# Patient Record
Sex: FEMALE | Race: WHITE | NOT HISPANIC OR LATINO | Employment: OTHER | URBAN - METROPOLITAN AREA
[De-identification: names, ages, dates, MRNs, and addresses within clinical notes are randomized per-mention and may not be internally consistent; named-entity substitution may affect disease eponyms.]

---

## 2017-01-01 ENCOUNTER — NURSE TRIAGE (OUTPATIENT)
Dept: ADMINISTRATIVE | Facility: CLINIC | Age: 77
End: 2017-01-01

## 2017-01-02 ENCOUNTER — TELEPHONE (OUTPATIENT)
Dept: INTERNAL MEDICINE | Facility: CLINIC | Age: 77
End: 2017-01-02

## 2017-01-03 ENCOUNTER — OFFICE VISIT (OUTPATIENT)
Dept: INTERNAL MEDICINE | Facility: CLINIC | Age: 77
End: 2017-01-03
Payer: MEDICARE

## 2017-01-03 VITALS
BODY MASS INDEX: 20.25 KG/M2 | WEIGHT: 129 LBS | HEART RATE: 58 BPM | SYSTOLIC BLOOD PRESSURE: 142 MMHG | HEIGHT: 67 IN | DIASTOLIC BLOOD PRESSURE: 86 MMHG

## 2017-01-03 DIAGNOSIS — B02.9 HERPES ZOSTER WITHOUT COMPLICATION: Primary | ICD-10-CM

## 2017-01-03 DIAGNOSIS — B02.29 POST HERPETIC NEURALGIA: ICD-10-CM

## 2017-01-03 DIAGNOSIS — I10 ESSENTIAL HYPERTENSION: ICD-10-CM

## 2017-01-03 PROCEDURE — 99213 OFFICE O/P EST LOW 20 MIN: CPT | Mod: PBBFAC | Performed by: INTERNAL MEDICINE

## 2017-01-03 PROCEDURE — 99214 OFFICE O/P EST MOD 30 MIN: CPT | Mod: S$PBB,,, | Performed by: INTERNAL MEDICINE

## 2017-01-03 PROCEDURE — 99999 PR PBB SHADOW E&M-EST. PATIENT-LVL III: CPT | Mod: PBBFAC,,, | Performed by: INTERNAL MEDICINE

## 2017-01-03 NOTE — MR AVS SNAPSHOT
[facility] - Internal Medicine  [address]  [facility] [address]  Phone: [phone]  Fax: [phone]                  Shima Sorto   1/3/2017 1:00 PM   Office Visit    Description:  Female : 1940   Provider:  Zeynep Tmoas MD   Department:  [facility] - Internal Medicine           Reason for Visit     Follow-up           Diagnoses this Visit        Comments    Herpes zoster without complication    -  Primary     Post herpetic neuralgia         Elevated blood pressure         Essential hypertension                To Do List           Goals (5 Years of Data)     None      [facility]     [facility] -  Assistance  Registered nurses in the [facility] provide clinical advisement, health education, appointment booking, and other advisory services.  Call for this free service at [phone].             Medications           Message regarding Medications     Verify the changes and/or additions to your medication regime listed below are the same as discussed with your clinician today.  If any of these changes or additions are incorrect, please notify your healthcare provider.             Verify that the below list of medications is an accurate representation of the medications you are currently taking.  If none reported, the list may be blank. If incorrect, please contact your healthcare provider. Carry this list with you in case of emergency.           Current Medications     alendronate (FOSAMAX) 70 MG tablet 1 tablet on  with a full glass of water and wait 30 minutes for breakfast and pills. Take sitting or standing    atenolol (TENORMIN) 25 MG tablet Take one per day    B COMPLEX & C NO.10/FOLIC ACID (B COMPLEX WITH C#10-FOLIC ACID ORAL) Take by mouth.    calcium citrate (CALCITRATE) 200 mg (950 mg) tablet Take 1 tablet by mouth once daily.    co-enzyme Q-10 50 mg capsule Take 50 mg by mouth once daily.    fish oil-vit E-fat  "acid5-hb137 (SUPER OMEGA-3) 400-5 mg-unit Cap Take 1 capsule by mouth Daily.    lisinopril 10 MG tablet Take 1 tablet (10 mg total) by mouth every morning.    MULTIVITAMIN ORAL Take 1 tablet by mouth Daily.    valacyclovir (VALTREX) 1000 MG tablet Take 1 tablet (1,000 mg total) by mouth 3 (three) times daily.           Clinical Reference Information           Vital Signs - Last Recorded  Most recent update: 1/3/2017  1:56 PM by Zeynep Tomas MD    BP Pulse Ht Wt BMI    (!) 142/86 (!) 58 5' 7" (1.702 m) 58.5 kg (128 lb 15.5 oz) 20.2 kg/m2      Blood Pressure          Most Recent Value    BP  (!)  142/86      Allergies as of 1/3/2017     Asparagus      Immunizations Administered on Date of Encounter - 1/3/2017     None      "

## 2017-01-03 NOTE — PROGRESS NOTES
Subjective:       Patient ID: Shima Sorto is a 76 y.o. female.    Chief Complaint: Follow-up (shingles)    HPI   Symptoms of shingles on 12/23, but patient did not take treatment until after 12/30. She has had symptoms of a viral disease and elevated blood pressure lately.  Review of Systems   Constitutional: Negative for activity change, chills, fever and unexpected weight change.   Respiratory: Negative for cough, chest tightness, shortness of breath and wheezing.    Cardiovascular: Negative for chest pain, palpitations and leg swelling.       Objective:      Physical Exam    No visual symptoms  Lesions are not draining and above the left eye  Sclera is not injected  Assessment:       1. Herpes zoster without complication    2. Post herpetic neuralgia    3. Elevated blood pressure    4. Essential hypertension        Plan:     Letter to [facility]  Shima was seen today for follow-up.    Diagnoses and all orders for this visit:    Herpes zoster without complication: complete valtrex    Post herpetic neuralgia: at this time does not want a medication    Elevated blood pressure: improving, monitor and continue    Essential hypertension: continue lisinopril      No Follow-up on file.    New Prescriptions    No medications on file       Modified Medications    No medications on file       No orders of the defined types were placed in this encounter.      Labs, studies and consults associated with this visit were reviewed

## 2017-01-03 NOTE — LETTER
January 3, 2017    Shima Sorto  [address] [place] [address]             [facility] - Internal Medicine  [address]  [place] [address]  Phone: [phone]  Fax: [phone] Dear Ms. Shima Sorto:    I am writing this letter as your primary care physician and having knowledge of a recent illness advising that for the medical safety of the public you not fly on January 2,2017. You were diagnosed with shingles in an exposed area that had not been treated long enough to say that it was not infectious.    If you have any questions or concerns, please don't hesitate to call.    Sincerely,          Zeynep Tomas MD

## 2017-01-17 ENCOUNTER — HOSPITAL ENCOUNTER (OUTPATIENT)
Facility: HOSPITAL | Age: 77
Discharge: HOME | End: 2017-01-19
Attending: EMERGENCY MEDICINE | Admitting: INTERNAL MEDICINE
Payer: MEDICARE

## 2017-01-17 DIAGNOSIS — R42 DIZZINESS: Primary | ICD-10-CM

## 2017-01-17 DIAGNOSIS — R53.83 FATIGUE, UNSPECIFIED TYPE: ICD-10-CM

## 2017-01-17 DIAGNOSIS — R42 VERTIGO: ICD-10-CM

## 2017-01-17 DIAGNOSIS — R11.0 NAUSEA: ICD-10-CM

## 2017-01-17 DIAGNOSIS — R53.83 FATIGUE: ICD-10-CM

## 2017-01-17 LAB
ALBUMIN SERPL BCP-MCNC: 3.3 G/DL
ALP SERPL-CCNC: 45 U/L
ALT SERPL W/O P-5'-P-CCNC: 19 U/L
ANION GAP SERPL CALC-SCNC: 4 MMOL/L
AST SERPL-CCNC: 30 U/L
BACTERIA #/AREA URNS AUTO: ABNORMAL /HPF
BASOPHILS # BLD AUTO: 0.02 K/UL
BASOPHILS NFR BLD: 0.5 %
BILIRUB SERPL-MCNC: 0.6 MG/DL
BILIRUB UR QL STRIP: NEGATIVE
BNP SERPL-MCNC: 138 PG/ML
BUN SERPL-MCNC: 24 MG/DL
CALCIUM SERPL-MCNC: 8.6 MG/DL
CHLORIDE SERPL-SCNC: 104 MMOL/L
CLARITY UR REFRACT.AUTO: CLEAR
CO2 SERPL-SCNC: 30 MMOL/L
COLOR UR AUTO: YELLOW
CREAT SERPL-MCNC: 1 MG/DL
DIFFERENTIAL METHOD: ABNORMAL
EOSINOPHIL # BLD AUTO: 0 K/UL
EOSINOPHIL NFR BLD: 0.5 %
ERYTHROCYTE [DISTWIDTH] IN BLOOD BY AUTOMATED COUNT: 13.5 %
EST. GFR  (AFRICAN AMERICAN): >60 ML/MIN/1.73 M^2
EST. GFR  (NON AFRICAN AMERICAN): 54.9 ML/MIN/1.73 M^2
GLUCOSE SERPL-MCNC: 91 MG/DL
GLUCOSE UR QL STRIP: NEGATIVE
HCT VFR BLD AUTO: 33.6 %
HGB BLD-MCNC: 11.2 G/DL
HGB UR QL STRIP: ABNORMAL
INR PPP: 1
KETONES UR QL STRIP: NEGATIVE
LEUKOCYTE ESTERASE UR QL STRIP: NEGATIVE
LYMPHOCYTES # BLD AUTO: 1.5 K/UL
LYMPHOCYTES NFR BLD: 38.1 %
MCH RBC QN AUTO: 30.9 PG
MCHC RBC AUTO-ENTMCNC: 33.3 %
MCV RBC AUTO: 93 FL
MICROSCOPIC COMMENT: ABNORMAL
MONOCYTES # BLD AUTO: 0.3 K/UL
MONOCYTES NFR BLD: 8 %
NEUTROPHILS # BLD AUTO: 2.1 K/UL
NEUTROPHILS NFR BLD: 52.9 %
NITRITE UR QL STRIP: NEGATIVE
PH UR STRIP: 8 [PH] (ref 5–8)
PLATELET # BLD AUTO: 143 K/UL
PMV BLD AUTO: 11.1 FL
POTASSIUM SERPL-SCNC: 4.5 MMOL/L
PROT SERPL-MCNC: 6.4 G/DL
PROT UR QL STRIP: NEGATIVE
PROTHROMBIN TIME: 10.8 SEC
RBC # BLD AUTO: 3.62 M/UL
RBC #/AREA URNS AUTO: 20 /HPF (ref 0–4)
SODIUM SERPL-SCNC: 138 MMOL/L
SP GR UR STRIP: 1 (ref 1–1.03)
TROPONIN I SERPL DL<=0.01 NG/ML-MCNC: 0.01 NG/ML
TROPONIN I SERPL DL<=0.01 NG/ML-MCNC: <0.006 NG/ML
TSH SERPL DL<=0.005 MIU/L-ACNC: 3.6 UIU/ML
URN SPEC COLLECT METH UR: ABNORMAL
UROBILINOGEN UR STRIP-ACNC: NEGATIVE EU/DL
WBC # BLD AUTO: 3.99 K/UL
WBC #/AREA URNS AUTO: 1 /HPF (ref 0–5)

## 2017-01-17 PROCEDURE — 83880 ASSAY OF NATRIURETIC PEPTIDE: CPT

## 2017-01-17 PROCEDURE — 25000003 PHARM REV CODE 250: Performed by: PHYSICIAN ASSISTANT

## 2017-01-17 PROCEDURE — 63600175 PHARM REV CODE 636 W HCPCS: Performed by: PHYSICIAN ASSISTANT

## 2017-01-17 PROCEDURE — 85025 COMPLETE CBC W/AUTO DIFF WBC: CPT

## 2017-01-17 PROCEDURE — 25000003 PHARM REV CODE 250: Performed by: EMERGENCY MEDICINE

## 2017-01-17 PROCEDURE — 85610 PROTHROMBIN TIME: CPT

## 2017-01-17 PROCEDURE — 81001 URINALYSIS AUTO W/SCOPE: CPT

## 2017-01-17 PROCEDURE — 99285 EMERGENCY DEPT VISIT HI MDM: CPT | Mod: ,,, | Performed by: EMERGENCY MEDICINE

## 2017-01-17 PROCEDURE — G0378 HOSPITAL OBSERVATION PER HR: HCPCS

## 2017-01-17 PROCEDURE — 96361 HYDRATE IV INFUSION ADD-ON: CPT

## 2017-01-17 PROCEDURE — 93010 ELECTROCARDIOGRAM REPORT: CPT | Mod: ,,, | Performed by: INTERNAL MEDICINE

## 2017-01-17 PROCEDURE — 84484 ASSAY OF TROPONIN QUANT: CPT | Mod: 91

## 2017-01-17 PROCEDURE — 96374 THER/PROPH/DIAG INJ IV PUSH: CPT

## 2017-01-17 PROCEDURE — 96376 TX/PRO/DX INJ SAME DRUG ADON: CPT

## 2017-01-17 PROCEDURE — 84443 ASSAY THYROID STIM HORMONE: CPT

## 2017-01-17 PROCEDURE — 63600175 PHARM REV CODE 636 W HCPCS: Performed by: STUDENT IN AN ORGANIZED HEALTH CARE EDUCATION/TRAINING PROGRAM

## 2017-01-17 PROCEDURE — 80053 COMPREHEN METABOLIC PANEL: CPT

## 2017-01-17 PROCEDURE — 99220 PR INITIAL OBSERVATION CARE,LEVL III: CPT | Mod: ,,, | Performed by: PHYSICIAN ASSISTANT

## 2017-01-17 PROCEDURE — 99285 EMERGENCY DEPT VISIT HI MDM: CPT | Mod: 25

## 2017-01-17 RX ORDER — SODIUM CHLORIDE 0.9 % (FLUSH) 0.9 %
3 SYRINGE (ML) INJECTION EVERY 8 HOURS
Status: DISCONTINUED | OUTPATIENT
Start: 2017-01-17 | End: 2017-01-19 | Stop reason: HOSPADM

## 2017-01-17 RX ORDER — DIAZEPAM 10 MG/2ML
2 INJECTION INTRAMUSCULAR ONCE
Status: COMPLETED | OUTPATIENT
Start: 2017-01-17 | End: 2017-01-17

## 2017-01-17 RX ORDER — ONDANSETRON 2 MG/ML
4 INJECTION INTRAMUSCULAR; INTRAVENOUS
Status: COMPLETED | OUTPATIENT
Start: 2017-01-17 | End: 2017-01-17

## 2017-01-17 RX ORDER — MECLIZINE HCL 12.5 MG 12.5 MG/1
25 TABLET ORAL 3 TIMES DAILY PRN
Status: DISCONTINUED | OUTPATIENT
Start: 2017-01-17 | End: 2017-01-19 | Stop reason: HOSPADM

## 2017-01-17 RX ORDER — LISINOPRIL 10 MG/1
10 TABLET ORAL EVERY MORNING
Status: DISCONTINUED | OUTPATIENT
Start: 2017-01-18 | End: 2017-01-19 | Stop reason: HOSPADM

## 2017-01-17 RX ORDER — RAMELTEON 8 MG/1
8 TABLET ORAL NIGHTLY PRN
Status: DISCONTINUED | OUTPATIENT
Start: 2017-01-17 | End: 2017-01-19 | Stop reason: HOSPADM

## 2017-01-17 RX ORDER — HYDROCODONE BITARTRATE AND ACETAMINOPHEN 5; 325 MG/1; MG/1
1 TABLET ORAL EVERY 4 HOURS PRN
Status: DISCONTINUED | OUTPATIENT
Start: 2017-01-17 | End: 2017-01-19 | Stop reason: HOSPADM

## 2017-01-17 RX ORDER — ACETAMINOPHEN 325 MG/1
650 TABLET ORAL EVERY 4 HOURS PRN
Status: DISCONTINUED | OUTPATIENT
Start: 2017-01-17 | End: 2017-01-19 | Stop reason: HOSPADM

## 2017-01-17 RX ORDER — ONDANSETRON 2 MG/ML
4 INJECTION INTRAMUSCULAR; INTRAVENOUS EVERY 12 HOURS PRN
Status: DISCONTINUED | OUTPATIENT
Start: 2017-01-17 | End: 2017-01-18

## 2017-01-17 RX ORDER — METHYLPREDNISOLONE SOD SUCC 125 MG
100 VIAL (EA) INJECTION DAILY
Status: DISCONTINUED | OUTPATIENT
Start: 2017-01-17 | End: 2017-01-18

## 2017-01-17 RX ORDER — ATENOLOL 25 MG/1
25 TABLET ORAL DAILY
Status: DISCONTINUED | OUTPATIENT
Start: 2017-01-18 | End: 2017-01-19 | Stop reason: HOSPADM

## 2017-01-17 RX ORDER — ONDANSETRON 8 MG/1
8 TABLET, ORALLY DISINTEGRATING ORAL EVERY 8 HOURS PRN
Status: DISCONTINUED | OUTPATIENT
Start: 2017-01-17 | End: 2017-01-17

## 2017-01-17 RX ORDER — DIAZEPAM 10 MG/2ML
2 INJECTION INTRAMUSCULAR EVERY 4 HOURS PRN
Status: DISCONTINUED | OUTPATIENT
Start: 2017-01-17 | End: 2017-01-18

## 2017-01-17 RX ORDER — SODIUM CHLORIDE 9 MG/ML
INJECTION, SOLUTION INTRAVENOUS CONTINUOUS
Status: DISCONTINUED | OUTPATIENT
Start: 2017-01-17 | End: 2017-01-18

## 2017-01-17 RX ORDER — ONDANSETRON 2 MG/ML
4 INJECTION INTRAMUSCULAR; INTRAVENOUS EVERY 12 HOURS PRN
Status: DISCONTINUED | OUTPATIENT
Start: 2017-01-17 | End: 2017-01-17

## 2017-01-17 RX ADMIN — METHYLPREDNISOLONE SODIUM SUCCINATE 100 MG: 125 INJECTION, POWDER, FOR SOLUTION INTRAMUSCULAR; INTRAVENOUS at 06:01

## 2017-01-17 RX ADMIN — MECLIZINE 25 MG: 12.5 TABLET ORAL at 12:01

## 2017-01-17 RX ADMIN — ONDANSETRON 4 MG: 2 INJECTION INTRAMUSCULAR; INTRAVENOUS at 02:01

## 2017-01-17 RX ADMIN — SODIUM CHLORIDE 500 ML: 0.9 INJECTION, SOLUTION INTRAVENOUS at 10:01

## 2017-01-17 RX ADMIN — SODIUM CHLORIDE: 0.9 INJECTION, SOLUTION INTRAVENOUS at 05:01

## 2017-01-17 RX ADMIN — PROMETHAZINE HYDROCHLORIDE 6.25 MG: 25 INJECTION INTRAMUSCULAR; INTRAVENOUS at 06:01

## 2017-01-17 RX ADMIN — DIAZEPAM 2 MG: 5 INJECTION, SOLUTION INTRAMUSCULAR; INTRAVENOUS at 06:01

## 2017-01-17 RX ADMIN — Medication 3 ML: at 02:01

## 2017-01-17 RX ADMIN — ONDANSETRON 4 MG: 2 INJECTION INTRAMUSCULAR; INTRAVENOUS at 10:01

## 2017-01-17 RX ADMIN — Medication 3 ML: at 10:01

## 2017-01-17 NOTE — ED PROVIDER NOTES
Encounter Date: 1/17/2017       History     Chief Complaint   Patient presents with    Fatigue     became dizzy nauseated, weak,      Review of patient's allergies indicates:   Allergen Reactions    Asparagus      Other reaction(s): Rash     HPI Comments: Ms. Sorto is a 75 yo F PMHx MVP, anemia, and HTN, who presents with vertigo. Patient states that she has had 3 second episodes of vertigo for the past 2 weeks, not associated with any particular activity. This morning she was at the coffee shop when she felt the room spinning around her, had diaphoresis and severe nausea, and she had to hold onto the counter. Bystanders helped her to a chair and her friends brought her into the ED. She currently feels queasy, but does not have vertigo. She denies any recent illness, decreased PO intake, change in medication or diet. She does feel dizzy sometimes when she goes from supine to sitting and sitting to standing. She has a history of mitral valve prolapse, but denies any symptoms or other cardiac history. She states her pulse has always been in the 50s and she has taken atenolol and lisinopril for blood pressure for over 10 years. Patient has had 2 loose BMs today, but does not have any abdominal pain or recent history of diarrhea. Friend at bedside states that patient is currently under a lot of stress. Denies fever, chills, abdominal pain, chest pain, SOB, headaches, loss of consciousness, changes in vision.     The history is provided by the patient.     Past Medical History   Diagnosis Date    Allergy      seasonal    Anemia     Basal cell carcinoma 7/2013     forehead    Hx of colonic polyps     Hypertension     Medullary sponge kidney     MVP (mitral valve prolapse)     Osteoporosis, senile     Renal calculi     TMJ syndrome      sometimes jaw clicking/jaw pain    Urinary retention     Visual impairment      reading glasses     No past medical history pertinent negatives.  Past Surgical History    Procedure Laterality Date    Kidney stone surgery  2000     @ [facility]  Mohs procedure      Interstim placed stage 1       Family History   Problem Relation Age of Onset    Kidney disease Mother     Heart disease Father     Breast cancer Maternal Aunt     Anesthesia problems Neg Hx     Malignant hypertension Neg Hx     Hypotension Neg Hx     Malignant hyperthermia Neg Hx     Pseudochol deficiency Neg Hx     Colon cancer Neg Hx     Ovarian cancer Neg Hx     Melanoma Neg Hx     Psoriasis Neg Hx     Lupus Neg Hx     Eczema Neg Hx     Acne Neg Hx      Social History   Substance Use Topics    Smoking status: Former Smoker     Packs/day: 0.50     Years: 8.00     Quit date: 8/22/1964    Smokeless tobacco: Never Used    Alcohol use 1.2 oz/week     2 Glasses of wine per week      Comment: daily     Review of Systems   Constitutional: Negative for chills, fatigue and fever.   HENT: Negative for congestion and hearing loss.    Eyes: Negative for visual disturbance.   Respiratory: Negative for cough and shortness of breath.    Cardiovascular: Negative for chest pain, palpitations and leg swelling.   Gastrointestinal: Positive for nausea. Negative for abdominal pain, diarrhea and vomiting.   Endocrine: Negative.    Genitourinary: Negative for difficulty urinating and dysuria.   Musculoskeletal: Negative.    Skin: Negative.    Neurological: Positive for dizziness. Negative for syncope and headaches.   Psychiatric/Behavioral: Negative.        Physical Exam   Initial Vitals   BP Pulse Resp Temp SpO2   01/17/17 0920 01/17/17 0920 01/17/17 0920 01/17/17 0920 01/17/17 0920   162/72 14 18 97.4 °F (36.3 °C) 97 %     Physical Exam    Constitutional:   Thin female in no acute distress    HENT:   Head: Normocephalic and atraumatic.   Mouth/Throat: Oropharynx is clear and moist.   Eyes: Conjunctivae are normal.   Neck: Normal range of motion.   Cardiovascular:   Bradycardic at 48 bpm, S1/S2 with no m/r/g    Pulmonary/Chest: Breath sounds normal. She has no wheezes. She has no rales.   Abdominal: Soft. Bowel sounds are normal. She exhibits no distension. There is no tenderness.   Musculoskeletal: Normal range of motion. She exhibits no edema or tenderness.   Neurological: She is alert and oriented to person, place, and time. She has normal strength. No cranial nerve deficit.   Negative Bernie-Hallpike maneuver   Skin: Skin is warm and dry.   Psychiatric: She has a normal mood and affect.         ED Course   Procedures  Labs Reviewed   CBC W/ AUTO DIFFERENTIAL - Abnormal; Notable for the following:        Result Value    RBC 3.62 (*)     Hemoglobin 11.2 (*)     Hematocrit 33.6 (*)     Platelets 143 (*)     All other components within normal limits    Narrative:     PLEASE REVIEW ORDER START TIME BEFORE MARKING SPECIMEN  COLLECTED.   COMPREHENSIVE METABOLIC PANEL - Abnormal; Notable for the following:     CO2 30 (*)     BUN, Bld 24 (*)     Calcium 8.6 (*)     Albumin 3.3 (*)     Alkaline Phosphatase 45 (*)     Anion Gap 4 (*)     eGFR if non  54.9 (*)     All other components within normal limits    Narrative:     PLEASE REVIEW ORDER START TIME BEFORE MARKING SPECIMEN  COLLECTED.   B-TYPE NATRIURETIC PEPTIDE - Abnormal; Notable for the following:      (*)     All other components within normal limits    Narrative:     PLEASE REVIEW ORDER START TIME BEFORE MARKING SPECIMEN  COLLECTED.   PROTIME-INR    Narrative:     PLEASE REVIEW ORDER START TIME BEFORE MARKING SPECIMEN  COLLECTED.   TROPONIN I    Narrative:     PLEASE REVIEW ORDER START TIME BEFORE MARKING SPECIMEN  COLLECTED.   TSH   TROPONIN I   TSH    Narrative:     PLEASE REVIEW ORDER START TIME BEFORE MARKING SPECIMEN  COLLECTED.  ADD ON PER DR MAR, TSH, ORDER #[id number]   11:34  01/17/2017      EKG Readings: (Independently Interpreted)   Initial Reading: No STEMI. Rhythm: Sinus Bradycardia.          Medical Decision Making:   History:   Old  Medical Records: I decided to obtain old medical records.  Initial Assessment:   Ms. Sorto is a 77 yo F PMHx MVP, anemia, and HTN, who presents with vertigo. Differentials include bradycardia, BPPV, ACS.   Clinical Tests:   Lab Tests: Ordered  Medical Tests: Ordered  ED Management:  R/o cardiac cause with troponin and BNP. CBC, CMP, TSH, CXR ordered. Zofran and 500cc NS ordered.   Other:   I have discussed this case with another health care provider.       APC / Resident Notes:   11:34 AM Patient's CXR and labs reviewed, no acute findings. Patient completed 500 cc bolus with improvement in dizziness and nausea, but still had one episode of vertigo en route to CXR. Patient's vital signs were stable while in room, HR 61, /72, RR 14. Patient will be admitted to observation. Patient in agreement with this plan. CT head without contrast ordered.               ED Course     Clinical Impression:   The primary encounter diagnosis was Dizziness. Diagnoses of Nausea, Fatigue, unspecified type, and Vertigo were also pertinent to this visit.        ATTENDING PHYSICIAN ATTESTATION  I have repeated the key portions of the resident's history and physical, reviewed and agree with the resident medical documentation, and supervised and managed the medical care of the patient.  Additionally, I was present for the critical portion of any procedure(s) performed.    Neuro: awake, alert, oriented, CNs intact, fundi w/o papilledema, motor, sensory, and coordination intact in 4 extremities      Kings Rico MD, RUMA, FACEP  Department of Emergency Medicine    All systems were reviewed and were negative except as noted in the HPI.    Medical Decision Making:    I reviewed and interpreted the ECG and any monitoring strips.  I reviewed radiology personally along with interpretations.  I reviewed and interpreted the laboratory results.    Kings Rico MD, RUMA, CPE, FACEP  Department of Emergency Medicine  , [facility]  [employer]           Dewayne Rico MD  01/18/17 0638

## 2017-01-17 NOTE — ASSESSMENT & PLAN NOTE
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 Recent shingles outbreak affecting face, delayed treatment. Denies tinnitus, fullness, hearing loss, endorses some R ear pain. CT negative, no acute neurologic deficits.  - Start 22 day steroid taper now, 100 mg daily (will do IV as patient has poor PO tolerance at this point). Literature review does not recommend antiviral therapy  - Patient given antivert in ED without much help (but may have vomited soon after administration), will give Diazepam 2 mg IV now, this can be repeated or titrated up depending on response  - PT/OT for vestibular therapy  - ENT outpatient follow up, consider in house consult based on response  - Neurochecks q4h

## 2017-01-17 NOTE — ED NOTES
Pt resting in bed comfortably; blood drawn for lab; no needs voiced at this time; call light in reach instructed to use if needed; pt verbalized understanding; will continue to monitor for any changes

## 2017-01-17 NOTE — SUBJECTIVE & OBJECTIVE
Past Medical History   Diagnosis Date    Allergy      seasonal    Anemia     Basal cell carcinoma 7/2013     forehead    Hx of colonic polyps     Hypertension     Medullary sponge kidney     MVP (mitral valve prolapse)     Osteoporosis, senile     Renal calculi     TMJ syndrome      sometimes jaw clicking/jaw pain    Urinary retention     Visual impairment      reading glasses       Past Surgical History   Procedure Laterality Date    Kid[facility] stone surgery  2000     @ [facility]    Mohs procedure      Interstim placed stage 1         Review of patient's allergies indicates:   Allergen Reactions    Asparagus      Other reaction(s): Rash       No current facility-administered medications on file prior to encounter.      Current Outpatient Prescriptions on File Prior to Encounter   Medication Sig    alendronate (FOSAMAX) 70 MG tablet 1 tablet on Sunday with a full glass of water and wait 30 minutes for breakfast and pills. Take sitting or standing    atenolol (TENORMIN) 25 MG tablet Take one per day    B COMPLEX & C NO.10/FOLIC ACID (B COMPLEX WITH C#10-FOLIC ACID ORAL) Take by mouth.    calcium citrate (CALCITRATE) 200 mg (950 mg) tablet Take 1 tablet by mouth once daily.    co-enzyme Q-10 50 mg capsule Take 50 mg by mouth once daily.    fish oil-vit E-fat acid5-hb137 (SUPER OMEGA-3) 400-5 mg-unit Cap Take 1 capsule by mouth Daily.    lisinopril 10 MG tablet Take 1 tablet (10 mg total) by mouth every morning.    MULTIVITAMIN ORAL Take 1 tablet by mouth Daily.    valacyclovir (VALTREX) 1000 MG tablet Take 1 tablet (1,000 mg total) by mouth 3 (three) times daily.     Family History     Problem Relation (Age of Onset)    Breast cancer Maternal Aunt    Heart disease Father    Kidney disease Mother        Social History Main Topics    Smoking status: Former Smoker     Packs/day: 0.50     Years: 8.00     Quit date: 8/22/1964    Smokeless tobacco: Never Used    Alcohol use 1.2 oz/week     2 Glasses of  wine per week      Comment: daily    Drug use: No    Sexual activity: Not Currently     Review of Systems   Constitutional: Negative for activity change, chills, fatigue and fever.   HENT: Positive for ear pain (R) and postnasal drip. Negative for ear discharge, hearing loss, sore throat and tinnitus.    Eyes: Negative for photophobia, pain, discharge, redness and visual disturbance.   Respiratory: Negative for apnea, cough, shortness of breath and wheezing.    Cardiovascular: Negative for chest pain, palpitations and leg swelling.   Gastrointestinal: Positive for nausea and vomiting. Negative for abdominal distention, abdominal pain, blood in stool, constipation and diarrhea.   Endocrine: Negative for cold intolerance and heat intolerance.   Genitourinary: Negative for decreased urine volume, difficulty urinating, dysuria, flank pain, hematuria and vaginal bleeding.   Musculoskeletal: Negative for arthralgias, gait problem, myalgias and neck stiffness.   Skin: Positive for rash (1 resolving facial lesion). Negative for wound.   Neurological: Positive for dizziness. Negative for tremors, syncope, facial asymmetry, speech difficulty, weakness, light-headedness, numbness and headaches.   Hematological: Negative for adenopathy. Does not bruise/bleed easily.   Psychiatric/Behavioral: Negative for agitation, confusion and sleep disturbance. The patient is not nervous/anxious.      Objective:     Vital Signs (Most Recent):  Temp: 97.4 °F (36.3 °C) (01/17/17 0920)  Pulse: (!) 54 (01/17/17 1605)  Resp: 14 (01/17/17 1605)  BP: (!) 161/74 (01/17/17 1605)  SpO2: 98 % (01/17/17 1605) Vital Signs (24h Range):  Temp:  [97.4 °F (36.3 °C)] 97.4 °F (36.3 °C)  Pulse:  [14-72] 54  Resp:  [14-43] 14  SpO2:  [95 %-100 %] 98 %  BP: (157-193)/() 161/74     Weight: 56.2 kg (124 lb)  Body mass index is 19.42 kg/(m^2).    Physical Exam   Constitutional: She is oriented to person, place, and time. She appears well-developed and  "well-nourished. She is cooperative. She appears distressed.   Eyes closed, actively vomiting with movement   HENT:   Head: Normocephalic and atraumatic.   Right Ear: Hearing, external ear and ear canal normal.   Left Ear: Hearing, external ear and ear canal normal.   Nose: Nose normal.   Mouth/Throat: Uvula is midline and oropharynx is clear and moist.   Cerumen in canal blocking TM   Eyes: Conjunctivae and lids are normal. Pupils are equal, round, and reactive to light. Right conjunctiva is not injected. Left conjunctiva is not injected. No scleral icterus. Right eye exhibits nystagmus (when looking Left). Left eye exhibits nystagmus.   Neck: Normal range of motion, full passive range of motion without pain and phonation normal. Neck supple.   Cardiovascular: Normal rate, regular rhythm and intact distal pulses.    Murmur heard.  Pulmonary/Chest: Effort normal and breath sounds normal. She has no wheezes. She has no rales.   Abdominal: Soft. Bowel sounds are normal. There is no hepatosplenomegaly. There is tenderness ("Soreness").   Musculoskeletal: Normal range of motion.        Right shoulder: Normal.        Right elbow: Normal.       Right wrist: Normal.   Lymphadenopathy:     She has no cervical adenopathy.   Neurological: She is alert and oriented to person, place, and time. No cranial nerve deficit. GCS eye subscore is 4. GCS verbal subscore is 5. GCS motor subscore is 6.   5/5 strength bilaterally without focal neurology deficits   Skin: Skin is warm, dry and intact.   No vesicular lesions on face or trunk   Psychiatric: She has a normal mood and affect. Her speech is normal and behavior is normal. Judgment and thought content normal. Cognition and memory are normal.   Nursing note and vitals reviewed.       Significant Labs:   CBC:   Recent Labs  Lab 01/17/17  1019   WBC 3.99   HGB 11.2*   HCT 33.6*   *     CMP:   Recent Labs  Lab 01/17/17  1019      K 4.5      CO2 30*   GLU 91   BUN 24* "   CREATININE 1.0   CALCIUM 8.6*   PROT 6.4   ALBUMIN 3.3*   BILITOT 0.6   ALKPHOS 45*   AST 30   ALT 19   ANIONGAP 4*   EGFRNONAA 54.9*     Urine Studies:   Recent Labs  Lab 01/17/17  1513   COLORU Yellow   APPEARANCEUA Clear   PHUR 8.0   SPECGRAV 1.005   PROTEINUA Negative   GLUCUA Negative   KETONESU Negative   BILIRUBINUA Negative   OCCULTUA 2+*   NITRITE Negative   UROBILINOGEN Negative   LEUKOCYTESUR Negative   RBCUA 20*   WBCUA 1   BACTERIA Occasional     All pertinent labs within the past 24 hours have been reviewed.    Significant Imaging: CT: I have reviewed all pertinent results/findings within the past 24 hours and my personal findings are:  no acute findings  CXR: I have reviewed all pertinent results/findings within the past 24 hours and my personal findings are:  no acute findings

## 2017-01-17 NOTE — IP AVS SNAPSHOT
[facility]  [address]  [facility] [address]  Phone: [phone]           Patient Discharge Instructions     Our goal is to set you up for success. This packet includes information on your condition, medications, and your home care. It will help you to care for yourself so you don't get sicker and need to go back to the hospital.     Please ask your nurse if you have any questions.        There are many details to remember when preparing to leave the hospital. Here is what you will need to do:    1. Take your medicine. If you are prescribed medications, review your Medication List in the following pages. You may have new medications to  at the pharmacy and others that you'll need to stop taking. Review the instructions for how and when to take your medications. Talk with your doctor or nurses if you are unsure of what to do.     2. Go to your follow-up appointments. Specific follow-up information is listed in the following pages. Your may be contacted by a transition nurse or clinical provider about future appointments. Be sure we have all of the phone numbers to reach you, if needed. Please contact your provider's office if you are unable to make an appointment.     3. Watch for warning signs. Your doctor or nurse will give you detailed warning signs to watch for and when to call for assistance. These instructions may also include educational information about your condition. If you experience any of warning signs to your health, call your doctor.               [facility]  Unless otherwise directed by your provider, please contact [facility], our nurse care line that is available for 24/7 assistance.     [phone] (toll-free)    Registered nurses in the [facility] provide clinical advisement, health education, appointment booking, and other advisory services.                    ** Verify the list of medication(s) below is accurate and up  to date. Carry this with you in case of emergency. If your medications have changed, please notify your healthcare provider.             Medication List      START taking these medications        Additional Info                      diazePAM 5 MG tablet   Commonly known as:  VALIUM   Quantity:  30 tablet   Refills:  0   Dose:  5 mg    Last time this was given:  5 mg on 1/19/2017  4:34 AM   Instructions:  Take 1 tablet (5 mg total) by mouth every 6 (six) hours as needed (dizziness).     Begin Date    AM    Noon    PM    Bedtime       meclizine 25 mg tablet   Commonly known as:  ANTIVERT   Quantity:  90 tablet   Refills:  0   Dose:  25 mg    Last time this was given:  25 mg on 1/17/2017 12:35 PM   Instructions:  Take 1 tablet (25 mg total) by mouth 3 (three) times daily as needed for Dizziness.     Begin Date    AM    Noon    PM    Bedtime       methylPREDNISolone 16 MG Tab   Commonly known as:  MEDROL   Quantity:  40 tablet   Refills:  0    Instructions:  Take as directed in taper instructions     Begin Date    AM    Noon    PM    Bedtime       ondansetron 8 MG Tbdl   Commonly known as:  ZOFRAN-ODT   Quantity:  30 tablet   Refills:  0   Dose:  8 mg    Last time this was given:  8 mg on 1/18/2017  8:41 PM   Instructions:  Take 1 tablet (8 mg total) by mouth every 6 (six) hours as needed (nausea).     Begin Date    AM    Noon    PM    Bedtime         CONTINUE taking these medications        Additional Info                      alendronate 70 MG tablet   Commonly known as:  FOSAMAX   Quantity:  4 tablet   Refills:  12    Instructions:  1 tablet on Sunday with a full glass of water and wait 30 minutes for breakfast and pills. Take sitting or standing     Begin Date    AM    Noon    PM    Bedtime       atenolol 25 MG tablet   Commonly known as:  TENORMIN   Quantity:  30 tablet   Refills:  12    Last time this was given:  25 mg on 1/19/2017  9:33 AM   Instructions:  Take one per day     Begin Date    AM    Noon    PM     Bedtime       B COMPLEX WITH C#10-FOLIC ACID ORAL   Refills:  0    Instructions:  Take by mouth.     Begin Date    AM    Noon    PM    Bedtime       calcium citrate 200 mg (950 mg) tablet   Commonly known as:  CALCITRATE   Refills:  0   Dose:  1 tablet    Instructions:  Take 1 tablet by mouth once daily.     Begin Date    AM    Noon    PM    Bedtime       co-enzyme Q-10 50 mg capsule   Refills:  0   Dose:  50 mg    Instructions:  Take 50 mg by mouth once daily.     Begin Date    AM    Noon    PM    Bedtime       lisinopril 10 MG tablet   Quantity:  30 tablet   Refills:  12   Dose:  10 mg    Last time this was given:  10 mg on 1/19/2017  6:10 AM   Instructions:  Take 1 tablet (10 mg total) by mouth every morning.     Begin Date    AM    Noon    PM    Bedtime       MULTIVITAMIN ORAL   Refills:  0   Dose:  1 tablet    Instructions:  Take 1 tablet by mouth Daily.     Begin Date    AM    Noon    PM    Bedtime       SUPER OMEGA-3 400-5 mg-unit Cap   Refills:  0   Dose:  1 capsule   Generic drug:  fish oil-vit E-fat acid5-hb137    Instructions:  Take 1 capsule by mouth Daily.     Begin Date    AM    Noon    PM    Bedtime         STOP taking these medications     valacyclovir 1000 MG tablet   Commonly known as:  VALTREX            Where to Get Your Medications      [facility] [id number] [place] [address] AV AT [place] [id number] [facility] [address]    Hours:  24-hours Phone:  [phone]     meclizine 25 mg tablet    methylPREDNISolone 16 MG Tab    ondansetron 8 MG Tbdl         You can get these medications from any pharmacy     Bring a paper prescription for each of these medications     diazePAM 5 MG tablet                  Please bring to all follow up appointments:    1. A copy of your discharge instructions.  2. All medicines you are currently taking in their original bottles.  3. Identification and insurance card.    Please  arrive 15 minutes ahead of scheduled appointment time.    Please call 24 hours in advance if you must reschedule your appointment and/or time.        Follow-up Information     Follow up with Zeynep Tomas MD.    Specialty:  Internal Medicine    Why:  Message sent to clinic to schedule hospital discharge follow up and clinic will notify patient    Contact information:    [address]  [place] [address]  [phone]          Please follow up.    Why:  Home Health/         Discharge Instructions     Future Orders    Activity as tolerated     Call MD for:  persistent dizziness, light-headedness, or visual disturbances     Diet general     Questions:    Total calories:      Fat restriction, if any:      Protein restriction, if any:      Na restriction, if any:      Fluid restriction:      Additional restrictions:          Discharge Instructions       22 Day Methylprednisolone Taper   Start 01/19  Day 4-6: 80 mg once daily (5 pills x 3d)   Day 7-9: 64 mg once daily (4 pills x 3d)   Day 10-12: 32 mg once daily (2 pills x 3d)   Day 13-15: 16 mg once daily (1 pills x 3d)   Day 16-22: 8 mg once daily (1/2 pill x 7d)     Discharge References/Attachments     DIZZINESS (VERTIGO) AND BALANCE PROBLEMS: ENSURING YOUR SAFETY (ENGLISH)    DIZZINESS (VERTIGO) WITH MEDICATIONS, MANAGING (ENGLISH)    MECLIZINE HYDROCHLORIDE ORAL TABLET (ENGLISH)    DIAZEPAM ORAL TABLET (ENGLISH)    ONDANSETRON ORAL DISINTEGRATING TABLET (ENGLISH)        Primary Diagnosis     Your primary diagnosis was:  Inflammation Of Vestibular Nerve      Admission Information     Date & Time Provider Department [facility]    1/17/2017  9:35 AM Tulio Nieto MD [facility] [id number]      Care Providers     Provider Role Specialty Primary office phone    Tulio Nieto MD Attending Provider Hospitalist [phone]    Darrick Astudillo MD Team Attending  Hospitalist [phone]    Tulio Nieto MD Team Attending  Hospitalist [phone]     "  Your Vitals Were     BP Pulse Temp Resp Height Weight    134/63 (BP Location: Right arm, Patient Position: Lying, BP Method: Automatic) 52 98.1 °F (36.7 °C) (Oral) 18 5' 7" (1.702 m) 56.2 kg (124 lb)    SpO2 BMI             99% 19.42 kg/m2         Recent Lab Values     No lab values to display.      Allergies as of 1/19/2017        Reactions    Asparagus     Other reaction(s): Rash      Advance Directives     An advance directive is a document which, in the event you are no longer able to make decisions for yourself, tells your healthcare team what kind of treatment you do or do not want to receive, or who you would like to make those decisions for you.  If you do not currently have an advance directive, [facility] encourages you to create one.  For more information call:  [phone]), [phone] ([phone]),  or log on to [email or web address].        Smoking Cessation     If you would like to quit smoking:   You may be eligible for free services if you are a [place] resident and started smoking cigarettes before September 1, 1988.  Call the Smoking Cessation Trust ([facility]) toll free at [phone] or [phone].   Call 6-943-QUIT-NOW if you do not meet the above criteria.            Language Assistance Services     ATTENTION: Language assistance services are available, free of charge. Please call [phone].      ATENCIÓN: Si habla español, tiene a norton disposición servicios gratuitos de asistencia lingüística. Llame al [phone].     [facility] Ý: N?u b?n nói Ti?ng Vi?t, có các d?ch v? h? tr? ngôn ng? mi?n phí dành cho b?n. G?i s? [phone].         [facility] complies with applicable Federal civil rights laws and does not discriminate on the basis of race, color, national origin, age, disability, or sex.        "

## 2017-01-17 NOTE — H&P
"[facility] Hospital Medicine  History & Physical    Patient Name: Shima Sorto  MRN: [id number]  Admission Date: 1/17/2017  Attending Physician: Dewayne Rico MD   Primary Care Provider: Zeynep Tomas MD    [facility] Medicine Team: [facility] E José Miguel Santos PA-C     Patient information was obtained from patient, past medical records and ER records.     Subjective:     Principal Problem:Vertigo    Chief Complaint:  "the room is spinning"     HPI: 76F with PMHx of mitral valve prolapse, anemia, HTN, and recent shingles outbreak who presents with vertigo that started abruptly this morning and is associated with nausea and vomiting. Patient describes the room as spinning, she denies ringing in the ears or hearing loss, but does state that she has developed R ear pain since being in the hospital. Her nausea is worse with movement in nay direction, better when lying still and eyes closed. She denies any focal neurologic deficits such as numbness, weakness, headache, or confusion. The patient was seen in this ED on 12/30 and diagnosed with a shingles outbreak affecting her L face/eyebrow (only one active lesion, associated with numbness, tingling). She completed her valtrex on 01/06/2017. Her symptoms related to the shingles is resolved. Chat review reveals that patient had "symptoms of shingles on 12/23, but patient did not take treatment until after 12/30. She has had symptoms of a viral disease". Currently the patient denies fever, chills, abdominal pain, chest pain, SOB, headaches, loss of consciousness, changes in vision.    In ED, CBC showing stable and chronic anemia, CMP showing stable renal function, troponin WNL, BNP slightly elevated at 138 with no history of CHF, TSH WNL, UA clean, CT without acute abnormalities, CXR clear. Patient placed into observation for evaluation of vertigo.       Past Medical History   Diagnosis Date    Allergy      seasonal    Anemia     Basal cell " carcinoma 7/2013     forehead    Hx of colonic polyps     Hypertension     Medullary sponge kidney     MVP (mitral valve prolapse)     Osteoporosis, senile     Renal calculi     TMJ syndrome      sometimes jaw clicking/jaw pain    Urinary retention     Visual impairment      reading glasses       Past Surgical History   Procedure Laterality Date    Kidney stone surgery  2000     @ [facility]  Mohs procedure      Interstim placed stage 1         Review of patient's allergies indicates:   Allergen Reactions    Asparagus      Other reaction(s): Rash       No current facility-administered medications on file prior to encounter.      Current Outpatient Prescriptions on File Prior to Encounter   Medication Sig    alendronate (FOSAMAX) 70 MG tablet 1 tablet on Sunday with a full glass of water and wait 30 minutes for breakfast and pills. Take sitting or standing    atenolol (TENORMIN) 25 MG tablet Take one per day    B COMPLEX & C NO.10/FOLIC ACID (B COMPLEX WITH C#10-FOLIC ACID ORAL) Take by mouth.    calcium citrate (CALCITRATE) 200 mg (950 mg) tablet Take 1 tablet by mouth once daily.    co-enzyme Q-10 50 mg capsule Take 50 mg by mouth once daily.    fish oil-vit E-fat acid5-hb137 (SUPER OMEGA-3) 400-5 mg-unit Cap Take 1 capsule by mouth Daily.    lisinopril 10 MG tablet Take 1 tablet (10 mg total) by mouth every morning.    MULTIVITAMIN ORAL Take 1 tablet by mouth Daily.    valacyclovir (VALTREX) 1000 MG tablet Take 1 tablet (1,000 mg total) by mouth 3 (three) times daily.     Family History     Problem Relation (Age of Onset)    Breast cancer Maternal Aunt    Heart disease Father    Kidney disease Mother        Social History Main Topics    Smoking status: Former Smoker     Packs/day: 0.50     Years: 8.00     Quit date: 8/22/1964    Smokeless tobacco: Never Used    Alcohol use 1.2 oz/week     2 Glasses of wine per week      Comment: daily    Drug use: No    Sexual activity: Not Currently      Review of Systems   Constitutional: Negative for activity change, chills, fatigue and fever.   HENT: Positive for ear pain (R) and postnasal drip. Negative for ear discharge, hearing loss, sore throat and tinnitus.    Eyes: Negative for photophobia, pain, discharge, redness and visual disturbance.   Respiratory: Negative for apnea, cough, shortness of breath and wheezing.    Cardiovascular: Negative for chest pain, palpitations and leg swelling.   Gastrointestinal: Positive for nausea and vomiting. Negative for abdominal distention, abdominal pain, blood in stool, constipation and diarrhea.   Endocrine: Negative for cold intolerance and heat intolerance.   Genitourinary: Negative for decreased urine volume, difficulty urinating, dysuria, flank pain, hematuria and vaginal bleeding.   Musculoskeletal: Negative for arthralgias, gait problem, myalgias and neck stiffness.   Skin: Positive for rash (1 resolving facial lesion). Negative for wound.   Neurological: Positive for dizziness. Negative for tremors, syncope, facial asymmetry, speech difficulty, weakness, light-headedness, numbness and headaches.   Hematological: Negative for adenopathy. Does not bruise/bleed easily.   Psychiatric/Behavioral: Negative for agitation, confusion and sleep disturbance. The patient is not nervous/anxious.      Objective:     Vital Signs (Most Recent):  Temp: 97.4 °F (36.3 °C) (01/17/17 0920)  Pulse: (!) 54 (01/17/17 1605)  Resp: 14 (01/17/17 1605)  BP: (!) 161/74 (01/17/17 1605)  SpO2: 98 % (01/17/17 1605) Vital Signs (24h Range):  Temp:  [97.4 °F (36.3 °C)] 97.4 °F (36.3 °C)  Pulse:  [14-72] 54  Resp:  [14-43] 14  SpO2:  [95 %-100 %] 98 %  BP: (157-193)/() 161/74     Weight: 56.2 kg (124 lb)  Body mass index is 19.42 kg/(m^2).    Physical Exam   Constitutional: She is oriented to person, place, and time. She appears well-developed and well-nourished. She is cooperative. She appears distressed.   Eyes closed, actively vomiting  "with movement   HENT:   Head: Normocephalic and atraumatic.   Right Ear: Hearing, external ear and ear canal normal.   Left Ear: Hearing, external ear and ear canal normal.   Nose: Nose normal.   Mouth/Throat: Uvula is midline and oropharynx is clear and moist.   Cerumen in canal blocking TM   Eyes: Conjunctivae and lids are normal. Pupils are equal, round, and reactive to light. Right conjunctiva is not injected. Left conjunctiva is not injected. No scleral icterus. Right eye exhibits nystagmus (when looking Left). Left eye exhibits nystagmus.   Neck: Normal range of motion, full passive range of motion without pain and phonation normal. Neck supple.   Cardiovascular: Normal rate, regular rhythm and intact distal pulses.    Murmur heard.  Pulmonary/Chest: Effort normal and breath sounds normal. She has no wheezes. She has no rales.   Abdominal: Soft. Bowel sounds are normal. There is no hepatosplenomegaly. There is tenderness ("Soreness").   Musculoskeletal: Normal range of motion.        Right shoulder: Normal.        Right elbow: Normal.       Right wrist: Normal.   Lymphadenopathy:     She has no cervical adenopathy.   Neurological: She is alert and oriented to person, place, and time. No cranial nerve deficit. GCS eye subscore is 4. GCS verbal subscore is 5. GCS motor subscore is 6.   5/5 strength bilaterally without focal neurology deficits   Skin: Skin is warm, dry and intact.   No vesicular lesions on face or trunk   Psychiatric: She has a normal mood and affect. Her speech is normal and behavior is normal. Judgment and thought content normal. Cognition and memory are normal.   Nursing note and vitals reviewed.       Significant Labs:   CBC:   Recent Labs  Lab 01/17/17  1019   WBC 3.99   HGB 11.2*   HCT 33.6*   *     CMP:   Recent Labs  Lab 01/17/17  1019      K 4.5      CO2 30*   GLU 91   BUN 24*   CREATININE 1.0   CALCIUM 8.6*   PROT 6.4   ALBUMIN 3.3*   BILITOT 0.6   ALKPHOS 45*   AST " 30   ALT 19   ANIONGAP 4*   EGFRNONAA 54.9*     Urine Studies:   Recent Labs  Lab 01/17/17  1513   COLORU Yellow   APPEARANCEUA Clear   PHUR 8.0   SPECGRAV 1.005   PROTEINUA Negative   GLUCUA Negative   KETONESU Negative   BILIRUBINUA Negative   OCCULTUA 2+*   NITRITE Negative   UROBILINOGEN Negative   LEUKOCYTESUR Negative   RBCUA 20*   WBCUA 1   BACTERIA Occasional     All pertinent labs within the past 24 hours have been reviewed.    Significant Imaging: CT: I have reviewed all pertinent results/findings within the past 24 hours and my personal findings are:  no acute findings  CXR: I have reviewed all pertinent results/findings within the past 24 hours and my personal findings are:  no acute findings    Assessment/Plan:     Vertigo  Vestibular neuritis vs Meniere's. Recent shingles outbreak affecting face, delayed treatment. Denies tinnitus, fullness, hearing loss, endorses some R ear pain. CT negative, no acute neurologic deficits.  - Start 22 day steroid taper now, 100 mg daily (will do IV as patient has poor PO tolerance at this point). Literature review does not recommend antiviral therapy  - Patient given antivert in ED without much help (but may have vomited soon after administration), will give Diazepam 2 mg IV now, this can be repeated or titrated up depending on response  - PT/OT for vestibular therapy  - ENT outpatient follow up, consider in house consult based on response  - Neurochecks q4h    Hypertension  - continue lisinopril and atenolol     VTE Risk Mitigation         Ordered     Medium Risk of VTE  Once      01/17/17 1241     Place JUAN hose  Until discontinued      01/17/17 1241     Place sequential compression device  Until discontinued      01/17/17 1241        José Miguel Santos PA-C  Department of Hospital Medicine   [facility]

## 2017-01-17 NOTE — ED TRIAGE NOTES
For the past week or so I have had episodes of dizziness and feeling like passing out; today I was at the coffee shop and felt dizzy and clammy then it passed and decided to come here to be evaluated; it felt like the room was spinning

## 2017-01-18 PROBLEM — R42 VERTIGO: Status: ACTIVE | Noted: 2017-01-18

## 2017-01-18 PROBLEM — H81.20 VESTIBULAR NEURONITIS: Status: ACTIVE | Noted: 2017-01-17

## 2017-01-18 PROCEDURE — G8988 SELF CARE GOAL STATUS: HCPCS | Mod: CI

## 2017-01-18 PROCEDURE — G8980 MOBILITY D/C STATUS: HCPCS | Mod: CK

## 2017-01-18 PROCEDURE — G0378 HOSPITAL OBSERVATION PER HR: HCPCS

## 2017-01-18 PROCEDURE — 99226 PR SUBSEQUENT OBSERVATION CARE,LEVEL III: CPT | Mod: ,,, | Performed by: PHYSICIAN ASSISTANT

## 2017-01-18 PROCEDURE — G8978 MOBILITY CURRENT STATUS: HCPCS | Mod: CK

## 2017-01-18 PROCEDURE — G8979 MOBILITY GOAL STATUS: HCPCS | Mod: CJ

## 2017-01-18 PROCEDURE — 97535 SELF CARE MNGMENT TRAINING: CPT

## 2017-01-18 PROCEDURE — 97162 PT EVAL MOD COMPLEX 30 MIN: CPT

## 2017-01-18 PROCEDURE — 97166 OT EVAL MOD COMPLEX 45 MIN: CPT

## 2017-01-18 PROCEDURE — 25000003 PHARM REV CODE 250: Performed by: PHYSICIAN ASSISTANT

## 2017-01-18 PROCEDURE — 63600175 PHARM REV CODE 636 W HCPCS: Performed by: PHYSICIAN ASSISTANT

## 2017-01-18 PROCEDURE — G8987 SELF CARE CURRENT STATUS: HCPCS | Mod: CJ

## 2017-01-18 RX ORDER — ONDANSETRON 8 MG/1
8 TABLET, ORALLY DISINTEGRATING ORAL EVERY 6 HOURS PRN
Status: DISCONTINUED | OUTPATIENT
Start: 2017-01-18 | End: 2017-01-19 | Stop reason: HOSPADM

## 2017-01-18 RX ORDER — PROMETHAZINE HYDROCHLORIDE 12.5 MG/1
12.5 TABLET ORAL EVERY 6 HOURS PRN
Status: DISCONTINUED | OUTPATIENT
Start: 2017-01-18 | End: 2017-01-19 | Stop reason: HOSPADM

## 2017-01-18 RX ORDER — DIAZEPAM 5 MG/1
5 TABLET ORAL EVERY 6 HOURS PRN
Status: DISCONTINUED | OUTPATIENT
Start: 2017-01-18 | End: 2017-01-19 | Stop reason: HOSPADM

## 2017-01-18 RX ORDER — METHYLPREDNISOLONE 4 MG/1
100 TABLET ORAL DAILY
Status: DISCONTINUED | OUTPATIENT
Start: 2017-01-18 | End: 2017-01-18 | Stop reason: ALTCHOICE

## 2017-01-18 RX ORDER — METHYLPREDNISOLONE SOD SUCC 125 MG
100 VIAL (EA) INJECTION DAILY
Status: DISCONTINUED | OUTPATIENT
Start: 2017-01-18 | End: 2017-01-19 | Stop reason: HOSPADM

## 2017-01-18 RX ADMIN — ONDANSETRON 8 MG: 8 TABLET, ORALLY DISINTEGRATING ORAL at 08:01

## 2017-01-18 RX ADMIN — LISINOPRIL 10 MG: 10 TABLET ORAL at 06:01

## 2017-01-18 RX ADMIN — ONDANSETRON 4 MG: 2 INJECTION INTRAMUSCULAR; INTRAVENOUS at 08:01

## 2017-01-18 RX ADMIN — ATENOLOL 25 MG: 25 TABLET ORAL at 08:01

## 2017-01-18 RX ADMIN — Medication 3 ML: at 06:01

## 2017-01-18 RX ADMIN — METHYLPREDNISOLONE SODIUM SUCCINATE 100 MG: 125 INJECTION, POWDER, FOR SOLUTION INTRAMUSCULAR; INTRAVENOUS at 05:01

## 2017-01-18 NOTE — PT/OT/SLP EVAL
Physical Therapy  Evaluation    Shima Sorto   MRN: [id number]   Admitting Diagnosis: Vertigo    PT Received On: 17  PT Start Time: 853     PT Stop Time: 929    PT Total Time (min): 36 min       Billable Minutes:  Evaluation 26 and Therapeutic Activity 10    Diagnosis: Vertigo    Past Medical History   Diagnosis Date    Allergy      seasonal    Anemia     Basal cell carcinoma 2013     forehead    Hx of colonic polyps     Hypertension     Medullary sponge kidney     MVP (mitral valve prolapse)     Osteoporosis, senile     Renal calculi     TMJ syndrome      sometimes jaw clicking/jaw pain    Urinary retention     Vertigo 2017    Visual impairment      reading glasses      Past Surgical History   Procedure Laterality Date    Kidney stone surgery       @ [facility]  Mohs procedure      Interstim placed stage 1         Referring physician: ROMI Nieto  Date referred to PT: 2017    General Precautions: Standard, fall  Orthopedic Precautions: N/A   Braces: N/A            Patient History:  Lives With: alone  Living Arrangements: house  Home Accessibility: stairs to enter home  Home Layout: Able to live on 1st floor  Number of Stairs to Enter Home: 16  Stair Railings at Home: outside, present on left side  Living Environment Comment: Pt lives alone in raised house with 16 PATY and mother-in-law suite on 1st floor. Pt reports (I) with ADLs and amb. Pt reports she has friends who are able to provide assist upon d/c.   Equipment Currently Used at Home: none  DME owned (not currently used): none    Previous Level of Function:  Ambulation Skills: independent  Transfer Skills: independent  ADL Skills: independent  Work/Leisure Activity: independent    Subjective:  Communicated with RN prior to session.  Pt agreeable to therapy session.   Chief Complaint: vertigo and nausea  Patient goals: return home     Pain Ratin/10               Pain Rating Post-Intervention: 0/10    Objective:    Patient found with: peripheral IV     Cognitive Exam:  Oriented to: Person, Place, Time and Situation    Follows Commands/attention: Follows multistep  commands  Communication: clear/fluent  Safety awareness/insight to disability: impaired    Physical Exam:  Postural examination/scapula alignment: Rounded shoulder    Skin integrity: Visible skin intact  Edema: None noted B LE    Sensation:   Intact  light/touch B LE    Lower Extremity Range of Motion:  Right Lower Extremity: WFL  Left Lower Extremity: WFL    Lower Extremity Strength:  Right Lower Extremity: WFL  Left Lower Extremity: WFL     Gross motor coordination: WFL    Functional Mobility:  Bed Mobility:  Supine to Sit: Supervision    Transfers:  Sit <> Stand Assistance: Contact Guard Assistance (x2 trials)  Sit <> Stand Assistive Device: Rolling Walker    Gait:   Gait Distance: ~125ft vc's to increase step length and heel strike; amb limited 2* dizziness and nausea  Assistance 1: Contact Guard Assistance  Gait Assistive Device: Rolling walker  Gait Pattern: swing-through gait  Gait Deviation(s): decreased allie, decreased toe-to-floor clearance, decreased velocity of limb motion, decreased stride length, decreased step length, decreased weight-shifting ability    Balance:   Static Sit: GOOD: Takes MODERATE challenges from all directions  Dynamic Sit: GOOD: Maintains balance through MODERATE excursions of active trunk movement  Static Stand: FAIR+: Takes MINIMAL challenges from all directions  Dynamic stand: FAIR: Needs CONTACT GUARD during gait    Therapeutic Activities and Exercises:  Pt educated on role of PT/POC.  Pt stood EOB on first stance pt experienced LOB and sat EOB quickly with CGA at hips to ensure safety with transfer.   Pt educated on compensatory strategies for vertigo, focusing on one object when dizzy; pt unable to tolerate rapid positional changes today.  Pt educated on benefits of OP PT for vestibular rehab and need for 24hr A for safety  upon d/c at this time.  Pt safe to amb to bathroom with RN staff with RW.     AM-PAC 6 CLICK MOBILITY  How much help from another person does this patient currently need?   1 = Unable, Total/Dependent Assistance  2 = A lot, Maximum/Moderate Assistance  3 = A little, Minimum/Contact Guard/Supervision  4 = None, Modified [place]/Independent    Turning over in bed (including adjusting bedclothes, sheets and blankets)?: 4  Sitting down on and standing up from a chair with arms (e.g., wheelchair, bedside commode, etc.): 3  Moving from lying on back to sitting on the side of the bed?: 3  Moving to and from a bed to a chair (including a wheelchair)?: 3  Need to walk in hospital room?: 3  Climbing 3-5 steps with a railing?: 3  Total Score: 19     AM-PAC Raw Score CMS G-Code Modifier Level of Impairment Assistance   6 % Total / Unable   7 - 9 CM 80 - 100% Maximal Assist   10 - 14 CL 60 - 80% Moderate Assist   15 - 19 CK 40 - 60% Moderate Assist   20 - 22 CJ 20 - 40% Minimal Assist   23 CI 1-20% SBA / CGA   24 CH 0% Independent/ Mod I     Patient left up in chair with all lines intact, call button in reach and RN notified.    Assessment:   Shima Sorto is a 76 y.o. female with a medical diagnosis of Vertigo and presents with increased dizziness and nausea and decreased overall functional mobility. Pt performed bed mobility S and transfer CGA with RW. Pt amb ~125ft vc's to increase step length and heel strike; amb limited 2* dizziness and nausea. Pt will benefit from skilled PT to improve deficits and increase overall functional mobility. Pt will benefit from OP PT for vestibular rehab and will require 24hr assist upon d/c for safety.     Rehab identified problem list/impairments: Rehab identified problem list/impairments: weakness, impaired endurance, gait instability, impaired balance, impaired functional mobilty (vertigo)    Rehab potential is good.    Activity tolerance: Good    Discharge  recommendations: Discharge Facility/Level Of Care Needs: outpatient PT ((vestibular rehab) 24hr A)     Barriers to discharge: Barriers to Discharge: Decreased caregiver support (pt lives alone)    Equipment recommendations: Equipment Needed After Discharge: walker, rolling, bedside commode, bath bench     GOALS:   Physical Therapy Goals        Problem: Physical Therapy Goal    Goal Priority Disciplines Outcome Goal Variances Interventions   Physical Therapy Goal     PT/OT, PT Ongoing (interventions implemented as appropriate)     Description:  Goals to be met by: 2017     Patient will increase functional independence with mobility by performin. Sit to stand transfer with Modified [place]  2. Gait  x 200 feet with Supervision with or without AD.   3. Ascend/descend 16 stair with left Handrails Contact Guard Assistance.                 PLAN:    Patient to be seen 4 x/week to address the above listed problems via gait training, therapeutic activities, therapeutic exercises  Plan of Care expires: 17  Plan of Care reviewed with: patient          BIJAN JASWINDER, PT  2017

## 2017-01-18 NOTE — ASSESSMENT & PLAN NOTE
Vestibular neuritis 2/2 recent shingles infection. Recent shingles outbreak affecting face, delayed treatment. Denies tinnitus, fullness, hearing loss, endorsed some R ear pain, then resolved. CT negative, no acute neurologic deficits.    - Start 22 day steroid taper on admission, 100 mg daily. Literature review does not recommend antiviral therapy. Significant improvement  - Antiemetics: zofran and phenergan, none since this AM  - PT/OT for vestibular therapy: home with home health and then can follow outpatient for further vestibular therapy as warranted.  - Neurochecks q4h

## 2017-01-18 NOTE — PLAN OF CARE
Zeynep Tomas MD  [address] / [facility] [address]      [id number] - [place], [place] - [address] AT [facility] [address]  [address]  [facility] [address]  Phone: [phone] Fax: [phone]    Payor: MEDICARE / Plan: MEDICARE PART A & B / Product Type: [facility] /        01/18/17 1427   Discharge Assessment   Assessment Type Discharge Planning Assessment   Confirmed/corrected address and phone number on facesheet? Yes   Assessment information obtained from? Patient   Expected Length of Stay (days) 2   Prior to hospitilization cognitive status: Alert/Oriented   Prior to hospitalization functional status: Independent   Current cognitive status: Alert/Oriented   Current Functional Status: Independent   Arrived From home or self-care   Lives With alone   Able to Return to Prior Arrangements yes   Is patient able to care for self after discharge? Yes   Who are your caregiver(s) and their phone number(s)? Chi Sorto  son [phone]   Patient's perception of discharge disposition home or selfcare   Patient currently receives home health services? No   Patient currently receives any other outside agency services? No   Equipment Currently Used at Home none   Does the patient have transportation to healthcare appointments? Yes   Transportation Available family or friend will provide   On Dialysis? No   Discharge Plan A Home   Discharge Plan B Home Health   Patient/Family In Agreement With Plan yes

## 2017-01-18 NOTE — PT/OT/SLP EVAL
Occupational Therapy  Evaluation/Co-Evaluation with Physical Therapy    Shima Sorto   MRN: [id number]   Admitting Diagnosis: Vertigo    OT Date of Treatment: 01/18/17   OT Start Time: 0854  OT Stop Time: 0930  OT Total Time (min): 36 min    Billable Minutes:  Evaluation 25  Self Care/Home Management 11    Diagnosis: Vertigo   Sudden onset vertigo when turning after buying a coffee at a coffee shop on 1/17/2017.  Recovering from shingles.  No definitive dx yet: vestibular neuritis vs Meniere's    Past Medical History   Diagnosis Date    Allergy      seasonal    Anemia     Basal cell carcinoma 7/2013     forehead    Hx of colonic polyps     Hypertension     Medullary sponge kidney     MVP (mitral valve prolapse)     Osteoporosis, senile     Renal calculi     TMJ syndrome      sometimes jaw clicking/jaw pain    Urinary retention     Vertigo 1/17/2017    Visual impairment      reading glasses      Past Surgical History   Procedure Laterality Date    Kidney stone surgery  2000     @ [facility]    Mohs procedure      Interstim placed stage 1         Referring physician: Dr. Tulio Nieto  Date referred to OT: 1/17/2017    General Precautions: Standard, fall  Orthopedic Precautions: N/A  Braces: N/A    Do you have any cultural, spiritual, Shinto conflicts, given your current situation?: No issues     Patient History:  Living Environment  Lives With:  (Pt lives alone in raised house with 16 PATY with rail on R.  Has a mother-in-law suite on ground level with walk in shower.)  Living Environment Comment: Both children live out of state; has friends she can rely on  Equipment Currently Used at Home: none    Prior level of function:   Bed Mobility/Transfers: independent  Grooming: independent  Bathing: independent  Upper Body Dressing: independent  Lower Body Dressing: independent  Toileting: independent  Home Management Skills: independent  IADL Comments: Enjoys getting coffee with friends, walks to  "[place] daily, drives     Dominant hand: right    Subjective:  Communicated with RN prior to session.  "I'm disappointed"  (pt disappointed by level of assist needed for amb)  Chief Complaint: vertigo, room tilting and spinning  Patient/Family stated goals: Reach plof    Pain Ratin/10              Pain Rating Post-Intervention: 0/10    Objective:  Patient found with: peripheral IV    Cognitive Exam:  Oriented to: Person, Place, Time and Situation  Follows Commands/attention: Follows one-step commands  Communication: clear/fluent  Memory:  No Deficits noted  Safety awareness/insight to disability: impaired; impaired judgement: pt has not yet called her children to tell them she is in hosp.  Impaired problem solving; pt wanted a SW to call her friend to bring phone cord when she has her cell phone with friend's number and enough power.  Coping skills/emotional control: Appropriate to situation    Visual/perceptual:  Intact    Physical Exam:  Postural examination/scapula alignment: No postural abnormalities identified  Skin integrity: Visible skin intact  Edema: None noted     Sensation:   Intact    Upper Extremity Range of Motion:  Right Upper Extremity: A/PROM WNL  Left Upper Extremity: A/PROM WNL    Upper Extremity Strength:  Right Upper Extremity: 4/5  Left Upper Extremity: 4/5   Strength: 4/5    Fine motor coordination:   Intact    Gross motor coordination: Impaired BLEs during ambulation    Functional Mobility:  Bed Mobility:  Rolling/Turning to Left: Supervision  Rolling/Turning Right: Supervision  Supine to Sit: Supervision    Transfers:  Sit <> Stand Assistance: Contact Guard Assistance  Sit <> Stand Assistive Device: Rolling Walker  Bed <> Chair Technique: Stand Pivot  Bed <> Chair Transfer Assistance: Contact Guard Assistance  Bed <> Chair Assistive Device: Rolling Walker    Functional Ambulation: Ambulate in room and halls with CG A of 2 people; see PT eval    Activities of Daily " "Living:  Feeding Level of Assistance: Set-up Assistance    UE Dressing Level of Assistance: Moderate assistance (don gown like robe)    LE Dressing Level of Assistance: Contact guard (don/doff socks)       Grooming Level of Assistance: Contact guard assistance     Toileting Level of Assistance: Contact guard            Balance:   Static Sit: FAIR+: Able to take MINIMAL challenges from all directions  Dynamic Sit: FAIR+: Maintains balance through MINIMAL excursions of active trunk motion  Static Stand: FAIR: Maintains without assist but unable to take challenges  Dynamic stand: FAIR: Needs CONTACT GUARD during gait    Therapeutic Activities and Exercises:  -Worked on transfers, LB and UB dressing  -Worked on compensatory tech for vertigo.  It helped pt to look at a distant point.  It also helped to avoid moving her head and to stay still after changing positions.    -[facility] 6 CLICK ADL  How much help from another person does this patient currently need?  1 = Unable, Total/Dependent Assistance  2 = A lot, Maximum/Moderate Assistance  3 = A little, Minimum/Contact Guard/Supervision  4 = None, Modified [place]/Independent    Putting on and taking off regular lower body clothing? : 3  Bathing (including washing, rinsing, drying)?: 3  Toileting, which includes using toilet, bedpan, or urinal? : 3  Putting on and taking off regular upper body clothing?: 3  Taking care of personal grooming such as brushing teeth?: 3  Eating meals?: 3  Total Score: 18    AM-PAC Raw Score CMS "G-Code Modifier Level of Impairment Assistance   6 % Total / Unable   7 - 9 CM 80 - 100% Maximal Assist   10 - 14 CL 60 - 80% Moderate Assist   15 - 19 CK 40 - 60% Moderate Assist   20 - 22 CJ 20 - 40% Minimal Assist   23 CI 1-20% SBA / CGA   24 CH 0% Independent/ Mod I       Patient left up in chair with all lines intact and call button in reach    Assessment:  Shima Sorto is a 76 y.o. female with a medical diagnosis of Vertigo of " unknown etiology.  The pt is currently at over-all CG A level self-care 2' to vertigo in standing, impaired static and dynamic standing balance, impaired dynamic sitting balance, nausea with too much movement.  When pt came to stand suddenly, pt had immediate/strong feeling of room tilting and fell back onto bed (onto buttocks) before OT and PT could react.The pt was previously indep all self-care and I-ADLS.  Pt to be seen by OT to increase self-care indep via compensation.    Rehab identified problem list/impairments: Rehab identified problem list/impairments: weakness, impaired endurance, impaired self care skills, impaired functional mobilty, impaired balance, gait instability (vertigo)    Rehab potential is good.    Activity tolerance: Fair    Discharge recommendations: Discharge Facility/Level Of Care Needs:  (Pt needs 24 hour assist and OP PT with vertigo specialist) Recommend 24 hour assist as pt is a high fall risk    Barriers to discharge: Barriers to Discharge: Inaccessible home environment, Decreased caregiver support (children out of state; 16 PATY)    Equipment recommendations: walker, rolling, bath bench, bedside commode     GOALS:   Occupational Therapy Goals        Problem: Occupational Therapy Goal    Goal Priority Disciplines Outcome Interventions   Occupational Therapy Goal     OT, PT/OT Ongoing (interventions implemented as appropriate)    Description:  1.  Pt will dress self, inducing minimal vertigo by compensating with either minimal head mvmt or fixating on distant spot  2.  Toilet self with SBA with RW  3.  [place] in standing with SBA x10 min  4.  Toilet transfer with SBA              PLAN:  Patient to be seen 3 x/week to address the above listed problems via self-care/home management, therapeutic activities, therapeutic exercises  Plan of Care expires: 02/18/17  Plan of Care reviewed with: patient         BERNADETTE Hernandez  01/18/2017

## 2017-01-18 NOTE — PROGRESS NOTES
"[facility] Hospital Medicine  Progress Note    Patient Name: Shima Sorto  MRN: [id number]  Patient Class: OP- Observation   Admission Date: 1/17/2017  Length of Stay: 0 days  Attending Physician: Tulio Nieto MD  Primary Care Provider: Zeynep Tomas MD    [facility] Medicine Team: [facility] HOSP MED E José Miguel Santos PA-C    Subjective:     Principal Problem:Vestibular neuronitis    HPI:  76F with PMHx of mitral valve prolapse, anemia, HTN, and recent shingles outbreak who presents with vertigo that started abruptly this morning and is associated with nausea and vomiting. Patient describes the room as spinning, she denies ringing in the ears or hearing loss, but does state that she has developed R ear pain since being in the hospital. Her nausea is worse with movement in nay direction, better when lying still and eyes closed. She denies any focal neurologic deficits such as numbness, weakness, headache, or confusion. The patient was seen in this ED on 12/30 and diagnosed with a shingles outbreak affecting her L face/eyebrow (only one active lesion, associated with numbness, tingling). She completed her valtrex on 01/06/2017. Her symptoms related to the shingles is resolved. Chat review reveals that patient had "symptoms of shingles on 12/23, but patient did not take treatment until after 12/30. She has had symptoms of a viral disease". Currently the patient denies fever, chills, abdominal pain, chest pain, SOB, headaches, loss of consciousness, changes in vision.    In ED, CBC showing stable and chronic anemia, CMP showing stable renal function, troponin WNL, BNP slightly elevated at 138 with no history of CHF, TSH WNL, UA clean, CT without acute abnormalities, CXR clear. Patient placed into observation for evaluation of vertigo.       Hospital Course:  Patient placed into observation for evaluation of vertigo. Patient with significant improvement s/p steroids and antiemetics. Patient worked " "well with PT/OT, but still slightly unstable on feet.    Interval History: Markedly improved. Tolerating diet and advanced to regular. Some vertigo with sudden movements and when looking up, encouraged patient to move more slowly. Worked well with PT/OT gaining confidence, plan for d/c tomorrow with home health. Patient complaining of food being too salty, offered low salt diet, refused. Patient complaining of stickiness of bedside table, notified nurse to provide patient wipes. Patient complaining about cost cutting measures taken by hospitals in general and discharging patient's "sicker and quicker" to save money, I assured the patient that her discharge is up to her and I always strive to ensure patient's feel safe going home, are knowledgeable about their diagnosis, and understand their treatment plan. I assured her that no one is forcing me or her to discharge. Extensive conversation about discharge planning.    Review of Systems   Constitutional: Negative for chills and fever.   Eyes: Negative for photophobia and visual disturbance.   Respiratory: Negative for cough and shortness of breath.    Cardiovascular: Negative for chest pain and palpitations.   Gastrointestinal: Negative for abdominal pain and nausea.   Genitourinary: Negative for difficulty urinating and dysuria.   Neurological: Positive for dizziness. Negative for weakness and headaches.     Objective:     Vital Signs (Most Recent):  Temp: 97.8 °F (36.6 °C) (01/18/17 0800)  Pulse: (!) 52 (01/18/17 1300)  Resp: 17 (01/18/17 0800)  BP: 124/86 (01/18/17 0800)  SpO2: 100 % (01/18/17 0800) Vital Signs (24h Range):  Temp:  [97.7 °F (36.5 °C)-97.9 °F (36.6 °C)] 97.8 °F (36.6 °C)  Pulse:  [48-72] 52  Resp:  [14-43] 17  SpO2:  [95 %-100 %] 100 %  BP: (124-176)/(60-86) 124/86     Weight: 56.2 kg (124 lb)  Body mass index is 19.42 kg/(m^2).    Intake/Output Summary (Last 24 hours) at 01/18/17 1430  Last data filed at 01/18/17 0639   Gross per 24 hour   Intake   "            240 ml   Output                0 ml   Net              240 ml      Physical Exam   Constitutional: She is oriented to person, place, and time.   Patient sitting up in chair   Eyes: EOM are normal. Pupils are equal, round, and reactive to light. Right eye exhibits no nystagmus. Left eye exhibits no nystagmus.   Cardiovascular: Normal rate and regular rhythm.    Pulmonary/Chest: Effort normal and breath sounds normal. No respiratory distress.   Abdominal: Soft. Bowel sounds are normal.   Musculoskeletal: Normal range of motion.   Neurological: She is alert and oriented to person, place, and time. No cranial nerve deficit.   No focal deficits   Nursing note and vitals reviewed.      Significant Labs: All pertinent labs within the past 24 hours have been reviewed.    Significant Imaging: I have reviewed all pertinent imaging results/findings within the past 24 hours.    Assessment/Plan:      * Vestibular neuronitis  Vestibular neuritis 2/2 recent shingles infection. Recent shingles outbreak affecting face, delayed treatment. Denies tinnitus, fullness, hearing loss, endorsed some R ear pain, then resolved. CT negative, no acute neurologic deficits.    - Start 22 day steroid taper on admission, 100 mg daily. Literature review does not recommend antiviral therapy. Significant improvement  - Antiemetics: zofran and phenergan, none since this AM  - PT/OT for vestibular therapy: home with home health and then can follow outpatient for further vestibular therapy as warranted.  - Neurochecks q4h    Hypertension  - continue lisinopril and atenolol     VTE Risk Mitigation         Ordered     Medium Risk of VTE  Once      01/17/17 1241     Place JUAN hose  Until discontinued      01/17/17 1241     Place sequential compression device  Until discontinued      01/17/17 1241          José Miguel Santos PA-C  Department of Hospital Medicine   [facility]

## 2017-01-18 NOTE — PLAN OF CARE
Problem: Patient Care Overview  Goal: Plan of Care Review  Outcome: Ongoing (interventions implemented as appropriate)  Pt free from injury entire shift.  VSS and afebrile.  Pt still c/o of dizziness but advises nausea has subsided.  No complaints or signs of distress at this time.  Bed locked in low position, bed rails up x2, call light within reach.

## 2017-01-18 NOTE — SUBJECTIVE & OBJECTIVE
"Interval History: Markedly improved. Tolerating diet and advanced to regular. Some vertigo with sudden movements and when looking up, encouraged patient to move more slowly. Worked well with PT/OT gaining confidence, plan for d/c tomorrow with home health. Patient complaining of food being too salty, offered low salt diet, refused. Patient complaining of stickiness of bedside table, notified nurse to provide patient wipes. Patient complaining about cost cutting measures taken by hospitals in general and discharging patient's "sicker and quicker" to save money, I assured the patient that her discharge is up to her and I always strive to ensure patient's feel safe going home, are knowledgeable about their diagnosis, and understand their treatment plan. I assured her that no one is forcing me or her to discharge. Extensive conversation about discharge planning.    Review of Systems   Constitutional: Negative for chills and fever.   Eyes: Negative for photophobia and visual disturbance.   Respiratory: Negative for cough and shortness of breath.    Cardiovascular: Negative for chest pain and palpitations.   Gastrointestinal: Negative for abdominal pain and nausea.   Genitourinary: Negative for difficulty urinating and dysuria.   Neurological: Positive for dizziness. Negative for weakness and headaches.     Objective:     Vital Signs (Most Recent):  Temp: 97.8 °F (36.6 °C) (01/18/17 0800)  Pulse: (!) 52 (01/18/17 1300)  Resp: 17 (01/18/17 0800)  BP: 124/86 (01/18/17 0800)  SpO2: 100 % (01/18/17 0800) Vital Signs (24h Range):  Temp:  [97.7 °F (36.5 °C)-97.9 °F (36.6 °C)] 97.8 °F (36.6 °C)  Pulse:  [48-72] 52  Resp:  [14-43] 17  SpO2:  [95 %-100 %] 100 %  BP: (124-176)/(60-86) 124/86     Weight: 56.2 kg (124 lb)  Body mass index is 19.42 kg/(m^2).    Intake/Output Summary (Last 24 hours) at 01/18/17 1430  Last data filed at 01/18/17 0639   Gross per 24 hour   Intake              240 ml   Output                0 ml   Net     "          240 ml      Physical Exam   Constitutional: She is oriented to person, place, and time.   Patient sitting up in chair   Eyes: EOM are normal. Pupils are equal, round, and reactive to light. Right eye exhibits no nystagmus. Left eye exhibits no nystagmus.   Cardiovascular: Normal rate and regular rhythm.    Pulmonary/Chest: Effort normal and breath sounds normal. No respiratory distress.   Abdominal: Soft. Bowel sounds are normal.   Musculoskeletal: Normal range of motion.   Neurological: She is alert and oriented to person, place, and time. No cranial nerve deficit.   No focal deficits   Nursing note and vitals reviewed.      Significant Labs: All pertinent labs within the past 24 hours have been reviewed.    Significant Imaging: I have reviewed all pertinent imaging results/findings within the past 24 hours.

## 2017-01-18 NOTE — PLAN OF CARE
Problem: Occupational Therapy Goal  Goal: Occupational Therapy Goal  1. Pt will dress self, inducing minimal vertigo by compensating with either minimal head mvmt or fixating on distant spot  2. Toilet self with SBA with RW  3. [place] in standing with SBA x10 min  4. Toilet transfer with SBA   Outcome: Ongoing (interventions implemented as appropriate)  Eval completed and goals established  BERNADETTE Garcia  1/18/2017  [phone]

## 2017-01-18 NOTE — PLAN OF CARE
Problem: Physical Therapy Goal  Goal: Physical Therapy Goal  Goals to be met by: 2017     Patient will increase functional independence with mobility by performin. Sit to stand transfer with Modified [place]  2. Gait x 200 feet with Supervision with or without AD.   3. Ascend/descend 16 stair with left Handrails Contact Guard Assistance.   Outcome: Ongoing (interventions implemented as appropriate)  Pt evaluation complete.      BIJAN SAN, PT  2017

## 2017-01-19 ENCOUNTER — NURSE TRIAGE (OUTPATIENT)
Dept: ADMINISTRATIVE | Facility: CLINIC | Age: 77
End: 2017-01-19

## 2017-01-19 ENCOUNTER — TELEPHONE (OUTPATIENT)
Dept: INTERNAL MEDICINE | Facility: CLINIC | Age: 77
End: 2017-01-19

## 2017-01-19 VITALS
DIASTOLIC BLOOD PRESSURE: 63 MMHG | BODY MASS INDEX: 19.46 KG/M2 | SYSTOLIC BLOOD PRESSURE: 134 MMHG | WEIGHT: 124 LBS | OXYGEN SATURATION: 99 % | RESPIRATION RATE: 18 BRPM | TEMPERATURE: 98 F | HEART RATE: 52 BPM | HEIGHT: 67 IN

## 2017-01-19 LAB
ANION GAP SERPL CALC-SCNC: 6 MMOL/L
BUN SERPL-MCNC: 32 MG/DL
CALCIUM SERPL-MCNC: 8.2 MG/DL
CHLORIDE SERPL-SCNC: 105 MMOL/L
CO2 SERPL-SCNC: 27 MMOL/L
CREAT SERPL-MCNC: 1 MG/DL
EST. GFR  (AFRICAN AMERICAN): >60 ML/MIN/1.73 M^2
EST. GFR  (NON AFRICAN AMERICAN): 54.9 ML/MIN/1.73 M^2
GLUCOSE SERPL-MCNC: 139 MG/DL
POTASSIUM SERPL-SCNC: 4.4 MMOL/L
SODIUM SERPL-SCNC: 138 MMOL/L

## 2017-01-19 PROCEDURE — 25000003 PHARM REV CODE 250: Performed by: PHYSICIAN ASSISTANT

## 2017-01-19 PROCEDURE — G8989 SELF CARE D/C STATUS: HCPCS | Mod: CJ

## 2017-01-19 PROCEDURE — 99217 PR OBSERVATION CARE DISCHARGE: CPT | Mod: ,,, | Performed by: PHYSICIAN ASSISTANT

## 2017-01-19 PROCEDURE — 63600175 PHARM REV CODE 636 W HCPCS: Performed by: PHYSICIAN ASSISTANT

## 2017-01-19 PROCEDURE — G8988 SELF CARE GOAL STATUS: HCPCS | Mod: CI

## 2017-01-19 PROCEDURE — 80048 BASIC METABOLIC PNL TOTAL CA: CPT

## 2017-01-19 PROCEDURE — G0378 HOSPITAL OBSERVATION PER HR: HCPCS

## 2017-01-19 PROCEDURE — 36415 COLL VENOUS BLD VENIPUNCTURE: CPT

## 2017-01-19 RX ORDER — MECLIZINE HYDROCHLORIDE 25 MG/1
25 TABLET ORAL 3 TIMES DAILY PRN
Qty: 90 TABLET | Refills: 0 | Status: SHIPPED | OUTPATIENT
Start: 2017-01-19 | End: 2018-07-10

## 2017-01-19 RX ORDER — DIAZEPAM 5 MG/1
5 TABLET ORAL EVERY 6 HOURS PRN
Qty: 30 TABLET | Refills: 0 | Status: SHIPPED | OUTPATIENT
Start: 2017-01-19 | End: 2018-07-10

## 2017-01-19 RX ORDER — DIAZEPAM 5 MG/1
5 TABLET ORAL EVERY 6 HOURS PRN
Qty: 30 TABLET | Refills: 0 | Status: SHIPPED | OUTPATIENT
Start: 2017-01-19 | End: 2017-01-19

## 2017-01-19 RX ORDER — ONDANSETRON 8 MG/1
8 TABLET, ORALLY DISINTEGRATING ORAL EVERY 6 HOURS PRN
Qty: 30 TABLET | Refills: 0 | Status: SHIPPED | OUTPATIENT
Start: 2017-01-19 | End: 2018-07-10

## 2017-01-19 RX ORDER — METHYLPREDNISOLONE 16 MG/1
TABLET ORAL
Qty: 40 TABLET | Refills: 0 | Status: SHIPPED | OUTPATIENT
Start: 2017-01-19 | End: 2017-07-20 | Stop reason: ALTCHOICE

## 2017-01-19 RX ADMIN — DIAZEPAM 5 MG: 5 TABLET ORAL at 04:01

## 2017-01-19 RX ADMIN — LISINOPRIL 10 MG: 10 TABLET ORAL at 06:01

## 2017-01-19 RX ADMIN — METHYLPREDNISOLONE SODIUM SUCCINATE 100 MG: 125 INJECTION, POWDER, FOR SOLUTION INTRAMUSCULAR; INTRAVENOUS at 09:01

## 2017-01-19 RX ADMIN — ATENOLOL 25 MG: 25 TABLET ORAL at 09:01

## 2017-01-19 NOTE — NURSING
Pt received discharge instruction and prescription for lorazepam. IV access removed. No complications, catheter intact. No questions in regard to instructions. Pt has ride on way. [facility].

## 2017-01-19 NOTE — ASSESSMENT & PLAN NOTE
Vestibular neuritis 2/2 recent shingles infection. Recent shingles outbreak affecting face, delayed treatment. Denies tinnitus, fullness, hearing loss, endorsed some R ear pain at admission, then resolved. CT negative, no acute neurologic deficits.    - Start 22 day steroid taper on admission, directions given on discharge. Literature review does not recommend antiviral therapy. Patient with significant improvement in symptoms.   - Antiemetics: zofran, meclizine, valium  - PT/OT for vestibular therapy: home with home health today and then patient can follow outpatient for further vestibular therapy as warranted.

## 2017-01-19 NOTE — PLAN OF CARE
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       No future appointments.  Follow-up Information     Follow up with Zeynep Tomas MD.    Specialty:  Internal Medicine    Why:  Message sent to clinic to schedule hospital discharge follow up and clinic will notify patient    Contact information:    [address]  [facility] [address]  [phone]          Please follow up.    Why:  Home Health/             I have seen and examined this patient face to face today. My clinical findings that support the need for the home health skilled services and home bound status are the following:  Weakness/numbness causing balance and gait disturbance due to vertigo making it taxing to leave home.  Requiring assistive device to leave home due to unsteady gait caused by  vertigo.    Allergies:  Review of patient's allergies indicates:   Allergen Reactions    Asparagus      Other reaction(s): Rash       Diet: regular diet and 3 gram sodium diet    Activities: activity as tolerated    Nursing:   SN to complete comprehensive assessment including routine vital signs. Instruct on disease process and s/s of complications to report to MD. Review/verify medication list sent home with the patient at time of discharge  and instruct patient/caregiver as needed. Frequency may be adjusted depending on start of care date.    Notify MD if SBP > 160 or < 90; DBP > 90 or < 50; HR > 120 or <  50; Temp > 101; Other:         CONSULTS:    Physical Therapy to evaluate and treat. Evaluate for home safety and equipment needs; Establish/upgrade home exercise program. Perform / instruct on therapeutic exercises, gait training, transfer training, and Range of Motion.  Occupational Therapy to evaluate and treat. Evaluate home environment for safety and equipment needs. Perform/Instruct on transfers, ADL training, ROM, and therapeutic exercises.    MISCELLANEOUS CARE:  N/A    WOUND CARE ORDERS  n/a      Medications: Review discharge medications with patient and family and provide education.      Current Discharge Medication List      START taking these medications    Details   diazePAM (VALIUM) 5 MG tablet Take 1 tablet (5 mg total) by mouth every 6 (six) hours as needed (dizziness).  Qty: 30 tablet, Refills: 0      meclizine (ANTIVERT) 25 mg tablet Take 1 tablet (25 mg total) by mouth 3 (three) times daily as needed for Dizziness.  Qty: 90 tablet, Refills: 0      methylPREDNISolone (MEDROL) 16 MG Tab Take as directed in taper instructions  Qty: 40 tablet, Refills: 0      ondansetron (ZOFRAN-ODT) 8 MG TbDL Take 1 tablet (8 mg total) by mouth every 6 (six) hours as needed (nausea).  Qty: 30 tablet, Refills: 0         CONTINUE these medications which have NOT CHANGED    Details   alendronate (FOSAMAX) 70 MG tablet 1 tablet on Sunday with a full glass of water and wait 30 minutes for breakfast and pills. Take sitting or standing  Qty: 4 tablet, Refills: 12      atenolol (TENORMIN) 25 MG tablet Take one per day  Qty: 30 tablet, Refills: 12      B COMPLEX & C NO.10/FOLIC ACID (B COMPLEX WITH C#10-FOLIC ACID ORAL) Take by mouth.      calcium citrate (CALCITRATE) 200 mg (950 mg) tablet Take 1 tablet by mouth once daily.      co-enzyme Q-10 50 mg capsule Take 50 mg by mouth once daily.      fish oil-vit E-fat acid5-hb137 (SUPER OMEGA-3) 400-5 mg-unit Cap Take 1 capsule by mouth Daily.      lisinopril 10 MG tablet Take 1  tablet (10 mg total) by mouth every morning.  Qty: 30 tablet, Refills: 12      MULTIVITAMIN ORAL Take 1 tablet by mouth Daily.         STOP taking these medications       valacyclovir (VALTREX) 1000 MG tablet Comments:   Reason for Stopping:               I certify that this patient is confined to her home and needs physical therapy and occupational therapy.

## 2017-01-19 NOTE — TELEPHONE ENCOUNTER
Reason for Disposition   Caller has medication question only, adult not sick, and triager answers question    Protocols used: ST MEDICATION QUESTION CALL-A-AH    Patient needed clarification on how to take Prednisone. Explained medication dosing per med rec. Patient voices understanding.

## 2017-01-19 NOTE — TELEPHONE ENCOUNTER
----- Message from Moriah Verdugo sent at 1/19/2017  9:20 AM CST -----  Contact: Marybeth  with [facility]  Type: Sooner appointment than  is able to schedule    When is the first available appointment? 2/23/17    What is the nature of the appointment? Hospital Follow up     What appointment type: Hospital Follow up    Comments: Please notify the patient with a one week follow up.     Thanks!

## 2017-01-19 NOTE — DISCHARGE INSTRUCTIONS
22 Day Methylprednisolone Taper   Start 01/19  Day 4-6: 80 mg once daily (5 pills x 3d)   Day 7-9: 64 mg once daily (4 pills x 3d)   Day 10-12: 32 mg once daily (2 pills x 3d)   Day 13-15: 16 mg once daily (1 pills x 3d)   Day 16-22: 8 mg once daily (1/2 pill x 7d)

## 2017-01-19 NOTE — DISCHARGE SUMMARY
"[facility] Hospital Medicine  Discharge Summary      Patient Name: Shima Sorto  MRN: [id number]  Admission Date: 1/17/2017  Hospital Length of Stay: 0 days  Discharge Date and Time: 1/19/2017 11:26 AM  Attending Physician: No att. providers found   Discharging Provider: José Miguel Santos PA-C  Primary Care Provider: Zeynep Tomas MD  [facility] Medicine Team: [facility] HOSP MED E José Miguel Santos PA-C    HPI:   76F with PMHx of mitral valve prolapse, anemia, HTN, and recent shingles outbreak who presents with vertigo that started abruptly this morning and is associated with nausea and vomiting. Patient describes the room as spinning, she denies ringing in the ears or hearing loss, but does state that she has developed R ear pain since being in the hospital. Her nausea is worse with movement in nay direction, better when lying still and eyes closed. She denies any focal neurologic deficits such as numbness, weakness, headache, or confusion. The patient was seen in this ED on 12/30 and diagnosed with a shingles outbreak affecting her L face/eyebrow (only one active lesion, associated with numbness, tingling). She completed her valtrex on 01/06/2017. Her symptoms related to the shingles is resolved. Chat review reveals that patient had "symptoms of shingles on 12/23, but patient did not take treatment until after 12/30. She has had symptoms of a viral disease". Currently the patient denies fever, chills, abdominal pain, chest pain, SOB, headaches, loss of consciousness, changes in vision.    In ED, CBC showing stable and chronic anemia, CMP showing stable renal function, troponin WNL, BNP slightly elevated at 138 with no history of CHF, TSH WNL, UA clean, CT without acute abnormalities, CXR clear. Patient placed into observation for evaluation of vertigo.       * No surgery found *      Indwelling Lines/Drains at time of discharge:   Lines/Drains/Airways          No matching active lines, drains, or " airways        Hospital Course:   Patient placed into observation for evaluation of labyrinthitis 2/2 recent shingles outbreak. Patient with significant improvement s/p steroids, valium, and antiemetics. Patient worked well with PT/OT who recommended home with 24 hour care and outpatient vestibular therapy. On day of discharge patient was able to ambulate to restroom and back without complaints of dizziness. She was sitting up in bed and pleasant. She was able to bend over without complaint and states she is ready to return home and feels safe doing so. She is discharged with a steroid taper for 22 days, valium for acute dizziness, meclizine as needed, and home with home health. She states she may have a walker at home that she can use and she has friends who can be with her daily to ensure she doesn't fall. She is stable for discharge home.     Consults:     Significant Diagnostic Studies: Labs:   CMP   Recent Labs  Lab 01/19/17  0448      K 4.4      CO2 27   *   BUN 32*   CREATININE 1.0   CALCIUM 8.2*   ANIONGAP 6*   ESTGFRAFRICA >60.0   EGFRNONAA 54.9*   , CBC No results for input(s): WBC, HGB, HCT, PLT in the last 48 hours. 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 Recent shingles outbreak affecting face, delayed treatment. Denies tinnitus, fullness, hearing loss, endorsed some R ear pain at admission, then resolved. CT negative, no acute neurologic deficits.    - Start 22 day steroid taper on admission, directions given on discharge. Literature review does not  recommend antiviral therapy. Patient with significant improvement in symptoms.   - Antiemetics: zofran, meclizine, valium  - PT/OT for vestibular therapy: home with home health today and then patient can follow outpatient for further vestibular therapy as warranted.    Hypertension  - continue lisinopril and atenolol       Discharged Condition: good    Disposition: Home or Self Care    Follow Up:  Follow-up Information     Follow up with Zeynep Tomas MD.    Specialty:  Internal Medicine    Why:  Message sent to clinic to schedule hospital discharge follow up and clinic will notify patient    Contact information:    [address]  [facility] [address]  [phone]          Please follow up.    Why:  Home Health/         Patient Instructions:     Diet general     Activity as tolerated     Call MD for:  persistent dizziness, light-headedness, or visual disturbances       Medications:  Reconciled Home Medications:   Discharge Medication List as of 1/19/2017 10:03 AM      START taking these medications    Details   meclizine (ANTIVERT) 25 mg tablet Take 1 tablet (25 mg total) by mouth 3 (three) times daily as needed for Dizziness., Starting 1/19/2017, Until Discontinued, Normal      methylPREDNISolone (MEDROL) 16 MG Tab Take as directed in taper instructions, Normal      ondansetron (ZOFRAN-ODT) 8 MG TbDL Take 1 tablet (8 mg total) by mouth every 6 (six) hours as needed (nausea)., Starting 1/19/2017, Until Discontinued, Normal         CONTINUE these medications which have CHANGED    Details   diazePAM (VALIUM) 5 MG tablet Take 1 tablet (5 mg total) by mouth every 6 (six) hours as needed (dizziness)., Starting 1/19/2017, Until Sat 2/18/17, Print         CONTINUE these medications which have NOT CHANGED    Details   alendronate (FOSAMAX) 70 MG tablet 1 tablet on Sunday with a full glass of water and wait 30 minutes for breakfast and pills. Take sitting or standing, Normal      atenolol (TENORMIN) 25 MG tablet Take  one per day, Normal      B COMPLEX & C NO.10/FOLIC ACID (B COMPLEX WITH C#10-FOLIC ACID ORAL) Take by mouth., Until Discontinued, Historical Med      calcium citrate (CALCITRATE) 200 mg (950 mg) tablet Take 1 tablet by mouth once daily., Until Discontinued, Historical Med      co-enzyme Q-10 50 mg capsule Take 50 mg by mouth once daily., Until Discontinued, Historical Med      fish oil-vit E-fat acid5-hb137 (SUPER OMEGA-3) 400-5 mg-unit Cap Take 1 capsule by mouth Daily., Starting 4/26/2012, Until Discontinued, Historical Med      lisinopril 10 MG tablet Take 1 tablet (10 mg total) by mouth every morning., Starting 7/7/2016, Until Discontinued, Normal      MULTIVITAMIN ORAL Take 1 tablet by mouth Daily., Starting 4/26/2012, Until Discontinued, Historical Med         STOP taking these medications       valacyclovir (VALTREX) 1000 MG tablet Comments:   Reason for Stopping:             Time spent on the discharge of patient: 35 minutes    José Miguel Santos PA-C  Department of Hospital Medicine  [facility]

## 2017-01-22 NOTE — PT/OT/SLP DISCHARGE
Occupational Therapy Discharge Summary    Shima Sorto  MRN: [id number]   Vestibular neuronitis   Patient Discharged from acute Occupational Therapy on 1/19/2017.  Please refer to prior OT note dated on 1/18/2017 for functional status.     Assessment:   Patient appropriate for care in another setting.  GOALS:   Occupational Therapy Goals        Problem: Occupational Therapy Goal    Goal Priority Disciplines Outcome Interventions   Occupational Therapy Goal     OT, PT/OT Ongoing (interventions implemented as appropriate)    Description:  1.  Pt will dress self, inducing minimal vertigo by compensating with either minimal head mvmt or fixating on distant spot  2.  Toilet self with SBA with RW  3.  [place] in standing with SBA x10 min  4.  Toilet transfer with SBA            Reasons for Discontinuation of Therapy Services  Transfer to alternate level of care.      Plan:  Patient Discharged to: Home, recommend OP PT with vertigo specialist.

## 2017-01-23 ENCOUNTER — NURSE TRIAGE (OUTPATIENT)
Dept: ADMINISTRATIVE | Facility: CLINIC | Age: 77
End: 2017-01-23

## 2017-01-23 ENCOUNTER — TELEPHONE (OUTPATIENT)
Dept: ADMINISTRATIVE | Facility: OTHER | Age: 77
End: 2017-01-23

## 2017-01-23 NOTE — TELEPHONE ENCOUNTER
Reason for Disposition   Caller has medication question, adult has minor symptoms, caller declines triage, and triager answers question    Protocols used: ST MEDICATION QUESTION CALL-A-AH    Patient calling to ask if she should continue to take Diazepam 5mg if it is not helping and I stated she does not have to take the med if not helpful (medication is PRN).  Patient stated she will continue the Meclizine 25mg and Prednisone 16mg.  Patient continues to have issues with not able to get services with [facility], PT, and OT.

## 2017-01-23 NOTE — TELEPHONE ENCOUNTER
Patient called asking for her SW who she thinks was Clementina ( It is [facility]). She states her HH was not chosen on Thursday when she was discharged.    She has chosen [facility]. I contacted Inpatient CM and they will reach out to Mrs. Sorto to inform her that [facility] will be reaching out to her for her needs.    Thank you    Suzanne Lei, [facility]  Ext [phone]

## 2017-01-23 NOTE — PLAN OF CARE
Sw left a message for pt to informed that [facility] will provide service.    Tiffanie Gonzales, [facility]  [phone]

## 2017-01-24 ENCOUNTER — TELEPHONE (OUTPATIENT)
Dept: INTERNAL MEDICINE | Facility: CLINIC | Age: 77
End: 2017-01-24

## 2017-01-24 NOTE — TELEPHONE ENCOUNTER
Please have our  call the patient and see if there is a problem getting the services. They were probably ordered from the hospital. GML

## 2017-01-24 NOTE — TELEPHONE ENCOUNTER
----- Message from Claudia Payne sent at 1/24/2017  1:30 PM CST -----  Contact: Self/ [phone]   Pt want to have referral for outpatient physical therapy. Pt stated the nurse agree that she need to get outpatient therapy. Please call and advise     Thank you

## 2017-01-24 NOTE — TELEPHONE ENCOUNTER
Please have our  call the patient regarding HH OT and PT from the hospital: see the previous message. GML

## 2017-01-24 NOTE — TELEPHONE ENCOUNTER
Good Afternoon,        Dr Tomas would like for you to assist Mrs Sorto with Home Health, Occupational Therapy, and Physical Therapy ordered during the patients last hospital stay.

## 2017-01-30 ENCOUNTER — OFFICE VISIT (OUTPATIENT)
Dept: INTERNAL MEDICINE | Facility: CLINIC | Age: 77
End: 2017-01-30
Payer: MEDICARE

## 2017-01-30 VITALS
BODY MASS INDEX: 20.62 KG/M2 | WEIGHT: 131.38 LBS | DIASTOLIC BLOOD PRESSURE: 86 MMHG | SYSTOLIC BLOOD PRESSURE: 162 MMHG | HEIGHT: 67 IN | HEART RATE: 64 BPM

## 2017-01-30 DIAGNOSIS — R33.9 URINARY RETENTION: ICD-10-CM

## 2017-01-30 DIAGNOSIS — M81.0 OSTEOPOROSIS: ICD-10-CM

## 2017-01-30 DIAGNOSIS — Z12.31 ENCOUNTER FOR SCREENING MAMMOGRAM FOR BREAST CANCER: ICD-10-CM

## 2017-01-30 DIAGNOSIS — N20.0 CALCIUM NEPHROLITHIASIS: ICD-10-CM

## 2017-01-30 DIAGNOSIS — Z13.9 SCREENING: ICD-10-CM

## 2017-01-30 DIAGNOSIS — I10 ESSENTIAL HYPERTENSION: ICD-10-CM

## 2017-01-30 DIAGNOSIS — H81.23 VESTIBULAR NEURONITIS, BILATERAL: ICD-10-CM

## 2017-01-30 DIAGNOSIS — M81.0 POSTMENOPAUSAL BONE LOSS: ICD-10-CM

## 2017-01-30 DIAGNOSIS — N32.9 BLADDER DISORDER: ICD-10-CM

## 2017-01-30 DIAGNOSIS — Z09 HOSPITAL DISCHARGE FOLLOW-UP: Primary | ICD-10-CM

## 2017-01-30 PROCEDURE — 99214 OFFICE O/P EST MOD 30 MIN: CPT | Mod: S$PBB,,, | Performed by: INTERNAL MEDICINE

## 2017-01-30 PROCEDURE — 99999 PR PBB SHADOW E&M-EST. PATIENT-LVL V: CPT | Mod: PBBFAC,,, | Performed by: INTERNAL MEDICINE

## 2017-01-30 PROCEDURE — 99215 OFFICE O/P EST HI 40 MIN: CPT | Mod: PBBFAC | Performed by: INTERNAL MEDICINE

## 2017-01-30 RX ORDER — LISINOPRIL 10 MG/1
20 TABLET ORAL EVERY MORNING
Qty: 60 TABLET | Refills: 3 | Status: SHIPPED | OUTPATIENT
Start: 2017-01-30 | End: 2017-05-10 | Stop reason: SDUPTHER

## 2017-01-30 RX ORDER — ALENDRONATE SODIUM 70 MG/1
TABLET ORAL
Qty: 4 TABLET | Refills: 12 | Status: SHIPPED | OUTPATIENT
Start: 2017-01-30 | End: 2018-02-04 | Stop reason: SDUPTHER

## 2017-01-30 NOTE — MR AVS SNAPSHOT
[facility] - [facility] Medicine  [address]  [place] [address]  Phone: [phone]  Fax: [phone]                  Shima Sorto   2017 10:00 AM   Office Visit    Description:  Female : 1940   Provider:  Zeynep Tomas MD   Department:  [facility] - Internal Medicine           Reason for Visit     Hospital Follow Up           Diagnoses this Visit        Comments    Hospital discharge follow-up    -  Primary     Postmenopausal bone loss         Encounter for screening mammogram for breast cancer         Screening         Osteoporosis         Bladder disorder         Vestibular neuronitis, bilateral         Essential hypertension         Calcium nephrolithiasis         Urinary retention                To Do List           Future Appointments        Provider Department Dept Phone    2017 1:30 PM Zeynep Tomas MD [facility] [phone]      Goals (5 Years of Data)     None      Follow-Up and Disposition     Return in about 3 weeks (around 2017) for Follow up hypertension.       These Medications        Disp Refills Start End    lisinopril 10 MG tablet 60 tablet 3 2017     Take 2 tablets (20 mg total) by mouth every morning. Dosage change - Oral    Pharmacy: [facility] [address] [place] [address] [address] AT [facility] Ph #: [phone]       alendronate (FOSAMAX) 70 MG tablet 4 tablet 12 2017     1 tablet on  with a full glass of water and wait 30 minutes for breakfast and pills. Take sitting or standing    Pharmacy: [facility] [address] [place] [address] [address] AT [facility] Ph #: [phone]         [facility]     [facility] On [facility] Nurse Care Line -  Assistance  Registered nurses in the [facility] On Call Center provide clinical advisement, health education, appointment booking, and other advisory services.  Call for this free service at  [phone].             Medications           Message regarding Medications     Verify the changes and/or additions to your medication regime listed below are the same as discussed with your clinician today.  If any of these changes or additions are incorrect, please notify your healthcare provider.        CHANGE how you are taking these medications     Start Taking Instead of    lisinopril 10 MG tablet lisinopril 10 MG tablet    Dosage:  Take 2 tablets (20 mg total) by mouth every morning. Dosage change Dosage:  Take 1 tablet (10 mg total) by mouth every morning.    Reason for Change:  Reorder            Verify that the below list of medications is an accurate representation of the medications you are currently taking.  If none reported, the list may be blank. If incorrect, please contact your healthcare provider. Carry this list with you in case of emergency.           Current Medications     alendronate (FOSAMAX) 70 MG tablet 1 tablet on Sunday with a full glass of water and wait 30 minutes for breakfast and pills. Take sitting or standing    atenolol (TENORMIN) 25 MG tablet Take one per day    B COMPLEX & C NO.10/FOLIC ACID (B COMPLEX WITH C#10-FOLIC ACID ORAL) Take by mouth.    calcium citrate (CALCITRATE) 200 mg (950 mg) tablet Take 1 tablet by mouth once daily.    co-enzyme Q-10 50 mg capsule Take 50 mg by mouth once daily.    fish oil-vit E-fat acid5-hb137 (SUPER OMEGA-3) 400-5 mg-unit Cap Take 1 capsule by mouth Daily.    lisinopril 10 MG tablet Take 2 tablets (20 mg total) by mouth every morning. Dosage change    methylPREDNISolone (MEDROL) 16 MG Tab Take as directed in taper instructions    MULTIVITAMIN ORAL Take 1 tablet by mouth Daily.    diazePAM (VALIUM) 5 MG tablet Take 1 tablet (5 mg total) by mouth every 6 (six) hours as needed (dizziness).    meclizine (ANTIVERT) 25 mg tablet Take 1 tablet (25 mg total) by mouth 3 (three) times daily as needed for Dizziness.    ondansetron (ZOFRAN-ODT) 8 MG  "TbDL Take 1 tablet (8 mg total) by mouth every 6 (six) hours as needed (nausea).           Clinical Reference Information           Vital Signs - Last Recorded  Most recent update: 1/30/2017 10:59 AM by Zeynep Tomas MD    BP Pulse Ht Wt BMI    (!) 162/86 64 5' 6.5" (1.689 m) 59.6 kg (131 lb 6.3 oz) 20.89 kg/m2      Blood Pressure          Most Recent Value    BP  (!)  162/86      Allergies as of 1/30/2017     Asparagus      Immunizations Administered on Date of Encounter - 1/30/2017     None      Orders Placed During Today's Visit      Normal Orders This Visit    Ambulatory consult to Endocrinology     Ambulatory consult to Gynecology     Ambulatory consult to Urology     Future Labs/Procedures Expected by Expires    DXA Bone Density Spine And Hip_Axial Skeleton  1/30/2017 1/30/2018    Mammo Digital Screening Bilat with CAD  1/30/2017 (Approximate) 1/30/2018      "

## 2017-01-30 NOTE — PROGRESS NOTES
Subjective:       Patient ID: Shima Sorto is a 76 y.o. female.    Chief Complaint: Hospital Follow Up    HPI   * Vestibular neuronitis  Vestibular neuritis 2/2 recent shingles infection. Recent shingles outbreak affecting face, delayed treatment. Denies tinnitus, fullness, hearing loss, endorsed some R ear pain at admission, then resolved. CT negative, no acute neurologic deficits.     - Start 22 day steroid taper on admission, directions given on discharge. Literature review does not recommend antiviral therapy. Patient with significant improvement in symptoms.   - Antiemetics: zofran, meclizine, valium  - PT/OT for vestibular therapy: home with home health today and then patient can follow outpatient for further vestibular therapy as warranted.     Hypertension  - continue lisinopril and atenolol         Discharged Condition: good     Disposition: Home or Self Care     Follow Up:      Follow-up Information      Follow up with Zeynep Tomas MD.     Specialty: Internal Medicine     Why: Message sent to clinic to schedule hospital discharge follow up and clinic will notify patient     Contact information:     [address]  [facility] [address]  [phone]           Please follow up.     Why: Home Health/          Overall she is doing better, she has tapered off the diazepam, meclizine and Zofran.  Review of Systems   Constitutional: Negative for chills, fatigue, fever and unexpected weight change.   HENT: Negative for trouble swallowing.    Respiratory: Negative for cough, shortness of breath and wheezing.    Cardiovascular: Negative for chest pain, palpitations and leg swelling.   Gastrointestinal: Negative for abdominal distention, abdominal pain, blood in stool and vomiting.       Objective:      Physical Exam   Constitutional: She appears well-developed and well-nourished.   Neck: No JVD present. No thyromegaly present.   Cardiovascular: Normal rate, normal heart sounds and intact distal  pulses.    Pulmonary/Chest: Effort normal and breath sounds normal. No respiratory distress.     no positional trigger  Assessment:       1. Hospital discharge follow-up    2. Postmenopausal bone loss    3. Encounter for screening mammogram for breast cancer    4. Screening    5. Osteoporosis    6. Bladder disorder    7. Vestibular neuronitis, bilateral    8. Essential hypertension    9. Calcium nephrolithiasis    10. Urinary retention        Plan:       Shima was seen today for hospital follow up.    Diagnoses and all orders for this visit:    Hospital discharge follow-up    Postmenopausal bone loss  -     DXA Bone Density Spine And Hip_Axial Skeleton; Future    Encounter for screening mammogram for breast cancer  -     Mammo Digital Screening Bilat with CAD; Future    Screening  -     Ambulatory consult to Gynecology    Osteoporosis  -     Ambulatory consult to Endocrinology    Bladder disorder  -     Ambulatory consult to Urology    Vestibular neuronitis, bilateral: Continue the taper off of prednisone    Essential hypertension: Increase lisinopril to 20 mg daily    Calcium nephrolithiasis    Urinary retention    Other orders  -     lisinopril 10 MG tablet; Take 2 tablets (20 mg total) by mouth every morning. Dosage change  -     alendronate (FOSAMAX) 70 MG tablet; 1 tablet on Sunday with a full glass of water and wait 30 minutes for breakfast and pills. Take sitting or standing      Return in about 3 weeks (around 2/20/2017) for Follow up hypertension.    New Prescriptions    No medications on file       Modified Medications    Modified Medication Previous Medication    ALENDRONATE (FOSAMAX) 70 MG TABLET alendronate (FOSAMAX) 70 MG tablet       1 tablet on Sunday with a full glass of water and wait 30 minutes for breakfast and pills. Take sitting or standing    1 tablet on Sunday with a full glass of water and wait 30 minutes for breakfast and pills. Take sitting or standing    LISINOPRIL 10 MG TABLET lisinopril  10 MG tablet       Take 2 tablets (20 mg total) by mouth every morning. Dosage change    Take 1 tablet (10 mg total) by mouth every morning.       Orders Placed This Encounter   Procedures    DXA Bone Density Spine And Hip_Axial Skeleton     Standing Status:   Future     Standing Expiration Date:   1/30/2018    Mammo Digital Screening Bilat with CAD     Standing Status:   Future     Standing Expiration Date:   1/30/2018    Ambulatory consult to Gynecology     Referral Priority:   Routine     Referral Type:   Consultation     Referral Reason:   Specialty Services Required     Requested Specialty:   Gynecology     Number of Visits Requested:   1    Ambulatory consult to Endocrinology     Referral Priority:   Routine     Referral Type:   Consultation     Requested Specialty:   Endocrinology     Number of Visits Requested:   1    Ambulatory consult to Urology     Referral Priority:   Routine     Referral Type:   Consultation     Referral Reason:   Specialty Services Required     Requested Specialty:   Urology     Number of Visits Requested:   1       Labs, studies and consults associated with this visit were reviewed

## 2017-01-31 ENCOUNTER — PATIENT MESSAGE (OUTPATIENT)
Dept: INTERNAL MEDICINE | Facility: CLINIC | Age: 77
End: 2017-01-31

## 2017-01-31 ENCOUNTER — HOSPITAL ENCOUNTER (OUTPATIENT)
Dept: RADIOLOGY | Facility: CLINIC | Age: 77
Discharge: HOME | End: 2017-01-31
Attending: INTERNAL MEDICINE
Payer: MEDICARE

## 2017-01-31 DIAGNOSIS — M81.0 POSTMENOPAUSAL BONE LOSS: ICD-10-CM

## 2017-01-31 PROCEDURE — 77080 DXA BONE DENSITY AXIAL: CPT | Mod: 26,,, | Performed by: INTERNAL MEDICINE

## 2017-01-31 PROCEDURE — 77080 DXA BONE DENSITY AXIAL: CPT | Mod: TC

## 2017-02-21 ENCOUNTER — PATIENT MESSAGE (OUTPATIENT)
Dept: ADMINISTRATIVE | Facility: OTHER | Age: 77
End: 2017-02-21

## 2017-02-21 ENCOUNTER — IMMUNIZATION (OUTPATIENT)
Dept: INTERNAL MEDICINE | Facility: CLINIC | Age: 77
End: 2017-02-21
Payer: MEDICARE

## 2017-02-21 ENCOUNTER — OFFICE VISIT (OUTPATIENT)
Dept: INTERNAL MEDICINE | Facility: CLINIC | Age: 77
End: 2017-02-21
Payer: MEDICARE

## 2017-02-21 VITALS
SYSTOLIC BLOOD PRESSURE: 102 MMHG | WEIGHT: 132.94 LBS | BODY MASS INDEX: 20.87 KG/M2 | DIASTOLIC BLOOD PRESSURE: 64 MMHG | HEART RATE: 60 BPM | HEIGHT: 67 IN

## 2017-02-21 DIAGNOSIS — I10 ESSENTIAL HYPERTENSION: Primary | ICD-10-CM

## 2017-02-21 DIAGNOSIS — R53.83 FATIGUE, UNSPECIFIED TYPE: ICD-10-CM

## 2017-02-21 DIAGNOSIS — Z23 IMMUNIZATION DUE: ICD-10-CM

## 2017-02-21 DIAGNOSIS — M81.0 OSTEOPOROSIS: ICD-10-CM

## 2017-02-21 PROCEDURE — 99999 PR PBB SHADOW E&M-EST. PATIENT-LVL V: CPT | Mod: PBBFAC,,, | Performed by: INTERNAL MEDICINE

## 2017-02-21 PROCEDURE — G0008 ADMIN INFLUENZA VIRUS VAC: HCPCS | Mod: PBBFAC

## 2017-02-21 PROCEDURE — 90670 PCV13 VACCINE IM: CPT | Mod: ,,, | Performed by: INTERNAL MEDICINE

## 2017-02-21 PROCEDURE — 90662 IIV NO PRSV INCREASED AG IM: CPT | Mod: PBBFAC | Performed by: INTERNAL MEDICINE

## 2017-02-21 PROCEDURE — G0009 ADMIN PNEUMOCOCCAL VACCINE: HCPCS | Mod: ,,, | Performed by: INTERNAL MEDICINE

## 2017-02-21 PROCEDURE — 99214 OFFICE O/P EST MOD 30 MIN: CPT | Mod: S$PBB,25,, | Performed by: INTERNAL MEDICINE

## 2017-02-21 PROCEDURE — G0009 ADMIN PNEUMOCOCCAL VACCINE: HCPCS | Mod: PBBFAC

## 2017-02-21 NOTE — MR AVS SNAPSHOT
[facility] - [facility]  [address]  [facility] [address]  Phone: [phone]  Fax: [phone]                  Shima Sorto   2017 2:00 PM   Office Visit    Description:  Female : 1940   Provider:  Zeynep Tomas MD   Department:  [facility]           Reason for Visit     Follow-up           Diagnoses this Visit        Comments    Essential hypertension    -  Primary     Fatigue, unspecified type         Osteoporosis         Immunization due                To Do List           Future Appointments        Provider Department Dept Phone    3/23/2017 10:00 AM MD [facility] - Internal Medicine [phone]    3/31/2017 10:00 AM [facility] [id number] SCREEN INT MED [facility] [phone]      Goals (5 Years of Data)     None      Follow-Up and Disposition     Return in about 1 month (around 3/21/2017) for Follow up hypertension.      [facility]     [facility] Nurse Care Line -  Assistance  Registered nurses in the [facility] Center provide clinical advisement, health education, appointment booking, and other advisory services.  Call for this free service at [phone].             Medications           Message regarding Medications     Verify the changes and/or additions to your medication regime listed below are the same as discussed with your clinician today.  If any of these changes or additions are incorrect, please notify your healthcare provider.             Verify that the below list of medications is an accurate representation of the medications you are currently taking.  If none reported, the list may be blank. If incorrect, please contact your healthcare provider. Carry this list with you in case of emergency.           Current Medications     alendronate (FOSAMAX) 70 MG tablet 1 tablet on  with a full glass of water and wait 30 minutes for breakfast and pills. Take sitting or standing     "atenolol (TENORMIN) 25 MG tablet Take one per day    B COMPLEX & C NO.10/FOLIC ACID (B COMPLEX WITH C#10-FOLIC ACID ORAL) Take by mouth.    calcium citrate (CALCITRATE) 200 mg (950 mg) tablet Take 1 tablet by mouth once daily.    co-enzyme Q-10 50 mg capsule Take 50 mg by mouth once daily.    diazePAM (VALIUM) 5 MG tablet Take 1 tablet (5 mg total) by mouth every 6 (six) hours as needed (dizziness).    fish oil-vit E-fat acid5-hb137 (SUPER OMEGA-3) 400-5 mg-unit Cap Take 1 capsule by mouth Daily.    lisinopril 10 MG tablet Take 2 tablets (20 mg total) by mouth every morning. Dosage change    meclizine (ANTIVERT) 25 mg tablet Take 1 tablet (25 mg total) by mouth 3 (three) times daily as needed for Dizziness.    MULTIVITAMIN ORAL Take 1 tablet by mouth Daily.    ondansetron (ZOFRAN-ODT) 8 MG TbDL Take 1 tablet (8 mg total) by mouth every 6 (six) hours as needed (nausea).    methylPREDNISolone (MEDROL) 16 MG Tab Take as directed in taper instructions           Clinical Reference Information           Your Vitals Were     BP Pulse Height Weight BMI    102/64 60 5' 6.5" (1.689 m) 60.3 kg (132 lb 15 oz) 21.14 kg/m2      Blood Pressure          Most Recent Value    BP  102/64      Allergies as of 2/21/2017     Asparagus      Immunizations Administered on Date of Encounter - 2/21/2017     Name Date Dose VIS Date Route    Pneumococcal Conjugate - 13 Valent 2/21/2017 0.5 mL 11/5/2015 Intramuscular      Orders Placed During Today's Visit      Normal Orders This Visit    Ambulatory consult to Endocrinology     Ambulatory consult to Sleep Disorders     Assign [facility] Onboarding Questionnaire Series     Hypertension Digital Medicine ([facility])  Enrollment Order     Pneumococcal Conjugate Vaccine (13 Valent) (IM)       Hypertension Digital Medicine Program Information              As discussed, you could benefit from enrolling in the Hypertension Digital Medicine Program. The goal of the program is to help you effectively manage your " high blood pressure through an appropriate balance of medication and lifestyle changes, all from the comfort of your own home. Effectively managed blood pressure reduces your risk of having a heart attack or a stroke in the future, so you can enjoy a long and healthy life. We want to make blood pressure control your goal.        What is the Hypertension Digital Medicine Program?  Finding the right balance in managing high blood pressure is different for every person, and we will tailor our treatment approach based on your individual needs using the most current evidence-based guidelines.  As a participant, you will be able to send home blood pressure readings, on your schedule, directly into your medical record at [facility]. I, along with a team of pharmacists, will monitor this data, help you adjust your medication(s) and/or make lifestyle recommendations to better manage your hypertension.       What are the requirements of the program?   Participating in the program is as easy as 1 - 2 - 3.   1. Smartphone - You must have your own smartphone to participate (either an iPhone or an Android phone such as [employer], [employer]s, HTC, LG, Get[employer], or [employer]).    2. [facility] account - [facility] offers a great way to connect through the online patient portal, [facility], which is free and provides you access to your [facility] medical record.  3. Digital blood pressure cuff - Using this blood pressure cuff that hooks up to your smartphone, you will be able to send in your home blood pressure readings to the Hypertension Digital Medicine Team. We have made arrangements to offer blood pressure cuffs at a discount for our programs participants.           What can I do to get started?   Complete the Hypertension Digital Medicine Patient Consent questionnaire already available in your [facility] account. To access and complete this questionnaire, either  - Use the [facility] website on a computer and select My Medical Record, then  "Questionnaires.  - Use the [email or web address] dmitry on your smartphone and select "Questionnaires".    Once you have given consent, additional onboarding questionnaires will be assigned to you to complete prior to starting the program. You must return to the "Questionnaires" page of your [facility] account to start the additional questionnaires.     How do I purchase a digital blood pressure cuff and obtain a discount?  [facility] has negotiated a discounted price from industry leading healthcare technology companies. Patients using an Apple iPhone can purchase an Bizzingo Ease Blood Pressure cuff for $33 (originally $39.99). While supplies last, Android users can purchase the Unutility Electric Blood Pressure Monitor for $45 (originally $129.95).  Purchase locations will be sent once all onboarding questionnaires have been completed (see above).    If you have any questions regarding this program or would like more information, please visit our Hypertension Digital Medicine website ([email or web address]) or call Digital Medicine Patient Support at [phone].          Language Assistance Services     ATTENTION: Language assistance services are available, free of charge. Please call [phone].      ATENCIÓN: Si habla español, tiene a norton disposición servicios gratuitos de asistencia lingüística. Llame al [phone].     RAJ Ý: N?u b?n nói Ti?ng Vi?t, có các d?ch v? h? tr? ngôn ng? mi?n phí dành cho b?n. G?i s? [phone].         [facility] complies with applicable Federal civil rights laws and does not discriminate on the basis of race, color, national origin, age, disability, or sex.        "

## 2017-02-21 NOTE — PROGRESS NOTES
Subjective:       Patient ID: Shima Sorto is a 76 y.o. female.    Chief Complaint: Follow-up    HPI   Patient is here for follow up of multiple problems that are related to a recent illness and hospitalization.  She has been noted to have labile hypertension and we discussed today the hypertension digital medicine program which the patient is interested in it will give us a better look at her blood pressures and allow for better adjustment of medication.    We discussed the fatigue she has experienced and the fact that the sleep apnea studies could now be done on an outpatient basis.  This can be done with home monitoring and she would like to consider this.      The patient has a history of osteoporosis and is currently on alendronate.  Her last bone density suggests that there is been no improvement and we discussed a endocrinology consult.  She has been followed in endocrinology by Dr. Thompson.    The patient also has continued to have some vertigo and I've suggested that she take a much lower dose of the medicine 6.25 mg at bedtime        Review of Systems   Constitutional: Negative for activity change, chills, fever and unexpected weight change.   Respiratory: Negative for cough, chest tightness, shortness of breath and wheezing.    Cardiovascular: Negative for chest pain, palpitations and leg swelling.       Patient still experiences some vertigo  Objective:      Physical Exam   Constitutional: She appears well-developed and well-nourished.   Neck: No JVD present. No thyromegaly present.   Cardiovascular: Normal rate, normal heart sounds and intact distal pulses.    Pulmonary/Chest: Effort normal and breath sounds normal. No respiratory distress.       Assessment:       1. Essential hypertension    2. Fatigue, unspecified type    3. Osteoporosis    4. Immunization due        Plan:       Shima was seen today for follow-up.    Diagnoses and all orders for this visit:    Essential hypertension:  Discussed program  -     Hypertension Digital Medicine (HDMP)  Enrollment Order  -     Assign HDMP Onboarding Questionnaire Series    Fatigue, unspecified type: Discuss further workup for sleep apnea  -     Cancel: Ambulatory consult to Sleep Disorders  -     Ambulatory consult to Sleep Disorders    Osteoporosis: May need change of medication  -     Ambulatory consult to Endocrinology    Immunization due  -     Pneumococcal Conjugate Vaccine (13 Valent) (IM)      Return in about 1 month (around 3/21/2017) for Follow up hypertension.    New Prescriptions    No medications on file       Modified Medications    No medications on file       Orders Placed This Encounter   Procedures    Pneumococcal Conjugate Vaccine (13 Valent) (IM)    Ambulatory consult to Sleep Disorders     Referral Priority:   Routine     Referral Type:   Consultation     Number of Visits Requested:   1    Ambulatory consult to Endocrinology     Referral Priority:   Routine     Referral Type:   Consultation     Requested Specialty:   Endocrinology     Number of Visits Requested:   1    Hypertension Digital Medicine (HDMP)  Enrollment Order       Labs, studies and consults associated with this visit were reviewed

## 2017-02-21 NOTE — PT/OT/SLP DISCHARGE
Physical Therapy Discharge Summary    Shima Sorto  MRN: [id number]   Vestibular neuronitis   Patient Discharged from acute Physical Therapy on 2017.  Please refer to prior PT noted date on 2017 for functional status.     Assessment:   Patient appropriate for care in another setting.  GOALS:   Physical Therapy Goals        Problem: Physical Therapy Goal    Goal Priority Disciplines Outcome Goal Variances Interventions   Physical Therapy Goal     PT/OT, PT Ongoing (interventions implemented as appropriate)     Description:  Goals to be met by: 2017     Patient will increase functional independence with mobility by performin. Sit to stand transfer with Modified [place]  2. Gait  x 200 feet with Supervision with or without AD.   3. Ascend/descend 16 stair with left Handrails Contact Guard Assistance.               Reasons for Discontinuation of Therapy Services  Transfer to alternate level of care.      Plan:  Patient Discharged to: Home with Home Health Service.    BIJAN SAN, PT  2017

## 2017-02-22 ENCOUNTER — NURSE TRIAGE (OUTPATIENT)
Dept: ADMINISTRATIVE | Facility: CLINIC | Age: 77
End: 2017-02-22

## 2017-02-22 ENCOUNTER — OFFICE VISIT (OUTPATIENT)
Dept: SLEEP MEDICINE | Facility: CLINIC | Age: 77
End: 2017-02-22
Payer: MEDICARE

## 2017-02-22 VITALS
BODY MASS INDEX: 21.33 KG/M2 | SYSTOLIC BLOOD PRESSURE: 120 MMHG | WEIGHT: 132.69 LBS | HEIGHT: 66 IN | HEART RATE: 72 BPM | DIASTOLIC BLOOD PRESSURE: 65 MMHG

## 2017-02-22 DIAGNOSIS — R09.89 LABILE BLOOD PRESSURE: ICD-10-CM

## 2017-02-22 DIAGNOSIS — G47.30 SLEEP APNEA, UNSPECIFIED TYPE: Primary | ICD-10-CM

## 2017-02-22 PROCEDURE — 99213 OFFICE O/P EST LOW 20 MIN: CPT | Mod: PBBFAC | Performed by: PSYCHIATRY & NEUROLOGY

## 2017-02-22 PROCEDURE — 99999 PR PBB SHADOW E&M-EST. PATIENT-LVL III: CPT | Mod: PBBFAC,,, | Performed by: PSYCHIATRY & NEUROLOGY

## 2017-02-22 PROCEDURE — 99213 OFFICE O/P EST LOW 20 MIN: CPT | Mod: S$PBB,,, | Performed by: PSYCHIATRY & NEUROLOGY

## 2017-02-22 NOTE — PROGRESS NOTES
"This 76 y.o. female patient returns for the evaluation of possible obstructive sleep apnea.     Patient was unable to complete sleep study after last visit 2 years ago. 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  She continues to complain of being excessively sleepy by mid afternoon.  "im tired of being tired". Reports this is ongoing since the 1980's.    KATHERIN risk symptoms:  Occasional soft snoring  Gasping for air in sleep  Excessively sleepy during the day    PRIOR SLEEP STUDY:  Per Dr. Tomas "Not sleeping enough or not sleeping the right way, puts her head down on her desk or while driving at times wants to take a nap"    "im tired of being tired". Reports this is ongoing since the 1980's.  Feels okay when she wakes up in the morning, but gets sleepy later in the day.  Excessively sleepy at times during the day. She has dozed off at traffic light.    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    ESS = 10    Takes decaf coffee in am; 3-4 cups in the morning  No trouble breathing through her nose.  Feels that she can breathe better when she stretches her jaw forward; Sometimes gasping when sitting quietly during the day  Snoring very softly, sometimes, reported by her late     No report of restless legs or squirming at night.    SLEEP ROUTINE:   Bed partner: sleeps alone   Time to bed: 11pm - MN, sometimes later   Sleep onset latency: very brief   Disruptions or awakenings: 1-2 times due to bathroom   Wakeup time: 4:30-4:45 am on Monday / 5:30-6 am / rarely 7 am   Perceived sleep quality: unsure   Daytime naps: none    Past Medical History   Diagnosis Date    Allergy      seasonal    Anemia     Basal cell carcinoma 7/2013     forehead    Hx of colonic polyps     Hypertension     " "Medullary sponge kidney     MVP (mitral valve prolapse)     Osteoporosis, senile     Renal calculi     TMJ syndrome      sometimes jaw clicking/jaw pain    Urinary retention     Vertigo 1/17/2017    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    Visual impairment      reading glasses       Past Surgical History   Procedure Laterality Date    [facility]  [facility]    Mohs procedure      Interstim placed stage 1         Family History   Problem Relation Age of Onset    Kidney disease Mother     Heart disease Father     Breast cancer Maternal Aunt     Anesthesia problems Neg Hx     Malignant hypertension Neg Hx     Hypotension Neg Hx     Malignant hyperthermia Neg Hx     Pseudochol deficiency Neg Hx     Colon cancer Neg Hx     Ovarian cancer Neg Hx     Melanoma Neg Hx     Psoriasis Neg Hx     Lupus Neg Hx     Eczema Neg Hx     Acne Neg Hx        Social History   Substance Use Topics    Smoking status: Former Smoker     Packs/day: 0.50     Years: 8.00     Quit date: 8/22/1964    Smokeless tobacco: Never Used    Alcohol use 1.2 oz/week     2 Glasses of wine per week      Comment: daily   Teaches social policy at [employer]    ALLERGIES: Reviewed in EPIC    CURRENT MEDICATIONS: Reviewed in EPIC    REVIEW OF SYSTEMS:  Sleep related symptoms as per HPI;    Denies weight gain;    Denies sinus problems;    Urinary frequency;    Otherwise, a balance of systems reviewed is negative.    PHYSICAL EXAM:  Visit Vitals    /65    Pulse 72    Ht 5' 6" (1.676 m)    Wt 60.2 kg (132 lb 11.5 oz)    BMI 21.42 kg/m2         ASSESSMENT:    1. Sleep Apnea, unspecifice. The patient symptomatically has snoring and excessive daytime sleepiness with exam findings of a crowded oral airway. This warrants further investigation for possible obstructive sleep apnea. She has medical co-morbidities of labile blood pressure in setting of hypertension.    2. Continues to have daytime hypersomnia, despite increasing " time in bed.         PLAN:    Diagnostic: Overnight polysomnogram, in-lab. The nature of this procedure and its indication was discussed with the patient.    Treatment:  1. Patient counseled on sufficient sleep needs.  2. She would prefer an oral appliance, should she test positive for KATHERIN    Precautions: The patient was advised to abstain from driving should they feel sleepy or drowsy.    Follow up: I will see the patient back after the overnight polysomnogram has been completed. At this time, we can review the data and discuss potential treatment options. MD/NP

## 2017-02-22 NOTE — PATIENT INSTRUCTIONS
Carmen or Josh will contact you to schedule your sleep study. Their number is [phone] (ext 1). The [facility] [place], operated by [facility] is located on 7th floor of the [facility].    We will call you when the sleep study results are ready - if you have not heard from us by 2 weeks from the date of the study, please call [phone] (ext 2).    You are advised to abstain from driving should you feel sleepy or drowsy.

## 2017-02-22 NOTE — LETTER
February 22, 2017      Zeynep Tomas MD  [address]  [facility] [address]           [facility]  [address]  [facility] [address]  Phone: [phone]          Patient: Shima Sorto   MR Number: [id number]   YOB: 1940   Date of Visit: 2/22/2017       Dear Dr. Zeynep Tomas:    Thank you for referring Shima Sorto to me for evaluation. Attached you will find relevant portions of my assessment and plan of care.    If you have questions, please do not hesitate to call me. I look forward to following Shima Sorto along with you.    Sincerely,    Bryan Chamberlain MD    Enclosure  CC:  No Recipients    If you would like to receive this communication electronically, please contact [email or web address] or [phone] to request more information on [employer] access.    For providers and/or their staff who would like to refer a patient to [facility], please contact us through our one-stop-shop provider referral line, [facility], at [phone].    If you feel you have received this communication in error or would no longer like to receive these types of communications, please e-mail [email or web address]

## 2017-02-22 NOTE — MR AVS SNAPSHOT
[facility]  [address]  [facility] [address]  Phone: [phone]                  Shima Sorto   2017 10:00 AM   Office Visit    Description:  Female : 1940   Provider:  Bryan Chamberlain MD   Department:  [facility]           Diagnoses this Visit        Comments    Sleep apnea, unspecified type    -  Primary     Labile blood pressure                To Do List           Future Appointments        Provider Department Dept Phone    3/23/2017 10:00 AM MD Fransico Santiago ayesha - Internal Medicine [phone]    3/31/2017 10:00 AM NOM [id number] SCREEN INT MED [facility] [phone]    2017 11:30 AM ZEYAD Harris - Endo/Diab/Metab [phone]      Goals (5 Years of Data)     None      Follow-Up and Disposition     Return after sleep study.      [facility]     [facility] -  Assistance  Registered nurses in the [facility] provide clinical advisement, health education, appointment booking, and other advisory services.  Call for this free service at [phone].             Medications           Message regarding Medications     Verify the changes and/or additions to your medication regime listed below are the same as discussed with your clinician today.  If any of these changes or additions are incorrect, please notify your healthcare provider.             Verify that the below list of medications is an accurate representation of the medications you are currently taking.  If none reported, the list may be blank. If incorrect, please contact your healthcare provider. Carry this list with you in case of emergency.           Current Medications     alendronate (FOSAMAX) 70 MG tablet 1 tablet on  with a full glass of water and wait 30 minutes for breakfast and pills. Take sitting or standing    atenolol (TENORMIN) 25 MG tablet Take one per day    B COMPLEX  "& C NO.10/FOLIC ACID (B COMPLEX WITH C#10-FOLIC ACID ORAL) Take by mouth.    calcium citrate (CALCITRATE) 200 mg (950 mg) tablet Take 1 tablet by mouth once daily.    co-enzyme Q-10 50 mg capsule Take 50 mg by mouth once daily.    fish oil-vit E-fat acid5-hb137 (SUPER OMEGA-3) 400-5 mg-unit Cap Take 1 capsule by mouth Daily.    lisinopril 10 MG tablet Take 2 tablets (20 mg total) by mouth every morning. Dosage change    meclizine (ANTIVERT) 25 mg tablet Take 1 tablet (25 mg total) by mouth 3 (three) times daily as needed for Dizziness.    MULTIVITAMIN ORAL Take 1 tablet by mouth Daily.    diazePAM (VALIUM) 5 MG tablet Take 1 tablet (5 mg total) by mouth every 6 (six) hours as needed (dizziness).    methylPREDNISolone (MEDROL) 16 MG Tab Take as directed in taper instructions    ondansetron (ZOFRAN-ODT) 8 MG TbDL Take 1 tablet (8 mg total) by mouth every 6 (six) hours as needed (nausea).           Clinical Reference Information           Your Vitals Were     BP Pulse Height Weight BMI    120/65 72 5' 6" (1.676 m) 60.2 kg (132 lb 11.5 oz) 21.42 kg/m2      Blood Pressure          Most Recent Value    BP  120/65      Allergies as of 2/22/2017     Asparagus      Immunizations Administered on Date of Encounter - 2/22/2017     None      Orders Placed During Today's Visit     Future Labs/Procedures Expected by Expires    Polysomnogram (CPAP will be added if patient meets diagnostic criteria.)  As directed 2/22/2018      Instructions    Carmen or Josh will contact you to schedule your sleep study. Their number is [phone] (ext 1). The [facility] is located on 7th floor of the [facility].    We will call you when the sleep study results are ready - if you have not heard from us by 2 weeks from the date of the study, please call [phone] (ext 2).    You are advised to abstain from driving should you feel sleepy or drowsy.         Language Assistance Services     ATTENTION: Language assistance services are " available, free of charge. Please call [phone].      ATENCIÓN: Si habla cindi, tiene a norton disposición servicios gratuitos de asistencia lingüística. Llame al [phone].     CHÚ Ý: N?u b?n nói Ti?ng Vi?t, có các d?ch v? h? tr? ngôn ng? mi?n phí dành cho b?n. G?i s? [phone].         [facility] complies with applicable Federal civil rights laws and does not discriminate on the basis of race, color, national origin, age, disability, or sex.

## 2017-02-23 NOTE — TELEPHONE ENCOUNTER
"  Reason for Disposition   Influenza (TIV; Injection) injected vaccine reactions  (all triage questions negative)    Answer Assessment - Initial Assessment Questions  1. SYMPTOMS: "What is the main symptom?" (e.g., redness, swelling, pain)       Has some redness on upper arm where she had flu immunization yesterday  2. ONSET: "When was the vaccine (shot) given?" "How much later did the __________ begin?" (e.g., hours, days ago)       Not sure  3. SEVERITY: "How bad is it?"       Redness to top of arm/shoulder  4. FEVER: "Is there a fever?" If so, ask: "What is it, how was it measured, and when did it start?"       Not reported  5. IMMUNIZATIONS GIVEN: "What shots have you recently received?"      Flu in affected arm, pneumonia in other arm yesterday  6. PAST REACTIONS: "Have you reacted to immunizations before?" If so, ask: "What happened?"      Not reported  7. OTHER SYMPTOMS: "Do you have any other symptoms?"      None reported.    Protocols used: ST IMMUNIZATION [id number]    "

## 2017-02-27 ENCOUNTER — PATIENT OUTREACH (OUTPATIENT)
Dept: OTHER | Facility: OTHER | Age: 77
End: 2017-02-27
Payer: MEDICARE

## 2017-02-27 NOTE — PROGRESS NOTES
Last 5 Patient Entered Readings                                                               Current 30 Day Average: 127/61     Recent Readings 2/27/2017 2/26/2017 2/26/2017 2/26/2017 2/25/2017    Systolic BP (mmHg) 128 123 121 127 119    Diastolic BP (mmHg) 60 60 63 65 58    Pulse 55 58 57 57 59        Initial introduction completed with the Mrs. Shima Sorto and the role of the health  was explained.   We discussed the following information:  Exercise - Pt exercises everyday - free weights, riding her stationary bike, walking and stretching.  Diet - Patient has been on a low sodium diet since she was a child (ever since her father had a heart attack). She does eat out sometimes but she mostly consumes low sodium foods and does not add salt to anything. We have noticed that every once and a while, she will splurge on some chips etc and we believe that is why her BP readings are sometimes elevated so she will make sure to monitor that closely.     Resources on diet and exercise were sent.     Patient is aware that I am not available for emergencies and to call 911 or [facility] on call if one arises.  Patient is aware of the importance of medication adherence.  Patient is aware of the importance of diet and exercise.  Patient is aware that his sodium intake should be no more than 2000mg per day.  Patient is aware that the recommended physical activity each week should be about 30 minutes per day at least 5 times per week.   Patient is aware of the importance of taking BP readings at least weekly if not more and during different times each day.  Patient is aware that the health  can be used as a resource for lifestyle modifications to help reduce or maintain a healthy BP

## 2017-02-28 ENCOUNTER — PATIENT MESSAGE (OUTPATIENT)
Dept: ADMINISTRATIVE | Facility: OTHER | Age: 77
End: 2017-02-28

## 2017-03-03 ENCOUNTER — TELEPHONE (OUTPATIENT)
Dept: SLEEP MEDICINE | Facility: OTHER | Age: 77
End: 2017-03-03

## 2017-03-11 ENCOUNTER — HOSPITAL ENCOUNTER (OUTPATIENT)
Dept: SLEEP MEDICINE | Facility: OTHER | Age: 77
Discharge: HOME | End: 2017-03-11
Attending: PSYCHIATRY & NEUROLOGY
Payer: MEDICARE

## 2017-03-11 DIAGNOSIS — G47.33 OSA (OBSTRUCTIVE SLEEP APNEA): ICD-10-CM

## 2017-03-11 DIAGNOSIS — R09.89 LABILE BLOOD PRESSURE: ICD-10-CM

## 2017-03-11 DIAGNOSIS — G47.30 SLEEP APNEA, UNSPECIFIED TYPE: ICD-10-CM

## 2017-03-11 PROCEDURE — 95810 POLYSOM 6/> YRS 4/> PARAM: CPT

## 2017-03-11 PROCEDURE — 95810 POLYSOM 6/> YRS 4/> PARAM: CPT | Mod: 26,,, | Performed by: PSYCHIATRY & NEUROLOGY

## 2017-03-12 NOTE — PROGRESS NOTES
Baseline PSG was performed on [facility]. Procedure was explained and questions were answered prior to the study.    Patient did not meet criteria for CPAP treatment.    EKG:  Occasional PAC    Difficulties:  Changed EKG placement from Lead One to Lead Two due to poor signal; Averaged channels due to EKG artifact

## 2017-03-14 ENCOUNTER — PATIENT OUTREACH (OUTPATIENT)
Dept: OTHER | Facility: OTHER | Age: 77
End: 2017-03-14
Payer: MEDICARE

## 2017-03-14 NOTE — LETTER
"   Cece Morgan, PharmD  [address] [place] [address]     Dear Shima Sorto,    Welcome to the [facility]!         My name is eCce Morgan and I am your dedicated clinical pharmacist.  As an expert in medication management, I will help ensure that the medications you are taking continue to provide you with the intended benefits.      This is Beth Villaseñor and she will be your health  for the duration of the program.  Her job is to help you identify lifestyle changes to improve your blood pressure control.  You will talk about nutrition, exercise, and other ways that you may be able to adjust your current habits to better your health. Together, we will work to improve your overall health and encourage you to meet your goals for a healthier lifestyle.    What we expect from YOU:    You will need to take blood pressure readings multiple times a week and no less than one reading per week.   It is important that you take your measurements at different times during the day, when possible.     What you should expect from your Digital Medicine Care Team:   We will provide you with education about high blood pressure, including lifestyle changes that could help you to control your blood pressure.   We will review your weekly readings and provide you with monthly blood pressure progress reports after you have been in the program for more than 30 days.   We will send monthly progress reports on your blood pressure control to your physician so they can follow along with your progress as well.    You will be able to reach me by phone at [phone] or through your [facility] account by clicking "Send a message to your doctor's office" on the home screen then selecting my name in the "To the office of:" field.     I look forward to working with you to achieve your blood pressure goals!    Sincerely,    Cece Morgan, [facility]  Your personal " Clinical Pharmacist    Please visit [email or web address] to learn more about high blood pressure and what you can do lower your blood pressure.                                                                                         Shima Sorto  [address] [place] [address]

## 2017-03-14 NOTE — PROGRESS NOTES
Last 5 Patient Entered Readings                                                               Current 30 Day Average: 126/61     Recent Readings 3/13/2017 3/13/2017 3/13/2017 3/13/2017 3/13/2017    Systolic BP (mmHg) 131 147 145 105 120    Diastolic BP (mmHg) 65 72 65 55 60    Pulse 54 51 51 56 56        Hypertension Medications             atenolol (TENORMIN) 25 MG tablet Take one per day. Patient states she takes 12.5mg -qam    lisinopril 10 MG tablet Take 2 tablets (20 mg total) by mouth every morning. Dosage change        Called patient to introduce her into the HDM (hypertension digital medicine) clinic.     Verified the following information with the patient:  Drug allergies  Medication adherence- Patient states she is adherent.     Screening questionnaires:  Depression- not indicated    Sleep apnea- not indicated    Explained that we expect her to obtain several blood pressures/week at random times of day.     Explained that our goal is to get her BP to consistently below 140/90mmHg.     Patient and I agreed that the patient will take her BP daily to every other day at varying times of the day.     I will plan to follow-up with the patient in 4 weeks.    Emailed patient link to [facility] webpage as well as my direct phone number in case she has in questions.

## 2017-03-16 ENCOUNTER — PATIENT OUTREACH (OUTPATIENT)
Dept: OTHER | Facility: OTHER | Age: 77
End: 2017-03-16
Payer: MEDICARE

## 2017-03-16 NOTE — PROGRESS NOTES
Last 5 Patient Entered Readings                                                               Current 30 Day Average: 127/62     Recent Readings 3/15/2017 3/15/2017 3/14/2017 3/14/2017 3/14/2017    Systolic BP (mmHg) 173 176 124 140 129    Diastolic BP (mmHg) 80 85 61 66 63    Pulse 51 50 52 54 55        Followed up with patient to discuss her elevated readings from last night. She explained that she was under a lot of stress last night dealing with issues with her health insurance coverage etc. She denies symptoms and will take another reading soon to see how her BP is. I will notify her PharmD.

## 2017-03-21 ENCOUNTER — TELEPHONE (OUTPATIENT)
Dept: SLEEP MEDICINE | Facility: OTHER | Age: 77
End: 2017-03-21

## 2017-03-22 ENCOUNTER — OFFICE VISIT (OUTPATIENT)
Dept: SLEEP MEDICINE | Facility: CLINIC | Age: 77
End: 2017-03-22
Payer: MEDICARE

## 2017-03-22 VITALS
BODY MASS INDEX: 21.36 KG/M2 | WEIGHT: 132.94 LBS | DIASTOLIC BLOOD PRESSURE: 74 MMHG | HEIGHT: 66 IN | HEART RATE: 52 BPM | SYSTOLIC BLOOD PRESSURE: 110 MMHG

## 2017-03-22 DIAGNOSIS — G47.33 OSA (OBSTRUCTIVE SLEEP APNEA): Primary | ICD-10-CM

## 2017-03-22 PROCEDURE — 99999 PR PBB SHADOW E&M-EST. PATIENT-LVL III: CPT | Mod: PBBFAC,,, | Performed by: NURSE PRACTITIONER

## 2017-03-22 PROCEDURE — 99213 OFFICE O/P EST LOW 20 MIN: CPT | Mod: PBBFAC | Performed by: NURSE PRACTITIONER

## 2017-03-22 PROCEDURE — 99214 OFFICE O/P EST MOD 30 MIN: CPT | Mod: S$PBB,,, | Performed by: NURSE PRACTITIONER

## 2017-03-22 NOTE — PROGRESS NOTES
"Shima Sorto returns to discuss sleep study results and potential treatment options. Last seen in clinic by Dr. Chamberlain 02/22/2017. This is her initial visit with me.       02/22/2017 Dr. Chamberlain This 76 y.o. female patient returns for the evaluation of possible obstructive sleep apnea.     Patient was unable to complete sleep study after last visit 2 years ago. 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  She continues to complain of being excessively sleepy by mid afternoon.  "im tired of being tired". Reports this is ongoing since the 1980's.    KATHERIN risk symptoms:  Occasional soft snoring  Gasping for air in sleep  Excessively sleepy during the day    PRIOR SLEEP STUDY:  Per Dr. Tomas "Not sleeping enough or not sleeping the right way, puts her head down on her desk or while driving at times wants to take a nap"    "im tired of being tired". Reports this is ongoing since the 1980's.  Feels okay when she wakes up in the morning, but gets sleepy later in the day.  Excessively sleepy at times during the day. She has dozed off at traffic light.    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    ESS = 10    Takes decaf coffee in am; 3-4 cups in the morning  No trouble breathing through her nose.  Feels that she can breathe better when she stretches her jaw forward; Sometimes gasping when sitting quietly during the day  Snoring very softly, sometimes, reported by her late     No report of restless legs or squirming at night.    SLEEP ROUTINE:   Bed partner: sleeps alone   Time to bed: 11pm - MN, sometimes later   Sleep onset latency: very brief   Disruptions or awakenings: 1-2 times due to bathroom   Wakeup time: 4:30-4:45 am on Monday / 5:30-6 am / rarely 7 am   Perceived sleep quality: " unsure   Daytime naps: none    INTERVAL HISTORY:    03/22/2017 RAIZA Adam NP: Discussed 03/11/2017 in-lab sleep study results in detail. Patient symptoms of interrupted sleep and excessive daytime sleepiness remain the same. She commutes a lot and is concerned about her safety as she is increasingly sleepier at the wheel. Reports her quality of life is poor due to sleepiness.     Baseline Sleep Study:  The overall AHI was 6.3 with an oxygen ted of 82.0%. The overall RDI was 10.7  The supine AHI was 31.0 and the REM AHI was 8.8.     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    Visual impairment     reading glasses       Past Surgical History:   Procedure Laterality Date    interstim placed stage 1      KIDNEY STONE SURGERY  2000    @ [facility]  MOHS procedure         Family History   Problem Relation Age of Onset    Kidney disease Mother     Heart disease Father     Breast cancer Maternal Aunt     Anesthesia problems Neg Hx     Malignant hypertension Neg Hx     Hypotension Neg Hx     Malignant hyperthermia Neg Hx     Pseudochol deficiency Neg Hx     Colon cancer Neg Hx     Ovarian cancer Neg Hx     Melanoma Neg Hx     Psoriasis Neg Hx     Lupus Neg Hx     Eczema Neg Hx     Acne Neg Hx        Social History   Substance Use Topics    Smoking status: Former Smoker     Packs/day: 0.50     Years: 8.00     Quit date: 8/22/1964    Smokeless tobacco: Never Used    Alcohol use 1.2 oz/week     2 Glasses of wine per week      Comment: daily   Teaches social policy at [employer]    ALLERGIES: Reviewed in EPIC    CURRENT MEDICATIONS: Reviewed in  "EPIC    REVIEW OF SYSTEMS:  Sleep related symptoms as per HPI;    Denies weight gain;    Denies sinus problems;    Urinary frequency;    Otherwise, a balance of systems reviewed is negative.    PHYSICAL EXAM:  /74  Pulse (!) 52  Ht 5' 6" (1.676 m)  Wt 60.3 kg (132 lb 15 oz)  BMI 21.46 kg/m2      ASSESSMENT:    Obstructive sleep apnea, mild by AHI. The patient symptomatically has interrupted sleep and excessive daytime sleepiness. She has medical co-morbidities of labile blood pressure in setting of hypertension. This warrants treatment for KATHERIN.     Continues to have daytime hypersomnia, despite increasing time in bed.         PLAN:    - Education: During our discussion today, we talked about the etiology of obstructive sleep apnea as well as the potential ramifications of untreated sleep apnea, which could include daytime sleepiness, hypertension, heart disease and/or stroke. We discussed potential treatment options, which could include weight loss, body positioning, continuous positive airway pressure (CPAP), OA, EPAP, or referral for surgical consideration.     -Treatment: pt prefers oral appliance to PAP therapy. Discussed with pt that oral appliance letter and rx will be sent to Dr. Dinero's office (provided pt with copy). Pt to make Sleep Med f/u appointment ~a couple of months after using OA to re-evaluate sleep apnea and titration PSG to re-evaluate KATHERIN with oral appliance. Treatment plan acceptable to pt, all questions/concerns addressed to patient's satisfaction.     - Precautions: The patient was advised to abstain from driving should they feel sleepy  or drowsy.     I spent greater than 15 minutes during this 30 minute visit in counseling the patient regarding treatment option, PAP therapy vs. Oral appliance with patient     "

## 2017-03-23 ENCOUNTER — OFFICE VISIT (OUTPATIENT)
Dept: INTERNAL MEDICINE | Facility: CLINIC | Age: 77
End: 2017-03-23
Payer: MEDICARE

## 2017-03-23 ENCOUNTER — TELEPHONE (OUTPATIENT)
Dept: SLEEP MEDICINE | Facility: CLINIC | Age: 77
End: 2017-03-23

## 2017-03-23 VITALS
HEART RATE: 56 BPM | DIASTOLIC BLOOD PRESSURE: 76 MMHG | HEIGHT: 67 IN | BODY MASS INDEX: 20.38 KG/M2 | WEIGHT: 129.88 LBS | SYSTOLIC BLOOD PRESSURE: 116 MMHG

## 2017-03-23 DIAGNOSIS — N20.0 CALCIUM NEPHROLITHIASIS: ICD-10-CM

## 2017-03-23 DIAGNOSIS — G47.33 OSA (OBSTRUCTIVE SLEEP APNEA): Primary | ICD-10-CM

## 2017-03-23 DIAGNOSIS — R42 VERTIGO: ICD-10-CM

## 2017-03-23 DIAGNOSIS — H81.23 VESTIBULAR NEURONITIS, BILATERAL: ICD-10-CM

## 2017-03-23 DIAGNOSIS — H81.90 VESTIBULAR DISORDER, UNSPECIFIED LATERALITY: Primary | ICD-10-CM

## 2017-03-23 DIAGNOSIS — R33.9 URINARY RETENTION: ICD-10-CM

## 2017-03-23 DIAGNOSIS — R26.89 BALANCE DISORDER: ICD-10-CM

## 2017-03-23 DIAGNOSIS — I10 ESSENTIAL HYPERTENSION: ICD-10-CM

## 2017-03-23 DIAGNOSIS — G47.33 OSA (OBSTRUCTIVE SLEEP APNEA): ICD-10-CM

## 2017-03-23 PROCEDURE — 99999 PR PBB SHADOW E&M-EST. PATIENT-LVL IV: CPT | Mod: PBBFAC,,, | Performed by: INTERNAL MEDICINE

## 2017-03-23 PROCEDURE — 99214 OFFICE O/P EST MOD 30 MIN: CPT | Mod: S$PBB,,, | Performed by: INTERNAL MEDICINE

## 2017-03-23 PROCEDURE — 99214 OFFICE O/P EST MOD 30 MIN: CPT | Mod: PBBFAC | Performed by: INTERNAL MEDICINE

## 2017-03-23 NOTE — TELEPHONE ENCOUNTER
----- Message from Candace Irby sent at 3/23/2017  4:54 PM CDT -----  Contact: pt  _  1st Request  _  2nd Request  _  3rd Request        Who: pt    Why: pt doesn't want the dental piece for her sleeping. She wants the cpap machine instead. Please call the pt     What Number to Call Back:[phone] or [phone]    When to Expect a call back: (Before the end of the day)   -- if the call is after 12:00, the call back will be tomorrow.

## 2017-03-23 NOTE — MR AVS SNAPSHOT
[facility]  [address]  [facility] [address]  Phone: [phone]  Fax: [phone]                  Shima Sorto   3/23/2017 10:00 AM   Office Visit    Description:  Female : 1940   Provider:  Zeynep Tomas MD   Department:  [facility]           Reason for Visit     Follow-up           Diagnoses this Visit        Comments    Vestibular disorder, unspecified laterality    -  Primary     Vertigo         Balance disorder         KATHERIN (obstructive sleep apnea)         Essential hypertension         Vestibular neuronitis, bilateral         Calcium nephrolithiasis         Urinary retention                To Do List           Future Appointments        Provider Department Dept Phone    3/31/2017 10:00 AM [id number] SCREEN INT MED [facility] [phone]    2017 11:30 AM ZEYAD [facility] - Endo/Diab/Metab [phone]    2017 11:30 AM Zeynep Tomas MD [facility] - Internal Medicine [phone]      Goals (5 Years of Data)              Today    3/22/17    3/21/17    Blood Pressure <= 140/90   116/76  116/76  116/76    Notes - Note created  2017  3:21 PM by Beth Villaseñor    It is important to consistently maintain a controlled blood pressure.      Exercise at least 150 minutes per week.           Notes - Note created  2017  3:43 PM by Beth Villaseñor    Continue your work our regime daily (riding your bike, stretching, free weights, walking etc).      Take at least one BP reading per week at various times of the day             Follow-Up and Disposition     Return in about 2 months (around 2017) for Follow up Sleep Apnea and hypertension.      [facility]     [facility] -  Assistance  Registered nurses in the [facility] provide clinical advisement, health education, appointment booking, and other advisory services.  Call for this free  "service at [phone].             Medications           Message regarding Medications     Verify the changes and/or additions to your medication regime listed below are the same as discussed with your clinician today.  If any of these changes or additions are incorrect, please notify your healthcare provider.             Verify that the below list of medications is an accurate representation of the medications you are currently taking.  If none reported, the list may be blank. If incorrect, please contact your healthcare provider. Carry this list with you in case of emergency.           Current Medications     alendronate (FOSAMAX) 70 MG tablet 1 tablet on Sunday with a full glass of water and wait 30 minutes for breakfast and pills. Take sitting or standing    atenolol (TENORMIN) 25 MG tablet Take one per day    B COMPLEX & C NO.10/FOLIC ACID (B COMPLEX WITH C#10-FOLIC ACID ORAL) Take by mouth.    calcium citrate (CALCITRATE) 200 mg (950 mg) tablet Take 1 tablet by mouth once daily.    co-enzyme Q-10 50 mg capsule Take 50 mg by mouth once daily.    diazePAM (VALIUM) 5 MG tablet Take 1 tablet (5 mg total) by mouth every 6 (six) hours as needed (dizziness).    fish oil-vit E-fat acid5-hb137 (SUPER OMEGA-3) 400-5 mg-unit Cap Take 1 capsule by mouth Daily.    lisinopril 10 MG tablet Take 2 tablets (20 mg total) by mouth every morning. Dosage change    meclizine (ANTIVERT) 25 mg tablet Take 1 tablet (25 mg total) by mouth 3 (three) times daily as needed for Dizziness.    methylPREDNISolone (MEDROL) 16 MG Tab Take as directed in taper instructions    MULTIVITAMIN ORAL Take 1 tablet by mouth Daily.    ondansetron (ZOFRAN-ODT) 8 MG TbDL Take 1 tablet (8 mg total) by mouth every 6 (six) hours as needed (nausea).           Clinical Reference Information           Your Vitals Were     BP Pulse Height Weight BMI    116/76 56 5' 6.5" (1.689 m) 58.9 kg (129 lb 13.6 oz) 20.64 kg/m2      Blood Pressure          Most Recent " Value    BP  116/76      Allergies as of 3/23/2017     Asparagus      Immunizations Administered on Date of Encounter - 3/23/2017     None      Orders Placed During Today's Visit      Normal Orders This Visit    Ambulatory consult to Physical Therapy       Language Assistance Services     ATTENTION: Language assistance services are available, free of charge. Please call [phone].      ATENCIÓN: Si habla español, tiene a norton disposición servicios gratuitos de asistencia lingüística. Llame al [phone].     CHÚ Ý: N?u b?n nói Ti?ng Vi?t, có các d?ch v? h? tr? ngôn ng? mi?n phí dành cho b?n. G?i s? [phone].         [facility] complies with applicable Federal civil rights laws and does not discriminate on the basis of race, color, national origin, age, disability, or sex.

## 2017-03-23 NOTE — PROGRESS NOTES
Subjective:       Patient ID: Shima Sorto is a 76 y.o. female.    Chief Complaint: Follow-up (Essential hypertension)    HPI   Last appt was  Meclizine: 1/4 of one tablet at bedtime:it does not make her sick or the negative effects. She is better.  Sleep Study: discussed with the patient, you may give her the paper copy.  Reviewed and discussed in I believe the patient would benefit from CPAP    Review of Systems   Constitutional: Negative for chills, fever and unexpected weight change.   HENT: Negative for trouble swallowing.    Respiratory: Negative for cough, shortness of breath and wheezing.    Cardiovascular: Negative for chest pain and palpitations.   Gastrointestinal: Negative for abdominal distention, abdominal pain, blood in stool and vomiting.   Musculoskeletal: Negative for back pain.       Objective:      Physical Exam   Constitutional: She is oriented to person, place, and time. She appears well-developed and well-nourished. No distress.   Neck: Carotid bruit is not present. No thyromegaly present.   Cardiovascular: Normal rate, regular rhythm and normal heart sounds.  PMI is not displaced.    Pulmonary/Chest: Effort normal and breath sounds normal. No respiratory distress.   Abdominal: Soft. Bowel sounds are normal. She exhibits no distension. There is no tenderness.   Musculoskeletal: She exhibits no edema.   Neurological: She is alert and oriented to person, place, and time.       Assessment:       1. Vestibular disorder, unspecified laterality    2. Vertigo    3. Balance disorder    4. KATHERIN (obstructive sleep apnea)    5. Essential hypertension    6. Vestibular neuronitis, bilateral    7. Calcium nephrolithiasis    8. Urinary retention        Plan:       Shima was seen today for follow-up.    Diagnoses and all orders for this visit:    Vestibular disorder, unspecified laterality  -     Ambulatory consult to Physical Therapy    Vertigo  -     Ambulatory consult to Physical Therapy    Balance  disorder  -     Ambulatory consult to Physical Therapy    KATHERIN (obstructive sleep apnea): In discussion with the patient I believe she would benefit from CPAP and I would recommend the titration study    Essential hypertension: Well controlled    Vestibular neuronitis, bilateral    Calcium nephrolithiasis: Discussed calcium and vitamin D replacement    Urinary retention: As instructed      Return in about 2 months (around 5/23/2017) for Follow up Sleep Apnea and hypertension.    New Prescriptions    No medications on file       Modified Medications    No medications on file       Orders Placed This Encounter   Procedures    Ambulatory consult to Physical Therapy     Referral Priority:   Routine     Referral Type:   Physical Medicine     Referral Reason:   Specialty Services Required     Requested Specialty:   Physical Therapy     Number of Visits Requested:   1       Labs, studies and consults associated with this visit were reviewed

## 2017-03-24 NOTE — TELEPHONE ENCOUNTER
Pt aware that CPAP order has been sent.     Questions/concerns addressed to patient's satisfaction.     Scheduled for f/u after CPAP set up on Wednesday, May 3 at 11:20 am.

## 2017-03-24 NOTE — TELEPHONE ENCOUNTER
CPAP for home use ordered.     Auto CPAP 6 - 20 cm, mask of patient's choice, and heated humidification. Insurance approval for CPAP may take up to 14 business days.     Please schedule pt to f/u with me  6 - 8 weeks from now.

## 2017-03-24 NOTE — PROGRESS NOTES
Pt phoned in 03/23/2017 requesting set up for CPAP instead of OA.     Auto CPAP 6 - 20 cm. RTC 6 - 8 weeks after set up.

## 2017-03-27 ENCOUNTER — PATIENT OUTREACH (OUTPATIENT)
Dept: OTHER | Facility: OTHER | Age: 77
End: 2017-03-27
Payer: MEDICARE

## 2017-03-27 NOTE — PROGRESS NOTES
Last 5 Patient Entered Readings                                                               Current 30 Day Average: 128/63     Recent Readings 3/27/2017 3/26/2017 3/26/2017 3/26/2017 3/25/2017    Systolic BP (mmHg) 134 137 118 114 97    Diastolic BP (mmHg) 66 64 56 55 61    Pulse 52 55 53 53 58          Follow up with Mrs. Shima Echols Noreen completed. Patient is maintaining a low sodium diet and continuing her exercise regime. She reports that she is doing well and feeling good. Her readings have improved and she denies symptoms. Patient did not have any further questions or concerns. I will follow up in a few weeks to see how she is doing and progressing.

## 2017-03-31 ENCOUNTER — HOSPITAL ENCOUNTER (OUTPATIENT)
Dept: RADIOLOGY | Facility: HOSPITAL | Age: 77
Discharge: HOME | End: 2017-03-31
Attending: INTERNAL MEDICINE
Payer: MEDICARE

## 2017-03-31 DIAGNOSIS — Z12.31 ENCOUNTER FOR SCREENING MAMMOGRAM FOR BREAST CANCER: ICD-10-CM

## 2017-03-31 PROCEDURE — 77063 BREAST TOMOSYNTHESIS BI: CPT | Mod: 26,,, | Performed by: RADIOLOGY

## 2017-03-31 PROCEDURE — 77067 SCR MAMMO BI INCL CAD: CPT | Mod: 26,,, | Performed by: RADIOLOGY

## 2017-03-31 PROCEDURE — 77067 SCR MAMMO BI INCL CAD: CPT | Mod: TC

## 2017-04-05 ENCOUNTER — CLINICAL SUPPORT (OUTPATIENT)
Dept: REHABILITATION | Facility: HOSPITAL | Age: 77
End: 2017-04-05
Attending: INTERNAL MEDICINE
Payer: MEDICARE

## 2017-04-05 DIAGNOSIS — R26.89 BALANCE PROBLEM: ICD-10-CM

## 2017-04-05 DIAGNOSIS — R26.9 GAIT DIFFICULTY: ICD-10-CM

## 2017-04-05 PROCEDURE — G8978 MOBILITY CURRENT STATUS: HCPCS | Mod: CI

## 2017-04-05 PROCEDURE — G8979 MOBILITY GOAL STATUS: HCPCS | Mod: CI

## 2017-04-05 PROCEDURE — 97161 PT EVAL LOW COMPLEX 20 MIN: CPT

## 2017-04-05 NOTE — PROGRESS NOTES
Name: Shima Sorto  Clinic Number: [id number]    Shima Sorto is a 76 y.o. female evaluated on 4/6/2017.    Diagnosis:   Encounter Diagnoses   Name Primary?    Gait difficulty     Balance problem         Physician: Zeynep Tomas MD    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    Visual impairment     reading glasses     Current Outpatient Prescriptions   Medication Sig    alendronate (FOSAMAX) 70 MG tablet 1 tablet on Sunday with a full glass of water and wait 30 minutes for breakfast and pills. Take sitting or standing    atenolol (TENORMIN) 25 MG tablet Take one per day    B COMPLEX & C NO.10/FOLIC ACID (B COMPLEX WITH C#10-FOLIC ACID ORAL) Take by mouth.    calcium citrate (CALCITRATE) 200 mg (950 mg) tablet Take 1 tablet by mouth once daily.    co-enzyme Q-10 50 mg capsule Take 50 mg by mouth once daily.    diazePAM (VALIUM) 5 MG tablet Take 1 tablet (5 mg total) by mouth every 6 (six) hours as needed (dizziness).    fish oil-vit E-fat acid5-hb137 (SUPER OMEGA-3) 400-5 mg-unit Cap Take 1 capsule by mouth Daily.    lisinopril 10 MG tablet Take 2 tablets (20 mg total) by mouth every morning. Dosage change    meclizine (ANTIVERT) 25 mg tablet Take 1 tablet (25 mg total) by mouth 3 (three) times daily as needed for Dizziness.    methylPREDNISolone (MEDROL) 16 MG Tab Take as directed in taper instructions    MULTIVITAMIN ORAL Take 1 tablet by mouth Daily.    ondansetron (ZOFRAN-ODT) 8 MG TbDL Take 1 tablet (8 mg total) by mouth every 6 (six) hours as needed (nausea).     No current facility-administered medications for this visit.      Review of  "patient's allergies indicates:   Allergen Reactions    Asparagus      Other reaction(s): Rash       Precautions :Cardiac/standard  Occupation:   retired    Subjective   In Dec 2016 had shingles, pt reports receiving antibiotics and then she was diagnosed with vestibular neuritis.   Jan 17,2017 episode of vertigo with 2 days in the hospital.  Pt had to use a walker for a few days and HH following discharge. Dizziness has resolved for the most part however gait and balance is still effected by speed while walking. Pt reports occasional heaviness and fullness in her forehead and sinuses while performing head movements, but does not report dizziness.    Shima Wykle symptoms are still present and are constant.      Since onset:  improving     Type : balance    Description of symptoms : sinus congestion/      Duration: symptoms present with inc speed    Associated symptoms : none    Prior Episodes: none    Falls: No        Patient Goals:  "to be able to walk at inc speed and on all surfaces without LOB"    CERVICAL ROM  Flex 100%  Ext    75%  RR    90%  LR     90%  SBR  100%  SBL   100%    POSTURE: rounded shoulder/fwd head/dec cervical lordosis       Oculomotor    Smooth Pursuit: elicits symptoms  Saccadic eye movements:  does not elicit symptoms  Convergence/divergence: Intact  Vestibular Ocular Reflex: Intact slight symptoms        VESTIBULAR  Head impulse test  Intact  Head shake test elicits symptoms of "heaviness"  [place]  intact       HASSAN Assessment    1. Sitting to Standing   4 - able to stand without using hands and stabilize independently  2. Standing Unsupported   4 - able to stand safely 2 minutes without hold  3. Sitting Unsupported   4 - able to sit safely and securely 2 minutes  4. Standing to Sitting   4 - sits safely with minimal use of hands  5. Pivot Transfer   4 - able to trnasfer safely with minor use of hands  6. Standing with Eyes Closed   4 - albe to stand 10 seconds safely  7. Standing " with Feet Together   4 - able to place feet together independently and stand 1 minute safely  8. Reaching Forward with Outstretched Arm   4 - can reach forward confidently 25 cm/10 inches  9. Retrieving Object from Floor   4 - able to  slipper safely and easily  10. Turning to Look Behind   4 - looks behind from both sides and weights shifts well  11. Turning 360 Degrees   4 - able to turn 360 in seconds or less  12. Placing Alternate Foot on Step   3 - able to stand independently and completely 8 steps > 20 seconds  13. Standing with One Foot in Front   4 - able to tandem stand independently and hold 30 seconds  14. Standing on One Foot   4 - able to lift leg independently and hold > 10 seconds                             Total:    55        Maximum = 56    CEREBELLAR SCREENS:  VOR cancellation impaired  Hand clap   WNL  Finger to nose  WNL  Toe tapping  WNl    BALANCE  rhomberg EO on foam Mod I  rhomberg EC on foam c/ SBA post LOB  Tandem stance EO on foam CGA        Gait on level surfaces: occasional veering to the right    Gait Deviations: Shima Charodario displays occasional unsteady gait.  Gait with changing speeds: gait quality declines wiht fast speeds  Gait with horizontal head turns: gait quality declines  Gait with vertical head turns: gait quality declines  Gait with pivot turns: gait quality declines  Tandem ambulation: gait quality declines/loss of balance    Time in: 10:50am  Time Out: 11:40am  PT Evaluation Completed?: Yes  Discussed plan of care with patient?: Yes    Shima received 0 minutes of vestibular rehabilitation  VOR 1& 2 hor/vert  VOR cancellation  rhomberg on foam  Tandem on foam  Walking with ball toss  Cone taps with step overs  Tandem gait  martínez  Written Home Exercises Provided: VOR 1 hor/vert  Pt demonstrated good understanding of the education provided. rajat good return demo of skill of exercise.    This is a 76 y.o. female with a medical diagnosis of   Encounter Diagnoses    Name Primary?    Gait difficulty     Balance problem    . Patient presents with abnormality of gait. Shima Sorto will benefit from skilled physical therapy intervention to address the above impairments and functional limitations.   History  Co-morbidities and personal factors that may impact the plan of care Examination  Body Structures and Functions, activity limitations and participation restrictions that may impact the plan of care Clinical Presentation          Stable     Decision Making/ Complexity Score      Low complexity   Co-morbidities:       MVP  HTN  Osteoporosis            Personal Factors:    Body Regions:  vestibular  Body Systems:   Gait with deviations  Decreased balance during dynamic activites    occulomotor deficits    Activity limitations:   Pt has difficulty with balance on uneven surfaces with ambulation    Participation Restrictions:                  The following goals were discussed with the patient and patient is in agreement with them as to be addressed in the treatment plan.     STG'S: 3 weeks  1. Patient independent in HEP of balance.  Exercises to be done Daily.  2. Pt will demonstrate ability to perform rhomberg on foam with EC x 20 sec without LOB  3. Pt will ambulate with head turns without LOB  LTG'S : 6 weeks  1. Patient able to ambulate in community safely without assistive device, on varied terrainwith head movement, without LOB or c/o dizziness.  2. Pt will balance in tandem stance x 30 sec without LOB  3. Pt to be I with self management of condition and progression vestibular program for maintenance.    PLAN       Shima Sorto will be seen 2 times per weeks for the next 6 weeks for vestibular rehabilitation. Pt may be seen by a PTA as part of the Rehab Team.     Teresa Rush, PT  4/6/2017

## 2017-04-06 ENCOUNTER — PATIENT OUTREACH (OUTPATIENT)
Dept: OTHER | Facility: OTHER | Age: 77
End: 2017-04-06
Payer: MEDICARE

## 2017-04-06 PROBLEM — R26.89 BALANCE PROBLEM: Status: ACTIVE | Noted: 2017-04-06

## 2017-04-06 PROBLEM — R26.9 GAIT DIFFICULTY: Status: ACTIVE | Noted: 2017-04-06

## 2017-04-06 NOTE — PROGRESS NOTES
Last 5 Patient Entered Readings                                                               Current 30 Day Average: 126/63     Recent Readings 4/5/2017 4/5/2017 4/4/2017 4/4/2017 4/3/2017    Systolic BP (mmHg) 123 121 126 138 109    Diastolic BP (mmHg) 62 61 66 68 66    Pulse 56 54 63 53 52        Hypertension Medications             atenolol (TENORMIN) 25 MG tablet Take one per day Patient states she takes 12.5mg -qam    lisinopril 10 MG tablet Take 2 tablets (20 mg total) by mouth every morning. Dosage change        Plan:   Called patient to follow up. Per current 30 day average, BP is well controlled. Patient denies having questions or concerns. Will continue to monitor. WCB in 3 months, sooner if BP begins to trend up or down.

## 2017-04-10 ENCOUNTER — TELEPHONE (OUTPATIENT)
Dept: SLEEP MEDICINE | Facility: CLINIC | Age: 77
End: 2017-04-10

## 2017-04-10 ENCOUNTER — CLINICAL SUPPORT (OUTPATIENT)
Dept: REHABILITATION | Facility: HOSPITAL | Age: 77
End: 2017-04-10
Attending: INTERNAL MEDICINE
Payer: MEDICARE

## 2017-04-10 DIAGNOSIS — R26.89 BALANCE PROBLEM: ICD-10-CM

## 2017-04-10 DIAGNOSIS — R26.9 GAIT DIFFICULTY: ICD-10-CM

## 2017-04-10 PROCEDURE — 97116 GAIT TRAINING THERAPY: CPT

## 2017-04-10 PROCEDURE — 97112 NEUROMUSCULAR REEDUCATION: CPT

## 2017-04-10 NOTE — TELEPHONE ENCOUNTER
----- Message from Nancy Garcia sent at 4/10/2017 10:04 AM CDT -----  x_  1st Request  _  2nd Request  _  3rd Request        Who: KEITH HAJI [[id number]]    Why: Pt called in reference to her C-Pap equipment she haven't received any of it yet and would like to speak to Milly in reference to that. Please call her.     What Number to Call Back: [phone]    When to Expect a call back: (Before the end of the day)   -- if the call is after 12:00, the call back will be tomorrow.

## 2017-04-10 NOTE — TELEPHONE ENCOUNTER
Per Renea CPAP order sent to [facility] on 3/24 but [facility] does not dispense auto CPAP machines. Renea to send CPAP order to [facility] instead and notify pt.

## 2017-04-10 NOTE — PROGRESS NOTES
Physical Therapy Daily Note     Name: Shima Echols [facility] Number: [id number]  Diagnosis:   Encounter Diagnoses   Name Primary?    Gait difficulty     Balance problem      Physician: Zeynep Tomas MD  Precautions: standard  Visit #: 2 of 12  PTA Visit #: 0  Time In: 2:00pm  Time Out: 2:55pm  Total Treatment Time 1:1: 55  Cancels/no shows:0    Subjective     Pt reports: no complaints, reports compliance with HEP  Pain Scale: Shima rates pain on a scale of 0-10 to be 1 currently.    Objective     Shima received individual therapeutic exercises to develop balance for 55 minutes including:  VOR 1& 2 hor/vert1 min ea  VOR cancellation 1min  rhomberg on foam EO 30 sec/ EC 30 sec x3  Tandem on foam 30 sec x 2  Walking with ball toss 20ft x2 fwd/bwd  Cone taps with step overs Lat/fwd 2x ea 4 cones  Tandem gait  20 ft x4  karioke 20ft x2  Steamboats ytb 20x  Side steps ytb  Written Home Exercises Provided: as provided upon evaluation.  Added SLS near counter top/rhomberg in corner EC  Pt demo good understanding of the education provided. Shima demonstrated good return demonstration of activities.     Education provided re: discussed proprioception exercises with and without visual assistance  Shima verbalized good understanding of education provided.   No spiritual or educational barriers to learning provided    PRECAUTIONS standard  Assessment     Patient tolerated treatment well.  Several LOB with rhomberg on foam EC and with cone step overs.  Progress as tolerated.  Work towards increased dynamic challenges.  This is a 76 y.o. female referred to outpatient physical therapy and presents with a medical diagnosis of balance difficulty and demonstrates limitations as described in the problem list. Pt prognosis is Good. Pt will continue to benefit from skilled outpatient physical therapy to address the deficits listed in the problem list, provide pt/family  education and to maximize pt's level of independence in the home and community environment.     Goals as follows:  STG'S: 3 weeks  1. Patient independent in HEP of balance.  Exercises to be done Daily.  2. Pt will demonstrate ability to perform rhomberg on foam with EC x 20 sec without LOB  3. Pt will ambulate with head turns without LOB  LTG'S : 6 weeks  1. Patient able to ambulate in community safely without assistive device, on varied terrainwith head movement, without LOB or c/o dizziness.  2. Pt will balance in tandem stance x 30 sec without LOB  3. Pt to be I with self management of condition and progression vestibular program for maintenance.       Plan     Continue with established Plan of Care towards PT goals.    Therapist: Teresa Rush, PT  4/10/2017

## 2017-04-11 ENCOUNTER — PATIENT MESSAGE (OUTPATIENT)
Dept: INTERNAL MEDICINE | Facility: CLINIC | Age: 77
End: 2017-04-11

## 2017-04-12 ENCOUNTER — CLINICAL SUPPORT (OUTPATIENT)
Dept: REHABILITATION | Facility: HOSPITAL | Age: 77
End: 2017-04-12
Attending: INTERNAL MEDICINE
Payer: MEDICARE

## 2017-04-12 DIAGNOSIS — R26.9 GAIT DIFFICULTY: ICD-10-CM

## 2017-04-12 DIAGNOSIS — R26.89 BALANCE PROBLEM: ICD-10-CM

## 2017-04-12 PROCEDURE — 97112 NEUROMUSCULAR REEDUCATION: CPT

## 2017-04-12 NOTE — PROGRESS NOTES
Physical Therapy Daily Note     Name: Shima Echols [facility] Number: [id number]  Diagnosis:   Encounter Diagnoses   Name Primary?    Gait difficulty     Balance problem      Physician: Zeynep Tomas MD  Precautions: standard  Visit #: 3 of 12  PTA Visit #: 0  Time In: 1:30pm  Time Out: :2:15pm  Total Treatment Time 1:1: 0  Cancels/no shows:0    Subjective     Pt reports: no complaints, reports compliance with HEP  Pain Scale: Shima rates pain on a scale of 0-10 to be 1 currently.    Objective     Shima received individual therapeutic exercises to develop balance for 45 minutes including:  VOR 1&  hor/vert1 min ea  VOR 2  VOR cancellation 1min  rhomberg on foam EO 30 sec/ EC 30 sec x3  Tandem on foam 30 sec x 2  Walking with ball toss 20ft x2 fwd/bwd  Cone taps with step overs Lat/fwd 2x ea 4 cones  Tandem gait  20 ft x41  karioke 20ft x2  Steamboats ytb 20x  Crab walks ytb  20ft x2.  SPC step outs        Written Home Exercises Provided: as provided upon evaluation.  Added SLS near counter top/rhomberg in corner EC  Pt demo good understanding of the education provided. Shima demonstrated good return demonstration of activities.     Education provided re: discussed proprioception exercises with and without visual assistance  Shima verbalized good understanding of education provided.   No spiritual or educational barriers to learning provided    PRECAUTIONS standard  Assessment     Patient tolerated treatment well. Addded reps to exercises.  Pt tolerated treatment well and had 2 minor LOB with tandem gait and cone step overs  This is a 76 y.o. female referred to outpatient physical therapy and presents with a medical diagnosis of balance difficulty and demonstrates limitations as described in the problem list. Pt prognosis is Good. Pt will continue to benefit from skilled outpatient physical therapy to address the deficits listed in the problem list, provide  pt/family education and to maximize pt's level of independence in the home and community environment.     Goals as follows:  STG'S: 3 weeks  1. Patient independent in HEP of balance.  Exercises to be done Daily.  2. Pt will demonstrate ability to perform rhomberg on foam with EC x 20 sec without LOB  3. Pt will ambulate with head turns without LOB  LTG'S : 6 weeks  1. Patient able to ambulate in community safely without assistive device, on varied terrainwith head movement, without LOB or c/o dizziness.  2. Pt will balance in tandem stance x 30 sec without LOB  3. Pt to be I with self management of condition and progression vestibular program for maintenance.       Plan     Continue with established Plan of Care towards PT goals.    Therapist: Teresa Rush, PT  4/12/2017

## 2017-04-15 ENCOUNTER — PATIENT MESSAGE (OUTPATIENT)
Dept: INTERNAL MEDICINE | Facility: CLINIC | Age: 77
End: 2017-04-15

## 2017-04-18 ENCOUNTER — CLINICAL SUPPORT (OUTPATIENT)
Dept: REHABILITATION | Facility: HOSPITAL | Age: 77
End: 2017-04-18
Attending: INTERNAL MEDICINE
Payer: MEDICARE

## 2017-04-18 DIAGNOSIS — R26.89 BALANCE PROBLEM: ICD-10-CM

## 2017-04-18 DIAGNOSIS — R26.9 GAIT DIFFICULTY: ICD-10-CM

## 2017-04-18 PROCEDURE — 97112 NEUROMUSCULAR REEDUCATION: CPT

## 2017-04-18 NOTE — PROGRESS NOTES
Physical Therapy Daily Note     Name: Shima Echols [facility] Number: [id number]  Diagnosis:   Encounter Diagnoses   Name Primary?    Gait difficulty     Balance problem      Physician: Zeynep Tomas MD  Precautions: standard  Visit #: 4 of 12  PTA Visit #: 0  Time In: 9:30am  Time Out: 10:30am  Total Treatment Time 1:1: 15  Cancels/no shows:0    Subjective     Pt reports: no complaints, reports compliance with HEP  Pain Scale: Shima rates pain on a scale of 0-10 to be 1 currently.    Objective     Shima received individual therapeutic exercises to develop balance for 60 minutes including:  VOR 1&  hor/vert1 min ea  VOR 2  VOR cancellation 1min  rhomberg on foam EO 30 sec/ EC 30 sec x3  Tandem on foam 30 sec x 2  Walking with ball toss 20ft x2 fwd/bwd  Cone taps with step overs Lat/fwd 2x ea 4 cones  Tandem gait  20 ft x41  karioke 20ft x2  Steamboats RTB 20x  Crab walks RTB x2  SPC step outs  Pink 10x side to side/fwd  High stepping with UE swing 20 ft x2  Fwd lunge with UE flex 5x  Side lunge with shoulder ABD 5x    Written Home Exercises Provided: as provided upon evaluation.  Added SLS near counter top/rhomberg in corner EC.  Added SLS c/ EC/tandem gait and walk with head turns(4/18/17)  Pt demo good understanding of the education provided. Shima demonstrated good return demonstration of activities.     Education provided re: discussed proprioception exercises with and without visual assistance  Shima verbalized good understanding of education provided.   No spiritual or educational barriers to learning provided    PRECAUTIONS standard  Assessment     Patient tolerated treatment well. Addded  External  Challenges to balance today with SPC and foam, along with increasingly difficulty dynamic UE/LE challenges.  Pt had 2 min LOB .  This is a 76 y.o. female referred to outpatient physical therapy and presents with a medical diagnosis of balance difficulty  and demonstrates limitations as described in the problem list. Pt prognosis is Good. Pt will continue to benefit from skilled outpatient physical therapy to address the deficits listed in the problem list, provide pt/family education and to maximize pt's level of independence in the home and community environment.     Goals as follows:  STG'S: 3 weeks  1. Patient independent in HEP of balance.  Exercises to be done Daily.  2. Pt will demonstrate ability to perform rhomberg on foam with EC x 20 sec without LOB  3. Pt will ambulate with head turns without LOB  LTG'S : 6 weeks  1. Patient able to ambulate in community safely without assistive device, on varied terrainwith head movement, without LOB or c/o dizziness.  2. Pt will balance in tandem stance x 30 sec without LOB  3. Pt to be I with self management of condition and progression vestibular program for maintenance.       Plan     Continue with established Plan of Care towards PT goals.    Therapist: Teresa Rush, PT  4/18/2017

## 2017-04-20 ENCOUNTER — CLINICAL SUPPORT (OUTPATIENT)
Dept: REHABILITATION | Facility: HOSPITAL | Age: 77
End: 2017-04-20
Attending: INTERNAL MEDICINE
Payer: MEDICARE

## 2017-04-20 DIAGNOSIS — R26.89 BALANCE PROBLEM: ICD-10-CM

## 2017-04-20 DIAGNOSIS — R26.9 GAIT DIFFICULTY: Primary | ICD-10-CM

## 2017-04-20 PROCEDURE — 97112 NEUROMUSCULAR REEDUCATION: CPT

## 2017-04-20 NOTE — PROGRESS NOTES
Physical Therapy Daily Note     Name: Shima Echols [facility] Number: [id number]  Diagnosis:   No diagnosis found.  Physician: Zeynep Tomas MD  Precautions: standard  Visit #: 5 of 12  PTA Visit #: 0  Time In: 9:30am  Time Out: 10:20am  Total Treatment Time 1:1: 30min  Cancels/no shows:0    Subjective     Pt reports: no complaints, reports compliance with HEP  Pain Scale: Shima rates pain on a scale of 0-10 to be 1 currently.    Objective     Shima received individual therapeutic exercises to develop balance for 50 minutes including:  VOR 1&  hor/vert1 min ea N/P  VOR 2 hor/vert  VOR cancellation 1min  rhomberg on foam EO 30 sec/ EC 30 sec x3  Tandem on foam 30 sec x 2  Walking with ball toss 20ft x2 fwd/bwd  Cone taps with step overs Lat/fwd 2x ea 4 cones  Tandem gait  20 ft x41  Gait c/ head turns  20ft x2  Steamboats RTB 20x  Crab walks RTB x2  SPC step outs  Pink 10x side to side/fwd onto foam c/ CGA  High stepping with UE swing 20 ft x2  Fwd lunge with UE flex 5x  Side lunge with shoulder ABD 5x  Cone tap SL 5x  Step ups on BOSU 5x fwd/lateral  Written Home Exercises Provided: as provided upon evaluation.  Added SLS near counter top/rhomberg in corner EC.  Added SLS c/ EC/tandem gait and walk with head turns(4/18/17)  Pt demo good understanding of the education provided. Shima demonstrated good return demonstration of activities.     Education provided re: discussed proprioception exercises with and without visual assistance  Shima verbalized good understanding of education provided.   No spiritual or educational barriers to learning provided    PRECAUTIONS standard  Assessment     Pt reports she has improved with PT.  Increased challenges today with external objects. Will re-assess next visit,  This is a 76 y.o. female referred to outpatient physical therapy and presents with a medical diagnosis of balance difficulty and demonstrates limitations as  described in the problem list. Pt prognosis is Good. Pt will continue to benefit from skilled outpatient physical therapy to address the deficits listed in the problem list, provide pt/family education and to maximize pt's level of independence in the home and community environment.     Goals as follows:  STG'S: 3 weeks  1. Patient independent in HEP of balance.  Exercises to be done Daily.  2. Pt will demonstrate ability to perform rhomberg on foam with EC x 20 sec without LOB  3. Pt will ambulate with head turns without LOB  LTG'S : 6 weeks  1. Patient able to ambulate in community safely without assistive device, on varied terrainwith head movement, without LOB or c/o dizziness.  2. Pt will balance in tandem stance x 30 sec without LOB  3. Pt to be I with self management of condition and progression vestibular program for maintenance.       Plan     Continue with established Plan of Care towards PT goals.    Therapist: Teresa Rush, PT  4/20/2017

## 2017-04-24 ENCOUNTER — CLINICAL SUPPORT (OUTPATIENT)
Dept: REHABILITATION | Facility: HOSPITAL | Age: 77
End: 2017-04-24
Attending: INTERNAL MEDICINE
Payer: MEDICARE

## 2017-04-24 DIAGNOSIS — R26.9 GAIT DIFFICULTY: Primary | ICD-10-CM

## 2017-04-24 DIAGNOSIS — R26.89 BALANCE PROBLEM: ICD-10-CM

## 2017-04-24 PROCEDURE — G8979 MOBILITY GOAL STATUS: HCPCS | Mod: CI

## 2017-04-24 PROCEDURE — 97112 NEUROMUSCULAR REEDUCATION: CPT

## 2017-04-24 PROCEDURE — G8978 MOBILITY CURRENT STATUS: HCPCS | Mod: CJ

## 2017-04-24 NOTE — PROGRESS NOTES
Physical Therapy Daily Note     Name: Shima Echols [facility] Number: [id number]  Diagnosis:   Encounter Diagnoses   Name Primary?    Gait difficulty Yes    Balance problem      Physician: Zeynep Tomas MD  Precautions: Standard  Visit #: 6 of 12  PTA Visit #: 1  Time In: 10:42 am  Time Out: 11:30 am  Total Treatment Time 1:1: 48 mins    Evaluation Date: 4/5/2017  POC Expiration: 6 weeks - 5/17/17    Subjective     Pt reports: pain and pressure in her R ear as well as between her eyes.   Pain Scale: Shima rates pain on a scale of 0-10 to be 0 currently.    Objective     Shima received individual therapeutic exercises to develop balance for 48 minutes including:  VOR X1 Hor/vert x 1 min ea - NP  VOR X2 Hor/vert x 1 min ea  VOR Cancellation x 1 min  Rhomberg on foam EO 30 sec / EC 30 sec x 3  Tandem on foam 30 sec x 2  Walking with ball toss 20ft x 2 fwd/bwd  Cone taps with step overs Lat/fwd 2x ea (4 cones)  Tandem gait 20 ft x 4  Gait c/ head turns 20ft x 2  Steamboats RTB 20x  Crab walks RTB x 2 laps  SPC step outs Pink 10x side to side/fwd onto foam c/ CGA  High stepping with UE swing 20 ft x 2  Fwd lunge with UE flex 5x  Side lunge with shoulder ABD 5x  Cone tap SL 10x  Step ups on BOSU 5x fwd/lateral    Written Home Exercises Provided: added SLS c/ EC/tandem gait and walk with head turns (4/18/17)  Pt demo good understanding of the education provided. Shima demonstrated good return demonstration of activities.     Education provided re: discussed proprioception exercises with and without visual assistance  Shima verbalized good understanding of education provided.   No spiritual or educational barriers to learning provided    Assessment     Patient tolerated treatment session well today. Pt with LOB this session with steamboats and step outs with pink SC, however was able to self recover. FOTO score increased 17% since initial evaluation despite pt  report of improvement with PT and ADL's. Plan for pt to be re-evaluated next visit by PT.   This is a 76 y.o. female referred to outpatient physical therapy and presents with a medical diagnosis of balance difficulty and demonstrates limitations as described in the problem list. Pt prognosis is Good. Pt will continue to benefit from skilled outpatient physical therapy to address the deficits listed in the problem list, provide pt/family education and to maximize pt's level of independence in the home and community environment.     Goals as follows:  STG'S: 3 weeks  1. Patient independent in HEP of balance.  Exercises to be done Daily.  2. Pt will demonstrate ability to perform rhomberg on foam with EC x 20 sec without LOB  3. Pt will ambulate with head turns without LOB    LTG'S : 6 weeks  1. Patient able to ambulate in community safely without assistive device, on varied terrainwith head movement, without LOB or c/o dizziness.  2. Pt will balance in tandem stance x 30 sec without LOB  3. Pt to be I with self management of condition and progression vestibular program for maintenance.     Plan     Continue with established Plan of Care towards PT goals.    Therapist: Elsa Campbell PTA  4/24/2017     Teresa Rush PT and Elsa Campbell PTA met face to face to discuss patient's treatment plan and progress towards established goals.  Treatment will be continued as described in initial report/eval and progress notes. Patient will be seen by physical therapist every sixth visit and minimally once per month.

## 2017-04-25 ENCOUNTER — PATIENT OUTREACH (OUTPATIENT)
Dept: OTHER | Facility: OTHER | Age: 77
End: 2017-04-25
Payer: MEDICARE

## 2017-04-25 NOTE — PROGRESS NOTES
Last 5 Patient Entered Readings                                                               Current 30 Day Average: 130/62     Recent Readings 4/25/2017 4/23/2017 4/23/2017 4/22/2017 4/21/2017    Systolic BP (mmHg) 129 154 158 139 156    Diastolic BP (mmHg) 63 73 74 63 69    Pulse 49 43 45 47 45          Follow up with Mrs. Shima Echols Noreen completed. Patient is maintaining a low sodium diet and continuing her exercise regime. She reports that she is doing well and feeling good. She still takes her readings before taking her BP medications but only does this sometimes (she wants to see how her readings are running before taking her medication). I explained that this gets confusing and makes it hard to adjust medications be we are receiving readings without her medication in her system. I requested that she take those readings on her manual cuff and she somewhat agreed. I will continue to have conversations about this. Patient did not have any further questions or concerns. I will follow up in a few weeks to see how she is doing and progressing.

## 2017-04-26 ENCOUNTER — CLINICAL SUPPORT (OUTPATIENT)
Dept: REHABILITATION | Facility: HOSPITAL | Age: 77
End: 2017-04-26
Attending: INTERNAL MEDICINE
Payer: MEDICARE

## 2017-04-26 ENCOUNTER — TELEPHONE (OUTPATIENT)
Dept: SLEEP MEDICINE | Facility: CLINIC | Age: 77
End: 2017-04-26

## 2017-04-26 DIAGNOSIS — R26.89 BALANCE PROBLEM: ICD-10-CM

## 2017-04-26 DIAGNOSIS — R26.9 GAIT DIFFICULTY: Primary | ICD-10-CM

## 2017-04-26 PROCEDURE — 97112 NEUROMUSCULAR REEDUCATION: CPT

## 2017-04-26 NOTE — TELEPHONE ENCOUNTER
----- Message from Trinidad Ferreira sent at 4/26/2017  4:28 PM CDT -----  Contact: pt  x_  1st Request  _  2nd Request  _  3rd Request      Who:pt    Why: pt states that she would like to speak to staff in regards to C-pap machine     What Number to Call Back: [phone]    When to Expect a call back: (Before the end of the day)   -- if call after 3:00 call back will be tomorrow.

## 2017-04-26 NOTE — PROGRESS NOTES
"                                                    Physical Therapy Daily Note     Name: Shima Echols [facility] Number: [id number]  Diagnosis:   Encounter Diagnoses   Name Primary?    Gait difficulty Yes    Balance problem      Physician: Zeynep Tomas MD  Precautions: Standard  Visit #: 7 of 12  PTA Visit #: 1  Time In: 10:35 am  Time Out: 11:25 am  Total Treatment Time 1:1: 50 mins    Evaluation Date: 4/5/2017  POC Expiration: 6 weeks - 5/17/17    Subjective   I walk"  Pt reports: "things dont jump as much when I walk"  Pain Scale: Shima rates pain on a scale of 0-10 to be 0 currently.    Objective   HASSAN Assessment 56/56    1. Sitting to Standing   4 - able to stand without using hands and stabilize independently  2. Standing Unsupported   4 - able to stand safely 2 minutes without hold  3. Sitting Unsupported   4 - able to sit safely and securely 2 minutes  4. Standing to Sitting   4 - sits safely with minimal use of hands  5. Pivot Transfer   4 - able to trnasfer safely with minor use of hands  6. Standing with Eyes Closed   4 - albe to stand 10 seconds safely  7. Standing with Feet Together   4 - able to place feet together independently and stand 1 minute safely  8. Reaching Forward with Outstretched Arm   4 - can reach forward confidently 25 cm/10 inches  9. Retrieving Object from Floor   4 - able to  slipper safely and easily  10. Turning to Look Behind   4 - looks behind from both sides and weights shifts well  11. Turning 360 Degrees   4 - able to turn 360 in seconds or less  12. Placing Alternate Foot on Step   4 - able to stand independently safely and complete 8 steps in 20 seconds  13. Standing with One Foot in Front   4 - able to tandem stand independently and hold 30 seconds  14. Standing on One Foot   4  Shima received individual therapeutic exercises to develop balance for 50 minutes including    VOR X1 Hor/vert x 1 min ea std on foam  VOR2 on foam 1 min  On foam  VOR Cancellation " x 1 min  On foam  Cone taps with step overs Lat/fwd 2x ea (4 cones)  Tandem gait 20 ft x 4  Gait c/ head turns 20ft x 2 Quick/smooth pursuits  Steamboats RTB 20x N/P  Crab walks RTB x 2 laps N/P  SPC step outs Pink 10x side to side/fwd onto foam c/ CGA  High stepping with UE swing 20 ft x 2  Fwd lunge with UE flex 5x  Side lunge with shoulder ABD 5x  Cone tap SL 10x  Step ups on BOSU 10x fwd/lateral  BOSU balance 30 sec x 3 both sides  Written Home Exercises Provided: added SLS c/ EC/tandem gait and walk with head turns (4/18/17)  Pt demo good understanding of the education provided. Shima demonstrated good return demonstration of activities.     Education provided re: discussed proprioception exercises with and without visual assistance and adding external forces  Shima verbalized good understanding of education provided.   No spiritual or educational barriers to learning provided    Assessment     Patient tolerated treatment session well today. Pt tends to have LOB to the left with head turns.  Pt challenged with standing foam  with VOR ex and  during gait with VOR/head turns. Increase challenges  This is a 76 y.o. female referred to outpatient physical therapy and presents with a medical diagnosis of balance difficulty and demonstrates limitations as described in the problem list. Pt prognosis is Good. Pt will continue to benefit from skilled outpatient physical therapy to address the deficits listed in the problem list, provide pt/family education and to maximize pt's level of independence in the home and community environment.     Goals as follows:  STG'S: 3 weeks  1. Patient independent in HEP of balance.  Exercises to be done Daily.  2. Pt will demonstrate ability to perform rhomberg on foam with EC x 20 sec without LOB  3. Pt will ambulate with head turns without LOB    LTG'S : 6 weeks  1. Patient able to ambulate in community safely without assistive device, on varied terrainwith head movement, without LOB  or c/o dizziness.  2. Pt will balance in tandem stance x 30 sec without LOB  3. Pt to be I with self management of condition and progression vestibular program for maintenance.     Plan     Continue with established Plan of Care towards PT goals.    Therapist: Teresa Rush PT  4/26/2017     Teresa Rush PT and Elsa Campbell PTA met face to face to discuss patient's treatment plan and progress towards established goals.  Treatment will be continued as described in initial report/eval and progress notes. Patient will be seen by physical therapist every sixth visit and minimally once per month.

## 2017-04-26 NOTE — TELEPHONE ENCOUNTER
Return pt call. Pt stated that she disliked CPAP.   She is interested in dental appliance over cpap.    Past office visit 3/22/2017.  Next appt 5/3/2017.    She would like to have model demonstration of devices on next office visit.

## 2017-05-01 ENCOUNTER — CLINICAL SUPPORT (OUTPATIENT)
Dept: REHABILITATION | Facility: HOSPITAL | Age: 77
End: 2017-05-01
Attending: INTERNAL MEDICINE
Payer: MEDICARE

## 2017-05-01 DIAGNOSIS — R26.89 BALANCE PROBLEM: ICD-10-CM

## 2017-05-01 DIAGNOSIS — R26.9 GAIT DIFFICULTY: Primary | ICD-10-CM

## 2017-05-01 PROCEDURE — 97112 NEUROMUSCULAR REEDUCATION: CPT

## 2017-05-01 NOTE — PROGRESS NOTES
"                                                    Physical Therapy Daily Note     Name: Shima Echols [facility] Number: [id number]  Diagnosis:   Encounter Diagnoses   Name Primary?    Gait difficulty Yes    Balance problem      Physician: Zeynep Tomas MD  Precautions: Standard  Visit #: 8 of 12  PTA Visit #: 0  Time In: 11:00am  Time Out: 11:50pm   Total Treatment Time 1:1: 50 mins    Evaluation Date: 4/5/2017  POC Expiration: 6 weeks - 5/17/17    Subjective     Pt reports: "I can tell I'm getting better"  Pain Scale: Shima rates pain on a scale of 0-10 to be 0 currently.    Objective     Shima received individual therapeutic exercises to develop balance for 50 minutes including    VOR X1 Hor/vert x 1 min ea std on BOSU  VOR2 on foam 1 min  BOSU  VOR Cancellation x 1 min  BOSU  Cone taps with step overs /fwd 5x  (4 cones)  Tandem gait 20 ft x 4  Gait c/ head turns 20ft x 2 Quick/smooth pursuits  SPC step outs Pink 10x side to side/fwd onto foam c/ CGA  High stepping with UE swing 20 ft x 2  Fwd lunge with UE flex 5x  Side lunge with shoulder ABD 5x  Cone tap SL 10x  Step ups on BOSU 20x fwd/lateral  Purple disc 30 sec  BOSU balance 30 sec x 3 both sides  Written Home Exercises Provided: added SLS c/ EC/tandem gait and walk with head turns (4/18/17)  Pt demo good understanding of the education provided. Shima demonstrated good return demonstration of activities.     Education provided re: discussed proprioception exercises with and without visual assistance and adding external forces  Shima verbalized good understanding of education provided.   No spiritual or educational barriers to learning provided    Assessment     Patient tolerated treatment session well today. Improvement with VOR 2 on foam and gait with head turns. Pt still c/o "heaviness in her head" with humidity. Pt reports she will contact MD to see if she can take claritan to determine if sinus congestion is affecting balance.  Pt instructed to " contact MD secondary to potential interactions  This is a 76 y.o. female referred to outpatient physical therapy and presents with a medical diagnosis of balance difficulty and demonstrates limitations as described in the problem list. Pt prognosis is Good. Pt will continue to benefit from skilled outpatient physical therapy to address the deficits listed in the problem list, provide pt/family education and to maximize pt's level of independence in the home and community environment.     Goals as follows:  STG'S: 3 weeks  1. Patient independent in HEP of balance.  Exercises to be done Daily.  2. Pt will demonstrate ability to perform rhomberg on foam with EC x 20 sec without LOB  3. Pt will ambulate with head turns without LOB    LTG'S : 6 weeks  1. Patient able to ambulate in community safely without assistive device, on varied terrainwith head movement, without LOB or c/o dizziness.  2. Pt will balance in tandem stance x 30 sec without LOB  3. Pt to be I with self management of condition and progression vestibular program for maintenance.     Plan     Continue with established Plan of Care towards PT goals.    Therapist: Teresa Rush PT  5/1/2017     Teresa Rush PT and Elsa Campbell PTA met face to face to discuss patient's treatment plan and progress towards established goals.  Treatment will be continued as described in initial report/eval and progress notes. Patient will be seen by physical therapist every sixth visit and minimally once per month.

## 2017-05-03 ENCOUNTER — CLINICAL SUPPORT (OUTPATIENT)
Dept: REHABILITATION | Facility: HOSPITAL | Age: 77
End: 2017-05-03
Attending: INTERNAL MEDICINE
Payer: MEDICARE

## 2017-05-03 ENCOUNTER — OFFICE VISIT (OUTPATIENT)
Dept: SLEEP MEDICINE | Facility: CLINIC | Age: 77
End: 2017-05-03
Payer: MEDICARE

## 2017-05-03 VITALS
DIASTOLIC BLOOD PRESSURE: 60 MMHG | HEIGHT: 67 IN | BODY MASS INDEX: 20.9 KG/M2 | SYSTOLIC BLOOD PRESSURE: 140 MMHG | WEIGHT: 133.19 LBS | HEART RATE: 68 BPM

## 2017-05-03 DIAGNOSIS — G47.33 OSA (OBSTRUCTIVE SLEEP APNEA): Primary | ICD-10-CM

## 2017-05-03 DIAGNOSIS — R26.9 GAIT DIFFICULTY: Primary | ICD-10-CM

## 2017-05-03 DIAGNOSIS — R26.89 BALANCE PROBLEM: ICD-10-CM

## 2017-05-03 PROCEDURE — 99214 OFFICE O/P EST MOD 30 MIN: CPT | Mod: S$PBB,,, | Performed by: NURSE PRACTITIONER

## 2017-05-03 PROCEDURE — 99999 PR PBB SHADOW E&M-EST. PATIENT-LVL III: CPT | Mod: PBBFAC,,, | Performed by: NURSE PRACTITIONER

## 2017-05-03 PROCEDURE — 97112 NEUROMUSCULAR REEDUCATION: CPT

## 2017-05-03 PROCEDURE — 99213 OFFICE O/P EST LOW 20 MIN: CPT | Mod: PBBFAC | Performed by: NURSE PRACTITIONER

## 2017-05-03 NOTE — PROGRESS NOTES
"                                                    Physical Therapy Daily Note     Name: Shima Echols [facility] Number: [id number]  Diagnosis:   Encounter Diagnoses   Name Primary?    Gait difficulty Yes    Balance problem      Physician: Zeynep Tomas MD  Precautions: Standard  Visit #: 9 of 12  PTA Visit #: 1  Time In: 3:00 pm  Time Out: 3:50 pm   Total Treatment Time: 50 mins (1:1 with PTA for 50 mins)    Evaluation Date: 4/5/2017  POC Expiration: 6 weeks - 5/17/17    Subjective     Pt reports: "I am feeling foggy today." Pt states "when I feel myself going side to side when I walk I feel like I'm dodging statues that are all in a row and side to side." Pt reports that she has not contacted her doctor yet in regards to taking Claritin.   Pain Scale: Shima rates dizziness on a scale of 0-10 to be 0 currently.    Objective     Shima received individual therapeutic exercises to develop balance for 50 minutes including  VOR X1 Hor/Vert x 1 min each on BOSU /c CGA  VOR X2 Horizontal on foam x 1 min   VOR cancellation x 1 min on BOSU /c CGA  Cone taps with step overs/fwd 5x (4 cones)  Tandem gait 20 ft x 4  Gait c/ head turns 20ft x 2 quick/smooth pursuits  SPC step outs Pink 10x side to side/fwd onto foam c/ CGA  High stepping with UE swing 20 ft x 2  Fwd lunge with UE flex 10x  Side lunge with shoulder ABD 10x  Cone tap SL 10x  Step ups on BOSU 20x fwd/lateral  Balance on purple disc x 30 sec  BOSU balance 30 sec x 3 both sides    Written Home Exercises Provided: added SLS c/ EC/tandem gait and walk with head turns (4/18/17)  Pt demo good understanding of the education provided. Shima demonstrated good return demonstration of activities.     Education provided re: discussed proprioception exercises with and without visual assistance and adding external forces  Shima verbalized good understanding of education provided.   No spiritual or educational barriers to learning provided    Assessment     Patient " tolerated treatment session well today. Good tolerance to VORX1 and VOR cancellation on BOSU with heavy CGA required secondary to increase in sway. Mild LOB with gait exercise, however patient able to self recover.   This is a 76 y.o. female referred to outpatient physical therapy and presents with a medical diagnosis of balance difficulty and demonstrates limitations as described in the problem list. Pt prognosis is Good. Pt will continue to benefit from skilled outpatient physical therapy to address the deficits listed in the problem list, provide pt/family education and to maximize pt's level of independence in the home and community environment.     Goals as follows:  STG'S: 3 weeks  1. Patient independent in HEP of balance.  Exercises to be done Daily.  2. Pt will demonstrate ability to perform rhomberg on foam with EC x 20 sec without LOB  3. Pt will ambulate with head turns without LOB    LTG'S : 6 weeks  1. Patient able to ambulate in community safely without assistive device, on varied terrainwith head movement, without LOB or c/o dizziness.  2. Pt will balance in tandem stance x 30 sec without LOB  3. Pt to be I with self management of condition and progression vestibular program for maintenance.     Plan     Continue with established Plan of Care towards PT goals.    Therapist: Elsa Campbell PTA  5/3/2017     Teresa Rush PT and Elsa Campbell PTA met face to face to discuss patient's treatment plan and progress towards established goals.  Treatment will be continued as described in initial report/eval and progress notes. Patient will be seen by physical therapist every sixth visit and minimally once per month.

## 2017-05-03 NOTE — LETTER
[facility]  [address]  [facility] [address]  Phone: [phone]     05/03/2017    RE:  Oral Appliance for the treatment of Obstructive Sleep Apnea      To whom it may concern:    I am writing this letter on behalf of my patient Shima Sorto.  She has been diagnosed with obstructive sleep apnea (KATHERIN) associated with symptoms of interrupted sleep and excessive daytime sleepiness. KATHERIN was confirmed on her in-lab sleep study on 03/11/2017. Her degree of sleep apnea falls within the mild severity category.  Although she benefits from CPAP, she prefers this treatment to PAP therapy.  She has elected to pursue use of an oral appliance as treatment for obstructive sleep apnea, which is appropriate, given  her circumstances.    According to the American Academy of Sleep Medicine, oral appliances are indicated for use in patients with KATHERIN. This is defined in their Practice Parameters for the Treatment of Snoring and Obstructive Sleep Apnea with Oral Appliances: An Update for 2005    Although not as efficacious as CPAP, oral appliances (OAs) are indicated for use in patients with mild to moderate KATHERIN who prefer OAs to CPAP, or who do not respond to CPAP, are not appropriate candidates for CPAP, or who fail treatment attempts with CPAP or treatment with behavioral measures such as weight loss or sleep position change. (Guideline)    Shima Sorto is a suitable candidate for an oral appliance, and I am recommending this as medically necessary for his condition of obstructive sleep apnea. I am requesting he have this service completed by Dr. Edy Dinero, FELIPA.  Please fit MAD/OA to treat KATHERIN.    Sincerely,        MIKAELA Adam NP  Nurse Practitioner   Sleep Medicine - [facility]  [address] [facility]  [place] [address]

## 2017-05-03 NOTE — PROGRESS NOTES
"Shima Solorioilchristine Noreen returns to discuss alternative treatment option to CPAP.       02/22/2017 Dr. Chamberlain This 76 y.o. female patient returns for the evaluation of possible obstructive sleep apnea.     Patient was unable to complete sleep study after last visit 2 years ago. 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  She continues to complain of being excessively sleepy by mid afternoon.  "im tired of being tired". Reports this is ongoing since the 1980's.    KATHERIN risk symptoms:  Occasional soft snoring  Gasping for air in sleep  Excessively sleepy during the day    PRIOR SLEEP STUDY:  Per Dr. Tomas "Not sleeping enough or not sleeping the right way, puts her head down on her desk or while driving at times wants to take a nap"    "im tired of being tired". Reports this is ongoing since the 1980's.  Feels okay when she wakes up in the morning, but gets sleepy later in the day.  Excessively sleepy at times during the day. She has dozed off at traffic light.    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    ESS = 10    Takes decaf coffee in am; 3-4 cups in the morning  No trouble breathing through her nose.  Feels that she can breathe better when she stretches her jaw forward; Sometimes gasping when sitting quietly during the day  Snoring very softly, sometimes, reported by her late     No report of restless legs or squirming at night.    SLEEP ROUTINE:   Bed partner: sleeps alone   Time to bed: 11pm - MN, sometimes later   Sleep onset latency: very brief   Disruptions or awakenings: 1-2 times due to bathroom   Wakeup time: 4:30-4:45 am on Monday / 5:30-6 am / rarely 7 am   Perceived sleep quality: unsure   Daytime naps: none    INTERVAL HISTORY:    03/22/2017 RAIZA Adam NP: Discussed 03/11/2017 " in-lab sleep study results in detail. Patient symptoms of interrupted sleep and excessive daytime sleepiness remain the same. She commutes a lot and is concerned about her safety as she is increasingly sleepier at the wheel. Reports her quality of life is poor due to sleepiness.       05/03/2017 RAIZA Adam NP: Pt phoned in after clinic visit on 03/22 requesting Rx for CPAP instead of OA. However, pt returns in clinic today to re-discuss oral appliance for treatment instead. States she went to CPAP set up at [facility] and did not like the look of CPAP mask on another patron that was there with her. She did not try on any masks. She left DME without CPAP. Would like trial of OA instead. Provided pt again with Rx and letter, as well as contact number for both Dr. Dinero and Dr. Crowe so that pt may contact them for pricing/consulation.     Baseline Sleep Study:  The overall AHI was 6.3 with an oxygen ted of 82.0%. The overall RDI was 10.7  The supine AHI was 31.0 and the REM AHI was 8.8.     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    Visual impairment     reading glasses       Past Surgical History:   Procedure Laterality Date    interstim placed stage 1      KIDNEY STONE SURGERY  2000    @ [facility]  MOHS procedure         Family History   Problem Relation Age of Onset    Kidney disease Mother     Heart disease Father     Breast cancer Maternal Aunt     Anesthesia problems Neg Hx     Malignant hypertension Neg Hx     Hypotension Neg Hx     Malignant hyperthermia Neg Hx     Pseudochol deficiency Neg Hx     Colon cancer Neg Hx      "Ovarian cancer Neg Hx     Melanoma Neg Hx     Psoriasis Neg Hx     Lupus Neg Hx     Eczema Neg Hx     Acne Neg Hx        Social History   Substance Use Topics    Smoking status: Former Smoker     Packs/day: 0.50     Years: 8.00     Quit date: 8/22/1964    Smokeless tobacco: Never Used    Alcohol use 1.2 oz/week     2 Glasses of wine per week      Comment: daily   Teaches social policy at [employer]    ALLERGIES: Reviewed in EPIC    CURRENT MEDICATIONS: Reviewed in EPIC    REVIEW OF SYSTEMS:  Sleep related symptoms as per HPI;    Denies weight gain;    Denies sinus problems;    Urinary frequency;    Otherwise, a balance of systems reviewed is negative.    PHYSICAL EXAM:  BP (!) 140/60 (BP Location: Right arm, Patient Position: Sitting, BP Method: Manual)  Pulse 68  Ht 5' 6.5" (1.689 m)  Wt 60.4 kg (133 lb 2.5 oz)  BMI 21.17 kg/m2      ASSESSMENT:    Obstructive sleep apnea, mild by AHI. The patient symptomatically has interrupted sleep and excessive daytime sleepiness. She has medical co-morbidities of labile blood pressure in setting of hypertension. This warrants treatment for KATHERIN.     Continues to have daytime hypersomnia, despite increasing time in bed.         PLAN:    - Education: During our discussion today, we talked about the etiology of obstructive sleep apnea as well as the potential ramifications of untreated sleep apnea, which could include daytime sleepiness, hypertension, heart disease and/or stroke. We discussed potential treatment options, which could include weight loss, body positioning, continuous positive airway pressure (CPAP), OA, EPAP, or referral for surgical consideration.     -Treatment: pt prefers oral appliance to PAP therapy. Discussed with pt that oral appliance letter and rx will be sent to Dr. Dinero's office (provided pt with copy). Pt to make Sleep Med f/u appointment ~a couple of months after using OA to re-evaluate sleep apnea and titration PSG to re-evaluate KATHERIN with oral " appliance. Treatment plan acceptable to pt, all questions/concerns addressed to patient's satisfaction.     - Precautions: The patient was advised to abstain from driving should they feel sleepy  or drowsy.     I spent greater than 30 minutes during this 45 minute visit in counseling the patient regarding treatment option, PAP therapy vs. Oral appliance with patient

## 2017-05-10 ENCOUNTER — NURSE TRIAGE (OUTPATIENT)
Dept: ADMINISTRATIVE | Facility: CLINIC | Age: 77
End: 2017-05-10

## 2017-05-10 ENCOUNTER — CLINICAL SUPPORT (OUTPATIENT)
Dept: REHABILITATION | Facility: HOSPITAL | Age: 77
End: 2017-05-10
Attending: INTERNAL MEDICINE
Payer: MEDICARE

## 2017-05-10 ENCOUNTER — PATIENT MESSAGE (OUTPATIENT)
Dept: INTERNAL MEDICINE | Facility: CLINIC | Age: 77
End: 2017-05-10

## 2017-05-10 ENCOUNTER — TELEPHONE (OUTPATIENT)
Dept: INTERNAL MEDICINE | Facility: CLINIC | Age: 77
End: 2017-05-10

## 2017-05-10 DIAGNOSIS — R26.89 BALANCE PROBLEM: ICD-10-CM

## 2017-05-10 DIAGNOSIS — R26.9 GAIT DIFFICULTY: Primary | ICD-10-CM

## 2017-05-10 PROCEDURE — 97112 NEUROMUSCULAR REEDUCATION: CPT

## 2017-05-10 RX ORDER — LISINOPRIL 10 MG/1
20 TABLET ORAL EVERY MORNING
Qty: 60 TABLET | Refills: 3 | Status: SHIPPED | OUTPATIENT
Start: 2017-05-10 | End: 2018-04-10 | Stop reason: SDUPTHER

## 2017-05-10 RX ORDER — ATENOLOL 25 MG/1
TABLET ORAL
Qty: 30 TABLET | Refills: 12 | Status: SHIPPED | OUTPATIENT
Start: 2017-05-10 | End: 2018-08-14 | Stop reason: SDUPTHER

## 2017-05-10 NOTE — TELEPHONE ENCOUNTER
Please advise pt called [facility] to see if Claritin would interfere with any of her medications. [facility] was unable to answer that question. Pt was advise to speak with at pharmacists.

## 2017-05-10 NOTE — TELEPHONE ENCOUNTER
Reason for Disposition   Caller has medication question about med not prescribed by PCP and triager unable to answer question (e.g., compatibility with other med, storage)    Protocols used: ST MEDICATION QUESTION CALL-A-  pt calling to find out if Claritin will interfere with any of her other medications. Pt advised to follow up with pharmacist.

## 2017-05-10 NOTE — PROGRESS NOTES
"                                                    Physical Therapy Daily Note     Name: Shima Echols [facility]  Clinic Number: [id number]  Diagnosis:   Encounter Diagnoses   Name Primary?    Gait difficulty Yes    Balance problem      Physician: Zeynep Tomas MD  Precautions: Standard  Visit #: 10 of 12  PTA Visit #: 2  Time In: 10:05 am  Time Out: 10:50 am   Total Treatment Time: 45 mins (1:1 with PTA for 45 mins)    Evaluation Date: 4/5/2017  POC Expiration: 6 weeks - 5/17/17    Subjective     Pt reports: "I am not feeling as stable as I'd like to be." Patient reports feeling herself drift off to the right when she is walking on concrete. Patient reports "some family came into town this past weekend and pointed out to me that I am unable to walk in a straight line and have to touch walls and rails to orient me to where I am in space."  Pain Scale: Shima rates dizziness on a scale of 0-10 to be 0 currently.    Objective     Shima received individual therapeutic exercises to develop balance for 45 minutes including  VOR X1 Hor/Vert x 1 min each on BOSU /c CGA  VOR X2 Horizontal on foam x 1 min   VOR cancellation x 1 min on BOSU /c CGA  Cone taps with step overs/fwd 5x (4 cones)  Tandem gait 20 ft x 4  Gait c/ head turns 20ft x 2 quick/smooth pursuits  SPC step outs Pink 15x side to side/fwd onto foam c/ CGA  High stepping with UE swing 20 ft x 2  Fwd lunge with UE flex 10x  Side lunge with shoulder ABD 10x  Cone tap SL 10x, Double 20x  Step ups on BOSU 20x fwd/lateral  Balance on purple disc 2 x 30 sec  BOSU balance 30 sec x 3 both sides    Written Home Exercises Provided: added SLS c/ EC/tandem gait and walk with head turns (4/18/17)  Pt demo good understanding of the education provided. Shima demonstrated good return demonstration of activities.     Education provided re: discussed proprioception exercises with and without visual assistance and adding external forces  Shima verbalized good understanding of " education provided.   No spiritual or educational barriers to learning provided    Assessment     Patient tolerated treatment session well today. Good tolerance to VORX1 and VOR cancellation on BOSU with heavy CGA required secondary to increase in sway. Mild LOB with gait exercise, however patient able to self recover.   This is a 76 y.o. female referred to outpatient physical therapy and presents with a medical diagnosis of balance difficulty and demonstrates limitations as described in the problem list. Pt prognosis is Good. Pt will continue to benefit from skilled outpatient physical therapy to address the deficits listed in the problem list, provide pt/family education and to maximize pt's level of independence in the home and community environment.     Goals as follows:  STG'S: 3 weeks  1. Patient independent in HEP of balance.  Exercises to be done Daily.  2. Pt will demonstrate ability to perform rhomberg on foam with EC x 20 sec without LOB  3. Pt will ambulate with head turns without LOB    LTG'S : 6 weeks  1. Patient able to ambulate in community safely without assistive device, on varied terrainwith head movement, without LOB or c/o dizziness.  2. Pt will balance in tandem stance x 30 sec without LOB  3. Pt to be I with self management of condition and progression vestibular program for maintenance.     Plan     Continue with established Plan of Care towards PT goals.    Therapist: Elsa Campbell PTA  5/10/2017     Teresa Rush PT and Elsa Campbell PTA met face to face to discuss patient's treatment plan and progress towards established goals.  Treatment will be continued as described in initial report/eval and progress notes. Patient will be seen by physical therapist every sixth visit and minimally once per month.

## 2017-05-15 ENCOUNTER — CLINICAL SUPPORT (OUTPATIENT)
Dept: REHABILITATION | Facility: HOSPITAL | Age: 77
End: 2017-05-15
Attending: INTERNAL MEDICINE
Payer: MEDICARE

## 2017-05-15 DIAGNOSIS — R26.89 BALANCE PROBLEM: ICD-10-CM

## 2017-05-15 DIAGNOSIS — R26.9 GAIT DIFFICULTY: Primary | ICD-10-CM

## 2017-05-15 PROCEDURE — 97112 NEUROMUSCULAR REEDUCATION: CPT

## 2017-05-15 NOTE — PROGRESS NOTES
"                                                    Physical Therapy Daily Note     Name: Shima Echols [facility]  Clinic Number: [id number]  Diagnosis:   Encounter Diagnoses   Name Primary?    Gait difficulty Yes    Balance problem      Physician: Zeynep Tomas MD  Precautions: Standard  Visit #: 11 of 12  PTA Visit #: 3  Time In: 11:05 am  Time Out: 11:45 am   Total Treatment Time: 40 mins (1:1 with PTA for 40 mins)    Evaluation Date: 4/5/2017  POC Expiration: 6 weeks - 5/17/17    Subjective     Pt reports: "I feel more stable today."  Pain Scale: Shima rates dizziness on a scale of 0-10 to be 0 currently.    Objective     Shima received individual therapeutic exercises to develop balance for 40 minutes including  VOR X1 Hor/Vert x 1 min each on BOSU /c CGA  VOR X2 Horizontal on foam x 1 min   VOR cancellation x 1 min on BOSU /c CGA  Cone taps with step overs/fwd 5x (4 cones)  Tandem gait 20 ft x 4  Gait c/ head turns 20ft x 2 quick/smooth pursuits  SPC step outs Pink 15x side to side/fwd onto foam c/ CGA  High stepping with UE swing 20 ft x 2  Fwd lunge with UE flex 10x  Side lunge with shoulder ABD 10x  Cone tap SL 10x, Double 20x  Step ups on BOSU 20x fwd/lateral  Balance on purple disc 2 x 30 sec  BOSU balance 30 sec x 3 both sides    Written Home Exercises Provided: added SLS c/ EC/tandem gait and walk with head turns (4/18/17)  Pt demo good understanding of the education provided. Shima demonstrated good return demonstration of activities.     Education provided re: discussed proprioception exercises with and without visual assistance and adding external forces  Shima verbalized good understanding of education provided.   No spiritual or educational barriers to learning provided    Assessment     Patient tolerated treatment session well today. Good tolerance to VORX1 and VOR cancellation on BOSU with heavy CGA required secondary to increase in sway. Mild LOB with gait exercise, however patient able to self " recover. Plan for patient to be re-evaluated by PT next visit.   This is a 76 y.o. female referred to outpatient physical therapy and presents with a medical diagnosis of balance difficulty and demonstrates limitations as described in the problem list. Pt prognosis is Good. Pt will continue to benefit from skilled outpatient physical therapy to address the deficits listed in the problem list, provide pt/family education and to maximize pt's level of independence in the home and community environment.     Goals as follows:  STG'S: 3 weeks  1. Patient independent in HEP of balance.  Exercises to be done Daily.  2. Pt will demonstrate ability to perform rhomberg on foam with EC x 20 sec without LOB  3. Pt will ambulate with head turns without LOB    LTG'S : 6 weeks  1. Patient able to ambulate in community safely without assistive device, on varied terrainwith head movement, without LOB or c/o dizziness.  2. Pt will balance in tandem stance x 30 sec without LOB  3. Pt to be I with self management of condition and progression vestibular program for maintenance.     Plan     Continue with established Plan of Care towards PT goals.    Therapist: Elsa Campbell PTA  5/15/2017     Teresa Rush PT and Elsa Campbell PTA met face to face to discuss patient's treatment plan and progress towards established goals.  Treatment will be continued as described in initial report/eval and progress notes. Patient will be seen by physical therapist every sixth visit and minimally once per month.

## 2017-05-17 ENCOUNTER — CLINICAL SUPPORT (OUTPATIENT)
Dept: REHABILITATION | Facility: HOSPITAL | Age: 77
End: 2017-05-17
Attending: INTERNAL MEDICINE
Payer: MEDICARE

## 2017-05-17 DIAGNOSIS — R26.89 BALANCE PROBLEM: ICD-10-CM

## 2017-05-17 DIAGNOSIS — R26.9 GAIT DIFFICULTY: Primary | ICD-10-CM

## 2017-05-17 PROCEDURE — G8979 MOBILITY GOAL STATUS: HCPCS | Mod: CI

## 2017-05-17 PROCEDURE — 97110 THERAPEUTIC EXERCISES: CPT

## 2017-05-17 PROCEDURE — G8980 MOBILITY D/C STATUS: HCPCS | Mod: CJ

## 2017-05-17 NOTE — PROGRESS NOTES
"                                                  DC  Physical Therapy Daily Note     Name: Shima Echols [facility] Number: [id number]  Diagnosis:   Encounter Diagnoses   Name Primary?    Gait difficulty Yes    Balance problem      Physician: Zeynep Tomas MD  Precautions: Standard  Visit #: 12 of 12  PTA Visit #: 3  Time In   1:30 pm  Time Out: 2:30pm  Total Treatment Time: 60 mins (1:1 with PTA for 60 mins)    Evaluation Date: 4/5/2017  POC Expiration: 6 weeks - 5/17/17    Subjective     Pt reports: "I feel I have gotten a little better, but not as good as I wanted to"  Pain Scale: Shima rates dizziness on a scale of 0-10 to be 0 currently.    Objective     Shima received individual therapeutic exercises to develop balance for 60 minutes including  Review of HEP, copy scanned into chart    Written Home Exercises Provided:added and updated as per chart  Pt demo good understanding of the education provided. Shima demonstrated good return demonstration of activities.     Education provided re: discussed proprioception exercises with and without visual assistance and adding external forces. Discussed potential sinus pressure adding to dynamic balance difficulties  Shima verbalized good understanding of education provided.   No spiritual or educational barriers to learning provided    Assessment   Pt received extensive education today on purpose of exercises, defiects existing being minimal.  Pt continues to reports difficulty with walking on uneven ground at times, pt never has a true LOB that has been observed by PT.  Pt has plateaued with progress and at this point pt will continue to focus on dynamic balance exercises at home.  Pts balance ability flucuates with weather and pressure in sinuses with barometric changes.  Pt exhibits no evidence of vestibular diagnosis at this point.    Goals as follows:  STG'S: 3 weeks  1. Patient independent in HEP of balance.  Exercises to be done Daily. Met  2. Pt will " demonstrate ability to perform rhomberg on foam with EC x 20 sec without LOB Not met  3. Pt will ambulate with head turns without LOB Met    LTG'S : 6 weeks  1. Patient able to ambulate in community safely without assistive device, on varied terrainwith head movement, without LOB or c/o dizziness.met  2. Pt will balance in tandem stance x 30 sec without LOB not met  3. Pt to be I with self management of condition and progression vestibular program for maintenance.met     Plan     DC to I HEP    Therapist: Teresa Rush, PT  5/17/2017

## 2017-05-22 RX ORDER — LISINOPRIL 10 MG/1
TABLET ORAL
Qty: 60 TABLET | Refills: 12 | Status: SHIPPED | OUTPATIENT
Start: 2017-05-22 | End: 2017-07-12

## 2017-05-25 ENCOUNTER — OFFICE VISIT (OUTPATIENT)
Dept: INTERNAL MEDICINE | Facility: CLINIC | Age: 77
End: 2017-05-25
Payer: MEDICARE

## 2017-05-25 ENCOUNTER — PATIENT OUTREACH (OUTPATIENT)
Dept: OTHER | Facility: OTHER | Age: 77
End: 2017-05-25
Payer: MEDICARE

## 2017-05-25 ENCOUNTER — PATIENT MESSAGE (OUTPATIENT)
Dept: INTERNAL MEDICINE | Facility: CLINIC | Age: 77
End: 2017-05-25

## 2017-05-25 VITALS
BODY MASS INDEX: 20.55 KG/M2 | OXYGEN SATURATION: 99 % | SYSTOLIC BLOOD PRESSURE: 132 MMHG | HEART RATE: 43 BPM | DIASTOLIC BLOOD PRESSURE: 70 MMHG | HEIGHT: 67 IN | WEIGHT: 130.94 LBS

## 2017-05-25 DIAGNOSIS — N64.4 BREAST PAIN: ICD-10-CM

## 2017-05-25 DIAGNOSIS — J34.9 SINUS DISORDER: Primary | ICD-10-CM

## 2017-05-25 DIAGNOSIS — R60.0 EDEMA OF LOWER EXTREMITY, UNSPECIFIED LATERALITY: ICD-10-CM

## 2017-05-25 DIAGNOSIS — T14.8XXD WOUND HEALING, DELAYED: ICD-10-CM

## 2017-05-25 PROCEDURE — 99215 OFFICE O/P EST HI 40 MIN: CPT | Mod: PBBFAC | Performed by: INTERNAL MEDICINE

## 2017-05-25 PROCEDURE — 99214 OFFICE O/P EST MOD 30 MIN: CPT | Mod: S$PBB,,, | Performed by: INTERNAL MEDICINE

## 2017-05-25 PROCEDURE — 99999 PR PBB SHADOW E&M-EST. PATIENT-LVL V: CPT | Mod: PBBFAC,,, | Performed by: INTERNAL MEDICINE

## 2017-05-25 NOTE — PROGRESS NOTES
Subjective:       Patient ID: Shima Sorto is a 76 y.o. female.    Chief Complaint: Follow-up (vestibular disorfer, unspecified laterality)    HPI   Patient is her for follow up:    Vestibular disorder: completed PT  Sleep Apnea; patient unable to wear headgear, then went to Dr. Edy Dinero for an appliance.    Allergy: claritin and nasal spray    Breast    Wound  Review of Systems   Constitutional: Negative for chills, fever and unexpected weight change.   HENT: Negative for trouble swallowing.    Respiratory: Negative for cough, shortness of breath and wheezing.    Cardiovascular: Negative for chest pain and palpitations.   Gastrointestinal: Negative for abdominal distention, abdominal pain, blood in stool and vomiting.   Musculoskeletal: Negative for back pain.       Objective:      Physical Exam   Constitutional: She is oriented to person, place, and time. She appears well-developed and well-nourished. No distress.   Neck: Carotid bruit is not present. No thyromegaly present.   Cardiovascular: Normal rate, regular rhythm and normal heart sounds.  PMI is not displaced.    Pulmonary/Chest: Effort normal and breath sounds normal. No respiratory distress.   Left chest wall pain   Abdominal: Soft. Bowel sounds are normal. She exhibits no distension. There is no tenderness.   Musculoskeletal: She exhibits no edema.   Neurological: She is alert and oriented to person, place, and time.       Assessment:       1. Sinus disorder    2. Edema of lower extremity, unspecified laterality    3. Wound healing, delayed    4. Breast pain        Plan:       Shima was seen today for follow-up.    Diagnoses and all orders for this visit:    Sinus disorder: patient has been discharged from PT for vestibular PT, she is using claritin and fluticasone and still feels a fullness in her ears. She could consider changing to xyzal and possible using Dymista  -     Ambulatory consult to ENT    Edema of lower extremity, unspecified  laterality: I would use the support stocking    Wound healing, delayed: referral  -     Ambulatory consult to Wound Clinic    Breast pain: on exam this seemed to be the chest wall.    Return in about 3 months (around 8/25/2017) for FU referrals.    New Prescriptions    No medications on file       Modified Medications    No medications on file       Orders Placed This Encounter   Procedures    Ambulatory consult to ENT     Referral Priority:   Routine     Referral Type:   Consultation     Referral Reason:   Specialty Services Required     Requested Specialty:   Otolaryngology     Number of Visits Requested:   1    Ambulatory consult to Wound Clinic     Referral Priority:   Routine     Referral Type:   Consultation     Referral Reason:   Specialty Services Required     Requested Specialty:   Wound Care     Number of Visits Requested:   1       Labs, studies and consults associated with this visit were reviewed

## 2017-05-25 NOTE — PROGRESS NOTES
Last 5 Patient Entered Readings                                                               Current 30 Day Average: 129/62     Recent Readings 5/23/2017 5/21/2017 5/19/2017 5/18/2017 5/17/2017    Systolic BP (mmHg) 129 126 101 101 132    Diastolic BP (mmHg) 60 60 53 54 64    Pulse 45 52 55 63 50          Follow up with Mrs. Shima Ehcols Noreen completed. Patient is maintaining a low sodium diet and continuing her exercise regime. She reports that overall she is doing well and feeling great. She continues to take readings weekly. We dicussed her readings and the fluctuations from time to time and she informed me that she always feels great and denies symptoms. Patient did not have any further questions or concerns. I will follow up in a few weeks to see how she is doing and progressing.

## 2017-05-31 ENCOUNTER — TELEPHONE (OUTPATIENT)
Dept: OBSTETRICS AND GYNECOLOGY | Facility: CLINIC | Age: 77
End: 2017-05-31

## 2017-05-31 NOTE — TELEPHONE ENCOUNTER
Called pt to inform her that due to Medicare regulations she only needs to be seen every 2 years. Informed pt that she may receive a bill.  Pt stated that she is going to call her insurance to see if they will cover her visit and she will give the office a call back.

## 2017-06-02 ENCOUNTER — OFFICE VISIT (OUTPATIENT)
Dept: WOUND CARE | Facility: CLINIC | Age: 77
End: 2017-06-02
Payer: MEDICARE

## 2017-06-02 VITALS
TEMPERATURE: 98 F | WEIGHT: 132.63 LBS | BODY MASS INDEX: 20.82 KG/M2 | HEIGHT: 67 IN | HEART RATE: 47 BPM | DIASTOLIC BLOOD PRESSURE: 65 MMHG | SYSTOLIC BLOOD PRESSURE: 148 MMHG

## 2017-06-02 DIAGNOSIS — I83.029 VARICOSE VEINS OF LEFT LOWER EXTREMITY WITH ULCER: ICD-10-CM

## 2017-06-02 DIAGNOSIS — L97.929 VARICOSE VEINS OF LEFT LOWER EXTREMITY WITH ULCER: ICD-10-CM

## 2017-06-02 DIAGNOSIS — L97.329 ULCER OF LEFT ANKLE, WITH UNSPECIFIED SEVERITY: ICD-10-CM

## 2017-06-02 PROCEDURE — 29580 STRAPPING UNNA BOOT: CPT | Mod: PBBFAC | Performed by: NURSE PRACTITIONER

## 2017-06-02 PROCEDURE — 29580 STRAPPING UNNA BOOT: CPT | Mod: S$PBB,,, | Performed by: NURSE PRACTITIONER

## 2017-06-02 PROCEDURE — 99203 OFFICE O/P NEW LOW 30 MIN: CPT | Mod: 25,S$PBB,, | Performed by: NURSE PRACTITIONER

## 2017-06-02 PROCEDURE — 99999 PR PBB SHADOW E&M-EST. PATIENT-LVL IV: CPT | Mod: PBBFAC,,, | Performed by: NURSE PRACTITIONER

## 2017-06-02 PROCEDURE — 99214 OFFICE O/P EST MOD 30 MIN: CPT | Mod: PBBFAC | Performed by: NURSE PRACTITIONER

## 2017-06-02 NOTE — PROGRESS NOTES
Subjective:       Patient ID: Shima Sorto is a 76 y.o. female.    Chief Complaint: Wound Check    HPI   This is a 76 year old female referred by Dr. Tomas for evaluation and management of an ulcer to the left medial ankle.  This has been present for several months.  She believes this was initiated by trauma to the area.  She is not currently using anything topically on the wound although she does wear compression hose on a routine basis.  She is afebrile.  She denies increased redness, swelling or purulent drainage.  She does not complain of pain.  Review of Systems   Constitutional: Positive for fatigue. Negative for chills, diaphoresis and fever.   HENT: Positive for postnasal drip, rhinorrhea, sinus pressure and trouble swallowing. Negative for hearing loss, sneezing, sore throat and tinnitus.    Eyes: Negative for visual disturbance.   Respiratory: Positive for apnea (not on CPAP presently). Negative for cough, shortness of breath and wheezing.    Cardiovascular: Positive for leg swelling. Negative for chest pain and palpitations.   Gastrointestinal: Negative for constipation, diarrhea, nausea and vomiting.   Genitourinary: Positive for frequency. Negative for difficulty urinating, dysuria and hematuria.   Musculoskeletal: Negative for arthralgias, back pain and joint swelling.        Trigger finger   Skin: Positive for wound.   Neurological: Negative for dizziness, weakness, light-headedness and headaches.        Balance problems   Hematological: Does not bruise/bleed easily.   Psychiatric/Behavioral: Negative for confusion, decreased concentration, dysphoric mood and sleep disturbance. The patient is not nervous/anxious.        Objective:      Physical Exam   Constitutional: She is oriented to person, place, and time. She appears well-developed and well-nourished. No distress.   HENT:   Head: Normocephalic and atraumatic.   Mouth/Throat: Oropharynx is clear and moist.   Eyes: Conjunctivae and EOM are  normal. Pupils are equal, round, and reactive to light. Right eye exhibits no discharge. Left eye exhibits no discharge. No scleral icterus.   Neck: Normal range of motion. Neck supple. No JVD present. No tracheal deviation present. No thyromegaly present.   Cardiovascular: Normal rate, regular rhythm, normal heart sounds and intact distal pulses.  Exam reveals no gallop and no friction rub.    No murmur heard.  Pulmonary/Chest: Effort normal and breath sounds normal. No respiratory distress. She has no wheezes. She has no rales.   Abdominal: Soft. Bowel sounds are normal. She exhibits no distension. There is no tenderness.   Musculoskeletal: Normal range of motion. She exhibits no edema or tenderness.        Feet:    Neurological: She is alert and oriented to person, place, and time.   Skin: Skin is warm and dry. No rash noted. She is not diaphoretic. No erythema.   Psychiatric: She has a normal mood and affect. Her behavior is normal. Judgment and thought content normal.   Nursing note and vitals reviewed.      Shima was seen in the clinic room and placed in the supine position on the treatment table.  The left leg was cleansed with Easi-clense sponges and dried thoroughly.  Eucerin cream was applied to the lower legs. Medihoney gel and a mepilex foam dressing was applied to the wound.  The patient's foot was positioned at a 90 degree angle.  The coflex was applied per package instructions.  A two layered application was performed using a spiral technique beginning with the foam layer followed by the cohesive bandage avoiding creases or folds.  The wrap was started behind the first metatarsal and ended below the tibial tubercle of the knee.  There was overlap of each turn half the width of the previous turn.  The compression wrap will be changed every 7 days.    Assessment:       1. Ulcer of left ankle, with unspecified severity    2. 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  Patient was warned not to get the dressings wet and to use cast covers for showering.  Should the dressing become wet, he is to remove it, place a wet-to-dry dressing over the wound, cover with gauze and roll gauze and use ace wraps for compression and to secure bandages.  He should then notify this office as soon as possible to have a new dressing applied.  Return to clinic in one week.

## 2017-06-02 NOTE — LETTER
June 2, 2017      Zeynep Tomas MD  [address]  [facility] [address]           [facility]  [address]  [facility] [address]  Phone: [phone]          Patient: Shima Sorto   MR Number: [id number]   YOB: 1940   Date of Visit: 6/2/2017       Dear Dr. Zeynep Tomas:    Thank you for referring Shima Sorto to me for evaluation. Attached you will find relevant portions of my assessment and plan of care.    If you have questions, please do not hesitate to call me. I look forward to following Shima Sorto along with you.    Sincerely,    Agnes Lisa NP    Enclosure  CC:  No Recipients    If you would like to receive this communication electronically, please contact [email or web address] or [phone] to request more information on [facility] access.    For providers and/or their staff who would like to refer a patient to [facility], please contact us through our one-stop-shop provider referral line, [facility], at [phone].    If you feel you have received this communication in error or would no longer like to receive these types of communications, please e-mail [email or web address]

## 2017-06-07 ENCOUNTER — NURSE TRIAGE (OUTPATIENT)
Dept: ADMINISTRATIVE | Facility: CLINIC | Age: 77
End: 2017-06-07

## 2017-06-07 NOTE — TELEPHONE ENCOUNTER
"1/2 in wound ankle on left. Pt called re white cream on it. Pt usually walks a couple miles all day. Just got home. Leg feeling sl burning and tingling. Keeping feet up now. How many hours to keep foot elevated?  Able to walk tonight after being on feet today? Spoke with Radha whitlock at 456pm.  Ok to go walk today per RN. Main concern is keep leg up whenever sitting. Pt notified. Call back with questions.     Reason for Disposition   Wound causes numbness (i.e., loss of sensation)    Answer Assessment - Initial Assessment Questions  1. APPEARANCE of INJURY: "What does the injury look like?"      Taking care of relatives today. Going to clinic tomorrow in am. Every once and while when on the feet a while feeling burning and tingling. sl numbness occasionally, stocking covering toes.    Protocols used: ST SKIN INJURY-A-      "

## 2017-06-08 ENCOUNTER — OFFICE VISIT (OUTPATIENT)
Dept: WOUND CARE | Facility: CLINIC | Age: 77
End: 2017-06-08
Payer: MEDICARE

## 2017-06-08 VITALS
DIASTOLIC BLOOD PRESSURE: 55 MMHG | BODY MASS INDEX: 20.78 KG/M2 | WEIGHT: 132.38 LBS | TEMPERATURE: 98 F | HEART RATE: 55 BPM | HEIGHT: 67 IN | SYSTOLIC BLOOD PRESSURE: 142 MMHG

## 2017-06-08 DIAGNOSIS — L97.329 ULCER OF LEFT ANKLE, WITH UNSPECIFIED SEVERITY: Primary | ICD-10-CM

## 2017-06-08 DIAGNOSIS — L97.929 VARICOSE VEINS OF LEFT LOWER EXTREMITY WITH ULCER: ICD-10-CM

## 2017-06-08 DIAGNOSIS — I83.029 VARICOSE VEINS OF LEFT LOWER EXTREMITY WITH ULCER: ICD-10-CM

## 2017-06-08 PROCEDURE — 11042 DBRDMT SUBQ TIS 1ST 20SQCM/<: CPT | Mod: S$PBB,LT,, | Performed by: NURSE PRACTITIONER

## 2017-06-08 PROCEDURE — 29580 STRAPPING UNNA BOOT: CPT | Mod: PBBFAC,LT | Performed by: NURSE PRACTITIONER

## 2017-06-08 PROCEDURE — 99214 OFFICE O/P EST MOD 30 MIN: CPT | Mod: PBBFAC | Performed by: NURSE PRACTITIONER

## 2017-06-08 PROCEDURE — 99999 PR PBB SHADOW E&M-EST. PATIENT-LVL IV: CPT | Mod: PBBFAC,,, | Performed by: NURSE PRACTITIONER

## 2017-06-08 PROCEDURE — 99499 UNLISTED E&M SERVICE: CPT | Mod: S$PBB,,, | Performed by: NURSE PRACTITIONER

## 2017-06-08 PROCEDURE — 11042 DBRDMT SUBQ TIS 1ST 20SQCM/<: CPT | Mod: PBBFAC,LT | Performed by: NURSE PRACTITIONER

## 2017-06-08 NOTE — PROGRESS NOTES
Subjective:       Patient ID: Shima Sorto is a 76 y.o. female.    Chief Complaint: Wound Check    HPI   This patient is seen today for reevaluation of an ulcer to the left medial ankle.  This has been present for several months.  She believes this was initiated by trauma to the area.  A coflex compression wrap was placed on the leg on the last visit. The wound is healing as evidenced by absence of necrotic tissue post-debridement.  She is afebrile.  She denies increased redness, swelling or purulent drainage.  Her pain level is 2/10.  Review of Systems   Constitutional: Positive for fatigue. Negative for chills, diaphoresis and fever.   HENT: Positive for postnasal drip, rhinorrhea, sinus pressure and trouble swallowing. Negative for hearing loss, sneezing, sore throat and tinnitus.    Eyes: Negative for visual disturbance.   Respiratory: Positive for apnea (not on CPAP presently). Negative for cough, shortness of breath and wheezing.    Cardiovascular: Positive for leg swelling. Negative for chest pain and palpitations.   Gastrointestinal: Negative for constipation, diarrhea, nausea and vomiting.   Genitourinary: Positive for frequency. Negative for difficulty urinating, dysuria and hematuria.   Musculoskeletal: Negative for arthralgias, back pain and joint swelling.        Trigger finger   Skin: Positive for wound.   Neurological: Negative for dizziness, weakness, light-headedness and headaches.        Balance problems   Hematological: Does not bruise/bleed easily.   Psychiatric/Behavioral: Negative for confusion, decreased concentration, dysphoric mood and sleep disturbance. The patient is not nervous/anxious.        Objective:      Physical Exam   Constitutional: She is oriented to person, place, and time. She appears well-developed and well-nourished. No distress.   HENT:   Head: Normocephalic and atraumatic.   Cardiovascular: Intact distal pulses.    Musculoskeletal: Normal range of motion. She  exhibits no edema or tenderness.        Feet:    Neurological: She is alert and oriented to person, place, and time.   Skin: Skin is warm and dry. No rash noted. She is not diaphoretic. No erythema.   Psychiatric: She has a normal mood and affect. Her behavior is normal. Judgment and thought content normal.   Nursing note and vitals reviewed.      Procedure: Shima was seen in the clinic room and placed in the supine position on the treatment table. Attention was directed to the wound which was located on the left medial ankle, where an wound measuring 0.5x0.4 cm is noted. The wound was covered with 100% non-viable tissue. No acute signs of infection were noted. The wound was non-tender. The wound was prepped with alcohol. Utilizing a #15 scalpel, I debrided the wound full-thickness through skin and subcutaneous tissue to excise viable and nonviable tissue inside the wound margins. The patient tolerated well. Because of a lack of sensation, anesthesia was not necessary. The wound was flushed with wound . There was minimal bleeding with debridement which was well controlled with direct pressure. The wound was then dressed with mepilex foam dressing. The patient tolerated the procedure well.    Shima was seen in the clinic room and placed in the supine position on the treatment table.  The left leg was cleansed with Easi-clense sponges and dried thoroughly.  Eucerin cream was applied to the lower legs.A mepilex foam dressing was applied to the wound.  The patient's foot was positioned at a 90 degree angle.  The coflex was applied per package instructions.  A two layered application was performed using a spiral technique beginning with the foam layer followed by the cohesive bandage avoiding creases or folds.  The wrap was started behind the first metatarsal and ended below the tibial tubercle of the knee.  There was overlap of each turn half the width of the previous turn.  The compression wrap will be changed  every 7 days.    Assessment:       1. Ulcer of left ankle, with unspecified severity    2. 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  Patient was warned not to get the dressings wet and to use cast covers for showering.  Should the dressing become wet, he is to remove it, place a wet-to-dry dressing over the wound, cover with gauze and roll gauze and use ace wraps for compression and to secure bandages.  He should then notify this office as soon as possible to have a new dressing applied.  Return to clinic in one week.     Left medial ankle post-debridement

## 2017-06-16 ENCOUNTER — OFFICE VISIT (OUTPATIENT)
Dept: WOUND CARE | Facility: CLINIC | Age: 77
End: 2017-06-16
Payer: MEDICARE

## 2017-06-16 VITALS
HEART RATE: 53 BPM | HEIGHT: 66 IN | SYSTOLIC BLOOD PRESSURE: 139 MMHG | BODY MASS INDEX: 21.05 KG/M2 | TEMPERATURE: 98 F | DIASTOLIC BLOOD PRESSURE: 65 MMHG | WEIGHT: 131 LBS

## 2017-06-16 DIAGNOSIS — I87.2 VENOUS INSUFFICIENCY OF BOTH LOWER EXTREMITIES: Primary | ICD-10-CM

## 2017-06-16 PROBLEM — I83.029 VARICOSE VEINS OF LEFT LOWER EXTREMITY WITH ULCER: Status: RESOLVED | Noted: 2017-06-02 | Resolved: 2017-06-16

## 2017-06-16 PROBLEM — L97.329 ULCER OF LEFT ANKLE: Status: RESOLVED | Noted: 2017-06-02 | Resolved: 2017-06-16

## 2017-06-16 PROBLEM — L97.929 VARICOSE VEINS OF LEFT LOWER EXTREMITY WITH ULCER: Status: RESOLVED | Noted: 2017-06-02 | Resolved: 2017-06-16

## 2017-06-16 PROCEDURE — 99214 OFFICE O/P EST MOD 30 MIN: CPT | Mod: PBBFAC | Performed by: NURSE PRACTITIONER

## 2017-06-16 PROCEDURE — 99999 PR PBB SHADOW E&M-EST. PATIENT-LVL IV: CPT | Mod: PBBFAC,,, | Performed by: NURSE PRACTITIONER

## 2017-06-16 PROCEDURE — 99211 OFF/OP EST MAY X REQ PHY/QHP: CPT | Mod: S$PBB,,, | Performed by: NURSE PRACTITIONER

## 2017-06-16 NOTE — PROGRESS NOTES
Subjective:       Patient ID: Shima Sorto is a 76 y.o. female.    Chief Complaint: Wound Check    Wound Check        This patient is seen today for reevaluation of an ulcer to the left medial ankle.  This has been present for several months.  She believes this was initiated by trauma to the area.  A coflex compression wrap was placed on the leg on the last visit. The wound is now healed.  She is afebrile.  She denies increased redness, swelling or purulent drainage.  She does not complain of pain.  Review of Systems     Unchanged from prior visit.  Objective:      Physical Exam   Constitutional: She is oriented to person, place, and time. She appears well-developed and well-nourished. No distress.   HENT:   Head: Normocephalic and atraumatic.   Cardiovascular: Intact distal pulses.    Musculoskeletal: Normal range of motion. She exhibits no edema or tenderness.        Feet:    Neurological: She is alert and oriented to person, place, and time.   Skin: Skin is warm and dry. No rash noted. She is not diaphoretic. No erythema.   Psychiatric: She has a normal mood and affect. Her behavior is normal. Judgment and thought content normal.   Nursing note and vitals reviewed.        Assessment:       1. Venous insufficiency of both lower extremities        Plan:           Compression hose bilateral lower legs.  Return to clinic as needed.     Left medial ankle

## 2017-06-16 NOTE — PATIENT INSTRUCTIONS
Leave the foam dressing in place to the healed wound site for one week then remove.    Apply compression hose first thing in the morning and remove at bedtime.

## 2017-06-18 ENCOUNTER — NURSE TRIAGE (OUTPATIENT)
Dept: ADMINISTRATIVE | Facility: CLINIC | Age: 77
End: 2017-06-18

## 2017-06-18 NOTE — TELEPHONE ENCOUNTER
Reason for Disposition   Wound doesn't sound infected    Protocols used: ST WOUND INFECTION-A-AH    Patient called with concerns that she lost the foam dressing SHAY Lisa NP gave her for the left ankle wound. Patient informed that a message would be sent to NP to see if they can provide another foam dressing or if she can just use gauze and paper.

## 2017-06-19 ENCOUNTER — TELEPHONE (OUTPATIENT)
Dept: SURGERY | Facility: CLINIC | Age: 77
End: 2017-06-19

## 2017-06-19 NOTE — TELEPHONE ENCOUNTER
"Patient called with concerns that she lost the foam dressing that SHAY Lisa, ZEYAD, gave her for the left ankle wound and left a message. Patient was called back and informed that a message would be sent to NP to call the pt back regarding dressing the wound.  Pt stated that for today, she has applied sterile 1"x1" gauze and paper tape.  Pt informed that SHAY Lisa will call her back tomorrow.  Pt verbalized understanding.       "

## 2017-06-26 ENCOUNTER — PATIENT MESSAGE (OUTPATIENT)
Dept: WOUND CARE | Facility: CLINIC | Age: 77
End: 2017-06-26

## 2017-06-27 ENCOUNTER — OFFICE VISIT (OUTPATIENT)
Dept: WOUND CARE | Facility: CLINIC | Age: 77
End: 2017-06-27
Payer: MEDICARE

## 2017-06-27 ENCOUNTER — TELEPHONE (OUTPATIENT)
Dept: WOUND CARE | Facility: CLINIC | Age: 77
End: 2017-06-27

## 2017-06-27 VITALS
HEIGHT: 68 IN | HEART RATE: 57 BPM | TEMPERATURE: 98 F | BODY MASS INDEX: 20.29 KG/M2 | WEIGHT: 133.88 LBS | DIASTOLIC BLOOD PRESSURE: 72 MMHG | SYSTOLIC BLOOD PRESSURE: 162 MMHG

## 2017-06-27 DIAGNOSIS — I87.2 VENOUS INSUFFICIENCY OF BOTH LOWER EXTREMITIES: ICD-10-CM

## 2017-06-27 DIAGNOSIS — L97.321 SKIN ULCER OF LEFT ANKLE, LIMITED TO BREAKDOWN OF SKIN: Primary | ICD-10-CM

## 2017-06-27 PROCEDURE — 99212 OFFICE O/P EST SF 10 MIN: CPT | Mod: 25,S$PBB,, | Performed by: NURSE PRACTITIONER

## 2017-06-27 PROCEDURE — 1126F AMNT PAIN NOTED NONE PRSNT: CPT | Mod: ,,, | Performed by: NURSE PRACTITIONER

## 2017-06-27 PROCEDURE — 99215 OFFICE O/P EST HI 40 MIN: CPT | Mod: PBBFAC,25 | Performed by: NURSE PRACTITIONER

## 2017-06-27 PROCEDURE — 29580 STRAPPING UNNA BOOT: CPT | Mod: S$PBB,LT,, | Performed by: NURSE PRACTITIONER

## 2017-06-27 PROCEDURE — 29580 STRAPPING UNNA BOOT: CPT | Mod: PBBFAC,LT | Performed by: NURSE PRACTITIONER

## 2017-06-27 PROCEDURE — 99999 PR PBB SHADOW E&M-EST. PATIENT-LVL V: CPT | Mod: PBBFAC,,, | Performed by: NURSE PRACTITIONER

## 2017-06-27 PROCEDURE — 1159F MED LIST DOCD IN RCRD: CPT | Mod: ,,, | Performed by: NURSE PRACTITIONER

## 2017-06-27 NOTE — PROGRESS NOTES
Subjective:       Patient ID: Shima Sorto is a 76 y.o. female.    Chief Complaint: Wound Check    Wound Check        This patient is seen today for reevaluation of a recurrent ulcer to the left medial ankle.  When she was seen on 6/16/17 the wound was healed.  She has responded well to compression therapy in the past.  She is afebrile.  She denies increased redness, swelling or purulent drainage.  She does not complain of pain.  Review of Systems  Unchanged from prior visit.  Objective:      Physical Exam   Constitutional: She is oriented to person, place, and time. She appears well-developed and well-nourished. No distress.   HENT:   Head: Normocephalic and atraumatic.   Cardiovascular: Intact distal pulses.    Musculoskeletal: Normal range of motion. She exhibits no edema or tenderness.        Feet:    Neurological: She is alert and oriented to person, place, and time.   Skin: Skin is warm and dry. No rash noted. She is not diaphoretic. No erythema.   Psychiatric: She has a normal mood and affect. Her behavior is normal. Judgment and thought content normal.   Nursing note and vitals reviewed.      Shima was seen in the clinic room and placed in the supine position on the treatment table.  The coflex bandage was removed with scissors and the leg was cleansed with Easi-clense sponges and dried thoroughly.  Eucerin  cream was applied to the lower legs. A mepilex foam dressing was applied to the wound.  The patient's foot was positioned at a 90 degree angle.  The coflex was applied per package instructions.  A two layered application was performed using a spiral technique beginning with the foam layer followed by the cohesive bandage avoiding creases or folds.  The wrap was started behind the first metatarsal and ended below the tibial tubercle of the knee.  There was overlap of each turn half the width of the previous turn.  The compression wrap will be changed every 7 days.    Assessment:       1. Skin  ulcer of left ankle, limited to breakdown of skin    2. Venous insufficiency of both lower extremities        Plan:           Pre-EVLT study.  Coflex compression wrap left lower leg as detailed above.  Patient was warned not to get the dressings wet and to use cast covers for showering.  Should the dressing become wet, she is to remove it, place a wet-to-dry dressing over the wound, cover with gauze and roll gauze and use ace wraps for compression and to secure bandages.  She should then notify this office as soon as possible to have a new dressing applied.  Return to clinic in one week.    Left medial ankle

## 2017-06-28 ENCOUNTER — PATIENT OUTREACH (OUTPATIENT)
Dept: OTHER | Facility: OTHER | Age: 77
End: 2017-06-28

## 2017-06-28 NOTE — PROGRESS NOTES
Last 5 Patient Entered Readings                                                               Current 30 Day Average: 122/62     Recent Readings 6/26/2017 6/23/2017 6/19/2017 6/17/2017 6/15/2017    Systolic BP (mmHg) 147 116 132 145 131    Diastolic BP (mmHg) 77 54 60 71 61    Pulse 51 58 52 50 49          Follow up with Mrs. Shima Echols Charodario completed. Patient is maintaining a low sodium diet and continuing her exercise regime. She reports that she is doing well and feeling good for the most part. She is still dealing with some inner ear issues and is going see her ENT in July. This all started in January after having vertigo and has not subsided since. She mentioned that the days that her readings are higher are days when she is on the go a lot and over doing it. She denies symptoms and stated that she just does not have enough time to relax like she should before taking her BP reading. She will let us know if anything changes. Patient did not have any further questions or concerns. I will follow up in a few weeks to see how she is doing and progressing.

## 2017-07-05 ENCOUNTER — OFFICE VISIT (OUTPATIENT)
Dept: WOUND CARE | Facility: CLINIC | Age: 77
End: 2017-07-05
Payer: MEDICARE

## 2017-07-05 VITALS
SYSTOLIC BLOOD PRESSURE: 151 MMHG | BODY MASS INDEX: 20.36 KG/M2 | HEART RATE: 60 BPM | TEMPERATURE: 98 F | HEIGHT: 68 IN | WEIGHT: 134.38 LBS | DIASTOLIC BLOOD PRESSURE: 71 MMHG

## 2017-07-05 DIAGNOSIS — L97.321 SKIN ULCER OF LEFT ANKLE, LIMITED TO BREAKDOWN OF SKIN: Primary | ICD-10-CM

## 2017-07-05 DIAGNOSIS — I87.2 VENOUS INSUFFICIENCY OF BOTH LOWER EXTREMITIES: ICD-10-CM

## 2017-07-05 PROCEDURE — 99214 OFFICE O/P EST MOD 30 MIN: CPT | Mod: PBBFAC | Performed by: NURSE PRACTITIONER

## 2017-07-05 PROCEDURE — 29580 STRAPPING UNNA BOOT: CPT | Mod: PBBFAC,LT | Performed by: NURSE PRACTITIONER

## 2017-07-05 PROCEDURE — 29580 STRAPPING UNNA BOOT: CPT | Mod: S$PBB,LT,, | Performed by: NURSE PRACTITIONER

## 2017-07-05 PROCEDURE — 99999 PR PBB SHADOW E&M-EST. PATIENT-LVL IV: CPT | Mod: PBBFAC,,, | Performed by: NURSE PRACTITIONER

## 2017-07-05 PROCEDURE — 99499 UNLISTED E&M SERVICE: CPT | Mod: S$PBB,,, | Performed by: NURSE PRACTITIONER

## 2017-07-05 NOTE — PROGRESS NOTES
Subjective:       Patient ID: Shima Sorto is a 76 y.o. female.    Chief Complaint: Wound Check    Wound Check        This patient is seen today for reevaluation of a recurrent ulcer to the left medial ankle.  A coflex compression wrap was placed on the leg on the last visit and the wound is healing as evidenced by wound contracture.  She is afebrile.  She denies increased redness, swelling or purulent drainage.  She does not complain of pain.  Review of Systems  Unchanged from prior visit.  Objective:      Physical Exam   Constitutional: She is oriented to person, place, and time. She appears well-developed and well-nourished. No distress.   HENT:   Head: Normocephalic and atraumatic.   Cardiovascular: Intact distal pulses.    Musculoskeletal: Normal range of motion. She exhibits no edema or tenderness.        Feet:    Neurological: She is alert and oriented to person, place, and time.   Skin: Skin is warm and dry. No rash noted. She is not diaphoretic. No erythema.   Psychiatric: She has a normal mood and affect. Her behavior is normal. Judgment and thought content normal.   Nursing note and vitals reviewed.      Shima was seen in the clinic room and placed in the supine position on the treatment table.  The coflex bandage was removed with scissors and the leg was cleansed with Easi-clense sponges and dried thoroughly.  Eucerin  cream was applied to the lower legs. A mepilex foam dressing was applied to the wound.  The patient's foot was positioned at a 90 degree angle.  The coflex was applied per package instructions.  A two layered application was performed using a spiral technique beginning with the foam layer followed by the cohesive bandage avoiding creases or folds.  The wrap was started behind the first metatarsal and ended below the tibial tubercle of the knee.  There was overlap of each turn half the width of the previous turn.  The compression wrap will be changed every 7 days.    Assessment:        1. Skin ulcer of left ankle, limited to breakdown of skin    2. Venous insufficiency of both lower extremities        Plan:           Pre-EVLT study.  Coflex compression wrap left lower leg as detailed above.  Patient was warned not to get the dressings wet and to use cast covers for showering.  Should the dressing become wet, she is to remove it, place a wet-to-dry dressing over the wound, cover with gauze and roll gauze and use ace wraps for compression and to secure bandages.  She should then notify this office as soon as possible to have a new dressing applied.  Return to clinic in one week.    Left medial ankle

## 2017-07-06 ENCOUNTER — PATIENT OUTREACH (OUTPATIENT)
Dept: OTHER | Facility: OTHER | Age: 77
End: 2017-07-06

## 2017-07-06 NOTE — PROGRESS NOTES
Last 5 Patient Entered Readings                                                               Current 30 Day Average: 124/62     Recent Readings 6/26/2017 6/23/2017 6/19/2017 6/17/2017 6/15/2017    Systolic BP (mmHg) 147 116 132 145 131    Diastolic BP (mmHg) 77 54 60 71 61    Pulse 51 58 52 50 49          Hypertension Medications             atenolol (TENORMIN) 25 MG tablet Take one per day    lisinopril 10 MG tablet Take 2 tablets (20 mg total) by mouth every morning. Dosage change        Plan:   Called patient to follow up. Per current 30 day average, BP is well controlled. Patient denies having questions or concerns. Will continue to monitor. WCB in 4 months, sooner if BP begins to trend up or down.

## 2017-07-12 ENCOUNTER — OFFICE VISIT (OUTPATIENT)
Dept: WOUND CARE | Facility: CLINIC | Age: 77
End: 2017-07-12
Payer: MEDICARE

## 2017-07-12 ENCOUNTER — CLINICAL SUPPORT (OUTPATIENT)
Dept: AUDIOLOGY | Facility: CLINIC | Age: 77
End: 2017-07-12
Payer: MEDICARE

## 2017-07-12 ENCOUNTER — HOSPITAL ENCOUNTER (OUTPATIENT)
Dept: VASCULAR SURGERY | Facility: CLINIC | Age: 77
Discharge: HOME | End: 2017-07-12
Attending: NURSE PRACTITIONER
Payer: MEDICARE

## 2017-07-12 ENCOUNTER — OFFICE VISIT (OUTPATIENT)
Dept: OTOLARYNGOLOGY | Facility: CLINIC | Age: 77
End: 2017-07-12
Payer: MEDICARE

## 2017-07-12 VITALS
HEART RATE: 48 BPM | DIASTOLIC BLOOD PRESSURE: 73 MMHG | WEIGHT: 132.31 LBS | BODY MASS INDEX: 20.05 KG/M2 | HEIGHT: 68 IN | SYSTOLIC BLOOD PRESSURE: 204 MMHG | TEMPERATURE: 97 F

## 2017-07-12 VITALS — HEART RATE: 51 BPM | SYSTOLIC BLOOD PRESSURE: 131 MMHG | DIASTOLIC BLOOD PRESSURE: 63 MMHG | TEMPERATURE: 97 F

## 2017-07-12 DIAGNOSIS — H81.319 AURAL VERTIGO, UNSPECIFIED LATERALITY: Primary | ICD-10-CM

## 2017-07-12 DIAGNOSIS — H61.21 IMPACTED CERUMEN, RIGHT EAR: ICD-10-CM

## 2017-07-12 DIAGNOSIS — H81.20 VESTIBULAR NEURONITIS, UNSPECIFIED LATERALITY: ICD-10-CM

## 2017-07-12 DIAGNOSIS — H90.3 HEARING LOSS, SENSORINEURAL, HIGH FREQUENCY, BILATERAL: ICD-10-CM

## 2017-07-12 DIAGNOSIS — Z86.19 HISTORY OF SHINGLES: ICD-10-CM

## 2017-07-12 DIAGNOSIS — R27.0 ATAXIA: Primary | ICD-10-CM

## 2017-07-12 DIAGNOSIS — J34.3 HYPERTROPHY OF NASAL TURBINATES: ICD-10-CM

## 2017-07-12 DIAGNOSIS — Z85.820 HISTORY OF MALIGNANT MELANOMA OF SKIN: ICD-10-CM

## 2017-07-12 DIAGNOSIS — R09.81 NASAL CONGESTION: ICD-10-CM

## 2017-07-12 DIAGNOSIS — I87.2 VENOUS INSUFFICIENCY OF BOTH LOWER EXTREMITIES: ICD-10-CM

## 2017-07-12 DIAGNOSIS — H93.8X3 EAR STUFFINESS, BILATERAL: ICD-10-CM

## 2017-07-12 DIAGNOSIS — H90.3 SENSORINEURAL HEARING LOSS, BILATERAL: ICD-10-CM

## 2017-07-12 DIAGNOSIS — L97.321 SKIN ULCER OF LEFT ANKLE, LIMITED TO BREAKDOWN OF SKIN: Primary | ICD-10-CM

## 2017-07-12 PROCEDURE — 99215 OFFICE O/P EST HI 40 MIN: CPT | Mod: PBBFAC,27 | Performed by: NURSE PRACTITIONER

## 2017-07-12 PROCEDURE — 99499 UNLISTED E&M SERVICE: CPT | Mod: S$PBB,,, | Performed by: NURSE PRACTITIONER

## 2017-07-12 PROCEDURE — 29580 STRAPPING UNNA BOOT: CPT | Mod: S$PBB,LT,, | Performed by: NURSE PRACTITIONER

## 2017-07-12 PROCEDURE — 99999 PR PBB SHADOW E&M-EST. PATIENT-LVL I: CPT | Mod: PBBFAC,,,

## 2017-07-12 PROCEDURE — 93970 EXTREMITY STUDY: CPT | Mod: 26,S$PBB,, | Performed by: SURGERY

## 2017-07-12 PROCEDURE — 29580 STRAPPING UNNA BOOT: CPT | Mod: PBBFAC,LT | Performed by: NURSE PRACTITIONER

## 2017-07-12 PROCEDURE — 99203 OFFICE O/P NEW LOW 30 MIN: CPT | Mod: 25,S$PBB,, | Performed by: OTOLARYNGOLOGY

## 2017-07-12 PROCEDURE — 99999 PR PBB SHADOW E&M-EST. PATIENT-LVL III: CPT | Mod: PBBFAC,,, | Performed by: OTOLARYNGOLOGY

## 2017-07-12 PROCEDURE — 99999 PR PBB SHADOW E&M-EST. PATIENT-LVL V: CPT | Mod: PBBFAC,,, | Performed by: NURSE PRACTITIONER

## 2017-07-12 PROCEDURE — 1159F MED LIST DOCD IN RCRD: CPT | Mod: ,,, | Performed by: OTOLARYNGOLOGY

## 2017-07-12 PROCEDURE — 1125F AMNT PAIN NOTED PAIN PRSNT: CPT | Mod: ,,, | Performed by: OTOLARYNGOLOGY

## 2017-07-12 PROCEDURE — 69210 REMOVE IMPACTED EAR WAX UNI: CPT | Mod: S$PBB,,, | Performed by: OTOLARYNGOLOGY

## 2017-07-12 NOTE — PROGRESS NOTES
Subjective:       Patient ID: Shima Sorto is a 76 y.o. female.    Chief Complaint: Wound Check    Wound Check        This patient is seen today for reevaluation of a recurrent ulcer to the left medial ankle.  A coflex compression wrap was placed on the leg on the last visit and the wound is healing as evidenced by wound contracture.  She is afebrile.  She denies increased redness, swelling or purulent drainage.  She does not complain of pain.  Review of Systems  Unchanged from prior visit.  Objective:      Physical Exam   Constitutional: She is oriented to person, place, and time. She appears well-developed and well-nourished. No distress.   HENT:   Head: Normocephalic and atraumatic.   Cardiovascular: Intact distal pulses.    Musculoskeletal: Normal range of motion. She exhibits no edema or tenderness.        Feet:    Neurological: She is alert and oriented to person, place, and time.   Skin: Skin is warm and dry. No rash noted. She is not diaphoretic. No erythema.   Psychiatric: She has a normal mood and affect. Her behavior is normal. Judgment and thought content normal.   Nursing note and vitals reviewed.      A vascular lab study performed today revealed:         Right  Left  Great Saphenous Veins  Proximal thigh    5.0  5.2  Middle thigh    4.8  4.6  Distal thigh    3.6  4.2    Small Saphenous Veins  Proximal calf    2.7  -    There is no evidence of venous thrombosis in the deep or superficial veins bilaterally.  There is significant reflux in the right GSV including the SFJ.  There is significant reflux in the SSV including the saphenopopliteal junction.  No reflux is noted in the accessory saphenous vein.    The left GSV exits the fascia at the distal ghigh with larger varicose veins noted in the region.  There is significant reflux in the SSV including the saphenopopliteal junction.There is no significant reflux in the SSV including the saphenopopliteal junction or accessory saphenous  vein.    Shima was seen in the clinic room and placed in the supine position on the treatment table.  The coflex bandage was removed with scissors and the leg was cleansed with Easi-clense sponges and dried thoroughly.  Eucerin  cream was applied to the lower legs. A mepilex foam dressing was applied to the wound.  The patient's foot was positioned at a 90 degree angle.  The coflex was applied per package instructions.  A two layered application was performed using a spiral technique beginning with the foam layer followed by the cohesive bandage avoiding creases or folds.  The wrap was started behind the first metatarsal and ended below the tibial tubercle of the knee.  There was overlap of each turn half the width of the previous turn.  The compression wrap will be changed every 7 days.    Assessment:       1. Skin ulcer of left ankle, limited to breakdown of skin    2. Venous insufficiency of both lower extremities        Plan:           Refer to vascular for possible EVLT procedure.  Coflex compression wrap left lower leg as detailed above.  Patient was warned not to get the dressings wet and to use cast covers for showering.  Should the dressing become wet, she is to remove it, place a wet-to-dry dressing over the wound, cover with gauze and roll gauze and use ace wraps for compression and to secure bandages.  She should then notify this office as soon as possible to have a new dressing applied.  Return to clinic in one week.    Left medial ankle

## 2017-07-12 NOTE — PATIENT INSTRUCTIONS
Head CT re: sinuses reviewed with pt.at computer screen   Large C.I. removed from AD eac  Audiometry reviewed: asymmetric ( AD > AS) high frequency SNHL; elevated AS reflexes  Consider VNG testing + posturography   Trial of Flonase NSS ( or Nasacort) 1-2 sprays @ lateral nostril once a day x 6 weeks may help   versus use of dye  as previously advised  Continue Claritin for now

## 2017-07-12 NOTE — LETTER
July 13, 2017      Zeynep Tomas MD  [address]  [facility] [address]           [facility] - Otorhinolaryngology  [address]  [facility] [address]  Phone: [phone]  Fax: [phone]          Patient: Shima Sorto   MR Number: [id number]   YOB: 1940   Date of Visit: 7/12/2017       Dear Dr. Zeynep Tomas:    Thank you for referring Shima Sorto to me for evaluation. Attached you will find relevant portions of my assessment and plan of care.    If you have questions, please do not hesitate to call me. I look forward to following Shima Sorto along with you.    Sincerely,    Naseem Buckley III, MD    Enclosure  CC:  No Recipients    If you would like to receive this communication electronically, please contact [email or web address] or [phone] to request more information on [facility] access.    For providers and/or their staff who would like to refer a patient to [facility], please contact us through our one-stop-shop provider referral line, [facility], at [phone].    If you feel you have received this communication in error or would no longer like to receive these types of communications, please e-mail [email or web address]

## 2017-07-12 NOTE — PROGRESS NOTES
"Subjective:       Patient ID: Shima Sorto is a 76 y.o. female.    Chief Complaint: No chief complaint on file.    HPI:Ms. Sorto is a 76-year-old  female who knows the [facility].  Her handwritten reason for the visit today is "ears and sinus area feel like stuffed with cotton, feel cloudy, off balance, cannot walk consistently in straight line, "balance problem".    She indicates a previous diagnosis of shingles with her first outbreak occurring on her right shoulder in 2013.  She had a minor outbreak in the right brow area around Christmastime of 2016 while  attending a special needs granddaughter in [place].  She presented to our ED 12/30/16 for a patrick above her left eye with tingling and burning sensations there diagnosed as herpes zoster.    She was ultimately evaluated in the ED 1/17/17 was significant vertigo symptoms thereafter.    A CT scan of the head was performed without contrast which indicated mild white matter changes in volume loss.    I reviewed the scan with the patient at computer screen.  There was no evidence of sinus pathology indicated in any of the visualized sinuses.  The mastoid air cells were clear.  Dr. Tomas indicated indicated some fluid behind the patient's left eardrum around the same time.  Her significant imbalance and vertigo and dizziness symptoms are greatly subsided after completion of 5 weeks of physical therapy.  She continues to walk with a cane.  She would like to regain the ability to walk a straight line again.  She is driving presently.  She reports ataxia and mis-stepping every fourth or fifth step at this point.    She was examined by Zeynep Tomas 5/25/17 in follow-up for her vestibular disorder.  The EMR otes indicate the patient's use of an oral appliance fitted by Edy Gonzalez which helps control her sleep apnea symptoms.  She was unable to wear headgear/CPAP.  She is diagnosed with a sinus disorder, edema of lower extremity, delayed wound healing of the " left angle area and breast pain.    She was advised  to change from her usual Claritin and fluticasone to Xyzal and Dymista.  She was referred to our service at that point.    PMH: High blood pressure, melanoma removed from forehead, vertigo 1/17/2017, kidney disease  PSH: Mohs surgery of forehead summer 2013; InterStim implantation 2014 with removal 1 month later  Family history: Heart disease and high blood pressure (father), diabetes maternal aunts and uncles, breast cancer maternal aunt and kidney disease, mother  ALLERGIES: Asparagus  Habits: One to 2 alcoholic drinks per week; 67 cups of decaf coffee per day  Occupation: Retired  as of 8/1/2016  Review of Systems   Ears: Positive for ear pain, ear pressure (hannah 1/17/2017), ringing in ear and dizziness (since 1/17/2017).  Taken gentramycin/streptomycin: ?    Nose:  Positive for postnasal drip.    Mouth/Throat: Positive for impaired swallowing.   Eyes:  Visual change: ?   Cardiovascular:  Chest pain: ?   Gastrointestinal:  Positive for indigestion.   Other:  Positive for kidney problem, bladder problem, arthritis, rash and cold intolerance.         The patient has completed an audiometric study performed by the [facility] audiology service..  The study is duplicated below and the results are reviewed with the patient in detail.  Objective:           Blood pressure 131/63 pulse 51 temperature height 5 feet 8 inches weight 132 pounds  Gen.: Alert and oriented and articulate lady in no acute distress  Physical Exam   Constitutional: She is oriented to person, place, and time. She appears well-developed and well-nourished.   HENT:   Head: Normocephalic.       Right Ear: Tympanic membrane and external ear normal. No drainage. No foreign bodies. No mastoid tenderness. Tympanic membrane is not perforated. No decreased hearing is noted.   Left Ear: Tympanic membrane and external ear normal. No drainage. No foreign bodies. No mastoid  tenderness. Tympanic membrane is not perforated. No decreased hearing is noted.   Ears:    Nose: Nose normal. No nasal deformity, septal deviation or nasal septal hematoma. No epistaxis. Right sinus exhibits no maxillary sinus tenderness and no frontal sinus tenderness. Left sinus exhibits no maxillary sinus tenderness and no frontal sinus tenderness.       Mouth/Throat: Uvula is midline, oropharynx is clear and moist and mucous membranes are normal. No oral lesions. No trismus in the jaw. No uvula swelling. No oropharyngeal exudate or tonsillar abscesses.   Neck: Neck supple. No tracheal deviation present. No thyromegaly present.   Pulmonary/Chest: Effort normal. No stridor.   Lymphadenopathy:     She has no cervical adenopathy.   Neurological: She is alert and oriented to person, place, and time.   Skin: Rash noted.       Assessment:       1. Ataxia    2. History of shingles ; single left medial brow lesion 12/2016   3. History of malignant melanoma of skin ; upper midline forehead   4. Nasal congestion    5. Impacted cerumen, right ear    6. Ear stuffiness, bilateral    7. Hypertrophy of nasal turbinates; both middle turbinates   8. Vestibular neuronitis, unspecified laterality    9. Hearing loss, sensorineural, high frequency, bilateral        Plan:     Head CT re: sinuses reviewed with pt.at computer screen   Large C.I. removed from AD eac  Audiometry reviewed: asymmetric ( AD > AS) high frequency SNHL; elevated AS reflexes  Consider VNG testing + posturography re: balance  Trial of Flonase NSS ( or Nasacort) 1-2 sprays @ lateral nostril once a day x 6 weeks may help   vs use of Dymista as previously advised  Continue Claritin for now

## 2017-07-13 ENCOUNTER — PATIENT MESSAGE (OUTPATIENT)
Dept: INTERNAL MEDICINE | Facility: CLINIC | Age: 77
End: 2017-07-13

## 2017-07-14 ENCOUNTER — PATIENT MESSAGE (OUTPATIENT)
Dept: WOUND CARE | Facility: CLINIC | Age: 77
End: 2017-07-14

## 2017-07-14 ENCOUNTER — TELEPHONE (OUTPATIENT)
Dept: WOUND CARE | Facility: CLINIC | Age: 77
End: 2017-07-14

## 2017-07-14 ENCOUNTER — NURSE TRIAGE (OUTPATIENT)
Dept: ADMINISTRATIVE | Facility: CLINIC | Age: 77
End: 2017-07-14

## 2017-07-14 NOTE — TELEPHONE ENCOUNTER
Pt was calling about her wound on her leg- states that she feels like there are tight rubberbands above the bandage- she is wanting to know if this is related to her spike in blood pressure    Please contact patient to advise    Reason for Disposition   Nursing judgment    Protocols used: ST NO PROTOCOL CALL: INFORMATION ONLY-A-[place]

## 2017-07-20 ENCOUNTER — INITIAL CONSULT (OUTPATIENT)
Dept: VASCULAR SURGERY | Facility: CLINIC | Age: 77
End: 2017-07-20
Payer: MEDICARE

## 2017-07-20 VITALS
DIASTOLIC BLOOD PRESSURE: 62 MMHG | WEIGHT: 134 LBS | TEMPERATURE: 98 F | HEART RATE: 49 BPM | SYSTOLIC BLOOD PRESSURE: 145 MMHG | HEIGHT: 66 IN | BODY MASS INDEX: 21.53 KG/M2

## 2017-07-20 DIAGNOSIS — L97.321 SKIN ULCER OF LEFT ANKLE, LIMITED TO BREAKDOWN OF SKIN: Primary | ICD-10-CM

## 2017-07-20 DIAGNOSIS — I87.2 VENOUS INSUFFICIENCY: Primary | ICD-10-CM

## 2017-07-20 DIAGNOSIS — I87.2 VENOUS INSUFFICIENCY: ICD-10-CM

## 2017-07-20 PROCEDURE — 99213 OFFICE O/P EST LOW 20 MIN: CPT | Mod: PBBFAC | Performed by: SURGERY

## 2017-07-20 PROCEDURE — 99214 OFFICE O/P EST MOD 30 MIN: CPT | Mod: S$PBB,,, | Performed by: SURGERY

## 2017-07-20 PROCEDURE — 1159F MED LIST DOCD IN RCRD: CPT | Mod: ,,, | Performed by: SURGERY

## 2017-07-20 PROCEDURE — 1126F AMNT PAIN NOTED NONE PRSNT: CPT | Mod: ,,, | Performed by: SURGERY

## 2017-07-20 PROCEDURE — 99999 PR PBB SHADOW E&M-EST. PATIENT-LVL III: CPT | Mod: PBBFAC,,, | Performed by: SURGERY

## 2017-07-20 RX ORDER — MELOXICAM 7.5 MG/1
7.5 TABLET ORAL DAILY
Qty: 7 TABLET | Refills: 0 | Status: SHIPPED | OUTPATIENT
Start: 2017-07-20 | End: 2017-08-19

## 2017-07-20 RX ORDER — ALPRAZOLAM 0.5 MG/1
0.5 TABLET ORAL NIGHTLY PRN
Qty: 2 TABLET | Refills: 0 | Status: SHIPPED | OUTPATIENT
Start: 2017-07-20 | End: 2018-07-10

## 2017-07-20 NOTE — LETTER
July 21, 2017      Agnes Lisa NP  [address]  [facility] [address]           [facility]  [address]  [facility] [address]  Phone: [phone]  Fax: [phone]          Patient: Shima Sorto   MR Number: [id number]   YOB: 1940   Date of Visit: 7/20/2017       Dear Agnes Lisa:    Thank you for referring Shima Sorto to me for evaluation. Attached you will find relevant portions of my assessment and plan of care.    If you have questions, please do not hesitate to call me. I look forward to following Shima Sorto along with you.    Sincerely,    Esperanza Rico MD    Enclosure  CC:  No Recipients    If you would like to receive this communication electronically, please contact [email or web address] or [phone] to request more information on [facility] access.    For providers and/or their staff who would like to refer a patient to [facility], please contact us through our one-stop-shop provider referral line, [facility], at [phone].    If you feel you have received this communication in error or would no longer like to receive these types of communications, please e-mail [email or web address]

## 2017-07-21 ENCOUNTER — PATIENT MESSAGE (OUTPATIENT)
Dept: WOUND CARE | Facility: CLINIC | Age: 77
End: 2017-07-21

## 2017-07-21 ENCOUNTER — TELEPHONE (OUTPATIENT)
Dept: VASCULAR SURGERY | Facility: CLINIC | Age: 77
End: 2017-07-21

## 2017-07-21 NOTE — PROGRESS NOTES
Patient ID: Shima Sorto is a 77 y.o. female.    I. HISTORY     Chief Complaint: No chief complaint on file.      HPI Shima Sorto is a 77 y.o. female referred by Naomi Lisa for evaluation and management of an ulcer to the left medial ankle.  This has been present for several months.  She believes this was initiated by trauma to the area.  She has been treated with a Profore compression dressing and the wound is almost healed. She wears knee high 20-30mmHg compression hose on a routine basis.  She is afebrile.  She denies increased redness, swelling or purulent drainage.  She does not complain of pain.  No history of DVT or prior venous ablation procedures. Denies claudication or rest pain.     [facility] as done a scrape biopsy which was negative for malignancy but they would like to do a punch biopsy after the venous ablation.    Review of Systems   Constitution: Negative.   HENT: Negative.    Eyes: Negative.    Cardiovascular: Negative for chest pain, claudication and leg swelling.   Respiratory: Negative for cough and shortness of breath.    Endocrine: Negative.    Hematologic/Lymphatic: Negative for adenopathy and bleeding problem. Does not bruise/bleed easily.   Skin: Positive for color change, dry skin, itching and poor wound healing.   Musculoskeletal: Positive for arthritis, joint pain and stiffness.   Gastrointestinal: Negative for bloating, nausea and vomiting.   Neurological: Negative.        II. PHYSICAL EXAM   Right Arm BP - Sittin/65 (17 1030)  Left Arm BP - Sittin/62 (17 1030)  Pulse: (!) 49  Temp: 97.6 °F (36.4 °C)  Body mass index is 21.63 kg/m².      Physical Exam   Constitutional: She is oriented to person, place, and time. She appears well-developed and well-nourished.   HENT:   Head: Normocephalic and atraumatic.   Eyes: Conjunctivae and EOM are normal.   Neck: Neck supple. No JVD present.   Cardiovascular: Normal rate.    Musculoskeletal:  Normal range of motion. She exhibits no edema or tenderness.   Neurological: She is alert and oriented to person, place, and time. No cranial nerve deficit.   Skin: Skin is warm and dry. No erythema.   Psychiatric: She has a normal mood and affect.   Vitals reviewed.    Left lower leg dressed with a coflex multilayer compression dressing. Images reviewed in media tab.  Bilateral gaitor hyperpigmentation and skin inflammation.    III. ASSESSMENT & PLAN (MEDICAL DECISION MAKING)     1. Skin ulcer of left ankle, limited to breakdown of skin    2. Venous insufficiency        Imaging Results:  Venous duplex -  Right GSV reflux >500ms including the SFJ; 5.0mm       Right LSV reflux >500ms including the SPJ; 2.7mm     Left   GSV reflux >500ms including the SFJ; 5.2mm     No Left    LSV reflux       No DVT      Assessment/Diagnosis and Plan:  Shima Sorto is a 77 y.o. female with a chronic recurrent left lower leg ulcer in the setting of chronic venous insufficiency. She has worn compression stockings for 3+ years and is being treated with a compression bandage at this time.   Plan left GSV EVLT to help prevent ulcer recurrence. I have explained the risks, benefits, medical treatments, and alternatives of the procedure in detail.    Risks include but are not limited to chronic pain, bruising, burns, deep vein thrombosis requiring long term anticoagulation, continued symptoms, leg swelling, skin changes or ulcers.  The patient voices understanding and all questions have been answered.  The patient agrees to proceed as planned.    Scheduled for Aug 2.    Esperanza Rico MD FACS [facility]  Vascular & Endovascular Surgery

## 2017-07-21 NOTE — TELEPHONE ENCOUNTER
Called patient to explain procedure again no answer left message with a call back number [phone].

## 2017-07-28 ENCOUNTER — OFFICE VISIT (OUTPATIENT)
Dept: WOUND CARE | Facility: CLINIC | Age: 77
End: 2017-07-28
Payer: MEDICARE

## 2017-07-28 VITALS
SYSTOLIC BLOOD PRESSURE: 142 MMHG | HEIGHT: 67 IN | TEMPERATURE: 98 F | WEIGHT: 134 LBS | BODY MASS INDEX: 21.03 KG/M2 | DIASTOLIC BLOOD PRESSURE: 54 MMHG | HEART RATE: 59 BPM

## 2017-07-28 DIAGNOSIS — I87.2 VENOUS INSUFFICIENCY: ICD-10-CM

## 2017-07-28 DIAGNOSIS — L97.321 SKIN ULCER OF LEFT ANKLE, LIMITED TO BREAKDOWN OF SKIN: Primary | ICD-10-CM

## 2017-07-28 PROCEDURE — 99999 PR PBB SHADOW E&M-EST. PATIENT-LVL IV: CPT | Mod: PBBFAC,,, | Performed by: NURSE PRACTITIONER

## 2017-07-28 PROCEDURE — 29580 STRAPPING UNNA BOOT: CPT | Mod: PBBFAC,LT | Performed by: NURSE PRACTITIONER

## 2017-07-28 PROCEDURE — 99499 UNLISTED E&M SERVICE: CPT | Mod: S$PBB,,, | Performed by: NURSE PRACTITIONER

## 2017-07-28 PROCEDURE — 29580 STRAPPING UNNA BOOT: CPT | Mod: S$PBB,LT,, | Performed by: NURSE PRACTITIONER

## 2017-07-28 PROCEDURE — 99214 OFFICE O/P EST MOD 30 MIN: CPT | Mod: PBBFAC | Performed by: NURSE PRACTITIONER

## 2017-07-28 NOTE — PROGRESS NOTES
Subjective:       Patient ID: Shima Sorto is a 77 y.o. female.    Chief Complaint: Wound Check    Wound Check        This patient is seen today for reevaluation of a recurrent ulcer to the left medial ankle.  A coflex compression wrap was placed on the leg on the last visit and the wound is healing as evidenced by wound contracture.  She is afebrile.  She denies increased redness, swelling or purulent drainage.  She does not complain of pain.  Review of Systems  Unchanged from prior visit.  Objective:      Physical Exam   Constitutional: She is oriented to person, place, and time. She appears well-developed and well-nourished. No distress.   HENT:   Head: Normocephalic and atraumatic.   Cardiovascular: Intact distal pulses.    Musculoskeletal: Normal range of motion. She exhibits no edema or tenderness.        Feet:    Neurological: She is alert and oriented to person, place, and time.   Skin: Skin is warm and dry. No rash noted. She is not diaphoretic. No erythema.   Psychiatric: She has a normal mood and affect. Her behavior is normal. Judgment and thought content normal.   Nursing note and vitals reviewed.      Shima was seen in the clinic room and placed in the supine position on the treatment table.  The coflex bandage was removed with scissors and the leg was cleansed with Easi-clense sponges and dried thoroughly.  Eucerin  cream was applied to the lower legs. A mepilex foam dressing was applied to the wound.  The patient's foot was positioned at a 90 degree angle.  The coflex was applied per package instructions.  A two layered application was performed using a spiral technique beginning with the foam layer followed by the cohesive bandage avoiding creases or folds.  The wrap was started behind the first metatarsal and ended below the tibial tubercle of the knee.  There was overlap of each turn half the width of the previous turn.  The compression wrap will be changed every 7 days.    Assessment:        1. Skin ulcer of left ankle, limited to breakdown of skin    2. Venous insufficiency        Plan:           Coflex compression wrap left lower leg as detailed above.  Patient was warned not to get the dressings wet and to use cast covers for showering.  Should the dressing become wet, she is to remove it, place a wet-to-dry dressing over the wound, cover with gauze and roll gauze and use ace wraps for compression and to secure bandages.  She should then notify this office as soon as possible to have a new dressing applied.  Return to clinic in one week.    Left medial ankle

## 2017-08-01 ENCOUNTER — PATIENT OUTREACH (OUTPATIENT)
Dept: OTHER | Facility: OTHER | Age: 77
End: 2017-08-01

## 2017-08-01 DIAGNOSIS — I87.2 VENOUS INSUFFICIENCY: Primary | ICD-10-CM

## 2017-08-01 NOTE — PROGRESS NOTES
Last 5 Patient Entered Redings Current 30 Day Average: 134/66     Recent Readings 7/30/2017 7/14/2017 7/14/2017 7/14/2017 7/14/2017    Systolic BP (mmHg) 137 140 148 164 154    Diastolic BP (mmHg) 67 65 69 73 74    Pulse 50 59 50 54 50          LVM to follow up with Mrs. Shima Echols Noreen. Inquired about how her low sodium diet and exercise is going. Inquired about her recent readings which were a little bit elevated. Requested that she call me back so we can discuss this and to make sure she does not have any questions. It appears that she may have just had a bad day or issues with her cuff because all of the elevated readings are on the same day.

## 2017-08-02 ENCOUNTER — PROCEDURE VISIT (OUTPATIENT)
Dept: VASCULAR SURGERY | Facility: CLINIC | Age: 77
End: 2017-08-02
Payer: MEDICARE

## 2017-08-02 VITALS
BODY MASS INDEX: 21.05 KG/M2 | DIASTOLIC BLOOD PRESSURE: 68 MMHG | WEIGHT: 131 LBS | HEIGHT: 66 IN | HEART RATE: 65 BPM | SYSTOLIC BLOOD PRESSURE: 158 MMHG | TEMPERATURE: 98 F

## 2017-08-02 DIAGNOSIS — I87.2 VENOUS INSUFFICIENCY: ICD-10-CM

## 2017-08-02 DIAGNOSIS — L97.321 SKIN ULCER OF LEFT ANKLE, LIMITED TO BREAKDOWN OF SKIN: Primary | ICD-10-CM

## 2017-08-02 PROCEDURE — 36478 ENDOVENOUS LASER 1ST VEIN: CPT | Mod: PBBFAC,LT | Performed by: SURGERY

## 2017-08-02 PROCEDURE — 36478 ENDOVENOUS LASER 1ST VEIN: CPT | Mod: S$PBB,LT,, | Performed by: SURGERY

## 2017-08-02 NOTE — PROGRESS NOTES
Shima Sorto    08/02/2017    Pre-Procedure Diagnosis: Left greater saphenous vein reflux; significant superficial venous insufficiency with ulcer and inflammation    Post-Procedure Diagnsosis: Same    Procedure:  Laser endovenous ablation of the left greater saphenous vein    Surgeon: Esperanza Rico MD FACS     Anesthesia: Local    The skin of the leg was prepped and draped in sterile fashion.  Ultrasound-guidance was used throughout the procedure with a portable duplex ultrasound machine.  The GSV was cannulated below the knee using a micro-puncture system.  An 0.035 wire J-tip followed by a 4-Fr Angiodynamics sheath was placed throughout the left GSV.   Using tumescent anesthesia, the entire sheathed portion of the GSV was anesthesized keeping the vein at least one cm from the skin; Klein pump was used.  Position of the tip of the laser was then reconfirmed to be approximately 2 cm from the saphenofemoral junction.  The 1470 nm laser was then activated and the entire length of the vein was treated at ~ 49 J/cm.  The fiber and sheath were then removed intact.  Duplex confirmed occlusion of the GSV with continued patency of the common femoral vein.   The incision was closed with Steri-strips.   Sterile compression dressings and a compression stocking were applied and the patient was discharged to home in a satisfactory condition.    Cannulation site: Below-the-knee    Sheath length: 55 cm    Jon of power: 7 W    Laser time: 348.2 sec    Joules: 2439.5 J

## 2017-08-07 ENCOUNTER — HOSPITAL ENCOUNTER (OUTPATIENT)
Dept: VASCULAR SURGERY | Facility: CLINIC | Age: 77
Discharge: HOME | End: 2017-08-07
Attending: SURGERY
Payer: MEDICARE

## 2017-08-07 ENCOUNTER — PATIENT MESSAGE (OUTPATIENT)
Dept: WOUND CARE | Facility: CLINIC | Age: 77
End: 2017-08-07

## 2017-08-07 ENCOUNTER — CLINICAL SUPPORT (OUTPATIENT)
Dept: AUDIOLOGY | Facility: CLINIC | Age: 77
End: 2017-08-07
Payer: MEDICARE

## 2017-08-07 DIAGNOSIS — H81.391 PERIPHERAL VERTIGO, RIGHT: Primary | ICD-10-CM

## 2017-08-07 DIAGNOSIS — I87.2 VENOUS INSUFFICIENCY: ICD-10-CM

## 2017-08-07 PROCEDURE — 92537 CALORIC VSTBLR TEST W/REC: CPT | Mod: PBBFAC | Performed by: AUDIOLOGIST-HEARING AID FITTER

## 2017-08-07 PROCEDURE — 92540 BASIC VESTIBULAR EVALUATION: CPT | Mod: 26,S$PBB,, | Performed by: AUDIOLOGIST-HEARING AID FITTER

## 2017-08-07 PROCEDURE — 93971 EXTREMITY STUDY: CPT | Mod: 26,S$PBB,, | Performed by: SURGERY

## 2017-08-07 PROCEDURE — 92540 BASIC VESTIBULAR EVALUATION: CPT | Mod: PBBFAC | Performed by: AUDIOLOGIST-HEARING AID FITTER

## 2017-08-07 PROCEDURE — 92537 CALORIC VSTBLR TEST W/REC: CPT | Mod: 26,S$PBB,, | Performed by: AUDIOLOGIST-HEARING AID FITTER

## 2017-08-07 NOTE — PROGRESS NOTES
VNG/Postuography Evaluation    Referring physician:  Dr. Buckley    77 y.o. female complains of vertigo, dizziness, nausea and vomiting.  Symptoms occurred spontaneously and have been one isolated episode since 2017. Ms. Sorto was vague describing her symptoms since they occurred 8 month ago. Ms. Sorto stated her episode lasted several hours. She reported she has not had any episodes since.     Gaze nystagmus was absent.    Oculomotor function was normal.    Spontaneous nystagmus was absent.    The head-hanging left Hallpike revealed <clinically insignificant> nystagmus: 3 d/s down-beating in the supine position and 3 d/s non-fatiguing, left beating in the return to upright position.    The head-hanging right Hallpike was negative.    Static positional nystagmus was absent.    Unilateral weakness: 100% (right)  Directional preponderance 19% (left beating)    RC: 0 d/s   d/s  RW: 0 d/s  LW: 28 d/s    Fixation suppression was normal.    Impression: Compensated, right peripheral vestibular abnormality.

## 2017-08-14 ENCOUNTER — PATIENT MESSAGE (OUTPATIENT)
Dept: WOUND CARE | Facility: CLINIC | Age: 77
End: 2017-08-14

## 2017-08-14 DIAGNOSIS — I87.2 VENOUS INSUFFICIENCY: Primary | ICD-10-CM

## 2017-08-25 NOTE — PROGRESS NOTES
Last 5 Patient Entered Redings Current 30 Day Average: 127/66     Recent Readings 8/24/2017 8/24/2017 8/24/2017 8/18/2017 8/18/2017    Systolic BP (mmHg) 145 158 165 127 132    Diastolic BP (mmHg) 63 74 75 59 60    Pulse 43 46 47 53 56          Hypertension Digital Medicine Program (HDMP): Health  Follow Up    Lifestyle Modifications:    1. Low sodium diet: yes    2.Physical activity: yes : Rides bike, lift weights and stretches at home.     3.Hypotension/Hypertension symptoms: no   Frequency/Alleviating factors/Precipitating factors, etc.     4. Medication Adherence? Per patient yes     Patient informed me that she took her readings before she took her medication on 8/24 because she wanted to see how much her BP medication was helping her BP readings. She is aware that she does need her BP medication so she will continue taking it as prescribed.     Follow up with Mrs. Ronquillo Osminilchristine Sorto completed. Patient did not have any further questions or concerns. I will follow up in a few weeks to see how she is doing and progressing.

## 2017-08-30 ENCOUNTER — OFFICE VISIT (OUTPATIENT)
Dept: VASCULAR SURGERY | Facility: CLINIC | Age: 77
End: 2017-08-30
Payer: MEDICARE

## 2017-08-30 ENCOUNTER — HOSPITAL ENCOUNTER (OUTPATIENT)
Dept: VASCULAR SURGERY | Facility: CLINIC | Age: 77
Discharge: HOME | End: 2017-08-30
Attending: SURGERY
Payer: MEDICARE

## 2017-08-30 VITALS
HEART RATE: 42 BPM | WEIGHT: 135.31 LBS | SYSTOLIC BLOOD PRESSURE: 164 MMHG | DIASTOLIC BLOOD PRESSURE: 71 MMHG | BODY MASS INDEX: 21.75 KG/M2 | HEIGHT: 66 IN

## 2017-08-30 DIAGNOSIS — I87.2 VENOUS INSUFFICIENCY: ICD-10-CM

## 2017-08-30 DIAGNOSIS — L97.321 SKIN ULCER OF LEFT ANKLE, LIMITED TO BREAKDOWN OF SKIN: Primary | ICD-10-CM

## 2017-08-30 PROCEDURE — 3077F SYST BP >= 140 MM HG: CPT | Mod: ,,, | Performed by: SURGERY

## 2017-08-30 PROCEDURE — 99999 PR PBB SHADOW E&M-EST. PATIENT-LVL II: CPT | Mod: PBBFAC,,, | Performed by: SURGERY

## 2017-08-30 PROCEDURE — 1126F AMNT PAIN NOTED NONE PRSNT: CPT | Mod: ,,, | Performed by: SURGERY

## 2017-08-30 PROCEDURE — 99213 OFFICE O/P EST LOW 20 MIN: CPT | Mod: S$PBB,,, | Performed by: SURGERY

## 2017-08-30 PROCEDURE — 93971 EXTREMITY STUDY: CPT | Mod: 26,S$PBB,, | Performed by: SURGERY

## 2017-08-30 PROCEDURE — 3078F DIAST BP <80 MM HG: CPT | Mod: ,,, | Performed by: SURGERY

## 2017-08-30 PROCEDURE — 99212 OFFICE O/P EST SF 10 MIN: CPT | Mod: PBBFAC | Performed by: SURGERY

## 2017-08-30 PROCEDURE — 93971 EXTREMITY STUDY: CPT | Mod: PBBFAC | Performed by: SURGERY

## 2017-08-30 PROCEDURE — 1159F MED LIST DOCD IN RCRD: CPT | Mod: ,,, | Performed by: SURGERY

## 2017-08-30 RX ORDER — EMOLLIENT BASE
CREAM (GRAM) TOPICAL
COMMUNITY
End: 2019-06-13

## 2017-08-30 NOTE — PROGRESS NOTES
Patient ID: Shima Sorto is a 77 y.o. female.    I. HISTORY     Chief Complaint: Follow-up (1 month f/u with [facility] , pt states having some discomfort when pressing the shin on her left leg. )      HPI Shima Sorto is a 77 y.o. female s/p Left GSV EVLT on 8/2/17 for nonhealing left medial ankle wound. Pt. Reports the left medial ankle wound is almost completely healed but there remains a small scab that will not resolve. She saw Dr. Douglas ([facility]) recently who sent a scraping of the left medial wound which patient reports is negative for CA. She states that Dr. Finch, outside dermatologist, also evaluated the wound.   She reports some left lateral leg numbness which was also present prior to the EVLT procedure. She is wearing her thigh high compression stockings.    She was initially referred by Naomi Lisa for evaluation and management of an ulcer to the left medial ankle that had had been present for several months.  She believes this was initiated by trauma to the area.  She had been treated with a Profore compression dressing and the wound almost healed. She wears knee high 20-30mmHg compression hose on a routine basis.  She is afebrile.  She denies increased redness, swelling or purulent drainage.  She does not complain of pain.  No history of DVT or prior venous ablation procedures. Denies claudication or rest pain.     Outside Dermatology ([facility])  done a scrape biopsy which was negative for malignancy but they would like to do a punch biopsy after the venous ablation.    Review of Systems   Constitution: Negative.   HENT: Negative.    Eyes: Negative.    Cardiovascular: Negative for chest pain, claudication and leg swelling.   Respiratory: Negative for cough and shortness of breath.    Endocrine: Negative.    Hematologic/Lymphatic: Negative for adenopathy and bleeding problem. Does not bruise/bleed easily.   Skin: Positive for color change, dry skin, itching and poor  wound healing.   Musculoskeletal: Positive for arthritis, joint pain and stiffness.   Gastrointestinal: Negative for bloating, nausea and vomiting.   Neurological: Negative.        II. PHYSICAL EXAM   Right Arm BP - Sittin/72 (17 1330)  Left Arm BP - Sittin/71 (17 1330)  Pulse: (!) 42     Body mass index is 21.84 kg/m².      Physical Exam   Constitutional: She is oriented to person, place, and time. She appears well-developed and well-nourished.   HENT:   Head: Normocephalic and atraumatic.   Eyes: Conjunctivae and EOM are normal.   Neck: Neck supple. No JVD present.   Cardiovascular: Normal rate.    Musculoskeletal: Normal range of motion. She exhibits no edema or tenderness.        Feet:    Neurological: She is alert and oriented to person, place, and time. No cranial nerve deficit.   Skin: Skin is warm and dry. No erythema.   Psychiatric: She has a normal mood and affect.   Vitals reviewed.    Left lower leg dressed with a coflex multilayer compression dressing. Images reviewed in media tab.  Bilateral gaitor hyperpigmentation and skin inflammation.    III. ASSESSMENT & PLAN (MEDICAL DECISION MAKING)     1. Skin ulcer of left ankle, limited to breakdown of skin    2. Venous insufficiency        Imaging Results:   Venous Duplex 17 ;   Left GSV: No compression; no color fill; thrombosed GSV   No DVT    Previous:  Venous duplex -  Right GSV reflux >500ms including the SFJ; 5.0mm       Right LSV reflux >500ms including the SPJ; 2.7mm     Left   GSV reflux >500ms including the SFJ; 5.2mm     No Left    LSV reflux     No DVT      Assessment/Diagnosis and Plan:  Shima Sorto is a 77 y.o. female with a chronic recurrent left lower leg ulcer in the setting of chronic venous insufficiency s/p Left GSV EVLT procedure on 17.     1. F/u prn      WALLY Sherman, APRN, FNP-BC  Nurse Practitioner  Vascular and Endovascular Surgery        Vascular Surgery Staff  I have seen and examined  the patient and reviewed the residents note. I agree with their assessment and plan.    Doing well after left EVLT  Cont compression stockings.  See me back as needed    Esperanza iRco MD FACS VI  Vascular & Endovascular Surgery

## 2017-09-06 ENCOUNTER — OFFICE VISIT (OUTPATIENT)
Dept: INTERNAL MEDICINE | Facility: CLINIC | Age: 77
End: 2017-09-06
Payer: MEDICARE

## 2017-09-06 ENCOUNTER — LAB VISIT (OUTPATIENT)
Dept: LAB | Facility: HOSPITAL | Age: 77
End: 2017-09-06
Attending: INTERNAL MEDICINE
Payer: MEDICARE

## 2017-09-06 VITALS
SYSTOLIC BLOOD PRESSURE: 122 MMHG | WEIGHT: 135.38 LBS | DIASTOLIC BLOOD PRESSURE: 78 MMHG | BODY MASS INDEX: 21.76 KG/M2 | HEIGHT: 66 IN | HEART RATE: 84 BPM | OXYGEN SATURATION: 99 %

## 2017-09-06 DIAGNOSIS — N28.1 CYST OF LEFT KIDNEY: Primary | ICD-10-CM

## 2017-09-06 DIAGNOSIS — E78.00 PURE HYPERCHOLESTEROLEMIA: ICD-10-CM

## 2017-09-06 DIAGNOSIS — N28.1 CYST OF RIGHT KIDNEY: ICD-10-CM

## 2017-09-06 DIAGNOSIS — R94.4 DECREASED GFR: ICD-10-CM

## 2017-09-06 LAB
ALBUMIN SERPL BCP-MCNC: 3.5 G/DL
ALP SERPL-CCNC: 58 U/L
ALT SERPL W/O P-5'-P-CCNC: 26 U/L
ANION GAP SERPL CALC-SCNC: 8 MMOL/L
AST SERPL-CCNC: 37 U/L
BASOPHILS # BLD AUTO: 0.02 K/UL
BASOPHILS NFR BLD: 0.4 %
BILIRUB SERPL-MCNC: 0.7 MG/DL
BILIRUB UR QL STRIP: NEGATIVE
BUN SERPL-MCNC: 26 MG/DL
CALCIUM SERPL-MCNC: 9.3 MG/DL
CHLORIDE SERPL-SCNC: 100 MMOL/L
CHOLEST SERPL-MCNC: 207 MG/DL
CHOLEST/HDLC SERPL: 2.9 {RATIO}
CLARITY UR REFRACT.AUTO: CLEAR
CO2 SERPL-SCNC: 30 MMOL/L
COLOR UR AUTO: YELLOW
CREAT SERPL-MCNC: 1.1 MG/DL
DIFFERENTIAL METHOD: ABNORMAL
EOSINOPHIL # BLD AUTO: 0.1 K/UL
EOSINOPHIL NFR BLD: 1.8 %
ERYTHROCYTE [DISTWIDTH] IN BLOOD BY AUTOMATED COUNT: 14 %
EST. GFR  (AFRICAN AMERICAN): 56 ML/MIN/1.73 M^2
EST. GFR  (NON AFRICAN AMERICAN): 48.5 ML/MIN/1.73 M^2
GLUCOSE SERPL-MCNC: 87 MG/DL
GLUCOSE UR QL STRIP: NEGATIVE
HCT VFR BLD AUTO: 34.4 %
HDLC SERPL-MCNC: 71 MG/DL
HDLC SERPL: 34.3 %
HGB BLD-MCNC: 11.6 G/DL
HGB UR QL STRIP: ABNORMAL
KETONES UR QL STRIP: NEGATIVE
LDLC SERPL CALC-MCNC: 117.2 MG/DL
LEUKOCYTE ESTERASE UR QL STRIP: NEGATIVE
LYMPHOCYTES # BLD AUTO: 1.8 K/UL
LYMPHOCYTES NFR BLD: 41.2 %
MCH RBC QN AUTO: 30.1 PG
MCHC RBC AUTO-ENTMCNC: 33.7 G/DL
MCV RBC AUTO: 89 FL
MICROSCOPIC COMMENT: ABNORMAL
MONOCYTES # BLD AUTO: 0.4 K/UL
MONOCYTES NFR BLD: 8.3 %
NEUTROPHILS # BLD AUTO: 2.2 K/UL
NEUTROPHILS NFR BLD: 48.1 %
NITRITE UR QL STRIP: NEGATIVE
NONHDLC SERPL-MCNC: 136 MG/DL
PH UR STRIP: 8 [PH] (ref 5–8)
PLATELET # BLD AUTO: 156 K/UL
PMV BLD AUTO: 11.2 FL
POTASSIUM SERPL-SCNC: 4.7 MMOL/L
PROT SERPL-MCNC: 6.8 G/DL
PROT UR QL STRIP: NEGATIVE
RBC # BLD AUTO: 3.85 M/UL
RBC #/AREA URNS AUTO: 15 /HPF (ref 0–4)
SODIUM SERPL-SCNC: 138 MMOL/L
SP GR UR STRIP: 1 (ref 1–1.03)
SQUAMOUS #/AREA URNS AUTO: 0 /HPF
TRIGL SERPL-MCNC: 94 MG/DL
URN SPEC COLLECT METH UR: ABNORMAL
UROBILINOGEN UR STRIP-ACNC: NEGATIVE EU/DL
WBC # BLD AUTO: 4.47 K/UL
WBC #/AREA URNS AUTO: 0 /HPF (ref 0–5)

## 2017-09-06 PROCEDURE — 80053 COMPREHEN METABOLIC PANEL: CPT

## 2017-09-06 PROCEDURE — 36415 COLL VENOUS BLD VENIPUNCTURE: CPT

## 2017-09-06 PROCEDURE — 1159F MED LIST DOCD IN RCRD: CPT | Mod: ,,, | Performed by: INTERNAL MEDICINE

## 2017-09-06 PROCEDURE — 99999 PR PBB SHADOW E&M-EST. PATIENT-LVL IV: CPT | Mod: PBBFAC,,, | Performed by: INTERNAL MEDICINE

## 2017-09-06 PROCEDURE — 1126F AMNT PAIN NOTED NONE PRSNT: CPT | Mod: ,,, | Performed by: INTERNAL MEDICINE

## 2017-09-06 PROCEDURE — 81001 URINALYSIS AUTO W/SCOPE: CPT

## 2017-09-06 PROCEDURE — 3078F DIAST BP <80 MM HG: CPT | Mod: ,,, | Performed by: INTERNAL MEDICINE

## 2017-09-06 PROCEDURE — 99214 OFFICE O/P EST MOD 30 MIN: CPT | Mod: S$PBB,,, | Performed by: INTERNAL MEDICINE

## 2017-09-06 PROCEDURE — 3074F SYST BP LT 130 MM HG: CPT | Mod: ,,, | Performed by: INTERNAL MEDICINE

## 2017-09-06 PROCEDURE — 85025 COMPLETE CBC W/AUTO DIFF WBC: CPT

## 2017-09-06 PROCEDURE — 80061 LIPID PANEL: CPT

## 2017-09-06 PROCEDURE — 99214 OFFICE O/P EST MOD 30 MIN: CPT | Mod: PBBFAC | Performed by: INTERNAL MEDICINE

## 2017-09-06 NOTE — PROGRESS NOTES
Subjective:      Patient ID: Shima Sorto is a 77 y.o. female.    Chief Complaint: Follow-up    HPI:  HPI   Patient is here in follow up    She has completed 6 weeks of therapy for vertigo, she finds herself better but  her problem is not resolved. She has had VNG and was scheduled for posturography which is scheduled. Both were scheduled by Dr. Buckley.    The patient has had a skin ulcer of the left ankle. She was treated by Ms. Lisa and consult with Dr. 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    Patient has had an appt with Dr. AVINASH Finch and a follow up with him related to the area on the left leg after the GSV EVLT  She is wearing support hose bilaterally.    Discussion of KATHERIN: Dr. Jabari Dinero      Annual exam:   Colonoscopy: 4/25/13 follow up in 5 years Dr. Parry  Mammogram: 3/2017  Gyn: patient may make appt  Optho: Dr. Matute  Flu:   Tetanus: 12/6/2014  Shingles: done  Pneumovax: done  Prevnar 13 : done  Incomplete emptying of the bladder  On Fosamax : Osteoporosis     Consultants:  Nephrology: Dr. Manjarrez  Urology: Dr. Stoddard  ( Inter-Stim: tried)  Sleep Consult:Patient completed this consult, sleep study was not done: Dr. Chamberlain ( Mouth Device) GSV  ELVT  Vascular Consult: reviewed 2015/2017: Venous Insufficiency   Bone Density: 1/6/2016 ( was off Fosamax for 2 years ) restarted in 1/2016  Dr. Rajiv Matute: Retinal hemorrhages resolved  Dr. Manriquez opthamologist  Patient Active Problem List   Diagnosis    Calcium nephrolithiasis    Hypertension    Hyperoxaluria    Hypocitraturic calcium nephrolithiasis    Cystic kidney disease    Fatigue    Urinary retention    Osteoporosis: per Endocrinology currently off medication    Hypoglycemia    Trigger middle finger of right hand    Retinal hemorrhage of both eyes at about 1'oclock    Vestibular neuronitis    Vertigo    KATHERIN (obstructive sleep apnea)    Gait difficulty    Balance problem    Skin ulcer of left ankle, limited to  "breakdown of skin    Venous insufficiency     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    Visual impairment     reading glasses     Past Surgical History:   Procedure Laterality Date    interstim placed stage 1      KIDNEY STONE SURGERY  2000    @ [facility]  MOHS procedure       Family History   Problem Relation Age of Onset    Kidney disease Mother     Heart disease Father     Breast cancer Maternal Aunt     Anesthesia problems Neg Hx     Malignant hypertension Neg Hx     Hypotension Neg Hx     Malignant hyperthermia Neg Hx     Pseudochol deficiency Neg Hx     Colon cancer Neg Hx     Ovarian cancer Neg Hx     Melanoma Neg Hx     Psoriasis Neg Hx     Lupus Neg Hx     Eczema Neg Hx     Acne Neg Hx      Review of Systems   Constitutional: Negative for chills, fatigue, fever and unexpected weight change.   HENT: Negative for trouble swallowing.    Respiratory: Negative for cough, shortness of breath and wheezing.    Cardiovascular: Negative for chest pain, palpitations and leg swelling.   Gastrointestinal: Negative for abdominal distention, abdominal pain, blood in stool and vomiting.     Objective:     Vitals:    09/06/17 1318   BP: 122/78   Pulse: 84   SpO2: 99%   Weight: 61.4 kg (135 lb 5.8 oz)   Height: 5' 6" (1.676 m)   PainSc: 0-No pain     Body mass index is 21.85 kg/m².  Physical Exam   Constitutional: She appears well-developed and well-nourished.   Neck: No JVD present. No thyromegaly present.   Cardiovascular: Normal rate, normal heart sounds and intact distal pulses.    Pulmonary/Chest: Effort normal and breath sounds normal. No " respiratory distress.   Musculoskeletal:   Leg not examined as she was wearing support hose     Assessment:     1. Cyst of left kidney    2. Cyst of right kidney    3. Decreased GFR    4. Pure hypercholesterolemia      Plan:   Shima was seen today for follow-up.    Diagnoses and all orders for this visit:    Cyst of left kidney  -     US Retroperitoneal Complete (Kidney and; Future  -     Urinalysis    Cyst of right kidney  -     US Retroperitoneal Complete (Kidney and; Future  -     Urinalysis    Decreased GFR  -     CBC auto differential; Future  -     Comprehensive metabolic panel; Future    Pure hypercholesterolemia  -     Lipid panel; Future       Return in about 3 months (around 12/6/2017) for Follow up.      Medication List          Accurate as of 9/6/17  2:02 PM. If you have any questions, ask your nurse or doctor.               CONTINUE taking these medications    alendronate 70 MG tablet  Commonly known as:  FOSAMAX  1 tablet on Sunday with a full glass of water and wait 30 minutes for breakfast and pills. Take sitting or standing     alprazolam 0.5 MG tablet  Commonly known as:  XANAX  Take 1 tablet (0.5 mg total) by mouth nightly as needed for Anxiety.     atenolol 25 MG tablet  Commonly known as:  TENORMIN  Take one per day     B COMPLEX WITH C#10-FOLIC ACID ORAL     calcium citrate 200 mg (950 mg) tablet  Commonly known as:  CALCITRATE     co-enzyme Q-10 50 mg capsule     diazePAM 5 MG tablet  Commonly known as:  VALIUM  Take 1 tablet (5 mg total) by mouth every 6 (six) hours as needed (dizziness).     lisinopril 10 MG tablet  Take 2 tablets (20 mg total) by mouth every morning. Dosage change     meclizine 25 mg tablet  Commonly known as:  ANTIVERT  Take 1 tablet (25 mg total) by mouth 3 (three) times daily as needed for Dizziness.     MULTIVITAMIN ORAL     ondansetron 8 MG Tbdl  Commonly known as:  ZOFRAN-ODT  Take 1 tablet (8 mg total) by mouth every 6 (six) hours as needed (nausea).     SUPER  OMEGA-3 400-5 mg-unit Cap  Generic drug:  fish oil-vit E-fat [id number]-[id number]     VANICREAM  Generic drug:  radiacream

## 2017-09-21 ENCOUNTER — PATIENT OUTREACH (OUTPATIENT)
Dept: OTHER | Facility: OTHER | Age: 77
End: 2017-09-21

## 2017-09-21 NOTE — PROGRESS NOTES
Last 5 Patient Entered Redings Current 30 Day Average: 124/62     Recent Readings 9/18/2017 9/18/2017 9/9/2017 9/8/2017 9/6/2017    Systolic BP (mmHg) 129 136 111 105 124    Diastolic BP (mmHg) 59 65 60 58 63    Pulse 54 56 63 46 54          Hypertension Digital Medicine (HDMP) Health  Follow Up    LVM to follow up with Mrs. Ronquillo Kinza Sorto.    Per 30 day average, blood pressure is controlled 124/62 mmHg. Encouraged adherence to low sodium diet and physical activity guidelines. Advised patient to call or message with questions or concerns. WCB in 3-4 weeks.

## 2017-10-05 ENCOUNTER — CLINICAL SUPPORT (OUTPATIENT)
Dept: AUDIOLOGY | Facility: CLINIC | Age: 77
End: 2017-10-05
Payer: MEDICARE

## 2017-10-05 DIAGNOSIS — H81.391 VERTIGO, PERIPHERAL, RIGHT: Primary | ICD-10-CM

## 2017-10-05 PROCEDURE — 92548 CDP-SOT 6 COND W/I&R: CPT | Mod: 26,S$PBB,, | Performed by: AUDIOLOGIST-HEARING AID FITTER

## 2017-10-05 PROCEDURE — 92548 CDP-SOT 6 COND W/I&R: CPT | Mod: PBBFAC | Performed by: AUDIOLOGIST-HEARING AID FITTER

## 2017-10-05 NOTE — PROGRESS NOTES
VNG/Postuography Evaluation    Referring physician:  Dr. Buckley    77 y.o. female returns to the clinic for posturography testing.    Sensory Organization Test was normal.  Motor Control Test was normal.    Impression: Normal posturography testing.

## 2017-10-09 ENCOUNTER — PATIENT MESSAGE (OUTPATIENT)
Dept: OTOLARYNGOLOGY | Facility: CLINIC | Age: 77
End: 2017-10-09

## 2017-10-10 ENCOUNTER — PATIENT MESSAGE (OUTPATIENT)
Dept: OTOLARYNGOLOGY | Facility: CLINIC | Age: 77
End: 2017-10-10

## 2017-10-10 DIAGNOSIS — R42 VERTIGO: Primary | ICD-10-CM

## 2017-10-10 DIAGNOSIS — R42 DIZZINESS: ICD-10-CM

## 2017-10-13 ENCOUNTER — PATIENT MESSAGE (OUTPATIENT)
Dept: INTERNAL MEDICINE | Facility: CLINIC | Age: 77
End: 2017-10-13

## 2017-10-16 ENCOUNTER — PATIENT OUTREACH (OUTPATIENT)
Dept: OTHER | Facility: OTHER | Age: 77
End: 2017-10-16

## 2017-10-16 NOTE — PROGRESS NOTES
Last 5 Patient Entered Redings Current 30 Day Average: 121/57     Recent Readings 10/15/2017 10/14/2017 10/2/2017 10/2/2017 9/18/2017    Systolic BP (mmHg) 109 118 127 131 129    Diastolic BP (mmHg) 52 60 57 61 59    Pulse 59 60 53 53 54          Hypertension Digital Medicine (HDMP) Health  Follow Up    LVM to follow up with Mrs. Ronquillo Kinza Sorto.    Per 30 day average, blood pressure is well controlled 121/57 mmHg. Encouraged adherence to low sodium diet and physical activity guidelines. Advised patient to call or message with questions or concerns.

## 2017-11-03 ENCOUNTER — HOSPITAL ENCOUNTER (OUTPATIENT)
Dept: RADIOLOGY | Facility: HOSPITAL | Age: 77
Discharge: HOME | End: 2017-11-03
Attending: INTERNAL MEDICINE
Payer: MEDICARE

## 2017-11-03 DIAGNOSIS — N28.1 CYST OF LEFT KIDNEY: ICD-10-CM

## 2017-11-03 DIAGNOSIS — N28.1 CYST OF RIGHT KIDNEY: ICD-10-CM

## 2017-11-03 PROCEDURE — 76770 US EXAM ABDO BACK WALL COMP: CPT | Mod: TC

## 2017-11-03 PROCEDURE — 76770 US EXAM ABDO BACK WALL COMP: CPT | Mod: 26,GC,, | Performed by: RADIOLOGY

## 2017-11-05 ENCOUNTER — PATIENT MESSAGE (OUTPATIENT)
Dept: INTERNAL MEDICINE | Facility: CLINIC | Age: 77
End: 2017-11-05

## 2017-11-05 ENCOUNTER — NURSE TRIAGE (OUTPATIENT)
Dept: ADMINISTRATIVE | Facility: CLINIC | Age: 77
End: 2017-11-05

## 2017-11-05 DIAGNOSIS — I49.9 CARDIAC ARRHYTHMIA, UNSPECIFIED CARDIAC ARRHYTHMIA TYPE: Primary | ICD-10-CM

## 2017-11-05 NOTE — TELEPHONE ENCOUNTER
Reason for Disposition   [1] MILD  bleeding or SPOTTING AND [2] after procedure (e.g., biopsy) or pelvic examination (e.g., pap smear) AND [2] < 7 days   (all triage questions negative)    Protocols used: ST VAGINAL BLEEDING - [id number]    Patient called to report that she wiped some blood away on tissue after a bowel movement but is certain that the blood was probably from the vagina or urethra. No pain or discomfort with urination, but patient mentioned that she had kidney stones without much discomfort in the past. Patient encouraged to make appt with pcp or ob/gyn for further care advice. She verbalized understanding.

## 2017-11-06 ENCOUNTER — TELEPHONE (OUTPATIENT)
Dept: INTERNAL MEDICINE | Facility: CLINIC | Age: 77
End: 2017-11-06

## 2017-11-06 ENCOUNTER — TELEPHONE (OUTPATIENT)
Dept: OBSTETRICS AND GYNECOLOGY | Facility: CLINIC | Age: 77
End: 2017-11-06

## 2017-11-06 ENCOUNTER — PATIENT MESSAGE (OUTPATIENT)
Dept: OBSTETRICS AND GYNECOLOGY | Facility: CLINIC | Age: 77
End: 2017-11-06

## 2017-11-06 ENCOUNTER — PATIENT OUTREACH (OUTPATIENT)
Dept: OTHER | Facility: OTHER | Age: 77
End: 2017-11-06

## 2017-11-06 ENCOUNTER — CLINICAL SUPPORT (OUTPATIENT)
Dept: REHABILITATION | Facility: HOSPITAL | Age: 77
End: 2017-11-06
Attending: OTOLARYNGOLOGY
Payer: MEDICARE

## 2017-11-06 DIAGNOSIS — R26.81 GAIT INSTABILITY: ICD-10-CM

## 2017-11-06 DIAGNOSIS — R26.89 IMBALANCE: Primary | ICD-10-CM

## 2017-11-06 PROCEDURE — 97112 NEUROMUSCULAR REEDUCATION: CPT

## 2017-11-06 PROCEDURE — G8978 MOBILITY CURRENT STATUS: HCPCS | Mod: CI

## 2017-11-06 PROCEDURE — 97535 SELF CARE MNGMENT TRAINING: CPT

## 2017-11-06 PROCEDURE — 97162 PT EVAL MOD COMPLEX 30 MIN: CPT

## 2017-11-06 PROCEDURE — G8979 MOBILITY GOAL STATUS: HCPCS | Mod: CI

## 2017-11-06 NOTE — TELEPHONE ENCOUNTER
Returned the pt's to the clinic. Informed the pt that Medicare allows for their insured to be seen ecery two years for a WWE and that she can be seen sooner for any other problems. Pt informed me that she has a WWE scheduled in December. Informed the pt that I can change her appointment to a problem visit and give her a sooner appointment. Pt agreed to appointment changes and informed me that she will be sending Dr. Mccord a message along with a photo of the perforated wound in her groin area. Pt given appointment date and time. Pt verbalized understanding. Letter sent out.

## 2017-11-06 NOTE — TELEPHONE ENCOUNTER
----- Message from Claudia Payne sent at 11/6/2017 12:04 PM CST -----  Contact: Self/ [phone]   Pt stated she was waiting on call from the office. Please call and advise     Thank you

## 2017-11-06 NOTE — PROGRESS NOTES
Last 5 Patient Entered Readings Current 30 Day Average: 124/58     Recent Readings 10/31/2017 10/15/2017 10/14/2017 10/2/2017 10/2/2017    Systolic BP (mmHg) 145 109 118 127 131    Diastolic BP (mmHg) 63 52 60 57 61    Pulse 52 59 60 53 53        Hypertension Medications             atenolol (TENORMIN) 25 MG tablet Take one per day    lisinopril 10 MG tablet Take 2 tablets (20 mg total) by mouth every morning. Dosage change        Assessment/ Plan:   Called patient to follow up. Per current 30 day average, BP is well controlled.  Patient states she has to complete a 24-holter monitor in order to determine what kind of arrhthymias she is suffering from.   Requested patient begin taking 2-3 readings per week, patient confirms understanding.   Patient denies hypotensive s/sx (lightheadedness, dizziness, nausea, fatigue) associated with low readings.  Instructed patient to inform me if she does develop hypotensive s/sx associated with low readings, patient confirms understanding.    Patient denies having questions or concerns. Instructed patient to call if she has any questions or concerns, patient confirms understanding.   Will continue to monitor. WCB in 2 months, sooner if BP begins to trend up or down.

## 2017-11-06 NOTE — TELEPHONE ENCOUNTER
Attempted to contact the pt regarding scheduling her annual. No answer, left VM message for the pt to call the clinic back.

## 2017-11-06 NOTE — TELEPHONE ENCOUNTER
----- Message from Herve Burleson sent at 11/6/2017 11:25 AM CST -----  X_  1st Request  _  2nd Request  _  3rd Request        Who: KEITH HAJI [[id number]]    Why: Requesting a call back in regards to getting an appt sooner. She states she has an open wound in her groin areas and would like to schedule an appt. She's currently scheduled for 12/14. Please call to discuss.    What Number to Call Back:[phone]    When to Expect a call back: (Within 24 hours)    Please return the call at earliest convenience. Thanks!

## 2017-11-07 ENCOUNTER — TELEPHONE (OUTPATIENT)
Dept: INTERNAL MEDICINE | Facility: CLINIC | Age: 77
End: 2017-11-07

## 2017-11-07 NOTE — TELEPHONE ENCOUNTER
----- Message from Julianna Petty sent at 11/7/2017 10:11 AM CST -----  Contact: patient on cell phone [phone]  Pt is calling about an order for a holter monitor and said that she was told someone would contact her about arrangement to have it put on but no one has contacted her. Please call to advise her .

## 2017-11-07 NOTE — PLAN OF CARE
Name: Shima Echols [facility] Number: [id number]     Shima is a 77 y.o. female evaluated on 11/06/2017    Diagnosis:    Encounter Diagnoses   Name Primary?   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 Take sitting or standing    alprazolam (XANAX) 0.5 MG tablet Take 1 tablet (0.5 mg total) by mouth nightly as needed for Anxiety.    atenolol (TENORMIN) 25 MG tablet Take one per day    B COMPLEX & C NO.10/FOLIC ACID (B COMPLEX WITH C#10-FOLIC ACID ORAL) Take by mouth.    calcium citrate (CALCITRATE) 200 mg (950 mg) tablet Take 1 tablet by mouth once daily.    co-enzyme Q-10 50 mg capsule Take 50 mg by mouth once daily.    diazePAM (VALIUM) 5 MG tablet Take 1 tablet (5 mg total) by mouth every 6 (six) hours as needed (dizziness).    fish oil-vit E-fat acid5-hb137 (SUPER OMEGA-3) 400-5 mg-unit Cap Take 1 capsule by mouth Daily.    lisinopril 10 MG tablet Take 2 tablets (20 mg total) by mouth every morning. Dosage change    meclizine (ANTIVERT) 25 mg tablet Take 1 tablet (25 mg total) by mouth 3 (three) times daily as needed for Dizziness.    MULTIVITAMIN ORAL Take 1 tablet by mouth Daily.    ondansetron (ZOFRAN-ODT) 8 MG TbDL Take 1 tablet (8 mg total) by mouth every 6 (six) hours as needed (nausea).    radiacream (VANICREAM) Apply topically  as needed.     Current Facility-Administered Medications   Medication    lidocaine-EPINEPHrine 1%-1:100,000 30 mL, lidocaine HCL 10 mg/ml (1%) 20 mL, sodium bicarbonate 1 mEq/mL (8.4 %) 10 mL in sodium chloride 0.9% 500 mL solution        Review of patient's allergies indicates:   Allergen Reactions    Asparagus      Other reaction(s): Rash       Precautions :Fall    SocHx:   Lives alone, + driving  Was having PT at [facility]  Pt does walk outdoors- amb 1.5 miles/day  Raised home with stairs and attic access with stairs  Exercises and stretches daily    SUBJECTIVE:   Pt wants to be able to walk on a balance beam.    Pt gives very lengthy historical history of her conditions and status    Onset: states possible shingles virus from forehead to affecting her vestibular nerve   Related medical checks while in [place] with her dtr   Cole Acuña 2016  Then to ER 12/31/16- was given anti viral meds  Jan 19, 2017 severe onset vertigo - admitted 2 nights- initially unable to stand- then progressive walking with device then HHA and now no longer needs.   Gait and balance are improving.     ENT testing    8/7/17 VNG  VNG/Postuography Evaluation     Referring physician:  Dr. Buckley     77 y.o. female complains of vertigo, dizziness, nausea and vomiting.  Symptoms occurred spontaneously and have been one isolated episode since January 2017. Ms. Sorto was vague describing her symptoms since they occurred 8 month ago. Ms. Sorto stated her episode lasted several hours. She reported she has not had any episodes since.      Gaze nystagmus was absent.     Oculomotor function was normal.     Spontaneous nystagmus was absent.     The head-hanging left Hallpike revealed <clinically insignificant> nystagmus: 3 d/s down-beating in the supine position and 3 d/s non-fatiguing, left beating in the return to upright position.     The head-hanging right Hallpike was negative.     Static positional nystagmus was  absent.     Unilateral weakness: 100% (right)  Directional preponderance 19% (left beating)     RC: 0 d/s   d/s  RW: 0 d/s  LW: 28 d/s     Fixation suppression was normal.     Impression: Compensated, right peripheral vestibular abnormality.        10/15/17  VNG/Postuography Evaluation   Referring physician:  Dr. Buckley   77 y.o. female returns to the clinic for posturography testing.   Sensory Organization Test was normal.  Motor Control Test was normal.   Impression: Normal posturography testing.       Shima Sorto's symptoms - remaining symptoms or for balance while walking  Denies any remaining dizziness or vertigo     Since onset:  improving     Type : balance    Description of symptoms : imbalance with standing on one foot while stretching  Adjustments while walking      Duration: sporadic while up and moving    Associated symptoms : no    Prior Episodes: no    Falls: No    Intensity of sympotoms: Patient rates symptoms to be  2 on a scale of 1-10.(where 0 = asymptomatic and 10 = extremely severe and disabling symptoms)    Patient Goals:  Walk on a balance beam    OBJECTIVE:  Pt is very talkative and demonstrative.     Mental status: intact- some cueing to remain on target and focus on components of evaluation    Posture Alignment :mild FH and slight kyphosis  Stands with narrow base    Sensation: Light Touch: Intact           Cervical AROM:  wfl - no onset of symptoms    ROM:  UPPER EXTREMITY--AROM  (R) UE: WFLs  (L) UE: WFLs           RANGE OF MOTION--LOWER EXTREMITIES   (R) LE: WFLs (L) LE: WFLs  Pt moves quickly and shows her exercise with large ROM attempts      Strength  Right LE  Left LE    Knee extension: 5/5 Knee extension: 5/5   Knee flexion: 4+/5 Knee flexion: 4/5   Hip flexion: 5/5 Hip flexion: 5/5   Hip extension:  4/5 Hip extension: 4/5   Hip abduction: 4+/5 Hip abduction: 4+/5   Ankle dorsiflexion:   5/5 Ankle dorsiflexion:   5/5   Ankle plantarflexion: 5/5 Ankle plantarflexion:  5/5     COORDINATION:   Moves quickly and with large motions    HEEL TO SHIN  B LE:  Able to perform    KELLEY B LE:  Able to perform    KELLEY B UE:  Able to perform    Finger to finger:  Able to perform    Eye Head Coordination:  Smooth Pursuit: good motion quality  Saccadic eye movements:  does not elicit symptoms  Convergence/divergence: Intact  Vestibular Ocular Reflex: Intact    Tracking with Head movements:    Head movements/object stationary: does not elicit symptoms  Head movement/moving object in phase:does not elicit symptoms  Head movement/moving object out of phase:does not elicit symptoms  Head and object stationary, body rotates does not elicit symptoms      SENSORY ORGANIZATION (Greater than 30 seconds)   Firm Surface Eyes Open:Yes  Firm Surface Eyes Closed: Yes  Foam Surface Eyes Open: Yes  Foam Surface Eyes Closed: No  Romberg FTEO: Yes  Romberg FTEC: No  Tandem Romberg EO: No  Tandem Romberg EC: No  Single Limb Support EO: varied in performance and concentration to task  Pt is able to tolerate moderate perturbations: Yes     HASSAN Assessment    1. Sitting to Standing   4 - able to stand without using hands and stabilize independently  2. Standing Unsupported   4 - able to stand safely 2 minutes without hold  3. Sitting Unsupported   4 - able to sit safely and securely 2 minutes  4. Standing to Sitting   4 - sits safely with minimal use of hands  5. Pivot Transfer   4 - able to trnasfer safely with minor use of hands  6. Standing with Eyes Closed   4 - albe to stand 10 seconds safely  7. Standing with Feet Together   4 - able to place feet together independently and stand 1 minute safely  8. Reaching Forward with Outstretched Arm   4 - can reach forward confidently 25 cm/10 inches  9. Retrieving Object from Floor   4 - able to  slipper safely and easily  10. Turning to Look Behind   4 - looks behind from both sides and weights shifts well  11. Turning 360 Degrees   3 - able to trun 360 safely one  side only in 4 seconds or less  12. Placing Alternate Foot on Step   3 - able to stand independently and completely 8 steps > 20 seconds  13. Standing with One Foot in Front   3 - able to place foot ahead of other independently and hold 30 seconds  14. Standing on One Foot   3- able to lift leg independently and hold 5-10 seconds                             Total:    52        Maximum = 56    Motion Provoked Symptoms  ( Intensity: 0-10 scale  0=no Sx, 10=severe Sx)     Intensity Duration   Baseline symptoms 0 no reports of dizziness or vertigo with postures and observed while moving supine, rolling, and prone- no issues with supine to sit  Did not perform formal testing    Sitting - Supine     Supine - L side     Supine - R side     Supine - Sitting     Left Hallpike     Left Hallpike - Sitting      Right Hallpike     Right Hallpike - Sitting     Sitting - Nose to Left Knee     Sitting - Erect Left     Sitting - Nose to Right Knee     Sitting - Erect Left     Sitting - Head Rotation     Sitting - Head flx/ext     Standing - Turn to Right     Standing - Turn to Left       Gait on level surfaces: amb no device    Gait Deviations: Shima displays occasional unsteady gait.- narrow base with some step over/crossover  Moves quickly and easily distracted to talk or comment  Gait with changing speeds: gait remains stable with speed changes  Gait with horizontal head turns: path deviates from midline  Gait with vertical head turns: path deviates from midline  Gait with pivot turns: gait remains stable   Tandem ambulation: path deviates from midline    Elevations: steps with rails - and no use of UE alt and non alt pattern  Encouraged use of rails when present      Dynamic Gait Index:   20/24    Treatment:   Education in Vestibular System and Balance Rehab  Discussed fall prevention and safety recommendations.   PT Evaluation Completed?: Yes  Discussed plan of care with patient?: Yes    Shima received 15 minutes of  neuromuscular re-education.     Written Home Exercises Provided: safety and balance training  Gait instruction and pattern with increased ROBERT  Safety and fall prevention  Vestibular rehab discussion- no dizziness or vertigo reported  Discussed needs of people for balance and coordination in community setting  She demonstrated her HEP- cued for less quick motions and more stance control and quality of movement    Instructed in and performed neuromuscular reeducation and vestibular rehab with activities consisting of:   Balance progression in standing with UE support as needed,  Progression of difficulty by changing foot position, closing eyes, moving head/arms, changing surface, SLS or dynamic challenges.  Perform varied challenges for 10 minutes daily.  Instructed in and performed wall slides with holds and hip abd with holds. - much cueing and repetition.     Pt demonstrated fair understanding of the education provided. Shima demo fair return demo of skill of exercise.    Assessment     This is a 77 y.o. female with a medical diagnosis of   Encounter Diagnoses   Name Primary?    Imbalance Yes    Gait instability    . Patient presents with impaired balance for higher balance challenges and gait in community. Pt is very quick and moves /talks with urgency and detail.  Pt has fair to good balance holding despite her VNG showing R ear dysfunction. She does need more stability control, improved gait quality, and focused attention to needs.  Pt has high goals for walking on balance beam and should consider good quality ambulation and balance in community setting.   Shima will benefit from skilled physical therapy intervention to address the above impairments and functional limitations.   Expect good response to intervention  FUNCTIONAL LIMITATIONS REPORTS- G codes    Category:  Mobility    Tool/Score:       FOTO  81      HASSAN  52/56      DGI  20/24       Current: : CI 1-20%    Goal: : CI 1-20%    Discharge:  :    History  Co-morbidities and personal factors that may impact the plan of care Examination  Body Structures and Functions, activity limitations and participation restrictions that may impact the plan of care Clinical Presentation   Decision Making/ Complexity Score   Co-morbidities:     R vestibular dysfunction per VNG  Shingles    Personal Factors:   Expectations  Active    Body Regions:  B LE  trunk    Body Systems:   Ankles and hips for balance strategies  Gait  Balance  coordination    Activity limitations:   Walking and balance in community and higher level goals      Participation Restrictions:   eager          evolving and changing     MOD         The following goals were discussed with the patient and patient is in agreement with them as to be addressed in the treatment plan.     STG'S: 4-6 weeks  1. Patient independent in HEP of balance.  Exercises to be done Daily.  2. Pt to stand on one foot with good stance stability for 10+ sec..   3.  Decrease patient's subjective rating of imbalance to a  1 (on 0-10 scale) or less as baseline.    LTG'S :4-6 weeks  1. Patient able to ambulate in community safely without assistive device, on varied terrainwith head movement, without LOB or c/o dizziness.  DGI improvement by 1-3 points.  2. Patient's subjective rating of imbalance will decreased to 0-2 on 0-10 scale  3. Patient to show amb with improved base and gait quality-   4.  Reduce risk of falls with Donaldson Balance gains by 3-5 points.  5. Pt to be I with self management of condition and progression vestibular program for maintenance.      PLAN:    Shima will be seen 1 times per months for the next 4-6 weeks for vestibular rehabilitation, balance and coordinatoin training, gait training, postural education, dynamic standing balance exercises, somato-sensory exercises, eye head coordination exercises  and and other therapeutic interventions as deemed necessary to address above goals.. Pt may be seen by a PTA  as part of the Rehab Team.     I certify the need for these services furnished under this plan of treatment and while under my care.       ____________________________________  Physician/Referring Practitioner    _______________  Date of Signature

## 2017-11-08 ENCOUNTER — HOSPITAL ENCOUNTER (EMERGENCY)
Facility: HOSPITAL | Age: 77
Discharge: HOME | End: 2017-11-08
Attending: EMERGENCY MEDICINE
Payer: MEDICARE

## 2017-11-08 VITALS
SYSTOLIC BLOOD PRESSURE: 149 MMHG | HEART RATE: 59 BPM | RESPIRATION RATE: 18 BRPM | DIASTOLIC BLOOD PRESSURE: 65 MMHG | BODY MASS INDEX: 20.72 KG/M2 | TEMPERATURE: 98 F | HEIGHT: 67 IN | WEIGHT: 132 LBS | OXYGEN SATURATION: 100 %

## 2017-11-08 DIAGNOSIS — S31.109A WOUND OF LEFT GROIN, INITIAL ENCOUNTER: Primary | ICD-10-CM

## 2017-11-08 PROCEDURE — 99283 EMERGENCY DEPT VISIT LOW MDM: CPT

## 2017-11-08 PROCEDURE — 99284 EMERGENCY DEPT VISIT MOD MDM: CPT | Mod: ,,, | Performed by: PHYSICIAN ASSISTANT

## 2017-11-08 RX ORDER — BACITRACIN ZINC 500 UNIT/G
OINTMENT (GRAM) TOPICAL DAILY PRN
Qty: 30 G | Refills: 0 | Status: SHIPPED | OUTPATIENT
Start: 2017-11-08 | End: 2018-07-10

## 2017-11-08 NOTE — ED NOTES
Discharge home, states understanding to follow up as directed. Ambulates out of ED without difficulty. RX given,

## 2017-11-08 NOTE — ED NOTES
Patient identifiers verified and correct for Ms Sorto  C/C: Wound check  APPEARANCE: awake and alert in NAD.  SKIN: warm, dry. Linear abrasion to inner groin panty line not open, not pink. 1 drop of blood on toilet paper  MUSCULOSKELETAL: Patient moving all extremities spontaneously, no obvious swelling or deformities noted. Ambulates independently.  RESPIRATORY: Denies shortness of breath.Respirations unlabored.   CARDIAC: Denies CP, 2+ distal pulses; no peripheral edema  ABDOMEN: S/ND/NT, Denies nausea  : voids spontaneously, denies difficulty  Neurologic: AAO x 4; follows commands equal strength in all extremities; denies numbness/tingling. Denies dizziness

## 2017-11-08 NOTE — ED PROVIDER NOTES
Encounter Date: 11/8/2017    SCRIBE #1 NOTE: I, Mary Hendricks, am scribing for, and in the presence of,  Dr. Mcnamara. I have scribed the following portions of the note - the APC attestation.       History     Chief Complaint   Patient presents with    Wound Infection     L groin 'wound since sunday     Patient is a 77-year-old female with past medical history of hypertension, mitral valve prolapse, biliary sponge kidney who presents to the emergency department due to a 4 day history of left groin wound.  Patient states that she was urinating on Sunday and noted a lesion in her left groin.  Patient states she noted a little white dot in the center of the lesion. Patient denies any discharge, fevers, chills, chest pain, shortness of breath, or any other complaint.          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    Visual impairment     reading glasses     Past Surgical History:   Procedure Laterality Date    interstim placed stage 1      KIDNEY STONE SURGERY  2000    @ [facility]  MOHS procedure       Family History   Problem Relation Age of Onset    Kidney disease Mother     Heart disease Father     Breast cancer Maternal Aunt     Anesthesia problems Neg Hx     Malignant hypertension Neg Hx     Hypotension Neg Hx     Malignant hyperthermia Neg Hx     Pseudochol deficiency Neg Hx     Colon cancer Neg Hx     Ovarian cancer Neg Hx     Melanoma Neg Hx     Psoriasis Neg Hx     Lupus Neg Hx     Eczema Neg Hx     Acne Neg Hx      Social  History   Substance Use Topics    Smoking status: Former Smoker     Packs/day: 0.50     Years: 8.00     Quit date: 8/22/1964    Smokeless tobacco: Never Used    Alcohol use 1.2 oz/week     2 Glasses of wine per week      Comment: daily, none today     Review of Systems   Constitutional: Negative for activity change, appetite change, diaphoresis, fatigue and fever.   HENT: Negative for congestion, dental problem, drooling, ear pain, facial swelling, sore throat and trouble swallowing.    Eyes: Negative for pain, discharge and visual disturbance.   Respiratory: Negative for apnea, cough, chest tightness and shortness of breath.    Cardiovascular: Negative for chest pain and palpitations.   Gastrointestinal: Negative for abdominal distention, anal bleeding, blood in stool, diarrhea, nausea and vomiting.   Endocrine: Negative for cold intolerance and polydipsia.   Genitourinary: Negative for decreased urine volume, difficulty urinating, enuresis, frequency and hematuria.   Musculoskeletal: Negative for arthralgias, gait problem, myalgias and neck stiffness.   Skin: Negative for color change and pallor.        Small skin wound on left perineum    Allergic/Immunologic: Negative for environmental allergies.   Neurological: Negative for dizziness, syncope, numbness and headaches.   Psychiatric/Behavioral: Negative for agitation, confusion and dysphoric mood.       Physical Exam     Initial Vitals [11/08/17 0956]   BP Pulse Resp Temp SpO2   (!) 149/65 (!) 59 18 97.9 °F (36.6 °C) 100 %      MAP       93         Physical Exam    Nursing note and vitals reviewed.  Constitutional: She appears well-developed and well-nourished. She is not diaphoretic. No distress.   HENT:   Head: Normocephalic and atraumatic.   Neck: Normal range of motion. Neck supple.   Cardiovascular: Normal rate, regular rhythm and normal heart sounds. Exam reveals no gallop and no friction rub.    No murmur heard.  Pulmonary/Chest: Breath sounds normal.  She has no wheezes. She has no rhonchi. She has no rales.   Abdominal: Soft. Bowel sounds are normal. There is no tenderness. There is no rebound and no guarding.   Musculoskeletal: Normal range of motion.   Neurological: She is alert and oriented to person, place, and time.   Skin: Skin is warm and dry. No rash noted. No erythema.   Small skin wound on left perineum   Psychiatric: She has a normal mood and affect.         ED Course   Procedures  Labs Reviewed - No data to display                APC / Resident Notes:   Patient is a 77-year-old female with past medical history of hypertension, mitral valve prolapse, biliary sponge kidney who presents to the emergency department due to a 4 day history of left groin pain.  Physical exam reveals female in no acute distress.  Heart regular rate and rhythm.  Lungs clear to auscultation bilaterally.  2 cm macerated skin tear noted on left perineum.  Patient will be discharged home in stable condition.  Patient is told to keep area dry and apply gauze.  Patient will be given antibiotic cream to apply to affected area.  Plan of treatment discussed with attending physician and she is agreeable.       Scribe Attestation:   Scribe #1: I performed the above scribed service and the documentation accurately describes the services I performed. I attest to the accuracy of the note.    Attending Attestation:     Physician Attestation Statement for NP/PA:   I have conducted a face to face encounter with this patient in addition to the NP/PA, due to NP/PA Request    Other NP/PA Attestation Additions:      Medical Decision Making: Pt with a small wound to the left perineum.  Likely drained abscess.  No fluctuance or cellulitis.  Pt was explained how to perform local wound care and follow-up with PCP.  Return instructions given.                 ED Course      Clinical Impression:   The encounter diagnosis was Wound of left groin, initial encounter.    Disposition:   Disposition:  Discharged  Condition: Stable                        Yudith Vinson PA-C  11/08/17 2019

## 2017-11-10 ENCOUNTER — NURSE TRIAGE (OUTPATIENT)
Dept: ADMINISTRATIVE | Facility: CLINIC | Age: 77
End: 2017-11-10

## 2017-11-10 ENCOUNTER — HOSPITAL ENCOUNTER (EMERGENCY)
Facility: HOSPITAL | Age: 77
Discharge: HOME | End: 2017-11-10
Attending: EMERGENCY MEDICINE
Payer: MEDICARE

## 2017-11-10 VITALS
WEIGHT: 132 LBS | HEIGHT: 67 IN | HEART RATE: 61 BPM | OXYGEN SATURATION: 100 % | TEMPERATURE: 98 F | DIASTOLIC BLOOD PRESSURE: 60 MMHG | RESPIRATION RATE: 16 BRPM | SYSTOLIC BLOOD PRESSURE: 131 MMHG | BODY MASS INDEX: 20.72 KG/M2

## 2017-11-10 DIAGNOSIS — M25.571 RIGHT ANKLE PAIN: Primary | ICD-10-CM

## 2017-11-10 PROCEDURE — 99283 EMERGENCY DEPT VISIT LOW MDM: CPT

## 2017-11-10 PROCEDURE — 99284 EMERGENCY DEPT VISIT MOD MDM: CPT | Mod: ,,, | Performed by: PHYSICIAN ASSISTANT

## 2017-11-10 NOTE — ED PROVIDER NOTES
Encounter Date: 11/10/2017    SCRIBE #1 NOTE: I, Haley Alexis, am scribing for, and in the presence of,  Dr. Bradshaw. I have scribed the following portions of the note - the APC attestation.       History     Chief Complaint   Patient presents with    Ankle Pain     Pt reports R ankle sprain last night at 1730     Patient is a 77 year old female with PMHx of mitral valve prolapse, HTN, medullary sponge kidney, osteoporosis, and anemia. She presents to the ED for right ankle pain. She reports tripping and falling yesterday evening while working around the house. Reports pain exacerbated with weight bearing and turning movements. Describes the pain as aching. Rates pain 2/10. She denies fever,chills, nausea, vomiting, sob, chest pain, abd pain, dysuria, diarrhea, or constipation. She is a former smoker and reports alcohol use.             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    Visual impairment     reading glasses     Past Surgical History:   Procedure Laterality Date    interstim placed stage 1      KIDNEY STONE SURGERY  2000    @ [facility]  MOHS procedure       Family History   Problem Relation Age of Onset    Kidney disease Mother     Heart disease Father     Breast cancer Maternal Aunt     Anesthesia problems Neg Hx     Malignant hypertension Neg Hx     Hypotension Neg Hx     Malignant hyperthermia Neg Hx     Pseudochol deficiency Neg Hx     Colon cancer Neg Hx     Ovarian cancer Neg Hx     Melanoma Neg Hx     Psoriasis Neg  Hx     Lupus Neg Hx     Eczema Neg Hx     Acne Neg Hx      Social History   Substance Use Topics    Smoking status: Former Smoker     Packs/day: 0.50     Years: 8.00     Quit date: 8/22/1964    Smokeless tobacco: Never Used    Alcohol use Yes      Comment: 1-3x/week     Review of Systems   Constitutional: Negative for fever.   HENT: Negative for sore throat.    Respiratory: Negative for shortness of breath.    Cardiovascular: Negative for chest pain.   Gastrointestinal: Negative for nausea.   Genitourinary: Negative for dysuria.   Musculoskeletal: Negative for back pain.        Right ankle pain   Skin: Negative for rash.   Neurological: Negative for weakness.   Hematological: Does not bruise/bleed easily.       Physical Exam     Initial Vitals [11/10/17 1105]   BP Pulse Resp Temp SpO2   131/60 61 16 97.7 °F (36.5 °C) 100 %      MAP       83.67         Physical Exam    Vitals reviewed.  Constitutional: She appears well-developed and well-nourished. She is not diaphoretic. No distress.   Patient resting comfortably in NAD on RA.    HENT:   Head: Normocephalic and atraumatic.   Nose: Nose normal.   Eyes: Conjunctivae and EOM are normal. Pupils are equal, round, and reactive to light.   Neck: Normal range of motion.   Cardiovascular: Normal rate and regular rhythm. Exam reveals no friction rub.    No murmur heard.  Pulses:       Dorsalis pedis pulses are 2+ on the right side, and 2+ on the left side.   Pulmonary/Chest: Breath sounds normal. No respiratory distress. She has no wheezes. She has no rales.   Abdominal: Soft. Bowel sounds are normal. She exhibits no distension. There is no tenderness. There is no rebound.   Musculoskeletal:        Right ankle: She exhibits decreased range of motion (pain with inversion). She exhibits no swelling, no ecchymosis and normal pulse. Tenderness. Lateral malleolus tenderness found.   Neurological: She is alert and oriented to person, place, and time. She has normal strength.  No sensory deficit.   Skin: Skin is warm and dry. No erythema.   Psychiatric: She has a normal mood and affect. Thought content normal.         ED Course   Procedures  Labs Reviewed - No data to display       X-Rays:   Independently Interpreted Readings:   Other Readings:  XR ankle: negative.    Medical Decision Making:   History:   Old Medical Records: I decided to obtain old medical records.  Independently Interpreted Test(s):   I have ordered and independently interpreted X-rays - see prior notes.  Clinical Tests:   Radiological Study: Ordered and Reviewed       APC / Resident Notes:   Patient is a 77 year old female with PMHx of mitral valve prolapse, HTN, medullary sponge kidney, osteoporosis, and anemia. She presents to the ED for right ankle pain. Patient is in no acute distress. Vitals stable. AAOx3. RRR. Lungs CTA. Abdomen is soft, non tender, non distended. Skin is normal appearance without rashes. TTP over lateral malleolus.      Will order imaging and reassess.     Differential diagnoses include, but are not limited to: ankle sprain, fracture, dislocation, contusion, or ligamental tear.     Patient found to have no acute displaced fracture or dislocation identified. Will discharge home with F/U with PCP and instructions to [facility].     I have discussed and reviewed with my supervising physician.           Scribe Attestation:   Scribe #1: I performed the above scribed service and the documentation accurately describes the services I performed. I attest to the accuracy of the note.    Attending Attestation:     Physician Attestation Statement for NP/PA:   I have conducted a face to face encounter with this patient in addition to the NP/PA, due to Medical Complexity    Other NP/PA Attestation Additions:    History of Present Illness: 77 y.o. woman presents with mechanical injury s/p rolling her right ankle last night. XR is negative. Consistent with a strain. Patient is neurovascularly intact. Will discharge  with instructions to [facility] and script for NSAID's provided.          Physician Attestation for Scribe:      Comments: I, Dr. Adalberto Bradshaw, personally performed the services described in this documentation. All medical record entries made by the scribe were at my direction and in my presence.  I have reviewed the chart and agree that the record reflects my personal performance and is accurate and complete. Adalberto rBadshaw MD.  2:52 PM 11/10/2017              ED Course      Clinical Impression:   The encounter diagnosis was Right ankle pain.    Disposition:   Disposition: Discharged  Condition: Stable                        Vesna Contreras PA-C  11/10/17 2479

## 2017-11-10 NOTE — ED TRIAGE NOTES
Pt reports to the ED c/o right ankle pain. Pt states the nurse on call told her it sounded like a sprain and to go to the ED. Pt reports twisting her ankle yesterday evening while moving the garbage cans. Pt ambulatory to intake. Tenderness noted to right ankle.

## 2017-11-10 NOTE — TELEPHONE ENCOUNTER
Reason for Disposition   Followed an injury   High-risk adult (e.g., age > 60, osteoporosis, chronic steroid use)    Protocols used: ST ANKLE PAIN-A-[place],  ANKLE AND FOOT INJURY-A-[place]    Pt states she was walking and missed a step that was about 8 inches last night. Pt c/o ankle pain, denies swelling. Care advice given.

## 2017-11-14 ENCOUNTER — OFFICE VISIT (OUTPATIENT)
Dept: INTERNAL MEDICINE | Facility: CLINIC | Age: 77
End: 2017-11-14
Payer: MEDICARE

## 2017-11-14 VITALS
HEIGHT: 67 IN | WEIGHT: 131.63 LBS | TEMPERATURE: 98 F | OXYGEN SATURATION: 100 % | SYSTOLIC BLOOD PRESSURE: 122 MMHG | BODY MASS INDEX: 20.66 KG/M2 | HEART RATE: 59 BPM | DIASTOLIC BLOOD PRESSURE: 78 MMHG

## 2017-11-14 DIAGNOSIS — B02.9 HERPES ZOSTER WITHOUT COMPLICATION: Primary | ICD-10-CM

## 2017-11-14 PROCEDURE — 99999 PR PBB SHADOW E&M-EST. PATIENT-LVL III: CPT | Mod: PBBFAC,,, | Performed by: INTERNAL MEDICINE

## 2017-11-14 PROCEDURE — 99213 OFFICE O/P EST LOW 20 MIN: CPT | Mod: PBBFAC | Performed by: INTERNAL MEDICINE

## 2017-11-14 PROCEDURE — 99213 OFFICE O/P EST LOW 20 MIN: CPT | Mod: S$PBB,,, | Performed by: INTERNAL MEDICINE

## 2017-11-14 RX ORDER — VALACYCLOVIR HYDROCHLORIDE 1 G/1
1000 TABLET, FILM COATED ORAL 3 TIMES DAILY
Qty: 21 TABLET | Refills: 0 | Status: SHIPPED | OUTPATIENT
Start: 2017-11-14 | End: 2017-11-21

## 2017-11-14 NOTE — PROGRESS NOTES
Subjective:       Patient ID: Shima Sorto is a 77 y.o. female.    Chief Complaint: Rash    Patient reports that she thinks she has shingles again, this time on her back.  She has the same tingling itchy sensation she has had before.  Also has history of herpes simplex type 2      Rash   This is a new problem. The current episode started in the past 7 days. The problem is unchanged. The affected locations include the back. The rash is characterized by itchiness. She was exposed to nothing. Pertinent negatives include no congestion, cough, diarrhea, fatigue, fever, shortness of breath, sore throat or vomiting. Past treatments include nothing. The treatment provided no relief. Her past medical history is significant for varicella.     Review of Systems   Constitutional: Negative for activity change, chills, fatigue and fever.   HENT: Negative for congestion, ear pain, nosebleeds, postnasal drip, sinus pressure and sore throat.    Eyes: Negative.  Negative for visual disturbance.   Respiratory: Negative for cough, chest tightness, shortness of breath and wheezing.    Cardiovascular: Negative for chest pain.   Gastrointestinal: Negative for abdominal pain, diarrhea, nausea and vomiting.   Genitourinary: Negative for difficulty urinating, dysuria, frequency and urgency.   Musculoskeletal: Negative for arthralgias and neck stiffness.   Skin: Positive for rash.   Neurological: Negative for dizziness, weakness and headaches.   Psychiatric/Behavioral: Negative for sleep disturbance. The patient is not nervous/anxious.        Objective:      Physical Exam   Skin:            Assessment:       1. Herpes zoster without complication        Plan:   Shima was seen today for rash.    Diagnoses and all orders for this visit:    Herpes zoster without complication    Other orders  -     valACYclovir (VALTREX) 1000 MG tablet; Take 1 tablet (1,000 mg total) by mouth 3 (three) times daily.

## 2017-11-16 ENCOUNTER — PATIENT MESSAGE (OUTPATIENT)
Dept: INTERNAL MEDICINE | Facility: CLINIC | Age: 77
End: 2017-11-16

## 2017-11-20 ENCOUNTER — CLINICAL SUPPORT (OUTPATIENT)
Dept: ELECTROPHYSIOLOGY | Facility: CLINIC | Age: 77
End: 2017-11-20
Attending: INTERNAL MEDICINE
Payer: MEDICARE

## 2017-11-20 DIAGNOSIS — I49.9 CARDIAC ARRHYTHMIA, UNSPECIFIED CARDIAC ARRHYTHMIA TYPE: ICD-10-CM

## 2017-11-20 PROCEDURE — 93227 XTRNL ECG REC<48 HR R&I: CPT | Mod: S$PBB,,, | Performed by: INTERNAL MEDICINE

## 2017-11-20 PROCEDURE — 93226 XTRNL ECG REC<48 HR SCAN A/R: CPT | Mod: PBBFAC | Performed by: INTERNAL MEDICINE

## 2017-11-29 ENCOUNTER — PATIENT OUTREACH (OUTPATIENT)
Dept: OTHER | Facility: OTHER | Age: 77
End: 2017-11-29

## 2017-11-29 NOTE — PROGRESS NOTES
Last 5 Patient Entered Readings Current 30 Day Average: 133/62     Recent Readings 11/8/2017 10/31/2017 10/15/2017 10/14/2017 10/2/2017    Systolic BP (mmHg) 120 145 109 118 127    Diastolic BP (mmHg) 60 63 52 60 57    Pulse 56 52 59 60 53        Messaging patient regarding lack of readings.

## 2017-12-04 ENCOUNTER — CLINICAL SUPPORT (OUTPATIENT)
Dept: REHABILITATION | Facility: HOSPITAL | Age: 77
End: 2017-12-04
Attending: OTOLARYNGOLOGY
Payer: MEDICARE

## 2017-12-04 DIAGNOSIS — R26.81 GAIT INSTABILITY: Primary | ICD-10-CM

## 2017-12-04 DIAGNOSIS — R26.89 IMBALANCE: ICD-10-CM

## 2017-12-04 PROCEDURE — 97110 THERAPEUTIC EXERCISES: CPT

## 2017-12-04 PROCEDURE — 97112 NEUROMUSCULAR REEDUCATION: CPT

## 2017-12-04 NOTE — PROGRESS NOTES
"                                                    Physical Therapy Progress Note     Name: Shima Echols [facility]  Clinic Number: [id number]  Diagnosis:   Encounter Diagnoses   Name Primary?   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 Pt is pleased to learn some of these things to focus on.    Pain Scale:  0  Baseline - 0/10 dizziness or vertigo reported  Notes at times with quick rollover of sit up from bed will have brief nausea and almost dizzy- resolves quickly.  This did not occur in clinic today with bed mobility    Objective     Elderly female amb to dept no device, wearing tennis shoes.   Pt amb with narrow base of support.  Pt with FH and kyphosis noted.     Patient received individual therapy to increase strength, endurance, ROM, posture, core stabilization and balance with activities as follows:     Shima received therapeutic exercises to develop strength, endurance, posture and core stabilization for 15 minutes including:   Bridge x 10  Bridge + leg ext with focus on hip ext and core stability x 10   sidelying hip abd with 1# - cues for alignment of LE- 5x lift 5x hold x 3 cycles  Wall slides 5 count hold x 10  Ankle heel toe raises with UE support    Cued during bed mobility for roll to " sidelying then to sit- no dizziness reported    Patient participated in neuromuscular re-education activities to improve: Balance, Coordination, Kinesthetic, Sense, Proprioception and Posture for 45 minutes. The following activities were included:   Pt was instructed in and performed neuromuscular reeducation for vestibular rehab:   -balance progressive challenges in standing with UE support as needed- increase difficulty with change in foot position, closing eyes, moving arms/head, change in surface, and / or SLS- vary activities and perform for 10-15 minutes daily.  Use of step, foam, gaze stabilization of visual spotting, SLS with arm support- showed most sway with eyes closed, showed SLS 3-5 seconds  Wall pulls for hip and ankle strategies  Resisted and challenged gait activities for heel toe pattern and hip control  Cued for wide base  Gaze in standing with turns, walking forward and back, spins while walking    Pt should continue as HEP as instructed.  updated written/illustrated HEP.  Pt demo good and fair understanding of the education provided. Shima demonstrated good and fair return demonstration of activities.     No spiritual or educational barriers to learning provided  Assessment   This is a 77 y.o. female referred to outpatient physical therapy and presents with a medical diagnosis of dizziness and vertigo and demonstrates limitations as described in the problem list Pt prognosis is Good. Pt will continue to benefit from skilled outpatient physical therapy to address the deficits listed in the problem list, provide pt/family education and to maximize pt's level of independence in the home and community environment.  Pt has some mild core and hip weakness that may contribute to some of her gait instability.  Pt has reported vestibular weakness of R ear and imbalance with higher level challenges.   Pt nees cues for quality of posture, core stability and gait pattern.     Plan   Continue with established  Plan of Care towards PT goals.

## 2017-12-05 ENCOUNTER — IMMUNIZATION (OUTPATIENT)
Dept: INTERNAL MEDICINE | Facility: CLINIC | Age: 77
End: 2017-12-05
Payer: MEDICARE

## 2017-12-05 PROCEDURE — G0008 ADMIN INFLUENZA VIRUS VAC: HCPCS | Mod: PBBFAC

## 2017-12-11 ENCOUNTER — PATIENT OUTREACH (OUTPATIENT)
Dept: OTHER | Facility: OTHER | Age: 77
End: 2017-12-11

## 2017-12-11 NOTE — PROGRESS NOTES
Last 5 Patient Entered Readings Current 30 Day Average: 121/58     Recent Readings 12/3/2017 11/8/2017 10/31/2017 10/15/2017 10/14/2017    Systolic BP (mmHg) 121 120 145 109 118    Diastolic BP (mmHg) 58 60 63 52 60    Pulse 56 56 52 59 60          Hypertension Digital Medicine (HDMP) Health  Follow Up    LVM to follow up with Mrs. Shima Echols Noreen.    Per 30 day average, blood pressure is controlled 121/58 mmHg but she has not been taking readings regularly. Requested that she start taking readings twice a week. Encouraged adherence to low sodium diet and physical activity guidelines. Requested patient call or message back so we can discuss her readings/obtain an update. Will continue to monitor/follow up.

## 2017-12-13 ENCOUNTER — NURSE TRIAGE (OUTPATIENT)
Dept: ADMINISTRATIVE | Facility: CLINIC | Age: 77
End: 2017-12-13

## 2017-12-13 NOTE — PROGRESS NOTES
Last 5 Patient Entered Readings Current 30 Day Average: 121/58     Recent Readings 12/3/2017 11/8/2017 10/31/2017 10/15/2017 10/14/2017    Systolic BP (mmHg) 121 120 145 109 118    Diastolic BP (mmHg) 58 60 63 52 60    Pulse 56 56 52 59 60           Hypertension Medications             atenolol (TENORMIN) 25 MG tablet Take one per day    lisinopril 10 MG tablet Take 2 tablets (20 mg total) by mouth every morning. Dosage change        Assessment/ Plan:   Called patient to follow up. Per newly released 2017 ACC/ AHA HTN guidelines (goal of BP < 130/80), current 30-day average is well controlled.  Discussed new goals with patient. Requested patient take more frequent readings so I can better determine whether or not we need to adjust she HTN medication regimen, patient confirms understanding.   Patient denies having questions or concerns. Instructed patient to call if she has any questions or concerns, patient confirms understanding.   Will continue to monitor. WCB in 4 months, sooner if BP begins to trend up or down.

## 2017-12-14 NOTE — TELEPHONE ENCOUNTER
Dionna may get it if her insurance will pay for it. We dod not have it. I would get it at a pharmacy so they can check payment. GML

## 2017-12-14 NOTE — TELEPHONE ENCOUNTER
Shima called to question whether she should get the new shingles vaccine. She did have zostavax in 2014, she said.  Message to Dr Tomas, pcp. Please contact caller directly with any additional care advice.      Reason for Disposition   Health Information question, no triage required and triager able to answer question   Caller has NON-URGENT medication question about med that PCP prescribed and triager unable to answer question    Protocols used: ST INFORMATION ONLY CALL-A-AH, ST MEDICATION QUESTION CALL-A-AH

## 2017-12-18 ENCOUNTER — CLINICAL SUPPORT (OUTPATIENT)
Dept: REHABILITATION | Facility: HOSPITAL | Age: 77
End: 2017-12-18
Attending: OTOLARYNGOLOGY
Payer: MEDICARE

## 2017-12-18 DIAGNOSIS — R26.89 IMBALANCE: ICD-10-CM

## 2017-12-18 DIAGNOSIS — R26.81 GAIT INSTABILITY: Primary | ICD-10-CM

## 2017-12-18 PROCEDURE — 97112 NEUROMUSCULAR REEDUCATION: CPT

## 2017-12-18 PROCEDURE — 97110 THERAPEUTIC EXERCISES: CPT

## 2017-12-21 NOTE — PROGRESS NOTES
Physical Therapy Progress Note     [place] [facility] [place] [id number]  Diagnosis:   Encounter Diagnoses   Name Primary?   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  Pt admits to little vertigo- some feeling when she moves quickly- remembers tasks with VNG testing and results showing she compensates well. .    Pain Scale:  0  Baseline - 0/10 dizziness or vertigo reported  Notes at times with quick rollover or sit up from bed will have brief  almost dizzy- resolves quickly.  This did not occur in clinic today with bed mobility    Objective     Elderly female amb to dept no device, wearing tennis shoes.   Pt amb with narrow base of support.  Pt with FH and kyphosis noted.     Patient received individual therapy to increase strength, endurance, ROM, posture, core stabilization and balance with activities as follows:     Shima received therapeutic exercises to develop strength, endurance, posture and core stabilization for 15 minutes including:   Green tband DF and PF- had cramping- stood for incline GSS and standing runners stretch of GSS R and L  Some therapist assisted pressures and stretching with cramping  Bridge x 10  Bridge + leg ext with focus on hip ext and core stability x 10    sidelying hip abd with 1# - cues for alignment of LE- 5x lift 5x hold x 3 cycles  Wall slides 5 count hold x 10  Ankle heel toe raises with UE support  Hip abd in standing- cueing for stance alignment and stability    Cued during bed mobility for roll to sidelying then to sit- no dizziness reported    Patient participated in neuromuscular re-education activities to improve: Balance, Coordination, Kinesthetic, Sense, Proprioception and Posture for 45 minutes. The following activities were included:   Pt was instructed in and performed neuromuscular reeducation for vestibular rehab:   -balance progressive challenges in standing with UE support as needed- increase difficulty with change in foot position, closing eyes, moving arms/head, change in surface, and / or SLS- vary activities and perform for 10-15 minutes daily.  Use of step, foam, gaze stabilization of visual spotting, SLS with arm support- showed most sway with eyes closed, showed SLS 5-8 seconds  Wall pulls for hip and ankle strategies added to HEP  Rocker board activities for ankle motions and activities for holding balance and strategies to recover  Cued for wide base while amb   Gaze stabilization during balance challegnes    Pt should continue as HEP as instructed.  updated written/illustrated HEP.  Pt demo good and fair understanding of the education provided. Shima demonstrated good and fair return demonstration of activities.     No spiritual or educational barriers to learning provided  Assessment   This is a 77 y.o. female referred to outpatient physical therapy and presents with a medical diagnosis of dizziness and vertigo and demonstrates limitations as described in the problem list Pt prognosis is Good. Pt will continue to benefit from skilled outpatient physical therapy to address the deficits listed in the problem list, provide pt/family education and to maximize pt's level of independence in the home and community environment.  Pt has mild  postural and core weakness that presents itself with higher balance challenges  Pt tends to move quickly and needs focus on quality.   Pt has reported vestibular weakness of R ear and imbalance with higher level challenges.   Pt nees cues for quality of posture, core stability and gait pattern.     Plan   Continue with established Plan of Care towards PT goals.

## 2017-12-25 ENCOUNTER — NURSE TRIAGE (OUTPATIENT)
Dept: ADMINISTRATIVE | Facility: CLINIC | Age: 77
End: 2017-12-25

## 2017-12-26 ENCOUNTER — PATIENT OUTREACH (OUTPATIENT)
Dept: OTHER | Facility: OTHER | Age: 77
End: 2017-12-26

## 2017-12-26 NOTE — TELEPHONE ENCOUNTER
"Pt called re past couple days felt worse - throat burning, sinuses draining. Hx vertigo. T99, ears feeling really stuffy. fri 12/29 10 yo granddaughter with CP coming, child has no shots.  Phone disconnected during call    Reason for Disposition   Ear congestion present > 48 hours    Answer Assessment - Initial Assessment Questions  1. LOCATION: "Which ear is involved?"       Pt states she is walking 2.25 mi qd.   2. SENSATION: "Describe how the ear feels."      Congested bilat , nauseated/queezy x1 week. Drinking fluids.   3. ONSET:  "When did the ear symptoms start?"       12/21  4. PAIN: "Do you also have an earache?" If so, ask: "How bad is it?" (Scale 1-10; or mild, moderate, severe)      2  5. CAUSE: "What do you think is causing the ear congestion?"     Unsure   6. URI: "Do you have a runny nose or cough?"      Cough, no SOB  7. NASAL ALLERGIES: "Are there symptoms of hay fever, such as sneezing or a clear nasal discharge?"     Some sneezing , using claritin, clarispray , KATHERIN    Protocols used: ST EAR - CONGESTION-A-AH  care recommendations offered. rec to follow up with MD in am. Call back with questions.   "

## 2017-12-26 NOTE — PROGRESS NOTES
Last 5 Patient Entered Readings Current 30 Day Average: 121/58     Recent Readings 12/3/2017 11/8/2017 10/31/2017 10/15/2017 10/14/2017    SBP (mmHg) 121 120 145 109 118    DBP (mmHg) 58 60 63 52 60    Pulse 56 56 52 59 60        12/26- Messaging patient regarding lack of readings.     1/9- Called patient to follow up. Per newly released 2017 ACC/ AHA HTN guidelines (goal of BP < 130/80), current 30-day average is well controlled. Patient has been suffering from an URI, states she hopes to take a BP reading soon. Patient is scheduled to see PCP tomorrow. Requested patient take more frequent readings so I can better determine whether or not we need to adjust she HTN medication regimen, patient confirms understanding.   Patient denies having questions or concerns. Instructed patient to call if she has any questions or concerns, patient confirms understanding.   Will continue to monitor. WCB in 1 month.

## 2017-12-27 ENCOUNTER — HOSPITAL ENCOUNTER (EMERGENCY)
Facility: HOSPITAL | Age: 77
Discharge: HOME | End: 2017-12-27
Attending: EMERGENCY MEDICINE
Payer: MEDICARE

## 2017-12-27 VITALS
RESPIRATION RATE: 14 BRPM | DIASTOLIC BLOOD PRESSURE: 73 MMHG | HEART RATE: 72 BPM | WEIGHT: 125 LBS | OXYGEN SATURATION: 99 % | TEMPERATURE: 99 F | BODY MASS INDEX: 19.87 KG/M2 | SYSTOLIC BLOOD PRESSURE: 163 MMHG

## 2017-12-27 DIAGNOSIS — J06.9 VIRAL URI WITH COUGH: Primary | ICD-10-CM

## 2017-12-27 DIAGNOSIS — R05.9 COUGH: ICD-10-CM

## 2017-12-27 LAB
FLUAV AG SPEC QL IA: NEGATIVE
FLUBV AG SPEC QL IA: NEGATIVE
SPECIMEN SOURCE: NORMAL

## 2017-12-27 PROCEDURE — 25000003 PHARM REV CODE 250: Performed by: EMERGENCY MEDICINE

## 2017-12-27 PROCEDURE — 96372 THER/PROPH/DIAG INJ SC/IM: CPT

## 2017-12-27 PROCEDURE — 63600175 PHARM REV CODE 636 W HCPCS: Performed by: EMERGENCY MEDICINE

## 2017-12-27 PROCEDURE — 99284 EMERGENCY DEPT VISIT MOD MDM: CPT | Mod: 25

## 2017-12-27 PROCEDURE — 25000242 PHARM REV CODE 250 ALT 637 W/ HCPCS: Performed by: EMERGENCY MEDICINE

## 2017-12-27 PROCEDURE — 87400 INFLUENZA A/B EACH AG IA: CPT

## 2017-12-27 PROCEDURE — 94640 AIRWAY INHALATION TREATMENT: CPT

## 2017-12-27 PROCEDURE — 99284 EMERGENCY DEPT VISIT MOD MDM: CPT | Mod: ,,, | Performed by: EMERGENCY MEDICINE

## 2017-12-27 RX ORDER — CETIRIZINE HYDROCHLORIDE 10 MG/1
10 TABLET ORAL DAILY
Qty: 30 TABLET | Refills: 0 | Status: SHIPPED | OUTPATIENT
Start: 2017-12-27 | End: 2018-07-10

## 2017-12-27 RX ORDER — CETIRIZINE HYDROCHLORIDE 5 MG/1
10 TABLET ORAL
Status: COMPLETED | OUTPATIENT
Start: 2017-12-27 | End: 2017-12-27

## 2017-12-27 RX ORDER — DEXAMETHASONE SODIUM PHOSPHATE 4 MG/ML
8 INJECTION, SOLUTION INTRA-ARTICULAR; INTRALESIONAL; INTRAMUSCULAR; INTRAVENOUS; SOFT TISSUE
Status: COMPLETED | OUTPATIENT
Start: 2017-12-27 | End: 2017-12-27

## 2017-12-27 RX ORDER — BENZONATATE 100 MG/1
100 CAPSULE ORAL 3 TIMES DAILY PRN
Qty: 20 CAPSULE | Refills: 0 | Status: SHIPPED | OUTPATIENT
Start: 2017-12-27 | End: 2018-01-06

## 2017-12-27 RX ORDER — ALBUTEROL SULFATE 0.83 MG/ML
2.5 SOLUTION RESPIRATORY (INHALATION)
Status: COMPLETED | OUTPATIENT
Start: 2017-12-27 | End: 2017-12-27

## 2017-12-27 RX ADMIN — CETIRIZINE HYDROCHLORIDE 10 MG: 5 TABLET, FILM COATED ORAL at 10:12

## 2017-12-27 RX ADMIN — ALBUTEROL SULFATE 2.5 MG: 2.5 SOLUTION RESPIRATORY (INHALATION) at 10:12

## 2017-12-27 RX ADMIN — DEXAMETHASONE SODIUM PHOSPHATE 8 MG: 4 INJECTION, SOLUTION INTRAMUSCULAR; INTRAVENOUS at 11:12

## 2017-12-27 NOTE — ED TRIAGE NOTES
Patient received with complaint of cough, sore throat, bilateral ear pain intermittently, with chest congestion, sputum reported as yellow/green, and generally not feeling well. Lack of appetite.    No LDA's in place on arrival to department.    Family not present.    Pain:  Rated left groin 3/10.    Psychosocial:  Patient is calm and cooperative.  Patients insight and judgement are appropriate to situation.  Appears clean, well maintained, with clothing appropriate to environment.  No evidence of delusions, hallucinations, or psychosis.    Neuro:  Eyes open spontaneously.  Awake, alert, oriented x 4.  Speech clear and appropriate.  Tolerating saliva secretions well.  Able to follow commands, demonstrating ability to actively and appropriately communicate within context of current conversation.  Symmetrical facial muscles.        Airway:  Bilateral chest rise and fall.  RR regular and non-labored.  No crepitus or subcutaneous emphysema noted on palpation.  Nasal congestion noted.  Cough.  No noted sputum.    Circulatory:  Skin warm, dry, and pink.  Capillary refill/skin blanching less than 3 seconds to distal of 4 extremities.    Abdomen:  Abdomen soft and non-distended.        Urinary:  No related complaints.    Extremities:  No redness, heat, swelling, deformity, or pain.    Skin:  Intact with no bruising/discolorations noted.

## 2017-12-27 NOTE — ED PROVIDER NOTES
Encounter Date: 12/27/2017    SCRIBE #1 NOTE: I, Haley Alexis, am scribing for, and in the presence of,  Dr. Gallegos. I have scribed the following portions of the note - the Resident attestation. Other sections scribed: CXR.       History     Chief Complaint   Patient presents with    Cough     Nasal Stuffiness for 5 days    Sore Throat     77-year-old female presents for the evaluation of nasal congestion and cough.  Patient reports an intermittent cough for the past 5 days.  The cough is productive of green sputum.  She does report sick contacts.  Patient did get a flu shot.  She also is complaining of a sore throat and persistent watery clear nasal discharge.  sHe denies running any fevers.  Denies shortness of breath, chest pain, headache.          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    Visual impairment     reading glasses     Past Surgical History:   Procedure Laterality Date    interstim placed stage 1      KIDNEY STONE SURGERY  2000    @ [facility]  MOHS procedure       Family History   Problem Relation Age of Onset    Kidney disease Mother     Heart disease Father     Breast cancer Maternal Aunt     Anesthesia problems Neg Hx     Malignant hypertension Neg Hx     Hypotension Neg Hx     Malignant hyperthermia Neg Hx     Pseudochol deficiency Neg Hx     Colon cancer Neg Hx     Ovarian cancer Neg Hx     Melanoma Neg Hx     Psoriasis Neg Hx     Lupus Neg Hx     Eczema Neg Hx     Acne Neg Hx      Social History    Substance Use Topics    Smoking status: Former Smoker     Packs/day: 0.50     Years: 8.00     Quit date: 8/22/1964    Smokeless tobacco: Never Used    Alcohol use Yes      Comment: 1-3x/week     Review of Systems   Constitutional: Negative for fever.   HENT: Positive for congestion, postnasal drip, rhinorrhea, sinus pain, sinus pressure and sore throat. Negative for facial swelling, hearing loss, mouth sores, tinnitus and trouble swallowing. Ear pain: ear fullness.    Respiratory: Positive for cough. Negative for shortness of breath.    Cardiovascular: Negative for chest pain.   Gastrointestinal: Negative for nausea.   Genitourinary: Negative for dysuria.   Musculoskeletal: Negative for back pain.   Skin: Negative for rash.   Neurological: Negative for weakness.   Hematological: Does not bruise/bleed easily.       Physical Exam     Initial Vitals [12/27/17 0856]   BP Pulse Resp Temp SpO2   (!) 163/73 89 16 99.2 °F (37.3 °C) 99 %      MAP       103         Physical Exam    Nursing note and vitals reviewed.  Constitutional: She appears well-developed and well-nourished. She is not diaphoretic. No distress.   HENT:   Head: Normocephalic and atraumatic.   Right Ear: External ear normal.   Left Ear: External ear normal.   Mouth/Throat: Oropharynx is clear and moist. No oropharyngeal exudate.   Erythematous nares bilaterally. No swollen or exudative tonsils. TMs normal bilaterally    Eyes: Right eye exhibits no discharge. Left eye exhibits no discharge.   Neck: No thyromegaly present. No tracheal deviation present.   Cardiovascular: Normal rate, regular rhythm and normal heart sounds.   Pulmonary/Chest: Breath sounds normal. No respiratory distress. She has no wheezes. She has no rhonchi. She has no rales. She exhibits no tenderness.   Abdominal: Soft. Bowel sounds are normal. She exhibits no distension. There is no tenderness. There is no rebound.   Musculoskeletal: Normal range of motion. She exhibits no edema or  tenderness.   Neurological: She is alert and oriented to person, place, and time. She has normal strength. No cranial nerve deficit.   Skin: Skin is warm and dry. No erythema. No pallor.   Psychiatric: She has a normal mood and affect. Her behavior is normal. Thought content normal.         ED Course   Procedures  Labs Reviewed   INFLUENZA A AND B ANTIGEN          X-Rays:   Independently Interpreted Readings:   Chest X-Ray: No acute process.     Medical Decision Making:   History:   Old Medical Records: I decided to obtain old medical records.  Initial Assessment:   77-year-old female presents for evaluation of sinus pressure, nasal congestion, and cough.  Differential Diagnosis:   Influenza, viral URI, pneumonia, bacterial infection, sinusitis, postnasal drip  Independently Interpreted Test(s):   I have ordered and independently interpreted X-rays - see prior notes.  Clinical Tests:   Lab Tests: Ordered and Reviewed  Radiological Study: Ordered and Reviewed  ED Management:  Patient is afebrile well-appearing, I do not feel any strong indication for blood work.  We will get a chest x-ray and a flu swab.  Will treat symptomatically with an albuterol inhaler, cetirizine, and a Decadron IM injection       APC / Resident Notes:   HO-III Update:  Flu antigen test is negative.  Patient with a normal chest x-ray, no evidence of pneumonia, consolidation, or other acute intra-or acid pathology.  Will discharge the patient at this time after she gets her Decadron injection.  We'll give a prescription for Tessalon  and cetirizine.  He has been encouraged to follow-up with her primary care provider    Fabricio Arambula MD, PGY- 3  11:39 AM         Scribe Attestation:   Scribe #1: I performed the above scribed service and the documentation accurately describes the services I performed. I attest to the accuracy of the note.    Attending Attestation:   Physician Attestation Statement for Resident:  As the supervising MD   Physician  Attestation Statement: I have personally seen and examined this patient.   I agree with the above history. -: 77 y.o. female presents with cough. She is concerned because her grandchild,who has a medical problem, will be in town and she wants to be checked. CXR performed. Patient was given injection of decadron and is to use over-the-counter allergy medication.    As the supervising MD I agree with the above PE.    As the supervising MD I agree with the above treatment, course, plan, and disposition.                    ED Course      Clinical Impression:   The primary encounter diagnosis was Viral URI with cough. A diagnosis of Cough was also pertinent to this visit.    Disposition:   Disposition: Discharged  Condition: Stable                        Genaro Gallegos MD  01/11/18 [id number]

## 2018-01-10 ENCOUNTER — OFFICE VISIT (OUTPATIENT)
Dept: INTERNAL MEDICINE | Facility: CLINIC | Age: 78
End: 2018-01-10
Payer: MEDICARE

## 2018-01-10 VITALS
DIASTOLIC BLOOD PRESSURE: 60 MMHG | WEIGHT: 132.25 LBS | BODY MASS INDEX: 21.25 KG/M2 | OXYGEN SATURATION: 98 % | SYSTOLIC BLOOD PRESSURE: 118 MMHG | HEIGHT: 66 IN | HEART RATE: 53 BPM

## 2018-01-10 DIAGNOSIS — R26.89 IMBALANCE: ICD-10-CM

## 2018-01-10 DIAGNOSIS — R82.992 HYPEROXALURIA: ICD-10-CM

## 2018-01-10 DIAGNOSIS — I10 ESSENTIAL HYPERTENSION: ICD-10-CM

## 2018-01-10 DIAGNOSIS — J34.9 SINUS DISORDER: Primary | ICD-10-CM

## 2018-01-10 DIAGNOSIS — R26.81 GAIT INSTABILITY: ICD-10-CM

## 2018-01-10 DIAGNOSIS — G47.33 OSA (OBSTRUCTIVE SLEEP APNEA): ICD-10-CM

## 2018-01-10 PROBLEM — L97.321 SKIN ULCER OF LEFT ANKLE, LIMITED TO BREAKDOWN OF SKIN: Status: RESOLVED | Noted: 2017-06-02 | Resolved: 2018-01-10

## 2018-01-10 PROCEDURE — 99214 OFFICE O/P EST MOD 30 MIN: CPT | Mod: PBBFAC | Performed by: INTERNAL MEDICINE

## 2018-01-10 PROCEDURE — 99999 PR PBB SHADOW E&M-EST. PATIENT-LVL IV: CPT | Mod: PBBFAC,,, | Performed by: INTERNAL MEDICINE

## 2018-01-10 PROCEDURE — 99214 OFFICE O/P EST MOD 30 MIN: CPT | Mod: S$PBB,,, | Performed by: INTERNAL MEDICINE

## 2018-01-10 NOTE — PATIENT INSTRUCTIONS
Stop Zyrtec: this has a side effect of drowsiness    Sinus clog:  Rinse sinuses with sterile saline ( Arm and Hammer) three times a day ( often needing 4-5 spays in each nostril until you taste it in the back of your throat , then gently blow.    Nightly use one spray of flonase in each nostril.    -------------------------------------------------------------------------------------------------------------

## 2018-01-10 NOTE — PROGRESS NOTES
"Subjective:      Patient ID: Shima Sorto is a 77 y.o. female.    Chief Complaint: Follow-up (3 months f/u)    HPI:  HPI   Patient has had a respiratory illness since the end of December. She went to the ER on 12/27/2017. She was given a Decadron injection, Tessalon and Certirizine. She continues with a cough although now dry, fatigue and her sinuses are clogged and now with ear fullness.     Stop Zyrtec: this has a side effect of drowsiness    Sinus clog:  Rinse sinuses with sterile saline ( Arm and Hammer) three times a day ( often needing 4-5 spays in each nostril until you taste it in the back of your throat , then gently blow.    Nightly use one spray of flonase in each nostril.  ----------------------------------------------------------------------------------------------------  Patient brought the Sleep Apnea Device to show me  -----------------------------------------------------------------------------------------------------  Blister on groin: 11/8/2017: 1/4" by 3/4" and raised about a 1/16"    -----------------------------------------------------------------------------------------------------  Discussed current therapy  Patient Active Problem List   Diagnosis    Calcium nephrolithiasis    Hypertension    Hyperoxaluria    Hypocitraturic calcium nephrolithiasis    Cystic kidney disease    Fatigue    Urinary retention    Osteoporosis: per Endocrinology currently off medication    Hypoglycemia    Trigger middle finger of right hand    Retinal hemorrhage of both eyes at about 1'oclock    Vestibular neuronitis    Vertigo    KATHERIN (obstructive sleep apnea)    Venous insufficiency     Past Medical History:   Diagnosis Date    Allergy     seasonal    Anemia     Basal cell carcinoma 7/2013    forehead    Hx of colonic polyps     Hypertension     Medullary sponge kidney     MVP (mitral valve prolapse)     Osteoporosis, senile     Renal calculi     TMJ syndrome     sometimes jaw " "clicking/jaw pain    Urinary retention     Vertigo 1/17/2017    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    Visual impairment     reading glasses     Past Surgical History:   Procedure Laterality Date    interstim placed stage 1      [facility] ST[facility]  2000    [facility]    MOHS procedure       Family History   Problem Relation Age of Onset    Kidney disease Mother     Heart disease Father     Breast cancer Maternal Aunt     Anesthesia problems Neg Hx     Malignant hypertension Neg Hx     Hypotension Neg Hx     Malignant hyperthermia Neg Hx     Pseudochol deficiency Neg Hx     Colon cancer Neg Hx     Ovarian cancer Neg Hx     Melanoma Neg Hx     Psoriasis Neg Hx     Lupus Neg Hx     Eczema Neg Hx     Acne Neg Hx      Review of Systems  Objective:     Vitals:    01/10/18 1415   BP: 118/60   Pulse: (!) 53   SpO2: 98%   Weight: 60 kg (132 lb 4.4 oz)   Height: 5' 6" (1.676 m)   PainSc: 0-No pain     Body mass index is 21.35 kg/m².  Physical Exam  Assessment:     1. Sinus disorder    2. KATHERIN (obstructive sleep apnea)    3. Hyperoxaluria    4. Essential hypertension    5. Gait instability    6. Imbalance      Plan:   Shima was seen today for follow-up.    Diagnoses and all orders for this visit:    Sinus disorder: discussed    KATHERIN (obstructive sleep apnea): discussed appliance    Hyperoxaluria: stable    Essential hypertension: well controlled    Gait instability: PT    Imbalance PT      Return in about 3 months (around 4/10/2018) for Follow up.     Medication List          Accurate as of 1/10/18  3:12 PM. If you have any questions, ask your nurse or doctor.               CONTINUE taking these medications    alendronate 70 MG tablet  Commonly known as:  FOSAMAX  1 tablet on Sunday with a full glass of water and wait 30 minutes for breakfast and pills. Take sitting or standing     ALPRAZolam 0.5 MG tablet  Commonly known as:  XANAX  Take 1 tablet (0.5 mg total) by mouth nightly as needed for Anxiety.   "   atenolol 25 MG tablet  Commonly known as:  TENORMIN  Take one per day     B COMPLEX WITH C#10-FOLIC ACID ORAL     bacitracin 500 unit/gram Oint  Apply topically daily as needed. Apply to wound     calcium citrate 200 mg (950 mg) tablet  Commonly known as:  CALCITRATE     cetirizine 10 MG tablet  Commonly known as:  ZYRTEC  Take 1 tablet (10 mg total) by mouth once daily.     co-enzyme Q-10 50 mg capsule     diazePAM 5 MG tablet  Commonly known as:  VALIUM  Take 1 tablet (5 mg total) by mouth every 6 (six) hours as needed (dizziness).     lisinopril 10 MG tablet  Take 2 tablets (20 mg total) by mouth every morning. Dosage change     meclizine 25 mg tablet  Commonly known as:  ANTIVERT  Take 1 tablet (25 mg total) by mouth 3 (three) times daily as needed for Dizziness.     MULTIVITAMIN ORAL     ondansetron 8 MG Tbdl  Commonly known as:  ZOFRAN-ODT  Take 1 tablet (8 mg total) by mouth every 6 (six) hours as needed (nausea).     SUPER OMEGA-3 400-5 mg-unit Cap  Generic drug:  fish oil-E-fatty [id number]     valACYclovir 1000 MG tablet  Commonly known as:  VALTREX  Take 1 tablet (1,000 mg total) by mouth 3 (three) times daily.     VANICREAM  Generic drug:  radiacream

## 2018-01-12 ENCOUNTER — CLINICAL SUPPORT (OUTPATIENT)
Dept: REHABILITATION | Facility: HOSPITAL | Age: 78
End: 2018-01-12
Attending: OTOLARYNGOLOGY
Payer: MEDICARE

## 2018-01-12 DIAGNOSIS — R42 VERTIGO: Primary | ICD-10-CM

## 2018-01-12 DIAGNOSIS — R26.81 GAIT INSTABILITY: ICD-10-CM

## 2018-01-12 PROCEDURE — 97110 THERAPEUTIC EXERCISES: CPT

## 2018-01-12 PROCEDURE — 97112 NEUROMUSCULAR REEDUCATION: CPT

## 2018-01-12 NOTE — PROGRESS NOTES
Physical Therapy Progress Note     Name: Shima Echols [facility]  Clinic Number: [id number]  Diagnosis:   Encounter Diagnoses   Name Primary?   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  Pt notes mild issues with rolling in bed- will move slowly, close eyes, then steady self by staring at door- this works for her. Pt notes mild difficulty with standing hip abd exercise- leans against wall to stay straight.   Pt denies true vertigo.  Pt walks no device community level.     She has some sinus congestion and this makes her head feel full.  She was busy with family and illness over the holidays plus cold weather limited her participation in activities.   Pain Scale:  0  Baseline - 0/10 dizziness or vertigo reported    Objective     Elderly female amb to dept no device, wearing tennis shoes.   Pt amb with narrow base of support.  Pt with FH and kyphosis noted.     Patient received individual therapy to increase strength, endurance, ROM, posture, core stabilization and balance with activities as follows:     Shima received therapeutic exercises to develop strength, endurance, posture and core stabilization for 15 minutes including:   Bridge x 10  Bridge + leg  ext with focus on hip ext and core stability x 10   sidelying hip abd with 1# - cues for alignment of LE- 5x lift 5x hold x 3 cycles  Prone alt HE x 2x15  Wall slides 5 count hold x 10  Ankle heel toe raises with UE support    Showed bed mobility and transitions without c/o dizziness or vertigo  Cued during bed mobility for roll to sidelying then to sit- no dizziness reported    Patient participated in neuromuscular re-education activities to improve: Balance, Coordination, Kinesthetic, Sense, Proprioception and Posture for 45 minutes. The following activities were included:   Pt was instructed in and performed neuromuscular reeducation for vestibular rehab:   -balance progressive challenges in standing with UE support as needed- increase difficulty with change in foot position, closing eyes, moving arms/head, change in surface, and / or SLS- vary activities and perform for 10-15 minutes daily.  Use of step, foam, gaze stabilization of visual spotting, SLS with arm support- showed most sway with eyes closed, showed SLS 3-5 seconds  Wall pulls for hip and ankle strategies- needs repeated cueing and assistance for ab actitation and DF over feet to pull forward  Resisted and challenged gait activities for heel toe pattern and hip control  Cued for wide base  Gaze in standing with turns, walking forward and back, spins while walking  Gait with head motions, gait with turns  Able to step over box and keep walking  Stairs in alt pattern with and without rails MOD I- encouraged use of HHA on one rail for safety in home setting- Pt has basement steps and 2nd floor steps    Pt should continue as HEP as instructed.  updated written/illustrated HEP.  Pt demo good and fair understanding of the education provided. Shima demonstrated good and fair return demonstration of activities.     No spiritual or educational barriers to learning provided  Assessment   This is a 77 y.o. female referred to outpatient physical therapy and  presents with a medical diagnosis of dizziness and vertigo and demonstrates limitations as described in the problem list Pt prognosis is Good. Pt will continue to benefit from skilled outpatient physical therapy to address the deficits listed in the problem list, provide pt/family education and to maximize pt's level of independence in the home and community environment.    Pt has functional balance and control for home and community ambulation.  She tends to work more for flexibility and needs more focus on stability and coordination.  R vestibular compensation and cueing for mobility without onset of dizziness or vertigo.  Focus on hip and ankle strategies in standing.   Pt has mild core and hip weakness that may contribute to some of her gait instability.  Pt has reported vestibular weakness of R ear and imbalance with higher level challenges.   Pt nees cues for quality of posture, core stability and gait pattern.     Plan   Continue with established Plan of Care towards PT goals.

## 2018-01-17 ENCOUNTER — NURSE TRIAGE (OUTPATIENT)
Dept: ADMINISTRATIVE | Facility: CLINIC | Age: 78
End: 2018-01-17

## 2018-01-17 NOTE — TELEPHONE ENCOUNTER
"  Reason for Disposition   Caller has NON-URGENT medication question about med that PCP prescribed and triager unable to answer question    Answer Assessment - Initial Assessment Questions  1. SYMPTOMS: "Do you have any symptoms?"      cough  2. SEVERITY: If symptoms are present, ask "Are they mild, moderate or severe?"      moderate    Protocols used: ST MEDICATION QUESTION CALL-A-AH    "

## 2018-01-17 NOTE — TELEPHONE ENCOUNTER
"  Reason for Disposition   Caller has medication question, adult has minor symptoms, caller declines triage, and triager answers question    Answer Assessment - Initial Assessment Questions  1. SYMPTOMS: "Do you have any symptoms?"      cough  2. SEVERITY: If symptoms are present, ask "Are they mild, moderate or severe?"      mild    Protocols used: ST MEDICATION QUESTION CALL-A-  Patient states she has found the Tessalon Pearls. She is taking them to see if the medication will help the cough.  "

## 2018-01-21 ENCOUNTER — PATIENT MESSAGE (OUTPATIENT)
Dept: INTERNAL MEDICINE | Facility: CLINIC | Age: 78
End: 2018-01-21

## 2018-01-22 ENCOUNTER — TELEPHONE (OUTPATIENT)
Dept: INTERNAL MEDICINE | Facility: CLINIC | Age: 78
End: 2018-01-22

## 2018-01-22 RX ORDER — VALACYCLOVIR HYDROCHLORIDE 1 G/1
1000 TABLET, FILM COATED ORAL 3 TIMES DAILY
Qty: 30 TABLET | Refills: 0 | Status: SHIPPED | OUTPATIENT
Start: 2018-01-22 | End: 2018-08-16

## 2018-02-01 NOTE — PROGRESS NOTES
Last 5 Patient Entered Readings                                      Current 30 Day Average:      Recent Readings 12/3/2017 11/8/2017 10/31/2017 10/15/2017 10/14/2017    SBP (mmHg) 121 120 145 109 118    DBP (mmHg) 58 60 63 52 60    Pulse 56 56 52 59 60          Hypertension Digital Medicine Program (HDMP): Health  Follow Up    Lifestyle Modifications:    1.Low sodium diet: Deferred    2.Physical activity: yes : She walks every chance she gets. Walks up two flights of stairs that are in her home numerous times a day. She also walks to the coffee shop in the mornings to meet friends and works in her yard.     3.Hypotension/Hypertension symptoms: no   Frequency/Alleviating factors/Precipitating factors, etc.     4.Patient has been compliant with the medication regimen.     Patient told me that over the last few weeks, she has been suffering from a cold and how has Shingles. She will be finished with her medication in two days. Because of getting caught up with all of that, she has not taken a BP reading. She told me that on a normal week, when she is not ill, that she is on the go a lot and it is hard for her to sit down and relax long enough to the take a BP reading. I reminded her that we would only need to see a reading once a week but she still stated that once a week is still hard and she forgets.   Patient received a call during our conversation so she is going to call me back.    Patient called back and we discussed whether or not she still wanted to take her BP readings. She stated that she does and knows it is important to be followed by her care team. I explained that it is a requirement of the program for her to take at least 1 BP reading per week in order for us to continue to monitor her. She agreed and stated that she will work on getting a reading in once a week. She continue talking about how busy her life is but that she will try.     I then asked the patient to make sure she is still logged into  "her [employer]ry and she got upset saying "she is always logged in and that she should not have to do an extra step". She could not understand the fact that sometimes, over time, patients get automatically logged out of their [employer]. I did my best to explain why but she did not appear to understand. She is just going to take her BP reading and call me back me back whenever she does it so I can check to see if she received it. We will move on from there if not.     Follow up with Mrs. Ronquillo Kinza Sorto completed. No further questions or concerns. I will follow up in a few weeks to assess progress.     "

## 2018-02-02 ENCOUNTER — CLINICAL SUPPORT (OUTPATIENT)
Dept: REHABILITATION | Facility: HOSPITAL | Age: 78
End: 2018-02-02
Attending: OTOLARYNGOLOGY
Payer: MEDICARE

## 2018-02-02 DIAGNOSIS — R26.81 GAIT INSTABILITY: ICD-10-CM

## 2018-02-02 PROCEDURE — G8979 MOBILITY GOAL STATUS: HCPCS | Mod: CI

## 2018-02-02 PROCEDURE — 97112 NEUROMUSCULAR REEDUCATION: CPT

## 2018-02-02 PROCEDURE — 97110 THERAPEUTIC EXERCISES: CPT

## 2018-02-02 PROCEDURE — G8980 MOBILITY D/C STATUS: HCPCS | Mod: CI

## 2018-02-05 PROBLEM — R26.81 GAIT INSTABILITY: Status: RESOLVED | Noted: 2018-01-12 | Resolved: 2018-02-05

## 2018-02-05 RX ORDER — ALENDRONATE SODIUM 70 MG/1
TABLET ORAL
Qty: 4 TABLET | Refills: 12 | Status: SHIPPED | OUTPATIENT
Start: 2018-02-05 | End: 2019-02-01 | Stop reason: SDUPTHER

## 2018-02-05 NOTE — PROGRESS NOTES
Physical Therapy Progress Note     Name: Shima Echols [facility] Number: [id number]  Diagnosis:   Encounter Diagnosis   Name Primary?   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     Precautions: standard  Visit #: 5  Treatment:  Neuro chandrakant, therex, education  Subjective   Pt notes issues with Shingles continue on her upper back.  Pt reports therapy has given her a better understanding of the why and the how of balance as well as focus on certain motions to improve.   Pt is compliant with her HEP and therapy's suggestions to advance.   Pt continues to walk outdoors or due to weather in big box stores.    Pt denies true dizziness or vertigo-  0/10 baseline.  Pt may have min issues with moving in bed but knows how to stabilize.  Pt is in agreement with discharge.  She uses counter support for trunk with standing activities.   Pain Scale:  0  Baseline - 0/10 dizziness or vertigo reported    Objective     Elderly female amb to dept no device, wearing tennis shoes.   Pt amb with narrow base of support.  Pt with FH and kyphosis noted.     Baseline symptoms 0/10  No motion provoked complaints    DGI no device- mild drift with head motions - no significant LOB and showed ability to recover as needed  Stairs alt pattern - encouraged  to use rail for safety as needed  Pt has stairs in her home and community  HASSAN Assessment    1. Sitting to Standing   4 - able to stand without using hands and stabilize independently  2. Standing Unsupported   4 - able to stand safely 2 minutes without hold  3. Sitting Unsupported   4 - able to sit safely and securely 2 minutes  4. Standing to Sitting   4 - sits safely with minimal use of hands  5. Pivot Transfer   4 - able to trnasfer safely with minor use of hands  6. Standing with Eyes Closed   4 - albe to stand 10 seconds safely  7. Standing with Feet Together   4 - able to place feet together independently and stand 1 minute safely  8. Reaching Forward with Outstretched Arm   4 - can reach forward confidently 25 cm/10 inches  9. Retrieving Object from Floor   4 - able to  slipper safely and easily  10. Turning to Look Behind   4 - looks behind from both sides and weights shifts well  11. Turning 360 Degrees   4 - able to turn 360 in seconds or less  12. Placing Alternate Foot on Step   4 - able to stand independently safely and complete 8 steps in 20 seconds  13. Standing with One Foot in Front   3 - able to place foot ahead of other independently and hold 30 seconds  14. Standing on One Foot   3- able to lift leg independently and hold 5-10 seconds  SLS varies  54/56    LE strength grossly WFL  Cueing needed with hip abd- tends to roll trunk and oftern uses more HF  Grossly 4-4+/5    Patient received individual therapy to increase strength, endurance, ROM, posture, core stabilization and balance with activities as follows:     Shima received therapeutic exercises to develop strength, endurance, posture and core stabilization for 15 minutes including:   Bridge x 10  Bridge + leg ext with focus on hip ext and core stability x 10   sidelying hip abd with 1# - cues for alignment of LE- 5x lift 5x hold x 3 cycles  Prone alt HE x 2x15  Wall slides 5 count hold x 10  Ankle heel toe raises with UE  support    Showed bed mobility and transitions without c/o dizziness or vertigo  Cued during bed mobility for roll to sidelying then to sit- no dizziness reported    Patient participated in neuromuscular re-education activities to improve: Balance, Coordination, Kinesthetic, Sense, Proprioception and Posture for 45 minutes. The following activities were included:   Pt was instructed in and performed neuromuscular reeducation for vestibular rehab:   -balance progressive challenges in standing with UE support as needed- increase difficulty with change in foot position, closing eyes, moving arms/head, change in surface, and / or SLS- vary activities and perform for 10-15 minutes daily.  Use of step, foam, gaze stabilization of visual spotting, SLS with arm support- showed most sway with eyes closed, showed SLS 3-5 seconds  Wall pulls for hip and ankle strategies- needs repeated cueing and assistance for ab actitation and DF over feet to pull forward      Donaldson assessment and challenges in those positions  Dynamic Gait Index and challenges in those positions during testing.     Cued for wide base  Gaze in standing with turns, walking forward and back, spins while walking  Gait with head motions, gait with turns  Able to step over box and keep walking  Stairs in alt pattern with and without rails MOD I- encouraged use of HHA on one rail for safety in home setting- Pt has basement steps and 2nd floor steps    Pt should continue as HEP as instructed.  updated written/illustrated HEP.  Pt demo good and fair understanding of the education provided. Shima demonstrated good and fair return demonstration of activities.     No spiritual or educational barriers to learning provided  Assessment   This is a 77 y.o. female referred to outpatient physical therapy and presents with a medical diagnosis of dizziness and vertigo and demonstrates limitations as described in the problem list Pt prognosis is Good. Pt will continue to benefit  from skilled outpatient physical therapy to address the deficits listed in the problem list, provide pt/family education and to maximize pt's level of independence in the home and community environment.    Pt has gained a better understanding her focus on hips and ankles to assist in her dynamic balance and gait needs.   She has functional balance and control for home and community ambulation.  She tends to work more for flexibility and needs more focus on stability and coordination.  R vestibular compensation and cueing for mobility without onset of dizziness or vertigo.  Focus on hip and ankle strategies in standing.   Pt has mild core and hip weakness that may contribute to some of her gait instability- this has improved slightly and will benefit from ongoing focused exercise.     FUNCTIONAL LIMITATIONS REPORTS- G codes     Category:  Mobility     Tool/Score:       FOTO  93/100      HASSAN  54/56      DGI  22/24       Current: :      Goal: : CI 1-20%     Discharge: :CI 1-20%    STG'S: 4-6 weeks  1. Patient independent in HEP of balance.  Exercises to be done Daily.  2. Pt to stand on one foot with good stance stability for 10+ sec..   3.  Decrease patient's subjective rating of imbalance to a  1 (on 0-10 scale) or less as baseline.     LTG'S  :4-6 weeks  1. Patient able to ambulate in community safely without assistive device, on varied terrainwith head movement, without LOB or c/o dizziness.  DGI improvement by 1-3 points.  2. Patient's subjective rating of imbalance will decreased to 0-2 on 0-10 scale  3. Patient to show amb with improved base and gait quality-   4.  Reduce risk of falls with Hassan Balance gains by 3-5 points.  5. Pt to be I with self management of condition and progression vestibular program for maintenance.    Goals achieved with ongoing progression with HEP.  Cueing for trunk stability and hip abd focus  PLAN Discharge from PT.

## 2018-02-06 ENCOUNTER — PATIENT OUTREACH (OUTPATIENT)
Dept: OTHER | Facility: OTHER | Age: 78
End: 2018-02-06

## 2018-02-06 ENCOUNTER — TELEPHONE (OUTPATIENT)
Dept: SLEEP MEDICINE | Facility: CLINIC | Age: 78
End: 2018-02-06

## 2018-02-06 DIAGNOSIS — G47.33 OBSTRUCTIVE SLEEP APNEA: Primary | ICD-10-CM

## 2018-02-06 NOTE — TELEPHONE ENCOUNTER
Received note from Dr. Rosette LEO referring pt back to Sleep Clinic for post OA PSG to determine appliance efficacy.     Please notify pt that PSG ordered and once approved, Carmen or Josh will contact her to schedule sleep study. Their number is [phone] (ext 2). [facility] is located on 7th floor of the [facility].    We will call when the sleep study results are ready - if you have not heard from us by 2 weeks from the date of the study, please call [phone] (ext 1).

## 2018-02-06 NOTE — PROGRESS NOTES
Last 5 Patient Entered Readings                                      Current 30 Day Average: 144/61     Recent Readings 2/3/2018 12/3/2017 11/8/2017 10/31/2017 10/15/2017    SBP (mmHg) 144 121 120 145 109    DBP (mmHg) 61 58 60 63 52    Pulse 55 56 56 52 59        Hypertension Medications             atenolol (TENORMIN) 25 MG tablet Take one per day    lisinopril 10 MG tablet Take 2 tablets (20 mg total) by mouth every morning. Dosage change        Assessment/ Plan:   Called patient to follow up.   Per newly released 2017 ACC/ AHA HTN guidelines (goal of BP < 130/80), current 30-day average is uncontrolled; however, patient has only taken 1 reading within the past 30 days.    Requested patient take more frequent readings so I can better determine whether or not we need to adjust she HTN medication regimen, patient confirms understanding.   Patient denies having questions or concerns. Instructed patient to call if she has any questions or concerns, patient confirms understanding.   Will continue to monitor. WCB in 1 month, and if BP remains elevated will discuss increasing lisinopril to 40mg.

## 2018-02-12 ENCOUNTER — TELEPHONE (OUTPATIENT)
Dept: SLEEP MEDICINE | Facility: CLINIC | Age: 78
End: 2018-02-12

## 2018-02-24 ENCOUNTER — HOSPITAL ENCOUNTER (OUTPATIENT)
Dept: SLEEP MEDICINE | Facility: OTHER | Age: 78
Discharge: HOME | End: 2018-02-24
Attending: PSYCHIATRY & NEUROLOGY
Payer: MEDICARE

## 2018-02-24 DIAGNOSIS — G47.33 OBSTRUCTIVE SLEEP APNEA: ICD-10-CM

## 2018-02-24 PROCEDURE — 95810 POLYSOM 6/> YRS 4/> PARAM: CPT

## 2018-02-24 PROCEDURE — 95810 POLYSOM 6/> YRS 4/> PARAM: CPT | Mod: 26,,, | Performed by: PSYCHIATRY & NEUROLOGY

## 2018-02-25 NOTE — PROGRESS NOTES
This is a Screen study for 77 year old Shima Sorto.  The procedure was explained to the patient upon arrival. This included the set-up process and what to expect during the night.   It was also explained to the patient that the test will be interpreted by a physician and the results will be sent to her PCP.  Her EKG appeared to be NSR with rare PACs.  Sleep disorder breathing most significant in supine REM sleep.  She did not qualify for a split night study.   Occational soft snoring observed.  She is wearing her oral appliance for this sleep study,

## 2018-02-28 ENCOUNTER — PATIENT OUTREACH (OUTPATIENT)
Dept: OTHER | Facility: OTHER | Age: 78
End: 2018-02-28

## 2018-02-28 NOTE — PROGRESS NOTES
Last 5 Patient Entered Readings                                      Current 30 Day Average: 130/63     Recent Readings 2/23/2018 2/23/2018 2/13/2018 2/8/2018 2/3/2018    SBP (mmHg) 126 133 110 140 144    DBP (mmHg) 60 65 61 69 61    Pulse 54 55 53 54 55          Hypertension Digital Medicine (HDMP) Health  Follow Up    LVM to follow up with Mrs. Ronquillo Kinza Sorto.    Per 30 day average, blood pressure is well controlled 130/63 mmHg. Encouraged adherence to low sodium diet and physical activity guidelines. Advised patient to call or message with questions or concerns.

## 2018-03-06 ENCOUNTER — PATIENT OUTREACH (OUTPATIENT)
Dept: OTHER | Facility: OTHER | Age: 78
End: 2018-03-06

## 2018-03-06 NOTE — PROGRESS NOTES
Last 5 Patient Entered Readings                                      Current 30 Day Average: 128/63     Recent Readings 3/5/2018 3/5/2018 2/23/2018 2/23/2018 2/13/2018    SBP (mmHg) 135 139 126 133 110    DBP (mmHg) 61 62 60 65 61    Pulse 57 58 54 55 53        Hypertension Medications             atenolol (TENORMIN) 25 MG tablet Take one per day    lisinopril 10 MG tablet Take 2 tablets (20 mg total) by mouth every morning. Dosage change        Assessment/ Plan:   Called patient to follow up.   Per newly released 2017 ACC/ AHA HTN guidelines (goal of BP < 130/80), current 30-day average is controlled.    Requested patient take more frequent readings so I can better determine whether or not we need to adjust she HTN medication regimen, patient confirms understanding.   Patient denies having questions or concerns. Instructed patient to call if she has any questions or concerns, patient confirms understanding.   Will continue to monitor. WCB in 6 weeks

## 2018-03-13 ENCOUNTER — TELEPHONE (OUTPATIENT)
Dept: SLEEP MEDICINE | Facility: CLINIC | Age: 78
End: 2018-03-13

## 2018-03-13 NOTE — TELEPHONE ENCOUNTER
----- Message from Christina Delacruz sent at 3/13/2018  9:58 AM CDT -----  Contact: KEITH HAJI [[id number]]  x_  1st Request  _  2nd Request  _  3rd Request        Who: KEITH HAJI [[id number]]    Why: Requesting a call back in regards to sleep study results.   Please return the call at earliest convenience. Thanks!    What Number to Call Back: [phone]      When to Expect a call back: (Within 24 hours)

## 2018-03-13 NOTE — TELEPHONE ENCOUNTER
Criselda,      1) Please send copy of 02/24/2018 PSG results to Dr. Rosette LEO    2) If patient returns my call, please schedule her for f/u so we can discuss her recent sleep study unless she is open to me emailing her results.

## 2018-03-14 ENCOUNTER — OFFICE VISIT (OUTPATIENT)
Dept: SLEEP MEDICINE | Facility: CLINIC | Age: 78
End: 2018-03-14
Payer: MEDICARE

## 2018-03-14 VITALS
SYSTOLIC BLOOD PRESSURE: 155 MMHG | HEIGHT: 66 IN | HEART RATE: 49 BPM | DIASTOLIC BLOOD PRESSURE: 73 MMHG | WEIGHT: 133.38 LBS | BODY MASS INDEX: 21.44 KG/M2

## 2018-03-14 DIAGNOSIS — G47.33 OBSTRUCTIVE SLEEP APNEA: Primary | ICD-10-CM

## 2018-03-14 PROCEDURE — 99213 OFFICE O/P EST LOW 20 MIN: CPT | Mod: PBBFAC | Performed by: NURSE PRACTITIONER

## 2018-03-14 PROCEDURE — 99999 PR PBB SHADOW E&M-EST. PATIENT-LVL III: CPT | Mod: PBBFAC,,, | Performed by: NURSE PRACTITIONER

## 2018-03-14 PROCEDURE — 99213 OFFICE O/P EST LOW 20 MIN: CPT | Mod: S$PBB,,, | Performed by: NURSE PRACTITIONER

## 2018-03-14 NOTE — PROGRESS NOTES
"Shima Echols [place] returns to discuss recent PSG w/ OA results.       02/22/2017 Dr. Chamberlain This 77 y.o. female patient returns for the evaluation of possible obstructive sleep apnea.     Patient was unable to complete sleep study after last visit 2 years ago. 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  She continues to complain of being excessively sleepy by mid afternoon.  "im tired of being tired". Reports this is ongoing since the 1980's.    KATHERIN risk symptoms:  Occasional soft snoring  Gasping for air in sleep  Excessively sleepy during the day    PRIOR SLEEP STUDY:  Per Dr. Tomas "Not sleeping enough or not sleeping the right way, puts her head down on her desk or while driving at times wants to take a nap"    "im tired of being tired". Reports this is ongoing since the 1980's.  Feels okay when she wakes up in the morning, but gets sleepy later in the day.  Excessively sleepy at times during the day. She has dozed off at traffic light.    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    ESS = 10    Takes decaf coffee in am; 3-4 cups in the morning  No trouble breathing through her nose.  Feels that she can breathe better when she stretches her jaw forward; Sometimes gasping when sitting quietly during the day  Snoring very softly, sometimes, reported by her late     No report of restless legs or squirming at night.    SLEEP ROUTINE:   Bed partner: sleeps alone   Time to bed: 11pm - MN, sometimes later   Sleep onset latency: very brief   Disruptions or awakenings: 1-2 times due to bathroom   Wakeup time: 4:30-4:45 am on Monday / 5:30-6 am / rarely 7 am   Perceived sleep quality: unsure   Daytime naps: none    INTERVAL HISTORY:    03/22/2017 RAIZA Adam NP: Discussed 03/11/2017 in-lab sleep " study results in detail. Patient symptoms of interrupted sleep and excessive daytime sleepiness remain the same. She commutes a lot and is concerned about her safety as she is increasingly sleepier at the wheel. Reports her quality of life is poor due to sleepiness.       05/03/2017 RAIZA Adam NP: Pt phoned in after clinic visit on 03/22 requesting Rx for CPAP instead of OA. However, pt returns in clinic today to re-discuss oral appliance for treatment instead. States she went to CPAP set up at [facility] and did not like the look of CPAP mask on another patron that was there with her. She did not try on any masks. She left DME without CPAP. Would like trial of OA instead. Provided pt again with Rx and letter, as well as contact number for both Dr. Dinero and Dr. Crowe so that pt may contact them for pricing/consulation.     03/14/2018 RAIZA Adam NP: Discussed recently completed PSG with OA device in place. Showed effective control of KATHERIN w/ current setting on side sleep. If KATHERIN control in supine sleep desired, DDS may further adjust OA. Pt may not want to adjust further as she has TMJ symptoms she does not want to aggravate. Reports continuous, refreshing sleep; no longer dozing off at red lights, or feeling excessively sleepy during the day.    Baseline Sleep Study:  The overall AHI was 6.3 with an oxygen ted of 82.0%. The overall RDI was 10.7  The supine AHI was 31.0 and the REM AHI was 8.8.     02/24/2018 PSG w/  lb. The overall AHI was 3.1 with an oxygen ted of 84.0%. The supine AHI was 17.6 and the REM AHI was 13.3. Continued use of oral appliance and avoidance of supine sleep appears to be successful at controlling obstructive sleep apnea (KATHERIN). 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    Visual impairment     reading glasses       Past Surgical History:   Procedure Laterality Date    interstim placed stage 1      KIDNEY STONE SURGERY  2000    @ [facility]  MOHS procedure         Family History   Problem Relation Age of Onset    Kidney disease Mother     Heart disease Father     Breast cancer Maternal Aunt     Anesthesia problems Neg Hx     Malignant hypertension Neg Hx     Hypotension Neg Hx     Malignant hyperthermia Neg Hx     Pseudochol deficiency Neg Hx     Colon cancer Neg Hx     Ovarian cancer Neg Hx     Melanoma Neg Hx     Psoriasis Neg Hx     Lupus Neg Hx     Eczema Neg Hx     Acne Neg Hx        Social History   Substance Use Topics    Smoking status: Former Smoker     Packs/day: 0.50     Years: 8.00     Quit date: 8/22/1964    Smokeless tobacco: Never Used    Alcohol use Yes      Comment: 1-3x/week   Teaches social policy at [employer]    ALLERGIES: Reviewed in EPIC    CURRENT MEDICATIONS: Reviewed in EPIC    REVIEW OF SYSTEMS:  Sleep related symptoms as per HPI;    Denies weight gain;    Denies sinus problems;    Urinary frequency;    Otherwise, a balance of systems reviewed is negative.    PHYSICAL EXAM:  BP (!) 155/73   Pulse (!) 49   Ht 5' 6" (1.676 m)   Wt 60.5 kg (133 lb 6.1 oz)   BMI 21.53 kg/m²       ASSESSMENT:    Obstructive sleep apnea, mild by AHI. The patient symptomatically has interrupted sleep and excessive daytime sleepiness resolved with OA. She has medical co-morbidities of labile blood pressure in setting of hypertension. This warrants treatment for KATHERIN.     Continues to have daytime hypersomnia, despite increasing time in bed.         PLAN:    -Treatment:  Continue OA on side sleep only. If KATHERIN control in supine sleep " desired, DDS to adjust OA, as tolerated by pt    - Education: During our discussion today, we talked about the etiology of obstructive sleep apnea as well as the potential ramifications of untreated sleep apnea, which could include daytime sleepiness, hypertension, heart disease and/or stroke. We discussed potential treatment options, which could include weight loss, body positioning, continuous positive airway pressure (CPAP), OA, EPAP, or referral for surgical consideration.     - Precautions: The patient was advised to abstain from driving should they feel sleepy  or drowsy.

## 2018-03-26 ENCOUNTER — PATIENT OUTREACH (OUTPATIENT)
Dept: OTHER | Facility: OTHER | Age: 78
End: 2018-03-26

## 2018-03-26 NOTE — PROGRESS NOTES
Last 5 Patient Entered Readings                                      Current 30 Day Average: 127/58     Recent Readings 3/19/2018 3/5/2018 3/5/2018 2/23/2018 2/23/2018    SBP (mmHg) 119 135 139 126 133    DBP (mmHg) 55 61 62 60 65    Pulse 63 57 58 54 55          Hypertension Digital Medicine Program (HDMP): Health  Follow Up    Lifestyle Modifications:    1.Low sodium diet: yes : Patient continues maintaining a low sodium diet. She is reading all the food labels and and avoids highly salted foods. No major changes in her diet that would cause an increase in sodium intake.     2.Physical activity: yes : Patient continues her home exercise regimen a few times per week.     3.Hypotension/Hypertension symptoms: no   Frequency/Alleviating factors/Precipitating factors, etc.     4.Patient has been compliant with the medication regimen.     I requested that she start taking readings 1-2 times per week and she agreed.     Follow up with Mrs. Ronquillo Osminilchristine Sorto completed. No further questions or concerns. I will follow up in a few weeks to assess progress.

## 2018-04-10 ENCOUNTER — HOSPITAL ENCOUNTER (OUTPATIENT)
Dept: RADIOLOGY | Facility: HOSPITAL | Age: 78
Discharge: HOME | End: 2018-04-10
Attending: INTERNAL MEDICINE
Payer: MEDICARE

## 2018-04-10 ENCOUNTER — OFFICE VISIT (OUTPATIENT)
Dept: INTERNAL MEDICINE | Facility: CLINIC | Age: 78
End: 2018-04-10
Payer: MEDICARE

## 2018-04-10 VITALS
HEART RATE: 64 BPM | SYSTOLIC BLOOD PRESSURE: 122 MMHG | HEIGHT: 66 IN | OXYGEN SATURATION: 99 % | WEIGHT: 135.81 LBS | DIASTOLIC BLOOD PRESSURE: 64 MMHG | BODY MASS INDEX: 21.83 KG/M2

## 2018-04-10 DIAGNOSIS — Z01.419 ENCOUNTER FOR ANNUAL ROUTINE GYNECOLOGICAL EXAMINATION: ICD-10-CM

## 2018-04-10 DIAGNOSIS — Z12.31 ENCOUNTER FOR SCREENING MAMMOGRAM FOR BREAST CANCER: ICD-10-CM

## 2018-04-10 DIAGNOSIS — I10 ESSENTIAL HYPERTENSION: Primary | ICD-10-CM

## 2018-04-10 DIAGNOSIS — M25.519 SHOULDER PAIN, UNSPECIFIED CHRONICITY, UNSPECIFIED LATERALITY: ICD-10-CM

## 2018-04-10 DIAGNOSIS — G47.33 OBSTRUCTIVE SLEEP APNEA: ICD-10-CM

## 2018-04-10 PROCEDURE — 77063 BREAST TOMOSYNTHESIS BI: CPT | Mod: 26,,, | Performed by: RADIOLOGY

## 2018-04-10 PROCEDURE — 99214 OFFICE O/P EST MOD 30 MIN: CPT | Mod: S$PBB,,, | Performed by: INTERNAL MEDICINE

## 2018-04-10 PROCEDURE — 77067 SCR MAMMO BI INCL CAD: CPT | Mod: TC

## 2018-04-10 PROCEDURE — 99999 PR PBB SHADOW E&M-EST. PATIENT-LVL V: CPT | Mod: PBBFAC,,, | Performed by: INTERNAL MEDICINE

## 2018-04-10 PROCEDURE — 77067 SCR MAMMO BI INCL CAD: CPT | Mod: 26,,, | Performed by: RADIOLOGY

## 2018-04-10 PROCEDURE — 99215 OFFICE O/P EST HI 40 MIN: CPT | Mod: PBBFAC | Performed by: INTERNAL MEDICINE

## 2018-04-10 RX ORDER — TRIAMCINOLONE ACETONIDE 1 MG/G
CREAM TOPICAL 2 TIMES DAILY
Start: 2018-04-10 | End: 2018-04-20

## 2018-04-10 RX ORDER — LISINOPRIL 10 MG/1
20 TABLET ORAL EVERY MORNING
Qty: 60 TABLET | Refills: 12 | Status: SHIPPED | OUTPATIENT
Start: 2018-04-10 | End: 2019-01-04 | Stop reason: ALTCHOICE

## 2018-04-10 NOTE — PROGRESS NOTES
Subjective:      Patient ID: Shima Sorto is a 77 y.o. female.    Chief Complaint: Follow-up    HPI:  HPI   Patient's lists:  Refill of lisinopril 20  M daily refilled for one year  Added triamcinolone acetonide 0.1% ( Kenalog) 0.1% cream added to the chart may not be the correct dose  Discussed rotator cuff: form is important  Sleep Study repeated with oral appliance and non-supine sleep  (Somnodent)  Dry hands with splitting and bleeding: would try triamcinolone that she has.  Patient Active Problem List   Diagnosis    Calcium nephrolithiasis    Hypertension    Hyperoxaluria    Hypocitraturic calcium nephrolithiasis    Cystic kidney disease    Fatigue    Urinary retention    Osteoporosis: per Endocrinology currently off medication    Hypoglycemia    Trigger middle finger of right hand    Retinal hemorrhage of both eyes at about 1'oclock    Vestibular neuronitis    Vertigo    Obstructive sleep apnea    Venous insufficiency     Past Medical History:   Diagnosis Date    Allergy     seasonal    Anemia     Basal cell carcinoma 2013    forehead    Hx of colonic polyps     Hypertension     Medullary sponge kidney     MVP (mitral valve prolapse)     Osteoporosis, senile     Renal calculi     TMJ syndrome     sometimes jaw clicking/jaw pain    Urinary retention     Vertigo 2017    Vestibular neuronitis 2017    Visual impairment     reading glasses     Past Surgical History:   Procedure Laterality Date    interstim placed stage 1      KIDNEY STONE SURGERY      @ [facility]  MOHS procedure       Family History   Problem Relation Age of Onset    Kidney disease Mother     Heart disease Father     Breast cancer Maternal Aunt     Anesthesia problems Neg Hx     Malignant hypertension Neg Hx     Hypotension Neg Hx     Malignant hyperthermia Neg Hx     Pseudochol deficiency Neg Hx     Colon cancer Neg Hx     Ovarian cancer Neg Hx     Melanoma Neg Hx      "Psoriasis Neg Hx     Lupus Neg Hx     Eczema Neg Hx     Acne Neg Hx      Review of Systems   Constitutional: Negative for activity change, chills, fever and unexpected weight change.   Respiratory: Negative for cough, chest tightness, shortness of breath and wheezing.    Cardiovascular: Negative for chest pain, palpitations and leg swelling.     Objective:     Vitals:    04/10/18 1454   BP: 122/64   Pulse: 64   SpO2: 99%   Weight: 61.6 kg (135 lb 12.9 oz)   Height: 5' 6" (1.676 m)   PainSc: 0-No pain     Body mass index is 21.92 kg/m².  Physical Exam   Constitutional: She appears well-developed and well-nourished.   Neck: No JVD present. No thyromegaly present.   Cardiovascular: Normal rate, normal heart sounds and intact distal pulses.    Pulmonary/Chest: Effort normal and breath sounds normal. No respiratory distress.     Assessment:     1. Essential hypertension    2. Obstructive sleep apnea    3. Shoulder pain, unspecified chronicity, unspecified laterality    4. Encounter for annual routine gynecological examination    5. Encounter for screening mammogram for breast cancer      Plan:   Shima was seen today for follow-up.    Diagnoses and all orders for this visit:    Essential hypertension  -     CBC auto differential; Future  -     Comprehensive metabolic panel; Future  -     Lipid panel; Future    Obstructive sleep apnea    Shoulder pain, unspecified chronicity, unspecified laterality    Encounter for annual routine gynecological examination  -     Ambulatory consult to Gynecology    Encounter for screening mammogram for breast cancer  -     Mammo Digital Screening Bilat with Tomosynthesis CAD; Future    Other orders  -     lisinopril 10 MG tablet; Take 2 tablets (20 mg total) by mouth every morning. Dosage change  -     triamcinolone acetonide 0.1% (KENALOG) 0.1 % cream; Apply topically 2 (two) times daily. Per Dr. GEOVANY Finch      Follow-up in about 3 months (around 7/10/2018) for Follow up.   "   Medication List          Accurate as of 4/10/18  3:33 PM. If you have any questions, ask your nurse or doctor.               START taking these medications    triamcinolone acetonide 0.1% 0.1 % cream  Commonly known as:  KENALOG  Apply topically 2 (two) times daily. Per Dr. GEOVANY Finch  Started by:  Zeynep Tomas MD        CONTINUE taking these medications    alendronate 70 MG tablet  Commonly known as:  FOSAMAX  TAKE 1 TABLET BY MOUTH ON SUNDAY WITH A FULL GLASS OF WATER AND WAIT 30 MINUTES BEFORE BREAKFAST AND PILLS. TAKE SITTING OR STANDING     ALPRAZolam 0.5 MG tablet  Commonly known as:  XANAX  Take 1 tablet (0.5 mg total) by mouth nightly as needed for Anxiety.     atenolol 25 MG tablet  Commonly known as:  TENORMIN  Take one per day     B COMPLEX WITH C#10-FOLIC ACID ORAL     bacitracin 500 unit/gram Oint  Apply topically daily as needed. Apply to wound     calcium citrate 200 mg (950 mg) tablet  Commonly known as:  CALCITRATE     cetirizine 10 MG tablet  Commonly known as:  ZYRTEC  Take 1 tablet (10 mg total) by mouth once daily.     co-enzyme Q-10 50 mg capsule     diazePAM 5 MG tablet  Commonly known as:  VALIUM  Take 1 tablet (5 mg total) by mouth every 6 (six) hours as needed (dizziness).     lisinopril 10 MG tablet  Take 2 tablets (20 mg total) by mouth every morning. Dosage change     meclizine 25 mg tablet  Commonly known as:  ANTIVERT  Take 1 tablet (25 mg total) by mouth 3 (three) times daily as needed for Dizziness.     MULTIVITAMIN ORAL     ondansetron 8 MG Tbdl  Commonly known as:  ZOFRAN-ODT  Take 1 tablet (8 mg total) by mouth every 6 (six) hours as needed (nausea).     SUPER OMEGA-3 400-5 mg-unit Cap  Generic drug:  fish oil-E-fatty [id number]     valACYclovir 1000 MG tablet  Commonly known as:  VALTREX  Take 1 tablet (1,000 mg total) by mouth 3 (three) times daily.     VANICREAM  Generic drug:  radiacream           Where to Get Your Medications      These medications were sent to  [id number] - [facility] [address] AT [facility] [address]  [facility] [address], [facility] [id number]    Phone:  [phone]   · lisinopril 10 MG tablet     Information about where to get these medications is not yet available    Ask your nurse or doctor about these medications  · triamcinolone acetonide 0.1% 0.1 % cream

## 2018-04-11 ENCOUNTER — LAB VISIT (OUTPATIENT)
Dept: LAB | Facility: HOSPITAL | Age: 78
End: 2018-04-11
Attending: INTERNAL MEDICINE
Payer: MEDICARE

## 2018-04-11 DIAGNOSIS — I10 ESSENTIAL HYPERTENSION: ICD-10-CM

## 2018-04-11 LAB
ALBUMIN SERPL BCP-MCNC: 3.7 G/DL
ALP SERPL-CCNC: 54 U/L
ALT SERPL W/O P-5'-P-CCNC: 23 U/L
ANION GAP SERPL CALC-SCNC: 8 MMOL/L
AST SERPL-CCNC: 28 U/L
BASOPHILS # BLD AUTO: 0.01 K/UL
BASOPHILS NFR BLD: 0.2 %
BILIRUB SERPL-MCNC: 0.6 MG/DL
BUN SERPL-MCNC: 33 MG/DL
CALCIUM SERPL-MCNC: 9.5 MG/DL
CHLORIDE SERPL-SCNC: 106 MMOL/L
CHOLEST SERPL-MCNC: 202 MG/DL
CHOLEST/HDLC SERPL: 3.1 {RATIO}
CO2 SERPL-SCNC: 29 MMOL/L
CREAT SERPL-MCNC: 1 MG/DL
DIFFERENTIAL METHOD: ABNORMAL
EOSINOPHIL # BLD AUTO: 0.1 K/UL
EOSINOPHIL NFR BLD: 1.2 %
ERYTHROCYTE [DISTWIDTH] IN BLOOD BY AUTOMATED COUNT: 14 %
EST. GFR  (AFRICAN AMERICAN): >60 ML/MIN/1.73 M^2
EST. GFR  (NON AFRICAN AMERICAN): 54 ML/MIN/1.73 M^2
GLUCOSE SERPL-MCNC: 105 MG/DL
HCT VFR BLD AUTO: 36.2 %
HDLC SERPL-MCNC: 65 MG/DL
HDLC SERPL: 32.2 %
HGB BLD-MCNC: 11.7 G/DL
LDLC SERPL CALC-MCNC: 122.4 MG/DL
LYMPHOCYTES # BLD AUTO: 1.7 K/UL
LYMPHOCYTES NFR BLD: 41.2 %
MCH RBC QN AUTO: 31 PG
MCHC RBC AUTO-ENTMCNC: 32.3 G/DL
MCV RBC AUTO: 96 FL
MONOCYTES # BLD AUTO: 0.6 K/UL
MONOCYTES NFR BLD: 13.2 %
NEUTROPHILS # BLD AUTO: 1.8 K/UL
NEUTROPHILS NFR BLD: 44.2 %
NONHDLC SERPL-MCNC: 137 MG/DL
PLATELET # BLD AUTO: 151 K/UL
PMV BLD AUTO: 11.7 FL
POTASSIUM SERPL-SCNC: 4.1 MMOL/L
PROT SERPL-MCNC: 6.7 G/DL
RBC # BLD AUTO: 3.78 M/UL
SODIUM SERPL-SCNC: 143 MMOL/L
TRIGL SERPL-MCNC: 73 MG/DL
WBC # BLD AUTO: 4.17 K/UL

## 2018-04-11 PROCEDURE — 80061 LIPID PANEL: CPT

## 2018-04-11 PROCEDURE — 80053 COMPREHEN METABOLIC PANEL: CPT

## 2018-04-11 PROCEDURE — 85025 COMPLETE CBC W/AUTO DIFF WBC: CPT

## 2018-04-11 PROCEDURE — 36415 COLL VENOUS BLD VENIPUNCTURE: CPT

## 2018-04-17 ENCOUNTER — PATIENT OUTREACH (OUTPATIENT)
Dept: OTHER | Facility: OTHER | Age: 78
End: 2018-04-17

## 2018-04-17 NOTE — PROGRESS NOTES
Last 5 Patient Entered Readings                                      Current 30 Day Average: 119/55     Recent Readings 3/19/2018 3/5/2018 3/5/2018 2/23/2018 2/23/2018    SBP (mmHg) 119 135 139 126 133    DBP (mmHg) 55 61 62 60 65    Pulse 63 57 58 54 55        Hypertension Medications             atenolol (TENORMIN) 25 MG tablet Take one per day    lisinopril 10 MG tablet Take 2 tablets (20 mg total) by mouth every morning. Dosage change        Assessment/ Plan:   Called patient to follow up.   Per newly released 2017 ACC/ AHA HTN guidelines (goal of BP < 130/80), current 30-day average is well controlled.    No readings since 3/19. Patient states she will resume readings soon.   Patient denies having questions or concerns. Instructed patient to call if she has any questions or concerns, patient confirms understanding.   Will continue to monitor. WCB in 3 months, sooner if BP begins to trend up or down.

## 2018-05-10 ENCOUNTER — OFFICE VISIT (OUTPATIENT)
Dept: DERMATOLOGY | Facility: CLINIC | Age: 78
End: 2018-05-10
Payer: MEDICARE

## 2018-05-10 DIAGNOSIS — Z85.828 HISTORY OF NONMELANOMA SKIN CANCER: ICD-10-CM

## 2018-05-10 DIAGNOSIS — L82.1 SEBORRHEIC KERATOSIS: Primary | ICD-10-CM

## 2018-05-10 DIAGNOSIS — D22.9 MULTIPLE BENIGN NEVI: ICD-10-CM

## 2018-05-10 DIAGNOSIS — L81.4 LENTIGO: ICD-10-CM

## 2018-05-10 PROCEDURE — 99203 OFFICE O/P NEW LOW 30 MIN: CPT | Mod: S$PBB,,, | Performed by: DERMATOLOGY

## 2018-05-10 PROCEDURE — 99999 PR PBB SHADOW E&M-EST. PATIENT-LVL II: CPT | Mod: PBBFAC,,, | Performed by: DERMATOLOGY

## 2018-05-10 PROCEDURE — 99212 OFFICE O/P EST SF 10 MIN: CPT | Mod: PBBFAC | Performed by: DERMATOLOGY

## 2018-05-10 RX ORDER — TRIAMCINOLONE ACETONIDE 1 MG/G
CREAM TOPICAL
COMMUNITY
Start: 2018-04-03 | End: 2018-07-10

## 2018-05-10 RX ORDER — TRIAMCINOLONE ACETONIDE 1 MG/ML
LOTION TOPICAL
Refills: 1 | COMMUNITY
Start: 2018-05-04 | End: 2018-07-10

## 2018-05-10 NOTE — PROGRESS NOTES
Subjective:       Patient ID:  Shima Sorto is a 77 y.o. female who presents for   Chief Complaint   Patient presents with    Rash     all over x several months, Rx-topical TAC lotn and cream .1%     History of Present Illness: The patient presents for follow up of skin check.    The patient was last seen on: 1/14/2014 for cryosurgery to 1 actinic keratoses which have resolved.     Red itchy papules all over the body, come and go, appear to be in different stages -- previously treated as asteatotic eczema  Patient complains of lesion(s)  Location: lower extremities  Duration: >years  Symptoms: ithcy  Relieving factors/Previous treatments: TAC    History of NMSC:   - BCC of the foreahead: treated with mohs in 7/2013.    This is a high risk patient here to check for the development of new lesions.            Review of Systems   Skin: Positive for activity-related sunscreen use. Negative for daily sunscreen use and recent sunburn.   Hematologic/Lymphatic: Does not bruise/bleed easily.        Objective:    Physical Exam   Constitutional: She appears well-developed and well-nourished. No distress.   Neurological: She is alert and oriented to person, place, and time. She is not disoriented.   Psychiatric: She has a normal mood and affect.   Skin:   Areas Examined (abnormalities noted in diagram):   Scalp / Hair Palpated and Inspected  Head / Face Inspection Performed  Neck Inspection Performed  Chest / Axilla Inspection Performed  Abdomen Inspection Performed  Genitals / Buttocks / Groin Inspection Performed  Back Inspection Performed  RUE Inspected  LUE Inspection Performed  RLE Inspected  LLE Inspection Performed  Nails and Digits Inspection Performed                   Diagram Legend     Erythematous scaling macule/papule c/w actinic keratosis       Vascular papule c/w angioma      Pigmented verrucoid papule/plaque c/w seborrheic keratosis      Yellow umbilicated papule c/w sebaceous hyperplasia       Irregularly shaped tan macule c/w lentigo     1-2 mm smooth white papules consistent with Milia      Movable subcutaneous cyst with punctum c/w epidermal inclusion cyst      Subcutaneous movable cyst c/w pilar cyst      Firm pink to brown papule c/w dermatofibroma      Pedunculated fleshy papule(s) c/w skin tag(s)      Evenly pigmented macule c/w junctional nevus     Mildly variegated pigmented, slightly irregular-bordered macule c/w mildly atypical nevus      Flesh colored to evenly pigmented papule c/w intradermal nevus       Pink pearly papule/plaque c/w basal cell carcinoma      Erythematous hyperkeratotic cursted plaque c/w SCC      Surgical scar with no sign of skin cancer recurrence      Open and closed comedones      Inflammatory papules and pustules      Verrucoid papule consistent consistent with wart     Erythematous eczematous patches and plaques     Dystrophic onycholytic nail with subungual debris c/w onychomycosis     Umbilicated papule    Erythematous-base heme-crusted tan verrucoid plaque consistent with inflamed seborrheic keratosis     Erythematous Silvery Scaling Plaque c/w Psoriasis     See annotation      Assessment / Plan:        Seborrheic keratosis  These are benign inherited growths without a malignant potential. Reassurance given to patient. No treatment is necessary.       Lentigo  This is a benign hyperpigmented sun induced lesion. Daily sun protection will reduce the number of new lesions. Treatment of these benign lesions are considered cosmetic.      Multiple benign nevi  total body skin examination performed today including at least 12 points as noted in physical examination. No lesions suspicious for malignancy noted.  Reassurance provided.  Instructed patient to observe lesion(s) for changes and follow up in clinic if changes are noted. Discussed ABCDE's of moles and brochure provided.      History of nonmelanoma skin cancer  Area(s) of previous NMSC evaluated with no signs of  recurrence.  Total body skin examination performed today including at least 12 points as noted in physical examination. No lesions suspicious for malignancy noted.    Pt with list of questions:  Rec lex'valentino's hand cream  otc shampoo  Lever 2000 soap - for hands  suzy nail products           Follow-up in about 1 year (around 5/10/2019).

## 2018-05-28 ENCOUNTER — TELEPHONE (OUTPATIENT)
Dept: INTERNAL MEDICINE | Facility: CLINIC | Age: 78
End: 2018-05-28

## 2018-05-28 ENCOUNTER — PATIENT MESSAGE (OUTPATIENT)
Dept: INTERNAL MEDICINE | Facility: CLINIC | Age: 78
End: 2018-05-28

## 2018-05-28 ENCOUNTER — PATIENT OUTREACH (OUTPATIENT)
Dept: OTHER | Facility: OTHER | Age: 78
End: 2018-05-28

## 2018-05-28 DIAGNOSIS — K90.41 GLUTEN INTOLERANCE: Primary | ICD-10-CM

## 2018-05-28 NOTE — TELEPHONE ENCOUNTER
Please schedule the following lab test    Outpatient Procedures Ordered This Visit    IgA              Tissue transglutaminase, IgA               We will check this but it is more likely to be a gluten intolerance

## 2018-05-28 NOTE — TELEPHONE ENCOUNTER
Shima Tomas MD 3 hours ago (1:04 PM)         On Thursday, 10 May I saw Dr Brunson and asked about a rash I have had for some time.   She suggested using the Triamcinalone  0.1 cream and lotion Dr Finch prescribed for the rash.         Coincidentally, later that day it was suggested by a relative that I consider the rash as a possible sign of gluten allergy.  In addition, there was the collection other symptoms I had--persistent bloating, a frequent pain on the left side of my abdomen, frequent diarrhea, continual tiredness, and others.     At that time 2 weeks ago, I stopped the cream and lotion and changed my diet--which was almost there anyway.  The change in how I feel is dramatic.   No new rash spots have appeared and the existing ones faded or scabbed.   I no longer need  Tums almost daily and the other symptoms are going away.   I feel much better.     Do I conclude that I probably have a gluten allergy, or is a test needed?   Other than continuing to follow the gluten free diet, is there anything else I need to do?

## 2018-05-28 NOTE — PROGRESS NOTES
Last 5 Patient Entered Readings                                      Current 30 Day Average: 131/60     Recent Readings 5/15/2018 5/15/2018 5/6/2018 4/30/2018 4/30/2018    SBP (mmHg) 133 151 128 133 141    DBP (mmHg) 60 70 64 56 61    Pulse 42 44 50 44 44          Digital Medicine: Health  Follow Up    Lifestyle Modifications:    1.Dietary Modifications (Sodium intake <2,000mg/day, food labels, dining out): Patient informed me that she continues reading food labels and tries to find lower sodium options whenever she can. She struggles the most with eating out because it is something she does regularly. She is going to a seafood restaurant today but told me that she only eats broiled foods and requests that the cook prepares her meal without any added salt. I did suggest that she limit the amount of seafood that she eats, regardless of how it is cooked (boiled, fried, broiled) because it is still likely to high in sodium content. She will work on this. I provided her with the Eat Fit resource.     2.Physical Activity: Deferred    3.Medication Therapy: Patient has been compliant with the medication regimen.    4.Patient has the following medication side effects/concerns:   (Frequency/Alleviating factors/Precipitating factors, etc.)     Patient informed me that she has not forgotten to take her BP reading but instead, when she thinks about taking it, it never ends up being a good time and she is not fully relaxed. This causes her to wait and she ends up not taking her BP readings. She is going to try to get one in this week.     Follow up with  Shima Kinza Sorto completed. No further questions or concerns. Will continue follow up to achieve health goals.

## 2018-05-29 ENCOUNTER — LAB VISIT (OUTPATIENT)
Dept: LAB | Facility: HOSPITAL | Age: 78
End: 2018-05-29
Attending: INTERNAL MEDICINE
Payer: MEDICARE

## 2018-05-29 DIAGNOSIS — K90.41 GLUTEN INTOLERANCE: ICD-10-CM

## 2018-05-29 LAB — IGA SERPL-MCNC: 241 MG/DL

## 2018-05-29 PROCEDURE — 82784 ASSAY IGA/IGD/IGG/IGM EACH: CPT

## 2018-05-29 PROCEDURE — 83516 IMMUNOASSAY NONANTIBODY: CPT

## 2018-05-29 PROCEDURE — 36415 COLL VENOUS BLD VENIPUNCTURE: CPT

## 2018-05-31 LAB — TTG IGA SER IA-ACNC: 6 UNITS

## 2018-06-24 ENCOUNTER — PATIENT MESSAGE (OUTPATIENT)
Dept: INTERNAL MEDICINE | Facility: CLINIC | Age: 78
End: 2018-06-24

## 2018-06-25 ENCOUNTER — OFFICE VISIT (OUTPATIENT)
Dept: URGENT CARE | Facility: CLINIC | Age: 78
End: 2018-06-25
Payer: MEDICARE

## 2018-06-25 ENCOUNTER — NURSE TRIAGE (OUTPATIENT)
Dept: ADMINISTRATIVE | Facility: CLINIC | Age: 78
End: 2018-06-25

## 2018-06-25 VITALS
HEART RATE: 44 BPM | RESPIRATION RATE: 17 BRPM | TEMPERATURE: 98 F | BODY MASS INDEX: 20.89 KG/M2 | WEIGHT: 130 LBS | DIASTOLIC BLOOD PRESSURE: 71 MMHG | OXYGEN SATURATION: 99 % | SYSTOLIC BLOOD PRESSURE: 138 MMHG | HEIGHT: 66 IN

## 2018-06-25 DIAGNOSIS — L85.3 DRY SKIN DERMATITIS: Primary | ICD-10-CM

## 2018-06-25 PROCEDURE — 99214 OFFICE O/P EST MOD 30 MIN: CPT | Mod: S$GLB,,, | Performed by: FAMILY MEDICINE

## 2018-06-25 NOTE — PROGRESS NOTES
"Subjective:       Patient ID: Shima Sorto is a 77 y.o. female.    Vitals:  height is 5' 6" (1.676 m) and weight is 59 kg (130 lb). Her oral temperature is 98 °F (36.7 °C). Her blood pressure is 138/71 and her pulse is 44 (abnormal). Her respiration is 17 and oxygen saturation is 99%.     Chief Complaint: Rash    Pt states she thinks she has had a shingles outbreak. Pt states she had shingles in January. Pt states she is experiencing redness and itching on her upper back between her shoulder blades. Her rash and itch is on both sides. No assoc pain. Pt states she noticed rash for the first time on Friday. She states it's about healed now. Pt denies taking any medications. She had had vaccine.      Rash   This is a new problem. The current episode started in the past 7 days. The rash is characterized by redness, itchiness and blistering. Pertinent negatives include no diarrhea, fever, joint pain, shortness of breath, sore throat or vomiting. Past treatments include nothing.     Review of Systems   Constitution: Negative for chills and fever.   HENT: Negative for sore throat.    Eyes: Negative for blurred vision.   Cardiovascular: Negative for chest pain.   Respiratory: Negative for shortness of breath.    Skin: Positive for itching and rash.   Musculoskeletal: Negative for back pain and joint pain.   Gastrointestinal: Negative for abdominal pain, diarrhea, nausea and vomiting.   Neurological: Negative for headaches.   Psychiatric/Behavioral: The patient is not nervous/anxious.        Objective:      Physical Exam   Constitutional: She is oriented to person, place, and time. She appears well-developed and well-nourished. She is cooperative.  Non-toxic appearance. She does not appear ill. No distress.   HENT:   Head: Normocephalic and atraumatic.   Right Ear: Hearing, tympanic membrane, external ear and ear canal normal.   Left Ear: Hearing, tympanic membrane, external ear and ear canal normal.   Nose: Nose " normal. No mucosal edema, rhinorrhea or nasal deformity. No epistaxis. Right sinus exhibits no maxillary sinus tenderness and no frontal sinus tenderness. Left sinus exhibits no maxillary sinus tenderness and no frontal sinus tenderness.   Mouth/Throat: Uvula is midline, oropharynx is clear and moist and mucous membranes are normal. No trismus in the jaw. Normal dentition. No uvula swelling. No posterior oropharyngeal erythema.   Eyes: Conjunctivae, EOM and lids are normal. Pupils are equal, round, and reactive to light. Right eye exhibits no discharge. Left eye exhibits no discharge. No scleral icterus.   Sclera clear bilat   Neck: Trachea normal, normal range of motion, full passive range of motion without pain and phonation normal. Neck supple.   Cardiovascular: Regular rhythm, normal heart sounds, intact distal pulses and normal pulses.    Rate 50 bpm, c/w previous readings. She reports its sometimes in the 40's   Pulmonary/Chest: Effort normal and breath sounds normal. No respiratory distress. She has no wheezes.   Abdominal: Soft. Normal appearance and bowel sounds are normal. She exhibits no distension, no pulsatile midline mass and no mass. There is no tenderness.   Musculoskeletal: Normal range of motion. She exhibits no edema or deformity.   Neurological: She is alert and oriented to person, place, and time. She exhibits normal muscle tone. Coordination normal.   Skin: Skin is warm, dry and intact. She is not diaphoretic. No pallor.   Few pinpoint scattered scabs to left upper back and 2 similar lesions to right back. No red base.   Psychiatric: She has a normal mood and affect. Her speech is normal and behavior is normal. Judgment and thought content normal. Cognition and memory are normal.   Nursing note and vitals reviewed.      Assessment:       1. Dry skin dermatitis        Plan:         Dry skin dermatitis    I REVIEWED WITH HER THAT FORTUNATELY SHE DOES NOT SEEM TO HAVE SHINGLES, AND WHY I  THOUGHT  THIS.     She shared with me previous pictures on her phone of what she thought was shingles (bilateral and unassociated with pain) and these did not have the appearance of shingles either.     ALSO ADVISED FOLLOW UP PCP ABOUT YOUR LOW HEART RATES. YOUR BETA-BLOCKER MEDICINE (ATENOLOL)  MAY NEED TO BE DISCONTINUED.

## 2018-06-25 NOTE — PATIENT INSTRUCTIONS
FORTUNATELY, YOU DO NOT SEEM TO HAVE SHINGLES.  Understanding Seborrheic Dermatitis    Seborrheic dermatitis is a common type of rash that causes red, scaly, greasy skin. It occurs on skin that has oil glands, such as the face, scalp, and upper chest. It tends to last a long time, or go away and come back. Seborrheicdermatitis is not spread from person to person.  How to say it  [id number]   What causes seborrheic dermatitis?  The cause is not yet known. It may be partly caused by a type of yeast that grows on skin, along with excess oil production. Experts are still learning more. Its more common in men than women, and it can occur at any age. It happens more often in people with HIV/AIDS, Parkinson disease, alcoholic pancreatitis, hepatitis, or cancer. It can also get worse during times of stress.  Symptoms of seborrheic dermatitis  Symptoms can include skin that is:  · Bumpy  · Covered with yellow scales or crusts  · Cracked  · Greasy  · Itchy  · Leaking fluid  · Painful  · Red or orange  These symptoms can occur on skin:  · Around the nose  · Behind the ears  · In the beard  · In the eyebrows  · On the scalp, also known as dandruff  · On the upper chest  You may also have acne, inflamed eyelids (blepharitis), or other skin conditions at the same time.  Treatment for seborrheic dermatitis  Treatment can reduce symptoms for a period of time. The types of treatments most often used include:  · Antifungal shampoo, body wash, or cream. These contain medicines such as ketoconazole, fluconazole, selenium sulfide, ciclopirox, or tea tree oil.  · Corticosteroid cream or ointment. These containmedicines such ashydrocortisone or fluocinolone acetonide.  · Calcineurin inhibitorcream or ointment. These contain medicines such as pimecrolimus or tacrolimus.  · Shampoo or cream with other medicines. These contain medicines such as coal tar, salicylic acid, or zinc pyrithione.  · Sodium sulfacetemide creams  and washes. These may also help.  Wash your skin gently. You can remove scales with oil and gentle rubbing or a brush.  Living with seborrheic dermatitis  Seborrheic dermatitis is an ongoing (chronic) condition. It can go away and then come back. You will likely need to use shampoo, cream, or ointment with medicine once or twice a week. This can help to keep symptoms from coming back or getting worse.  When to call your healthcare provider  Call your healthcare provider right away if you have any of these:  · Symptoms that dont get better, or get worse  · New symptoms   Date Last Reviewed: 5/1/2016 © 2000-2017 [employer]. [address] [place] [address], [place] [address]. All rights reserved. This information is not intended as a substitute for professional medical care. Always follow your healthcare professional's instructions.        Nonspecific Dermatitis  Dermatitis is a skin rash caused by something that touches the skin and makes it irritated and inflamed.  Your skin may be red, swollen, dry, and may be cracked. Blisters may form and ooze. The rash will itch.  Dermatitis can form on the face and neck, backs of hands, forearms, genitals, and lower legs. Dermatitis is not passed from person to person.  Talk with your health care provider about what may have caused the rash. Common things that cause skin allergies are metal in jewelry, plants like poison ivy or poison oak, and certain skin care products. You will need to avoid the source of your rash in the future to prevent it from coming back. In some cases, the cause of the dermatitis may not be found.  Treatment is done to relieve itching and prevent the rash from coming back. The rash should go away in a few days to a few weeks.  Home care  The health care provider may prescribe medications to relieve swelling and itching. Follow all instructions when using these medications.  · Avoid anything that heats up your skin, such as hot showers or baths,  or direct sunlight. This can make itching worse.  · Stay away from whatever you think caused the rash.  · Bathe in warm, not hot, water. Apply a moisturizing lotion after bathing to prevent dry skin.  · Avoid skin irritants such as wool or silk clothing, grease, oils, harsh soaps, and detergents.  · Apply cold compresses to soothe your sores to help relieve your symptoms. Do this for 30 minutes 3 to 4 times a day. You can make a cold compress by soaking a cloth in cold water. Squeeze out excess water. You can add colloidal oatmeal to the water to help reduce itching. For severe itching in a small area, apply an ice pack wrapped in a thin towel. Do this for 20 minutes 3 to 4 times a day.  · You can also help relieve large areas of itching by taking a lukewarm bath with colloidal oatmeal added to the water.  · Use hydrocortisone cream for redness and irritation, unless another medicine was prescribed. You can also use benzocaine anesthetic cream or spray.  · Use oral diphenhydramine to help reduce itching. This is an antihistamine you can buy at drug and grocery stores. It can make you sleepy, so use lower doses during the daytime. Or you can use loratadine. This is an antihistamine that will not make you sleepy. Dont use diphenhydramine if you have glaucoma or have trouble urinating because of an enlarged prostate.  · Wash your hands or use an antibacterial gel often to prevent the spread of the rash.  Follow-up care  Follow up with your health care provider. Make an appointment with your health care provider if your symptoms do not get better in the next 1 to 2 weeks.  When to seek medical advice  Call your health care provider right away if any of these occur:  · Spreading of the rash to other parts of your body  · Severe swelling of your face, eyelids, mouth, throat or tongue  · Trouble urinating due to swelling in the genital area  · Fever of 100.4°F (38°C) or higher  · Redness or swelling that gets  worse  · Pain that gets worse  · Foul-smelling fluid leaking from the skin  · Yellow-brown crusts on the open blisters  · Joint pain   Date Last Reviewed: 7/23/2014  © 6711-2409 [employer]. [address] [place] [address], [place] [address]. All rights reserved. This information is not intended as a substitute for professional medical care. Always follow your healthcare professional's instructions.    FORTUNATELY, YOU DO NOT SEEM TO HAVE SHINGLES.      FOLLOW UP WITH YOUR PCP ABOUT YOUR LOW HEART RATES.  YOUR BETA-BLOCKER MEDICINE (ATENOLOL)  MAY NEED TO BE DISCONTINUED.

## 2018-06-25 NOTE — TELEPHONE ENCOUNTER
Patient called to report the following:     -I think I have another shingles outbreak on my back, left and right side of spine between shoulder blade  -discovered on Friday 6/22/18   -I have had shingles before  -denies pain, and fever     Education completed per [facility] On Call Care Advice including follow up with provider within 24 hours. Patient verbalized understating. Patient will report to urgent care.     Reason for Disposition   [1] Shingles rash (matches SYMPTOMS) AND [2] onset within past 72 hours    Protocols used: ST SHINGLES-A-

## 2018-06-26 ENCOUNTER — PATIENT OUTREACH (OUTPATIENT)
Dept: OTHER | Facility: OTHER | Age: 78
End: 2018-06-26

## 2018-06-26 NOTE — PROGRESS NOTES
Last 5 Patient Entered Readings                                      Current 30 Day Average: 127/65     Recent Readings 6/21/2018 6/2/2018 5/15/2018 5/15/2018 5/6/2018    SBP (mmHg) 125 129 133 151 128    DBP (mmHg) 64 65 60 70 64    Pulse 55 52 42 44 50          Digital Medicine: Health  Follow Up    Lifestyle Modifications:    1.Dietary Modifications (Sodium intake <2,000mg/day, food labels, dining out): Deferred     2.Physical Activity: Patient stays active around her home. She lives in a raised home so she goes up and down the stairs from the basement all the way up to the finished attic that she has where she stores a lot of books and household supplies.     3.Medication Therapy: Patient has been compliant with the medication regimen.    4.Patient has the following medication side effects/concerns:   (Frequency/Alleviating factors/Precipitating factors, etc.)     Patient went to urgent care yesterday because of a rash she has on her back and while she was there, the physician stated concerns about her low heart rate (was in the 40s). Patients heart rate does fall in the 40s sometimes. Patient does feels run down and fatigue some days. The physician thinks the Atenolol may be causing her heart rate to be lower. I will inform her PharmD, AVINASH Galloway, of this information. The patient will be see Dr. Tomas in about 2 weeks and she will also bring this up at the appointment as well.     Patient recently got braces so she is not uses her sleep apnea mouth piece/machine right now.    Patient continues struggling with getting BP readings in because she is not always in a situation where she is fully relaxed enough to take her reading. She will continue to work on this.       Follow up with Mrs. Ronquillo Kinza Sorto completed. No further questions or concerns. Will continue follow up to achieve health goals.

## 2018-06-27 ENCOUNTER — PATIENT OUTREACH (OUTPATIENT)
Dept: OTHER | Facility: OTHER | Age: 78
End: 2018-06-27

## 2018-06-27 NOTE — PROGRESS NOTES
Last 5 Patient Entered Readings                                      Current 30 Day Average: 125/62     Recent Readings 6/26/2018 6/26/2018 6/21/2018 6/2/2018 5/15/2018    SBP (mmHg) 122 139 125 129 133    DBP (mmHg) 58 65 64 65 60    Pulse 52 50 55 52 42        Hypertension Medications             atenolol (TENORMIN) 25 MG tablet Take one per day. Patient is currently taking 12.5mg daily.     lisinopril 10 MG tablet Take 2 tablets (20 mg total) by mouth every morning. Dosage change        Assessment/ Plan:   Called patient to follow up regarding patient's HR. Patient was seen by Urgent Care on 6/25 and her HR was 44bpm. Offered to begin BB taper, atenolol 12.5mg EOD; however, patient would prefer to discuss with PCP. Patient is scheduled to see PCP on 7/10.  Per newly released 2017 ACC/ AHA HTN guidelines (goal of BP < 130/80), current 30-day average is well controlled.    Patient denies having questions or concerns. Instructed patient to call if she has any questions or concerns, patient confirms understanding.   Will continue to monitor. WCB in 1 month.

## 2018-07-10 ENCOUNTER — OFFICE VISIT (OUTPATIENT)
Dept: INTERNAL MEDICINE | Facility: CLINIC | Age: 78
End: 2018-07-10
Payer: MEDICARE

## 2018-07-10 VITALS
WEIGHT: 128.31 LBS | HEART RATE: 82 BPM | HEIGHT: 66 IN | SYSTOLIC BLOOD PRESSURE: 132 MMHG | DIASTOLIC BLOOD PRESSURE: 78 MMHG | BODY MASS INDEX: 20.62 KG/M2 | OXYGEN SATURATION: 99 %

## 2018-07-10 DIAGNOSIS — N63.10 BREAST MASS, RIGHT: ICD-10-CM

## 2018-07-10 DIAGNOSIS — N64.4 BREAST PAIN: ICD-10-CM

## 2018-07-10 DIAGNOSIS — I10 ESSENTIAL HYPERTENSION: ICD-10-CM

## 2018-07-10 DIAGNOSIS — R00.1 BRADYCARDIA: Primary | ICD-10-CM

## 2018-07-10 DIAGNOSIS — Z12.11 COLON CANCER SCREENING: ICD-10-CM

## 2018-07-10 PROCEDURE — 99214 OFFICE O/P EST MOD 30 MIN: CPT | Mod: PBBFAC | Performed by: INTERNAL MEDICINE

## 2018-07-10 PROCEDURE — 99999 PR PBB SHADOW E&M-EST. PATIENT-LVL IV: CPT | Mod: PBBFAC,,, | Performed by: INTERNAL MEDICINE

## 2018-07-10 PROCEDURE — 99214 OFFICE O/P EST MOD 30 MIN: CPT | Mod: S$PBB,,, | Performed by: INTERNAL MEDICINE

## 2018-07-10 NOTE — PROGRESS NOTES
Subjective:      Patient ID: Shima Sorto is a 77 y.o. female.    Chief Complaint: Follow-up    HPI:  HPI   Follow up in 3 months:  Celiac diet: but celiac antibodies are negative  Rash: dry skin was told in UC: red dots , itchy  Shingles vaccine : Wed 5/23/2018  Your next dose in 7/22/2018  Questions:  Blood pressure  Pulse Atenolol 12.5  Patient Active Problem List   Diagnosis    Calcium nephrolithiasis    Hypertension    Hyperoxaluria    Hypocitraturic calcium nephrolithiasis    Cystic kidney disease    Fatigue    Urinary retention    Osteoporosis: per Endocrinology currently off medication    Hypoglycemia    Trigger middle finger of right hand    Retinal hemorrhage of both eyes at about 1'oclock    Vestibular neuronitis    Vertigo    Obstructive sleep apnea    Venous insufficiency    Bradycardia     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    Visual impairment     reading glasses     Past Surgical History:   Procedure Laterality Date    interstim placed stage 1      KIDNEY STONE SURGERY  2000    @ [facility]  MOHS procedure       Family History   Problem Relation Age of Onset    Kidney disease Mother     Heart disease Father     Breast cancer Maternal Aunt     Anesthesia problems Neg Hx     Malignant hypertension Neg Hx     Hypotension Neg Hx     Malignant hyperthermia Neg Hx     Pseudochol deficiency Neg Hx     Colon cancer Neg Hx     Ovarian cancer Neg Hx     Melanoma Neg Hx     Psoriasis Neg Hx     Lupus Neg Hx     Eczema Neg Hx     Acne Neg Hx      Review of Systems   Constitutional: Negative for fatigue.  "  HENT: Negative for congestion, rhinorrhea and sore throat.    Eyes: Negative for pain.   Respiratory: Negative for cough and shortness of breath.    Gastrointestinal: Negative for diarrhea and vomiting.   Skin: Positive for rash.     Objective:     Vitals:    07/10/18 1346   BP: 132/78   Pulse: 82   SpO2: 99%   Weight: 58.2 kg (128 lb 4.9 oz)   Height: 5' 6" (1.676 m)   PainSc: 0-No pain     Body mass index is 20.71 kg/m².  Physical Exam   Constitutional: She appears well-developed and well-nourished.   Neck: No JVD present. No thyromegaly present.   Cardiovascular: Normal rate, normal heart sounds and intact distal pulses.    Pulmonary/Chest: Effort normal and breath sounds normal. No respiratory distress.     Assessment:     1. Bradycardia    2. Colon cancer screening    3. Essential hypertension      Plan:   Shima was seen today for follow-up.    Diagnoses and all orders for this visit:    Bradycardia: discussed would continue on atenolol 12.5    Colon cancer screening  -     Case request GI: COLONOSCOPY    Essential hypertension: same medication      Follow-up in about 3 months (around 10/10/2018) for Follow up.     Medication List          Accurate as of 7/10/18  2:50 PM. If you have any questions, ask your nurse or doctor.               CHANGE how you take these medications    atenolol 25 MG tablet  Commonly known as:  TENORMIN  Take one per day  What changed:  · how much to take  · additional instructions        CONTINUE taking these medications    alendronate 70 MG tablet  Commonly known as:  FOSAMAX  TAKE 1 TABLET BY MOUTH ON SUNDAY WITH A FULL GLASS OF WATER AND WAIT 30 MINUTES BEFORE BREAKFAST AND PILLS. TAKE SITTING OR STANDING     B COMPLEX WITH C#10-FOLIC ACID ORAL     calcium citrate 200 mg (950 mg) tablet  Commonly known as:  CALCITRATE     co-enzyme Q-10 50 mg capsule     lisinopril 10 MG tablet  Take 2 tablets (20 mg total) by mouth every morning. Dosage change     MULTIVITAMIN ORAL   "   PRESERVISION AREDS 2 ORAL     SUPER OMEGA-3 400-5 mg-unit Cap  Generic drug:  fish oil-E-fatty [id number]     valACYclovir 1000 MG tablet  Commonly known as:  VALTREX  Take 1 tablet (1,000 mg total) by mouth 3 (three) times daily.     VANICREAM  Generic drug:  radiacream        STOP taking these medications    ALPRAZolam 0.5 MG tablet  Commonly known as:  XANAX  Stopped by:  Zeynep Tomas MD     bacitracin 500 unit/gram Oint  Stopped by:  Zeynep Tomas MD     cetirizine 10 MG tablet  Commonly known as:  ZYRTEC  Stopped by:  Zeynep Tomas MD     diazePAM 5 MG tablet  Commonly known as:  VALIUM  Stopped by:  Zeynep Tomas MD     meclizine 25 mg tablet  Commonly known as:  ANTIVERT  Stopped by:  Zeynep Tomas MD     ondansetron 8 MG Tbdl  Commonly known as:  ZOFRAN-ODT  Stopped by:  Zeynep Tomas MD     triamcinolone acetonide 0.1% 0.1 % cream  Commonly known as:  KENALOG  Stopped by:  Zeynep Tomas MD     triamcinolone acetonide 0.1% 0.1 % Lotn  Commonly known as:  KENALOG  Stopped by:  Zeynep Tomas MD     varicella-zoster gE-AS01B (PF) 50 mcg/0.5 mL injection  Commonly known as:  SHINGRIX (PF)  Stopped by:  Zeynep Tomas MD

## 2018-07-11 ENCOUNTER — HOSPITAL ENCOUNTER (OUTPATIENT)
Dept: RADIOLOGY | Facility: HOSPITAL | Age: 78
Discharge: HOME | End: 2018-07-11
Attending: INTERNAL MEDICINE
Payer: MEDICARE

## 2018-07-11 DIAGNOSIS — N63.10 BREAST MASS, RIGHT: ICD-10-CM

## 2018-07-11 DIAGNOSIS — N64.4 BREAST PAIN: ICD-10-CM

## 2018-07-11 PROCEDURE — 77065 DX MAMMO INCL CAD UNI: CPT | Mod: TC,PO

## 2018-07-11 PROCEDURE — 76642 ULTRASOUND BREAST LIMITED: CPT | Mod: 26,RT,, | Performed by: RADIOLOGY

## 2018-07-11 PROCEDURE — 77061 BREAST TOMOSYNTHESIS UNI: CPT | Mod: 26,,, | Performed by: RADIOLOGY

## 2018-07-11 PROCEDURE — 76642 ULTRASOUND BREAST LIMITED: CPT | Mod: TC,PO,RT

## 2018-07-11 PROCEDURE — 77061 BREAST TOMOSYNTHESIS UNI: CPT | Mod: TC,PO

## 2018-07-11 PROCEDURE — 77065 DX MAMMO INCL CAD UNI: CPT | Mod: 26,,, | Performed by: RADIOLOGY

## 2018-07-12 DIAGNOSIS — Z12.11 SPECIAL SCREENING FOR MALIGNANT NEOPLASMS, COLON: Primary | ICD-10-CM

## 2018-07-12 RX ORDER — POLYETHYLENE GLYCOL 3350, SODIUM SULFATE ANHYDROUS, SODIUM BICARBONATE, SODIUM CHLORIDE, POTASSIUM CHLORIDE 236; 22.74; 6.74; 5.86; 2.97 G/4L; G/4L; G/4L; G/4L; G/4L
4 POWDER, FOR SOLUTION ORAL ONCE
Qty: 4000 ML | Refills: 0 | Status: SHIPPED | OUTPATIENT
Start: 2018-07-12 | End: 2018-07-12

## 2018-07-13 ENCOUNTER — TELEPHONE (OUTPATIENT)
Dept: OBSTETRICS AND GYNECOLOGY | Facility: CLINIC | Age: 78
End: 2018-07-13

## 2018-07-13 NOTE — TELEPHONE ENCOUNTER
Returned the pt's call to the clinic. Pt stated that she'll be able to have her breast evaluated when she goes in for her mmg on 7/20. Pt wanted to schedule her wwe. Pt agreed to an appointment on 8/16 at 10:45AM. Pt verbalized understanding. Letter sent out.

## 2018-07-13 NOTE — TELEPHONE ENCOUNTER
----- Message from Sofia Schmidt sent at 7/13/2018  3:11 PM CDT -----  Contact: self  Pt is calling to see if she could get an appt before September. She stated she has a mass in her right breast that she is getting a biopsy on 07/20/18. But she thinks she has another mass and is very concern. I offered other providers but pt refused. The pt can be reached at  Cell:[phone] or home: [phone].

## 2018-07-20 ENCOUNTER — HOSPITAL ENCOUNTER (OUTPATIENT)
Dept: RADIOLOGY | Facility: HOSPITAL | Age: 78
Discharge: HOME | End: 2018-07-20
Attending: INTERNAL MEDICINE
Payer: MEDICARE

## 2018-07-20 DIAGNOSIS — R92.8 ABNORMAL MAMMOGRAM: ICD-10-CM

## 2018-07-20 PROCEDURE — 88360 TUMOR IMMUNOHISTOCHEM/MANUAL: CPT | Mod: 26,,, | Performed by: PATHOLOGY

## 2018-07-20 PROCEDURE — 19081 BX BREAST 1ST LESION STRTCTC: CPT | Mod: RT,,, | Performed by: RADIOLOGY

## 2018-07-20 PROCEDURE — 19081 BX BREAST 1ST LESION STRTCTC: CPT | Mod: TC,PO,RT

## 2018-07-20 PROCEDURE — 88305 TISSUE EXAM BY PATHOLOGIST: CPT | Mod: 26,,, | Performed by: PATHOLOGY

## 2018-07-20 PROCEDURE — 27201044 MAMMO BREAST STEREOTACTIC BREAST BIOPSY RIGHT: Mod: PO

## 2018-07-20 PROCEDURE — 88342 IMHCHEM/IMCYTCHM 1ST ANTB: CPT | Mod: 26,59,, | Performed by: PATHOLOGY

## 2018-07-20 PROCEDURE — 88360 TUMOR IMMUNOHISTOCHEM/MANUAL: CPT | Performed by: PATHOLOGY

## 2018-07-24 ENCOUNTER — PATIENT MESSAGE (OUTPATIENT)
Dept: INTERNAL MEDICINE | Facility: CLINIC | Age: 78
End: 2018-07-24

## 2018-07-24 ENCOUNTER — SURGERY (OUTPATIENT)
Age: 78
End: 2018-07-24

## 2018-07-24 ENCOUNTER — HOSPITAL ENCOUNTER (OUTPATIENT)
Facility: HOSPITAL | Age: 78
Discharge: HOME | End: 2018-07-24
Attending: COLON & RECTAL SURGERY | Admitting: COLON & RECTAL SURGERY
Payer: MEDICARE

## 2018-07-24 ENCOUNTER — TELEPHONE (OUTPATIENT)
Dept: SURGERY | Facility: CLINIC | Age: 78
End: 2018-07-24

## 2018-07-24 ENCOUNTER — ANESTHESIA (OUTPATIENT)
Dept: ENDOSCOPY | Facility: HOSPITAL | Age: 78
End: 2018-07-24
Payer: MEDICARE

## 2018-07-24 ENCOUNTER — ANESTHESIA EVENT (OUTPATIENT)
Dept: ENDOSCOPY | Facility: HOSPITAL | Age: 78
End: 2018-07-24
Payer: MEDICARE

## 2018-07-24 VITALS
WEIGHT: 125 LBS | BODY MASS INDEX: 19.62 KG/M2 | HEART RATE: 87 BPM | HEIGHT: 67 IN | RESPIRATION RATE: 18 BRPM | DIASTOLIC BLOOD PRESSURE: 79 MMHG | OXYGEN SATURATION: 97 % | TEMPERATURE: 98 F | SYSTOLIC BLOOD PRESSURE: 181 MMHG

## 2018-07-24 DIAGNOSIS — Z12.11 SCREENING FOR COLON CANCER: ICD-10-CM

## 2018-07-24 PROCEDURE — E9220 PRA ENDO ANESTHESIA: HCPCS | Mod: ,,, | Performed by: NURSE ANESTHETIST, CERTIFIED REGISTERED

## 2018-07-24 PROCEDURE — 25000003 PHARM REV CODE 250: Performed by: NURSE ANESTHETIST, CERTIFIED REGISTERED

## 2018-07-24 PROCEDURE — 25000003 PHARM REV CODE 250: Performed by: NURSE PRACTITIONER

## 2018-07-24 PROCEDURE — 37000008 HC ANESTHESIA 1ST 15 MINUTES: Performed by: COLON & RECTAL SURGERY

## 2018-07-24 PROCEDURE — G0105 COLORECTAL SCRN; HI RISK IND: HCPCS | Mod: ,,, | Performed by: COLON & RECTAL SURGERY

## 2018-07-24 PROCEDURE — 37000009 HC ANESTHESIA EA ADD 15 MINS: Performed by: COLON & RECTAL SURGERY

## 2018-07-24 PROCEDURE — G0105 COLORECTAL SCRN; HI RISK IND: HCPCS | Performed by: COLON & RECTAL SURGERY

## 2018-07-24 PROCEDURE — 63600175 PHARM REV CODE 636 W HCPCS: Performed by: NURSE ANESTHETIST, CERTIFIED REGISTERED

## 2018-07-24 RX ORDER — PROPOFOL 10 MG/ML
VIAL (ML) INTRAVENOUS CONTINUOUS PRN
Status: DISCONTINUED | OUTPATIENT
Start: 2018-07-24 | End: 2018-07-24

## 2018-07-24 RX ORDER — LIDOCAINE HCL/PF 100 MG/5ML
SYRINGE (ML) INTRAVENOUS
Status: DISCONTINUED | OUTPATIENT
Start: 2018-07-24 | End: 2018-07-24

## 2018-07-24 RX ORDER — SODIUM CHLORIDE 9 MG/ML
INJECTION, SOLUTION INTRAVENOUS CONTINUOUS
Status: DISCONTINUED | OUTPATIENT
Start: 2018-07-24 | End: 2018-07-24 | Stop reason: HOSPADM

## 2018-07-24 RX ORDER — SODIUM CHLORIDE 0.9 % (FLUSH) 0.9 %
3 SYRINGE (ML) INJECTION
Status: DISCONTINUED | OUTPATIENT
Start: 2018-07-24 | End: 2018-07-24 | Stop reason: HOSPADM

## 2018-07-24 RX ORDER — LABETALOL HYDROCHLORIDE 5 MG/ML
INJECTION, SOLUTION INTRAVENOUS
Status: DISCONTINUED | OUTPATIENT
Start: 2018-07-24 | End: 2018-07-24

## 2018-07-24 RX ORDER — PROPOFOL 10 MG/ML
VIAL (ML) INTRAVENOUS
Status: DISCONTINUED | OUTPATIENT
Start: 2018-07-24 | End: 2018-07-24

## 2018-07-24 RX ADMIN — SODIUM CHLORIDE: 0.9 INJECTION, SOLUTION INTRAVENOUS at 01:07

## 2018-07-24 RX ADMIN — LIDOCAINE HYDROCHLORIDE 50 MG: 20 INJECTION, SOLUTION INTRAVENOUS at 03:07

## 2018-07-24 RX ADMIN — LABETALOL HYDROCHLORIDE 10 MG: 5 INJECTION, SOLUTION INTRAVENOUS at 03:07

## 2018-07-24 RX ADMIN — SODIUM CHLORIDE: 0.9 INJECTION, SOLUTION INTRAVENOUS at 03:07

## 2018-07-24 RX ADMIN — PROPOFOL 60 MG: 10 INJECTION, EMULSION INTRAVENOUS at 03:07

## 2018-07-24 RX ADMIN — PROPOFOL 125 MCG/KG/MIN: 10 INJECTION, EMULSION INTRAVENOUS at 03:07

## 2018-07-24 RX ADMIN — PROPOFOL 20 MG: 10 INJECTION, EMULSION INTRAVENOUS at 03:07

## 2018-07-24 NOTE — TELEPHONE ENCOUNTER
Patient returned my call, we discussed biopsy results. Explained that biopsy showed invasive lobular carcinoma. We discussed waht this means, and that the next step is to meet with a breast surgeon. Patient is agreeable with this, would like first available. Scheduled for 8/1 with Dr. Mason. Reviewed the location of the breast center, patient verbalized understanding of all information

## 2018-07-24 NOTE — TRANSFER OF CARE
"Anesthesia Transfer of Care Note    Patient: Shima Sorto    Procedure(s) Performed: Procedure(s) (LRB):  COLONOSCOPY (N/A)    Patient location: PACU    Anesthesia Type: general    Transport from OR: Transported from OR on room air with adequate spontaneous ventilation    Post pain: adequate analgesia    Post assessment: no apparent anesthetic complications and tolerated procedure well    Post vital signs: stable    Level of consciousness: awake and alert    Nausea/Vomiting: no nausea/vomiting    Complications: none    Transfer of care protocol was followed      Last vitals:   Visit Vitals  /57   Pulse 72   Temp 36.7 °C (98.1 °F) (Temporal)   Resp 18   Ht 5' 7" (1.702 m)   Wt 56.7 kg (125 lb)   LMP  (LMP Unknown)   SpO2 99%   Breastfeeding? No   BMI 19.58 kg/m²     "

## 2018-07-24 NOTE — DISCHARGE INSTRUCTIONS
Colonoscopy     A camera attached to a flexible tube with a viewing lens is used to take video pictures.     Colonoscopy is a test to view the inside of your lower digestive tract (colon and rectum). Sometimes it can show the last part of the small intestine (ileum). During the test, small pieces of tissue may be removed for testing. This is called a biopsy. Small growths, such as polyps, may also be removed.   Why is colonoscopy done?  The test is done to help look for colon cancer. And it can help find the source of abdominal pain, bleeding, and changes in bowel habits. It may be needed once a year, depending on factors such as your:  · Age  · Health history  · Family health history  · Symptoms  · Results from any prior colonoscopy  Risks and possible complications  These include:  · Bleeding               · A puncture or tear in the colon   · Risks of anesthesia  · A cancer lesion not being seen  Getting ready   To prepare for the test:  · Talk with your healthcare provider about the risks of the test (see below). Also ask your healthcare provider about alternatives to the test.  · Tell your healthcare provider about any medicines you take. Also tell him or her about any health conditions you may have.  · Make sure your rectum and colon are empty for the test. Follow the diet and bowel prep instructions exactly. If you dont, the test may need to be rescheduled.  · Plan for a friend or family member to drive you home after the test.     Colonoscopy provides an inside view of the entire colon.     You may discuss the results with your doctor right away or at a future visit.  During the test   The test is usually done in the hospital on an outpatient basis. This means you go home the same day. The procedure takes about 30 minutes. During that time:  · You are given relaxing (sedating) medicine through an IV line. You may be drowsy, or fully asleep.  · The healthcare provider will first give you a physical exam to  check for anal and rectal problems.  · Then the anus is lubricated and the scope inserted.  · If you are awake, you may have a feeling similar to needing to have a bowel movement. You may also feel pressure as air is pumped into the colon. Its OK to pass gas during the procedure.  · Biopsy, polyp removal, or other treatments may be done during the test.  After the test   You may have gas right after the test. It can help to try to pass it to help prevent later bloating. Your healthcare provider may discuss the results with you right away. Or you may need to schedule a follow-up visit to talk about the results. After the test, you can go back to your normal eating and other activities. You may be tired from the sedation and need to rest for a few hours.  Date Last Reviewed: 11/1/2016 © 2000-2017 [employer]. [address] [place] [address], [place] [address]. All rights reserved. This information is not intended as a substitute for professional medical care. Always follow your healthcare professional's instructions.

## 2018-07-24 NOTE — H&P
Endoscopy H&P    Procedure : Colonoscopy      asymptomatic screening exam      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    Visual impairment     reading glasses       Family History   Problem Relation Age of Onset    Kidney disease Mother     Heart disease Father     Breast cancer Maternal Aunt     Anesthesia problems Neg Hx     Malignant hypertension Neg Hx     Hypotension Neg Hx     Malignant hyperthermia Neg Hx     Pseudochol deficiency Neg Hx     Colon cancer Neg Hx     Ovarian cancer Neg Hx     Melanoma Neg Hx     Psoriasis Neg Hx     Lupus Neg Hx     Eczema Neg Hx     Acne Neg Hx        Social History     Social History    Marital status:      Spouse name: N/A    Number of children: N/A    Years of education: N/A     Occupational History     [employer]      Social History Main Topics    Smoking status: Former Smoker     Packs/day: 0.50     Years: 8.00     Quit date: 8/22/1964    Smokeless tobacco: Never Used    Alcohol use Yes      Comment: 1-3x/week    Drug use: No    Sexual activity: Not Currently     Other Topics Concern    Not on file     Social History Narrative    No narrative on file       Review of Systems:  Respiratory ROS: no cough, shortness of breath, or wheezing  Cardiovascular ROS: no chest pain or dyspnea on exertion  Gastrointestinal ROS: no abdominal pain, change in bowel habits, or black or bloody stools  Musculoskeletal ROS: negative  Neurological ROS: no TIA or stroke symptoms        Physical  Exam:  General: no distress  Head: normocephalic  Neck: supple, symmetrical, trachea midline  Lungs:  clear to auscultation bilaterally and normal respiratory effort  Heart: regular rate and rhythm, S1, S2 normal, no murmur, rub or gallop  Abdomen: soft, non-tender non-distented; bowel sounds normal; no masses,  no organomegaly  Extremities: no cyanosis or edema, or clubbing       Deep Sedation: Mallampati Score II (hard and soft palate, upper portion of tonsils anduvula visible)    II    Assessment and Plan:  Proceed with Colonoscopy

## 2018-07-24 NOTE — PROVATION PATIENT INSTRUCTIONS
Discharge Summary/Instructions after an Endoscopic Procedure  Patient Name: Shima Sorto  Patient MRN: [id number]  Patient YOB: 1940 Tuesday, July 24, 2018  Juan Miguel Pena MD  RESTRICTIONS:  During your procedure today, you received medications for sedation.  These   medications may affect your judgment, balance and coordination.  Therefore,   for 24 hours, you have the following restrictions:   - DO NOT drive a car, operate machinery, make legal/financial decisions,   sign important papers or drink alcohol.    ACTIVITY:  Today: no heavy lifting, straining or running due to procedural   sedation/anesthesia.  The following day: return to full activity including work.  DIET:  Eat and drink normally unless instructed otherwise.     TREATMENT FOR COMMON SIDE EFFECTS:  - Mild abdominal pain, nausea, belching, bloating or excessive gas:  rest,   eat lightly and use a heating pad.  - Sore Throat: treat with throat lozenges and/or gargle with warm salt   water.  - Because air was used during the procedure, expelling large amounts of air   from your rectum or belching is normal.  - If a bowel prep was taken, you may not have a bowel movement for 1-3 days.    This is normal.  SYMPTOMS TO WATCH FOR AND REPORT TO YOUR PHYSICIAN:  1. Abdominal pain or bloating, other than gas cramps.  2. Chest pain.  3. Back pain.  4. Signs of infection such as: chills or fever occurring within 24 hours   after the procedure.  5. Rectal bleeding, which would show as bright red, maroon, or black stools.   (A tablespoon of blood from the rectum is not serious, especially if   hemorrhoids are present.)  6. Vomiting.  7. Weakness or dizziness.  GO DIRECTLY TO THE NEAREST EMERGENCY ROOM IF YOU HAVE ANY OF THE FOLLOWING:      Difficulty breathing              Chills and/or fever over 101 F   Persistent vomiting and/or vomiting blood   Severe abdominal pain   Severe chest pain   Black, tarry stools   Bleeding- more than one tablespoon   Any  other symptom or condition that you feel may need urgent attention  Your doctor recommends these additional instructions:  If any biopsies were taken, your doctors clinic will contact you in 1 to 2   weeks with any results.  - Discharge patient to home (ambulatory).   - Resume previous diet indefinitely.   - Continue present medications.   - Repeat colonoscopy in 7 years for screening purposesdepending on health.  For questions, problems or results please call your physician - Juan Miguel Pena MD at Work:  [phone].  [facility], EMERGENCY ROOM PHONE NUMBER: [phone]  IF A COMPLICATION OR EMERGENCY SITUATION ARISES AND YOU ARE UNABLE TO REACH   YOUR PHYSICIAN - GO DIRECTLY TO THE EMERGENCY ROOM.  Juan Miguel Pena MD  7/24/2018 3:43:14 PM  This report has been verified and signed electronically.  [employer]

## 2018-07-24 NOTE — ANESTHESIA PREPROCEDURE EVALUATION
07/24/2018  Shima Sorto is a 78 y.o., female.  Patient Active Problem List   Diagnosis    Calcium nephrolithiasis    Hypertension    Hyperoxaluria    Hypocitraturic calcium nephrolithiasis    Cystic kidney disease    Fatigue    Urinary retention    Osteoporosis: per Endocrinology currently off medication    Hypoglycemia    Trigger middle finger of right hand    Retinal hemorrhage of both eyes at about 1'oclock    Vestibular neuronitis    Vertigo    Obstructive sleep apnea    Venous insufficiency    Bradycardia    Screening for colon cancer     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    Visual impairment     reading glasses         Anesthesia Evaluation    I have reviewed the Patient Summary Reports.     I have reviewed the Medications.     Review of Systems  Anesthesia Hx:  No problems with previous Anesthesia Denies Hx of Anesthetic complications  Denies Family Hx of Anesthesia complications.   Denies Personal Hx of Anesthesia complications.   Hematology/Oncology:  Hematology Normal   Oncology Normal     EENT/Dental:EENT/Dental Normal   Cardiovascular:   Exercise tolerance: good Hypertension Valvular problems/Murmurs, MVP    Pulmonary:   Sleep Apnea    Renal/:   Chronic Renal Disease    Hepatic/GI:  Hepatic/GI Normal Bowel Prep.    Musculoskeletal:  Musculoskeletal Normal    Neurological:   Neuromuscular Disease,    Endocrine:  Endocrine Normal    Dermatological:  Skin Normal    Psych:  Psychiatric Normal            Physical Exam  General:  Well nourished    Airway/Jaw/Neck:  Airway Findings: Mouth Opening: Normal Tongue: Normal  General Airway Assessment: Adult  Mallampati: II  TM Distance: Normal, at least 6 cm  Jaw/Neck Findings:  Neck ROM: Normal ROM     Eyes/Ears/Nose:  EYES/EARS/NOSE FINDINGS: Normal   Dental:  Dental Findings: In tact, upper braces, lower braces   Chest/Lungs:  Chest/Lungs Findings: Clear to auscultation, Normal Respiratory Rate     Heart/Vascular:  Heart Findings: Rate: Normal  Sounds: Normal     Abdomen:  Abdomen Findings: Normal    Musculoskeletal:  Musculoskeletal Findings: Normal   Skin:  Skin Findings: Normal    Mental Status:  Mental Status Findings:  Cooperative, Alert and Oriented         Anesthesia Plan  Type of Anesthesia, risks & benefits discussed:  Anesthesia Type:  general  Patient's Preference: General  Intra-op Monitoring Plan: standard ASA monitors  Intra-op Monitoring Plan Comments:   Post Op Pain Control Plan:   Post Op Pain Control Plan Comments:   Induction:   IV  Beta Blocker:  Patient is on a Beta-Blocker and has received one dose within the past 24 hours (No further documentation required).       Informed Consent: Patient understands risks and agrees with Anesthesia plan.  Questions answered. Anesthesia consent signed with patient.  ASA Score: 3     Day of Surgery Review of History & Physical: I have interviewed and examined the patient. I have reviewed the patient's H&P dated:  There are no significant changes.  H&P update referred to the provider.     Anesthesia Plan Notes: NPO confirmed        Ready For Surgery From Anesthesia Perspective.

## 2018-07-25 ENCOUNTER — PATIENT MESSAGE (OUTPATIENT)
Dept: INTERNAL MEDICINE | Facility: CLINIC | Age: 78
End: 2018-07-25

## 2018-07-25 ENCOUNTER — TELEPHONE (OUTPATIENT)
Dept: INTERNAL MEDICINE | Facility: CLINIC | Age: 78
End: 2018-07-25

## 2018-07-25 ENCOUNTER — PATIENT OUTREACH (OUTPATIENT)
Dept: OTHER | Facility: OTHER | Age: 78
End: 2018-07-25

## 2018-07-25 NOTE — PROGRESS NOTES
Last 5 Patient Entered Readings                                      Current 30 Day Average: 117/59     Recent Readings 7/22/2018 7/9/2018 6/26/2018 6/26/2018 6/21/2018    SBP (mmHg) 98 132 122 139 125    DBP (mmHg) 55 64 58 65 64    Pulse 59 51 52 50 55        Hypertension Medications             atenolol (TENORMIN) 25 MG tablet Take one per day Patient is currently taking 12.5mg daily.     lisinopril 10 MG tablet Take 2 tablets (20 mg total) by mouth every morning. Dosage change      Hospital Medications             labetalol injection (Discontinued) as needed.        Assessment/ Plan:   Called patient to follow up.  Patient was recently diagnosed with breast cancer.   Per newly released 2017 ACC/ AHA HTN guidelines (goal of BP < 130/80), current 30-day average is well controlled.    Patient denies having questions or concerns. Instructed patient to call if she has any questions or concerns, patient confirms understanding.   Will continue to monitor. WCB in 3 months, sooner if BP begins to trend up or down.

## 2018-07-25 NOTE — ANESTHESIA POSTPROCEDURE EVALUATION
"Anesthesia Post Evaluation    Patient: Shima Sorto    Procedure(s) Performed: Procedure(s) (LRB):  COLONOSCOPY (N/A)    Final Anesthesia Type: general  Patient location during evaluation: PACU  Patient participation: Yes- Able to Participate  Level of consciousness: awake and alert  Post-procedure vital signs: reviewed and stable  Pain management: adequate  Airway patency: patent  PONV status at discharge: No PONV  Anesthetic complications: no      Cardiovascular status: blood pressure returned to baseline  Respiratory status: unassisted  Hydration status: euvolemic  Follow-up not needed.        Visit Vitals  BP (!) 181/79 (BP Location: Left arm, Patient Position: Sitting)   Pulse 87   Temp 36.6 °C (97.9 °F) (Temporal)   Resp 18   Ht 5' 7" (1.702 m)   Wt 56.7 kg (125 lb)   LMP  (LMP Unknown)   SpO2 97%   Breastfeeding? No   BMI 19.58 kg/m²       Pain/Eboni Score: Pain Assessment Performed: Yes (7/24/2018  4:15 PM)  Presence of Pain: denies (7/24/2018  4:15 PM)  Eboni Score: 10 (7/24/2018  4:00 PM)      "

## 2018-07-31 ENCOUNTER — TELEPHONE (OUTPATIENT)
Dept: ENDOSCOPY | Facility: HOSPITAL | Age: 78
End: 2018-07-31

## 2018-08-01 ENCOUNTER — OFFICE VISIT (OUTPATIENT)
Dept: SURGERY | Facility: CLINIC | Age: 78
End: 2018-08-01
Payer: MEDICARE

## 2018-08-01 ENCOUNTER — DOCUMENTATION ONLY (OUTPATIENT)
Dept: SURGERY | Facility: CLINIC | Age: 78
End: 2018-08-01

## 2018-08-01 ENCOUNTER — PATIENT OUTREACH (OUTPATIENT)
Dept: OTHER | Facility: OTHER | Age: 78
End: 2018-08-01

## 2018-08-01 VITALS
DIASTOLIC BLOOD PRESSURE: 77 MMHG | HEART RATE: 44 BPM | BODY MASS INDEX: 20.41 KG/M2 | HEIGHT: 66 IN | TEMPERATURE: 98 F | SYSTOLIC BLOOD PRESSURE: 175 MMHG | WEIGHT: 127 LBS

## 2018-08-01 DIAGNOSIS — C50.911 MALIGNANT NEOPLASM OF RIGHT BREAST IN FEMALE, ESTROGEN RECEPTOR POSITIVE, UNSPECIFIED SITE OF BREAST: Primary | ICD-10-CM

## 2018-08-01 DIAGNOSIS — C50.411 MALIGNANT NEOPLASM OF UPPER-OUTER QUADRANT OF RIGHT BREAST IN FEMALE, ESTROGEN RECEPTOR POSITIVE: Primary | ICD-10-CM

## 2018-08-01 DIAGNOSIS — Z17.0 MALIGNANT NEOPLASM OF RIGHT BREAST IN FEMALE, ESTROGEN RECEPTOR POSITIVE, UNSPECIFIED SITE OF BREAST: Primary | ICD-10-CM

## 2018-08-01 DIAGNOSIS — Z17.0 MALIGNANT NEOPLASM OF UPPER-OUTER QUADRANT OF RIGHT BREAST IN FEMALE, ESTROGEN RECEPTOR POSITIVE: Primary | ICD-10-CM

## 2018-08-01 PROCEDURE — 99205 OFFICE O/P NEW HI 60 MIN: CPT | Mod: S$PBB,,, | Performed by: SURGERY

## 2018-08-01 PROCEDURE — 99213 OFFICE O/P EST LOW 20 MIN: CPT | Mod: PBBFAC,PO | Performed by: SURGERY

## 2018-08-01 PROCEDURE — 99999 PR PBB SHADOW E&M-EST. PATIENT-LVL III: CPT | Mod: PBBFAC,,, | Performed by: SURGERY

## 2018-08-01 NOTE — PROGRESS NOTES
Last 5 Patient Entered Readings                                      Current 30 Day Average: 131/64     Recent Readings 7/28/2018 7/28/2018 7/27/2018 7/26/2018 7/22/2018    SBP (mmHg) 156 155 131 137 98    DBP (mmHg) 74 67 62 63 55    Pulse 62 59 44 54 59          Digital Medicine: Health  Follow Up    Lifestyle Modifications:    1.Dietary Modifications (Sodium intake <2,000mg/day, food labels, dining out): Patient admits to eating out and drinking this weekend whenever her children were in to visit her. She does not do this often but she is sure this has contributed to her higher BP readings on 7/28.     2.Physical Activity: Deferred    3.Medication Therapy: Patient has been compliant with the medication regimen.    4.Patient has the following medication side effects/concerns:   (Frequency/Alleviating factors/Precipitating factors, etc.)     Patient is also under a bit of stress. She has an appointment today at 2:30pm to discuss the plans for her cancer treatment per patient information.     Patient denies symptoms even on the day her BP was elevated. She will let us know if anything changes.     Follow up with Mrs. Hermosillole Kinza Sorto completed. No further questions or concerns. Will continue follow up to achieve health goals.

## 2018-08-01 NOTE — PROGRESS NOTES
Nurse Navigator Note:     Met with patient during her consult with Dr. Mason. Patient and I reviewed the information she discussed with Dr. Mason, including treatment options, diagnosis, and future plans for workup. Patient and I went through the new patient binder, explained some of the information and why it is provided.     Also offered patient consults with our other specialty clinics: Dr. Smith for gynecological health during treatment, Christina Rosas for physical therapy evaluation, Dr. Collins for psychological support, and Sydney Baron for nutritional counseling. Explained to patient that all of these support services are completely optional. Discussed that physical therapy is the only service that is recommended pre-op specifically, everything else can be requested at a later time. Patient was given a copy of her appointments, Dr. Mason's card, and my card. Encouraged her to call me if she has any questions or concerns or would like to schedule any additional appointments. Verbalized understanding of all information.

## 2018-08-01 NOTE — Clinical Note
Dr. Tomas,   I wanted to update you on Ms. Sorto.  She will be obtaining a breast MRI for her new diagnosis of a Lobular Breast Cancer.  Though it does not have aggressive features on pathology, these tumors sometimes are more extensive than what is initially appreciated on the mammogram.  If the MRI does not show suspicion for additional disease, she is likely to proceed with a lumpectomy and sentinel node biopsy.  She seems quite healthy at her age, so I will plan on obtaining CXR, EKG and labs for anesthesia purposes, but I don't think she will require additional evaluation prior to surgery.  She is eager to get things moving.  Thank you for sending her!   Abby Mason MD Breast Surgical Oncology

## 2018-08-01 NOTE — PROGRESS NOTES
New Breast Cancer  History and Physical  [facility]  Department of Surgery    REFERRING PROVIDER: Zeynep Tomas MD  [address] [place] [address]    CHIEF COMPLAINT: right breast cancer    Subjective:      Shima Sorto is a 78 y.o. postmenopausal female referred for evaluation of recently diagnosed carcinoma of the right breast. The patient was initially referred for surgical evaluation of a palpable right breast lump felt by the patient first noted on 7/10/18. She states that she first noticed a right breast lump on 7/10/18. She followed up with her internist who then ordered a mammogram and ultrasound. Her last mammogram before this time was 4/10/18 and was normal. Follow-up breast biopsy (18), mammogram (18) and ultrasound (18) showed a 10 mm x 9 mm x 4 mm oval, hypoechoic mass with indistinct margins seen in the upper outer quadrant of the right breast at 10 o'clock in the posterior depth, 5 cm from the nipple. A stereotactic biopsy was performed on 18 with pathology revealing infiltrating lobular carcinoma of the breast.     Patient does routinely do self breast exams.  Patient has noted a change on breast exam, see above.  Patient denies nipple discharge. Patient admits to to previous breast biopsy. She states that she had a prior right breast biopsy on the right side and was told it was not cancerous. This biopsy was based off of an abnormal mammogram. Patient denies a personal history of breast cancer.    Findings at that time were the following:   Tumor size: 1 cm   Tumor ndgndrndanddndend:nd nd2nd Estrogen Receptor: positive   Progesterone Receptor: negative   Her-2 mike: negative   Lymph node status: clinically negative   Lymphatic invasion: unknown     GYN History:  Age of menarche was 13. Age of menopause was 56. Patient admits to hormonal therapy. She states she used OCP for approximately 5 years in the 1960s. Patient is . Age of first live birth was 31. Patient  did not breast feed.    FAMILY History:  Maternal aunt- breast cancer, 55;  from breast cancer 5-10 years later (patient is unsure)  Father- melanoma, 65; did not die from melanoma  No other family history of malignancy including ovarian, colon, pancreatic, prostate, gastric or melanoma cancers.    Past Medical History:   Diagnosis Date    Allergy     seasonal    Anemia     Basal cell carcinoma 2013    forehead    Hx of colonic polyps     Hypertension     Medullary sponge kidney     MVP (mitral valve prolapse)     Osteoporosis, senile     Renal calculi     TMJ syndrome     sometimes jaw clicking/jaw pain    Urinary retention     Vertigo 2017    Vestibular neuronitis 2017    Visual impairment     reading glasses     Past Surgical History:   Procedure Laterality Date    BREAST CYST ASPIRATION Right     COLONOSCOPY N/A 2018    Procedure: COLONOSCOPY;  Surgeon: Juan Miguel Pena MD;  Location: [facility] ([address] [place] [address]);  Service: Endoscopy;  Laterality: N/A;    interstim placed stage 1      KIDNEY STONE SURGERY      @ [facility]  MOHS procedure       Current Outpatient Prescriptions on File Prior to Visit   Medication Sig Dispense Refill    alendronate (FOSAMAX) 70 MG tablet TAKE 1 TABLET BY MOUTH ON  WITH A FULL GLASS OF WATER AND WAIT 30 MINUTES BEFORE BREAKFAST AND PILLS. TAKE SITTING OR STANDING 4 tablet 12    atenolol (TENORMIN) 25 MG tablet Take one per day (Patient taking differently: 12.5 mg. Take one per day) 30 tablet 12    B COMPLEX & C NO.10/FOLIC ACID (B COMPLEX WITH C#10-FOLIC ACID ORAL) Take by mouth.      calcium citrate (CALCITRATE) 200 mg (950 mg) tablet Take 1 tablet by mouth once daily.      co-enzyme Q-10 50 mg capsule Take 100 mg by mouth once daily.       fish oil-vit E-fat acid5-hb137 (SUPER OMEGA-3) 400-5 mg-unit Cap Take 1 capsule by mouth Daily.      lisinopril 10 MG tablet Take 2 tablets (20 mg total) by mouth every morning. Dosage  change 60 tablet 12    MULTIVITAMIN ORAL Take 1 tablet by mouth Daily.      radiacream (VANICREAM) Apply topically as needed.      valACYclovir (VALTREX) 1000 MG tablet Take 1 tablet (1,000 mg total) by mouth 3 (three) times daily. 30 tablet 0    vit C/E/Zn/coppr/lutein/zeaxan (PRESERVISION AREDS 2 ORAL) Take by mouth.       No current facility-administered medications on file prior to visit.      Social History     Social History    Marital status:      Spouse name: N/A    Number of children: N/A    Years of education: N/A     Occupational History     [employer]      Social History Main Topics    Smoking status: Former Smoker     Packs/day: 0.50     Years: 8.00     Quit date: 8/22/1964    Smokeless tobacco: Never Used    Alcohol use Yes      Comment: 1-3x/week    Drug use: No    Sexual activity: Not Currently     Other Topics Concern    Not on file     Social History Narrative    No narrative on file     Family History   Problem Relation Age of Onset    Kidney disease Mother     Heart disease Father     Breast cancer Maternal Aunt     Anesthesia problems Neg Hx     Malignant hypertension Neg Hx     Hypotension Neg Hx     Malignant hyperthermia Neg Hx     Pseudochol deficiency Neg Hx     Colon cancer Neg Hx     Ovarian cancer Neg Hx     Melanoma Neg Hx     Psoriasis Neg Hx     Lupus Neg Hx     Eczema Neg Hx     Acne Neg Hx         Review of Systems  Review of Systems   Constitutional: Negative for activity change, chills and fever.   Respiratory: Negative for chest tightness and shortness of breath.    Cardiovascular: Negative for chest pain.   Gastrointestinal: Negative for abdominal pain, blood in stool, constipation, diarrhea, nausea and vomiting.   Genitourinary: Positive for frequency (Increased frequency at baseline). Negative for hematuria.   Musculoskeletal: Negative for arthralgias and back pain.   Skin: Negative for color change and wound.  "  Neurological: Negative for dizziness, light-headedness and headaches.   Hematological: Negative for adenopathy. Does not bruise/bleed easily.   Psychiatric/Behavioral: Negative for agitation.        Objective:   PHYSICAL EXAM:  BP (!) 175/77 (BP Location: Left arm, Patient Position: Sitting, BP Method: Medium (Automatic))   Pulse (!) 44   Temp 98 °F (36.7 °C) (Oral)   Ht 5' 6" (1.676 m)   Wt 57.6 kg (127 lb)   LMP  (LMP Unknown)   BMI 20.50 kg/m²     Physical Exam   Vitals reviewed.  Constitutional: She is oriented to person, place, and time. She appears well-developed and well-nourished.   HENT:   Head: Normocephalic and atraumatic.   Cardiovascular: Normal rate.    Pulmonary/Chest: Effort normal. No respiratory distress. Right breast exhibits mass, skin change (Extensive bruising over lateral aspect of breast) and tenderness. Right breast exhibits no inverted nipple and no nipple discharge. Left breast exhibits no inverted nipple, no mass, no nipple discharge, no skin change and no tenderness.       Abdominal: Soft. There is no tenderness.   Genitourinary: No breast bleeding.   Musculoskeletal: Normal range of motion.   Neurological: She is alert and oriented to person, place, and time.   Skin: Skin is warm and dry.     Psychiatric: She has a normal mood and affect.     Radiology review: Images personally reviewed by me in the clinic.   Mammogram:7/11/18  Findings:  This procedure was performed using tomosynthesis.  Computer-aided detection was utilized in the interpretation of this examination.  Mammo Digital Diagnostic Right with Tomosynthesis_CAD  The breast is heterogeneously dense, which may obscure small masses.     There is an equal density, oval mass with indistinct margins seen in the upper outer quadrant of the right breast in the posterior depth.     Ultrasound:7/11/18  There is a 10 mm x 9 mm x 4 mm oval, hypoechoic mass with indistinct margins seen in the upper outer quadrant of the right " breast at 10 o'clock in the posterior depth, 5 cm from the nipple. The mass correlates with a palpable mass reported by the patient.     Assessment:      Shima Sorto is a 78 y.o. postmenopausal female with recently diagnosed invasive lobular carcinoma of the right breast.      Plan:    Options for management were discussed with the patient and her friend. We reviewed the existing data noting the equivalency of breast conserving surgery with radiation therapy and mastectomy. We also reviewed the guidelines of the National Comprehensive Cancer Network for Stage I breast carcinoma. We discussed the need for lumpectomy margins to be negative for carcinoma, the necessity for postoperative radiation therapy after breast conservation in most cases, the possibility of a failed or false negative sentinel lymph node biopsy and the potential need for complete lymphadenectomy for a failed or positive sentinel lymph node biopsy were fully discussed. In the setting of mastectomy, delayed or immediate reconstruction options are available and were discussed.     In the setting of lumpectomy, radiation therapy would be recommended majority of the time.  The duration and treatment side effects were discussed with the patient.  This will be coordinated with the radiation oncologist pending final pathology.    We also discussed the role of systemic therapy in the treatment of early stage breast cancer.  We discussed that this is based on tumor biology and quincy status and will be determined based on final pathology.  We discussed that if the cancer is hormone positive, endocrine therapy would be recommended in most cases and its use can reduce the risk of recurrence as well as improve survival. Side effects of treatment were briefly discussed. We also discussed the potential role for chemotherapy based on a number of factors such as tumor phenotype (ER+ vs. triple negative vs. Srp6ygl+) and this would be determined in  coordination with the medical oncologist.    We will plan on obtaining bilateral breast MRI given diagnosis of invasive lobular carcinoma to check for multifocal and contralateral disease as well as assess for any apparent LAD.   I explained that it is routine to obtain MRI for lobular cancers, but also that this was not identified on her most recent mammogram and therefore difficult to detect being lobular.  The MRI will be more sensitive.  This will assure us that she is a candidate for lumpectomy if there is not additional disease present.  All of her questions were answered today.  We will see Ms. Sorto back next week following the MRI to discuss surgical options in more detail.    Patient was educated on breast cancer, receptors, wire localization lumpectomy, mastectomy, sentinel lymph node mapping and biopsy, axillary lymph node dissection, reconstruction, breast prosthesis with post-mastectomy bra and radiation therapy. Patient was given [facility].  All her questions were answered.    Total time spent with the patient: 70 minutes.  50 minutes of face to face consultation and 20 minutes of chart review and coordination of care.

## 2018-08-01 NOTE — LETTER
August 5, 2018      Zeynep Tomas MD  [address]  [facility] [address]           [facility]  [address]  [facility] [address]  Phone: [phone]  Fax: [phone]          Patient: Shima Sorto   MR Number: [id number]   YOB: 1940   Date of Visit: 8/1/2018       Dear Dr. Zeynep Tomas:    Thank you for referring Shima Sorto to me for evaluation. Attached you will find relevant portions of my assessment and plan of care.    If you have questions, please do not hesitate to call me. I look forward to following Shima Sorto along with you.    Sincerely,    Abby Mason MD    Enclosure  CC:  No Recipients    If you would like to receive this communication electronically, please contact [email or web address] or [phone] to request more information on [employer] Link access.    For providers and/or their staff who would like to refer a patient to [facility], please contact us through our one-stop-shop provider referral line, [facility], at [phone].    If you feel you have received this communication in error or would no longer like to receive these types of communications, please e-mail [email or web address]

## 2018-08-02 DIAGNOSIS — C50.911 MALIGNANT NEOPLASM OF RIGHT BREAST IN FEMALE, ESTROGEN RECEPTOR POSITIVE, UNSPECIFIED SITE OF BREAST: Primary | ICD-10-CM

## 2018-08-02 DIAGNOSIS — Z17.0 MALIGNANT NEOPLASM OF RIGHT BREAST IN FEMALE, ESTROGEN RECEPTOR POSITIVE, UNSPECIFIED SITE OF BREAST: Primary | ICD-10-CM

## 2018-08-05 ENCOUNTER — PATIENT MESSAGE (OUTPATIENT)
Dept: INTERNAL MEDICINE | Facility: CLINIC | Age: 78
End: 2018-08-05

## 2018-08-05 PROBLEM — Z17.0 MALIGNANT NEOPLASM OF UPPER-OUTER QUADRANT OF RIGHT BREAST IN FEMALE, ESTROGEN RECEPTOR POSITIVE: Status: ACTIVE | Noted: 2018-08-05

## 2018-08-05 PROBLEM — C50.411 MALIGNANT NEOPLASM OF UPPER-OUTER QUADRANT OF RIGHT BREAST IN FEMALE, ESTROGEN RECEPTOR POSITIVE: Status: ACTIVE | Noted: 2018-08-05

## 2018-08-06 ENCOUNTER — HOSPITAL ENCOUNTER (OUTPATIENT)
Dept: RADIOLOGY | Facility: HOSPITAL | Age: 78
Discharge: HOME | End: 2018-08-06
Attending: SURGERY
Payer: MEDICARE

## 2018-08-06 ENCOUNTER — TELEPHONE (OUTPATIENT)
Dept: SURGERY | Facility: CLINIC | Age: 78
End: 2018-08-06

## 2018-08-06 DIAGNOSIS — C50.911 MALIGNANT NEOPLASM OF RIGHT BREAST IN FEMALE, ESTROGEN RECEPTOR POSITIVE, UNSPECIFIED SITE OF BREAST: ICD-10-CM

## 2018-08-06 DIAGNOSIS — Z17.0 MALIGNANT NEOPLASM OF RIGHT BREAST IN FEMALE, ESTROGEN RECEPTOR POSITIVE, UNSPECIFIED SITE OF BREAST: ICD-10-CM

## 2018-08-06 DIAGNOSIS — Z17.0 MALIGNANT NEOPLASM OF UPPER-OUTER QUADRANT OF RIGHT BREAST IN FEMALE, ESTROGEN RECEPTOR POSITIVE: Primary | ICD-10-CM

## 2018-08-06 DIAGNOSIS — C50.411 MALIGNANT NEOPLASM OF UPPER-OUTER QUADRANT OF RIGHT BREAST IN FEMALE, ESTROGEN RECEPTOR POSITIVE: Primary | ICD-10-CM

## 2018-08-06 PROCEDURE — 0159T MRI BREAST BILATERAL W W/O CONTRAST: CPT | Mod: 26,,, | Performed by: RADIOLOGY

## 2018-08-06 PROCEDURE — 25500020 PHARM REV CODE 255: Performed by: SURGERY

## 2018-08-06 PROCEDURE — A9577 INJ MULTIHANCE: HCPCS | Performed by: SURGERY

## 2018-08-06 PROCEDURE — 0159T MRI BREAST BILATERAL W W/O CONTRAST: CPT | Mod: TC

## 2018-08-06 PROCEDURE — 77059 MRI BREAST BILATERAL W W/O CONTRAST: CPT | Mod: 26,,, | Performed by: RADIOLOGY

## 2018-08-06 RX ADMIN — GADOBENATE DIMEGLUMINE 12 ML: 529 INJECTION, SOLUTION INTRAVENOUS at 02:08

## 2018-08-06 NOTE — PROGRESS NOTES
OUTPATIENT PHYSICAL THERAPY   EVALUATION    Name: Shima Sorto  Clinic Number: [id number]    Therapy Diagnosis:   Encounter Diagnoses   Name Primary?    Malignant neoplasm of upper-outer quadrant of right breast in female, estrogen receptor positive Yes    At risk for lymphedema      Physician: Abby Mason MD    Physician Orders: PT Eval and Treat   Medical Diagnosis: Right breast infiltrating lobular carcinoma  Evaluation Date: 8/8/2018  Authorization period Expiration: 12/31/18  Plan of Care Certification Period: 8/8/18  Insurance: Medicare Part A & B    Visit #: 1/ Visits authorized: 1  Time In:11:00 AM  Time Out: 11:50 AM  Total Billable Time: 50 minutes    Precautions: Standard    History   History of Present Illness: Shima is a 78 y.o. female that presents to  [facility] at the [facility] secondary to dx of right breast cancer.    Dx: Right breast  infiltrating lobular carcinoma, Grade I, ER (+), MD (-), BVW4imm (-)   Surgery date: pending    Pt presents today for baseline measurements to aid in the early detection of lymphedema, UE muscle testing, postural and ROM assessment along with education of risk of lymphedema and surgical precautions post surgery. Circumferential measurements will be taken today of BL UEs for early detection of lymphedema post surgery. Pt will also be instructed in exercises to perform pre and post-surgery to insure best outcomes.       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    Shima has a current medication list which includes the following prescription(s): alendronate, atenolol, b complex c no.10/folic acid, calcium citrate, co-enzyme q-10, fish oil-e-fatty acid5-[id number], lisinopril, multivitamin, radiacream, valacyclovir, and vit c/e/zn/coppr/lutein/zeaxan.    Review of patient's allergies indicates:   Allergen Reactions    Asparagus      Other reaction(s): Rash          Hand dominance: right hand dominant  Prior Therapy: 2017 for vertigo  Nutrition:  Thin  Social History: Lives alone   Place of Residence (Steps/Adaptations): stairs in home  Current functional status:  Indpendent with ADL's  Exercise routine prior to onset : stretching exercises; walking --6000 steps a day  DME owned: None  Work:  Retired                         Subjective   Pt states: not having any pain  Pain: 0/10 on VAS.     Objective   Mental status :oriented    Posture/Alignment   Postural examination/scapula alignment: forward head and rounded shoulders  Joint integrity: WFLs  Skin integrity: intact  Edema: none noted    Sensation: Light Touch: Intact           Proprioception: Intact  - appearance: well groomed     ROM:   UPPER EXTREMITY--AROM/PROM  (R) UE: WNLs  (L) UE: WNLs     Shoulder Range of Motion:   ACTIVE ROM LEFT RIGHT   Flexion 180 180   Abduction 180 180   Horizontal Abd 60 60   Extension 70 70   IR/90deg 85 85   ER/90deg 90 90     Strength: manual muscle test grades below   Upper Extremity Strength   (L) UE (R) UE   Shoulder flexion: 5/5 5/5   Shoulder Abduction: 5/5 5/5   Shoulder IR 5/5 5/5   Shoulder ER 5/5 5/5   Elbow flexion: 5/5 5/5   Elbow extension: 5/5 5/5   Lower Trap: 5/5 5/5   Middle Trap: 5/5 5/5    5/5 5/5       Baseline Measurements of BL UE's for early detection of Lymphedema:     LANDMARK RIGHT UE LEFT UE  "DIFFERENCE   E + 8" 26 cm 26 cm 0 cm   E + 6" 24 cm 23.5 cm 0.5 cm   E + 4" 24 cm7 24 cm 0 cm   E + 2" 22 cm 22 cm 0 cm   Elbow 23 cm 23 cm 0 cm   W+ 8" 23 cm 23 cm 0 cm   W +  6" 21 cm 21 cm 0 cm   W + 4" 17 cm 17 cm 0 cm   Wrist 16 cm 16 cm 0 cm   DPC 21 cm 21 cm 0 cm   IP Thumb 7 cm 7 cm 0 cm         Coordination:   - fine motor: WFL  - UE coordination: intact     - LE coordination:  Not tested     Functional Mobility (Bed mobility, transfers)  Bed mobility: I =  independent   Roll to left: I  Roll to right: I  Supine to prone: I  Scooting to edge of bed: I  Supine to sit: I  Sit to supine: I  Transfers to bed: I  Transfers to toilet: I  Sit to stand:  I  Stand pivot:  I  Car transfers: I      ADL's:  Feeding: I = independent   Grooming: I  Hygiene: I  UB Dressing: I  LB Dressing: I  Toileting: I  Bathing: I    Gait Assessment:   - AD used: none  - Assistance: independent  - Distance: community distances       Endurance Deficit: none      Patient Education   - role of PT in multi - disciplinary team, goals for PT  - Pt was educated in lymphedema etiology and management plans.    - Pt was provided with written risk reductions and precautions for managing lymphedema.   - Reviewed JOLIE drain care instructions.     ROM/lifting Precautions post surgery discussed -  until drains have been removed:  - do not lift affected arm above 90 degrees of shoulder flexion  - do not lift over 5 lbs  - do not pull or push heavy objects  - do not sleep on your stomach or surgery side     Written Home Exercises Provided and Patient Education: Handouts given   Pt was instructed in and performed therapeutic exercise for postural correction and alignment, stretching and soft tissue mobility, and strengthening.     Exercises included: handout given    - exaggerated deep breathing and relaxation  - scapular retractions  - wrist circles  - elbow flexion/extension      Pt was able to demonstrate and report understanding and performance    Pt " has no cultural, educational or language barriers to learning provided.    Functional Limitations Reporting     Category: mobility  Score: 0% Limitation   Current/ : CH = 0 % impaired, limited or restricted  Goal/ : CH = 0 % impaired, limited or restricted   Discharge/: CH = 0% impaired, limited or restricted       Modifier  Impairment Limitation Restriction    CH  0 % impaired, limited or restricted    CI  @ least 1% but less than 20% impaired, limited or restricted    CJ  @ least 20%<40% impaired, limited or restricted    CK  @ least 40%<60% impaired, limited or restricted    CL  @ least 60% <80% impaired, limited or restricted    CM  @ least 80%<100% impaired limited or restricted    CN  100% impaired, limited or restricted     Assessment   This is a 78 y.o. female referred to outpatient physical therapy and presents with a medical diagnosis of right breast cancer and was seen today pre-operatively to assess strength and ROM of BL UEs, to take baseline circumferential measurements of BL UEs to aid in the early detection of lymphedema and provide pt education on exercises/precations post breast surgery. Pt does not exhibit any ROM impairments  Pt educated in lymphedema risks/precautions as well as ROM/lifting precautions post surgery - pt demonstrated/verbalized understanding. No goals established this visit as goals for PT will be established post surgery at follow up.      Anticipated barriers to physical therapy: None     Pt's spiritual, cultural and educational needs considered and pt agreeable to plan of care and goals as stated below:     Medical necessity is demonstrated by the following IMPAIRMENTS/PROMBLEM LIST:  History  Co-morbidities and personal factors that may impact the plan of care Examination  Body Structures and Functions, activity limitations and participation restrictions that may impact the plan of care    Clinical Presentation   Co-morbidities:   Right Breast  Cancer        Personal Factors:   no deficits Body Regions:   upper extremities    Body Systems:   ROM  strength        Participation Restrictions:   No deficits     Activity limitations:   Mobility  lifting and carrying objects    Self care  no deficits    Domestic Life  no deficits    Interactions/Relationships  no deficits    Life Areas  no deficits    Community and Social Life  no deficits         stable and uncomplicated                      low   low  low Decision Making/ Complexity Score:  low       Plan   Schedule patient for follow up with Physical therapy post surgery. Goals for therapy post surgery will be established at that time.     Therapist: Pricila Bryson, PT  8/8/2018

## 2018-08-06 NOTE — TELEPHONE ENCOUNTER
Called patient to let her know that her BMP is in acceptable range for her MRI today. No answer, left voicemail with this information

## 2018-08-08 ENCOUNTER — OFFICE VISIT (OUTPATIENT)
Dept: SURGERY | Facility: CLINIC | Age: 78
End: 2018-08-08
Payer: MEDICARE

## 2018-08-08 ENCOUNTER — CLINICAL SUPPORT (OUTPATIENT)
Dept: REHABILITATION | Facility: HOSPITAL | Age: 78
End: 2018-08-08
Payer: MEDICARE

## 2018-08-08 VITALS
HEIGHT: 66 IN | DIASTOLIC BLOOD PRESSURE: 77 MMHG | TEMPERATURE: 98 F | WEIGHT: 127 LBS | BODY MASS INDEX: 20.41 KG/M2 | SYSTOLIC BLOOD PRESSURE: 166 MMHG | HEART RATE: 50 BPM

## 2018-08-08 DIAGNOSIS — Z17.0 MALIGNANT NEOPLASM OF UPPER-OUTER QUADRANT OF RIGHT BREAST IN FEMALE, ESTROGEN RECEPTOR POSITIVE: Primary | ICD-10-CM

## 2018-08-08 DIAGNOSIS — Z91.89 AT RISK FOR LYMPHEDEMA: ICD-10-CM

## 2018-08-08 DIAGNOSIS — C50.411 MALIGNANT NEOPLASM OF UPPER-OUTER QUADRANT OF RIGHT BREAST IN FEMALE, ESTROGEN RECEPTOR POSITIVE: Primary | ICD-10-CM

## 2018-08-08 PROCEDURE — 97161 PT EVAL LOW COMPLEX 20 MIN: CPT | Mod: PO | Performed by: PHYSICAL MEDICINE & REHABILITATION

## 2018-08-08 PROCEDURE — 99213 OFFICE O/P EST LOW 20 MIN: CPT | Mod: PBBFAC,PO | Performed by: SURGERY

## 2018-08-08 PROCEDURE — 99999 PR PBB SHADOW E&M-EST. PATIENT-LVL III: CPT | Mod: PBBFAC,,, | Performed by: SURGERY

## 2018-08-08 PROCEDURE — G8984 CARRY CURRENT STATUS: HCPCS | Mod: CH,PO | Performed by: PHYSICAL MEDICINE & REHABILITATION

## 2018-08-08 PROCEDURE — G8986 CARRY D/C STATUS: HCPCS | Mod: CH,PO | Performed by: PHYSICAL MEDICINE & REHABILITATION

## 2018-08-08 PROCEDURE — 99214 OFFICE O/P EST MOD 30 MIN: CPT | Mod: S$PBB,,, | Performed by: SURGERY

## 2018-08-08 PROCEDURE — G8985 CARRY GOAL STATUS: HCPCS | Mod: CH,PO | Performed by: PHYSICAL MEDICINE & REHABILITATION

## 2018-08-08 NOTE — Clinical Note
Dr. Tomas,Ms. Sorto's MRI was clear for additional breast disease.We have decided to proceed with Lumpectomy and sentinel node biopsy this Friday.Thanks again for sending Ms. Sorto!Abby Mason MDDignity Health [facility] Centerast Surgical Oncology

## 2018-08-08 NOTE — PROGRESS NOTES
New Breast Cancer  History and Physical  [facility]  Department of Surgery    REFERRING PROVIDER: Zeynep Tomas    CHIEF COMPLAINT: right breast cancer    Subjective:      Shima Sorto is a 78 y.o. postmenopausal female referred for evaluation of recently diagnosed carcinoma of the right breast. The patient was initially referred for surgical evaluation of a palpable right breast lump felt by the patient first noted on 7/10/18. She states that she first noticed a right breast lump on 7/10/18. She followed up with her internist who then ordered a mammogram and ultrasound. Her last mammogram before this time was 4/10/18 and was normal. Follow-up breast biopsy (7/20/18), mammogram (7/11/18) and ultrasound (7/11/18) showed a 10 mm x 9 mm x 4 mm oval, hypoechoic mass with indistinct margins seen in the upper outer quadrant of the right breast at 10 o'clock in the posterior depth, 5 cm from the nipple. A stereotactic biopsy was performed on 7/20/18 with pathology revealing infiltrating lobular carcinoma of the breast.     Patient does routinely do self breast exams.  Patient has noted a change on breast exam, see above.  Patient denies nipple discharge. Patient admits to to previous breast biopsy. She states that she had a prior right breast biopsy on the right side and was told it was not cancerous. This biopsy was based off of an abnormal mammogram. Patient denies a personal history of breast cancer.    Findings at that time were the following:   Tumor size: 1 cm   Tumor ndgndrndanddndend:nd nd2nd Estrogen Receptor: positive   Progesterone Receptor: negative   Her-2 mike: negative   Lymph node status: clinically negative   Lymphatic invasion: unknown     Interval history:  Ms. Sorto presents back to clinic following MRI for further discussion of surgical options. MRI was negative other than confirmed malignancy in the right breast. She denies any new lumps, breast pain or nipple discharge. She is leaning toward partial  mastectomy at this time.    GYN History:  Age of menarche was 13. Age of menopause was 56. Patient admits to hormonal therapy. She states she used OCP for approximately 5 years in the 1960s. Patient is . Age of first live birth was 31. Patient did not breast feed.    FAMILY History:  Maternal aunt- breast cancer, 55;  from breast cancer 5-10 years later (patient is unsure)  Father- melanoma, 65; did not die from melanoma  No other family history of malignancy including ovarian, colon, pancreatic, prostate, gastric or melanoma cancers.    Past Medical History:   Diagnosis Date    Allergy     seasonal    Anemia     Basal cell carcinoma 2013    forehead    Hx of colonic polyps     Hypertension     Medullary sponge kidney     MVP (mitral valve prolapse)     Osteoporosis, senile     Renal calculi     Sleep apnea     TMJ syndrome     sometimes jaw clicking/jaw pain    Urinary retention     Vertigo 2017    Vestibular neuronitis 2017    Visual impairment     reading glasses     Past Surgical History:   Procedure Laterality Date    BREAST CYST ASPIRATION Right     COLONOSCOPY N/A 2018    Procedure: COLONOSCOPY;  Surgeon: Juan Miguel Pena MD;  Location: [facility] ([address] [place] [address]);  Service: Endoscopy;  Laterality: N/A;    interstim placed stage 1      KIDNEY STONE SURGERY      @ [facility]  MOHS procedure       Current Outpatient Prescriptions on File Prior to Visit   Medication Sig Dispense Refill    alendronate (FOSAMAX) 70 MG tablet TAKE 1 TABLET BY MOUTH ON  WITH A FULL GLASS OF WATER AND WAIT 30 MINUTES BEFORE BREAKFAST AND PILLS. TAKE SITTING OR STANDING 4 tablet 12    atenolol (TENORMIN) 25 MG tablet Take one per day (Patient taking differently: 12.5 mg. Take one per day) 30 tablet 12    B COMPLEX & C NO.10/FOLIC ACID (B COMPLEX WITH C#10-FOLIC ACID ORAL) Take by mouth.      calcium citrate (CALCITRATE) 200 mg (950 mg) tablet Take 1 tablet by mouth once daily.       co-enzyme Q-10 50 mg capsule Take 100 mg by mouth once daily.       fish oil-vit E-fat acid5-hb137 (SUPER OMEGA-3) 400-5 mg-unit Cap Take 1 capsule by mouth Daily.      lisinopril 10 MG tablet Take 2 tablets (20 mg total) by mouth every morning. Dosage change 60 tablet 12    MULTIVITAMIN ORAL Take 1 tablet by mouth Daily.      radiacream (VANICREAM) Apply topically as needed.      valACYclovir (VALTREX) 1000 MG tablet Take 1 tablet (1,000 mg total) by mouth 3 (three) times daily. 30 tablet 0    vit C/E/Zn/coppr/lutein/zeaxan (PRESERVISION AREDS 2 ORAL) Take by mouth.       No current facility-administered medications on file prior to visit.      Social History     Social History    Marital status:      Spouse name: N/A    Number of children: N/A    Years of education: N/A     Occupational History     [employer]      Social History Main Topics    Smoking status: Former Smoker     Packs/day: 0.50     Years: 8.00     Quit date: 8/22/1964    Smokeless tobacco: Never Used    Alcohol use Yes      Comment: 1-3x/week    Drug use: No    Sexual activity: Not Currently     Other Topics Concern    Not on file     Social History Narrative    No narrative on file     Family History   Problem Relation Age of Onset    Kidney disease Mother     Heart disease Father     Melanoma Father     Breast cancer Maternal Aunt     Anesthesia problems Neg Hx     Malignant hypertension Neg Hx     Hypotension Neg Hx     Malignant hyperthermia Neg Hx     Pseudochol deficiency Neg Hx     Colon cancer Neg Hx     Ovarian cancer Neg Hx     Psoriasis Neg Hx     Lupus Neg Hx     Eczema Neg Hx     Acne Neg Hx         Review of Systems  Review of Systems   Constitutional: Negative for activity change, chills and fever.   Respiratory: Negative for chest tightness and shortness of breath.    Cardiovascular: Negative for chest pain.   Gastrointestinal: Negative for abdominal pain,  "blood in stool, constipation, diarrhea, nausea and vomiting.   Genitourinary: Positive for frequency (Increased frequency at baseline). Negative for hematuria.   Musculoskeletal: Negative for arthralgias and back pain.   Skin: Negative for color change and wound.   Neurological: Negative for dizziness, light-headedness and headaches.   Hematological: Negative for adenopathy. Does not bruise/bleed easily.   Psychiatric/Behavioral: Negative for agitation.        Objective:   PHYSICAL EXAM:  BP (!) 166/77 (BP Location: Right arm, Patient Position: Sitting, BP Method: Medium (Automatic))   Pulse (!) 50   Temp 97.6 °F (36.4 °C) (Oral)   Ht 5' 6" (1.676 m)   Wt 57.6 kg (127 lb)   LMP  (LMP Unknown)   BMI 20.50 kg/m²     Physical Exam   Vitals reviewed.  Constitutional: She is oriented to person, place, and time. She appears well-developed and well-nourished.   HENT:   Head: Normocephalic and atraumatic.   Cardiovascular: Normal rate.    Pulmonary/Chest: Effort normal. No respiratory distress. Right breast exhibits mass, skin change (Extensive bruising over lateral aspect of breast) and tenderness. Right breast exhibits no inverted nipple and no nipple discharge. Left breast exhibits no inverted nipple, no mass, no nipple discharge, no skin change and no tenderness.       Abdominal: Soft. There is no tenderness.   Genitourinary: No breast bleeding.   Musculoskeletal: Normal range of motion.   Neurological: She is alert and oriented to person, place, and time.   Skin: Skin is warm and dry.     Psychiatric: She has a normal mood and affect.     Radiology review: Images personally reviewed by me in the clinic.   Mammogram:7/11/18  Findings:  This procedure was performed using tomosynthesis.  Computer-aided detection was utilized in the interpretation of this examination.  Mammo Digital Diagnostic Right with Tomosynthesis_CAD  The breast is heterogeneously dense, which may obscure small masses.     There is an equal " density, oval mass with indistinct margins seen in the upper outer quadrant of the right breast in the posterior depth.     Ultrasound:7/11/18  There is a 10 mm x 9 mm x 4 mm oval, hypoechoic mass with indistinct margins seen in the upper outer quadrant of the right breast at 10 o'clock in the posterior depth, 5 cm from the nipple. The mass correlates with a palpable mass reported by the patient.     MRI 8/1/18:  Findings:  The breasts have heterogeneous fibroglandular tissue. The background parenchymal enhancement is minimal and symmetric.      Right  There is a mass seen in the upper outer quadrant of the right breast at 10 o'clock in the posterior depth, 5 cm from the nipple. This recently biopsied mass is difficult to see on MRI.  What is present is the artifact from the biopsy marker as well as minimal enhancement surrounding this artifact.  There are no additional lesions present.  The right axilla is normal.      Left  There is no evidence of suspicious masses, abnormal enhancement, or other abnormal findings.      Incidental note is made of an accessory vessel running along the course of the aortic arch. This is only partially visualized and is likely congenital.      Impression:  Right  Mass: Right breast mass at the upper outer posterior 10 o'clock position. Assessment: 6 - Known biopsy, proven malignancy.      Left  There is no MR evidence of malignancy.    Assessment:      Shima Sorto is a 78 y.o. postmenopausal female with recently diagnosed invasive lobular carcinoma of the right breast.      Plan:    Options for management were discussed with the patient and her friend. We reviewed the existing data noting the equivalency of breast conserving surgery with radiation therapy and mastectomy. We also reviewed the guidelines of the National Comprehensive Cancer Network for Stage I breast carcinoma. We discussed the need for lumpectomy margins to be negative for carcinoma, the necessity for  postoperative radiation therapy after breast conservation in most cases, the possibility of a failed or false negative sentinel lymph node biopsy and the potential need for complete lymphadenectomy for a failed or positive sentinel lymph node biopsy were fully discussed. In the setting of mastectomy, delayed or immediate reconstruction options are available and were discussed.     In the setting of lumpectomy, radiation therapy would be recommended majority of the time.  The duration and treatment side effects were discussed with the patient.  This will be coordinated with the radiation oncologist pending final pathology.    We also discussed the role of systemic therapy in the treatment of early stage breast cancer.  We discussed that this is based on tumor biology and quincy status and will be determined based on final pathology.  We discussed that if the cancer is hormone positive, endocrine therapy would be recommended in most cases and its use can reduce the risk of recurrence as well as improve survival. Side effects of treatment were briefly discussed. We also discussed the potential role for chemotherapy based on a number of factors such as tumor phenotype (ER+ vs. triple negative vs. Itb0aot+) and this would be determined in coordination with the medical oncologist.    MRI did not show any additional disease.  She is ready to proceed with lumpectomy and SLNB.  I can visualize the lesion on Ultraound in the office today so will use US guidance in the OR rather than have her come back for a seed or a wire.  All of her questions were answered today.    Patient was educated on breast cancer, receptors, wire localization lumpectomy, mastectomy, sentinel lymph node mapping and biopsy, axillary lymph node dissection, reconstruction, breast prosthesis with post-mastectomy bra and radiation therapy. Patient was given patient information binder including [facility] breast cancer treatment brochure.  All her questions were  answered.    Total time spent with the patient: 50 minutes.  40 minutes of face to face consultation and 10 minutes of chart review and coordination of care.

## 2018-08-08 NOTE — PATIENT INSTRUCTIONS
PRE/POST OP PATIENT EDUCATION    Post Operative Instructions     Range of Motion/lifting Precautions post surgery  The following activities should be avoided until your drain(s) have been removed  - do not lift affected arm above 90 degrees of shoulder flexion  - do not lift over 5 lbs  - do not pull or push heavy objects  - do not sleep on your stomach or surgery side       After surgery, you may begin self-care tasks including grooming, dressing, feeding and simple hygiene as soon as you feel up to it.    Schedule your post-op therapy follow-up after your drains have been removed     When to call your doctor   - if any part of your affected arm or axilla feels hot, is reddened or has increased swelling   - if you develop a temperature over 101 degrees Fahrenheit      Lymphedema - Identification and Prevention     Lymphedema - is the swelling of a body area or extremity caused by the accumulation of lymphatic fluid.  There is a risk for lymphedema with the removal of lymph nodes, trauma or radiation therapy.  Treatment of breast cancer often involves surgery: mastectomy or lumpectomy. Some of the lymph nodes in the underarm (called axillary lymph nodes) may be removed and checked to see if they contain cancer cells.     During breast surgery when axillary lymph nodes are removed (with sentinel node biopsy or axillary dissection) or are treated with radiation therapy, the lymphatic system may become impaired. This may prevent lymphatic fluid from leaving the area therefore, causing lymphedema.     Lymphatic fluid is a normal part of the circulatory system. Its function is to remove waste products and to produce cells vital to fighting infection. Swelling occurs when the vessels become restricted and the lymphatic fluid is unable to freely flow through them.  If lymphedema is left untreated, the affected limb could progressively become more swollen, which could lead to hardening  of the skin, bulkiness in the limb, infection and impaired wound healing.         There are things you can do to decrease the chance of developing lymphedema.                                          [email or web address]                                                                                                                                                  The information presented is intended for general information and educational purposes. It is not intended to replace the  advice of your health care provider. Contact your health care provider if you believe you have a health problem.                                                    POST OP EXERCISES - SAFE TO DO THE FIRST 2 WEEKS AFTER SURGERY UNTIL YOUR FOLLOW UP APPOINTMENT WITH PHYSICAL/OCCUPATIONAL THERAPY    Scapular Retraction (Standing)    With arms at sides, squeeze shoulder blades together. Do not shrug shoulders and do not hold your breath. Hold 5 seconds. Repeat 10 times 1 sessions 1-2 x day.       Exaggerated Breathing and Relaxation      Practice deep breathing frequently in the first few days following surgery even before you begin exercising. This exercise helps with tissue extensibility in the chest wall.  Inhale slowly and deeply through the nose and exhale through pursed lips. Concentrate on relaxing as you let the air out of your lungs. Repeat three (3) to four (4) times, remembering to breath in deeply and then relaxing. This exercise helps to ease the sensation of pulling and discomfort that may be experienced while exercising.      Ball Squeeze OR Hand pumps       Perform this exercise three (3) times a day for 1-3 minutes each time.    The ball squeeze or hand pumps helps to prevent or reduce temporary swelling that may occur in the affected arm. This exercise may be performed standing, sitting or while lying in bed. During this exercise the affected arm should be slightly bent and held upward. Support your arm with a  pillow if you are uncomfortable holding it up.    a. Hold a rubber ball in your hand on the affected side and lift your arm upward.  b. Alternate squeezing and relaxing the ball.              AROM: Elbow Flexion / Extension        With left hand palm up, gently bend elbow as far as possible. Then straighten arm as far as possible. Do this in standing.   Repeat 15 times per set. Do 3 sets per session. Do 1-3 sessions per day.

## 2018-08-08 NOTE — H&P (VIEW-ONLY)
New Breast Cancer  History and Physical  [facility]  Department of Surgery    REFERRING PROVIDER: Zeynep Tomas    CHIEF COMPLAINT: right breast cancer    Subjective:      Shima Sorto is a 78 y.o. postmenopausal female referred for evaluation of recently diagnosed carcinoma of the right breast. The patient was initially referred for surgical evaluation of a palpable right breast lump felt by the patient first noted on 7/10/18. She states that she first noticed a right breast lump on 7/10/18. She followed up with her internist who then ordered a mammogram and ultrasound. Her last mammogram before this time was 4/10/18 and was normal. Follow-up breast biopsy (7/20/18), mammogram (7/11/18) and ultrasound (7/11/18) showed a 10 mm x 9 mm x 4 mm oval, hypoechoic mass with indistinct margins seen in the upper outer quadrant of the right breast at 10 o'clock in the posterior depth, 5 cm from the nipple. A stereotactic biopsy was performed on 7/20/18 with pathology revealing infiltrating lobular carcinoma of the breast.     Patient does routinely do self breast exams.  Patient has noted a change on breast exam, see above.  Patient denies nipple discharge. Patient admits to to previous breast biopsy. She states that she had a prior right breast biopsy on the right side and was told it was not cancerous. This biopsy was based off of an abnormal mammogram. Patient denies a personal history of breast cancer.    Findings at that time were the following:   Tumor size: 1 cm   Tumor ndgndrndanddndend:nd nd2nd Estrogen Receptor: positive   Progesterone Receptor: negative   Her-2 mike: negative   Lymph node status: clinically negative   Lymphatic invasion: unknown     Interval history:  Ms. Sorto presents back to clinic following MRI for further discussion of surgical options. MRI was negative other than confirmed malignancy in the right breast. She denies any new lumps, breast pain or nipple discharge. She is leaning toward partial  mastectomy at this time.    GYN History:  Age of menarche was 13. Age of menopause was 56. Patient admits to hormonal therapy. She states she used OCP for approximately 5 years in the 1960s. Patient is . Age of first live birth was 31. Patient did not breast feed.    FAMILY History:  Maternal aunt- breast cancer, 55;  from breast cancer 5-10 years later (patient is unsure)  Father- melanoma, 65; did not die from melanoma  No other family history of malignancy including ovarian, colon, pancreatic, prostate, gastric or melanoma cancers.    Past Medical History:   Diagnosis Date    Allergy     seasonal    Anemia     Basal cell carcinoma 2013    forehead    Hx of colonic polyps     Hypertension     Medullary sponge kidney     MVP (mitral valve prolapse)     Osteoporosis, senile     Renal calculi     Sleep apnea     TMJ syndrome     sometimes jaw clicking/jaw pain    Urinary retention     Vertigo 2017    Vestibular neuronitis 2017    Visual impairment     reading glasses     Past Surgical History:   Procedure Laterality Date    BREAST CYST ASPIRATION Right     COLONOSCOPY N/A 2018    Procedure: COLONOSCOPY;  Surgeon: Juan Miguel Pena MD;  Location: [facility] ([address] [place] [address]);  Service: Endoscopy;  Laterality: N/A;    interstim placed stage 1      KIDNEY STONE SURGERY      @ [facility]  MOHS procedure       Current Outpatient Prescriptions on File Prior to Visit   Medication Sig Dispense Refill    alendronate (FOSAMAX) 70 MG tablet TAKE 1 TABLET BY MOUTH ON  WITH A FULL GLASS OF WATER AND WAIT 30 MINUTES BEFORE BREAKFAST AND PILLS. TAKE SITTING OR STANDING 4 tablet 12    atenolol (TENORMIN) 25 MG tablet Take one per day (Patient taking differently: 12.5 mg. Take one per day) 30 tablet 12    B COMPLEX & C NO.10/FOLIC ACID (B COMPLEX WITH C#10-FOLIC ACID ORAL) Take by mouth.      calcium citrate (CALCITRATE) 200 mg (950 mg) tablet Take 1 tablet by mouth once daily.       co-enzyme Q-10 50 mg capsule Take 100 mg by mouth once daily.       fish oil-vit E-fat acid5-hb137 (SUPER OMEGA-3) 400-5 mg-unit Cap Take 1 capsule by mouth Daily.      lisinopril 10 MG tablet Take 2 tablets (20 mg total) by mouth every morning. Dosage change 60 tablet 12    MULTIVITAMIN ORAL Take 1 tablet by mouth Daily.      radiacream (VANICREAM) Apply topically as needed.      valACYclovir (VALTREX) 1000 MG tablet Take 1 tablet (1,000 mg total) by mouth 3 (three) times daily. 30 tablet 0    vit C/E/Zn/coppr/lutein/zeaxan (PRESERVISION AREDS 2 ORAL) Take by mouth.       No current facility-administered medications on file prior to visit.      Social History     Social History    Marital status:      Spouse name: N/A    Number of children: N/A    Years of education: N/A     Occupational History     [employer]      Social History Main Topics    Smoking status: Former Smoker     Packs/day: 0.50     Years: 8.00     Quit date: 8/22/1964    Smokeless tobacco: Never Used    Alcohol use Yes      Comment: 1-3x/week    Drug use: No    Sexual activity: Not Currently     Other Topics Concern    Not on file     Social History Narrative    No narrative on file     Family History   Problem Relation Age of Onset    Kidney disease Mother     Heart disease Father     Melanoma Father     Breast cancer Maternal Aunt     Anesthesia problems Neg Hx     Malignant hypertension Neg Hx     Hypotension Neg Hx     Malignant hyperthermia Neg Hx     Pseudochol deficiency Neg Hx     Colon cancer Neg Hx     Ovarian cancer Neg Hx     Psoriasis Neg Hx     Lupus Neg Hx     Eczema Neg Hx     Acne Neg Hx         Review of Systems  Review of Systems   Constitutional: Negative for activity change, chills and fever.   Respiratory: Negative for chest tightness and shortness of breath.    Cardiovascular: Negative for chest pain.   Gastrointestinal: Negative for abdominal pain,  "blood in stool, constipation, diarrhea, nausea and vomiting.   Genitourinary: Positive for frequency (Increased frequency at baseline). Negative for hematuria.   Musculoskeletal: Negative for arthralgias and back pain.   Skin: Negative for color change and wound.   Neurological: Negative for dizziness, light-headedness and headaches.   Hematological: Negative for adenopathy. Does not bruise/bleed easily.   Psychiatric/Behavioral: Negative for agitation.        Objective:   PHYSICAL EXAM:  BP (!) 166/77 (BP Location: Right arm, Patient Position: Sitting, BP Method: Medium (Automatic))   Pulse (!) 50   Temp 97.6 °F (36.4 °C) (Oral)   Ht 5' 6" (1.676 m)   Wt 57.6 kg (127 lb)   LMP  (LMP Unknown)   BMI 20.50 kg/m²     Physical Exam   Vitals reviewed.  Constitutional: She is oriented to person, place, and time. She appears well-developed and well-nourished.   HENT:   Head: Normocephalic and atraumatic.   Cardiovascular: Normal rate.    Pulmonary/Chest: Effort normal. No respiratory distress. Right breast exhibits mass, skin change (Extensive bruising over lateral aspect of breast) and tenderness. Right breast exhibits no inverted nipple and no nipple discharge. Left breast exhibits no inverted nipple, no mass, no nipple discharge, no skin change and no tenderness.       Abdominal: Soft. There is no tenderness.   Genitourinary: No breast bleeding.   Musculoskeletal: Normal range of motion.   Neurological: She is alert and oriented to person, place, and time.   Skin: Skin is warm and dry.     Psychiatric: She has a normal mood and affect.     Radiology review: Images personally reviewed by me in the clinic.   Mammogram:7/11/18  Findings:  This procedure was performed using tomosynthesis.  Computer-aided detection was utilized in the interpretation of this examination.  Mammo Digital Diagnostic Right with Tomosynthesis_CAD  The breast is heterogeneously dense, which may obscure small masses.     There is an equal " density, oval mass with indistinct margins seen in the upper outer quadrant of the right breast in the posterior depth.     Ultrasound:7/11/18  There is a 10 mm x 9 mm x 4 mm oval, hypoechoic mass with indistinct margins seen in the upper outer quadrant of the right breast at 10 o'clock in the posterior depth, 5 cm from the nipple. The mass correlates with a palpable mass reported by the patient.     MRI 8/1/18:  Findings:  The breasts have heterogeneous fibroglandular tissue. The background parenchymal enhancement is minimal and symmetric.      Right  There is a mass seen in the upper outer quadrant of the right breast at 10 o'clock in the posterior depth, 5 cm from the nipple. This recently biopsied mass is difficult to see on MRI.  What is present is the artifact from the biopsy marker as well as minimal enhancement surrounding this artifact.  There are no additional lesions present.  The right axilla is normal.      Left  There is no evidence of suspicious masses, abnormal enhancement, or other abnormal findings.      Incidental note is made of an accessory vessel running along the course of the aortic arch. This is only partially visualized and is likely congenital.      Impression:  Right  Mass: Right breast mass at the upper outer posterior 10 o'clock position. Assessment: 6 - Known biopsy, proven malignancy.      Left  There is no MR evidence of malignancy.    Assessment:      Shima Sorto is a 78 y.o. postmenopausal female with recently diagnosed invasive lobular carcinoma of the right breast.      Plan:    Options for management were discussed with the patient and her friend. We reviewed the existing data noting the equivalency of breast conserving surgery with radiation therapy and mastectomy. We also reviewed the guidelines of the National Comprehensive Cancer Network for Stage I breast carcinoma. We discussed the need for lumpectomy margins to be negative for carcinoma, the necessity for  postoperative radiation therapy after breast conservation in most cases, the possibility of a failed or false negative sentinel lymph node biopsy and the potential need for complete lymphadenectomy for a failed or positive sentinel lymph node biopsy were fully discussed. In the setting of mastectomy, delayed or immediate reconstruction options are available and were discussed.     In the setting of lumpectomy, radiation therapy would be recommended majority of the time.  The duration and treatment side effects were discussed with the patient.  This will be coordinated with the radiation oncologist pending final pathology.    We also discussed the role of systemic therapy in the treatment of early stage breast cancer.  We discussed that this is based on tumor biology and quincy status and will be determined based on final pathology.  We discussed that if the cancer is hormone positive, endocrine therapy would be recommended in most cases and its use can reduce the risk of recurrence as well as improve survival. Side effects of treatment were briefly discussed. We also discussed the potential role for chemotherapy based on a number of factors such as tumor phenotype (ER+ vs. triple negative vs. [id number]+) and this would be determined in coordination with the medical oncologist.    MRI did not show any additional disease.  She is ready to proceed with lumpectomy and SLNB.  I can visualize the lesion on Ultraound in the office today so will use US guidance in the OR rather than have her come back for a seed or a wire.  All of her questions were answered today.    Patient was educated on breast cancer, receptors, wire localization lumpectomy, mastectomy, sentinel lymph node mapping and biopsy, axillary lymph node dissection, reconstruction, breast prosthesis with post-mastectomy bra and radiation therapy. Patient was given patient information binder including [facility] breast cancer treatment brochure.  All her questions were  answered.    Total time spent with the patient: 50 minutes.  40 minutes of face to face consultation and 10 minutes of chart review and coordination of care.

## 2018-08-09 ENCOUNTER — PATIENT MESSAGE (OUTPATIENT)
Dept: SURGERY | Facility: HOSPITAL | Age: 78
End: 2018-08-09

## 2018-08-09 ENCOUNTER — PATIENT MESSAGE (OUTPATIENT)
Dept: INTERNAL MEDICINE | Facility: CLINIC | Age: 78
End: 2018-08-09

## 2018-08-09 DIAGNOSIS — Z17.0 MALIGNANT NEOPLASM OF UPPER-OUTER QUADRANT OF RIGHT BREAST IN FEMALE, ESTROGEN RECEPTOR POSITIVE: Primary | ICD-10-CM

## 2018-08-09 DIAGNOSIS — C50.411 MALIGNANT NEOPLASM OF UPPER-OUTER QUADRANT OF RIGHT BREAST IN FEMALE, ESTROGEN RECEPTOR POSITIVE: Primary | ICD-10-CM

## 2018-08-10 ENCOUNTER — TELEPHONE (OUTPATIENT)
Dept: HEMATOLOGY/ONCOLOGY | Facility: CLINIC | Age: 78
End: 2018-08-10

## 2018-08-14 ENCOUNTER — CLINICAL SUPPORT (OUTPATIENT)
Dept: HEMATOLOGY/ONCOLOGY | Facility: CLINIC | Age: 78
End: 2018-08-14
Payer: MEDICARE

## 2018-08-14 VITALS — WEIGHT: 128.31 LBS | BODY MASS INDEX: 20.62 KG/M2 | HEIGHT: 66 IN

## 2018-08-14 DIAGNOSIS — C50.411 MALIGNANT NEOPLASM OF UPPER-OUTER QUADRANT OF RIGHT BREAST IN FEMALE, ESTROGEN RECEPTOR POSITIVE: ICD-10-CM

## 2018-08-14 DIAGNOSIS — Z71.3 NUTRITIONAL COUNSELING: Primary | ICD-10-CM

## 2018-08-14 DIAGNOSIS — Z17.0 MALIGNANT NEOPLASM OF UPPER-OUTER QUADRANT OF RIGHT BREAST IN FEMALE, ESTROGEN RECEPTOR POSITIVE: ICD-10-CM

## 2018-08-14 PROCEDURE — 99212 OFFICE O/P EST SF 10 MIN: CPT | Mod: PBBFAC | Performed by: DIETITIAN, REGISTERED

## 2018-08-14 PROCEDURE — 99999 PR PBB SHADOW E&M-EST. PATIENT-LVL II: CPT | Mod: PBBFAC,,, | Performed by: DIETITIAN, REGISTERED

## 2018-08-14 PROCEDURE — 97802 MEDICAL NUTRITION INDIV IN: CPT | Mod: PBBFAC,59 | Performed by: DIETITIAN, REGISTERED

## 2018-08-14 RX ORDER — ATENOLOL 25 MG/1
TABLET ORAL
Qty: 30 TABLET | Refills: 6 | Status: SHIPPED | OUTPATIENT
Start: 2018-08-14 | End: 2020-01-07 | Stop reason: SDUPTHER

## 2018-08-14 NOTE — PROGRESS NOTES
"Medical Nutrition Therapy Oncology Progress Note    Name: Shima Sorto MRN: [id number]  : 1940    Age: 78 y.o.  Ethnicity: /White Language: English    Diagnosis: No diagnosis found.    Chemo Regimen:    Referring MD: Dr. Mason Frequency:  Radiation:            Goal of Cancer treatment n/a         Nutrition Assessment     Chief Complaint:   Chief Complaint   Patient presents with    Nutrition Counseling    Breast Cancer        Anthropometric Measurements:  Height: 5' 6" (1.676 m)  Current Weight: 58.2 kg (128 lb 4.9 oz)  Ideal Body Weight: 136#  Percent Ideal Body Weight:: 94%  BMI: Body mass index is 20.71 kg/m².     Weight History:   Wt Readings from Last 8 Encounters:   18 58.2 kg (128 lb 4.9 oz)   18 57.6 kg (127 lb)   18 57.6 kg (127 lb)   18 56.7 kg (125 lb)   07/10/18 58.2 kg (128 lb 4.9 oz)   18 59 kg (130 lb)   04/10/18 61.6 kg (135 lb 12.9 oz)   18 60.5 kg (133 lb 6.1 oz)     Medical Health History:  Feeding tube placed: No  Pre-treatment: Yes    Past Surgical History:   Procedure Laterality Date    BREAST CYST ASPIRATION Right     interstim placed stage 1      KIDNEY STONE SURGERY      @ [facility]  MOHS procedure          Medications:   Current Outpatient Medications:     alendronate (FOSAMAX) 70 MG tablet, TAKE 1 TABLET BY MOUTH ON  WITH A FULL GLASS OF WATER AND WAIT 30 MINUTES BEFORE BREAKFAST AND PILLS. TAKE SITTING OR STANDING, Disp: 4 tablet, Rfl: 12    atenolol (TENORMIN) 25 MG tablet, Take one per day (Patient taking differently: 12.5 mg. Take one per day), Disp: 30 tablet, Rfl: 12    B COMPLEX & C NO.10/FOLIC ACID (B COMPLEX WITH C#10-FOLIC ACID ORAL), Take by mouth., Disp: , Rfl:     calcium citrate (CALCITRATE) 200 mg (950 mg) tablet, Take 1 tablet by mouth once daily., Disp: , Rfl:     co-enzyme Q-10 50 mg capsule, Take 100 mg by mouth once daily. , Disp: , Rfl:     fish oil-vit E-fat acid5-hb137 (SUPER " OMEGA-3) 400-5 mg-unit Cap, Take 1 capsule by mouth Daily., Disp: , Rfl:     lisinopril 10 MG tablet, Take 2 tablets (20 mg total) by mouth every morning. Dosage change, Disp: 60 tablet, Rfl: 12    MULTIVITAMIN ORAL, Take 1 tablet by mouth Daily., Disp: , Rfl:     radiacream (VANICREAM), Apply topically as needed., Disp: , Rfl:     valACYclovir (VALTREX) 1000 MG tablet, Take 1 tablet (1,000 mg total) by mouth 3 (three) times daily., Disp: 30 tablet, Rfl: 0    vit C/E/Zn/coppr/lutein/zeaxan (PRESERVISION AREDS 2 ORAL), Take by mouth., Disp: , Rfl:     Last Labs:  Last Labs:  Glucose   Date Value Ref Range Status   08/06/2018 88 70 - 110 mg/dL Final   04/11/2018 105 70 - 110 mg/dL Final     BUN, Bld   Date Value Ref Range Status   08/06/2018 27 (H) 8 - 23 mg/dL Final   04/11/2018 33 (H) 8 - 23 mg/dL Final     Creatinine   Date Value Ref Range Status   08/06/2018 1.0 0.5 - 1.4 mg/dL Final   04/11/2018 1.0 0.5 - 1.4 mg/dL Final     Sodium   Date Value Ref Range Status   08/06/2018 138 136 - 145 mmol/L Final   04/11/2018 143 136 - 145 mmol/L Final     Potassium   Date Value Ref Range Status   08/06/2018 4.4 3.5 - 5.1 mmol/L Final   04/11/2018 4.1 3.5 - 5.1 mmol/L Final     Phosphorus   Date Value Ref Range Status   12/28/2015 3.2 2.7 - 4.5 mg/dL Final   08/17/2015 2.9 2.7 - 4.5 mg/dL Final     Calcium   Date Value Ref Range Status   08/06/2018 9.2 8.7 - 10.5 mg/dL Final   04/11/2018 9.5 8.7 - 10.5 mg/dL Final     Prealbumin   Date Value Ref Range Status   01/20/2010 20 20 - 43 mg/dL Final     Total Protein   Date Value Ref Range Status   04/11/2018 6.7 6.0 - 8.4 g/dL Final   09/06/2017 6.8 6.0 - 8.4 g/dL Final     Cholesterol   Date Value Ref Range Status   04/11/2018 202 (H) 120 - 199 mg/dL Final     Comment:     The National Cholesterol Education Program (NCEP) has set the  following guidelines (reference ranges) for Cholesterol:  Optimal.....................<200 mg/dL  Borderline High.............200-239  mg/dL  High........................> or = 240 mg/dL     09/06/2017 207 (H) 120 - 199 mg/dL Final     Comment:     The National Cholesterol Education Program (NCEP) has set the  following guidelines (reference ranges) for Cholesterol:  Optimal.....................<200 mg/dL  Borderline High.............200-239 mg/dL  High........................> or = 240 mg/dL       No results found for: HGBA1C  Hemoglobin   Date Value Ref Range Status   04/11/2018 11.7 (L) 12.0 - 16.0 g/dL Final   09/06/2017 11.6 (L) 12.0 - 16.0 g/dL Final     Hematocrit   Date Value Ref Range Status   04/11/2018 36.2 (L) 37.0 - 48.5 % Final   09/06/2017 34.4 (L) 37.0 - 48.5 % Final     Iron   Date Value Ref Range Status   04/10/2013 90 30 - 160 ug/dL Final   01/20/2010 92 30 - 160 mcg/dl Final     No components found for: FROLATE  Vit D, 25-Hydroxy   Date Value Ref Range Status   04/16/2015 52 30 - 96 ng/mL Final     Comment:     Vitamin D deficiency.........<10 ng/mL                              Vitamin D insufficiency......10-29 ng/mL       Vitamin D sufficiency........> or equal to 30 ng/mL  Vitamin D toxicity............>100 ng/mL     03/25/2014 48 30 - 96 ng/mL Final     Comment:     Vitamin D deficiency.........<10 ng/mL                              Vitamin D insufficiency......10-29 ng/mL       Vitamin D sufficiency........> or equal to 30 ng/mL  Vitamin D toxicity............>100 ng/mL     WBC   Date Value Ref Range Status   04/11/2018 4.17 3.90 - 12.70 K/uL Final   09/06/2017 4.47 3.90 - 12.70 K/uL Final         Client History/Food Access:    Living Situation: Lives alone   Who: Shops for Groceries? Patient   Who : Prepares meals? Patient   Who: Fills prescriptions? Patient   Are there financial difficulties purchasing food? No   Personal History (occupation, physical activity level, exercise):      Baseline for Outcomes Monitoring  Food and Nutrition History: Pt here for nutrition counseling before partial mastectomy. Pt with current  weight of 128# which pt states is stable. Pt recently had braces put on--states she is eating softer foods and snacking less often. Per 24 hour recall, pt with fairly normal consistency diet and snacks often. Pt states she eats 2 meals/day (evening meal away from the home 3-4 nights/week) and avoids fried foods. Pt eats whole grains/starches only at breakfast. Pt snacks on fruit often, stating she eats about 1 pint of strawberries daily in addition to a variety of all other fruit. Pt has a history of kidney stones and had questions regarding calcium oxalate diet. Reviewed list of oxalate content of foods and provided pt with copy. Encourage calcium paired with oxalate foods. Encouraged pt to limit vitamin C supplementation. Pt states her MD recently asked her to stop taking vitamin E supplements as well. Encouraged pt to aim to eat more vitamins and minerals rather than using supplements. Pt states she exercises daily. States she stretches in bed every morning and lifts light weights.   24-hour recall:  Breakfast: cereal with 2% milk, coffee, fruit or oatmeal with fruit and coffee or toast with fruit  Lunch: grapes, cherries, blueberries, crackers  Dinner: fish or seafood with vegetables or steak or cheeseburger with 1/2 bun, vegetables  Snack: strawberries and celery with crackers or yogurt  Drinks: milk, water, tonic, sweet tea     Nutritional Needs:  Estimated Needs Method Use    [id number] kcal/day [] Predictive Equation: [place]   [x]  25-28 kcal/kg   Protein 60-70 g 1.0-1.2 gm/kg/day   Fluid 1600 ml 28 ml/kg/day     Food/Nutrient Intake (oral, enteral or parenteral) and Patient Behaviors     Calorie intake: Adequate   Protein intake: Inadequate     Yes/No    N/A Uses medical food supplements   N/A Cooking techniques to minimize fatigue   Yes Currently modifying food textures   Yes Able to maintain usual physical actiivty     Nutrition Diagnosis     Nutrition Diagnosis Related to (Etiology) As Evidenced  By (Signs/Symptoms)   No nutrition diagnosis at this time                   Nutritional Intervention and Prescription        Nutrition Intervention General/healthful diet   Goals/Expected Outcomes Pt to consume healthy diet with fruits and vegetables often throughout the day.  Pt to balance diet with fats, carbohydrates, and protein at each meal.   Progress Progressing towards goal     Nutrition Intervention Protein-modified diet   Goals/Expected Outcomes Pt to consume adequate protein to protect kidneys, but also promote healing from surgery.   Progress Initial     Nutrition Intervention Vitamin-modified diet and Mineral-modified diet   Goals/Expected Outcomes Pt to consume calcium at each meal and limit oxalate foods to no more than 200 mg/day.   Progress Initial     Pt needs education? yes (see intervention)    Coordination of Nutrition Care: Comments:   Collaboration with other providers MD         Monitoring and Evaluation     Monitor: diet education needs    Next Visit: PRN    Materials Provided:  1. RD contact information 2. Kidney stone nutrition therapy   3. Oxalate content of food            Total time: 60 minutes    Nutrition Score:      ©2010 [employer] Oncology Toolkit   Answers for HPI/ROS submitted by the patient on 8/10/2018   appetite change : No  unexpected weight change: No  visual disturbance: No  cough: No  shortness of breath: No  chest pain: No  abdominal pain: No  diarrhea: No  frequency: Yes  back pain: No  rash: No  headaches: No  adenopathy: No  nervous/ anxious: No

## 2018-08-14 NOTE — PATIENT INSTRUCTIONS
Recommendations:    Pt to consume calcium at each meal and limit oxalate foods to no more than 200 mg/day.  Pt to consume healthy diet with fruits and vegetables often throughout the day, lean protein, whole grains, and low fat dairy.  Pt to consume adequate calories and protein to maintain weight.  Pt to exercise for at least 30 minutes 5 days/week.

## 2018-08-16 ENCOUNTER — OFFICE VISIT (OUTPATIENT)
Dept: OBSTETRICS AND GYNECOLOGY | Facility: CLINIC | Age: 78
End: 2018-08-16
Attending: OBSTETRICS & GYNECOLOGY
Payer: MEDICARE

## 2018-08-16 VITALS — BODY MASS INDEX: 20.1 KG/M2 | HEIGHT: 67 IN | SYSTOLIC BLOOD PRESSURE: 120 MMHG | DIASTOLIC BLOOD PRESSURE: 70 MMHG

## 2018-08-16 DIAGNOSIS — Z01.419 VISIT FOR GYNECOLOGIC EXAMINATION: Primary | ICD-10-CM

## 2018-08-16 DIAGNOSIS — D22.9 BENIGN NEVUS OF SKIN: ICD-10-CM

## 2018-08-16 PROCEDURE — 99999 PR PBB SHADOW E&M-EST. PATIENT-LVL III: CPT | Mod: PBBFAC,,, | Performed by: OBSTETRICS & GYNECOLOGY

## 2018-08-16 PROCEDURE — 99213 OFFICE O/P EST LOW 20 MIN: CPT | Mod: PBBFAC | Performed by: OBSTETRICS & GYNECOLOGY

## 2018-08-16 PROCEDURE — G0101 CA SCREEN;PELVIC/BREAST EXAM: HCPCS | Mod: S$PBB,,, | Performed by: OBSTETRICS & GYNECOLOGY

## 2018-08-16 NOTE — PRE-PROCEDURE INSTRUCTIONS
Preop instructions: NPO solids/ milk products  after midnight or clears up to 2 hours before arrival (clear liquids are: water, apple juice, black coffee/no cream or creamer, Gatorade & Jello), shower instructions, directions, leave all valuables at home, medication instructions for PM prior & am of procedure explained. Patient stated an understanding.      Patient denies any side effects or issues with anesthesia or sedation. Does report that it does not take much to put her under.

## 2018-08-17 ENCOUNTER — HOSPITAL ENCOUNTER (OUTPATIENT)
Dept: RADIOLOGY | Facility: HOSPITAL | Age: 78
Discharge: HOME | End: 2018-08-17
Attending: SURGERY | Admitting: SURGERY
Payer: MEDICARE

## 2018-08-17 ENCOUNTER — HOSPITAL ENCOUNTER (OUTPATIENT)
Dept: RADIOLOGY | Facility: HOSPITAL | Age: 78
Discharge: HOME | End: 2018-08-17
Attending: SURGERY
Payer: MEDICARE

## 2018-08-17 ENCOUNTER — HOSPITAL ENCOUNTER (OUTPATIENT)
Facility: HOSPITAL | Age: 78
Discharge: HOME | End: 2018-08-17
Attending: SURGERY | Admitting: SURGERY
Payer: MEDICARE

## 2018-08-17 ENCOUNTER — ANESTHESIA (OUTPATIENT)
Dept: SURGERY | Facility: HOSPITAL | Age: 78
End: 2018-08-17
Payer: MEDICARE

## 2018-08-17 ENCOUNTER — ANESTHESIA EVENT (OUTPATIENT)
Dept: SURGERY | Facility: HOSPITAL | Age: 78
End: 2018-08-17
Payer: MEDICARE

## 2018-08-17 VITALS
BODY MASS INDEX: 19.62 KG/M2 | OXYGEN SATURATION: 100 % | HEART RATE: 65 BPM | HEIGHT: 67 IN | RESPIRATION RATE: 16 BRPM | DIASTOLIC BLOOD PRESSURE: 78 MMHG | TEMPERATURE: 98 F | WEIGHT: 125 LBS | SYSTOLIC BLOOD PRESSURE: 168 MMHG

## 2018-08-17 DIAGNOSIS — C50.411 MALIGNANT NEOPLASM OF UPPER-OUTER QUADRANT OF RIGHT BREAST IN FEMALE, ESTROGEN RECEPTOR POSITIVE: ICD-10-CM

## 2018-08-17 DIAGNOSIS — Z17.0 MALIGNANT NEOPLASM OF UPPER-OUTER QUADRANT OF RIGHT BREAST IN FEMALE, ESTROGEN RECEPTOR POSITIVE: ICD-10-CM

## 2018-08-17 DIAGNOSIS — C50.911 CANCER OF RIGHT BREAST: ICD-10-CM

## 2018-08-17 PROCEDURE — 64520 N BLOCK LUMBAR/THORACIC: CPT | Performed by: ANESTHESIOLOGY

## 2018-08-17 PROCEDURE — 19301 PARTIAL MASTECTOMY: CPT | Mod: RT,,, | Performed by: SURGERY

## 2018-08-17 PROCEDURE — 76098 X-RAY EXAM SURGICAL SPECIMEN: CPT | Mod: TC,PO

## 2018-08-17 PROCEDURE — 36000707: Performed by: SURGERY

## 2018-08-17 PROCEDURE — 71000033 HC RECOVERY, INTIAL HOUR: Performed by: SURGERY

## 2018-08-17 PROCEDURE — 76998 US GUIDE INTRAOP: CPT | Mod: 26,,, | Performed by: SURGERY

## 2018-08-17 PROCEDURE — 38525 BIOPSY/REMOVAL LYMPH NODES: CPT | Mod: 51,RT,, | Performed by: SURGERY

## 2018-08-17 PROCEDURE — D9220A PRA ANESTHESIA: Mod: CRNA,,, | Performed by: NURSE ANESTHETIST, CERTIFIED REGISTERED

## 2018-08-17 PROCEDURE — 88341 IMHCHEM/IMCYTCHM EA ADD ANTB: CPT | Mod: 26,,, | Performed by: PATHOLOGY

## 2018-08-17 PROCEDURE — C1729 CATH, DRAINAGE: HCPCS | Performed by: SURGERY

## 2018-08-17 PROCEDURE — 36000706: Performed by: SURGERY

## 2018-08-17 PROCEDURE — 25000003 PHARM REV CODE 250: Performed by: STUDENT IN AN ORGANIZED HEALTH CARE EDUCATION/TRAINING PROGRAM

## 2018-08-17 PROCEDURE — 88342 IMHCHEM/IMCYTCHM 1ST ANTB: CPT | Performed by: PATHOLOGY

## 2018-08-17 PROCEDURE — A9520 TC99 TILMANOCEPT DIAG 0.5MCI: HCPCS

## 2018-08-17 PROCEDURE — 37000008 HC ANESTHESIA 1ST 15 MINUTES: Performed by: SURGERY

## 2018-08-17 PROCEDURE — 63600175 PHARM REV CODE 636 W HCPCS: Performed by: ANESTHESIOLOGY

## 2018-08-17 PROCEDURE — 88342 IMHCHEM/IMCYTCHM 1ST ANTB: CPT | Mod: 26,,, | Performed by: PATHOLOGY

## 2018-08-17 PROCEDURE — 88307 TISSUE EXAM BY PATHOLOGIST: CPT | Mod: 59 | Performed by: PATHOLOGY

## 2018-08-17 PROCEDURE — 27000221 HC OXYGEN, UP TO 24 HOURS

## 2018-08-17 PROCEDURE — 94761 N-INVAS EAR/PLS OXIMETRY MLT: CPT

## 2018-08-17 PROCEDURE — 25000003 PHARM REV CODE 250: Performed by: NURSE ANESTHETIST, CERTIFIED REGISTERED

## 2018-08-17 PROCEDURE — D9220A PRA ANESTHESIA: Mod: ANES,,, | Performed by: ANESTHESIOLOGY

## 2018-08-17 PROCEDURE — 88305 TISSUE EXAM BY PATHOLOGIST: CPT | Performed by: PATHOLOGY

## 2018-08-17 PROCEDURE — 37000009 HC ANESTHESIA EA ADD 15 MINS: Performed by: SURGERY

## 2018-08-17 PROCEDURE — 38900 IO MAP OF SENT LYMPH NODE: CPT | Mod: ,,, | Performed by: SURGERY

## 2018-08-17 PROCEDURE — 63600175 PHARM REV CODE 636 W HCPCS: Performed by: NURSE ANESTHETIST, CERTIFIED REGISTERED

## 2018-08-17 PROCEDURE — 88309 TISSUE EXAM BY PATHOLOGIST: CPT | Mod: 26,,, | Performed by: PATHOLOGY

## 2018-08-17 PROCEDURE — 63600175 PHARM REV CODE 636 W HCPCS: Performed by: STUDENT IN AN ORGANIZED HEALTH CARE EDUCATION/TRAINING PROGRAM

## 2018-08-17 PROCEDURE — 71000015 HC POSTOP RECOV 1ST HR: Performed by: SURGERY

## 2018-08-17 PROCEDURE — 76098 X-RAY EXAM SURGICAL SPECIMEN: CPT | Mod: 26,,, | Performed by: SURGERY

## 2018-08-17 PROCEDURE — 88305 TISSUE EXAM BY PATHOLOGIST: CPT | Mod: 26,,, | Performed by: PATHOLOGY

## 2018-08-17 PROCEDURE — 88307 TISSUE EXAM BY PATHOLOGIST: CPT | Mod: 26,,, | Performed by: PATHOLOGY

## 2018-08-17 PROCEDURE — 27200651 HC AIRWAY, LMA: Performed by: NURSE ANESTHETIST, CERTIFIED REGISTERED

## 2018-08-17 RX ORDER — MIDAZOLAM HYDROCHLORIDE 1 MG/ML
INJECTION, SOLUTION INTRAMUSCULAR; INTRAVENOUS
Status: DISCONTINUED | OUTPATIENT
Start: 2018-08-17 | End: 2018-08-17

## 2018-08-17 RX ORDER — DEXAMETHASONE SODIUM PHOSPHATE 4 MG/ML
INJECTION, SOLUTION INTRA-ARTICULAR; INTRALESIONAL; INTRAMUSCULAR; INTRAVENOUS; SOFT TISSUE
Status: DISCONTINUED | OUTPATIENT
Start: 2018-08-17 | End: 2018-08-17

## 2018-08-17 RX ORDER — HYDROCODONE BITARTRATE AND ACETAMINOPHEN 5; 325 MG/1; MG/1
1 TABLET ORAL ONCE AS NEEDED
Status: DISCONTINUED | OUTPATIENT
Start: 2018-08-17 | End: 2018-08-17 | Stop reason: HOSPADM

## 2018-08-17 RX ORDER — ROPIVACAINE HYDROCHLORIDE 5 MG/ML
INJECTION, SOLUTION EPIDURAL; INFILTRATION; PERINEURAL
Status: COMPLETED | OUTPATIENT
Start: 2018-08-17 | End: 2018-08-17

## 2018-08-17 RX ORDER — HYDROCODONE BITARTRATE AND ACETAMINOPHEN 5; 325 MG/1; MG/1
1 TABLET ORAL EVERY 6 HOURS PRN
Qty: 12 TABLET | Refills: 0 | Status: SHIPPED | OUTPATIENT
Start: 2018-08-17 | End: 2018-09-04

## 2018-08-17 RX ORDER — MIDAZOLAM HYDROCHLORIDE 1 MG/ML
0.5 INJECTION INTRAMUSCULAR; INTRAVENOUS
Status: DISCONTINUED | OUTPATIENT
Start: 2018-08-17 | End: 2018-08-17 | Stop reason: HOSPADM

## 2018-08-17 RX ORDER — EPHEDRINE SULFATE 50 MG/ML
INJECTION, SOLUTION INTRAVENOUS
Status: DISCONTINUED | OUTPATIENT
Start: 2018-08-17 | End: 2018-08-17

## 2018-08-17 RX ORDER — FENTANYL CITRATE 50 UG/ML
INJECTION, SOLUTION INTRAMUSCULAR; INTRAVENOUS
Status: DISCONTINUED | OUTPATIENT
Start: 2018-08-17 | End: 2018-08-17

## 2018-08-17 RX ORDER — CEFAZOLIN SODIUM 1 G/3ML
2 INJECTION, POWDER, FOR SOLUTION INTRAMUSCULAR; INTRAVENOUS
Status: COMPLETED | OUTPATIENT
Start: 2018-08-17 | End: 2018-08-17

## 2018-08-17 RX ORDER — SODIUM CHLORIDE 9 MG/ML
INJECTION, SOLUTION INTRAVENOUS CONTINUOUS
Status: DISCONTINUED | OUTPATIENT
Start: 2018-08-17 | End: 2023-10-10

## 2018-08-17 RX ORDER — PROPOFOL 10 MG/ML
VIAL (ML) INTRAVENOUS
Status: DISCONTINUED | OUTPATIENT
Start: 2018-08-17 | End: 2018-08-17

## 2018-08-17 RX ORDER — LIDOCAINE HCL/PF 100 MG/5ML
SYRINGE (ML) INTRAVENOUS
Status: DISCONTINUED | OUTPATIENT
Start: 2018-08-17 | End: 2018-08-17

## 2018-08-17 RX ORDER — ONDANSETRON 2 MG/ML
INJECTION INTRAMUSCULAR; INTRAVENOUS
Status: DISCONTINUED | OUTPATIENT
Start: 2018-08-17 | End: 2018-08-17

## 2018-08-17 RX ORDER — FENTANYL CITRATE 50 UG/ML
25 INJECTION, SOLUTION INTRAMUSCULAR; INTRAVENOUS EVERY 5 MIN PRN
Status: DISCONTINUED | OUTPATIENT
Start: 2018-08-17 | End: 2018-08-17 | Stop reason: HOSPADM

## 2018-08-17 RX ADMIN — FENTANYL CITRATE 50 MCG: 50 INJECTION INTRAMUSCULAR; INTRAVENOUS at 09:08

## 2018-08-17 RX ADMIN — FENTANYL CITRATE 25 MCG: 50 INJECTION, SOLUTION INTRAMUSCULAR; INTRAVENOUS at 12:08

## 2018-08-17 RX ADMIN — DEXAMETHASONE SODIUM PHOSPHATE 4 MG: 4 INJECTION, SOLUTION INTRAMUSCULAR; INTRAVENOUS at 11:08

## 2018-08-17 RX ADMIN — LIDOCAINE HYDROCHLORIDE 100 MG: 20 INJECTION, SOLUTION INTRAVENOUS at 10:08

## 2018-08-17 RX ADMIN — FENTANYL CITRATE 25 MCG: 50 INJECTION, SOLUTION INTRAMUSCULAR; INTRAVENOUS at 11:08

## 2018-08-17 RX ADMIN — ROPIVACAINE HYDROCHLORIDE 30 ML: 5 INJECTION, SOLUTION EPIDURAL; INFILTRATION; PERINEURAL at 09:08

## 2018-08-17 RX ADMIN — SODIUM CHLORIDE: 0.9 INJECTION, SOLUTION INTRAVENOUS at 08:08

## 2018-08-17 RX ADMIN — ONDANSETRON 4 MG: 2 INJECTION INTRAMUSCULAR; INTRAVENOUS at 12:08

## 2018-08-17 RX ADMIN — PROPOFOL 110 MG: 10 INJECTION, EMULSION INTRAVENOUS at 10:08

## 2018-08-17 RX ADMIN — MIDAZOLAM HYDROCHLORIDE 1 MG: 1 INJECTION, SOLUTION INTRAMUSCULAR; INTRAVENOUS at 09:08

## 2018-08-17 RX ADMIN — MIDAZOLAM HYDROCHLORIDE 1 MG: 1 INJECTION, SOLUTION INTRAMUSCULAR; INTRAVENOUS at 10:08

## 2018-08-17 RX ADMIN — EPHEDRINE SULFATE 5 MG: 50 INJECTION, SOLUTION INTRAMUSCULAR; INTRAVENOUS; SUBCUTANEOUS at 11:08

## 2018-08-17 RX ADMIN — CEFAZOLIN 2 G: 330 INJECTION, POWDER, FOR SOLUTION INTRAMUSCULAR; INTRAVENOUS at 10:08

## 2018-08-17 NOTE — ANESTHESIA POSTPROCEDURE EVALUATION
"Anesthesia Post Evaluation    Patient: Shima Sorto    Procedure(s) Performed: Procedure(s) (LRB):  MASTECTOMY, PARTIAL-US guided (Right)  INJECTION, FOR SENTINEL NODE IDENTIFICATION (Right)  BIOPSY, LYMPH NODE, SENTINEL (Right)    Final Anesthesia Type: general  Patient location during evaluation: PACU  Patient participation: Yes- Able to Participate  Level of consciousness: awake and alert and oriented  Post-procedure vital signs: reviewed and stable  Pain management: adequate  Airway patency: patent  PONV status at discharge: No PONV  Anesthetic complications: no      Cardiovascular status: hemodynamically stable  Respiratory status: unassisted, spontaneous ventilation and room air  Hydration status: euvolemic  Follow-up not needed.        Visit Vitals  BP (!) 168/77   Pulse 60   Temp 36.5 °C (97.7 °F) (Temporal)   Resp 18   Ht 5' 7" (1.702 m)   Wt 56.7 kg (125 lb)   LMP  (LMP Unknown)   SpO2 100%   Breastfeeding? No   BMI 19.58 kg/m²       Pain/Eboni Score: Pain Assessment Performed: Yes (8/17/2018 12:56 PM)  Presence of Pain: denies (8/17/2018 12:56 PM)  Pain Rating Prior to Med Admin: 0 (8/17/2018  9:26 AM)  Eboni Score: 8 (8/17/2018 12:56 PM)        "

## 2018-08-17 NOTE — PROGRESS NOTES
Patient states she's hesitant to get up because she's scared she will lose her balance and become dizzy, patient is awake and alert, drinking water, she's asking for more time to feel less foggy.

## 2018-08-17 NOTE — TRANSFER OF CARE
"Anesthesia Transfer of Care Note    Patient: Shima Sorto    Procedure(s) Performed: Procedure(s) (LRB):  MASTECTOMY, PARTIAL-US guided (Right)  INJECTION, FOR SENTINEL NODE IDENTIFICATION (Right)  BIOPSY, LYMPH NODE, SENTINEL (Right)    Patient location: PACU    Anesthesia Type: general    Transport from OR: Transported from OR on 6-10 L/min O2 by face mask with adequate spontaneous ventilation    Post pain: adequate analgesia    Post assessment: no apparent anesthetic complications and tolerated procedure well    Post vital signs: stable    Level of consciousness: awake and responds to stimulation    Nausea/Vomiting: no nausea/vomiting    Complications: none    Transfer of care protocol was followed      Last vitals:   Visit Vitals  BP (!) 157/71   Pulse (!) 52   Temp 36.5 °C (97.7 °F) (Temporal)   Resp 18   Ht 5' 7" (1.702 m)   Wt 56.7 kg (125 lb)   LMP  (LMP Unknown)   SpO2 100%   Breastfeeding? No   BMI 19.58 kg/m²     "

## 2018-08-17 NOTE — BRIEF OP NOTE
[facility]  Brief Operative Note     SUMMARY     Surgery Date: 8/17/2018     Surgeon(s) and Role:     * Abby Mason MD - Primary     * James Carranza MD - Resident - Assisting        Pre-op Diagnosis:  Malignant neoplasm of upper-outer quadrant of right breast in female, estrogen receptor positive [C50.411, Z17.0]    Post-op Diagnosis:  Post-Op Diagnosis Codes:     * Malignant neoplasm of upper-outer quadrant of right breast in female, estrogen receptor positive [C50.411, Z17.0]    Procedure(s) (LRB):  MASTECTOMY, PARTIAL-US guided (Right)  INJECTION, FOR SENTINEL NODE IDENTIFICATION (Right)  BIOPSY, LYMPH NODE, SENTINEL (Right)    Anesthesia: General    Description of the findings of the procedure: R Partial Mastectomy with SLNBx    Findings/Key Components: R partial Mastectomy.  [place] Lymph node(s) submitted to path.    Estimated Blood Loss: 25 ml         Specimens:   Specimen (12h ago, onward)    Start     Ordered    08/17/18 1250  Specimen to Pathology - Surgery  Once     Comments:  1.) right lumpectomy  (green=inferior; blue=superior; orange=lateral; yellow=anterior; black=posterior; red=medial                                    -- PERMANENT2.) right axillary sentinel lymph node # 1 (hot 93)  --PERMANENT3.) right axillary sentinel lymph node #2 ( warm 32 )  - PERMANENT4.) right breast inferior margin; ink marks new margin  - PERMANENT     Start Status   08/17/18 1250 Collected (08/17/18 1256)       08/17/18 1255    08/17/18 1226  Specimen to Pathology - Surgery  Once     Comments:  1.) right inferior margin; ink marks new margin   -  PERMANENT     Start Status   08/17/18 1226 Collected (08/17/18 1226)       08/17/18 1226    08/17/18 1210  Specimen to Pathology - Surgery  Once     Comments:  1.) Right lumpectomy ( green=inferior;blue=superior; orange=lateral;yellow=anterior; black=posterior; red=medial )                         - PERMANENT2.) Right axillary sentinel lymph node # 1(  Hot 93 )   -  PERMANENT3.) Right axillary sentinel lymph node # 2 ( warm 32)  -  PERMANENT     Start Status   08/17/18 1210 Collected (08/17/18 1210)       08/17/18 1209    Pending  Specimen to Pathology - Surgery  Once     Comments:  1.) right lumpectomy  - ( green=inferior; blue=superior;orange=lateral;yellow=anterior;black=posterior;red=medial)  -                                    PERMANENT      Pending          Discharge Note    SUMMARY     Admit Date: 8/17/2018    Discharge Date and Time:  08/17/2018 1:12 PM    Hospital Course (synopsis of major diagnoses, care, treatment, and services provided during the course of the hospital stay): Presented for R partial mastectomy.  No complications.  Discharged in stable position.     Final Diagnosis: Post-Op Diagnosis Codes:     * Malignant neoplasm of upper-outer quadrant of right breast in female, estrogen receptor positive [C50.411, Z17.0]    Disposition: Home or Self Care    Follow Up/Patient Instructions:     Medications:  Reconciled Home Medications:      Medication List      START taking these medications    HYDROcodone-acetaminophen 5-325 mg per tablet  Commonly known as:  NORCO  Take 1 tablet by mouth every 6 (six) hours as needed for Pain.        CONTINUE taking these medications    alendronate 70 MG tablet  Commonly known as:  FOSAMAX  TAKE 1 TABLET BY MOUTH ON SUNDAY WITH A FULL GLASS OF WATER AND WAIT 30 MINUTES BEFORE BREAKFAST AND PILLS. TAKE SITTING OR STANDING     atenolol 25 MG tablet  Commonly known as:  TENORMIN  TAKE 1 TABLET BY MOUTH EVERY DAY     B COMPLEX WITH C#10-FOLIC ACID ORAL  Take by mouth.     calcium citrate 200 mg (950 mg) tablet  Commonly known as:  CALCITRATE  Take 1 tablet by mouth once daily.     co-enzyme Q-10 50 mg capsule  Take 100 mg by mouth once daily.     lisinopril 10 MG tablet  Take 2 tablets (20 mg total) by mouth every morning. Dosage change     MULTIVITAMIN ORAL  Take 1 tablet by mouth Daily.     PRESERVISION AREDS 2  ORAL  Take by mouth.     SUPER OMEGA-3 400-5 mg-unit Cap  Generic drug:  fish oil-E-fatty acid5-rrvc680  Take 1 capsule by mouth Daily.     VANICREAM  Generic drug:  radiacream  Apply topically as needed.          Discharge Procedure Orders   Diet Adult Regular     Lifting restrictions   Order Comments: No more than 10 lbs for 2 weeks.     Notify your health care provider if you experience any of the following:  temperature >100.4     Notify your health care provider if you experience any of the following:  persistent nausea and vomiting or diarrhea     Notify your health care provider if you experience any of the following:  redness, tenderness, or signs of infection (pain, swelling, redness, odor or green/yellow discharge around incision site)     Notify your health care provider if you experience any of the following:  increased confusion or weakness     Notify your health care provider if you experience any of the following:  persistent dizziness, light-headedness, or visual disturbances     Activity as tolerated

## 2018-08-17 NOTE — DISCHARGE INSTRUCTIONS
POSTOPERATIVE INSTRUCTIONS FOLLOWING SENTINEL   LYMPH NODE BIOPSY AND LUMPECTOMY      The following are post-operative instructions that will help you to recover from your surgery.  Please read over these instructions carefully and contact us if we can answer any of your questions or concerns.    Dressing/breast binder (surgi-bra)  A surgical bra may be placed around your chest after your surgery.  If you are given the bra, please wear it as close to 24 hours a day as possible until your post-operative clinic appointment.  If the elastic around the bra irritates your skin, you may wear a soft t-shirt underneath the bra.    You may go without wearing the bra long enough to bath, to launder and dry the bra. If you have fluffy filler placed inside the bra, the filler should be removed whenever the bra is taken off. Please reinsert the fluffy filler, or insert the new soft filler, under the bra when you put the bra back on.  If the bra is extremely uncomfortable, you may wear a supportive sports bra instead after 2 days.    You may shower after 2 days.  Do not take a tub bath and do not soak the surgical site. Please do not remove the white strips of tape (steri-strips) that cover your incision.  They will be removed at your clinic visit.    Activity   You should be able to return to your regular activities 2 to 7 days after your surgery depending on your particular procedure.  However, do not engage in strenuous activities in which you use your upper body such as:  golf, tennis, aerobics, washing windows, raking the yard, mopping, vacuuming, heavy lifting (e.g children) until you are seen for your follow-up appointment in clinic.    Medication for pain  You may find that over the counter pain medications may be sufficient for your pain.  You will be given a prescription for pain medication for more severe pain.  You should not drive or operate machinery while taking these.  Please take narcotics with food.  Narcotics  can cause, or worse, constipation.  You will need to increase your fluid intake, eat high fiber foods (such as fruits and bran) and make sure that you are up and walking. You may need to take an over the counter stool softener for constipation.    Please report the following:     Temperature greater than 101 degrees   Discharge or bad odor from the wound   Excessive bleeding, such as bloody dressing or extreme bruising   Redness at incision and/or drain sites   Swelling or buildup of fluid around incision     If blue dye was used to locate your sentinel lymph nodes, your urine and stool may be blue-green in color for 1 or 2 days.      Additional information  I will see you approximately 2 weeks following your surgery.  If this follow-up appointment has not been made, please call the office.    If you have any questions or problems, please call my office or my nurse.    Dr. Patricia Samson, RN  [phone]    After hours and on weekends, you may call the main [facility] line at [phone] and ask to have the general surgery resident paged or have me paged.      Anesthesia: General Anesthesia     You are watched continuously during your procedure by your anesthesia provider.     Youre due to have surgery. During surgery, youll be given medicine called anesthesia or anesthetic. This will keep you comfortable and pain-free. Your anesthesia provider will use general anesthesia.  What is general anesthesia?  General anesthesia puts you into a state like deep sleep. It goes into the bloodstream (IV anesthetics), into the lungs (gas anesthetics), or both. You feel nothing during the procedure. You will not remember it. During the procedure, the anesthesia provider monitors you continuously. He or she checks your heart rate and rhythm, blood pressure, breathing, and blood oxygen.  · IV anesthetics. IV anesthetics are given through an IV line in your arm. Theyre often given first. This is so you are  asleep before a gas anesthetic is started. Some kinds of IV anesthetics relieve pain. Others relax you. Your doctor will decide which kind is best in your case.  · Gas anesthetics. Gas anesthetics are breathed into the lungs. They are often used to keep you asleep. They can be given through a facemask or a tube placed in your larynx or trachea (breathing tube).  ¨ If you have a facemask, your anesthesia provider will most likely place it over your nose and mouth while youre still awake. Youll breathe oxygen through the mask as your IV anesthetic is started. Gas anesthetic may be added through the mask.  ¨ If you have a tube in the larynx or trachea, it will be inserted into your throat after youre asleep.  Anesthesia tools and medicines  You will likely have:  · IV anesthetics. These are put into an IV line into your bloodstream.  · Gas anesthetics. You breathe these anesthetics into your lungs, where they pass into your bloodstream.  · Pulse oximeter. This is a small clip that is attached to the end of your finger. This measures your blood oxygen level.  · Electrocardiography leads (electrodes). These are small sticky pads that are placed on your chest. They record your heart rate and rhythm.  · Blood pressure cuff. This reads your blood pressure.  Risks and possible complications  General anesthesia has some risks. These include:  · Breathing problems  · Nausea and vomiting  · Sore throat or hoarseness (usually temporary)  · Allergic reaction to the anesthetic  · Irregular heartbeat (rare)  · Cardiac arrest (rare)   Anesthesia safety  · Follow all instructions you are given for how long not to eat or drink before your procedure.  · Be sure your doctor knows what medicines and drugs you take. This includes over-the-counter medicines, herbs, supplements, alcohol or other drugs. You will be asked when those were last taken.  · Have an adult family member or friend drive you home after the procedure.  · For the  first 24 hours after your surgery:  ¨ Do not drive or use heavy equipment.  ¨ Do not make important decisions or sign legal documents. If important decisions or signing legal documents is necessary during the first 24 hours after surgery, have a trusted family member or spouse act on your behalf.  ¨ Avoid alcohol.  ¨ Have a responsible adult stay with you. He or she can watch for problems and help keep you safe.  Date Last Reviewed: 12/1/2016 © 2000-2017 [employer]. [address] [place] [address]. All rights reserved. This information is not intended as a substitute for professional medical care. Always follow your healthcare professional's instructions.

## 2018-08-17 NOTE — ANESTHESIA RELEASE NOTE
"Anesthesia Release from PACU Note    Patient: Shima Sorto    Procedure(s) Performed: Procedure(s) (LRB):  MASTECTOMY, PARTIAL-US guided (Right)  INJECTION, FOR SENTINEL NODE IDENTIFICATION (Right)  BIOPSY, LYMPH NODE, SENTINEL (Right)    Anesthesia type: general    Post pain: Adequate analgesia    Post assessment: no apparent anesthetic complications, tolerated procedure well and no evidence of recall    Last Vitals:   Visit Vitals  BP (!) 168/77   Pulse 60   Temp 36.5 °C (97.7 °F) (Temporal)   Resp 18   Ht 5' 7" (1.702 m)   Wt 56.7 kg (125 lb)   LMP  (LMP Unknown)   SpO2 100%   Breastfeeding? No   BMI 19.58 kg/m²       Post vital signs: stable    Level of consciousness: awake, alert  and oriented    Nausea/Vomiting: no nausea/no vomiting    Complications: none    Airway Patency: patent    Respiratory: unassisted, spontaneous ventilation, room air    Cardiovascular: stable    Hydration: euvolemic  "

## 2018-08-17 NOTE — PROGRESS NOTES
Patient asked for some more time in Phase II prior to getting dressed. Son at bedside, patient awake, alert, sitting up talking with son.

## 2018-08-17 NOTE — PLAN OF CARE
Patient tolerated procedure/anesthesia well, vss, no distress noted or reported. Pain well controlled, denies nausea, tolerates PO. Discharge instructions reviewed with patient and family, verbalized understanding, consents with chart.

## 2018-08-17 NOTE — ANESTHESIA PROCEDURE NOTES
Paravertebral Single Injection Block(s)    Patient location during procedure: pre-op   Block not for primary anesthetic.  Reason for block: at surgeon's request and post-op pain management   Post-op Pain Location: R breast pain  Start time: 8/17/2018 9:15 AM  Timeout: 8/17/2018 9:15 AM   End time: 8/17/2018 9:30 AM  Staffing  Anesthesiologist: Eliud Cortez MD  Resident/CRNA: Jia Walters MD  Performed: resident/CRNA   Preanesthetic Checklist  Completed: patient identified, site marked, surgical consent, pre-op evaluation, timeout performed, IV checked, risks and benefits discussed and monitors and equipment checked  Peripheral Block  Patient position: sitting  Prep: ChloraPrep  Patient monitoring: heart rate, cardiac monitor, continuous pulse ox, continuous capnometry and frequent blood pressure checks  Block type: paravertebral - thoracic  Laterality: right  Injection technique: single shot  Needle  Needle type: Tuohy   Needle gauge: 17 G  Needle length: 3.5 in  Needle localization: anatomical landmarks     Assessment  Injection assessment: negative aspiration and negative parasthesia  Paresthesia pain: none  Heart rate change: no  Slow fractionated injection: yes  Additional Notes  T2 os at 2 cm  T4 os at 2.5 cm  VSS.  [facility] RN monitoring vitals throughout procedure.  Patient tolerated procedure well.

## 2018-08-17 NOTE — OP NOTE
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 right breast ultrasound localization partial mastectomy (lumpectomy) with excision for clear margins   2. right axillary deep sentinel lymph node biopsy   3. injection of right breast with technetium-labeled radiocolloid for sentinel lymph node identification  4. Identification of sentinel lymph node   5. Ultrasound localization of right breast mass  6. Surgeon interpretation of specimen radiograph        PROCEDURE IN DETAIL:   The patient underwent informed consent.  The films were reviewed.    She was then brought to the Operating Room and placed in the supine position. regional and general anesthesia was administered.    The right breast was injected in the subareolar region with the technetium-labeled radiocolloid.The right breast, anterior chest, arm and axilla were then prepped and draped in a sterile fashion.     Next, we turned our attention to the right breast itself. Ultrasound was used to identify the breast mass at 10 o'clock.  The area if interest was identified with a clip within and then was marked for localization using ultrasound guidance.  An incision was made in the upper outer quadrant of the right breast over the anticipated tract of the lesion in a alessio-areolar location with a skin ellipse taken directly over the tumor.  The specimen was dissected circumferentially around the cancer.  We did dissect all the way down to, and including the underlying pectoralis fascia.  The ultrasound localization lumpectomy specimen was inked on the back table using green ink inferiorly, blue ink superiorly, orange ink laterally, yellow ink anteriorly, black ink posteriorly, and  the red ink medially.  It was then fixed with acetic acid and submitted for specimen radiograph with the Mozart, which confirmed the clip and area of interest within the specimen, and was interpreted in the operating room. Given the appearance and location of the lesion, additional margins were taken including inferior.       Using the gamma probe, activity was noted and localized in the right axilla through the same incision.  We then dissected down through the clavipectoral fascia using electrocautery, identifying a hot afferent lymphatic channel coming from the upper outer quadrant axillary tail of Mack breast tissue to a level 1 sentinel node, which was excised. It was dissected circumferentially with blunt dissection and the afferent and efferent lymphatics were tied together. The lymph node was labeled as specimen #1 for permanent sectioning, hot and not blue with an ex vivo count of 93. A total of 2 axillary sentinel lymph nodes were removed.  The probe was placed in the cavity and there was no significant residual background radioactivity or blue dye noted in the axilla indicating adequate and appropriate sentinel lymph node biopsy. The cavity was then palpated and 0 further palpable nodes were noted. Any additional palpable nodes were sent to pathology for permanent section.       Within the lumpectomy cavity, hemostasis was achieved with cautery. The wound was irrigated until clear. There was no evidence of bleeding. It was closed in multiple layers with deep dermal and subcutaneous interrupted Vicryl sutures and a running 4-0 vicryl subcuticular skin closure.    Dermabond was applied. Sterile fluff gauze was placed and a post-procedure bra was placed. She tolerated the procedure well without complication and was turned over to Anesthesia for transport to the recovery area in a satisfactory condition. All specimens were sent to Pathology for permanent sectioning.    ESTIMATED BLOOD LOSS:  10ml    COMPLICATIONS: none    Specimens: 1.) right lumpectomy  (green=inferior; blue=superior; orange=lateral; yellow=anterior; black=posterior; red=medial                                    -- PERMANENT2.) right axillary sentinel lymph node # 1 (hot 93)  --PERMANENT3.) right axillary sentinel lymph node #2 ( warm 32 )  - PERMANENT4.) right breast inferior margin; ink marks new margin  - PERMANENT    DISPOSITION:  PACU--hemodynamically stable    ATTESTATION:  I was present and scrubbed for the entire procedure.

## 2018-08-17 NOTE — ANESTHESIA PREPROCEDURE EVALUATION
08/17/2018  Shima Sorto is a 78 y.o., female with breast Ca.  Here for right mastectomy and sentinel node dissection.    Anesthesia Evaluation    I have reviewed the Patient Summary Reports.    I have reviewed the Nursing Notes.   I have reviewed the Medications.     Review of Systems  Anesthesia Hx:  No problems with previous Anesthesia    Cardiovascular:   Hypertension    Pulmonary:   Sleep Apnea    Renal/:   Chronic Renal Disease        Physical Exam  General:  Well nourished    Airway/Jaw/Neck:  Airway Findings: Mouth Opening: Normal Tongue: Normal  General Airway Assessment: Adult  Mallampati: II  Improves to II with phonation.  TM Distance: 4 - 6 cm      Dental:  Dental Findings: In tact   Chest/Lungs:  Chest/Lungs Findings: Clear to auscultation     Heart/Vascular:  Heart Findings: Rate: Normal  Rhythm: Regular Rhythm  Sounds: Normal        Mental Status:  Mental Status Findings:  Cooperative, Alert and Oriented         Anesthesia Plan  Type of Anesthesia, risks & benefits discussed:  Anesthesia Type:  general, regional  Patient's Preference:   Intra-op Monitoring Plan: standard ASA monitors  Intra-op Monitoring Plan Comments:   Post Op Pain Control Plan:   Post Op Pain Control Plan Comments:   Induction:   IV  Beta Blocker:         Informed Consent: Patient understands risks and agrees with Anesthesia plan.  Questions answered. Anesthesia consent signed with patient.  ASA Score: 3     Day of Surgery Review of History & Physical:    H&P update referred to the surgeon.         Ready For Surgery From Anesthesia Perspective.

## 2018-08-17 NOTE — INTERVAL H&P NOTE
The patient has been examined and the H&P has been reviewed:    I concur with the findings and no changes have occurred since H&P was written.    Anesthesia/Surgery risks, benefits and alternative options discussed and understood by patient/family.          Active Hospital Problems    Diagnosis  POA    Cancer of right breast [C50.911]  Yes      Resolved Hospital Problems   No resolved problems to display.

## 2018-08-18 ENCOUNTER — PATIENT MESSAGE (OUTPATIENT)
Dept: SURGERY | Facility: CLINIC | Age: 78
End: 2018-08-18

## 2018-08-18 ENCOUNTER — NURSE TRIAGE (OUTPATIENT)
Dept: ADMINISTRATIVE | Facility: CLINIC | Age: 78
End: 2018-08-18

## 2018-08-18 NOTE — TELEPHONE ENCOUNTER
Patient called to report the following:     -partial mastectomy 8/17/18   -denies symptoms   -can I restart the vitamin supplements      Education completed per [facility] On Call Care Advice including follow up with provider. Patient states that I will   Patient verbalized understating.     Can you please advise patient?       Reason for Disposition   Caller has NON-URGENT medication question about med that PCP prescribed and triager unable to answer question    Protocols used: ST MEDICATION QUESTION CALL-A-AH

## 2018-08-19 ENCOUNTER — NURSE TRIAGE (OUTPATIENT)
Dept: ADMINISTRATIVE | Facility: CLINIC | Age: 78
End: 2018-08-19

## 2018-08-19 NOTE — TELEPHONE ENCOUNTER
surg 8/17  Pt called re vits/supplements. Pt would like to speak with MD on call if ok to start retaking her meds.     Reason for Disposition   [1] Follow-up call from patient regarding patient's clinical status AND [2] information urgent    Protocols used: ST PCP CALL - NO TRIAGE-A-

## 2018-08-20 ENCOUNTER — TELEPHONE (OUTPATIENT)
Dept: SURGERY | Facility: CLINIC | Age: 78
End: 2018-08-20

## 2018-08-20 NOTE — TELEPHONE ENCOUNTER
Pt called in with additional questions. States she spoke with the resident on call who told her to wait 1 week before resuming her supplements of Vit E and fish oil. Pt also had questions about post op appt and when she was able to drive. All questions answered. Appt for post op eval booked on 9/4/18 at 3:15 pm.Appt reminder mailed to pt.

## 2018-08-23 ENCOUNTER — TELEPHONE (OUTPATIENT)
Dept: SURGERY | Facility: CLINIC | Age: 78
End: 2018-08-23

## 2018-08-23 NOTE — TELEPHONE ENCOUNTER
Return call to pt after receiving a voice mail message. Pt had question of whether or not she should have steri strips on incision. Informed pt that there are no steri strips, sutures are on the inside with glue on the top of incision. Pt asked if she can wash her hair now. Informed pt that she may wash her hair.Advised pt to call back if any further questions or concerns arise.

## 2018-08-24 NOTE — PROGRESS NOTES
"CC: Well woman exam    Shima Sorto is a 78 y.o. female  presents for a well woman exam.  LMP: No LMP recorded (lmp unknown). Patient is postmenopausal..  No gynecologic issues, problems, or complaints but has other concerns.    She is scheduled for a lumpectomy/mastectomy on 18.  She reports when breast biopsy was done, a large vessel was hit and she experienced bleeding.  She noticed blood on her blouse today and wants to ensure the bleeding has not restarted.      She also noticed a "mole" on the back of her calf and is wondering if she should follow-up with dermatology as she has seen in them in the past.     Past Medical History:   Diagnosis Date    Allergy     seasonal    Anemia     Basal cell carcinoma 2013    forehead    Hx of colonic polyps     Hypertension     Medullary sponge kidney     MVP (mitral valve prolapse)     Osteoporosis, senile     Renal calculi     Sleep apnea     TMJ syndrome     sometimes jaw clicking/jaw pain    Urinary retention     Vertigo 2017    Vestibular neuronitis 2017    Visual impairment     reading glasses     Past Surgical History:   Procedure Laterality Date    BREAST CYST ASPIRATION Right     interstim placed stage 1      KIDNEY STONE SURGERY      @ [facility]  MOHS procedure       Social History     Socioeconomic History    Marital status:      Spouse name: None    Number of children: None    Years of education: None    Highest education level: None   Social Needs    Financial resource strain: None    Food insecurity - worry: None    Food insecurity - inability: None    Transportation needs - medical: None    Transportation needs - non-medical: None   Occupational History    Occupation:      Employer: [employer]   Tobacco Use    Smoking status: Former Smoker     Packs/day: 0.50     Years: 8.00     Pack years: 4.00     Last attempt to quit: 1964     Years since " "quittin.0    Smokeless tobacco: Never Used   Substance and Sexual Activity    Alcohol use: Yes     Comment: 1-3x/week    Drug use: No    Sexual activity: Not Currently   Other Topics Concern    Are you pregnant or think you may be? Not Asked    Breast-feeding Not Asked   Social History Narrative    None     Family History   Problem Relation Age of Onset    Kidney disease Mother     Heart disease Father     Melanoma Father     Breast cancer Maternal Aunt     Anesthesia problems Neg Hx     Malignant hypertension Neg Hx     Hypotension Neg Hx     Malignant hyperthermia Neg Hx     Pseudochol deficiency Neg Hx     Colon cancer Neg Hx     Ovarian cancer Neg Hx     Psoriasis Neg Hx     Lupus Neg Hx     Eczema Neg Hx     Acne Neg Hx      OB History      Para Term  AB Living    4 3 3   1 2    SAB TAB Ectopic Multiple Live Births    1       2          /70   Ht 5' 7" (1.702 m)   LMP  (LMP Unknown)   BMI 20.10 kg/m²       ROS:    ROS:  GENERAL: Denies weight gain or weight loss. Feeling well overall.   SKIN: Denies rash or lesions.   HEAD: Denies head injury or headache.   NODES: Denies enlarged lymph nodes.   CHEST: Denies chest pain or shortness of breath.   CARDIOVASCULAR: Denies palpitations or left sided chest pain.   ABDOMEN: No abdominal pain, constipation, diarrhea, nausea, vomiting or rectal bleeding.   URINARY: No frequency, dysuria, hematuria, or burning on urination.  REPRODUCTIVE: See HPI.   BREASTS: The patient performs breast self-examination and denies pain, lumps, or nipple discharge.   HEMATOLOGIC: No easy bruisability or excessive bleeding.   MUSCULOSKELETAL: Denies joint pain or swelling.   NEUROLOGIC: Denies syncope or weakness.   PSYCHIATRIC: Denies depression, anxiety or mood swings.    PHYSICAL EXAM:    APPEARANCE: Well nourished, well developed, in no acute distress.  AFFECT: WNL, alert and oriented x 3  SKIN: No acne or hirsutism  NECK: Neck symmetric " without masses or thyromegaly  NODES: No inguinal, cervical, axillary, or femoral lymph node enlargement  CHEST: Good respiratory effect  ABDOMEN: Soft.  No tenderness or masses.  No hepatosplenomegaly.  No hernias.  BREASTS: Symmetrical, no skin changes or visible lesions.  Palpable mass in the are of where the biopsy occured, no nipple discharge bilaterally.  No active bleeding  PELVIC: Normal external genitalia without lesions.  Normal hair distribution.  Adequate perineal body, normal urethral meatus.  Vagina moist and well rugated without lesions or discharge.  Cervix pink, without lesions, discharge or tenderness.  No significant cystocele or rectocele.  Bimanual exam shows uterus to be normal size, regular, mobile and nontender.  Adnexa without masses or tenderness.    RECTAL: Rectovaginal exam confirms above with normal sphincter tone, no masses.  EXTREMITIES: No edema.  Skin nevus on the right calf      ICD-10-CM ICD-9-CM    1. Visit for gynecologic examination Z01.419 V72.31    2. Benign nevus of skin D22.9 216.9          Patient was counseled today on A.C.S. Pap guidelines and recommendations for yearly pelvic exams, mammograms and monthly self breast exams; to see her PCP for other health maintenance.     Recommended f/u with dermatology to evaluate what is most likely a skin nevus    Follow-up in about 2 years (around 8/16/2020).

## 2018-08-28 ENCOUNTER — TELEPHONE (OUTPATIENT)
Dept: SURGERY | Facility: CLINIC | Age: 78
End: 2018-08-28

## 2018-08-28 NOTE — TELEPHONE ENCOUNTER
Pt calls in with question of purchasing a sports bra. Informed pt that she does not necessarily need a sports bra if she has a good compression with her regular bra with gauze pad in bra. Pt states that she has not ever worn a sports bra would most likely never wear a sports bra after this, but does not have an more gauze. Informed pt that we can get her some fluff pad to line her regular bra until her appt with Dr Mason 9/4/18. Pt to come to [facility] for the fluff pads today. Pt also notified per Dr Mason of her surgical pathology results.

## 2018-08-29 ENCOUNTER — PATIENT OUTREACH (OUTPATIENT)
Dept: OTHER | Facility: OTHER | Age: 78
End: 2018-08-29

## 2018-08-29 NOTE — PROGRESS NOTES
Last 5 Patient Entered Readings                                      Current 30 Day Average: 118/60     Recent Readings 8/28/2018 8/20/2018 8/19/2018 8/11/2018 8/10/2018    SBP (mmHg) 128 98 129 115 126    DBP (mmHg) 60 57 67 57 62    Pulse 56 67 57 53 52          Digital Medicine: Health  Follow Up    Lifestyle Modifications:    1.Dietary Modifications (Sodium intake <2,000mg/day, food labels, dining out): Deferred    2.Physical Activity: Deferred    3.Medication Therapy: Patient has been compliant with the medication regimen. Patient is doing well on her current regimen. She denies symptoms/side effects.     4.Patient has the following medication side effects/concerns:   (Frequency/Alleviating factors/Precipitating factors, etc.)     Patient recently had surgery to remove a lump from her breast. She is healing up well and stated that she recently got the results back and they did not find any cancer cells. Patient is very relieved by this news.   Patient is worried because she had the first round of the shingles vaccination but the second round has a shortage so her pharmacy is out of it. She is worried that she will not get it within the window that she needs to get it in. She will continue reaching out to other pharmacies to see if they can assist. [facility] pharmacy is giving priority to those who got the first round with them. She got hers at [facility].     Follow up with  Shima Kinza Sorto completed. No further questions or concerns. Will continue follow up to achieve health goals.

## 2018-08-30 ENCOUNTER — PATIENT MESSAGE (OUTPATIENT)
Dept: INTERNAL MEDICINE | Facility: CLINIC | Age: 78
End: 2018-08-30

## 2018-08-30 NOTE — TELEPHONE ENCOUNTER
Spoke to Erica Wagner RN manager advised to speak to pharmacist at [facility] and [facility] pharmacy in regards to patient issue . Pt first dose of shingrix 1 was placed in pt chart and pt was advised by pharmacist Ray to have pt come in for pt second dose , spoke to pt , pt verbalized understanding states she will come in today and have vaccine given to her .

## 2018-09-04 ENCOUNTER — OFFICE VISIT (OUTPATIENT)
Dept: SURGERY | Facility: CLINIC | Age: 78
End: 2018-09-04
Payer: MEDICARE

## 2018-09-04 ENCOUNTER — TUMOR BOARD CONFERENCE (OUTPATIENT)
Dept: SURGERY | Facility: CLINIC | Age: 78
End: 2018-09-04

## 2018-09-04 VITALS
HEIGHT: 66 IN | WEIGHT: 125.63 LBS | TEMPERATURE: 98 F | HEART RATE: 46 BPM | SYSTOLIC BLOOD PRESSURE: 179 MMHG | DIASTOLIC BLOOD PRESSURE: 77 MMHG | BODY MASS INDEX: 20.19 KG/M2

## 2018-09-04 DIAGNOSIS — C50.411 MALIGNANT NEOPLASM OF UPPER-OUTER QUADRANT OF RIGHT BREAST IN FEMALE, ESTROGEN RECEPTOR POSITIVE: Primary | ICD-10-CM

## 2018-09-04 DIAGNOSIS — Z17.0 MALIGNANT NEOPLASM OF UPPER-OUTER QUADRANT OF RIGHT BREAST IN FEMALE, ESTROGEN RECEPTOR POSITIVE: Primary | ICD-10-CM

## 2018-09-04 PROCEDURE — 99213 OFFICE O/P EST LOW 20 MIN: CPT | Mod: PBBFAC,PO | Performed by: SURGERY

## 2018-09-04 PROCEDURE — 99999 PR PBB SHADOW E&M-EST. PATIENT-LVL III: CPT | Mod: PBBFAC,,, | Performed by: SURGERY

## 2018-09-04 PROCEDURE — 99024 POSTOP FOLLOW-UP VISIT: CPT | Mod: POP,,, | Performed by: SURGERY

## 2018-09-04 NOTE — PROGRESS NOTES
"    [facility]       Post-Op        REFERRING PHYSICIAN:  Zeynep Tomas MD    MEDICAL ONCOLOGIST:    Dr. Hernandez  RADIATION ONCOLOGIST:   Dr. Ames    DIAGNOSIS:    This is a 78 y.o. female with a stage IA,  pT1bN0(sn)MX grade 1 ER+ NE- HER2 - invasive lobular carcinoma of the right breast.    TREATMENT SUMMARY:  The patient is status post right partial mastectomy mastectomy and sentinel node biopsy on 8/17/2018.  Final pathology showed 8 mm unifocal grade 2 invasive lobular carcinoma with 0/2 SLNs with disease.    INTERVAL HISTORY:   Shima Sorto comes in for a post-op check. She denies fever, chills, chest pain or shortness of breath. She is pain free. Took one Percocet on the evening of surgery. Nothing since. States she "never really had any pain." No issues with incision. No drainage or redness. No axillary or breast swelling. No fever.    MEDICATIONS:  Current Outpatient Medications   Medication Sig Dispense Refill    alendronate (FOSAMAX) 70 MG tablet TAKE 1 TABLET BY MOUTH ON SUNDAY WITH A FULL GLASS OF WATER AND WAIT 30 MINUTES BEFORE BREAKFAST AND PILLS. TAKE SITTING OR STANDING 4 tablet 12    atenolol (TENORMIN) 25 MG tablet TAKE 1 TABLET BY MOUTH EVERY DAY 30 tablet 6    B COMPLEX & C NO.10/FOLIC ACID (B COMPLEX WITH C#10-FOLIC ACID ORAL) Take by mouth.      calcium citrate (CALCITRATE) 200 mg (950 mg) tablet Take 1 tablet by mouth once daily.      co-enzyme Q-10 50 mg capsule Take 100 mg by mouth once daily.       fish oil-vit E-fat acid5-hb137 (SUPER OMEGA-3) 400-5 mg-unit Cap Take 1 capsule by mouth Daily.      MULTIVITAMIN ORAL Take 1 tablet by mouth Daily.      radiacream (VANICREAM) Apply topically as needed.      vit C/E/Zn/coppr/lutein/zeaxan (PRESERVISION AREDS 2 ORAL) Take by mouth.      lisinopril 10 MG tablet Take 2 tablets (20 mg total) by mouth every morning. Dosage change 60 tablet 12     No current facility-administered medications for this visit.  "     Facility-Administered Medications Ordered in Other Visits   Medication Dose Route Frequency Provider Last Rate Last Dose    0.9%  NaCl infusion   Intravenous Continuous James Carranza MD 70 mL/hr at 08/17/18 0843         ALLERGIES:   Review of patient's allergies indicates:   Allergen Reactions    Asparagus Rash       PHYSICAL EXAMINATION:   General:  This is a well appearing female with appropriate speech, affect and gait.     Breast:  R breast single incision clean, dry, and intact. No erythema, induration, drainage, or other signs of infection. No edema. Dermabond in place.    IMPRESSION:   The patient has had an uneventful postoperative course.    PLAN:   1. Return in 4 months for a follow up office visit and breast exam  4. The patient is advised in continued exam of the breast chest wall and to report to this office sooner should she note any areas of abnormality or concern.  3. Discussed at multidisciplinary tumor conference this morning  4.  She has been instructed to meet with med onc and rad onc for discussion of adjuvant treatment recommendations

## 2018-09-06 ENCOUNTER — OFFICE VISIT (OUTPATIENT)
Dept: SURGERY | Facility: CLINIC | Age: 78
End: 2018-09-06
Payer: MEDICARE

## 2018-09-06 VITALS
DIASTOLIC BLOOD PRESSURE: 62 MMHG | HEIGHT: 67 IN | TEMPERATURE: 98 F | HEART RATE: 49 BPM | WEIGHT: 125.31 LBS | SYSTOLIC BLOOD PRESSURE: 127 MMHG | BODY MASS INDEX: 19.67 KG/M2 | RESPIRATION RATE: 16 BRPM

## 2018-09-06 DIAGNOSIS — Z17.0 MALIGNANT NEOPLASM OF UPPER-OUTER QUADRANT OF RIGHT BREAST IN FEMALE, ESTROGEN RECEPTOR POSITIVE: Primary | ICD-10-CM

## 2018-09-06 DIAGNOSIS — C50.411 MALIGNANT NEOPLASM OF UPPER-OUTER QUADRANT OF RIGHT BREAST IN FEMALE, ESTROGEN RECEPTOR POSITIVE: Primary | ICD-10-CM

## 2018-09-06 PROCEDURE — 99204 OFFICE O/P NEW MOD 45 MIN: CPT | Mod: S$PBB,,, | Performed by: RADIOLOGY

## 2018-09-06 PROCEDURE — 99213 OFFICE O/P EST LOW 20 MIN: CPT | Mod: PBBFAC,PO | Performed by: RADIOLOGY

## 2018-09-06 PROCEDURE — 99999 PR PBB SHADOW E&M-EST. PATIENT-LVL III: CPT | Mod: PBBFAC,,, | Performed by: RADIOLOGY

## 2018-09-06 RX ORDER — ERGOCALCIFEROL 1.25 MG/1
50000 CAPSULE ORAL
COMMUNITY
End: 2019-01-15

## 2018-09-06 NOTE — PROGRESS NOTES
REFERRING PHYSICIAN:   Abby Mason M.D.    DIAGNOSIS:  pT1b N0 M0, stage IA, invasive lobular carcinoma of the right breast    HISTORY OF PRESENT ILLNESS:   Ms. Sorto is a 78-year-old female who was recently diagnosed with right breast cancer she presented with a palpable mass in the upper outer quadrant of the right breast. A mammogram in 2018 revealed a 10 x 9 x 4 mm suspicious lesion in the upper outer quadrant of the right breast.   A core needle biopsy on 2018 revealed grade 1, invasive lobular carcinoma which was ER positive ( %), MO negative, and her 2- with a Ki-67 of 5%.   Bilateral breast MRI on 2018 revealed the known lesion in the upper outer quadrant of the right breast with no evidence suspicious masses in the left breast.  On 2018, she underwent lumpectomy and sentinel node biopsy.  The pathology revealed right breast with 8 mm, grade 1, invasive lobular carcinoma with the closest margin at 2 mm anteriorly and posteriorly.  2/2 sentinel lymph nodes were negative for involvement.  She is here today for recommendations regarding further treatment.      At present, patient is healing from the surgery without any unexpected side effects.  She denies right breast pain, edema, erythema, or nipple discharge.   She also denies fever, night sweats, or weight loss.  She is planned to see Dr. Hernandez in 2 weeks regarding endocrine therapy.  She lives alone and is very active.    REVIEW OF SYSTEMS:  As above.  In addition, patient denies headaches, visual problems, dizziness, chest pain, shortness of breath, cough, nausea, vomiting, diarrhea, or any new bony pains. Patient also denies easy bruising, skin rashes, or numbness or tingling.    GYN HISTORY:   Menarche age 13.  Menopause at age 56. .  She has 5 year history of oral contraceptive pills.  She also has history of hormone replacement therapy.    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 TAKE SITTING OR STANDING    atenolol (TENORMIN) 25 MG tablet TAKE 1 TABLET BY MOUTH EVERY DAY    B COMPLEX & C NO.10/FOLIC ACID (B COMPLEX WITH C#10-FOLIC ACID ORAL) Take by mouth.    calcium citrate (CALCITRATE) 200 mg (950 mg) tablet Take 1 tablet by mouth once daily.    co-enzyme Q-10 50 mg capsule Take 100 mg by mouth once daily.     ergocalciferol (VITAMIN D2) 50,000 unit Cap Take 50,000 Units by mouth every 7 days.    fish oil-vit E-fat acid5-hb137 (SUPER OMEGA-3) 400-5 mg-unit Cap Take 1 capsule by mouth Daily.    lisinopril 10 MG tablet Take 2 tablets (20 mg total) by mouth every morning. Dosage change    MULTIVITAMIN ORAL Take 1 tablet by mouth Daily.    radiacream (VANICREAM) Apply topically as needed.    vit C/E/Zn/coppr/lutein/zeaxan (PRESERVISION AREDS 2 ORAL) Take by mouth.     No current facility-administered medications for this visit.      Facility-Administered Medications Ordered in Other Visits   Medication    0.9%  NaCl infusion  "      SOCIAL HISTORY:  Social History     Socioeconomic History    Marital status:      Spouse name: Not on file    Number of children: Not on file    Years of education: Not on file    Highest education level: Not on file   Social Needs    Financial resource strain: Not on file    Food insecurity - worry: Not on file    Food insecurity - inability: Not on file    Transportation needs - medical: Not on file    Transportation needs - non-medical: Not on file   Occupational History    Occupation:      Employer: [employer]   Tobacco Use    Smoking status: Former Smoker     Packs/day: 0.50     Years: 8.00     Pack years: 4.00     Last attempt to quit: 1964     Years since quittin.0    Smokeless tobacco: Never Used   Substance and Sexual Activity    Alcohol use: Yes     Comment: 1-3x/week    Drug use: No    Sexual activity: Not Currently   Other Topics Concern    Are you pregnant or think you may be? Not Asked    Breast-feeding Not Asked   Social History Narrative    Not on file       FAMILY HISTORY:  Family History   Problem Relation Age of Onset    Kidney disease Mother     Heart disease Father     Melanoma Father     Breast cancer Maternal Aunt     Anesthesia problems Neg Hx     Malignant hypertension Neg Hx     Hypotension Neg Hx     Malignant hyperthermia Neg Hx     Pseudochol deficiency Neg Hx     Colon cancer Neg Hx     Ovarian cancer Neg Hx     Psoriasis Neg Hx     Lupus Neg Hx     Eczema Neg Hx     Acne Neg Hx          PHYSICAL EXAMINATION:  Vitals:    18 0944   BP: 127/62   Pulse: (!) 49   Resp: 16   Temp: 97.7 °F (36.5 °C)   TempSrc: Oral   Weight: 56.8 kg (125 lb 5.3 oz)   Height: 5' 7" (1.702 m)   Body mass index is 19.63 kg/m².  GENERAL: Patient is alert and oriented, in no acute distress.  HEENT:Extraocular muscles are intact.  Oropharynx is clear without lesions.  There is no cervical or supraclavicular lymphadenopathy " palpated.  No thyromegaly noted.  HEART: Regular rate and rhythm.  LUNGS: Clear to auscultation bilaterally.  BREAST EXAM:  The right breast is slightly smaller than the left.  The scar secondary to lumpectomy and sentinel node biopsy is noted in the upper outer quadrant of the right breast.  No abnormal masses palpated in the right breast or right axilla.  The left breast and left axilla are also without palpable masses.  ABDOMEN:Soft, nontender, nondistended, without hepatosplenomegaly.  Normoactive bowel sounds.  EXTREMITIES: No clubbing, cyanosis, or edema.  NEUROLOGICAL: Cranial nerve II through XII grossly intact.  Sensation is intact.  Strength is 5 out of 5 in the upper and lower extremities bilaterally.     ASSESSMENT:   This is a 78-year-old female with p T1b N0 M0, stage IA, invasive lobular carcinoma of the right breast who underwent lumpectomy and sentinel node biopsy on August 17, 2018 with an 8 mm lesion which is ER positive, GA negative, and her 2-.    PLAN:   After review of the pathology and images of the radiological studies, Ms. Sorto is noted to have a lesion which measures less than 1 cm which was excised with negative margins.  I had a long discussion with the patient about the options of endocrine therapy alone versus radiation with or without endocrine therapy.  The risks, benefits, and side effects of each were also discussed in detail.  She would like to undergo postoperative radiation to reduce her chance of local recurrence.  I plan to  Deliver approximately 4200 cGy.    The risks, benefits, and side effects of radiation were explained in detail to the patient.  All questions were answered and informed consent was signed.  I plan to see the patient back for radiation planning CT in approximately 2-3 weeks.    Psychosocial Distress screening score of Distress Score: 1 noted and reviewed. No intervention indicated.    I spent approximately 60 minutes reviewing the available records and  evaluating the patient, out of which over 50% of the time was spent face to face with the patient in counseling and coordinating this patient's care.

## 2018-09-06 NOTE — PATIENT INSTRUCTIONS
Radiation Therapy Treatment  Radiation therapy can help you in your fight against cancer. It begins with a session to discuss treatment with your doctor. If you and your doctor decide on radiation, you will return for a simulation. The simulation is a planning session that helps the doctor target your cancer. He or she will design a radiation plan to protect normal tissues. When the simulation and plan are completed, you will begin your daily treatments. Treatment is usually once daily Monday to Friday. It takes less than a half an hour. Sometimes you may need radiation twice a day, with usually 6 hours between treatments. After the course of radiation is complete, you will be scheduled for follow-up appointments. This is to make sure the cancer is under control. The follow-ups will also make sure that any side effects from the treatment are taken care of.  Radiation therapy uses high-energy X-rays to kill cancer cells.   Your treatment planning visit: The simulation  Your radiation therapy team uses a special machine called a simulator to map out your treatment. The simulator is usually an X-ray machine (fluoroscopy), CT scanner, MRI scanner, or PET-CT scanner machine. Laser lights act as guides to help position your body accurately. During this visit:  · The team figures out the best position for your body. They make notes in your chart so youll be placed the same way each time.  · You may use special devices to keep your body correctly positioned and still during treatment. These may include molds, masks, rests, and blocks.  · The team makes ink marks on your skin. These will help you get in the same position for each treatment. Tiny permanent tattoos may also be used.   · Markers such as metal balls or wires may be put on or in your body. Sometime these are taped to the skin to help with the imaging process. These work with the X-rays to position your body. The markers are removed when the visit is  over.  After the team has the imaging and data, the information is sent into the computer planning system. Your doctor and the team of physicists and dosimetrists design a treatment field. The field will best target your cancer and how it might spread. It will also help limit radiation to nearby normal tissues.  Your treatments  Each treatment usually takes 10 to 30 minutes. You may need to change into a hospital gown. The radiation therapist puts you in the correct position on the treatment table, then leaves the room. Sometimes you may need more imaging before each treatment. The machine may take digital X-rays or a CT scan to help make sure you are lined up correctly. During treatment, lie as still as you can and breathe normally. You will hear noises coming from the machine. You can talk to the radiation therapist, who watches you from the control room on a TV monitor. After treatment, the therapist will help you off the table. You can then get dressed and go back to your normal activities.  Date Last Reviewed: 1/14/2016 © 2000-2017 [employer]. [address] [place] [address]. All rights reserved. This information is not intended as a substitute for professional medical care. Always follow your healthcare professional's instructions.        Discharge Instructions for Radiation Therapy  Radiation therapy uses high-energy X-rays to kill cancer cells and help you in your fight against cancer. Radiation destroys cancer cells gradually, over time. The goal of therapy is to focus on and kill as many cancer cells as possible. Radiation can also damage or kill some of the normal cells that are closest to the tumor. Damaged normal cells can repair themselves, often within a few days.  Caring for your skin  You should ask your healthcare provider for specific products that he or she recommends for washing and bathing. In general, use a mild nondetergent soap and warm (not hot) water to clean the  "area receiving radiation. Pat the region dry rather than rubbing.  Your healthcare provider may give you products to moisturize the skin and prevent infection. The goal is to prevent cracks or breaks in the skin that may be sensitive from treatment:   · Dont be surprised if your treatment causes skin redness, and a type of "sunburn" over time. Some radiation treatments can cause this.   · Ask your therapy team what lotion to use. Also ask for directions about when and how to apply it.  · Avoid prolonged or direct sunlight on the treated area. Ask your therapy team about using a sunscreen. You do not have to avoid going outside altogether, but must take appropriate precautions.  · Dont remove ink marks unless your radiation therapist says its OK. Dont scrub or use soap on the marks when you wash. Let water run over them and pat them dry.  · Protect your skin from heat or cold. Avoid hot tubs, saunas, heating pads, or ice packs.  · Avoid clothing that causes friction or rubbing on the skin.  Fighting fatigue  Radiation therapy may cause you to feel tired. Your body is working hard to heal and repair itself. To feel better, try these things:  · Do light exercise each day. Take short walks.  · Plan tasks for the times when you tend to have the most energy. Ask for help when you need it.  · Relax before you go to bed. This will help you sleep better. Try reading or listening to soothing music.  Coping with appetite changes  Here are ways to cope:  · Tell your therapy team if you find it hard to eat or you have no appetite. You may be referred to a nutritionist to help you with meal planning.  · Radiation to certain internal sites can cause nausea, depending on the location of treatment. This can affect your appetite. Think of healthy eating as part of your treatment. Try these tips:  ¨ Eat slowly.  ¨ Eat small meals several times a day.  ¨ Eat more food when youre feeling better.  ¨ Ask others to keep you company " when you eat.  ¨ Stock up on easy-to-prepare foods.  ¨ Eat foods high in protein and calories. Your healthcare provider may recommend liquid meal supplements.  ¨ Drink plenty of water and other fluids.  ¨ Ask your healthcare provider before taking any vitamins or over-the-counter supplements. Such products are not regulated by the FDA and can sometimes interfere with your treatments.   Dealing with other side effects  Here are suggestions to deal with other side effects:   · Be prepared for hair loss in the area being treated. The hair loss may be permanent. Be sure to discuss this with your healthcare provider.  · Sip cool water if your mouth or throat becomes dry or sore. Ice chips may also help.  · Tell your healthcare provider if you have diarrhea or constipation. You may be given a special diet.  · If you have trouble swallowing liquids, tell your healthcare provider.  Follow-up  Make a follow-up appointment as directed by your healthcare provider.     When to call your healthcare provider  Call your healthcare provider right away if you have any of the following:  · Unexpected headaches  · Trouble concentrating  · Ongoing fatigue  · Wheezing, shortness of breath, or trouble breathing  · Pain that doesnt go away, especially if its always in the same place  · New or unusual lumps, bumps, or swelling  · Dizziness or lightheadedness  · Unusual rashes, bruises, or bleeding  · Fever of 100.4°F (38°C) or higher, or chills  · Nausea and vomiting  · Diarrhea that doesnt improve with time  · Skin breakdown; significant pain due to skin irritation   Date Last Reviewed: 1/13/2016  © 6031-7894 [employer]. [address] [place] [address], [place] [address]. All rights reserved. This information is not intended as a substitute for professional medical care. Always follow your healthcare professional's instructions.        Radiation Therapy: Managing Short-Term Side Effects     Take short walks daily.   Radiation  therapy uses high-energy X-rays or particles to kill cancer cells. Some normal cells can also be affected. This causes side effects such as dry skin, tiredness (fatigue), or changes in your appetite. Most side effects go away when your radiation therapy is over.  Having side effects of radiation therapy does not mean that your cancer is getting worse or that therapy isnt working.   Caring for your skin  Skin problems may happen where your body gets radiation. Your skin may become dry, itchy, red, and peeling. It may darken in that spot, like a tan. To care for your skin:  · Dont scrub on the treatment area. Clean that area of the skin every day. Use warm water and mild soap, or as your healthcare provider advises. Pat the skin afterward or let it air dry.  · Ask your therapy team what lotion to use and when to use it.  · Keep the treated area out of the sun. Ask your team about using a sunscreen.  · Don't remove ink marks unless your radiation therapist says you can. Dont scrub the marks when you wash. Let water run over them and pat them dry.  · Protect your skin from heat or cold. Avoid hot tubs, saunas, hot pads, and ice packs.  · Wear soft, loose clothing to avoid rubbing skin.  Fighting tiredness  The cancer itself or the radiation therapy may cause you to feel tired. Your body is working hard to heal and repair itself. To feel better:  · Try light exercise each day. Take short walks.  · Plan tasks for the times when you tend to have the most energy. Ask for help when you need it.  · Relax before you go to bed to sleep better. Try reading or listening to soothing music.  · Be sure to let your cancer care team know if you continue to have fatigue that is not getting better. They may be able to offer ways to help.   Coping with appetite changes  Tell your therapy team if you find it hard to eat or have no appetite. You may need to see a nutritionist. This is a healthcare provider with special training in meal  planning. To keep your strength up, you need to eat well and maintain your weight. Think of healthy eating as part of your treatment. Try these tips:  · Eat slowly.  · Eat small meals several times a day.  · Eat more food when youre feeling better, even if it is not mealtime.  · Ask others to keep you company when you eat.  · Stock up on easy-to-prepare foods.  · Eat foods high in protein and calories.  · Drink plenty of water and other fluids.  · Ask your healthcare provider before taking any vitamins.  Site-specific side effects  These side effects include the following:   · You may lose hair in the area being treated. The hair often grows back after treatment.  · Your mouth or throat can become dry or sore if your head or neck is being treated. Sip cool water to help ease discomfort.  · Nausea and bowel changes can happen with radiation to the pelvic region. Tell your healthcare provider if you have nausea, diarrhea, or constipation. You may need to take medicine or follow a special diet.  Talk with your healthcare team  Radiation therapy can also have other side effects, including some that might not show up until years later. Be sure to talk with your healthcare team about what to expect with the type of radiation therapy you are getting, including when you should call them with concerns.   Date Last Reviewed: 5/1/2016  © 8113-8291 [employer]. [address] [place] [address], [place] [address]. All rights reserved. This information is not intended as a substitute for professional medical care. Always follow your healthcare professional's instructions.      Return for ct/sim in 2-3 weeks

## 2018-09-12 ENCOUNTER — PATIENT MESSAGE (OUTPATIENT)
Dept: INTERNAL MEDICINE | Facility: CLINIC | Age: 78
End: 2018-09-12

## 2018-09-12 DIAGNOSIS — N18.30 CKD (CHRONIC KIDNEY DISEASE) STAGE 3, GFR 30-59 ML/MIN: ICD-10-CM

## 2018-09-16 ENCOUNTER — PATIENT MESSAGE (OUTPATIENT)
Dept: SURGERY | Facility: CLINIC | Age: 78
End: 2018-09-16

## 2018-09-22 NOTE — PROGRESS NOTES
Interdisciplinary Breast Cancer Conference    Shima Sorto    Female    Date Presented to Tumor Board: 09/04/18    HOSPTIAL/CLINIC PRESENTING: [facility] [address]    TUMOR SITE: RIGHT    TUMOR SITE: UOQ(1:00 posterior depth 5cm FN)    Presenter: Keith Fernando(on behalf of Abby Mason MD)    Reason For Consultation: Initial Presentation    Specialties Present: Medical Oncology;Surgical Oncology;Pathology;Radiology;Research;Physical / Occupational Therapy    Patient Status: a current patient    Treatment to Date: Surgical Intervention(s)(partial mastectomy with SLNB)    Clinical Trial Eligibility: None available    Estrogen Receptor Status: Positive    Progesterone Status: Negative    Her2/COLBY Status: Negative    Cancer Staging  Invasive lobular carcinoma, T1bN0  Stage IA per AJCC 8th ed. pathologic prognostic staging system      RECOMMENDED PLAN: Radiation;Endocrine Therapy   Radiation oncology consult to discuss, AI    UNSTAGEABLE: No         PRESENTATION AT CANCER CONFERENCE: [facility]

## 2018-09-24 ENCOUNTER — HOSPITAL ENCOUNTER (OUTPATIENT)
Dept: RADIATION THERAPY | Facility: HOSPITAL | Age: 78
Discharge: HOME | End: 2018-09-24
Attending: RADIOLOGY
Payer: MEDICARE

## 2018-09-24 PROCEDURE — 77334 RADIATION TREATMENT AID(S): CPT | Mod: TC | Performed by: RADIOLOGY

## 2018-09-24 PROCEDURE — 77263 THER RADIOLOGY TX PLNG CPLX: CPT | Mod: ,,, | Performed by: RADIOLOGY

## 2018-09-24 PROCEDURE — 77290 THER RAD SIMULAJ FIELD CPLX: CPT | Mod: TC | Performed by: RADIOLOGY

## 2018-09-24 PROCEDURE — 77014 HC CT GUIDANCE RADIATION THERAPY FLDS PLACEMENT: CPT | Mod: TC | Performed by: RADIOLOGY

## 2018-09-24 PROCEDURE — 77334 RADIATION TREATMENT AID(S): CPT | Mod: 26,,, | Performed by: RADIOLOGY

## 2018-09-24 PROCEDURE — 77290 THER RAD SIMULAJ FIELD CPLX: CPT | Mod: 26,,, | Performed by: RADIOLOGY

## 2018-09-25 RX ORDER — ATENOLOL 25 MG/1
TABLET ORAL
Qty: 30 TABLET | Refills: 12 | Status: SHIPPED | OUTPATIENT
Start: 2018-09-25 | End: 2019-03-27

## 2018-09-26 PROCEDURE — 77334 RADIATION TREATMENT AID(S): CPT | Mod: TC | Performed by: RADIOLOGY

## 2018-09-26 PROCEDURE — 77295 3-D RADIOTHERAPY PLAN: CPT | Mod: TC | Performed by: RADIOLOGY

## 2018-09-26 PROCEDURE — 77334 RADIATION TREATMENT AID(S): CPT | Mod: 26,,, | Performed by: RADIOLOGY

## 2018-09-26 PROCEDURE — 77300 RADIATION THERAPY DOSE PLAN: CPT | Mod: TC | Performed by: RADIOLOGY

## 2018-09-26 PROCEDURE — 77295 3-D RADIOTHERAPY PLAN: CPT | Mod: 26,,, | Performed by: RADIOLOGY

## 2018-09-26 PROCEDURE — 77300 RADIATION THERAPY DOSE PLAN: CPT | Mod: 26,,, | Performed by: RADIOLOGY

## 2018-09-27 ENCOUNTER — PATIENT OUTREACH (OUTPATIENT)
Dept: OTHER | Facility: OTHER | Age: 78
End: 2018-09-27

## 2018-09-27 ENCOUNTER — PATIENT MESSAGE (OUTPATIENT)
Dept: ADMINISTRATIVE | Facility: OTHER | Age: 78
End: 2018-09-27

## 2018-09-27 ENCOUNTER — OFFICE VISIT (OUTPATIENT)
Dept: SURGERY | Facility: CLINIC | Age: 78
End: 2018-09-27
Payer: MEDICARE

## 2018-09-27 VITALS
TEMPERATURE: 98 F | RESPIRATION RATE: 16 BRPM | WEIGHT: 129.44 LBS | BODY MASS INDEX: 20.31 KG/M2 | DIASTOLIC BLOOD PRESSURE: 64 MMHG | HEIGHT: 67 IN | SYSTOLIC BLOOD PRESSURE: 154 MMHG

## 2018-09-27 DIAGNOSIS — Z17.0 MALIGNANT NEOPLASM OF UPPER-OUTER QUADRANT OF RIGHT BREAST IN FEMALE, ESTROGEN RECEPTOR POSITIVE: Primary | ICD-10-CM

## 2018-09-27 DIAGNOSIS — C50.411 MALIGNANT NEOPLASM OF UPPER-OUTER QUADRANT OF RIGHT BREAST IN FEMALE, ESTROGEN RECEPTOR POSITIVE: Primary | ICD-10-CM

## 2018-09-27 PROCEDURE — 99205 OFFICE O/P NEW HI 60 MIN: CPT | Mod: S$PBB,,, | Performed by: INTERNAL MEDICINE

## 2018-09-27 PROCEDURE — 99999 PR PBB SHADOW E&M-EST. PATIENT-LVL III: CPT | Mod: PBBFAC,,, | Performed by: INTERNAL MEDICINE

## 2018-09-27 PROCEDURE — 99213 OFFICE O/P EST LOW 20 MIN: CPT | Mod: PBBFAC,PO | Performed by: INTERNAL MEDICINE

## 2018-09-27 NOTE — PROGRESS NOTES
Subjective:       Patient ID: Shima Sorto is a 78 y.o. female.    Chief Complaint: No chief complaint on file.    HPI   2REFERRING PHYSICIAN:  Abby Mason M.D.    REASON FOR REFERRAL:  Recent diagnosis of breast cancer, consideration for   adjuvant treatment.    HISTORY OF PRESENT ILLNESS:  Ms. Sorto is a very pleasant 78-year-old    female who was recently diagnosed with breast cancer.  Apparently, she presented   with a palpable mass in the upper outer quadrant of the right breast.  A   mammogram in July 2018 showed a 10 mm suspicious lesion in the upper outer   quadrant of the right breast.  Core needle biopsy was performed on 07/20/2018   and showed a grade I invasive lobular carcinoma, which was ER strongly positive,   CA negative and HER-2 negative with a Ki-67 of 5%.  A breast MRI on 08/06/2018,   showed the known lesion in the upper outer quadrant of the right breast with no   evidence of suspicious masses elsewhere.  On 08/17/2018, she underwent a   lumpectomy and sentinel lymph node biopsy.  Pathology report from the procedure   indicates that she had an 8 mm grade 1 invasive lobular carcinoma with the   closest margin being 2 mm.  Two of 2 sentinel lymph nodes were negative for   involvement.  She has already met Dr. Ames, and she has already undergone   simulation and she will start radiation therapy four days from now.  She   presents to discuss adjuvant treatment options.    PAST MEDICAL HISTORY:  As above.  She has a history of medullary sponge kidneys,   mitral valve prolapse, osteoporosis, renal stones, sleep apnea, TMJ disease and   vertigo.    PAST SURGICAL HISTORY:  Significant for kidney stone surgery in 2000.  She also   had a Mohs procedure for a skin carcinoma.    SOCIAL HISTORY:  She is a retired .  She is .  She does   not smoke and does not drink alcohol more than socially.    GYNECOLOGICAL HISTORY:  Menarche was at 12, first live birth at 31  and she is .  Menopause at 55.    FAMILY HISTORY:  Her father had melanoma.      Review of Systems      Overall she feels well.  She has fully recovered from her surgery.  She denies any anxiety, depression, easy bruising, fevers, chills, night  sweats, weight loss, nausea, vomiting, diarrhea, constipation, diplopia,     blurred vision, headache, chest pain, palpitations, shortness of breath, breast pain, abdominal pain, extremity pain, or difficulty ambulating.  The remainder of the ten-point ROS, including general, skin, lymph, H/N, cardiorespiratory, GI, , Neuro, Endocrine, and psychiatric is negative.     Objective:      Physical Exam        She is alert, oriented to time, place, person, pleasant, well      nourished, in no acute physical distress.                                    VITAL SIGNS:  Reviewed                                      HEENT:  Normal.  There are no nasal, oral, lip, gingival, auricular, lid,    or conjunctival lesions.  Mucosae are moist and pink, and there is no        thrush.  Pupils are equal, reactive to light and accommodation.              Extraocular muscle movements are intact.  Dentition is good.  She is wearing braces.  There is no frontal or maxillary tenderness.                                     NECK:  Supple without JVD, adenopathy, or thyromegaly.                       LUNGS:  Clear to auscultation without wheezing, rales, or rhonchi.           CARDIOVASCULAR:  Reveals an S1, S2, no murmurs, no rubs, no gallops.         ABDOMEN:  Soft, nontender, without organomegaly.  Bowel sounds are    present.                                                                     EXTREMITIES:  No cyanosis, clubbing, or edema.                               BREASTS:  She is status post right lumpectomy with a well-healed incision in the upper outer quadrant.    There are no masses in either breast.                                      LYMPHATIC:  There is no cervical, axillary, or  supraclavicular adenopathy.   SKIN:  Warm and moist, without petechiae, rashes, induration, or ecchymoses.           NEUROLOGIC:  DTRs are 0-1+ bilaterally, symmetrical, motor function is 5/5,  and cranial nerves are  within normal limits.    Assessment:       1. Malignant neoplasm of upper-outer quadrant of right breast in female, estrogen receptor positive        Plan:         After review of the pathology and images of the radiological studies, Ms. Sorto is noted to have a lesion which measures less than 1 cm which was excised with negative margins.  I had a long discussion with her; she had a [id number] lobular carcinoma and she is s/p lumpectomy.  She has already been scheduled to begin her radiation treatment next week.  Upon completion of XRT, she will be an excellent candidate for adjuvant hormopnal therapy with anastrazole.  The pros and cons of such an approach were explained to her in detail.  I will see her again in late October to initiate her adjuvant hormonal therapy.  Her multiple questions were answered to her satisfaction.  I spent approximately 80 minutes reviewing the available records and evaluating the patient, out of which over 75% of the time was spent face to face with the patient in counseling and coordinating this patient's care.

## 2018-09-27 NOTE — PROGRESS NOTES
Last 5 Patient Entered Readings                                      Current 30 Day Average: 120/60     Recent Readings 9/22/2018 9/20/2018 9/14/2018 8/29/2018 8/28/2018    SBP (mmHg) 109 106 121 135 128    DBP (mmHg) 62 52 62 65 60    Pulse 56 69 56 56 56            Digital Medicine: Health  Follow Up    Left voicemail to follow up with Maye Shima Kinza Sorto.  Current BP average 120/60 mmHg is at goal, <130/80.

## 2018-09-28 ENCOUNTER — DOCUMENTATION ONLY (OUTPATIENT)
Dept: RADIATION ONCOLOGY | Facility: CLINIC | Age: 78
End: 2018-09-28

## 2018-09-28 PROCEDURE — 77280 THER RAD SIMULAJ FIELD SMPL: CPT | Mod: 26,,, | Performed by: RADIOLOGY

## 2018-09-28 PROCEDURE — 77280 THER RAD SIMULAJ FIELD SMPL: CPT | Mod: TC | Performed by: RADIOLOGY

## 2018-09-28 NOTE — PLAN OF CARE
Problem: Patient Care Overview  Goal: Plan of Care Review  Outcome: Ongoing (interventions implemented as appropriate)  First day of XRT to right breast. Nursing education done and handout given and discussed. Miaderm given to pt.

## 2018-10-01 ENCOUNTER — PATIENT MESSAGE (OUTPATIENT)
Dept: INTERNAL MEDICINE | Facility: CLINIC | Age: 78
End: 2018-10-01

## 2018-10-01 ENCOUNTER — HOSPITAL ENCOUNTER (OUTPATIENT)
Dept: RADIATION THERAPY | Facility: HOSPITAL | Age: 78
Discharge: HOME | End: 2018-10-01
Attending: RADIOLOGY
Payer: MEDICARE

## 2018-10-01 PROCEDURE — 77412 RADIATION TX DELIVERY LVL 3: CPT | Performed by: RADIOLOGY

## 2018-10-02 ENCOUNTER — PATIENT MESSAGE (OUTPATIENT)
Dept: INTERNAL MEDICINE | Facility: CLINIC | Age: 78
End: 2018-10-02

## 2018-10-02 ENCOUNTER — OFFICE VISIT (OUTPATIENT)
Dept: INTERNAL MEDICINE | Facility: CLINIC | Age: 78
End: 2018-10-02
Payer: MEDICARE

## 2018-10-02 ENCOUNTER — DOCUMENTATION ONLY (OUTPATIENT)
Dept: RADIATION ONCOLOGY | Facility: CLINIC | Age: 78
End: 2018-10-02

## 2018-10-02 DIAGNOSIS — L98.9 SKIN LESION: Primary | ICD-10-CM

## 2018-10-02 PROCEDURE — 99999 PR PBB SHADOW E&M-EST. PATIENT-LVL IV: CPT | Mod: PBBFAC,,, | Performed by: INTERNAL MEDICINE

## 2018-10-02 PROCEDURE — 77412 RADIATION TX DELIVERY LVL 3: CPT | Performed by: RADIOLOGY

## 2018-10-02 PROCEDURE — 99214 OFFICE O/P EST MOD 30 MIN: CPT | Mod: PBBFAC,25 | Performed by: INTERNAL MEDICINE

## 2018-10-02 PROCEDURE — 99212 OFFICE O/P EST SF 10 MIN: CPT | Mod: S$PBB,,, | Performed by: INTERNAL MEDICINE

## 2018-10-02 NOTE — PLAN OF CARE
Problem: Patient Care Overview  Goal: Plan of Care Review  Outcome: Ongoing (interventions implemented as appropriate)  Pt. On day 1 of xrt to breast.  Pt. Just started.  Using cream.

## 2018-10-03 PROCEDURE — 77412 RADIATION TX DELIVERY LVL 3: CPT | Performed by: RADIOLOGY

## 2018-10-04 PROCEDURE — 77336 RADIATION PHYSICS CONSULT: CPT | Performed by: RADIOLOGY

## 2018-10-04 PROCEDURE — 77412 RADIATION TX DELIVERY LVL 3: CPT | Performed by: RADIOLOGY

## 2018-10-05 VITALS
WEIGHT: 126.13 LBS | SYSTOLIC BLOOD PRESSURE: 131 MMHG | OXYGEN SATURATION: 99 % | HEIGHT: 67 IN | HEART RATE: 50 BPM | DIASTOLIC BLOOD PRESSURE: 66 MMHG | BODY MASS INDEX: 19.8 KG/M2

## 2018-10-05 PROCEDURE — 77412 RADIATION TX DELIVERY LVL 3: CPT | Performed by: RADIOLOGY

## 2018-10-05 NOTE — PROGRESS NOTES
Subjective:      Patient ID: Shima Sorto is a 78 y.o. female.    Chief Complaint: Herpes Zoster    HPI:  HPI   Patient is currently under treatment for breast cancer and her current treatment is radiation.  She has had a breakout on her back and she wonders whether this is shingles.  She walked over today to see if I would see her.  She has no other problems and overall is doing quite well.    Patient is on the digital Hypertension program.  Her blood pressure is elevated today but I do not believe that this is what her blood pressure generally is.  Reviewing the digital Hypertension program her blood pressure is well controlled.  Patient Active Problem List   Diagnosis    Calcium nephrolithiasis    Hypertension    Hyperoxaluria    Hypocitraturic calcium nephrolithiasis    Cystic kidney disease    Fatigue    Urinary retention    Osteoporosis: per Endocrinology currently off medication    Hypoglycemia    Trigger middle finger of right hand    Retinal hemorrhage of both eyes at about 1'oclock    Vestibular neuronitis    Vertigo    Obstructive sleep apnea    Venous insufficiency    Bradycardia    Screening for colon cancer    Malignant neoplasm of upper-outer quadrant of right breast in female, estrogen receptor positive    At risk for lymphedema    Cancer of right breast    CKD (chronic kidney disease) stage 3, GFR 30-59 ml/min     Past Medical History:   Diagnosis Date    Allergy     seasonal    Anemia     Basal cell carcinoma 7/2013    forehead    Hx of colonic polyps     Hypertension     Medullary sponge kidney     MVP (mitral valve prolapse)     Osteoporosis, senile     Renal calculi     Sleep apnea     TMJ syndrome     sometimes jaw clicking/jaw pain    Urinary retention     Vertigo 1/17/2017    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    Visual impairment     reading glasses     Past Surgical History:   Procedure Laterality Date    BIOPSY, LYMPH NODE, SENTINEL Right 8/17/2018     Performed by Abby Mason MD at [facility] OR 2ND FLR    BREAST CYST ASPIRATION Right 1999    COLONOSCOPY N/A 7/24/2018    Procedure: COLONOSCOPY;  Surgeon: Juan Miguel Pena MD;  Location: [facility] (4TH FLR);  Service: Endoscopy;  Laterality: N/A;    COLONOSCOPY N/A 7/24/2018    Performed by Juan Miguel Pena MD at [facility] ENDO (4TH FLR)    COLONOSCOPY N/A 4/25/2013    Performed by Rigoberto Parry MD at [facility] ENDO (4TH FLR)    INJECTION FOR SENTINEL NODE IDENTIFICATION Right 8/17/2018    Procedure: INJECTION, FOR SENTINEL NODE IDENTIFICATION;  Surgeon: Abby Mason MD;  Location: [facility] OR [address] [place] [address];  Service: General;  Laterality: Right;    INJECTION, FOR SENTINEL NODE IDENTIFICATION Right 8/17/2018    Performed by Abby Mason MD at [facility] OR [address] [place] [address]    INSERTION-LEAD-INTERSTIM (STAGE I) N/A 7/2/2013    Performed by Leticia Stoddard MD at [facility] OR [address] [place] [address]    interstim placed stage 1      KID[facility] STONE SURGERY  2000    @ [facility]    MASTECTOMY, PARTIAL Right 8/17/2018    Procedure: MASTECTOMY, PARTIAL-US guided;  Surgeon: Abby Mason MD;  Location: [facility] OR [address] [place] [address];  Service: General;  Laterality: Right;    MASTECTOMY, PARTIAL-US guided Right 8/17/2018    Performed by Ab[facility] Mason [facility] at [facility] OR 2ND [facility]    MOHS procedure      REMOVAL, NEUROSTIMULATOR, SACRAL N/A 7/25/2013    Performed by Leticia Stoddard MD at [facility] OR [address] [place] [address]    SENTINEL LYMPH NODE BIOPSY Right 8/17/2018    Procedure: BIOPSY, LYMPH NODE, SENTINEL;  Surgeon: Abby Mason MD;  Location: [facility] OR [address] [place] [address];  Service: General;  Laterality: Right;     Family History   Problem Relation Age of Onset    Kidney disease Mother     Heart disease Father     Melanoma Father     Breast cancer Maternal Aunt     Anesthesia problems Neg Hx     Malignant hypertension Neg Hx     Hypotension Neg Hx     Malignant hyperthermia Neg Hx     Pseudochol deficiency Neg Hx     Colon cancer Neg Hx     Ovarian cancer Neg Hx     Psoriasis Neg Hx      "Lupus Neg Hx     Eczema Neg Hx     Acne Neg Hx      Review of Systems: no new problems  Objective:     Vitals:    10/02/18 1509 10/05/18 1322   BP: (!) 160/60 131/66   Pulse: (!) 50    SpO2: 99%    Weight: 57.2 kg (126 lb 1.7 oz)    Height: 5' 7" (1.702 m)    PainSc: 0-No pain      Body mass index is 19.75 kg/m².  Physical Exam   Constitutional: She appears well-developed and well-nourished.   Neck: No JVD present. No thyromegaly present.   Cardiovascular: Normal rate, normal heart sounds and intact distal pulses.   Pulmonary/Chest: Effort normal and breath sounds normal. No respiratory distress.    2 skin lesions which are not shingles  Assessment:     1. Skin lesion      Plan:   Shima was seen today for herpes zoster.    Diagnoses and all orders for this visit:    Skin lesion:  2 skin lesions not shingles      Blood pressure is to be followed on the digital Hypertension program  Problem List Items Addressed This Visit     None      Visit Diagnoses     Skin lesion    -  Primary        No orders of the defined types were placed in this encounter.    No Follow-up on file.     Medication List           Accurate as of 10/2/18 11:59 PM. If you have any questions, ask your nurse or doctor.               CONTINUE taking these medications    alendronate 70 MG tablet  Commonly known as:  FOSAMAX  TAKE 1 TABLET BY MOUTH ON SUNDAY WITH A FULL GLASS OF WATER AND WAIT 30 MINUTES BEFORE BREAKFAST AND PILLS. TAKE SITTING OR STANDING     * atenolol 25 MG tablet  Commonly known as:  TENORMIN  TAKE 1 TABLET BY MOUTH EVERY DAY     * atenolol 25 MG tablet  Commonly known as:  TENORMIN  TAKE 1 TABLET BY MOUTH EVERY DAY     B COMPLEX WITH C#10-FOLIC ACID ORAL     calcium citrate 200 mg (950 mg) tablet  Commonly known as:  CALCITRATE     co-enzyme Q-10 50 mg capsule     lisinopril 10 MG tablet  Take 2 tablets (20 mg total) by mouth every morning. Dosage change     MULTIVITAMIN ORAL     PRESERVISION AREDS 2 ORAL     SUPER OMEGA-3 400-5 " mg-unit Cap  Generic drug:  fish oil-E-fatty [id number]     VANICREAM  Generic drug:  radiacream     VITAMIN D2 50,000 unit Cap  Generic drug:  ergocalciferol         * This list has 2 medication(s) that are the same as other medications prescribed for you. Read the directions carefully, and ask your doctor or other care provider to review them with you.

## 2018-10-08 PROCEDURE — 77417 THER RADIOLOGY PORT IMAGE(S): CPT | Performed by: RADIOLOGY

## 2018-10-08 PROCEDURE — 77412 RADIATION TX DELIVERY LVL 3: CPT | Performed by: RADIOLOGY

## 2018-10-09 ENCOUNTER — DOCUMENTATION ONLY (OUTPATIENT)
Dept: RADIATION ONCOLOGY | Facility: CLINIC | Age: 78
End: 2018-10-09

## 2018-10-09 PROCEDURE — 77412 RADIATION TX DELIVERY LVL 3: CPT | Performed by: RADIOLOGY

## 2018-10-09 NOTE — PLAN OF CARE
Problem: Patient Care Overview  Goal: Plan of Care Review  Outcome: Ongoing (interventions implemented as appropriate)  Pt. On day 6 of xrt to breast.  Pt. Doing well.  No c/o .  Using cream.

## 2018-10-10 ENCOUNTER — IMMUNIZATION (OUTPATIENT)
Dept: INTERNAL MEDICINE | Facility: CLINIC | Age: 78
End: 2018-10-10
Payer: MEDICARE

## 2018-10-10 ENCOUNTER — OFFICE VISIT (OUTPATIENT)
Dept: INTERNAL MEDICINE | Facility: CLINIC | Age: 78
End: 2018-10-10
Payer: MEDICARE

## 2018-10-10 VITALS
SYSTOLIC BLOOD PRESSURE: 122 MMHG | DIASTOLIC BLOOD PRESSURE: 74 MMHG | HEIGHT: 67 IN | HEART RATE: 64 BPM | OXYGEN SATURATION: 97 % | WEIGHT: 126.31 LBS | BODY MASS INDEX: 19.82 KG/M2

## 2018-10-10 DIAGNOSIS — L98.9 SKIN LESION: ICD-10-CM

## 2018-10-10 DIAGNOSIS — M67.911 DISORDER OF ROTATOR CUFF OF BOTH SHOULDERS: ICD-10-CM

## 2018-10-10 DIAGNOSIS — Z71.89 GOALS OF CARE, COUNSELING/DISCUSSION: Primary | ICD-10-CM

## 2018-10-10 DIAGNOSIS — M67.912 DISORDER OF ROTATOR CUFF OF BOTH SHOULDERS: ICD-10-CM

## 2018-10-10 PROCEDURE — 99999 PR PBB SHADOW E&M-EST. PATIENT-LVL III: CPT | Mod: PBBFAC,,, | Performed by: INTERNAL MEDICINE

## 2018-10-10 PROCEDURE — 90662 IIV NO PRSV INCREASED AG IM: CPT | Mod: PBBFAC

## 2018-10-10 PROCEDURE — 77412 RADIATION TX DELIVERY LVL 3: CPT | Performed by: RADIOLOGY

## 2018-10-10 PROCEDURE — 99213 OFFICE O/P EST LOW 20 MIN: CPT | Mod: S$PBB,,, | Performed by: INTERNAL MEDICINE

## 2018-10-10 PROCEDURE — 99213 OFFICE O/P EST LOW 20 MIN: CPT | Mod: PBBFAC,25 | Performed by: INTERNAL MEDICINE

## 2018-10-10 NOTE — PROGRESS NOTES
Subjective:      Patient ID: Shima Sorto is a 78 y.o. female.    Chief Complaint: Follow-up    HPI:  Hypertension   This is a chronic problem. The current episode started more than 1 year ago. The problem has been waxing and waning since onset. The problem is controlled. Associated symptoms include malaise/fatigue and neck pain. Pertinent negatives include no anxiety, blurred vision, chest pain, orthopnea, palpitations, peripheral edema, PND, shortness of breath or sweats. Agents associated with hypertension include decongestants. Risk factors for coronary artery disease include family history and post-menopausal state. Past treatments include lifestyle changes. The current treatment provides significant improvement. There are no compliance problems.    Patient had filled the about out on line    Patient had a series of questions that she needed direction  Patient Active Problem List   Diagnosis    Calcium nephrolithiasis    Hypertension    Hyperoxaluria    Hypocitraturic calcium nephrolithiasis    Cystic kidney disease    Fatigue    Urinary retention    Osteoporosis: per Endocrinology currently off medication    Hypoglycemia    Trigger middle finger of right hand    Retinal hemorrhage of both eyes at about 1'oclock    Vestibular neuronitis    Vertigo    Obstructive sleep apnea    Venous insufficiency    Bradycardia    Screening for colon cancer    Malignant neoplasm of upper-outer quadrant of right breast in female, estrogen receptor positive    At risk for lymphedema    Cancer of right breast    CKD (chronic kidney disease) stage 3, GFR 30-59 ml/min     Past Medical History:   Diagnosis Date    Allergy     seasonal    Anemia     Basal cell carcinoma 7/2013    forehead    Hx of colonic polyps     Hypertension     Medullary sponge kidney     MVP (mitral valve prolapse)     Osteoporosis, senile     Renal calculi     Sleep apnea     TMJ syndrome     sometimes jaw clicking/jaw  pain    Urinary retention     Vertigo 1/17/2017    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    Visual impairment     reading glasses     Past Surgical History:   Procedure Laterality Date    BIOPSY, LYMPH NODE, SENTINEL Right 8/17/2018    Performed by [facility] at [facility] OR 2ND [facility]    BREAST CYST ASPIRATION Right 1999    COLONOSCOPY N/A 7/24/2018    Procedure: COLONOSCOPY;  Surgeon: Juan Miguel Pena MD;  Location: [facility] (4TH FLR);  Service: Endoscopy;  Laterality: N/A;    COLONOSCOPY N/A 7/24/2018    Performed by Juan Miguel Pena MD at [facility] ENDO (4TH FLR)    COLONOSCOPY N/A 4/25/2013    Performed by Rigoberto Parry MD at [facility] ENDO (4TH FLR)    INJECTION FOR SENTINEL NODE IDENTIFICATION Right 8/17/2018    Procedure: INJECTION, FOR SENTINEL NODE IDENTIFICATION;  Surgeon: Abby Mason MD;  Location: [facility] OR [address] [place] [address];  Service: General;  Laterality: Right;    INJECTION, FOR SENTINEL NODE IDENTIFICATION Right 8/17/2018    Performed by Abby Mason MD at [facility] OR [address] [place] [address]    INSERTION-LEAD-INTERSTIM (STAGE I) N/A 7/2/2013    Performed by Leticia Stoddard MD at [facility] OR [address] [place] [address]    interstim placed stage 1      KIDNEY STONE SURGERY  2000    @ [employer]    MASTECTOMY, PARTIAL Right 8/17/2018    Procedure: MASTECTOMY, PARTIAL-US guided;  Surgeon: Abby Mason MD;  Location: [facility] OR [address] [place] [address];  Service: General;  Laterality: Right;    MASTECTOMY, PARTIAL-US guided Right 8/17/2018    Performed by Abby Mason MD at [facility] OR [address] [place] [address]    MOHS procedure      REMOVAL, NEUROSTIMULATOR, SACRAL N/A 7/25/2013    Performed by Leticia Stoddard MD at [facility] OR [address] [place] [address]    SENTINEL LYMPH NODE BIOPSY Right 8/17/2018    Procedure: BIOPSY, LYMPH NODE, SENTINEL;  Surgeon: Abby Mason MD;  Location: [facility] OR [address] [place] [address];  Service: General;  Laterality: Right;     Family History   Problem Relation Age of Onset    Kidney disease Mother     Heart disease Father     Melanoma Father     Breast cancer Maternal Aunt      "Anesthesia problems Neg Hx     Malignant hypertension Neg Hx     Hypotension Neg Hx     Malignant hyperthermia Neg Hx     Pseudochol deficiency Neg Hx     Colon cancer Neg Hx     Ovarian cancer Neg Hx     Psoriasis Neg Hx     Lupus Neg Hx     Eczema Neg Hx     Acne Neg Hx      Review of Systems   Constitutional: Positive for malaise/fatigue. Negative for chills, fever and unexpected weight change.   HENT: Negative for trouble swallowing.    Eyes: Negative for blurred vision.   Respiratory: Negative for cough, shortness of breath and wheezing.    Cardiovascular: Negative for chest pain, palpitations, orthopnea and PND.   Gastrointestinal: Negative for abdominal distention, abdominal pain, blood in stool and vomiting.        Indigestion:   Musculoskeletal: Positive for neck pain. Negative for back pain.     Objective:     Vitals:    10/10/18 1340   BP: 122/74   Pulse: 64   SpO2: 97%   Weight: 57.3 kg (126 lb 5.2 oz)   Height: 5' 7" (1.702 m)   PainSc: 0-No pain     Body mass index is 19.79 kg/m².  Physical Exam   Constitutional: She is oriented to person, place, and time. She appears well-developed and well-nourished. No distress.   Neck: Carotid bruit is not present. No thyromegaly present.   Cardiovascular: Normal rate, regular rhythm and normal heart sounds. PMI is not displaced.   Pulmonary/Chest: Effort normal and breath sounds normal. No respiratory distress.   Abdominal: Soft. Bowel sounds are normal. She exhibits no distension. There is no tenderness.   Musculoskeletal: She exhibits no edema.   Neurological: She is alert and oriented to person, place, and time.     Assessment:     1. Goals of care, counseling/discussion    2. Skin lesion    3. Disorder of rotator cuff of both shoulders      Plan:   Shima was seen today for follow-up.    Diagnoses and all orders for this visit:    Goals of care, counseling/discussion  Comments:  Multiple questions answered for the patient    Skin " lesion  Comments:  Are on leg should be removed  Orders:  -     Ambulatory consult to Dermatology    Disorder of rotator cuff of both shoulders  Comments:  Patient will call me with the PT person she would like to see and I will refer for shoulder exercise instruction    Because patient is seeing multiple doctors she does not want her regular 3 month appt but will call for follow up and I have asked the patient to come in at least yearly.    Problem List Items Addressed This Visit     None      Visit Diagnoses     Goals of care, counseling/discussion    -  Primary    Multiple questions answered for the patient    Skin lesion        Are on leg should be removed    Relevant Orders    Ambulatory consult to Dermatology    Disorder of rotator cuff of both shoulders        Patient will call me with the PT person she would like to see and I will refer for shoulder exercise instruction        Orders Placed This Encounter   Procedures    Ambulatory consult to Dermatology     Referral Priority:   Routine     Referral Type:   Consultation     Referral Reason:   Specialty Services Required     Requested Specialty:   Dermatology     Number of Visits Requested:   1     Follow-up if symptoms worsen or fail to improve.     Medication List           Accurate as of 10/10/18  6:02 PM. If you have any questions, ask your nurse or doctor.               START taking these medications    influenza 180 mcg/0.5 mL vaccine  Commonly known as:  FLUZONE HIGH-DOSE  Inject into the muscle.        CONTINUE taking these medications    alendronate 70 MG tablet  Commonly known as:  FOSAMAX  TAKE 1 TABLET BY MOUTH ON SUNDAY WITH A FULL GLASS OF WATER AND WAIT 30 MINUTES BEFORE BREAKFAST AND PILLS. TAKE SITTING OR STANDING     * atenolol 25 MG tablet  Commonly known as:  TENORMIN  TAKE 1 TABLET BY MOUTH EVERY DAY     * atenolol 25 MG tablet  Commonly known as:  TENORMIN  TAKE 1 TABLET BY MOUTH EVERY DAY     B COMPLEX WITH C#10-FOLIC ACID ORAL      calcium citrate 200 mg (950 mg) tablet  Commonly known as:  CALCITRATE     co-enzyme Q-10 50 mg capsule     lisinopril 10 MG tablet  Take 2 tablets (20 mg total) by mouth every morning. Dosage change     MULTIVITAMIN ORAL     PRESERVISION AREDS 2 ORAL     SUPER OMEGA-3 400-5 mg-unit Cap  Generic drug:  fish oil-E-fatty acid5-xsym602     VANICREAM  Generic drug:  radiacream     VITAMIN D2 50,000 unit Cap  Generic drug:  ergocalciferol         * This list has 2 medication(s) that are the same as other medications prescribed for you. Read the directions carefully, and ask your doctor or other care provider to review them with you.               Where to Get Your Medications      These medications were sent to [facility]  [facility] [address]    Hours:  Mon-Fri, 8a-5:30p Phone:  [phone]   · influenza 180 mcg/0.5 mL vaccine

## 2018-10-11 PROCEDURE — 77412 RADIATION TX DELIVERY LVL 3: CPT | Performed by: RADIOLOGY

## 2018-10-11 NOTE — PROGRESS NOTES
Last 5 Patient Entered Readings                                      Current 30 Day Average: 118/60     Recent Readings 10/6/2018 10/3/2018 9/22/2018 9/20/2018 9/14/2018    SBP (mmHg) 123 131 109 106 121    DBP (mmHg) 57 66 62 52 62    Pulse 60 60 56 69 56          Digital Medicine: Health  Follow Up    Lifestyle Modifications:    1.Dietary Modifications (Sodium intake <2,000mg/day, food labels, dining out): Deferred    2.Physical Activity: Deferred    3.Medication Therapy: Patient has been compliant with the medication regimen. Patient is doing well on her current regimen. She denies symptoms/side effects.     4.Patient has the following medication side effects/concerns:   (Frequency/Alleviating factors/Precipitating factors, etc.)     Patient is doing well and feeling good. She is currently undergoing radiation but stated that it is a very quick process and she feels fine after treatment.   She stated that she is not taking her BP readings as often as she once was because I informed that she is still doing well with getting her readings in at least once a week.     Follow up with Mrs. Hermosillole Kinza Sorto completed. No further questions or concerns. Will continue to follow up to achieve health goals.

## 2018-10-12 ENCOUNTER — TELEPHONE (OUTPATIENT)
Dept: HEMATOLOGY/ONCOLOGY | Facility: CLINIC | Age: 78
End: 2018-10-12

## 2018-10-12 PROCEDURE — 77412 RADIATION TX DELIVERY LVL 3: CPT | Performed by: RADIOLOGY

## 2018-10-12 NOTE — TELEPHONE ENCOUNTER
Called pt and informed that Dr. Conroy will order DXA scan after starting arimidex.   Pt verbalized understanding.           ----- Message from Robert Hernandez MD sent at 10/12/2018  4:34 PM CDT -----  Contact: [phone]  No, I typically get a DXA scan when they start arimidex, not before  ----- Message -----  From: Christina Mas  Sent: 10/12/2018   4:10 PM  To: Robert Hernandez MD    Does she need scans ordered before her visit on 10/23?    ----- Message -----  From: Gosia Duron  Sent: 10/12/2018   4:06 PM  To: [facility] Staff    Pt is calling to speak with the nurse, pt would like to discuss her appt and bone scan that needs to be ordered.       Thank you!

## 2018-10-15 ENCOUNTER — TELEPHONE (OUTPATIENT)
Dept: INTERNAL MEDICINE | Facility: CLINIC | Age: 78
End: 2018-10-15

## 2018-10-15 DIAGNOSIS — M67.919 DISORDER OF ROTATOR CUFF, UNSPECIFIED LATERALITY: Primary | ICD-10-CM

## 2018-10-15 PROCEDURE — 77412 RADIATION TX DELIVERY LVL 3: CPT | Performed by: RADIOLOGY

## 2018-10-15 PROCEDURE — 77336 RADIATION PHYSICS CONSULT: CPT | Performed by: RADIOLOGY

## 2018-10-15 NOTE — TELEPHONE ENCOUNTER
----- Message from Emery Chaudhry sent at 10/12/2018  3:15 PM CDT -----  Contact: self [phone]  Patient stated the PT name is Roberto Zurita in regards to her rotator cuff brace. Please call and advise, Thanks

## 2018-10-15 NOTE — TELEPHONE ENCOUNTER
Do you know anything about this?      Tried to call the patient for more information. Left message on machine

## 2018-10-16 ENCOUNTER — PATIENT OUTREACH (OUTPATIENT)
Dept: OTHER | Facility: OTHER | Age: 78
End: 2018-10-16

## 2018-10-16 ENCOUNTER — DOCUMENTATION ONLY (OUTPATIENT)
Dept: RADIATION ONCOLOGY | Facility: CLINIC | Age: 78
End: 2018-10-16

## 2018-10-16 PROCEDURE — 77417 THER RADIOLOGY PORT IMAGE(S): CPT | Performed by: RADIOLOGY

## 2018-10-16 PROCEDURE — 77412 RADIATION TX DELIVERY LVL 3: CPT | Performed by: RADIOLOGY

## 2018-10-16 NOTE — PROGRESS NOTES
Last 5 Patient Entered Readings                                      Current 30 Day Average: 117/59     Recent Readings 10/6/2018 10/3/2018 9/22/2018 9/20/2018 9/14/2018    SBP (mmHg) 123 131 109 106 121    DBP (mmHg) 57 66 62 52 62    Pulse 60 60 56 69 56        Hypertension Medications             atenolol (TENORMIN) 25 MG tablet TAKE 1 TABLET BY MOUTH EVERY DAY- patient is taking 1/2 tab         lisinopril 10 MG tablet Take 2 tablets (20 mg total) by mouth every morning. Dosage change        Assessment/ Plan:   Called patient to follow up, reviewed recent readings.   Per 2017 ACC/ AHA HTN guidelines (goal of BP < 130/80), current 30-day average is well controlled. Patient denies hypotensive s/sx with low readings.   Patient was seen by PCP on 10/10, BP was 122/74.  Patient states she has been undergoing radiation for the past 3 weeks.   Patient denies having questions or concerns. Instructed patient to call if she has any questions or concerns, patient confirms understanding.   Will continue to monitor. WCB in 3 months, sooner if BP begins to trend up or down.

## 2018-10-16 NOTE — PLAN OF CARE
Problem: Patient Care Overview  Goal: Plan of Care Review  Outcome: Ongoing (interventions implemented as appropriate)  Pt. On day 11 of xrt to breast.  Pt. Doing well.  No c/o  Mild erythema.  Using cream.

## 2018-10-17 PROCEDURE — 77412 RADIATION TX DELIVERY LVL 3: CPT | Performed by: RADIOLOGY

## 2018-10-18 PROCEDURE — 77412 RADIATION TX DELIVERY LVL 3: CPT | Performed by: RADIOLOGY

## 2018-10-19 PROCEDURE — 77336 RADIATION PHYSICS CONSULT: CPT | Performed by: RADIOLOGY

## 2018-10-19 PROCEDURE — 77412 RADIATION TX DELIVERY LVL 3: CPT | Performed by: RADIOLOGY

## 2018-10-22 PROCEDURE — 77412 RADIATION TX DELIVERY LVL 3: CPT | Performed by: RADIOLOGY

## 2018-10-22 NOTE — PROGRESS NOTES
Subjective:       Patient ID: Shima Sorto is a 78 y.o. female.    Chief Complaint: No chief complaint on file.    HPI    Ms. Sorto returns today for follow up.  She has now completed her XRT and she is here to initiate her adjuvant hormonal treatment.  However, she is reluctant to do so and had multiple questions.    Briefly, she is a  78-year-old  female who was recently diagnosed with breast cancer.  On 08/17/2018, she underwent a lumpectomy and sentinel lymph node biopsy for an 8 mm grade 1 invasive lobular carcinoma which was ER strongly positive, ID negative and HER-2 negative with a Ki-67 of 5%with the closest margin being 2 mm.  Two of 2 sentinel lymph nodes were negative for involvement.      As mentioned above, she has now completed her radiation therapy.      Review of Systems      Overall she feels well.  She has fully recovered from her surgery and XRT.  She denies any anxiety, depression, easy bruising, fevers, chills, night  sweats, weight loss, nausea, vomiting, diarrhea, constipation, diplopia,     blurred vision, headache, chest pain, palpitations, shortness of breath, breast pain, abdominal pain, extremity pain, or difficulty ambulating.  The remainder of the ten-point ROS, including general, skin, lymph, H/N, cardiorespiratory, GI, , Neuro, Endocrine, and psychiatric is negative.     Objective:      Physical Exam        She is alert, oriented to time, place, person, pleasant, well      nourished, in no acute physical distress.                                    VITAL SIGNS:  Reviewed                                      HEENT:  Normal.  There are no nasal, oral, lip, gingival, auricular, lid,    or conjunctival lesions.  Mucosae are moist and pink, and there is no        thrush.  Pupils are equal, reactive to light and accommodation.              Extraocular muscle movements are intact.  Dentition is good.  She is wearing braces.                                       NECK:   Supple without JVD, adenopathy, or thyromegaly.                       LUNGS:  Clear to auscultation without wheezing, rales, or rhonchi.           CARDIOVASCULAR:  Reveals an S1, S2, no murmurs, no rubs, no gallops.         ABDOMEN:  Soft, nontender, without organomegaly.  Bowel sounds are    present.                                                                     EXTREMITIES:  No cyanosis, clubbing, or edema.                               BREASTS:  She is status post right lumpectomy with a well-healed incision in the upper outer quadrant.    There are no masses in either breast.                                      LYMPHATIC:  There is no cervical, axillary, or supraclavicular adenopathy.   SKIN:  Warm and moist, without petechiae, rashes, induration, or ecchymoses.  There is mild erythema on the right breast from her XRT.          NEUROLOGIC:  DTRs are 0-1+ bilaterally, symmetrical, motor function is 5/5,and cranial nerves are  within normal limits.    Assessment:       1. Malignant neoplasm of upper-outer quadrant of right breast in female, estrogen receptor positive        Plan:           I had a long discussion with her; she had a [id number] lobular carcinoma and she is s/p lumpectomy.  Even though she is excellent candidate for adjuvant hormopnal therapy with anastrazole, she is extremely reluctant to try AIs and is extremely concerned since she already has osteoporosis based on her most recent DXA scan of 1/31/2017 (reviewed).   We have a 25 minute discussion about the pros and cons of adjuvant hormonal therapy..  She asked for more time to weigh ehr options, and for her DXA scan be repeated PRIOR to starting AIs.  I will see her again in three weeks with a DXA scan.  Her multiple questions were answered to her satisfaction.

## 2018-10-23 ENCOUNTER — DOCUMENTATION ONLY (OUTPATIENT)
Dept: RADIATION ONCOLOGY | Facility: CLINIC | Age: 78
End: 2018-10-23

## 2018-10-23 ENCOUNTER — OFFICE VISIT (OUTPATIENT)
Dept: HEMATOLOGY/ONCOLOGY | Facility: CLINIC | Age: 78
End: 2018-10-23
Payer: MEDICARE

## 2018-10-23 DIAGNOSIS — T38.6X5A OSTEOPOROSIS DUE TO AROMATASE INHIBITOR: ICD-10-CM

## 2018-10-23 DIAGNOSIS — C50.411 MALIGNANT NEOPLASM OF UPPER-OUTER QUADRANT OF RIGHT BREAST IN FEMALE, ESTROGEN RECEPTOR POSITIVE: Primary | ICD-10-CM

## 2018-10-23 DIAGNOSIS — Z17.0 MALIGNANT NEOPLASM OF UPPER-OUTER QUADRANT OF RIGHT BREAST IN FEMALE, ESTROGEN RECEPTOR POSITIVE: Primary | ICD-10-CM

## 2018-10-23 DIAGNOSIS — M81.8 OSTEOPOROSIS DUE TO AROMATASE INHIBITOR: ICD-10-CM

## 2018-10-23 PROCEDURE — 99999 PR PBB SHADOW E&M-EST. PATIENT-LVL II: CPT | Mod: PBBFAC,,, | Performed by: INTERNAL MEDICINE

## 2018-10-23 PROCEDURE — 99212 OFFICE O/P EST SF 10 MIN: CPT | Mod: PBBFAC | Performed by: INTERNAL MEDICINE

## 2018-10-23 PROCEDURE — 99214 OFFICE O/P EST MOD 30 MIN: CPT | Mod: S$PBB,,, | Performed by: INTERNAL MEDICINE

## 2018-10-23 NOTE — PLAN OF CARE
Problem: Patient Care Overview  Goal: Plan of Care Review  Outcome: Outcome(s) achieved Date Met: 10/23/18  Pt.completed xrt to breast. rtc 6 weeks.

## 2018-10-31 ENCOUNTER — TELEPHONE (OUTPATIENT)
Dept: HEMATOLOGY/ONCOLOGY | Facility: CLINIC | Age: 78
End: 2018-10-31

## 2018-10-31 ENCOUNTER — HOSPITAL ENCOUNTER (OUTPATIENT)
Dept: RADIOLOGY | Facility: CLINIC | Age: 78
Discharge: HOME | End: 2018-10-31
Attending: INTERNAL MEDICINE
Payer: MEDICARE

## 2018-10-31 ENCOUNTER — DOCUMENTATION ONLY (OUTPATIENT)
Dept: HEMATOLOGY/ONCOLOGY | Facility: CLINIC | Age: 78
End: 2018-10-31

## 2018-10-31 DIAGNOSIS — T38.6X5A OSTEOPOROSIS DUE TO AROMATASE INHIBITOR: ICD-10-CM

## 2018-10-31 DIAGNOSIS — M81.8 OSTEOPOROSIS DUE TO AROMATASE INHIBITOR: ICD-10-CM

## 2018-10-31 DIAGNOSIS — Z79.811 USE OF AROMATASE INHIBITORS: Primary | ICD-10-CM

## 2018-10-31 RX ORDER — ANASTROZOLE 1 MG/1
1 TABLET ORAL DAILY
Qty: 90 TABLET | Refills: 1 | Status: SHIPPED | OUTPATIENT
Start: 2018-10-31 | End: 2019-04-03 | Stop reason: SDUPTHER

## 2018-10-31 NOTE — PROGRESS NOTES
At the patient's request, a prescription for anastrazole was sent to her pharmacy today.  Please refer to my note from last week when I had seen her.    Robert Hernandez MD

## 2018-10-31 NOTE — TELEPHONE ENCOUNTER
----- Message from Ekaterina Brown sent at 10/31/2018 11:36 AM CDT -----  Contact: Pt  Pt would like to schedule bone scan that is being requested by     Contact number [phone]  Thanks

## 2018-10-31 NOTE — TELEPHONE ENCOUNTER
Received call from [place] in bone density who stated that patient's insurance will only cover bone density tests every 2 years. Patient did not proceed with imaging until further followed up with dr. Conroy, given if proceeds out of pocket cost will be $300. Explained to patient to hold off, and if highly recommended by Dr. Conroy and patient comfortable with out of pocket cost would have patient rescheduled for next available.     Will follow up with patient per Dr. Conroy's recommendations.

## 2018-10-31 NOTE — TELEPHONE ENCOUNTER
Contacted patient to discuss her appointment rescheduling. Patient states that she does not feel comfortable delaying her AI therapy until the bone density test, given Dr. Conroy stressed importance of starting shortly after radiation therapy which was completed on 10/22. Patient requesting appointment with dr. Conroy to discuss. Explained to patient that no appointment necessary, arimidex prescription to be called in to her pharmacy this afternoon and that she can start as soon as tomorrow if she likes.   Patient in agreement, and common symptoms experienced with medication and medication instructions explained. Patient verbalized understanding.    Also, at this time patient recommended to have follow up and bone density test schedule no later than 3 months (which is also the soonest to have the bone density test completed). Recall set to have test and appt with Dr. Conroy for 2/1/2019.   Pt also encouraged to reach out to staff with any noted discomfort or symptom concerns after initiation with the anastrazole given her dx of osteoporosis.     Patient verbalized understanding.     Will relay to dr. Conroy to have prescription submitted to her pharmacy.

## 2018-11-01 ENCOUNTER — PATIENT MESSAGE (OUTPATIENT)
Dept: HEMATOLOGY/ONCOLOGY | Facility: CLINIC | Age: 78
End: 2018-11-01

## 2018-11-01 ENCOUNTER — TELEPHONE (OUTPATIENT)
Dept: HEMATOLOGY/ONCOLOGY | Facility: CLINIC | Age: 78
End: 2018-11-01

## 2018-11-01 NOTE — TELEPHONE ENCOUNTER
----- Message from Sharmila Fu sent at 11/1/2018  4:16 PM CDT -----  Contact: Pt  Pt called to speak with nurse anton have some questions   Callback#[phone]  Thank You  IFEANYI fu

## 2018-11-01 NOTE — TELEPHONE ENCOUNTER
Spoke with patient.  She is requesting to speak with you about her bone density scan, as she was told medicare didn't cover it.  She is hesitant to start her arimidex until this scan is approved and obtained.  ~jovan

## 2018-11-02 ENCOUNTER — TELEPHONE (OUTPATIENT)
Dept: RADIATION ONCOLOGY | Facility: CLINIC | Age: 78
End: 2018-11-02

## 2018-11-02 ENCOUNTER — TELEPHONE (OUTPATIENT)
Dept: HEMATOLOGY/ONCOLOGY | Facility: CLINIC | Age: 78
End: 2018-11-02

## 2018-11-02 NOTE — TELEPHONE ENCOUNTER
----- Message from Jesse Forbes sent at 11/2/2018 11:16 AM CDT -----  Contact: Pt   Will like a call from staff in regards to message that was left via my portal on yesterday 11/1      Contact::[phone]

## 2018-11-02 NOTE — TELEPHONE ENCOUNTER
Pt. Has small rash.  Pt. Encouraged to use hydrocortisone cream and to call back if no improvement.  Pt. Still using cream.  RTC 6 weeks.

## 2018-11-02 NOTE — TELEPHONE ENCOUNTER
Contacted patient to explain that per our authorization department the referral for the DXA has actually been approved. Unsure of why the system was showing her out of pocket costs. Patient rescheduled for DXA imaging. Appointment confirmed per pt.

## 2018-11-02 NOTE — TELEPHONE ENCOUNTER
Returned call to patient to inform that message was received via patient portal. Explained that this was discussed with Dr. Hernandez who verbalized that he felt that the dxa scan was not considered medically urgent given the medication she is prescribed makes insidious changes and may take years to show any changes. Okay to wait 3 months, after two year patrick of last dxa to proceed. Patient now after seeking medicare approval, however has changed her mine and once again reluctant to start AI therapy without a baseline DXA given her known hx of Osteoporosis dx in 2017. Explained to patient that would be discussed with auth department and if could be approved will have rescheduled given Dr. Conroy's permission. Dr. conroy in agreement if able to be authorized, otherwise still persists that patient should wait three months after initiation of therapy.     Will call patient back once everything sorted.

## 2018-11-03 ENCOUNTER — NURSE TRIAGE (OUTPATIENT)
Dept: ADMINISTRATIVE | Facility: CLINIC | Age: 78
End: 2018-11-03

## 2018-11-03 NOTE — TELEPHONE ENCOUNTER
"    Reason for Disposition   MILD rectal bleeding (more than just a few drops or streaks)    Answer Assessment - Initial Assessment Questions  1. APPEARANCE of BLOOD: "What color is it?" "Is it passed separately, on the surface of the stool, or mixed in with the stool?"       Faded red  2. AMOUNT: "How much blood was passed?"       Small 1/8 inch  3. FREQUENCY: "How many times has blood been passed with the stools?"       twice  4. ONSET: "When was the blood first seen in the stools?" (Days or weeks)       2 days   5. DIARRHEA: "Is there also some diarrhea?" If so, ask: "How many diarrhea stools were passed in past 24 hours?"       no  6. CONSTIPATION: "Do you have constipation?" If so, "How bad is it?"      no  7. RECURRENT SYMPTOMS: "Have you had blood in your stools before?" If so, ask: "When was the last time?" and "What happened that time?"       no  8. BLOOD THINNERS: "Do you take any blood thinners?" (e.g., Coumadin/warfarin, Pradaxa/dabigatran, aspirin)      no  9. OTHER SYMPTOMS: "Do you have any other symptoms?"  (e.g., abdominal pain, vomiting, dizziness, fever)      Dizzy earlier  10. PREGNANCY: "Is there any chance you are pregnant?" "When was your last menstrual period?"        no    Protocols used: ST RECTAL BLEEDING-A-AH   per protocol, advised to see pcp within 2 days    "

## 2018-11-05 ENCOUNTER — TELEPHONE (OUTPATIENT)
Dept: SURGERY | Facility: CLINIC | Age: 78
End: 2018-11-05

## 2018-11-05 NOTE — TELEPHONE ENCOUNTER
Spoke with pt on 11/2/18 regarding breast redness ans spots that pt is concerned may be shingles. Pt states she just finished radiation to the breast week before last. Advised pt to contact her Radiation Oncologist to see if they feel this is due to radiation, or if pt to seek medical attention from her PCP to evaluation the possibility of shingles. Pt voiced understanding.

## 2018-11-07 ENCOUNTER — CLINICAL SUPPORT (OUTPATIENT)
Dept: REHABILITATION | Facility: HOSPITAL | Age: 78
End: 2018-11-07
Attending: INTERNAL MEDICINE
Payer: MEDICARE

## 2018-11-07 DIAGNOSIS — R29.898 SHOULDER WEAKNESS: ICD-10-CM

## 2018-11-07 DIAGNOSIS — M25.511 BILATERAL SHOULDER PAIN, UNSPECIFIED CHRONICITY: ICD-10-CM

## 2018-11-07 DIAGNOSIS — Z91.89 AT RISK FOR LYMPHEDEMA: Primary | ICD-10-CM

## 2018-11-07 DIAGNOSIS — R29.3 POSTURE ABNORMALITY: ICD-10-CM

## 2018-11-07 DIAGNOSIS — M25.512 BILATERAL SHOULDER PAIN, UNSPECIFIED CHRONICITY: ICD-10-CM

## 2018-11-07 PROCEDURE — G8985 CARRY GOAL STATUS: HCPCS | Mod: CJ,PO

## 2018-11-07 PROCEDURE — G8984 CARRY CURRENT STATUS: HCPCS | Mod: CJ,PO

## 2018-11-07 PROCEDURE — 97162 PT EVAL MOD COMPLEX 30 MIN: CPT | Mod: PO

## 2018-11-07 PROCEDURE — 97110 THERAPEUTIC EXERCISES: CPT | Mod: PO

## 2018-11-07 PROCEDURE — 97140 MANUAL THERAPY 1/> REGIONS: CPT | Mod: PO

## 2018-11-07 NOTE — PLAN OF CARE
Patient: Shima Echols [facility] #: [id number]    Date: 11/07/2018  Physician: Zeynep Tomas MD   Diagnosis:   Encounter Diagnoses   Name Primary?    At risk for lymphedema Yes    Posture abnormality     Bilateral shoulder pain, unspecified chronicity     Shoulder weakness      Patient is a 78 y.o..     History of Present Illness: R breast CA ILC ER+   Onset: some post op tightness reported and post radiation-  Pt notes chronic shoulder and postural issues.  Surgery: 8/17/2018 lumpectomy with SLNB 2 of 2 negative  Radiation: October 22, 2018  Chemotherapy: -  Hormonal medications:still discussing with Dr Conroy- pt wants bone scan before starting AIs  Pt denies CHF, KF-stable CKD 3, and DM.  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 TAKE SITTING OR STANDING    anastrozole (ARIMIDEX) 1 mg Tab Take 1 tablet (1 mg total) by mouth once daily.    atenolol (TENORMIN) 25 MG tablet TAKE 1 TABLET BY MOUTH EVERY DAY    atenolol (TENORMIN) 25 MG tablet TAKE 1 TABLET BY MOUTH EVERY DAY    B COMPLEX & C NO.10/FOLIC ACID (B COMPLEX WITH C#10-FOLIC ACID ORAL) Take by mouth.    calcium citrate (CALCITRATE) 200 mg (950 mg) tablet Take 1 tablet by mouth once daily.    co-enzyme Q-10 50 mg capsule Take 100 mg by mouth once daily.      ergocalciferol (VITAMIN D2) 50,000 unit Cap Take 50,000 Units by mouth every 7 days.    fish oil-vit E-fat acid5-hb137 (SUPER OMEGA-3) 400-5 mg-unit Cap Take 1 capsule by mouth Daily.    influenza (FLUZONE HIGH-DOSE) 180 mcg/0.5 mL vaccine Inject into the muscle.    lisinopril 10 MG tablet Take 2 tablets (20 mg total) by mouth every morning. Dosage change    MULTIVITAMIN ORAL Take 1 tablet by mouth Daily.    radiacream (VANICREAM) Apply topically as needed.    vit C/E/Zn/coppr/lutein/zeaxan (PRESERVISION AREDS 2 ORAL) Take by mouth.     No current facility-administered medications for this visit.      Facility-Administered Medications Ordered in Other Visits   Medication    0.9%  NaCl infusion     Review of patient's allergies indicates:   Allergen Reactions    Asparagus Rash     Precautions: low risk of lymphedema    Social History: lives alone, + driving, walk  Questions return to weighted exercise 7# free weights at home  Able to manage to ADLS and dressing  Mild hardness in chest and breast  Environmental barriers: - stairs with rails  Abuse/Neglect: -  Nutritional status:thin  Educational needs: met  Spiritual/Cultural: met  Fall risk: min      Subjective    Shima reports she is not sure about starting Anastrazole until baseline bone density and not sure if she will proceed.  Pt wants to know more from therapy about what she can and can not do.  Pt rates pain at a 0/10 where 0 = no pain and 10 = maximal pain. Does feel lumpy and tight in some areas of breast  Pt has general shoulder soreness R and L- feels R is less apparent at shoulder due to focus on surgical site,  Her L shoulder is sore.  Pt has OP and has always tried to work on her posture.   Patient's goals are as follows: what and how to exercise     Objective:  Thin female R lumpectomy with SLNB site in R lateral chest region  Small breasts B- mild lobular density around incision line,  2 small ? Skin tabs vs scabs vs hypertrophic scarring  "along line- mild soreness just superior and lateral to scar line moving towards axilla  Bony chest and ribs, rounded shoulders with kyphosis noted  Amount of Swelling:Location of Swelling: no lymphedema noted to R UE or breast region  Skin Integrity: intact,  Multiple skin tags, moles, brown spots throughout chest and UE- noted to small circular areas along incisional line- grayish pink- pt will view and discuss with MD as needed  Palpation/Texture: mild tautness along and around lumpectomy/SLNB site, mild tautness in R axilla  Radiation skin color changes in field  Small red dots more midline R chest wall- pt questions shingles due to prior history but doesn't appear to be this- pt using lotion from radiation oncology  Circulation: intact  Posture: as above- OP - fh rounded shoulders, kyphosis    Range of Motion - UE  (R) mild end range limitations per posture for flex and abd, mild tautness in axilla with full ROM supine  (L) mild end range limitations per posture for flex and abd,   Shows active shoulder motions without onset of pain- able to reach behind head and up lower back to near bra strap B  Wrist and elbow wfl    Strength: not resisted but shows active motion    Current Functional Status:  Gait: MOD I  Transfers: MOD I  Bed Mobility: MOD I    Girth Measurements (in centimeters)  LANDMARK RIGHT UE 11/7/18 LEFT UE DIFFERENCE   E + 8" 22 cm 22.5 cm 0.5 cm   E + 6" 22 cm 21.5 cm 0.5 cm   E + 4" 22.5 cm 22.0 cm 0.5 cm   E + 2" 21.5 cm 21.5 cm 0 cm   Elbow  22.0cm 22.0 cm 0 cm   W+ 8" 22.0 cm 21.5 cm 0.5 cm   W +  6" 19.5 cm 19.5cm 0cm   W + 4" 17.5 cm 18.5 cm 1.0 cm   Wrist 15.5 cm 15.5 cm 0 cm   DPC 20.0 cm 18.5 cm 1.5 cm   IP Thumb 5.5 cm 5.5 cm 0 cm     Sensation: intact to lt touch    Treatment Evaluation  Pt was educated in lymphedema etiology and management plans.  Pt was provided with written  risk reductions and precautions for managing lymphedema.    Discussed low risk with 2 nodes removed and " radiation.  No signs or symptoms to R upper quadrant at present.   Pt requested education in therex programs- she declined following for rot cuff issues- pt has functional ROM without onset of pain- notes shoulders are achy at times.  Pt was cued in postural correction to assist in alignment and shoulder biomechanics.  Pt received stm and massage to R axilla, ant and lateral chest wall, gentle AAROM of R sh with added rib gentle press away, LTR and R UE motions.  Positioned in sh flex abd and er- instructed in gentle skin mobility and scar massage.   Pt was instructed in and performed therapeutic exercise for postural correction and alignment, stretching and soft tissue mobility, and strengthening.    Exercises included: postural correction - OP and lymphedema recommendations.    Pt has 7# weights at home she is presently using- agreed on biceps and triceps and motions with large muscle groups close to her body.  1-2# weights or theraband program was provided.  Butterfly style with active LTR  Snow cherelle style with active LTR  sidelying reach and retraction  Open book - horizontal Abd  Reach and elongate  Gentle pec stretch  sbing  Table walk aways  tband green rowing and retraction ee and ef  Retractions with gentle extension  scaption to 90 with 1#  Pt was able to demonstrate and report understanding and performance.      Discussed biomechanics of shoulder and posture to protect rot cuff during overhead motions.     This patient is in agreement to participate in Lymphedema treatment.      Assessment    This patient presents s/p R breast CA with lumpectomy SLNB and radiation- pt has prior h/o shoulder OA/rotator cuff issues, OP, and shingles  resulting in:risk of  lymphedema of the R upper quadrant, some mild tautness in her R axilla and near incisional line that appears to be typical of post operative and post radiation course, increased pain, increased stiffness in the ant chest wall, axilla and prior shoulder  issues- she chooses to begin with education and HEP and will contact PT in near future if additional follow up is warranted.    Pt was pleased with education and training. Pt choice to have therapy as needed and address R axilla and chest wall- her shoulder issues are chronic per her report.  This pt would benefit from skilled therapy services to address the above impairments.  Good/ Fair expected outcomes with therapy intervention.     History  Co-morbidities and personal factors that may impact the plan of care Examination  Body Structures and Functions, activity limitations and participation restrictions that may impact the plan of care Clinical Presentation   Decision Making/ Complexity Score   Co-morbidities:   R breast CA with lumpectomy SLNB and radiation- pt has prior h/o shoulder OA/rotator cuff issues, OP, and shingles    Personal Factors:   Active and interested in education and HEP Body Regions:  B shoulders  R UE, R upper quadrant  R breast    Body Systems:     ROM  Posture  Tissue tautness  Shoulder biomechanics  Lymphedema risk      Activity limitations:   Full reach  Wants full return to exercise  Participation Restrictions:   Wants HEP                 The following goals were discussed with the patient and patient is in agreement with them as to be addressed in the treatment plan.     Short Term Goals: ( 6-8   weeks)  Patient will demonstrate 100% knowledge of lymphedema precautions and signs of infection.   Patient will perform self lymph drainage techniques and soft tissue/scar mobility techniques..   Pt to show improved postural awareness and alignment.  Pt to be I and compliant with HEP.    Pt to be I with her self management plan.    Plan  Patient to be seen as needed for updates to HEP - therex and education on progression of program. - for the medically necessary treatments to include: decongestive massage- Manual lymphatic drainage, intermittent compression pump, multi layered bandaging,  education in lymphedema precautions, self massage, self bandaging, and assistance in obtaining appropriate compression garment.  Therex, postural correction, and progression of HEP.      I certify the need for these services furnished under this plan of treatment and while under my care.         ____________________________________     ____________    Physician/Referring Practitioner                         Date of Signature

## 2018-11-08 ENCOUNTER — HOSPITAL ENCOUNTER (OUTPATIENT)
Dept: RADIOLOGY | Facility: CLINIC | Age: 78
Discharge: HOME | End: 2018-11-08
Attending: INTERNAL MEDICINE
Payer: MEDICARE

## 2018-11-08 PROCEDURE — 77080 DXA BONE DENSITY AXIAL: CPT | Mod: 26,,, | Performed by: INTERNAL MEDICINE

## 2018-11-08 PROCEDURE — 77080 DXA BONE DENSITY AXIAL: CPT | Mod: TC

## 2018-11-12 ENCOUNTER — PATIENT MESSAGE (OUTPATIENT)
Dept: HEMATOLOGY/ONCOLOGY | Facility: CLINIC | Age: 78
End: 2018-11-12

## 2018-11-12 ENCOUNTER — PATIENT MESSAGE (OUTPATIENT)
Dept: SURGERY | Facility: CLINIC | Age: 78
End: 2018-11-12

## 2018-11-12 ENCOUNTER — PATIENT MESSAGE (OUTPATIENT)
Dept: INTERNAL MEDICINE | Facility: CLINIC | Age: 78
End: 2018-11-12

## 2018-11-12 ENCOUNTER — TELEPHONE (OUTPATIENT)
Dept: SURGERY | Facility: CLINIC | Age: 78
End: 2018-11-12

## 2018-11-12 NOTE — TELEPHONE ENCOUNTER
Spoke with pt regarding the appt with Dr Mason tomorrow being canceled. Pt said it was still showing up on her portal that the appt was still scheduled. Informed pt hat the appt has in fact been canceled on our end and that it may be just delayed in showing up on her end.Pt also questioned about her Dexa scan results. Informed pt that the scan is awaiting a final result and that she can message Dr Hernandez on that for further information. Pt voiced understanding.

## 2018-11-13 ENCOUNTER — PATIENT MESSAGE (OUTPATIENT)
Dept: HEMATOLOGY/ONCOLOGY | Facility: CLINIC | Age: 78
End: 2018-11-13

## 2018-11-13 ENCOUNTER — PATIENT OUTREACH (OUTPATIENT)
Dept: OTHER | Facility: OTHER | Age: 78
End: 2018-11-13

## 2018-11-13 NOTE — PROGRESS NOTES
Last 5 Patient Entered Readings                                      Current 30 Day Average: 123/59     Recent Readings 11/6/2018 10/31/2018 10/25/2018 10/24/2018 10/6/2018    SBP (mmHg) 133 114 126 119 123    DBP (mmHg) 62 57 58 59 57    Pulse 48 52 61 58 60          Digital Medicine: Health  Follow Up    Lifestyle Modifications:    1.Dietary Modifications (Sodium intake <2,000mg/day, food labels, dining out): Patient continues monitoring her sodium intake. She denies any major changes in her diet that would cause an increase in salt intake    2.Physical Activity: Deferred    3.Medication Therapy: Patient has been compliant with the medication regimen. Patient is doing well on her current regimen. She denies symptoms/side effects.     4.Patient has the following medication side effects/concerns:   (Frequency/Alleviating factors/Precipitating factors, etc.)     Patient is pleased with her BP readings at this time.     Follow up with  Shima Kinza Sorto completed. No further questions or concerns. Will continue to follow up to achieve health goals.

## 2018-12-03 ENCOUNTER — OFFICE VISIT (OUTPATIENT)
Dept: RADIATION ONCOLOGY | Facility: CLINIC | Age: 78
End: 2018-12-03
Payer: MEDICARE

## 2018-12-03 VITALS
RESPIRATION RATE: 16 BRPM | TEMPERATURE: 98 F | WEIGHT: 131.31 LBS | HEIGHT: 67 IN | DIASTOLIC BLOOD PRESSURE: 63 MMHG | BODY MASS INDEX: 20.61 KG/M2 | HEART RATE: 53 BPM | SYSTOLIC BLOOD PRESSURE: 140 MMHG

## 2018-12-03 DIAGNOSIS — C50.411 MALIGNANT NEOPLASM OF UPPER-OUTER QUADRANT OF RIGHT BREAST IN FEMALE, ESTROGEN RECEPTOR POSITIVE: Primary | ICD-10-CM

## 2018-12-03 DIAGNOSIS — Z17.0 MALIGNANT NEOPLASM OF UPPER-OUTER QUADRANT OF RIGHT BREAST IN FEMALE, ESTROGEN RECEPTOR POSITIVE: Primary | ICD-10-CM

## 2018-12-03 PROCEDURE — 99999 PR PBB SHADOW E&M-EST. PATIENT-LVL III: CPT | Mod: PBBFAC,,, | Performed by: RADIOLOGY

## 2018-12-03 PROCEDURE — 99213 OFFICE O/P EST LOW 20 MIN: CPT | Mod: PBBFAC | Performed by: RADIOLOGY

## 2018-12-03 PROCEDURE — 99213 OFFICE O/P EST LOW 20 MIN: CPT | Mod: S$PBB,,, | Performed by: RADIOLOGY

## 2018-12-03 NOTE — PROGRESS NOTES
"REFERRING PHYSICIAN: Abby Mason MD    DIAGNOSIS: pT1b N0 M0, stage IA, invasive lobular carcinoma of the right breast    Radiation Treatment Summary:  Ms. Sorto completed radiation to the right breast as shown below.    Treatment Site Energy Dose/Fx (Gy) #Fx The Total Dose (Gy) Start Date End Date   RT BREAST 6X 2.65 16 / 16 42.4 10/1/2018 10/22/2018     INTERVAL HISTORY:   Ms. Sorto is a 78-year-old female who was recently diagnosed with right breast cancer she presented with a palpable mass in the upper outer quadrant of the right breast. A mammogram in July 2018 revealed a 10 x 9 x 4 mm suspicious lesion in the upper outer quadrant of the right breast. A core needle biopsy on July 20, 2018 revealed grade 1, invasive lobular carcinoma which was ER positive ( %), MS negative, and her 2- with a Ki-67 of 5%. Bilateral breast MRI on August 6, 2018 revealed the known lesion in the upper outer quadrant of the right breast with no evidence suspicious masses in the left breast. On August 17, 2018, she underwent lumpectomy and sentinel node biopsy. The pathology revealed right breast with 8 mm, grade 1, invasive lobular carcinoma with the closest margin at 2 mm anteriorly and posteriorly. 2/2 sentinel lymph nodes were negative for involvement. To reduce her chance of local recurrence, she received postoperative radiation to the right breast as above.  She is here today for routine follow-up.    After discussion with Dr. Hernandez, Ms. Sorto recently started Anastrozole.  At present, she denies right breast pain, edema, erythema, or nipple discharge. She also denies fever, night sweats, or weight loss.    PHYSICAL EXAMINATION:  VITAL SINGS: BP (!) 140/63   Pulse (!) 53   Temp 97.5 °F (36.4 °C) (Oral)   Resp 16   Ht 5' 7" (1.702 m)   Wt 59.6 kg (131 lb 4.8 oz)   LMP  (LMP Unknown)   BMI 20.56 kg/m²   GENERAL: Patient is alert and oriented, in no acute distress.  HEENT: Extraocular muscles are intact.  " Oropharynx is clear without lesions.  There is no cervical or supraclavicular adenopathy palpated.    CHEST: Breath sounds clear bilaterally.  No rales.  No rhonchi.  Unlabored respirations.  CARDIOVASCULAR: Normal S1, S2.  Normal rate.  Regular rhythm.  BREAST EXAM:  There is mild hyperpigmentation of the skin of the right breast.  The right breast is slightly smaller than the left. The scar secondary to lumpectomy and sentinel node biopsy is noted in the upper outer quadrant of the right breast. No abnormal masses palpated in the right breast or right axilla. The left breast and left axilla are also without palpable masses.  ABDOMEN: Bowel sounds normal.  No tenderness.  No abdominal distention.  No hepatomegaly.  No splenomegaly.  EXTREMITIES: No clubbing, cyanosis, edema  NEUROLOGIC: Cranial nerves II through XII are grossly intact.  Sensation is intact.  Strength is 5 out of 5 in the upper and lower extremities bilaterally.    ASSESSMENT:   This is a 78-year-old female with p T1b N0 M0, stage IA, invasive Lab pillar carcinoma of the right breast who underwent lumpectomy and sentinel node biopsy on August 17, 2018 followed by postoperative radiation, with an 8 mm lesion which is ER positive, DC negative, and her 2 negative.    PLAN:   Ms. Sorto is recovering from the radiation without any unexpected side effects.  She will continue anastrozole as planned.  She will also follow up with Dr. Mason and repeat yearly mammograms.  I plan to see her back in approximately 6 months, unless symptoms warrant otherwise.

## 2018-12-12 ENCOUNTER — TELEPHONE (OUTPATIENT)
Dept: SURGERY | Facility: CLINIC | Age: 78
End: 2018-12-12

## 2018-12-12 NOTE — TELEPHONE ENCOUNTER
Spoke with pt. Appt for f/u scheduled at the end of Feb 2019 due to Dr Mason being out on maternity leave.Appt reminder mailed to pt per her request for appt 2/27/18 at 2:00 pm.

## 2018-12-12 NOTE — TELEPHONE ENCOUNTER
----- Message from Linda Galaviz sent at 12/12/2018  3:51 PM CST -----  Contact: Patient   Needs Advice    Reason for call: Patient is needing an appointment in January or February. Patient was returning a dropped call.        Communication Preference: [phone]    Additional Information: please contact the caller

## 2018-12-13 ENCOUNTER — OFFICE VISIT (OUTPATIENT)
Dept: ENDOCRINOLOGY | Facility: CLINIC | Age: 78
End: 2018-12-13
Payer: MEDICARE

## 2018-12-13 VITALS
DIASTOLIC BLOOD PRESSURE: 72 MMHG | HEIGHT: 67 IN | WEIGHT: 128.75 LBS | SYSTOLIC BLOOD PRESSURE: 100 MMHG | BODY MASS INDEX: 20.21 KG/M2 | HEART RATE: 68 BPM

## 2018-12-13 DIAGNOSIS — Z79.811 USE OF ANASTROZOLE (ARIMIDEX): ICD-10-CM

## 2018-12-13 DIAGNOSIS — N20.0 HYPOCITRATURIC CALCIUM NEPHROLITHIASIS: ICD-10-CM

## 2018-12-13 DIAGNOSIS — M81.0 OSTEOPOROSIS, UNSPECIFIED OSTEOPOROSIS TYPE, UNSPECIFIED PATHOLOGICAL FRACTURE PRESENCE: Primary | ICD-10-CM

## 2018-12-13 PROCEDURE — 99214 OFFICE O/P EST MOD 30 MIN: CPT | Mod: PBBFAC | Performed by: INTERNAL MEDICINE

## 2018-12-13 PROCEDURE — 99214 OFFICE O/P EST MOD 30 MIN: CPT | Mod: S$PBB,,, | Performed by: INTERNAL MEDICINE

## 2018-12-13 PROCEDURE — 99999 PR PBB SHADOW E&M-EST. PATIENT-LVL IV: CPT | Mod: PBBFAC,,, | Performed by: INTERNAL MEDICINE

## 2018-12-13 NOTE — PROGRESS NOTES
Subjective:     Patient ID: Shima Sorto is a 78 y.o. female.    Chief Complaint: No chief complaint on file.    HPI:   Ms. Sorto is a 78 y.o. female who is here for a consult visit for evaluation  and management of osteoporosis. Last seen by Dr. Thompson two years ago.     Since her last visit, had partial mastectomy and diagnosed with breast cancer. Currently on anastrozole.  Denies falls or fractures since last visit with Dr. Thompson.   Used ALN for nine years stopped five years ago. Restarted ALN 2/2018. Has indigestion but not clear if related. Takes ALN on Sunday morning with water, 30 - 45 minutes upright. And avoids food for thirty minutes.     FN T score -2.8    Osteoporosis Risk Factor Assessment:    Menarche occurred at 12 years of age.   Menopause occurred at mid 50s.  Her menstrual cycles were normal but closer to five weeks between cycles. Had at least 10 cycles per years. Two children and one miscarriage.   Last kidney stone 2002.  Denies history of falls or fractures. Denies loss of height (maybe an 1/8th of inch). Denies back pain.     She denies history of calcium disorders, thyroid disease, kidney stones, malabsorption symptoms, anemia or kidney disease.     Supplements:  Calcium citrate one daily   Vitamin D3 one daily   Also on b complex, vision vitamin, fish oil, co enzyme and biotin    Dietary:  Vegetables, lean proteins,   Avoids spinach  Carrots, celery, turnips, green peas, green beans, tomatoes, zucchini, squash  Almonds  Elizabeth/fish  Avoids starches    Mother fell twice, fractured hip.    Review of Systems   No recent illness    Objective:     Physical Exam   Constitutional: She appears well-developed and well-nourished. No distress.   HENT:   Head: Normocephalic and atraumatic.   Nose: Nose normal.   Mouth/Throat: Oropharynx is clear and moist. No oropharyngeal exudate.   Eyes: Conjunctivae and EOM are normal. Pupils are equal, round, and reactive to light. No scleral icterus.  "  Neck: Normal range of motion. Neck supple. No thyromegaly present.   Cardiovascular: Normal rate, regular rhythm, normal heart sounds and intact distal pulses.   Pulmonary/Chest: Effort normal and breath sounds normal.   Abdominal: Soft. Bowel sounds are normal. She exhibits no distension.   Musculoskeletal: Normal range of motion. She exhibits no edema or tenderness.   Lymphadenopathy:     She has no cervical adenopathy.   Neurological: She has normal reflexes.   Skin: Skin is warm and dry.   Psychiatric: She has a normal mood and affect.     Vitals:    12/13/18 1405   BP: 100/72   Pulse: 68   Weight: 58.4 kg (128 lb 12 oz)   Height: 5' 7" (1.702 m)       11/2018  FINDINGS:  Lumbar Spine: Lumbar bone mineral density L1-L4 is 0.915g/cm2, which is a T-score of -1.2. The Z-score is 1.4.    Total Hip: Total hip bone mineral density is 0.702g/cm2.  The T-score is -2.0, and the Z-score is 0.0.    Femoral neck: Bone mineral density is 0.540g/cm2 and the T-score is -2.8 and the Z-score is -0.6 g/cm2.    There is a 14.9% risk of a major osteoporotic fracture and a 6.6% risk of hip fracture in the next 10 years (FRAX).    Compared with previous DXA, BMD at the lumbar spine has remained stable, and the BMD at the total hip has remained stable.    Assessment/Plan:     1. Osteoporosis, unspecified osteoporosis type, unspecified pathological fracture presence  - continue ALN  - Calcium, Timed Urine; Future  - Creatinine, urine, timed 24 Hours; Future    2. Use of anastrozole (Arimidex)    3. Has history of nephrolithiasis currently drinks mild and takes supplements + mVI  May need to cut back   Check 24 hr urine    BMD in two years.   "

## 2018-12-14 ENCOUNTER — HOSPITAL ENCOUNTER (OUTPATIENT)
Dept: RADIOLOGY | Facility: HOSPITAL | Age: 78
Discharge: HOME | End: 2018-12-14
Attending: INTERNAL MEDICINE
Payer: MEDICARE

## 2018-12-14 DIAGNOSIS — N20.0 HYPOCITRATURIC CALCIUM NEPHROLITHIASIS: ICD-10-CM

## 2018-12-14 PROCEDURE — 74018 RADEX ABDOMEN 1 VIEW: CPT | Mod: 26,,, | Performed by: RADIOLOGY

## 2018-12-14 PROCEDURE — 74018 RADEX ABDOMEN 1 VIEW: CPT | Mod: TC

## 2018-12-27 ENCOUNTER — PATIENT OUTREACH (OUTPATIENT)
Dept: OTHER | Facility: OTHER | Age: 78
End: 2018-12-27

## 2018-12-27 NOTE — PROGRESS NOTES
Last 5 Patient Entered Readings                                      Current 30 Day Average:      Recent Readings 11/6/2018 10/31/2018 10/25/2018 10/24/2018 10/6/2018    SBP (mmHg) 133 114 126 119 123    DBP (mmHg) 62 57 58 59 57    Pulse 48 52 61 58 60          Digital Medicine: Health  Follow Up    Lifestyle Modifications:    1.Dietary Modifications (Sodium intake <2,000mg/day, food labels, dining out): Deferred    2.Physical Activity:  Deferred    3.Medication Therapy: Patient has been compliant with the medication regimen. Patient is doing well on her current regimen. She denies symptoms/side effects.     4.Patient has the following medication side effects/concerns:   (Frequency/Alleviating factors/Precipitating factors, etc.)     Patient plans to get back on track with taking her BP reading within the next few days. She stated that she went on hiatus for a few weeks due to the stresses of the holidays and family coming and going.   She knows that she needs to get on a better schedule with taking her BP readings and she understands the importance of this. She wants to make sure that her BP readings are staying consistent because she had a higher diastolic reading when she was seeing Dr. Beltran and she is unsure why that happened. I will continue to work with her to make sure she starts taking her BP readings again.     Follow up with Mrs. Ronquillo Kinza Sorto completed. No further questions or concerns. Will continue to follow up to achieve health goals.

## 2019-01-04 ENCOUNTER — PATIENT OUTREACH (OUTPATIENT)
Dept: OTHER | Facility: OTHER | Age: 79
End: 2019-01-04

## 2019-01-04 DIAGNOSIS — I10 HYPERTENSION, UNSPECIFIED TYPE: Primary | ICD-10-CM

## 2019-01-04 RX ORDER — OLMESARTAN MEDOXOMIL 20 MG/1
20 TABLET ORAL DAILY
Qty: 30 TABLET | Refills: 11 | Status: SHIPPED | OUTPATIENT
Start: 2019-01-04 | End: 2019-05-04 | Stop reason: SDUPTHER

## 2019-01-04 NOTE — PROGRESS NOTES
HPI:  Called patient to follow up. Patient endorses adherence to medication regimen daily and denies missed doses. Patient denies hypotensive s/sx (lightheadedness, dizziness, nausea, fatigue); patient denies hypertensive s/sx (SOB, CP, severe headaches, changes in vision, dizziness, fatigue, confusion, anxiety, nosebleeds).     Last 5 Patient Entered Readings                                      Current 30 Day Average:      Recent Readings 11/6/2018 10/31/2018 10/25/2018 10/24/2018 10/6/2018    SBP (mmHg) 133 114 126 119 123    DBP (mmHg) 62 57 58 59 57    Pulse 48 52 61 58 60        Assessment:  No recent readings, last reading on 11/6.    Plan:  Discussed recent study linking increased risk of lung cancer to use of ACE inhibitors in patients who have been on therapy for 5+ years. Discussed with and instructed patient to stop lisinopril 20mg and to start olmesartan 20mg, patient confirms understanding.   Instructed patient to resume readings.   Patients health , Beth Villaseñor, will be following up as scheduled. I will continue to monitor regularly and will follow-up in 3 weeks, sooner if blood pressure begins to trend upward or downward.     Current medication regimen:  Hypertension Medications             atenolol (TENORMIN) 25 MG tablet TAKE 1 TABLET BY MOUTH EVERY DAY 12.5mg daily         olmesartan (BENICAR) 20 MG tablet Take 1 tablet (20 mg total) by mouth once daily. Stop lisinopril.        Patient denies having questions or concerns. Patient has my contact information and knows to call with any concerns or clinical changes.

## 2019-01-08 DIAGNOSIS — Z79.811 USE OF AROMATASE INHIBITORS: Primary | ICD-10-CM

## 2019-01-08 DIAGNOSIS — Z91.89 AT RISK FOR OSTEOPOROSIS: ICD-10-CM

## 2019-01-08 DIAGNOSIS — Z85.3 HISTORY OF BREAST CANCER: ICD-10-CM

## 2019-01-09 ENCOUNTER — HOSPITAL ENCOUNTER (EMERGENCY)
Facility: HOSPITAL | Age: 79
Discharge: HOME | End: 2019-01-09
Attending: EMERGENCY MEDICINE
Payer: MEDICARE

## 2019-01-09 VITALS
HEART RATE: 54 BPM | TEMPERATURE: 98 F | OXYGEN SATURATION: 100 % | RESPIRATION RATE: 16 BRPM | HEIGHT: 67 IN | SYSTOLIC BLOOD PRESSURE: 170 MMHG | BODY MASS INDEX: 19.3 KG/M2 | WEIGHT: 123 LBS | DIASTOLIC BLOOD PRESSURE: 68 MMHG

## 2019-01-09 DIAGNOSIS — R31.9 HEMATURIA, UNSPECIFIED TYPE: ICD-10-CM

## 2019-01-09 DIAGNOSIS — S39.012A BACK STRAIN, INITIAL ENCOUNTER: Primary | ICD-10-CM

## 2019-01-09 LAB
BACTERIA #/AREA URNS AUTO: ABNORMAL /HPF
BILIRUB UR QL STRIP: NEGATIVE
BUN SERPL-MCNC: 28 MG/DL (ref 6–30)
CHLORIDE SERPL-SCNC: 100 MMOL/L (ref 95–110)
CLARITY UR REFRACT.AUTO: ABNORMAL
COLOR UR AUTO: YELLOW
CREAT SERPL-MCNC: 0.9 MG/DL (ref 0.5–1.4)
GLUCOSE SERPL-MCNC: 87 MG/DL (ref 70–110)
GLUCOSE UR QL STRIP: ABNORMAL
HCT VFR BLD CALC: 34 %PCV (ref 36–54)
HGB UR QL STRIP: ABNORMAL
HYALINE CASTS UR QL AUTO: 1 /LPF
KETONES UR QL STRIP: NEGATIVE
LEUKOCYTE ESTERASE UR QL STRIP: NEGATIVE
MICROSCOPIC COMMENT: ABNORMAL
NITRITE UR QL STRIP: NEGATIVE
PH UR STRIP: 7 [PH] (ref 5–8)
POC IONIZED CALCIUM: 1.11 MMOL/L (ref 1.06–1.42)
POC TCO2 (MEASURED): 28 MMOL/L (ref 23–29)
POTASSIUM BLD-SCNC: 3.9 MMOL/L (ref 3.5–5.1)
PROT UR QL STRIP: ABNORMAL
RBC #/AREA URNS AUTO: >100 /HPF (ref 0–4)
SAMPLE: ABNORMAL
SODIUM BLD-SCNC: 139 MMOL/L (ref 136–145)
SP GR UR STRIP: 1.01 (ref 1–1.03)
SQUAMOUS #/AREA URNS AUTO: 3 /HPF
URN SPEC COLLECT METH UR: ABNORMAL
WBC #/AREA URNS AUTO: 4 /HPF (ref 0–5)

## 2019-01-09 PROCEDURE — 25000003 PHARM REV CODE 250: Performed by: EMERGENCY MEDICINE

## 2019-01-09 PROCEDURE — 81001 URINALYSIS AUTO W/SCOPE: CPT

## 2019-01-09 PROCEDURE — 99284 EMERGENCY DEPT VISIT MOD MDM: CPT | Mod: ,,, | Performed by: EMERGENCY MEDICINE

## 2019-01-09 PROCEDURE — 99284 PR EMERGENCY DEPT VISIT,LEVEL IV: ICD-10-PCS | Mod: ,,, | Performed by: EMERGENCY MEDICINE

## 2019-01-09 PROCEDURE — 99284 EMERGENCY DEPT VISIT MOD MDM: CPT | Mod: 25

## 2019-01-09 RX ORDER — METHOCARBAMOL 750 MG/1
1500 TABLET, FILM COATED ORAL 3 TIMES DAILY
Qty: 20 TABLET | Refills: 0 | Status: SHIPPED | OUTPATIENT
Start: 2019-01-09 | End: 2019-01-19

## 2019-01-09 RX ORDER — ACETAMINOPHEN 500 MG
1000 TABLET ORAL EVERY 8 HOURS PRN
Qty: 30 TABLET | Refills: 0 | COMMUNITY
Start: 2019-01-09 | End: 2019-02-18

## 2019-01-09 RX ORDER — ACETAMINOPHEN 500 MG
1000 TABLET ORAL
Status: COMPLETED | OUTPATIENT
Start: 2019-01-09 | End: 2019-01-09

## 2019-01-09 RX ADMIN — ACETAMINOPHEN 1000 MG: 500 TABLET ORAL at 08:01

## 2019-01-09 NOTE — ED PROVIDER NOTES
Encounter Date: 1/9/2019    SCRIBE #1 NOTE: I, Rai Anderson, am scribing for, and in the presence of,  Tay Zapata III, MD. I have scribed the following portions of the note -       History     Chief Complaint   Patient presents with    Back Pain     began dec 26th. no trauma. no falls. no injury. no loss of bladder or bowel control. has not seen a md yet about it. c/o frequent urination and pain above her waist     77 y/o female with comorbities of chronic and cystic kidney disease, osteoporosis, and urinary retention difficulty presents to ED c/o back pain. Patient reports back pain in the lumbar region bilaterally that began acutely when she picked up a 1 y/o child 16x days PTA. She endorses little to no relief of pain and reports she has not taken any medication for pain. Patient endorses a dull, constant ache in the lower lumber region. She also endorses general weakness, chills, and frequency. Denies fever, nausea, vomiting, numbness, or any incontinence.       The history is provided by the patient and medical records.     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    Visual impairment     reading glasses     Past Surgical History:   Procedure Laterality Date    BIOPSY, LYMPH NODE, SENTINEL Right 8/17/2018    Performed by [facility] at [facility] OR 2ND FLR    BREAST CYST ASPIRATION Right 1999    COLONOSCOPY N/A 7/24/2018    Performed by Juan Miguel Pena MD at [facility] ENDO (4TH FLR)    COLONOSCOPY N/A 4/25/2013    Performed by  Rigoberto Parry MD at [facility] ENDO (4TH FLR)    INJECTION, FOR SENTINEL NODE IDENTIFICATION Right 2018    Performed by [facility]by [facility] at [facility] OR 2ND FLR    INSERTION-LEAD-INTERSTIM (STAGE I) N/A 2013    Performed by Leticia Stoddard MD at [facility] OR 2ND FLR    interstim placed stage 1      KIDNEY STONE SURGERY  2000    @ [employer]    MASTECTOMY, PARTIAL-US guided Right 2018    Performed by [facility]by Mason [facility] at [facility] OR 2ND FLR    MOHS procedure      REMOVAL, NEUROSTIMULATOR, SACRAL N/A 2013    Performed by Leticia [facility]ard MD at [facility] OR 1ST FLR     Family History   Problem Relation Age of Onset    Kidney disease Mother     Heart disease Father     Melanoma Father     Breast cancer Maternal Aunt     Anesthesia problems Neg Hx     Malignant hypertension Neg Hx     Hypotension Neg Hx     Malignant hyperthermia Neg Hx     Pseudochol deficiency Neg Hx     Colon cancer Neg Hx     Ovarian cancer Neg Hx     Psoriasis Neg Hx     Lupus Neg Hx     Eczema Neg Hx     Acne Neg Hx      Social History     Tobacco Use    Smoking status: Former Smoker     Packs/day: 0.50     Years: 8.00     Pack years: 4.00     Last attempt to quit: 1964     Years since quittin.4    Smokeless tobacco: Never Used   Substance Use Topics    Alcohol use: Yes     Comment: 1-3x/week    Drug use: No     Review of Systems   Constitutional: Positive for chills. Negative for fever.   HENT: Negative for rhinorrhea and sinus pain.    Eyes: Negative for pain and visual disturbance.   Respiratory: Negative for chest tightness and shortness of breath.    Cardiovascular: Negative for chest pain and leg swelling.   Gastrointestinal: Negative for diarrhea, nausea and vomiting.   Genitourinary: Positive for frequency.   Musculoskeletal: Positive for back pain (lumbar ) and myalgias (lumbar).   Neurological: Positive for weakness. Negative for numbness.   Psychiatric/Behavioral: Negative for confusion and decreased  concentration.       Physical Exam     Initial Vitals [01/09/19 0726]   BP Pulse Resp Temp SpO2   (!) 148/72 79 18 97.9 °F (36.6 °C) 99 %      MAP       --         Physical Exam    Nursing note and vitals reviewed.  Constitutional: She appears well-developed and well-nourished. No distress.   HENT:   Head: Normocephalic and atraumatic.   Eyes: EOM are normal. Pupils are equal, round, and reactive to light.   Neck: Normal range of motion. Neck supple.   Pulmonary/Chest: Breath sounds normal. No respiratory distress. She has no wheezes. She has no rhonchi. She has no rales.   Abdominal: Soft. She exhibits no distension. There is no tenderness. There is no rebound and no guarding.   Musculoskeletal: Normal range of motion. She exhibits tenderness (paraspinal musculature of lumbar bilaterally. No midline T or L-spine tenderness. Pain reproducible with movement.).   Skin: Skin is warm and dry. No erythema.   Psychiatric: She has a normal mood and affect. Her behavior is normal. Judgment and thought content normal.         ED Course   Procedures  Labs Reviewed   URINALYSIS, REFLEX TO URINE CULTURE - Abnormal; Notable for the following components:       Result Value    Appearance, UA Hazy (*)     Protein, UA 3+ (*)     Glucose, UA 1+ (*)     Occult Blood UA 2+ (*)     All other components within normal limits    Narrative:     Preferred Collection Type->Urine, Clean Catch   URINALYSIS MICROSCOPIC - Abnormal; Notable for the following components:    RBC, UA >100 (*)     All other components within normal limits    Narrative:     Preferred Collection Type->Urine, Clean Catch   ISTAT PROCEDURE - Abnormal; Notable for the following components:    POC Hematocrit 34 (*)     All other components within normal limits   ISTAT CHEM8          Imaging Results          CT Renal Stone Study ABD Pelvis WO (Final result)  Result time 01/09/19 10:22:30    Final result by Clemente Abel MD (01/09/19 10:22:30)                 Impression:       Numerous calcifications at the right renal collecting system, with the largest discrete stone measuring 0.6 cm, and no calcifications present in the left kidney.  There is no hydronephrosis or proximal ureteral dilatation bilaterally, though owing to a paucity of intra-abdominal fat, the ureters are not evaluated along the mid and distal course.  Incomplete visualization argues against ureteral dilatation, though ureteral obstruction cannot be definitively excluded.    Hyperattenuating cystic focus at the left renal midpole measuring up to 0.9 cm which may reflect a hemorrhagic cyst with several additional simple or minimally complex cysts.    Moderate degenerative change of the lumbar spine as detailed above.    Copious stool and gas present throughout the large bowel likely reflecting constipation.    Additional findings as detailed above.    Electronically signed by resident: Ricki Sim MD  Date:    01/09/2019  Time:    09:32    Electronically signed by: Clemente Abel MD  Date:    01/09/2019  Time:    10:22             Narrative:    EXAMINATION:  CT RENAL STONE STUDY ABD PELVIS WO    CLINICAL HISTORY:  Flank pain, recurrent stone disease suspected;    TECHNIQUE:  The patient was surveyed from the thoracic inlet through the pelvis without the administration of oral or IV contrast and data was reconstructed for coronal, sagittal, and axial images.    COMPARISON:  Ultrasound retroperitoneal complete 11/03/2017    FINDINGS:  The visualized esophagus is normal in course and contour.    The thoracic aorta is normal in course, caliber, and contour without significant atherosclerotic calcifications.The heart does not appear enlarged and there is no pericardial effusion.    The lung bases are symmetrically expanded and demonstrate no convincing evidence of pleural thickening, pneumothorax, or pleural fluid.  There is linear opacification in the inferior lingula which may represent subsegmental atelectasis versus  scarring.    The liver is normal in size and attenuation with no focal hepatic abnormality.  The gallbladder shows no evidence of stones or pericholecystic fluid.  There is no intra-or extrahepatic biliary ductal dilatation.    There is high-density, likely ingested material in the gastric lumen. The stomach, spleen, pancreas, and adrenal glands are otherwise unremarkable.    The kidneys are normal in size and location.  There are scattered calcifications about the inferior aspect of the right renal collecting system likely representing numerous nephroliths, with the largest discrete stone measuring up to 0.6 cm.  There is a single 0.4 cm nonobstructing nephrolith at the interpolar region of the right kidney.  There is no dilatation of the right renal pelvis or proximal ureter.  Owing to a paucity of intra-abdominal fat and lack of contrast, the mid and distal course of the ureter are incompletely visualized.    No calcifications present in the left kidney, however there are two hypodense cystic structures demonstrating water attenuation characteristics, one at the superior pole, and the other at the interpolar region both favored to represent cysts.  Approximately 0.9 cm hyperattenuating cystic focus about the posterior aspect of the left renal midpole which may reflect a hemorrhagic cyst.  No evidence of left-sided hydronephrosis or proximal ureteral dilatation, though similar to the contralateral side the ureter is incompletely evaluated along its mid and distal course.  The urinary bladder demonstrates no significant abnormality.    The abdominal aorta is normal in course and caliber without significant atherosclerotic calcifications.    The visualized loops of small bowel show no evidence of obstruction or inflammation. There is copious stool and gas present throughout the large bowel likely reflecting constipation..  There is no ascites, free fluid, or intraperitoneal free air. There is no evidence of lymph  node enlargement in the abdomen or pelvis.    When viewed with bone windows the osseous structures demonstrate moderate degenerative change noting compression fracture at the L3 vertebral body and grade 1 retrolisthesis L1 on L2.  The extraperitoneal soft tissues are unremarkable.                                 Medical Decision Making:   History:   Old Medical Records: I decided to obtain old medical records.  Initial Assessment:   Patient with back pain after picking up a two year old child. No evidence for cuadaequina syndrome or other red flags. She has not taken any medications. Will discharge with tylenol and muscle relaxant. Will also check UA since she is having frequency and has had bladder emptying problems in the past.   Differential Diagnosis:   Herniated disc, caudaequina syndrome, back strain, UTI, pyelonephritis.  Clinical Tests:   Lab Tests: Ordered and Reviewed  ED Management:  Patient's urinanalysis shows hematuria but no signs of infection. Will discharge home with Tylenol and Robaxin. No red flags for more concerning back problems or kidney stones. She is instructed to follow up with her PCP for her back pain and hematuria.     Upon attempting to discharge the patient, she now states pain may be somewhat similar to prior kidney stones. Given hematuria, a chemistry and CT scan was obtained. There is no evidence of ureteral stone. Will discharge home.              Attending Attestation:           Physician Attestation for Scribe:  Physician Attestation Statement for Scribe #1: I, Tay Zapata III, MD, reviewed documentation, as scribed by Rai Anderson in my presence, and it is both accurate and complete.                    Clinical Impression:   The primary encounter diagnosis was Back strain, initial encounter. A diagnosis of Hematuria, unspecified type was also pertinent to this visit.      Disposition:   Disposition: Discharged               I, Dr. Tay Zapata III, personally performed the  services described in this documentation. All medical record entries made by the scribe were at my direction and in my presence.  I have reviewed the chart and agree that the record reflects my personal performance and is accurate and complete. Tay Zapata III, MD.  11:27 AM 01/09/2019           Tay Zapata III, MD  01/09/19 1128

## 2019-01-09 NOTE — ED NOTES
Patient identifiers verified and correct for Ms Sorto  C/C: Back pain, mid > right after recently lifting child.   APPEARANCE: awake and alert in NAD. Color pale.  SKIN: warm, dry and intact. No breakdown or bruising.  MUSCULOSKELETAL: Patient moving all extremities spontaneously, no obvious swelling or deformities noted. Ambulates independently.  RESPIRATORY: Denies shortness of breath.Respirations unlabored.   CARDIAC: Denies CP, 2+ distal pulses; no peripheral edema  ABDOMEN: S/ND/NT, Denies nausea  : voids spontaneously, states frequency and inability to empty bladder  Neurologic: AAO x 4; follows commands equal strength in all extremities; denies numbness/tingling. Denies dizziness Denies weakness

## 2019-01-11 ENCOUNTER — PATIENT MESSAGE (OUTPATIENT)
Dept: INTERNAL MEDICINE | Facility: CLINIC | Age: 79
End: 2019-01-11

## 2019-01-14 ENCOUNTER — PATIENT MESSAGE (OUTPATIENT)
Dept: INTERNAL MEDICINE | Facility: CLINIC | Age: 79
End: 2019-01-14

## 2019-01-15 ENCOUNTER — OFFICE VISIT (OUTPATIENT)
Dept: INTERNAL MEDICINE | Facility: CLINIC | Age: 79
End: 2019-01-15
Payer: MEDICARE

## 2019-01-15 VITALS
DIASTOLIC BLOOD PRESSURE: 78 MMHG | SYSTOLIC BLOOD PRESSURE: 142 MMHG | WEIGHT: 128.06 LBS | OXYGEN SATURATION: 96 % | BODY MASS INDEX: 20.1 KG/M2 | HEIGHT: 67 IN | HEART RATE: 78 BPM

## 2019-01-15 DIAGNOSIS — R52 PAIN MANAGEMENT: ICD-10-CM

## 2019-01-15 DIAGNOSIS — S32.030S CLOSED COMPRESSION FRACTURE OF THIRD LUMBAR VERTEBRA, SEQUELA: Primary | ICD-10-CM

## 2019-01-15 DIAGNOSIS — N20.0 KIDNEY STONE: ICD-10-CM

## 2019-01-15 PROCEDURE — 99999 PR PBB SHADOW E&M-EST. PATIENT-LVL III: CPT | Mod: PBBFAC,,, | Performed by: INTERNAL MEDICINE

## 2019-01-15 PROCEDURE — 99213 OFFICE O/P EST LOW 20 MIN: CPT | Mod: PBBFAC | Performed by: INTERNAL MEDICINE

## 2019-01-15 PROCEDURE — 99999 PR PBB SHADOW E&M-EST. PATIENT-LVL III: ICD-10-PCS | Mod: PBBFAC,,, | Performed by: INTERNAL MEDICINE

## 2019-01-15 PROCEDURE — 99214 OFFICE O/P EST MOD 30 MIN: CPT | Mod: S$PBB,,, | Performed by: INTERNAL MEDICINE

## 2019-01-15 PROCEDURE — 99214 PR OFFICE/OUTPT VISIT, EST, LEVL IV, 30-39 MIN: ICD-10-PCS | Mod: S$PBB,,, | Performed by: INTERNAL MEDICINE

## 2019-01-15 RX ORDER — ASCORBIC ACID 125 MG
TABLET,CHEWABLE ORAL
COMMUNITY
End: 2022-02-25

## 2019-01-15 NOTE — PROGRESS NOTES
Subjective:      Patient ID: Shima Sorto is a 78 y.o. female.    Chief Complaint: Hospital Follow Up    HPI:  HPI   Follow up ER visit: 1/9/2019    Compression fracture: L3 tolerable 3-4 and 5-6. She believes the fracture was on 12/26/2018. Patient is on Alendronate 70 mg weekly , the 2nd period of time. She has kidney stones and is on 1000 a day of vit D.    Renal stone study showed multiple calcifications, urine greater than 100 RBC's. Patient is on a waiting list for an appt with Dr. Sims  Patient Active Problem List   Diagnosis    Calcium nephrolithiasis    Hypertension    Hyperoxaluria    Hypocitraturic calcium nephrolithiasis    Cystic kidney disease    Fatigue    Urinary retention    Osteoporosis: per Endocrinology currently off medication    Hypoglycemia    Trigger middle finger of right hand    Retinal hemorrhage of both eyes at about 1'oclock    Vestibular neuronitis    Vertigo    Obstructive sleep apnea    Venous insufficiency    Bradycardia    Screening for colon cancer    Malignant neoplasm of upper-outer quadrant of right breast in female, estrogen receptor positive    At risk for lymphedema    Cancer of right breast    CKD (chronic kidney disease) stage 3, GFR 30-59 ml/min    Posture abnormality    Shoulder pain, bilateral    Shoulder weakness     Past Medical History:   Diagnosis Date    Allergy     seasonal    Anemia     Basal cell carcinoma 7/2013    forehead    Breast cancer     Hx of colonic polyps     Hypertension     Medullary sponge kidney     MVP (mitral valve prolapse)     Osteoporosis, senile     Renal calculi     Sleep apnea     TMJ syndrome     sometimes jaw clicking/jaw pain    Urinary retention     Vertigo 1/17/2017    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    Visual impairment     reading glasses     Past Surgical History:   Procedure Laterality Date    BIOPSY, LYMPH NODE, SENTINEL Right 8/17/2018    [facility]formed by [facility]  "2ND FLR    BREAST CYST ASPIRATION Right 1999    COLONOSCOPY N/A 7/24/2018    Performed by Juan Miguel [facility] at [facility] ENDO (4TH FLR)    COLONOSCOPY N/A 4/25/2013    Performed by Rigoberto Parry MD at [facility] ENDO (4TH FLR)    INJECTION, FOR SENTINEL NODE IDENTIFICATION Right 8/17/2018    Performed by [facility] at [facility] OR 2ND FLR    INSERTION-LEAD-INTERSTIM (STAGE I) N/A 7/2/2013    Performed by Leticia Stoddard MD at [facility] OR 2ND FLR    interstim placed stage 1      KIDNEY STONE SURGERY  2000    @ [facility]    MASTECTOMY, PARTIAL-US guided Right 8/17/2018    Performed by [facility] Mason [facility] at [facility] OR 2ND FLR    MOHS procedure      REMOVAL, NEUROSTIMULATOR, SACRAL N/A 7/25/2013    Performed by Leticia Stoddard MD at [facility] OR 1ST FLR     Family History   Problem Relation Age of Onset    Kidney disease Mother     Heart disease Father     Melanoma Father     Breast cancer Maternal Aunt     Anesthesia problems Neg Hx     Malignant hypertension Neg Hx     Hypotension Neg Hx     Malignant hyperthermia Neg Hx     Pseudochol deficiency Neg Hx     Colon cancer Neg Hx     Ovarian cancer Neg Hx     Psoriasis Neg Hx     Lupus Neg Hx     Eczema Neg Hx     Acne Neg Hx      Review of Systems   Constitutional: Negative for activity change, chills, fever and unexpected weight change.   Respiratory: Negative for cough, chest tightness, shortness of breath and wheezing.    Cardiovascular: Negative for chest pain, palpitations and leg swelling.   Gastrointestinal: Negative for abdominal pain.   Genitourinary: Positive for hematuria. Negative for dysuria and pelvic pain.   Musculoskeletal: Positive for back pain.   Neurological: Negative for weakness, numbness and headaches.     Objective:     Vitals:    01/15/19 1123   BP: (!) 142/78   Pulse: 78   SpO2: 96%   Weight: 58.1 kg (128 lb 1.4 oz)   Height: 5' 7" (1.702 m)   PainSc:   2   PainLoc: Back     Body mass index is 20.06 kg/m².  Physical Exam "   Constitutional: She is oriented to person, place, and time. She appears well-developed and well-nourished. No distress.   Neck: Carotid bruit is not present. No thyromegaly present.   Cardiovascular: Normal rate, regular rhythm and normal heart sounds. PMI is not displaced.   Pulmonary/Chest: Effort normal and breath sounds normal. No respiratory distress.   Abdominal: Soft. Bowel sounds are normal. She exhibits no distension. There is no tenderness.   Musculoskeletal: She exhibits no edema.   Neurological: She is alert and oriented to person, place, and time.     Assessment:     1. Closed compression fracture of third lumbar vertebra, sequela    2. Kidney stone    3. Pain management      Plan:   Shima was seen today for hospital follow up.    Diagnoses and all orders for this visit:    Closed compression fracture of third lumbar vertebra, sequela  Comments:  New, patient on alendronate, patient level tolerable 3-4 to 5-6, no additional interventions needed    Kidney stone  Comments:  Patient will follow up with Nephrology    Pain management  Comments:  Discussed options        Problem List Items Addressed This Visit     None      Visit Diagnoses     Closed compression fracture of third lumbar vertebra, sequela    -  Primary    New, patient on alendronate, patient level tolerable 3-4 to 5-6, no additional interventions needed    Kidney stone        Patient will follow up with Nephrology    Pain management        Discussed options        No orders of the defined types were placed in this encounter.    No Follow-up on file.     Medication List           Accurate as of 1/15/19  6:58 PM. If you have any questions, ask your nurse or doctor.               CONTINUE taking these medications    acetaminophen 500 MG tablet  Commonly known as:  TYLENOL  Take 2 tablets (1,000 mg total) by mouth every 8 (eight) hours as needed for Pain.     alendronate 70 MG tablet  Commonly known as:  FOSAMAX  TAKE 1 TABLET BY MOUTH ON SUNDAY  WITH A FULL GLASS OF WATER AND WAIT 30 MINUTES BEFORE BREAKFAST AND PILLS. TAKE SITTING OR STANDING     anastrozole 1 mg Tab  Commonly known as:  ARIMIDEX  Take 1 tablet (1 mg total) by mouth once daily.     * atenolol 25 MG tablet  Commonly known as:  TENORMIN  TAKE 1 TABLET BY MOUTH EVERY DAY     * atenolol 25 MG tablet  Commonly known as:  TENORMIN  TAKE 1 TABLET BY MOUTH EVERY DAY     B COMPLEX WITH C#10-FOLIC ACID ORAL     calcium citrate 200 mg (950 mg) tablet  Commonly known as:  CALCITRATE     co-enzyme Q-10 50 mg capsule     influenza 180 mcg/0.5 mL vaccine  Commonly known as:  FLUZONE HIGH-DOSE  Inject into the muscle.     methocarbamol 750 MG Tab  Commonly known as:  ROBAXIN  Take 2 tablets (1,500 mg total) by mouth 3 (three) times daily. for 10 days     MULTIVITAMIN ORAL     olmesartan 20 MG tablet  Commonly known as:  BENICAR  Take 1 tablet (20 mg total) by mouth once daily. Stop lisinopril.     PRESERVISION AREDS 2 ORAL     SUPER OMEGA-3 400-5 mg-unit Cap  Generic drug:  fish oil-E-fatty [id number]     VANICREAM  Generic drug:  radiacream     VITAJOY DAILY D 1,000 unit Chew  Generic drug:  cholecalciferol (vitamin D3)         * This list has 2 medication(s) that are the same as other medications prescribed for you. Read the directions carefully, and ask your doctor or other care provider to review them with you.            STOP taking these medications    VITAMIN D2 50,000 unit Cap  Generic drug:  ergocalciferol  Stopped by:  Zeynep Toams MD

## 2019-01-16 ENCOUNTER — PATIENT MESSAGE (OUTPATIENT)
Dept: INTERNAL MEDICINE | Facility: CLINIC | Age: 79
End: 2019-01-16

## 2019-01-25 ENCOUNTER — PATIENT OUTREACH (OUTPATIENT)
Dept: OTHER | Facility: OTHER | Age: 79
End: 2019-01-25

## 2019-01-25 NOTE — PROGRESS NOTES
HPI:  Called patient to follow up since changing lisinopril to olmesartan, patient confirms she is tolerating well. Patient endorses adherence to medication regimen daily and denies missed doses. Patient denies hypotensive s/sx (lightheadedness, dizziness, nausea, fatigue); patient denies hypertensive s/sx (SOB, CP, severe headaches, changes in vision, dizziness, fatigue, confusion, anxiety, nosebleeds).     Last 5 Patient Entered Readings                                      Current 30 Day Average: 130/62     Recent Readings 1/22/2019 1/14/2019 1/13/2019 1/12/2019 1/8/2019    SBP (mmHg) 134 123 158 122 115    DBP (mmHg) 63 57 72 57 59    Pulse 55 60 43 57 57     Assessment:  Reviewed recent readings. Per 2017 ACC/ AHA HTN guidelines (goal of BP < 130/80), current 30-day average is controlled.     Plan:  Continue current medication regimen.   Patients health , Beth Villaseñor, will be following up as scheduled.   I will continue to monitor regularly and will follow-up in 12 weeks, sooner if blood pressure begins to trend upward or downward.     Current medication regimen:  Hypertension Medications             atenolol (TENORMIN) 25 MG tablet TAKE 1 TABLET BY MOUTH EVERY DAY 12.5mg daily         olmesartan (BENICAR) 20 MG tablet Take 1 tablet (20 mg total) by mouth once daily. Stop lisinopril.         Patient denies having questions or concerns. Patient has my contact information and knows to call with any concerns or clinical changes.

## 2019-01-30 ENCOUNTER — DOCUMENTATION ONLY (OUTPATIENT)
Dept: REHABILITATION | Facility: HOSPITAL | Age: 79
End: 2019-01-30

## 2019-01-30 PROBLEM — R29.898 SHOULDER WEAKNESS: Status: RESOLVED | Noted: 2018-11-07 | Resolved: 2019-01-30

## 2019-01-30 PROBLEM — M25.512 SHOULDER PAIN, BILATERAL: Status: RESOLVED | Noted: 2018-11-07 | Resolved: 2019-01-30

## 2019-01-30 PROBLEM — R29.3 POSTURE ABNORMALITY: Status: RESOLVED | Noted: 2018-11-07 | Resolved: 2019-01-30

## 2019-01-30 PROBLEM — M25.511 SHOULDER PAIN, BILATERAL: Status: RESOLVED | Noted: 2018-11-07 | Resolved: 2019-01-30

## 2019-01-30 NOTE — PROGRESS NOTES
Pt was evaluated 11/7/18 for rotator cuff and R breast CA lymphedema risk.    Pt was seen for eval only - she was to call to return as needed.     Pt has not contacted dept to resume.    No change in status or change in goals.     Discharge due to lack of attendance.

## 2019-02-01 RX ORDER — ALENDRONATE SODIUM 70 MG/1
TABLET ORAL
Qty: 4 TABLET | Refills: 12 | Status: SHIPPED | OUTPATIENT
Start: 2019-02-01 | End: 2019-11-14 | Stop reason: ALTCHOICE

## 2019-02-18 ENCOUNTER — OFFICE VISIT (OUTPATIENT)
Dept: HEMATOLOGY/ONCOLOGY | Facility: CLINIC | Age: 79
End: 2019-02-18
Payer: MEDICARE

## 2019-02-18 ENCOUNTER — TELEPHONE (OUTPATIENT)
Dept: NEPHROLOGY | Facility: CLINIC | Age: 79
End: 2019-02-18

## 2019-02-18 VITALS
TEMPERATURE: 98 F | DIASTOLIC BLOOD PRESSURE: 67 MMHG | OXYGEN SATURATION: 100 % | BODY MASS INDEX: 20.21 KG/M2 | RESPIRATION RATE: 18 BRPM | SYSTOLIC BLOOD PRESSURE: 162 MMHG | HEART RATE: 55 BPM | HEIGHT: 67 IN | WEIGHT: 128.75 LBS

## 2019-02-18 DIAGNOSIS — C50.411 MALIGNANT NEOPLASM OF UPPER-OUTER QUADRANT OF RIGHT BREAST IN FEMALE, ESTROGEN RECEPTOR POSITIVE: Primary | ICD-10-CM

## 2019-02-18 DIAGNOSIS — Z17.0 MALIGNANT NEOPLASM OF UPPER-OUTER QUADRANT OF RIGHT BREAST IN FEMALE, ESTROGEN RECEPTOR POSITIVE: Primary | ICD-10-CM

## 2019-02-18 DIAGNOSIS — N18.30 CHRONIC KIDNEY DISEASE, STAGE III (MODERATE): Primary | ICD-10-CM

## 2019-02-18 PROCEDURE — 99213 OFFICE O/P EST LOW 20 MIN: CPT | Mod: S$PBB,,, | Performed by: INTERNAL MEDICINE

## 2019-02-18 PROCEDURE — 99214 OFFICE O/P EST MOD 30 MIN: CPT | Mod: PBBFAC | Performed by: INTERNAL MEDICINE

## 2019-02-18 PROCEDURE — 99213 PR OFFICE/OUTPT VISIT, EST, LEVL III, 20-29 MIN: ICD-10-PCS | Mod: S$PBB,,, | Performed by: INTERNAL MEDICINE

## 2019-02-18 PROCEDURE — 99999 PR PBB SHADOW E&M-EST. PATIENT-LVL IV: CPT | Mod: PBBFAC,,, | Performed by: INTERNAL MEDICINE

## 2019-02-18 PROCEDURE — 99999 PR PBB SHADOW E&M-EST. PATIENT-LVL IV: ICD-10-PCS | Mod: PBBFAC,,, | Performed by: INTERNAL MEDICINE

## 2019-02-18 NOTE — TELEPHONE ENCOUNTER
Former pt of dr duval came in today to inform me she was refer to dr gil because of some minor issues and plus she hasn't seen dr duval in 3years. I made an appt for pt for pt in April she was satisfy but still a little concern and I inform her ill let dr gil know some of the things she stated and ill give her a call on the response dr gil gives

## 2019-02-18 NOTE — PROGRESS NOTES
Subjective:       Patient ID: Shima Sorto is a 78 y.o. female.    Chief Complaint: No chief complaint on file.    HPI    Ms. Sorto returns today for follow up.  I had last seen her in later October 2018. She started anastrazole in mid November 2018, and so fas has tolerated it well.    Briefly, she is a  78-year-old  female who was recently diagnosed with breast cancer.  On 08/17/2018, she underwent a lumpectomy and sentinel lymph node biopsy for an 8 mm grade 1 invasive lobular carcinoma which was ER strongly positive, OR negative and HER-2 negative with a Ki-67 of 5%with the closest margin being 2 mm.  Two of 2 sentinel lymph nodes were negative for involvement.      As mentioned above, she has now completed her radiation therapy and has been started on AIs.  Her DXA scan showed osteopenia..      Review of Systems      Overall she feels well.  She has tolerated the anastrazole quite well so far.   She denies any anxiety, depression, easy bruising, fevers, chills, night  sweats, weight loss, nausea, vomiting, diarrhea, constipation, diplopia, blurred vision, headache, chest pain, palpitations, shortness of breath, breast pain, abdominal pain, extremity pain, or difficulty ambulating.  The remainder of the ten-point ROS, including general, skin, lymph, H/N, cardiorespiratory, GI, , Neuro, Endocrine, and psychiatric is negative.     Objective:      Physical Exam        She is alert, oriented to time, place, person, pleasant, well      nourished, in no acute physical distress.                                    VITAL SIGNS:  Reviewed                                      HEENT:  Normal.  There are no nasal, oral, lip, gingival, auricular, lid,    or conjunctival lesions.  Mucosae are moist and pink, and there is no        thrush.  Pupils are equal, reactive to light and accommodation.              Extraocular muscle movements are intact.  Dentition is good.  She is wearing braces.                                        NECK:  Supple without JVD, adenopathy, or thyromegaly.                       LUNGS:  Clear to auscultation without wheezing, rales, or rhonchi.           CARDIOVASCULAR:  Reveals an S1, S2, no murmurs, no rubs, no gallops.         ABDOMEN:  Soft, nontender, without organomegaly.  Bowel sounds are    present.                                                                     EXTREMITIES:  No cyanosis, clubbing, or edema.                               BREASTS:  She is status post right lumpectomy with a well-healed incision in the upper outer quadrant.    There are no masses in either breast.                                      LYMPHATIC:  There is no cervical, axillary, or supraclavicular adenopathy.   SKIN:  Warm and moist, without petechiae, rashes, induration, or ecchymoses.  There is mild erythema on the right breast from her XRT.          NEUROLOGIC:  DTRs are 0-1+ bilaterally, symmetrical, motor function is 5/5,and cranial nerves are  within normal limits.    Assessment:       1. Malignant neoplasm of upper-outer quadrant of right breast in female, estrogen receptor positive        Plan:           I had a long discussion with her;  She will remain on anatsrazole through the end of October 2023, and see me again in 4 months.  Her multiple questions were answered to her satisfaction.

## 2019-02-19 ENCOUNTER — TELEPHONE (OUTPATIENT)
Dept: NEPHROLOGY | Facility: CLINIC | Age: 79
End: 2019-02-19

## 2019-02-19 DIAGNOSIS — N18.30 CKD (CHRONIC KIDNEY DISEASE) STAGE 3, GFR 30-59 ML/MIN: Primary | ICD-10-CM

## 2019-02-19 NOTE — TELEPHONE ENCOUNTER
I called pt to inform her dr gil will see her in apr. And need lab work. Pt is aware and didn't have no further questions

## 2019-02-19 NOTE — TELEPHONE ENCOUNTER
Called pt to inform her I talk to dr gil and dr gil stated her appt in April is fine . Pt understood and had concerns I also forward message over to dr gil

## 2019-02-27 ENCOUNTER — PATIENT OUTREACH (OUTPATIENT)
Dept: OTHER | Facility: OTHER | Age: 79
End: 2019-02-27

## 2019-02-27 NOTE — PROGRESS NOTES
Last 5 Patient Entered Readings                                      Current 30 Day Average: 127/60     Recent Readings 2/19/2019 2/14/2019 2/7/2019 1/28/2019 1/25/2019    SBP (mmHg) 127 128 140 111 127    DBP (mmHg) 61 62 61 55 58    Pulse 60 64 53 70 63          Digital Medicine: Health  Follow Up    Lifestyle Modifications:    1.Dietary Modifications (Sodium intake <2,000mg/day, food labels, dining out): Patient continues maintaining a low sodium diet. She reads food labels and avoids highly salted food. She recently found out that she is lactose intolerant so she has cut back on her dairy intake.      2.Physical Activity: Patient does a lot of yard and house work to keep her active.     3.Medication Therapy: Patient has been compliant with the medication regimen. Patient is doing well on her current regimen. She denies symptoms/side effects.   Patient has a question about her Olmesartan. She read on the bottle that she needed to speak with a doctor before drinking alcohol. She only drinks maybe once a week or when she is visiting with friends (usually just 1-2 glasses of wine). She just wants to see if it is ok for her to drink with the medication or not. I will forward this question to her PharmD, AVINASH Morgan.     4.Patient has the following medication side effects/concerns:   (Frequency/Alleviating factors/Precipitating factors, etc.)     Follow up with Mrs. Ronquillo Kinza Sorto completed. No further questions or concerns. Will continue to follow up to achieve health goals.

## 2019-03-06 ENCOUNTER — NURSE TRIAGE (OUTPATIENT)
Dept: ADMINISTRATIVE | Facility: CLINIC | Age: 79
End: 2019-03-06

## 2019-03-06 NOTE — TELEPHONE ENCOUNTER
Pt reported earlier today was cleaning up outside. Put foot in trash to put something down and lost balance falling to left side of body/hip. Has no pain, no bruising/swelling, is wt bearing and walking as normal. Is a little stiff but was stiff when she woke up today so nothing new. Stated she didn't fall very hard. Was calling to see if she needed to be seen.    Reason for Disposition   High-risk adult (e.g., age > 60, osteoporosis, chronic steroid use)    Protocols used: ST HIP INJURY-A-[place]

## 2019-03-06 NOTE — PLAN OF CARE
Name: Shima Sorto  Clinic Number: [id number]    Shima Sorto is a 76 y.o. female evaluated on 4/6/2017.    Diagnosis:   Encounter Diagnoses   Name Primary?    Gait difficulty     Balance problem         Physician: Zeynep Tomas MD    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    Visual impairment     reading glasses     Current Outpatient Prescriptions   Medication Sig    alendronate (FOSAMAX) 70 MG tablet 1 tablet on Sunday with a full glass of water and wait 30 minutes for breakfast and pills. Take sitting or standing    atenolol (TENORMIN) 25 MG tablet Take one per day    B COMPLEX & C NO.10/FOLIC ACID (B COMPLEX WITH C#10-FOLIC ACID ORAL) Take by mouth.    calcium citrate (CALCITRATE) 200 mg (950 mg) tablet Take 1 tablet by mouth once daily.    co-enzyme Q-10 50 mg capsule Take 50 mg by mouth once daily.    diazePAM (VALIUM) 5 MG tablet Take 1 tablet (5 mg total) by mouth every 6 (six) hours as needed (dizziness).    fish oil-vit E-fat acid5-hb137 (SUPER OMEGA-3) 400-5 mg-unit Cap Take 1 capsule by mouth Daily.    lisinopril 10 MG tablet Take 2 tablets (20 mg total) by mouth every morning. Dosage change    meclizine (ANTIVERT) 25 mg tablet Take 1 tablet (25 mg total) by mouth 3 (three) times daily as needed for Dizziness.    methylPREDNISolone (MEDROL) 16 MG Tab Take as directed in taper instructions    MULTIVITAMIN ORAL Take 1 tablet by mouth Daily.    ondansetron (ZOFRAN-ODT) 8 MG TbDL Take 1 tablet (8 mg total) by mouth every 6 (six) hours as needed (nausea).     No current facility-administered medications for this visit.      Review of  "patient's allergies indicates:   Allergen Reactions    Asparagus      Other reaction(s): Rash       Precautions :Cardiac/standard  Occupation:   retired    Subjective   In Dec 2016 had shingles, pt reports receiving antibiotics and then she was diagnosed with vestibular neuritis.   Jan 17,2017 episode of vertigo with 2 days in the hospital.  Pt had to use a walker for a few days and HH following discharge. Dizziness has resolved for the most part however gait and balance is still effected by speed while walking. Pt reports occasional heaviness and fullness in her forehead and sinuses while performing head movements, but does not report dizziness.    Shima Wykle symptoms are still present and are constant.      Since onset:  improving     Type : balance    Description of symptoms : sinus congestion/      Duration: symptoms present with inc speed    Associated symptoms : none    Prior Episodes: none    Falls: No        Patient Goals:  "to be able to walk at inc speed and on all surfaces without LOB"    CERVICAL ROM  Flex 100%  Ext    75%  RR    90%  LR     90%  SBR  100%  SBL   100%    POSTURE: rounded shoulder/fwd head/dec cervical lordosis       Oculomotor    Smooth Pursuit: elicits symptoms  Saccadic eye movements:  does not elicit symptoms  Convergence/divergence: Intact  Vestibular Ocular Reflex: Intact slight symptoms        VESTIBULAR  Head impulse test  Intact  Head shake test elicits symptoms of "heaviness"  [place]- Hallpike  intact       HASSAN Assessment    1. Sitting to Standing   4 - able to stand without using hands and stabilize independently  2. Standing Unsupported   4 - able to stand safely 2 minutes without hold  3. Sitting Unsupported   4 - able to sit safely and securely 2 minutes  4. Standing to Sitting   4 - sits safely with minimal use of hands  5. Pivot Transfer   4 - able to trnasfer safely with minor use of hands  6. Standing with Eyes Closed   4 - albe to stand 10 seconds safely  7. Standing " with Feet Together   4 - able to place feet together independently and stand 1 minute safely  8. Reaching Forward with Outstretched Arm   4 - can reach forward confidently 25 cm/10 inches  9. Retrieving Object from Floor   4 - able to  slipper safely and easily  10. Turning to Look Behind   4 - looks behind from both sides and weights shifts well  11. Turning 360 Degrees   4 - able to turn 360 in seconds or less  12. Placing Alternate Foot on Step   3 - able to stand independently and completely 8 steps > 20 seconds  13. Standing with One Foot in Front   4 - able to tandem stand independently and hold 30 seconds  14. Standing on One Foot   4 - able to lift leg independently and hold > 10 seconds                             Total:    55        Maximum = 56    CEREBELLAR SCREENS:  VOR cancellation impaired  Hand clap   WNL  Finger to nose  WNL  Toe tapping  WNl    BALANCE  rhomberg EO on foam Mod I  rhomberg EC on foam c/ SBA post LOB  Tandem stance EO on foam CGA        Gait on level surfaces: occasional veering to the right    Gait Deviations: Shima Charodario displays occasional unsteady gait.  Gait with changing speeds: gait quality declines wiht fast speeds  Gait with horizontal head turns: gait quality declines  Gait with vertical head turns: gait quality declines  Gait with pivot turns: gait quality declines  Tandem ambulation: gait quality declines/loss of balance    Time in: 10:50am  Time Out: 11:40am  PT Evaluation Completed?: Yes  Discussed plan of care with patient?: Yes    Shima received 0 minutes of vestibular rehabilitation  VOR 1& 2 hor/vert  VOR cancellation  rhomberg on foam  Tandem on foam  Walking with ball toss  Cone taps with step overs  Tandem gait  martínez  Written Home Exercises Provided: VOR 1 hor/vert  Pt demonstrated good understanding of the education provided. rajat good return demo of skill of exercise.    This is a 76 y.o. female with a medical diagnosis of   Encounter Diagnoses    Name Primary?    Gait difficulty     Balance problem    . Patient presents with abnormality of gait. Shima Sorto will benefit from skilled physical therapy intervention to address the above impairments and functional limitations.   History  Co-morbidities and personal factors that may impact the plan of care Examination  Body Structures and Functions, activity limitations and participation restrictions that may impact the plan of care Clinical Presentation          Stable     Decision Making/ Complexity Score      Low complexity   Co-morbidities:       MVP  HTN  Osteoporosis            Personal Factors:    Body Regions:  vestibular  Body Systems:   Gait with deviations  Decreased balance during dynamic activites    occulomotor deficits    Activity limitations:   Pt has difficulty with balance on uneven surfaces with ambulation    Participation Restrictions:                  The following goals were discussed with the patient and patient is in agreement with them as to be addressed in the treatment plan.     STG'S: 3 weeks  1. Patient independent in HEP of balance.  Exercises to be done Daily.  2. Pt will demonstrate ability to perform rhomberg on foam with EC x 20 sec without LOB  3. Pt will ambulate with head turns without LOB  LTG'S : 6 weeks  1. Patient able to ambulate in community safely without assistive device, on varied terrainwith head movement, without LOB or c/o dizziness.  2. Pt will balance in tandem stance x 30 sec without LOB  3. Pt to be I with self management of condition and progression vestibular program for maintenance.    PLAN       Shima Sorto will be seen 2 times per weeks for the next 6 weeks for vestibular rehabilitation. Pt may be seen by a PTA as part of the Rehab Team.     Teresa Rush, PT  4/6/2017     0 = understands/communicates without difficulty

## 2019-03-07 ENCOUNTER — TELEPHONE (OUTPATIENT)
Dept: INTERNAL MEDICINE | Facility: CLINIC | Age: 79
End: 2019-03-07

## 2019-03-07 NOTE — TELEPHONE ENCOUNTER
Spoke to pt and she informed that she is on her way to the ER requesting a call from you about left hip

## 2019-03-07 NOTE — TELEPHONE ENCOUNTER
I tried to return the call at 4:39 on her mobile and it went to a voice mail. Please call and see how we can help. See the last 2 days of messages. GML

## 2019-03-07 NOTE — TELEPHONE ENCOUNTER
----- Message from Julianna Petty sent at 3/7/2019 12:46 PM CST -----  Contact: patient on cell [phone]  Pt returned the nurse's call.     Pt does not feel bad but is concerned because that she can see a marble sized lump on her left hip She said that it moves. . Patient wonders if she could have a chipped bone. Please call pt to advise pt but she said that she would like a happily ever answer!

## 2019-03-08 ENCOUNTER — PATIENT MESSAGE (OUTPATIENT)
Dept: SURGERY | Facility: CLINIC | Age: 79
End: 2019-03-08

## 2019-03-08 ENCOUNTER — HOSPITAL ENCOUNTER (OUTPATIENT)
Dept: RADIOLOGY | Facility: HOSPITAL | Age: 79
Discharge: HOME | End: 2019-03-08
Attending: INTERNAL MEDICINE
Payer: MEDICARE

## 2019-03-08 ENCOUNTER — OFFICE VISIT (OUTPATIENT)
Dept: INTERNAL MEDICINE | Facility: CLINIC | Age: 79
End: 2019-03-08
Payer: MEDICARE

## 2019-03-08 ENCOUNTER — PATIENT MESSAGE (OUTPATIENT)
Dept: INTERNAL MEDICINE | Facility: CLINIC | Age: 79
End: 2019-03-08

## 2019-03-08 ENCOUNTER — TELEPHONE (OUTPATIENT)
Dept: INTERNAL MEDICINE | Facility: CLINIC | Age: 79
End: 2019-03-08

## 2019-03-08 VITALS
HEART RATE: 46 BPM | DIASTOLIC BLOOD PRESSURE: 80 MMHG | OXYGEN SATURATION: 99 % | WEIGHT: 129.44 LBS | SYSTOLIC BLOOD PRESSURE: 140 MMHG | BODY MASS INDEX: 20.27 KG/M2
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
## 2019-03-08 DIAGNOSIS — M79.89 SOFT TISSUE MASS: ICD-10-CM

## 2019-03-08 DIAGNOSIS — M25.552 LEFT HIP PAIN: ICD-10-CM

## 2019-03-08 DIAGNOSIS — M25.552 LEFT HIP PAIN: Primary | ICD-10-CM

## 2019-03-08 DIAGNOSIS — Z17.0 MALIGNANT NEOPLASM OF UPPER-OUTER QUADRANT OF RIGHT BREAST IN FEMALE, ESTROGEN RECEPTOR POSITIVE: ICD-10-CM

## 2019-03-08 DIAGNOSIS — C50.411 MALIGNANT NEOPLASM OF UPPER-OUTER QUADRANT OF RIGHT BREAST IN FEMALE, ESTROGEN RECEPTOR POSITIVE: ICD-10-CM

## 2019-03-08 PROCEDURE — 99214 PR OFFICE/OUTPT VISIT, EST, LEVL IV, 30-39 MIN: ICD-10-PCS | Mod: S$PBB,,, | Performed by: INTERNAL MEDICINE

## 2019-03-08 PROCEDURE — 73502 X-RAY EXAM HIP UNI 2-3 VIEWS: CPT | Mod: 26,LT,, | Performed by: RADIOLOGY

## 2019-03-08 PROCEDURE — 99999 PR PBB SHADOW E&M-EST. PATIENT-LVL III: ICD-10-PCS | Mod: PBBFAC,,, | Performed by: INTERNAL MEDICINE

## 2019-03-08 PROCEDURE — 99999 PR PBB SHADOW E&M-EST. PATIENT-LVL III: CPT | Mod: PBBFAC,,, | Performed by: INTERNAL MEDICINE

## 2019-03-08 PROCEDURE — 99214 OFFICE O/P EST MOD 30 MIN: CPT | Mod: S$PBB,,, | Performed by: INTERNAL MEDICINE

## 2019-03-08 PROCEDURE — 99213 OFFICE O/P EST LOW 20 MIN: CPT | Mod: PBBFAC,25 | Performed by: INTERNAL MEDICINE

## 2019-03-08 PROCEDURE — 73502 XR HIP 2 VIEW LEFT: ICD-10-PCS | Mod: 26,LT,, | Performed by: RADIOLOGY

## 2019-03-08 PROCEDURE — 73502 X-RAY EXAM HIP UNI 2-3 VIEWS: CPT | Mod: TC,LT

## 2019-03-08 NOTE — PROGRESS NOTES
Subjective:       Patient ID: Shima Sorto is a 78 y.o. female.    Chief Complaint: Fall    HPI 78-year-old in with days ago patient she tried to raise leg 2 ft in the air to down some and lost her balance landing on the side of the.  She said she mostly braced herself with her arms did not hit head.  She only noticed mild soreness but did notice a soft tissue type lump in the region.  There was no bruising or bleeding.  She watched it for a day and the discomfort was a little less but this morning when the lump was still present she decided to come in.  She thinks the area is a little sore and she is not quite sure whether this lump is older new.  It is somewhat soft and mobile.  Again no bruising.  No tenderness with walking or moving the leg.  She can cross the left hip.  She does have a history of thin bones and breast cancer      Review of Systems   Constitutional: Negative for appetite change, chills and fever.   HENT: Negative for nosebleeds, sinus pain and sore throat.    Eyes: Negative for visual disturbance.   Respiratory: Negative for cough, shortness of breath and wheezing.    Cardiovascular: Negative for chest pain and leg swelling.   Gastrointestinal: Negative for abdominal pain, constipation and diarrhea.   Genitourinary: Negative for difficulty urinating and hematuria.   Musculoskeletal: Positive for arthralgias. Negative for neck pain and neck stiffness.   Skin: Negative for pallor and rash.        Soft tissue lump lateral left hip   Neurological: Negative for headaches.   Psychiatric/Behavioral: Negative for dysphoric mood and suicidal ideas. The patient is not nervous/anxious.        Objective:      Physical Exam   Constitutional: She appears well-developed and well-nourished.   HENT:   Head: Normocephalic and atraumatic.   Abdominal: Soft. Bowel sounds are normal. She exhibits no distension. There is no tenderness.   Musculoskeletal: She exhibits tenderness (Minimal tenderness soft tissue  lateral left hip.  No tenderness with straight leg raise or rotation of the hip.). She exhibits no edema or deformity.   There is a small approximately 1-1/2 cm round rubbery mobile soft tissue mass.  Minimally tender.  This could be a cyst versus lipoma versus other.   Skin: No rash noted. No erythema. No pallor.       Assessment:       1. Left hip pain    2. Fall, initial encounter    3. Soft tissue mass    4. Malignant neoplasm of upper-outer quadrant of right breast in female, estrogen receptor positive        Plan:       Shima was seen today for fall.    Diagnoses and all orders for this visit:    Left hip pain  -     X-Ray Hip 2 or 3 views Right; Future  -     X-Ray Hip 2 or 3 views Left; Future    Fall, initial encounter  -     X-Ray Hip 2 or 3 views Right; Future    Soft tissue mass  Comments:  Left lateral hip, possibly lipoma vs other soft tissue disruption after fall, but no bruise and minimal tenderness.   Orders:  -     X-Ray Hip 2 or 3 views Right; Future    Malignant neoplasm of upper-outer quadrant of right breast in female, estrogen receptor positive          Will get an x-ray of the hip but I doubt there is a fracture.  I suspect this soft tissue finding may have been present prior to the fall.  This may be a lipoma or cyst.  I asked her to use some ice then heat and observe for changes.  If staying the same she can monitor for a few weeks.  If at all worsening she should see her PCP or myself.

## 2019-03-08 NOTE — TELEPHONE ENCOUNTER
----- Message from Emery Chaudhry sent at 3/8/2019  9:33 AM CST -----  Contact: [phone]  Patient is calling to inform the office she will like to take appointment on next week , stated the nurse offer her appointment on yesterday  . Please call and advise, Thanks

## 2019-03-08 NOTE — TELEPHONE ENCOUNTER
Pt  Did not go to the ER. Pt request to be seen by MD today. Pt  Assisted with scheduling appointment.

## 2019-03-20 ENCOUNTER — PATIENT MESSAGE (OUTPATIENT)
Dept: ADMINISTRATIVE | Facility: OTHER | Age: 79
End: 2019-03-20

## 2019-03-21 ENCOUNTER — PATIENT MESSAGE (OUTPATIENT)
Dept: ENDOCRINOLOGY | Facility: CLINIC | Age: 79
End: 2019-03-21

## 2019-03-21 ENCOUNTER — LAB VISIT (OUTPATIENT)
Dept: LAB | Facility: HOSPITAL | Age: 79
End: 2019-03-21
Attending: INTERNAL MEDICINE
Payer: MEDICARE

## 2019-03-21 DIAGNOSIS — M81.0 OSTEOPOROSIS, UNSPECIFIED OSTEOPOROSIS TYPE, UNSPECIFIED PATHOLOGICAL FRACTURE PRESENCE: ICD-10-CM

## 2019-03-21 LAB
CALCIUM 24H UR-MRATE: 5 MG/HR
CALCIUM UR-MCNC: 4 MG/DL
CALCIUM URINE (MG/SPEC): 128 MG/SPEC
CREAT 24H UR-MRATE: 32 MG/HR
CREAT UR-MCNC: 24 MG/DL
CREATININE, URINE (MG/SPEC): 768 MG/SPEC
URINE COLLECTION DURATION: 24 HR
URINE COLLECTION DURATION: 24 HR
URINE VOLUME: 3200 ML
URINE VOLUME: 3200 ML

## 2019-03-21 PROCEDURE — 82570 ASSAY OF URINE CREATININE: CPT

## 2019-03-21 PROCEDURE — 82340 ASSAY OF CALCIUM IN URINE: CPT

## 2019-03-27 ENCOUNTER — OFFICE VISIT (OUTPATIENT)
Dept: SURGERY | Facility: CLINIC | Age: 79
End: 2019-03-27
Payer: MEDICARE

## 2019-03-27 VITALS
DIASTOLIC BLOOD PRESSURE: 73 MMHG | TEMPERATURE: 97 F | BODY MASS INDEX: 20.17 KG/M2 | HEIGHT: 67 IN | SYSTOLIC BLOOD PRESSURE: 150 MMHG | WEIGHT: 128.5 LBS | HEART RATE: 55 BPM

## 2019-03-27 DIAGNOSIS — Z17.0 MALIGNANT NEOPLASM OF UPPER-OUTER QUADRANT OF RIGHT BREAST IN FEMALE, ESTROGEN RECEPTOR POSITIVE: Primary | ICD-10-CM

## 2019-03-27 DIAGNOSIS — C50.411 MALIGNANT NEOPLASM OF UPPER-OUTER QUADRANT OF RIGHT BREAST IN FEMALE, ESTROGEN RECEPTOR POSITIVE: Primary | ICD-10-CM

## 2019-03-27 DIAGNOSIS — Z85.3 PERSONAL HISTORY OF BREAST CANCER: Primary | ICD-10-CM

## 2019-03-27 DIAGNOSIS — M25.611 DECREASED RANGE OF MOTION OF SHOULDER, RIGHT: Primary | ICD-10-CM

## 2019-03-27 PROCEDURE — 99999 PR PBB SHADOW E&M-EST. PATIENT-LVL III: CPT | Mod: PBBFAC,,, | Performed by: SURGERY

## 2019-03-27 PROCEDURE — 99213 PR OFFICE/OUTPT VISIT, EST, LEVL III, 20-29 MIN: ICD-10-PCS | Mod: S$PBB,,, | Performed by: SURGERY

## 2019-03-27 PROCEDURE — 99213 OFFICE O/P EST LOW 20 MIN: CPT | Mod: S$PBB,,, | Performed by: SURGERY

## 2019-03-27 PROCEDURE — 99213 OFFICE O/P EST LOW 20 MIN: CPT | Mod: PBBFAC,PO | Performed by: SURGERY

## 2019-03-27 PROCEDURE — 99999 PR PBB SHADOW E&M-EST. PATIENT-LVL III: ICD-10-PCS | Mod: PBBFAC,,, | Performed by: SURGERY

## 2019-03-27 NOTE — PROGRESS NOTES
[facility]          Surveillance Visit        REFERRING PHYSICIAN:  Zeynep Tomas MD    MEDICAL ONCOLOGIST:    Dr. Hernandez  RADIATION ONCOLOGIST:   Dr. Ames    DIAGNOSIS:    This is a 78 y.o. female with a stage IA,  pT1bN0(sn)MX grade 1 ER+ IN- HER2 - invasive lobular carcinoma of the right breast.    TREATMENT SUMMARY:  The patient is status post right partial mastectomy and sentinel node biopsy (2 LN) on 8/17/2018.  Final pathology showed 8 mm unifocal grade 2 invasive lobular carcinoma with 0/2 SLNs with disease. Now s/p XRT, completed 10/2018, on Anastrazole (10/31/2018). DXA (11/8/2018) with osteopenia.     INTERVAL HISTORY:   Shima Sorto comes in for a follow up, last seen in clinic in September 2018. Has been meeting with PT, would like to be cleared for more activity. No side effects from AI. Seen in ED for kidney stones around [place] with hematuria / compression fracture in L3. Recent fall after [place], no hip fracture on XR.       MEDICATIONS:  Current Outpatient Medications   Medication Sig Dispense Refill    alendronate (FOSAMAX) 70 MG tablet TAKE 1 TABLET BY MOUTH ON SUNDAY WITH A FULL GLASS OF WATER AND WAIT 30 MINUTES BEFORE BREAKFAST AND PILLS. TAKE SITTING OR STANDING 4 tablet 12    anastrozole (ARIMIDEX) 1 mg Tab Take 1 tablet (1 mg total) by mouth once daily. 90 tablet 1    atenolol (TENORMIN) 25 MG tablet TAKE 1 TABLET BY MOUTH EVERY DAY 30 tablet 6    atenolol (TENORMIN) 25 MG tablet TAKE 1 TABLET BY MOUTH EVERY DAY 30 tablet 12    B COMPLEX & C NO.10/FOLIC ACID (B COMPLEX WITH C#10-FOLIC ACID ORAL) Take by mouth.      Biotin/Silicon Diox/L-Cysteine (KAROLYN MATRIX 5000 ORAL) Take by mouth.      calcium citrate (CALCITRATE) 200 mg (950 mg) tablet Take 1 tablet by mouth once daily.      cholecalciferol, vitamin D3, (VITAJOY DAILY D) 1,000 unit Chew Take by mouth.      co-enzyme Q-10 50 mg capsule Take 100 mg by mouth once daily.       fish oil-vit  E-fat acid5-hb137 (SUPER OMEGA-3) 400-5 mg-unit Cap Take 1 capsule by mouth Daily.      influenza (FLUZONE HIGH-DOSE) 180 mcg/0.5 mL vaccine Inject into the muscle. 0.5 mL 0    MULTIVITAMIN ORAL Take 1 tablet by mouth Daily.      mv-min-folic ac-biotin-lutein (KAROLYN MATRIX 5000 COMPLETE) 33-5,000-250 mcg Tab Take 5,000 mcg by mouth once daily.      olmesartan (BENICAR) 20 MG tablet Take 1 tablet (20 mg total) by mouth once daily. Stop lisinopril. 30 tablet 11    radiacream (VANICREAM) Apply topically as needed.      vit C/E/Zn/coppr/lutein/zeaxan (PRESERVISION AREDS 2 ORAL) Take by mouth.       No current facility-administered medications for this visit.      Facility-Administered Medications Ordered in Other Visits   Medication Dose Route Frequency Provider Last Rate Last Dose    0.9%  NaCl infusion   Intravenous Continuous James Carranza MD 70 mL/hr at 08/17/18 0843         ALLERGIES:   Review of patient's allergies indicates:   Allergen Reactions    Asparagus Rash       PHYSICAL EXAMINATION:   Physical Exam   Constitutional: She is oriented to person, place, and time. She appears well-developed and well-nourished.   HENT:   Head: Normocephalic.   Eyes: Pupils are equal, round, and reactive to light.   Neck: Neck supple. No thyromegaly present.   Cardiovascular: Normal rate.    Pulmonary/Chest: Effort normal. No respiratory distress. Right breast exhibits tenderness. Right breast exhibits no inverted nipple, no mass, no nipple discharge and no skin change. Left breast exhibits no inverted nipple, no mass, no nipple discharge, no skin change and no tenderness.       Lumpectomy incision RUOQ    Area of tenderness to palpation at 4-5 o'clock in R breast   Abdominal: Soft. She exhibits no distension. There is no tenderness.   Musculoskeletal: She exhibits no edema.   Neurological: She is alert and oriented to person, place, and time. No cranial nerve deficit.   Skin: Skin is warm.       IMAGING:  Last MRI  8/2018  Last MMG Bilat 4/2018    IMPRESSION:   The patient has had an uneventful postoperative course now s/p XRT on AI.    PLAN:   1. Return in 6 months for a follow up office visit and breast exam  2. Due for MMG in April 2019  3. The patient is advised in continued exam of the breast chest wall and to report to this office sooner should she note any areas of abnormality or concern.  4. Continue to follow with Dr. Conroy for AI.   5. Will refer for PT

## 2019-03-27 NOTE — Clinical Note
Doing well.  Will continue annual mammography and I will see her in 6 months for clinical breast exam.

## 2019-04-01 ENCOUNTER — PATIENT OUTREACH (OUTPATIENT)
Dept: OTHER | Facility: OTHER | Age: 79
End: 2019-04-01

## 2019-04-01 NOTE — PROGRESS NOTES
Last 5 Patient Entered Readings                                      Current 30 Day Average: 129/64     Recent Readings 3/25/2019 3/22/2019 2/19/2019 2/14/2019 2/7/2019    SBP (mmHg) 127 130 127 128 140    DBP (mmHg) 62 65 61 62 61    Pulse 59 68 60 64 53          Digital Medicine: Health  Follow Up    Lifestyle Modifications:    1.Dietary Modifications (Sodium intake <2,000mg/day, food labels, dining out): Patient continues monitoring her sodium intake. She denies any major changes that would cause an increase in salt intake.     2.Physical Activity: Patient continues stretching and doing exercises in her home.     3.Medication Therapy: Patient has been compliant with the medication regimen. Patient is doing well on her current BP medication regimen. She denies symptoms/side effects.     4.Patient has the following medication side effects/concerns:   (Frequency/Alleviating factors/Precipitating factors, etc.)     Patient is working on getting more consistent with taking her BP readings. She stated that she gets busy and cannot find enough time to fully relax before taking her readings. She continues working on this.  Patient has not worn her CPAP in a few months due to needing a new mouth piece. She is waiting until she gets her braces off so she can get fitted correctly. She should get her braces removed in the summer.     Follow up with Mrs. Shima Sorto completed. No further questions or concerns. Will continue to follow up to achieve health goals.

## 2019-04-03 DIAGNOSIS — Z79.811 USE OF AROMATASE INHIBITORS: ICD-10-CM

## 2019-04-03 RX ORDER — ANASTROZOLE 1 MG/1
1 TABLET ORAL DAILY
Qty: 90 TABLET | Refills: 1 | Status: SHIPPED | OUTPATIENT
Start: 2019-04-03 | End: 2019-10-26 | Stop reason: SDUPTHER

## 2019-04-03 NOTE — TELEPHONE ENCOUNTER
----- Message from Martha Rico sent at 4/3/2019  4:00 PM CDT -----  Contact: Pt   Pt is requesting a refill on her rx anastrozole (ARIMIDEX) 1 mg Tab.       Pt can be contacted at [phone]

## 2019-04-08 ENCOUNTER — LAB VISIT (OUTPATIENT)
Dept: LAB | Facility: HOSPITAL | Age: 79
End: 2019-04-08
Payer: MEDICARE

## 2019-04-08 DIAGNOSIS — N18.30 CKD (CHRONIC KIDNEY DISEASE) STAGE 3, GFR 30-59 ML/MIN: ICD-10-CM

## 2019-04-08 LAB
25(OH)D3+25(OH)D2 SERPL-MCNC: 80 NG/ML (ref 30–96)
ALBUMIN SERPL BCP-MCNC: 3.6 G/DL (ref 3.5–5.2)
ANION GAP SERPL CALC-SCNC: 9 MMOL/L (ref 8–16)
BUN SERPL-MCNC: 19 MG/DL (ref 8–23)
CALCIUM SERPL-MCNC: 9.5 MG/DL (ref 8.7–10.5)
CHLORIDE SERPL-SCNC: 103 MMOL/L (ref 95–110)
CO2 SERPL-SCNC: 29 MMOL/L (ref 23–29)
CREAT SERPL-MCNC: 1.1 MG/DL (ref 0.5–1.4)
EST. GFR  (AFRICAN AMERICAN): 55.6 ML/MIN/1.73 M^2
EST. GFR  (NON AFRICAN AMERICAN): 48.2 ML/MIN/1.73 M^2
GLUCOSE SERPL-MCNC: 97 MG/DL (ref 70–110)
PHOSPHATE SERPL-MCNC: 4.4 MG/DL (ref 2.7–4.5)
POTASSIUM SERPL-SCNC: 4.7 MMOL/L (ref 3.5–5.1)
PTH-INTACT SERPL-MCNC: 64 PG/ML (ref 9–77)
SODIUM SERPL-SCNC: 141 MMOL/L (ref 136–145)

## 2019-04-08 PROCEDURE — 80069 RENAL FUNCTION PANEL: CPT

## 2019-04-08 PROCEDURE — 82306 VITAMIN D 25 HYDROXY: CPT

## 2019-04-08 PROCEDURE — 36415 COLL VENOUS BLD VENIPUNCTURE: CPT

## 2019-04-08 PROCEDURE — 83970 ASSAY OF PARATHORMONE: CPT

## 2019-04-09 ENCOUNTER — CLINICAL SUPPORT (OUTPATIENT)
Dept: REHABILITATION | Facility: OTHER | Age: 79
End: 2019-04-09
Attending: SURGERY
Payer: MEDICARE

## 2019-04-09 DIAGNOSIS — M54.50 CHRONIC LEFT-SIDED LOW BACK PAIN WITHOUT SCIATICA: ICD-10-CM

## 2019-04-09 DIAGNOSIS — R29.3 POOR POSTURE: ICD-10-CM

## 2019-04-09 DIAGNOSIS — G89.29 CHRONIC LEFT-SIDED LOW BACK PAIN WITHOUT SCIATICA: ICD-10-CM

## 2019-04-09 PROCEDURE — 97161 PT EVAL LOW COMPLEX 20 MIN: CPT | Mod: PN | Performed by: PHYSICAL THERAPIST

## 2019-04-09 NOTE — PLAN OF CARE
[facility]  Physical Therapy Initial Evaluation    Name: Shima Sorto  Clinic Number: [id number]    Therapy Diagnosis:   Encounter Diagnoses   Name Primary?    Chronic left-sided low back pain without sciatica     Poor posture      Physician: Abby Mason MD    Physician Orders: PT Eval and Treat: Pt has decreased range of motion. History of Breast Cancer.  Medical Diagnosis from Referral: M25.611 (ICD-10-CM) - Decreased range of motion of shoulder, right   Evaluation Date: 4/9/2019  Authorization Period Expiration: 3/26/2020  Plan of Care Expiration: 7/5/2019  Visit # / Visits authorized: 1/ 1 (pending further authorization)    Time In: 3:10 PM  Time Out: 4:10 PM  Total Billable Time: 60 minutes    Precautions: Standard, cancer and osteoporosis    Subjective   Date of onset: 1/9/2019  History of current condition - Shima reports: mastectomy in August in 2018. She says procedure went very well. Mild tenderness over incision. She says overall shoulder motion is good, but mild pain to B shoulders. Primary concern at this time is low back pain. Did some lifting around the holidays and had pain to low back, was later dx with stress fracture at L3. Reports pain with lifting and holding, as with holding 2 year old relative (weighs about 20 pounds).       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    Shima has a current medication list which includes the following prescription(s): alendronate, anastrozole, atenolol, b complex c no.10/folic acid, calcium citrate, cholecalciferol (vitamin d3), co-enzyme q-10, fish oil-e-fatty acid5-[id number], multivitamin, mv-min-folic ac-biotin-lutein, olmesartan, radiacream, and vit c/e/zn/coppr/lutein/zeaxan, and the following Facility-Administered Medications: sodium chloride 0.9%.    Review of patient's allergies indicates:   Allergen Reactions    Asparagus Rash        Imaging, none:     Prior Therapy: none for c/c  Social History: Pt lives alone in raised two story home.   Occupation: retired  Prior Level of Function: Independent with ADL's and driving   Current Level of Function: Independent with ADL's and driving. Pain with lifting/holding 20 pound 2 year old relative    Pain:  Current 1/10, worst 3/10, best 0/10   Location: left low back   Description: tightness and fatigue to low back. Only occasional pain to shoulders reported  Aggravating Factors: Lifting  Easing Factors: rest and activity     Pts goals: wants to be able to get out and do yard work    Objective       Posture: Mild rounded shoulders. L shoulder elevated consistent with noted scoliosis through thoracolumbar spine    B shoulder AROM grossly WFL and equal bilaterally. Slight c/o pulling in end range elevation R    Upper Extremity Strength   (L) UE (R) UE   Shoulder flexion: 5/5 5/5   Shoulder Abduction: 4+/5 4+/5   Shoulder extension 5/5 5/5   Shoulder ER 4+/5 4+/5   Shoulder IR 5/5 5/5   Elbow flexion: 5/5 5/5   Elbow extension: 4+/5 4+/5     Apley's Scratch Test   Left Right   Combined ER T2 T2   Combined IR T8 T10   Cross-body reach Posterior opposite shoulder Posterior opposite shoulder            Lumbar Range of Motion:    Percent WFL Pain   Flexion 50%   tightness        Extension 50%   Pain midline        Left Side Bending 75% Pain L        Right Side Bending 75%         Left rotation   75%         Right Rotation   75%              Lower Extremity Strength  Right LE  Left LE    Ankle dorsiflexion: 5/5 Ankle dorsiflexion: 5/5   Ankle plantarflexion: 5/5 Ankle plantarflexion: 5/5   Knee extension: 5/5 Knee extension: 5/5   Knee flexion: 5/5 Knee flexion: 5/5   Hip flexion: 4+/5 Hip flexion: 5/5   Hip external rotation: 5/5 Hip external rotation: 5/5   Hip internal rotation: 5/5 Hip internal rotation: 5/5   Hip extension:  3+/5 Hip extension: 3+/5   Hip abduction: 5/5 Hip abduction: 5/5   Hip adduction: 4/5 Hip adduction 4/5       Neuro Dynamic Testing:    Sciatic nerve:      SLR: R = negative     L = negative          Palpation: Tenderness to L3 spinous process and PVM to L    Sensation: grossly intact to light touch    Flexibility:    Hamstring: R = full; L = full   Quad: R = mod; L = mod   Piriformis: R: full; L full           CMS Impairment/Limitation/Restriction for FOTO Shoulder Survey    Therapist reviewed FOTO scores for Shima Sorto on 4/9/2019.   FOTO documents entered into [employer] - see Media section.    Limitation Score: 21%  Category: Carrying    Current : CJ = at least 20% but < 40% impaired, limited or restricted  Goal: CJ = at least 20% but < 40% impaired, limited or restricted  Discharge: n/a             TREATMENT   Treatment Time In: n/a  Treatment Time Out: n/a  Total Treatment time separate from Evaluation: n/a minutes        Home Exercises and Patient Education Provided    Education provided:   - Therapy rationale and plan of care. Scapular retractions demonstrated for HEP.    Written Home Exercises Provided: yes.  Exercises were reviewed and Shima was able to demonstrate them prior to the end of the session.  Shima demonstrated good  understanding of the education  provided.     See EMR under Patient Instructions for exercises provided 4/9/2019.    Assessment   Shima is a 78 y.o. female referred to outpatient Physical Therapy with a medical diagnosis of M25.611 (ICD-10-CM) - Decreased range of motion of shoulder, right. Pt presents with primary complaint of L sided low back pain with report of stress fracture to L3 after lifting small child in January. B shoulder ROM WFL, and MMT >/=4+/5. With assessment of lumbar spine, mild ROM limitations noted with some c/o pain to L lumbar spine. Tenderness over L3 transverse process and PVM. Weakness to B hip extensors with MMT. Flexibility restrictions to B quads. Scoliosis noted with lumbar ROM. Recommend skilled intervention with focus on improved core stability and postural strengthening.    Pt prognosis is Good.   Pt will benefit from skilled outpatient Physical Therapy to address the deficits stated above and in the chart below, provide pt/family education, and to maximize pt's level of independence.     Plan of care discussed with patient: Yes  Pt's spiritual, cultural and educational needs considered and patient is agreeable to the plan of care and goals as stated below:     Anticipated Barriers for therapy: none    Medical Necessity is demonstrated by the following  History  Co-morbidities and personal factors that may impact the plan of care Co-morbidities:   cancer, HTN, vertigo, osteoporosis    Personal Factors:   no deficits     moderate   Examination  Body Structures and Functions, activity limitations and participation restrictions that may impact the plan of care Body Regions:   back  lower extremities  upper extremities    Body Systems:    ROM  strength  balance  gait    Participation Restrictions:   Lifting, carrying, bending    Activity limitations:   Learning and applying knowledge  no deficits    General Tasks and Commands  no deficits    Communication  no deficits    Mobility  lifting and carrying objects    Self  care  no deficits    Domestic Life  doing house work (cleaning house, washing dishes, laundry)    Interactions/Relationships  no deficits    Life Areas  no deficits    Community and Social Life  community life         moderate   Clinical Presentation stable and uncomplicated low   Decision Making/ Complexity Score: low     Goals:  Short Term Goals (4 Weeks):   1. Pt will report 20% reduction in pain of the lumbar spinefor ease with ADL's  2. PT will demonstrate improved upright posture with minimal cuing for ease with functional positioning in home and community.  3. Pt will demonstrate improved lumbar spine ROM in all directions by 10% for ease with bending activities.     Long Term Goals (12 Weeks):   1. Pt will report being independent with HEP for maintenance of improvements gained during therapy sessions  2. PT will report 50% reduction of pain of the back for ease with yard work.   3. Pt will demonstrate trunk and extremity strength to >=4+/5 without the provocation of pain for ease with lifting.  4. Pt will demonstrate appropriate upright posture without external cueing for ease with lifting 2-year old relative.       Plan   Plan of care Certification: 4/9/2019 to 7/5/2019.    Outpatient Physical Therapy 2 times weekly for 12 weeks to include the following interventions: Manual Therapy, Moist Heat/ Ice, Neuromuscular Re-ed, Patient Education and Therapeutic Exercise.     Elsa Regalado, PT

## 2019-04-11 ENCOUNTER — OFFICE VISIT (OUTPATIENT)
Dept: NEPHROLOGY | Facility: CLINIC | Age: 79
End: 2019-04-11
Payer: MEDICARE

## 2019-04-11 ENCOUNTER — HOSPITAL ENCOUNTER (OUTPATIENT)
Dept: RADIOLOGY | Facility: HOSPITAL | Age: 79
Discharge: HOME | End: 2019-04-11
Attending: SURGERY
Payer: MEDICARE

## 2019-04-11 VITALS
OXYGEN SATURATION: 100 % | DIASTOLIC BLOOD PRESSURE: 60 MMHG | BODY MASS INDEX: 19.82 KG/M2 | SYSTOLIC BLOOD PRESSURE: 130 MMHG | HEIGHT: 67 IN | HEART RATE: 56 BPM | WEIGHT: 126.31 LBS

## 2019-04-11 DIAGNOSIS — N20.0 KIDNEY STONES: ICD-10-CM

## 2019-04-11 DIAGNOSIS — Z85.3 PERSONAL HISTORY OF BREAST CANCER: ICD-10-CM

## 2019-04-11 DIAGNOSIS — N18.30 CKD (CHRONIC KIDNEY DISEASE) STAGE 3, GFR 30-59 ML/MIN: Primary | ICD-10-CM

## 2019-04-11 PROCEDURE — 99999 PR PBB SHADOW E&M-EST. PATIENT-LVL III: ICD-10-PCS | Mod: PBBFAC,,, | Performed by: INTERNAL MEDICINE

## 2019-04-11 PROCEDURE — 99204 OFFICE O/P NEW MOD 45 MIN: CPT | Mod: S$PBB,,, | Performed by: INTERNAL MEDICINE

## 2019-04-11 PROCEDURE — 77062 MAMMO DIGITAL DIAGNOSTIC BILAT WITH TOMOSYNTHESIS_CAD: ICD-10-PCS | Mod: 26,,, | Performed by: RADIOLOGY

## 2019-04-11 PROCEDURE — 99213 OFFICE O/P EST LOW 20 MIN: CPT | Mod: PBBFAC | Performed by: INTERNAL MEDICINE

## 2019-04-11 PROCEDURE — 77062 BREAST TOMOSYNTHESIS BI: CPT | Mod: 26,,, | Performed by: RADIOLOGY

## 2019-04-11 PROCEDURE — 77066 MAMMO DIGITAL DIAGNOSTIC BILAT WITH TOMOSYNTHESIS_CAD: ICD-10-PCS | Mod: 26,,, | Performed by: RADIOLOGY

## 2019-04-11 PROCEDURE — 77062 BREAST TOMOSYNTHESIS BI: CPT | Mod: TC,PO

## 2019-04-11 PROCEDURE — 77066 DX MAMMO INCL CAD BI: CPT | Mod: 26,,, | Performed by: RADIOLOGY

## 2019-04-11 PROCEDURE — 99204 PR OFFICE/OUTPT VISIT, NEW, LEVL IV, 45-59 MIN: ICD-10-PCS | Mod: S$PBB,,, | Performed by: INTERNAL MEDICINE

## 2019-04-11 PROCEDURE — 99999 PR PBB SHADOW E&M-EST. PATIENT-LVL III: CPT | Mod: PBBFAC,,, | Performed by: INTERNAL MEDICINE

## 2019-04-11 NOTE — PROGRESS NOTES
This is a followup visit on [facility].  The patient was previously seen by Dr. Manjarrez and this is my first time seeing the patient.    HISTORY OF PRESENT ILLNESS:  This is a 78-year-old white female with a   longstanding history of nephrolithiasis, but no recent symptomatic   nephrolithiasis episodes who is coming in for a followup.  She also has   hypertension with stable blood pressures at home and had failed InterStim   therapy with her bladder in the past and had seen Dr. Stoddard in Urology.  No   recent symptomatic stones.  Her kidney function is stable with a creatinine of   1.1, which appears to be her baseline.  Her creatinine is stable.  Her blood   pressure is stable.    PAST MEDICAL HISTORY:  Significant for hypertension for over 10 years, history   of kidney stones, failed InterStim therapy, breast cancer, status post   lumpectomy, XRT and adjuvant hormonal therapy and osteoporosis.    REVIEW OF SYSTEMS:  She states that she is feeling well.  Denies any back pain   or kidney stone pain.  She denies any blood in the urine, frequency, urgency,   hematuria, dysuria, nausea, vomiting, chest pain or shortness of breath.    PHYSICAL EXAMINATION:  GENERAL:  A well-nourished, well-developed individual, in no acute distress.  HEENT:  PERRLA.  EOMI.  HEENT:  No cervical lymphadenopathy.  CARDIOVASCULAR:  Rate and rhythm regular.  No murmurs, gallops or rubs.  LUNGS:  Clear to auscultation bilaterally.  Normal respiratory effort.  ABDOMEN:  Soft, nontender and nondistended.  SKIN:  No rash.  No indurations.  Warm to touch.  EXTREMITIES:  Edema.    ASSESSMENT AND PLAN:  This is a 78-year-old white female with a longstanding   history of hypertension who is coming in for a followup visit.  1.  ____ nephrolithiasis.  No recent symptomatic nephrolithiasis.  She had a   procedure in 2001, for a kidney stone.  Her most recent CAT scan showed small   calcifications with 0.6 cm stone and 0.4 cm stone, which were not  causing her   problem.  She is hydrating well over 3 liters of urine output per recent 24-hour   urine per the patient.  I advised her to drink lemon juice for citrate.  She   also follows a low oxalate diet because in 2010, she had high oxalate, but since   then, the levels have been much better.  I also advised her to decrease her   meat intake.  She can follow up in a year.  2.  Renal.  Her creatinines have been stable around 1.1.  I emphasized good   blood pressure control.  She has CKD stage III with an MDRD GFR of 48 to 55 mL   per minute.  3.  Hypertension.  Blood pressure is well controlled per the patient.  She has   no urine protein.  She is on Benicar for nephroprotection.  4.  Renal osteodystrophy.  Vitamin D levels are stable on the higher side.  I   asked her to decrease her frequency of vitamin D over-the-counter that she   takes.  Her PTH is stable.  Calcium and phosphorus are stable.  She can be   followed up in a year.      SB/IN  dd: 04/11/2019 11:27:27 (CDT)  td: 04/12/2019 01:04:33 (CDT)  Doc ID   #[id number]  Job ID #[id number]    CC:     [id number]

## 2019-04-13 PROBLEM — C50.911 CANCER OF RIGHT BREAST: Status: RESOLVED | Noted: 2018-08-17 | Resolved: 2019-04-13

## 2019-04-16 NOTE — PROGRESS NOTES
"  Physical Therapy Daily Treatment Note     Name: Shima Echols [facility]  Clinic Number: [id number]    Therapy Diagnosis:   Encounter Diagnoses   Name Primary?    Chronic left-sided low back pain without sciatica     Poor posture      Physician: Abby Mason MD    Visit Date: 4/17/2019    Physician Orders: PT Eval and Treat: Pt has decreased range of motion. History of Breast Cancer.  Medical Diagnosis from Referral: M25.611 (ICD-10-CM) - Decreased range of motion of shoulder, right   Evaluation Date: 4/9/2019  Authorization Period Expiration: 12/31/2019  Plan of Care Expiration: 7/5/2019  Visit # / Visits authorized: 2/20    Time In: 12:00 PM  Time Out: 1:00 PM  Total Billable Time: 60 minutes    Precautions:Standard, cancer and osteoporosis     Subjective     Pt reports: no new complaints today.  She was compliant with home exercise program.  Response to previous treatment: no change  Functional change: no change    Pain: 1/10  Location: left low back       Objective     Shima received therapeutic exercises to develop strength, endurance, flexibility, posture and core stabilization for 60 minutes including:  TrA 5" x 2 min  TrA/PPT 5" x 2 min  Bridges x 2 min  Brace marching x 2 min  Adduction ball squeeze 5" x 2 min  Prone hip extension 2 x 10  Prone quad stretch 3 x 30"  Rows GTB 2 x 10  SALPD GTB 2 x 10  Lat pull down GTB 2 x 10  Mini squats 2 x 10  Step ups on 6" step 2 x 10  Heel raises 3 x 10  GS slantboard 3  30"  Shuttle (62.5#) x 5 min          Home Exercises Provided and Patient Education Provided     Education provided:   - therex rationale    Written Home Exercises Provided: Patient instructed to cont prior HEP.  Exercises were reviewed and Shima was able to demonstrate them prior to the end of the session.  Shima demonstrated good  understanding of the education provided.     See EMR under Patient Instructions for exercises provided 4/9/2019.    Assessment     Good tolerance to initial treatment " session today. Verbal and tactile cuing for technique with therexMaye Ronquillo is progressing well towards her goals.   Pt prognosis is Good.     Pt will continue to benefit from skilled outpatient physical therapy to address the deficits listed in the problem list box on initial evaluation, provide pt/family education and to maximize pt's level of independence in the home and community environment.     Pt's spiritual, cultural and educational needs considered and pt agreeable to plan of care and goals.     Anticipated barriers to physical therapy: none    Goals: IE 4/9/2019  Short Term Goals (4 Weeks):   1. Pt will report 20% reduction in pain of the lumbar spinefor ease with ADL's. Progressing, not met  2. PT will demonstrate improved upright posture with minimal cuing for ease with functional positioning in home and community. Progressing, not met  3. Pt will demonstrate improved lumbar spine ROM in all directions by 10% for ease with bending activities. Progressing, not met     Long Term Goals (12 Weeks):   1. Pt will report being independent with HEP for maintenance of improvements gained during therapy sessions. Progressing, not met  2. PT will report 50% reduction of pain of the back for ease with yard work. Progressing, not met  3. Pt will demonstrate trunk and extremity strength to >=4+/5 without the provocation of pain for ease with lifting. Progressing, not met  4. Pt will demonstrate appropriate upright posture without external cueing for ease with lifting 2-year old relative. Progressing, not met      Plan     Plan of care Certification: 4/9/2019 to 7/5/2019.     Continue plan of care with focus on improving core and hip strength and improved posture.    Elsa Regalado, PT

## 2019-04-17 ENCOUNTER — CLINICAL SUPPORT (OUTPATIENT)
Dept: REHABILITATION | Facility: OTHER | Age: 79
End: 2019-04-17
Payer: MEDICARE

## 2019-04-17 DIAGNOSIS — R29.3 POOR POSTURE: ICD-10-CM

## 2019-04-17 DIAGNOSIS — G89.29 CHRONIC LEFT-SIDED LOW BACK PAIN WITHOUT SCIATICA: ICD-10-CM

## 2019-04-17 DIAGNOSIS — M54.50 CHRONIC LEFT-SIDED LOW BACK PAIN WITHOUT SCIATICA: ICD-10-CM

## 2019-04-17 PROCEDURE — 97110 THERAPEUTIC EXERCISES: CPT | Mod: PN | Performed by: PHYSICAL THERAPIST

## 2019-04-18 ENCOUNTER — PATIENT OUTREACH (OUTPATIENT)
Dept: OTHER | Facility: OTHER | Age: 79
End: 2019-04-18

## 2019-04-25 ENCOUNTER — CLINICAL SUPPORT (OUTPATIENT)
Dept: REHABILITATION | Facility: OTHER | Age: 79
End: 2019-04-25
Payer: MEDICARE

## 2019-04-25 DIAGNOSIS — M54.50 CHRONIC LEFT-SIDED LOW BACK PAIN WITHOUT SCIATICA: ICD-10-CM

## 2019-04-25 DIAGNOSIS — R29.3 POOR POSTURE: ICD-10-CM

## 2019-04-25 DIAGNOSIS — G89.29 CHRONIC LEFT-SIDED LOW BACK PAIN WITHOUT SCIATICA: ICD-10-CM

## 2019-04-25 PROCEDURE — 97110 THERAPEUTIC EXERCISES: CPT | Mod: PN | Performed by: PHYSICAL THERAPIST

## 2019-04-25 NOTE — PROGRESS NOTES
"  Physical Therapy Daily Treatment Note     Name: Shima Echols [facility]  Clinic Number: [id number]    Therapy Diagnosis:   Encounter Diagnoses   Name Primary?    Chronic left-sided low back pain without sciatica     Poor posture      Physician: Abby Mason MD    Visit Date: 4/25/2019    Physician Orders: PT Eval and Treat: Pt has decreased range of motion. History of Breast Cancer.  Medical Diagnosis from Referral: M25.611 (ICD-10-CM) - Decreased range of motion of shoulder, right   Evaluation Date: 4/9/2019  Authorization Period Expiration: 12/31/2019  Plan of Care Expiration: 7/5/2019  Visit # / Visits authorized: 3/20    Time In: 11:05 AM  Time Out: 12:05 PM  Total Billable Time: 60 minutes    Precautions:Standard, cancer and osteoporosis     Subjective     Pt reports: feeling a little stiff this morning. Worked in her hard, including shoveling dirt/rocks, over the weekend without significant increase in pain.  She was compliant with home exercise program.  Response to previous treatment: no change  Functional change: no change    Pain: 1/10  Location: left low back       Objective     Shima received therapeutic exercises to develop strength, endurance, flexibility, posture and core stabilization for 60 minutes including:  TrA 5" x 2 min  TrA/PPT 5" x 2 min  Bridges x 2 min  Brace marching x 2 min  Adduction ball squeeze 5" x 2 min  +SLR abd 1# 2 x 10  +SLR add 1# 2 x 10  Prone hip extension 1# 2 x 10  Prone quad stretch 3 x 30"  Rows GTB 2 x 10  SALPD GTB 2 x 10  Lat pull down GTB 2 x 10 - NP  Mini squats 2 x 10 - NP  Step ups on 6" step 2 x 10  Heel raises 3 x 10  GS slantboard 3  30"  Shuttle (62.5#) x 5 min          Home Exercises Provided and Patient Education Provided     Education provided:   - therex rationale    Written Home Exercises Provided: Patient instructed to cont prior HEP.  Exercises were reviewed and Shima was able to demonstrate them prior to the end of the session.  Shima demonstrated good  " understanding of the education provided.     See EMR under Patient Instructions for exercises provided 4/9/2019.    Assessment     Good tolerance to all therex today. Min c/o mm fatigue with SLR in adduction, but able to perform. Verbal and tactile cuing to position and pelvic stabilization with new sidelying exercises.    Shima is progressing well towards her goals.   Pt prognosis is Good.     Pt will continue to benefit from skilled outpatient physical therapy to address the deficits listed in the problem list box on initial evaluation, provide pt/family education and to maximize pt's level of independence in the home and community environment.     Pt's spiritual, cultural and educational needs considered and pt agreeable to plan of care and goals.     Anticipated barriers to physical therapy: none    Goals: IE 4/9/2019  Short Term Goals (4 Weeks):   1. Pt will report 20% reduction in pain of the lumbar spinefor ease with ADL's. Progressing, not met  2. PT will demonstrate improved upright posture with minimal cuing for ease with functional positioning in home and community. Progressing, not met  3. Pt will demonstrate improved lumbar spine ROM in all directions by 10% for ease with bending activities. Progressing, not met     Long Term Goals (12 Weeks):   1. Pt will report being independent with HEP for maintenance of improvements gained during therapy sessions. Progressing, not met  2. PT will report 50% reduction of pain of the back for ease with yard work. Progressing, not met  3. Pt will demonstrate trunk and extremity strength to >=4+/5 without the provocation of pain for ease with lifting. Progressing, not met  4. Pt will demonstrate appropriate upright posture without external cueing for ease with lifting 2-year old relative. Progressing, not met      Plan     Plan of care Certification: 4/9/2019 to 7/5/2019.     Continue plan of care with focus on improving core and hip strength and improved  posture.    Elsa Regalado, PT

## 2019-04-29 NOTE — PROGRESS NOTES
"  Physical Therapy Daily Treatment Note     Name: Shima Echols [facility]  Clinic Number: [id number]    Therapy Diagnosis:   Encounter Diagnoses   Name Primary?    Chronic left-sided low back pain without sciatica     Poor posture      Physician: Abby Mason MD    Visit Date: 4/30/2019    Physician Orders: PT Eval and Treat: Pt has decreased range of motion. History of Breast Cancer.  Medical Diagnosis from Referral: M25.611 (ICD-10-CM) - Decreased range of motion of shoulder, right   Evaluation Date: 4/9/2019  Authorization Period Expiration: 12/31/2019  Plan of Care Expiration: 7/5/2019  Visit # / Visits authorized: 4/20    Time In: 1:00 AM  Time Out: 2:0 PM  Total Billable Time: 60 minutes    Precautions:Standard, cancer and osteoporosis     Subjective     Pt reports: doing a lot of yard and street work recently with c/c back stiffness.  She was compliant with home exercise program.  Response to previous treatment: no change  Functional change: no change    Pain: 1/10  Location: left low back       Objective     Shima received therapeutic exercises to develop strength, endurance, flexibility, posture and core stabilization for 60 minutes including:    TrA 5" x 2 min  TrA/PPT 5" x 2 min  Bridges x 2 min  Brace marching x 2 min  Adduction ball squeeze 5" x 2 min  SLR abd 1# 2 x 10  SLR add 1# 2 x 10  Prone hip extension 1# 2 x 10  Prone quad stretch 3 x 30"    Rows GTB 2 x 10  SALPD GTB 2 x 10  Lat pull down GTB 2 x 10   Mini squats 2 x 10 - NP  Step ups on 6" step 2 x 10  Heel raises 3 x 10  GS slantboard 3  30"  Shuttle (62.5#) x 5 min      Modalities: 10 min x MHP to low back simultaneous with therex today    Home Exercises Provided and Patient Education Provided     Education provided:   - therex rationale    Written Home Exercises Provided: Patient instructed to cont prior HEP.  Exercises were reviewed and Shima was able to demonstrate them prior to the end of the session.  Shima demonstrated good  " understanding of the education provided.     See EMR under Patient Instructions for exercises provided 4/9/2019.    Assessment     Reduction of stiffness with supine therex with concurrent use of modalities. TC/VC for scapular positioning as well as core activation with standing therex.    Shima is progressing well towards her goals.   Pt prognosis is Good.     Pt will continue to benefit from skilled outpatient physical therapy to address the deficits listed in the problem list box on initial evaluation, provide pt/family education and to maximize pt's level of independence in the home and community environment.     Pt's spiritual, cultural and educational needs considered and pt agreeable to plan of care and goals.     Anticipated barriers to physical therapy: none    Goals: IE 4/9/2019  Short Term Goals (4 Weeks):   1. Pt will report 20% reduction in pain of the lumbar spinefor ease with ADL's. Progressing, not met  2. PT will demonstrate improved upright posture with minimal cuing for ease with functional positioning in home and community. Progressing, not met  3. Pt will demonstrate improved lumbar spine ROM in all directions by 10% for ease with bending activities. Progressing, not met     Long Term Goals (12 Weeks):   1. Pt will report being independent with HEP for maintenance of improvements gained during therapy sessions. Progressing, not met  2. PT will report 50% reduction of pain of the back for ease with yard work. Progressing, not met  3. Pt will demonstrate trunk and extremity strength to >=4+/5 without the provocation of pain for ease with lifting. Progressing, not met  4. Pt will demonstrate appropriate upright posture without external cueing for ease with lifting 2-year old relative. Progressing, not met      Plan     Plan of care Certification: 4/9/2019 to 7/5/2019.     Continue plan of care with focus on improving core and hip strength and improved posture.    Arely Dempsey, PT

## 2019-04-30 ENCOUNTER — CLINICAL SUPPORT (OUTPATIENT)
Dept: REHABILITATION | Facility: OTHER | Age: 79
End: 2019-04-30
Payer: MEDICARE

## 2019-04-30 DIAGNOSIS — R29.3 POOR POSTURE: ICD-10-CM

## 2019-04-30 DIAGNOSIS — G89.29 CHRONIC LEFT-SIDED LOW BACK PAIN WITHOUT SCIATICA: ICD-10-CM

## 2019-04-30 DIAGNOSIS — M54.50 CHRONIC LEFT-SIDED LOW BACK PAIN WITHOUT SCIATICA: ICD-10-CM

## 2019-04-30 PROCEDURE — 97110 THERAPEUTIC EXERCISES: CPT | Mod: PN

## 2019-05-01 ENCOUNTER — PATIENT OUTREACH (OUTPATIENT)
Dept: OTHER | Facility: OTHER | Age: 79
End: 2019-05-01

## 2019-05-01 NOTE — PROGRESS NOTES
Last 5 Patient Entered Readings                                      Current 30 Day Average: 133/62     Recent Readings 4/18/2019 4/14/2019 4/10/2019 4/2/2019 3/25/2019    SBP (mmHg) 133 133 133 131 127    DBP (mmHg) 62 62 62 63 62    Pulse 58 58 58 65 59          Digital Medicine: Health  Follow Up    Lifestyle Modifications:    1.Dietary Modifications (Sodium intake <2,000mg/day, food labels, dining out): Patient denies any changes to her diet that would cause an increase in salt intake.     2.Physical Activity: Patient has been working more in her garden lately (along with exercising a few days per week). This is why she missed taking a few BP readings. She will submit one today or tomorrow.     3.Medication Therapy: Patient has been compliant with the medication regimen. Patient is doing well on her current BP medication regimen and she denies symptoms/side effects. She did mention that she has noticed her BP is running a little higher then is has in the past since changing her BP medication. Her average is still fairly controlled and she did not appear to be concerned but wanted to mention it. She also had issues getting the olmesartan from her [facility] but was able to get it from another local pharmacy she use in the past.     4.Patient has the following medication side effects/concerns: None  (Frequency/Alleviating factors/Precipitating factors, etc.)     Follow up with Mrs. Ronquillo Osminreshma Noreen completed. No further questions or concerns. Will continue to follow up to achieve health goals.

## 2019-05-02 ENCOUNTER — CLINICAL SUPPORT (OUTPATIENT)
Dept: REHABILITATION | Facility: OTHER | Age: 79
End: 2019-05-02
Payer: MEDICARE

## 2019-05-02 DIAGNOSIS — M54.50 CHRONIC LEFT-SIDED LOW BACK PAIN WITHOUT SCIATICA: ICD-10-CM

## 2019-05-02 DIAGNOSIS — R29.3 POOR POSTURE: ICD-10-CM

## 2019-05-02 DIAGNOSIS — G89.29 CHRONIC LEFT-SIDED LOW BACK PAIN WITHOUT SCIATICA: ICD-10-CM

## 2019-05-02 PROCEDURE — 97110 THERAPEUTIC EXERCISES: CPT | Mod: PN

## 2019-05-02 NOTE — PROGRESS NOTES
"  Physical Therapy Daily Treatment Note     Name: Shima Echols [facility]  Clinic Number: [id number]    Therapy Diagnosis:   Encounter Diagnoses   Name Primary?    Chronic left-sided low back pain without sciatica     Poor posture      Physician: Abby Mason MD    Visit Date: 5/2/2019    Physician Orders: PT Eval and Treat: Pt has decreased range of motion. History of Breast Cancer.  Medical Diagnosis from Referral: M25.611 (ICD-10-CM) - Decreased range of motion of shoulder, right   Evaluation Date: 4/9/2019  Authorization Period Expiration: 12/31/2019  Plan of Care Expiration: 7/5/2019  Visit # / Visits authorized: 5/20    Time In: 11:00 AM  Time Out: 12:00 PM  Total Billable Time: 30 minutes    Precautions:Standard, cancer and osteoporosis     Subjective     Pt reports: denies soreness after increased resistance during previous visit  She was compliant with home exercise program.  Response to previous treatment: no change  Functional change: no change    Pain: 1/10  Location: left low back       Objective     Shima received therapeutic exercises to develop strength, endurance, flexibility, posture and core stabilization for 60 minutes including:    TrA 5" x 2 min  TrA/PPT 5" x 2 min  Bridges x 2 min x OTB -- increased resistance today  +BKFO 2 x 10 x OTB  Brace marching x 2 min  Adduction ball squeeze 5" x 2 min  SLR abd 2# 2 x 10 -- increased resistance today  SLR add 2# 2 x 10 -- increased resistance today  Prone hip extension 2# 2 x 10 -- increased resistance today  Prone quad stretch 3 x 30"    Rows GTB 2 x 10  SALPD GTB 2 x 10  Lat pull down GTB 2 x 10   +anti rotations: 1 x 15 x 3" endrange hold x OTB  Mini squats 2 x 10 - NP  Step ups on 6" step 2 x 10  Heel raises 3 x 10  GS slantboard 3  30"  Shuttle (75#) x 20 -- increased resistance today      Modalities: 00 min x MHP to low back simultaneous with therex today    Home Exercises Provided and Patient Education Provided     Education provided:   - therex " rationale    Written Home Exercises Provided: Patient instructed to cont prior HEP.  Exercises were reviewed and Shima was able to demonstrate them prior to the end of the session.  Shima demonstrated good  understanding of the education provided.     See EMR under Patient Instructions for exercises provided 4/9/2019.    Assessment     Increased resistance with several exercises with good training effect. New exercises added to promote core activation with extremity dual tasking. Mod difficulty maintaining neutral spine during dual task due to decreased strength and motor control. Pt stands with mild lateral trunk lean to left despite VC/TC for upright posture and core activation.  Shima is progressing well towards her goals.   Pt prognosis is Good.     Pt will continue to benefit from skilled outpatient physical therapy to address the deficits listed in the problem list box on initial evaluation, provide pt/family education and to maximize pt's level of independence in the home and community environment.     Pt's spiritual, cultural and educational needs considered and pt agreeable to plan of care and goals.     Anticipated barriers to physical therapy: none    Goals: IE 4/9/2019  Short Term Goals (4 Weeks):   1. Pt will report 20% reduction in pain of the lumbar spinefor ease with ADL's. Progressing, not met  2. PT will demonstrate improved upright posture with minimal cuing for ease with functional positioning in home and community. Progressing, not met  3. Pt will demonstrate improved lumbar spine ROM in all directions by 10% for ease with bending activities. Progressing, not met     Long Term Goals (12 Weeks):   1. Pt will report being independent with HEP for maintenance of improvements gained during therapy sessions. Progressing, not met  2. PT will report 50% reduction of pain of the back for ease with yard work. Progressing, not met  3. Pt will demonstrate trunk and extremity strength to >=4+/5 without the  provocation of pain for ease with lifting. Progressing, not met  4. Pt will demonstrate appropriate upright posture without external cueing for ease with lifting 2-year old relative. Progressing, not met      Plan     Plan of care Certification: 4/9/2019 to 7/5/2019.     Continue plan of care with focus on improving core and hip strength and improved posture.    Arely Dempsey, PT

## 2019-05-04 ENCOUNTER — PATIENT MESSAGE (OUTPATIENT)
Dept: INTERNAL MEDICINE | Facility: CLINIC | Age: 79
End: 2019-05-04

## 2019-05-04 ENCOUNTER — PATIENT MESSAGE (OUTPATIENT)
Dept: ADMINISTRATIVE | Facility: OTHER | Age: 79
End: 2019-05-04

## 2019-05-04 DIAGNOSIS — I10 HYPERTENSION, UNSPECIFIED TYPE: ICD-10-CM

## 2019-05-05 RX ORDER — OLMESARTAN MEDOXOMIL 20 MG/1
TABLET ORAL
Qty: 30 TABLET | Refills: 6 | Status: SHIPPED | OUTPATIENT
Start: 2019-05-05 | End: 2019-05-06 | Stop reason: RX

## 2019-05-06 ENCOUNTER — TELEPHONE (OUTPATIENT)
Dept: SLEEP MEDICINE | Facility: CLINIC | Age: 79
End: 2019-05-06

## 2019-05-06 DIAGNOSIS — I10 HYPERTENSION, UNSPECIFIED TYPE: Primary | ICD-10-CM

## 2019-05-06 RX ORDER — VALSARTAN 160 MG/1
160 TABLET ORAL DAILY
Qty: 90 TABLET | Refills: 3 | Status: SHIPPED | OUTPATIENT
Start: 2019-05-06 | End: 2020-01-08

## 2019-05-06 NOTE — TELEPHONE ENCOUNTER
Please advise pt that in order to get CPAP machine for treatment, Medicare requires that she return to [facility] for an appointment to complete face-to-face visit requirement. During this clinic visit, a repeat sleep study must be ordered to re-evaluate sleep apnea. After repeating sleep test, we can then proceed with ordering CPAP machine. This is per Medicare policy.

## 2019-05-06 NOTE — TELEPHONE ENCOUNTER
----- Message from Genesis Peterson MA sent at 5/6/2019 10:55 AM CDT -----  Contact: KEITH HAJI [[id number]]      ----- Message -----  From: Christina Delacruz  Sent: 5/6/2019  10:35 AM  To: Jair Atkins Staff    Name of Who is Calling: KEITH HAJI [[id number]]      What is the request in detail: questions regarding switching mouth device to cpap machine. Please call     Can the clinic reply by [facility]: no    [facility] [phone]

## 2019-05-09 ENCOUNTER — PATIENT MESSAGE (OUTPATIENT)
Dept: INTERNAL MEDICINE | Facility: CLINIC | Age: 79
End: 2019-05-09

## 2019-05-09 ENCOUNTER — PATIENT MESSAGE (OUTPATIENT)
Dept: ADMINISTRATIVE | Facility: OTHER | Age: 79
End: 2019-05-09

## 2019-05-14 ENCOUNTER — CLINICAL SUPPORT (OUTPATIENT)
Dept: REHABILITATION | Facility: OTHER | Age: 79
End: 2019-05-14
Payer: MEDICARE

## 2019-05-14 ENCOUNTER — PATIENT MESSAGE (OUTPATIENT)
Dept: SLEEP MEDICINE | Facility: CLINIC | Age: 79
End: 2019-05-14

## 2019-05-14 DIAGNOSIS — R29.3 POOR POSTURE: ICD-10-CM

## 2019-05-14 DIAGNOSIS — M54.50 CHRONIC LEFT-SIDED LOW BACK PAIN WITHOUT SCIATICA: ICD-10-CM

## 2019-05-14 DIAGNOSIS — G89.29 CHRONIC LEFT-SIDED LOW BACK PAIN WITHOUT SCIATICA: ICD-10-CM

## 2019-05-14 PROCEDURE — 97110 THERAPEUTIC EXERCISES: CPT | Mod: PN | Performed by: PHYSICAL THERAPIST

## 2019-05-14 NOTE — PROGRESS NOTES
"  Physical Therapy Daily Treatment Note     [facility]  Clinic Number: [id number]    Therapy Diagnosis:   Encounter Diagnoses   Name Primary?    Chronic left-sided low back pain without sciatica     Poor posture      Physician: Abby Mason MD    Visit Date: 5/14/2019    Physician Orders: PT Eval and Treat: Pt has decreased range of motion. History of Breast Cancer.  Medical Diagnosis from Referral: M25.611 (ICD-10-CM) - Decreased range of motion of shoulder, right   Evaluation Date: 4/9/2019  Authorization Period Expiration: 12/31/2019  Plan of Care Expiration: 7/5/2019  Visit # / Visits authorized: 5/20    Time In: 3:00 PM  Time Out: 4:00 PM  Total Billable Time: 60 minutes    Precautions:Standard, cancer and osteoporosis     Subjective     Pt reports: she still feels she needs more reinforcement with therapy for improving her posture and strength. She would like referral for wellness program [facility] fitness center.   She was compliant with home exercise program.  Response to previous treatment: no change  Functional change: no change    Pain: 1/10  Location: left low back       Objective     Shima received therapeutic exercises to develop strength, endurance, flexibility, posture and core stabilization for 60 minutes including:    TrA 5" x 2 min - NP  TrA/PPT 5" x 2 min - NP  Bridges x 2 min x OTB  BKFO 2 x 10 x OTB - NP  Brace marching x 2 min  Adduction ball squeeze 5" x 2 min  SLR abd 2# 2 x 10   SLR add 2# 2 x 10   Prone hip extension 2# 2 x 10 --NP  Prone quad stretch 3 x 30" - NP    Rows GTB 2 x 10  SALPD GTB 2 x 10 - NP  Lat pull down GTB 2 x 10   anti rotations: 1 x 15 x 3" endrange hold x OTB- NP  Mini squats 2 x 10 - NP  Step ups on 6" step 2 x 10  Heel raises 3 x 10  GS slantboard 3  30"  Shuttle (75#) x 5 min      Modalities: 00 min x MHP to low back simultaneous with therex today    Home Exercises Provided and Patient Education Provided     Education provided:   - therex " rationale    Written Home Exercises Provided: Patient instructed to cont prior HEP.  Exercises were reviewed and Shima was able to demonstrate them prior to the end of the session.  Shima demonstrated good  understanding of the education provided.     See EMR under Patient Instructions for exercises provided 4/9/2019.    Assessment     Good tolerance to treatment today. 30 minutes of treatment time today spent discussing recommendations for exercise following discharge from therapy. Pt is interested in referral to medical wellness program, which should be a good transition for her.  Shima is progressing well towards her goals.   Pt prognosis is Good.     Pt will continue to benefit from skilled outpatient physical therapy to address the deficits listed in the problem list box on initial evaluation, provide pt/family education and to maximize pt's level of independence in the home and community environment.     Pt's spiritual, cultural and educational needs considered and pt agreeable to plan of care and goals.     Anticipated barriers to physical therapy: none    Goals: IE 4/9/2019  Short Term Goals (4 Weeks):   1. Pt will report 20% reduction in pain of the lumbar spinefor ease with ADL's. Progressing, not met  2. PT will demonstrate improved upright posture with minimal cuing for ease with functional positioning in home and community. Progressing, not met  3. Pt will demonstrate improved lumbar spine ROM in all directions by 10% for ease with bending activities. Progressing, not met     Long Term Goals (12 Weeks):   1. Pt will report being independent with HEP for maintenance of improvements gained during therapy sessions. Progressing, not met  2. PT will report 50% reduction of pain of the back for ease with yard work. Progressing, not met  3. Pt will demonstrate trunk and extremity strength to >=4+/5 without the provocation of pain for ease with lifting. Progressing, not met  4. Pt will demonstrate appropriate  upright posture without external cueing for ease with lifting 2-year old relative. Progressing, not met      Plan     Plan of care Certification: 4/9/2019 to 7/5/2019.     Continue plan of care with focus on improving core and hip strength and improved posture.    Elsa Regalado, PT

## 2019-05-15 NOTE — PROGRESS NOTES
"  Physical Therapy Daily Treatment Note     Name: Shima Echols [facility]  Clinic Number: [id number]    Therapy Diagnosis:   Encounter Diagnoses   Name Primary?    Chronic left-sided low back pain without sciatica     Poor posture      Physician: Abby Mason MD    Visit Date: 5/16/2019    Physician Orders: PT Eval and Treat: Pt has decreased range of motion. History of Breast Cancer.  Medical Diagnosis from Referral: M25.611 (ICD-10-CM) - Decreased range of motion of shoulder, right   Evaluation Date: 4/9/2019  Authorization Period Expiration: 12/31/2019  Plan of Care Expiration: 7/5/2019  Visit # / Visits authorized: 7/20    Time In: 11:00 AM  Time Out: 11:50 AM  Total Billable Time: 50 minutes    Precautions:Standard, cancer and osteoporosis     Subjective     Pt reports: feeling good today, no pain to low back or shoulder. She is looking forward to consultation at fitness center, and has been regularly performing home exercises.   She was compliant with home exercise program.  Response to previous treatment: no change  Functional change: no change    Pain: 0/10  Location: left low back       Objective     Shima received therapeutic exercises to develop strength, endurance, flexibility, posture and core stabilization for 50 minutes including:    TrA 5" x 2 min   TrA/PPT 5" x 2 min   Bridges x 2 min x OTB  BKFO 2 x 10 x OTB - NP  Brace marching x 2 min  Adduction ball squeeze 5" x 2 min - NP  SLR abd 2# 2 x 10   SLR add 2# 2 x 10   Prone hip extension 2# 2 x 10   Prone quad stretch 3 x 30" - NP    Rows GTB 2 x 10  SALPD GTB 2 x 10 - NP  Lat pull down GTB 2 x 10   anti rotations: 1 x 15 x 3" endrange hold x OTB- NP  Mini squats 2 x 10 - NP  Step ups on 6" step 2 x 10  Heel raises 3 x 10  GS slantboard 3  30"  Shuttle (75#) x 5 min - NP      B LE MMT: hip adduction 5/5 B, hip extension 4+/5 B    Home Exercises Provided and Patient Education Provided     Education provided:   - therex rationale    Written Home " Exercises Provided: yes.  Exercises were reviewed and Shima was able to demonstrate them prior to the end of the session.  Shima demonstrated good  understanding of the education provided.     See EMR under Patient Instructions for exercises provided 5/16/2019.    Assessment     Shima has made excellent progress with therapy and has met all therapeutic goals at this time. Recommend discharge to independent HEP based on progress.  Shima is progressing well towards her goals.   Pt prognosis is Good.     Pt will continue to benefit from skilled outpatient physical therapy to address the deficits listed in the problem list box on initial evaluation, provide pt/family education and to maximize pt's level of independence in the home and community environment.     Pt's spiritual, cultural and educational needs considered and pt agreeable to plan of care and goals.     Anticipated barriers to physical therapy: none    Goals: IE 4/9/2019  Short Term Goals (4 Weeks):   1. Pt will report 20% reduction in pain of the lumbar spinefor ease with ADL's. Met 5/16/2019  2. PT will demonstrate improved upright posture with minimal cuing for ease with functional positioning in home and community. Met 5/16/2019  3. Pt will demonstrate improved lumbar spine ROM in all directions by 10% for ease with bending activities. Met 5/16/2019     Long Term Goals (12 Weeks):   1. Pt will report being independent with HEP for maintenance of improvements gained during therapy sessions. Met 5/16/2019  2. PT will report 50% reduction of pain of the back for ease with yard work. Met 5/16/2019  3. Pt will demonstrate trunk and extremity strength to >=4+/5 without the provocation of pain for ease with lifting. Met 5/16/2019  4. Pt will demonstrate appropriate upright posture without external cueing for ease with lifting 2-year old relative. Met 5/16/2019      Plan     Discharge to independent [facility].    Elsa eRgalado, PT

## 2019-05-16 ENCOUNTER — OFFICE VISIT (OUTPATIENT)
Dept: SLEEP MEDICINE | Facility: CLINIC | Age: 79
End: 2019-05-16
Payer: MEDICARE

## 2019-05-16 ENCOUNTER — CLINICAL SUPPORT (OUTPATIENT)
Dept: REHABILITATION | Facility: OTHER | Age: 79
End: 2019-05-16
Payer: MEDICARE

## 2019-05-16 VITALS
BODY MASS INDEX: 19.51 KG/M2 | DIASTOLIC BLOOD PRESSURE: 63 MMHG | SYSTOLIC BLOOD PRESSURE: 152 MMHG | HEIGHT: 67 IN | HEART RATE: 41 BPM | WEIGHT: 124.31 LBS

## 2019-05-16 DIAGNOSIS — R29.3 POOR POSTURE: ICD-10-CM

## 2019-05-16 DIAGNOSIS — G47.33 OBSTRUCTIVE SLEEP APNEA: Primary | ICD-10-CM

## 2019-05-16 DIAGNOSIS — G89.29 CHRONIC LEFT-SIDED LOW BACK PAIN WITHOUT SCIATICA: ICD-10-CM

## 2019-05-16 DIAGNOSIS — M54.50 CHRONIC LEFT-SIDED LOW BACK PAIN WITHOUT SCIATICA: ICD-10-CM

## 2019-05-16 PROCEDURE — 99999 PR PBB SHADOW E&M-EST. PATIENT-LVL IV: CPT | Mod: PBBFAC,,, | Performed by: NURSE PRACTITIONER

## 2019-05-16 PROCEDURE — 97110 THERAPEUTIC EXERCISES: CPT | Mod: PN | Performed by: PHYSICAL THERAPIST

## 2019-05-16 PROCEDURE — 99213 OFFICE O/P EST LOW 20 MIN: CPT | Mod: S$PBB,,, | Performed by: NURSE PRACTITIONER

## 2019-05-16 PROCEDURE — 99213 PR OFFICE/OUTPT VISIT, EST, LEVL III, 20-29 MIN: ICD-10-PCS | Mod: S$PBB,,, | Performed by: NURSE PRACTITIONER

## 2019-05-16 PROCEDURE — 99999 PR PBB SHADOW E&M-EST. PATIENT-LVL IV: ICD-10-PCS | Mod: PBBFAC,,, | Performed by: NURSE PRACTITIONER

## 2019-05-16 PROCEDURE — 99214 OFFICE O/P EST MOD 30 MIN: CPT | Mod: PBBFAC | Performed by: NURSE PRACTITIONER

## 2019-05-16 NOTE — PLAN OF CARE
Outpatient Therapy Discharge Summary     Name: Shima Echols [facility] Number: [id number]    Therapy Diagnosis:   Encounter Diagnoses   Name Primary?    Chronic left-sided low back pain without sciatica     Poor posture      Physician: Abby Mason MD    Physician Orders: PT Eval and Treat: Pt has decreased range of motion. History of Breast Cancer.  Medical Diagnosis from Referral: M25.611 (ICD-10-CM) - Decreased range of motion of shoulder, right   Evaluation Date: 4/9/2019      Date of Last visit: 5/16/2019  Total Visits Received: 7  Cancelled Visits: 1  No Show Visits: 0    Assessment    Goals: All therapeutic goals met at this time.    Discharge reason: Patient has met all of her goals    Plan   This patient is discharged from Physical Therapy

## 2019-05-16 NOTE — PROGRESS NOTES
Shima Sorto returns in follow-up for KATHERIN management.     INTERVAL HISTORY:    05/16/2019 RAIZA Adam NP: Since last clinic visit pt has gotten braces due to malocclusion causing difficulty eating, persistently biting inside of cheek, and difficulty with speech. Braces on 06/10/2018 and will be removed soon. Apparently malocclusion present on impressions pre-OA made by Dr. Dinero, but perhaps this exacerbated malocclusion. Pt returns today to discuss whether there's any merit in resuming OA for KATHERIN. Of note, pt's prior OA showed effective therapy in side-sleep proven by PSG. Pt prefers to resume OA therapy since she felt significant improvement in sleep fragmentation and daytime sleepiness. She has an appointment for OA consultation with Dr. Salas early June.       03/14/2018 RAIZA Adam NP: Discussed recently completed PSG with OA device in place. Showed effective control of KATHERIN w/ current setting on side sleep. If KATHERIN control in supine sleep desired, DDS may further adjust OA. Pt may not want to adjust further as she has TMJ symptoms she does not want to aggravate. Reports continuous, refreshing sleep; no longer dozing off at red lights, or feeling excessively sleepy during the day.    05/03/2017 RAIZA Adam NP: Pt phoned in after clinic visit on 03/22 requesting Rx for CPAP instead of OA. However, pt returns in clinic today to re-discuss oral appliance for treatment instead. States she went to CPAP set up at [facility] and did not like the look of CPAP mask on another patron that was there with her. She did not try on any masks. She left [facility] without CPAP. Would like trial of OA instead. Provided pt again with Rx and letter, as well as contact number for both Dr. Dinero and Dr. Crowe so that pt may contact them for pricing/consulation.     03/22/2017 RAIZA Adam NP: Discussed 03/11/2017 in-lab sleep study results in detail. Patient symptoms of interrupted sleep and excessive daytime sleepiness  "remain the same. She commutes a lot and is concerned about her safety as she is increasingly sleepier at the wheel. Reports her quality of life is poor due to sleepiness.     02/22/2017 Dr. Chamberlain This 78 y.o. female patient returns for the evaluation of possible obstructive sleep apnea.     Patient was unable to complete sleep study after last visit 2 years ago. 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  She continues to complain of being excessively sleepy by mid afternoon.  "im tired of being tired". Reports this is ongoing since the 1980's.    KATHERIN risk symptoms:   Occasional soft snoring  Gasping for air in sleep  Excessively sleepy during the day    PRIOR SLEEP STUDY:  Per Dr. Tomas "Not sleeping enough or not sleeping the right way, puts her head down on her desk or while driving at times wants to take a nap"    "im tired of being tired". Reports this is ongoing since the 1980's.  Feels okay when she wakes up in the morning, but gets sleepy later in the day.  Excessively sleepy at times during the day. She has dozed off at traffic light.    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    ESS = 10    Takes decaf coffee in am; 3-4 cups in the morning  No trouble breathing through her nose.  Feels that she can breathe better when she stretches her jaw forward; Sometimes gasping when sitting quietly during the day  Snoring very softly, sometimes, reported by her late     No report of restless legs or squirming at night.    SLEEP ROUTINE:   Bed partner: sleeps alone   Time to bed: 11pm - MN, sometimes later   Sleep onset latency: very brief   Disruptions or awakenings: 1-2 times due to bathroom   Wakeup time: 4:30-4:45 am on Monday / 5:30-6 am / rarely 7 am   Perceived sleep quality: " unsure   Daytime naps: none    02/24/2018 PSG w/  lb. The overall AHI was 3.1 with an oxygen ted of 84.0%. The supine AHI was 17.6 and the REM AHI was 13.3. Continued use of oral appliance and avoidance of supine sleep appears to be successful at controlling obstructive sleep apnea (KATHERIN). Additional titration of OA may be needed to control KATHERIN supine    Baseline Sleep Study:  The overall AHI was 6.3 with an oxygen ted of 82.0%. The overall RDI was 10.7  The supine AHI was 31.0 and the REM AHI was 8.8.       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    Visual impairment     reading glasses       Past Surgical History:   Procedure Laterality Date    BIOPSY, LYMPH NODE, SENTINEL Right 8/17/2018    Performed by Abby Mason MD at [facility] OR 2ND FLR    BREAST CYST ASPIRATION Right 1999    BREAST LUMPECTOMY Right 2018    with radiation    COLONOSCOPY N/A 7/24/2018    Performed by Juan Miguel Pena MD at [facility] ENDO (4TH FLR)    COLONOSCOPY N/A 4/25/2013    Performed by Rigoberto Parry MD at [facility] ENDO (4TH FLR)    INJECTION, FOR SENTINEL NODE IDENTIFICATION Right 8/17/2018    Performed by Abby Mason MD at [facility] OR 2ND FLR    INSERTION-LEAD-INTERSTIM (STAGE I) N/A 7/2/2013    [facility]    interstim placed stage 1      KIDNEY STONE SURGERY  2000    @ [facility]    MASTECTOMY, PARTIAL-US guided Right 8/17/2018    Performed by Abby Mason MD at [facility] OR 2ND FLR    MOHS procedure      REMOVAL, NEUROSTIMULATOR, SACRAL N/A 7/25/2013    Performed by Leticia Stoddard,  "MD at [facility] OR 1ST FLR       Family History   Problem Relation Age of Onset    Kidney disease Mother     Heart disease Father     Melanoma Father     Breast cancer Maternal Aunt     Anesthesia problems Neg Hx     Malignant hypertension Neg Hx     Hypotension Neg Hx     Malignant hyperthermia Neg Hx     Pseudochol deficiency Neg Hx     Colon cancer Neg Hx     Ovarian cancer Neg Hx     Psoriasis Neg Hx     Lupus Neg Hx     Eczema Neg Hx     Acne Neg Hx        Social History     Tobacco Use    Smoking status: Former Smoker     Packs/day: 0.50     Years: 8.00     Pack years: 4.00     Last attempt to quit: 1964     Years since quittin.7    Smokeless tobacco: Never Used   Substance Use Topics    Alcohol use: Yes     Comment: 1-3x/week    Drug use: No   Teaches social policy at [employer]    ALLERGIES: Reviewed in EPIC    CURRENT MEDICATIONS: Reviewed in EPIC    REVIEW OF SYSTEMS:  Sleep related symptoms as per HPI;    Denies weight gain;    Denies sinus problems;    Urinary frequency;    Otherwise, a balance of systems reviewed is negative.    PHYSICAL EXAM:  BP (!) 152/63   Pulse (!) 41   Ht 5' 7" (1.702 m)   Wt 56.4 kg (124 lb 5.4 oz)   LMP  (LMP Unknown)   BMI 19.47 kg/m²     ASSESSMENT:    Obstructive sleep apnea, mild by AHI. The patient symptomatically has interrupted sleep and excessive daytime sleepiness resolved with OA. The patient is experiences symptomatic benefit from OA device. She has medical co-morbidities of labile blood pressure in setting of hypertension. This warrants treatment for KATHERIN. OA by Dr. Dinero showed effective therapy per repeat PSG showing AHI of 3.1, but OA ineffective for supine sleep.          PLAN:    Treatment:  Discussed merits of resuming OA, especially given prior success with this therapy. I defer ultimate decision to Maritza LEO who is managing braces and soon OA   Discussed that since braces, new impressions likely will be recommended and follow-up " closely with DDS given recent symptoms.   When OA resumed, best efficacy when side-lying. In the interim that KATHERIN is untreated, avoid supine sleep since KATHERIN worse in this sleeping position.   Provided today with TAP brochure and updated Rx for OA  Discussed that OA/MAD requires repeat sleep testing to determine efficacy.      Education: During our discussion today, we talked about the etiology of obstructive sleep apnea as well as the potential ramifications of untreated sleep apnea, which could include daytime sleepiness, hypertension, heart disease and/or stroke. We discussed potential treatment options, which could include weight loss, body positioning, continuous positive airway pressure (CPAP), OA, EPAP, or referral for surgical consideration.     Precautions: The patient was advised to abstain from driving should they feel sleepy  or drowsy.

## 2019-05-31 ENCOUNTER — PATIENT MESSAGE (OUTPATIENT)
Dept: INTERNAL MEDICINE | Facility: CLINIC | Age: 79
End: 2019-05-31

## 2019-06-04 ENCOUNTER — OFFICE VISIT (OUTPATIENT)
Dept: RADIATION ONCOLOGY | Facility: CLINIC | Age: 79
End: 2019-06-04
Payer: MEDICARE

## 2019-06-04 VITALS
SYSTOLIC BLOOD PRESSURE: 174 MMHG | DIASTOLIC BLOOD PRESSURE: 72 MMHG | WEIGHT: 127.19 LBS | BODY MASS INDEX: 19.96 KG/M2 | RESPIRATION RATE: 16 BRPM | TEMPERATURE: 98 F | HEIGHT: 67 IN | HEART RATE: 50 BPM

## 2019-06-04 DIAGNOSIS — Z17.0 MALIGNANT NEOPLASM OF UPPER-OUTER QUADRANT OF RIGHT BREAST IN FEMALE, ESTROGEN RECEPTOR POSITIVE: Primary | ICD-10-CM

## 2019-06-04 DIAGNOSIS — C50.411 MALIGNANT NEOPLASM OF UPPER-OUTER QUADRANT OF RIGHT BREAST IN FEMALE, ESTROGEN RECEPTOR POSITIVE: Primary | ICD-10-CM

## 2019-06-04 PROCEDURE — 99213 OFFICE O/P EST LOW 20 MIN: CPT | Mod: PBBFAC | Performed by: RADIOLOGY

## 2019-06-04 PROCEDURE — 99213 OFFICE O/P EST LOW 20 MIN: CPT | Mod: S$PBB,,, | Performed by: RADIOLOGY

## 2019-06-04 PROCEDURE — 99213 PR OFFICE/OUTPT VISIT, EST, LEVL III, 20-29 MIN: ICD-10-PCS | Mod: S$PBB,,, | Performed by: RADIOLOGY

## 2019-06-04 PROCEDURE — 99999 PR PBB SHADOW E&M-EST. PATIENT-LVL III: CPT | Mod: PBBFAC,,, | Performed by: RADIOLOGY

## 2019-06-04 PROCEDURE — 99999 PR PBB SHADOW E&M-EST. PATIENT-LVL III: ICD-10-PCS | Mod: PBBFAC,,, | Performed by: RADIOLOGY

## 2019-06-04 NOTE — PROGRESS NOTES
REFERRING PHYSICIAN: Abby Mason MD     DIAGNOSIS: pT1b N0 M0, stage IA, invasive lobular carcinoma of the right breast     Radiation Treatment Summary:  Ms. Sorto completed radiation to the right breast as shown below.     Treatment Site Energy Dose/Fx (Gy) #Fx The Total Dose (Gy) Start Date End Date   RT BREAST 6X 2.65 16 / 16 42.4 10/1/2018 10/22/2018      INTERVAL HISTORY:   Ms. Sorto is a 78-year-old female who completed radiation as above. She returns for a routine follow up.    Briefly, she was diagnosed with right breast cancer she presented with a palpable mass in the upper outer quadrant of the right breast. A mammogram in July 2018 revealed a 10 x 9 x 4 mm suspicious lesion in the upper outer quadrant of the right breast. A core needle biopsy on July 20, 2018 revealed grade 1, invasive lobular carcinoma which was ER positive ( %), NM negative, and her 2- with a Ki-67 of 5%. Bilateral breast MRI on August 6, 2018 revealed the known lesion in the upper outer quadrant of the right breast with no evidence suspicious masses in the left breast. On August 17, 2018, she underwent lumpectomy and sentinel node biopsy. The pathology revealed right breast with 8 mm, grade 1, invasive lobular carcinoma with the closest margin at 2 mm anteriorly and posteriorly. 2/2 sentinel lymph nodes were negative for involvement. To reduce her chance of local recurrence, she received postoperative radiation to the right breast as above.  She is here today for routine follow-up.     Ms. Sorto is currently on anastrozole which she is tolerating without any unexpected side effects. Since I saw her last, she suffered a fall with closed fracture of lumbar spine in December 2018.  She is recovering from that.  She had a repeat mammogram on April 11, 2019 which is negative for any suspicious lesions.  She is recommended to repeat that in 1 year.  At present, she denies right breast pain, edema, erythema, or nipple discharge. She also  "denies fever, night sweats, or weight loss. Of note, she reports some asymmetry of the salivary glands off and on over the last two months.  She denies pain, dry mouth, or dysphagia.    PHYSICAL EXAMINATION:  VITAL SINGS: BP (!) 174/72   Pulse (!) 50   Temp 97.9 °F (36.6 °C) (Oral)   Resp 16   Ht 5' 7" (1.702 m)   Wt 57.7 kg (127 lb 3.2 oz)   LMP  (LMP Unknown)   BMI 19.92 kg/m²   GENERAL: Patient is alert and oriented, in no acute distress.  HEENT: Extraocular muscles are intact.  Oropharynx is clear without lesions.  There is no cervical or supraclavicular adenopathy palpated.    CHEST: Breath sounds clear bilaterally.  No rales.  No rhonchi.  Unlabored respirations.  CARDIOVASCULAR: Normal S1, S2.  Normal rate.  Regular rhythm.  BREAST EXAM:  The right breast is slightly smaller than the left.  The scar secondary to lumpectomy and sentinel node biopsy is noted in the upper outer quadrant of the right breast.  No abnormal masses palpated in the right breast or right axilla.  The left breast and left axilla are also without palpable masses.  ABDOMEN: Bowel sounds normal.  No tenderness.  No abdominal distention.  No hepatomegaly.  No splenomegaly.  EXTREMITIES: No clubbing, cyanosis, edema  NEUROLOGIC: Cranial nerves II through XII are grossly intact.  Sensation is intact.  Strength is 5 out of 5 in the upper and lower extremities bilaterally.    ASSESSMENT:   This is a 78-year-old female with p T1b N0 M0, stage IA, invasive Lab pillar carcinoma of the right breast who underwent lumpectomy and sentinel node biopsy on August 17, 2018 followed by postoperative radiation, with an 8 mm lesion which is ER positive, UT negative, and her 2 negative.     PLAN:   Ms. Sorto is doing well from the standpoint of right breast cancer.  Her most recent mammogram in April 2019 is negative for any suspicious lesions.  She will repeat that in 1 year. She will continue anastrozole as planned.   I plan to see her back in " approximately 6 months, unless symptoms warrant otherwise.  She is planning to follow up with primary care physician regarding the salivary gland.

## 2019-06-05 ENCOUNTER — PATIENT OUTREACH (OUTPATIENT)
Dept: OTHER | Facility: OTHER | Age: 79
End: 2019-06-05

## 2019-06-05 NOTE — PROGRESS NOTES
Last 5 Patient Entered Readings                                      Current 30 Day Average: 124/63     Recent Readings 5/28/2019 5/28/2019 5/4/2019 4/18/2019 4/14/2019    SBP (mmHg) 124 124 127 133 133    DBP (mmHg) 63 63 62 62 62    Pulse 52 52 54 58 58          Digital Medicine: Health  Follow Up    Lifestyle Modifications:    1.Dietary Modifications (Sodium intake <2,000mg/day, food labels, dining out): Deferred    2.Physical Activity: Patient continues working in her garden and exercising a few times per week.     3.Medication Therapy: Patient has been compliant with the medication regimen. Patient is doing well on her current BP medication regimen. She is still concerned about it not working as well as the previous medication she is on so I will have her PharmD, AVINASH Morgan, address this further with her during her next outreach.     4.Patient has the following medication side effects/concerns: None  (Frequency/Alleviating factors/Precipitating factors, etc.)     Follow up with Mrs. Ronquillo Kinza Sorto completed. No further questions or concerns. Will continue to follow up to achieve health goals.

## 2019-06-06 ENCOUNTER — LAB VISIT (OUTPATIENT)
Dept: LAB | Facility: HOSPITAL | Age: 79
End: 2019-06-06
Attending: INTERNAL MEDICINE
Payer: MEDICARE

## 2019-06-06 ENCOUNTER — TELEPHONE (OUTPATIENT)
Dept: HEMATOLOGY/ONCOLOGY | Facility: CLINIC | Age: 79
End: 2019-06-06

## 2019-06-06 ENCOUNTER — OFFICE VISIT (OUTPATIENT)
Dept: HEMATOLOGY/ONCOLOGY | Facility: CLINIC | Age: 79
End: 2019-06-06
Payer: MEDICARE

## 2019-06-06 VITALS
HEART RATE: 47 BPM | SYSTOLIC BLOOD PRESSURE: 173 MMHG | RESPIRATION RATE: 16 BRPM | OXYGEN SATURATION: 99 % | HEIGHT: 67 IN | TEMPERATURE: 98 F | WEIGHT: 125 LBS | BODY MASS INDEX: 19.62 KG/M2 | DIASTOLIC BLOOD PRESSURE: 74 MMHG

## 2019-06-06 DIAGNOSIS — Z17.0 MALIGNANT NEOPLASM OF UPPER-OUTER QUADRANT OF RIGHT BREAST IN FEMALE, ESTROGEN RECEPTOR POSITIVE: Primary | ICD-10-CM

## 2019-06-06 DIAGNOSIS — N18.30 CKD (CHRONIC KIDNEY DISEASE) STAGE 3, GFR 30-59 ML/MIN: ICD-10-CM

## 2019-06-06 DIAGNOSIS — C50.411 MALIGNANT NEOPLASM OF UPPER-OUTER QUADRANT OF RIGHT BREAST IN FEMALE, ESTROGEN RECEPTOR POSITIVE: Primary | ICD-10-CM

## 2019-06-06 DIAGNOSIS — M54.9 BACK PAIN, UNSPECIFIED BACK LOCATION, UNSPECIFIED BACK PAIN LATERALITY, UNSPECIFIED CHRONICITY: ICD-10-CM

## 2019-06-06 DIAGNOSIS — Z17.0 MALIGNANT NEOPLASM OF UPPER-OUTER QUADRANT OF RIGHT BREAST IN FEMALE, ESTROGEN RECEPTOR POSITIVE: ICD-10-CM

## 2019-06-06 DIAGNOSIS — R59.0 CERVICAL LYMPHADENOPATHY: ICD-10-CM

## 2019-06-06 DIAGNOSIS — M48.56XS NON-TRAUMATIC COMPRESSION FRACTURE OF THIRD LUMBAR VERTEBRA, SEQUELA: ICD-10-CM

## 2019-06-06 DIAGNOSIS — C50.411 MALIGNANT NEOPLASM OF UPPER-OUTER QUADRANT OF RIGHT BREAST IN FEMALE, ESTROGEN RECEPTOR POSITIVE: ICD-10-CM

## 2019-06-06 DIAGNOSIS — M81.0 OSTEOPOROSIS, UNSPECIFIED OSTEOPOROSIS TYPE, UNSPECIFIED PATHOLOGICAL FRACTURE PRESENCE: ICD-10-CM

## 2019-06-06 LAB
ALBUMIN SERPL BCP-MCNC: 3.9 G/DL (ref 3.5–5.2)
ALP SERPL-CCNC: 64 U/L (ref 55–135)
ALT SERPL W/O P-5'-P-CCNC: 26 U/L (ref 10–44)
ANION GAP SERPL CALC-SCNC: 8 MMOL/L (ref 8–16)
AST SERPL-CCNC: 43 U/L (ref 10–40)
BILIRUB SERPL-MCNC: 0.6 MG/DL (ref 0.1–1)
BUN SERPL-MCNC: 22 MG/DL (ref 8–23)
CALCIUM SERPL-MCNC: 10 MG/DL (ref 8.7–10.5)
CHLORIDE SERPL-SCNC: 103 MMOL/L (ref 95–110)
CO2 SERPL-SCNC: 32 MMOL/L (ref 23–29)
CREAT SERPL-MCNC: 1.1 MG/DL (ref 0.5–1.4)
ERYTHROCYTE [DISTWIDTH] IN BLOOD BY AUTOMATED COUNT: 13.4 % (ref 11.5–14.5)
EST. GFR  (AFRICAN AMERICAN): 55.6 ML/MIN/1.73 M^2
EST. GFR  (NON AFRICAN AMERICAN): 48.2 ML/MIN/1.73 M^2
GLUCOSE SERPL-MCNC: 98 MG/DL (ref 70–110)
HCT VFR BLD AUTO: 35.4 % (ref 37–48.5)
HGB BLD-MCNC: 11.6 G/DL (ref 12–16)
IMM GRANULOCYTES # BLD AUTO: 0.01 K/UL (ref 0–0.04)
MCH RBC QN AUTO: 30.9 PG (ref 27–31)
MCHC RBC AUTO-ENTMCNC: 32.8 G/DL (ref 32–36)
MCV RBC AUTO: 94 FL (ref 82–98)
NEUTROPHILS # BLD AUTO: 1.8 K/UL (ref 1.8–7.7)
PLATELET # BLD AUTO: 145 K/UL (ref 150–350)
PMV BLD AUTO: 11.6 FL (ref 9.2–12.9)
POTASSIUM SERPL-SCNC: 4 MMOL/L (ref 3.5–5.1)
PROT SERPL-MCNC: 7 G/DL (ref 6–8.4)
RBC # BLD AUTO: 3.76 M/UL (ref 4–5.4)
SODIUM SERPL-SCNC: 143 MMOL/L (ref 136–145)
WBC # BLD AUTO: 3.96 K/UL (ref 3.9–12.7)

## 2019-06-06 PROCEDURE — 99999 PR PBB SHADOW E&M-EST. PATIENT-LVL IV: ICD-10-PCS | Mod: PBBFAC,,, | Performed by: NURSE PRACTITIONER

## 2019-06-06 PROCEDURE — 99999 PR PBB SHADOW E&M-EST. PATIENT-LVL IV: CPT | Mod: PBBFAC,,, | Performed by: NURSE PRACTITIONER

## 2019-06-06 PROCEDURE — 99214 OFFICE O/P EST MOD 30 MIN: CPT | Mod: PBBFAC | Performed by: NURSE PRACTITIONER

## 2019-06-06 PROCEDURE — 80053 COMPREHEN METABOLIC PANEL: CPT

## 2019-06-06 PROCEDURE — 36415 COLL VENOUS BLD VENIPUNCTURE: CPT

## 2019-06-06 PROCEDURE — 85027 COMPLETE CBC AUTOMATED: CPT

## 2019-06-06 PROCEDURE — 99215 OFFICE O/P EST HI 40 MIN: CPT | Mod: S$PBB,,, | Performed by: NURSE PRACTITIONER

## 2019-06-06 PROCEDURE — 99215 PR OFFICE/OUTPT VISIT, EST, LEVL V, 40-54 MIN: ICD-10-PCS | Mod: S$PBB,,, | Performed by: NURSE PRACTITIONER

## 2019-06-06 NOTE — TELEPHONE ENCOUNTER
----- Message from Melody Nelson NP sent at 6/6/2019  3:20 PM CDT -----  1. CT neck and MRI thoracic and lumbar spine this week or next.   2. Needs to see Dr. Beltran (endo) ASAP.   3. RTC to see Dr. Conroy in 3 months for follow up.

## 2019-06-06 NOTE — TELEPHONE ENCOUNTER
Called pt to schedule mri and ct, no answer left message asking pt to call back to assist with scheduling

## 2019-06-06 NOTE — Clinical Note
1. CT neck and MRI thoracic and lumbar spine this week or next. 2. Needs to see Dr. Beltran (vivien) ASAP. 3. RTC to see Dr. Conroy in 3 months for follow up.
Good Morning Dr. Beltran, I saw our mutual patient today. She went to ED 1/2019 with back pain and CT renal stone study was done and it was noted that she had a compression fracture at L3. She reports continued back pain. I will order further imaging as this may be an osteoporotic fx. ?May need Kyphoplasty. She tells me that she has been on Fosamax since 2003. Can you get her in to be evaluated?Best, PJ
no difficulty in swallowing

## 2019-06-06 NOTE — TELEPHONE ENCOUNTER
Returned call, spoke with patient and assisted with scheduling her MRI and CT scan next week on Wednesday, fasting instructions given.

## 2019-06-06 NOTE — TELEPHONE ENCOUNTER
----- Message from Dilia Crews sent at 6/6/2019  3:46 PM CDT -----  Contact: pt  Pt returning a missed call in regards to scheduling  She is available and can be reached at [phone]  Thank you

## 2019-06-06 NOTE — PROGRESS NOTES
"Subjective:       Patient ID: Shima Sorto is a 78 y.o. female.    Chief Complaint: Malignant neoplasm of upper-outer quadrant of right breast i    HPI   Ms. Sorto returns today for follow up. She started anastrazole in mid November 2018, and so far has tolerated it well.     In review, She was sent to nephrology by her endocrinologist. Saw Nephrology who mentioned she should see her internist for the swollen glands in her neck. Patient reports enlarged neck nodes for months. Left node larger than right- but both have become larger in the past months.  Her internist has been out and she was unable to get an appt.  She continues to have mid back pain. She noticed the pain when she picked up her grandchild in 1/2019. Of note, went to ED 1/2019 with back pain and CT renal stone study was done and it was noted that she had a compression fracture at L3. She tells me no further workup for that has been done. She has seen Dr. Beltran in the past but not since her fx Dx. She has been on Fosamax since "2003".   She has tolerated the anastrazole quite well so far.    Oncology History:  Briefly, she is a 78-year-old  female who was diagnosed with breast cancer.  On 08/17/2018, she underwent a lumpectomy and sentinel lymph node biopsy for an 8 mm grade 1 invasive lobular carcinoma which was ER strongly positive, MI negative and HER-2 negative with a Ki-67 of 5%with the closest margin being 2 mm.  Two of 2 sentinel lymph nodes were negative for involvement.      She has now completed her radiation therapy and has been started on AIs.    Her BMD on 11/8/18 showed osteoporosis.     A mammogram 4/11/19 was read as a BIRADS 2, and a repeat was recommended in 1 year.       Review of Systems    Overall she feels ok. See above. She denies any anxiety, depression, easy bruising, fevers, chills, night  sweats, weight loss, nausea, vomiting, diarrhea, constipation, diplopia, blurred vision, headache, chest pain, " palpitations, shortness of breath, breast pain, abdominal pain, extremity pain, or difficulty ambulating.  No urinary complaints. The remainder of the ten-point ROS, including general, skin, lymph, H/N, cardiorespiratory, GI, , Neuro, Endocrine, and psychiatric is negative.     Objective:      Physical Exam      She is alert, oriented to time, place, person, pleasant, well      nourished, in no acute physical distress.  Presents alone.                                VITAL SIGNS:  Reviewed                                      HEENT:  Normal.  There are no nasal, oral, lip, gingival, auricular, lid,    or conjunctival lesions.  Mucosae are moist and pink, and there is no        thrush.  Pupils are equal, reactive to light and accommodation.              Extraocular muscle movements are intact.  Dentition is good.  She is wearing braces.                                       NECK:  Supple without JVD,  or thyromegaly. Enlarged salivary glands L>R.                   LUNGS:  Clear to auscultation without wheezing, rales, or rhonchi.           CARDIOVASCULAR:  Reveals an S1, S2, no murmurs, no rubs, no gallops.         ABDOMEN:  Soft, nontender, without organomegaly.  Bowel sounds are    present.               MS: No spinal or paraspinal tenderness.                                                         EXTREMITIES:  No cyanosis, clubbing, or edema.                               BREASTS:  She is status post right lumpectomy with a well-healed incision in the upper outer quadrant.    There are no masses in either breast.                                      LYMPHATIC:  There is no cervical, axillary, or supraclavicular adenopathy.   SKIN:  Warm and moist, without petechiae, rashes, induration, or ecchymoses.  There is mild erythema on the right breast from her XRT.          NEUROLOGIC:  DTRs are 0-1+ bilaterally, symmetrical, motor function is 5/5,and cranial nerves are  within normal limits.    Assessment:       1.  Malignant neoplasm of upper-outer quadrant of right breast in female, estrogen receptor positive    2. Non-traumatic compression fracture of third lumbar vertebra, sequela    3. Back pain, unspecified back location, unspecified back pain laterality, unspecified chronicity    4. Cervical lymphadenopathy    5. Osteoporosis, unspecified osteoporosis type, unspecified pathological fracture presence    6. CKD (chronic kidney disease) stage 3, GFR 30-59 ml/min        Plan:           1. Doing well, clinically stable.  She will remain on anastrozole through the end of October 2023.   2,3. Needs further imaging as she continues to have back pain and a compression fracture was noted on CT renal stone study done in ED 1/2019. Will get  MRI thoracic and lumbar spine. If Osteoporotic fracture, may need kyphoplasty. Will send her back to ENDO. She has also been on Fosamex for >2 years.   4. CT neck.   5. Needs F/u with ENDO-see #2,3.  Vit D adequately replaced.   6. Continue to follow up with nephrology.     CBC, CMP today.   RTC to see Dr. Conroy in 3 months for follow up. Sooner f/u if indicated.    Message sent to Dr. Beltran.     Patient is in agreement with the proposed treatment plan. All questions were answered to the patient's satisfaction. Pt knows to call clinic for any new or worsening symptoms and if anything is needed before the next clinic visit.      KRISTIE RickP-C  Hematology & Oncology  [facility] [place] [address]  ph. [phone]  Fax. [phone]     I spent 40 minutes (face to face) with the patient, more than 50% was in counseling and coordination of care as detailed above.          Distress Screening Results: Psychosocial Distress screening score of Distress Score: 0 noted and reviewed. No intervention indicated.

## 2019-06-07 ENCOUNTER — TELEPHONE (OUTPATIENT)
Dept: ENDOCRINOLOGY | Facility: CLINIC | Age: 79
End: 2019-06-07

## 2019-06-07 NOTE — TELEPHONE ENCOUNTER
Talk to pt she agreed to come in on June 12 at 830. Pt stated that it is difficult to get up that eariler. I ask if she would like to see another dr at later she said she rather she dip. I told her after the 12th dr coleman do not have anything left until November. Pt agreed she will come in. Gave her the clinic floor

## 2019-06-12 ENCOUNTER — HOSPITAL ENCOUNTER (OUTPATIENT)
Dept: RADIOLOGY | Facility: HOSPITAL | Age: 79
Discharge: HOME | End: 2019-06-12
Attending: NURSE PRACTITIONER
Payer: MEDICARE

## 2019-06-12 ENCOUNTER — OFFICE VISIT (OUTPATIENT)
Dept: ENDOCRINOLOGY | Facility: CLINIC | Age: 79
End: 2019-06-12
Payer: MEDICARE

## 2019-06-12 ENCOUNTER — PATIENT MESSAGE (OUTPATIENT)
Dept: ENDOCRINOLOGY | Facility: CLINIC | Age: 79
End: 2019-06-12

## 2019-06-12 VITALS
BODY MASS INDEX: 19.48 KG/M2 | DIASTOLIC BLOOD PRESSURE: 70 MMHG | HEIGHT: 67 IN | HEART RATE: 48 BPM | WEIGHT: 124.13 LBS | SYSTOLIC BLOOD PRESSURE: 122 MMHG

## 2019-06-12 DIAGNOSIS — Z79.811 USE OF ANASTROZOLE (ARIMIDEX): ICD-10-CM

## 2019-06-12 DIAGNOSIS — M54.9 BACK PAIN, UNSPECIFIED BACK LOCATION, UNSPECIFIED BACK PAIN LATERALITY, UNSPECIFIED CHRONICITY: ICD-10-CM

## 2019-06-12 DIAGNOSIS — M48.56XS NON-TRAUMATIC COMPRESSION FRACTURE OF THIRD LUMBAR VERTEBRA, SEQUELA: ICD-10-CM

## 2019-06-12 DIAGNOSIS — Z17.0 MALIGNANT NEOPLASM OF UPPER-OUTER QUADRANT OF RIGHT BREAST IN FEMALE, ESTROGEN RECEPTOR POSITIVE: ICD-10-CM

## 2019-06-12 DIAGNOSIS — R59.0 CERVICAL LYMPHADENOPATHY: ICD-10-CM

## 2019-06-12 DIAGNOSIS — C50.911 MALIGNANT NEOPLASM OF RIGHT BREAST IN FEMALE, ESTROGEN RECEPTOR POSITIVE, UNSPECIFIED SITE OF BREAST: ICD-10-CM

## 2019-06-12 DIAGNOSIS — M81.0 OSTEOPOROSIS, UNSPECIFIED OSTEOPOROSIS TYPE, UNSPECIFIED PATHOLOGICAL FRACTURE PRESENCE: ICD-10-CM

## 2019-06-12 DIAGNOSIS — C50.411 MALIGNANT NEOPLASM OF UPPER-OUTER QUADRANT OF RIGHT BREAST IN FEMALE, ESTROGEN RECEPTOR POSITIVE: ICD-10-CM

## 2019-06-12 DIAGNOSIS — N18.30 CKD (CHRONIC KIDNEY DISEASE) STAGE 3, GFR 30-59 ML/MIN: ICD-10-CM

## 2019-06-12 DIAGNOSIS — M80.00XD AGE-RELATED OSTEOPOROSIS WITH CURRENT PATHOLOGICAL FRACTURE WITH ROUTINE HEALING: Primary | ICD-10-CM

## 2019-06-12 DIAGNOSIS — E55.9 VITAMIN D DEFICIENCY: ICD-10-CM

## 2019-06-12 DIAGNOSIS — Z17.0 MALIGNANT NEOPLASM OF RIGHT BREAST IN FEMALE, ESTROGEN RECEPTOR POSITIVE, UNSPECIFIED SITE OF BREAST: ICD-10-CM

## 2019-06-12 PROCEDURE — 70491 CT SOFT TISSUE NECK W/DYE: CPT | Mod: TC

## 2019-06-12 PROCEDURE — 25500020 PHARM REV CODE 255: Performed by: NURSE PRACTITIONER

## 2019-06-12 PROCEDURE — 99999 PR PBB SHADOW E&M-EST. PATIENT-LVL III: ICD-10-PCS | Mod: PBBFAC,,, | Performed by: INTERNAL MEDICINE

## 2019-06-12 PROCEDURE — 70491 CT SOFT TISSUE NECK W/DYE: CPT | Mod: 26,,, | Performed by: RADIOLOGY

## 2019-06-12 PROCEDURE — 72157 MRI THORACIC SPINE W WO CONTRAST: ICD-10-PCS | Mod: 26,,, | Performed by: RADIOLOGY

## 2019-06-12 PROCEDURE — A9585 GADOBUTROL INJECTION: HCPCS | Performed by: NURSE PRACTITIONER

## 2019-06-12 PROCEDURE — 72157 MRI CHEST SPINE W/O & W/DYE: CPT | Mod: 26,,, | Performed by: RADIOLOGY

## 2019-06-12 PROCEDURE — 72158 MRI LUMBAR SPINE W/O & W/DYE: CPT | Mod: TC

## 2019-06-12 PROCEDURE — 72158 MRI LUMBAR SPINE W WO CONTRAST: ICD-10-PCS | Mod: 26,,, | Performed by: RADIOLOGY

## 2019-06-12 PROCEDURE — 70491 CT SOFT TISSUE NECK WITH CONTRAST: ICD-10-PCS | Mod: 26,,, | Performed by: RADIOLOGY

## 2019-06-12 PROCEDURE — 99999 PR PBB SHADOW E&M-EST. PATIENT-LVL III: CPT | Mod: PBBFAC,,, | Performed by: INTERNAL MEDICINE

## 2019-06-12 PROCEDURE — 99214 OFFICE O/P EST MOD 30 MIN: CPT | Mod: S$PBB,,, | Performed by: INTERNAL MEDICINE

## 2019-06-12 PROCEDURE — 72158 MRI LUMBAR SPINE W/O & W/DYE: CPT | Mod: 26,,, | Performed by: RADIOLOGY

## 2019-06-12 PROCEDURE — 99213 OFFICE O/P EST LOW 20 MIN: CPT | Mod: PBBFAC,25 | Performed by: INTERNAL MEDICINE

## 2019-06-12 PROCEDURE — 99214 PR OFFICE/OUTPT VISIT, EST, LEVL IV, 30-39 MIN: ICD-10-PCS | Mod: S$PBB,,, | Performed by: INTERNAL MEDICINE

## 2019-06-12 PROCEDURE — 72157 MRI CHEST SPINE W/O & W/DYE: CPT | Mod: TC

## 2019-06-12 RX ORDER — GADOBUTROL 604.72 MG/ML
6 INJECTION INTRAVENOUS
Status: COMPLETED | OUTPATIENT
Start: 2019-06-12 | End: 2019-06-12

## 2019-06-12 RX ADMIN — GADOBUTROL 6 ML: 604.72 INJECTION INTRAVENOUS at 05:06

## 2019-06-12 RX ADMIN — IOHEXOL 75 ML: 350 INJECTION, SOLUTION INTRAVENOUS at 03:06

## 2019-06-12 NOTE — PROGRESS NOTES
Subjective:     Patient ID: Shima Sorto is a 78 y.o. female.    Chief Complaint: Follow-up    HPI:   Ms. Sorto is a 78 y.o. female with breast cancer s/p partial mastectomy on anastrozole (started 11/2018), hypertension, and CKD who is here for a follow-up visit for evaluation evaluation  and management of osteoporosis (FN T score -2.8). Last seen by me six months ago.       Since her last visit, she reports a compression fracture. She reports picking up a two year old. Developed a sensation that something wasn't quite right (soreness?) so she put her down. Over a two to three week period pain gradually worsened and drove herself to the ER for more care. She was diagnosed with an L3 compression fracture.     Previous regimen:  Used ALN for nine years stopped 2013 - 2016, restarted ALN 2/2018.       Reviewed Osteoporosis Risk Factor Assessment:     Menarche occurred at 12 years of age.   Menopause occurred at mid 50s.  Her menstrual cycles were normal but closer to five weeks between cycles. Had at least 10 cycles per year. Two children and one miscarriage. Denies lactation.  Last kidney stone 2002, and then again 1/2019.   Back pain due to compression fractures - pain has resolved and occasional in bed with twisting motion.      Has CKD stage III, anemia, and kidney stones. She denies history of calcium disorders, thyroid disease, malabsorption symptoms.    Supplements:  Calcium citrate one daily   Vitamin D3 1000 IUs one daily   Also on b complex, vision vitamin, fish oil, co enzyme and biotin     Dietary:  Vegetables, lean proteins,   Avoids spinach  Carrots, celery, turnips, green peas, green beans, tomatoes, zucchini, squash  Almonds  [place]/fish  Avoids starches     Mother fell twice, fractured hip.     Review of Systems  Objective:     Physical Exam   Constitutional: She is oriented to person, place, and time. She appears well-developed and well-nourished. No distress.   HENT:   Head: Normocephalic  "and atraumatic.   Nose: Nose normal.   Mouth/Throat: Oropharynx is clear and moist. No oropharyngeal exudate.   Eyes: Pupils are equal, round, and reactive to light. Conjunctivae and EOM are normal. No scleral icterus.   Neck: Normal range of motion. Neck supple. No thyromegaly present.   Cardiovascular: Normal rate, regular rhythm, normal heart sounds and intact distal pulses.   Pulmonary/Chest: Effort normal and breath sounds normal.   Abdominal: Soft. Bowel sounds are normal. She exhibits no distension.   Musculoskeletal: Normal range of motion. She exhibits no edema or tenderness.   Lymphadenopathy:     She has no cervical adenopathy.   Neurological: She is alert and oriented to person, place, and time. She has normal reflexes.   Skin: Skin is warm and dry.   Scoliosis    Psychiatric: She has a normal mood and affect.       Vitals:    06/12/19 0828   BP: 122/70   Pulse: (!) 48   Weight: 56.3 kg (124 lb 1.9 oz)   Height: 5' 7" (1.702 m)       Chemistry        Component Value Date/Time     06/06/2019 1447    K 4.0 06/06/2019 1447     06/06/2019 1447    CO2 32 (H) 06/06/2019 1447    BUN 22 06/06/2019 1447    CREATININE 1.1 06/06/2019 1447    GLU 98 06/06/2019 1447        Component Value Date/Time    CALCIUM 10.0 06/06/2019 1447    ALKPHOS 64 06/06/2019 1447    AST 43 (H) 06/06/2019 1447    ALT 26 06/06/2019 1447    BILITOT 0.6 06/06/2019 1447    ESTGFRAFRICA 55.6 (A) 06/06/2019 1447    EGFRNONAA 48.2 (A) 06/06/2019 1447            Results for KEITH HAJI (MRN [id number]) as of 6/12/2019 08:50   Ref. Range 4/8/2019 10:21   Vit D, 25-Hydroxy Latest Ref Range: 30 - 96 ng/mL 80   Results for KEITH HAJI (MRN [id number]) as of 6/12/2019 08:50   Ref. Range 6/6/2019 14:47   Sodium Latest Ref Range: 136 - 145 mmol/L 143   Potassium Latest Ref Range: 3.5 - 5.1 mmol/L 4.0   Chloride Latest Ref Range: 95 - 110 mmol/L 103   CO2 Latest Ref Range: 23 - 29 mmol/L 32 (H)   Anion Gap Latest Ref Range: 8 - " 16 mmol/L 8   BUN, Bld Latest Ref Range: 8 - 23 mg/dL 22   Creatinine Latest Ref Range: 0.5 - 1.4 mg/dL 1.1   eGFR if non African American Latest Ref Range: >60 mL/min/1.73 m^2 48.2 (A)   eGFR if African American Latest Ref Range: >60 mL/min/1.73 m^2 55.6 (A)   Glucose Latest Ref Range: 70 - 110 mg/dL 98   Calcium Latest Ref Range: 8.7 - 10.5 mg/dL 10.0   Alkaline Phosphatase Latest Ref Range: 55 - 135 U/L 64   PROTEIN TOTAL Latest Ref Range: 6.0 - 8.4 g/dL 7.0   Albumin Latest Ref Range: 3.5 - 5.2 g/dL 3.9   BILIRUBIN TOTAL Latest Ref Range: 0.1 - 1.0 mg/dL 0.6   AST Latest Ref Range: 10 - 40 U/L 43 (H)   ALT Latest Ref Range: 10 - 44 U/L 26     DXA 11/2018  Lumbar Spine: Lumbar bone mineral density L1-L4 is 0.915g/cm2, which is a T-score of -1.2. The Z-score is 1.4.  Total Hip: Total hip bone mineral density is 0.702g/cm2.  The T-score is -2.0, and the Z-score is 0.0.  Femoral neck: Bone mineral density is 0.540g/cm2 and the T-score is -2.8 and the Z-score is -0.6 g/cm2.  There is a 14.9% risk of a major osteoporotic fracture and a 6.6% risk of hip fracture in the next 10 years (FRAX).  Compared with previous DXA, BMD at the lumbar spine has remained stable, and the BMD at the total hip has remained stable.    Assessment/Plan:     1. Age-related osteoporosis with current pathological fracture with routine healing  Fracture while on oral alendronate with excellent compliance  Discussed options, to start PRolia -- discussed benefits and risks     2. CKD (chronic kidney disease) stage 3, GFR 30-59 ml/min  Calcium higher level of normal  Cut back on vitamin D a bit as level was 80 ng/ml    3. Vitamin D deficiency  Cut back to 1000 IUs three times weekly     4. Use of anastrozole (Arimidex)  Repeat DXA 11/2019

## 2019-06-13 ENCOUNTER — OFFICE VISIT (OUTPATIENT)
Dept: DERMATOLOGY | Facility: CLINIC | Age: 79
End: 2019-06-13
Payer: MEDICARE

## 2019-06-13 DIAGNOSIS — L30.0 NUMMULAR ECZEMA: ICD-10-CM

## 2019-06-13 DIAGNOSIS — Z85.828 HISTORY OF NONMELANOMA SKIN CANCER: ICD-10-CM

## 2019-06-13 DIAGNOSIS — L82.1 SEBORRHEIC KERATOSIS: Primary | ICD-10-CM

## 2019-06-13 DIAGNOSIS — D22.9 MULTIPLE BENIGN NEVI: ICD-10-CM

## 2019-06-13 PROCEDURE — 99999 PR PBB SHADOW E&M-EST. PATIENT-LVL II: CPT | Mod: PBBFAC,,, | Performed by: DERMATOLOGY

## 2019-06-13 PROCEDURE — 99999 PR PBB SHADOW E&M-EST. PATIENT-LVL II: ICD-10-PCS | Mod: PBBFAC,,, | Performed by: DERMATOLOGY

## 2019-06-13 PROCEDURE — 99214 OFFICE O/P EST MOD 30 MIN: CPT | Mod: S$PBB,,, | Performed by: DERMATOLOGY

## 2019-06-13 PROCEDURE — 99212 OFFICE O/P EST SF 10 MIN: CPT | Mod: PBBFAC | Performed by: DERMATOLOGY

## 2019-06-13 PROCEDURE — 99214 PR OFFICE/OUTPT VISIT, EST, LEVL IV, 30-39 MIN: ICD-10-PCS | Mod: S$PBB,,, | Performed by: DERMATOLOGY

## 2019-06-13 NOTE — PROGRESS NOTES
Subjective:       Patient ID:  Shima Sorto is a 78 y.o. female who presents for   Chief Complaint   Patient presents with    Skin Check     TBSE     History of Present Illness: The patient presents for follow up of skin check.    The patient was last seen on: 5/10/18 for skin check.  This is a high risk patient here to check for the development of new lesions.       Other skin complaints: lesions left thigh x few months + growing. No prev treatment        Review of Systems   Skin: Positive for activity-related sunscreen use. Negative for daily sunscreen use and recent sunburn.   Hematologic/Lymphatic: Does not bruise/bleed easily.        Objective:    Physical Exam   Constitutional: She appears well-developed and well-nourished. No distress.   Neurological: She is alert and oriented to person, place, and time. She is not disoriented.   Psychiatric: She has a normal mood and affect.   Skin:   Areas Examined (abnormalities noted in diagram):   Scalp / Hair Palpated and Inspected  Head / Face Inspection Performed  Neck Inspection Performed  Chest / Axilla Inspection Performed  Abdomen Inspection Performed  Genitals / Buttocks / Groin Inspection Performed  Back Inspection Performed  RUE Inspected  LUE Inspection Performed  RLE Inspected  LLE Inspection Performed  Nails and Digits Inspection Performed                   Diagram Legend     Erythematous scaling macule/papule c/w actinic keratosis       Vascular papule c/w angioma      Pigmented verrucoid papule/plaque c/w seborrheic keratosis      Yellow umbilicated papule c/w sebaceous hyperplasia      Irregularly shaped tan macule c/w lentigo     1-2 mm smooth white papules consistent with Milia      Movable subcutaneous cyst with punctum c/w epidermal inclusion cyst      Subcutaneous movable cyst c/w pilar cyst      Firm pink to brown papule c/w dermatofibroma      Pedunculated fleshy papule(s) c/w skin tag(s)      Evenly pigmented macule c/w junctional  nevus     Mildly variegated pigmented, slightly irregular-bordered macule c/w mildly atypical nevus      Flesh colored to evenly pigmented papule c/w intradermal nevus       Pink pearly papule/plaque c/w basal cell carcinoma      Erythematous hyperkeratotic cursted plaque c/w SCC      Surgical scar with no sign of skin cancer recurrence      Open and closed comedones      Inflammatory papules and pustules      Verrucoid papule consistent consistent with wart     Erythematous eczematous patches and plaques     Dystrophic onycholytic nail with subungual debris c/w onychomycosis     Umbilicated papule    Erythematous-base heme-crusted tan verrucoid plaque consistent with inflamed seborrheic keratosis     Erythematous Silvery Scaling Plaque c/w Psoriasis     See annotation      Assessment / Plan:        Seborrheic keratosis  These are benign inherited growths without a malignant potential. Reassurance given to patient. No treatment is necessary.       Multiple benign nevi   - stable and chronic      Nummular eczema  OTC HC qday - bid    History of nonmelanoma skin cancer  Area(s) of previous NMSC evaluated with no signs of recurrence.    Total body skin examination performed today including at least 12 points as noted in physical examination. No lesions suspicious for malignancy noted.               Follow up in about 1 year (around 6/13/2020).

## 2019-06-13 NOTE — PATIENT INSTRUCTIONS
XEROSIS (DRY SKIN)        1. Definition    Xerosis is the term for dry skin.  We all have a natural oil coating over our skin produced by the skin oil glands.  If this oil is removed, the skin becomes dry which can lead to cracking, which can lead to inflammation.  Xerosis is usually a long-term problem that recurs often, especially in the winter.    2. Cause     Long hot baths or showers can remove our natural oil and lead to xerosis.  One should never take more than one bath or shower a day and for no longer than ten minutes.   Use of harsh soaps such as Zest, Dial, and Ivory can worsen and cause xerosis.   Cold winter weather worsens xerosis because the amount of moisture contained in cold air is much less than the amount of moisture in warm air.    3. Treatment     Treatment is intended to restore the natural oil to your skin.  Keep the skin lubricated.     Do not take more than one bath or shower a day.  Use lukewarm water, not hot.  Hot water dries out the skin.     Use a gentle moisturizing soap such as Cetaphil soap, Oil of Olay, Dove, Basis, Ivory moisture care, Restoraderm cleanser.     When toweling dry, dont rub.  Blot the skin so there is still some water left on the skin.  You should apply a moisturizing cream to all of the skin such as Cerave cream, Cetaphil cream, Restoraderm or Eucerin Original Formula cream.   Alpha hydroxyacid lotions, i.e., AmLactin, also work very well for preventing dry skin, but may burn when used on inflamed or reddened skin.     If you like to swim during the winter months, you should not use soap when getting out of the pool.  When you have finished swimming, rinse off the chlorine with cool to warm water.  If this will be the only shower of the day, then you may use Cetaphil or another mild soap to cleanse your skin.  After the shower, apply a moisturizing cream to all of the skin as above.        [address] [facility] [place] [address]/ [phone]  [phone] FAX/ [email or web address]

## 2019-06-14 ENCOUNTER — TELEPHONE (OUTPATIENT)
Dept: SLEEP MEDICINE | Facility: CLINIC | Age: 79
End: 2019-06-14

## 2019-06-14 NOTE — TELEPHONE ENCOUNTER
----- Message from Kirsty Mccain sent at 6/14/2019 11:26 AM CDT -----  Contact: pt  Name of Who is Calling: KEITH HAJI [[id number]]      What is the request in detail: pt calling in regards to dental device and sleep machine.. Please advise..      Can the clinic reply by [facility]: no      What Number [facility] Back [facility] [phone]

## 2019-06-14 NOTE — TELEPHONE ENCOUNTER
Pt is calling about a provider that accepts medicare for her oral appliance. Pt stated that she now has her braces off and is ready to get her OA. She stated that you two would talk about the providers. Pt stated that she's fine with you messaging her through the portal.

## 2019-06-16 ENCOUNTER — PATIENT MESSAGE (OUTPATIENT)
Dept: SLEEP MEDICINE | Facility: CLINIC | Age: 79
End: 2019-06-16

## 2019-07-01 ENCOUNTER — PATIENT MESSAGE (OUTPATIENT)
Dept: SLEEP MEDICINE | Facility: CLINIC | Age: 79
End: 2019-07-01

## 2019-07-03 ENCOUNTER — PATIENT OUTREACH (OUTPATIENT)
Dept: OTHER | Facility: OTHER | Age: 79
End: 2019-07-03

## 2019-07-03 NOTE — PROGRESS NOTES
Last 5 Patient Entered Readings                                      Current 30 Day Average: 124/63     Recent Readings 6/23/2019 6/23/2019 6/16/2019 6/16/2019 6/8/2019    SBP (mmHg) 131 131 127 127 113    DBP (mmHg) 67 67 68 68 53    Pulse 55 55 58 58 59          Digital Medicine: Health  Follow Up    Lifestyle Modifications:    1.Dietary Modifications (Sodium intake <2,000mg/day, food labels, dining out): Deferred    2.Physical Activity: Deferred    3.Medication Therapy: Patient has been compliant with the medication regimen. Patient is doing well on her current BP medication regimen. She would still like to discuss her regimen with her PharmD, AVINASH Morgan, since she does not feel like the newer medication is controlling her BP like the old one did. I placed a task for her PharmD in regards to this.     4.Patient has the following medication side effects/concerns: None  (Frequency/Alleviating factors/Precipitating factors, etc.)     I requested that the patient start taking 2-3 BP readings per week. Patient stated that she will work on this. It has been hard because she has been busy, she moved a new tenet into her basement and she had to bring a family member to the hospital.     Follow up with Mrs. Ronquillo Kinza Sorto completed. No further questions or concerns. Will continue to follow up to achieve health goals.

## 2019-07-11 ENCOUNTER — PATIENT OUTREACH (OUTPATIENT)
Dept: OTHER | Facility: OTHER | Age: 79
End: 2019-07-11

## 2019-07-11 NOTE — PROGRESS NOTES
"HPI:  Called patient to follow up. HC placed task, "(No rush on this - just the next time you reach out). Patient continues to question the BP medication she is on now (had a switch due to the recall I believe). She stated that her BP use to run in the 100's/110's but now it runs in the 120s/130s. I assured her that her BP is still remaining controlled and she did express understanding, she is just worried about it trending up more in the future. I did ask her to start taking readings twice a week. Again, if you can just discuss this with her during your next call that would be great!"     Patient reports adherence to medication regimen daily and denies missed doses. Patient denies hypotensive s/sx (lightheadedness, dizziness, nausea, fatigue); patient denies hypertensive s/sx (SOB, CP, severe headaches, changes in vision, dizziness, fatigue, confusion, anxiety, nosebleeds).     Last 5 Patient Entered Readings                                      Current 30 Day Average: 118/60     Recent Readings 7/7/2019 7/7/2019 7/4/2019 7/4/2019 7/3/2019    SBP (mmHg) 128 128 102 102 101    DBP (mmHg) 60 60 54 54 49    Pulse 56 56 60 60 63        Assessment:  Reviewed recent readings. Per 2017 ACC/ AHA HTN guidelines (goal of BP < 130/80), current 30-day average is well controlled.     Plan:  Continue current medication regimen.   Patients health , Beth Villaseñor, will be following up as scheduled.   I will continue to monitor regularly and will follow-up in 6 months, sooner if blood pressure begins to trend upward or downward.     Current medication regimen:  Hypertension Medications             atenolol (TENORMIN) 25 MG tablet TAKE 1 TABLET BY MOUTH EVERY DAY    valsartan (DIOVAN) 160 MG tablet Take 1 tablet (160 mg total) by mouth once daily. To replace olmesartan 20.         Patient denies having questions or concerns. Patient has my contact information and knows to call with any concerns or clinical changes. " Instructed patient to call if BP remains > 130/80.

## 2019-07-18 PROBLEM — R29.3 POOR POSTURE: Status: RESOLVED | Noted: 2019-04-09 | Resolved: 2019-05-16

## 2019-07-18 PROBLEM — M54.50 CHRONIC LEFT-SIDED LOW BACK PAIN WITHOUT SCIATICA: Status: RESOLVED | Noted: 2019-04-09 | Resolved: 2019-05-16

## 2019-07-18 PROBLEM — G89.29 CHRONIC LEFT-SIDED LOW BACK PAIN WITHOUT SCIATICA: Status: RESOLVED | Noted: 2019-04-09 | Resolved: 2019-05-16

## 2019-08-18 ENCOUNTER — PATIENT MESSAGE (OUTPATIENT)
Dept: SLEEP MEDICINE | Facility: CLINIC | Age: 79
End: 2019-08-18

## 2019-09-12 ENCOUNTER — OFFICE VISIT (OUTPATIENT)
Dept: HEMATOLOGY/ONCOLOGY | Facility: CLINIC | Age: 79
End: 2019-09-12
Payer: MEDICARE

## 2019-09-12 ENCOUNTER — CLINICAL SUPPORT (OUTPATIENT)
Dept: INTERNAL MEDICINE | Facility: CLINIC | Age: 79
End: 2019-09-12
Payer: MEDICARE

## 2019-09-12 VITALS
TEMPERATURE: 98 F | DIASTOLIC BLOOD PRESSURE: 73 MMHG | OXYGEN SATURATION: 100 % | BODY MASS INDEX: 19.27 KG/M2 | HEART RATE: 51 BPM | SYSTOLIC BLOOD PRESSURE: 166 MMHG | RESPIRATION RATE: 20 BRPM | WEIGHT: 123 LBS

## 2019-09-12 DIAGNOSIS — Z79.811 USE OF AROMATASE INHIBITORS: ICD-10-CM

## 2019-09-12 DIAGNOSIS — C50.411 MALIGNANT NEOPLASM OF UPPER-OUTER QUADRANT OF RIGHT BREAST IN FEMALE, ESTROGEN RECEPTOR POSITIVE: Primary | ICD-10-CM

## 2019-09-12 DIAGNOSIS — Z17.0 MALIGNANT NEOPLASM OF UPPER-OUTER QUADRANT OF RIGHT BREAST IN FEMALE, ESTROGEN RECEPTOR POSITIVE: Primary | ICD-10-CM

## 2019-09-12 PROCEDURE — 99999 PR PBB SHADOW E&M-EST. PATIENT-LVL I: CPT | Mod: PBBFAC,,,

## 2019-09-12 PROCEDURE — 99213 OFFICE O/P EST LOW 20 MIN: CPT | Mod: S$PBB,,, | Performed by: INTERNAL MEDICINE

## 2019-09-12 PROCEDURE — 99999 PR PBB SHADOW E&M-EST. PATIENT-LVL III: CPT | Mod: PBBFAC,,, | Performed by: INTERNAL MEDICINE

## 2019-09-12 PROCEDURE — 99211 OFF/OP EST MAY X REQ PHY/QHP: CPT | Mod: PBBFAC,27

## 2019-09-12 PROCEDURE — 99213 PR OFFICE/OUTPT VISIT, EST, LEVL III, 20-29 MIN: ICD-10-PCS | Mod: S$PBB,,, | Performed by: INTERNAL MEDICINE

## 2019-09-12 PROCEDURE — 90662 IIV NO PRSV INCREASED AG IM: CPT | Mod: PBBFAC

## 2019-09-12 PROCEDURE — 99999 PR PBB SHADOW E&M-EST. PATIENT-LVL I: ICD-10-PCS | Mod: PBBFAC,,,

## 2019-09-12 PROCEDURE — 99213 OFFICE O/P EST LOW 20 MIN: CPT | Mod: PBBFAC | Performed by: INTERNAL MEDICINE

## 2019-09-12 PROCEDURE — 99999 PR PBB SHADOW E&M-EST. PATIENT-LVL III: ICD-10-PCS | Mod: PBBFAC,,, | Performed by: INTERNAL MEDICINE

## 2019-09-12 NOTE — PROGRESS NOTES
Subjective:       Patient ID: Shima Sorto is a 79 y.o. female.    Chief Complaint: No chief complaint on file.    HPI    Ms. Sorto returns today for follow up.  She has been on anastrazole since mid November 2018, and so fas has tolerated it well.    Briefly, she is a  78-year-old  female who diagnosed with breast cancer last year.  On 08/17/2018, she underwent a lumpectomy and sentinel lymph node biopsy for an 8 mm grade 1 invasive lobular carcinoma which was ER strongly positive, MT negative and HER-2 negative with a Ki-67 of 5% with the closest margin being 2 mm.  Two of 2 sentinel lymph nodes were negative for involvement.      She has completed her radiation therapy and has been started on AIs.  Her DXA scan last November had shown osteopenia.      Review of Systems      Overall she feels well.  She has tolerated the anastrazole quite well so far.   She denies any anxiety, depression, easy bruising, fevers, chills, night  sweats, weight loss, nausea, vomiting, diarrhea, constipation, diplopia, blurred vision, headache, chest pain, palpitations, shortness of breath, breast pain, abdominal pain, extremity pain, or difficulty ambulating.  The remainder of the ten-point ROS, including general, skin, lymph, H/N, cardiorespiratory, GI, , Neuro, Endocrine, and psychiatric is negative.     Objective:      Physical Exam        She is alert, oriented to time, place, person, pleasant, well      nourished, in no acute physical distress.                                    VITAL SIGNS:  Reviewed                                      HEENT:  Normal.  There are no nasal, oral, lip, gingival, auricular, lid,    or conjunctival lesions.  Mucosae are moist and pink, and there is no        thrush.  Pupils are equal, reactive to light and accommodation.              Extraocular muscle movements are intact.  Dentition is good.  She is wearing braces.                                       NECK:  Supple without JVD,  adenopathy, or thyromegaly.                       LUNGS:  Clear to auscultation without wheezing, rales, or rhonchi.           CARDIOVASCULAR:  Reveals an S1, S2, no murmurs, no rubs, no gallops.         ABDOMEN:  Soft, nontender, without organomegaly.  Bowel sounds are    present.                                                                     EXTREMITIES:  No cyanosis, clubbing, or edema.                               BREASTS:  She is status post right lumpectomy with a well-healed incision in the upper outer quadrant.    There are no masses in either breast.                                      LYMPHATIC:  There is no cervical, axillary, or supraclavicular adenopathy.   SKIN:  Warm and moist, without petechiae, rashes, induration, or ecchymoses.  There is mild erythema on the right breast from her XRT.          NEUROLOGIC:  DTRs are 0-1+ bilaterally, symmetrical, motor function is 5/5,and cranial nerves are  within normal limits.    Assessment:       1. Malignant neoplasm of upper-outer quadrant of right breast in female, estrogen receptor positive    2. Use of aromatase inhibitors      3.     osteopenia  Plan:           I had a long discussion with her;  She will remain on anatsrazole through the end of October 2023, and see me again in 4 months.  Her multiple questions were answered to her satisfaction.

## 2019-09-13 ENCOUNTER — PATIENT OUTREACH (OUTPATIENT)
Dept: OTHER | Facility: OTHER | Age: 79
End: 2019-09-13

## 2019-09-13 NOTE — PROGRESS NOTES
Digital Medicine: Health  Follow-Up    The history is provided by the patient.     Follow Up  Follow-up reason(s): goal follow-up              Diet:   She has the following dietary restrictions: low sodium diet    Physical Activity:   When asked if exercising, patient responded: yes3 day(s) a week.  Her level of intensity when exercising is low.    Patient participates in the following activities: biking and walking    Patient is working to get her walking in and riding her stationary bike. She does this a few times per week.       [facility]    Intervention/Plan    There are no preventive care reminders to display for this patient.    Last 5 Patient Entered Readings                                      Current 30 Day Average: 130/70     Recent Readings 9/11/2019 9/11/2019 9/1/2019 9/1/2019 8/25/2019    SBP (mmHg) 120 120 129 129 129    DBP (mmHg) 57 57 77 77 77    Pulse 57 57 58 58 58

## 2019-09-26 ENCOUNTER — TELEPHONE (OUTPATIENT)
Dept: INTERNAL MEDICINE | Facility: CLINIC | Age: 79
End: 2019-09-26

## 2019-09-30 ENCOUNTER — OFFICE VISIT (OUTPATIENT)
Dept: SURGERY | Facility: CLINIC | Age: 79
End: 2019-09-30
Payer: MEDICARE

## 2019-09-30 VITALS
DIASTOLIC BLOOD PRESSURE: 72 MMHG | WEIGHT: 123 LBS | HEART RATE: 51 BPM | HEIGHT: 67 IN | BODY MASS INDEX: 19.3 KG/M2 | TEMPERATURE: 98 F | SYSTOLIC BLOOD PRESSURE: 167 MMHG

## 2019-09-30 DIAGNOSIS — C50.411 MALIGNANT NEOPLASM OF UPPER-OUTER QUADRANT OF RIGHT BREAST IN FEMALE, ESTROGEN RECEPTOR POSITIVE: Primary | ICD-10-CM

## 2019-09-30 DIAGNOSIS — Z17.0 MALIGNANT NEOPLASM OF UPPER-OUTER QUADRANT OF RIGHT BREAST IN FEMALE, ESTROGEN RECEPTOR POSITIVE: Primary | ICD-10-CM

## 2019-09-30 PROCEDURE — 99213 OFFICE O/P EST LOW 20 MIN: CPT | Mod: PBBFAC | Performed by: SURGERY

## 2019-09-30 PROCEDURE — 99999 PR PBB SHADOW E&M-EST. PATIENT-LVL III: ICD-10-PCS | Mod: PBBFAC,,, | Performed by: SURGERY

## 2019-09-30 PROCEDURE — 99213 OFFICE O/P EST LOW 20 MIN: CPT | Mod: S$PBB,,, | Performed by: SURGERY

## 2019-09-30 PROCEDURE — 99999 PR PBB SHADOW E&M-EST. PATIENT-LVL III: CPT | Mod: PBBFAC,,, | Performed by: SURGERY

## 2019-09-30 PROCEDURE — 99213 PR OFFICE/OUTPT VISIT, EST, LEVL III, 20-29 MIN: ICD-10-PCS | Mod: S$PBB,,, | Performed by: SURGERY

## 2019-10-05 NOTE — TELEPHONE ENCOUNTER
We have received a request for a refill of atenolol. Please ask if she is still taking the med. GML

## 2019-10-07 RX ORDER — ATENOLOL 25 MG/1
TABLET ORAL
Qty: 30 TABLET | Refills: 12 | Status: SHIPPED | OUTPATIENT
Start: 2019-10-07 | End: 2020-01-17

## 2019-10-07 NOTE — TELEPHONE ENCOUNTER
Spoke with pt. Pt stated it was the lisinopril that was d/c. Stated she is still taking the atenolol. Pt uses [facility] on [address].

## 2019-10-08 NOTE — PROGRESS NOTES
[facility]          Surveillance Visit        REFERRING PHYSICIAN:  Zeynep Tomas MD    MEDICAL ONCOLOGIST:    Dr. Hernandez  RADIATION ONCOLOGIST:   Dr. Ames    DIAGNOSIS:    This is a 79 y.o. female with a stage IA,  pT1bN0(sn)MX grade 1 ER+ NJ- HER2 - invasive lobular carcinoma of the right breast.    TREATMENT SUMMARY:  The patient is status post right partial mastectomy and sentinel node biopsy (2 LN) on 8/17/2018.  Final pathology showed 8 mm unifocal grade 2 invasive lobular carcinoma with 0/2 SLNs with disease. Now s/p XRT, completed 10/2018, on Anastrazole (10/31/2018). DXA (11/8/2018) with osteopenia.     INTERVAL HISTORY:   Shima Sorto comes in for a follow up, last seen in clinic in March, 2019.  No side effects from AI.  Recent fall after [place], no hip fracture on XR.   Overall doing very well.    MEDICATIONS:  Current Outpatient Medications   Medication Sig Dispense Refill    alendronate (FOSAMAX) 70 MG tablet TAKE 1 TABLET BY MOUTH ON SUNDAY WITH A FULL GLASS OF WATER AND WAIT 30 MINUTES BEFORE BREAKFAST AND PILLS. TAKE SITTING OR STANDING 4 tablet 12    anastrozole (ARIMIDEX) 1 mg Tab Take 1 tablet (1 mg total) by mouth once daily. 90 tablet 1    atenolol (TENORMIN) 25 MG tablet TAKE 1 TABLET BY MOUTH EVERY DAY 30 tablet 6    B COMPLEX & C NO.10/FOLIC ACID (B COMPLEX WITH C#10-FOLIC ACID ORAL) Take by mouth.      calcium citrate (CALCITRATE) 200 mg (950 mg) tablet Take 1 tablet by mouth once daily.      cholecalciferol, vitamin D3, (VITAJOY DAILY D) 1,000 unit Chew Take by mouth.      co-enzyme Q-10 50 mg capsule Take 100 mg by mouth once daily.       fish oil-vit E-fat acid5-hb137 (SUPER OMEGA-3) 400-5 mg-unit Cap Take 1 capsule by mouth Daily.      MULTIVITAMIN ORAL Take 1 tablet by mouth Daily.      mv-min-folic ac-biotin-lutein (KAROLYN MATRIX 5000 COMPLETE) 33-5,000-250 mcg Tab Take 5,000 mcg by mouth once daily.      valsartan (DIOVAN) 160 MG tablet  "Take 1 tablet (160 mg total) by mouth once daily. To replace olmesartan 20. 90 tablet 3    vit C/E/Zn/coppr/lutein/zeaxan (PRESERVISION AREDS 2 ORAL) Take by mouth.      atenolol (TENORMIN) 25 MG tablet TAKE 1 TABLET BY MOUTH EVERY DAY 30 tablet 12     No current facility-administered medications for this visit.      Facility-Administered Medications Ordered in Other Visits   Medication Dose Route Frequency Provider Last Rate Last Dose    0.9%  NaCl infusion   Intravenous Continuous James Carranza MD 70 mL/hr at 08/17/18 0818         ALLERGIES:   Review of patient's allergies indicates:   Allergen Reactions    Asparagus Rash       PHYSICAL EXAMINATION:   BP (!) 167/72 (BP Location: Left arm, Patient Position: Sitting, BP Method: Medium (Automatic))   Pulse (!) 51   Temp 97.6 °F (36.4 °C) (Oral)   Ht 5' 7" (1.702 m)   Wt 55.8 kg (123 lb 0.3 oz)   LMP  (LMP Unknown)   BMI 19.27 kg/m²     Physical Exam   Constitutional: She is oriented to person, place, and time. She appears well-developed and well-nourished.   HENT:   Head: Normocephalic.   Eyes: Pupils are equal, round, and reactive to light.   Neck: Neck supple. No thyromegaly present.   Cardiovascular: Normal rate.    Pulmonary/Chest: Effort normal. No respiratory distress. Right breast exhibits tenderness. Right breast exhibits no inverted nipple, no mass, no nipple discharge and no skin change. Left breast exhibits no inverted nipple, no mass, no nipple discharge, no skin change and no tenderness.   Lumpectomy incision RUOQ       Abdominal: Soft. She exhibits no distension. There is no tenderness.   Musculoskeletal: She exhibits no edema.   Neurological: She is alert and oriented to person, place, and time. No cranial nerve deficit.   Skin: Skin is warm.       IMAGING:  Last MRI 8/2018  Last MMG Bilat 4/2019 - BIRADS 1    IMPRESSION:   The patient has had an uneventful postoperative course now s/p XRT on AI.    PLAN:   1. Return in 6 months for a " follow up office visit and breast exam  2. Due for MMG in April 2020  3. The patient is advised in continued exam of the breast chest wall and to report to this office sooner should she note any areas of abnormality or concern.  4. Continue to follow with Dr. Conroy for AI.

## 2019-10-18 ENCOUNTER — PATIENT OUTREACH (OUTPATIENT)
Dept: OTHER | Facility: OTHER | Age: 79
End: 2019-10-18

## 2019-10-18 NOTE — PROGRESS NOTES
Digital Medicine: Health  Follow-Up    The history is provided by the patient.     Follow Up  Follow-up reason(s): reading review and routine education    Patient is unsure why her BP reading on 10/9 was higher then her normal range. She stated that she will not take the reading over if its under 160 in the top number (she said that it takes too much time).     Patient denies symptoms and stated that she will reach out if she starts experiencing any issues.           Physical Activity:   When asked if exercising, patient responded: yesHer level of intensity when exercising is low.    Medication Adherence:   She misses doses: never      Patient identified the following reasons for non-compliance: none    Patient is doing well on her current BP medication regimen. She denies symptoms/side effects.       [facility]    Intervention/Plan    There are no preventive care reminders to display for this patient.    Last 5 Patient Entered Readings                                      Current 30 Day Average: 134/69     Recent Readings 10/13/2019 10/13/2019 10/9/2019 10/9/2019 9/24/2019    SBP (mmHg) 125 125 148 148 130    DBP (mmHg) 69 69 70 70 69    Pulse 56 56 53 53 58

## 2019-10-26 DIAGNOSIS — Z79.811 USE OF AROMATASE INHIBITORS: ICD-10-CM

## 2019-10-28 RX ORDER — ANASTROZOLE 1 MG/1
TABLET ORAL
Qty: 90 TABLET | Refills: 0 | Status: SHIPPED | OUTPATIENT
Start: 2019-10-28 | End: 2020-03-23

## 2019-11-04 ENCOUNTER — PATIENT MESSAGE (OUTPATIENT)
Dept: ENDOCRINOLOGY | Facility: CLINIC | Age: 79
End: 2019-11-04

## 2019-11-05 ENCOUNTER — PATIENT MESSAGE (OUTPATIENT)
Dept: ENDOCRINOLOGY | Facility: CLINIC | Age: 79
End: 2019-11-05

## 2019-11-14 ENCOUNTER — INFUSION (OUTPATIENT)
Dept: INFECTIOUS DISEASES | Facility: HOSPITAL | Age: 79
End: 2019-11-14
Attending: INTERNAL MEDICINE
Payer: MEDICARE

## 2019-11-14 ENCOUNTER — HOSPITAL ENCOUNTER (OUTPATIENT)
Dept: RADIOLOGY | Facility: CLINIC | Age: 79
Discharge: HOME | End: 2019-11-14
Attending: INTERNAL MEDICINE
Payer: MEDICARE

## 2019-11-14 VITALS — HEIGHT: 67 IN | BODY MASS INDEX: 19.3 KG/M2 | WEIGHT: 123 LBS

## 2019-11-14 DIAGNOSIS — E55.9 VITAMIN D DEFICIENCY: Primary | ICD-10-CM

## 2019-11-14 DIAGNOSIS — M81.0 OSTEOPOROSIS, UNSPECIFIED OSTEOPOROSIS TYPE, UNSPECIFIED PATHOLOGICAL FRACTURE PRESENCE: ICD-10-CM

## 2019-11-14 DIAGNOSIS — M80.00XD AGE-RELATED OSTEOPOROSIS WITH CURRENT PATHOLOGICAL FRACTURE WITH ROUTINE HEALING: ICD-10-CM

## 2019-11-14 DIAGNOSIS — N18.30 CKD (CHRONIC KIDNEY DISEASE) STAGE 3, GFR 30-59 ML/MIN: ICD-10-CM

## 2019-11-14 PROCEDURE — 77080 DXA BONE DENSITY AXIAL: CPT | Mod: 26,,, | Performed by: RADIOLOGY

## 2019-11-14 PROCEDURE — 77080 DEXA BONE DENSITY SPINE HIP: ICD-10-PCS | Mod: 26,,, | Performed by: RADIOLOGY

## 2019-11-14 PROCEDURE — 96372 THER/PROPH/DIAG INJ SC/IM: CPT

## 2019-11-14 PROCEDURE — 63600175 PHARM REV CODE 636 W HCPCS: Mod: JG | Performed by: INTERNAL MEDICINE

## 2019-11-14 PROCEDURE — 77080 DXA BONE DENSITY AXIAL: CPT | Mod: TC

## 2019-11-14 RX ADMIN — DENOSUMAB 60 MG: 60 INJECTION SUBCUTANEOUS at 11:11

## 2019-11-16 ENCOUNTER — NURSE TRIAGE (OUTPATIENT)
Dept: ADMINISTRATIVE | Facility: CLINIC | Age: 79
End: 2019-11-16

## 2019-11-16 NOTE — TELEPHONE ENCOUNTER
Reason for Disposition   Major injury to the back (e.g., MVA, fall > 10 feet or 3 meters, penetrating injury, etc.)   [1] High-risk adult (e.g., age > 60, osteoporosis, chronic steroid use) AND [2] still hurts    Additional Information   Negative: Passed out (i.e., lost consciousness, collapsed and was not responding)   Negative: Shock suspected (e.g., cold/pale/clammy skin, too weak to stand, low BP, rapid pulse)   Negative: Sounds like a life-threatening emergency to the triager   Negative: Dangerous mechanism of injury (e.g., MVA, contact sports, trampoline, diving, fall > 10 feet or 3 meters)  (Exception: back pain began > 1 hour after injury)   Negative: [1] Weakness (i.e., paralysis, loss of muscle strength) of the leg(s) or foot AND [2] sudden onset after back injury   Negative: [1] Numbness (i.e., loss of sensation) of the leg(s) or foot AND [2] sudden onset after back injury   Negative: [1] Major bleeding (e.g., actively dripping or spurting) AND [2] can't be stopped   Negative: Bullet wound, knife wound, or other penetrating object   Negative: Shock suspected (e.g., cold/pale/clammy skin, too weak to stand, low BP, rapid pulse)   Negative: Sounds like a life-threatening emergency to the triager   Negative: [1] Injuries at more than 1 site AND [2] unsure which guideline to use   Negative: Injury to the neck   Negative: Injury to the tailbone   Negative: Back pain not from an injury   Negative: Back pain from overuse (work, exercise, gardening) OR from twisting, lifting, or bending injury   Negative: [1] SEVERE PAIN in kidney area (flank) AND [2] follows direct blow to that site   Negative: Blood in urine (red, pink, or tea-colored)   Negative: [1] Unable to urinate (or only a few drops) > 4 hours AND     [2] bladder feels very full (e.g., palpable bladder or strong urge to urinate)   Negative: [1] Urinary or bowel incontinence (i.e., loss of bladder or bowel control) AND [2] new  onset   Negative: Numbness (loss of sensation) in groin or rectal area   Negative: Skin is split open or gaping  (or length > 1/2 inch or 12 mm)   Negative: Puncture wound of back   Negative: [1] Bleeding AND [2] won't stop after 10 minutes of direct pressure (using correct technique)   Negative: Sounds like a serious injury to the triager   Negative: Weakness of a leg or foot (e.g., unable to bear weight, dragging foot)   Negative: Numbness of a leg or foot (i.e.., loss of sensation)   Negative: [1] SEVERE pain (e.g., excruciating) AND [2] not improved 2 hours after pain medicine/ice packs   Negative: Pain radiates into the thigh or further down the leg now   Negative: [1] Landed hard on feet or buttocks AND [2] pain over spine   Negative: Suspicious history for the injury   Negative: Patient is confused or is an unreliable provider of information (e.g., dementia, profound mental retardation, alcohol intoxication)    Protocols used: BACK PAIN-A-, BACK INJURY-A-  Pt called re stress fx L3 from Jan 2019 healing until today pt leaned over to get something 24 inches off floor. In December turned to L without moving feet to  3 yo then carried things to car. One week later told she had stress fx. Told to take Tylenol at that time. Suddenly felt pain at fx site today. took 5 mins to be able to stand up. used salon pas and Tylenol (waited to talk with triage). Now sitting in desk chair. Bone scan thurs: osteopenia. cancer pt. Fosamax discontinued. Had first prolia injection 2 days ago. rates pain with standing , able to move arms. pain still there 5-6. Goes to 8-9 if moving. rec UC within 24 hours. Call back with questions

## 2019-12-03 ENCOUNTER — OFFICE VISIT (OUTPATIENT)
Dept: RADIATION ONCOLOGY | Facility: CLINIC | Age: 79
End: 2019-12-03
Payer: MEDICARE

## 2019-12-03 VITALS
TEMPERATURE: 98 F | HEART RATE: 49 BPM | DIASTOLIC BLOOD PRESSURE: 77 MMHG | WEIGHT: 127.31 LBS | RESPIRATION RATE: 18 BRPM | SYSTOLIC BLOOD PRESSURE: 179 MMHG | BODY MASS INDEX: 19.98 KG/M2 | HEIGHT: 67 IN

## 2019-12-03 DIAGNOSIS — C50.411 MALIGNANT NEOPLASM OF UPPER-OUTER QUADRANT OF RIGHT BREAST IN FEMALE, ESTROGEN RECEPTOR POSITIVE: Primary | ICD-10-CM

## 2019-12-03 DIAGNOSIS — Z17.0 MALIGNANT NEOPLASM OF UPPER-OUTER QUADRANT OF RIGHT BREAST IN FEMALE, ESTROGEN RECEPTOR POSITIVE: Primary | ICD-10-CM

## 2019-12-03 PROCEDURE — 99213 OFFICE O/P EST LOW 20 MIN: CPT | Mod: PBBFAC | Performed by: RADIOLOGY

## 2019-12-03 PROCEDURE — 1126F AMNT PAIN NOTED NONE PRSNT: CPT | Mod: ,,, | Performed by: RADIOLOGY

## 2019-12-03 PROCEDURE — 1126F PR PAIN SEVERITY QUANTIFIED, NO PAIN PRESENT: ICD-10-PCS | Mod: ,,, | Performed by: RADIOLOGY

## 2019-12-03 PROCEDURE — 99999 PR PBB SHADOW E&M-EST. PATIENT-LVL III: CPT | Mod: PBBFAC,,, | Performed by: RADIOLOGY

## 2019-12-03 PROCEDURE — 99999 PR PBB SHADOW E&M-EST. PATIENT-LVL III: ICD-10-PCS | Mod: PBBFAC,,, | Performed by: RADIOLOGY

## 2019-12-03 PROCEDURE — 99213 OFFICE O/P EST LOW 20 MIN: CPT | Mod: S$PBB,,, | Performed by: RADIOLOGY

## 2019-12-03 PROCEDURE — 99213 PR OFFICE/OUTPT VISIT, EST, LEVL III, 20-29 MIN: ICD-10-PCS | Mod: S$PBB,,, | Performed by: RADIOLOGY

## 2019-12-03 PROCEDURE — 1159F MED LIST DOCD IN RCRD: CPT | Mod: ,,, | Performed by: RADIOLOGY

## 2019-12-03 PROCEDURE — 1159F PR MEDICATION LIST DOCUMENTED IN MEDICAL RECORD: ICD-10-PCS | Mod: ,,, | Performed by: RADIOLOGY

## 2019-12-03 NOTE — PROGRESS NOTES
REFERRING PHYSICIAN: Abby Mason MD     DIAGNOSIS: pT1b N0 M0, stage IA, invasive lobular carcinoma of the right breast     Radiation Treatment Summary:  Ms. Sorto completed radiation to the right breast as shown below.     Treatment Site Energy Dose/Fx (Gy) #Fx The Total Dose (Gy) Start Date End Date   RT BREAST 6X 2.65 16 / 16 42.4 10/1/2018 10/22/2018      INTERVAL HISTORY:   Ms. Sorto is a 79-year-old female who completed radiation as above. She returns for a routine follow up.     Briefly, she was diagnosed with right breast cancer she presented with a palpable mass in the upper outer quadrant of the right breast. A mammogram in July 2018 revealed a 10 x 9 x 4 mm suspicious lesion in the upper outer quadrant of the right breast. A core needle biopsy on July 20, 2018 revealed grade 1, invasive lobular carcinoma which was ER positive ( %), NV negative, and her 2- with a Ki-67 of 5%. Bilateral breast MRI on August 6, 2018 revealed the known lesion in the upper outer quadrant of the right breast with no evidence suspicious masses in the left breast. On August 17, 2018, she underwent lumpectomy and sentinel node biopsy. The pathology revealed right breast with 8 mm, grade 1, invasive lobular carcinoma with the closest margin at 2 mm anteriorly and posteriorly. 2/2 sentinel lymph nodes were negative for involvement. To reduce her chance of local recurrence, she received postoperative radiation to the right breast as above.  She is here today for routine follow-up.     Ms. Sorto is currently on anastrozole which she is tolerating without any unexpected side effects.  Her last mammogram on April 11, 2019 which is negative for any suspicious lesions.  She is recommended to repeat that in 1 year.  At present, she reports occasional discomfort in the right breast site of the lumpectomy, but denies right breast edema, erythema, or nipple discharge. She also denies fever, night sweats, or weight loss.      PHYSICAL  "EXAMINATION:  VITAL SINGS: BP (!) 179/77   Pulse (!) 49   Temp 97.6 °F (36.4 °C) (Oral)   Resp 18   Ht 5' 7" (1.702 m)   Wt 57.7 kg (127 lb 4.8 oz)   LMP  (LMP Unknown)   BMI 19.94 kg/m²   GENERAL: Patient is alert and oriented, in no acute distress.  HEENT: Extraocular muscles are intact.  Oropharynx is clear without lesions.  There is no cervical or supraclavicular adenopathy palpated.    CHEST: Breath sounds clear bilaterally.  No rales.  No rhonchi.  Unlabored respirations.  CARDIOVASCULAR: Normal S1, S2.  Normal rate.  Regular rhythm.  BREAST EXAM: The right breast is slightly smaller than the left.  The scar secondary to lumpectomy and sentinel node biopsy is noted in the upper outer quadrant of the right breast.  No abnormal masses palpated in the right breast or right axilla.  The left breast and left axilla are also without palpable masses.  ABDOMEN: Bowel sounds normal.  No tenderness.  No abdominal distention.  No hepatomegaly.  No splenomegaly.  EXTREMITIES: No clubbing, cyanosis, edema  NEUROLOGIC: Cranial nerves II through XII are grossly intact.  Sensation is intact.  Strength is 5 out of 5 in the upper and lower extremities bilaterally.    ASSESSMENT:   This is a 79-year-old female with p T1b N0 M0, stage IA, invasive lobular carcinoma of the right breast who underwent lumpectomy and sentinel node biopsy on August 17, 2018 followed by postoperative radiation, with an 8 mm lesion which is ER positive, MT negative, and her 2 negative.     PLAN:   Ms. Sorto is doing well from the standpoint of right breast cancer.  Her most recent mammogram in April 2019 is negative for any suspicious lesions.  She will repeat that in 1 year. She will continue anastrozole as planned.  She will continue follow-up with Medical Oncology as planned.  I plan to see her back as needed, unless symptoms warrant otherwise.       "

## 2019-12-03 NOTE — PATIENT INSTRUCTIONS
Continue follow up with Dr. Hernandez.  Repeat bilateral mammogram in April 2020  Return to see me as needed.

## 2019-12-05 ENCOUNTER — PATIENT OUTREACH (OUTPATIENT)
Dept: OTHER | Facility: OTHER | Age: 79
End: 2019-12-05

## 2019-12-05 NOTE — PROGRESS NOTES
Digital Medicine: Health  Follow-Up    The history is provided by the patient.     Follow Up  Follow-up reason(s): routine education    Patient is unsure why her BP readings continue to run above the goal range. She denies major symptoms but sometimes she does have slight dizziness and/or fatigue. Symptoms usually resolve fairly quickly.     She is still not using her CPAP and does not plan on using it. She is using a mouthpiece that helps keep her airway open at night so she can avoid using the CPAP.       Intervention/Plan    There are no preventive care reminders to display for this patient.    Last 5 Patient Entered Readings                                      Current 30 Day Average: 141/66     Recent Readings 12/1/2019 12/1/2019 11/17/2019 11/17/2019 11/17/2019    SBP (mmHg) 143 143 143 143 150    DBP (mmHg) 66 66 65 65 69    Pulse 63 63 53 53 56                      Diet Screening   No change to diet.  She has the following dietary restrictions: low sodium diet    Patient admits to dietary indiscretions over the Thanksgiving holiday. She ate out numerous times each day for the past week or so. She is now just starting to get back on track with her diet. She is hoping to see her BP start trending down now that she is getting back on track.     Physical Activity Screening   No change to exercise routine.    When asked if exercising, patient responded: yesHer level of intensity when exercising is low.    Patient participates in the following activities: walking    Medication Adherence Screening   She misses doses: never      Patient identified the following reasons for non-compliance: none    Patient is doing well on her current BP medication regimen. She denies symptoms/side effects.       SDOH

## 2019-12-26 ENCOUNTER — PATIENT OUTREACH (OUTPATIENT)
Dept: OTHER | Facility: OTHER | Age: 79
End: 2019-12-26

## 2019-12-26 NOTE — PROGRESS NOTES
Digital Medicine: Clinician Follow-Up    The history is provided by the patient.     Follow Up  Follow-up reason(s): reading review        HPI:  Called patient to follow up. Patient reports adherence to medication regimen daily and denies missed doses. Patient denies hypotensive s/sx (lightheadedness, dizziness, nausea, fatigue); patient denies hypertensive s/sx (SOB, CP, severe headaches, changes in vision, dizziness, fatigue, confusion, anxiety, nosebleeds).     Patient reports she has been using another home BP cuff, reports BPs have been running 140-160s.    Last 5 Patient Entered Readings                                      Current 30 Day Average: 153/71     Recent Readings 12/10/2019 12/10/2019 12/1/2019 12/1/2019 11/17/2019    SBP (mmHg) 162 162 143 143 143    DBP (mmHg) 76 76 66 66 65    Pulse 52 52 63 63 53        Assessment:  Reviewed recent readings and labs (last CMP drawn on 6/6). Per 2017 ACC/ AHA HTN guidelines (goal of BP < 130/80), current 30-day average is uncontrolled. Patient is scheduled to see PCP on 1/21/20.    Plan:  Offered to increase valsartan to 320mg, patient declines changes today, states she would prefer to discuss with PCP on 1/21; therefore, continue current medication regimen.   Instructed patient to increase frequency of digital monitoring to 2-3x per week.   Encouraged patient to purchase a new cuff, enrolled in 2/2017.  Patients health  will be following up as scheduled.   I will continue to monitor regularly and will follow-up in 6 weeks, sooner if blood pressure begins to trend upward or downward.     Current medication regimen:  Hypertension Medications             atenolol (TENORMIN) 25 MG tablet TAKE 1 TABLET BY MOUTH EVERY DAY         valsartan (DIOVAN) 160 MG tablet Take 1 tablet (160 mg total) by mouth once daily. To replace olmesartan 20.         Patient denies having questions or concerns. Patient has my contact information and knows to call with any concerns or  clinical changes.

## 2020-01-07 ENCOUNTER — OFFICE VISIT (OUTPATIENT)
Dept: INTERNAL MEDICINE | Facility: CLINIC | Age: 80
End: 2020-01-07
Payer: MEDICARE

## 2020-01-07 VITALS
DIASTOLIC BLOOD PRESSURE: 90 MMHG | WEIGHT: 124.44 LBS | SYSTOLIC BLOOD PRESSURE: 176 MMHG | BODY MASS INDEX: 19.53 KG/M2 | HEIGHT: 67 IN | HEART RATE: 56 BPM

## 2020-01-07 DIAGNOSIS — R00.1 BRADYCARDIA: ICD-10-CM

## 2020-01-07 DIAGNOSIS — Q61.9 CYSTIC KIDNEY DISEASE: ICD-10-CM

## 2020-01-07 DIAGNOSIS — C50.411 MALIGNANT NEOPLASM OF UPPER-OUTER QUADRANT OF RIGHT BREAST IN FEMALE, ESTROGEN RECEPTOR POSITIVE: ICD-10-CM

## 2020-01-07 DIAGNOSIS — I87.2 VENOUS INSUFFICIENCY: ICD-10-CM

## 2020-01-07 DIAGNOSIS — Z00.00 ENCOUNTER FOR PREVENTIVE HEALTH EXAMINATION: Primary | ICD-10-CM

## 2020-01-07 DIAGNOSIS — Z79.811 USE OF AROMATASE INHIBITORS: ICD-10-CM

## 2020-01-07 DIAGNOSIS — M80.00XD AGE-RELATED OSTEOPOROSIS WITH CURRENT PATHOLOGICAL FRACTURE WITH ROUTINE HEALING: ICD-10-CM

## 2020-01-07 DIAGNOSIS — G47.33 OBSTRUCTIVE SLEEP APNEA: ICD-10-CM

## 2020-01-07 DIAGNOSIS — I10 ESSENTIAL HYPERTENSION: ICD-10-CM

## 2020-01-07 DIAGNOSIS — N18.30 CKD (CHRONIC KIDNEY DISEASE) STAGE 3, GFR 30-59 ML/MIN: ICD-10-CM

## 2020-01-07 DIAGNOSIS — R33.9 URINARY RETENTION: ICD-10-CM

## 2020-01-07 DIAGNOSIS — Z17.0 MALIGNANT NEOPLASM OF UPPER-OUTER QUADRANT OF RIGHT BREAST IN FEMALE, ESTROGEN RECEPTOR POSITIVE: ICD-10-CM

## 2020-01-07 DIAGNOSIS — D69.6 THROMBOCYTOPENIA: ICD-10-CM

## 2020-01-07 DIAGNOSIS — N20.0 HYPOCITRATURIC CALCIUM NEPHROLITHIASIS: ICD-10-CM

## 2020-01-07 DIAGNOSIS — E55.9 VITAMIN D DEFICIENCY: ICD-10-CM

## 2020-01-07 PROCEDURE — 99999 PR PBB SHADOW E&M-EST. PATIENT-LVL V: ICD-10-PCS | Mod: PBBFAC,,, | Performed by: NURSE PRACTITIONER

## 2020-01-07 PROCEDURE — G0439 PPPS, SUBSEQ VISIT: HCPCS | Mod: S$GLB,,, | Performed by: NURSE PRACTITIONER

## 2020-01-07 PROCEDURE — G0439 PR MEDICARE ANNUAL WELLNESS SUBSEQUENT VISIT: ICD-10-PCS | Mod: S$GLB,,, | Performed by: NURSE PRACTITIONER

## 2020-01-07 PROCEDURE — 99215 OFFICE O/P EST HI 40 MIN: CPT | Mod: PBBFAC | Performed by: NURSE PRACTITIONER

## 2020-01-07 PROCEDURE — 99999 PR PBB SHADOW E&M-EST. PATIENT-LVL V: CPT | Mod: PBBFAC,,, | Performed by: NURSE PRACTITIONER

## 2020-01-07 NOTE — PROGRESS NOTES
"Shima Sorto presented for a  Medicare AWV and comprehensive Health Risk Assessment today. The following components were reviewed and updated:    · Medical history  · Family History  · Social history  · Allergies and Current Medications  · Health Risk Assessment  · Health Maintenance  · Care Team     ** See Completed Assessments for Annual Wellness Visit within the encounter summary.**       The following assessments were completed:  · Living Situation  · CAGE  · Depression Screening  · Timed Get Up and Go  · Whisper Test  · Cognitive Function Screening  · Nutrition Screening  · ADL Screening  · PAQ Screening        Vitals:    01/07/20 0925 01/07/20 1037   BP: (!) 172/92 (!) 176/90   BP Location: Left arm Left arm   Patient Position: Sitting Sitting   Pulse: (!) 56    Weight: 56.4 kg (124 lb 7.2 oz)    Height: 5' 7" (1.702 m)      Body mass index is 19.49 kg/m².  Physical Exam   Constitutional: She is oriented to person, place, and time. She appears well-developed and well-nourished. No distress.   HENT:   Head: Normocephalic and atraumatic.   Eyes: No scleral icterus.   Neck: Normal range of motion. Neck supple.   Cardiovascular: Regular rhythm, normal heart sounds and intact distal pulses. Bradycardia present.   Pulmonary/Chest: Effort normal and breath sounds normal. No respiratory distress.   Abdominal: She exhibits no distension.   Musculoskeletal: Normal range of motion. She exhibits no edema.   Neurological: She is alert and oriented to person, place, and time.   Skin: Skin is warm and dry. She is not diaphoretic.   Psychiatric: She has a normal mood and affect. Her behavior is normal.       Diagnoses and health risks identified today and associated recommendations/orders:    1. Encounter for preventive health examination  Assessment completed  Preventive health recommendations reviewed    2. CKD (chronic kidney disease) stage 3, GFR 30-59 ml/min  Stable.   Controlled with current medical therapy  Followed by " PCP and Nephrology.     3. Cystic kidney disease  Stable.   Controlled with current medical therapy  Followed by Nephrology  And PCP    4. Hypocitraturic calcium nephrolithiasis  Stable.   Controlled with current medical therapy  Followed by PCP and Nephrology.     5. Malignant neoplasm of upper-outer quadrant of right breast in female, estrogen receptor positive  Stable.   Controlled with current medical therapy  Followed by Heme-Onc    6. Thrombocytopenia  Stable.   Followed by PCP and Heme-Onc      7. Essential hypertension  Elevated at today's visit.  Checked twice.  Patient denies severe headache, chest pain, sudden vision changes, shortness of breath.  She is part of the digital hypertension program and her blood pressures have been running higher than her normal.  Offered to increase patient's valsartan to 320 mg today.  Patient says she would like to discuss this with the pharmacist 1st and have a change to the digital hypertension program.  Patient said she will also notify her PCP.  Patient has appointment coming up with PCP in 3 weeks.  Discussed signs and symptoms to go to the ER.  Discussed low caffeine and low-salt diet.  Followed by PCP.     8. Venous insufficiency  Stable.   Followed by PCP.     9. Bradycardia  Stable.   Followed by PCP.     10. Vitamin D deficiency  Stable.   Controlled with current medical therapy  Followed by PCP.     11. Age-related osteoporosis with current pathological fracture with routine healing  Stable.   Controlled with current medical therapy  Followed by PCP.     12. Urinary retention  Stable.   Evaluated by urology  Followed by PCP.     13. Obstructive sleep apnea  Stable.   Uses a mouthpeice  Followed by PCP.     14. Use of aromatase inhibitors  Stable.   Followed by hem/onc    Provided Shima with a 5-10 year written screening schedule and personal prevention plan. Recommendations were developed using the USPSTF age appropriate recommendations. Education, counseling,  and referrals were provided as needed. After Visit Summary printed and given to patient which includes a list of additional screenings\tests needed.    Follow up in about 2 weeks (around 1/21/2020) for a routine visit with your primary care provider or sooner if problems arise. .    Tiffanie Licona, NP

## 2020-01-07 NOTE — PATIENT INSTRUCTIONS
Blood pressure was elevated today.  I recommend you increase the valsartan to 320 mg daily.  Please call the pharmacist with the digital htn program to further discuss this and have her change your medication.  Keep follow up with Dr. Tomas.      Your blood pressure was elevated at today's visit.  Recheck your blood pressure at home 4-5 times in the next week.  Keep a blood pressure log.  Call your PCP if blood pressures remain consistently greater than 140/90.  Go to the ER if you start having severe headaches, chest pain, shortness of breath or sudden vision changes.  Decrease your salt and caffeine intake.  Maintain adequate water intake.        Counseling and Referral of Other Preventative  (Italic type indicates deductible and co-insurance are waived)    Patient Name: Shima Sorto  Today's Date: 1/7/2020    Health Maintenance       Date Due Completion Date    DEXA SCAN 11/14/2021 11/14/2019    Lipid Panel 04/11/2023 4/11/2018    TETANUS VACCINE 12/06/2024 12/6/2014        No orders of the defined types were placed in this encounter.    The following information is provided to all patients.  This information is to help you find resources for any of the problems found today that may be affecting your health:                Living healthy guide: www.[facility].gov      Understanding Diabetes: www.diabetes.org      Eating healthy: www.cdc.gov/healthyweight      CDC home safety checklist: www.cdc.gov/steadi/patient.html      Agency on Aging: www.goea.[place].[facility]      Alcoholics anonymous (AA): www.aa.org      Physical Activity: www.[place].nih.gov/sa3qoqc      Tobacco use: www.quitwithusla.org

## 2020-01-08 ENCOUNTER — PATIENT OUTREACH (OUTPATIENT)
Dept: OTHER | Facility: OTHER | Age: 80
End: 2020-01-08

## 2020-01-08 DIAGNOSIS — I10 HYPERTENSION, UNSPECIFIED TYPE: ICD-10-CM

## 2020-01-08 RX ORDER — VALSARTAN 320 MG/1
320 TABLET ORAL DAILY
Qty: 30 TABLET | Refills: 11 | Status: SHIPPED | OUTPATIENT
Start: 2020-01-08 | End: 2020-01-15 | Stop reason: ALTCHOICE

## 2020-01-08 NOTE — PROGRESS NOTES
"Digital Medicine: Clinician Follow-Up    The history is provided by the patient.     Follow Up  Follow-up reason(s): reading review and medication change      Medication Change: dose increase      HPI:  Patient called in regards to office visit yesterday, BP was 176/90; per note, "Elevated at today's visit.  Checked twice.  Patient denies severe headache, chest pain, sudden vision changes, shortness of breath.  She is part of the digital hypertension program and her blood pressures have been running higher than her normal.  Offered to increase patient's valsartan to 320 mg today.  Patient says she would like to discuss this with the pharmacist 1st and have a change to the digital hypertension program.  Patient said she will also notify her PCP.  Patient has appointment coming up with PCP in 3 weeks.  Discussed signs and symptoms to go to the ER.  Discussed low caffeine and low-salt diet.  Followed by PCP."      Patient attributes trend in BP to currently untreated KATHERIN. Patient reports teeth have shifted, can no longer wear current KATHERIN splint. Patient states she needs to purchase a new KATHERIN splint.  Patient requests I route PCP my note.    Last 5 Patient Entered Readings                                      Current 30 Day Average: 151/74     Recent Readings 12/29/2019 12/29/2019 12/26/2019 12/26/2019 12/10/2019    SBP (mmHg) 153 153 138 138 162    DBP (mmHg) 82 82 63 63 76    Pulse 52 52 54 54 52        Assessment:  Reviewed recent readings and labs (last CMP drawn on 6/6/19). Per 2017 ACC/ AHA HTN guidelines (goal of BP < 130/80), current 30-day average is uncontrolled. Patient is scheduled to see PCP on 1/21/20.    Plan:  Will route note to PCP.  Discussed with and instructed patient to increase valsartan to 320, patient confirms understanding.  Instructed patient to bring digital cuff to her appointment on 1/21/20 for a reading comparison. Instructed patient to bring her digital cuff back to the [facility] if readings " are greater than 10 points apart.  Patients health  will be following up as scheduled.   I will continue to monitor regularly and will follow-up in 2 weeks, sooner if blood pressure begins to trend upward or downward.     Current medication regimen:  Hypertension Medications             atenolol (TENORMIN) 25 MG tablet TAKE 1 TABLET BY MOUTH EVERY DAY    valsartan (DIOVAN) 320 MG tablet Take 1 tablet (320 mg total) by mouth once daily. Dose increase.        Patient denies having questions or concerns. Patient has my contact information and knows to call with any concerns or clinical changes.

## 2020-01-15 ENCOUNTER — PATIENT MESSAGE (OUTPATIENT)
Dept: INTERNAL MEDICINE | Facility: CLINIC | Age: 80
End: 2020-01-15

## 2020-01-15 ENCOUNTER — PATIENT OUTREACH (OUTPATIENT)
Dept: OTHER | Facility: OTHER | Age: 80
End: 2020-01-15

## 2020-01-15 ENCOUNTER — PATIENT MESSAGE (OUTPATIENT)
Dept: ADMINISTRATIVE | Facility: OTHER | Age: 80
End: 2020-01-15

## 2020-01-15 DIAGNOSIS — I10 HYPERTENSION, UNSPECIFIED TYPE: Primary | ICD-10-CM

## 2020-01-15 RX ORDER — VALSARTAN 320 MG/1
320 TABLET ORAL DAILY
Qty: 90 TABLET | Refills: 3
Start: 2020-01-15 | End: 2021-03-29 | Stop reason: SDUPTHER

## 2020-01-15 NOTE — PROGRESS NOTES
Digital Medicine: Clinician Follow-Up    The history is provided by the patient.     Follow Up  Follow-up reason(s): reading review        HPI:  Called patient to follow up per patient's request via [facility], [facility] is unable to fill valsartan 320mg RX due to backorder.     Last 5 Patient Entered Readings                                      Current 30 Day Average: 149/74     Recent Readings 1/14/2020 1/14/2020 1/11/2020 1/11/2020 12/29/2019    SBP (mmHg) 139 139 167 167 153    DBP (mmHg) 68 68 83 83 82    Pulse 58 58 45 45 52        Assessment:  Reviewed recent readings and labs (last CMP drawn on 6/6/19). Per 2017 ACC/ AHA HTN guidelines (goal of BP < 130/80), current 30-day average is uncontrolled.     Plan:  Called in RX (valsartan 320mg) to [facility].  Patients health  will be following up as scheduled.   I will continue to monitor regularly and will follow-up in 1 week, sooner if blood pressure begins to trend upward or downward.     Current medication regimen:  Hypertension Medications             atenolol (TENORMIN) 25 MG tablet TAKE 1 TABLET BY MOUTH EVERY DAY    valsartan (DIOVAN) 320 MG tablet Take 1 tablet (320 mg total) by mouth once daily. Dose increase.        Patient denies having questions or concerns. Patient has my contact information and knows to call with any concerns or clinical changes.

## 2020-01-17 ENCOUNTER — OFFICE VISIT (OUTPATIENT)
Dept: HEMATOLOGY/ONCOLOGY | Facility: CLINIC | Age: 80
End: 2020-01-17
Payer: MEDICARE

## 2020-01-17 VITALS
SYSTOLIC BLOOD PRESSURE: 167 MMHG | OXYGEN SATURATION: 100 % | BODY MASS INDEX: 19.44 KG/M2 | RESPIRATION RATE: 18 BRPM | WEIGHT: 123.88 LBS | HEART RATE: 47 BPM | TEMPERATURE: 98 F | HEIGHT: 67 IN | DIASTOLIC BLOOD PRESSURE: 76 MMHG

## 2020-01-17 DIAGNOSIS — Z17.0 MALIGNANT NEOPLASM OF UPPER-OUTER QUADRANT OF RIGHT BREAST IN FEMALE, ESTROGEN RECEPTOR POSITIVE: Primary | ICD-10-CM

## 2020-01-17 DIAGNOSIS — Z79.811 USE OF AROMATASE INHIBITORS: ICD-10-CM

## 2020-01-17 DIAGNOSIS — M80.00XD AGE-RELATED OSTEOPOROSIS WITH CURRENT PATHOLOGICAL FRACTURE WITH ROUTINE HEALING: ICD-10-CM

## 2020-01-17 DIAGNOSIS — E55.9 VITAMIN D DEFICIENCY: ICD-10-CM

## 2020-01-17 DIAGNOSIS — C50.411 MALIGNANT NEOPLASM OF UPPER-OUTER QUADRANT OF RIGHT BREAST IN FEMALE, ESTROGEN RECEPTOR POSITIVE: Primary | ICD-10-CM

## 2020-01-17 PROCEDURE — 1126F AMNT PAIN NOTED NONE PRSNT: CPT | Mod: ,,, | Performed by: NURSE PRACTITIONER

## 2020-01-17 PROCEDURE — 99214 OFFICE O/P EST MOD 30 MIN: CPT | Mod: PBBFAC | Performed by: NURSE PRACTITIONER

## 2020-01-17 PROCEDURE — 99999 PR PBB SHADOW E&M-EST. PATIENT-LVL IV: ICD-10-PCS | Mod: PBBFAC,,, | Performed by: NURSE PRACTITIONER

## 2020-01-17 PROCEDURE — 99999 PR PBB SHADOW E&M-EST. PATIENT-LVL IV: CPT | Mod: PBBFAC,,, | Performed by: NURSE PRACTITIONER

## 2020-01-17 PROCEDURE — 1159F PR MEDICATION LIST DOCUMENTED IN MEDICAL RECORD: ICD-10-PCS | Mod: ,,, | Performed by: NURSE PRACTITIONER

## 2020-01-17 PROCEDURE — 1126F PR PAIN SEVERITY QUANTIFIED, NO PAIN PRESENT: ICD-10-PCS | Mod: ,,, | Performed by: NURSE PRACTITIONER

## 2020-01-17 PROCEDURE — 99214 OFFICE O/P EST MOD 30 MIN: CPT | Mod: S$PBB,,, | Performed by: NURSE PRACTITIONER

## 2020-01-17 PROCEDURE — 1159F MED LIST DOCD IN RCRD: CPT | Mod: ,,, | Performed by: NURSE PRACTITIONER

## 2020-01-17 PROCEDURE — 99214 PR OFFICE/OUTPT VISIT, EST, LEVL IV, 30-39 MIN: ICD-10-PCS | Mod: S$PBB,,, | Performed by: NURSE PRACTITIONER

## 2020-01-17 NOTE — PROGRESS NOTES
"Subjective:       Patient ID: Shima Sorto is a 79 y.o. female.    Chief Complaint: Malignant neoplasm of upper-outer quadrant of right breast i    HPI    Ms. Sorto returns today for follow up.  She has been on anastrazole since mid November 2018.  Back on Prolia per Endo. Last dose 11/2019.  She has multiple questions regarding BP and medication, indigestion, sleep apnea....  Issues with sleep apnea since her teeth were "fixed" and no longer required mouth guard. Going to dentist next Wednesday to get another "oral device" to wear at night as this has helped in the past prior to dental correction.    Hopes to stop valsartan once sleep apnea resolves.   Seeing Dr. Licona.   Also hair thinning- derm suggested biotin but not helping.   Was told that her salivary glands swollen by nephrology and encouraged to see internist.   Seeing nephrology for h/o kidney stones.     Briefly, she is a  79-year-old  female who diagnosed with breast cancer.  On 08/17/2018, she underwent a lumpectomy and sentinel lymph node biopsy for an 8 mm grade 1 invasive lobular carcinoma which was ER strongly positive, ME negative and HER-2 negative with a Ki-67 of 5% with the closest margin being 2 mm.  Two of 2 sentinel lymph nodes were negative for involvement.      She has completed her radiation therapy and has been started on AIs.  Her DXA scan last November had shown osteopenia.      Review of Systems      Overall she feels ok. See above.   She has tolerated the anastrazole quite well so far.   She denies any anxiety, depression, easy bruising, fevers, chills, night  sweats, weight loss, nausea, vomiting, diarrhea, constipation, diplopia, blurred vision, headache, chest pain, palpitations, shortness of breath, breast pain, abdominal pain, extremity pain, or difficulty ambulating.  The remainder of the ten-point ROS, including general, skin, lymph, H/N, cardiorespiratory, GI, , Neuro, Endocrine, and psychiatric is " negative.     Objective:      Physical Exam        She is alert, oriented to time, place, person, pleasant, well      nourished, in no acute physical distress.                                    VITAL SIGNS:  Reviewed                                      HEENT:  Normal.  There are no nasal, oral, lip, gingival, auricular, lid,    or conjunctival lesions.  Mucosae are moist and pink, and there is no        thrush.  Pupils are equal, reactive to light and accommodation.              Extraocular muscle movements are intact.  Dentition is good.  She is wearing braces.                                       NECK:  Supple without JVD, adenopathy, or thyromegaly.                       LUNGS:  Clear to auscultation without wheezing, rales, or rhonchi.           CARDIOVASCULAR:  Reveals an S1, S2, no murmurs, no rubs, no gallops.         ABDOMEN:  Soft, nontender, without organomegaly.  Bowel sounds are    present.                                                                     EXTREMITIES:  No cyanosis, clubbing, or edema.                               BREASTS:  She is status post right lumpectomy with a well-healed incision in the upper outer quadrant.    There are no masses in either breast.                                      LYMPHATIC:  There is no cervical, axillary, or supraclavicular adenopathy.   SKIN:  Warm and moist, without petechiae, rashes, induration, or ecchymoses.  There is mild erythema on the right breast from her XRT.          NEUROLOGIC:  DTRs are 0-1+ bilaterally, symmetrical, motor function is 5/5,and cranial nerves are  within normal limits.    Assessment:       1. Malignant neoplasm of upper-outer quadrant of right breast in female, estrogen receptor positive    2. Age-related osteoporosis with current pathological fracture with routine healing    3. Vitamin D deficiency    4. Use of aromatase inhibitors        Plan:           In regards to her breast cancer, GILDARDO clinically and doing well.    Continue anastrozole through the end of October 2023.   RTC in 4 months to see Dr. Conroy.   MMG due 4/2020.   Continue follow up with Endo for prolia. Vit d adequately replaced.   Continue to follow up with PCP for other health maintenance. Reminded that she needs Vit D yearly.     All questions answered. Lengthy discussion regarding issues/questions.      Patient is in agreement with the proposed treatment plan. All questions were answered to the patient's satisfaction. Pt knows to call clinic for any new or worsening symptoms and if anything is needed before the next clinic visit.      Melody Nelson, KRISTIEP-C  Hematology & Oncology  [facility] [place] [address]  ph. [phone]  Fax. [phone]     I spent 45 minutes (face to face) with the patient, more than 50% was in counseling and coordination of care as detailed above.

## 2020-01-20 ENCOUNTER — PATIENT OUTREACH (OUTPATIENT)
Dept: OTHER | Facility: OTHER | Age: 80
End: 2020-01-20

## 2020-01-20 NOTE — TELEPHONE ENCOUNTER
Spoke to pt, she wanted to know if we have any other dentist that takes medicare besides the five dentist you referred. I told the pt she can call her insurance to find out what dentist cover her insurance.

## 2020-01-21 ENCOUNTER — PATIENT OUTREACH (OUTPATIENT)
Dept: OTHER | Facility: OTHER | Age: 80
End: 2020-01-21

## 2020-01-21 ENCOUNTER — TELEPHONE (OUTPATIENT)
Dept: SLEEP MEDICINE | Facility: CLINIC | Age: 80
End: 2020-01-21

## 2020-01-21 ENCOUNTER — OFFICE VISIT (OUTPATIENT)
Dept: INTERNAL MEDICINE | Facility: CLINIC | Age: 80
End: 2020-01-21
Payer: MEDICARE

## 2020-01-21 VITALS
WEIGHT: 124.13 LBS | SYSTOLIC BLOOD PRESSURE: 142 MMHG | HEART RATE: 52 BPM | OXYGEN SATURATION: 99 % | BODY MASS INDEX: 19.48 KG/M2 | DIASTOLIC BLOOD PRESSURE: 70 MMHG | HEIGHT: 67 IN

## 2020-01-21 DIAGNOSIS — E72.53 PRIMARY HYPEROXALURIA: ICD-10-CM

## 2020-01-21 DIAGNOSIS — R68.89 COLD FEELING: ICD-10-CM

## 2020-01-21 DIAGNOSIS — I10 ESSENTIAL HYPERTENSION: Primary | ICD-10-CM

## 2020-01-21 DIAGNOSIS — K11.1 SALIVARY GLAND ENLARGEMENT: ICD-10-CM

## 2020-01-21 DIAGNOSIS — E55.9 MILD VITAMIN D DEFICIENCY: ICD-10-CM

## 2020-01-21 PROBLEM — I87.332 CHRONIC VENOUS HYPERTENSION (IDIOPATHIC) WITH ULCER AND INFLAMMATION OF LEFT LOWER EXTREMITY (CODE): Status: RESOLVED | Noted: 2020-01-21 | Resolved: 2020-01-21

## 2020-01-21 PROBLEM — I87.332 CHRONIC VENOUS HYPERTENSION (IDIOPATHIC) WITH ULCER AND INFLAMMATION OF LEFT LOWER EXTREMITY (CODE): Status: ACTIVE | Noted: 2020-01-21

## 2020-01-21 PROCEDURE — 99999 PR PBB SHADOW E&M-EST. PATIENT-LVL V: CPT | Mod: PBBFAC,,, | Performed by: INTERNAL MEDICINE

## 2020-01-21 PROCEDURE — 99215 OFFICE O/P EST HI 40 MIN: CPT | Mod: PBBFAC | Performed by: INTERNAL MEDICINE

## 2020-01-21 PROCEDURE — 1126F PR PAIN SEVERITY QUANTIFIED, NO PAIN PRESENT: ICD-10-PCS | Mod: ,,, | Performed by: INTERNAL MEDICINE

## 2020-01-21 PROCEDURE — 99214 OFFICE O/P EST MOD 30 MIN: CPT | Mod: S$PBB,,, | Performed by: INTERNAL MEDICINE

## 2020-01-21 PROCEDURE — 1159F PR MEDICATION LIST DOCUMENTED IN MEDICAL RECORD: ICD-10-PCS | Mod: ,,, | Performed by: INTERNAL MEDICINE

## 2020-01-21 PROCEDURE — 99214 PR OFFICE/OUTPT VISIT, EST, LEVL IV, 30-39 MIN: ICD-10-PCS | Mod: S$PBB,,, | Performed by: INTERNAL MEDICINE

## 2020-01-21 PROCEDURE — 1126F AMNT PAIN NOTED NONE PRSNT: CPT | Mod: ,,, | Performed by: INTERNAL MEDICINE

## 2020-01-21 PROCEDURE — 1159F MED LIST DOCD IN RCRD: CPT | Mod: ,,, | Performed by: INTERNAL MEDICINE

## 2020-01-21 PROCEDURE — 99999 PR PBB SHADOW E&M-EST. PATIENT-LVL V: ICD-10-PCS | Mod: PBBFAC,,, | Performed by: INTERNAL MEDICINE

## 2020-01-21 NOTE — PROGRESS NOTES
Digital Medicine: Clinician Follow-Up    The history is provided by the patient.     Follow Up  Follow-up reason(s): reading review and medication change follow-up      Is patient tolerating med change?:  Yes    HPI:  Called patient to follow up since increasing valsartan to 320, patient confirms she is tolerating well. Patient reports adherence to medication regimen daily and denies missed doses. Patient denies hypotensive s/sx (lightheadedness, dizziness, nausea, fatigue); patient denies hypertensive s/sx (SOB, CP, severe headaches, changes in vision, dizziness, fatigue, confusion, anxiety, nosebleeds).     Patient states she is scheduled to see her Dentist this week to discuss a new KATHERIN split.    Last 5 Patient Entered Readings                                      Current 30 Day Average: 145/74     Recent Readings 1/18/2020 1/18/2020 1/16/2020 1/16/2020 1/14/2020    SBP (mmHg) 135 135 139 139 139    DBP (mmHg) 69 69 78 78 68    Pulse 59 59 54 54 58        Assessment:  Reviewed recent readings and labs (last CMP drawn on 6/6/19). Per 2017 ACC/ AHA HTN guidelines (goal of BP < 130/80), current 30-day average is uncontrolled. Patient was seen by Heme/ Onc on 1/17/20, BP was 167/67. Patient is scheduled to see PCP today.    Plan:  Continue current medication regimen.   Patients health  will be following up as scheduled.   I will continue to monitor regularly and will follow-up in 4 weeks, sooner if blood pressure begins to trend upward or downward.     Current medication regimen:  Hypertension Medications             valsartan (DIOVAN) 320 MG tablet Take 1 tablet (320 mg total) by mouth once daily. Dose increase.         Patient denies having questions or concerns. Patient has my contact information and knows to call with any concerns or clinical changes.

## 2020-01-21 NOTE — PROGRESS NOTES
Subjective:      Patient ID: Shima Sorto is a 79 y.o. female.    Chief Complaint: Follow-up    HPI:  HPI   Hypertension: patient currently is on 320mg and wonders if sleep apnea may be contributing during a period she was not using the appliance. The increased dose of valsartan was started on 1/9/2020.    Breast cancer: follow up with oncology:    Concern of being always cold, no thyroid disease.    Compression fracture: on Prolia    Hair loss:   Patient Active Problem List   Diagnosis    Hypertension    Hyperoxaluria    Hypocitraturic calcium nephrolithiasis    Cystic kidney disease    Fatigue    Urinary retention    Age-related osteoporosis with current pathological fracture with routine healing    Hypoglycemia    Trigger middle finger of right hand    Retinal hemorrhage of both eyes at about 1'oclock    Vestibular neuronitis    Vertigo    Obstructive sleep apnea    Venous insufficiency    Bradycardia    Screening for colon cancer    Malignant neoplasm of upper-outer quadrant of right breast in female, estrogen receptor positive    At risk for lymphedema    CKD (chronic kidney disease) stage 3, GFR 30-59 ml/min    Vitamin D deficiency    Use of aromatase inhibitors    Thrombocytopenia    Primary hyperoxaluria     Past Medical History:   Diagnosis Date    Allergy     seasonal    Anemia     Basal cell carcinoma 7/2013    forehead    Breast cancer 2018    Hx of colonic polyps     Hypertension     Medullary sponge kidney     MVP (mitral valve prolapse)     Osteoporosis, senile     Pneumonia     Renal calculi     Sleep apnea     TMJ syndrome     sometimes jaw clicking/jaw pain    Urinary retention     Vertigo 1/17/2017    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    Visual impairment     reading glasses     Past Surgical History:   Procedure Laterality Date    BREAST CYST ASPIRATION Right 1999    BREAST LUMPECTOMY Right 2018    with radiation    COLONOSCOPY N/A 7/24/2018     Procedure: COLONOSCOPY;  Surgeon: Juan Miguel Pena MD;  Location: [facility] (4TH FLR);  Service: Endoscopy;  Laterality: N/A;    INJECTION FOR SENTINEL NODE IDENTIFICATION Right 8/17/2018    Procedure: INJECTION, FOR SENTINEL NODE IDENTIFICATION;  Surgeon: Abby Mason MD;  [facility];  Service: General;  Laterality: Right;    interstim placed stage 1      and removed    KIDNEY STONE SURGERY  2000    @ [facility]    MASTECTOMY, PARTIAL Right 8/17/2018    Procedure: MASTECTOMY, PARTIAL-US guided;  Surgeon: Abby Mason MD;  Location: [facility] OR [address] [place] [address];  Service: General;  Laterality: Right;    MOHS procedure      SENTINEL LYMPH NODE BIOPSY Right 8/17/2018    Procedure: BIOPSY, LYMPH NODE, SENTINEL;  Surgeon: Abby Mason MD;  Location: [facility] OR [address] [place] [address];  Service: General;  Laterality: Right;     Family History   Problem Relation Age of Onset    Kidney disease Mother     Heart disease Father     Melanoma Father     Heart attack Father     Breast cancer Maternal Aunt     Hearing loss Son     Hyperlipidemia Son     Anesthesia problems Neg Hx     Malignant hypertension Neg Hx     Hypotension Neg Hx     Malignant hyperthermia Neg Hx     Pseudochol deficiency Neg Hx     Colon cancer Neg Hx     Ovarian cancer Neg Hx     Psoriasis Neg Hx     Lupus Neg Hx     Eczema Neg Hx     Acne Neg Hx      Review of Systems   Constitutional: Negative for activity change and unexpected weight change.   HENT: Negative for hearing loss, rhinorrhea and trouble swallowing.    Eyes: Positive for visual disturbance. Negative for discharge.   Respiratory: Negative for chest tightness and wheezing.    Cardiovascular: Negative for chest pain and palpitations.   Gastrointestinal: Negative for blood in stool, constipation, diarrhea and vomiting.   Endocrine: Negative for polydipsia and polyuria.   Genitourinary: Negative for difficulty urinating, dysuria, hematuria and menstrual problem.   Musculoskeletal:  "Positive for neck pain. Negative for arthralgias and joint swelling.   Neurological: Negative for weakness and headaches.   Psychiatric/Behavioral: Negative for confusion and dysphoric mood.     Objective:     Vitals:    01/21/20 1413   BP: (!) 142/70   Pulse: (!) 52   SpO2: 99%   Weight: 56.3 kg (124 lb 1.9 oz)   Height: 5' 7" (1.702 m)   PainSc: 0-No pain     Body mass index is 19.44 kg/m².  Physical Exam   Constitutional: She is oriented to person, place, and time. She appears well-developed and well-nourished. No distress.   Neck: Carotid bruit is not present. No thyromegaly present.   Cardiovascular: Normal rate, regular rhythm and normal heart sounds. PMI is not displaced.   Pulmonary/Chest: Effort normal and breath sounds normal. No respiratory distress.   Abdominal: Soft. Bowel sounds are normal. She exhibits no distension. There is no tenderness.   Musculoskeletal: She exhibits no edema.   Neurological: She is alert and oriented to person, place, and time.     Assessment:     1. Essential hypertension    2. Primary hyperoxaluria    3. Salivary gland enlargement    4. Cold feeling    5. Mild vitamin D deficiency      Plan:   Shima was seen today for follow-up.    Diagnoses and all orders for this visit:    Essential hypertension  Comments:  On Digital Medicine:   Orders:  -     CBC auto differential; Future  -     Comprehensive metabolic panel; Future  -     Lipid panel; Future    Primary hyperoxaluria  Comments:  Monitor    Salivary gland enlargement  Comments:  referrral  Orders:  -     Ambulatory consult to ENT    Cold feeling  Comments:  Likely not related to a disease process  Orders:  -     TSH; Future    Mild vitamin D deficiency  Comments:  Monitor  Orders:  -     Vitamin D; Future        Problem List Items Addressed This Visit     Hypertension - Primary    Relevant Orders    CBC auto differential    Comprehensive metabolic panel    Lipid panel    Primary hyperoxaluria      Other Visit Diagnoses     " Salivary gland enlargement        referrral    Relevant Orders    Ambulatory consult to ENT    Cold feeling        Likely not related to a disease process    Relevant Orders    TSH    Mild vitamin D deficiency        Monitor    Relevant Orders    Vitamin D        Orders Placed This Encounter   Procedures    CBC auto differential     Standing Status:   Future     Standing Expiration Date:   3/21/2021    Comprehensive metabolic panel     Standing Status:   Future     Standing Expiration Date:   3/21/2021    Lipid panel     Standing Status:   Future     Standing Expiration Date:   3/21/2021    TSH     Standing Status:   Future     Standing Expiration Date:   3/21/2021    Vitamin D     Standing Status:   Future     Standing Expiration Date:   3/21/2021    Ambulatory consult to ENT     Referral Priority:   Routine     Referral Type:   Consultation     Referral Reason:   Specialty Services Required     Requested Specialty:   Otolaryngology     Number of Visits Requested:   1     Follow up in about 6 months (around 7/21/2020).     Medication List           Accurate as of January 21, 2020 11:59 PM. If you have any questions, ask your nurse or doctor.               CONTINUE taking these medications    anastrozole 1 mg Tab  Commonly known as:  ARIMIDEX  TAKE 1 TABLET(1 MG) BY MOUTH EVERY DAY     B COMPLEX WITH C#10-FOLIC ACID ORAL     calcium citrate 200 mg (950 mg) tablet  Commonly known as:  CALCITRATE     co-enzyme Q-10 50 mg capsule     Sudarshan Matrix 5000 Complete 33-5,000-250 mcg Tab  Generic drug:  mv-min-folic ac-biotin-lutein     MULTIVITAMIN ORAL     PRESERVISION AREDS 2 ORAL     Prolia 60 mg/mL Syrg  Generic drug:  denosumab     Super Omega-3 400-5 mg-unit Cap  Generic drug:  fish oil-E-fatty acid5-abml883     valsartan 320 MG tablet  Commonly known as:  DIOVAN  Take 1 tablet (320 mg total) by mouth once daily. Dose increase.     VitaJoy Daily D 1,000 unit Chew  Generic drug:  cholecalciferol (vitamin D3)

## 2020-01-21 NOTE — TELEPHONE ENCOUNTER
----- Message from Didier Leal sent at 1/21/2020 12:23 PM CST -----  Contact: Christina (Dr. Teresa)  Name of Who is Calling: Christina Teresa)      What is the request in detail: Would like to speak with staff in regards to getting the medical necessity and sleep study that was requested by faxed on 1/17. Please fax to [phone]. Needs ASAP for patient appointment tomorrow.      Can the clinic reply by [facility]: no      What Number to Call Back if not in [facility]: [phone]

## 2020-01-21 NOTE — TELEPHONE ENCOUNTER
----- Message from Didier Leal sent at 1/21/2020 12:23 PM CST -----  Contact: Christina (Dr. Teresa)  Name of Who is Calling: Christina Teresa)      What is the request in detail: Would like to speak with staff in regards to getting the medical necessity and sleep study that was requested by faxed on 1/17. Please fax to [phone]. Needs ASAP for patient appointment tomorrow.      Can the clinic reply by [facility]: no      What Number to Call Back if not in [facility]: [phone]

## 2020-01-28 ENCOUNTER — PATIENT MESSAGE (OUTPATIENT)
Dept: ADMINISTRATIVE | Facility: OTHER | Age: 80
End: 2020-01-28

## 2020-01-28 ENCOUNTER — PATIENT MESSAGE (OUTPATIENT)
Dept: INTERNAL MEDICINE | Facility: CLINIC | Age: 80
End: 2020-01-28

## 2020-01-28 ENCOUNTER — TELEPHONE (OUTPATIENT)
Dept: INTERNAL MEDICINE | Facility: CLINIC | Age: 80
End: 2020-01-28

## 2020-01-28 NOTE — TELEPHONE ENCOUNTER
Please tell the patient that Mark is asking for an alternative to valsartan 320  mg. which is on backorder. With her permission I can send irbesartan 300 which is a similar drug.  Dr. Tomas

## 2020-01-28 NOTE — TELEPHONE ENCOUNTER
----- Message from Flor Coelho MA sent at 1/28/2020  2:53 PM CST -----  Contact: Mark/ [phone]      ----- Message -----  From: Emilie Grider  Sent: 1/28/2020  12:50 PM CST  To: Thom DA SILVA Staff    Prescription Alternative Needed:     The pharmacy needs alternative on the following RX:    valsartan (DIOVAN) 320 MG tablet    Reason: Drug on backorder. Please send alternative.    Pharmacy: [id number] - [place], [place] - [address] AT [facility] [address]    Please advise.    Thank You

## 2020-02-06 ENCOUNTER — PATIENT MESSAGE (OUTPATIENT)
Dept: INTERNAL MEDICINE | Facility: CLINIC | Age: 80
End: 2020-02-06

## 2020-02-06 ENCOUNTER — TELEPHONE (OUTPATIENT)
Dept: INTERNAL MEDICINE | Facility: CLINIC | Age: 80
End: 2020-02-06

## 2020-02-06 NOTE — TELEPHONE ENCOUNTER
----- Message from Ashley Almanza sent at 2/6/2020 10:56 AM CST -----  Contact: Sara with [facility]  [phone]  Pharmacy is calling to clarify an RX.  RX name:  Valsartan (DIOVAN) 320 MG tablet  What do they need to clarify:  On back order    Comments: Do you want to call in another Rx?

## 2020-02-06 NOTE — TELEPHONE ENCOUNTER
Spoke with pt. Pt stated pharmacy was holding her rx because they couldn't give her all the tablets. Stated she will check with pharmacy and notify us through portal if she is able to get the medication.

## 2020-02-06 NOTE — TELEPHONE ENCOUNTER
Please call the patient and see if she needs Valsartan 320 mg as Mark wants me to change it as it is on backorder. GML

## 2020-02-10 ENCOUNTER — OFFICE VISIT (OUTPATIENT)
Dept: OTOLARYNGOLOGY | Facility: CLINIC | Age: 80
End: 2020-02-10
Payer: MEDICARE

## 2020-02-10 VITALS — HEART RATE: 52 BPM | DIASTOLIC BLOOD PRESSURE: 73 MMHG | SYSTOLIC BLOOD PRESSURE: 160 MMHG

## 2020-02-10 DIAGNOSIS — R09.82 POST-NASAL DRIP: ICD-10-CM

## 2020-02-10 DIAGNOSIS — K11.1 SUBMANDIBULAR GLAND HYPERTROPHY: Primary | ICD-10-CM

## 2020-02-10 DIAGNOSIS — M54.2 POSTERIOR NECK PAIN: ICD-10-CM

## 2020-02-10 DIAGNOSIS — R44.8 FEELS COLD: ICD-10-CM

## 2020-02-10 DIAGNOSIS — R49.9 CHANGE IN VOICE: ICD-10-CM

## 2020-02-10 PROCEDURE — 99999 PR PBB SHADOW E&M-EST. PATIENT-LVL III: ICD-10-PCS | Mod: PBBFAC,,, | Performed by: OTOLARYNGOLOGY

## 2020-02-10 PROCEDURE — 99213 PR OFFICE/OUTPT VISIT, EST, LEVL III, 20-29 MIN: ICD-10-PCS | Mod: S$PBB,,, | Performed by: OTOLARYNGOLOGY

## 2020-02-10 PROCEDURE — 99213 OFFICE O/P EST LOW 20 MIN: CPT | Mod: PBBFAC | Performed by: OTOLARYNGOLOGY

## 2020-02-10 PROCEDURE — 99213 OFFICE O/P EST LOW 20 MIN: CPT | Mod: S$PBB,,, | Performed by: OTOLARYNGOLOGY

## 2020-02-10 PROCEDURE — 99999 PR PBB SHADOW E&M-EST. PATIENT-LVL III: CPT | Mod: PBBFAC,,, | Performed by: OTOLARYNGOLOGY

## 2020-02-10 NOTE — LETTER
February 11, 2020      Zeynep Tomas MD  [address]  [facility] [address]           Fr[facility] Kulwinder - Otorhinolaryngology  [address]  [facility] [address]  Phone: [phone]  Fax: [phone]          Patient: Shima Sorto   MR Number: [id number]   YOB: 1940   Date of Visit: 2/10/2020       Dear Dr. Zeynep Tomas:    Thank you for referring Shima Sorto to me for evaluation. Attached you will find relevant portions of my assessment and plan of care.    If you have questions, please do not hesitate to call me. I look forward to following Shima Sorto along with you.    Sincerely,    Naseem Buckley III, MD    Enclosure  CC:  No Recipients    If you would like to receive this communication electronically, please contact [email or web address] or [phone] to request more information on [employer] Link access.    For providers and/or their staff who would like to refer a patient to [facility], please contact us through our one-stop-shop provider referral line, [facility], at [phone].    If you feel you have received this communication in error or would no longer like to receive these types of communications, please e-mail [email or web address]

## 2020-02-10 NOTE — PATIENT INSTRUCTIONS
Hydration encouraged re: salivary gland status  Humidifer use encouraged prn  Consider consultation with Dr. Fransico Witt re; voice/vocal  status prn  Vocal hygiene instruction sheets provided  RTC prn

## 2020-02-11 NOTE — PROGRESS NOTES
"CC:Feels cold; glands swollen  HPI:Ms. Sorto is a 79-year-old  female with a history of breast cancer and stage 3 chronic kidney disease who is a close friend of Mica Cohen.    She reminds me of her remote history of vestibular neuronitis treated effectively with therapy.  She indicates feeling "cold" all of the time. This is one of her chief complaints.  No one seems to give her an answer about this condition.   Weather change affects her.  She also indicates firmness and some swelling of her submandibular glands.  She denies evidence of sialolithiasis.  She is a water drinker.  She further complains of a change in her voicing as well. She describes her voice as gravelly.  She is followed by Dr. Zeynep Tomas whose note of 01/21/2020 indicates the patient's follow-up for hypertension.  EMR notes indicate her history of breast cancer.   She complained of being cold at that visit without evidence of thyroid disease. She was diagnosed with essential hypertension, primary hyperoxaluria, salivary gland enlargement, a cold feeling and mild vitamin D deficiency.  She was given an ambulatory referral to the ENT service for evaluation of salivary gland enlargement.  She has a history of sleep apnea which is controlled with use of an oral device.  She is anticipating utilizing a new oral appliance which we fit for her this coming Humberto Wednesday.    She had completed a soft tissue neck CT with contrast in June 2019 which indicated an asymmetric enlargement of the left submandibular gland without associated inflammatory change or evidence to suggest sialolithiasis.  The parotid glands were bilaterally symmetric and demonstrated no gross abnormalities. There was no pathologic enlargement of the cervical lymph nodes.    Answers for HPI/ROS submitted by the patient on 2/10/2020   Fatigue (Tiredness)?: Yes  tinnitus: Yes  postnasal drip: Yes  trouble swallowing: Yes  Hoarseness?: Yes  eye discharge: Yes  Light " sensitivity / Light hurts the eyes?: Yes  Visual changes / Changes in eyesight?: Yes  Sleep disturbances due to breathing?: Yes  Irregular heartbeat?: Yes  frequency: Yes  back pain: Yes  neck pain: Yes  rash: Yes  swollen glands: Yes  sleep disturbance: Yes    Past Medical History:   Diagnosis Date    Allergy     seasonal    Anemia     Basal cell carcinoma 7/2013    forehead    Breast cancer 2018    Hx of colonic polyps     Hypertension     Medullary sponge kidney     MVP (mitral valve prolapse)     Osteoporosis, senile     Pneumonia     Renal calculi     Sleep apnea     TMJ syndrome     sometimes jaw clicking/jaw pain    Urinary retention     Vertigo 1/17/2017    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    Visual impairment     reading glasses    Allergies:  S Amy Sky  Past Surgical History:   Procedure Laterality Date    BREAST CYST ASPIRATION Right 1999    BREAST LUMPECTOMY Right 2018    with radiation    COLONOSCOPY N/A 7/24/2018    Procedure: COLONOSCOPY;  Surgeon: Juan Miguel Pena MD;  [facility]  Service: Endoscopy;  Laterality: N/A;    INJECTION FOR SENTINEL NODE IDENTIFICATION Right 8/17/2018    Procedure: INJECTION, FOR SENTINEL NODE IDENTIFICATION;  Surgeon: Abby Mason MD;  Location: [facility] OR [address] [place] [address];  Service: General;  Laterality: Right;    interstim placed stage 1      and removed    KIDNEY STONE SURGERY  2000    @ [employer]    MASTECTOMY, PARTIAL Right 8/17/2018    Procedure: MASTECTOMY, PARTIAL-US guided;  Surgeon: Abby Mason MD;  Location: [facility] OR [address] [place] [address];  Service: General;  Laterality: Right;    MOHS procedure      SENTINEL LYMPH NODE BIOPSY Right 8/17/2018    Procedure: BIOPSY, LYMPH NODE, SENTINEL;  Surgeon: Abby Mason MD;  Location: [facility] OR [address] [place] [address];  Service: General;  Laterality: Right;     Current Outpatient Medications on File Prior to Visit   Medication Sig Dispense Refill    anastrozole (ARIMIDEX) 1 mg Tab TAKE 1 TABLET(1 MG) BY MOUTH EVERY  DAY 90 tablet 0    B COMPLEX & C NO.10/FOLIC ACID (B COMPLEX WITH C#10-FOLIC ACID ORAL) Take by mouth.      calcium citrate (CALCITRATE) 200 mg (950 mg) tablet Take 1 tablet by mouth once daily.      cholecalciferol, vitamin D3, (VITAJOY DAILY D) 1,000 unit Chew Take by mouth.      co-enzyme Q-10 50 mg capsule Take 100 mg by mouth once daily.       denosumab (PROLIA) 60 mg/mL Syrg Inject 60 mg into the skin every 6 (six) months.      fish oil-vit E-fat acid5-hb137 (SUPER OMEGA-3) 400-5 mg-unit Cap Take 1 capsule by mouth Daily.      MULTIVITAMIN ORAL Take 1 tablet by mouth Daily.      mv-min-folic ac-biotin-lutein (KAROLYN MATRIX 5000 COMPLETE) 33-5,000-250 mcg Tab Take 5,000 mcg by mouth once daily.      valsartan (DIOVAN) 320 MG tablet Take 1 tablet (320 mg total) by mouth once daily. Dose increase. 90 tablet 3    vit C/E/Zn/coppr/lutein/zeaxan (PRESERVISION AREDS 2 ORAL) Take by mouth.       Current Facility-Administered Medications on File Prior to Visit   Medication Dose Route Frequency Provider Last Rate Last Dose    0.9%  NaCl infusion   Intravenous Continuous James Carranza MD 70 mL/hr at 08/17/18 0843         PE:  Blood pressure 160/73 pulse 52 height 5 ft 7 in weight 124 lb  General:  Alert and oriented articulate lady in no acute distress  Otologic examination for otoscopy indicates some wax in both ear canals.  I am not able to remove the wax today in the micro procedure room.  Nasal exam reveals evidence for more patent right nasal passage.  There is no evidence of purulent discharge or polypoid disease in the right nasal passage.  There is evidence for a narrower left nasal passage.  There is evidence for left middle turbinate hypertrophy.  Oropharyngeal exam reveals evidence of an elongated uvula and a slightly low lying soft palate. The floor of the mouth is supple; there is no evidence of sialolithiasis in the Lary's duct areas.  Cetacaine was applied to the posterior pharynx.  A  mirror exam of larynx reveals no specific evidence of vocal cord paresis or paralysis nor true vocal cord lesions on a brief glimpse exam.  The base of the tongue appeared within normal limits.  Neck examination reveals firm and bilaterally symmetrical submandibular gland hypertrophy without palpation of specific lesions in either gland.   I annot identify anterior or posterior cervical adenopathy.    DIAGNOSIS:     ICD-10-CM ICD-9-CM    1. 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 Post-nasal drip R09.82 784.91    3. Change in voice R49.9 784.49    4. Feels cold R68.89 780.99    5. Posterior neck pain M54.2 723.1      PLAN:  Conservative measures discussed and encouraged   Hydration encouraged re: salivary gland status  Humidifer use encouraged prn  Consider consultation with Dr. Fransico Witt re; voice/vocal  status prn  Vocal hygiene instruction sheets provided  RTC prn

## 2020-02-18 ENCOUNTER — PATIENT OUTREACH (OUTPATIENT)
Dept: OTHER | Facility: OTHER | Age: 80
End: 2020-02-18

## 2020-02-18 NOTE — PROGRESS NOTES
Digital Medicine: Clinician Follow-Up    The history is provided by the patient.     Follow Up  Follow-up reason(s): reading review          HPI:  Called patient to follow up. Patient reports adherence to medication regimen daily and denies missed doses. Patient denies hypotensive s/sx (lightheadedness, dizziness, nausea, fatigue); patient denies hypertensive s/sx (SOB, CP, severe headaches, changes in vision, dizziness, fatigue, confusion, anxiety, nosebleeds).     Patient states she has an upcoming appt with Dentist (2/26/20) regarding KATHERIN splint.     Last 5 Patient Entered Readings                                      Current 30 Day Average: 140/70     Recent Readings 2/16/2020 2/16/2020 2/12/2020 2/12/2020 1/31/2020    SBP (mmHg) 133 133 132 132 146    DBP (mmHg) 74 74 67 67 70    Pulse 60 60 65 65 55        Assessment:  Reviewed recent readings and labs (last CMP drawn on 6/6/19). Per 2017 ACC/ AHA HTN guidelines (goal of BP < 130/80), current 30-day average is slightly uncontrolled, appears to be trending down.    Plan:  Continue current medication regimen.   Patients health  will be following up as scheduled.   I will continue to monitor regularly and will follow-up in 6 weeks, sooner if blood pressure begins to trend upward or downward. If BP remains elevated, will discuss adding amlodipine.     Current medication regimen:  Hypertension Medications             valsartan (DIOVAN) 320 MG tablet Take 1 tablet (320 mg total) by mouth once daily. Dose increase.         Patient denies having questions or concerns. Patient has my contact information and knows to call with any concerns or clinical changes.

## 2020-03-03 ENCOUNTER — PATIENT OUTREACH (OUTPATIENT)
Dept: OTHER | Facility: OTHER | Age: 80
End: 2020-03-03

## 2020-03-03 NOTE — PROGRESS NOTES
Digital Medicine: Health  Follow-Up        Follow Up  Follow-up reason(s): routine education    Patient informed me that she is doing well and feeling good today. [place] CP[place]. She did inform me that she is going to reach out to her pharmacy to inquire about a refill on her Valsartan due to information that there is shortage, etc. She has 20 days left of this medication so she wants to proactive about it. She will reach out to her Clinician, AVINASH Morgan, if she needs assistance with changing the medication to something else (if she cannot obtain a Valsartan refill).      Intervention/Plan    There are no preventive care reminders to display for this patient.    Last 5 Patient Entered Readings                                      Current 30 Day Average: 133/69     Recent Readings 2/22/2020 2/22/2020 2/19/2020 2/19/2020 2/16/2020    SBP (mmHg) 150 150 116 116 133    DBP (mmHg) 73 73 60 60 74    Pulse 49 49 58 58 60                      Medication Adherence Screening   She did not miss a dose this month.    Patient identified the following reasons for non-compliance: None      SDOH

## 2020-03-23 DIAGNOSIS — Z79.811 USE OF AROMATASE INHIBITORS: ICD-10-CM

## 2020-03-23 RX ORDER — ANASTROZOLE 1 MG/1
TABLET ORAL
Qty: 90 TABLET | Refills: 3 | Status: SHIPPED | OUTPATIENT
Start: 2020-03-23 | End: 2021-03-29

## 2020-03-31 ENCOUNTER — TELEPHONE (OUTPATIENT)
Dept: INTERNAL MEDICINE | Facility: CLINIC | Age: 80
End: 2020-03-31

## 2020-03-31 ENCOUNTER — PATIENT OUTREACH (OUTPATIENT)
Dept: OTHER | Facility: OTHER | Age: 80
End: 2020-03-31

## 2020-03-31 ENCOUNTER — PATIENT MESSAGE (OUTPATIENT)
Dept: INTERNAL MEDICINE | Facility: CLINIC | Age: 80
End: 2020-03-31

## 2020-03-31 ENCOUNTER — NURSE TRIAGE (OUTPATIENT)
Dept: ADMINISTRATIVE | Facility: CLINIC | Age: 80
End: 2020-03-31

## 2020-03-31 NOTE — TELEPHONE ENCOUNTER
Pt states she started having right flank pain that is worsened by movement, she has a history of kidney stones but states she usually does not have pain with them, denies trouble or any pain with urination, denies fever, advised her to see a provider within 24 hours and transferred her to [facility] for virtual visit    Reason for Disposition   Pain mainly in flank (i.e., in the side, over the lower ribs or just below the ribs)   MODERATE pain (e.g., interferes with normal activities or awakens from sleep)    Additional Information   Negative: Passed out (i.e., lost consciousness, collapsed and was not responding)   Negative: Shock suspected (e.g., cold/pale/clammy skin, too weak to stand, low BP, rapid pulse)   Negative: Sounds like a life-threatening emergency to the triager   Negative: Major injury to the back (e.g., MVA, fall > 10 feet or 3 meters, penetrating injury, etc.)   Negative: Followed a tailbone injury   Negative: [1] Pain in the upper back over the ribs (rib cage) AND [2] radiates (travels, goes) into chest   Negative: [1] Pain in the upper back over the ribs (rib cage) AND [2] worsened by coughing (or clearly increases with breathing)   Negative: Back pain during pregnancy   Negative: Passed out (i.e., lost consciousness, collapsed and was not responding)   Negative: Shock suspected (e.g., cold/pale/clammy skin, too weak to stand, low BP, rapid pulse)   Negative: Difficult to awaken or acting confused (e.g., disoriented, slurred speech)   Negative: Sounds like a life-threatening emergency to the triager   Negative: Followed a major injury to the back (e.g., MVA, fall > 10 feet or 3 meters, penetrating injury, etc.)   Negative: Back pain or flank pain during pregnancy   Negative: Upper, mid or lower back pain that occurs mainly in the midline   Negative: [1] SEVERE pain (e.g., excruciating, scale 8-10) AND [2] not improved after pain medicine   Negative: [1] SEVERE pain (e.g.,  excruciating, scale 8-10) AND [2] present > 1 hour   Negative: [1] Sudden onset of severe flank pain AND [2] age > 60   Negative: [1] Abdominal pain AND [2] age > 60   Negative: [1] Unable to urinate (or only a few drops) > 4 hours AND     [2] bladder feels very full (e.g., palpable bladder or strong urge to urinate)   Negative: Vomiting   Negative: Weakness of a leg or foot (e.g., unable to bear weight, dragging foot)   Negative: Patient sounds very sick or weak to the triager   Negative: Fever > 100.5 F (38.1 C)   Negative: Pain or burning with urination    Protocols used: BACK PAIN-A-AH, FLANK PAIN-A-AH

## 2020-03-31 NOTE — TELEPHONE ENCOUNTER
Called pt to see if she was having trouble logging in to her video visit. Pt stated she does not want a video visit and if she needs to make an appointment she will call back to schedule a in person visit. Pt states she does not feel she is in need of one as of now.

## 2020-03-31 NOTE — PROGRESS NOTES
Digital Medicine: Clinician Follow-Up    The history is provided by the patient.     Follow Up  Follow-up reason(s): reading review        HPI:  Called patient to follow up. Patient reports adherence to medication regimen daily and denies missed doses. Patient admits to occasional hypotensive s/sx, attributes this to barometric changes (lightheadedness, dizziness, nausea, fatigue); patient denies hypertensive s/sx (SOB, CP, severe headaches, changes in vision, dizziness, fatigue, confusion, anxiety, nosebleeds).     Patient reports increase in physical activity, walking.    Patient reports KATHERIN splint was poorly made.    Last 5 Patient Entered Readings                                      Current 30 Day Average: 143/73     Recent Readings 3/25/2020 3/25/2020 3/24/2020 3/24/2020 3/20/2020    SBP (mmHg) 129 129 130 130 134    DBP (mmHg) 64 64 71 71 73    Pulse 58 58 55 55 63        Assessment:  Reviewed recent readings and labs (last CMP drawn on 6/6/19). Per 2017 ACC/ AHA HTN guidelines (goal of BP < 130/80), current 30-day average is slightly uncontrolled, appears to be trending down. Within the past month, SBPs have ranged 129-165 and DBPs have ranged 64-86.    Plan:  Continue current medication regimen.   Patients health  will be following up as scheduled.   I will continue to monitor regularly and will follow-up in 4 weeks, sooner if blood pressure begins to trend upward or downward. If BP remains elevated, will discuss adding amlodipine.     Current medication regimen:  Hypertension Medications             valsartan (DIOVAN) 320 MG tablet Take 1 tablet (320 mg total) by mouth once daily. Dose increase.         Patient denies having questions or concerns. Patient has my contact information and knows to call with any concerns or clinical changes.

## 2020-04-01 NOTE — TELEPHONE ENCOUNTER
She cannot do a video visit when she tries to log into it screen pops up she does not have the correct drive to support the system?    She states she has pain on her R side she believes it may be another compression fracture. She has had no falls and no urinary symptoms.

## 2020-04-01 NOTE — TELEPHONE ENCOUNTER
Primitivo Deshpande,      We are booking it as a video visit, commenting telephone and  I will call her at that time and check her in. For anything I need to see I will do a facetime. If she just wants a call no problem. I will call her right now so you can go on to other messages.    Thank you, as always you are terrific!  Zeynep    Calling right now

## 2020-04-07 ENCOUNTER — PATIENT MESSAGE (OUTPATIENT)
Dept: ADMINISTRATIVE | Facility: OTHER | Age: 80
End: 2020-04-07

## 2020-04-07 ENCOUNTER — PATIENT OUTREACH (OUTPATIENT)
Dept: OTHER | Facility: OTHER | Age: 80
End: 2020-04-07

## 2020-04-07 NOTE — PROGRESS NOTES
Digital Medicine: Clinician Follow-Up    The history is provided by the patient.     Follow Up  Follow-up reason(s): reading review      Alert received.   Care Team received high BP alert.  Patient is not experiencing symptoms.    HPI:  Called patient to follow up regarding elevated reading taken today, patient denies hypertensive s/sx (SOB, CP, severe headaches, changes in vision, dizziness, fatigue, confusion, anxiety, nosebleeds). Patient reports adherence to medication regimen daily and denies missed doses. Patient denies hypotensive s/sx (lightheadedness, dizziness, nausea, fatigue).    Patient attributes back pain/ strain  to elevated readings.     Patient states she is unable to fill valsartan 320 at [facility] or [facility], patient states she will try [facility] for availability. Patient reports she will call back if she is unable to fill valsartan elsewhere, will need to change to alternative.    Last 5 Patient Entered Readings                                      Current 30 Day Average: 149/77     Recent Readings 4/7/2020 4/7/2020 4/2/2020 4/2/2020 3/25/2020    SBP (mmHg) 191 191 163 163 129    DBP (mmHg) 92 92 80 80 64    Pulse 49 49 48 48 58        Assessment:  Reviewed recent readings and labs (last CMP drawn on 6/6/19). Per 2017 ACC/ AHA HTN guidelines (goal of BP < 130/80), current 30-day average is uncontrolled. Within the past month, SBPs have ranged 129-191 and DBPs have ranged 64-92.    Plan:  Continue current medication regimen.   Encouraged patient to continue frequent readings.  Patients health  will be following up as scheduled.   I will continue to monitor regularly and will follow-up in 2 weeks, sooner if blood pressure begins to trend upward or downward. If BP remains elevated, will discuss adding amlodipine 2.5.  Also, if needed, will change valsartan 320 to irbesartan 300.    Current medication regimen:  Hypertension Medications             valsartan (DIOVAN) 320 MG  tablet Take 1 tablet (320 mg total) by mouth once daily. Dose increase.         Patient denies having questions or concerns. Patient has my contact information and knows to call with any concerns or clinical changes.

## 2020-04-08 ENCOUNTER — PATIENT MESSAGE (OUTPATIENT)
Dept: INTERNAL MEDICINE | Facility: CLINIC | Age: 80
End: 2020-04-08

## 2020-04-08 RX ORDER — TIZANIDINE 2 MG/1
2 TABLET ORAL NIGHTLY PRN
Qty: 15 TABLET | Refills: 1 | Status: SHIPPED | OUTPATIENT
Start: 2020-04-08 | End: 2020-04-18

## 2020-04-20 ENCOUNTER — PATIENT OUTREACH (OUTPATIENT)
Dept: OTHER | Facility: OTHER | Age: 80
End: 2020-04-20

## 2020-04-20 DIAGNOSIS — I10 HYPERTENSION, UNSPECIFIED TYPE: Primary | ICD-10-CM

## 2020-04-20 NOTE — PROGRESS NOTES
Last 5 Patient Entered Readings                                      Current 30 Day Average: 149/78     Recent Readings 4/19/2020 4/19/2020 4/18/2020 4/18/2020 4/17/2020    SBP (mmHg) 148 148 174 174 168    DBP (mmHg) 65 65 84 84 88    Pulse 55 55 49 49 57        Hypertension Medications             valsartan (DIOVAN) 320 MG tablet Take 1 tablet (320 mg total) by mouth once daily. Dose increase.        Called patient to follow up regarding elevated readings taken on 4/17/20. Reviewed recent readings and labs (last CMP drawn on 6/6/19). Per 2017 ACC/ AHA HTN guidelines  (goal of BP < 130/80), current 30-day average is uncontrolled. Would like to discuss adding amlodipine 2.5.  LVM, requested patient call back at her convenience.  Will continue to monitor. WCB in 1 week.

## 2020-04-23 RX ORDER — AMLODIPINE BESYLATE 2.5 MG/1
2.5 TABLET ORAL DAILY
Qty: 30 TABLET | Refills: 11 | Status: SHIPPED | OUTPATIENT
Start: 2020-04-23 | End: 2021-03-29 | Stop reason: SDUPTHER

## 2020-04-23 NOTE — PROGRESS NOTES
Digital Medicine: Clinician Follow-Up    The history is provided by the patient.     Follow Up  Follow-up reason(s): reading review and medication change      Alert received.   Care Team received high BP alert.  Patient is not experiencing symptoms.  Medication Change: new med      HPI:  Called patient to follow up regarding elevated readings taken on 4/17/20; patient denies hypertensive s/sx (SOB, CP, severe headaches, changes in vision, dizziness, fatigue, confusion, anxiety, nosebleeds). Patient reports adherence to medication regimen daily and denies missed doses. Patient denies hypotensive s/sx (lightheadedness, dizziness, nausea, fatigue).    Patient reports sciatic pain.    Patient reports she has a h/o labile BP.    Last 5 Patient Entered Readings                                      Current 30 Day Average: 152/78     Recent Readings 4/22/2020 4/22/2020 4/21/2020 4/21/2020 4/19/2020    SBP (mmHg) 150 150 175 175 148    DBP (mmHg) 75 75 84 84 65    Pulse 60 60 54 54 55        Assessment:  Reviewed recent readings and labs (last CMP drawn on 6/6/19). Per 2017 ACC/ AHA HTN guidelines (goal of BP < 130/80), current 30-day average is uncontrolled. Within the past month, SBPs have ranged 124-191 and DBPs have ranged 64-92.    Plan:  Discussed with and instructed patient to start amlodipine 2.5, patient confirms understanding.   Patients health  will be following up as scheduled.   I will continue to monitor regularly and will follow-up in 2 weeks, sooner if blood pressure begins to trend upward or downward.     Current medication regimen:  Hypertension Medications             amLODIPine (NORVASC) 2.5 MG tablet Take 1 tablet (2.5 mg total) by mouth once daily.    valsartan (DIOVAN) 320 MG tablet Take 1 tablet (320 mg total) by mouth once daily. Dose increase.        Patient denies having questions or concerns. Patient has my contact information and knows to call with any concerns or clinical changes.

## 2020-05-07 ENCOUNTER — PATIENT MESSAGE (OUTPATIENT)
Dept: INTERNAL MEDICINE | Facility: CLINIC | Age: 80
End: 2020-05-07

## 2020-05-07 ENCOUNTER — PATIENT OUTREACH (OUTPATIENT)
Dept: OTHER | Facility: OTHER | Age: 80
End: 2020-05-07

## 2020-05-07 NOTE — PROGRESS NOTES
Digital Medicine: Clinician Follow-Up    The history is provided by the patient.     Follow Up  Follow-up reason(s): reading review and medication change follow-up      Is patient tolerating med change?:  Yes    HPI:  Called patient to follow up since starting amlodipine 2.5, patient confirms she is tolerating well. Patient reports adherence to medication regimen daily and denies missed doses. Patient denies hypotensive s/sx (lightheadedness, dizziness, nausea, fatigue); patient denies hypertensive s/sx (SOB, CP, severe headaches, changes in vision, dizziness, fatigue, confusion, anxiety, nosebleeds).     Last 5 Patient Entered Readings                                      Current 30 Day Average: 149/75     Recent Readings 5/2/2020 5/2/2020 5/1/2020 5/1/2020 4/30/2020    SBP (mmHg) 126 126 136 136 137    DBP (mmHg) 62 62 67 67 66    Pulse 58 58 60 60 55        Assessment:  Reviewed recent readings and labs (last CMP drawn on 6/6/19). Per 2017 ACC/ AHA HTN guidelines (goal of BP < 130/80), current 30-day average is well controlled. Within the past month, SBPs have ranged 124-191 and DBPs have ranged 62-92.    Plan:  Continue current medication regimen.   Patients health  will be following up as scheduled.   I will continue to monitor regularly and will follow-up in 4 weeks, sooner if blood pressure begins to trend upward or downward.     Current medication regimen:  Hypertension Medications             amLODIPine (NORVASC) 2.5 MG tablet Take 1 tablet (2.5 mg total) by mouth once daily.    valsartan (DIOVAN) 320 MG tablet Take 1 tablet (320 mg total) by mouth once daily. Dose increase.         Patient denies having questions or concerns. Patient has my contact information and knows to call with any concerns or clinical changes.

## 2020-05-18 ENCOUNTER — INFUSION (OUTPATIENT)
Dept: INFECTIOUS DISEASES | Facility: HOSPITAL | Age: 80
End: 2020-05-18
Attending: INTERNAL MEDICINE
Payer: MEDICARE

## 2020-05-18 VITALS
HEART RATE: 49 BPM | TEMPERATURE: 97 F | SYSTOLIC BLOOD PRESSURE: 154 MMHG | DIASTOLIC BLOOD PRESSURE: 69 MMHG | WEIGHT: 124.13 LBS | HEIGHT: 67 IN | BODY MASS INDEX: 19.48 KG/M2

## 2020-05-18 DIAGNOSIS — N18.30 CKD (CHRONIC KIDNEY DISEASE) STAGE 3, GFR 30-59 ML/MIN: ICD-10-CM

## 2020-05-18 DIAGNOSIS — E55.9 VITAMIN D DEFICIENCY: Primary | ICD-10-CM

## 2020-05-18 DIAGNOSIS — M80.00XD AGE-RELATED OSTEOPOROSIS WITH CURRENT PATHOLOGICAL FRACTURE WITH ROUTINE HEALING: ICD-10-CM

## 2020-05-18 PROCEDURE — 63600175 PHARM REV CODE 636 W HCPCS: Mod: JG | Performed by: INTERNAL MEDICINE

## 2020-05-18 PROCEDURE — 96372 THER/PROPH/DIAG INJ SC/IM: CPT

## 2020-05-18 RX ORDER — ATENOLOL 25 MG/1
TABLET ORAL
COMMUNITY
Start: 2020-04-26 | End: 2020-10-28

## 2020-05-18 RX ADMIN — DENOSUMAB 60 MG: 60 INJECTION SUBCUTANEOUS at 12:05

## 2020-05-18 NOTE — PROGRESS NOTES
Patient received Prolia 60 mg injection sub Q in the left arm.  Tolerated well and left in NAD.    Patient is taking calcium and Vit D

## 2020-05-25 ENCOUNTER — NURSE TRIAGE (OUTPATIENT)
Dept: ADMINISTRATIVE | Facility: CLINIC | Age: 80
End: 2020-05-25

## 2020-05-25 PROCEDURE — 99284 PR EMERGENCY DEPT VISIT,LEVEL IV: ICD-10-PCS | Mod: ,,, | Performed by: EMERGENCY MEDICINE

## 2020-05-25 PROCEDURE — 99283 EMERGENCY DEPT VISIT LOW MDM: CPT

## 2020-05-25 PROCEDURE — 99284 EMERGENCY DEPT VISIT MOD MDM: CPT | Mod: ,,, | Performed by: EMERGENCY MEDICINE

## 2020-05-26 ENCOUNTER — HOSPITAL ENCOUNTER (EMERGENCY)
Facility: HOSPITAL | Age: 80
Discharge: HOME | End: 2020-05-26
Attending: EMERGENCY MEDICINE
Payer: MEDICARE

## 2020-05-26 VITALS
HEIGHT: 67 IN | TEMPERATURE: 99 F | HEART RATE: 81 BPM | SYSTOLIC BLOOD PRESSURE: 147 MMHG | BODY MASS INDEX: 19.46 KG/M2 | WEIGHT: 124 LBS | DIASTOLIC BLOOD PRESSURE: 76 MMHG | OXYGEN SATURATION: 98 % | RESPIRATION RATE: 18 BRPM

## 2020-05-26 DIAGNOSIS — R31.9 HEMATURIA, UNSPECIFIED TYPE: Primary | ICD-10-CM

## 2020-05-26 DIAGNOSIS — N30.01 ACUTE CYSTITIS WITH HEMATURIA: ICD-10-CM

## 2020-05-26 LAB
ALBUMIN SERPL BCP-MCNC: 4.2 G/DL (ref 3.5–5.2)
ALP SERPL-CCNC: 45 U/L (ref 55–135)
ALT SERPL W/O P-5'-P-CCNC: 22 U/L (ref 10–44)
ANION GAP SERPL CALC-SCNC: 12 MMOL/L (ref 8–16)
AST SERPL-CCNC: 34 U/L (ref 10–40)
BACTERIA #/AREA URNS AUTO: ABNORMAL /HPF
BASOPHILS # BLD AUTO: 0.04 K/UL (ref 0–0.2)
BASOPHILS NFR BLD: 0.5 % (ref 0–1.9)
BILIRUB SERPL-MCNC: 0.5 MG/DL (ref 0.1–1)
BILIRUB UR QL STRIP: NEGATIVE
BUN SERPL-MCNC: 24 MG/DL (ref 8–23)
CALCIUM SERPL-MCNC: 9.8 MG/DL (ref 8.7–10.5)
CHLORIDE SERPL-SCNC: 102 MMOL/L (ref 95–110)
CLARITY UR REFRACT.AUTO: ABNORMAL
CO2 SERPL-SCNC: 26 MMOL/L (ref 23–29)
COLOR UR AUTO: ABNORMAL
CREAT SERPL-MCNC: 1.2 MG/DL (ref 0.5–1.4)
DIFFERENTIAL METHOD: ABNORMAL
EOSINOPHIL # BLD AUTO: 0.1 K/UL (ref 0–0.5)
EOSINOPHIL NFR BLD: 0.9 % (ref 0–8)
ERYTHROCYTE [DISTWIDTH] IN BLOOD BY AUTOMATED COUNT: 13.7 % (ref 11.5–14.5)
EST. GFR  (AFRICAN AMERICAN): 49.7 ML/MIN/1.73 M^2
EST. GFR  (NON AFRICAN AMERICAN): 43.1 ML/MIN/1.73 M^2
GLUCOSE SERPL-MCNC: 101 MG/DL (ref 70–110)
GLUCOSE UR QL STRIP: NEGATIVE
HCT VFR BLD AUTO: 38.9 % (ref 37–48.5)
HGB BLD-MCNC: 12.2 G/DL (ref 12–16)
HGB UR QL STRIP: ABNORMAL
HYALINE CASTS UR QL AUTO: 0 /LPF
IMM GRANULOCYTES # BLD AUTO: 0.02 K/UL (ref 0–0.04)
IMM GRANULOCYTES NFR BLD AUTO: 0.3 % (ref 0–0.5)
INR PPP: 1 (ref 0.8–1.2)
KETONES UR QL STRIP: ABNORMAL
LACTATE SERPL-SCNC: 0.7 MMOL/L (ref 0.5–2.2)
LEUKOCYTE ESTERASE UR QL STRIP: NEGATIVE
LYMPHOCYTES # BLD AUTO: 1.6 K/UL (ref 1–4.8)
LYMPHOCYTES NFR BLD: 21.4 % (ref 18–48)
MCH RBC QN AUTO: 31.4 PG (ref 27–31)
MCHC RBC AUTO-ENTMCNC: 31.4 G/DL (ref 32–36)
MCV RBC AUTO: 100 FL (ref 82–98)
MICROSCOPIC COMMENT: ABNORMAL
MONOCYTES # BLD AUTO: 0.7 K/UL (ref 0.3–1)
MONOCYTES NFR BLD: 8.9 % (ref 4–15)
NEUTROPHILS # BLD AUTO: 5.2 K/UL (ref 1.8–7.7)
NEUTROPHILS NFR BLD: 68 % (ref 38–73)
NITRITE UR QL STRIP: NEGATIVE
NRBC BLD-RTO: 0 /100 WBC
PH UR STRIP: 7 [PH] (ref 5–8)
PLATELET # BLD AUTO: 168 K/UL (ref 150–350)
PMV BLD AUTO: 11.4 FL (ref 9.2–12.9)
POTASSIUM SERPL-SCNC: 4.4 MMOL/L (ref 3.5–5.1)
PROT SERPL-MCNC: 7.5 G/DL (ref 6–8.4)
PROT UR QL STRIP: ABNORMAL
PROTHROMBIN TIME: 10.3 SEC (ref 9–12.5)
RBC # BLD AUTO: 3.88 M/UL (ref 4–5.4)
RBC #/AREA URNS AUTO: >100 /HPF (ref 0–4)
SODIUM SERPL-SCNC: 140 MMOL/L (ref 136–145)
SP GR UR STRIP: 1.01 (ref 1–1.03)
URN SPEC COLLECT METH UR: ABNORMAL
WBC # BLD AUTO: 7.68 K/UL (ref 3.9–12.7)
WBC #/AREA URNS AUTO: 0 /HPF (ref 0–5)

## 2020-05-26 PROCEDURE — 80053 COMPREHEN METABOLIC PANEL: CPT

## 2020-05-26 PROCEDURE — 85610 PROTHROMBIN TIME: CPT

## 2020-05-26 PROCEDURE — 83605 ASSAY OF LACTIC ACID: CPT

## 2020-05-26 PROCEDURE — 85025 COMPLETE CBC W/AUTO DIFF WBC: CPT

## 2020-05-26 PROCEDURE — 25000003 PHARM REV CODE 250: Performed by: STUDENT IN AN ORGANIZED HEALTH CARE EDUCATION/TRAINING PROGRAM

## 2020-05-26 PROCEDURE — 81001 URINALYSIS AUTO W/SCOPE: CPT

## 2020-05-26 RX ORDER — CEPHALEXIN 500 MG/1
500 CAPSULE ORAL 4 TIMES DAILY
Qty: 20 CAPSULE | Refills: 0 | Status: SHIPPED | OUTPATIENT
Start: 2020-05-26 | End: 2020-05-31

## 2020-05-26 RX ORDER — CEPHALEXIN 500 MG/1
500 CAPSULE ORAL
Status: COMPLETED | OUTPATIENT
Start: 2020-05-26 | End: 2020-05-26

## 2020-05-26 RX ADMIN — CEPHALEXIN 500 MG: 500 CAPSULE ORAL at 01:05

## 2020-05-26 NOTE — ED NOTES
Pt resting comfortably in stretcher, no apparent distress noted. Pt denies pain at this time. Pt provided with additional blankets. Denies needs at this time. Side rails raised x2, bed locked and low.

## 2020-05-26 NOTE — ED TRIAGE NOTES
"Shima Sorto, a 79 y.o. female presents to the ED w/ complaint of hematuria. Pt reports blood in her urine beginning around 1930 last night. Pt reports that color has darkened with each void. Pt states "It's like the color of a merlot wine now." Pt also reports associated urinary frequency. Pt denies blood thinner use. Denies injury/assault/ trauma. Pt denies dizziness/lightheadedness, SOB, CP, N/V, abdominal pain, urinary discomforts. Pt reports PMH kidney stones and states "I never have symptoms when I have stones, that's the problem and why I'm here."    Triage note:  Chief Complaint   Patient presents with    Hematuria     Pt reports noticing dark blood in her urine at 7:30pm tonight. Pt with hx of kidney stones. Pt denies painful/burning urination, N/V, fever/chills, and abdominal pain at this time.     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

## 2020-05-26 NOTE — TELEPHONE ENCOUNTER
Reports urinating dark red blood. Advised per protocol to be seen in the ED, now, for further evaluation. Verbalizes understanding.      Reason for Disposition   Passing pure blood or large blood clots (i.e., size > a dime) (Exception: feng or small strands)    Additional Information   Negative: Shock suspected (e.g., cold/pale/clammy skin, too weak to stand, low BP, rapid pulse)   Negative: Sounds like a life-threatening emergency to the triager   Negative: Recent back or abdominal injury   Negative: Recent genital injury   Negative: [1] Unable to urinate (or only a few drops) > 4 hours AND [2] bladder feels very full (e.g., palpable bladder or strong urge to urinate)    Protocols used: ST URINE - BLOOD IN-A-AH

## 2020-05-26 NOTE — ED PROVIDER NOTES
Encounter Date: 5/25/2020       History     Chief Complaint   Patient presents with    Hematuria     Pt reports noticing dark blood in her urine at 7:30pm tonight. Pt with hx of kidney stones. Pt denies painful/burning urination, N/V, fever/chills, and abdominal pain at this time.     79-year-old  female with a history of breast cancer, stage 3 chronic kidney disease, htn, and kidney stones (last one 5 years ago) presents with blood in her urine.  Patient urinated around 6:00 p.m. and noticed that it was red.  At 1st she thought it may have come from her vagina but she had another episode of seeing red in her urine.  Patient denies any pain on urination.  However she has had increased frequency and urgency for the past week.  Patient denies any chest pain, shortness of breath, fever, chills, abdominal pain, nausea, vomiting.         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    Visual impairment     reading glasses     Past Surgical History:   Procedure Laterality Date    BREAST CYST ASPIRATION Right 1999    BREAST LUMPECTOMY Right 2018    with radiation    COLONOSCOPY N/A 7/24/2018    Procedure: COLONOSCOPY;  Surgeon: Juan Miguel Pena MD;  Location: [facility] (4TH FLR);  Service: Endoscopy;  Laterality: N/A;    INJECTION FOR SENTINEL NODE IDENTIFICATION Right 8/17/2018    Procedure: INJECTION, FOR SENTINEL NODE IDENTIFICATION;  Surgeon: Abby Mason MD;  Location: [address];  Service: General;   Laterality: Right;    interstim placed stage 1      and removed    KIDNEY STONE SURGERY      @ [facility]    MASTECTOMY, PARTIAL Right 2018    Procedure: MASTECTOMY, PARTIAL-US guided;  Surgeon: Abby Mason MD;  Location: [facility] OR [address] [place] [address];  Service: General;  Laterality: Right;    MOHS procedure      SENTINEL LYMPH NODE BIOPSY Right 2018    Procedure: BIOPSY, LYMPH NODE, SENTINEL;  Surgeon: Abby Mason MD;  Location: [facility] OR [address] [place] [address];  Service: General;  Laterality: Right;     Family History   Problem Relation Age of Onset    Kidney disease Mother     Heart disease Father     Melanoma Father     Heart attack Father     Breast cancer Maternal Aunt     Hearing loss Son     Hyperlipidemia Son     Anesthesia problems Neg Hx     Malignant hypertension Neg Hx     Hypotension Neg Hx     Malignant hyperthermia Neg Hx     Pseudochol deficiency Neg Hx     Colon cancer Neg Hx     Ovarian cancer Neg Hx     Psoriasis Neg Hx     Lupus Neg Hx     Eczema Neg Hx     Acne Neg Hx      Social History     Tobacco Use    Smoking status: Former Smoker     Packs/day: 0.50     Years: 8.00     Pack years: 4.00     Last attempt to quit: 1964     Years since quittin.7    Smokeless tobacco: Never Used   Substance Use Topics    Alcohol use: Yes     Frequency: Monthly or less     Drinks per session: 1 or 2     Binge frequency: Never     Comment: occasional, 1/ month    Drug use: No     Review of Systems   Constitutional: Negative for chills and fever.   HENT: Negative for congestion and sore throat.    Respiratory: Negative for shortness of breath.    Cardiovascular: Negative for chest pain and palpitations.   Gastrointestinal: Negative for abdominal pain, blood in stool, nausea and vomiting.   Genitourinary: Positive for frequency, hematuria and urgency. Negative for dysuria and flank pain.   Musculoskeletal: Negative for arthralgias and myalgias.   Skin: Negative for rash and wound.    Neurological: Negative for dizziness and light-headedness.   Psychiatric/Behavioral: Negative for agitation and confusion.       Physical Exam     Initial Vitals [05/25/20 2255]   BP Pulse Resp Temp SpO2   (!) 157/78 75 18 98.1 °F (36.7 °C) 97 %      MAP       --         Physical Exam    Nursing note and vitals reviewed.  Constitutional: She appears well-developed and well-nourished.   HENT:   Head: Normocephalic and atraumatic.   Eyes: EOM are normal.   Neck: Normal range of motion. No JVD present.   Cardiovascular: Normal rate, regular rhythm, normal heart sounds and intact distal pulses. Exam reveals no gallop and no friction rub.    No murmur heard.  Pulmonary/Chest: Breath sounds normal. No respiratory distress.   Abdominal: Soft. Bowel sounds are normal. She exhibits no distension. There is no tenderness.   Musculoskeletal: Normal range of motion.   Neurological: She is alert and oriented to person, place, and time.   Skin: Skin is warm and dry.   Psychiatric: She has a normal mood and affect.         ED Course   Procedures  Labs Reviewed   URINALYSIS, REFLEX TO URINE CULTURE - Abnormal; Notable for the following components:       Result Value    Color, UA Red (*)     Appearance, UA Cloudy (*)     Protein, UA 2+ (*)     Ketones, UA Trace (*)     Occult Blood UA 3+ (*)     All other components within normal limits    Narrative:     Preferred Collection Type->Urine, Clean Catch   CBC W/ AUTO DIFFERENTIAL - Abnormal; Notable for the following components:    RBC 3.88 (*)     Mean Corpuscular Volume 100 (*)     Mean Corpuscular Hemoglobin 31.4 (*)     Mean Corpuscular Hemoglobin Conc 31.4 (*)     All other components within normal limits   COMPREHENSIVE METABOLIC PANEL - Abnormal; Notable for the following components:    BUN, Bld 24 (*)     Alkaline Phosphatase 45 (*)     eGFR if  49.7 (*)     eGFR if non  43.1 (*)     All other components within normal limits   URINALYSIS  MICROSCOPIC - Abnormal; Notable for the following components:    RBC, UA >100 (*)     All other components within normal limits    Narrative:     Preferred Collection Type->Urine, Clean Catch   LACTIC ACID, PLASMA   PROTIME-INR          Imaging Results    None          Medical Decision Making:   Initial Assessment:   79-year-old  female with a history of breast cancer, stage 3 chronic kidney disease, htn, and kidney stones (last one 5 years ago) presents with blood in her urine. She has also had increased urgency and frequency for the past week.   Differential Diagnosis:   1. Acute cystitis  2. Kidney stone  3. Glomerular disease    Most likely a simple acute cystitis. Patient may have a kidney stone but she has no pain. Chance that she has a glomerular disease because she does have protein in her urine as well. Her Cr. Is stable though. Will treat the patient for a UTI. 5 day course of keflex. Given strict instructions to follow up and return precautions              Attending Attestation:   Physician Attestation Statement for Resident:  As the supervising MD   Physician Attestation Statement: I have personally seen and examined this patient.   I agree with the above history. -:   As the supervising MD I agree with the above PE.    As the supervising MD I agree with the above treatment, course, plan, and disposition.   -: 79-year-old female presenting to emergency department with complaint of hematuria and increased urinary frequency and urgency.  She is afebrile, stable, nontoxic.  No CVA or abdominal tenderness, no abdominal pain.  Urinalysis with hematuria, and symptomatically consistent with UTI.  Labs reviewed, no acute abnormality.  I discussed imaging with this patient at bedside.  She prefers to hold off at this time, will return if symptoms worsen.  She understands that there is a possibility that there could be a kidney stone, and feels comfortable declining CT at this time.  Will discharge home  with antibiotics, and close primary care follow-up.  Return precautions were discussed at bedside.  Discharged home in stable condition.  I have reviewed and agree with the residents interpretation of the following: lab data.                                  Clinical Impression:       ICD-10-CM ICD-9-CM   1. Hematuria, unspecified type R31.9 599.70   2. Acute cystitis with hematuria N30.01 595.0         Disposition:   Disposition: Discharged  Condition: Stable     ED Disposition Condition    Discharge Stable        ED Prescriptions     Medication Sig Dispense Start Date End Date Auth. Provider    cephALEXin (KEFLEX) 500 MG capsule Take 1 capsule (500 mg total) by mouth 4 (four) times daily. for 5 days 20 capsule 5/26/2020 5/31/2020 Jhony Malin MD        Follow-up Information     Follow up With Specialties Details Why Contact Info    Zeynep Tomas MD Internal Medicine Schedule an appointment as soon as possible for a visit in 1 week As needed [id number]  [place] [address]  [phone]                                       Jhony Malin MD  Resident  05/26/20 0218       Christina Busch MD  05/26/20 [id number]

## 2020-05-26 NOTE — TELEPHONE ENCOUNTER
"    Additional Information   Negative: Shock suspected (e.g., cold/pale/clammy skin, too weak to stand, low BP, rapid pulse)   Negative: Sounds like a life-threatening emergency to the triager   Negative: Urinary catheter, questions about   Negative: Recent back or abdominal injury   Negative: Recent genital injury   Negative: [1] Unable to urinate (or only a few drops) > 4 hours AND [2] bladder feels very full (e.g., palpable bladder or strong urge to urinate)   Negative: Passing pure blood or large blood clots (i.e., size > a dime) (Exception: feng or small strands)   Negative: Fever > 100.5 F (38.1 C)   Negative: Patient sounds very sick or weak to the triager   Negative: Known sickle cell disease   Negative: Taking Coumadin (warfarin) or other strong blood thinner, or known bleeding disorder (e.g., thrombocytopenia)   Negative: Side (flank) or back pain present   Negative: Pain or burning with passing urine   Negative: [1] Pink or red-colored urine and likely from food (beets, rhubarb, red food dye) AND [2] lasts > 24 hours after stopping food   Negative: Blood in urine  (Exception: could be normal menstrual bleeding)   Negative: [1] Female AND [2] before menopause AND [3] could be normal menstrual bleeding   Negative: Pink or red-colored urine and likely from food (beets, rhubarb, red food dye)    Protocols used: URINE - BLOOD IN-A-    Pt stated she feels like she has a UTI. Stated when she wiped, she saw a small amount of blood on her tissue. Pt answered "no" to all triage questions above. Advised a message will be sent to the PCP to follow up. Advised to come back if she becomes worse or has any other questions or concerns. Pt verbalized understanding.  "

## 2020-05-26 NOTE — DISCHARGE INSTRUCTIONS
Diagnosis: Hematuria (blood in urine), suspected urinary tract infection    Tests today showed:   Labs Reviewed   URINALYSIS, REFLEX TO URINE CULTURE - Abnormal; Notable for the following components:       Result Value    Color, UA Red (*)     Appearance, UA Cloudy (*)     Protein, UA 2+ (*)     Ketones, UA Trace (*)     Occult Blood UA 3+ (*)     All other components within normal limits    Narrative:     Preferred Collection Type->Urine, Clean Catch   CBC W/ AUTO DIFFERENTIAL   COMPREHENSIVE METABOLIC PANEL   LACTIC ACID, PLASMA   PROTIME-INR   URINALYSIS MICROSCOPIC     Imaging Results    None         Treatments you had today:   Medications - No data to display    Home Care Instructions:  - Take the prescribed antibiotic as directed  - Stay well hydrated  - For relief of burning and the feeling of urgency, take Azo over-the-counter according to packaging directions for up to two days. This medication may turn your urine yellow-orange, and may stain clothing.  - Continue taking your home medications as prescribed    Take ibuprofen (also called Advil, Motrin) for your pain. This medicine is available over-the-counter in 200 mg tablets.  - You may take 600 mg every 6 hours, or 800 mg every 8 hours as needed   - Do not take more than this amount, as it can cause kidney problems, bleeding in your stomach, and other serious problems.   - Do not also take naproxen (Aleve) at the same time or on the same day  - If you have heart problems or uncontrolled high blood pressure, you should not take ibuprofen for more than 3 days without discussing with your doctor    Follow-up plan:  - Follow-up with: Primary care doctor within 3 - 5 days  - Additional testing and/or evaluation as directed by your primary doctor    Return to the Emergency Department for symptoms including but not limited to: worsening symptoms, severe abdominal or back pain, shortness of breath or chest pain, vomiting with inability to hold down fluids,  fevers greater than 100.4°F, dizziness, passing out/fainting/unconsciousness, or other concerning symptoms.

## 2020-05-27 ENCOUNTER — NURSE TRIAGE (OUTPATIENT)
Dept: ADMINISTRATIVE | Facility: CLINIC | Age: 80
End: 2020-05-27

## 2020-05-27 ENCOUNTER — HOSPITAL ENCOUNTER (EMERGENCY)
Facility: HOSPITAL | Age: 80
Discharge: HOME | End: 2020-05-27
Attending: EMERGENCY MEDICINE
Payer: MEDICARE

## 2020-05-27 VITALS
DIASTOLIC BLOOD PRESSURE: 65 MMHG | HEIGHT: 67 IN | TEMPERATURE: 99 F | HEART RATE: 74 BPM | BODY MASS INDEX: 19.46 KG/M2 | RESPIRATION RATE: 18 BRPM | WEIGHT: 124 LBS | SYSTOLIC BLOOD PRESSURE: 152 MMHG | OXYGEN SATURATION: 100 %

## 2020-05-27 DIAGNOSIS — M79.606 LEG PAIN: ICD-10-CM

## 2020-05-27 DIAGNOSIS — M79.89 LEG SWELLING: Primary | ICD-10-CM

## 2020-05-27 PROCEDURE — 99284 PR EMERGENCY DEPT VISIT,LEVEL IV: ICD-10-PCS | Mod: ,,, | Performed by: PHYSICIAN ASSISTANT

## 2020-05-27 PROCEDURE — 99284 EMERGENCY DEPT VISIT MOD MDM: CPT | Mod: 25

## 2020-05-27 PROCEDURE — 99284 EMERGENCY DEPT VISIT MOD MDM: CPT | Mod: ,,, | Performed by: PHYSICIAN ASSISTANT

## 2020-05-27 NOTE — TELEPHONE ENCOUNTER
Yesterday started with tingling and tightness around left knee as if had a tourniquet on it. Then the same on right leg. Then feeling extended to ankles. Has pain as well. Improved with elevation of legs. Has a UTI. Went to ER on Monday night and was given Cephalexin for UTI.

## 2020-05-27 NOTE — ED NOTES
Patient identifiers verified and correct for Ms Sorto  C/C: Pain tin BLE from knees to ankles SEE NN  APPEARANCE: awake and alert in NAD.  SKIN: warm, dry and intact. No breakdown or bruising.  MUSCULOSKELETAL: Patient moving all extremities spontaneously, no obvious swelling or deformities noted. Ambulates independently.  RESPIRATORY: Denies shortness of breath.Respirations unlabored.   CARDIAC: Denies CP, 2+ distal pulses; no peripheral edema  ABDOMEN: S/ND/NT, Denies nausea  : voids spontaneously, denies difficulty  Neurologic: AAO x 4; follows commands equal strength in all extremities; denies numbness/tingling. Denies dizziness Denies weakness, tingling pain

## 2020-05-27 NOTE — ED PROVIDER NOTES
"Encounter Date: 5/27/2020       History     Chief Complaint   Patient presents with    Leg Pain     started with pain to L leg, 1 month ago has sciatic nerve problem, on antibiotics for uti,, r foot feels like band around it     79-year-old female with history of breast cancer on aromatase inhibitor, hypertension, CKD 3 presents for bilateral lower leg pain for about 5 hr.  She reports that she felt like there were tourniquets around my legs".  Pain was worse with hanging her legs down and improved with elevating them.  She has significantly decreased pain now without taking any medications.  She reports associated bilateral leg swelling and tightness" in her knees.  She has intermittent tingling sensation in her left foot which is chronic as well as sciatica that usually causes her to have low back and buttock pain.  She denies any weakness, decreased range of motion, fevers/chills, skin changes, injury, shortness of breath, orthopnea or chest pain.  She is able to ambulate without difficulty. No history of DVT/PE.        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    Visual impairment     reading glasses     Past Surgical History:   Procedure Laterality Date    BREAST CYST ASPIRATION Right 1999    BREAST LUMPECTOMY Right 2018    with radiation    COLONOSCOPY N/A 7/24/2018    Procedure: COLONOSCOPY;  Surgeon: Juan Miguel Pena MD;  Location: [facility] ([address] [place] [address]);  Service: Endoscopy;  Laterality: N/A; "    INJECTION FOR SENTINEL NODE IDENTIFICATION Right 2018    Procedure: INJECTION, FOR SENTINEL NODE IDENTIFICATION;  Surgeon: Abby Mason MD;  Location: [facility] OR [address] [place] [address];  Service: General;  Laterality: Right;    interstim placed stage 1      and removed    KIDNEY STONE SURGERY      @ [facility]    MASTECTOMY, PARTIAL Right 2018    Procedure: MASTECTOMY, PARTIAL-US guided;  Surgeon: Abby Mason MD;  Location: [facility] OR [address] [place] [address];  Service: General;  Laterality: Right;    MOHS procedure      SENTINEL LYMPH NODE BIOPSY Right 2018    Procedure: BIOPSY, LYMPH NODE, SENTINEL;  Surgeon: Abby Mason MD;  Location: [facility] OR [address] [place] [address];  Service: General;  Laterality: Right;     Family History   Problem Relation Age of Onset    Kidney disease Mother     Heart disease Father     Melanoma Father     Heart attack Father     Breast cancer Maternal Aunt     Hearing loss Son     Hyperlipidemia Son     Anesthesia problems Neg Hx     Malignant hypertension Neg Hx     Hypotension Neg Hx     Malignant hyperthermia Neg Hx     Pseudochol deficiency Neg Hx     Colon cancer Neg Hx     Ovarian cancer Neg Hx     Psoriasis Neg Hx     Lupus Neg Hx     Eczema Neg Hx     Acne Neg Hx      Social History     Tobacco Use    Smoking status: Former Smoker     Packs/day: 0.50     Years: 8.00     Pack years: 4.00     Last attempt to quit: 1964     Years since quittin.8    Smokeless tobacco: Never Used   Substance Use Topics    Alcohol use: Yes     Frequency: Monthly or less     Drinks per session: 1 or 2     Binge frequency: Never     Comment: occasional, 1/ month    Drug use: No     Review of Systems   Constitutional: Negative for fever.   HENT: Negative for sore throat.    Respiratory: Negative for shortness of breath.    Cardiovascular: Positive for leg swelling. Negative for chest pain.   Gastrointestinal: Negative for nausea.   Genitourinary: Negative for dysuria.   Musculoskeletal: Positive  for myalgias. Negative for back pain and gait problem.   Skin: Negative for rash.   Neurological: Negative for weakness.   Hematological: Does not bruise/bleed easily.       Physical Exam     Initial Vitals [05/27/20 1640]   BP Pulse Resp Temp SpO2   (!) 152/65 74 18 98.5 °F (36.9 °C) 100 %      MAP       --         Physical Exam    Nursing note and vitals reviewed.  Constitutional: She appears well-developed and well-nourished.   HENT:   Head: Normocephalic and atraumatic.   Eyes: EOM are normal. Pupils are equal, round, and reactive to light.   Neck: Normal range of motion. Neck supple.   Cardiovascular: Normal rate, regular rhythm, normal heart sounds and intact distal pulses. Exam reveals no gallop and no friction rub.    No murmur heard.  1+ pitting edema bilateral lower legs to the mid calf.  There are tender, there is no erythema or warmth   Pulmonary/Chest: Breath sounds normal. No respiratory distress. She has no wheezes. She has no rhonchi. She has no rales. She exhibits no tenderness.   Musculoskeletal: Normal range of motion.   Neurological: She is alert and oriented to person, place, and time. She has normal strength. No sensory deficit.   Skin: Skin is warm and dry.   Psychiatric: She has a normal mood and affect.         ED Course   Procedures  Labs Reviewed - No data to display       Imaging Results          US Lower Extremity Veins Bilateral (Final result)  Result time 05/27/20 19:07:11    Final result by Juliocesar Mariscal MD (05/27/20 19:07:11)                 Impression:      No evidence of deep venous thrombosis in either lower extremity.    Electronically signed by resident: José Miguel Clark  Date:    05/27/2020  Time:    19:00    Electronically signed by: Juliocesar Mariscal MD  Date:    05/27/2020  Time:    19:07             Narrative:    EXAMINATION:  US LOWER EXTREMITY VEINS BILATERAL    CLINICAL HISTORY:  Other specified soft tissue disorders    TECHNIQUE:  Duplex and color flow Doppler and dynamic  compression was performed of the bilateral lower extremity veins was performed.    COMPARISON:  None    FINDINGS:  Right thigh veins: The common femoral, femoral, popliteal, upper greater saphenous, and deep femoral veins are patent and free of thrombus. The veins are normally compressible and have normal phasic flow and augmentation response.    Right calf veins: The visualized calf veins are patent.    Left thigh veins: The common femoral, femoral, popliteal, upper greater saphenous, and deep femoral veins are patent and free of thrombus. The veins are normally compressible and have normal phasic flow and augmentation response.    Left calf veins: The visualized calf veins are patent.    Miscellaneous: None                                 Medical Decision Making:   History:   Old Medical Records: I decided to obtain old medical records.  Old Records Summarized: records from previous admission(s).       <> Summary of Records: Patient seen in the ED last night for hematuria and diagnosed with UTI  Initial Assessment:   79-year-old female presenting for bilateral leg swelling and pain.  She is hypertensive 150/65 with otherwise normal vitals.  She has pitting edema in her bilateral lower legs and tenderness but no erythema, normal strength and sensation.  Differential Diagnosis:   Given her history of cancer am concerned about DVT  Venous insufficiency  I considered heart failure but I think this is unlikely given short duration of symptoms and no shortness of breath, chest pain or cardiac history  Clinical exam inconsistent with cellulitis  Clinical Tests:   Radiological Study: Ordered and Reviewed  ED Management:  Will do leg ultrasound and reassess.    Ultrasound negative for DVT bilaterally.  I suspect the patient's symptoms are due venous insufficiency.  I advised her to use compression stockings and follow up with her PCP. Stressed the importance of follow-up, strict ED return precautions given.  Patient voiced  understanding and is comfortable with discharge.                                    Clinical Impression:       ICD-10-CM ICD-9-CM   1. Leg swelling M79.89 729.81   2. Leg pain M79.606 729.5         Disposition:   Disposition: Discharged  Condition: Stable                        Shea Estevez PA-C  05/27/20 2018

## 2020-05-27 NOTE — TELEPHONE ENCOUNTER
Moderate swelling to BLE, worse on Left. At the end of call pt reported she had been feeling a bit woozy off and on. Pt advised to get treatment at ER now. She VU. Asked about COVID testing. RN advised testing can be done at ER, [facility], and community testing centers.       Reason for Disposition   Diabetes mellitus or weak immune system (e.g., HIV positive, cancer chemotherapy, transplant patient)   Thigh, calf, or ankle swelling in both legs, but one side is definitely more swollen    Additional Information   Negative: Shock suspected (e.g., cold/pale/clammy skin, too weak to stand, low BP, rapid pulse)   Negative: Sounds like a life-threatening emergency to the triager   Negative: [1] Unable to urinate (or only a few drops) > 4 hours AND     [2] bladder feels very full (e.g., palpable bladder or strong urge to urinate)   Negative: Passing pure blood or large blood clots (i.e., size > a dime)  (Exceptions: flecks, small strands, or pinkish-red color)   Negative: Patient sounds very sick or weak to the triager   Negative: [1] SEVERE pain (e.g., excruciating) AND [2] no improvement 2 hours after pain medications   Negative: [1] Fever > 100.5 F (38.1 C) AND [2] new onset since starting antibiotics   Negative: [1] Side (flank) or lower back pain AND [2] new onset since starting antibiotics   Negative: [1] Taking antibiotic > 24 hours for UTI AND [2] flank or lower back pain worsening   Negative: [1] Vomiting 2 or more times AND [2] interferes with taking oral antibiotic   Negative: [1] Taking antibiotic > 24 hours for UTI (urinary tract or bladder infection) AND [2] fever persists   Negative: [1] Taking antibiotic > 72 hours (3 days) for UTI AND [2] painful urination or frequency not improved   Negative: [1] Taking antibiotic > 72 hours (3 days) for UTI AND [2] flank or lower back pain not improved   Negative: Sickle cell disease   Negative: Sounds like a life-threatening emergency to the triager    Negative: Difficulty breathing at rest   Negative: Entire foot is cool or blue in comparison to other side   Negative: SEVERE swelling (e.g., swelling extends above knee, entire leg is swollen, weeping fluid)   Negative: Thigh or calf pain and only 1 side and present > 1 hour   Negative: Thigh, calf, or ankle swelling in only one leg    Protocols used: ST URINARY TRACT INFECTION ON ANTIBIOTIC FOLLOW-UP CALL - FEMALE-A-, LEG SWELLING AND EDEMA-A-OH

## 2020-05-27 NOTE — ED TRIAGE NOTES
"Patient states both leg swelling and tightness in knees 5 hours PTA, felt like a "tourniquet" States currently feels better. Currently on Keflex for UTI.   "

## 2020-05-27 NOTE — PROVIDER PROGRESS NOTES - EMERGENCY DEPT.
Emergency Department TeleTRIAGE Encounter Note      CHIEF COMPLAINT    Chief Complaint   Patient presents with    Leg Pain     started with pain to L leg, 1 month ago has sciatic nerve problem, on antibiotics for uti,, r foot feels like band around it       VITAL SIGNS   Initial Vitals [05/27/20 1640]   BP Pulse Resp Temp SpO2   (!) 152/65 74 18 98.5 °F (36.9 °C) 100 %      MAP       --            ALLERGIES    Review of patient's allergies indicates:   Allergen Reactions    Asparagus Rash       PROVIDER TRIAGE NOTE  Patient with past medical history breast cancer, vertigo, anemia, hypertension, venous insufficiency presents to the ED for evaluation of leg pain.  Patient reports less leg pain and swelling that started approximately 5 hr ago.  She states that it feels as if a tourniquet was placed just below her left knee.  Swelling extends down to her ankle.  Similar but not as severe pain also noted to the right leg.  She denies trauma or injury.  She has not taken any medications for the pain.  She has never had a blood clot before.  She denies chest pain or shortness of breath.      ORDERS  Labs Reviewed - No data to display    ED Orders (720h ago, onward)    Start Ordered     Status Ordering Provider    05/27/20 1650 05/27/20 1649  US Lower Extremity Veins Left  1 time imaging      Ordered SHADIA LÓPEZ            Virtual Visit Note: The provider triage portion of this emergency department evaluation and documentation was performed via [employer]-time, a HIPAA-compliant telemedicine application, in concert with a tele-presenter in the room. A face to face patient evaluation with one of my colleagues will occur once the patient is placed in an emergency department room.      DISCLAIMER: This note was prepared with [employer] voice recognition transcription software. Garbled syntax, mangled pronouns, and other bizarre constructions may be attributed to that software system.

## 2020-05-28 NOTE — DISCHARGE INSTRUCTIONS
Diagnosis: Leg swelling, leg pain    There are no signs of blood clots on your ultrasound.  I am not sure what is causing your leg swelling, however you should use compression stockings and elevate the legs to help with swelling.  You can continue to take Tylenol, up to 3 g daily which is 6 of the 500 mg extra strength tablets as needed for pain.    Please schedule an appointment with your primary care doctor for follow-up.  If you start to have any severe swelling, color changes in the legs or other concerning findings please return to the emergency department.

## 2020-05-28 NOTE — TELEPHONE ENCOUNTER
Pt stated she realized she hadn't been wearing the compression stockings and thinks that's why she had the pain and swelling in her legs. Stated now she has them on and she is feeling better. Pt stated she will see us in the office in July.

## 2020-05-31 PROCEDURE — 99454 REM MNTR PHYSIOL PARAM 16-30: CPT | Mod: PBBFAC | Performed by: INTERNAL MEDICINE

## 2020-06-04 ENCOUNTER — PATIENT OUTREACH (OUTPATIENT)
Dept: OTHER | Facility: OTHER | Age: 80
End: 2020-06-04

## 2020-06-04 NOTE — PROGRESS NOTES
Digital Medicine: Clinician Follow-Up    The history is provided by the patient.       HPI:  Called patient to follow up. Patient reports adherence to medication regimen daily and denies missed doses. Patient denies hypotensive s/sx (lightheadedness, dizziness, nausea, fatigue); patient denies hypertensive s/sx (SOB, CP, severe headaches, changes in vision, dizziness, fatigue, confusion, anxiety, nosebleeds).     Last 5 Patient Entered Readings                                      Current 30 Day Average: 122/65     Recent Readings 6/3/2020 6/3/2020 6/1/2020 6/1/2020 5/30/2020    SBP (mmHg) 135 135 119 119 110    DBP (mmHg) 61 61 63 63 61    Pulse 58 58 57 57 58        Assessment:  Reviewed recent readings and labs (last CMP drawn on 5/26/20). Per 2017 ACC/ AHA HTN guidelines (goal of BP < 130/80), current 30-day average is well controlled. Within the past month, SBPs have ranged  and DBPs have ranged 57-78.    Plan:  Continue current medication regimen.   Instructed patient to call if BP remains > 130/80 or if she begins to experience s/sx of hypotension.   Patients health  will be following up as scheduled.   I will continue to monitor regularly and will follow-up in 6-8 weeks, sooner if blood pressure begins to trend upward or downward.     Current medication regimen:  Hypertension Medications             amLODIPine (NORVASC) 2.5 MG tablet Take 1 tablet (2.5 mg total) by mouth once daily.    atenoloL (TENORMIN) 25 MG tablet TK 1 T PO QD    valsartan (DIOVAN) 320 MG tablet Take 1 tablet (320 mg total) by mouth once daily. Dose increase.         Patient denies having questions or concerns. Patient has my contact information and knows to call with any concerns or clinical changes.

## 2020-06-10 ENCOUNTER — PATIENT OUTREACH (OUTPATIENT)
Dept: OTHER | Facility: OTHER | Age: 80
End: 2020-06-10

## 2020-06-10 NOTE — PROGRESS NOTES
Digital Medicine: Health  Follow-Up    The history is provided by the patient.   Follow Up  Follow-up reason(s): routine education    Brief follow up completed with the patient. She stated that she is doing well and denies any issues at this time. She is feeling much better since her hospital visits last week.     Patient continue taking precautions to stay healthy (such as wearing a mask, etc).       Intervention/Plan    There are no preventive care reminders to display for this patient.    Last 5 Patient Entered Readings                                      Current 30 Day Average: 123/65     Recent Readings 6/6/2020 6/6/2020 6/5/2020 6/5/2020 6/3/2020    SBP (mmHg) 133 133 121 121 135    DBP (mmHg) 60 60 60 60 61    Pulse 61 61 55 55 58                      Medication Adherence Screening   She did not miss a dose this month.    Patient identified the following reasons for non-compliance: None      SDOH

## 2020-06-17 ENCOUNTER — OFFICE VISIT (OUTPATIENT)
Dept: HEMATOLOGY/ONCOLOGY | Facility: CLINIC | Age: 80
End: 2020-06-17
Payer: MEDICARE

## 2020-06-17 ENCOUNTER — HOSPITAL ENCOUNTER (OUTPATIENT)
Dept: RADIOLOGY | Facility: HOSPITAL | Age: 80
Discharge: HOME | End: 2020-06-17
Attending: NURSE PRACTITIONER
Payer: MEDICARE

## 2020-06-17 VITALS
WEIGHT: 127 LBS | BODY MASS INDEX: 19.93 KG/M2 | HEART RATE: 55 BPM | DIASTOLIC BLOOD PRESSURE: 65 MMHG | OXYGEN SATURATION: 97 % | SYSTOLIC BLOOD PRESSURE: 147 MMHG | HEIGHT: 67 IN | RESPIRATION RATE: 18 BRPM | TEMPERATURE: 99 F

## 2020-06-17 DIAGNOSIS — C50.411 MALIGNANT NEOPLASM OF UPPER-OUTER QUADRANT OF RIGHT BREAST IN FEMALE, ESTROGEN RECEPTOR POSITIVE: Primary | ICD-10-CM

## 2020-06-17 DIAGNOSIS — C50.411 MALIGNANT NEOPLASM OF UPPER-OUTER QUADRANT OF RIGHT BREAST IN FEMALE, ESTROGEN RECEPTOR POSITIVE: ICD-10-CM

## 2020-06-17 DIAGNOSIS — Z79.811 USE OF AROMATASE INHIBITORS: ICD-10-CM

## 2020-06-17 DIAGNOSIS — Z17.0 MALIGNANT NEOPLASM OF UPPER-OUTER QUADRANT OF RIGHT BREAST IN FEMALE, ESTROGEN RECEPTOR POSITIVE: ICD-10-CM

## 2020-06-17 DIAGNOSIS — Z17.0 MALIGNANT NEOPLASM OF UPPER-OUTER QUADRANT OF RIGHT BREAST IN FEMALE, ESTROGEN RECEPTOR POSITIVE: Primary | ICD-10-CM

## 2020-06-17 PROCEDURE — 77062 BREAST TOMOSYNTHESIS BI: CPT | Mod: 26,,, | Performed by: RADIOLOGY

## 2020-06-17 PROCEDURE — 77066 MAMMO DIGITAL DIAGNOSTIC BILAT WITH TOMOSYNTHESIS_CAD: ICD-10-PCS | Mod: 26,,, | Performed by: RADIOLOGY

## 2020-06-17 PROCEDURE — 76642 US BREAST RIGHT LIMITED: ICD-10-PCS | Mod: 26,RT,, | Performed by: RADIOLOGY

## 2020-06-17 PROCEDURE — 99213 OFFICE O/P EST LOW 20 MIN: CPT | Mod: S$PBB,,, | Performed by: INTERNAL MEDICINE

## 2020-06-17 PROCEDURE — 99213 PR OFFICE/OUTPT VISIT, EST, LEVL III, 20-29 MIN: ICD-10-PCS | Mod: S$PBB,,, | Performed by: INTERNAL MEDICINE

## 2020-06-17 PROCEDURE — 76642 ULTRASOUND BREAST LIMITED: CPT | Mod: 26,RT,, | Performed by: RADIOLOGY

## 2020-06-17 PROCEDURE — 77066 DX MAMMO INCL CAD BI: CPT | Mod: 26,,, | Performed by: RADIOLOGY

## 2020-06-17 PROCEDURE — 77062 MAMMO DIGITAL DIAGNOSTIC BILAT WITH TOMOSYNTHESIS_CAD: ICD-10-PCS | Mod: 26,,, | Performed by: RADIOLOGY

## 2020-06-17 PROCEDURE — 77066 DX MAMMO INCL CAD BI: CPT | Mod: TC,PO

## 2020-06-17 PROCEDURE — 76642 ULTRASOUND BREAST LIMITED: CPT | Mod: TC,PO,RT

## 2020-06-17 NOTE — PROGRESS NOTES
Subjective:       Patient ID: Shima Sorto is a 79 y.o. female.    Chief Complaint: No chief complaint on file.    HPI    Ms. Sorto returns today for follow up.  She has been on anastrazole since mid November 2018, and so fas has tolerated it well.    Briefly, she is a  79-year-old  female who diagnosed with breast cancer last year.  On 08/17/2018, she underwent a lumpectomy and sentinel lymph node biopsy for an 8 mm grade 1 invasive lobular carcinoma which was ER strongly positive, KS negative and HER-2 negative with a Ki-67 of 5% with the closest margin being 2 mm.  Two of 2 sentinel lymph nodes were negative for involvement.      She completed her radiation therapy and was subsequently started on AIs.  Her DXA scan last November had shown osteopenia.  A mammogram and breast ultrasound earlier today was read as BIRADS II, and a one year follow up was recommended.      Review of Systems      Overall she feels well.  She has tolerated the anastrazole quite well so far.   She denies any anxiety, depression, easy bruising, fevers, chills, night  sweats, weight loss, nausea, vomiting, diarrhea, constipation, diplopia, blurred vision, headache, chest pain, palpitations, shortness of breath, breast pain, abdominal pain, extremity pain, or difficulty ambulating.  The remainder of the ten-point ROS, including general, skin, lymph, H/N, cardiorespiratory, GI, , Neuro, Endocrine, and psychiatric is negative.     Objective:      Physical Exam        She is alert, oriented to time, place, person, pleasant, well      nourished, in no acute physical distress.                                    VITAL SIGNS:  Reviewed                                      HEENT:  Normal.  There are no nasal, oral, lip, gingival, auricular, lid,    or conjunctival lesions.  Mucosae are moist and pink, and there is no        thrush.  Pupils are equal, reactive to light and accommodation.              Extraocular muscle movements  are intact.  Dentition is good.  She is wearing braces.                                       NECK:  Supple without JVD, adenopathy, or thyromegaly.                       LUNGS:  Clear to auscultation without wheezing, rales, or rhonchi.           CARDIOVASCULAR:  Reveals an S1, S2, no murmurs, no rubs, no gallops.         ABDOMEN:  Soft, nontender, without organomegaly.  Bowel sounds are    present.                                                                     EXTREMITIES:  No cyanosis, clubbing, or edema.                               BREASTS:  She is status post right lumpectomy with a well-healed incision in the upper outer quadrant.    There are no masses in either breast.                                      LYMPHATIC:  There is no cervical, axillary, or supraclavicular adenopathy.   SKIN:  Warm and moist, without petechiae, rashes, induration, or ecchymoses.  There is mild erythema on the right breast from her XRT.          NEUROLOGIC:  DTRs are 0-1+ bilaterally, symmetrical, motor function is 5/5,and cranial nerves are  within normal limits.    Assessment:       1. Malignant neoplasm of upper-outer quadrant of right breast in female, estrogen receptor positive    2. Use of aromatase inhibitors      3.    Osteopenia  Plan:           I had a long discussion with her;  She will remain on anatsrazole through the end of October 2023, and see me again in 6 months (at her request).  Her mammogram will be repeated a year from now.    Her multiple questions were answered to her satisfaction.

## 2020-07-13 ENCOUNTER — LAB VISIT (OUTPATIENT)
Dept: LAB | Facility: HOSPITAL | Age: 80
End: 2020-07-13
Attending: INTERNAL MEDICINE
Payer: MEDICARE

## 2020-07-13 DIAGNOSIS — R68.89 COLD FEELING: ICD-10-CM

## 2020-07-13 DIAGNOSIS — I10 ESSENTIAL HYPERTENSION: ICD-10-CM

## 2020-07-13 LAB
ALBUMIN SERPL BCP-MCNC: 3.8 G/DL (ref 3.5–5.2)
ALP SERPL-CCNC: 42 U/L (ref 55–135)
ALT SERPL W/O P-5'-P-CCNC: 23 U/L (ref 10–44)
ANION GAP SERPL CALC-SCNC: 6 MMOL/L (ref 8–16)
AST SERPL-CCNC: 34 U/L (ref 10–40)
BASOPHILS # BLD AUTO: 0.03 K/UL (ref 0–0.2)
BASOPHILS NFR BLD: 0.7 % (ref 0–1.9)
BILIRUB SERPL-MCNC: 0.6 MG/DL (ref 0.1–1)
BUN SERPL-MCNC: 26 MG/DL (ref 8–23)
CALCIUM SERPL-MCNC: 9.2 MG/DL (ref 8.7–10.5)
CHLORIDE SERPL-SCNC: 106 MMOL/L (ref 95–110)
CHOLEST SERPL-MCNC: 193 MG/DL (ref 120–199)
CHOLEST/HDLC SERPL: 2.8 {RATIO} (ref 2–5)
CO2 SERPL-SCNC: 32 MMOL/L (ref 23–29)
CREAT SERPL-MCNC: 1.3 MG/DL (ref 0.5–1.4)
DIFFERENTIAL METHOD: ABNORMAL
EOSINOPHIL # BLD AUTO: 0.1 K/UL (ref 0–0.5)
EOSINOPHIL NFR BLD: 1.4 % (ref 0–8)
ERYTHROCYTE [DISTWIDTH] IN BLOOD BY AUTOMATED COUNT: 13.1 % (ref 11.5–14.5)
EST. GFR  (AFRICAN AMERICAN): 45.1 ML/MIN/1.73 M^2
EST. GFR  (NON AFRICAN AMERICAN): 39.1 ML/MIN/1.73 M^2
GLUCOSE SERPL-MCNC: 98 MG/DL (ref 70–110)
HCT VFR BLD AUTO: 37.8 % (ref 37–48.5)
HDLC SERPL-MCNC: 68 MG/DL (ref 40–75)
HDLC SERPL: 35.2 % (ref 20–50)
HGB BLD-MCNC: 11.7 G/DL (ref 12–16)
IMM GRANULOCYTES # BLD AUTO: 0.01 K/UL (ref 0–0.04)
IMM GRANULOCYTES NFR BLD AUTO: 0.2 % (ref 0–0.5)
LDLC SERPL CALC-MCNC: 116.4 MG/DL (ref 63–159)
LYMPHOCYTES # BLD AUTO: 1.5 K/UL (ref 1–4.8)
LYMPHOCYTES NFR BLD: 35.8 % (ref 18–48)
MCH RBC QN AUTO: 31 PG (ref 27–31)
MCHC RBC AUTO-ENTMCNC: 31 G/DL (ref 32–36)
MCV RBC AUTO: 100 FL (ref 82–98)
MONOCYTES # BLD AUTO: 0.4 K/UL (ref 0.3–1)
MONOCYTES NFR BLD: 10.3 % (ref 4–15)
NEUTROPHILS # BLD AUTO: 2.1 K/UL (ref 1.8–7.7)
NEUTROPHILS NFR BLD: 51.6 % (ref 38–73)
NONHDLC SERPL-MCNC: 125 MG/DL
NRBC BLD-RTO: 0 /100 WBC
PLATELET # BLD AUTO: 151 K/UL (ref 150–350)
PMV BLD AUTO: 12.1 FL (ref 9.2–12.9)
POTASSIUM SERPL-SCNC: 4.9 MMOL/L (ref 3.5–5.1)
PROT SERPL-MCNC: 6.7 G/DL (ref 6–8.4)
RBC # BLD AUTO: 3.77 M/UL (ref 4–5.4)
SODIUM SERPL-SCNC: 144 MMOL/L (ref 136–145)
TRIGL SERPL-MCNC: 43 MG/DL (ref 30–150)
TSH SERPL DL<=0.005 MIU/L-ACNC: 2.93 UIU/ML (ref 0.4–4)
WBC # BLD AUTO: 4.16 K/UL (ref 3.9–12.7)

## 2020-07-13 PROCEDURE — 80061 LIPID PANEL: CPT

## 2020-07-13 PROCEDURE — 85025 COMPLETE CBC W/AUTO DIFF WBC: CPT

## 2020-07-13 PROCEDURE — 36415 COLL VENOUS BLD VENIPUNCTURE: CPT

## 2020-07-13 PROCEDURE — 84443 ASSAY THYROID STIM HORMONE: CPT

## 2020-07-13 PROCEDURE — 80053 COMPREHEN METABOLIC PANEL: CPT

## 2020-07-17 DIAGNOSIS — Z71.89 COMPLEX CARE COORDINATION: ICD-10-CM

## 2020-07-21 ENCOUNTER — OFFICE VISIT (OUTPATIENT)
Dept: INTERNAL MEDICINE | Facility: CLINIC | Age: 80
End: 2020-07-21
Payer: MEDICARE

## 2020-07-21 ENCOUNTER — TELEPHONE (OUTPATIENT)
Dept: DERMATOLOGY | Facility: CLINIC | Age: 80
End: 2020-07-21

## 2020-07-21 VITALS
DIASTOLIC BLOOD PRESSURE: 60 MMHG | OXYGEN SATURATION: 99 % | HEART RATE: 53 BPM | BODY MASS INDEX: 19.98 KG/M2 | WEIGHT: 124.31 LBS | HEIGHT: 66 IN | SYSTOLIC BLOOD PRESSURE: 116 MMHG

## 2020-07-21 DIAGNOSIS — R21 RASH: ICD-10-CM

## 2020-07-21 DIAGNOSIS — H61.23 BILATERAL IMPACTED CERUMEN: Primary | ICD-10-CM

## 2020-07-21 DIAGNOSIS — R94.4 DECREASED GFR: ICD-10-CM

## 2020-07-21 PROCEDURE — 99999 PR PBB SHADOW E&M-EST. PATIENT-LVL V: CPT | Mod: PBBFAC,,, | Performed by: INTERNAL MEDICINE

## 2020-07-21 PROCEDURE — 99214 PR OFFICE/OUTPT VISIT, EST, LEVL IV, 30-39 MIN: ICD-10-PCS | Mod: S$PBB,,, | Performed by: INTERNAL MEDICINE

## 2020-07-21 PROCEDURE — 99215 OFFICE O/P EST HI 40 MIN: CPT | Mod: PBBFAC | Performed by: INTERNAL MEDICINE

## 2020-07-21 PROCEDURE — 99999 PR PBB SHADOW E&M-EST. PATIENT-LVL V: ICD-10-PCS | Mod: PBBFAC,,, | Performed by: INTERNAL MEDICINE

## 2020-07-21 PROCEDURE — 99214 OFFICE O/P EST MOD 30 MIN: CPT | Mod: S$PBB,,, | Performed by: INTERNAL MEDICINE

## 2020-07-21 NOTE — TELEPHONE ENCOUNTER
----- Message from Apoorva Lyn sent at 7/21/2020 11:56 AM CDT -----  Please assist in scheduling

## 2020-07-21 NOTE — PROGRESS NOTES
Subjective:      Patient ID: Shima Sorto is a 80 y.o. female.    Chief Complaint: Follow-up    HPI:  HPI   Patient comes in with an organized list several questions on it she only wished in opinion.    She shows me these areas on her thigh which she describes as mysterious bites.  They are very small like a pink and when they occur her it feels like she has a stinger.:  I wonder if these might not be vascular in nature.    Patient describes to me fall she had hitting her chest.  She states that if you touch the bones under the right breast in the right breast itself that this is uncomfortable.  We discussed that it could be related to that fall.    The patient states that she feels tired in fact much too tired.  She has been diagnosed with sleep apnea but has not had a correct dental appliance.  She is uncertain as to how much to correlate to this.    Patient had a ringing in her ear and it was felt that cerumen removal might help this but this has not been completed    Patient had been referred to wound care for an area near her ankle.  This has healed but now she states that at time she has a throbbing pain.    At night while lying on her the patient states that she occasionally feels 1.5 in from the crest of the ilium pain spot her abdomen.  It may be the left or the right.  It does not occur her when she is sitting or standing.  She does have a history of back pain.    Patient asks me to go over care [place]    Patient states she is still wobbly when she walks and has been to physical therapy and it was suggested that she focus as she is walking.  There did not appear to be any muscle strength issues  Patient Active Problem List   Diagnosis    Hypertension    Hyperoxaluria    Hypocitraturic calcium nephrolithiasis    Cystic kidney disease    Fatigue    Urinary retention    Age-related osteoporosis with current pathological fracture with routine healing    Hypoglycemia    Trigger middle finger  of right hand    Retinal hemorrhage of both eyes at about 1'oclock    Vestibular neuronitis    Vertigo    Obstructive sleep apnea    Venous insufficiency    Bradycardia    Screening for colon cancer    Malignant neoplasm of upper-outer quadrant of right breast in female, estrogen receptor positive    At risk for lymphedema    CKD (chronic kidney disease) stage 3, GFR 30-59 ml/min    Vitamin D deficiency    Use of aromatase inhibitors    Thrombocytopenia    Primary hyperoxaluria     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    Visual impairment     reading glasses     Past Surgical History:   Procedure Laterality Date    BREAST CYST ASPIRATION Right 1999    BREAST LUMPECTOMY Right 2018    with radiation    COLONOSCOPY N/A 7/24/2018    Procedure: COLONOSCOPY;  Surgeon: Juan Miguel Pena MD;  Location: [facility] (4TH FLR);  Service: Endoscopy;  Laterality: N/A;    INJECTION FOR SENTINEL NODE IDENTIFICATION Right 8/17/2018    Procedure: INJECTION, FOR SENTINEL NODE IDENTIFICATION;  Surgeon: Abby Mason MD;  Location: [facility] OR 2ND FLR;  Service: General;  Laterality: Right;    interstim placed stage 1      and removed    KIDNEY STONE SURGERY  2000    @ [employer]    MASTECTOMY, PARTIAL Right 8/17/2018    Procedure: MASTECTOMY, PARTIAL-US guided;  Surgeon: Abby Mason MD;  [facility];  Service: General;  Laterality: Right;    MOHS procedure      SENTINEL LYMPH NODE BIOPSY Right 8/17/2018    Procedure: BIOPSY, LYMPH NODE, SENTINEL;  Surgeon: Abby Mason  "MD;  Location: [facility] OR [address] [place] [address];  Service: General;  Laterality: Right;     Family History   Problem Relation Age of Onset    Kidney disease Mother     Heart disease Father     Melanoma Father     Heart attack Father     Breast cancer Maternal Aunt     Hearing loss Son     Hyperlipidemia Son     Anesthesia problems Neg Hx     Malignant hypertension Neg Hx     Hypotension Neg Hx     Malignant hyperthermia Neg Hx     Pseudochol deficiency Neg Hx     Colon cancer Neg Hx     Ovarian cancer Neg Hx     Psoriasis Neg Hx     Lupus Neg Hx     Eczema Neg Hx     Acne Neg Hx      Review of Systems   Constitutional: Positive for activity change. Negative for unexpected weight change.   HENT: Negative for hearing loss, rhinorrhea and trouble swallowing.    Eyes: Positive for visual disturbance. Negative for discharge.   Respiratory: Positive for chest tightness. Negative for wheezing.    Cardiovascular: Negative for chest pain and palpitations.   Gastrointestinal: Negative for blood in stool, constipation, diarrhea and vomiting.   Endocrine: Negative for polydipsia and polyuria.   Genitourinary: Negative for difficulty urinating, dysuria, hematuria and menstrual problem.   Musculoskeletal: Negative for arthralgias, joint swelling and neck pain.   Neurological: Negative for weakness and headaches.   Psychiatric/Behavioral: Negative for confusion and dysphoric mood.     Objective:     Vitals:    07/21/20 1105   BP: 116/60   Pulse: (!) 53   SpO2: 99%   Weight: 56.4 kg (124 lb 5.4 oz)   Height: 5' 6" (1.676 m)   PainSc: 0-No pain     Body mass index is 20.07 kg/m².  Physical Exam  Constitutional:       General: She is not in acute distress.     Appearance: She is well-developed.   Neck:      Thyroid: No thyromegaly.      Vascular: No carotid bruit.   Cardiovascular:      Rate and Rhythm: Normal rate and regular rhythm.      Chest Wall: PMI is not displaced.      Heart sounds: Normal heart sounds.   Pulmonary:      " Effort: Pulmonary effort is normal. No respiratory distress.      Breath sounds: Normal breath sounds.   Abdominal:      General: Bowel sounds are normal. There is no distension.      Palpations: Abdomen is soft.      Tenderness: There is no abdominal tenderness.   Neurological:      Mental Status: She is alert and oriented to person, place, and time.       Assessment:     1. Bilateral impacted cerumen    2. Rash    3. Decreased GFR      Plan:   Shima was seen today for follow-up.    Diagnoses and all orders for this visit:    Bilateral impacted cerumen  -     Ambulatory referral/consult to ENT; Future    Rash  -     Ambulatory referral/consult to Dermatology; Future    Decreased GFR  -     Basic metabolic panel; Future      Only 3 issues required follow-up all other issues were felt to be stable or an explanation given.  Greater than 20 min was spent at the appointment.  Most of the time was spent in discussion.  Problem List Items Addressed This Visit     None      Visit Diagnoses     Bilateral impacted cerumen    -  Primary    Relevant Orders    Ambulatory referral/consult to ENT    Rash        Relevant Orders    Ambulatory referral/consult to Dermatology    Decreased GFR        Relevant Orders    Basic metabolic panel        Orders Placed This Encounter   Procedures    Basic metabolic panel     Standing Status:   Future     Standing Expiration Date:   7/21/2021    Ambulatory referral/consult to Dermatology     Standing Status:   Future     Standing Expiration Date:   8/21/2021     Referral Priority:   Routine     Referral Type:   Consultation     Referral Reason:   Specialty Services Required     Requested Specialty:   Dermatology     Number of Visits Requested:   1    Ambulatory referral/consult to ENT     Standing Status:   Future     Standing Expiration Date:   8/21/2021     Referral Priority:   Routine     Referral Type:   Consultation     Referral Reason:   Specialty Services Required     Requested  Specialty:   Otolaryngology     Number of Visits Requested:   1     Follow up in about 6 months (around 1/21/2021) for Follow up.     Medication List          Accurate as of July 21, 2020  5:32 PM. If you have any questions, ask your nurse or doctor.            CONTINUE taking these medications    amLODIPine 2.5 MG tablet  Commonly known as: NORVASC  Take 1 tablet (2.5 mg total) by mouth once daily.     anastrozole 1 mg Tab  Commonly known as: ARIMIDEX  TAKE 1 TABLET(1 MG) BY MOUTH EVERY DAY     atenoloL 25 MG tablet  Commonly known as: TENORMIN     B COMPLEX WITH C#10-FOLIC ACID ORAL     calcium citrate 200 mg (950 mg) tablet  Commonly known as: CALCITRATE     co-enzyme Q-10 50 mg capsule     KAROLYN MATRIX 5000 COMPLETE 33-5,000-250 mcg Tab  Generic drug: mv-min-folic ac-biotin-lutein     MULTIVITAMIN ORAL     PRESERVISION AREDS 2 ORAL     PROLIA 60 mg/mL Syrg  Generic drug: denosumab     SUPER OMEGA-3 400-5 mg-unit Cap  Generic drug: fish oil-E-fatty acid5-iwqa840     valsartan 320 MG tablet  Commonly known as: DIOVAN  Take 1 tablet (320 mg total) by mouth once daily. Dose increase.     VITAJOY DAILY D 25 mcg (1,000 unit) Chew  Generic drug: cholecalciferol (vitamin D3)

## 2020-07-21 NOTE — TELEPHONE ENCOUNTER
Spoke with patient due to message received to return call.  She did not know why is was calling and does not need an appointment in Dermatology.

## 2020-07-29 ENCOUNTER — PATIENT OUTREACH (OUTPATIENT)
Dept: OTHER | Facility: OTHER | Age: 80
End: 2020-07-29

## 2020-07-29 NOTE — PROGRESS NOTES
Digital Medicine: Clinician Follow-Up    Patient was seen by PCP on 7/21/20, BP was 116/60.    The history is provided by the patient.   Follow-up reason(s): routine follow up.     Patient readings are stable     Patient is not experiencing signs/symptoms of hypotension.  Patient is not experiencing signs/symptoms of hypertension.      Last 5 Patient Entered Readings                                      Current 30 Day Average: 128/65     Recent Readings 7/22/2020 7/22/2020 7/15/2020 7/15/2020 7/3/2020    SBP (mmHg) 118 118 138 138 129    DBP (mmHg) 63 63 67 67 64    Pulse 51 51 50 50 59          Depression Screening  Did not address depression screening.    Sleep Apnea Screening    Did not address sleep apnea screening.     Medication Affordability Screening  Did not address medication affordability screening.     Medication Adherence-Medication adherence was assessed.        ASSESSMENT(S)  Patients BP average is 128/65 mmHg, which is at goal. Patient's BP goal is less than or equal to 130/80 per 2017 ACC/AHA Hypertension Guidelines.      PLAN  Continue current therapy: see medication list below.    Patient verbalizes understanding. Patient did not express questions or concerns and patient has contact information if needed.      Instructed patient to call if she begins to experience s/s of hypotension.       Hypertension Medications             amLODIPine (NORVASC) 2.5 MG tablet Take 1 tablet (2.5 mg total) by mouth once daily.    atenoloL (TENORMIN) 25 MG tablet TK 1 T PO QD    valsartan (DIOVAN) 320 MG tablet Take 1 tablet (320 mg total) by mouth once daily. Dose increase.

## 2020-08-17 ENCOUNTER — OFFICE VISIT (OUTPATIENT)
Dept: OTOLARYNGOLOGY | Facility: CLINIC | Age: 80
End: 2020-08-17
Payer: MEDICARE

## 2020-08-17 VITALS — DIASTOLIC BLOOD PRESSURE: 68 MMHG | HEART RATE: 47 BPM | SYSTOLIC BLOOD PRESSURE: 152 MMHG

## 2020-08-17 DIAGNOSIS — H61.23 BILATERAL IMPACTED CERUMEN: ICD-10-CM

## 2020-08-17 PROCEDURE — 99499 NO LOS: ICD-10-PCS | Mod: S$PBB,,, | Performed by: OTOLARYNGOLOGY

## 2020-08-17 PROCEDURE — 99999 PR PBB SHADOW E&M-EST. PATIENT-LVL IV: CPT | Mod: PBBFAC,,, | Performed by: OTOLARYNGOLOGY

## 2020-08-17 PROCEDURE — 99214 OFFICE O/P EST MOD 30 MIN: CPT | Mod: PBBFAC,25 | Performed by: OTOLARYNGOLOGY

## 2020-08-17 PROCEDURE — 69210 PR REMOVAL IMPACTED CERUMEN REQUIRING INSTRUMENTATION, UNILATERAL: ICD-10-PCS | Mod: S$PBB,,, | Performed by: OTOLARYNGOLOGY

## 2020-08-17 PROCEDURE — 69210 REMOVE IMPACTED EAR WAX UNI: CPT | Mod: PBBFAC | Performed by: OTOLARYNGOLOGY

## 2020-08-17 PROCEDURE — 99499 UNLISTED E&M SERVICE: CPT | Mod: S$PBB,,, | Performed by: OTOLARYNGOLOGY

## 2020-08-17 PROCEDURE — 69210 REMOVE IMPACTED EAR WAX UNI: CPT | Mod: S$PBB,,, | Performed by: OTOLARYNGOLOGY

## 2020-08-17 PROCEDURE — 99999 PR PBB SHADOW E&M-EST. PATIENT-LVL IV: ICD-10-PCS | Mod: PBBFAC,,, | Performed by: OTOLARYNGOLOGY

## 2020-08-17 NOTE — PROGRESS NOTES
"CC: Ear discomfort r/o cerumen impaction  HPI:Ms. Sorto is an 80 year old CF with a remote hx of vestibular neuronitis.  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  She has a history of shingles 1st occurring in 2013 with a minor outbreak 2016.  Completed a CT of the head in January 2017 for vertigo sx which indicated no evidence of sinus pathology.  The mastoid air cells were clear.  She was last evaluated by me in February 2020 for evaluation of left submandibular gland hypertrophy.  One of her chief complaints was feeling "cold" all of the time.    She is here today  for ear cleaning procedures.   She believes she can hear well.   She paretnhetically indicates tinnitus perception in her ears described a ringing sensation or a conch- shell contant sound which goes away if she concentrates something else.    She admits to utilizing cotton swabs for ear cleaning.      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    Visual impairment     reading glasses     Past Surgical History:   Procedure Laterality Date    BREAST CYST ASPIRATION Right 1999    BREAST LUMPECTOMY Right 2018    with radiation    COLONOSCOPY N/A 7/24/2018    Procedure: COLONOSCOPY;  Surgeon: Juan Miguel Pena MD;  Location: [facility] ([address] [place] [address]);  Service: Endoscopy;  Laterality: N/A; "    INJECTION FOR SENTINEL NODE IDENTIFICATION Right 8/17/2018    Procedure: INJECTION, FOR SENTINEL NODE IDENTIFICATION;  Surgeon: Abby Mason MD;  Location: [facility] OR [address] [place] [address];  Service: General;  Laterality: Right;    interstim placed stage 1      and removed    KIDNEY STONE SURGERY  2000    @ [employer]    MASTECTOMY, PARTIAL Right 8/17/2018    Procedure: MASTECTOMY, PARTIAL-US guided;  Surgeon: Abby Mason MD;  [facility];  Service: General;  Laterality: Right;    MOHS procedure      SENTINEL LYMPH NODE BIOPSY Right 8/17/2018    Procedure: BIOPSY, LYMPH NODE, SENTINEL;  Surgeon: Abby Mason MD;  Location: [facility] OR [address] [place] [address];  Service: General;  Laterality: Right;     Current Outpatient Medications on File Prior to Visit   Medication Sig Dispense Refill    amLODIPine (NORVASC) 2.5 MG tablet Take 1 tablet (2.5 mg total) by mouth once daily. 30 tablet 11    anastrozole (ARIMIDEX) 1 mg Tab TAKE 1 TABLET(1 MG) BY MOUTH EVERY DAY 90 tablet 3    atenoloL (TENORMIN) 25 MG tablet TK 1 T PO QD      B COMPLEX & C NO.10/FOLIC ACID (B COMPLEX WITH C#10-FOLIC ACID ORAL) Take by mouth.      calcium citrate (CALCITRATE) 200 mg (950 mg) tablet Take 1 tablet by mouth once daily.      cholecalciferol, vitamin D3, (VITAJOY DAILY D) 1,000 unit Chew Take by mouth.      co-enzyme Q-10 50 mg capsule Take 100 mg by mouth once daily.       denosumab (PROLIA) 60 mg/mL Syrg Inject 60 mg into the skin every 6 (six) months.      fish oil-vit E-fat acid5-hb137 (SUPER OMEGA-3) 400-5 mg-unit Cap Take 1 capsule by mouth Daily.      MULTIVITAMIN ORAL Take 1 tablet by mouth Daily.      mv-min-folic ac-biotin-lutein (KAROLYN MATRIX 5000 COMPLETE) 33-5,000-250 mcg Tab Take 5,000 mcg by mouth once daily.      valsartan (DIOVAN) 320 MG tablet Take 1 tablet (320 mg total) by mouth once daily. Dose increase. 90 tablet 3    vit C/E/Zn/coppr/lutein/zeaxan (PRESERVISION AREDS 2 ORAL) Take by mouth.       Current  Facility-Administered Medications on File Prior to Visit   Medication Dose Route Frequency Provider Last Rate Last Dose    0.9%  NaCl infusion   Intravenous Continuous James Carranza MD 70 mL/hr at 08/17/18 0843             PE:Gen.: Alert and oriented CF in no acute distress  Both ears are examined under the microscope in the microprocedure room.  Procedure:  Occlusive skin and cerumen is removed the deep left ear canal.  Left TM is clear and intact when visualized.  A few hairs are noted deep in the left ear canal which were left alone.  There is no chelsea evidence of cerumen impaction occluding the left ear canal.  The left TM is clear and intact.  A tiny amount of cerumen is removed from the floor of the left ear canal the blunt curette.  Audiometry was not performed today.    DIAGNOSIS:     ICD-10-CM ICD-9-CM    1. Bilateral impacted cerumen  H61.23 380.4 Ambulatory referral/consult to ENT     PLAN: Cerumen/skin debris removed from occluded AD eac with microforceps  Some cerumen removed from AS eac ; some hairs remain  Discontinue use of cotton swabs for ear cleaning   Tinnitus Rx  options briefly discussed  RTC prn ear cleaning

## 2020-08-17 NOTE — LETTER
August 17, 2020      Zeynep Tomas MD  [address]  [facility] [address]           [facility] [address] [address]  [address]  [facility] [address]  Phone: [phone]  Fax: [phone]          Patient: Shima Sorto   MR Number: [id number]   YOB: 1940   Date of Visit: 8/17/2020       Dear Dr. Zeynep Tomas:    Thank you for referring Shima Sorto to me for evaluation. Attached you will find relevant portions of my assessment and plan of care.    If you have questions, please do not hesitate to call me. I look forward to following Shima Sorto along with you.    Sincerely,    Naseem Buckley III, MD    Enclosure  CC:  No Recipients    If you would like to receive this communication electronically, please contact [email or web address] or [phone] to request more information on [email or web address] Link access.    For providers and/or their staff who would like to refer a patient to [facility], please contact us through our one-stop-shop provider referral line, [facility], at [phone].    If you feel you have received this communication in error or would no longer like to receive these types of communications, please e-mail [email or web address]

## 2020-08-17 NOTE — PATIENT INSTRUCTIONS
Cerumen/skin debris removed from occluded AD eac with microforceps  Some cerumen removed from AS eac ; some hairs remain  Discontinue use of cotton swabs for ear cleaning   Tinnitus Rx  options briefly discussed  RTC prn ear cleaning

## 2020-08-21 ENCOUNTER — NURSE TRIAGE (OUTPATIENT)
Dept: ADMINISTRATIVE | Facility: CLINIC | Age: 80
End: 2020-08-21

## 2020-09-01 ENCOUNTER — TELEPHONE (OUTPATIENT)
Dept: NEPHROLOGY | Facility: CLINIC | Age: 80
End: 2020-09-01

## 2020-09-02 ENCOUNTER — PATIENT OUTREACH (OUTPATIENT)
Dept: OTHER | Facility: OTHER | Age: 80
End: 2020-09-02

## 2020-09-02 NOTE — PROGRESS NOTES
Digital Medicine: Health  Follow-Up    The history is provided by the patient.             Reason for review: Blood pressure at goal        Topics Covered on Call: General conversation about covid-19    Additional Follow-up details: Patient stated that she is doing well and feeling good. She denies any major changes at this time. She stated that she is will reach out if any concerns arise.    Patient spoke about Covid-19 and her family members who had it but recovered.         Diet-Not assessed          Physical Activity-Not assessed    Medication Adherence-Medication adherence was assessed.      Substance, Sleep, Stress-Not assessed      Continue current diet/physical activity routine.       Addressed any questions or concerns and patient has my contact information if needed prior to next outreach.   Explained the importance of self-monitoring and medication adherence. Encouraged the patient to communicate with their health  for lifestyle modifications to help improve or maintain a healthy lifestyle.            There are no preventive care reminders to display for this patient.    Last 5 Patient Entered Readings                                      Current 30 Day Average: 126/62     Recent Readings 8/26/2020 8/26/2020 8/16/2020 8/16/2020 8/8/2020    SBP (mmHg) 124 124 124 124 129    DBP (mmHg) 62 62 65 65 59    Pulse 62 62 57 57 59

## 2020-09-09 ENCOUNTER — CLINICAL SUPPORT (OUTPATIENT)
Dept: INTERNAL MEDICINE | Facility: CLINIC | Age: 80
End: 2020-09-09
Payer: MEDICARE

## 2020-09-09 PROCEDURE — 90694 VACC AIIV4 NO PRSRV 0.5ML IM: CPT | Mod: PBBFAC

## 2020-09-09 PROCEDURE — G0008 ADMIN INFLUENZA VIRUS VAC: HCPCS | Mod: PBBFAC

## 2020-09-14 ENCOUNTER — TELEPHONE (OUTPATIENT)
Dept: NEPHROLOGY | Facility: CLINIC | Age: 80
End: 2020-09-14

## 2020-09-14 DIAGNOSIS — N18.30 STAGE 3 CHRONIC KIDNEY DISEASE: Primary | ICD-10-CM

## 2020-10-01 ENCOUNTER — LAB VISIT (OUTPATIENT)
Dept: LAB | Facility: HOSPITAL | Age: 80
End: 2020-10-01
Payer: MEDICARE

## 2020-10-01 DIAGNOSIS — N18.30 STAGE 3 CHRONIC KIDNEY DISEASE: ICD-10-CM

## 2020-10-01 LAB
25(OH)D3+25(OH)D2 SERPL-MCNC: 74 NG/ML (ref 30–96)
ALBUMIN SERPL BCP-MCNC: 3.7 G/DL (ref 3.5–5.2)
ANION GAP SERPL CALC-SCNC: 9 MMOL/L (ref 8–16)
BUN SERPL-MCNC: 24 MG/DL (ref 8–23)
CALCIUM SERPL-MCNC: 9.2 MG/DL (ref 8.7–10.5)
CHLORIDE SERPL-SCNC: 101 MMOL/L (ref 95–110)
CO2 SERPL-SCNC: 31 MMOL/L (ref 23–29)
CREAT SERPL-MCNC: 1.2 MG/DL (ref 0.5–1.4)
EST. GFR  (AFRICAN AMERICAN): 49.3 ML/MIN/1.73 M^2
EST. GFR  (NON AFRICAN AMERICAN): 42.8 ML/MIN/1.73 M^2
GLUCOSE SERPL-MCNC: 96 MG/DL (ref 70–110)
PHOSPHATE SERPL-MCNC: 3.3 MG/DL (ref 2.7–4.5)
POTASSIUM SERPL-SCNC: 4.8 MMOL/L (ref 3.5–5.1)
PTH-INTACT SERPL-MCNC: 66 PG/ML (ref 9–77)
SODIUM SERPL-SCNC: 141 MMOL/L (ref 136–145)

## 2020-10-01 PROCEDURE — 80069 RENAL FUNCTION PANEL: CPT

## 2020-10-01 PROCEDURE — 83970 ASSAY OF PARATHORMONE: CPT

## 2020-10-01 PROCEDURE — 36415 COLL VENOUS BLD VENIPUNCTURE: CPT

## 2020-10-01 PROCEDURE — 82306 VITAMIN D 25 HYDROXY: CPT

## 2020-10-08 ENCOUNTER — OFFICE VISIT (OUTPATIENT)
Dept: NEPHROLOGY | Facility: CLINIC | Age: 80
End: 2020-10-08
Payer: MEDICARE

## 2020-10-08 VITALS
DIASTOLIC BLOOD PRESSURE: 66 MMHG | WEIGHT: 125.69 LBS | HEART RATE: 56 BPM | SYSTOLIC BLOOD PRESSURE: 134 MMHG | BODY MASS INDEX: 20.28 KG/M2 | OXYGEN SATURATION: 100 %

## 2020-10-08 DIAGNOSIS — N18.2 CHRONIC KIDNEY DISEASE, STAGE 2 (MILD): ICD-10-CM

## 2020-10-08 DIAGNOSIS — N20.0 KIDNEY STONES: Primary | ICD-10-CM

## 2020-10-08 DIAGNOSIS — N18.30 STAGE 3 CHRONIC KIDNEY DISEASE, UNSPECIFIED WHETHER STAGE 3A OR 3B CKD: ICD-10-CM

## 2020-10-08 PROCEDURE — 99214 OFFICE O/P EST MOD 30 MIN: CPT | Mod: S$PBB,,, | Performed by: INTERNAL MEDICINE

## 2020-10-08 PROCEDURE — 99213 OFFICE O/P EST LOW 20 MIN: CPT | Mod: PBBFAC | Performed by: INTERNAL MEDICINE

## 2020-10-08 PROCEDURE — 99999 PR PBB SHADOW E&M-EST. PATIENT-LVL III: CPT | Mod: PBBFAC,,, | Performed by: INTERNAL MEDICINE

## 2020-10-08 PROCEDURE — 99214 PR OFFICE/OUTPT VISIT, EST, LEVL IV, 30-39 MIN: ICD-10-PCS | Mod: S$PBB,,, | Performed by: INTERNAL MEDICINE

## 2020-10-08 PROCEDURE — 99999 PR PBB SHADOW E&M-EST. PATIENT-LVL III: ICD-10-PCS | Mod: PBBFAC,,, | Performed by: INTERNAL MEDICINE

## 2020-10-08 NOTE — PROGRESS NOTES
HISTORY OF PRESENT ILLNESS:  This is a 80 -year-old white female with a   longstanding history of nephrolithiasis, but no recent symptomatic   nephrolithiasis episodes who is coming in for a followup.  She also has   hypertension with stable blood pressures at home and had failed InterStim   therapy with her bladder in the past and had seen Dr. Stoddard in Urology.  No   recent symptomatic stones.  Her kidney function is stable with a creatinine of   1.2, which appears to be her baseline.  Her creatinine is stable.  Her blood   pressure is stable at home in the 119-130's/50's.    PAST MEDICAL HISTORY:  Significant for hypertension for over 10 years, history   of kidney stones, failed InterStim therapy, breast cancer, status post   lumpectomy, XRT and adjuvant hormonal therapy and osteoporosis.    REVIEW OF SYSTEMS:  She states that she is feeling well.  Apetite good.  Weight stable.Denies any back pain or kidney stone pain.  She denies any blood in the urine, frequency, urgency,   hematuria, dysuria, nausea, vomiting, chest pain or shortness of breath.    PHYSICAL EXAMINATION:  GENERAL:  A well-nourished, well-developed individual, in no acute distress.  HEENT:  PERRLA.  EOMI.  HEENT:  No cervical lymphadenopathy.  CARDIOVASCULAR:  Rate and rhythm regular.  No murmurs, gallops or rubs.  LUNGS:  Clear to auscultation bilaterally.  Normal respiratory effort.  ABDOMEN:  Soft, nontender and nondistended.  SKIN:  No rash.  No indurations.  Warm to touch.  EXTREMITIES:  Edema.    ASSESSMENT AND PLAN:  This is a 78-year-old white female with a longstanding   history of hypertension who is coming in for a followup visit.  1.   nephrolithiasis.  No recent symptomatic nephrolithiasis.  She had a   procedure in 2001, for a kidney stone.  Her most recent CAT scan showed small   calcifications with 0.6 cm stone and 0.4 cm stone, which were not causing her   problem.  She is hydrating well over 3 liters of urine output per  recent 24-hour   urine per the patient.  I advised her to drink lemon juice for citrate.  She   also follows a low oxalate diet because in 2010, she had high oxalate, but since   then, the levels have been much better.  I also advised her to decrease her   meat intake.  Will get  Renal US for stones as she had  0.6 cm stone on CT scan from 1/19.  2.  Renal.  Her creatinines have been stable around 1.2 with GFR of 42 ml/min.  I emphasized good   blood pressure control.  She has CKD stage III with an MDRD GFR of 42  mL   per minute. RBC's on UA's of and  likely sec to stones-had seen urology for in the past and does not want to be refferred.   3.  Hypertension.  Blood pressure is well controlled per the patient.  She has   no urine protein.  She is on Benicar for nephroprotection.  4.  Renal osteodystrophy.  Vitamin D levels are stable on the higher side. Her PTH is stable.  Calcium and phosphorus are stable.    She can be  followed up in a year. Needs US.dietary consult for stones, urorisk profile.

## 2020-10-09 ENCOUNTER — TELEPHONE (OUTPATIENT)
Dept: HEMATOLOGY/ONCOLOGY | Facility: CLINIC | Age: 80
End: 2020-10-09

## 2020-10-14 ENCOUNTER — HOSPITAL ENCOUNTER (OUTPATIENT)
Dept: RADIOLOGY | Facility: HOSPITAL | Age: 80
Discharge: HOME | End: 2020-10-14
Attending: INTERNAL MEDICINE
Payer: MEDICARE

## 2020-10-14 DIAGNOSIS — N20.0 KIDNEY STONES: ICD-10-CM

## 2020-10-14 DIAGNOSIS — N18.30 STAGE 3 CHRONIC KIDNEY DISEASE, UNSPECIFIED WHETHER STAGE 3A OR 3B CKD: ICD-10-CM

## 2020-10-14 PROCEDURE — 76770 US EXAM ABDO BACK WALL COMP: CPT | Mod: 26,,, | Performed by: RADIOLOGY

## 2020-10-14 PROCEDURE — 76770 US RETROPERITONEAL COMPLETE: ICD-10-PCS | Mod: 26,,, | Performed by: RADIOLOGY

## 2020-10-14 PROCEDURE — 76770 US EXAM ABDO BACK WALL COMP: CPT | Mod: TC

## 2020-10-15 ENCOUNTER — TELEPHONE (OUTPATIENT)
Dept: NEPHROLOGY | Facility: CLINIC | Age: 80
End: 2020-10-15

## 2020-10-15 ENCOUNTER — TELEPHONE (OUTPATIENT)
Dept: NEPHROLOGY | Facility: HOSPITAL | Age: 80
End: 2020-10-15

## 2020-10-15 NOTE — TELEPHONE ENCOUNTER
Please let her know that there is one medium sized stone in her kidney- 0.5 cm stone.  Please let her know that she has mild blockage in both kidneys likely from the stone which urology likely will not do anything about but I would like her to f/u with urology with Dr. Stoddard.

## 2020-10-20 ENCOUNTER — TELEPHONE (OUTPATIENT)
Dept: NEPHROLOGY | Facility: CLINIC | Age: 80
End: 2020-10-20

## 2020-10-20 NOTE — TELEPHONE ENCOUNTER
----- Message from Dewayne Rico sent at 10/20/2020  2:11 PM CDT -----  Contact: pt  Pt is calling to speak with nurse     Call back: [phone]

## 2020-10-21 ENCOUNTER — LAB VISIT (OUTPATIENT)
Dept: LAB | Facility: HOSPITAL | Age: 80
End: 2020-10-21
Payer: MEDICARE

## 2020-10-21 ENCOUNTER — PATIENT OUTREACH (OUTPATIENT)
Dept: OTHER | Facility: OTHER | Age: 80
End: 2020-10-21

## 2020-10-21 DIAGNOSIS — N18.30 STAGE 3 CHRONIC KIDNEY DISEASE, UNSPECIFIED WHETHER STAGE 3A OR 3B CKD: ICD-10-CM

## 2020-10-21 DIAGNOSIS — N20.0 KIDNEY STONES: ICD-10-CM

## 2020-10-21 DIAGNOSIS — N18.2 CHRONIC KIDNEY DISEASE, STAGE 2 (MILD): ICD-10-CM

## 2020-10-21 PROCEDURE — 84392 ASSAY OF URINE SULFATE: CPT

## 2020-10-21 PROCEDURE — 83986 ASSAY PH BODY FLUID NOS: CPT

## 2020-10-21 NOTE — PROGRESS NOTES
Digital Medicine: Clinician Follow-Up    Called patient to follow up regarding HTN digital readings.     Patient was seen by Nephrology on 10/8/20, BP was 134/66.    Patient reports she is will be getting a new dental KATHERIN device on 11/2/20.      The history is provided by the patient.   Follow-up reason(s): routine follow up.     Hypertension    Patient's blood pressure is stable.   Patient is not experiencing signs/symptoms of hypotension.  Patient is not experiencing signs/symptoms of hypertension.        Last 5 Patient Entered Readings                                      Current 30 Day Average: 136/65     Recent Readings 10/13/2020 10/13/2020 10/5/2020 10/5/2020 9/27/2020    SBP (mmHg) 123 123 133 133 153    DBP (mmHg) 55 55 66 66 73    Pulse 51 51 60 60 59          Depression Screening  Did not address depression screening.    Sleep Apnea Screening    Did not address sleep apnea screening.     Medication Affordability Screening  Did not address medication affordability screening.     Medication Adherence-Medication adherence was asssessed.  Patient continue taking medication as prescribed.            ASSESSMENT(S)  Patients BP average is 136/65 mmHg, which is above goal. Patient's BP goal is less than or equal to 130/80.     Hypertension Plan  Additional monitoring needed. Encouraged patient to take BP 1-2x per week.  Continue current therapy.  Instructed patient to call if BP remains > 140/90 or if she begins to experience s/s of hypotension.     Will continue to monitor, WCB in 4-6 weeks.          Addressed patient questions and patient has my contact information if needed prior to next outreach. Patient verbalizes understanding.                 Hypertension Medications             amLODIPine (NORVASC) 2.5 MG tablet Take 1 tablet (2.5 mg total) by mouth once daily.    atenoloL (TENORMIN) 25 MG tablet TK 1 T PO QD    valsartan (DIOVAN) 320 MG tablet Take 1 tablet (320 mg total) by mouth once daily. Dose  increase.

## 2020-10-23 ENCOUNTER — NUTRITION (OUTPATIENT)
Dept: NUTRITION | Facility: CLINIC | Age: 80
End: 2020-10-23
Payer: MEDICARE

## 2020-10-23 VITALS — HEIGHT: 66 IN | BODY MASS INDEX: 20.23 KG/M2 | WEIGHT: 125.88 LBS

## 2020-10-23 DIAGNOSIS — N18.2 CHRONIC KIDNEY DISEASE, STAGE 2 (MILD): Primary | ICD-10-CM

## 2020-10-23 DIAGNOSIS — I10 ESSENTIAL HYPERTENSION: ICD-10-CM

## 2020-10-23 DIAGNOSIS — N20.0 KIDNEY STONES: ICD-10-CM

## 2020-10-23 DIAGNOSIS — Z71.3 DIETARY COUNSELING: ICD-10-CM

## 2020-10-23 DIAGNOSIS — N18.30 STAGE 3 CHRONIC KIDNEY DISEASE, UNSPECIFIED WHETHER STAGE 3A OR 3B CKD: ICD-10-CM

## 2020-10-23 PROCEDURE — 99999 PR PBB SHADOW E&M-EST. PATIENT-LVL III: CPT | Mod: PBBFAC,,,

## 2020-10-23 PROCEDURE — 99999 PR PBB SHADOW E&M-EST. PATIENT-LVL III: ICD-10-PCS | Mod: PBBFAC,,,

## 2020-10-23 PROCEDURE — 99213 OFFICE O/P EST LOW 20 MIN: CPT | Mod: PBBFAC

## 2020-10-23 PROCEDURE — 97802 MEDICAL NUTRITION INDIV IN: CPT | Mod: PBBFAC | Performed by: DIETITIAN, REGISTERED

## 2020-10-23 NOTE — PROGRESS NOTES
"Referring Physician:Mercy Sims DO     Reason for visit:  Chief Complaint   Patient presents with    Chronic Kidney Disease    Nutrition Counseling    Hypertension      Initial Visit    :1940     Allergies Reviewed  Meds Reviewed    Anthropometrics  Weight:57.1 kg (125 lb 14.1 oz)  Height:5' 6" (1.676 m)  BMI:Body mass index is 20.32 kg/m².   IBW:   59.0 kg  +/-10%    Meds:  Outpatient Medications Prior to Visit   Medication Sig Dispense Refill    amLODIPine (NORVASC) 2.5 MG tablet Take 1 tablet (2.5 mg total) by mouth once daily. 30 tablet 11    anastrozole (ARIMIDEX) 1 mg Tab TAKE 1 TABLET(1 MG) BY MOUTH EVERY DAY 90 tablet 3    atenoloL (TENORMIN) 25 MG tablet TK 1 T PO QD      B COMPLEX & C NO.10/FOLIC ACID (B COMPLEX WITH C#10-FOLIC ACID ORAL) Take by mouth.      calcium citrate (CALCITRATE) 200 mg (950 mg) tablet Take 1 tablet by mouth once daily.      cholecalciferol, vitamin D3, (VITAJOY DAILY D) 1,000 unit Chew Take by mouth.      co-enzyme Q-10 50 mg capsule Take 100 mg by mouth once daily.       denosumab (PROLIA) 60 mg/mL Syrg Inject 60 mg into the skin every 6 (six) months.      fish oil-vit E-fat acid5-hb137 (SUPER OMEGA-3) 400-5 mg-unit Cap Take 1 capsule by mouth Daily.      flu vac 2020 65up-[id number],PF, 60 mcg (15 mcg x 4)/0.5 mL Syrg inject as directed (Patient not taking: Reported on 10/8/2020) 0.5 mL 0    flu vac 2020 65up-[id number],PF, (FLUAD QUAD 2020-21,65Y UP,,PF,) 60 mcg (15 mcg x 4)/0.5 mL Syrg inject IM (Patient not taking: Reported on 10/8/2020) 0.5 mL 0    MULTIVITAMIN ORAL Take 1 tablet by mouth Daily.      mv-min-folic ac-biotin-lutein (KAROLYN MATRIX 5000 COMPLETE) 33-5,000-250 mcg Tab Take 5,000 mcg by mouth once daily.      valsartan (DIOVAN) 320 MG tablet Take 1 tablet (320 mg total) by mouth once daily. Dose increase. 90 tablet 3    vit C/E/Zn/coppr/lutein/zeaxan (PRESERVISION AREDS 2 ORAL) Take by mouth.       Facility-Administered Medications Prior " "to Visit   Medication Dose Route Frequency Provider Last Rate Last Dose    0.9%  NaCl infusion   Intravenous Continuous James Carranza MD 70 mL/hr at 08/17/18 0843         Food/Drug Interactions Noted:  n/a    Vitamins/Supplements/Herbs:    See med list    Labs: eGFR  42.8   Vit D  74   Cr  1.2 (noted as stable in MD notes)     Nutrition Prescription:   1770 Kcals/day( 30 kcal/kg IBW),  47 g protein( 0.8 g/kg IBW)     Support System:    Pt lives alone; does her own grocery shopping and cooking.    Diet Hx:   Pt has followed low oxalate diet regimen for many years.  Avoids salt and sugar.  Beverages:  Water, decaf coffee, diluted apple/cranberry juices, green tea, gold tea.  Eats a lot of fruits and vegetables, chicken and fish (especially salmon) and rarely eats red meat.  States she is lactose-intolerant.  Brought with her a list she had devised from the [employer] website of foods high and low in potassium, and had questions about how much of her favorite foods on the "avoid" list she could safely eat.  Discussed small portions and no more than one selection per day as a general rule.    Breakfast:   All Bran cereal or shredded wheat with CoffeeMate or slice of wheat toast with fruit (likes all berries).  Decaf coffee and juice  Lunch:   Has a snack ie apple with a piece of cheese or 1/2 avocado  Dinner:   Last night was "red meat night":  1/3# raw weight ground beef with cheddar cheese and slice of bread.  Fruit and salad.  Tonight plans to have a dinner salad including fresh, cooked carrots with boiled egg and leftover salmon    Current activity level and/or physical limitations:    Normal activity    Motivation to make changes/anticipated barriers and/or expected adherence:  Continued diet adherence expected; pt states current diet regimen is working well for her    Nutrition-Focus Physical Findings:    Pt wearing face covering per COVID19 protocol; pt appears well nourished        Assessment:   Pt attentive " and interested in sharing her extensive medical history and that currently she has a kidney stone which has been asymptomatic for years.  States she is comfortable with current diet regimen and doesn't think she eats too much meat-portion sizes are small.    Nutrition Diagnosis:   None at this time    Recommendations:  Low sodium, low oxalate diet as tolerated.  Handout provided:  Calcium-Oxalate Restricted Diet    Strategies Implemented:    n/a    Consultation Time:45 minutes.  Communicated with referring healthcare provider:  Consult note available in pt's Epic chart per MD discretion  Follow Up:  Pt provided with dietitian contact number and advised to call with questions or make future appointment if further intervention needed.

## 2020-10-30 ENCOUNTER — TELEPHONE (OUTPATIENT)
Dept: NEPHROLOGY | Facility: HOSPITAL | Age: 80
End: 2020-10-30

## 2020-10-31 LAB
CALCIUM 24H UR-MRATE: 177 MG/DAY
CAOX INDEX 24H UR-RTO: 0.91
CAOX INDEX 24H UR-RTO: 1.22
CITRATE 24H UR-MRATE: 835 MG/DAY
CREAT 24H UR-MRATE: 797 MG/DAY (ref 600–1800)
MAGNESIUM 24H UR-MRATE: 209 MG/DAY
NA URATE 24H SATFR UR: 0.12
OXALATE 24H UR-MRATE: 63 MG/DAY
PATIENT HX: ABNORMAL
PH 24H UR: 7.4 [PH] (ref 5.5–7)
PHOSPHATE 24H UR-MRATE: 391 MG/DAY
POTASSIUM 24H UR-SRATE: 83 MEQ/DAY (ref 19–135)
SODIUM 24H UR-SRATE: 60 MEQ/DAY
SPECIMEN VOL 24H UR: 3.99 L/DAY
SULFATE 24H UR-SRATE: 7 MMOL/DAY
SUSPECTED PROBLEM IS: ABNORMAL
URATE 24H SATFR UR: 0.02
URATE 24H UR-MRATE: 291 MG/DAY

## 2020-11-02 ENCOUNTER — TELEPHONE (OUTPATIENT)
Dept: NEPHROLOGY | Facility: HOSPITAL | Age: 80
End: 2020-11-02

## 2020-11-02 ENCOUNTER — TELEPHONE (OUTPATIENT)
Dept: NEPHROLOGY | Facility: CLINIC | Age: 80
End: 2020-11-02

## 2020-11-02 NOTE — TELEPHONE ENCOUNTER
Please let her know that her oxalate in urine is high which can predispose her to kidney stones.  Please have her follow low oxalate diet and we can mail her an oxalate list.  Thanks.

## 2020-11-02 NOTE — TELEPHONE ENCOUNTER
Preferred Name:   Shima Sorto  Female, 80 y.o., 1940  Phone:   [phone] (M)  PCP:   Zeynep Tomas MD  Language:   English  Need Interp:   No  Allergies Last Reviewed:   10/23/20  Allergies:   Asparagus  Digital Medicine:   Active  Health Maintenance:   None  Primary Ins.:   MEDICARE  MRN:   [id number]  Pt Comm Pref:   Patient Portal, Mail  Last [employer] Login:   10/26/2020 10:26 AM, This patient has never logged into the mobile [employer] dmitry  Next Appt:   With Urology  11/16/2020 at 1:20 PM  My Sticky Note:     Specialty Comments:     Last Encounter Center:   None  Last Enc in Dept:   None  Last Enc this Prov:   10/8/2020  Last Contact Department:   [facility] NEPHROLOGY  Patient Calls    Mercy Sims DO routed conversation to Levi Madrid Staff 24 minutes ago (10:33 AM)      Mercy Sims DO 25 minutes ago (10:33 AM)        Please let her know that her oxalate in urine is high which can predispose her to kidney stones.  Please have her follow low oxalate diet and we can mail her an oxalate list.  Thanks.         Documentation           Recent Patient Communication     Last Update Reason Specialty     Today -- Nephrology     [facility] Nephrology Mercy Sims Closed     2 days ago -- Nephrology     [facility] Nephrology Mercy Sims Closed     5 days ago Medication Refill Internal Medicine     [facility] Internal Medicine Zeynep Tomas Open     1 week ago -- Nephrology     [facility] Nephrology Elda Pulliam Closed     1 week ago -- Nephrology     [facility] Nephrology Mercy Sims Closed       There are additional recent communications with this patient. View the rest in Chart Review.

## 2020-11-05 ENCOUNTER — OFFICE VISIT (OUTPATIENT)
Dept: ENDOCRINOLOGY | Facility: CLINIC | Age: 80
End: 2020-11-05
Payer: MEDICARE

## 2020-11-05 VITALS
DIASTOLIC BLOOD PRESSURE: 61 MMHG | SYSTOLIC BLOOD PRESSURE: 128 MMHG | WEIGHT: 130.06 LBS | BODY MASS INDEX: 20.9 KG/M2 | HEIGHT: 66 IN

## 2020-11-05 DIAGNOSIS — M80.00XD AGE-RELATED OSTEOPOROSIS WITH CURRENT PATHOLOGICAL FRACTURE WITH ROUTINE HEALING: Primary | ICD-10-CM

## 2020-11-05 DIAGNOSIS — E55.9 VITAMIN D DEFICIENCY: ICD-10-CM

## 2020-11-05 DIAGNOSIS — Z79.811 USE OF AROMATASE INHIBITORS: ICD-10-CM

## 2020-11-05 DIAGNOSIS — N18.31 STAGE 3A CHRONIC KIDNEY DISEASE: ICD-10-CM

## 2020-11-05 PROCEDURE — 99213 OFFICE O/P EST LOW 20 MIN: CPT | Mod: PBBFAC | Performed by: NURSE PRACTITIONER

## 2020-11-05 PROCEDURE — 99999 PR PBB SHADOW E&M-EST. PATIENT-LVL III: CPT | Mod: PBBFAC,,, | Performed by: NURSE PRACTITIONER

## 2020-11-05 PROCEDURE — 99999 PR PBB SHADOW E&M-EST. PATIENT-LVL III: ICD-10-PCS | Mod: PBBFAC,,, | Performed by: NURSE PRACTITIONER

## 2020-11-05 PROCEDURE — 99214 OFFICE O/P EST MOD 30 MIN: CPT | Mod: S$PBB,,, | Performed by: NURSE PRACTITIONER

## 2020-11-05 PROCEDURE — 99214 PR OFFICE/OUTPT VISIT, EST, LEVL IV, 30-39 MIN: ICD-10-PCS | Mod: S$PBB,,, | Performed by: NURSE PRACTITIONER

## 2020-11-05 NOTE — PATIENT INSTRUCTIONS
Osteoporosis   Continue Prolia    Bone density test in November 2020   Avoid excessive alcohol and tobacco   Continue current dosage of calcium    Recommended daily allowance of calcium is 1000 mg daily     Estimated Calcium Content of Foods:   Produce  Serving Size Estimated Calcium*    [place]atherine greens, frozen 8 oz 360 mg   Broccoli lauren 8 oz 200 mg   Kale, frozen 8 oz 180 mg   Soy Beans, green, boiled 8 oz 175 mg   Bok [place], cooked, boiled 8 oz 160 mg   Figs, dried 2 figs 65 mg   Broccoli, fresh, cooked 8 oz 60 mg   Oranges 1 whole 55 mg   Seafood Serving Size Estimated Calcium*    Sardines, canned with bones 3 oz 325 mg   [place], canned with bones 3 oz 180 mg   Shrimp, canned 3 oz 125 mg   Dairy Serving Size Estimated Calcium*    Ricotta, part-skim 4 oz 335 mg   Yogurt, plain, low-fat 6 oz 310 mg   Milk, skim, low-fat, whole 8 oz 300 mg   Yogurt with fruit, low-fat 6 oz 260 mg   Mozzarella, part-skim 1 oz 210 mg   Cheddar 1 oz 205 mg   Yogurt, Greek 6 oz 200 mg   American Cheese 1 oz 195 mg   Feta Cheese 4 oz 140 mg   Cottage Cheese, 2% 4 oz 105 mg   Frozen yogurt, vanilla 8 oz 105 mg   Ice Cream, vanilla 8 oz 85 mg   Parmesan 1 tbsp 55 mg   Fortified Food Serving Size Estimated Calcium*   [place] milk, rice milk or soy milk, fortified 8 oz 300 mg   Orange juice and other fruit juices, fortified 8 oz 300 mg   Tofu, prepared with calcium 4 oz 205 mg   Waffle, frozen, fortified 2 pieces 200 mg   Oatmeal, fortified 1 packet 140 mg   English muffin, fortified 1 muffin 100 mg   Cereal, fortified 8 oz 100-1,000 mg   Other Serving Size Estimated Calcium*   Mac & cheese, frozen 1 package 325 mg   Pizza, cheese, frozen 1 serving 115 mg   Pudding, chocolate, prepared with 2% milk 4 oz 160 mg   Beans, baked, canned 4 oz 160 mg   *The calcium content listed for most foods is estimated and can vary due to multiple factors. Check the food label to determine how much calcium is in a particular product.  If you read the nutrition  label for a food source, it lists the % calcium in that food.  For an 8 oz glass of milk, for example, the label states calcium 30%.  This is equivalent to 300 mg of calcium (multiply the listed number by 10).   **Table from the National Osteoporosis Foundation     Vit D    Vitamin D level is too high.    Decrease Vitamin D to 1000 IU 3 times weekly

## 2020-11-05 NOTE — PROGRESS NOTES
Subjective:     Patient ID: Shima Sorto is a 80 y.o. female.    Chief Complaint: Osteoporosis    HPI:   Ms. Sorto is a 80 y.o. female with breast cancer s/p partial mastectomy on anastrozole (started 11/2018), hypertension, and CKD who is here for a follow-up visit of osteoporosis (FN T score -2.8). Previous patient of Dr. Beltran. Last visit in June 2019.     Since last visit, she reports some right sided sciatica pain. Was using a walker and cane at this time.     States that she is having problems with following both renal and osteoporotic diet but has gotten better.    Previous regimen:  Used ALN for nine years stopped 2013 - 2016, restarted ALN 2/2018.     At her June 2019 visit she reported a compression fracture L3 after picking up a two year old.    We switched to Prolia with first injection in Nov 2019. Last injection in May 2020      Reviewed Osteoporosis Risk Factor Assessment:     Menarche occurred at 12 years of age.   Menopause occurred at mid 50s.  Her menstrual cycles were normal but closer to five weeks between cycles. Had at least 10 cycles per year. Two children and one miscarriage. Denies lactation.  Last kidney stone 2002, and then again 1/2019.     Back pain due to compression fractures - pain has resolved and occasional in bed with twisting motion.     She is no longer using cane or walker. Denies falls since last visit. She has good lighting in her home and no loose rugs. She is living alone. She has family that lives in other states. She has a lot of friends that live near her. She carries her cell phone with her in her pocket. She is doing stretches exercises in the morning that she learned from PT. She is also walking for about 2.5 miles in the morning after breakfast -on days that allows her to walk. She is up and down stairs at home.      Has CKD stage III, anemia, and kidney stones. She denies history of calcium disorders, thyroid disease, malabsorption  symptoms.    Supplements:  Calcium citrate one daily   Vitamin D3 1000 IUs 6 days weekly  Also on b complex, vision vitamin, fish oil, co enzyme and biotin      Ref. Range 10/1/2020 11:41   Vit D, 25-Hydroxy Latest Ref Range: 30 - 96 ng/mL 74     Dietary:  Vegetables, lean proteins,   Avoids spinach  Carrots, celery, turnips, green peas, green beans, tomatoes, zucchini, squash  Almonds  [place]/fish  Avoids starches and white things  Lactose intolerance and she avoid dairy  Avoids desserts    ETOH socially-scotch   Avoids tobacco    Mother fell twice, fractured hip.    DXA 11/14/19  FINDINGS:  The L1 to L4 vertebral bone mineral density is equal to 0.996 g/cm squared with a T score of -0.5.  There has been a +8.8% statistically significant change relative to the prior study.     The left femoral neck bone mineral density is equal to 0.582 g/cm squared with a T score of -2.4.  There has been  significant improvement relative to the prior study.     The total hip bone mineral density is equal to 0.724 g/cm squared with a T score of -1.8.  There has been a +3.2% statistically significant change relative to the prior study.     There is a 21.0% risk of a major osteoporotic fracture and a 6.7% risk of hip fracture in the next 10 years (FRAX).     Impression:  1. Normal bone mineral density of the lumbar spine, improved.  2. Severe osteopenia of the left femoral neck and moderate osteopenia of the left total hip, improved.  Consider FDA approved medical therapies in postmenopausal women and men aged 50 years and older, based on the following:     Family history of type 2 diabetes in maternal grandmother and aunt  Of note, has sleep apnea and waiting for oral device.      Review of Systems   Constitutional: Positive for fatigue (attributes to sleep apnea).   Eyes: Negative for visual disturbance.   Respiratory: Negative for shortness of breath.    Cardiovascular: Negative for chest pain.   Gastrointestinal: Negative for  "abdominal pain.   Musculoskeletal: Positive for arthralgias (with strenuous activity) and back pain. Negative for gait problem.   Skin: Negative for wound.   Neurological: Negative for headaches.   Hematological: Does not bruise/bleed easily.   Psychiatric/Behavioral: Negative for sleep disturbance.     Objective:     Physical Exam  Vitals signs reviewed.   Constitutional:       Appearance: She is well-developed.   Neck:      Thyroid: No thyromegaly.   Cardiovascular:      Rate and Rhythm: Normal rate.   Pulmonary:      Effort: Pulmonary effort is normal.   Abdominal:      Palpations: Abdomen is soft.   Musculoskeletal: Normal range of motion.      Comments: No point tenderness to spine         Vitals:    11/05/20 1002   BP: 128/61   Weight: 59 kg (130 lb 1.1 oz)   Height: 5' 6" (1.676 m)       Chemistry        Component Value Date/Time     10/01/2020 1141    K 4.8 10/01/2020 1141     10/01/2020 1141    CO2 31 (H) 10/01/2020 1141    BUN 24 (H) 10/01/2020 1141    CREATININE 1.2 10/01/2020 1141    GLU 96 10/01/2020 1141        Component Value Date/Time    CALCIUM 9.2 10/01/2020 1141    ALKPHOS 42 (L) 07/13/2020 1110    AST 34 07/13/2020 1110    ALT 23 07/13/2020 1110    BILITOT 0.6 07/13/2020 1110    ESTGFRAFRICA 49.3 (A) 10/01/2020 1141    EGFRNONAA 42.8 (A) 10/01/2020 1141        Results for KEITH HAJI (MRN [id number]) as of 11/5/2020 07:42   Ref. Range 10/1/2020 11:41   Sodium Latest Ref Range: 136 - 145 mmol/L 141   Potassium Latest Ref Range: 3.5 - 5.1 mmol/L 4.8   Chloride Latest Ref Range: 95 - 110 mmol/L 101   CO2 Latest Ref Range: 23 - 29 mmol/L 31 (H)   Anion Gap Latest Ref Range: 8 - 16 mmol/L 9   BUN Latest Ref Range: 8 - 23 mg/dL 24 (H)   Creatinine Latest Ref Range: 0.5 - 1.4 mg/dL 1.2   eGFR if non African American Latest Ref Range: >60 mL/min/1.73 m^2 42.8 (A)   eGFR if African American Latest Ref Range: >60 mL/min/1.73 m^2 49.3 (A)   Glucose Latest Ref Range: 70 - 110 mg/dL 96 "   Calcium Latest Ref Range: 8.7 - 10.5 mg/dL 9.2   Phosphorus Latest Ref Range: 2.7 - 4.5 mg/dL 3.3   Albumin Latest Ref Range: 3.5 - 5.2 g/dL 3.7   Vit D, 25-Hydroxy Latest Ref Range: 30 - 96 ng/mL 74   PTH Latest Ref Range: 9.0 - 77.0 pg/mL 66.0     Assessment/Plan:     1. Age-related osteoporosis with current pathological fracture with routine healing  DXA Bone Density Spine And Hip    Comprehensive Metabolic Panel    Vitamin D   2. Stage 3a chronic kidney disease     3. Vitamin D deficiency  Vitamin D   4. Use of aromatase inhibitors  DXA Bone Density Spine And Hip     Age-related osteoporosis with current pathological fracture with routine healing  -- Continue Prolia -- Let me know if you fracture  -- RDA of 3036-9489 mg daily of calcium. Given information on calcium in foods  -- Check Bone density test in November 2020  -- Avoid excessive alcohol and tobacco  -- Continue current dosage of calcium   -- Continue exercise       CKD (chronic kidney disease) stage 3, GFR 30-59 ml/min  -- Calcium higher level of normal  -- Cut back on vitamin D a bit as level was 80 ng/ml    Vitamin D deficiency  Cut back to 1000 IUs three times weekly     Use of aromatase inhibitors  Repeat DXA 11/2020    Follow up in about 1 year (around 11/5/2021).

## 2020-11-05 NOTE — ASSESSMENT & PLAN NOTE
-- Continue Prolia -- Let me know if you fracture  -- RDA of 2342-6421 mg daily of calcium. Given information on calcium in foods  -- Check Bone density test in November 2020  -- Avoid excessive alcohol and tobacco  -- Continue current dosage of calcium   -- Continue exercise

## 2020-11-06 ENCOUNTER — LAB VISIT (OUTPATIENT)
Dept: LAB | Facility: HOSPITAL | Age: 80
End: 2020-11-06
Payer: MEDICARE

## 2020-11-06 DIAGNOSIS — E55.9 VITAMIN D DEFICIENCY: ICD-10-CM

## 2020-11-06 DIAGNOSIS — M80.00XD AGE-RELATED OSTEOPOROSIS WITH CURRENT PATHOLOGICAL FRACTURE WITH ROUTINE HEALING: ICD-10-CM

## 2020-11-06 LAB
25(OH)D3+25(OH)D2 SERPL-MCNC: 74 NG/ML (ref 30–96)
ALBUMIN SERPL BCP-MCNC: 3.5 G/DL (ref 3.5–5.2)
ALP SERPL-CCNC: 50 U/L (ref 55–135)
ALT SERPL W/O P-5'-P-CCNC: 24 U/L (ref 10–44)
ANION GAP SERPL CALC-SCNC: 10 MMOL/L (ref 8–16)
AST SERPL-CCNC: 35 U/L (ref 10–40)
BILIRUB SERPL-MCNC: 0.5 MG/DL (ref 0.1–1)
BUN SERPL-MCNC: 15 MG/DL (ref 8–23)
CALCIUM SERPL-MCNC: 9.1 MG/DL (ref 8.7–10.5)
CHLORIDE SERPL-SCNC: 103 MMOL/L (ref 95–110)
CO2 SERPL-SCNC: 26 MMOL/L (ref 23–29)
CREAT SERPL-MCNC: 1.1 MG/DL (ref 0.5–1.4)
EST. GFR  (AFRICAN AMERICAN): 54.8 ML/MIN/1.73 M^2
EST. GFR  (NON AFRICAN AMERICAN): 47.5 ML/MIN/1.73 M^2
GLUCOSE SERPL-MCNC: 86 MG/DL (ref 70–110)
POTASSIUM SERPL-SCNC: 4.4 MMOL/L (ref 3.5–5.1)
PROT SERPL-MCNC: 6.8 G/DL (ref 6–8.4)
SODIUM SERPL-SCNC: 139 MMOL/L (ref 136–145)

## 2020-11-06 PROCEDURE — 36415 COLL VENOUS BLD VENIPUNCTURE: CPT

## 2020-11-06 PROCEDURE — 82306 VITAMIN D 25 HYDROXY: CPT

## 2020-11-06 PROCEDURE — 80053 COMPREHEN METABOLIC PANEL: CPT

## 2020-11-09 ENCOUNTER — PATIENT MESSAGE (OUTPATIENT)
Dept: ENDOCRINOLOGY | Facility: CLINIC | Age: 80
End: 2020-11-09

## 2020-11-12 ENCOUNTER — TELEPHONE (OUTPATIENT)
Dept: NEPHROLOGY | Facility: CLINIC | Age: 80
End: 2020-11-12

## 2020-11-12 ENCOUNTER — TELEPHONE (OUTPATIENT)
Dept: NEPHROLOGY | Facility: HOSPITAL | Age: 80
End: 2020-11-12

## 2020-11-12 NOTE — TELEPHONE ENCOUNTER
Please see tel messages from 10/15 where she was called by marianne and appt was made for 11/9 per marianne's note.  Thanks.

## 2020-11-16 ENCOUNTER — TELEPHONE (OUTPATIENT)
Dept: OBSTETRICS AND GYNECOLOGY | Facility: CLINIC | Age: 80
End: 2020-11-16

## 2020-11-16 NOTE — TELEPHONE ENCOUNTER
returned patient call, unable to reach patient ,lvm       ----- Message from Apoorva Lyn sent at 11/16/2020  2:55 PM CST -----  Pt would like to be contacted to set up an AWV.      [phone]

## 2020-11-19 ENCOUNTER — INFUSION (OUTPATIENT)
Dept: INFECTIOUS DISEASES | Facility: HOSPITAL | Age: 80
End: 2020-11-19
Attending: INTERNAL MEDICINE
Payer: MEDICARE

## 2020-11-19 VITALS
BODY MASS INDEX: 20.9 KG/M2 | HEIGHT: 66 IN | WEIGHT: 130.06 LBS | SYSTOLIC BLOOD PRESSURE: 150 MMHG | DIASTOLIC BLOOD PRESSURE: 70 MMHG | HEART RATE: 50 BPM

## 2020-11-19 DIAGNOSIS — M80.00XD AGE-RELATED OSTEOPOROSIS WITH CURRENT PATHOLOGICAL FRACTURE WITH ROUTINE HEALING: ICD-10-CM

## 2020-11-19 DIAGNOSIS — E55.9 VITAMIN D DEFICIENCY: Primary | ICD-10-CM

## 2020-11-19 DIAGNOSIS — N18.31 STAGE 3A CHRONIC KIDNEY DISEASE: ICD-10-CM

## 2020-11-19 PROCEDURE — 63600175 PHARM REV CODE 636 W HCPCS: Mod: JG | Performed by: INTERNAL MEDICINE

## 2020-11-19 PROCEDURE — 96372 THER/PROPH/DIAG INJ SC/IM: CPT

## 2020-11-19 RX ADMIN — DENOSUMAB 60 MG: 60 INJECTION SUBCUTANEOUS at 11:11

## 2020-11-20 ENCOUNTER — HOSPITAL ENCOUNTER (OUTPATIENT)
Dept: RADIOLOGY | Facility: CLINIC | Age: 80
Discharge: HOME | End: 2020-11-20
Attending: NURSE PRACTITIONER
Payer: MEDICARE

## 2020-11-20 DIAGNOSIS — M80.00XD AGE-RELATED OSTEOPOROSIS WITH CURRENT PATHOLOGICAL FRACTURE WITH ROUTINE HEALING: ICD-10-CM

## 2020-11-20 DIAGNOSIS — Z79.811 USE OF AROMATASE INHIBITORS: ICD-10-CM

## 2020-11-20 PROCEDURE — 77080 DXA BONE DENSITY AXIAL: CPT | Mod: TC

## 2020-11-20 PROCEDURE — 77080 DXA BONE DENSITY AXIAL: CPT | Mod: 26,,, | Performed by: INTERNAL MEDICINE

## 2020-11-20 PROCEDURE — 77080 DEXA BONE DENSITY SPINE HIP: ICD-10-PCS | Mod: 26,,, | Performed by: INTERNAL MEDICINE

## 2020-12-02 ENCOUNTER — PATIENT OUTREACH (OUTPATIENT)
Dept: OTHER | Facility: OTHER | Age: 80
End: 2020-12-02

## 2020-12-02 NOTE — PROGRESS NOTES
Digital Medicine: Clinician Follow-Up    ** Patient called back. Patient reports she has been told she has labile BP in the past. Patient confirms she is still using her [place] cuff from 2/2017.    *Called patient back per her request, JEFFRYM. WCB in 1-2 weeks.     Called patient regarding low readings taken on 12/1/20 (83/40 and 102/45); about 2 hours later BP was 169/76. Patient attributes moving furniture and exerting herself to high BP reading, denies hypotensive or hypertensive s/s. Patient requests I call back tomorrow to finish conversation.    The history is provided by the patient.   Follow-up reason(s): Alert received.   Care Team received low BP alert.      Hypertension    Patient's blood pressure is stable.   Patient is not experiencing signs/symptoms of hypotension.  Patient is not experiencing signs/symptoms of hypertension.          Last 5 Patient Entered Readings                                      Current 30 Day Average: 125/57     Recent Readings 12/1/2020 12/1/2020 12/1/2020 12/1/2020 12/1/2020    SBP (mmHg) 169 169 102 102 83    DBP (mmHg) 76 76 45 45 40    Pulse 52 52 58 58 62             Depression Screening  Did not address depression screening.    Sleep Apnea Screening    Did not address sleep apnea screening.     Medication Affordability Screening  Did not address medication affordability screening.     Medication Adherence-Medication adherence was asssessed.  Patient continue taking medication as prescribed.            ASSESSMENT(S)  Patients BP average is 126/58 mmHg, which is at goal. Patient's BP goal is less than or equal to 130/80.    Hypertension Plan  Continue current therapy.  Encouraged patient to purchase a new digital cuff, current cuff is from when she enrolled in the program in 2/2017.    Will continue to monitor, WCB in 4-6 weeks.          Addressed patient questions and patient has my contact information if needed prior to next outreach. Patient verbalizes  understanding.                 Hypertension Medications             amLODIPine (NORVASC) 2.5 MG tablet Take 1 tablet (2.5 mg total) by mouth once daily.    atenoloL (TENORMIN) 25 MG tablet Take 1 tablet (25 mg total) by mouth once daily.    valsartan (DIOVAN) 320 MG tablet Take 1 tablet (320 mg total) by mouth once daily. Dose increase.

## 2020-12-10 ENCOUNTER — PES CALL (OUTPATIENT)
Dept: ADMINISTRATIVE | Facility: CLINIC | Age: 80
End: 2020-12-10

## 2020-12-10 ENCOUNTER — PATIENT MESSAGE (OUTPATIENT)
Dept: ENDOCRINOLOGY | Facility: CLINIC | Age: 80
End: 2020-12-10

## 2020-12-14 ENCOUNTER — PATIENT MESSAGE (OUTPATIENT)
Dept: ENDOCRINOLOGY | Facility: CLINIC | Age: 80
End: 2020-12-14

## 2020-12-16 ENCOUNTER — TELEPHONE (OUTPATIENT)
Dept: OBSTETRICS AND GYNECOLOGY | Facility: CLINIC | Age: 80
End: 2020-12-16

## 2020-12-16 ENCOUNTER — OFFICE VISIT (OUTPATIENT)
Dept: UROLOGY | Facility: CLINIC | Age: 80
End: 2020-12-16
Payer: MEDICARE

## 2020-12-16 ENCOUNTER — OFFICE VISIT (OUTPATIENT)
Dept: HEMATOLOGY/ONCOLOGY | Facility: CLINIC | Age: 80
End: 2020-12-16
Payer: MEDICARE

## 2020-12-16 VITALS
BODY MASS INDEX: 19.56 KG/M2 | WEIGHT: 121.69 LBS | SYSTOLIC BLOOD PRESSURE: 128 MMHG | HEART RATE: 46 BPM | HEIGHT: 66 IN | DIASTOLIC BLOOD PRESSURE: 63 MMHG

## 2020-12-16 VITALS
SYSTOLIC BLOOD PRESSURE: 174 MMHG | DIASTOLIC BLOOD PRESSURE: 78 MMHG | OXYGEN SATURATION: 99 % | TEMPERATURE: 98 F | BODY MASS INDEX: 19.91 KG/M2 | HEIGHT: 66 IN | HEART RATE: 60 BPM | RESPIRATION RATE: 16 BRPM | WEIGHT: 123.88 LBS

## 2020-12-16 DIAGNOSIS — R33.9 INCOMPLETE EMPTYING OF BLADDER: Primary | ICD-10-CM

## 2020-12-16 DIAGNOSIS — Z17.0 MALIGNANT NEOPLASM OF UPPER-OUTER QUADRANT OF RIGHT BREAST IN FEMALE, ESTROGEN RECEPTOR POSITIVE: Primary | ICD-10-CM

## 2020-12-16 DIAGNOSIS — Z79.811 USE OF AROMATASE INHIBITORS: ICD-10-CM

## 2020-12-16 DIAGNOSIS — C50.411 MALIGNANT NEOPLASM OF UPPER-OUTER QUADRANT OF RIGHT BREAST IN FEMALE, ESTROGEN RECEPTOR POSITIVE: Primary | ICD-10-CM

## 2020-12-16 DIAGNOSIS — N13.30 BILATERAL HYDRONEPHROSIS: ICD-10-CM

## 2020-12-16 PROCEDURE — 99213 OFFICE O/P EST LOW 20 MIN: CPT | Mod: PBBFAC,27 | Performed by: UROLOGY

## 2020-12-16 PROCEDURE — 51701 PR INSERTION OF NON-INDWELLING BLADDER CATHETERIZATION FOR RESIDUAL UR: ICD-10-PCS | Mod: S$PBB,,, | Performed by: UROLOGY

## 2020-12-16 PROCEDURE — 99213 PR OFFICE/OUTPT VISIT, EST, LEVL III, 20-29 MIN: ICD-10-PCS | Mod: S$PBB,25,, | Performed by: UROLOGY

## 2020-12-16 PROCEDURE — 99999 PR PBB SHADOW E&M-EST. PATIENT-LVL III: ICD-10-PCS | Mod: PBBFAC,,, | Performed by: UROLOGY

## 2020-12-16 PROCEDURE — 51701 INSERT BLADDER CATHETER: CPT | Mod: S$PBB,,, | Performed by: UROLOGY

## 2020-12-16 PROCEDURE — 99213 OFFICE O/P EST LOW 20 MIN: CPT | Mod: S$PBB,,, | Performed by: INTERNAL MEDICINE

## 2020-12-16 PROCEDURE — 99215 OFFICE O/P EST HI 40 MIN: CPT | Mod: PBBFAC,25 | Performed by: INTERNAL MEDICINE

## 2020-12-16 PROCEDURE — 99213 OFFICE O/P EST LOW 20 MIN: CPT | Mod: S$PBB,25,, | Performed by: UROLOGY

## 2020-12-16 PROCEDURE — 51701 INSERT BLADDER CATHETER: CPT | Mod: PBBFAC | Performed by: UROLOGY

## 2020-12-16 PROCEDURE — 99999 PR PBB SHADOW E&M-EST. PATIENT-LVL V: ICD-10-PCS | Mod: PBBFAC,,, | Performed by: INTERNAL MEDICINE

## 2020-12-16 PROCEDURE — 99999 PR PBB SHADOW E&M-EST. PATIENT-LVL V: CPT | Mod: PBBFAC,,, | Performed by: INTERNAL MEDICINE

## 2020-12-16 PROCEDURE — 99999 PR PBB SHADOW E&M-EST. PATIENT-LVL III: CPT | Mod: PBBFAC,,, | Performed by: UROLOGY

## 2020-12-16 PROCEDURE — 99213 PR OFFICE/OUTPT VISIT, EST, LEVL III, 20-29 MIN: ICD-10-PCS | Mod: S$PBB,,, | Performed by: INTERNAL MEDICINE

## 2020-12-16 NOTE — PROGRESS NOTES
Subjective:       Patient ID: Shima Sorto is a 80 y.o. female.    Chief Complaint: No chief complaint on file.    HPI    Ms. Sorto returns today for follow up.  She has been on anastrazole since mid November 2018, and so fas has tolerated it well.    Briefly, she is an 80-year-old  female who diagnosed with breast cancer in 2018.  On 08/17/2018, she underwent a lumpectomy and sentinel lymph node biopsy for an 8 mm grade 1 invasive lobular carcinoma which was ER strongly positive, ND negative and HER-2 negative with a Ki-67 of 5% with the closest margin being 2 mm.  Two of 2 sentinel lymph nodes were negative for involvement.      She completed her radiation therapy and was subsequently started on AIs.  Her DXA scan last November had shown osteopenia.  A mammogram and breast ultrasound in June 2020 were read as BIRADS II, and a one year follow up was recommended.      Review of Systems      Overall she feels well.  She has tolerated the anastrazole quite well so far.   She denies any anxiety, depression, easy bruising, fevers, chills, night  sweats, weight loss, nausea, vomiting, diarrhea, constipation, diplopia, blurred vision, headache, chest pain, palpitations, shortness of breath, breast pain, abdominal pain, extremity pain, or difficulty ambulating.  The remainder of the ten-point ROS, including general, skin, lymph, H/N, cardiorespiratory, GI, , Neuro, Endocrine, and psychiatric is negative.     Objective:      Physical Exam        She is alert, oriented to time, place, person, pleasant, well      nourished, in no acute physical distress.                                    VITAL SIGNS:  Reviewed                                      HEENT:  Normal.  There are no nasal, oral, lip, gingival, auricular, lid,    or conjunctival lesions.  Mucosae are moist and pink, and there is no        thrush.  Pupils are equal, reactive to light and accommodation.              Extraocular muscle movements are  intact.  Dentition is good.  She is wearing braces.                                       NECK:  Supple without JVD, adenopathy, or thyromegaly.                       LUNGS:  Clear to auscultation without wheezing, rales, or rhonchi.           CARDIOVASCULAR:  Reveals an S1, S2, no murmurs, no rubs, no gallops.         ABDOMEN:  Soft, nontender, without organomegaly.  Bowel sounds are    present.                                                                     EXTREMITIES:  No cyanosis, clubbing, or edema.                               BREASTS:  She is status post right lumpectomy with a well-healed incision in the upper outer quadrant.    There are no masses in either breast.                                      LYMPHATIC:  There is no cervical, axillary, or supraclavicular adenopathy.   SKIN:  Warm and moist, without petechiae, rashes, induration, or ecchymoses.  There is mild erythema on the right breast from her XRT.          NEUROLOGIC:  DTRs are 0-1+ bilaterally, symmetrical, motor function is 5/5,and cranial nerves are  within normal limits.    Assessment:       1. Malignant neoplasm of upper-outer quadrant of right breast in female, estrogen receptor positive    2. Use of aromatase inhibitors      3.    Osteopenia  Plan:           I had a long discussion with her;  She will remain on anatsrazole through the end of October 2023, and see me again in 6 months with her yearly mammogram.    Her multiple questions were answered to her satisfaction.

## 2020-12-16 NOTE — PROGRESS NOTES
CHIEF COMPLAINT:    Mrs. Sorto is a 80 y.o. female presenting for a consultation at the request of Dr. Sims. Patient presents with mild bilateral hydronephrosis, history of incomplete bladder emptying..    PRESENTING ILLNESS:    Shima Sorto is a 80 y.o. female who returns at the [place] of her nephrologist, Dr. Sims.  She states that her symptoms are unchanged since she was last seen in the office in 2016.  She has to double void in the 2nd void is quite a lot she symptoms after has to wait  at least 30 sec for the 2nd time to void.  She states that 1 time she measured how much urine was coming out she had a 700 cc.  This was not including the postvoid residual that she might have had at the same time.  She has known incomplete bladder emptying, she is status post failed InterStim since placement.  She also cannot perform intermittent catheterization.  She also has a history of nephrolithiasis and on her most recent renal ultrasound, she was found to have a 6 mm nonobstructing stone.  This was on the right side.  There is no mass, or stones on the left side.  She has not passed a stone recently, however she does pass sand.  She is on Urocit-K.    Review of her chart reveals that her creatinine has been stable, at 1.1.  She has stable bilateral mild hydronephrosis.  And she is adamant about not wanting to retry another sacral neuromodulation.  She is willing to do something less invasive including PTNS.    REVIEW OF SYSTEMS:    Review of Systems   Constitutional: Negative.    HENT: Negative.    Eyes: Negative.    Respiratory: Negative.    Cardiovascular: Negative.    Gastrointestinal: Negative.    Genitourinary: Positive for urgency.   Musculoskeletal: Negative.    Skin: Negative.    Neurological: Negative.    Endo/Heme/Allergies: Negative.    Psychiatric/Behavioral: Negative.     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    Visual impairment     reading glasses       Past Surgical History:   Procedure Laterality Date    BREAST CYST ASPIRATION Right 1999    BREAST LUMPECTOMY Right 2018    with radiation    COLONOSCOPY N/A 7/24/2018    Procedure: COLONOSCOPY;  Surgeon: Juan Miguel Pena MD;  Location: [facility] ENDO (4TH FLR);  Service: Endoscopy;  Laterality: N/A;    INJECTION FOR SENTINEL NODE IDENTIFICATION Right 8/17/2018    Procedure: INJECTION, FOR SENTINEL NODE IDENTIFICATION;  Surgeon: Abby Mason MD;  Location: [facility] OR 2ND FLR;  Service: General;  Laterality: Right;    interstim placed stage 1      and removed    KIDNEY STONE SURGERY  2000    @ [facility]    MASTECTOMY, PARTIAL Right 8/17/2018    Procedure: MASTECTOMY, PARTIAL-US guided;  Surgeon: Abby Mason MD;  Location: [facility] OR 2ND FLR;  Service: General;  Laterality: Right;    MOHS procedure      SENTINEL LYMPH NODE BIOPSY Right 8/17/2018    Procedure: BIOPSY, LYMPH NODE, SENTINEL;  Surgeon: Abby Mason MD;  Location: [facility] OR 2ND FLR;  Service: General;  Laterality: Right;       Family History   Problem Relation Age of Onset    Kidney disease Mother     Heart disease Father     Melanoma Father     Heart attack Father     Breast cancer Maternal Aunt     Hearing loss Son     Hyperlipidemia Son      Socioeconomic History    Marital status:    Occupational History    Occupation:      Employer: [employer]   Social Needs    Financial resource strain: Not hard at all    Food insecurity     Worry: Sometimes true     Inability: Never true     Transportation needs     Medical: No     Non-medical: No   Tobacco Use    Smoking status: Former Smoker     Packs/day: 0.50     Years: 8.00     Pack years: 4.00     Quit date: 1964     Years since quittin.3    Smokeless tobacco: Never Used   Substance and Sexual Activity    Alcohol use: Yes     Frequency: Monthly or less     Drinks per session: 1 or 2     Binge frequency: Never     Comment: occasional, 1/ month    Drug use: No    Sexual activity: Not Currently   Lifestyle    Physical activity     Days per week: 1 day     Minutes per session: 40 min    Stress: Not at all   Relationships    Social connections     Talks on phone: Not on file     Gets together: More than three times a week     Attends Gnosticism service: Not on file     Active member of club or organization: No     Attends meetings of clubs or organizations: Never     Relationship status:      Allergies:  Asparagus    Medications:  Outpatient Encounter Medications as of 2020   Medication Sig Dispense Refill    amLODIPine (NORVASC) 2.5 MG tablet Take 1 tablet (2.5 mg total) by mouth once daily. 30 tablet 11    anastrozole (ARIMIDEX) 1 mg Tab TAKE 1 TABLET(1 MG) BY MOUTH EVERY DAY 90 tablet 3    atenoloL (TENORMIN) 25 MG tablet Take 1 tablet (25 mg total) by mouth once daily. 30 tablet 6    B COMPLEX & C NO.10/FOLIC ACID (B COMPLEX WITH C#10-FOLIC ACID ORAL) Take by mouth.      calcium citrate (CALCITRATE) 200 mg (950 mg) tablet Take 1 tablet by mouth once daily.      cholecalciferol, vitamin D3, (VITAJOY DAILY D) 1,000 unit Chew Take by mouth.      co-enzyme Q-10 50 mg capsule Take 100 mg by mouth once daily.       denosumab (PROLIA) 60 mg/mL Syrg Inject 60 mg into the skin every 6 (six) months.      fish oil-vit E-fat acid5-hb137 (SUPER OMEGA-3) 400-5 mg-unit Cap Take 1 capsule by mouth Daily.      flu vac 2020 65up-gvxLR72O,PF, (FLUAD QUAD 2020-21,65Y UP,,PF,) 60 mcg (15 mcg x 4)/0.5 mL Syrg inject IM 0.5 mL 0     MULTIVITAMIN ORAL Take 1 tablet by mouth Daily.      mv-min-folic ac-biotin-lutein (KAROLYN MATRIX 5000 COMPLETE) 33-5,000-250 mcg Tab Take 5,000 mcg by mouth once daily.      valsartan (DIOVAN) 320 MG tablet Take 1 tablet (320 mg total) by mouth once daily. Dose increase. 90 tablet 3    vit C/E/Zn/coppr/lutein/zeaxan (PRESERVISION AREDS 2 ORAL) Take by mouth.      flu vac 2020 65up-[id number],PF, 60 mcg (15 mcg x 4)/0.5 mL Syrg inject as directed (Patient not taking: Reported on 10/8/2020) 0.5 mL 0     Facility-Administered Encounter Medications as of 12/16/2020   Medication Dose Route Frequency Provider Last Rate Last Dose    0.9%  NaCl infusion   Intravenous Continuous James Carranza MD 70 mL/hr at 08/17/18 0843           PHYSICAL EXAMINATION:    The patient generally appears in good health, is appropriately interactive, and is in no apparent distress.    Skin: No lesions.    Mental: Cooperative with normal affect.    Neuro: Grossly intact.    HEENT: Normal. No evidence of lymphadenopathy.    Chest:  normal inspiratory effort.    Abdomen:  Soft, non-tender. No masses or organomegaly. Bladder is not palpable. No evidence of flank discomfort. No evidence of inguinal hernia.    Extremities: No clubbing, cyanosis, or edema    Normal external female genitalia  Grade II urogenital atrophy  Urethral meatus is normal  Urethra with a small urethral caruncle and bladder are nontender to bimanual exam  Well supported anteriorly and posteriorly   Uterus and cervix are normal  No adnexal masses  Amount in bladder was 540 ml (random volume, as she could not void)    LABS:    Lab Results   Component Value Date    BUN 15 11/06/2020    CREATININE 1.1 11/06/2020     UA specific gravity 1.005, pH 8, 50 blood, otherwise negative.    IMPRESSION:    Encounter Diagnoses   Name Primary?    Incomplete emptying of bladder Yes    Bilateral hydronephrosis        PLAN:    1.  Did not qualify for the Avation study  2.  Offered PTNS  and she would like to try it.  Will get her pre approved.

## 2020-12-17 ENCOUNTER — PATIENT MESSAGE (OUTPATIENT)
Dept: UROLOGY | Facility: CLINIC | Age: 80
End: 2020-12-17

## 2020-12-17 DIAGNOSIS — N39.41 URGE INCONTINENCE: Primary | ICD-10-CM

## 2020-12-18 ENCOUNTER — PATIENT OUTREACH (OUTPATIENT)
Dept: OTHER | Facility: OTHER | Age: 80
End: 2020-12-18

## 2020-12-18 NOTE — PROGRESS NOTES
Digital Medicine: Health  Follow-Up    The history is provided by the patient.             Reason for review: Blood pressure not at goal        Topics Covered on Call: Diet    Additional Follow-up details: Patient stated that she is doing well. She is still trying to pinpoint the cause of her fluctuating BP readings. She denies symptoms. She is just starting to use her CPAP again with the oral mouth piece. She denies any questions or concerns at this time.             Diet-no change to diet    No change to diet.        Physical Activity-Not assessed    Medication Adherence-Medication adherence was assessed.      Substance, Sleep, Stress-Not assessed      Continue current diet/physical activity routine.       Addressed patient questions and patient has my contact information if needed prior to next outreach. Patient verbalizes understanding.      Explained the importance of self-monitoring and medication adherence. Encouraged the patient to communicate with their health  for lifestyle modifications to help improve or maintain a healthy lifestyle.               There are no preventive care reminders to display for this patient.      Last 5 Patient Entered Readings                                      Current 30 Day Average: 134/60     Recent Readings 12/15/2020 12/15/2020 12/6/2020 12/6/2020 12/1/2020    SBP (mmHg) 115 115 154 154 169    DBP (mmHg) 72 72 69 69 76    Pulse 62 62 54 54 52

## 2021-01-03 ENCOUNTER — PATIENT MESSAGE (OUTPATIENT)
Dept: INTERNAL MEDICINE | Facility: CLINIC | Age: 81
End: 2021-01-03

## 2021-01-21 ENCOUNTER — OFFICE VISIT (OUTPATIENT)
Dept: INTERNAL MEDICINE | Facility: CLINIC | Age: 81
End: 2021-01-21
Payer: MEDICARE

## 2021-01-21 ENCOUNTER — DOCUMENTATION ONLY (OUTPATIENT)
Dept: RESEARCH | Facility: HOSPITAL | Age: 81
End: 2021-01-21

## 2021-01-21 VITALS
DIASTOLIC BLOOD PRESSURE: 60 MMHG | SYSTOLIC BLOOD PRESSURE: 114 MMHG | HEIGHT: 66 IN | HEART RATE: 54 BPM | WEIGHT: 129.19 LBS | OXYGEN SATURATION: 99 % | BODY MASS INDEX: 20.76 KG/M2

## 2021-01-21 DIAGNOSIS — E72.53 PRIMARY HYPEROXALURIA: ICD-10-CM

## 2021-01-21 DIAGNOSIS — C50.411 MALIGNANT NEOPLASM OF UPPER-OUTER QUADRANT OF RIGHT BREAST IN FEMALE, ESTROGEN RECEPTOR POSITIVE: ICD-10-CM

## 2021-01-21 DIAGNOSIS — D69.6 THROMBOCYTOPENIA: ICD-10-CM

## 2021-01-21 DIAGNOSIS — N18.30 STAGE 3 CHRONIC KIDNEY DISEASE, UNSPECIFIED WHETHER STAGE 3A OR 3B CKD: ICD-10-CM

## 2021-01-21 DIAGNOSIS — Z17.0 MALIGNANT NEOPLASM OF UPPER-OUTER QUADRANT OF RIGHT BREAST IN FEMALE, ESTROGEN RECEPTOR POSITIVE: ICD-10-CM

## 2021-01-21 DIAGNOSIS — M25.50 ARTHRALGIA, UNSPECIFIED JOINT: Primary | ICD-10-CM

## 2021-01-21 PROCEDURE — 99999 PR PBB SHADOW E&M-EST. PATIENT-LVL IV: ICD-10-PCS | Mod: PBBFAC,,, | Performed by: INTERNAL MEDICINE

## 2021-01-21 PROCEDURE — 99214 OFFICE O/P EST MOD 30 MIN: CPT | Mod: S$PBB,,, | Performed by: INTERNAL MEDICINE

## 2021-01-21 PROCEDURE — 99999 PR PBB SHADOW E&M-EST. PATIENT-LVL IV: CPT | Mod: PBBFAC,,, | Performed by: INTERNAL MEDICINE

## 2021-01-21 PROCEDURE — 99214 OFFICE O/P EST MOD 30 MIN: CPT | Mod: PBBFAC | Performed by: INTERNAL MEDICINE

## 2021-01-21 PROCEDURE — 99214 PR OFFICE/OUTPT VISIT, EST, LEVL IV, 30-39 MIN: ICD-10-PCS | Mod: S$PBB,,, | Performed by: INTERNAL MEDICINE

## 2021-01-27 ENCOUNTER — IMMUNIZATION (OUTPATIENT)
Dept: PHARMACY | Facility: CLINIC | Age: 81
End: 2021-01-27
Payer: MEDICARE

## 2021-01-27 DIAGNOSIS — Z23 NEED FOR VACCINATION: Primary | ICD-10-CM

## 2021-02-02 ENCOUNTER — PES CALL (OUTPATIENT)
Dept: ADMINISTRATIVE | Facility: CLINIC | Age: 81
End: 2021-02-02

## 2021-02-05 ENCOUNTER — HOSPITAL ENCOUNTER (EMERGENCY)
Facility: HOSPITAL | Age: 81
Discharge: HOME | End: 2021-02-05
Attending: EMERGENCY MEDICINE
Payer: MEDICARE

## 2021-02-05 VITALS
SYSTOLIC BLOOD PRESSURE: 179 MMHG | OXYGEN SATURATION: 99 % | DIASTOLIC BLOOD PRESSURE: 75 MMHG | RESPIRATION RATE: 18 BRPM | HEIGHT: 66 IN | HEART RATE: 55 BPM | BODY MASS INDEX: 19.77 KG/M2 | TEMPERATURE: 97 F | WEIGHT: 123 LBS

## 2021-02-05 DIAGNOSIS — R07.9 CHEST PAIN: ICD-10-CM

## 2021-02-05 DIAGNOSIS — R42 DIZZINESS: Primary | ICD-10-CM

## 2021-02-05 LAB
ALBUMIN SERPL BCP-MCNC: 3.5 G/DL (ref 3.5–5.2)
ALP SERPL-CCNC: 55 U/L (ref 55–135)
ALT SERPL W/O P-5'-P-CCNC: 24 U/L (ref 10–44)
ANION GAP SERPL CALC-SCNC: 9 MMOL/L (ref 8–16)
AST SERPL-CCNC: 39 U/L (ref 10–40)
BASOPHILS # BLD AUTO: 0.02 K/UL (ref 0–0.2)
BASOPHILS NFR BLD: 0.7 % (ref 0–1.9)
BILIRUB SERPL-MCNC: 0.7 MG/DL (ref 0.1–1)
BUN SERPL-MCNC: 17 MG/DL (ref 6–30)
BUN SERPL-MCNC: 19 MG/DL (ref 8–23)
CALCIUM SERPL-MCNC: 8.8 MG/DL (ref 8.7–10.5)
CHLORIDE SERPL-SCNC: 101 MMOL/L (ref 95–110)
CHLORIDE SERPL-SCNC: 104 MMOL/L (ref 95–110)
CHOLEST SERPL-MCNC: 166 MG/DL (ref 120–199)
CHOLEST/HDLC SERPL: 2.5 {RATIO} (ref 2–5)
CO2 SERPL-SCNC: 29 MMOL/L (ref 23–29)
CREAT SERPL-MCNC: 1.1 MG/DL (ref 0.5–1.4)
CREAT SERPL-MCNC: 1.2 MG/DL (ref 0.5–1.4)
CTP QC/QA: YES
DIFFERENTIAL METHOD: ABNORMAL
EOSINOPHIL # BLD AUTO: 0 K/UL (ref 0–0.5)
EOSINOPHIL NFR BLD: 1.3 % (ref 0–8)
ERYTHROCYTE [DISTWIDTH] IN BLOOD BY AUTOMATED COUNT: 12.8 % (ref 11.5–14.5)
EST. GFR  (AFRICAN AMERICAN): 49.3 ML/MIN/1.73 M^2
EST. GFR  (NON AFRICAN AMERICAN): 42.8 ML/MIN/1.73 M^2
GLUCOSE SERPL-MCNC: 100 MG/DL (ref 70–110)
GLUCOSE SERPL-MCNC: 104 MG/DL (ref 70–110)
HCT VFR BLD AUTO: 37.8 % (ref 37–48.5)
HCT VFR BLD CALC: 33 %PCV (ref 36–54)
HDLC SERPL-MCNC: 67 MG/DL (ref 40–75)
HDLC SERPL: 40.4 % (ref 20–50)
HGB BLD-MCNC: 12.1 G/DL (ref 12–16)
IMM GRANULOCYTES # BLD AUTO: 0.01 K/UL (ref 0–0.04)
IMM GRANULOCYTES NFR BLD AUTO: 0.3 % (ref 0–0.5)
INR PPP: 1 (ref 0.8–1.2)
LDLC SERPL CALC-MCNC: 93.4 MG/DL (ref 63–159)
LYMPHOCYTES # BLD AUTO: 0.8 K/UL (ref 1–4.8)
LYMPHOCYTES NFR BLD: 28.3 % (ref 18–48)
MCH RBC QN AUTO: 31.3 PG (ref 27–31)
MCHC RBC AUTO-ENTMCNC: 32 G/DL (ref 32–36)
MCV RBC AUTO: 98 FL (ref 82–98)
MONOCYTES # BLD AUTO: 0.4 K/UL (ref 0.3–1)
MONOCYTES NFR BLD: 13.1 % (ref 4–15)
NEUTROPHILS # BLD AUTO: 1.7 K/UL (ref 1.8–7.7)
NEUTROPHILS NFR BLD: 56.3 % (ref 38–73)
NONHDLC SERPL-MCNC: 99 MG/DL
NRBC BLD-RTO: 0 /100 WBC
PLATELET # BLD AUTO: 145 K/UL (ref 150–350)
PMV BLD AUTO: 12.2 FL (ref 9.2–12.9)
POC IONIZED CALCIUM: 1.02 MMOL/L (ref 1.06–1.42)
POC TCO2 (MEASURED): 28 MMOL/L (ref 23–29)
POCT GLUCOSE: 110 MG/DL (ref 70–110)
POTASSIUM BLD-SCNC: 3.6 MMOL/L (ref 3.5–5.1)
POTASSIUM SERPL-SCNC: 4.1 MMOL/L (ref 3.5–5.1)
PROT SERPL-MCNC: 6.8 G/DL (ref 6–8.4)
PROTHROMBIN TIME: 10.5 SEC (ref 9–12.5)
RBC # BLD AUTO: 3.86 M/UL (ref 4–5.4)
SAMPLE: ABNORMAL
SARS-COV-2 RDRP RESP QL NAA+PROBE: NEGATIVE
SODIUM BLD-SCNC: 138 MMOL/L (ref 136–145)
SODIUM SERPL-SCNC: 139 MMOL/L (ref 136–145)
TRIGL SERPL-MCNC: 28 MG/DL (ref 30–150)
TROPONIN I SERPL DL<=0.01 NG/ML-MCNC: 0.01 NG/ML (ref 0–0.03)
TROPONIN I SERPL DL<=0.01 NG/ML-MCNC: 0.01 NG/ML (ref 0–0.03)
TSH SERPL DL<=0.005 MIU/L-ACNC: 3.74 UIU/ML (ref 0.4–4)
WBC # BLD AUTO: 2.97 K/UL (ref 3.9–12.7)

## 2021-02-05 PROCEDURE — 93005 ELECTROCARDIOGRAM TRACING: CPT

## 2021-02-05 PROCEDURE — 85025 COMPLETE CBC W/AUTO DIFF WBC: CPT

## 2021-02-05 PROCEDURE — 99285 PR EMERGENCY DEPT VISIT,LEVEL V: ICD-10-PCS | Mod: CS,,, | Performed by: EMERGENCY MEDICINE

## 2021-02-05 PROCEDURE — 80047 BASIC METABLC PNL IONIZED CA: CPT

## 2021-02-05 PROCEDURE — 80053 COMPREHEN METABOLIC PANEL: CPT

## 2021-02-05 PROCEDURE — 99285 EMERGENCY DEPT VISIT HI MDM: CPT | Mod: CS,,, | Performed by: EMERGENCY MEDICINE

## 2021-02-05 PROCEDURE — 99284 EMERGENCY DEPT VISIT MOD MDM: CPT | Mod: ,,, | Performed by: PSYCHIATRY & NEUROLOGY

## 2021-02-05 PROCEDURE — 84484 ASSAY OF TROPONIN QUANT: CPT

## 2021-02-05 PROCEDURE — 99285 EMERGENCY DEPT VISIT HI MDM: CPT | Mod: 25

## 2021-02-05 PROCEDURE — U0002 COVID-19 LAB TEST NON-CDC: HCPCS | Performed by: EMERGENCY MEDICINE

## 2021-02-05 PROCEDURE — 84443 ASSAY THYROID STIM HORMONE: CPT

## 2021-02-05 PROCEDURE — 85610 PROTHROMBIN TIME: CPT

## 2021-02-05 PROCEDURE — 80061 LIPID PANEL: CPT

## 2021-02-05 PROCEDURE — 25500020 PHARM REV CODE 255: Performed by: EMERGENCY MEDICINE

## 2021-02-05 PROCEDURE — 93010 ELECTROCARDIOGRAM REPORT: CPT | Mod: ,,, | Performed by: INTERNAL MEDICINE

## 2021-02-05 PROCEDURE — 93010 EKG 12-LEAD: ICD-10-PCS | Mod: ,,, | Performed by: INTERNAL MEDICINE

## 2021-02-05 PROCEDURE — 82330 ASSAY OF CALCIUM: CPT

## 2021-02-05 PROCEDURE — 99284 PR EMERGENCY DEPT VISIT,LEVEL IV: ICD-10-PCS | Mod: ,,, | Performed by: PSYCHIATRY & NEUROLOGY

## 2021-02-05 PROCEDURE — 82962 GLUCOSE BLOOD TEST: CPT

## 2021-02-05 RX ADMIN — IOHEXOL 75 ML: 350 INJECTION, SOLUTION INTRAVENOUS at 01:02

## 2021-02-10 ENCOUNTER — PES CALL (OUTPATIENT)
Dept: ADMINISTRATIVE | Facility: CLINIC | Age: 81
End: 2021-02-10

## 2021-02-24 ENCOUNTER — IMMUNIZATION (OUTPATIENT)
Dept: PHARMACY | Facility: CLINIC | Age: 81
End: 2021-02-24
Payer: MEDICARE

## 2021-02-24 DIAGNOSIS — Z23 NEED FOR VACCINATION: Primary | ICD-10-CM

## 2021-03-24 ENCOUNTER — PATIENT MESSAGE (OUTPATIENT)
Dept: INTERNAL MEDICINE | Facility: CLINIC | Age: 81
End: 2021-03-24

## 2021-03-26 ENCOUNTER — OFFICE VISIT (OUTPATIENT)
Dept: URGENT CARE | Facility: CLINIC | Age: 81
End: 2021-03-26
Payer: MEDICARE

## 2021-03-26 VITALS
WEIGHT: 123 LBS | DIASTOLIC BLOOD PRESSURE: 80 MMHG | OXYGEN SATURATION: 98 % | TEMPERATURE: 98 F | SYSTOLIC BLOOD PRESSURE: 156 MMHG | BODY MASS INDEX: 19.77 KG/M2 | HEIGHT: 66 IN | HEART RATE: 60 BPM | RESPIRATION RATE: 18 BRPM

## 2021-03-26 DIAGNOSIS — R23.4 SKIN FISSURES: Primary | ICD-10-CM

## 2021-03-26 PROCEDURE — 99214 OFFICE O/P EST MOD 30 MIN: CPT | Mod: S$GLB,,, | Performed by: FAMILY MEDICINE

## 2021-03-26 PROCEDURE — 99214 PR OFFICE/OUTPT VISIT, EST, LEVL IV, 30-39 MIN: ICD-10-PCS | Mod: S$GLB,,, | Performed by: FAMILY MEDICINE

## 2021-03-26 RX ORDER — MUPIROCIN 20 MG/G
OINTMENT TOPICAL
Qty: 22 G | Refills: 0 | Status: SHIPPED | OUTPATIENT
Start: 2021-03-26 | End: 2021-04-09 | Stop reason: ALTCHOICE

## 2021-03-26 RX ORDER — SULFAMETHOXAZOLE AND TRIMETHOPRIM 800; 160 MG/1; MG/1
1 TABLET ORAL 2 TIMES DAILY
Qty: 14 TABLET | Refills: 0 | Status: SHIPPED | OUTPATIENT
Start: 2021-03-26 | End: 2021-04-02

## 2021-03-27 ENCOUNTER — TELEPHONE (OUTPATIENT)
Dept: URGENT CARE | Facility: CLINIC | Age: 81
End: 2021-03-27

## 2021-03-31 ENCOUNTER — TELEPHONE (OUTPATIENT)
Dept: INTERNAL MEDICINE | Facility: CLINIC | Age: 81
End: 2021-03-31

## 2021-04-06 ENCOUNTER — PES CALL (OUTPATIENT)
Dept: ADMINISTRATIVE | Facility: CLINIC | Age: 81
End: 2021-04-06

## 2021-04-09 ENCOUNTER — OFFICE VISIT (OUTPATIENT)
Dept: WOUND CARE | Facility: CLINIC | Age: 81
End: 2021-04-09
Payer: MEDICARE

## 2021-04-09 ENCOUNTER — TELEPHONE (OUTPATIENT)
Dept: HEMATOLOGY/ONCOLOGY | Facility: CLINIC | Age: 81
End: 2021-04-09

## 2021-04-09 VITALS
SYSTOLIC BLOOD PRESSURE: 122 MMHG | DIASTOLIC BLOOD PRESSURE: 54 MMHG | BODY MASS INDEX: 19.35 KG/M2 | TEMPERATURE: 97 F | HEART RATE: 54 BPM | WEIGHT: 120.38 LBS | HEIGHT: 66 IN

## 2021-04-09 DIAGNOSIS — B37.2 CANDIDIASIS, CUTANEOUS: ICD-10-CM

## 2021-04-09 DIAGNOSIS — L20.9 ATOPIC DERMATITIS, UNSPECIFIED TYPE: Primary | ICD-10-CM

## 2021-04-09 PROBLEM — R23.4 SKIN FISSURE: Status: ACTIVE | Noted: 2021-04-09

## 2021-04-09 PROCEDURE — 99204 OFFICE O/P NEW MOD 45 MIN: CPT | Mod: S$PBB,,, | Performed by: NURSE PRACTITIONER

## 2021-04-09 PROCEDURE — 99204 PR OFFICE/OUTPT VISIT, NEW, LEVL IV, 45-59 MIN: ICD-10-PCS | Mod: S$PBB,,, | Performed by: NURSE PRACTITIONER

## 2021-04-09 PROCEDURE — 99999 PR PBB SHADOW E&M-EST. PATIENT-LVL V: CPT | Mod: PBBFAC,,, | Performed by: NURSE PRACTITIONER

## 2021-04-09 PROCEDURE — 99215 OFFICE O/P EST HI 40 MIN: CPT | Mod: PBBFAC | Performed by: NURSE PRACTITIONER

## 2021-04-09 PROCEDURE — 99999 PR PBB SHADOW E&M-EST. PATIENT-LVL V: ICD-10-PCS | Mod: PBBFAC,,, | Performed by: NURSE PRACTITIONER

## 2021-04-09 RX ORDER — NYSTATIN 100000 U/G
CREAM TOPICAL 2 TIMES DAILY
Qty: 30 G | Refills: 1 | Status: SHIPPED | OUTPATIENT
Start: 2021-04-09 | End: 2021-06-17

## 2021-04-09 RX ORDER — TRIAMCINOLONE ACETONIDE 1 MG/G
CREAM TOPICAL 2 TIMES DAILY
Qty: 80 G | Refills: 1 | Status: SHIPPED | OUTPATIENT
Start: 2021-04-09 | End: 2021-06-17

## 2021-04-12 ENCOUNTER — PATIENT MESSAGE (OUTPATIENT)
Dept: HEMATOLOGY/ONCOLOGY | Facility: CLINIC | Age: 81
End: 2021-04-12

## 2021-04-12 ENCOUNTER — TELEPHONE (OUTPATIENT)
Dept: UROLOGY | Facility: CLINIC | Age: 81
End: 2021-04-12

## 2021-04-12 ENCOUNTER — DOCUMENTATION ONLY (OUTPATIENT)
Dept: HEMATOLOGY/ONCOLOGY | Facility: CLINIC | Age: 81
End: 2021-04-12

## 2021-04-25 ENCOUNTER — PATIENT MESSAGE (OUTPATIENT)
Dept: WOUND CARE | Facility: CLINIC | Age: 81
End: 2021-04-25

## 2021-05-20 ENCOUNTER — INFUSION (OUTPATIENT)
Dept: INFECTIOUS DISEASES | Facility: HOSPITAL | Age: 81
End: 2021-05-20
Attending: INTERNAL MEDICINE
Payer: MEDICARE

## 2021-05-20 VITALS
BODY MASS INDEX: 20.21 KG/M2 | TEMPERATURE: 99 F | RESPIRATION RATE: 16 BRPM | DIASTOLIC BLOOD PRESSURE: 65 MMHG | OXYGEN SATURATION: 100 % | WEIGHT: 125.75 LBS | HEART RATE: 52 BPM | HEIGHT: 66 IN | SYSTOLIC BLOOD PRESSURE: 140 MMHG

## 2021-05-20 DIAGNOSIS — M80.00XD AGE-RELATED OSTEOPOROSIS WITH CURRENT PATHOLOGICAL FRACTURE WITH ROUTINE HEALING: ICD-10-CM

## 2021-05-20 DIAGNOSIS — E55.9 VITAMIN D DEFICIENCY: Primary | ICD-10-CM

## 2021-05-20 DIAGNOSIS — N18.31 STAGE 3A CHRONIC KIDNEY DISEASE: ICD-10-CM

## 2021-05-20 PROCEDURE — 96372 THER/PROPH/DIAG INJ SC/IM: CPT

## 2021-05-20 PROCEDURE — 63600175 PHARM REV CODE 636 W HCPCS: Mod: JG | Performed by: INTERNAL MEDICINE

## 2021-05-20 RX ADMIN — DENOSUMAB 60 MG: 60 INJECTION SUBCUTANEOUS at 10:05

## 2021-06-01 ENCOUNTER — PES CALL (OUTPATIENT)
Dept: ADMINISTRATIVE | Facility: CLINIC | Age: 81
End: 2021-06-01

## 2021-06-02 ENCOUNTER — TELEPHONE (OUTPATIENT)
Dept: OBSTETRICS AND GYNECOLOGY | Facility: CLINIC | Age: 81
End: 2021-06-02

## 2021-06-03 ENCOUNTER — TELEPHONE (OUTPATIENT)
Dept: OBSTETRICS AND GYNECOLOGY | Facility: CLINIC | Age: 81
End: 2021-06-03

## 2021-06-03 ENCOUNTER — PATIENT MESSAGE (OUTPATIENT)
Dept: INTERNAL MEDICINE | Facility: CLINIC | Age: 81
End: 2021-06-03

## 2021-06-14 ENCOUNTER — TELEPHONE (OUTPATIENT)
Dept: DERMATOLOGY | Facility: CLINIC | Age: 81
End: 2021-06-14

## 2021-06-16 ENCOUNTER — TELEPHONE (OUTPATIENT)
Dept: INTERNAL MEDICINE | Facility: CLINIC | Age: 81
End: 2021-06-16

## 2021-06-17 ENCOUNTER — HOSPITAL ENCOUNTER (OUTPATIENT)
Dept: RADIOLOGY | Facility: HOSPITAL | Age: 81
Discharge: HOME | End: 2021-06-17
Attending: INTERNAL MEDICINE
Payer: MEDICARE

## 2021-06-17 ENCOUNTER — OFFICE VISIT (OUTPATIENT)
Dept: INTERNAL MEDICINE | Facility: CLINIC | Age: 81
End: 2021-06-17
Payer: MEDICARE

## 2021-06-17 VITALS — HEIGHT: 66 IN | WEIGHT: 124 LBS | BODY MASS INDEX: 19.93 KG/M2

## 2021-06-17 VITALS
DIASTOLIC BLOOD PRESSURE: 88 MMHG | BODY MASS INDEX: 19.88 KG/M2 | HEART RATE: 60 BPM | HEIGHT: 66 IN | SYSTOLIC BLOOD PRESSURE: 128 MMHG | OXYGEN SATURATION: 100 % | WEIGHT: 123.69 LBS

## 2021-06-17 DIAGNOSIS — R00.1 BRADYCARDIA: ICD-10-CM

## 2021-06-17 DIAGNOSIS — Z17.0 MALIGNANT NEOPLASM OF UPPER-OUTER QUADRANT OF RIGHT BREAST IN FEMALE, ESTROGEN RECEPTOR POSITIVE: ICD-10-CM

## 2021-06-17 DIAGNOSIS — I87.2 VENOUS INSUFFICIENCY: ICD-10-CM

## 2021-06-17 DIAGNOSIS — C50.411 MALIGNANT NEOPLASM OF UPPER-OUTER QUADRANT OF RIGHT BREAST IN FEMALE, ESTROGEN RECEPTOR POSITIVE: ICD-10-CM

## 2021-06-17 DIAGNOSIS — M80.00XD AGE-RELATED OSTEOPOROSIS WITH CURRENT PATHOLOGICAL FRACTURE WITH ROUTINE HEALING: ICD-10-CM

## 2021-06-17 DIAGNOSIS — I10 ESSENTIAL HYPERTENSION: ICD-10-CM

## 2021-06-17 DIAGNOSIS — Z00.00 ENCOUNTER FOR PREVENTIVE HEALTH EXAMINATION: Primary | ICD-10-CM

## 2021-06-17 DIAGNOSIS — N20.0 HYPOCITRATURIC CALCIUM NEPHROLITHIASIS: ICD-10-CM

## 2021-06-17 DIAGNOSIS — H35.63 RETINAL HEMORRHAGE OF BOTH EYES: ICD-10-CM

## 2021-06-17 DIAGNOSIS — N18.30 STAGE 3 CHRONIC KIDNEY DISEASE, UNSPECIFIED WHETHER STAGE 3A OR 3B CKD: ICD-10-CM

## 2021-06-17 DIAGNOSIS — G47.33 OBSTRUCTIVE SLEEP APNEA: ICD-10-CM

## 2021-06-17 DIAGNOSIS — Q61.9 CYSTIC KIDNEY DISEASE: ICD-10-CM

## 2021-06-17 DIAGNOSIS — Z79.811 USE OF AROMATASE INHIBITORS: ICD-10-CM

## 2021-06-17 DIAGNOSIS — E55.9 VITAMIN D DEFICIENCY: ICD-10-CM

## 2021-06-17 DIAGNOSIS — D69.6 THROMBOCYTOPENIA: ICD-10-CM

## 2021-06-17 PROBLEM — R07.9 CHEST PAIN: Status: RESOLVED | Noted: 2021-02-05 | Resolved: 2021-06-17

## 2021-06-17 PROBLEM — Z12.11 SCREENING FOR COLON CANCER: Status: RESOLVED | Noted: 2018-07-24 | Resolved: 2021-06-17

## 2021-06-17 PROCEDURE — 77066 DX MAMMO INCL CAD BI: CPT | Mod: TC

## 2021-06-17 PROCEDURE — 99999 PR PBB SHADOW E&M-EST. PATIENT-LVL V: ICD-10-PCS | Mod: PBBFAC,,, | Performed by: NURSE PRACTITIONER

## 2021-06-17 PROCEDURE — 99999 PR PBB SHADOW E&M-EST. PATIENT-LVL V: CPT | Mod: PBBFAC,,, | Performed by: NURSE PRACTITIONER

## 2021-06-17 PROCEDURE — G0439 PPPS, SUBSEQ VISIT: HCPCS | Mod: ,,, | Performed by: NURSE PRACTITIONER

## 2021-06-17 PROCEDURE — 77066 MAMMO DIGITAL DIAGNOSTIC BILAT WITH TOMO: ICD-10-PCS | Mod: 26,,, | Performed by: RADIOLOGY

## 2021-06-17 PROCEDURE — 77062 BREAST TOMOSYNTHESIS BI: CPT | Mod: 26,,, | Performed by: RADIOLOGY

## 2021-06-17 PROCEDURE — G0439 PR MEDICARE ANNUAL WELLNESS SUBSEQUENT VISIT: ICD-10-PCS | Mod: ,,, | Performed by: NURSE PRACTITIONER

## 2021-06-17 PROCEDURE — 77066 DX MAMMO INCL CAD BI: CPT | Mod: 26,,, | Performed by: RADIOLOGY

## 2021-06-17 PROCEDURE — 99215 OFFICE O/P EST HI 40 MIN: CPT | Mod: PBBFAC | Performed by: NURSE PRACTITIONER

## 2021-06-17 PROCEDURE — 77062 MAMMO DIGITAL DIAGNOSTIC BILAT WITH TOMO: ICD-10-PCS | Mod: 26,,, | Performed by: RADIOLOGY

## 2021-06-24 ENCOUNTER — TELEPHONE (OUTPATIENT)
Dept: NEPHROLOGY | Facility: CLINIC | Age: 81
End: 2021-06-24

## 2021-06-24 DIAGNOSIS — N18.32 STAGE 3B CHRONIC KIDNEY DISEASE: Primary | ICD-10-CM

## 2021-07-08 ENCOUNTER — OFFICE VISIT (OUTPATIENT)
Dept: URGENT CARE | Facility: CLINIC | Age: 81
End: 2021-07-08
Payer: MEDICARE

## 2021-07-08 VITALS
HEART RATE: 55 BPM | HEIGHT: 66 IN | RESPIRATION RATE: 18 BRPM | OXYGEN SATURATION: 100 % | WEIGHT: 123 LBS | BODY MASS INDEX: 19.77 KG/M2 | TEMPERATURE: 97 F

## 2021-07-08 DIAGNOSIS — T14.8XXA AVULSION OF SKIN: Primary | ICD-10-CM

## 2021-07-08 PROCEDURE — 99214 PR OFFICE/OUTPT VISIT, EST, LEVL IV, 30-39 MIN: ICD-10-PCS | Mod: S$GLB,,, | Performed by: INTERNAL MEDICINE

## 2021-07-08 PROCEDURE — 99214 OFFICE O/P EST MOD 30 MIN: CPT | Mod: S$GLB,,, | Performed by: INTERNAL MEDICINE

## 2021-07-08 RX ORDER — MUPIROCIN 20 MG/G
OINTMENT TOPICAL 3 TIMES DAILY
Qty: 1 G | Refills: 0 | Status: SHIPPED | OUTPATIENT
Start: 2021-07-08 | End: 2021-07-13

## 2021-07-09 ENCOUNTER — PATIENT MESSAGE (OUTPATIENT)
Dept: INTERNAL MEDICINE | Facility: CLINIC | Age: 81
End: 2021-07-09

## 2021-07-14 ENCOUNTER — PATIENT MESSAGE (OUTPATIENT)
Dept: WOUND CARE | Facility: CLINIC | Age: 81
End: 2021-07-14

## 2021-07-15 ENCOUNTER — LAB VISIT (OUTPATIENT)
Dept: LAB | Facility: HOSPITAL | Age: 81
End: 2021-07-15
Payer: MEDICARE

## 2021-07-15 DIAGNOSIS — N18.32 STAGE 3B CHRONIC KIDNEY DISEASE: ICD-10-CM

## 2021-07-15 LAB
25(OH)D3+25(OH)D2 SERPL-MCNC: 71 NG/ML (ref 30–96)
ALBUMIN SERPL BCP-MCNC: 3.5 G/DL (ref 3.5–5.2)
ANION GAP SERPL CALC-SCNC: 9 MMOL/L (ref 8–16)
BUN SERPL-MCNC: 19 MG/DL (ref 8–23)
CALCIUM SERPL-MCNC: 9.7 MG/DL (ref 8.7–10.5)
CHLORIDE SERPL-SCNC: 104 MMOL/L (ref 95–110)
CO2 SERPL-SCNC: 30 MMOL/L (ref 23–29)
CREAT SERPL-MCNC: 1.1 MG/DL (ref 0.5–1.4)
EST. GFR  (AFRICAN AMERICAN): 54.8 ML/MIN/1.73 M^2
EST. GFR  (NON AFRICAN AMERICAN): 47.5 ML/MIN/1.73 M^2
GLUCOSE SERPL-MCNC: 82 MG/DL (ref 70–110)
HCT VFR BLD AUTO: 36.7 % (ref 37–48.5)
HGB BLD-MCNC: 11.5 G/DL (ref 12–16)
PHOSPHATE SERPL-MCNC: 3.7 MG/DL (ref 2.7–4.5)
POTASSIUM SERPL-SCNC: 4.3 MMOL/L (ref 3.5–5.1)
PTH-INTACT SERPL-MCNC: 46 PG/ML (ref 9–77)
SODIUM SERPL-SCNC: 143 MMOL/L (ref 136–145)

## 2021-07-15 PROCEDURE — 36415 COLL VENOUS BLD VENIPUNCTURE: CPT | Performed by: INTERNAL MEDICINE

## 2021-07-15 PROCEDURE — 82306 VITAMIN D 25 HYDROXY: CPT | Performed by: INTERNAL MEDICINE

## 2021-07-15 PROCEDURE — 80069 RENAL FUNCTION PANEL: CPT | Performed by: INTERNAL MEDICINE

## 2021-07-15 PROCEDURE — 85014 HEMATOCRIT: CPT | Performed by: INTERNAL MEDICINE

## 2021-07-15 PROCEDURE — 83970 ASSAY OF PARATHORMONE: CPT | Performed by: INTERNAL MEDICINE

## 2021-07-15 PROCEDURE — 85018 HEMOGLOBIN: CPT | Performed by: INTERNAL MEDICINE

## 2021-07-16 ENCOUNTER — PATIENT MESSAGE (OUTPATIENT)
Dept: WOUND CARE | Facility: CLINIC | Age: 81
End: 2021-07-16

## 2021-07-16 ENCOUNTER — LAB VISIT (OUTPATIENT)
Dept: LAB | Facility: HOSPITAL | Age: 81
End: 2021-07-16
Attending: INTERNAL MEDICINE
Payer: MEDICARE

## 2021-07-16 DIAGNOSIS — S41.102A OPEN WOUND OF LEFT UPPER EXTREMITY, INITIAL ENCOUNTER: Primary | ICD-10-CM

## 2021-07-16 DIAGNOSIS — N18.32 STAGE 3B CHRONIC KIDNEY DISEASE: ICD-10-CM

## 2021-07-16 LAB
CREAT UR-MCNC: 29 MG/DL (ref 15–325)
PROT UR-MCNC: 9 MG/DL (ref 0–15)
PROT/CREAT UR: 0.31 MG/G{CREAT} (ref 0–0.2)

## 2021-07-16 PROCEDURE — 84156 ASSAY OF PROTEIN URINE: CPT | Performed by: INTERNAL MEDICINE

## 2021-07-16 RX ORDER — HONEY 100 %
PASTE (ML) TOPICAL
Qty: 44 ML | Refills: 0 | Status: SHIPPED | OUTPATIENT
Start: 2021-07-16 | End: 2021-08-16

## 2021-07-21 ENCOUNTER — OFFICE VISIT (OUTPATIENT)
Dept: INTERNAL MEDICINE | Facility: CLINIC | Age: 81
End: 2021-07-21
Payer: MEDICARE

## 2021-07-21 VITALS
HEART RATE: 49 BPM | SYSTOLIC BLOOD PRESSURE: 130 MMHG | OXYGEN SATURATION: 99 % | HEIGHT: 66 IN | DIASTOLIC BLOOD PRESSURE: 60 MMHG | WEIGHT: 121.94 LBS | BODY MASS INDEX: 19.6 KG/M2

## 2021-07-21 DIAGNOSIS — R29.3 BODY POSTURE PROBLEM: Primary | ICD-10-CM

## 2021-07-21 DIAGNOSIS — I10 ESSENTIAL HYPERTENSION: ICD-10-CM

## 2021-07-21 PROCEDURE — 99213 PR OFFICE/OUTPT VISIT, EST, LEVL III, 20-29 MIN: ICD-10-PCS | Mod: S$PBB,,, | Performed by: INTERNAL MEDICINE

## 2021-07-21 PROCEDURE — 99214 OFFICE O/P EST MOD 30 MIN: CPT | Mod: PBBFAC | Performed by: INTERNAL MEDICINE

## 2021-07-21 PROCEDURE — 99213 OFFICE O/P EST LOW 20 MIN: CPT | Mod: S$PBB,,, | Performed by: INTERNAL MEDICINE

## 2021-07-21 PROCEDURE — 99999 PR PBB SHADOW E&M-EST. PATIENT-LVL IV: ICD-10-PCS | Mod: PBBFAC,,, | Performed by: INTERNAL MEDICINE

## 2021-07-21 PROCEDURE — 99999 PR PBB SHADOW E&M-EST. PATIENT-LVL IV: CPT | Mod: PBBFAC,,, | Performed by: INTERNAL MEDICINE

## 2021-07-28 ENCOUNTER — PATIENT MESSAGE (OUTPATIENT)
Dept: NEPHROLOGY | Facility: CLINIC | Age: 81
End: 2021-07-28

## 2021-07-28 ENCOUNTER — NURSE TRIAGE (OUTPATIENT)
Dept: ADMINISTRATIVE | Facility: CLINIC | Age: 81
End: 2021-07-28

## 2021-07-28 ENCOUNTER — TELEPHONE (OUTPATIENT)
Dept: NEPHROLOGY | Facility: CLINIC | Age: 81
End: 2021-07-28

## 2021-08-02 DIAGNOSIS — N18.31 STAGE 3A CHRONIC KIDNEY DISEASE: Primary | ICD-10-CM

## 2021-08-09 ENCOUNTER — LAB VISIT (OUTPATIENT)
Dept: PRIMARY CARE CLINIC | Facility: CLINIC | Age: 81
End: 2021-08-09
Payer: MEDICARE

## 2021-08-09 DIAGNOSIS — Z20.822 ENCOUNTER FOR LABORATORY TESTING FOR COVID-19 VIRUS: ICD-10-CM

## 2021-08-09 PROCEDURE — U0003 INFECTIOUS AGENT DETECTION BY NUCLEIC ACID (DNA OR RNA); SEVERE ACUTE RESPIRATORY SYNDROME CORONAVIRUS 2 (SARS-COV-2) (CORONAVIRUS DISEASE [COVID-19]), AMPLIFIED PROBE TECHNIQUE, MAKING USE OF HIGH THROUGHPUT TECHNOLOGIES AS DESCRIBED BY CMS-2020-01-R: HCPCS | Performed by: INTERNAL MEDICINE

## 2021-08-09 PROCEDURE — U0005 INFEC AGEN DETEC AMPLI PROBE: HCPCS | Performed by: INTERNAL MEDICINE

## 2021-08-11 LAB
SARS-COV-2 RNA RESP QL NAA+PROBE: NOT DETECTED
SARS-COV-2- CYCLE NUMBER: -1

## 2021-08-16 ENCOUNTER — OFFICE VISIT (OUTPATIENT)
Dept: DERMATOLOGY | Facility: CLINIC | Age: 81
End: 2021-08-16
Payer: MEDICARE

## 2021-08-16 DIAGNOSIS — L81.4 LENTIGO: ICD-10-CM

## 2021-08-16 DIAGNOSIS — D22.9 MULTIPLE BENIGN NEVI: ICD-10-CM

## 2021-08-16 DIAGNOSIS — L98.8 PERFORATING DERMATOSIS: ICD-10-CM

## 2021-08-16 DIAGNOSIS — L30.0 NUMMULAR ECZEMA: ICD-10-CM

## 2021-08-16 DIAGNOSIS — L82.1 SK (SEBORRHEIC KERATOSIS): Primary | ICD-10-CM

## 2021-08-16 PROCEDURE — 99213 PR OFFICE/OUTPT VISIT, EST, LEVL III, 20-29 MIN: ICD-10-PCS | Mod: S$PBB,,, | Performed by: DERMATOLOGY

## 2021-08-16 PROCEDURE — 99213 OFFICE O/P EST LOW 20 MIN: CPT | Mod: PBBFAC | Performed by: DERMATOLOGY

## 2021-08-16 PROCEDURE — 99999 PR PBB SHADOW E&M-EST. PATIENT-LVL III: CPT | Mod: PBBFAC,,, | Performed by: DERMATOLOGY

## 2021-08-16 PROCEDURE — 99213 OFFICE O/P EST LOW 20 MIN: CPT | Mod: S$PBB,,, | Performed by: DERMATOLOGY

## 2021-08-16 PROCEDURE — 99999 PR PBB SHADOW E&M-EST. PATIENT-LVL III: ICD-10-PCS | Mod: PBBFAC,,, | Performed by: DERMATOLOGY

## 2021-08-23 ENCOUNTER — PATIENT MESSAGE (OUTPATIENT)
Dept: HEMATOLOGY/ONCOLOGY | Facility: CLINIC | Age: 81
End: 2021-08-23

## 2021-08-23 ENCOUNTER — PATIENT MESSAGE (OUTPATIENT)
Dept: NEPHROLOGY | Facility: CLINIC | Age: 81
End: 2021-08-23

## 2021-08-23 ENCOUNTER — PATIENT MESSAGE (OUTPATIENT)
Dept: DERMATOLOGY | Facility: CLINIC | Age: 81
End: 2021-08-23

## 2021-09-05 ENCOUNTER — NURSE TRIAGE (OUTPATIENT)
Dept: ADMINISTRATIVE | Facility: CLINIC | Age: 81
End: 2021-09-05

## 2021-09-07 ENCOUNTER — LAB VISIT (OUTPATIENT)
Dept: LAB | Facility: HOSPITAL | Age: 81
End: 2021-09-07
Attending: INTERNAL MEDICINE
Payer: MEDICARE

## 2021-09-07 DIAGNOSIS — N18.31 STAGE 3A CHRONIC KIDNEY DISEASE: ICD-10-CM

## 2021-09-07 LAB
ALBUMIN SERPL BCP-MCNC: 3.5 G/DL (ref 3.5–5.2)
ANION GAP SERPL CALC-SCNC: 8 MMOL/L (ref 8–16)
BUN SERPL-MCNC: 29 MG/DL (ref 8–23)
CALCIUM SERPL-MCNC: 9.1 MG/DL (ref 8.7–10.5)
CHLORIDE SERPL-SCNC: 100 MMOL/L (ref 95–110)
CO2 SERPL-SCNC: 29 MMOL/L (ref 23–29)
CREAT SERPL-MCNC: 1.3 MG/DL (ref 0.5–1.4)
EST. GFR  (AFRICAN AMERICAN): 44.5 ML/MIN/1.73 M^2
EST. GFR  (NON AFRICAN AMERICAN): 38.6 ML/MIN/1.73 M^2
GLUCOSE SERPL-MCNC: 101 MG/DL (ref 70–110)
HCT VFR BLD AUTO: 35.6 % (ref 37–48.5)
HGB BLD-MCNC: 11.7 G/DL (ref 12–16)
PHOSPHATE SERPL-MCNC: 3.7 MG/DL (ref 2.7–4.5)
POTASSIUM SERPL-SCNC: 4 MMOL/L (ref 3.5–5.1)
SODIUM SERPL-SCNC: 137 MMOL/L (ref 136–145)

## 2021-09-07 PROCEDURE — 85014 HEMATOCRIT: CPT | Performed by: INTERNAL MEDICINE

## 2021-09-07 PROCEDURE — 80069 RENAL FUNCTION PANEL: CPT | Performed by: INTERNAL MEDICINE

## 2021-09-07 PROCEDURE — 36415 COLL VENOUS BLD VENIPUNCTURE: CPT | Performed by: INTERNAL MEDICINE

## 2021-09-07 PROCEDURE — 85018 HEMOGLOBIN: CPT | Performed by: INTERNAL MEDICINE

## 2021-09-09 ENCOUNTER — OFFICE VISIT (OUTPATIENT)
Dept: NEPHROLOGY | Facility: CLINIC | Age: 81
End: 2021-09-09
Payer: MEDICARE

## 2021-09-09 VITALS
HEART RATE: 54 BPM | SYSTOLIC BLOOD PRESSURE: 120 MMHG | OXYGEN SATURATION: 100 % | BODY MASS INDEX: 19.84 KG/M2 | HEIGHT: 66 IN | DIASTOLIC BLOOD PRESSURE: 54 MMHG | WEIGHT: 123.44 LBS

## 2021-09-09 DIAGNOSIS — N20.0 KIDNEY STONES: Primary | ICD-10-CM

## 2021-09-09 DIAGNOSIS — N18.31 CHRONIC KIDNEY DISEASE, STAGE 3A: ICD-10-CM

## 2021-09-09 DIAGNOSIS — N18.30 STAGE 3 CHRONIC KIDNEY DISEASE, UNSPECIFIED WHETHER STAGE 3A OR 3B CKD: ICD-10-CM

## 2021-09-09 DIAGNOSIS — N20.0 KIDNEY STONES: ICD-10-CM

## 2021-09-09 DIAGNOSIS — N18.30 STAGE 3 CHRONIC KIDNEY DISEASE, UNSPECIFIED WHETHER STAGE 3A OR 3B CKD: Primary | ICD-10-CM

## 2021-09-09 PROCEDURE — 99214 OFFICE O/P EST MOD 30 MIN: CPT | Mod: S$PBB,,, | Performed by: INTERNAL MEDICINE

## 2021-09-09 PROCEDURE — 99214 PR OFFICE/OUTPT VISIT, EST, LEVL IV, 30-39 MIN: ICD-10-PCS | Mod: S$PBB,,, | Performed by: INTERNAL MEDICINE

## 2021-09-09 PROCEDURE — 99999 PR PBB SHADOW E&M-EST. PATIENT-LVL III: ICD-10-PCS | Mod: PBBFAC,,, | Performed by: INTERNAL MEDICINE

## 2021-09-09 PROCEDURE — 99999 PR PBB SHADOW E&M-EST. PATIENT-LVL III: CPT | Mod: PBBFAC,,, | Performed by: INTERNAL MEDICINE

## 2021-09-09 PROCEDURE — 99213 OFFICE O/P EST LOW 20 MIN: CPT | Mod: PBBFAC | Performed by: INTERNAL MEDICINE

## 2021-09-14 ENCOUNTER — NUTRITION (OUTPATIENT)
Dept: NUTRITION | Facility: CLINIC | Age: 81
End: 2021-09-14
Payer: MEDICARE

## 2021-09-14 VITALS — HEIGHT: 66 IN | WEIGHT: 126.31 LBS | BODY MASS INDEX: 20.3 KG/M2

## 2021-09-14 DIAGNOSIS — Z71.3 DIETARY COUNSELING: ICD-10-CM

## 2021-09-14 DIAGNOSIS — N18.30 STAGE 3 CHRONIC KIDNEY DISEASE, UNSPECIFIED WHETHER STAGE 3A OR 3B CKD: Primary | ICD-10-CM

## 2021-09-14 DIAGNOSIS — N20.0 KIDNEY STONES: ICD-10-CM

## 2021-09-14 DIAGNOSIS — N18.31 CHRONIC KIDNEY DISEASE, STAGE 3A: ICD-10-CM

## 2021-09-14 PROCEDURE — 99213 OFFICE O/P EST LOW 20 MIN: CPT | Mod: PBBFAC

## 2021-09-14 PROCEDURE — 99999 PR PBB SHADOW E&M-EST. PATIENT-LVL III: CPT | Mod: PBBFAC,,,

## 2021-09-14 PROCEDURE — 99999 PR PBB SHADOW E&M-EST. PATIENT-LVL III: ICD-10-PCS | Mod: PBBFAC,,,

## 2021-09-14 PROCEDURE — 97802 MEDICAL NUTRITION INDIV IN: CPT | Mod: PBBFAC | Performed by: DIETITIAN, REGISTERED

## 2021-09-15 ENCOUNTER — HOSPITAL ENCOUNTER (OUTPATIENT)
Dept: RADIOLOGY | Facility: HOSPITAL | Age: 81
Discharge: HOME | End: 2021-09-15
Attending: INTERNAL MEDICINE
Payer: MEDICARE

## 2021-09-15 DIAGNOSIS — N18.30 STAGE 3 CHRONIC KIDNEY DISEASE, UNSPECIFIED WHETHER STAGE 3A OR 3B CKD: ICD-10-CM

## 2021-09-15 DIAGNOSIS — N20.0 KIDNEY STONES: ICD-10-CM

## 2021-09-15 PROCEDURE — 76770 US EXAM ABDO BACK WALL COMP: CPT | Mod: 26,,, | Performed by: RADIOLOGY

## 2021-09-15 PROCEDURE — 76770 US KIDNEY: ICD-10-PCS | Mod: 26,,, | Performed by: RADIOLOGY

## 2021-09-15 PROCEDURE — 76770 US EXAM ABDO BACK WALL COMP: CPT | Mod: TC

## 2021-09-21 ENCOUNTER — PATIENT MESSAGE (OUTPATIENT)
Dept: DERMATOLOGY | Facility: CLINIC | Age: 81
End: 2021-09-21

## 2021-10-08 ENCOUNTER — IMMUNIZATION (OUTPATIENT)
Dept: INTERNAL MEDICINE | Facility: CLINIC | Age: 81
End: 2021-10-08
Payer: MEDICARE

## 2021-10-08 PROCEDURE — G0008 ADMIN INFLUENZA VIRUS VAC: HCPCS | Mod: PBBFAC

## 2021-10-08 PROCEDURE — 90694 VACC AIIV4 NO PRSRV 0.5ML IM: CPT | Mod: PBBFAC

## 2021-10-19 ENCOUNTER — PATIENT MESSAGE (OUTPATIENT)
Dept: INTERNAL MEDICINE | Facility: CLINIC | Age: 81
End: 2021-10-19
Payer: MEDICARE

## 2021-10-20 ENCOUNTER — PATIENT MESSAGE (OUTPATIENT)
Dept: ENDOCRINOLOGY | Facility: CLINIC | Age: 81
End: 2021-10-20
Payer: MEDICARE

## 2021-10-20 ENCOUNTER — TELEPHONE (OUTPATIENT)
Dept: HEMATOLOGY/ONCOLOGY | Facility: CLINIC | Age: 81
End: 2021-10-20

## 2021-10-21 DIAGNOSIS — M80.00XD AGE-RELATED OSTEOPOROSIS WITH CURRENT PATHOLOGICAL FRACTURE WITH ROUTINE HEALING: Primary | ICD-10-CM

## 2021-10-21 DIAGNOSIS — Z79.811 USE OF AROMATASE INHIBITORS: ICD-10-CM

## 2021-11-04 ENCOUNTER — OFFICE VISIT (OUTPATIENT)
Dept: INTERNAL MEDICINE | Facility: CLINIC | Age: 81
End: 2021-11-04
Payer: MEDICARE

## 2021-11-04 VITALS
OXYGEN SATURATION: 99 % | SYSTOLIC BLOOD PRESSURE: 116 MMHG | DIASTOLIC BLOOD PRESSURE: 68 MMHG | HEIGHT: 66 IN | HEART RATE: 58 BPM | BODY MASS INDEX: 20.2 KG/M2 | WEIGHT: 125.69 LBS

## 2021-11-04 DIAGNOSIS — N64.4 BREAST PAIN: Primary | ICD-10-CM

## 2021-11-04 DIAGNOSIS — C50.411 MALIGNANT NEOPLASM OF UPPER-OUTER QUADRANT OF RIGHT BREAST IN FEMALE, ESTROGEN RECEPTOR POSITIVE: ICD-10-CM

## 2021-11-04 DIAGNOSIS — Z17.0 MALIGNANT NEOPLASM OF UPPER-OUTER QUADRANT OF RIGHT BREAST IN FEMALE, ESTROGEN RECEPTOR POSITIVE: ICD-10-CM

## 2021-11-04 DIAGNOSIS — R07.81 RIB PAIN ON RIGHT SIDE: ICD-10-CM

## 2021-11-04 DIAGNOSIS — R53.81 PHYSICAL DECONDITIONING: ICD-10-CM

## 2021-11-04 PROCEDURE — 99214 OFFICE O/P EST MOD 30 MIN: CPT | Mod: S$PBB,,, | Performed by: INTERNAL MEDICINE

## 2021-11-04 PROCEDURE — 99214 OFFICE O/P EST MOD 30 MIN: CPT | Mod: PBBFAC | Performed by: INTERNAL MEDICINE

## 2021-11-04 PROCEDURE — 99214 PR OFFICE/OUTPT VISIT, EST, LEVL IV, 30-39 MIN: ICD-10-PCS | Mod: S$PBB,,, | Performed by: INTERNAL MEDICINE

## 2021-11-04 PROCEDURE — 99999 PR PBB SHADOW E&M-EST. PATIENT-LVL IV: ICD-10-PCS | Mod: PBBFAC,,, | Performed by: INTERNAL MEDICINE

## 2021-11-04 PROCEDURE — 99999 PR PBB SHADOW E&M-EST. PATIENT-LVL IV: CPT | Mod: PBBFAC,,, | Performed by: INTERNAL MEDICINE

## 2021-11-04 RX ORDER — VALSARTAN AND HYDROCHLOROTHIAZIDE 320; 25 MG/1; MG/1
TABLET, FILM COATED ORAL
COMMUNITY
Start: 2021-07-07 | End: 2021-12-20

## 2021-11-06 ENCOUNTER — PATIENT MESSAGE (OUTPATIENT)
Dept: RESEARCH | Facility: HOSPITAL | Age: 81
End: 2021-11-06
Payer: MEDICARE

## 2021-11-09 ENCOUNTER — OFFICE VISIT (OUTPATIENT)
Dept: SURGERY | Facility: CLINIC | Age: 81
End: 2021-11-09
Payer: MEDICARE

## 2021-11-09 ENCOUNTER — IMMUNIZATION (OUTPATIENT)
Dept: PHARMACY | Facility: CLINIC | Age: 81
End: 2021-11-09
Payer: MEDICARE

## 2021-11-09 VITALS
HEIGHT: 66 IN | SYSTOLIC BLOOD PRESSURE: 177 MMHG | HEART RATE: 54 BPM | BODY MASS INDEX: 20.09 KG/M2 | WEIGHT: 125 LBS | DIASTOLIC BLOOD PRESSURE: 79 MMHG

## 2021-11-09 DIAGNOSIS — Z23 NEED FOR VACCINATION: Primary | ICD-10-CM

## 2021-11-09 DIAGNOSIS — N64.4 BREAST PAIN: Primary | ICD-10-CM

## 2021-11-09 DIAGNOSIS — C50.411 MALIGNANT NEOPLASM OF UPPER-OUTER QUADRANT OF RIGHT BREAST IN FEMALE, ESTROGEN RECEPTOR POSITIVE: ICD-10-CM

## 2021-11-09 DIAGNOSIS — Z17.0 MALIGNANT NEOPLASM OF UPPER-OUTER QUADRANT OF RIGHT BREAST IN FEMALE, ESTROGEN RECEPTOR POSITIVE: ICD-10-CM

## 2021-11-09 PROCEDURE — 99999 PR PBB SHADOW E&M-EST. PATIENT-LVL III: CPT | Mod: PBBFAC,,, | Performed by: NURSE PRACTITIONER

## 2021-11-09 PROCEDURE — 99999 PR PBB SHADOW E&M-EST. PATIENT-LVL III: ICD-10-PCS | Mod: PBBFAC,,, | Performed by: NURSE PRACTITIONER

## 2021-11-09 PROCEDURE — 99213 PR OFFICE/OUTPT VISIT, EST, LEVL III, 20-29 MIN: ICD-10-PCS | Mod: S$PBB,,, | Performed by: NURSE PRACTITIONER

## 2021-11-09 PROCEDURE — 99213 OFFICE O/P EST LOW 20 MIN: CPT | Mod: S$PBB,,, | Performed by: NURSE PRACTITIONER

## 2021-11-09 PROCEDURE — 99213 OFFICE O/P EST LOW 20 MIN: CPT | Mod: PBBFAC | Performed by: NURSE PRACTITIONER

## 2021-11-10 ENCOUNTER — HOSPITAL ENCOUNTER (OUTPATIENT)
Dept: RADIOLOGY | Facility: HOSPITAL | Age: 81
Discharge: HOME | End: 2021-11-10
Attending: INTERNAL MEDICINE
Payer: MEDICARE

## 2021-11-10 ENCOUNTER — OFFICE VISIT (OUTPATIENT)
Dept: HEMATOLOGY/ONCOLOGY | Facility: CLINIC | Age: 81
End: 2021-11-10
Payer: MEDICARE

## 2021-11-10 VITALS
WEIGHT: 125.88 LBS | TEMPERATURE: 98 F | BODY MASS INDEX: 19.76 KG/M2 | SYSTOLIC BLOOD PRESSURE: 135 MMHG | HEART RATE: 65 BPM | OXYGEN SATURATION: 99 % | HEIGHT: 67 IN | DIASTOLIC BLOOD PRESSURE: 64 MMHG | RESPIRATION RATE: 18 BRPM

## 2021-11-10 DIAGNOSIS — C50.411 MALIGNANT NEOPLASM OF UPPER-OUTER QUADRANT OF RIGHT BREAST IN FEMALE, ESTROGEN RECEPTOR POSITIVE: ICD-10-CM

## 2021-11-10 DIAGNOSIS — R07.81 RIB PAIN ON RIGHT SIDE: ICD-10-CM

## 2021-11-10 DIAGNOSIS — C50.411 MALIGNANT NEOPLASM OF UPPER-OUTER QUADRANT OF RIGHT BREAST IN FEMALE, ESTROGEN RECEPTOR POSITIVE: Primary | ICD-10-CM

## 2021-11-10 DIAGNOSIS — Z17.0 MALIGNANT NEOPLASM OF UPPER-OUTER QUADRANT OF RIGHT BREAST IN FEMALE, ESTROGEN RECEPTOR POSITIVE: Primary | ICD-10-CM

## 2021-11-10 DIAGNOSIS — Z17.0 MALIGNANT NEOPLASM OF UPPER-OUTER QUADRANT OF RIGHT BREAST IN FEMALE, ESTROGEN RECEPTOR POSITIVE: ICD-10-CM

## 2021-11-10 DIAGNOSIS — Z79.811 USE OF AROMATASE INHIBITORS: ICD-10-CM

## 2021-11-10 PROCEDURE — 71100 X-RAY EXAM RIBS UNI 2 VIEWS: CPT | Mod: TC,FY,RT

## 2021-11-10 PROCEDURE — 99999 PR PBB SHADOW E&M-EST. PATIENT-LVL IV: ICD-10-PCS | Mod: PBBFAC,,, | Performed by: INTERNAL MEDICINE

## 2021-11-10 PROCEDURE — 71100 X-RAY EXAM RIBS UNI 2 VIEWS: CPT | Mod: 26,RT,, | Performed by: RADIOLOGY

## 2021-11-10 PROCEDURE — 99213 PR OFFICE/OUTPT VISIT, EST, LEVL III, 20-29 MIN: ICD-10-PCS | Mod: S$PBB,,, | Performed by: INTERNAL MEDICINE

## 2021-11-10 PROCEDURE — 99214 OFFICE O/P EST MOD 30 MIN: CPT | Mod: PBBFAC | Performed by: INTERNAL MEDICINE

## 2021-11-10 PROCEDURE — 99213 OFFICE O/P EST LOW 20 MIN: CPT | Mod: S$PBB,,, | Performed by: INTERNAL MEDICINE

## 2021-11-10 PROCEDURE — 99999 PR PBB SHADOW E&M-EST. PATIENT-LVL IV: CPT | Mod: PBBFAC,,, | Performed by: INTERNAL MEDICINE

## 2021-11-10 PROCEDURE — 71100 XR RIBS 2 VIEW RIGHT: ICD-10-PCS | Mod: 26,RT,, | Performed by: RADIOLOGY

## 2021-11-10 RX ORDER — VIT C/E/ZN/COPPR/LUTEIN/ZEAXAN 250MG-90MG
1 CAPSULE ORAL 2 TIMES DAILY
COMMUNITY
Start: 2021-07-07

## 2021-11-15 ENCOUNTER — HOSPITAL ENCOUNTER (OUTPATIENT)
Dept: RADIOLOGY | Facility: CLINIC | Age: 81
Discharge: HOME | End: 2021-11-15
Attending: NURSE PRACTITIONER
Payer: MEDICARE

## 2021-11-15 DIAGNOSIS — Z79.811 USE OF AROMATASE INHIBITORS: ICD-10-CM

## 2021-11-15 DIAGNOSIS — M80.00XD AGE-RELATED OSTEOPOROSIS WITH CURRENT PATHOLOGICAL FRACTURE WITH ROUTINE HEALING: ICD-10-CM

## 2021-11-15 PROCEDURE — 77080 DXA BONE DENSITY AXIAL: CPT | Mod: 26,,, | Performed by: INTERNAL MEDICINE

## 2021-11-15 PROCEDURE — 77080 DEXA BONE DENSITY SPINE HIP: ICD-10-PCS | Mod: 26,,, | Performed by: INTERNAL MEDICINE

## 2021-11-15 PROCEDURE — 77080 DXA BONE DENSITY AXIAL: CPT | Mod: TC

## 2021-11-17 ENCOUNTER — PATIENT MESSAGE (OUTPATIENT)
Dept: ENDOCRINOLOGY | Facility: CLINIC | Age: 81
End: 2021-11-17
Payer: MEDICARE

## 2021-11-19 ENCOUNTER — PATIENT MESSAGE (OUTPATIENT)
Dept: ENDOCRINOLOGY | Facility: CLINIC | Age: 81
End: 2021-11-19
Payer: MEDICARE

## 2021-11-21 ENCOUNTER — CLINICAL SUPPORT (OUTPATIENT)
Dept: URGENT CARE | Facility: CLINIC | Age: 81
End: 2021-11-21
Payer: MEDICARE

## 2021-11-21 DIAGNOSIS — Z11.9 ENCOUNTER FOR SCREENING EXAMINATION FOR INFECTIOUS DISEASE: ICD-10-CM

## 2021-11-21 PROCEDURE — U0005 INFEC AGEN DETEC AMPLI PROBE: HCPCS | Performed by: PHYSICIAN ASSISTANT

## 2021-11-21 PROCEDURE — 99211 OFF/OP EST MAY X REQ PHY/QHP: CPT | Mod: S$GLB,,, | Performed by: PHYSICIAN ASSISTANT

## 2021-11-21 PROCEDURE — 99211 PR OFFICE/OUTPT VISIT, EST, LEVL I: ICD-10-PCS | Mod: S$GLB,,, | Performed by: PHYSICIAN ASSISTANT

## 2021-11-21 PROCEDURE — U0003 INFECTIOUS AGENT DETECTION BY NUCLEIC ACID (DNA OR RNA); SEVERE ACUTE RESPIRATORY SYNDROME CORONAVIRUS 2 (SARS-COV-2) (CORONAVIRUS DISEASE [COVID-19]), AMPLIFIED PROBE TECHNIQUE, MAKING USE OF HIGH THROUGHPUT TECHNOLOGIES AS DESCRIBED BY CMS-2020-01-R: HCPCS | Performed by: PHYSICIAN ASSISTANT

## 2021-11-22 ENCOUNTER — OFFICE VISIT (OUTPATIENT)
Dept: OTOLARYNGOLOGY | Facility: CLINIC | Age: 81
End: 2021-11-22
Payer: MEDICARE

## 2021-11-22 VITALS — SYSTOLIC BLOOD PRESSURE: 122 MMHG | HEART RATE: 68 BPM | DIASTOLIC BLOOD PRESSURE: 72 MMHG

## 2021-11-22 DIAGNOSIS — H61.893 DRY BOTH EAR CANALS: Primary | ICD-10-CM

## 2021-11-22 DIAGNOSIS — H61.23 BILATERAL IMPACTED CERUMEN: ICD-10-CM

## 2021-11-22 DIAGNOSIS — H93.13 TINNITUS, BILATERAL: ICD-10-CM

## 2021-11-22 DIAGNOSIS — Z86.69 HISTORY OF HEARING LOSS: ICD-10-CM

## 2021-11-22 LAB
SARS-COV-2 RNA RESP QL NAA+PROBE: NOT DETECTED
SARS-COV-2- CYCLE NUMBER: NORMAL

## 2021-11-22 PROCEDURE — 69210 REMOVE IMPACTED EAR WAX UNI: CPT | Mod: S$PBB,,, | Performed by: OTOLARYNGOLOGY

## 2021-11-22 PROCEDURE — 99999 PR PBB SHADOW E&M-EST. PATIENT-LVL III: CPT | Mod: PBBFAC,,, | Performed by: OTOLARYNGOLOGY

## 2021-11-22 PROCEDURE — 69210 PR REMOVAL IMPACTED CERUMEN REQUIRING INSTRUMENTATION, UNILATERAL: ICD-10-PCS | Mod: S$PBB,,, | Performed by: OTOLARYNGOLOGY

## 2021-11-22 PROCEDURE — 99212 PR OFFICE/OUTPT VISIT, EST, LEVL II, 10-19 MIN: ICD-10-PCS | Mod: 25,S$PBB,, | Performed by: OTOLARYNGOLOGY

## 2021-11-22 PROCEDURE — 99212 OFFICE O/P EST SF 10 MIN: CPT | Mod: 25,S$PBB,, | Performed by: OTOLARYNGOLOGY

## 2021-11-22 PROCEDURE — 99999 PR PBB SHADOW E&M-EST. PATIENT-LVL III: ICD-10-PCS | Mod: PBBFAC,,, | Performed by: OTOLARYNGOLOGY

## 2021-11-22 PROCEDURE — 69210 REMOVE IMPACTED EAR WAX UNI: CPT | Mod: 50,PBBFAC | Performed by: OTOLARYNGOLOGY

## 2021-11-22 PROCEDURE — 99213 OFFICE O/P EST LOW 20 MIN: CPT | Mod: PBBFAC | Performed by: OTOLARYNGOLOGY

## 2021-11-23 ENCOUNTER — TELEPHONE (OUTPATIENT)
Dept: URGENT CARE | Facility: CLINIC | Age: 81
End: 2021-11-23
Payer: MEDICARE

## 2021-12-02 ENCOUNTER — INFUSION (OUTPATIENT)
Dept: INFECTIOUS DISEASES | Facility: HOSPITAL | Age: 81
End: 2021-12-02
Attending: INTERNAL MEDICINE
Payer: MEDICARE

## 2021-12-02 VITALS
HEIGHT: 66 IN | HEART RATE: 72 BPM | SYSTOLIC BLOOD PRESSURE: 127 MMHG | RESPIRATION RATE: 16 BRPM | WEIGHT: 125 LBS | TEMPERATURE: 97 F | BODY MASS INDEX: 20.09 KG/M2 | OXYGEN SATURATION: 100 % | DIASTOLIC BLOOD PRESSURE: 60 MMHG

## 2021-12-02 DIAGNOSIS — N18.31 STAGE 3A CHRONIC KIDNEY DISEASE: ICD-10-CM

## 2021-12-02 DIAGNOSIS — E55.9 VITAMIN D DEFICIENCY: Primary | ICD-10-CM

## 2021-12-02 DIAGNOSIS — M80.00XD AGE-RELATED OSTEOPOROSIS WITH CURRENT PATHOLOGICAL FRACTURE WITH ROUTINE HEALING: ICD-10-CM

## 2021-12-02 PROCEDURE — 96372 THER/PROPH/DIAG INJ SC/IM: CPT

## 2021-12-02 PROCEDURE — 63600175 PHARM REV CODE 636 W HCPCS: Mod: JG | Performed by: NURSE PRACTITIONER

## 2021-12-02 RX ADMIN — DENOSUMAB 60 MG: 60 INJECTION SUBCUTANEOUS at 10:12

## 2021-12-10 RX ORDER — ATENOLOL 25 MG/1
25 TABLET ORAL DAILY
Qty: 90 TABLET | Refills: 3 | Status: CANCELLED | OUTPATIENT
Start: 2021-12-10

## 2021-12-12 ENCOUNTER — TELEPHONE (OUTPATIENT)
Dept: INTERNAL MEDICINE | Facility: CLINIC | Age: 81
End: 2021-12-12
Payer: MEDICARE

## 2021-12-12 RX ORDER — ATENOLOL 25 MG/1
TABLET ORAL
Qty: 30 TABLET | Refills: 6 | Status: SHIPPED | OUTPATIENT
Start: 2021-12-12 | End: 2022-02-07 | Stop reason: SDUPTHER

## 2022-01-05 ENCOUNTER — OFFICE VISIT (OUTPATIENT)
Dept: UROLOGY | Facility: CLINIC | Age: 82
End: 2022-01-05
Payer: MEDICARE

## 2022-01-05 ENCOUNTER — LAB VISIT (OUTPATIENT)
Dept: LAB | Facility: HOSPITAL | Age: 82
End: 2022-01-05
Attending: UROLOGY
Payer: MEDICARE

## 2022-01-05 VITALS
DIASTOLIC BLOOD PRESSURE: 66 MMHG | WEIGHT: 126.13 LBS | HEIGHT: 66 IN | HEART RATE: 48 BPM | BODY MASS INDEX: 20.27 KG/M2 | SYSTOLIC BLOOD PRESSURE: 153 MMHG

## 2022-01-05 DIAGNOSIS — N13.30 BILATERAL HYDRONEPHROSIS: ICD-10-CM

## 2022-01-05 DIAGNOSIS — R33.9 INCOMPLETE EMPTYING OF BLADDER: ICD-10-CM

## 2022-01-05 DIAGNOSIS — R33.9 INCOMPLETE EMPTYING OF BLADDER: Primary | ICD-10-CM

## 2022-01-05 DIAGNOSIS — R35.0 INCREASED FREQUENCY OF URINATION: ICD-10-CM

## 2022-01-05 LAB
ANION GAP SERPL CALC-SCNC: 5 MMOL/L (ref 8–16)
BUN SERPL-MCNC: 24 MG/DL (ref 8–23)
CALCIUM SERPL-MCNC: 9.8 MG/DL (ref 8.7–10.5)
CHLORIDE SERPL-SCNC: 100 MMOL/L (ref 95–110)
CO2 SERPL-SCNC: 36 MMOL/L (ref 23–29)
CREAT SERPL-MCNC: 1.1 MG/DL (ref 0.5–1.4)
EST. GFR  (AFRICAN AMERICAN): 54.4 ML/MIN/1.73 M^2
EST. GFR  (NON AFRICAN AMERICAN): 47.2 ML/MIN/1.73 M^2
GLUCOSE SERPL-MCNC: 95 MG/DL (ref 70–110)
POTASSIUM SERPL-SCNC: 4.7 MMOL/L (ref 3.5–5.1)
SODIUM SERPL-SCNC: 141 MMOL/L (ref 136–145)

## 2022-01-05 PROCEDURE — 99999 PR PBB SHADOW E&M-EST. PATIENT-LVL III: CPT | Mod: PBBFAC,,, | Performed by: UROLOGY

## 2022-01-05 PROCEDURE — 99999 PR PBB SHADOW E&M-EST. PATIENT-LVL III: ICD-10-PCS | Mod: PBBFAC,,, | Performed by: UROLOGY

## 2022-01-05 PROCEDURE — 99214 PR OFFICE/OUTPT VISIT, EST, LEVL IV, 30-39 MIN: ICD-10-PCS | Mod: S$PBB,,, | Performed by: UROLOGY

## 2022-01-05 PROCEDURE — 80048 BASIC METABOLIC PNL TOTAL CA: CPT | Performed by: UROLOGY

## 2022-01-05 PROCEDURE — 51798 US URINE CAPACITY MEASURE: CPT | Mod: PBBFAC | Performed by: UROLOGY

## 2022-01-05 PROCEDURE — 36415 COLL VENOUS BLD VENIPUNCTURE: CPT | Performed by: UROLOGY

## 2022-01-05 PROCEDURE — 99214 OFFICE O/P EST MOD 30 MIN: CPT | Mod: S$PBB,,, | Performed by: UROLOGY

## 2022-01-05 PROCEDURE — 99213 OFFICE O/P EST LOW 20 MIN: CPT | Mod: PBBFAC | Performed by: UROLOGY

## 2022-01-05 PROCEDURE — 81002 URINALYSIS NONAUTO W/O SCOPE: CPT | Mod: PBBFAC | Performed by: UROLOGY

## 2022-01-05 RX ORDER — MULTIVITAMIN WITH IRON
1 TABLET ORAL DAILY
COMMUNITY
End: 2022-02-25

## 2022-01-05 NOTE — PROGRESS NOTES
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 female who returns for follow up today.  She has a history of non obstructed urinary retention.  She has tried intermittent catheterization was kept getting symptomatic bladder infections as a result of catheterization.  She also underwent the first stage InterStim but it was unsccessful so did not undergo the second stage and the lead was removed.  She voids at home.  She states when she is away from her house, it is harder to urinate as she is generally cold and finds when the bathrooms are cold, she tenses up and cannot void well. She feels like she voids better at home.  Her bathroom has a heater which she turns on when she voids.    Her hydronephrosis has been stable, though she has flank tenderness.  Her creatinine has also been stable.  She does not wish to try catheterizing.  She has stones on the right side but they are small.     REVIEW OF SYSTEMS:    Review of Systems   Constitutional: Negative.    HENT: Negative.    Eyes: Negative.    Respiratory: Negative.    Cardiovascular: Negative.    Gastrointestinal: Negative.    Genitourinary: Positive for flank pain and frequency.   Skin: Negative.    Neurological: Negative.    Endo/Heme/Allergies: Negative.    Psychiatric/Behavioral: Negative.        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 ENDO (4TH FLR);  Service: Endoscopy;  Laterality: N/A;    INJECTION FOR SENTINEL NODE IDENTIFICATION Right 2018    Procedure: INJECTION, FOR SENTINEL NODE IDENTIFICATION;  Surgeon: Abby Mason MD;  [facility];  Service: General;  Laterality: Right;    interstim placed stage 1      and removed    KIDNEY STONE SURGERY  2000    @ [facility]    MASTECTOMY, PARTIAL Right 2018    Procedure: MASTECTOMY, PARTIAL-US guided;  Surgeon: Abby Mason MD;  [facility];  Service: General;  Laterality: Right;    MOHS procedure      SENTINEL LYMPH NODE BIOPSY Right 2018    Procedure: BIOPSY, LYMPH NODE, SENTINEL;  Surgeon: Abby Mason MD;  [facility];  Service: General;  Laterality: Right;       Family History   Problem Relation Age of Onset    Kidney disease Mother     Heart disease Father     Melanoma Father     Heart attack Father     Breast cancer Maternal Aunt     Hearing loss Son     Hyperlipidemia Son      Socioeconomic History    Marital status:    Occupational History    Occupation:      Employer: [employer]    Tobacco Use    Smoking status: Former Smoker     Packs/day: 0.50     Years: 8.00     Pack years: 4.00     Quit date: 1964     Years since quittin.4    Smokeless tobacco: Never Used   Substance and Sexual Activity    Alcohol use: Yes     Alcohol/week: 1.0 standard drink     Types: 1 Shots of liquor per week     Comment: maybe one weekly    Drug use: No    Sexual activity: Not Currently     Financial Resource Strain: Low Risk     Difficulty of Paying Living Expenses: Not very hard   Food Insecurity: No Food Insecurity     Worried About Running Out of Food in the Last Year: Never true    Ran Out of Food in the Last Year: Never true   Transportation Needs: No Transportation Needs    Lack of Transportation (Medical): No    Lack of Transportation (Non-Medical): No   Physical Activity: Sufficiently Active    Days of Exercise per Week: 7 days    Minutes of Exercise per Session: 30 min   Stress: Stress Concern Present    Feeling of Stress : To some extent   Social Connections: Unknown    Frequency of Communication with Friends and Family: More than three times a week    Frequency of Social Gatherings with Friends and Family: Once a week    Active Member of Clubs or Organizations: Yes    Attends Club or Organization Meetings: 1 to 4 times per year    Marital Status:    Housing Stability: Low Risk     Unable to Pay for Housing in the Last Year: No    Number of Places Lived in the Last Year: 1    Unstable Housing in the Last Year: No       Allergies:  Asparagus    Medications:  Outpatient Encounter Medications as of 1/5/2022   Medication Sig Dispense Refill    amLODIPine (NORVASC) 2.5 MG tablet Take 1 tablet (2.5 mg total) by mouth once daily. 30 tablet 5    anastrozole (ARIMIDEX) 1 mg Tab TAKE 1 TABLET(1 MG) BY MOUTH EVERY DAY 90 tablet 3    atenoloL (TENORMIN) 25 MG tablet 1/2 tablet daily and elbert increase to full tablet if needed. 30 tablet 6    B COMPLEX & C NO.10/FOLIC ACID (B COMPLEX WITH C#10-FOLIC ACID ORAL) Take by mouth.      calcium citrate (CALCITRATE) 200 mg (950 mg) tablet Take 1 tablet by mouth once daily.      cholecalciferol, vitamin D3, 25 mcg (1,000 unit) Chew Take by mouth.      co-enzyme Q-10 50 mg capsule Take 100 mg by mouth once daily.       denosumab (PROLIA) 60 mg/mL Syrg Inject 60 mg into the skin every 6 (six) months.      fish oil-E-fatty acid5-qhes034 400-5 mg-unit Cap Take 1 capsule by mouth Daily.      MULTIVITAMIN ORAL Take 1 tablet by mouth Daily.      multivitamin with iron  (HAIR VITAMINS) Tab Take 4 capsules by mouth Daily. Nutrafol brand      PRESERVISION AREDS-2 250-90-40-1 mg Cap Take 1 tablet by mouth 2 (two) times daily.      valsartan (DIOVAN) 320 MG tablet TAKE 1 TABLET(320 MG) BY MOUTH EVERY DAY. DOSE INCREASE 90 tablet 1     Facility-Administered Encounter Medications as of 1/5/2022   Medication Dose Route Frequency Provider Last Rate Last Admin    0.9%  NaCl infusion   Intravenous Continuous James Carranza MD 70 mL/hr at 08/17/18 0843 New Bag at 08/17/18 0843         PHYSICAL EXAMINATION:    The patient generally appears in good health, is appropriately interactive, and is in no apparent distress.    Skin: No lesions.    Mental: Cooperative with normal affect.    Neuro: Grossly intact.    HEENT: Normal. No evidence of lymphadenopathy.    Chest:  normal inspiratory effort.    Abdomen:  Soft, non-tender. No masses or organomegaly. Bladder is not palpable. No evidence of flank discomfort. No evidence of inguinal hernia.    Extremities: No clubbing, cyanosis, or edema    PVR after she voided was 400 ml by bladder scan    LABS:    Lab Results   Component Value Date    BUN 24 (H) 01/05/2022    CREATININE 1.1 01/05/2022     UA 1.005, pH 8, + leuk, 250 blood, otherwise, negative (voided, has stones)    Renal ultrasound reviewed from 9/9/2021 and it shows stable mild hydronephrosis.  Some small stones on the right side.  No masses.  Left side has no stones    IMPRESSION:    Encounter Diagnoses   Name Primary?    Incomplete emptying of bladder Yes    Bilateral hydronephrosis     Increased frequency of urination        PLAN:    1.  Will check a BMP.  If stable, follow up in 1 year, if not, will have her return to the office for further discussion    NOTE:  The creatinine is 1.1 which is better than 1.3 on 9/7/2021.  Will continue to monitor.

## 2022-01-15 ENCOUNTER — LAB VISIT (OUTPATIENT)
Dept: PRIMARY CARE CLINIC | Facility: OTHER | Age: 82
End: 2022-01-15
Attending: INTERNAL MEDICINE
Payer: MEDICARE

## 2022-01-15 DIAGNOSIS — R05.9 COUGH: ICD-10-CM

## 2022-01-15 PROCEDURE — U0003 INFECTIOUS AGENT DETECTION BY NUCLEIC ACID (DNA OR RNA); SEVERE ACUTE RESPIRATORY SYNDROME CORONAVIRUS 2 (SARS-COV-2) (CORONAVIRUS DISEASE [COVID-19]), AMPLIFIED PROBE TECHNIQUE, MAKING USE OF HIGH THROUGHPUT TECHNOLOGIES AS DESCRIBED BY CMS-2020-01-R: HCPCS | Performed by: INTERNAL MEDICINE

## 2022-01-16 LAB
SARS-COV-2 RNA RESP QL NAA+PROBE: NOT DETECTED
SARS-COV-2- CYCLE NUMBER: NORMAL

## 2022-01-26 ENCOUNTER — OFFICE VISIT (OUTPATIENT)
Dept: URGENT CARE | Facility: CLINIC | Age: 82
End: 2022-01-26
Payer: MEDICARE

## 2022-01-26 VITALS
BODY MASS INDEX: 19.62 KG/M2 | TEMPERATURE: 98 F | SYSTOLIC BLOOD PRESSURE: 160 MMHG | OXYGEN SATURATION: 98 % | DIASTOLIC BLOOD PRESSURE: 65 MMHG | RESPIRATION RATE: 16 BRPM | HEART RATE: 64 BPM | WEIGHT: 125 LBS | HEIGHT: 67 IN

## 2022-01-26 DIAGNOSIS — N30.01 ACUTE CYSTITIS WITH HEMATURIA: Primary | ICD-10-CM

## 2022-01-26 LAB
BILIRUB UR QL STRIP: NEGATIVE
GLUCOSE UR QL STRIP: NEGATIVE
KETONES UR QL STRIP: NEGATIVE
LEUKOCYTE ESTERASE UR QL STRIP: POSITIVE
PH, POC UA: 8 (ref 5–8)
POC BLOOD, URINE: POSITIVE
POC NITRATES, URINE: NEGATIVE
PROT UR QL STRIP: POSITIVE
SP GR UR STRIP: 1 (ref 1–1.03)
UROBILINOGEN UR STRIP-ACNC: NORMAL (ref 0.1–1.1)

## 2022-01-26 PROCEDURE — 99213 PR OFFICE/OUTPT VISIT, EST, LEVL III, 20-29 MIN: ICD-10-PCS | Mod: S$GLB,,, | Performed by: FAMILY MEDICINE

## 2022-01-26 PROCEDURE — 99213 OFFICE O/P EST LOW 20 MIN: CPT | Mod: S$GLB,,, | Performed by: FAMILY MEDICINE

## 2022-01-26 PROCEDURE — 81003 URINALYSIS AUTO W/O SCOPE: CPT | Mod: QW,S$GLB,, | Performed by: FAMILY MEDICINE

## 2022-01-26 PROCEDURE — 87086 URINE CULTURE/COLONY COUNT: CPT | Performed by: FAMILY MEDICINE

## 2022-01-26 PROCEDURE — 81003 POCT URINALYSIS, DIPSTICK, AUTOMATED, W/O SCOPE: ICD-10-PCS | Mod: QW,S$GLB,, | Performed by: FAMILY MEDICINE

## 2022-01-26 RX ORDER — CEPHALEXIN 500 MG/1
500 CAPSULE ORAL 4 TIMES DAILY
Qty: 20 CAPSULE | Refills: 0 | Status: SHIPPED | OUTPATIENT
Start: 2022-01-26 | End: 2022-01-31

## 2022-01-26 NOTE — PROGRESS NOTES
Subjective:     Patient ID: Shima Sorto is a 81 y.o. female.    Chief Complaint: No chief complaint on file.    HPI:   Ms. Sorto is a 81 y.o. female with breast cancer s/p partial mastectomy on anastrozole (started 11/2018), KATHERIN and uses dental device, hypertension, and CKD who is here for a follow-up visit of osteoporosis (FN T score -2.8). Previous patient of Dr. Beltran and myself. Last visit in Nov 2020.     Denies falls or fractures since her last visit.  She remains on anastrozole     Had an episode of shingles that caused vertigo. States the vertigo caused some inner eat problems that cause her to drift when walking.     Had procedure for varicose veins on left leg in 2016. She is wearing compression stockings today. Noted some swelling in her left leg that began around one month ago. No recent traveling- last around Thanksgiving. Remembers she also had a wound on medial left leg in the past that was slow to heal. Her PCP recently retired and she is interested in a new PCP    Previous regimen:  Used ALN for nine years stopped 2013 - 2016, restarted ALN 2/2018.     At her June 2019 visit she reported a compression fracture L3 after picking up a two year old.    We switched to Prolia with first injection in Nov 2019. Last injection in Dec 2021      Reviewed Osteoporosis Risk Factor Assessment:     Menarche occurred at 12 years of age.   Menopause occurred at mid 50s.  Her menstrual cycles were normal but closer to five weeks between cycles. Had at least 10 cycles per year. Two children and one miscarriage. Denies lactation.  Last kidney stone 2002, and then again 1/2019.     Back pain due to compression fractures - pain has resolved and occasional in bed with twisting motion.     She is no longer using cane or walker -used for a couple of days when she had sciatica. She has good lighting in her home and no loose rugs. She is living alone. She has family that lives in other states. She has a lot of friends  that live near her. She carries her cell phone with her in her pocket. She is doing stretches exercises in the morning that she learned from PT. She is also walking for about 2.5 miles in the morning after breakfast -weather permitting -on days that allows her to walk. She is up and down stairs at home.      Has CKD stage III, anemia, and kidney stones. She denies history of calcium disorders, thyroid disease, malabsorption symptoms.    Supplements:  Calcium (1000)-mag citrate with Vitamin D (1000) and K2 2 tabs three times daily  Vitamin D3 1000 IUs 5 days weekly  nutrafol with 2500 of Vitamin D daily  Also on b complex, vision vitamin, fish oil, co enzyme and biotin    She is on CQ10 100 mg daily    Results for KEITH HAJI (MRN [id number]) as of 1/28/2022 09:10   Ref. Range 7/15/2021 10:53   Vit D, 25-Hydroxy Latest Ref Range: 30 - 96 ng/mL 71      Dietary:  Vegetables, lean proteins   Avoids spinach  Carrots, celery, turnips, green peas, green beans, tomatoes, zucchini, squash  Almonds  [place]/fish  Avoids starches and white things  Lactose intolerance and she avoid dairy  Avoids desserts    ETOH socially-scotch   Avoids tobacco    Mother fell twice, fractured hip.    11/15/2021 DXA  COMPARISON:  Comparison study done on 11/20/2020.     Lumbar spine:    BMD 1.044 g/cm2 and T-score 0.0.   Total Hip:           BMD 0.740 g/cm2 and T-score -1.7.   FINDINGS:  Lumbar spine (L1-L4):              BMD is 1.109 g/cm2, T-score is 0.6, and Z-score is 3.3.   Bone mineral density at L3 is increased compared to adjacent vertebrae consistent with clinical history of L3 fracture.   Total hip:                                BMD is 0.751 g/cm2, T-score is -1.6, and Z-score is 0.6   Femoral neck:                          BMD is 0.587 g/cm2, T-score is -2.4, and Z-score is 0.0.   FRAX:   21% risk of a major osteoporotic fracture in the next 10 years.   6.9% risk of hip fracture in the next 10  years.   Impression:   *Osteoporosis on treatment with Prolia  *Compared with previous DXA, BMD at the lumbar spine has increased by 6.2%, and the BMD at the total hip has remained stable.  RECOMMENDATIONS:  *Daily calcium intake 1183-8915 mg, dietary sources preferred; Vitamin D 4725-1691 IU daily.  *Weight bearing exercise and fall precautions.  *Recommend continued therapy with Prolia every 6 months.  If Prolia is discontinued alternative anti-resorptive therapy should be started to prevent rapid bone loss associated with Prolia withdrawal.  *Repeat BMD in 2 years     Family history of type 2 diabetes in maternal grandmother and aunt. FBG normal on last set of labs    Of note, she has sleep apnea and is wearing device.    Review of Systems   Musculoskeletal: Positive for arthralgias, back pain and gait problem.     Objective:     Physical Exam  Constitutional:       Appearance: Normal appearance.   Musculoskeletal:      Left lower leg: Edema present.      Comments: No point tenderness to spine    Neurological:      Mental Status: She is alert.       Vitals:    01/28/22 0917   BP: 130/80   Pulse: (!) 50   SpO2: 98%   Weight: 59.6 kg (131 lb 8.1 oz)       Chemistry        Component Value Date/Time     01/05/2022 1517    K 4.7 01/05/2022 1517     01/05/2022 1517    CO2 36 (H) 01/05/2022 1517    BUN 24 (H) 01/05/2022 1517    CREATININE 1.1 01/05/2022 1517    GLU 95 01/05/2022 1517        Component Value Date/Time    CALCIUM 9.8 01/05/2022 1517    ALKPHOS 55 02/05/2021 1048    AST 39 02/05/2021 1048    ALT 24 02/05/2021 1048    BILITOT 0.7 02/05/2021 1048    ESTGFRAFRICA 54.4 (A) 01/05/2022 1517    EGFRNONAA 47.2 (A) 01/05/2022 1517        Results for KEITH HAJI (MRN [id number]) as of 1/28/2022 09:25   Ref. Range 1/5/2022 15:17   Sodium Latest Ref Range: 136 - 145 mmol/L 141   Potassium Latest Ref Range: 3.5 - 5.1 mmol/L 4.7   Chloride Latest Ref Range: 95 - 110 mmol/L 100   CO2 Latest Ref Range:  23 - 29 mmol/L 36 (H)   Anion Gap Latest Ref Range: 8 - 16 mmol/L 5 (L)   BUN Latest Ref Range: 8 - 23 mg/dL 24 (H)   Creatinine Latest Ref Range: 0.5 - 1.4 mg/dL 1.1   eGFR if non African American Latest Ref Range: >60 mL/min/1.73 m^2 47.2 (A)   eGFR if African American Latest Ref Range: >60 mL/min/1.73 m^2 54.4 (A)   Glucose Latest Ref Range: 70 - 110 mg/dL 95   Calcium Latest Ref Range: 8.7 - 10.5 mg/dL 9.8     Assessment/Plan:     1. Age-related osteoporosis with current pathological fracture with routine healing  DXA Bone Density Spine And Hip    Comprehensive Metabolic Panel   2. Stage 3 chronic kidney disease, unspecified whether stage 3a or 3b CKD     3. Vitamin D deficiency     4. Use of aromatase inhibitors  DXA Bone Density Spine And Hip     Age-related osteoporosis with current pathological fracture with routine healing  -- Continue Prolia -- Let me know if you fracture  She is on 5500 IU of vitamin D daily. We will reduce her to Vitamin D 2500 IU daily as it is in a supplement she would like to continue to take for her hair. Continue CQ10. Stop Vitamin D 3 5 days weekly. Stop Rufus-Mag with Vitamin D3. We wrote stop on her bottles.                 Avoid excessive alcohol and tobacco                Recommended daily allowance of calcium is 1000 mg daily. If you look at my list below, and realize you are not on enough calcium, start Citracal petites 400 mg twice daily  Given information on calcium in foods    -- Check Bone density test in November 2022 considering anastrozole   -- Avoid excessive alcohol and tobacco  -- Continue current dosage of calcium   -- Continue exercise       CKD (chronic kidney disease) stage 3, GFR 30-59 ml/min  -- Cut back on vitamin D a bit as level was 71 ng/ml    Vitamin D deficiency  Vitamin D level is high and she is on too much vitamin D  As above    Use of aromatase inhibitors  Repeat DXA annually    Follow up in about 1 year (around 1/28/2023).

## 2022-01-27 NOTE — PATIENT INSTRUCTIONS
"PLEASE READ YOUR DISCHARGE INSTRUCTIONS ENTIRELY AS IT CONTAINS IMPORTANT INFORMATION.      Take the antibiotics to completion with food and probiotic.     Drink plenty of fluids, wipe front to back, take showers not baths, no scented soaps, wear breathable cotton underwear, urinate after sexual intercourse.     A urine culture was sent. You will be contacted once it results and appropriate action will be taken if needed.       Please go to the ER for worsening symptoms including fever, worsening flank pain, vomiting, etc.       Please return or see your primary care doctor if you develop new or worsening symptoms.     Please arrange follow up with your primary medical clinic as soon as possible. You must understand that you've received an Urgent Care treatment only and that you may be released before all of your medical problems are known or treated. You, the patient, will arrange for follow up as instructed. If your symptoms worsen or fail to improve you should go to the Emergency Room.  WE CANNOT RULE OUT ALL POSSIBLE CAUSES OF YOUR SYMPTOMS IN THE URGENT CARE SETTING PLEASE GO TO THE ER IF YOU FEELS YOUR CONDITION IS WORSENING OR YOU WOULD LIKE EMERGENT EVALUATION.    Patient Education       Urinary Tract Infections in Adults   The Basics   Written by the doctors and editors at [facility]   What is the urinary tract? -- The urinary tract is the group of organs in the body that handle urine (figure 1). The urinary tract includes the:  · Kidneys, 2 bean-shaped organs that filter the blood to make urine  · Bladder, a balloon-shaped organ that stores urine  · Ureters, 2 tubes that carry urine from the kidneys to the bladder  · Urethra, the tube that carries urine from the bladder to the outside of the body  What are urinary tract infections? -- Urinary tract infections, also called "UTIs," are infections that affect either the bladder or the kidneys:  · Bladder infections are more common than kidney infections. They " "happen when bacteria get into the urethra and travel up into the bladder. The medical term for bladder infection is "cystitis."  · Kidney infections happen when the bacteria travel even higher, up into the kidneys. The medical term for kidney infection is "pyelonephritis."   Both bladder and kidney infections are more common in women than men.  What are the symptoms of a bladder infection? -- The symptoms include:  · Pain or a burning feeling when you urinate  · The need to urinate often  · The need to urinate suddenly or in a hurry  · Blood in the urine  What are the symptoms of a kidney infection? -- The symptoms of a kidney infection can include the symptoms of a bladder infection, but kidney infections can also cause:  · Fever  · Back pain  · Nausea or vomiting  How do I find out if I have a urinary tract infection? -- Call your doctor or nurse. Sometimes, he or she can tell if you have a urinary tract infection just by learning about your symptoms.  Your doctor or nurse might do a simple urine test. If he or she thinks you might have a kidney infection or is unsure what you have, he or she might also do a more involved urine test to check for bacteria.  How are urinary tract infections treated? -- Most urinary tract infections are treated with antibiotic pills. These pills work by killing the germs that cause the infection.  If you have a bladder infection, you will probably need to take antibiotics for 3 to 7 days. If you have a kidney infection, you will probably need to take antibiotics for longer - maybe for up to 2 weeks. If you have a kidney infection, it's also possible you will need to be treated in the hospital.  Your symptoms should begin to improve within a day of starting antibiotics. But you should finish all the antibiotic pills you get. Otherwise your infection might come back.  If needed, you can also take a medicine to numb your bladder. This medicine eases the pain caused by urinary tract " infections. It also reduces the need to urinate.  What if I get bladder infections a lot? -- First, check with your doctor or nurse to make sure that you are really having bladder infections. The symptoms of bladder infection can be caused by other things. Your doctor or nurse will want to see if those problems might be causing your symptoms.  But if you are really dealing with repeated infections, there are things you can do to keep from getting more infections. These include:  · Avoiding spermicides (sperm-killing creams) - Spermicide is a form of birth control. It seems to increase the risk of bladder infections in some women, especially when used with a diaphragm. If you use spermicide and get a lot of bladder infections, you might want to try switching to a different form of birth control.  · Drinking more fluid - This can help prevent bladder infections.  · Urinating right after sex - Some doctors think this helps, because it helps flush out germs that might get into the bladder during sex. There is no proof it works, but it also cannot hurt.  · Vaginal estrogen - If you are a woman who has already been through menopause, your doctor might suggest this. Vaginal estrogen comes in a cream or a flexible ring that you put into your vagina. It can help prevent bladder infections.  · Antibiotics - If you get a lot of bladder infections, and the above methods have not helped, your doctor might give you antibiotics to help prevent infection. But taking antibiotics has downsides, so doctors usually suggest trying other things first.  Can cranberry juice or other cranberry products prevent bladder infections? -- The studies suggesting that cranberry products prevent bladder infections are not very good. Other studies suggest that cranberry products do not prevent bladder infections. But if you want to try cranberry products for this purpose, there is probably not much harm in doing so.  All topics are updated as new  evidence becomes available and our peer review process is complete.  This topic retrieved from [employer] on: Sep 21, 2021.  Topic 30684 Version 12.0  Release: 29.4.2 - C29.263  © 2021 UpToDate, Inc. and/or its affiliates. All rights reserved.  figure 1: Anatomy of the urinary tract     Urine is made by the kidneys. It passes from the kidneys into the bladder through two tubes called the ureters. Then it leaves the bladder through another tube called the urethra.  Graphic 21787 Version 7.0    Consumer Information Use and Disclaimer   This information is not specific medical advice and does not replace information you receive from your health care provider. This is only a brief summary of general information. It does NOT include all information about conditions, illnesses, injuries, tests, procedures, treatments, therapies, discharge instructions or life-style choices that may apply to you. You must talk with your health care provider for complete information about your health and treatment options. This information should not be used to decide whether or not to accept your health care provider's advice, instructions or recommendations. Only your health care provider has the knowledge and training to provide advice that is right for you. The use of this information is governed by the [employer] End User License Agreement, available at https://[email or web address].Spotzer/en/solutions/Cinemagram/about/bryan.The use of [employer] content is governed by the [employer] Terms of Use. ©2021 UpToDate, Inc. All rights reserved.  Copyright   © 2021 Up[employer], Inc. and/or its affiliates. All rights reserved.

## 2022-01-27 NOTE — PROGRESS NOTES
"Subjective:       Patient ID: Shima Sorto is a 81 y.o. female.    Vitals:  height is 5' 7" (1.702 m) and weight is 56.7 kg (125 lb). Her temperature is 98 °F (36.7 °C). Her blood pressure is 160/65 (abnormal) and her pulse is 64. Her respiration is 16 and oxygen saturation is 98%.     Chief Complaint: Dysuria    Pt states today with dysuria, cloudy urine, suprapubic pressure. No fever, chills, nv, vaginal discharge, concern for std. Has flank pain that is normal for her. Hx of kidney stone but US two months ago showed they aren't moving. No hematuria.   Has done well with keflex before.     Dysuria   This is a new problem. The current episode started today. The problem occurs every urination. The problem has been gradually improving. The quality of the pain is described as burning and stabbing. The pain is at a severity of 8/10. The pain is severe. There has been no fever. Associated symptoms include flank pain, frequency, hesitancy and urgency. Pertinent negatives include no behavior changes, chills, discharge, hematuria, nausea, possible pregnancy, sweats, vomiting, weight loss, bubble bath use, constipation, rash or withholding. She has tried nothing for the symptoms. The treatment provided no relief. Her past medical history is significant for catheterization, kidney stones and recurrent UTIs.       Constitution: Negative for chills.   Gastrointestinal: Negative for nausea, vomiting and constipation.   Genitourinary: Positive for dysuria, frequency, urgency, flank pain and history of kidney stones. Negative for hematuria.   Skin: Negative for rash.       Objective:      Physical Exam   Constitutional: She is oriented to person, place, and time. She appears well-developed and well-nourished.   HENT:   Head: Normocephalic and atraumatic.   Ears:   Right Ear: External ear normal.   Left Ear: External ear normal.   Nose: Nose normal. No nasal deformity. No epistaxis.   Mouth/Throat: Oropharynx is clear and " moist and mucous membranes are normal.   Eyes: Lids are normal.   Neck: Trachea normal and phonation normal. Neck supple.   Cardiovascular: Normal pulses.   Pulmonary/Chest: Effort normal.   Abdominal: Normal appearance and bowel sounds are normal. She exhibits no distension. Soft. There is abdominal tenderness in the suprapubic area. There is no rebound, no guarding, no CVA tenderness, no tenderness at McBurney's point, negative Kessler's sign, no left CVA tenderness and no right CVA tenderness.   Neurological: She is alert and oriented to person, place, and time.   Skin: Skin is warm, dry and intact.   Psychiatric: She has a normal mood and affect. Her speech is normal and behavior is normal. Cognition and memory  Nursing note and vitals reviewed.    Results for orders placed or performed in visit on 01/26/22   POCT Urinalysis, Dipstick, Automated, W/O Scope   Result Value Ref Range    POC Blood, Urine Positive (A) Negative    POC Bilirubin, Urine Negative Negative    POC Urobilinogen, Urine normal 0.1 - 1.1    POC Ketones, Urine Negative Negative    POC Protein, Urine Positive (A) Negative    POC Nitrates, Urine Negative Negative    POC Glucose, Urine Negative Negative    pH, UA 8.0 5 - 8    POC Specific Gravity, Urine 1.005 1.003 - 1.029    POC Leukocytes, Urine Positive (A) Negative     *Note: Due to a large number of results and/or encounters for the requested time period, some results have not been displayed. A complete set of results can be found in Results Review.           Assessment:       1. Acute cystitis with hematuria          Plan:         Acute cystitis with hematuria  -     POCT Urinalysis, Dipstick, Automated, W/O Scope  -     cephALEXin (KEFLEX) 500 MG capsule; Take 1 capsule (500 mg total) by mouth 4 (four) times daily. for 5 days  Dispense: 20 capsule; Refill: 0  -     Urine culture    has previous cultures with resistance to bactrim, decreased GFR will avoid on macrobid.     Patient  "Instructions   PLEASE READ YOUR DISCHARGE INSTRUCTIONS ENTIRELY AS IT CONTAINS IMPORTANT INFORMATION.      Take the antibiotics to completion with food and probiotic.     Drink plenty of fluids, wipe front to back, take showers not baths, no scented soaps, wear breathable cotton underwear, urinate after sexual intercourse.     A urine culture was sent. You will be contacted once it results and appropriate action will be taken if needed.       Please go to the ER for worsening symptoms including fever, worsening flank pain, vomiting, etc.       Please return or see your primary care doctor if you develop new or worsening symptoms.     Please arrange follow up with your primary medical clinic as soon as possible. You must understand that you've received an Urgent Care treatment only and that you may be released before all of your medical problems are known or treated. You, the patient, will arrange for follow up as instructed. If your symptoms worsen or fail to improve you should go to the Emergency Room.  WE CANNOT RULE OUT ALL POSSIBLE CAUSES OF YOUR SYMPTOMS IN THE URGENT CARE SETTING PLEASE GO TO THE ER IF YOU FEELS YOUR CONDITION IS WORSENING OR YOU WOULD LIKE EMERGENT EVALUATION.    Patient Education       Urinary Tract Infections in Adults   The Basics   Written by the doctors and editors at [facility]   What is the urinary tract? -- The urinary tract is the group of organs in the body that handle urine (figure 1). The urinary tract includes the:  · Kidneys, 2 bean-shaped organs that filter the blood to make urine  · Bladder, a balloon-shaped organ that stores urine  · Ureters, 2 tubes that carry urine from the kidneys to the bladder  · Urethra, the tube that carries urine from the bladder to the outside of the body  What are urinary tract infections? -- Urinary tract infections, also called "UTIs," are infections that affect either the bladder or the kidneys:  · Bladder infections are more common than kidney " "infections. They happen when bacteria get into the urethra and travel up into the bladder. The medical term for bladder infection is "cystitis."  · Kidney infections happen when the bacteria travel even higher, up into the kidneys. The medical term for kidney infection is "pyelonephritis."   Both bladder and kidney infections are more common in women than men.  What are the symptoms of a bladder infection? -- The symptoms include:  · Pain or a burning feeling when you urinate  · The need to urinate often  · The need to urinate suddenly or in a hurry  · Blood in the urine  What are the symptoms of a kidney infection? -- The symptoms of a kidney infection can include the symptoms of a bladder infection, but kidney infections can also cause:  · Fever  · Back pain  · Nausea or vomiting  How do I find out if I have a urinary tract infection? -- Call your doctor or nurse. Sometimes, he or she can tell if you have a urinary tract infection just by learning about your symptoms.  Your doctor or nurse might do a simple urine test. If he or she thinks you might have a kidney infection or is unsure what you have, he or she might also do a more involved urine test to check for bacteria.  How are urinary tract infections treated? -- Most urinary tract infections are treated with antibiotic pills. These pills work by killing the germs that cause the infection.  If you have a bladder infection, you will probably need to take antibiotics for 3 to 7 days. If you have a kidney infection, you will probably need to take antibiotics for longer - maybe for up to 2 weeks. If you have a kidney infection, it's also possible you will need to be treated in the hospital.  Your symptoms should begin to improve within a day of starting antibiotics. But you should finish all the antibiotic pills you get. Otherwise your infection might come back.  If needed, you can also take a medicine to numb your bladder. This medicine eases the pain caused by " urinary tract infections. It also reduces the need to urinate.  What if I get bladder infections a lot? -- First, check with your doctor or nurse to make sure that you are really having bladder infections. The symptoms of bladder infection can be caused by other things. Your doctor or nurse will want to see if those problems might be causing your symptoms.  But if you are really dealing with repeated infections, there are things you can do to keep from getting more infections. These include:  · Avoiding spermicides (sperm-killing creams) - Spermicide is a form of birth control. It seems to increase the risk of bladder infections in some women, especially when used with a diaphragm. If you use spermicide and get a lot of bladder infections, you might want to try switching to a different form of birth control.  · Drinking more fluid - This can help prevent bladder infections.  · Urinating right after sex - Some doctors think this helps, because it helps flush out germs that might get into the bladder during sex. There is no proof it works, but it also cannot hurt.  · Vaginal estrogen - If you are a woman who has already been through menopause, your doctor might suggest this. Vaginal estrogen comes in a cream or a flexible ring that you put into your vagina. It can help prevent bladder infections.  · Antibiotics - If you get a lot of bladder infections, and the above methods have not helped, your doctor might give you antibiotics to help prevent infection. But taking antibiotics has downsides, so doctors usually suggest trying other things first.  Can cranberry juice or other cranberry products prevent bladder infections? -- The studies suggesting that cranberry products prevent bladder infections are not very good. Other studies suggest that cranberry products do not prevent bladder infections. But if you want to try cranberry products for this purpose, there is probably not much harm in doing so.  All topics are  updated as new evidence becomes available and our peer review process is complete.  This topic retrieved from [employer] on: Sep 21, 2021.  Topic 22940 Version 12.0  Release: 29.4.2 - C29.263  © 2021 UpToDate, Inc. and/or its affiliates. All rights reserved.  figure 1: Anatomy of the urinary tract     Urine is made by the kidneys. It passes from the kidneys into the bladder through two tubes called the ureters. Then it leaves the bladder through another tube called the urethra.  Graphic 06976 Version 7.0    Consumer Information Use and Disclaimer   This information is not specific medical advice and does not replace information you receive from your health care provider. This is only a brief summary of general information. It does NOT include all information about conditions, illnesses, injuries, tests, procedures, treatments, therapies, discharge instructions or life-style choices that may apply to you. You must talk with your health care provider for complete information about your health and treatment options. This information should not be used to decide whether or not to accept your health care provider's advice, instructions or recommendations. Only your health care provider has the knowledge and training to provide advice that is right for you. The use of this information is governed by the [employer] End User License Agreement, available at https://[email or web address]/en/solutions/[email or web address]/about/bryan.The use of [employer] content is governed by the [employer] Terms of Use. ©2021 UpToDate, Inc. All rights reserved.  Copyright   © 2021 UpToDate, Inc. and/or its affiliates. All rights reserved.

## 2022-01-28 ENCOUNTER — OFFICE VISIT (OUTPATIENT)
Dept: ENDOCRINOLOGY | Facility: CLINIC | Age: 82
End: 2022-01-28
Payer: MEDICARE

## 2022-01-28 ENCOUNTER — TELEPHONE (OUTPATIENT)
Dept: URGENT CARE | Facility: CLINIC | Age: 82
End: 2022-01-28
Payer: MEDICARE

## 2022-01-28 VITALS
BODY MASS INDEX: 20.6 KG/M2 | HEART RATE: 50 BPM | DIASTOLIC BLOOD PRESSURE: 80 MMHG | OXYGEN SATURATION: 98 % | SYSTOLIC BLOOD PRESSURE: 130 MMHG | WEIGHT: 131.5 LBS

## 2022-01-28 DIAGNOSIS — E55.9 VITAMIN D DEFICIENCY: ICD-10-CM

## 2022-01-28 DIAGNOSIS — Z79.811 USE OF AROMATASE INHIBITORS: ICD-10-CM

## 2022-01-28 DIAGNOSIS — M80.00XD AGE-RELATED OSTEOPOROSIS WITH CURRENT PATHOLOGICAL FRACTURE WITH ROUTINE HEALING: ICD-10-CM

## 2022-01-28 DIAGNOSIS — N18.30 STAGE 3 CHRONIC KIDNEY DISEASE, UNSPECIFIED WHETHER STAGE 3A OR 3B CKD: ICD-10-CM

## 2022-01-28 LAB — BACTERIA UR CULT: ABNORMAL

## 2022-01-28 PROCEDURE — 99214 PR OFFICE/OUTPT VISIT, EST, LEVL IV, 30-39 MIN: ICD-10-PCS | Mod: S$PBB,,, | Performed by: NURSE PRACTITIONER

## 2022-01-28 PROCEDURE — 99999 PR PBB SHADOW E&M-EST. PATIENT-LVL IV: CPT | Mod: PBBFAC,,, | Performed by: NURSE PRACTITIONER

## 2022-01-28 PROCEDURE — 99214 OFFICE O/P EST MOD 30 MIN: CPT | Mod: PBBFAC | Performed by: NURSE PRACTITIONER

## 2022-01-28 PROCEDURE — 99214 OFFICE O/P EST MOD 30 MIN: CPT | Mod: S$PBB,,, | Performed by: NURSE PRACTITIONER

## 2022-01-28 PROCEDURE — 99999 PR PBB SHADOW E&M-EST. PATIENT-LVL IV: ICD-10-PCS | Mod: PBBFAC,,, | Performed by: NURSE PRACTITIONER

## 2022-01-28 NOTE — TELEPHONE ENCOUNTER
I spoke with patient and reported result of urine culture and should be sensitive to prescribed antibiotic. She is on Cephalexin and symptoms are resolved. Cont same.  Recheck with problems. Answered all questions.

## 2022-01-28 NOTE — ASSESSMENT & PLAN NOTE
-- Continue Prolia -- Let me know if you fracture  She is on 5500 IU of vitamin D daily. We will reduce her to Vitamin D 2500 IU daily as it is in a supplement she would like to continue to take for her hair. Continue CQ10. Stop Vitamin D 3 5 days weekly. Stop Rufus-Mag with Vitamin D3. We wrote stop on her bottles.                 Avoid excessive alcohol and tobacco                Recommended daily allowance of calcium is 1000 mg daily. If you look at my list below, and realize you are not on enough calcium, start Citracal petites 400 mg twice daily  Given information on calcium in foods    -- Check Bone density test in November 2022 considering anastrozole   -- Avoid excessive alcohol and tobacco  -- Continue current dosage of calcium   -- Continue exercise

## 2022-01-28 NOTE — PATIENT INSTRUCTIONS
Dr Marko Doty [phone]     Osteoporosis    She is on 5500 IU of vitamin D daily. We will reduce her to Vitamin D 2500 IU daily as it is in a supplement she would like to continue to take for her hair. Continue CQ10. Stop Vitamin D 3 5 days weekly. Stop Rufus-Mag with Vitamin D3. We wrote stop on her bottles.                 Continue Prolia               Avoid excessive alcohol and tobacco              Continue current dosage of calcium                   Recommended daily allowance of calcium is 1000 mg daily. If you look at my list below, and realize you are not on enough calcium, start Citracal petites 400 mg twice daily    Estimated Calcium Content of Foods:  Produce  Serving Size Estimated Calcium*    Katherine greens, frozen 8 oz 360 mg   Broccoli lauren 8 oz 200 mg   Kale, frozen 8 oz 180 mg   Soy Beans, green, boiled 8 oz 175 mg   Bok Miles, cooked, boiled 8 oz 160 mg   Figs, dried 2 figs 65 mg   Broccoli, fresh, cooked 8 oz 60 mg   Oranges 1 whole 55 mg   Seafood Serving Size Estimated Calcium*    Sardines, canned with bones 3 oz 325 mg   [place], canned with bones 3 oz 180 mg   Shrimp, canned 3 oz 125 mg   Dairy Serving Size Estimated Calcium*    Ricotta, part-skim 4 oz 335 mg   Yogurt, plain, low-fat 6 oz 310 mg   Milk, skim, low-fat, whole 8 oz 300 mg   Yogurt with fruit, low-fat 6 oz 260 mg   Mozzarella, part-skim 1 oz 210 mg   Cheddar 1 oz 205 mg   Yogurt, Greek 6 oz 200 mg   American Cheese 1 oz 195 mg   Feta Cheese 4 oz 140 mg   Cottage Cheese, 2% 4 oz 105 mg   Frozen yogurt, vanilla 8 oz 105 mg   Ice Cream, vanilla 8 oz 85 mg   Parmesan 1 tbsp 55 mg   Fortified Food Serving Size Estimated Calcium*   [place] milk, rice milk or soy milk, fortified 8 oz 300 mg   Orange juice and other fruit juices, fortified 8 oz 300 mg   Tofu, prepared with calcium 4 oz 205 mg   Waffle, frozen, fortified 2 pieces 200 mg   Oatmeal, fortified 1 packet 140 mg   English muffin, fortified 1 muffin 100 mg   Cereal, fortified 8 oz  100-1,000 mg   Other Serving Size Estimated Calcium*   Mac & cheese, frozen 1 package 325 mg   Pizza, cheese, frozen 1 serving 115 mg   Pudding, chocolate, prepared with 2% milk 4 oz 160 mg   Beans, baked, canned 4 oz 160 mg   *The calcium content listed for most foods is estimated and can vary due to multiple factors. Check the food label to determine how much calcium is in a particular product.  If you read the nutrition label for a food source, it lists the % calcium in that food.  For an 8 oz glass of milk, for example, the label states calcium 30%.  This is equivalent to 300 mg of calcium (multiply the listed number by 10).   **Table from the National Osteoporosis Foundation

## 2022-01-31 ENCOUNTER — LAB VISIT (OUTPATIENT)
Dept: LAB | Facility: HOSPITAL | Age: 82
End: 2022-01-31
Payer: MEDICARE

## 2022-01-31 DIAGNOSIS — N20.0 KIDNEY STONES: ICD-10-CM

## 2022-01-31 DIAGNOSIS — N20.0 KIDNEY STONES: Primary | ICD-10-CM

## 2022-01-31 LAB
ALBUMIN SERPL BCP-MCNC: 3.4 G/DL (ref 3.5–5.2)
ANION GAP SERPL CALC-SCNC: 6 MMOL/L (ref 8–16)
BUN SERPL-MCNC: 28 MG/DL (ref 8–23)
CALCIUM SERPL-MCNC: 9.1 MG/DL (ref 8.7–10.5)
CHLORIDE SERPL-SCNC: 103 MMOL/L (ref 95–110)
CO2 SERPL-SCNC: 33 MMOL/L (ref 23–29)
CREAT SERPL-MCNC: 1.1 MG/DL (ref 0.5–1.4)
EST. GFR  (AFRICAN AMERICAN): 54.4 ML/MIN/1.73 M^2
EST. GFR  (NON AFRICAN AMERICAN): 47.2 ML/MIN/1.73 M^2
GLUCOSE SERPL-MCNC: 84 MG/DL (ref 70–110)
HCT VFR BLD AUTO: 33.3 % (ref 37–48.5)
HGB BLD-MCNC: 10.6 G/DL (ref 12–16)
PHOSPHATE SERPL-MCNC: 3.4 MG/DL (ref 2.7–4.5)
POTASSIUM SERPL-SCNC: 4.1 MMOL/L (ref 3.5–5.1)
PTH-INTACT SERPL-MCNC: 120.1 PG/ML (ref 9–77)
SODIUM SERPL-SCNC: 142 MMOL/L (ref 136–145)

## 2022-01-31 PROCEDURE — 85014 HEMATOCRIT: CPT | Performed by: INTERNAL MEDICINE

## 2022-01-31 PROCEDURE — 80069 RENAL FUNCTION PANEL: CPT | Performed by: INTERNAL MEDICINE

## 2022-01-31 PROCEDURE — 36415 COLL VENOUS BLD VENIPUNCTURE: CPT | Performed by: INTERNAL MEDICINE

## 2022-01-31 PROCEDURE — 85018 HEMOGLOBIN: CPT | Performed by: INTERNAL MEDICINE

## 2022-01-31 PROCEDURE — 83970 ASSAY OF PARATHORMONE: CPT | Performed by: INTERNAL MEDICINE

## 2022-02-01 ENCOUNTER — LAB VISIT (OUTPATIENT)
Dept: LAB | Facility: HOSPITAL | Age: 82
End: 2022-02-01
Payer: MEDICARE

## 2022-02-01 DIAGNOSIS — M80.00XD AGE-RELATED OSTEOPOROSIS WITH CURRENT PATHOLOGICAL FRACTURE WITH ROUTINE HEALING: ICD-10-CM

## 2022-02-01 LAB
ALBUMIN SERPL BCP-MCNC: 3.4 G/DL (ref 3.5–5.2)
ALP SERPL-CCNC: 50 U/L (ref 55–135)
ALT SERPL W/O P-5'-P-CCNC: 25 U/L (ref 10–44)
ANION GAP SERPL CALC-SCNC: 8 MMOL/L (ref 8–16)
AST SERPL-CCNC: 33 U/L (ref 10–40)
BILIRUB SERPL-MCNC: 0.4 MG/DL (ref 0.1–1)
BUN SERPL-MCNC: 29 MG/DL (ref 8–23)
CALCIUM SERPL-MCNC: 9.1 MG/DL (ref 8.7–10.5)
CHLORIDE SERPL-SCNC: 104 MMOL/L (ref 95–110)
CO2 SERPL-SCNC: 29 MMOL/L (ref 23–29)
CREAT SERPL-MCNC: 1.2 MG/DL (ref 0.5–1.4)
EST. GFR  (AFRICAN AMERICAN): 49 ML/MIN/1.73 M^2
EST. GFR  (NON AFRICAN AMERICAN): 42.5 ML/MIN/1.73 M^2
GLUCOSE SERPL-MCNC: 85 MG/DL (ref 70–110)
POTASSIUM SERPL-SCNC: 4.5 MMOL/L (ref 3.5–5.1)
PROT SERPL-MCNC: 6.7 G/DL (ref 6–8.4)
SODIUM SERPL-SCNC: 141 MMOL/L (ref 136–145)

## 2022-02-01 PROCEDURE — 36415 COLL VENOUS BLD VENIPUNCTURE: CPT | Performed by: NURSE PRACTITIONER

## 2022-02-01 PROCEDURE — 80053 COMPREHEN METABOLIC PANEL: CPT | Performed by: NURSE PRACTITIONER

## 2022-02-02 ENCOUNTER — PATIENT MESSAGE (OUTPATIENT)
Dept: ENDOCRINOLOGY | Facility: CLINIC | Age: 82
End: 2022-02-02
Payer: MEDICARE

## 2022-02-02 NOTE — TELEPHONE ENCOUNTER
Results for KEITH HAJI (MRN [id number]) as of 2/2/2022 07:20   Ref. Range 2/1/2022 15:37   Sodium Latest Ref Range: 136 - 145 mmol/L 141   Potassium Latest Ref Range: 3.5 - 5.1 mmol/L 4.5   Chloride Latest Ref Range: 95 - 110 mmol/L 104   CO2 Latest Ref Range: 23 - 29 mmol/L 29   Anion Gap Latest Ref Range: 8 - 16 mmol/L 8   BUN Latest Ref Range: 8 - 23 mg/dL 29 (H)   Creatinine Latest Ref Range: 0.5 - 1.4 mg/dL 1.2   eGFR if non African American Latest Ref Range: >60 mL/min/1.73 m^2 42.5 (A)   eGFR if African American Latest Ref Range: >60 mL/min/1.73 m^2 49.0 (A)   Glucose Latest Ref Range: 70 - 110 mg/dL 85   Calcium Latest Ref Range: 8.7 - 10.5 mg/dL 9.1   Alkaline Phosphatase Latest Ref Range: 55 - 135 U/L 50 (L)   PROTEIN TOTAL Latest Ref Range: 6.0 - 8.4 g/dL 6.7   Albumin Latest Ref Range: 3.5 - 5.2 g/dL 3.4 (L)   BILIRUBIN TOTAL Latest Ref Range: 0.1 - 1.0 mg/dL 0.4   AST Latest Ref Range: 10 - 40 U/L 33   ALT Latest Ref Range: 10 - 44 U/L 25

## 2022-02-08 ENCOUNTER — OFFICE VISIT (OUTPATIENT)
Dept: NEPHROLOGY | Facility: CLINIC | Age: 82
End: 2022-02-08
Payer: MEDICARE

## 2022-02-08 VITALS
BODY MASS INDEX: 20.03 KG/M2 | OXYGEN SATURATION: 99 % | SYSTOLIC BLOOD PRESSURE: 146 MMHG | WEIGHT: 127.88 LBS | HEART RATE: 53 BPM | DIASTOLIC BLOOD PRESSURE: 62 MMHG

## 2022-02-08 DIAGNOSIS — N20.0 KIDNEY STONES: ICD-10-CM

## 2022-02-08 DIAGNOSIS — M79.89 LEG SWELLING: ICD-10-CM

## 2022-02-08 DIAGNOSIS — I74.9 EMBOLISM AND THROMBOSIS OF UNSPECIFIED ARTERY: ICD-10-CM

## 2022-02-08 DIAGNOSIS — D64.9 ANEMIA, UNSPECIFIED TYPE: ICD-10-CM

## 2022-02-08 DIAGNOSIS — N18.30 STAGE 3 CHRONIC KIDNEY DISEASE, UNSPECIFIED WHETHER STAGE 3A OR 3B CKD: Primary | ICD-10-CM

## 2022-02-08 PROCEDURE — 99213 OFFICE O/P EST LOW 20 MIN: CPT | Mod: PBBFAC | Performed by: INTERNAL MEDICINE

## 2022-02-08 PROCEDURE — 99214 OFFICE O/P EST MOD 30 MIN: CPT | Mod: S$PBB,,, | Performed by: INTERNAL MEDICINE

## 2022-02-08 PROCEDURE — 99999 PR PBB SHADOW E&M-EST. PATIENT-LVL III: CPT | Mod: PBBFAC,,, | Performed by: INTERNAL MEDICINE

## 2022-02-08 PROCEDURE — 99999 PR PBB SHADOW E&M-EST. PATIENT-LVL III: ICD-10-PCS | Mod: PBBFAC,,, | Performed by: INTERNAL MEDICINE

## 2022-02-08 PROCEDURE — 99214 PR OFFICE/OUTPT VISIT, EST, LEVL IV, 30-39 MIN: ICD-10-PCS | Mod: S$PBB,,, | Performed by: INTERNAL MEDICINE

## 2022-02-08 RX ORDER — FUROSEMIDE 20 MG/1
20 TABLET ORAL DAILY
Qty: 60 TABLET | Refills: 2 | Status: SHIPPED | OUTPATIENT
Start: 2022-02-08 | End: 2022-11-18

## 2022-02-08 NOTE — PROGRESS NOTES
HISTORY OF PRESENT ILLNESS:  This is a 81 -year-old white female with a   longstanding history of nephrolithiasis, but no recent symptomatic   nephrolithiasis episodes who is coming in for a followup.  She also has   hypertension with stable blood pressures at home and had failed InterStim   therapy with her bladder in the past and had seen Dr. Stoddard in Urology.  No   recent symptomatic stones.  Her kidney function is stable with a creatinine of   1.2, which appears to be close to her baseline.    Her blood   pressure is stable at home in the 140's-150's/60's-70's.    PAST MEDICAL HISTORY:  Significant for hypertension for over 10 years, history   of kidney stones, failed InterStim therapy, breast cancer, status post   lumpectomy, XRT and adjuvant hormonal therapy and osteoporosis.    REVIEW OF SYSTEMS:  She states that she is feeling well.  Apetite good.  Weight stable.Denies any back pain or kidney stone pain.  She denies any blood in the urine, frequency, urgency,   hematuria, dysuria, nausea, vomiting, chest pain or shortness of breath.    PHYSICAL EXAMINATION:limited  GENERAL:  A well-nourished, well-developed individual, in no acute distress.  CARDIOVASCULAR:  Rate and rhythm regular.  No murmurs, gallops or rubs.  LUNGS:  Clear to auscultation bilaterally.  Normal respiratory effort.  ABDOMEN:  Soft, nontender and nondistended.  L leg larger than R leg recently per pt.  +1 edema.    EXTREMITIES:  Edema.    ASSESSMENT AND PLAN:  This is a 81-year-old white female with a longstanding   history of hypertension who is coming in for a followup visit.  1.   Nephrolithiasis.  No recent symptomatic nephrolithiasis.  She had a   procedure in 2001, for a kidney stone.  Her most recent CAT scan showed small   calcifications with 0.6 cm stone and 0.4 cm stone, which were not causing her   problem.  She is hydrating well over 3 liters of urine output per recent 24-hour   urine per the patient.  I advised her to drink  lemon juice for citrate.  She   also follows a low oxalate diet for high oxalate.  I also advised her to decrease her   meat intake.  Last US from last year showed 0.5 cm and mild hydro frances and saw urology. Repeat US.   2.  Renal/ckd stg 3:  Her creatinines have been stable around 1.2 with GFR of 42  Ml/min.    I emphasized good   blood pressure control.   RBC's on UA's of and  likely sec to stones-had seen urology for in the past and did not want to be refferred. No RBC's now.  3.  Hypertension.  Blood pressure is elevated per the patient. Change amlodipine 2.5 mg to lasix 20 mg a day and monitor the bp's.   She has   no urine protein.  She is on Benicar for nephroprotection.  4.  Renal osteodystrophy.  Vitamin D levels are stable.   Her PTH is stable.  Calcium and phosphorus are stable.    5.  Anemia: stable.   She can be  followed up in 4 months.    US of L leg to r/o DVT and call with Bp readings.

## 2022-02-09 ENCOUNTER — TELEPHONE (OUTPATIENT)
Dept: NEPHROLOGY | Facility: HOSPITAL | Age: 82
End: 2022-02-09
Payer: MEDICARE

## 2022-02-09 ENCOUNTER — LAB VISIT (OUTPATIENT)
Dept: LAB | Facility: HOSPITAL | Age: 82
End: 2022-02-09
Payer: MEDICARE

## 2022-02-09 ENCOUNTER — TELEPHONE (OUTPATIENT)
Dept: NEPHROLOGY | Facility: CLINIC | Age: 82
End: 2022-02-09
Payer: MEDICARE

## 2022-02-09 DIAGNOSIS — N20.0 KIDNEY STONES: ICD-10-CM

## 2022-02-09 DIAGNOSIS — D64.9 ANEMIA, UNSPECIFIED TYPE: ICD-10-CM

## 2022-02-09 DIAGNOSIS — N18.30 STAGE 3 CHRONIC KIDNEY DISEASE, UNSPECIFIED WHETHER STAGE 3A OR 3B CKD: ICD-10-CM

## 2022-02-09 LAB
FERRITIN SERPL-MCNC: 44 NG/ML (ref 20–300)
IRON SERPL-MCNC: 61 UG/DL (ref 30–160)
SATURATED IRON: 16 % (ref 20–50)
TOTAL IRON BINDING CAPACITY: 371 UG/DL (ref 250–450)
TRANSFERRIN SERPL-MCNC: 251 MG/DL (ref 200–375)

## 2022-02-09 PROCEDURE — 84466 ASSAY OF TRANSFERRIN: CPT | Performed by: INTERNAL MEDICINE

## 2022-02-09 PROCEDURE — 82728 ASSAY OF FERRITIN: CPT | Performed by: INTERNAL MEDICINE

## 2022-02-09 PROCEDURE — 36415 COLL VENOUS BLD VENIPUNCTURE: CPT | Performed by: INTERNAL MEDICINE

## 2022-02-09 NOTE — TELEPHONE ENCOUNTER
----- Message from Georgina Caal sent at 2/9/2022  7:57 AM CST -----  Regarding: Lab Inquiry  Regarding:Pt asked to speak to nurse Elda  about imaging she thought she had scheduled for today.      Can patient be contacted on [facility] IQt:YES     Call Back number: [phone]

## 2022-02-10 ENCOUNTER — HOSPITAL ENCOUNTER (OUTPATIENT)
Dept: CARDIOLOGY | Facility: HOSPITAL | Age: 82
Discharge: HOME | End: 2022-02-10
Attending: INTERNAL MEDICINE
Payer: MEDICARE

## 2022-02-10 DIAGNOSIS — I74.9 EMBOLISM AND THROMBOSIS OF UNSPECIFIED ARTERY: ICD-10-CM

## 2022-02-10 DIAGNOSIS — M79.89 LEG SWELLING: ICD-10-CM

## 2022-02-10 PROCEDURE — 93971 EXTREMITY STUDY: CPT | Mod: 26,LT,, | Performed by: INTERNAL MEDICINE

## 2022-02-10 PROCEDURE — 93971 CV US DOPPLER VENOUS LEG LEFT (CUPID ONLY): ICD-10-PCS | Mod: 26,LT,, | Performed by: INTERNAL MEDICINE

## 2022-02-10 PROCEDURE — 93971 EXTREMITY STUDY: CPT | Mod: LT

## 2022-02-11 ENCOUNTER — PATIENT MESSAGE (OUTPATIENT)
Dept: OTHER | Facility: OTHER | Age: 82
End: 2022-02-11
Payer: MEDICARE

## 2022-02-11 ENCOUNTER — TELEPHONE (OUTPATIENT)
Dept: NEPHROLOGY | Facility: CLINIC | Age: 82
End: 2022-02-11
Payer: MEDICARE

## 2022-02-11 ENCOUNTER — TELEPHONE (OUTPATIENT)
Dept: NEPHROLOGY | Facility: HOSPITAL | Age: 82
End: 2022-02-11
Payer: MEDICARE

## 2022-02-11 NOTE — TELEPHONE ENCOUNTER
Iron low-she can take an OTC iron daily . May cause constipation/stool will be darker.

## 2022-02-11 NOTE — TELEPHONE ENCOUNTER
Please let her know that there is no evidence of a blood clot in her left leg and can see her PCP for further eval.          Documentation

## 2022-02-11 NOTE — TELEPHONE ENCOUNTER
Please let her know that there is no evidence of a blood clot in her left leg and can see her PCP for further eval.

## 2022-02-18 ENCOUNTER — PATIENT MESSAGE (OUTPATIENT)
Dept: OTHER | Facility: OTHER | Age: 82
End: 2022-02-18
Payer: MEDICARE

## 2022-02-24 ENCOUNTER — OFFICE VISIT (OUTPATIENT)
Dept: INTERNAL MEDICINE | Facility: CLINIC | Age: 82
End: 2022-02-24
Payer: MEDICARE

## 2022-02-24 ENCOUNTER — PATIENT MESSAGE (OUTPATIENT)
Dept: INTERNAL MEDICINE | Facility: CLINIC | Age: 82
End: 2022-02-24

## 2022-02-24 DIAGNOSIS — C50.411 MALIGNANT NEOPLASM OF UPPER-OUTER QUADRANT OF RIGHT BREAST IN FEMALE, ESTROGEN RECEPTOR POSITIVE: ICD-10-CM

## 2022-02-24 DIAGNOSIS — E72.53 PRIMARY HYPEROXALURIA: ICD-10-CM

## 2022-02-24 DIAGNOSIS — R42 VERTIGO: Primary | ICD-10-CM

## 2022-02-24 DIAGNOSIS — D69.6 THROMBOCYTOPENIA: ICD-10-CM

## 2022-02-24 DIAGNOSIS — Z17.0 MALIGNANT NEOPLASM OF UPPER-OUTER QUADRANT OF RIGHT BREAST IN FEMALE, ESTROGEN RECEPTOR POSITIVE: ICD-10-CM

## 2022-02-24 DIAGNOSIS — I10 HYPERTENSION, UNSPECIFIED TYPE: ICD-10-CM

## 2022-02-24 DIAGNOSIS — M81.0 OSTEOPOROSIS, UNSPECIFIED OSTEOPOROSIS TYPE, UNSPECIFIED PATHOLOGICAL FRACTURE PRESENCE: ICD-10-CM

## 2022-02-24 PROCEDURE — 99999 PR PBB SHADOW E&M-EST. PATIENT-LVL V: ICD-10-PCS | Mod: PBBFAC,,, | Performed by: INTERNAL MEDICINE

## 2022-02-24 PROCEDURE — 99999 PR PBB SHADOW E&M-EST. PATIENT-LVL V: CPT | Mod: PBBFAC,,, | Performed by: INTERNAL MEDICINE

## 2022-02-24 PROCEDURE — 99214 OFFICE O/P EST MOD 30 MIN: CPT | Mod: S$PBB,,, | Performed by: INTERNAL MEDICINE

## 2022-02-24 PROCEDURE — 99215 OFFICE O/P EST HI 40 MIN: CPT | Mod: PBBFAC | Performed by: INTERNAL MEDICINE

## 2022-02-24 PROCEDURE — 99214 PR OFFICE/OUTPT VISIT, EST, LEVL IV, 30-39 MIN: ICD-10-PCS | Mod: S$PBB,,, | Performed by: INTERNAL MEDICINE

## 2022-02-25 RX ORDER — UBIQUINOL 100 MG
100 CAPSULE ORAL DAILY
COMMUNITY
Start: 2015-08-14

## 2022-02-27 VITALS
WEIGHT: 125.69 LBS | HEART RATE: 68 BPM | TEMPERATURE: 99 F | BODY MASS INDEX: 19.73 KG/M2 | SYSTOLIC BLOOD PRESSURE: 138 MMHG | OXYGEN SATURATION: 99 % | HEIGHT: 67 IN | DIASTOLIC BLOOD PRESSURE: 68 MMHG

## 2022-02-27 NOTE — PROGRESS NOTES
Subjective:       Patient ID: Shima Sorto is a 81 y.o. female.    Chief Complaint: Hypertension    HPI  She returns for management of hypertension.  She has had hypertension for over a year.  Current treatment has included medications outlined in medication list.  She denies chest pain or shortness of breath.  No palpitations.  Denies left arm or neck pain.  She has osteoporosis.  Currently on calcium supplement    Past medical history:  Hypertension, nephrolithiasis, osteoporosis, polycystic kidney disease, sleep apnea, breast cancer, skin cancer, vitamin-D deficiency, status post lumpectomy    Medications:  See med list    No known drug allergies      Review of Systems   Constitutional: Negative for chills, fatigue, fever and unexpected weight change.   Respiratory: Negative for chest tightness and shortness of breath.    Cardiovascular: Negative for chest pain and palpitations.   Gastrointestinal: Negative for abdominal pain and blood in stool.   Neurological: Negative for dizziness, syncope, numbness and headaches.       Objective:      Physical Exam  HENT:      Right Ear: External ear normal.      Left Ear: External ear normal.      Nose: Nose normal.      Mouth/Throat:      Mouth: Mucous membranes are moist.      Pharynx: Oropharynx is clear.   Eyes:      Pupils: Pupils are equal, round, and reactive to light.   Cardiovascular:      Rate and Rhythm: Normal rate and regular rhythm.      Heart sounds: No murmur heard.  Pulmonary:      Breath sounds: Normal breath sounds.   Chest:   Breasts:      Right: No axillary adenopathy.      Left: No axillary adenopathy.       Abdominal:      General: There is no distension.      Palpations: There is no hepatomegaly or splenomegaly.      Tenderness: There is no abdominal tenderness.   Musculoskeletal:      Cervical back: Normal range of motion.   Lymphadenopathy:      Cervical: No cervical adenopathy.      Upper Body:      Right upper body: No axillary adenopathy.       Left upper body: No axillary adenopathy.   Neurological:      Cranial Nerves: No cranial nerve deficit.      Sensory: No sensory deficit.      Motor: Motor function is intact.      Deep Tendon Reflexes: Reflexes are normal and symmetric.         Assessment/Plan       Assessment and plan:  1.  Hypertension:  Controlled  2. Osteoporosis:  Continue Prolia  3. Discussed Pap smear, pelvic exam.  She declined all

## 2022-03-11 ENCOUNTER — PATIENT MESSAGE (OUTPATIENT)
Dept: OTHER | Facility: OTHER | Age: 82
End: 2022-03-11
Payer: MEDICARE

## 2022-03-14 ENCOUNTER — OFFICE VISIT (OUTPATIENT)
Dept: HEMATOLOGY/ONCOLOGY | Facility: CLINIC | Age: 82
End: 2022-03-14
Payer: MEDICARE

## 2022-03-14 VITALS
BODY MASS INDEX: 20.09 KG/M2 | OXYGEN SATURATION: 97 % | HEART RATE: 57 BPM | SYSTOLIC BLOOD PRESSURE: 149 MMHG | RESPIRATION RATE: 16 BRPM | TEMPERATURE: 97 F | DIASTOLIC BLOOD PRESSURE: 68 MMHG | WEIGHT: 125 LBS | HEIGHT: 66 IN

## 2022-03-14 DIAGNOSIS — Z17.0 MALIGNANT NEOPLASM OF UPPER-OUTER QUADRANT OF RIGHT BREAST IN FEMALE, ESTROGEN RECEPTOR POSITIVE: Primary | ICD-10-CM

## 2022-03-14 DIAGNOSIS — Z79.811 USE OF AROMATASE INHIBITORS: ICD-10-CM

## 2022-03-14 DIAGNOSIS — C50.411 MALIGNANT NEOPLASM OF UPPER-OUTER QUADRANT OF RIGHT BREAST IN FEMALE, ESTROGEN RECEPTOR POSITIVE: Primary | ICD-10-CM

## 2022-03-14 PROCEDURE — 99214 OFFICE O/P EST MOD 30 MIN: CPT | Mod: PBBFAC | Performed by: INTERNAL MEDICINE

## 2022-03-14 PROCEDURE — 99999 PR PBB SHADOW E&M-EST. PATIENT-LVL IV: CPT | Mod: PBBFAC,,, | Performed by: INTERNAL MEDICINE

## 2022-03-14 PROCEDURE — 99214 OFFICE O/P EST MOD 30 MIN: CPT | Mod: S$PBB,,, | Performed by: INTERNAL MEDICINE

## 2022-03-14 PROCEDURE — 99214 PR OFFICE/OUTPT VISIT, EST, LEVL IV, 30-39 MIN: ICD-10-PCS | Mod: S$PBB,,, | Performed by: INTERNAL MEDICINE

## 2022-03-14 PROCEDURE — 99999 PR PBB SHADOW E&M-EST. PATIENT-LVL IV: ICD-10-PCS | Mod: PBBFAC,,, | Performed by: INTERNAL MEDICINE

## 2022-03-14 RX ORDER — VALSARTAN 320 MG/1
TABLET ORAL
COMMUNITY
Start: 2022-03-08 | End: 2022-04-04 | Stop reason: SDUPTHER

## 2022-03-14 NOTE — PROGRESS NOTES
Subjective:       Patient ID: Shima Sorto is a 81 y.o. female.    Chief Complaint: No chief complaint on file.    HPI    Ms. Sorto returns today for follow up.  She has been on anastrazole since mid November 2018, and so fas has tolerated it well.    Briefly, she is an 81-year-old  female who diagnosed with breast cancer in 2018.  On 08/17/2018, she underwent a lumpectomy and sentinel lymph node biopsy for an 8 mm grade 1 invasive lobular carcinoma which was ER strongly positive, WV negative and HER-2 negative with a Ki-67 of 5%, with the closest margin being 2 mm.  Two of 2 sentinel lymph nodes were negative for involvement.      She completed her radiation therapy and was subsequently started on AIs.  Her DXA scan last November had shown osteopenia.  A mammogram and breast ultrasound in June 2021 were read as BIRADS II, and a one year follow up was recommended.      Review of Systems      Overall she feels well.  She has tolerated the anastrazole quite well so far, however, she complains of persistent chest wall pains primarily under the right breast.  She relates that she had sustained a fall several years ago and be area has been tender since then.  ECOG PS is 1.   She denies any anxiety, depression, easy bruising, fevers, chills, night  sweats, weight loss, nausea, vomiting, diarrhea, constipation, diplopia, blurred vision, headache, chest pain, palpitations, shortness of breath, breast pain, abdominal pain, extremity pain, or difficulty ambulating.  The remainder of the ten-point ROS, including general, skin, lymph, H/N, cardiorespiratory, GI, , Neuro, Endocrine, and psychiatric is negative.     Objective:      Physical Exam        She is alert, oriented to time, place, person, pleasant, well      nourished, in no acute physical distress.                                    VITAL SIGNS:  Reviewed                                      HEENT:  Normal.  There are no nasal, oral, lip, gingival,  auricular, lid,    or conjunctival lesions.  Mucosae are moist and pink, and there is no        thrush.  Pupils are equal, reactive to light and accommodation.              Extraocular muscle movements are intact.  Dentition is good.                                    NECK:  Supple without JVD, adenopathy, or thyromegaly.                       LUNGS:  Clear to auscultation without wheezing, rales, or rhonchi.           CARDIOVASCULAR:  Reveals an S1, S2, no murmurs, no rubs, no gallops.         ABDOMEN:  Soft, nontender, without organomegaly.  Bowel sounds are    present.                                                                     EXTREMITIES:  No cyanosis, clubbing, or edema.                               BREASTS:  She is status post right lumpectomy with a well-healed incision in the upper outer quadrant.    There are no masses in either breast.    There is tenderness on palpation of the right 7th or 8th rib from the lateral position although with the sternum.                                      LYMPHATIC:  There is no cervical, axillary, or supraclavicular adenopathy.   SKIN:  Warm and moist, without petechiae, rashes, induration, or ecchymoses.        NEUROLOGIC:  DTRs are 0-1+ bilaterally, symmetrical, motor function is 5/5,and cranial nerves are  within normal limits.    Assessment:       1. Malignant neoplasm of upper-outer quadrant of right breast in female, estrogen receptor positive    2. Use of aromatase inhibitors      3.    Osteopenia  4.      Pain, right chest wall, as described above  Plan:           I had a long discussion with her.    In regards to her pain, out of abundance of caution I offered to obtain a bone scan.  I explained to her that the negative predictive value as high while its specificity is rather low.  After a long deliberation, Mrs. Sorto stated that she would prefer to wait for now.  She will remain on anatsrazole through the end of October 2023, and see me again in late  June with a mammogram.  Her multiple questions were answered to her satisfaction.  Duration of visit including time spent reviewing her chart was in excess of 30 minutes.

## 2022-03-19 ENCOUNTER — PATIENT MESSAGE (OUTPATIENT)
Dept: HEMATOLOGY/ONCOLOGY | Facility: CLINIC | Age: 82
End: 2022-03-19
Payer: MEDICARE

## 2022-03-19 DIAGNOSIS — Z79.811 USE OF AROMATASE INHIBITORS: ICD-10-CM

## 2022-03-21 RX ORDER — ANASTROZOLE 1 MG/1
TABLET ORAL
Qty: 90 TABLET | Refills: 3 | Status: SHIPPED | OUTPATIENT
Start: 2022-03-21 | End: 2023-05-30

## 2022-03-24 ENCOUNTER — CLINICAL SUPPORT (OUTPATIENT)
Dept: REHABILITATION | Facility: HOSPITAL | Age: 82
End: 2022-03-24
Attending: INTERNAL MEDICINE
Payer: MEDICARE

## 2022-03-24 DIAGNOSIS — M53.82 IMPAIRED RANGE OF MOTION OF CERVICAL SPINE: ICD-10-CM

## 2022-03-24 DIAGNOSIS — M53.84 DECREASED RANGE OF MOTION OF THORACIC SPINE: ICD-10-CM

## 2022-03-24 DIAGNOSIS — R42 VERTIGO: ICD-10-CM

## 2022-03-24 PROCEDURE — 97110 THERAPEUTIC EXERCISES: CPT | Mod: PO

## 2022-03-24 PROCEDURE — 97161 PT EVAL LOW COMPLEX 20 MIN: CPT | Mod: PO

## 2022-03-24 NOTE — PROGRESS NOTES
[facility]   Physical Therapy Initial Evaluation     Date: 3/24/2022   Name: Shima Echols [facility]  Clinic Number: [id number]    Therapy Diagnosis:   Encounter Diagnoses   Name Primary?    Vertigo     Impaired range of motion of cervical spine     Decreased range of motion of thoracic spine      Physician: Carmen Milton MD  Physician Orders: PT Eval and Treat   Medical Diagnosis from Referral: Vertigo R42 (ICD-10-CM) - Dizziness and giddiness  Evaluation Date: 3/24/2022  Authorization Period Expiration: 3/24/2022  Plan of Care Expiration: 4/25/2022  Progress Note Due: 4/25/2022  Visit # / Visits authorized: 1/ 1   FOTO: 1/3    Precautions: Standard and Breast Cancer, History of L3 Compression Fracture    Time In: 3:43 pm (13 minutes late)  Time Out: 4:15 pm  Total Appointment Time (timed & untimed codes): 32 minutes  SUBJECTIVE   Date of onset: Chronic, no onset of pain.  History of current condition - Shima reports: patient reported she had one experience of vertigo while in [place], when she reports that she got a vestibular episode after having a bout of shingles. This is no longer a problem for the patient. The main reason she is here is because she feels that her blood pressure is affected by her posture, and she would like to consult with physical therapy to help her select a back brace to force her into a better posture, noting that she would prefer one with a neck brace as well as exercises she can have to facilitate improved posture. The patient denies any pain.  Imaging, Bone scan films: Comparison study done on 11/20/2020.     Lumbar spine:    BMD 1.044 g/cm2 and T-score 0.0.  Total Hip:           BMD 0.740 g/cm2 and T-score -1.7.  FINDINGS:  Lumbar spine (L1-L4):              BMD is 1.109 g/cm2, T-score is 0.6, and Z-score is 3.3.     Bone mineral density at L3 is increased compared to adjacent vertebrae consistent with clinical history of L3 fracture.     Total hip:                                 BMD is 0.751 g/cm2, T-score is -1.6, and Z-score is 0.6.  Femoral neck:                          BMD is 0.587 g/cm2, T-score is -2.4, and Z-score is 0.0.     FRAX:  21% risk of a major osteoporotic fracture in the next 10 years.  6.9% risk of hip fracture in the next 10 years.  Impression:  *Osteoporosis on treatment with Prolia  *Compared with previous DXA, BMD at the lumbar spine has increased by 6.2%, and the BMD at the total hip has remained stable.  Prior Therapy: None  Social History: Lives with their family  Occupation: Retired Professor  Prior Level of Function: Independent  Current Level of Function: Independent    Pain:  Current 0/10, worst 0/10, best 0/10  Patients goals: To obtain consultation on a back and neck brace for passive postural correction and exercises for postural correction.    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    Medications:   Shima has a current medication list which includes the following prescription(s): amlodipine, anastrozole, anastrozole, coq10 (ubiquinol),  "denosumab, fish oil-e-fatty [id number], furosemide, multivitamin, preservision areds-2, UNABLE TO FIND, valsartan, and valsartan, and the following Facility-Administered Medications: sodium chloride 0.9%.    Allergies:   Review of patient's allergies indicates:   Allergen Reactions    Asparagus Rash     OBJECTIVE     Cervical AROM   Flexion  (45-50 degrees) 40 degrees   Extension  (80 degrees) 70 degrees   Right Side Bending  (40 degrees) Not tested.   Left Side Bending  (40 degrees) Not tested.   Right Rotation  (90 degrees) 60 degrees   Left Rotation  (90 degrees) 60 degrees   Shoulder Flexion: 150 degrees bilaterally    Limitation/Restriction for FOTO Thoracic Survey    Therapist reviewed FOTO scores for Shima Sorto on 3/24/2022.   FOTO documents entered into [employer] - see Media section.    Limitation Score: TBD%       TREATMENT     Total Treatment time (time-based codes) separate from Evaluation: 8 minutes      Shima received the treatments listed below:      therapeutic exercises to develop ROM, posture and core stabilization for 8 minutes including:  Wall Standing + Scapular and Cervical Retraction, 30" (5x per day)  Seated Self-Thoracic Extension with Chair, 10x5" hold    PATIENT EDUCATION AND HOME EXERCISES     Education provided:   - -Findings of evaluation and examination, and affect of these on plan for treatment  -Prognosis and expectations  -Role of PT and team-centered care for patient  -Home exercise program and expectations of therapy  -[facility] and PT/PTA treatment    Written Home Exercises Provided: yes. Exercises were reviewed and Shima was able to demonstrate them prior to the end of the session.  Shima demonstrated good  understanding of the education provided. See EMR under Patient Instructions for exercises provided during therapy sessions.    ASSESSMENT     Shima is a 81 y.o. female referred to outpatient Physical Therapy with a medical diagnosis of vertigo, " however she reports that her main complaint is in correcting her postural deficits. Patient presents with significant postural abnormality of the thoracic spine and cervical spine, scapular malalignment, and impaired joint accessory mobility and active range of motion into flexion of cervical spine and extension of thoracic spine. Despite these limitations, the patient demonstrates good functional shoulder and cervical range of motion. She was educated that a cervical collar (which she was interested in obtaining recommendations for) is not recommended due to it's immobilizing properties rather than it's corrective ones. She was shown ideas for postural cueing that may temporarily aid in posture, but that PT does not endorse any specific kind and that it should be comfortable rather than a forceful correction.    Patient prognosis is Fair.   Patient will benefit from skilled outpatient Physical Therapy to address the deficits stated above and in the chart below, provide patient /family education, and to maximize patientt's level of independence.     Plan of care discussed with patient: Yes  Patient's spiritual, cultural and educational needs considered and patient is agreeable to the plan of care and goals as stated below:     Anticipated Barriers for therapy: None    Medical Necessity is demonstrated by the following  History  Co-morbidities and personal factors that may impact the plan of care Co-morbidities:   advanced age, CKD stage 3, history of cancer and HTN    Personal Factors:   age  education level  coping style  social background  lifestyle  character  attitudes     low   Examination  Body Structures and Functions, activity limitations and participation restrictions that may impact the plan of care Body Regions:   head  neck  back  upper extremities  trunk    Body Systems:    gross symmetry  ROM  strength  gross coordinated movement  balance  motor control  motor learning    Participation Restrictions:    Community Dweller    Activity limitations:   Learning and applying knowledge  no deficits    General Tasks and Commands  no deficits    Communication  no deficits    Mobility  driving (bike, car, motorcycle)    Self care  no deficits    Domestic Life  shopping  cooking  doing house work (cleaning house, washing dishes, laundry)  assisting others    Interactions/Relationships  no deficits    Life Areas  no deficits    Community and Social Life  community life  recreation and leisure         low   Clinical Presentation stable and uncomplicated low   Decision Making/ Complexity Score: low     Goals:  Short Term Goals: 2 weeks   1.) Patient will demonstrate independence in compliance and technique of home exercise program provided as per teach-back method of assessment.  2.) Patient will demonstrate ability to self-correct posture with minimal cueing for <3 times in each session.    Long Term Goals: 4 weeks   1.) Patient will demonstrate independence in compliance and technique of home exercise program provided as per teach-back method of assessment.  2.) Patient will demonstrate ability to self-correct posture with minimal cueing for <3 times in each session.  3.) Patient will demonstrate 4/5 strength as per parascapular retractors and >10 seconds on 30 mmHg of DNF endurance.    PLAN   Plan of care Certification: 3/24/2022 to 4/24/2022.    Outpatient Physical Therapy 1 times weekly for 4 weeks to include the following interventions: Manual Therapy, Moist Heat/ Ice, Neuromuscular Re-ed, Patient Education, Self Care, Therapeutic Activities and Therapeutic Exercise.     Bertha Sauceda PT, DPT, hospitals  License Number: [id number]      I CERTIFY THE NEED FOR THESE SERVICES FURNISHED UNDER THIS PLAN OF TREATMENT AND WHILE UNDER MY CARE   Physician's comments:     Physician's Signature: ___________________________________________________

## 2022-03-25 PROBLEM — M53.82 IMPAIRED RANGE OF MOTION OF CERVICAL SPINE: Status: ACTIVE | Noted: 2022-03-25

## 2022-03-25 PROBLEM — M53.84 DECREASED RANGE OF MOTION OF THORACIC SPINE: Status: ACTIVE | Noted: 2022-03-25

## 2022-03-29 NOTE — PLAN OF CARE
[facility]   Physical Therapy Initial Evaluation     Date: 3/24/2022   Name: Shima Echols [facility]  Clinic Number: [id number]    Therapy Diagnosis:   Encounter Diagnoses   Name Primary?    Vertigo     Impaired range of motion of cervical spine     Decreased range of motion of thoracic spine      Physician: Carmen Milton MD  Physician Orders: PT Eval and Treat   Medical Diagnosis from Referral: Vertigo R42 (ICD-10-CM) - Dizziness and giddiness  Evaluation Date: 3/24/2022  Authorization Period Expiration: 3/24/2022  Plan of Care Expiration: 4/25/2022  Progress Note Due: 4/25/2022  Visit # / Visits authorized: 1/ 1   FOTO: 1/3    Precautions: Standard and Breast Cancer, History of L3 Compression Fracture    Time In: 3:43 pm (13 minutes late)  Time Out: 4:15 pm  Total Appointment Time (timed & untimed codes): 32 minutes  SUBJECTIVE   Date of onset: Chronic, no onset of pain.  History of current condition - Shima reports: patient reported she had one experience of vertigo while in [place], when she reports that she got a vestibular episode after having a bout of shingles. This is no longer a problem for the patient. The main reason she is here is because she feels that her blood pressure is affected by her posture, and she would like to consult with physical therapy to help her select a back brace to force her into a better posture, noting that she would prefer one with a neck brace as well as exercises she can have to facilitate improved posture. The patient denies any pain.  Imaging, Bone scan films: Comparison study done on 11/20/2020.     Lumbar spine:    BMD 1.044 g/cm2 and T-score 0.0.  Total Hip:           BMD 0.740 g/cm2 and T-score -1.7.  FINDINGS:  Lumbar spine (L1-L4):              BMD is 1.109 g/cm2, T-score is 0.6, and Z-score is 3.3.     Bone mineral density at L3 is increased compared to adjacent vertebrae consistent with clinical history of L3 fracture.     Total hip:                                 BMD is 0.751 g/cm2, T-score is -1.6, and Z-score is 0.6.  Femoral neck:                          BMD is 0.587 g/cm2, T-score is -2.4, and Z-score is 0.0.     FRAX:  21% risk of a major osteoporotic fracture in the next 10 years.  6.9% risk of hip fracture in the next 10 years.  Impression:  *Osteoporosis on treatment with Prolia  *Compared with previous DXA, BMD at the lumbar spine has increased by 6.2%, and the BMD at the total hip has remained stable.  Prior Therapy: None  Social History: Lives with their family  Occupation: Retired Professor  Prior Level of Function: Independent  Current Level of Function: Independent    Pain:  Current 0/10, worst 0/10, best 0/10  Patients goals: To obtain consultation on a back and neck brace for passive postural correction and exercises for postural correction.    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    Medications:   Shima has a current medication list which includes the following prescription(s): amlodipine, anastrozole, anastrozole, coq10 (ubiquinol),  "denosumab, fish oil-e-fatty [id number], furosemide, multivitamin, preservision areds-2, UNABLE TO FIND, valsartan, and valsartan, and the following Facility-Administered Medications: sodium chloride 0.9%.    Allergies:   Review of patient's allergies indicates:   Allergen Reactions    Asparagus Rash     OBJECTIVE     Cervical AROM   Flexion  (45-50 degrees) 40 degrees   Extension  (80 degrees) 70 degrees   Right Side Bending  (40 degrees) Not tested.   Left Side Bending  (40 degrees) Not tested.   Right Rotation  (90 degrees) 60 degrees   Left Rotation  (90 degrees) 60 degrees   Shoulder Flexion: 150 degrees bilaterally    Limitation/Restriction for FOTO Thoracic Survey    Therapist reviewed FOTO scores for Shima Sorto on 3/24/2022.   FOTO documents entered into [employer] - see Media section.    Limitation Score: TBD%       TREATMENT     Total Treatment time (time-based codes) separate from Evaluation: 8 minutes      Shima received the treatments listed below:      therapeutic exercises to develop ROM, posture and core stabilization for 8 minutes including:  Wall Standing + Scapular and Cervical Retraction, 30" (5x per day)  Seated Self-Thoracic Extension with Chair, 10x5" hold    PATIENT EDUCATION AND HOME EXERCISES     Education provided:   - -Findings of evaluation and examination, and affect of these on plan for treatment  -Prognosis and expectations  -Role of PT and team-centered care for patient  -Home exercise program and expectations of therapy  -Te[facility] System of [place] and PT/PTA treatment    Written Home Exercises Provided: yes. Exercises were reviewed and Shima was able to demonstrate them prior to the end of the session.  Shima demonstrated good  understanding of the education provided. See EMR under Patient Instructions for exercises provided during therapy sessions.    ASSESSMENT     Shima is a 81 y.o. female referred to outpatient Physical Therapy with a medical diagnosis of vertigo, " however she reports that her main complaint is in correcting her postural deficits. Patient presents with significant postural abnormality of the thoracic spine and cervical spine, scapular malalignment, and impaired joint accessory mobility and active range of motion into flexion of cervical spine and extension of thoracic spine. Despite these limitations, the patient demonstrates good functional shoulder and cervical range of motion. She was educated that a cervical collar (which she was interested in obtaining recommendations for) is not recommended due to it's immobilizing properties rather than it's corrective ones. She was shown ideas for postural cueing that may temporarily aid in posture, but that PT does not endorse any specific kind and that it should be comfortable rather than a forceful correction.    Patient prognosis is Fair.   Patient will benefit from skilled outpatient Physical Therapy to address the deficits stated above and in the chart below, provide patient /family education, and to maximize patientt's level of independence.     Plan of care discussed with patient: Yes  Patient's spiritual, cultural and educational needs considered and patient is agreeable to the plan of care and goals as stated below:     Anticipated Barriers for therapy: None    Medical Necessity is demonstrated by the following  History  Co-morbidities and personal factors that may impact the plan of care Co-morbidities:   advanced age, CKD stage 3, history of cancer and HTN    Personal Factors:   age  education level  coping style  social background  lifestyle  character  attitudes     low   Examination  Body Structures and Functions, activity limitations and participation restrictions that may impact the plan of care Body Regions:   head  neck  back  upper extremities  trunk    Body Systems:    gross symmetry  ROM  strength  gross coordinated movement  balance  motor control  motor learning    Participation Restrictions:    Community Dweller    Activity limitations:   Learning and applying knowledge  no deficits    General Tasks and Commands  no deficits    Communication  no deficits    Mobility  driving (bike, car, motorcycle)    Self care  no deficits    Domestic Life  shopping  cooking  doing house work (cleaning house, washing dishes, laundry)  assisting others    Interactions/Relationships  no deficits    Life Areas  no deficits    Community and Social Life  community life  recreation and leisure         low   Clinical Presentation stable and uncomplicated low   Decision Making/ Complexity Score: low     Goals:  Short Term Goals: 2 weeks   1.) Patient will demonstrate independence in compliance and technique of home exercise program provided as per teach-back method of assessment.  2.) Patient will demonstrate ability to self-correct posture with minimal cueing for <3 times in each session.    Long Term Goals: 4 weeks   1.) Patient will demonstrate independence in compliance and technique of home exercise program provided as per teach-back method of assessment.  2.) Patient will demonstrate ability to self-correct posture with minimal cueing for <3 times in each session.  3.) Patient will demonstrate 4/5 strength as per parascapular retractors and >10 seconds on 30 mmHg of DNF endurance.    PLAN   Plan of care Certification: 3/24/2022 to 4/24/2022.    Outpatient Physical Therapy 1 times weekly for 4 weeks to include the following interventions: Manual Therapy, Moist Heat/ Ice, Neuromuscular Re-ed, Patient Education, Self Care, Therapeutic Activities and Therapeutic Exercise.     Bertha Sauceda PT, DPT, [facility]  License Number: [id number]      I CERTIFY THE NEED FOR THESE SERVICES FURNISHED UNDER THIS PLAN OF TREATMENT AND WHILE UNDER MY CARE   Physician's comments:     Physician's Signature: ___________________________________________________

## 2022-04-19 ENCOUNTER — PATIENT MESSAGE (OUTPATIENT)
Dept: ADMINISTRATIVE | Facility: OTHER | Age: 82
End: 2022-04-19
Payer: MEDICARE

## 2022-04-27 ENCOUNTER — PATIENT MESSAGE (OUTPATIENT)
Dept: NEPHROLOGY | Facility: CLINIC | Age: 82
End: 2022-04-27
Payer: MEDICARE

## 2022-05-17 ENCOUNTER — IMMUNIZATION (OUTPATIENT)
Dept: PHARMACY | Facility: CLINIC | Age: 82
End: 2022-05-17
Payer: MEDICARE

## 2022-05-17 DIAGNOSIS — Z23 NEED FOR VACCINATION: Primary | ICD-10-CM

## 2022-05-18 ENCOUNTER — TELEPHONE (OUTPATIENT)
Dept: NEPHROLOGY | Facility: CLINIC | Age: 82
End: 2022-05-18
Payer: MEDICARE

## 2022-05-18 DIAGNOSIS — N18.31 STAGE 3A CHRONIC KIDNEY DISEASE: Primary | ICD-10-CM

## 2022-05-19 ENCOUNTER — PATIENT MESSAGE (OUTPATIENT)
Dept: RESEARCH | Facility: HOSPITAL | Age: 82
End: 2022-05-19
Payer: MEDICARE

## 2022-06-06 ENCOUNTER — INFUSION (OUTPATIENT)
Dept: INFECTIOUS DISEASES | Facility: HOSPITAL | Age: 82
End: 2022-06-06
Attending: INTERNAL MEDICINE
Payer: MEDICARE

## 2022-06-06 VITALS
WEIGHT: 123.25 LBS | OXYGEN SATURATION: 100 % | SYSTOLIC BLOOD PRESSURE: 149 MMHG | TEMPERATURE: 96 F | HEART RATE: 61 BPM | BODY MASS INDEX: 19.89 KG/M2 | DIASTOLIC BLOOD PRESSURE: 66 MMHG

## 2022-06-06 DIAGNOSIS — M80.00XD AGE-RELATED OSTEOPOROSIS WITH CURRENT PATHOLOGICAL FRACTURE WITH ROUTINE HEALING: ICD-10-CM

## 2022-06-06 DIAGNOSIS — N18.31 STAGE 3A CHRONIC KIDNEY DISEASE: ICD-10-CM

## 2022-06-06 DIAGNOSIS — E55.9 VITAMIN D DEFICIENCY: Primary | ICD-10-CM

## 2022-06-06 PROCEDURE — 63600175 PHARM REV CODE 636 W HCPCS: Mod: JG | Performed by: NURSE PRACTITIONER

## 2022-06-06 PROCEDURE — 96372 THER/PROPH/DIAG INJ SC/IM: CPT

## 2022-06-06 RX ADMIN — DENOSUMAB 60 MG: 60 INJECTION SUBCUTANEOUS at 11:06

## 2022-06-06 NOTE — PROGRESS NOTES
Patient received Prolia 60 mg injection sub Q in the left arm. Patient tolerated injection well and left in NAD.    Patient is taking Vitamin D

## 2022-06-13 ENCOUNTER — LAB VISIT (OUTPATIENT)
Dept: LAB | Facility: HOSPITAL | Age: 82
End: 2022-06-13
Payer: MEDICARE

## 2022-06-13 DIAGNOSIS — N18.31 STAGE 3A CHRONIC KIDNEY DISEASE: ICD-10-CM

## 2022-06-13 LAB
ALBUMIN SERPL BCP-MCNC: 3.5 G/DL (ref 3.5–5.2)
ANION GAP SERPL CALC-SCNC: 12 MMOL/L (ref 8–16)
BUN SERPL-MCNC: 39 MG/DL (ref 8–23)
CALCIUM SERPL-MCNC: 8.7 MG/DL (ref 8.7–10.5)
CHLORIDE SERPL-SCNC: 98 MMOL/L (ref 95–110)
CO2 SERPL-SCNC: 25 MMOL/L (ref 23–29)
CREAT SERPL-MCNC: 1.3 MG/DL (ref 0.5–1.4)
EST. GFR  (AFRICAN AMERICAN): 44.5 ML/MIN/1.73 M^2
EST. GFR  (NON AFRICAN AMERICAN): 38.6 ML/MIN/1.73 M^2
GLUCOSE SERPL-MCNC: 65 MG/DL (ref 70–110)
HCT VFR BLD AUTO: 31.9 % (ref 37–48.5)
HGB BLD-MCNC: 10.4 G/DL (ref 12–16)
PHOSPHATE SERPL-MCNC: 2.9 MG/DL (ref 2.7–4.5)
POTASSIUM SERPL-SCNC: 3.7 MMOL/L (ref 3.5–5.1)
PTH-INTACT SERPL-MCNC: 79 PG/ML (ref 9–77)
SODIUM SERPL-SCNC: 135 MMOL/L (ref 136–145)

## 2022-06-13 PROCEDURE — 36415 COLL VENOUS BLD VENIPUNCTURE: CPT | Performed by: INTERNAL MEDICINE

## 2022-06-13 PROCEDURE — 85018 HEMOGLOBIN: CPT | Performed by: INTERNAL MEDICINE

## 2022-06-13 PROCEDURE — 80069 RENAL FUNCTION PANEL: CPT | Performed by: INTERNAL MEDICINE

## 2022-06-13 PROCEDURE — 83970 ASSAY OF PARATHORMONE: CPT | Performed by: INTERNAL MEDICINE

## 2022-06-13 PROCEDURE — 85014 HEMATOCRIT: CPT | Performed by: INTERNAL MEDICINE

## 2022-06-15 ENCOUNTER — PATIENT MESSAGE (OUTPATIENT)
Dept: NEPHROLOGY | Facility: CLINIC | Age: 82
End: 2022-06-15

## 2022-06-15 ENCOUNTER — OFFICE VISIT (OUTPATIENT)
Dept: NEPHROLOGY | Facility: CLINIC | Age: 82
End: 2022-06-15
Payer: MEDICARE

## 2022-06-15 VITALS
WEIGHT: 121.25 LBS | BODY MASS INDEX: 19.57 KG/M2 | HEART RATE: 52 BPM | SYSTOLIC BLOOD PRESSURE: 152 MMHG | DIASTOLIC BLOOD PRESSURE: 80 MMHG | OXYGEN SATURATION: 99 %

## 2022-06-15 DIAGNOSIS — N20.0 KIDNEY STONES: ICD-10-CM

## 2022-06-15 DIAGNOSIS — N18.30 STAGE 3 CHRONIC KIDNEY DISEASE, UNSPECIFIED WHETHER STAGE 3A OR 3B CKD: Primary | ICD-10-CM

## 2022-06-15 DIAGNOSIS — D64.9 ANEMIA, UNSPECIFIED TYPE: ICD-10-CM

## 2022-06-15 PROCEDURE — 99214 PR OFFICE/OUTPT VISIT, EST, LEVL IV, 30-39 MIN: ICD-10-PCS | Mod: S$PBB,,, | Performed by: INTERNAL MEDICINE

## 2022-06-15 PROCEDURE — 99999 PR PBB SHADOW E&M-EST. PATIENT-LVL II: ICD-10-PCS | Mod: PBBFAC,,, | Performed by: INTERNAL MEDICINE

## 2022-06-15 PROCEDURE — 99214 OFFICE O/P EST MOD 30 MIN: CPT | Mod: S$PBB,,, | Performed by: INTERNAL MEDICINE

## 2022-06-15 PROCEDURE — 99212 OFFICE O/P EST SF 10 MIN: CPT | Mod: PBBFAC | Performed by: INTERNAL MEDICINE

## 2022-06-15 PROCEDURE — 99999 PR PBB SHADOW E&M-EST. PATIENT-LVL II: CPT | Mod: PBBFAC,,, | Performed by: INTERNAL MEDICINE

## 2022-06-15 NOTE — PROGRESS NOTES
HISTORY OF PRESENT ILLNESS:  This is a 81 -year-old white female with a   longstanding history of nephrolithiasis, but no recent symptomatic   nephrolithiasis episodes who is coming in for a followup.  She also has   hypertension with stable blood pressures at home and had failed InterStim   therapy with her bladder in the past and had seen Dr. Stoddard in Urology.  No   recent symptomatic stones.  Her kidney function is stable with a creatinine of   1.3, which appears to be close to her baseline.    Her blood   pressure is stable at home in the 120's-130's/60's-70's and occasionally lower in the 1 teens and occasionally. .    PAST MEDICAL HISTORY:  Significant for hypertension for over 10 years, history   of kidney stones, failed InterStim therapy, breast cancer, status post   lumpectomy, XRT and adjuvant hormonal therapy and osteoporosis.  KATHERIN-CPAP    REVIEW OF SYSTEMS:  She states that she is feeling well but fatigued.  Apetite good.  Weight stable. Denies any back pain or kidney stone pain.  She denies any blood in the urine, frequency, urgency,   hematuria, dysuria, nausea, vomiting, chest pain or shortness of breath.    PHYSICAL EXAMINATION:limited  GENERAL:  A well-nourished, well-developed individual, in no acute distress.  CARDIOVASCULAR:  Rate and rhythm regular.  No murmurs, gallops or rubs.  LUNGS:  Clear to auscultation bilaterally.  Normal respiratory effort.  ABDOMEN:  Soft, nontender and nondistended.  No edema    EXTREMITIES:  Edema.    ASSESSMENT AND PLAN:  This is a 81-year-old white female with a longstanding   history of hypertension who is coming in for a followup visit.  1.   Nephrolithiasis.  No recent symptomatic nephrolithiasis.  She had a   procedure in 2001, for a kidney stone.  Her most recent CAT scan showed small   calcifications with 0.6 cm stone and 0.4 cm stone, which were not causing her   problem.  She is hydrating well over 3 liters of urine output per recent 24-hour   urine  per the patient.  I advised her to drink lemon juice for citrate.  She   also follows a low oxalate diet for high oxalate.  I also advised her to decrease her   meat intake.  Last US from last year showed 0.5 cm and mild hydro frances and saw urology. Repeat US.   2.  Renal/ckd stg 3:  Her creatinines have been stable around 1.2-1.3 with GFR of 39  Ml/min.    I emphasized good   blood pressure control.   RBC's on UA's of and  likely sec to stones-had seen urology in 1/22. Had PVR and did not want to cathterize and failed interstim.  No RBC's now.  3.  Hypertension.  Blood pressures are stable.     She has   no urine protein.  She is on Benicar for nephroprotection.  4.  Renal osteodystrophy.  Vitamin D levels are stable.   Her PTH is stable.  Calcium and phosphorus are stable.    5.  Anemia: stable. Restart iron.    She can be  followed up in 5 months.

## 2022-06-16 ENCOUNTER — TELEPHONE (OUTPATIENT)
Dept: HEMATOLOGY/ONCOLOGY | Facility: CLINIC | Age: 82
End: 2022-06-16
Payer: MEDICARE

## 2022-06-16 ENCOUNTER — TELEPHONE (OUTPATIENT)
Dept: NEPHROLOGY | Facility: CLINIC | Age: 82
End: 2022-06-16
Payer: MEDICARE

## 2022-06-16 NOTE — TELEPHONE ENCOUNTER
Spoke to patient to reschedule the 29th appt. Patient verbalized understanding and agreed to change.

## 2022-06-17 ENCOUNTER — HOSPITAL ENCOUNTER (OUTPATIENT)
Dept: RADIOLOGY | Facility: HOSPITAL | Age: 82
Discharge: HOME | End: 2022-06-17
Attending: INTERNAL MEDICINE
Payer: MEDICARE

## 2022-06-17 DIAGNOSIS — N18.30 STAGE 3 CHRONIC KIDNEY DISEASE, UNSPECIFIED WHETHER STAGE 3A OR 3B CKD: ICD-10-CM

## 2022-06-17 PROCEDURE — 76770 US EXAM ABDO BACK WALL COMP: CPT | Mod: 26,,, | Performed by: INTERNAL MEDICINE

## 2022-06-17 PROCEDURE — 76770 US KIDNEY: ICD-10-PCS | Mod: 26,,, | Performed by: INTERNAL MEDICINE

## 2022-06-17 PROCEDURE — 76770 US EXAM ABDO BACK WALL COMP: CPT | Mod: TC

## 2022-06-27 ENCOUNTER — OFFICE VISIT (OUTPATIENT)
Dept: INTERNAL MEDICINE | Facility: CLINIC | Age: 82
End: 2022-06-27
Payer: MEDICARE

## 2022-06-27 DIAGNOSIS — I10 HYPERTENSION, UNSPECIFIED TYPE: ICD-10-CM

## 2022-06-27 DIAGNOSIS — R07.9 CHEST PAIN, UNSPECIFIED TYPE: Primary | ICD-10-CM

## 2022-06-27 PROCEDURE — 99214 PR OFFICE/OUTPT VISIT, EST, LEVL IV, 30-39 MIN: ICD-10-PCS | Mod: S$PBB,,, | Performed by: INTERNAL MEDICINE

## 2022-06-27 PROCEDURE — 99214 OFFICE O/P EST MOD 30 MIN: CPT | Mod: PBBFAC | Performed by: INTERNAL MEDICINE

## 2022-06-27 PROCEDURE — 99999 PR PBB SHADOW E&M-EST. PATIENT-LVL IV: CPT | Mod: PBBFAC,,, | Performed by: INTERNAL MEDICINE

## 2022-06-27 PROCEDURE — 99999 PR PBB SHADOW E&M-EST. PATIENT-LVL IV: ICD-10-PCS | Mod: PBBFAC,,, | Performed by: INTERNAL MEDICINE

## 2022-06-27 PROCEDURE — 99214 OFFICE O/P EST MOD 30 MIN: CPT | Mod: S$PBB,,, | Performed by: INTERNAL MEDICINE

## 2022-06-28 ENCOUNTER — HOSPITAL ENCOUNTER (OUTPATIENT)
Dept: CARDIOLOGY | Facility: HOSPITAL | Age: 82
Discharge: HOME | End: 2022-06-28
Attending: INTERNAL MEDICINE
Payer: MEDICARE

## 2022-06-28 ENCOUNTER — HOSPITAL ENCOUNTER (OUTPATIENT)
Dept: RADIOLOGY | Facility: HOSPITAL | Age: 82
Discharge: HOME | End: 2022-06-28
Attending: INTERNAL MEDICINE
Payer: MEDICARE

## 2022-06-28 VITALS — WEIGHT: 124 LBS | HEIGHT: 66 IN | BODY MASS INDEX: 19.93 KG/M2

## 2022-06-28 DIAGNOSIS — R07.9 CHEST PAIN, UNSPECIFIED TYPE: ICD-10-CM

## 2022-06-28 DIAGNOSIS — C50.411 MALIGNANT NEOPLASM OF UPPER-OUTER QUADRANT OF RIGHT BREAST IN FEMALE, ESTROGEN RECEPTOR POSITIVE: ICD-10-CM

## 2022-06-28 DIAGNOSIS — Z17.0 MALIGNANT NEOPLASM OF UPPER-OUTER QUADRANT OF RIGHT BREAST IN FEMALE, ESTROGEN RECEPTOR POSITIVE: ICD-10-CM

## 2022-06-28 DIAGNOSIS — Z79.811 USE OF AROMATASE INHIBITORS: ICD-10-CM

## 2022-06-28 LAB
ASCENDING AORTA: 3.04 CM
BSA FOR ECHO PROCEDURE: 1.62 M2
CV ECHO LV RWT: 0.34 CM
CV STRESS BASE HR: 56 BPM
DIASTOLIC BLOOD PRESSURE: 60 MMHG
DOP CALC LVOT AREA: 2.4 CM2
DOP CALC LVOT DIAMETER: 1.74 CM
DOP CALC LVOT PEAK VEL: 1.3 M/S
DOP CALC LVOT STROKE VOLUME: 68.71 CM3
DOP CALCLVOT PEAK VEL VTI: 28.91 CM
E WAVE DECELERATION TIME: 245.61 MSEC
E/A RATIO: 0.94
E/E' RATIO: 9.87 M/S
ECHO LV POSTERIOR WALL: 0.73 CM (ref 0.6–1.1)
EJECTION FRACTION: 65 %
FRACTIONAL SHORTENING: 34 % (ref 28–44)
INTERVENTRICULAR SEPTUM: 0.68 CM (ref 0.6–1.1)
IVRT: 88.49 MSEC
LA MAJOR: 4.2 CM
LA MINOR: 5.36 CM
LA WIDTH: 2.74 CM
LEFT ATRIUM SIZE: 3.35 CM
LEFT ATRIUM VOLUME INDEX MOD: 11.1 ML/M2
LEFT ATRIUM VOLUME INDEX: 22.5 ML/M2
LEFT ATRIUM VOLUME MOD: 18.04 CM3
LEFT ATRIUM VOLUME: 36.75 CM3
LEFT INTERNAL DIMENSION IN SYSTOLE: 2.81 CM (ref 2.1–4)
LEFT VENTRICLE DIASTOLIC VOLUME INDEX: 49.34 ML/M2
LEFT VENTRICLE DIASTOLIC VOLUME: 80.43 ML
LEFT VENTRICLE MASS INDEX: 54 G/M2
LEFT VENTRICLE SYSTOLIC VOLUME INDEX: 18.3 ML/M2
LEFT VENTRICLE SYSTOLIC VOLUME: 29.89 ML
LEFT VENTRICULAR INTERNAL DIMENSION IN DIASTOLE: 4.24 CM (ref 3.5–6)
LEFT VENTRICULAR MASS: 87.24 G
LV LATERAL E/E' RATIO: 8.22 M/S
LV SEPTAL E/E' RATIO: 12.33 M/S
MV A" WAVE DURATION": 13.42 MSEC
MV PEAK A VEL: 0.79 M/S
MV PEAK E VEL: 0.74 M/S
MV STENOSIS PRESSURE HALF TIME: 71.23 MS
MV VALVE AREA P 1/2 METHOD: 3.09 CM2
OHS CV CPX 1 MINUTE RECOVERY HEART RATE: 103 BPM
OHS CV CPX 85 PERCENT MAX PREDICTED HEART RATE MALE: 115
OHS CV CPX ESTIMATED METS: 7
OHS CV CPX MAX PREDICTED HEART RATE: 135
OHS CV CPX PATIENT IS FEMALE: 1
OHS CV CPX PATIENT IS MALE: 0
OHS CV CPX PEAK DIASTOLIC BLOOD PRESSURE: 70 MMHG
OHS CV CPX PEAK HEAR RATE: 126 BPM
OHS CV CPX PEAK RATE PRESSURE PRODUCT: NORMAL
OHS CV CPX PEAK SYSTOLIC BLOOD PRESSURE: 184 MMHG
OHS CV CPX PERCENT MAX PREDICTED HEART RATE ACHIEVED: 94
OHS CV CPX RATE PRESSURE PRODUCT PRESENTING: 6496
PULM VEIN S/D RATIO: 2.11
PV PEAK D VEL: 0.36 M/S
PV PEAK S VEL: 0.76 M/S
RA MAJOR: 4.78 CM
RA PRESSURE: 3 MMHG
RA WIDTH: 3.77 CM
RIGHT VENTRICULAR END-DIASTOLIC DIMENSION: 3.19 CM
RV TISSUE DOPPLER FREE WALL SYSTOLIC VELOCITY 1 (APICAL 4 CHAMBER VIEW): 14.65 CM/S
SINUS: 2.97 CM
STJ: 2.91 CM
STRESS ECHO POST EXERCISE DUR MIN: 9 MINUTES
STRESS ECHO POST EXERCISE DUR SEC: 44 SECONDS
SYSTOLIC BLOOD PRESSURE: 116 MMHG
TDI LATERAL: 0.09 M/S
TDI SEPTAL: 0.06 M/S
TDI: 0.08 M/S
TRICUSPID ANNULAR PLANE SYSTOLIC EXCURSION: 2.19 CM

## 2022-06-28 PROCEDURE — 93351 STRESS TTE COMPLETE: CPT | Mod: 26,,, | Performed by: INTERNAL MEDICINE

## 2022-06-28 PROCEDURE — 77062 BREAST TOMOSYNTHESIS BI: CPT | Mod: 26,,, | Performed by: RADIOLOGY

## 2022-06-28 PROCEDURE — 77066 MAMMO DIGITAL DIAGNOSTIC BILAT WITH TOMO: ICD-10-PCS | Mod: 26,,, | Performed by: RADIOLOGY

## 2022-06-28 PROCEDURE — 77066 DX MAMMO INCL CAD BI: CPT | Mod: 26,,, | Performed by: RADIOLOGY

## 2022-06-28 PROCEDURE — 77062 MAMMO DIGITAL DIAGNOSTIC BILAT WITH TOMO: ICD-10-PCS | Mod: 26,,, | Performed by: RADIOLOGY

## 2022-06-28 PROCEDURE — 71046 X-RAY EXAM CHEST 2 VIEWS: CPT | Mod: 26,,, | Performed by: RADIOLOGY

## 2022-06-28 PROCEDURE — 77066 DX MAMMO INCL CAD BI: CPT | Mod: TC

## 2022-06-28 PROCEDURE — 71046 X-RAY EXAM CHEST 2 VIEWS: CPT | Mod: TC

## 2022-06-28 PROCEDURE — 71046 XR CHEST PA AND LATERAL: ICD-10-PCS | Mod: 26,,, | Performed by: RADIOLOGY

## 2022-06-28 PROCEDURE — 93351 STRESS TTE COMPLETE: CPT

## 2022-06-28 PROCEDURE — 93351 STRESS ECHO (CUPID ONLY): ICD-10-PCS | Mod: 26,,, | Performed by: INTERNAL MEDICINE

## 2022-06-29 ENCOUNTER — OFFICE VISIT (OUTPATIENT)
Dept: HEMATOLOGY/ONCOLOGY | Facility: CLINIC | Age: 82
End: 2022-06-29
Payer: MEDICARE

## 2022-06-29 DIAGNOSIS — C50.411 MALIGNANT NEOPLASM OF UPPER-OUTER QUADRANT OF RIGHT BREAST IN FEMALE, ESTROGEN RECEPTOR POSITIVE: Primary | ICD-10-CM

## 2022-06-29 DIAGNOSIS — Z79.811 USE OF AROMATASE INHIBITORS: ICD-10-CM

## 2022-06-29 DIAGNOSIS — Z17.0 MALIGNANT NEOPLASM OF UPPER-OUTER QUADRANT OF RIGHT BREAST IN FEMALE, ESTROGEN RECEPTOR POSITIVE: Primary | ICD-10-CM

## 2022-06-29 PROCEDURE — 99211 OFF/OP EST MAY X REQ PHY/QHP: CPT | Mod: PBBFAC | Performed by: INTERNAL MEDICINE

## 2022-06-29 PROCEDURE — 99999 PR PBB SHADOW E&M-EST. PATIENT-LVL I: CPT | Mod: PBBFAC,,, | Performed by: INTERNAL MEDICINE

## 2022-06-29 PROCEDURE — 99999 PR PBB SHADOW E&M-EST. PATIENT-LVL I: ICD-10-PCS | Mod: PBBFAC,,, | Performed by: INTERNAL MEDICINE

## 2022-06-29 PROCEDURE — 99213 OFFICE O/P EST LOW 20 MIN: CPT | Mod: S$PBB,,, | Performed by: INTERNAL MEDICINE

## 2022-06-29 PROCEDURE — 99213 PR OFFICE/OUTPT VISIT, EST, LEVL III, 20-29 MIN: ICD-10-PCS | Mod: S$PBB,,, | Performed by: INTERNAL MEDICINE

## 2022-06-29 NOTE — PROGRESS NOTES
Subjective:       Patient ID: Shima Sorto is a 81 y.o. female.    Chief Complaint: No chief complaint on file.    HPI    Ms. Sorto returns today for follow up.  She has been on anastrazole since mid November 2018, and so fas has tolerated it well.    Briefly, she is an 81-year-old  female who diagnosed with breast cancer in 2018.  On 08/17/2018, she underwent a lumpectomy and sentinel lymph node biopsy for an 8 mm grade 1 invasive lobular carcinoma which was ER strongly positive, NE negative and HER-2 negative with a Ki-67 of 5%, with the closest margin being 2 mm.  Two of 2 sentinel lymph nodes were negative for involvement.      She completed her radiation therapy and was subsequently started on AIs.  Her DXA scan last November had shown osteopenia.  A mammogram on 6/28/2022 were read as BIRADS II, and a one year follow up was recommended.      Review of Systems      Overall she feels well.  She has tolerated the anastrazole quite well so far.  ECOG PS is 1.   She denies any anxiety, depression, easy bruising, fevers, chills, night  sweats, weight loss, nausea, vomiting, diarrhea, constipation, diplopia, blurred vision, headache, chest pain, palpitations, shortness of breath, breast pain, abdominal pain, extremity pain, or difficulty ambulating.  The remainder of the ten-point ROS, including general, skin, lymph, H/N, cardiorespiratory, GI, , Neuro, Endocrine, and psychiatric is negative.     Objective:      Physical Exam        She is alert, oriented to time, place, person, pleasant, well      nourished, in no acute physical distress.                                    VITAL SIGNS:  Reviewed                                      HEENT:  Normal.  There are no nasal, oral, lip, gingival, auricular, lid,    or conjunctival lesions.  Mucosae are moist and pink, and there is no        thrush.  Pupils are equal, reactive to light and accommodation.              Extraocular muscle movements are  intact.  Dentition is good.                                    NECK:  Supple without JVD, adenopathy, or thyromegaly.                       LUNGS:  Clear to auscultation without wheezing, rales, or rhonchi.           CARDIOVASCULAR:  Reveals an S1, S2, no murmurs, no rubs, no gallops.         ABDOMEN:  Soft, nontender, without organomegaly.  Bowel sounds are    present.                                                                     EXTREMITIES:  No cyanosis, clubbing, or edema.                               BREASTS:  She is status post right lumpectomy with a well-healed incision in the upper outer quadrant.    There are no masses in either breast.    There is tenderness on palpation of the right 7th or 8th rib from the lateral position although with the sternum.                                      LYMPHATIC:  There is no cervical, axillary, or supraclavicular adenopathy.   SKIN:  Warm and moist, without petechiae, rashes, induration, or ecchymoses.        NEUROLOGIC:  DTRs are 0-1+ bilaterally, symmetrical, motor function is 5/5,and cranial nerves are  within normal limits.    Assessment:       1. Malignant neoplasm of upper-outer quadrant of right breast in female, estrogen receptor positive    2. Use of aromatase inhibitors      3.    Osteopenia    Plan:           I had a long discussion with her.    She will remain on anatsrazole through the end of October 2023, and see me again in 6 months.  Her mammogram will be repeated a year from now.  Her multiple questions were answered to her satisfaction.  Duration of visit including time spent reviewing her chart was in excess of 25 minutes.

## 2022-06-30 ENCOUNTER — TELEPHONE (OUTPATIENT)
Dept: INTERNAL MEDICINE | Facility: CLINIC | Age: 82
End: 2022-06-30
Payer: MEDICARE

## 2022-06-30 DIAGNOSIS — R94.39 ABNORMAL STRESS TEST: Primary | ICD-10-CM

## 2022-06-30 NOTE — TELEPHONE ENCOUNTER
Called and spoke to patient about abnormal stress test. Scheduled patient to see cardiology for friday

## 2022-06-30 NOTE — TELEPHONE ENCOUNTER
Please contact patient and inform her there was a slight abnormality on her stress test. I would like for her to see cardiology. Order in,please schedule

## 2022-07-01 ENCOUNTER — OFFICE VISIT (OUTPATIENT)
Dept: CARDIOLOGY | Facility: CLINIC | Age: 82
End: 2022-07-01
Payer: MEDICARE

## 2022-07-01 VITALS
SYSTOLIC BLOOD PRESSURE: 124 MMHG | DIASTOLIC BLOOD PRESSURE: 70 MMHG | BODY MASS INDEX: 19.56 KG/M2 | WEIGHT: 121.19 LBS

## 2022-07-01 DIAGNOSIS — I10 ESSENTIAL HYPERTENSION: Primary | ICD-10-CM

## 2022-07-01 DIAGNOSIS — R94.39 ABNORMAL STRESS TEST: ICD-10-CM

## 2022-07-01 PROCEDURE — 93010 EKG 12-LEAD: ICD-10-PCS | Mod: ,,, | Performed by: INTERNAL MEDICINE

## 2022-07-01 PROCEDURE — 99205 OFFICE O/P NEW HI 60 MIN: CPT | Mod: S$PBB,,, | Performed by: INTERNAL MEDICINE

## 2022-07-01 PROCEDURE — 99999 PR PBB SHADOW E&M-EST. PATIENT-LVL III: CPT | Mod: PBBFAC,,, | Performed by: INTERNAL MEDICINE

## 2022-07-01 PROCEDURE — 99999 PR PBB SHADOW E&M-EST. PATIENT-LVL III: ICD-10-PCS | Mod: PBBFAC,,, | Performed by: INTERNAL MEDICINE

## 2022-07-01 PROCEDURE — 99205 PR OFFICE/OUTPT VISIT, NEW, LEVL V, 60-74 MIN: ICD-10-PCS | Mod: S$PBB,,, | Performed by: INTERNAL MEDICINE

## 2022-07-01 PROCEDURE — 93005 ELECTROCARDIOGRAM TRACING: CPT

## 2022-07-01 PROCEDURE — 99213 OFFICE O/P EST LOW 20 MIN: CPT | Mod: PBBFAC | Performed by: INTERNAL MEDICINE

## 2022-07-01 PROCEDURE — 93010 ELECTROCARDIOGRAM REPORT: CPT | Mod: ,,, | Performed by: INTERNAL MEDICINE

## 2022-07-01 NOTE — PROGRESS NOTES
[facility]    Chief Complaint  Chief Complaint   Patient presents with    Abnormal Stress Test       HPI:    Patient presents to discuss her recent stress test.  She has a history of chest pain since she fell onto a plantar about 6 years ago.  It is not exertional.  But over the past couple of months, she has noted increasing fatigue when she exerts herself.  Denies dyspnea or chest discomfort.  This prompted the stress echo.  The report states that the electrocardiographic portion was positive for ischemia.  The tracings are not available for my review, perhaps they have not yet been uploaded in him use.  The echocardiogram showed no evidence of ischemia.  I have reviewed those images and agree.  She denies prior cardiac problems or workup.  She exercises regularly without limitations.    Medications  Current Outpatient Medications   Medication Sig Dispense Refill    amLODIPine (NORVASC) 2.5 MG tablet Take 1 tablet (2.5 mg total) by mouth once daily. 90 tablet 1    anastrozole (ARIMIDEX) 1 mg Tab TAKE 1 TABLET(1 MG) BY MOUTH EVERY DAY. 90 tablet 3    coQ10, ubiquinol, 100 mg Cap Take 100 mg by mouth once daily.      denosumab (PROLIA) 60 mg/mL Syrg Inject 60 mg into the skin every 6 (six) months.      fish oil-E-fatty [id number] 400-5 mg-unit Cap Take 1 capsule by mouth Daily.      furosemide (LASIX) 20 MG tablet Take 1 tablet (20 mg total) by mouth once daily. 60 tablet 2    ONE DAILY MULTIVITAMIN ORAL       PRESERVISION AREDS-2 250-90-40-1 mg Cap Take 1 tablet by mouth 2 (two) times daily.      UNABLE TO FIND Rufus-Mag-Citrate with D3 & K2      UNABLE TO FIND Nutrafol      valsartan (DIOVAN) 160 MG tablet Take 1 tablet (160 mg total) by mouth once daily. 90 tablet 1    vitamin renal formula, B-complex-vitamin c-folic acid, (NEPHROCAPS) 1 mg Cap Take 1 capsule by mouth once daily. 90 capsule 3     No current facility-administered medications for this visit.     Facility-Administered  Medications Ordered in Other Visits   Medication Dose Route Frequency Provider Last Rate Last Admin    0.9%  NaCl infusion   Intravenous Continuous James Carranza MD 70 mL/hr at 08/17/18 0843 New Bag at 08/17/18 0843        History  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    Visual impairment     reading glasses     Past Surgical History:   Procedure Laterality Date    BREAST CYST ASPIRATION Right 1999    BREAST LUMPECTOMY Right 2018    with radiation    COLONOSCOPY N/A 7/24/2018    Procedure: COLONOSCOPY;  Surgeon: Juan Miguel Pena MD;  Location: [facility] (4TH FLR);  Service: Endoscopy;  Laterality: N/A;    INJECTION FOR SENTINEL NODE IDENTIFICATION Right 8/17/2018    Procedure: INJECTION, FOR SENTINEL NODE IDENTIFICATION;  Surgeon: Abby Mason MD;  Location: [facility] OR 2ND FLR;  Service: General;  Laterality: Right;    interstim placed stage 1      and removed    KIDNEY STONE SURGERY  2000    @ [facility]    MASTECTOMY, PARTIAL Right 8/17/2018    Procedure: MASTECTOMY, PARTIAL-US guided;  Surgeon: Abby Mason MD;  Location: [facility] OR 2ND FLR;  Service: General;  Laterality: Right;    MOHS procedure      SENTINEL LYMPH NODE BIOPSY Right 8/17/2018    Procedure: BIOPSY, LYMPH NODE, SENTINEL;  Surgeon: Abby Mason MD;  Location: [facility] OR [facility] FLR;  Service: General;  Laterality: Right;     Social History     Socioeconomic History    Marital status:    Occupational History    Occupation:      Employer: [employer]    Tobacco Use    Smoking  status: Former Smoker     Packs/day: 0.50     Years: 8.00     Pack years: 4.00     Quit date: 1964     Years since quittin.8    Smokeless tobacco: Never Used   Substance and Sexual Activity    Alcohol use: Yes     Alcohol/week: 1.0 standard drink     Types: 1 Shots of liquor per week     Comment: maybe one weekly    Drug use: No    Sexual activity: Not Currently     Social Determinants of Health     Financial Resource Strain: Low Risk     Difficulty of Paying Living Expenses: Not hard at all   Food Insecurity: No Food Insecurity    Worried About Running Out of Food in the Last Year: Never true    Ran Out of Food in the Last Year: Never true   Transportation Needs: No Transportation Needs    Lack of Transportation (Medical): No    Lack of Transportation (Non-Medical): No   Physical Activity: Sufficiently Active    Days of Exercise per Week: 7 days    Minutes of Exercise per Session: 30 min   Stress: No Stress Concern Present    Feeling of Stress : Not at all   Social Connections: Unknown    Frequency of Communication with Friends and Family: More than three times a week    Frequency of Social Gatherings with Friends and Family: Twice a week    Active Member of Clubs or Organizations: Yes    Attends Club or Organization Meetings: 1 to 4 times per year    Marital Status:    Housing Stability: Low Risk     Unable to Pay for Housing in the Last Year: No    Number of Places Lived in the Last Year: 1    Unstable Housing in the Last Year: No     Family History   Problem Relation Age of Onset    Kidney disease Mother     Heart disease Father     Melanoma Father     Heart attack Father     Breast cancer Maternal Aunt     Hearing loss Son     Hyperlipidemia Son     Anesthesia problems Neg Hx     Malignant hypertension Neg Hx     Hypotension Neg Hx     Malignant hyperthermia Neg Hx     Pseudochol deficiency Neg Hx     Colon cancer Neg Hx     Ovarian cancer Neg Hx     Psoriasis  Neg Hx     Lupus Neg Hx     Eczema Neg Hx     Acne Neg Hx         Allergies  Review of patient's allergies indicates:   Allergen Reactions    Asparagus Rash       Review of Systems   Review of Systems   Constitutional: Negative for malaise/fatigue, weight gain and weight loss.   Eyes: Negative for visual disturbance.   Cardiovascular: Positive for chest pain. Negative for claudication, cyanosis, dyspnea on exertion, irregular heartbeat, leg swelling, near-syncope, orthopnea, palpitations, paroxysmal nocturnal dyspnea and syncope.   Respiratory: Negative for cough, hemoptysis, shortness of breath, sleep disturbances due to breathing and wheezing.    Hematologic/Lymphatic: Negative for bleeding problem. Does not bruise/bleed easily.   Skin: Negative for poor wound healing.   Musculoskeletal: Negative for muscle cramps and myalgias.   Gastrointestinal: Negative for abdominal pain, anorexia, diarrhea, heartburn, hematemesis, hematochezia, melena, nausea and vomiting.   Genitourinary: Negative for hematuria and nocturia.   Neurological: Negative for excessive daytime sleepiness, dizziness, focal weakness, light-headedness and weakness.       Physical Exam  Vitals:    07/01/22 1118   BP: 124/70     Wt Readings from Last 1 Encounters:   07/01/22 55 kg (121 lb 3.2 oz)     Physical Exam  Vitals and nursing note reviewed.   Constitutional:       General: She is not in acute distress.     Appearance: She is not toxic-appearing or diaphoretic.   HENT:      Head: Normocephalic and atraumatic.      Mouth/Throat:      Lips: Pink.      Mouth: Mucous membranes are moist.   Eyes:      General: No scleral icterus.     Conjunctiva/sclera: Conjunctivae normal.   Neck:      Thyroid: No thyromegaly.      Vascular: No carotid bruit, hepatojugular reflux or JVD.      Trachea: Trachea normal.   Cardiovascular:      Rate and Rhythm: Normal rate and regular rhythm.  No extrasystoles are present.     Chest Wall: PMI is not displaced.       Pulses:           Carotid pulses are 2+ on the right side and 2+ on the left side.       Radial pulses are 2+ on the right side and 2+ on the left side.        Dorsalis pedis pulses are 2+ on the right side and 2+ on the left side.        Posterior tibial pulses are 2+ on the right side and 2+ on the left side.      Heart sounds: S1 normal and S2 normal. No murmur heard.    No friction rub. No S3 or S4 sounds.   Pulmonary:      Effort: Pulmonary effort is normal. No accessory muscle usage or respiratory distress.      Breath sounds: Normal breath sounds and air entry. No decreased breath sounds, wheezing, rhonchi or rales.   Abdominal:      General: Bowel sounds are normal. There is no distension or abdominal bruit.      Palpations: Abdomen is soft. There is no hepatomegaly, splenomegaly or pulsatile mass.      Tenderness: There is no abdominal tenderness.   Musculoskeletal:         General: No tenderness or deformity.      Right lower leg: No edema.      Left lower leg: No edema.   Skin:     General: Skin is warm and dry.      Capillary Refill: Capillary refill takes less than 2 seconds.      Coloration: Skin is not cyanotic or pale.      Nails: There is no clubbing.   Neurological:      General: No focal deficit present.      Mental Status: She is alert and oriented to person, place, and time.   Psychiatric:         Attention and Perception: Attention normal.         Mood and Affect: Mood normal.         Speech: Speech normal.         Behavior: Behavior normal. Behavior is cooperative.         Labs  Hospital Outpatient Visit on 06/28/2022   Component Date Value Ref Range Status    Ascending aorta 06/28/2022 3.04  cm Final    STJ 06/28/2022 2.91  cm Final    IVRT 06/28/2022 88.49  msec Final    IVS 06/28/2022 0.68  0.6 - 1.1 cm Final    LA size 06/28/2022 3.35  cm Final    Left Atrium Major Axis 06/28/2022 4.20  cm Final    Left Atrium Minor Axis 06/28/2022 5.36  cm Final    LVIDd 06/28/2022 4.24  3.5 - 6.0  "cm Final    LVIDs 06/28/2022 2.81  2.1 - 4.0 cm Final    LVOT diameter 06/28/2022 1.74  cm Final    LVOT peak VTI 06/28/2022 28.91  cm Final    Posterior Wall 06/28/2022 0.73  0.6 - 1.1 cm Final    MV Peak A Aldo 06/28/2022 0.79  m/s Final    E wave deceleration time 06/28/2022 245.61  msec Final    MV Peak E Aldo 06/28/2022 0.74  m/s Final    PV Peak D Aldo 06/28/2022 0.36  m/s Final    PV Peak S Aldo 06/28/2022 0.76  m/s Final    RA Major Axis 06/28/2022 4.78  cm Final    RA Width 06/28/2022 3.77  cm Final    RVDD 06/28/2022 3.19  cm Final    Sinus 06/28/2022 2.97  cm Final    TAPSE 06/28/2022 2.19  cm Final    TDI LATERAL 06/28/2022 0.09  m/s Final    TDI SEPTAL 06/28/2022 0.06  m/s Final    LA WIDTH 06/28/2022 2.74  cm Final    MV stenosis pressure 1/2 time 06/28/2022 71.23  ms Final    LV Diastolic Volume 06/28/2022 80.43  mL Final    LV Systolic Volume 06/28/2022 29.89  mL Final    RV S' 06/28/2022 14.65  cm/s Final    LVOT peak aldo 06/28/2022 1.30  m/s Final    LA volume (mod) 06/28/2022 18.04  cm3 Final    MV "A" wave duration 06/28/2022 13.42  msec Final    LV LATERAL E/E' RATIO 06/28/2022 8.22  m/s Final    LV SEPTAL E/E' RATIO 06/28/2022 12.33  m/s Final    FS 06/28/2022 34  % Final    LA volume 06/28/2022 36.75  cm3 Final    LV mass 06/28/2022 87.24  g Final    Left Ventricle Relative Wall Thick* 06/28/2022 0.34  cm Final    MV valve area p 1/2 method 06/28/2022 3.09  cm2 Final    E/A ratio 06/28/2022 0.94   Final    Mean e' 06/28/2022 0.08  m/s Final    Pulm vein S/D ratio 06/28/2022 2.11   Final    LVOT area 06/28/2022 2.4  cm2 Final    LVOT stroke volume 06/28/2022 68.71  cm3 Final    E/E' ratio 06/28/2022 9.87  m/s Final    BSA 06/28/2022 1.62  m2 Final    Systolic blood pressure 06/28/2022 116  mmHg Final    Diastolic blood pressure 06/28/2022 60  mmHg Final    HR at rest 06/28/2022 56  bpm Final    RPP 06/28/2022 6,496   Final    Peak HR 06/28/2022 126  bpm " Final    Peak Systolic BP 06/28/2022 184  mmHg Final    Peak Diatolic BP 06/28/2022 70  mmHg Final    Peak RPP 06/28/2022 23,184   Final    Estimated METs 06/28/2022 7   Final    Max Predicted HR 06/28/2022 135   Final    85% Max Predicted HR 06/28/2022 115   Final    % Max HR Achieved 06/28/2022 94   Final    1 Minute Recovery HR 06/28/2022 103  bpm Final    OHS CV CPX PATIENT IS MALE 06/28/2022 0   Final    OHS CV CPX PATIENT IS FEMALE 06/28/2022 1   Final    Exercise duration (sec) 06/28/2022 44  seconds Final    Exercise duration (min) 06/28/2022 9  minutes Final    LV Systolic Volume Index 06/28/2022 18.3  mL/m2 Final    LV Diastolic Volume Index 06/28/2022 49.34  mL/m2 Final    LA Volume Index 06/28/2022 22.5  mL/m2 Final    LV Mass Index 06/28/2022 54  g/m2 Final    LA Volume Index (Mod) 06/28/2022 11.1  mL/m2 Final    Right Atrial Pressure (from IVC) 06/28/2022 3  mmHg Final    EF 06/28/2022 65  % Final   Lab Visit on 06/13/2022   Component Date Value Ref Range Status    Protein, Urine Random 06/13/2022 <7  0 - 15 mg/dL Final    Creatinine, Urine 06/13/2022 45.0  15.0 - 325.0 mg/dL Final    Prot/Creat Ratio, Urine 06/13/2022 Unable to calculate  0.00 - 0.20 Final   Lab Visit on 06/13/2022   Component Date Value Ref Range Status    PTH, Intact 06/13/2022 79.0 (A) 9.0 - 77.0 pg/mL Final    Glucose 06/13/2022 65 (A) 70 - 110 mg/dL Final    Sodium 06/13/2022 135 (A) 136 - 145 mmol/L Final    Potassium 06/13/2022 3.7  3.5 - 5.1 mmol/L Final    Chloride 06/13/2022 98  95 - 110 mmol/L Final    CO2 06/13/2022 25  23 - 29 mmol/L Final    BUN 06/13/2022 39 (A) 8 - 23 mg/dL Final    Calcium 06/13/2022 8.7  8.7 - 10.5 mg/dL Final    Creatinine 06/13/2022 1.3  0.5 - 1.4 mg/dL Final    Albumin 06/13/2022 3.5  3.5 - 5.2 g/dL Final    Phosphorus 06/13/2022 2.9  2.7 - 4.5 mg/dL Final    eGFR if  06/13/2022 44.5 (A) >60 mL/min/1.73 m^2 Final    eGFR if non African American  06/13/2022 38.6 (A) >60 mL/min/1.73 m^2 Final    Comment: Calculation used to obtain the estimated glomerular filtration  rate (eGFR) is the CKD-EPI equation.       Anion Gap 06/13/2022 12  8 - 16 mmol/L Final    Hemoglobin 06/13/2022 10.4 (A) 12.0 - 16.0 g/dL Final    Hematocrit 06/13/2022 31.9 (A) 37.0 - 48.5 % Final   Lab Visit on 02/09/2022   Component Date Value Ref Range Status    Ferritin 02/09/2022 44  20.0 - 300.0 ng/mL Final    Iron 02/09/2022 61  30 - 160 ug/dL Final    Transferrin 02/09/2022 251  200 - 375 mg/dL Final    TIBC 02/09/2022 371  250 - 450 ug/dL Final    Saturated Iron 02/09/2022 16 (A) 20 - 50 % Final   Lab Visit on 02/01/2022   Component Date Value Ref Range Status    Sodium 02/01/2022 141  136 - 145 mmol/L Final    Potassium 02/01/2022 4.5  3.5 - 5.1 mmol/L Final    Chloride 02/01/2022 104  95 - 110 mmol/L Final    CO2 02/01/2022 29  23 - 29 mmol/L Final    Glucose 02/01/2022 85  70 - 110 mg/dL Final    BUN 02/01/2022 29 (A) 8 - 23 mg/dL Final    Creatinine 02/01/2022 1.2  0.5 - 1.4 mg/dL Final    Calcium 02/01/2022 9.1  8.7 - 10.5 mg/dL Final    Total Protein 02/01/2022 6.7  6.0 - 8.4 g/dL Final    Albumin 02/01/2022 3.4 (A) 3.5 - 5.2 g/dL Final    Total Bilirubin 02/01/2022 0.4  0.1 - 1.0 mg/dL Final    Comment: For infants and newborns, interpretation of results should be based  on gestational age, weight and in agreement with clinical  observations.    Premature Infant recommended reference ranges:  Up to 24 hours.............<8.0 mg/dL  Up to 48 hours............<12.0 mg/dL  3-5 days..................<15.0 mg/dL  6-29 days.................<15.0 mg/dL      Alkaline Phosphatase 02/01/2022 50 (A) 55 - 135 U/L Final    AST 02/01/2022 33  10 - 40 U/L Final    ALT 02/01/2022 25  10 - 44 U/L Final    Anion Gap 02/01/2022 8  8 - 16 mmol/L Final    eGFR if  02/01/2022 49.0 (A) >60 mL/min/1.73 m^2 Final    eGFR if non African American 02/01/2022 42.5 (A)  >60 mL/min/1.73 m^2 Final    Comment: Calculation used to obtain the estimated glomerular filtration  rate (eGFR) is the CKD-EPI equation.      Lab Visit on 01/31/2022   Component Date Value Ref Range Status    Protein, Urine Random 01/31/2022 <7  0 - 15 mg/dL Final    Creatinine, Urine 01/31/2022 50.0  15.0 - 325.0 mg/dL Final    Prot/Creat Ratio, Urine 01/31/2022 Unable to calculate  0.00 - 0.20 Final    Specimen UA 01/31/2022 Urine, Unspecified   Final    Color, UA 01/31/2022 Yellow  Yellow, Straw, Yumiko Final    Appearance, UA 01/31/2022 Clear  Clear Final    pH, UA 01/31/2022 8.0  5.0 - 8.0 Final    Specific Gravity, UA 01/31/2022 1.015  1.005 - 1.030 Final    Protein, UA 01/31/2022 Negative  Negative Final    Comment: Recommend a 24 hour urine protein or a urine   protein/creatinine ratio if globulin induced proteinuria is  clinically suspected.      Glucose, UA 01/31/2022 Negative  Negative Final    Ketones, UA 01/31/2022 Negative  Negative Final    Bilirubin (UA) 01/31/2022 Negative  Negative Final    Occult Blood UA 01/31/2022 Trace (A) Negative Final    Nitrite, UA 01/31/2022 Negative  Negative Final    Leukocytes, UA 01/31/2022 Negative  Negative Final   Lab Visit on 01/31/2022   Component Date Value Ref Range Status    Hematocrit 01/31/2022 33.3 (A) 37.0 - 48.5 % Final    Hemoglobin 01/31/2022 10.6 (A) 12.0 - 16.0 g/dL Final    PTH, Intact 01/31/2022 120.1 (A) 9.0 - 77.0 pg/mL Final    Glucose 01/31/2022 84  70 - 110 mg/dL Final    Sodium 01/31/2022 142  136 - 145 mmol/L Final    Potassium 01/31/2022 4.1  3.5 - 5.1 mmol/L Final    Chloride 01/31/2022 103  95 - 110 mmol/L Final    CO2 01/31/2022 33 (A) 23 - 29 mmol/L Final    BUN 01/31/2022 28 (A) 8 - 23 mg/dL Final    Calcium 01/31/2022 9.1  8.7 - 10.5 mg/dL Final    Creatinine 01/31/2022 1.1  0.5 - 1.4 mg/dL Final    Albumin 01/31/2022 3.4 (A) 3.5 - 5.2 g/dL Final    Phosphorus 01/31/2022 3.4  2.7 - 4.5 mg/dL Final    eGFR  if  01/31/2022 54.4 (A) >60 mL/min/1.73 m^2 Final    eGFR if non African American 01/31/2022 47.2 (A) >60 mL/min/1.73 m^2 Final    Comment: Calculation used to obtain the estimated glomerular filtration  rate (eGFR) is the CKD-EPI equation.       Anion Gap 01/31/2022 6 (A) 8 - 16 mmol/L Final   Office Visit on 01/26/2022   Component Date Value Ref Range Status    POC Blood, Urine 01/26/2022 Positive (A) Negative Final    250rbc/ul    POC Bilirubin, Urine 01/26/2022 Negative  Negative Final    POC Urobilinogen, Urine 01/26/2022 normal  0.1 - 1.1 Final    POC Ketones, Urine 01/26/2022 Negative  Negative Final    POC Protein, Urine 01/26/2022 Positive (A) Negative Final    30mg/dl    POC Nitrates, Urine 01/26/2022 Negative  Negative Final    POC Glucose, Urine 01/26/2022 Negative  Negative Final    pH, UA 01/26/2022 8.0  5 - 8 Final    POC Specific Gravity, Urine 01/26/2022 1.005  1.003 - 1.029 Final    POC Leukocytes, Urine 01/26/2022 Positive (A) Negative Final    10wbc/ul    Urine Culture, Routine 01/26/2022  (A)  Final                    Value:GRAM POSITIVE COCCI  10,000 - 49,999 cfu/ml  Further identified as Globicatella sanguinis  Susceptibility testing not routinely performed     Lab Visit on 01/15/2022   Component Date Value Ref Range Status    SARS-CoV2 (COVID-19) Qualitative P* 01/15/2022 Not Detected  Not Detected Final    Comment: This test utilizes a real-time reverse transcription  polymerase chain reaction procedure to amplify and   detect the SARS-CoV-2 RdRp and N genes.    The analytical sensitivity (limit of detection) of   this assay is 100 copies/mL.     A Detected result is considered positive for COVID-19.  This patient is considered infected with the   SARS-CoV-2 virus and is presumed to be contagious.    A Not Detected result means that SARS-CoV-2 RNA is not  present above the limit of detection. It does not rule  out the possibility of COVID-19 and should not be  the  sole basis for treatment decisions.  If COVID-19 is   strongly suspected based on clinical and exposure   history,re-testing should be considered.      This test is only for use under Food and Drug   Administration s Emergency Use Authorization (EUA).   Commercial reagents are provided by [employer].  Performance characteristics of the EUA have been   independently verified by [facility]   Department of Pathology a                           nd Laboratory Medicine.        SARS-COV-2- Cycle Number 01/15/2022 N/A   Final    Comment: CT (Cycle Threshold) values are surrogate markers of   nucleic acid concentration in a sample. They are non-standard  measurements and should only be interpreted by those familiar   with both PCR technology and the patient's clinical presentation.     Lab Visit on 01/05/2022   Component Date Value Ref Range Status    Sodium 01/05/2022 141  136 - 145 mmol/L Final    Potassium 01/05/2022 4.7  3.5 - 5.1 mmol/L Final    Chloride 01/05/2022 100  95 - 110 mmol/L Final    CO2 01/05/2022 36 (A) 23 - 29 mmol/L Final    Glucose 01/05/2022 95  70 - 110 mg/dL Final    BUN 01/05/2022 24 (A) 8 - 23 mg/dL Final    Creatinine 01/05/2022 1.1  0.5 - 1.4 mg/dL Final    Calcium 01/05/2022 9.8  8.7 - 10.5 mg/dL Final    Anion Gap 01/05/2022 5 (A) 8 - 16 mmol/L Final    eGFR if African American 01/05/2022 54.4 (A) >60 mL/min/1.73 m^2 Final    eGFR if non African American 01/05/2022 47.2 (A) >60 mL/min/1.73 m^2 Final    Comment: Calculation used to obtain the estimated glomerular filtration  rate (eGFR) is the CKD-EPI equation.      There may be more visits with results that are not included.       Imaging  X-Ray Chest PA And Lateral    Result Date: 6/28/2022  EXAMINATION: XR CHEST PA AND LATERAL CLINICAL HISTORY: Chest pain, unspecified FINDINGS: Chest two views: Heart size is normal.  Lungs are clear and the bones showed DJD.     No acute process seen. Electronically  signed by: Kyler Valdez MD Date:    06/28/2022 Time:    11:21    US Kidney Only    Result Date: 6/17/2022  EXAMINATION: US KIDNEY CLINICAL HISTORY: stone; Chronic kidney disease, stage 3 unspecified TECHNIQUE: Ultrasound of the kidneys was performed including color flow and Doppler evaluation of the kidneys. COMPARISON: Ultrasound 09/15/2021 10/14/2020.  CT 01/09/2019 FINDINGS: Right kidney: The right kidney measures 10.4 cm. Resistive index measures 0.84.  Cortical thinning.  No loss of corticomedullary distinction. Simple renal cyst measuring 0.9 x 0.9 x 1.2 cm.  Multiple stones measuring up to 0.4 cm.  Mild hydronephrosis. Left kidney: The left kidney measures 11.3 cm. Resistive index measures 0.77.  Cortical thinning.  No loss of corticomedullary distinction. Lower pole cyst measuring 2.1 x 2.0 x 1.5 cm.  No stone visualized.  Mild hydronephrosis. Bilateral ureteric jets are noted.     Sonographic findings suggestive of medical renal disease. Stable right nephrolithiasis measuring up to 0.5 cm. mild bilateral hydronephrosis. Bilateral cysts. Electronically signed by resident: Jacob Salmeron Date:    06/17/2022 Time:    14:37 Electronically signed by: Angi Greenwood MD Date:    06/17/2022 Time:    14:57    Stress Echo Which stress agent will be used? Treadmill Exercise; Color Flow Doppler? No    Result Date: 6/28/2022  · The stress echo portion of this study is negative for myocardial ischemia. · The ECG portion of this study is positive for myocardial ischemia. · The test was stopped because the patient experienced fatigue. · The patient's exercise capacity was average. · During stress, the following significant arrhythmias were observed: rare PACs, rare PVCs. · The left ventricle is normal in size with normal systolic function. The estimated ejection fraction is 65%. · Normal left ventricular diastolic function. · Normal right ventricular size with normal right ventricular systolic function. · Normal  central venous pressure (3 mmHg).      Mammo Digital Diagnostic Bilat with Freddy    Result Date: 6/28/2022  Result: Mammo Digital Diagnostic Bilat with Freddy  History: Patient is 81 y.o. and is seen for diagnostic imaging. Films Compared: Compared to: 06/17/2021 Mammo Digital Diagnostic Bilat with Freddy, 06/17/2020 Mammo Digital Diagnostic Bilat w/ Freddy, and 04/11/2019 Mammo Digital Diagnostic Bilat w/ Freddy  Findings: This procedure was performed using tomosynthesis. Computer-aided detection was utilized in the interpretation of this examination. The breasts are heterogeneously dense, which may obscure small masses. Right There are post-surgical findings from a previous lumpectomy seen in the right breast. There has been no interval development of a suspicious mass, microcalcification, or architectural distortion. Left There is no evidence of suspicious masses, calcifications, or other abnormal findings in the left breast.     Bilateral There is no mammographic evidence of malignancy. BI-RADS Category: Overall: 2 - Benign  Recommendation: Routine screening mammogram in 1 year, or as clinically indicated.       Assessment  1. Abnormal stress test  - Ambulatory referral/consult to Cardiology    2. Essential hypertension  Controlled      Plan and Discussion    We discussed false-positive stress test.  We discussed that the echocardiogram increases the sensitivity and specificity of the test.  Hence although the electrocardiogram may be abnormal, it is a negative stress test with no evidence of significant coronary atherosclerosis.  Her risk factors are well managed.  Would not pursue further cardiac workup at this point.    The ASCVD Risk score (Maico NOAH Jr., et al., 2013) failed to calculate for the following reasons:    The 2013 ASCVD risk score is only valid for ages 40 to 79     Follow Up  Follow up if symptoms worsen or fail to improve.      Robbie Contreras MD

## 2022-07-02 VITALS
SYSTOLIC BLOOD PRESSURE: 138 MMHG | HEART RATE: 62 BPM | HEIGHT: 66 IN | BODY MASS INDEX: 20.05 KG/M2 | OXYGEN SATURATION: 100 % | TEMPERATURE: 97 F | WEIGHT: 124.75 LBS | DIASTOLIC BLOOD PRESSURE: 88 MMHG

## 2022-07-02 NOTE — PROGRESS NOTES
Subjective:       Patient ID: Shima Sorto is a 81 y.o. female.    Chief Complaint: Hypertension    HPI  She returns for management of hypertension.  She has had hypertension for over a year.  Current treatment has included medications outlined in medication list.  She denies  shortness of breath.  No palpitations.  Denies left arm or neck pain.  She complains of occasional episodes of chest pain    Medications:  See med list    Social history:  Does not smoke, does not drink alcohol      Review of Systems   Constitutional: Negative for chills, fatigue, fever and unexpected weight change.   Respiratory: Negative for chest tightness and shortness of breath.    Cardiovascular: Negative for chest pain and palpitations.   Gastrointestinal: Negative for abdominal pain and blood in stool.   Neurological: Negative for dizziness, syncope, numbness and headaches.       Objective:      Physical Exam  HENT:      Right Ear: External ear normal.      Left Ear: External ear normal.      Nose: Nose normal.      Mouth/Throat:      Mouth: Mucous membranes are moist.      Pharynx: Oropharynx is clear.   Eyes:      Pupils: Pupils are equal, round, and reactive to light.   Cardiovascular:      Rate and Rhythm: Normal rate and regular rhythm.      Heart sounds: No murmur heard.  Pulmonary:      Breath sounds: Normal breath sounds.   Chest:   Breasts:      Right: No axillary adenopathy.      Left: No axillary adenopathy.       Abdominal:      General: There is no distension.      Palpations: There is no hepatomegaly or splenomegaly.      Tenderness: There is no abdominal tenderness.   Musculoskeletal:      Cervical back: Normal range of motion.   Lymphadenopathy:      Cervical: No cervical adenopathy.      Upper Body:      Right upper body: No axillary adenopathy.      Left upper body: No axillary adenopathy.   Neurological:      Cranial Nerves: No cranial nerve deficit.      Sensory: No sensory deficit.      Motor: Motor  function is intact.      Deep Tendon Reflexes: Reflexes are normal and symmetric.         Assessment/Plan       Assessment and plan:  1.  Hypertension:  Controlled  2. Chest pain:  Check chest x-ray and stress echo

## 2022-07-11 ENCOUNTER — OFFICE VISIT (OUTPATIENT)
Dept: INTERNAL MEDICINE | Facility: CLINIC | Age: 82
End: 2022-07-11
Payer: MEDICARE

## 2022-07-11 VITALS
SYSTOLIC BLOOD PRESSURE: 126 MMHG | HEART RATE: 74 BPM | HEIGHT: 66 IN | OXYGEN SATURATION: 100 % | BODY MASS INDEX: 20.02 KG/M2 | WEIGHT: 124.56 LBS | DIASTOLIC BLOOD PRESSURE: 72 MMHG

## 2022-07-11 DIAGNOSIS — Z17.0 MALIGNANT NEOPLASM OF UPPER-OUTER QUADRANT OF RIGHT BREAST IN FEMALE, ESTROGEN RECEPTOR POSITIVE: ICD-10-CM

## 2022-07-11 DIAGNOSIS — M80.00XD AGE-RELATED OSTEOPOROSIS WITH CURRENT PATHOLOGICAL FRACTURE WITH ROUTINE HEALING: ICD-10-CM

## 2022-07-11 DIAGNOSIS — E55.9 VITAMIN D DEFICIENCY: ICD-10-CM

## 2022-07-11 DIAGNOSIS — Z79.811 USE OF AROMATASE INHIBITORS: ICD-10-CM

## 2022-07-11 DIAGNOSIS — Z00.00 ENCOUNTER FOR PREVENTIVE HEALTH EXAMINATION: Primary | ICD-10-CM

## 2022-07-11 DIAGNOSIS — C50.411 MALIGNANT NEOPLASM OF UPPER-OUTER QUADRANT OF RIGHT BREAST IN FEMALE, ESTROGEN RECEPTOR POSITIVE: ICD-10-CM

## 2022-07-11 DIAGNOSIS — N18.30 STAGE 3 CHRONIC KIDNEY DISEASE, UNSPECIFIED WHETHER STAGE 3A OR 3B CKD: ICD-10-CM

## 2022-07-11 DIAGNOSIS — E72.53 PRIMARY HYPEROXALURIA: ICD-10-CM

## 2022-07-11 DIAGNOSIS — R00.1 BRADYCARDIA: ICD-10-CM

## 2022-07-11 DIAGNOSIS — I87.2 VENOUS INSUFFICIENCY: ICD-10-CM

## 2022-07-11 DIAGNOSIS — N20.0 HYPOCITRATURIC CALCIUM NEPHROLITHIASIS: ICD-10-CM

## 2022-07-11 DIAGNOSIS — I10 HYPERTENSION, UNSPECIFIED TYPE: ICD-10-CM

## 2022-07-11 DIAGNOSIS — G47.33 OBSTRUCTIVE SLEEP APNEA: ICD-10-CM

## 2022-07-11 PROBLEM — R29.3 ABNORMAL POSTURE: Status: RESOLVED | Noted: 2018-11-07 | Resolved: 2022-07-11

## 2022-07-11 PROCEDURE — G0439 PPPS, SUBSEQ VISIT: HCPCS | Mod: ,,, | Performed by: NURSE PRACTITIONER

## 2022-07-11 PROCEDURE — 99999 PR PBB SHADOW E&M-EST. PATIENT-LVL IV: CPT | Mod: PBBFAC,,, | Performed by: NURSE PRACTITIONER

## 2022-07-11 PROCEDURE — G0439 PR MEDICARE ANNUAL WELLNESS SUBSEQUENT VISIT: ICD-10-PCS | Mod: ,,, | Performed by: NURSE PRACTITIONER

## 2022-07-11 PROCEDURE — 99214 OFFICE O/P EST MOD 30 MIN: CPT | Mod: PBBFAC | Performed by: NURSE PRACTITIONER

## 2022-07-11 PROCEDURE — 99999 PR PBB SHADOW E&M-EST. PATIENT-LVL IV: ICD-10-PCS | Mod: PBBFAC,,, | Performed by: NURSE PRACTITIONER

## 2022-07-11 NOTE — PATIENT INSTRUCTIONS
Counseling and Referral of Other Preventative  (Italic type indicates deductible and co-insurance are waived)    Patient Name: Shima Sorto  Today's Date: 7/11/2022    Health Maintenance       Date Due Completion Date    Influenza Vaccine (1) 09/01/2022 10/8/2021    COVID-19 Vaccine (5 - Booster for Moderna series) 09/17/2022 5/17/2022    DEXA Scan 11/15/2023 11/15/2021    TETANUS VACCINE 12/06/2024 12/6/2014    Colonoscopy 07/24/2025 7/24/2018    Lipid Panel 02/05/2026 2/5/2021        No orders of the defined types were placed in this encounter.    The following information is provided to all patients.  This information is to help you find resources for any of the problems found today that may be affecting your health:                Living healthy guide: www.[facility].[place].gov      Understanding Diabetes: www.diabetes.org      Eating healthy: www.cdc.gov/healthyweight      CDC home safety checklist: www.cdc.gov/steadi/patient.html      Agency on Aging: www.goea.[place].[facility]      Alcoholics anonymous (AA): www.aa.org      Physical Activity: www.[place].nih.gov/dj6itpv      Tobacco use: www.quitwithusla.org

## 2022-07-11 NOTE — PROGRESS NOTES
"  Shima Sorto presented for a  Medicare AWV and comprehensive Health Risk Assessment today. The following components were reviewed and updated:    · Medical history  · Family History  · Social history  · Allergies and Current Medications  · Health Risk Assessment  · Health Maintenance  · Care Team         ** See Completed Assessments for Annual Wellness Visit within the encounter summary.**         The following assessments were completed:  · Living Situation  · CAGE  · Depression Screening  · Timed Get Up and Go  · Whisper Test  · Cognitive Function Screening      ·   · Nutrition Screening  · ADL Screening  · PAQ Screening        Vitals:    07/11/22 1257 07/11/22 1305   BP:  126/72   BP Location:  Right arm   Patient Position:  Sitting   Pulse:  74   SpO2:  100%   Weight: 56.5 kg (124 lb 9 oz)    Height: 5' 6" (1.676 m)      Body mass index is 20.1 kg/m².  Physical Exam  Vitals and nursing note reviewed.   Constitutional:       Appearance: She is well-developed.   HENT:      Head: Normocephalic.   Cardiovascular:      Rate and Rhythm: Normal rate and regular rhythm.   Pulmonary:      Effort: Pulmonary effort is normal.      Breath sounds: Normal breath sounds.   Abdominal:      General: Bowel sounds are normal.      Palpations: Abdomen is soft.   Musculoskeletal:         General: Normal range of motion.   Skin:     General: Skin is warm and dry.   Neurological:      Mental Status: She is alert and oriented to person, place, and time.      Motor: No abnormal muscle tone.   Psychiatric:         Mood and Affect: Mood normal.               Diagnoses and health risks identified today and associated recommendations/orders:    1. Encounter for preventive health examination  Here for Health Risk Assessment/Annual Wellness Visit.  Health maintenance reviewed and updated. Follow up in one year    2. Hypertension, unspecified type  Chronic, stable on current medications. Followed by PCP.    3. Bradycardia  Chronic, stable. " Followed by PCP.    4. Venous insufficiency  Chronic, stable on current medications. Followed by PCP.    5. Stage 3 chronic kidney disease, unspecified whether stage 3a or 3b CKD  Chronic, stable on current medications. Followed by PCP, Nephrology.    6. Hypocitraturic calcium nephrolithiasis  Chronic, stable. Followed by PCP, Nephrology, Urology.    7. Primary hyperoxaluria  Chronic, stable. Followed by Nephrology, PCP, Urology.    8. Malignant neoplasm of upper-outer quadrant of right breast in female, estrogen receptor positive  Chronic, stable on current medication. Followed by Oncology, PCP.    9. Use of aromatase inhibitors  Stable. Followed by Oncology.    10. Age-related osteoporosis with current pathological fracture with routine healing  Chronic, stable on current medications/Prolia. Followed by PCP, Endocrinology.    11. Vitamin D deficiency  Chronic, stable on current medication. Followed by PCP.    12. Obstructive sleep apnea  Chronic, no CPAP use. Followed by PCP      Provided Shima with a 5-10 year written screening schedule and personal prevention plan. Recommendations were developed using the USPSTF age appropriate recommendations. Education, counseling, and referrals were provided as needed. After Visit Summary printed and given to patient which includes a list of additional screenings\tests needed.    Follow up in about 1 year (around 6/28/2023).with PCP    Deana Terry NP

## 2022-07-20 ENCOUNTER — PATIENT MESSAGE (OUTPATIENT)
Dept: NEPHROLOGY | Facility: CLINIC | Age: 82
End: 2022-07-20
Payer: MEDICARE

## 2022-09-12 ENCOUNTER — IMMUNIZATION (OUTPATIENT)
Dept: INTERNAL MEDICINE | Facility: CLINIC | Age: 82
End: 2022-09-12
Payer: MEDICARE

## 2022-09-12 PROCEDURE — G0008 ADMIN INFLUENZA VIRUS VAC: HCPCS | Mod: PBBFAC

## 2022-09-29 ENCOUNTER — PATIENT MESSAGE (OUTPATIENT)
Dept: NEPHROLOGY | Facility: CLINIC | Age: 82
End: 2022-09-29
Payer: MEDICARE

## 2022-10-03 DIAGNOSIS — Z71.89 COMPLEX CARE COORDINATION: ICD-10-CM

## 2022-10-05 ENCOUNTER — IMMUNIZATION (OUTPATIENT)
Dept: PHARMACY | Facility: CLINIC | Age: 82
End: 2022-10-05
Payer: MEDICARE

## 2022-10-05 DIAGNOSIS — Z23 NEED FOR VACCINATION: Primary | ICD-10-CM

## 2022-10-06 ENCOUNTER — PATIENT MESSAGE (OUTPATIENT)
Dept: INTERNAL MEDICINE | Facility: CLINIC | Age: 82
End: 2022-10-06
Payer: MEDICARE

## 2022-10-24 DIAGNOSIS — N18.30 STAGE 3 CHRONIC KIDNEY DISEASE, UNSPECIFIED WHETHER STAGE 3A OR 3B CKD: Primary | ICD-10-CM

## 2022-11-18 ENCOUNTER — LAB VISIT (OUTPATIENT)
Dept: LAB | Facility: HOSPITAL | Age: 82
End: 2022-11-18
Attending: INTERNAL MEDICINE
Payer: MEDICARE

## 2022-11-18 ENCOUNTER — OFFICE VISIT (OUTPATIENT)
Dept: INTERNAL MEDICINE | Facility: CLINIC | Age: 82
End: 2022-11-18
Payer: MEDICARE

## 2022-11-18 DIAGNOSIS — I10 PRIMARY HYPERTENSION: ICD-10-CM

## 2022-11-18 DIAGNOSIS — C44.90 SKIN CANCER: Primary | ICD-10-CM

## 2022-11-18 DIAGNOSIS — R68.81 EARLY SATIETY: ICD-10-CM

## 2022-11-18 LAB
ALBUMIN SERPL BCP-MCNC: 4 G/DL (ref 3.5–5.2)
ALP SERPL-CCNC: 56 U/L (ref 55–135)
ALT SERPL W/O P-5'-P-CCNC: 21 U/L (ref 10–44)
ANION GAP SERPL CALC-SCNC: 9 MMOL/L (ref 8–16)
AST SERPL-CCNC: 33 U/L (ref 10–40)
BASOPHILS # BLD AUTO: 0.03 K/UL (ref 0–0.2)
BASOPHILS NFR BLD: 0.7 % (ref 0–1.9)
BILIRUB SERPL-MCNC: 0.5 MG/DL (ref 0.1–1)
BUN SERPL-MCNC: 28 MG/DL (ref 8–23)
CALCIUM SERPL-MCNC: 10.1 MG/DL (ref 8.7–10.5)
CHLORIDE SERPL-SCNC: 102 MMOL/L (ref 95–110)
CO2 SERPL-SCNC: 32 MMOL/L (ref 23–29)
CREAT SERPL-MCNC: 1.4 MG/DL (ref 0.5–1.4)
DIFFERENTIAL METHOD: ABNORMAL
EOSINOPHIL # BLD AUTO: 0.1 K/UL (ref 0–0.5)
EOSINOPHIL NFR BLD: 1.7 % (ref 0–8)
ERYTHROCYTE [DISTWIDTH] IN BLOOD BY AUTOMATED COUNT: 12.8 % (ref 11.5–14.5)
EST. GFR  (NO RACE VARIABLE): 37.6 ML/MIN/1.73 M^2
GLUCOSE SERPL-MCNC: 100 MG/DL (ref 70–110)
HCT VFR BLD AUTO: 36.3 % (ref 37–48.5)
HGB BLD-MCNC: 11.3 G/DL (ref 12–16)
IMM GRANULOCYTES # BLD AUTO: 0.01 K/UL (ref 0–0.04)
IMM GRANULOCYTES NFR BLD AUTO: 0.2 % (ref 0–0.5)
LYMPHOCYTES # BLD AUTO: 1.3 K/UL (ref 1–4.8)
LYMPHOCYTES NFR BLD: 31.6 % (ref 18–48)
MCH RBC QN AUTO: 31.7 PG (ref 27–31)
MCHC RBC AUTO-ENTMCNC: 31.1 G/DL (ref 32–36)
MCV RBC AUTO: 102 FL (ref 82–98)
MONOCYTES # BLD AUTO: 0.5 K/UL (ref 0.3–1)
MONOCYTES NFR BLD: 10.7 % (ref 4–15)
NEUTROPHILS # BLD AUTO: 2.3 K/UL (ref 1.8–7.7)
NEUTROPHILS NFR BLD: 55.1 % (ref 38–73)
NRBC BLD-RTO: 0 /100 WBC
PLATELET # BLD AUTO: 172 K/UL (ref 150–450)
PMV BLD AUTO: 12.7 FL (ref 9.2–12.9)
POTASSIUM SERPL-SCNC: 4.6 MMOL/L (ref 3.5–5.1)
PROT SERPL-MCNC: 7.5 G/DL (ref 6–8.4)
RBC # BLD AUTO: 3.57 M/UL (ref 4–5.4)
SODIUM SERPL-SCNC: 143 MMOL/L (ref 136–145)
TSH SERPL DL<=0.005 MIU/L-ACNC: 2.82 UIU/ML (ref 0.4–4)
WBC # BLD AUTO: 4.21 K/UL (ref 3.9–12.7)

## 2022-11-18 PROCEDURE — 36415 COLL VENOUS BLD VENIPUNCTURE: CPT | Performed by: INTERNAL MEDICINE

## 2022-11-18 PROCEDURE — 84443 ASSAY THYROID STIM HORMONE: CPT | Performed by: INTERNAL MEDICINE

## 2022-11-18 PROCEDURE — 99214 PR OFFICE/OUTPT VISIT, EST, LEVL IV, 30-39 MIN: ICD-10-PCS | Mod: S$PBB,,, | Performed by: INTERNAL MEDICINE

## 2022-11-18 PROCEDURE — 85025 COMPLETE CBC W/AUTO DIFF WBC: CPT | Performed by: INTERNAL MEDICINE

## 2022-11-18 PROCEDURE — 99999 PR PBB SHADOW E&M-EST. PATIENT-LVL V: ICD-10-PCS | Mod: PBBFAC,,, | Performed by: INTERNAL MEDICINE

## 2022-11-18 PROCEDURE — 80053 COMPREHEN METABOLIC PANEL: CPT | Performed by: INTERNAL MEDICINE

## 2022-11-18 PROCEDURE — 99999 PR PBB SHADOW E&M-EST. PATIENT-LVL V: CPT | Mod: PBBFAC,,, | Performed by: INTERNAL MEDICINE

## 2022-11-18 PROCEDURE — 99214 OFFICE O/P EST MOD 30 MIN: CPT | Mod: S$PBB,,, | Performed by: INTERNAL MEDICINE

## 2022-11-18 PROCEDURE — 99215 OFFICE O/P EST HI 40 MIN: CPT | Mod: PBBFAC | Performed by: INTERNAL MEDICINE

## 2022-11-25 ENCOUNTER — LAB VISIT (OUTPATIENT)
Dept: LAB | Facility: HOSPITAL | Age: 82
End: 2022-11-25
Attending: INTERNAL MEDICINE
Payer: MEDICARE

## 2022-11-25 VITALS
OXYGEN SATURATION: 99 % | HEART RATE: 60 BPM | HEIGHT: 66 IN | WEIGHT: 127 LBS | TEMPERATURE: 99 F | SYSTOLIC BLOOD PRESSURE: 130 MMHG | DIASTOLIC BLOOD PRESSURE: 72 MMHG | BODY MASS INDEX: 20.41 KG/M2

## 2022-11-25 DIAGNOSIS — N18.30 STAGE 3 CHRONIC KIDNEY DISEASE, UNSPECIFIED WHETHER STAGE 3A OR 3B CKD: ICD-10-CM

## 2022-11-25 LAB
ALBUMIN SERPL BCP-MCNC: 3.5 G/DL (ref 3.5–5.2)
ANION GAP SERPL CALC-SCNC: 8 MMOL/L (ref 8–16)
BUN SERPL-MCNC: 29 MG/DL (ref 8–23)
CALCIUM SERPL-MCNC: 8.9 MG/DL (ref 8.7–10.5)
CHLORIDE SERPL-SCNC: 100 MMOL/L (ref 95–110)
CO2 SERPL-SCNC: 30 MMOL/L (ref 23–29)
CREAT SERPL-MCNC: 1.5 MG/DL (ref 0.5–1.4)
EST. GFR  (NO RACE VARIABLE): 34.6 ML/MIN/1.73 M^2
GLUCOSE SERPL-MCNC: 106 MG/DL (ref 70–110)
HCT VFR BLD AUTO: 33.7 % (ref 37–48.5)
HGB BLD-MCNC: 10.5 G/DL (ref 12–16)
PHOSPHATE SERPL-MCNC: 3.1 MG/DL (ref 2.7–4.5)
POTASSIUM SERPL-SCNC: 4.3 MMOL/L (ref 3.5–5.1)
PTH-INTACT SERPL-MCNC: 88.6 PG/ML (ref 9–77)
SODIUM SERPL-SCNC: 138 MMOL/L (ref 136–145)

## 2022-11-25 PROCEDURE — 83970 ASSAY OF PARATHORMONE: CPT | Performed by: INTERNAL MEDICINE

## 2022-11-25 PROCEDURE — 85018 HEMOGLOBIN: CPT | Performed by: INTERNAL MEDICINE

## 2022-11-25 PROCEDURE — 80069 RENAL FUNCTION PANEL: CPT | Performed by: INTERNAL MEDICINE

## 2022-11-25 PROCEDURE — 85014 HEMATOCRIT: CPT | Performed by: INTERNAL MEDICINE

## 2022-11-25 PROCEDURE — 36415 COLL VENOUS BLD VENIPUNCTURE: CPT | Performed by: INTERNAL MEDICINE

## 2022-11-25 NOTE — PROGRESS NOTES
Subjective:       Patient ID: Shima Sorto is a 82 y.o. female.    Chief Complaint: Hypertension    HPI  She returns for management of hypertension.  She has had hypertension for over a year.  Current treatment has included medications outlined in medication list.  She denies chest pain or shortness of breath.  No palpitations.  Denies left arm or neck pain.  She complains of abdominal pain and early satiety.         Past medical history:  Hypertension, nephrolithiasis, osteoporosis, polycystic kidney disease, sleep apnea, breast cancer, skin cancer, vitamin-D deficiency, status post lumpectomy     Medications:  Diovan, Prolia, Norvasc, Arimidex     No known drug allergies       Review of Systems   Constitutional:  Negative for chills, fatigue, fever and unexpected weight change.   Respiratory:  Negative for chest tightness and shortness of breath.    Cardiovascular:  Negative for chest pain and palpitations.   Gastrointestinal:  Negative for abdominal pain and blood in stool.   Neurological:  Negative for dizziness, syncope, numbness and headaches.     Objective:      Physical Exam  HENT:      Right Ear: External ear normal.      Left Ear: External ear normal.      Nose: Nose normal.      Mouth/Throat:      Mouth: Mucous membranes are moist.      Pharynx: Oropharynx is clear.   Eyes:      Pupils: Pupils are equal, round, and reactive to light.   Cardiovascular:      Rate and Rhythm: Normal rate and regular rhythm.      Heart sounds: No murmur heard.  Pulmonary:      Breath sounds: Normal breath sounds.   Abdominal:      General: There is no distension.      Palpations: There is no hepatomegaly or splenomegaly.      Tenderness: There is no abdominal tenderness.   Musculoskeletal:      Cervical back: Normal range of motion.   Lymphadenopathy:      Cervical: No cervical adenopathy.      Upper Body:      Right upper body: No axillary adenopathy.      Left upper body: No axillary adenopathy.   Neurological:       Cranial Nerves: No cranial nerve deficit.      Sensory: No sensory deficit.      Motor: Motor function is intact.      Deep Tendon Reflexes: Reflexes are normal and symmetric.       Assessment/Plan       Assessment and plan:  1.  Hypertension:  Check CMP and CBC  2.  Early satiety:  Check TSH.  Schedule gastroenterology appointment

## 2022-11-28 ENCOUNTER — OFFICE VISIT (OUTPATIENT)
Dept: NEPHROLOGY | Facility: CLINIC | Age: 82
End: 2022-11-28
Payer: MEDICARE

## 2022-11-28 VITALS
DIASTOLIC BLOOD PRESSURE: 68 MMHG | WEIGHT: 119.06 LBS | HEART RATE: 60 BPM | BODY MASS INDEX: 19.21 KG/M2 | OXYGEN SATURATION: 100 % | SYSTOLIC BLOOD PRESSURE: 164 MMHG

## 2022-11-28 DIAGNOSIS — N20.0 KIDNEY STONES: ICD-10-CM

## 2022-11-28 DIAGNOSIS — N18.30 STAGE 3 CHRONIC KIDNEY DISEASE, UNSPECIFIED WHETHER STAGE 3A OR 3B CKD: Primary | ICD-10-CM

## 2022-11-28 DIAGNOSIS — D64.9 ANEMIA, UNSPECIFIED TYPE: ICD-10-CM

## 2022-11-28 PROCEDURE — 99212 OFFICE O/P EST SF 10 MIN: CPT | Mod: PBBFAC | Performed by: INTERNAL MEDICINE

## 2022-11-28 PROCEDURE — 99999 PR PBB SHADOW E&M-EST. PATIENT-LVL II: ICD-10-PCS | Mod: PBBFAC,,, | Performed by: INTERNAL MEDICINE

## 2022-11-28 PROCEDURE — 99214 PR OFFICE/OUTPT VISIT, EST, LEVL IV, 30-39 MIN: ICD-10-PCS | Mod: S$PBB,,, | Performed by: INTERNAL MEDICINE

## 2022-11-28 PROCEDURE — 99999 PR PBB SHADOW E&M-EST. PATIENT-LVL II: CPT | Mod: PBBFAC,,, | Performed by: INTERNAL MEDICINE

## 2022-11-28 PROCEDURE — 99214 OFFICE O/P EST MOD 30 MIN: CPT | Mod: S$PBB,,, | Performed by: INTERNAL MEDICINE

## 2022-11-28 NOTE — PROGRESS NOTES
HISTORY OF PRESENT ILLNESS:  This is a 82 -year-old white female with a   longstanding history of nephrolithiasis, but no recent symptomatic   nephrolithiasis episodes who is coming in for a followup.  She also has   hypertension with stable blood pressures at home and had failed InterStim   therapy with her bladder in the past and had seen Dr. Stoddard in Urology.  No   recent symptomatic stones.  Her kidney function is stable with a creatinine of   1.5, slightly higher than baseline.    Her blood   pressure is stable at home in the 110's-140's/60's-70's and occasionally higher.    PAST MEDICAL HISTORY:  Significant for hypertension for over 10 years, history   of kidney stones, failed InterStim therapy, breast cancer, status post   lumpectomy, XRT and adjuvant hormonal therapy and osteoporosis.  KATHERIN-CPAP    REVIEW OF SYSTEMS:  She states that she is feeling well but fatigued.  Apetite good.  Weight stable. Denies any back pain or kidney stone pain.  She denies any blood in the urine, frequency, urgency, hematuria, dysuria, nausea, vomiting, chest pain or shortness of breath.    PHYSICAL EXAMINATION:limited  GENERAL:  A well-nourished, well-developed individual, in no acute distress.  CARDIOVASCULAR:  Rate and rhythm regular.  No murmurs, gallops or rubs.  LUNGS:  Clear to auscultation bilaterally.  Normal respiratory effort.  ABDOMEN:  Soft, nontender and nondistended.  No edema    EXTREMITIES:  Edema.    ASSESSMENT AND PLAN:  This is a 82-year-old white female with a longstanding   history of hypertension who is coming in for a followup visit.  1.   Nephrolithiasis.  No recent symptomatic nephrolithiasis.  She had a   procedure in 2001, for a kidney stone.  Her most recent CAT scan showed small   calcifications with 0.6 cm stone and 0.4 cm stone, which were not causing her   problem.  3 liters of urine output per last 24-hour   urine per the patient.  I advised her to drink lemon juice for citrate.  She   also  follows a low oxalate diet for high oxalate.  I also advised her to decrease her   meat intake.  Last US from 6/22 showed 0.5 cm on right side  and mild hydro frances and saw urology. Repeat US next time.   2.  Renal/ckd stg 3:  Her creatinines have been stable around 1.2-1.3 with GFR of 39  Ml/min but slightly higher-hydrate and repeat.    I emphasized good   blood pressure control.   RBC's on UA's of and  likely sec to stones-had seen urology in 1/22. Had PVR and did not want to cathterize and failed interstim.  No RBC's now.  3.  Hypertension.  Blood pressures are stable.     She has   no urine protein.  She is on Benicar for nephroprotection.  4.  Renal osteodystrophy.  Vitamin D levels are stable.   Her PTH is stable.  Calcium and phosphorus are stable.    5.  Anemia: stable. On  iron.    She can be  followed up in 5 months. RFP next week.

## 2022-11-29 ENCOUNTER — TELEPHONE (OUTPATIENT)
Dept: INTERNAL MEDICINE | Facility: CLINIC | Age: 82
End: 2022-11-29
Payer: MEDICARE

## 2022-11-29 VITALS — DIASTOLIC BLOOD PRESSURE: 66 MMHG | SYSTOLIC BLOOD PRESSURE: 139 MMHG

## 2022-12-07 ENCOUNTER — INFUSION (OUTPATIENT)
Dept: INFECTIOUS DISEASES | Facility: HOSPITAL | Age: 82
End: 2022-12-07
Payer: MEDICARE

## 2022-12-07 ENCOUNTER — TELEPHONE (OUTPATIENT)
Dept: NEPHROLOGY | Facility: CLINIC | Age: 82
End: 2022-12-07
Payer: MEDICARE

## 2022-12-07 VITALS
DIASTOLIC BLOOD PRESSURE: 63 MMHG | OXYGEN SATURATION: 100 % | SYSTOLIC BLOOD PRESSURE: 137 MMHG | RESPIRATION RATE: 18 BRPM | HEART RATE: 61 BPM | BODY MASS INDEX: 20.16 KG/M2 | TEMPERATURE: 97 F | WEIGHT: 124.88 LBS

## 2022-12-07 DIAGNOSIS — N18.31 STAGE 3A CHRONIC KIDNEY DISEASE: ICD-10-CM

## 2022-12-07 DIAGNOSIS — M80.00XD AGE-RELATED OSTEOPOROSIS WITH CURRENT PATHOLOGICAL FRACTURE WITH ROUTINE HEALING: ICD-10-CM

## 2022-12-07 DIAGNOSIS — E55.9 VITAMIN D DEFICIENCY: Primary | ICD-10-CM

## 2022-12-07 PROCEDURE — 63600175 PHARM REV CODE 636 W HCPCS: Mod: JG | Performed by: NURSE PRACTITIONER

## 2022-12-07 PROCEDURE — 96372 THER/PROPH/DIAG INJ SC/IM: CPT

## 2022-12-07 RX ADMIN — DENOSUMAB 60 MG: 60 INJECTION SUBCUTANEOUS at 11:12

## 2022-12-11 ENCOUNTER — OFFICE VISIT (OUTPATIENT)
Dept: URGENT CARE | Facility: CLINIC | Age: 82
End: 2022-12-11
Payer: MEDICARE

## 2022-12-11 VITALS
DIASTOLIC BLOOD PRESSURE: 48 MMHG | SYSTOLIC BLOOD PRESSURE: 108 MMHG | HEART RATE: 50 BPM | OXYGEN SATURATION: 98 % | TEMPERATURE: 98 F | RESPIRATION RATE: 18 BRPM | HEIGHT: 66 IN | BODY MASS INDEX: 19.77 KG/M2 | WEIGHT: 123 LBS

## 2022-12-11 DIAGNOSIS — J02.9 VIRAL PHARYNGITIS: Primary | ICD-10-CM

## 2022-12-11 DIAGNOSIS — J02.9 SORE THROAT: ICD-10-CM

## 2022-12-11 LAB
CTP QC/QA: YES
CTP QC/QA: YES
POC MOLECULAR INFLUENZA A AGN: NEGATIVE
POC MOLECULAR INFLUENZA B AGN: NEGATIVE
SARS-COV-2 AG RESP QL IA.RAPID: NEGATIVE

## 2022-12-11 PROCEDURE — 87502 INFLUENZA DNA AMP PROBE: CPT | Mod: QW,S$GLB,,

## 2022-12-11 PROCEDURE — 99213 OFFICE O/P EST LOW 20 MIN: CPT | Mod: CR,S$GLB,,

## 2022-12-11 PROCEDURE — 87502 POCT INFLUENZA A/B MOLECULAR: ICD-10-PCS | Mod: QW,S$GLB,,

## 2022-12-11 PROCEDURE — 99213 PR OFFICE/OUTPT VISIT, EST, LEVL III, 20-29 MIN: ICD-10-PCS | Mod: CR,S$GLB,,

## 2022-12-11 PROCEDURE — 87811 SARS-COV-2 COVID19 W/OPTIC: CPT | Mod: QW,CR,S$GLB,

## 2022-12-11 PROCEDURE — 87811 SARS CORONAVIRUS 2 ANTIGEN POCT, MANUAL READ: ICD-10-PCS | Mod: QW,CR,S$GLB,

## 2022-12-11 RX ORDER — DOXYCYCLINE HYCLATE 100 MG/1
100 TABLET, DELAYED RELEASE ORAL 2 TIMES DAILY
COMMUNITY
End: 2023-02-07

## 2022-12-11 NOTE — PROGRESS NOTES
"Subjective:       Patient ID: Shima Sorto is a 82 y.o. female.    Vitals:  height is 5' 6" (1.676 m) and weight is 55.8 kg (123 lb). Her temperature is 97.9 °F (36.6 °C). Her blood pressure is 108/48 (abnormal) and her pulse is 50 (abnormal). Her respiration is 18 and oxygen saturation is 98%.     Chief Complaint: Sore Throat    83 yo female presents to the urgent care with c/o sore throat. Patient states that symptoms started 5 days ago. Associated symptoms include burning ear sensation. Patient has tried Tylenol with no relief. Patient denies CP, SOB, fever.         Sore Throat   This is a new problem. The current episode started in the past 7 days. The problem has been gradually worsening. There has been no fever. Associated symptoms include congestion, ear pain and swollen glands. Pertinent negatives include no abdominal pain, diarrhea, drooling, headaches, hoarse voice, neck pain, shortness of breath or trouble swallowing. She has had no exposure to strep. She has tried acetaminophen for the symptoms. The treatment provided no relief.     HENT:  Positive for ear pain, congestion and sore throat. Negative for drooling and trouble swallowing.    Neck: Negative for neck pain, neck stiffness and neck swelling.   Cardiovascular:  Negative for chest pain and sob on exertion.   Respiratory:  Negative for shortness of breath.    Gastrointestinal:  Negative for abdominal pain and diarrhea.   Musculoskeletal:  Positive for muscle ache.   Neurological:  Negative for headaches.     Objective:      Physical Exam   Constitutional:  Non-toxic appearance. She does not appear ill. No distress. normal  HENT:   Head: Normocephalic.   Ears:   Right Ear: Tympanic membrane, external ear and ear canal normal. impacted cerumen  Left Ear: Tympanic membrane, external ear and ear canal normal. impacted cerumen  Nose: No rhinorrhea or congestion.   Mouth/Throat: Posterior oropharyngeal erythema present. No oropharyngeal exudate. "   Eyes: Conjunctivae are normal. Right eye exhibits no discharge. Left eye exhibits no discharge. No scleral icterus. Extraocular movement intact   Neck: No neck rigidity present.   Cardiovascular: Regular rhythm and normal heart sounds. Bradycardia present.   No murmur heard.Exam reveals no gallop and no friction rub.   Pulmonary/Chest: Effort normal and breath sounds normal. No stridor. No respiratory distress. She has no wheezes. She has no rhonchi. She has no rales. She exhibits no tenderness.   Abdominal: Normal appearance.   Musculoskeletal:      Cervical back: She exhibits no tenderness.   Lymphadenopathy:     She has cervical adenopathy.   Neurological: She is alert.   Skin: Skin is not diaphoretic and no rash.   Nursing note and vitals reviewed.      Assessment:       1. Viral pharyngitis    2. Sore throat          Plan:     Previous external notes reviewed.  Vital signs reviewed. /48, HR 50: recent EKG shows bradycardia.  Labs ordered. Labs reviewed. COVID/FLU negative.  Discussed viral pharyngitis, home care, tx options, and given follow up precautions.  Patient was briefed on my thought process and diagnosis.   Patient involved with the treatment plan and agreed to the plan.  Patient informed on warning signs, patient understood warning signs.  Patient informed to return to the urgent care or go to the ER if warning signs appear.    Patient Instructions   Please drink plenty of fluids.  Please get plenty of rest.  Please utilize saltwater gargles for sore throat.  Please utilize warm tea, and lemon for sore throat relief.  Please utilize over-the-counter throat numbing spray and lozenges for sore throat relief.  Please return here or go to the Emergency Department for any concerns or worsening of condition.  We recommend you take over the counter Flonase (Fluticasone) or another nasally inhaled steroid unless you are already taking one.  Nasal irrigation with a saline spray or Netti Pot like device  per their directions is also recommended.  If not allergic, please take over the counter Tylenol (Acetaminophen) and/or Motrin (Ibuprofen) as directed for control of pain and/or fever.  Please follow up with your primary care doctor or specialist as needed.    If you  smoke, please stop smoking.    Viral pharyngitis    Sore throat  -     SARS Coronavirus 2 Antigen, POCT Manual Read  -     POCT Influenza A/B MOLECULAR      Inge'Beverly Birmingham PA-C

## 2022-12-11 NOTE — PATIENT INSTRUCTIONS
Please drink plenty of fluids.  Please get plenty of rest.  Please utilize saltwater gargles for sore throat.  Please utilize warm tea, and lemon for sore throat relief.  Please utilize over-the-counter throat numbing spray and lozenges for sore throat relief.  Please return here or go to the Emergency Department for any concerns or worsening of condition.  We recommend you take over the counter Flonase (Fluticasone) or another nasally inhaled steroid unless you are already taking one.  Nasal irrigation with a saline spray or Netti Pot like device per their directions is also recommended.  If not allergic, please take over the counter Tylenol (Acetaminophen) and/or Motrin (Ibuprofen) as directed for control of pain and/or fever.  Please follow up with your primary care doctor or specialist as needed.    If you  smoke, please stop smoking.

## 2022-12-29 ENCOUNTER — TELEPHONE (OUTPATIENT)
Dept: DERMATOLOGY | Facility: CLINIC | Age: 82
End: 2022-12-29
Payer: MEDICARE

## 2022-12-29 NOTE — TELEPHONE ENCOUNTER
----- Message from Suzette Escobar CMA sent at 12/29/2022  9:40 AM CST -----  Regarding: Please call  Contact: [phone]  Pt is returning a call from Corrine regarding a sooner appt. Please call    Thank you

## 2022-12-29 NOTE — TELEPHONE ENCOUNTER
----- Message from Aura Rico LPN sent at 12/28/2022  2:22 PM CST -----  Contact: [phone]    ----- Message -----  From: Richard Ledezma  Sent: 12/28/2022   1:56 PM CST  To: Boy [facility]ley Staff    Pt calling to see if she can be seen sooner than 2/27 please advise [phone]

## 2023-01-03 ENCOUNTER — TELEPHONE (OUTPATIENT)
Dept: GASTROENTEROLOGY | Facility: CLINIC | Age: 83
End: 2023-01-03
Payer: MEDICARE

## 2023-01-03 NOTE — TELEPHONE ENCOUNTER
Return call and spoke with patient. Inform patient of Dr. Garza schedule and offered another provider. Patient declined on Dr. Ordaz the fellow  Schedule. Patient will keep Dr. Garza appointment

## 2023-01-03 NOTE — TELEPHONE ENCOUNTER
----- Message from Sofia Kang sent at 1/3/2023  2:27 PM CST -----  Regarding: Appt  Contact: [phone]  Patient Shima is calling. Patient would like to know if she can be seen sooner than 2/27. Please call patient at [phone]     Thank You

## 2023-01-05 ENCOUNTER — HOSPITAL ENCOUNTER (OUTPATIENT)
Dept: RADIOLOGY | Facility: CLINIC | Age: 83
Discharge: HOME | End: 2023-01-05
Attending: NURSE PRACTITIONER
Payer: MEDICARE

## 2023-01-05 DIAGNOSIS — M80.00XD AGE-RELATED OSTEOPOROSIS WITH CURRENT PATHOLOGICAL FRACTURE WITH ROUTINE HEALING: ICD-10-CM

## 2023-01-05 DIAGNOSIS — Z79.811 USE OF AROMATASE INHIBITORS: ICD-10-CM

## 2023-01-05 PROCEDURE — 77080 DXA BONE DENSITY AXIAL: CPT | Mod: TC

## 2023-01-05 PROCEDURE — 77080 DEXA BONE DENSITY SPINE HIP: ICD-10-PCS | Mod: 26,,, | Performed by: INTERNAL MEDICINE

## 2023-01-05 PROCEDURE — 77080 DXA BONE DENSITY AXIAL: CPT | Mod: 26,,, | Performed by: INTERNAL MEDICINE

## 2023-01-11 ENCOUNTER — OFFICE VISIT (OUTPATIENT)
Dept: HEMATOLOGY/ONCOLOGY | Facility: CLINIC | Age: 83
End: 2023-01-11
Payer: MEDICARE

## 2023-01-11 VITALS
HEART RATE: 57 BPM | RESPIRATION RATE: 17 BRPM | DIASTOLIC BLOOD PRESSURE: 66 MMHG | SYSTOLIC BLOOD PRESSURE: 149 MMHG | TEMPERATURE: 97 F | WEIGHT: 127 LBS | BODY MASS INDEX: 20.41 KG/M2 | HEIGHT: 66 IN | OXYGEN SATURATION: 100 %

## 2023-01-11 DIAGNOSIS — Z12.31 ENCOUNTER FOR SCREENING MAMMOGRAM FOR MALIGNANT NEOPLASM OF BREAST: ICD-10-CM

## 2023-01-11 DIAGNOSIS — Z17.0 MALIGNANT NEOPLASM OF UPPER-OUTER QUADRANT OF RIGHT BREAST IN FEMALE, ESTROGEN RECEPTOR POSITIVE: Primary | ICD-10-CM

## 2023-01-11 DIAGNOSIS — C50.411 MALIGNANT NEOPLASM OF UPPER-OUTER QUADRANT OF RIGHT BREAST IN FEMALE, ESTROGEN RECEPTOR POSITIVE: Primary | ICD-10-CM

## 2023-01-11 DIAGNOSIS — Z79.811 USE OF AROMATASE INHIBITORS: ICD-10-CM

## 2023-01-11 PROCEDURE — 99213 PR OFFICE/OUTPT VISIT, EST, LEVL III, 20-29 MIN: ICD-10-PCS | Mod: S$PBB,,, | Performed by: INTERNAL MEDICINE

## 2023-01-11 PROCEDURE — 99999 PR PBB SHADOW E&M-EST. PATIENT-LVL III: ICD-10-PCS | Mod: PBBFAC,,, | Performed by: INTERNAL MEDICINE

## 2023-01-11 PROCEDURE — 99999 PR PBB SHADOW E&M-EST. PATIENT-LVL III: CPT | Mod: PBBFAC,,, | Performed by: INTERNAL MEDICINE

## 2023-01-11 PROCEDURE — 99213 OFFICE O/P EST LOW 20 MIN: CPT | Mod: PBBFAC | Performed by: INTERNAL MEDICINE

## 2023-01-11 PROCEDURE — 99213 OFFICE O/P EST LOW 20 MIN: CPT | Mod: S$PBB,,, | Performed by: INTERNAL MEDICINE

## 2023-01-11 NOTE — PROGRESS NOTES
Subjective:       Patient ID: Shima Sorto is a 82 y.o. female.    Chief Complaint: Malignant neoplasm of upper-outer quadrant of right breast     HPI    Ms. Sorto returns today for follow up.  I had last seen her in late June 2022.  She has been on anastrazole since mid November 2018, and so fas has tolerated it well.    Briefly, she is an 82-year-old  female who diagnosed with breast cancer in 2018.  On 08/17/2018, she underwent a lumpectomy and sentinel lymph node biopsy for an 8 mm grade 1 invasive lobular carcinoma which was ER strongly positive, NM negative and HER-2 negative with a Ki-67 of 5%, with the closest margin being 2 mm.  Two of 2 sentinel lymph nodes were negative for involvement.      She completed her radiation therapy and was subsequently started on AIs.  Her DXA scan from 01/05/2023 shows osteopenia approaching osteoporosis (reviewed).  Of note, the patient is on Prolia.  A mammogram on 6/28/2022 were read as BIRADS II, and a one year follow up was recommended.      Review of Systems      Overall she feels well.  She has tolerated the anastrazole quite well so far.  ECOG PS is 1.   She denies any anxiety, depression, easy bruising, fevers, chills, night  sweats, weight loss, nausea, vomiting, diarrhea, constipation, diplopia, blurred vision, headache, chest pain, palpitations, shortness of breath, breast pain, abdominal pain, extremity pain, or difficulty ambulating.  The remainder of the ten-point ROS, including general, skin, lymph, H/N, cardiorespiratory, GI, , Neuro, Endocrine, and psychiatric is negative.     Objective:      Physical Exam        She is alert, oriented to time, place, person, pleasant, well      nourished, in no acute physical distress.                                    VITAL SIGNS:  Reviewed                                      HEENT:  Normal.  There are no nasal, oral, lip, gingival, auricular, lid,    or conjunctival lesions.  Mucosae are moist and  pink, and there is no        thrush.  Pupils are equal, reactive to light and accommodation.              Extraocular muscle movements are intact.  Dentition is good.                                    NECK:  Supple without JVD, adenopathy, or thyromegaly.                       LUNGS:  Clear to auscultation without wheezing, rales, or rhonchi.           CARDIOVASCULAR:  Reveals an S1, S2, no murmurs, no rubs, no gallops.         ABDOMEN:  Soft, nontender, without organomegaly.  Bowel sounds are    present.                                                                     EXTREMITIES:  No cyanosis, clubbing, or edema.                               BREASTS:  She is status post right lumpectomy with a well-healed incision in the upper outer quadrant.    There are no masses in either breast.    T                                    LYMPHATIC:  There is no cervical, axillary, or supraclavicular adenopathy.   SKIN:  Warm and moist, without petechiae, rashes, induration, or ecchymoses.        NEUROLOGIC:  DTRs are 0-1+ bilaterally, symmetrical, motor function is 5/5,and cranial nerves are  within normal limits.    Assessment:       1. Malignant neoplasm of upper-outer quadrant of right breast in female, estrogen receptor positive    2. Use of aromatase inhibitors      3.    Osteopenia approaching osteoporosis    Plan:           I had a long discussion with her.    She will remain on anatsrazole through the end of October 2023, and see me again in 6 months.  Her mammogram will be repeated prior to her next visit.  She will remain on calcium and vitamin-D supplements in addition the Prolia injections  Her multiple questions were answered to her satisfaction.  Duration of visit including time spent reviewing her chart was in excess of 20 minutes.     Route Chart for Scheduling    Med Onc Chart Routing      Follow up with physician 6 months.   Follow up with BASHIR    Infusion scheduling note    Injection scheduling note    Labs     Imaging Mammogram      Pharmacy appointment    Other referrals          Therapy Plan Information  Medications  denosumab (PROLIA) injection 60 mg  60 mg, Subcutaneous, Every 26 weeks

## 2023-02-07 ENCOUNTER — OFFICE VISIT (OUTPATIENT)
Dept: DERMATOLOGY | Facility: CLINIC | Age: 83
End: 2023-02-07
Payer: MEDICARE

## 2023-02-07 DIAGNOSIS — Z85.828 HISTORY OF NONMELANOMA SKIN CANCER: ICD-10-CM

## 2023-02-07 DIAGNOSIS — L82.0 INFLAMED SEBORRHEIC KERATOSIS: Primary | ICD-10-CM

## 2023-02-07 DIAGNOSIS — D22.9 MULTIPLE BENIGN NEVI: ICD-10-CM

## 2023-02-07 DIAGNOSIS — L82.1 SK (SEBORRHEIC KERATOSIS): ICD-10-CM

## 2023-02-07 DIAGNOSIS — L98.8 PERFORATING DERMATOSIS: ICD-10-CM

## 2023-02-07 PROCEDURE — 99213 OFFICE O/P EST LOW 20 MIN: CPT | Mod: 25,S$PBB,, | Performed by: DERMATOLOGY

## 2023-02-07 PROCEDURE — 99999 PR PBB SHADOW E&M-EST. PATIENT-LVL III: CPT | Mod: PBBFAC,,, | Performed by: DERMATOLOGY

## 2023-02-07 PROCEDURE — 99213 OFFICE O/P EST LOW 20 MIN: CPT | Mod: PBBFAC | Performed by: DERMATOLOGY

## 2023-02-07 PROCEDURE — 99999 PR PBB SHADOW E&M-EST. PATIENT-LVL III: ICD-10-PCS | Mod: PBBFAC,,, | Performed by: DERMATOLOGY

## 2023-02-07 PROCEDURE — 17110 DESTRUCTION B9 LES UP TO 14: CPT | Mod: S$PBB,,, | Performed by: DERMATOLOGY

## 2023-02-07 PROCEDURE — 17110 DESTRUCTION B9 LES UP TO 14: CPT | Mod: PBBFAC | Performed by: DERMATOLOGY

## 2023-02-07 PROCEDURE — 99213 PR OFFICE/OUTPT VISIT, EST, LEVL III, 20-29 MIN: ICD-10-PCS | Mod: 25,S$PBB,, | Performed by: DERMATOLOGY

## 2023-02-07 PROCEDURE — 17110 PR DESTRUCTION BENIGN LESIONS UP TO 14: ICD-10-PCS | Mod: S$PBB,,, | Performed by: DERMATOLOGY

## 2023-02-07 NOTE — PATIENT INSTRUCTIONS

## 2023-02-07 NOTE — PROGRESS NOTES
Subjective:       Patient ID:  Shima Sorto is a 82 y.o. female who presents for   Chief Complaint   Patient presents with    Skin Check     TBSE    Lesion     L posterior ear, R forearm     History of Present Illness: The patient presents for follow up of skin check.    The patient was last seen on: 8/16/21 for skin check.  This is a high risk patient here to check for the development of new lesions.      Other skin complaints:   Patient with new complaint of lesion(s)  Location: L forearm  Duration: 1 year  Symptoms: none  Relieving factors/Previous treatments: none    Patient with new complaint of lesion(s)  Location: posterior L ear  Duration: 8 months  Symptoms: larger  Relieving factors/Previous treatments: none    Pt has CRF - stage 3      Review of Systems   Skin:  Positive for activity-related sunscreen use. Negative for daily sunscreen use and recent sunburn.   Hematologic/Lymphatic: Bruises/bleeds easily (bruise).      Objective:    Physical Exam   Constitutional: She appears well-developed and well-nourished. No distress.   Neurological: She is alert and oriented to person, place, and time. She is not disoriented.   Psychiatric: She has a normal mood and affect.   Skin:   Areas Examined (abnormalities noted in diagram):   Scalp / Hair Palpated and Inspected  Head / Face Inspection Performed  Neck Inspection Performed  Chest / Axilla Inspection Performed  Abdomen Inspection Performed  Genitals / Buttocks / Groin Inspection Performed  Back Inspection Performed  RUE Inspected  LUE Inspection Performed  RLE Inspected  LLE Inspection Performed  Nails and Digits Inspection Performed                 Diagram Legend     Erythematous scaling macule/papule c/w actinic keratosis       Vascular papule c/w angioma      Pigmented verrucoid papule/plaque c/w seborrheic keratosis      Yellow umbilicated papule c/w sebaceous hyperplasia      Irregularly shaped tan macule c/w lentigo     1-2 mm smooth white  papules consistent with Milia      Movable subcutaneous cyst with punctum c/w epidermal inclusion cyst      Subcutaneous movable cyst c/w pilar cyst      Firm pink to brown papule c/w dermatofibroma      Pedunculated fleshy papule(s) c/w skin tag(s)      Evenly pigmented macule c/w junctional nevus     Mildly variegated pigmented, slightly irregular-bordered macule c/w mildly atypical nevus      Flesh colored to evenly pigmented papule c/w intradermal nevus       Pink pearly papule/plaque c/w basal cell carcinoma      Erythematous hyperkeratotic cursted plaque c/w SCC      Surgical scar with no sign of skin cancer recurrence      Open and closed comedones      Inflammatory papules and pustules      Verrucoid papule consistent consistent with wart     Erythematous eczematous patches and plaques     Dystrophic onycholytic nail with subungual debris c/w onychomycosis     Umbilicated papule    Erythematous-base heme-crusted tan verrucoid plaque consistent with inflamed seborrheic keratosis     Erythematous Silvery Scaling Plaque c/w Psoriasis     See annotation      Assessment / Plan:        Inflamed seborrheic keratosis - right forearm + bleeding  Cryosurgery procedure note:    Verbal consent from the patient is obtained including, but not limited to, risk of hypopigmentation/hyperpigmentation, scar, recurrence of lesion. Liquid nitrogen cryosurgery is applied to 1 lesions to produce a freeze injury. The patient is aware that blisters may form and is instructed on wound care with gentle cleansing and use of vaseline ointment to keep moist until healed. The patient is supplied a handout on cryosurgery and is instructed to call if lesions do not completely resolve.        SK (seborrheic keratosis)  These are benign inherited growths without a malignant potential. Reassurance given to patient. No treatment is necessary.       Multiple benign nevi   - minor problem and chronic.   Reassurance given to patient. No treatment  necessary.     Probable Perforating dermatosis - legs  Would need bx. As is relatively asymptomatic (and treatment options very limited), will defer bx at this time    History of nonmelanoma skin cancer  Area of previous nmsc examined. Site well healed with no signs of recurrence.    Total body skin examination performed today including at least 12 points as noted in physical examination. No lesions suspicious for malignancy noted.    Recommend daily sun protection/avoidance, use of at least SPF 30, broad spectrum sunscreen (OTC drug), skin self examinations, and routine physician surveillance to optimize early detection               No follow-ups on file.

## 2023-02-24 NOTE — PROGRESS NOTES
Subjective:     Patient ID: Shima Sorto is a 82 y.o. female.    Chief Complaint: No chief complaint on file.    HPI:   Ms. Sorto is a 82 y.o. female with breast cancer s/p partial mastectomy on anastrozole (started 11/2018), KATHERIN and uses dental device, hypertension, and CKD who is here for a follow-up visit of osteoporosis (FN T score -2.8). Previous patient of Dr. Beltran and myself. Last visit in 1/2022.     She remains on anastrozole     Since her last visit she has been dealing with ulcer on her left foot between 1st and 2nd toes. She is wearing ace bandage with darco shoe today. States this will come off this week!  States she fell around Feb 2023 due to darco shoe and losing her balance in parking garage- caught herself with her hands but landed on her knees. Denies fractures    Had an episode of shingles that caused vertigo. States the vertigo caused some inner ear problems that cause her to drift when walking. States this waxes and wanes since her last visit. She is trying to do exercises while walking with focus.     Had procedure for varicose veins on left leg in 2016. She is wearing compression stockings today on right leg.    Previous regimen:  Used ALN for nine years stopped 2013 - 2016, restarted ALN 2/2018.     At her June 2019 visit she reported a compression fracture L3 after picking up a two year old.  We switched to Prolia with first injection in Nov 2019. Last injection in Dec 2022      Reviewed Osteoporosis Risk Factor Assessment:     Menarche occurred at 12 years of age.   Menopause occurred at mid 50s.  Her menstrual cycles were normal but closer to five weeks between cycles. Had at least 10 cycles per year. Two children and one miscarriage. Denies lactation.  Last kidney stone 2002, and then again 1/2019   US in June 2022 showed one right sided kidney stone    Back pain due to compression fractures - pain has resolved and occasional in bed with twisting motion.     She is no longer  using cane or walker -used for a couple of days when she had sciatica. She has good lighting in her home and no loose rugs. She is living alone. She has family that lives in other states. She has a lot of friends that live near her. She carries her cell phone with her in her pocket.     She is doing stretches exercises in the morning that she learned from PT. She is also walking for about 2.5 miles in the morning after breakfast -weather permitting -on days that allows her to walk. She does not like the humidity, rain, or cold. She is going to big box stores for walking. She is up and down stairs at home. She has basement family area with bedroom and bathroom.      Has CKD stage III, anemia, and kidney stones. She denies history of calcium disorders, thyroid disease, malabsorption symptoms.    Supplements:  Calcium (1000)-mag citrate with Vitamin D (1000) and K2 2 tabs three times daily  Vitamin D3 1000 IUs 5 days weekly  Nutrafol with 2500 of Vitamin D daily which has helped with hair loss  Also on b complex, vision vitamin, fish oil, co enzyme and biotin    She is on CQ10 100 mg daily     Latest Reference Range & Units 11/06/20 11:27 07/15/21 10:53   Vit D, 25-Hydroxy 30 - 96 ng/mL 74 71      Dietary:  Vegetables, lean proteins   Avoids spinach  Carrots, celery, turnips, green peas, green beans, tomatoes, zucchini, squash  Almonds  Vinson/fish  Avoids starches and white things  Lactose intolerance and she avoid dairy  Avoids desserts  She is also drinking more lactaid and eating more cottage cheese and activa.     ETOH socially-scotch   Avoids tobacco    Mother fell twice, fractured hip.    States she was not taking vitamin D for a few months prior to bone scan.  States she has regular dental cleanings every 6 months. States she had a tooth pain in left lower. States her platform of crown is deteriorating but nothing to do at this time if does not hurt. She has decided not to pursue dental treatment at this  time.    1/5/2023 DXA  FINDINGS:  Lumbar spine (L1-L4):              BMD is 1.096 g/cm2, T-score is 0.6, and Z-score is 3.3.  Total hip:                                BMD is 0.710 g/cm2, T-score is -1.9, and Z-score is 0.3.  Femoral neck:                          BMD is 0.551 g/cm2, T-score is -2.7, and Z-score is -0.3.  Distal 1/3 radius:                      Not applicable  FRAX:  24% risk of a major osteoporotic fracture in the next 10 years.   8.8% risk of hip fracture in the next 10 years.  Impression   *Osteoporosis based on T-score below -2.5.  *Fracture risk is very high due to FRAX >4.5% risk of hip fracture in the next 10 years.  *Compared with previous DXA, BMD at the lumbar spine has increased by 6.2%, and the BMD at the total hip has declined by -5.5%.  RECOMMENDATIONS:  *Daily calcium intake 2709-4774 mg, dietary sources preferred; Vitamin D 4512-9923 IU daily.  *Weight bearing exercise and fall precautions.  *Confirm compliance with Prolia.  Recommend continued therapy with Prolia every 6 months.  If Prolia is discontinued alternative anti-resorptive therapy should be started to prevent rapid bone loss associated with Prolia withdrawal.  *Repeat BMD in 2 years.   Family history of type 2 diabetes in maternal grandmother and aunt. FBG normal on last set of labs    Of note, she has sleep apnea and was wearing dental device but no longer wearing as she had some orthodontia work done and dental device crumbled. Has been waking up tired but reports she is also getting up to go to the bathroom 3-4 times nightly. She is trying to limit water intake between 6-7pm and tries to go to bed before 11pm.    Review of Systems   Musculoskeletal:  Positive for arthralgias, back pain and gait problem.     Objective:     Physical Exam  Constitutional:       Appearance: Normal appearance.   Musculoskeletal:      Left lower leg: Edema present.      Comments: No point tenderness to spine    Neurological:      Mental  "Status: She is alert.     Vitals:    03/01/23 0958   BP: 130/76   Pulse: (!) 55   SpO2: 99%   Weight: 57.6 kg (126 lb 15.8 oz)   Height: 5' 7" (1.702 m)         Chemistry        Component Value Date/Time     02/27/2023 1211    K 4.9 02/27/2023 1211     02/27/2023 1211    CO2 31 (H) 02/27/2023 1211    BUN 27 (H) 02/27/2023 1211    CREATININE 1.3 02/27/2023 1211    CREATININE 1.3 02/27/2023 1211    GLU 91 02/27/2023 1211        Component Value Date/Time    CALCIUM 9.9 02/27/2023 1211    ALKPHOS 49 (L) 02/27/2023 1211    AST 38 02/27/2023 1211    ALT 27 02/27/2023 1211    BILITOT 0.6 02/27/2023 1211    ESTGFRAFRICA 44.5 (A) 06/13/2022 1152    EGFRNONAA 38.6 (A) 06/13/2022 1152        Results for KEITH HAJI (MRN [id number]) as of 1/28/2022 09:25   Ref. Range 1/5/2022 15:17   Sodium Latest Ref Range: 136 - 145 mmol/L 141   Potassium Latest Ref Range: 3.5 - 5.1 mmol/L 4.7   Chloride Latest Ref Range: 95 - 110 mmol/L 100   CO2 Latest Ref Range: 23 - 29 mmol/L 36 (H)   Anion Gap Latest Ref Range: 8 - 16 mmol/L 5 (L)   BUN Latest Ref Range: 8 - 23 mg/dL 24 (H)   Creatinine Latest Ref Range: 0.5 - 1.4 mg/dL 1.1   eGFR if non African American Latest Ref Range: >60 mL/min/1.73 m^2 47.2 (A)   eGFR if African American Latest Ref Range: >60 mL/min/1.73 m^2 54.4 (A)   Glucose Latest Ref Range: 70 - 110 mg/dL 95   Calcium Latest Ref Range: 8.7 - 10.5 mg/dL 9.8     Assessment/Plan:     1. Age-related osteoporosis with current pathological fracture with routine healing  Vitamin D    Comprehensive Metabolic Panel      2. Stage 3 chronic kidney disease, unspecified whether stage 3a or 3b CKD        3. Vitamin D deficiency  Vitamin D      4. Use of aromatase inhibitors        5. Primary hyperoxaluria          Age-related osteoporosis with current pathological fracture with routine healing  -- Continue Prolia   Check labs today  Discussed her bone density worsened in her hip. Consider switching to bone building " medication   Evenity. She will read about evenity and will let us know.   Continue current medications supplements- vitmain D etc  She is on Vitamin D 2500 IU daily as it is in a supplement she would like to continue to take for her hair. Continue CQ10.              Avoid excessive alcohol and tobacco                Recommended daily allowance of calcium is 1000 mg daily. If you look at my list below, and realize you are not on enough calcium, start Citracal petites 400 mg twice daily  Given information on calcium in foods    -- Check Bone density test in November 2022 considering anastrozole   -- Avoid excessive alcohol and tobacco  -- Continue current dosage of calcium   -- Continue exercise       CKD (chronic kidney disease) stage 3, GFR 30-59 ml/min  -- Cut back on vitamin D a bit as level was 71 ng/ml    Vitamin D deficiency  Vitamin D level is high and she is on too much vitamin D  As above    Use of aromatase inhibitors  Repeat DXA annually    Primary hyperoxaluria  Follows with nephrology    Follow up in about 1 year (around 3/1/2024).

## 2023-02-27 ENCOUNTER — LAB VISIT (OUTPATIENT)
Dept: LAB | Facility: HOSPITAL | Age: 83
End: 2023-02-27
Attending: INTERNAL MEDICINE
Payer: MEDICARE

## 2023-02-27 ENCOUNTER — OFFICE VISIT (OUTPATIENT)
Dept: GASTROENTEROLOGY | Facility: CLINIC | Age: 83
End: 2023-02-27
Payer: MEDICARE

## 2023-02-27 VITALS
BODY MASS INDEX: 19.44 KG/M2 | DIASTOLIC BLOOD PRESSURE: 63 MMHG | HEIGHT: 67 IN | WEIGHT: 123.88 LBS | SYSTOLIC BLOOD PRESSURE: 145 MMHG | HEART RATE: 49 BPM

## 2023-02-27 DIAGNOSIS — N18.9 CHRONIC RENAL IMPAIRMENT, UNSPECIFIED CKD STAGE: ICD-10-CM

## 2023-02-27 DIAGNOSIS — D53.9 MACROCYTIC ANEMIA: ICD-10-CM

## 2023-02-27 DIAGNOSIS — R68.81 EARLY SATIETY: ICD-10-CM

## 2023-02-27 DIAGNOSIS — D64.9 LOW HEMOGLOBIN: ICD-10-CM

## 2023-02-27 DIAGNOSIS — R14.0 ABDOMINAL DISTENTION: Primary | ICD-10-CM

## 2023-02-27 DIAGNOSIS — R14.0 ABDOMINAL DISTENTION: ICD-10-CM

## 2023-02-27 LAB
ALBUMIN SERPL BCP-MCNC: 3.8 G/DL (ref 3.5–5.2)
ALP SERPL-CCNC: 49 U/L (ref 55–135)
ALT SERPL W/O P-5'-P-CCNC: 27 U/L (ref 10–44)
ANION GAP SERPL CALC-SCNC: 7 MMOL/L (ref 8–16)
AST SERPL-CCNC: 38 U/L (ref 10–40)
BASOPHILS # BLD AUTO: 0.03 K/UL (ref 0–0.2)
BASOPHILS NFR BLD: 0.8 % (ref 0–1.9)
BILIRUB SERPL-MCNC: 0.6 MG/DL (ref 0.1–1)
BUN SERPL-MCNC: 27 MG/DL (ref 8–23)
CALCIUM SERPL-MCNC: 9.9 MG/DL (ref 8.7–10.5)
CHLORIDE SERPL-SCNC: 105 MMOL/L (ref 95–110)
CO2 SERPL-SCNC: 31 MMOL/L (ref 23–29)
CREAT SERPL-MCNC: 1.3 MG/DL (ref 0.5–1.4)
CREAT SERPL-MCNC: 1.3 MG/DL (ref 0.5–1.4)
DIFFERENTIAL METHOD: ABNORMAL
EOSINOPHIL # BLD AUTO: 0.1 K/UL (ref 0–0.5)
EOSINOPHIL NFR BLD: 1.3 % (ref 0–8)
ERYTHROCYTE [DISTWIDTH] IN BLOOD BY AUTOMATED COUNT: 13.1 % (ref 11.5–14.5)
EST. GFR  (NO RACE VARIABLE): 41.1 ML/MIN/1.73 M^2
EST. GFR  (NO RACE VARIABLE): 41.1 ML/MIN/1.73 M^2
FERRITIN SERPL-MCNC: 71 NG/ML (ref 20–300)
FOLATE SERPL-MCNC: 18.4 NG/ML (ref 4–24)
GLUCOSE SERPL-MCNC: 91 MG/DL (ref 70–110)
HCT VFR BLD AUTO: 34.1 % (ref 37–48.5)
HGB BLD-MCNC: 10.9 G/DL (ref 12–16)
IMM GRANULOCYTES # BLD AUTO: 0.01 K/UL (ref 0–0.04)
IMM GRANULOCYTES NFR BLD AUTO: 0.3 % (ref 0–0.5)
IRON SERPL-MCNC: 69 UG/DL (ref 30–160)
LIPASE SERPL-CCNC: 36 U/L (ref 4–60)
LYMPHOCYTES # BLD AUTO: 1.4 K/UL (ref 1–4.8)
LYMPHOCYTES NFR BLD: 35.2 % (ref 18–48)
MCH RBC QN AUTO: 31.8 PG (ref 27–31)
MCHC RBC AUTO-ENTMCNC: 32 G/DL (ref 32–36)
MCV RBC AUTO: 99 FL (ref 82–98)
MONOCYTES # BLD AUTO: 0.5 K/UL (ref 0.3–1)
MONOCYTES NFR BLD: 13.8 % (ref 4–15)
NEUTROPHILS # BLD AUTO: 1.9 K/UL (ref 1.8–7.7)
NEUTROPHILS NFR BLD: 48.6 % (ref 38–73)
NRBC BLD-RTO: 0 /100 WBC
PLATELET # BLD AUTO: 168 K/UL (ref 150–450)
PMV BLD AUTO: 12.1 FL (ref 9.2–12.9)
POTASSIUM SERPL-SCNC: 4.9 MMOL/L (ref 3.5–5.1)
PROT SERPL-MCNC: 7 G/DL (ref 6–8.4)
RBC # BLD AUTO: 3.43 M/UL (ref 4–5.4)
SATURATED IRON: 21 % (ref 20–50)
SODIUM SERPL-SCNC: 143 MMOL/L (ref 136–145)
TOTAL IRON BINDING CAPACITY: 324 UG/DL (ref 250–450)
TRANSFERRIN SERPL-MCNC: 219 MG/DL (ref 200–375)
VIT B12 SERPL-MCNC: 492 PG/ML (ref 210–950)
WBC # BLD AUTO: 3.84 K/UL (ref 3.9–12.7)

## 2023-02-27 PROCEDURE — 83690 ASSAY OF LIPASE: CPT | Performed by: INTERNAL MEDICINE

## 2023-02-27 PROCEDURE — 84466 ASSAY OF TRANSFERRIN: CPT | Performed by: INTERNAL MEDICINE

## 2023-02-27 PROCEDURE — 99215 OFFICE O/P EST HI 40 MIN: CPT | Mod: PBBFAC | Performed by: INTERNAL MEDICINE

## 2023-02-27 PROCEDURE — 99999 PR PBB SHADOW E&M-EST. PATIENT-LVL V: ICD-10-PCS | Mod: PBBFAC,,, | Performed by: INTERNAL MEDICINE

## 2023-02-27 PROCEDURE — 82746 ASSAY OF FOLIC ACID SERUM: CPT | Performed by: INTERNAL MEDICINE

## 2023-02-27 PROCEDURE — 99204 PR OFFICE/OUTPT VISIT, NEW, LEVL IV, 45-59 MIN: ICD-10-PCS | Mod: S$PBB,,, | Performed by: INTERNAL MEDICINE

## 2023-02-27 PROCEDURE — 80053 COMPREHEN METABOLIC PANEL: CPT | Performed by: INTERNAL MEDICINE

## 2023-02-27 PROCEDURE — 99204 OFFICE O/P NEW MOD 45 MIN: CPT | Mod: S$PBB,,, | Performed by: INTERNAL MEDICINE

## 2023-02-27 PROCEDURE — 36415 COLL VENOUS BLD VENIPUNCTURE: CPT | Performed by: INTERNAL MEDICINE

## 2023-02-27 PROCEDURE — 82607 VITAMIN B-12: CPT | Performed by: INTERNAL MEDICINE

## 2023-02-27 PROCEDURE — 82728 ASSAY OF FERRITIN: CPT | Performed by: INTERNAL MEDICINE

## 2023-02-27 PROCEDURE — 85025 COMPLETE CBC W/AUTO DIFF WBC: CPT | Performed by: INTERNAL MEDICINE

## 2023-02-27 PROCEDURE — 99999 PR PBB SHADOW E&M-EST. PATIENT-LVL V: CPT | Mod: PBBFAC,,, | Performed by: INTERNAL MEDICINE

## 2023-02-27 RX ORDER — DOXYCYCLINE HYCLATE 100 MG
100 TABLET ORAL 2 TIMES DAILY
COMMUNITY
Start: 2023-02-16 | End: 2023-04-05 | Stop reason: ALTCHOICE

## 2023-02-27 NOTE — Clinical Note
Refugio also please refer patient to gyn for ovarian and uterine evaluation referral placed in light of her abdominal distension thank you

## 2023-02-27 NOTE — PROGRESS NOTES
Last 5 Patient Entered Readings                                      Current 30 Day Average: 131/63     Recent Readings 4/18/2019 4/14/2019 4/10/2019 4/2/2019 3/25/2019    SBP (mmHg) 133 133 133 131 127    DBP (mmHg) 62 62 62 63 62    Pulse 58 58 58 65 59        Hypertension Medications             atenolol (TENORMIN) 25 MG tablet TAKE 1 TABLET BY MOUTH EVERY DAY    olmesartan (BENICAR) 20 MG tablet Take 1 tablet (20 mg total) by mouth once daily. Stop lisinopril.        Plan:   Called patient to follow up, reviewed BP readings. Per 2017 ACC/ AHA HTN guidelines  (goal of BP < 130/80), current 30-day average is slightly uncontrolled, remains stable. Patient was seen by Nephrology on 4/11, BP was 130/60.  LVM, requested patient call back at her convenience if she has any questions or concerns, and to continue to take at least weekly BP readings.   Will continue to monitor. WCB in 3 months, sooner if BP begins to trend up or down.          Helical Rim Text: The closure involved the helical rim.

## 2023-02-27 NOTE — PROGRESS NOTES
[facility] Consultation Note    Reason for Consult:  The primary encounter diagnosis was Abdominal distention. Diagnoses of Early satiety, Chronic renal impairment, unspecified CKD stage, Macrocytic anemia, and Low hemoglobin were also pertinent to this visit.    PCP:   Carmen Milton   [address] / [place] [address]    Referring MD:  Carmen Milton Md  [address] [place] [address]    Initial History of Present Illness (HPI):  This is a 82 y.o. female here for evaluation of some early satiety after eating and abdominal distension patient has history of kidney stones no on prior CT renal stone protocol in 2019 also looks like there is constipation on the scan reported patient's has had 2 prior colonoscopies in her system both normal last 1 about 4 years ago no polyps patient has no family history of GI malignancies nobody with esophageal or stomach cancer nobody with small-bowel or colon cancer nobody with advanced colon adenomas polyps nobody with ovarian or uterine cancer nobody with kidney or bladder cancer nobody with pancreatitis pancreatic cancer liver cancer patient is not losing weight but having what seems like some early satiety still has her gallbladder her appendix her ovaries and uterus and placed.  Will refer patient to gyn for further evaluation of her ovaries and uterus she does have chronic renal insufficiency not taking NSAIDs with a macrocytic anemia will set up patient for EGD for further evaluation will set up patient for CT enterography oral contrast only no IV contrast dye patient does know there is some limitation of solid organ viscera imaging without IV contrast but it should protect her kidneys she is happy with that.  Prior colonoscopy reports reviewed no dysphagia no odynophagia she is been drinking more water and her symptoms have improved a little bit no GI bleeding no NSAID use she does not feel like she is constipated sometimes she  will have as many as 2 bowel movements a day to a bowel movement every other day will recommend she consider Metamucil 1 tbsp in 12 oz water once or twice daily in the Main time.  No chest pain no fever no shortness of breath no weight loss.  No overt GI bleeding    Abdominal pain - no  Reflux - no  Dysphagia - no   Bowel habits - as above  GI bleeding - none  NSAID usage - none    Interval HPI 02/27/2023:  The patient's last visit with me was on Visit date not found.      ROS:  Constitutional: No fevers, chills, No weight loss  ENT:  No heartburn no dysphagia no odynophagia no hoarseness  CV: No chest pain, no palpitation  Pulm: No cough, No shortness of breath, no wheezing  Ophtho: No vision changes  GI: see HPI  Derm: No rash, no itching  Heme: No lymphadenopathy, No easy bruising  MSK: No significant arthritis  : No dysuria, No hematuria  Endo: No hot or cold intolerance  Neuro: No syncope, No seizure, no strokes  Psych: No uncontrolled anxiety, No uncontrolled depression    Medical History:  has a past medical history of Allergy, Anemia, Basal cell carcinoma (7/2013), Breast cancer (2018), colonic polyps, Hypertension, Medullary sponge kidney, MVP (mitral valve prolapse), Osteoporosis, senile, Pneumonia, Renal calculi, Sciatica, Sleep apnea, TMJ syndrome, Urinary retention, Vertigo (1/17/2017), 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    Surgical History:  has a past surgical history that includes Kidney stone surgery (2000); MOHS procedure; interstim placed stage 1; Breast cyst aspiration (Right, 1999); Colonoscopy (N/A, 7/24/2018); Mastectomy, partial (Right, 8/17/2018); Injection for sentinel node identification (Right, 8/17/2018); [place] lymph node biopsy (Right, 8/17/2018); and Breast lumpectomy (Right, 2018).    Family History: family history includes Breast cancer in her maternal aunt; Hearing loss in her son; Heart attack in her father; Heart disease in her father; Hyperlipidemia in  "her son; Kidney disease in her mother; Melanoma in her father..     Social History:  reports that she quit smoking about 58 years ago. She has a 4.00 pack-year smoking history. She has never used smokeless tobacco. She reports current alcohol use of about 1.0 standard drink per week. She reports that she does not use drugs.    Review of patient's allergies indicates:   Allergen Reactions    Asparagus Rash       Medication List with Changes/Refills   Current Medications    AMLODIPINE (NORVASC) 2.5 MG TABLET    Take 1 tablet (2.5 mg total) by mouth once daily.    ANASTROZOLE (ARIMIDEX) 1 MG TAB    TAKE 1 TABLET(1 MG) BY MOUTH EVERY DAY.    COQ10, UBIQUINOL, 100 MG CAP    Take 100 mg by mouth once daily.    DENOSUMAB (PROLIA) 60 MG/ML SYRG    Inject 60 mg into the skin every 6 (six) months.    DOXYCYCLINE (VIBRA-TABS) 100 MG TABLET    Take 100 mg by mouth 2 (two) times daily.    FERROUS SULFATE (SLOW RELEASE IRON) 142 MG (45 MG IRON) TBSR        FISH OIL-E-FATTY [id number] 400-5 MG-UNIT CAP    Take 1 capsule by mouth Daily.    PRESERVISION AREDS-2 250-90-40-1 MG CAP    Take 1 tablet by mouth 2 (two) times daily.    UNABLE TO FIND    2 tablets 2 (two) times daily. Rufus-Mag-Citrate with D3 & K2    UNABLE TO FIND    4 capsules Daily. Nutrafol    VALSARTAN (DIOVAN) 160 MG TABLET    TAKE 1 TABLET(160 MG) BY MOUTH EVERY DAY    VITAMIN RENAL FORMULA, B-COMPLEX-VITAMIN C-FOLIC ACID, (NEPHROCAPS) 1 MG CAP    Take 1 capsule by mouth once daily.         Objective Findings:    Vital Signs:  BP (!) 145/63 (BP Location: Left arm, Patient Position: Sitting, BP Method: Medium (Automatic))   Pulse (!) 49   Ht 5' 7" (1.702 m)   Wt 56.2 kg (123 lb 14.4 oz)   LMP  (LMP Unknown)   BMI 19.41 kg/m²   Body mass index is 19.41 kg/m².    Physical Exam:  General Appearance: Well appearing in no acute distress  Eyes:    No scleral icterus  ENT:  No lesions or masses   Lungs: CTA bilaterally, no wheezes, no rhonchi, no rales  Heart:  S1, S2 " normal, no murmurs heard  Abdomen:  Non distended, soft, no guarding, no rebound, no tenderness, no appreciated ascites, no bruits, no hepatosplenomegaly,  No CVA tenderness, no appreciated hernias, no Kessler sign, no McBurney point tenderness  Musculoskeletal:  No major joint deformities  Skin: No petechiae or rash on exposed skin areas  Neurologic:  Alert and oriented x4  Psychiatric:  Normal speech mentation and affect    Labs:  Lab Results   Component Value Date    WBC 4.21 11/18/2022    HGB 10.5 (L) 11/25/2022    HCT 33.7 (L) 11/25/2022     11/18/2022    CHOL 166 02/05/2021    TRIG 28 (L) 02/05/2021    HDL 67 02/05/2021    ALT 21 11/18/2022    AST 33 11/18/2022     (L) 12/07/2022    K 4.5 12/07/2022     12/07/2022    CREATININE 1.3 12/07/2022    BUN 21 12/07/2022    CO2 27 12/07/2022    TSH 2.823 11/18/2022    INR 1.0 02/05/2021             Medical Decision Making:  Lab work reviewed  Prior colonoscopy images reviewed myself  Lab work talk given chronic renal insufficiency talk given  CT enterography with oral contrast only talk given  EGD talk given  Metamucil talk given  Constipation talk given  Potential for earlier colonoscopy talk given      Assessment:  1. Abdominal distention    2. Early satiety    3. Chronic renal impairment, unspecified CKD stage    4. Macrocytic anemia    5. Low hemoglobin         Recommendations:  1. Lab work today  2. CT enterography with oral contrast only for further evaluation of abdominal distention in a patient with chronic renal insufficiency  3. Referral to endoscopy schedulers for urgent EGD for early satiety anemia abdominal distention  4. Start Metamucil 1 tbsp in 12 oz of water once or twice daily  5. Referral to gyn for ovarian uterine evaluation in light of patient's abdominal distension  6. Return GI clinic 8 weeks for follow-up to discuss if need for repeat colonoscopy sooner.      No follow-ups on file.      Order summary:  Orders Placed This  Encounter    CT Enterography Abd_Pelvis With Contrast    Creatinine, serum    CBC Auto Differential    Ferritin    Iron and TIBC    Vitamin B12    Folate    Comprehensive Metabolic Panel    Lipase    Ambulatory referral/consult to Obstetrics / Gynecology    Ambulatory referral/consult to Endo Procedure          Thank you so much for allowing me to participate in the care of Shima Echols Charodario    Keven Garza MD    DISCLAIMER: This note was prepared with dooyoo voice recognition transcription software. Garbled syntax, mangled or inadvertent pronouns, and other bizarre constructions may be attributed to that software system. While efforts were made to correct any mistakes made by this voice recognition program, some errors and/or omissions may remain in the note that were missed when the note was originally created.

## 2023-02-28 NOTE — PROGRESS NOTES
Shima your creatinine is stable does look like your slightly dehydrated make sure you drink ample amount of water each day we have plans for an EGD for further evaluation if EGD is unrevealing for your anemia will recommend a repeat colonoscopy.    Please make sure you follow-up your slight anemia with her Heme-Onc doctor Dr. Hernandez.    Thanks    Keven

## 2023-02-28 NOTE — ASSESSMENT & PLAN NOTE
-- Continue Prolia   Check labs today  Discussed her bone density worsened in her hip. Consider switching to bone building medication   Evenity. She will read about evenity and will let us know.   Continue current medications supplements- vitmain D etc  She is on Vitamin D 2500 IU daily as it is in a supplement she would like to continue to take for her hair. Continue CQ10.              Avoid excessive alcohol and tobacco                Recommended daily allowance of calcium is 1000 mg daily. If you look at my list below, and realize you are not on enough calcium, start Citracal petites 400 mg twice daily  Given information on calcium in foods    -- Check Bone density test in November 2022 considering anastrozole   -- Avoid excessive alcohol and tobacco  -- Continue current dosage of calcium   -- Continue exercise

## 2023-03-01 ENCOUNTER — LAB VISIT (OUTPATIENT)
Dept: LAB | Facility: HOSPITAL | Age: 83
End: 2023-03-01
Payer: MEDICARE

## 2023-03-01 ENCOUNTER — OFFICE VISIT (OUTPATIENT)
Dept: ENDOCRINOLOGY | Facility: CLINIC | Age: 83
End: 2023-03-01
Payer: MEDICARE

## 2023-03-01 VITALS
WEIGHT: 127 LBS | DIASTOLIC BLOOD PRESSURE: 76 MMHG | SYSTOLIC BLOOD PRESSURE: 130 MMHG | HEART RATE: 55 BPM | HEIGHT: 67 IN | OXYGEN SATURATION: 99 % | BODY MASS INDEX: 19.93 KG/M2

## 2023-03-01 DIAGNOSIS — Z79.811 USE OF AROMATASE INHIBITORS: ICD-10-CM

## 2023-03-01 DIAGNOSIS — N18.30 STAGE 3 CHRONIC KIDNEY DISEASE, UNSPECIFIED WHETHER STAGE 3A OR 3B CKD: ICD-10-CM

## 2023-03-01 DIAGNOSIS — E55.9 VITAMIN D DEFICIENCY: ICD-10-CM

## 2023-03-01 DIAGNOSIS — M80.00XD AGE-RELATED OSTEOPOROSIS WITH CURRENT PATHOLOGICAL FRACTURE WITH ROUTINE HEALING: ICD-10-CM

## 2023-03-01 DIAGNOSIS — E72.53 PRIMARY HYPEROXALURIA: ICD-10-CM

## 2023-03-01 LAB
25(OH)D3+25(OH)D2 SERPL-MCNC: 71 NG/ML (ref 30–96)
ALBUMIN SERPL BCP-MCNC: 3.8 G/DL (ref 3.5–5.2)
ALP SERPL-CCNC: 46 U/L (ref 55–135)
ALT SERPL W/O P-5'-P-CCNC: 24 U/L (ref 10–44)
ANION GAP SERPL CALC-SCNC: 6 MMOL/L (ref 8–16)
AST SERPL-CCNC: 34 U/L (ref 10–40)
BILIRUB SERPL-MCNC: 0.5 MG/DL (ref 0.1–1)
BUN SERPL-MCNC: 20 MG/DL (ref 8–23)
CALCIUM SERPL-MCNC: 9.6 MG/DL (ref 8.7–10.5)
CHLORIDE SERPL-SCNC: 103 MMOL/L (ref 95–110)
CO2 SERPL-SCNC: 31 MMOL/L (ref 23–29)
CREAT SERPL-MCNC: 1.3 MG/DL (ref 0.5–1.4)
EST. GFR  (NO RACE VARIABLE): 41.1 ML/MIN/1.73 M^2
GLUCOSE SERPL-MCNC: 82 MG/DL (ref 70–110)
POTASSIUM SERPL-SCNC: 4.8 MMOL/L (ref 3.5–5.1)
PROT SERPL-MCNC: 7 G/DL (ref 6–8.4)
SODIUM SERPL-SCNC: 140 MMOL/L (ref 136–145)

## 2023-03-01 PROCEDURE — 82306 VITAMIN D 25 HYDROXY: CPT | Performed by: NURSE PRACTITIONER

## 2023-03-01 PROCEDURE — 99214 PR OFFICE/OUTPT VISIT, EST, LEVL IV, 30-39 MIN: ICD-10-PCS | Mod: S$PBB,,, | Performed by: NURSE PRACTITIONER

## 2023-03-01 PROCEDURE — 80053 COMPREHEN METABOLIC PANEL: CPT | Performed by: NURSE PRACTITIONER

## 2023-03-01 PROCEDURE — 99999 PR PBB SHADOW E&M-EST. PATIENT-LVL IV: ICD-10-PCS | Mod: PBBFAC,,, | Performed by: NURSE PRACTITIONER

## 2023-03-01 PROCEDURE — 99214 OFFICE O/P EST MOD 30 MIN: CPT | Mod: S$PBB,,, | Performed by: NURSE PRACTITIONER

## 2023-03-01 PROCEDURE — 36415 COLL VENOUS BLD VENIPUNCTURE: CPT | Performed by: NURSE PRACTITIONER

## 2023-03-01 PROCEDURE — 99999 PR PBB SHADOW E&M-EST. PATIENT-LVL IV: CPT | Mod: PBBFAC,,, | Performed by: NURSE PRACTITIONER

## 2023-03-01 PROCEDURE — 99214 OFFICE O/P EST MOD 30 MIN: CPT | Mod: PBBFAC | Performed by: NURSE PRACTITIONER

## 2023-03-02 ENCOUNTER — TELEPHONE (OUTPATIENT)
Dept: ENDOSCOPY | Facility: HOSPITAL | Age: 83
End: 2023-03-02
Payer: MEDICARE

## 2023-03-02 VITALS — BODY MASS INDEX: 19.12 KG/M2 | WEIGHT: 121.81 LBS | HEIGHT: 67 IN

## 2023-03-02 DIAGNOSIS — D64.9 LOW HEMOGLOBIN: ICD-10-CM

## 2023-03-02 DIAGNOSIS — R68.81 EARLY SATIETY: ICD-10-CM

## 2023-03-02 DIAGNOSIS — R14.0 ABDOMINAL DISTENTION: Primary | ICD-10-CM

## 2023-03-02 NOTE — PLAN OF CARE
Endoscopy procedure scheduled on 3/17/23, prep instructions reviewed, Pt verbalized understanding. Patient instructed on how to locate prep instructions within the portal.

## 2023-03-02 NOTE — PLAN OF CARE
Contacted patient to schedule Urgent EGD. Patient states she has to speak with family in regards to availability for a ride. Patient to call back once she has a ride set up. Hold placed for patient on 3/9 and 3/17.

## 2023-03-07 ENCOUNTER — TELEPHONE (OUTPATIENT)
Dept: GASTROENTEROLOGY | Facility: CLINIC | Age: 83
End: 2023-03-07
Payer: MEDICARE

## 2023-03-07 ENCOUNTER — HOSPITAL ENCOUNTER (OUTPATIENT)
Dept: RADIOLOGY | Facility: HOSPITAL | Age: 83
Discharge: HOME | End: 2023-03-07
Attending: INTERNAL MEDICINE
Payer: MEDICARE

## 2023-03-07 DIAGNOSIS — N18.9 CHRONIC RENAL IMPAIRMENT, UNSPECIFIED CKD STAGE: ICD-10-CM

## 2023-03-07 DIAGNOSIS — R14.0 ABDOMINAL DISTENTION: ICD-10-CM

## 2023-03-07 DIAGNOSIS — R68.81 EARLY SATIETY: ICD-10-CM

## 2023-03-07 PROCEDURE — A9698 NON-RAD CONTRAST MATERIALNOC: HCPCS | Performed by: INTERNAL MEDICINE

## 2023-03-07 PROCEDURE — 25500020 PHARM REV CODE 255: Performed by: INTERNAL MEDICINE

## 2023-03-07 PROCEDURE — 74177 CT ENTEROGRAPHY ABD_PELVIS WITH CONTRAST: ICD-10-PCS | Mod: 26,,, | Performed by: STUDENT IN AN ORGANIZED HEALTH CARE EDUCATION/TRAINING PROGRAM

## 2023-03-07 PROCEDURE — 74177 CT ABD & PELVIS W/CONTRAST: CPT | Mod: TC

## 2023-03-07 PROCEDURE — 74177 CT ABD & PELVIS W/CONTRAST: CPT | Mod: 26,,, | Performed by: STUDENT IN AN ORGANIZED HEALTH CARE EDUCATION/TRAINING PROGRAM

## 2023-03-07 RX ADMIN — BARIUM SULFATE 225 ML: 1 SUSPENSION ORAL at 02:03

## 2023-03-07 RX ADMIN — BARIUM SULFATE 450 ML: 1 SUSPENSION ORAL at 02:03

## 2023-03-07 NOTE — TELEPHONE ENCOUNTER
----- Message from Krys Pizarro sent at 3/7/2023  8:28 AM CST -----  Shima Sorto calling regarding Patient Advice for wanting to speak to the nurse to clarify what she is having to see the OB/GYN doctor per the referral sent in from Dr. Garza because she is unaware of the diagnoses, call back [phone]

## 2023-03-07 NOTE — TELEPHONE ENCOUNTER
Return call and spoke with patient. Inform patient per Dr. Garza recommendation on last visit: Referral to gyn for ovarian uterine evaluation in light of patient's abdominal distension.     Patient verbalized understanding.

## 2023-03-10 ENCOUNTER — TELEPHONE (OUTPATIENT)
Dept: GASTROENTEROLOGY | Facility: CLINIC | Age: 83
End: 2023-03-10
Payer: MEDICARE

## 2023-03-10 NOTE — TELEPHONE ENCOUNTER
----- Message from Krys Pizarro sent at 3/10/2023  8:31 AM CST -----  Shima Sorto calling regarding Patient Advice if she is to continue taking the metamucil as a laxative or is it for her to have a EGD done, call back to clarify [phone]

## 2023-03-10 NOTE — TELEPHONE ENCOUNTER
Dr. Garza please advise:     Shima Sorto calling regarding Patient Advice if she is to continue taking the metamucil as a laxative or is it for her to have a EGD done, call back to clarify [phone]

## 2023-03-11 DIAGNOSIS — N28.1 RENAL CYST: ICD-10-CM

## 2023-03-11 DIAGNOSIS — N32.89 BLADDER DISTENTION: Primary | ICD-10-CM

## 2023-03-11 DIAGNOSIS — N28.1 RENAL CYST: Primary | ICD-10-CM

## 2023-03-12 NOTE — PROGRESS NOTES
IMPORTANT:    Refugio please refer patient to Urology for evaluation of moderate bladder distention and renal cyst.  Referral placed    Refugio please schedule patient for renal ultrasounds to follow-up renal cyst seen on CT scan.    Refugio please schedule patient for follow-up telemedicine video visit with me in the next 4-5 weeks for follow-up of her constipation.    Impression:     1. Findings suggestive for slow GI transit including small bowel feces sign and large volume stool burden throughout the colon.  Recommend clinical correlation.  2. No evidence of bowel stricture or mechanical obstruction.  Note, limited evaluation given lack of intravenous contrast.  3. Stable nonobstructing calcifications within the right lower pole renal calyx.  Simple and hyperdense left renal cysts.  4. Moderate bladder distension.  Recommend correlation for urinary retention.  5. Additional findings as above.     Electronically signed by resident: Harjit Thompson  Date:                                            03/07/2023  Time:                                           14:03     Electronically signed by: Abram Kurtz  Date:                                            03/07/2023    Shima your CT scan shows renal cyst that like to follow-up with renal ultrasounds and also what looks like constipation.    Please let me know how your doing with the MiraLax 1 capful in 12 oz of water twice daily.

## 2023-03-14 ENCOUNTER — PATIENT MESSAGE (OUTPATIENT)
Dept: GASTROENTEROLOGY | Facility: CLINIC | Age: 83
End: 2023-03-14
Payer: MEDICARE

## 2023-03-16 ENCOUNTER — TELEPHONE (OUTPATIENT)
Dept: GASTROENTEROLOGY | Facility: CLINIC | Age: 83
End: 2023-03-16
Payer: MEDICARE

## 2023-03-16 NOTE — TELEPHONE ENCOUNTER
----- Message from Richard Ledezma sent at 3/15/2023  1:25 PM CDT -----  Contact: [phone]  The patient is calling from a missed call and would like to speak with the staff.  the patient would like a call back at your earliest convince.  The pt can be reached at [phone]

## 2023-03-17 ENCOUNTER — HOSPITAL ENCOUNTER (OUTPATIENT)
Facility: HOSPITAL | Age: 83
Discharge: HOME | End: 2023-03-17
Attending: INTERNAL MEDICINE | Admitting: INTERNAL MEDICINE
Payer: MEDICARE

## 2023-03-17 ENCOUNTER — ANESTHESIA (OUTPATIENT)
Dept: ENDOSCOPY | Facility: HOSPITAL | Age: 83
End: 2023-03-17
Payer: MEDICARE

## 2023-03-17 ENCOUNTER — ANESTHESIA EVENT (OUTPATIENT)
Dept: ENDOSCOPY | Facility: HOSPITAL | Age: 83
End: 2023-03-17
Payer: MEDICARE

## 2023-03-17 VITALS
HEART RATE: 78 BPM | HEIGHT: 67 IN | WEIGHT: 119 LBS | SYSTOLIC BLOOD PRESSURE: 164 MMHG | BODY MASS INDEX: 18.68 KG/M2 | OXYGEN SATURATION: 98 % | RESPIRATION RATE: 18 BRPM | DIASTOLIC BLOOD PRESSURE: 96 MMHG | TEMPERATURE: 98 F

## 2023-03-17 DIAGNOSIS — K44.9 HIATAL HERNIA: Primary | ICD-10-CM

## 2023-03-17 DIAGNOSIS — R68.81 EARLY SATIETY: ICD-10-CM

## 2023-03-17 DIAGNOSIS — K22.10 EROSIVE ESOPHAGITIS: ICD-10-CM

## 2023-03-17 PROCEDURE — 88305 TISSUE EXAM BY PATHOLOGIST: CPT | Performed by: STUDENT IN AN ORGANIZED HEALTH CARE EDUCATION/TRAINING PROGRAM

## 2023-03-17 PROCEDURE — 25000003 PHARM REV CODE 250: Performed by: NURSE ANESTHETIST, CERTIFIED REGISTERED

## 2023-03-17 PROCEDURE — 43239 EGD BIOPSY SINGLE/MULTIPLE: CPT | Mod: ,,, | Performed by: INTERNAL MEDICINE

## 2023-03-17 PROCEDURE — 25000003 PHARM REV CODE 250: Performed by: STUDENT IN AN ORGANIZED HEALTH CARE EDUCATION/TRAINING PROGRAM

## 2023-03-17 PROCEDURE — 88305 TISSUE EXAM BY PATHOLOGIST: ICD-10-PCS | Mod: 26,,, | Performed by: STUDENT IN AN ORGANIZED HEALTH CARE EDUCATION/TRAINING PROGRAM

## 2023-03-17 PROCEDURE — 37000009 HC ANESTHESIA EA ADD 15 MINS: Performed by: INTERNAL MEDICINE

## 2023-03-17 PROCEDURE — 43239 PR EGD, FLEX, W/BIOPSY, SGL/MULTI: ICD-10-PCS | Mod: ,,, | Performed by: INTERNAL MEDICINE

## 2023-03-17 PROCEDURE — E9220 PRA ENDO ANESTHESIA: HCPCS | Mod: ,,, | Performed by: NURSE ANESTHETIST, CERTIFIED REGISTERED

## 2023-03-17 PROCEDURE — 88305 TISSUE EXAM BY PATHOLOGIST: CPT | Mod: 26,,, | Performed by: STUDENT IN AN ORGANIZED HEALTH CARE EDUCATION/TRAINING PROGRAM

## 2023-03-17 PROCEDURE — 37000008 HC ANESTHESIA 1ST 15 MINUTES: Performed by: INTERNAL MEDICINE

## 2023-03-17 PROCEDURE — 43239 EGD BIOPSY SINGLE/MULTIPLE: CPT | Performed by: INTERNAL MEDICINE

## 2023-03-17 PROCEDURE — 88342 CHG IMMUNOCYTOCHEMISTRY: ICD-10-PCS | Mod: 26,,, | Performed by: STUDENT IN AN ORGANIZED HEALTH CARE EDUCATION/TRAINING PROGRAM

## 2023-03-17 PROCEDURE — 88342 IMHCHEM/IMCYTCHM 1ST ANTB: CPT | Performed by: STUDENT IN AN ORGANIZED HEALTH CARE EDUCATION/TRAINING PROGRAM

## 2023-03-17 PROCEDURE — 27201012 HC FORCEPS, HOT/COLD, DISP: Performed by: INTERNAL MEDICINE

## 2023-03-17 PROCEDURE — 88342 IMHCHEM/IMCYTCHM 1ST ANTB: CPT | Mod: 26,,, | Performed by: STUDENT IN AN ORGANIZED HEALTH CARE EDUCATION/TRAINING PROGRAM

## 2023-03-17 PROCEDURE — 63600175 PHARM REV CODE 636 W HCPCS: Performed by: NURSE ANESTHETIST, CERTIFIED REGISTERED

## 2023-03-17 PROCEDURE — E9220 PRA ENDO ANESTHESIA: ICD-10-PCS | Mod: ,,, | Performed by: NURSE ANESTHETIST, CERTIFIED REGISTERED

## 2023-03-17 RX ORDER — PANTOPRAZOLE SODIUM 40 MG/1
40 TABLET, DELAYED RELEASE ORAL
Qty: 90 TABLET | Refills: 0 | Status: SHIPPED | OUTPATIENT
Start: 2023-03-17 | End: 2023-05-27 | Stop reason: SDUPTHER

## 2023-03-17 RX ORDER — LIDOCAINE HYDROCHLORIDE 20 MG/ML
INJECTION, SOLUTION EPIDURAL; INFILTRATION; INTRACAUDAL; PERINEURAL
Status: DISCONTINUED | OUTPATIENT
Start: 2023-03-17 | End: 2023-03-17

## 2023-03-17 RX ORDER — PROPOFOL 10 MG/ML
VIAL (ML) INTRAVENOUS
Status: DISCONTINUED | OUTPATIENT
Start: 2023-03-17 | End: 2023-03-17

## 2023-03-17 RX ADMIN — PROPOFOL 20 MG: 10 INJECTION, EMULSION INTRAVENOUS at 07:03

## 2023-03-17 RX ADMIN — LIDOCAINE HYDROCHLORIDE 50 MG: 20 INJECTION, SOLUTION EPIDURAL; INFILTRATION; INTRACAUDAL at 07:03

## 2023-03-17 RX ADMIN — PROPOFOL 60 MG: 10 INJECTION, EMULSION INTRAVENOUS at 07:03

## 2023-03-17 RX ADMIN — SODIUM CHLORIDE: 0.9 INJECTION, SOLUTION INTRAVENOUS at 07:03

## 2023-03-17 RX ADMIN — PROPOFOL 30 MG: 10 INJECTION, EMULSION INTRAVENOUS at 07:03

## 2023-03-17 NOTE — ANESTHESIA POSTPROCEDURE EVALUATION
Anesthesia Post Evaluation    Patient: Shima Sorto    Procedure(s) Performed: Procedure(s) (LRB):  EGD (ESOPHAGOGASTRODUODENOSCOPY) (N/A)    Final Anesthesia Type: general      Patient location during evaluation: GI PACU  Patient participation: Yes- Able to Participate  Level of consciousness: awake and alert  Post-procedure vital signs: reviewed and stable  Pain management: adequate  Airway patency: patent  KATHERIN mitigation strategies: Multimodal analgesia  PONV status at discharge: No PONV  Anesthetic complications: no      Cardiovascular status: stable  Respiratory status: unassisted and spontaneous ventilation  Hydration status: euvolemic  Follow-up not needed.          Vitals Value Taken Time   /96 03/17/23 0817   Temp 36.6 °C (97.9 °F) 03/17/23 0757   Pulse 78 03/17/23 0817   Resp 18 03/17/23 0817   SpO2 98 % 03/17/23 0817         Event Time   Out of Recovery 08:31:43         Pain/Eboni Score: Eboni Score: 10 (3/17/2023  7:58 AM)

## 2023-03-17 NOTE — H&P
[facility] 4[facility]  History & Physical    Subjective:      Chief Complaint/Reason for Admission:     EGD    Shima Sorto is a 82 y.o. female.    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 ENDO (4TH FLR);  Service: Endoscopy;  Laterality: N/A;    INJECTION FOR SENTINEL NODE IDENTIFICATION Right 8/17/2018    Procedure: INJECTION, FOR SENTINEL NODE IDENTIFICATION;  Surgeon: Abby Mason MD;  Location: [facility] OR [address] [place] [address];  Service: General;  Laterality: Right;    interstim placed stage 1      and removed    KIDNEY STONE SURGERY  2000    @ [facility]    MASTECTOMY, PARTIAL Right 8/17/2018    Procedure: MASTECTOMY, PARTIAL-US guided;  Surgeon: Abby Mason MD;  Location: [facility] OR [address] [place] [address];  Service: General;  Laterality: Right;    MOHS procedure      SENTINEL LYMPH NODE BIOPSY Right 8/17/2018    Procedure: BIOPSY, LYMPH NODE, SENTINEL;  Surgeon: Abby Mason MD;  Location: [facility] OR [address] [place] [address];  Service: General;  Laterality: Right;     Family History   Problem Relation Age of Onset    Kidney disease Mother     Heart disease Father     Melanoma Father     Heart attack Father     Breast cancer Maternal Aunt     Hearing loss Son     Hyperlipidemia Son     Anesthesia problems Neg Hx     Malignant hypertension Neg Hx      Hypotension Neg Hx     Malignant hyperthermia Neg Hx     Pseudochol deficiency Neg Hx     Colon cancer Neg Hx     Ovarian cancer Neg Hx     Psoriasis Neg Hx     Lupus Neg Hx     Eczema Neg Hx     Acne Neg Hx      Social History     Tobacco Use    Smoking status: Former     Packs/day: 0.50     Years: 8.00     Pack years: 4.00     Types: Cigarettes     Quit date: 1964     Years since quittin.6    Smokeless tobacco: Never   Substance Use Topics    Alcohol use: Yes     Alcohol/week: 1.0 standard drink     Types: 1 Shots of liquor per week     Comment: less than one weekly    Drug use: No       PTA Medications   Medication Sig    amLODIPine (NORVASC) 2.5 MG tablet Take 1 tablet (2.5 mg total) by mouth once daily.    anastrozole (ARIMIDEX) 1 mg Tab TAKE 1 TABLET(1 MG) BY MOUTH EVERY DAY.    coQ10, ubiquinol, 100 mg Cap Take 100 mg by mouth once daily.    denosumab (PROLIA) 60 mg/mL Syrg Inject 60 mg into the skin every 6 (six) months.    doxycycline (VIBRA-TABS) 100 MG tablet Take 100 mg by mouth 2 (two) times daily.    ferrous sulfate (SLOW RELEASE IRON) 142 mg (45 mg iron) TbSR     fish oil-E-fatty acid5-sedf744 400-5 mg-unit Cap Take 1 capsule by mouth Daily.    PRESERVISION AREDS-2 250-90-40-1 mg Cap Take 1 tablet by mouth 2 (two) times daily.    UNABLE TO FIND 2 tablets 2 (two) times daily. Rufus-Mag-Citrate with D3 & K2    UNABLE TO FIND 4 capsules Daily. Nutrafol    valsartan (DIOVAN) 160 MG tablet TAKE 1 TABLET(160 MG) BY MOUTH EVERY DAY    vitamin renal formula, B-complex-vitamin c-folic acid, (NEPHROCAPS) 1 mg Cap Take 1 capsule by mouth once daily.     Review of patient's allergies indicates:   Allergen Reactions    Asparagus Rash        Review of Systems   Constitutional:  Negative for fever.   Respiratory:  Negative for shortness of breath.    Cardiovascular:  Negative for chest pain.   Gastrointestinal:  Negative for blood in stool, diarrhea and melena.     Objective:      Vital Signs (Most Recent)        Vital Signs Range (Last 24H):       Physical Exam  Constitutional:       Appearance: Normal appearance.   Pulmonary:      Effort: Pulmonary effort is normal.   Neurological:      Mental Status: She is alert and oriented to person, place, and time.           Assessment:      Assessment:  1. Abdominal distention    2. Early satiety    3. Chronic renal impairment, unspecified CKD stage    4. Macrocytic anemia    5. Low hemoglobin             Plan:    EGD

## 2023-03-17 NOTE — TRANSFER OF CARE
"Anesthesia Transfer of Care Note    Patient: Shima Sorto    Procedure(s) Performed: Procedure(s) (LRB):  EGD (ESOPHAGOGASTRODUODENOSCOPY) (N/A)    Patient location: OPS    Anesthesia Type: general    Transport from OR: Transported from OR on room air with adequate spontaneous ventilation    Post pain: adequate analgesia    Post assessment: no apparent anesthetic complications and tolerated procedure well    Post vital signs: stable    Level of consciousness: sedated    Nausea/Vomiting: no nausea/vomiting    Complications: none    Transfer of care protocol was followed      Last vitals:   Visit Vitals  BP (!) 203/83 (BP Location: Left arm, Patient Position: Lying)   Pulse (!) 54   Temp 36.6 °C (97.9 °F) (Tympanic)   Resp 18   Ht 5' 7" (1.702 m)   Wt 54 kg (119 lb)   LMP  (LMP Unknown)   SpO2 100%   Breastfeeding No   BMI 18.64 kg/m²     "

## 2023-03-17 NOTE — PROVATION PATIENT INSTRUCTIONS
Discharge Summary/Instructions after an Endoscopic Procedure  Patient Name: Shima Sorto  Patient MRN: [id number]  Patient YOB: 1940 Friday, March 17, 2023  Keven Garza MD  Dear patient,  As a result of recent federal legislation (The Federal Cures Act), you may   receive lab or pathology results from your procedure in your [facility]   account before your physician is able to contact you. Your physician or   their representative will relay the results to you with their   recommendations at their soonest availability.  Thank you,  RESTRICTIONS:  During your procedure today, you received medications for sedation.  These   medications may affect your judgment, balance and coordination.  Therefore,   for 24 hours, you have the following restrictions:   - DO NOT drive a car, operate machinery, make legal/financial decisions,   sign important papers or drink alcohol.    ACTIVITY:  Today: no heavy lifting, straining or running due to procedural   sedation/anesthesia.  The following day: return to full activity including work.  DIET:  Eat and drink normally unless instructed otherwise.     TREATMENT FOR COMMON SIDE EFFECTS:  - Mild abdominal pain, nausea, belching, bloating or excessive gas:  rest,   eat lightly and use a heating pad.  - Sore Throat: treat with throat lozenges and/or gargle with warm salt   water.  - Because air was used during the procedure, expelling large amounts of air   from your rectum or belching is normal.  - If a bowel prep was taken, you may not have a bowel movement for 1-3 days.    This is normal.  SYMPTOMS TO WATCH FOR AND REPORT TO YOUR PHYSICIAN:  1. Abdominal pain or bloating, other than gas cramps.  2. Chest pain.  3. Back pain.  4. Signs of infection such as: chills or fever occurring within 24 hours   after the procedure.  5. Rectal bleeding, which would show as bright red, maroon, or black stools.   (A tablespoon of blood from the rectum is not serious, especially if    hemorrhoids are present.)  6. Vomiting.  7. Weakness or dizziness.  GO DIRECTLY TO THE NEAREST EMERGENCY ROOM IF YOU HAVE ANY OF THE FOLLOWING:      Difficulty breathing              Chills and/or fever over 101 F   Persistent vomiting and/or vomiting blood   Severe abdominal pain   Severe chest pain   Black, tarry stools   Bleeding- more than one tablespoon   Any other symptom or condition that you feel may need urgent attention  Your doctor recommends these additional instructions:  If any biopsies were taken, your doctors clinic will contact you in 1 to 2   weeks with any results.  - Discharge patient to home.   - Await pathology results.   - Telephone endoscopist for pathology results in 3 weeks.   - Return to GI clinic.   - Use Protonix 40 mg once daily (or any other full strength proton pump   inhibitor) - best taken 45 minutes before your first protein containing   meal.   - The findings and recommendations were discussed with the patient.  For questions, problems or results please call your physician - Keven Garza MD at Work:  [phone].  [facility], EMERGENCY ROOM PHONE NUMBER: [phone]  IF A COMPLICATION OR EMERGENCY SITUATION ARISES AND YOU ARE UNABLE TO REACH   YOUR PHYSICIAN - GO DIRECTLY TO THE EMERGENCY ROOM.  Keven Garza MD  3/17/2023 7:46:25 AM  This report has been verified and signed electronically.  Dear patient,  As a result of recent federal legislation (The Federal Cures Act), you may   receive lab or pathology results from your procedure in your [facility]   account before your physician is able to contact you. Your physician or   their representative will relay the results to you with their   recommendations at their soonest availability.  Thank you,  PROVATION

## 2023-03-17 NOTE — ANESTHESIA PREPROCEDURE EVALUATION
03/17/2023  Shima Sorto is a 82 y.o., female.    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    Visual impairment     reading glasses       Past Surgical History:   Procedure Laterality Date    BREAST CYST ASPIRATION Right 1999    BREAST LUMPECTOMY Right 2018    with radiation    COLONOSCOPY N/A 7/24/2018    Procedure: COLONOSCOPY;  Surgeon: Juan Miguel Pena MD;  Location: [facility] ENDO (4TH FLR);  Service: Endoscopy;  Laterality: N/A;    INJECTION FOR SENTINEL NODE IDENTIFICATION Right 8/17/2018    Procedure: INJECTION, FOR SENTINEL NODE IDENTIFICATION;  Surgeon: Abby Mason MD;  Location: [facility] OR [address] [place] [address];  Service: General;  Laterality: Right;    interstim placed stage 1      and removed    KIDNEY STONE SURGERY  2000    @ [facility]    MASTECTOMY, PARTIAL Right 8/17/2018    Procedure: MASTECTOMY, PARTIAL-US guided;  Surgeon: Abby Mason MD;  Location: [facility] OR [address] [place] [address];  Service: General;  Laterality: Right;    MOHS procedure      SENTINEL LYMPH NODE BIOPSY Right 8/17/2018    Procedure: BIOPSY, LYMPH NODE, SENTINEL;  Surgeon: Abby Mason MD;  Location: [facility] OR [address] [place] [address];  Service: General;  Laterality: Right;             Pre-op Assessment    I have reviewed the Patient Summary Reports.     I have reviewed the Nursing Notes.    I have reviewed the Medications.     Review of Systems  Anesthesia Hx:  No problems with previous Anesthesia    Social:  Former Smoker, Alcohol  Use    Hematology/Oncology:  Hematology Normal   Oncology Normal     EENT/Dental:EENT/Dental Normal   Cardiovascular:   Exercise tolerance: good Hypertension    Pulmonary:   Pneumonia Sleep Apnea    Renal/:   Chronic Renal Disease    Hepatic/GI:  Hepatic/GI Normal    Musculoskeletal:  Musculoskeletal Normal    Neurological:   Neuromuscular Disease,    Endocrine:  Endocrine Normal    Dermatological:  Skin Normal    Psych:  Psychiatric Normal           Physical Exam  General: Well nourished, Cooperative, Alert and Oriented    Airway:  Mallampati: II / II  Mouth Opening: Normal  TM Distance: 4 - 6 cm  Tongue: Normal  Neck ROM: Normal ROM    Dental:  Intact    Chest/Lungs:  Clear to auscultation, Normal Respiratory Rate    Heart:  Rate: Normal  Rhythm: Regular Rhythm        Anesthesia Plan  Type of Anesthesia, risks & benefits discussed:    Anesthesia Type: Gen Natural Airway  Intra-op Monitoring Plan: Standard ASA Monitors  Post Op Pain Control Plan: multimodal analgesia  Induction:  IV  Informed Consent: Informed consent signed with the Patient and all parties understand the risks and agree with anesthesia plan.  All questions answered.   ASA Score: 2  Day of Surgery Review of History & Physical: H&P Update referred to the surgeon/provider.    Ready For Surgery From Anesthesia Perspective.     .

## 2023-03-25 LAB
FINAL PATHOLOGIC DIAGNOSIS: NORMAL
GROSS: NORMAL
Lab: NORMAL
SUPPLEMENTAL DIAGNOSIS: NORMAL

## 2023-03-28 DIAGNOSIS — N18.30 STAGE 3 CHRONIC KIDNEY DISEASE, UNSPECIFIED WHETHER STAGE 3A OR 3B CKD: Primary | ICD-10-CM

## 2023-03-29 ENCOUNTER — HOSPITAL ENCOUNTER (OUTPATIENT)
Dept: RADIOLOGY | Facility: HOSPITAL | Age: 83
Discharge: HOME | End: 2023-03-29
Attending: INTERNAL MEDICINE
Payer: MEDICARE

## 2023-03-29 DIAGNOSIS — N28.1 RENAL CYST: ICD-10-CM

## 2023-03-29 PROCEDURE — 76770 US EXAM ABDO BACK WALL COMP: CPT | Mod: 26,,, | Performed by: STUDENT IN AN ORGANIZED HEALTH CARE EDUCATION/TRAINING PROGRAM

## 2023-03-29 PROCEDURE — 76770 US RETROPERITONEAL COMPLETE: ICD-10-PCS | Mod: 26,,, | Performed by: STUDENT IN AN ORGANIZED HEALTH CARE EDUCATION/TRAINING PROGRAM

## 2023-03-29 PROCEDURE — 76770 US EXAM ABDO BACK WALL COMP: CPT | Mod: TC

## 2023-03-30 ENCOUNTER — LAB VISIT (OUTPATIENT)
Dept: LAB | Facility: HOSPITAL | Age: 83
End: 2023-03-30
Payer: MEDICARE

## 2023-03-30 DIAGNOSIS — N18.30 STAGE 3 CHRONIC KIDNEY DISEASE, UNSPECIFIED WHETHER STAGE 3A OR 3B CKD: ICD-10-CM

## 2023-03-30 LAB
ALBUMIN SERPL BCP-MCNC: 3.9 G/DL (ref 3.5–5.2)
ANION GAP SERPL CALC-SCNC: 8 MMOL/L (ref 8–16)
BUN SERPL-MCNC: 28 MG/DL (ref 8–23)
CALCIUM SERPL-MCNC: 9.2 MG/DL (ref 8.7–10.5)
CHLORIDE SERPL-SCNC: 96 MMOL/L (ref 95–110)
CO2 SERPL-SCNC: 27 MMOL/L (ref 23–29)
CREAT SERPL-MCNC: 1.2 MG/DL (ref 0.5–1.4)
EST. GFR  (NO RACE VARIABLE): 45.2 ML/MIN/1.73 M^2
GLUCOSE SERPL-MCNC: 105 MG/DL (ref 70–110)
HCT VFR BLD AUTO: 32.4 % (ref 37–48.5)
HGB BLD-MCNC: 10.4 G/DL (ref 12–16)
PHOSPHATE SERPL-MCNC: 3.9 MG/DL (ref 2.7–4.5)
POTASSIUM SERPL-SCNC: 4.9 MMOL/L (ref 3.5–5.1)
PTH-INTACT SERPL-MCNC: 98.8 PG/ML (ref 9–77)
SODIUM SERPL-SCNC: 131 MMOL/L (ref 136–145)

## 2023-03-30 PROCEDURE — 85018 HEMOGLOBIN: CPT | Performed by: INTERNAL MEDICINE

## 2023-03-30 PROCEDURE — 83970 ASSAY OF PARATHORMONE: CPT | Performed by: INTERNAL MEDICINE

## 2023-03-30 PROCEDURE — 85014 HEMATOCRIT: CPT | Performed by: INTERNAL MEDICINE

## 2023-03-30 PROCEDURE — 80069 RENAL FUNCTION PANEL: CPT | Performed by: INTERNAL MEDICINE

## 2023-03-30 PROCEDURE — 36415 COLL VENOUS BLD VENIPUNCTURE: CPT | Performed by: INTERNAL MEDICINE

## 2023-03-31 ENCOUNTER — OFFICE VISIT (OUTPATIENT)
Dept: NEPHROLOGY | Facility: CLINIC | Age: 83
End: 2023-03-31
Payer: MEDICARE

## 2023-03-31 VITALS
HEIGHT: 67 IN | OXYGEN SATURATION: 100 % | WEIGHT: 125.69 LBS | HEART RATE: 50 BPM | BODY MASS INDEX: 19.73 KG/M2 | SYSTOLIC BLOOD PRESSURE: 161 MMHG | DIASTOLIC BLOOD PRESSURE: 65 MMHG

## 2023-03-31 DIAGNOSIS — E87.1 HYPONATREMIA: ICD-10-CM

## 2023-03-31 DIAGNOSIS — N18.31 STAGE 3A CHRONIC KIDNEY DISEASE: Primary | ICD-10-CM

## 2023-03-31 DIAGNOSIS — I10 HYPERTENSION, UNSPECIFIED TYPE: ICD-10-CM

## 2023-03-31 PROCEDURE — 99214 PR OFFICE/OUTPT VISIT, EST, LEVL IV, 30-39 MIN: ICD-10-PCS | Mod: S$PBB,,, | Performed by: INTERNAL MEDICINE

## 2023-03-31 PROCEDURE — 99999 PR PBB SHADOW E&M-EST. PATIENT-LVL III: ICD-10-PCS | Mod: PBBFAC,,, | Performed by: INTERNAL MEDICINE

## 2023-03-31 PROCEDURE — 99999 PR PBB SHADOW E&M-EST. PATIENT-LVL III: CPT | Mod: PBBFAC,,, | Performed by: INTERNAL MEDICINE

## 2023-03-31 PROCEDURE — 99213 OFFICE O/P EST LOW 20 MIN: CPT | Mod: PBBFAC | Performed by: INTERNAL MEDICINE

## 2023-03-31 PROCEDURE — 99214 OFFICE O/P EST MOD 30 MIN: CPT | Mod: S$PBB,,, | Performed by: INTERNAL MEDICINE

## 2023-03-31 NOTE — PROGRESS NOTES
HISTORY OF PRESENT ILLNESS:  This is a 82 -year-old white female with a   longstanding history of nephrolithiasis, but no recent symptomatic   nephrolithiasis episodes who is coming in for a followup.  She also has   hypertension with stable blood pressures at home and had failed InterStim   therapy with her bladder in the past and had seen Dr. Stoddard in Urology.  No   recent symptomatic stones.  Her kidney function is stable with a creatinine of   1.2.    Her blood pressure is stable at home in the 105's-130's/50's-60's and occasionally higher. C/o abd distension and is being worked up in GI.     PAST MEDICAL HISTORY:  Significant for hypertension for over 10 years, history   of kidney stones, failed InterStim therapy, breast cancer, status post   lumpectomy, XRT and adjuvant hormonal therapy and osteoporosis.  KATHERIN-CPAP    REVIEW OF SYSTEMS:  Abd distension. She states that she is feeling well but some fatigue.  Apetite good.  Weight stable. Denies any back pain or kidney stone pain.  She denies any blood in the urine, frequency, urgency, hematuria, dysuria, nausea, vomiting, chest pain or shortness of breath.    PHYSICAL EXAMINATION:limited  GENERAL:  A well-nourished, well-developed individual, in no acute distress.  CARDIOVASCULAR:  Rate and rhythm regular.  No murmurs, gallops or rubs.  LUNGS:  Clear to auscultation bilaterally.  Normal respiratory effort.  ABDOMEN:  Soft, nontender and nondistended.  No edema    EXTREMITIES:  Edema.    ASSESSMENT AND PLAN:  This is a 82-year-old white female with a longstanding   history of hypertension who is coming in for a followup visit.  1.   Nephrolithiasis.  No recent symptomatic nephrolithiasis.  She had a   procedure in 2001, for a kidney stone.  Her most recent CAT scan showed small   calcifications with 0.6 cm stone and 0.4 cm stone in the past but recent one showed only 0.4 cm stone on the left(one mildly complex cyst), which were not causing her   problem.  3  liters of urine output per last 24-hour   urine per the patient.  I advised her to drink lemon juice for citrate.  She   also follows a low oxalate diet for high oxalate.  I also advised her to decrease her   meat intake.  Last US from 6/22 showed 0.5 cm on right side  and mild hydro frances and saw urology.    Sodium slightly low-nml earlier in the month.  Will recheck with osm's in 2 weeks. H/o breast cancer. Had EGD, colonoscopy, mammogram, pap smear.  Started on PPI by GI but stopped it today and is taking  rolaids, asked her to check with GI to take pepcid if she can tolerate it better and safer for kidneys.   2.  Renal/ckd stg 3:  Her creatinines have been stable around 1.2-1.3 with GFR of 45  Ml/min.    I emphasized good   blood pressure control.   RBC's on UA's of and  likely sec to stones-had seen urology in 1/22. Had PVR and did not want to cathterize and failed interstim.  No RBC's on UA's overall since then.  3.  Hypertension.  Blood pressures are stable.     She has   no urine protein.  She is on Benicar for nephroprotection.  4.  Renal osteodystrophy.  Vitamin D levels are stable.   Her PTH is stable.  Calcium and phosphorus are stable.    5.  Anemia: stable. On  iron.      She can be  followed up in 6 months.  RFP , urine osmolality and serum osmolality in a week.

## 2023-04-03 ENCOUNTER — OFFICE VISIT (OUTPATIENT)
Dept: UROLOGY | Facility: CLINIC | Age: 83
End: 2023-04-03
Payer: MEDICARE

## 2023-04-03 VITALS
SYSTOLIC BLOOD PRESSURE: 113 MMHG | WEIGHT: 126.31 LBS | HEART RATE: 55 BPM | BODY MASS INDEX: 19.82 KG/M2 | HEIGHT: 67 IN | DIASTOLIC BLOOD PRESSURE: 64 MMHG

## 2023-04-03 DIAGNOSIS — N32.89 BLADDER DISTENTION: ICD-10-CM

## 2023-04-03 DIAGNOSIS — R33.9 INCOMPLETE BLADDER EMPTYING: Primary | ICD-10-CM

## 2023-04-03 DIAGNOSIS — N28.1 RENAL CYST: ICD-10-CM

## 2023-04-03 LAB
BILIRUB SERPL-MCNC: NORMAL MG/DL
BLOOD URINE, POC: NORMAL
CLARITY, POC UA: CLEAR
COLOR, POC UA: YELLOW
GLUCOSE UR QL STRIP: NORMAL
KETONES UR QL STRIP: NORMAL
LEUKOCYTE ESTERASE URINE, POC: NORMAL
MICROSCOPIC COMMENT: ABNORMAL
NITRITE, POC UA: NORMAL
PH, POC UA: 8
POC RESIDUAL URINE VOLUME: 805 ML (ref 0–100)
PROTEIN, POC: NORMAL
RBC #/AREA URNS AUTO: 6 /HPF (ref 0–4)
SPECIFIC GRAVITY, POC UA: 1000
UROBILINOGEN, POC UA: NORMAL
WBC #/AREA URNS AUTO: 1 /HPF (ref 0–5)

## 2023-04-03 PROCEDURE — 51701 INSERT BLADDER CATHETER: CPT | Mod: S$PBB,,, | Performed by: NURSE PRACTITIONER

## 2023-04-03 PROCEDURE — 87086 URINE CULTURE/COLONY COUNT: CPT | Performed by: NURSE PRACTITIONER

## 2023-04-03 PROCEDURE — 99215 PR OFFICE/OUTPT VISIT, EST, LEVL V, 40-54 MIN: ICD-10-PCS | Mod: S$PBB,25,, | Performed by: NURSE PRACTITIONER

## 2023-04-03 PROCEDURE — 81001 URINALYSIS AUTO W/SCOPE: CPT | Performed by: NURSE PRACTITIONER

## 2023-04-03 PROCEDURE — 99215 OFFICE O/P EST HI 40 MIN: CPT | Mod: S$PBB,25,, | Performed by: NURSE PRACTITIONER

## 2023-04-03 PROCEDURE — 99999 PR PBB SHADOW E&M-EST. PATIENT-LVL IV: CPT | Mod: PBBFAC,,, | Performed by: NURSE PRACTITIONER

## 2023-04-03 PROCEDURE — 51798 US URINE CAPACITY MEASURE: CPT | Mod: PBBFAC | Performed by: NURSE PRACTITIONER

## 2023-04-03 PROCEDURE — 99214 OFFICE O/P EST MOD 30 MIN: CPT | Mod: PBBFAC | Performed by: NURSE PRACTITIONER

## 2023-04-03 PROCEDURE — 81002 URINALYSIS NONAUTO W/O SCOPE: CPT | Mod: PBBFAC | Performed by: NURSE PRACTITIONER

## 2023-04-03 PROCEDURE — 99999 PR PBB SHADOW E&M-EST. PATIENT-LVL IV: ICD-10-PCS | Mod: PBBFAC,,, | Performed by: NURSE PRACTITIONER

## 2023-04-03 PROCEDURE — 51701 INSERT BLADDER CATHETER: CPT | Mod: PBBFAC | Performed by: NURSE PRACTITIONER

## 2023-04-03 PROCEDURE — 51701 PR INSERTION OF NON-INDWELLING BLADDER CATHETERIZATION FOR RESIDUAL UR: ICD-10-PCS | Mod: S$PBB,,, | Performed by: NURSE PRACTITIONER

## 2023-04-03 RX ORDER — TAMSULOSIN HYDROCHLORIDE 0.4 MG/1
0.4 CAPSULE ORAL DAILY
Qty: 30 CAPSULE | Refills: 11 | Status: ON HOLD | OUTPATIENT
Start: 2023-04-03 | End: 2023-10-14 | Stop reason: SDUPTHER

## 2023-04-03 NOTE — PROGRESS NOTES
CHIEF COMPLAINT:    Mrs. Sorto is a 82 y.o. female presenting for bladder distention.    .  PRESENTING ILLNESS:    Shima Sorto is a 82 y.o. female with a PMH of htn, ckd 3, hx breast ca, sania who presents for bladder distention on imaging.     Established patient to urology department. Presents today for bladder distention on imaging.  Recently underwent GI work up including CTA of abd/pelvis.  CTA remarkable for moderately distended bladder. Has history of non obstructed urinary retention.  Tried intermittent catheterization in the past, but stopped due to recurrent symptomatic bladder infections as a result of catheterization.  Underwent the first stage InterStim but it was unsccessful so did not undergo the second stage and the lead was removed.  She states when she is away from her house, it is harder to urinate as she is generally cold and finds when the bathrooms are cold, she tenses up and cannot void well. She feels like she voids better at home.  Her bathroom has a heater which she turns on when she voids.  She does not wish to try catheterizing.     Has had hydronephrosis on left side.  Recent kidney ultrasound reviewed and left hydronephrosis stable. Her creatinine has also been stable.  She has stones on the right side, they are small and not bothering.       REVIEW OF SYSTEMS:    Review of Systems   Constitutional:  Negative for chills and fever.   Respiratory:  Negative for shortness of breath.    Cardiovascular:  Negative for chest pain.   Gastrointestinal:  Negative for constipation and diarrhea.   Genitourinary:  Negative for dysuria, flank pain, frequency, hematuria and urgency.   Neurological:  Negative for dizziness and weakness.      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       Family History   Problem Relation Age of Onset    Kidney disease Mother     Heart disease Father     Melanoma Father     Heart attack Father     Breast cancer Maternal Aunt     Hearing loss Son     Hyperlipidemia Son     Anesthesia problems Neg Hx     Malignant hypertension Neg Hx     Hypotension Neg Hx     Malignant hyperthermia Neg Hx     Pseudochol deficiency Neg Hx     Colon cancer Neg Hx     Ovarian cancer Neg Hx     Psoriasis Neg Hx     Lupus Neg Hx     Eczema Neg Hx     Acne Neg Hx        Allergies:  Asparagus    Medications:    Current Outpatient Medications:     amLODIPine (NORVASC) 2.5 MG tablet, Take 1 tablet (2.5 mg total) by mouth once daily., Disp: 90 tablet, Rfl: 1    anastrozole (ARIMIDEX) 1 mg Tab, TAKE 1 TABLET(1 MG) BY MOUTH EVERY DAY., Disp: 90 tablet, Rfl: 3    coQ10, ubiquinol, 100 mg Cap, Take 100 mg by mouth once daily., Disp: , Rfl:     denosumab (PROLIA) 60 mg/mL Syrg, Inject 60 mg into the skin every 6 (six) months., Disp: , Rfl:     ferrous sulfate (SLOW RELEASE IRON) 142 mg (45 mg iron) TbSR, , Disp: , Rfl:     fish oil-E-fatty [id number] 400-5 mg-unit Cap, Take 1 capsule by mouth Daily., Disp: , Rfl:     PRESERVISION AREDS-2 250-90-40-1 mg Cap, Take 1 tablet by mouth 2 (two) times daily., Disp: , Rfl:     UNABLE TO FIND, 2 tablets 2 (two) times daily. Rufus-Mag-Citrate with D3 & K2, Disp: , Rfl:     UNABLE TO FIND, 4 capsules Daily. Nutrafol, Disp: , Rfl:     valsartan (DIOVAN) 160 MG tablet, TAKE 1 TABLET(160 MG) BY MOUTH EVERY DAY, Disp: 90 tablet, Rfl: 2    vitamin renal formula, B-complex-vitamin c-folic acid, (NEPHROCAPS) 1 mg Cap, Take 1 capsule by mouth once daily., Disp: 90 capsule, Rfl: 3    doxycycline  (VIBRA-TABS) 100 MG tablet, Take 100 mg by mouth 2 (two) times daily., Disp: , Rfl:     pantoprazole (PROTONIX) 40 MG tablet, Take 1 tablet (40 mg total) by mouth before breakfast. Best taken 45-60 minutes before your first protein meal of the day - Breakfast (Patient not taking: Reported on 4/3/2023), Disp: 90 tablet, Rfl: 0    tamsulosin (FLOMAX) 0.4 mg Cap, Take 1 capsule (0.4 mg total) by mouth once daily., Disp: 30 capsule, Rfl: 11  No current facility-administered medications for this visit.    Facility-Administered Medications Ordered in Other Visits:     0.9%  NaCl infusion, , Intravenous, Continuous, James Carranza MD, Last Rate: 70 mL/hr at 08/17/18 0843, New Bag at 03/17/23 0719    PHYSICAL EXAMINATION:    Physical Exam  Vitals and nursing note reviewed. Exam conducted with a chaperone present.   Constitutional:       Appearance: Normal appearance. She is well-developed.   HENT:      Head: Normocephalic and atraumatic.   Eyes:      Pupils: Pupils are equal, round, and reactive to light.   Pulmonary:      Effort: Pulmonary effort is normal.   Genitourinary:     General: Normal vulva.      Exam position: Supine.      Urethra: No prolapse, urethral pain, urethral swelling or urethral lesion.      Vagina: Normal.      Rectum: Normal.   Musculoskeletal:         General: Normal range of motion.      Cervical back: Normal range of motion.   Skin:     General: Skin is warm and dry.   Neurological:      Mental Status: She is alert and oriented to person, place, and time.   Psychiatric:         Behavior: Behavior normal.       Consent verbally obtained.  Betadine prep was applied to the urethral meatus. An in and out cath was performed after voiding.  The PVR was 820 ml.      LABS:    U/a dipstick performed in office today: yellow, ph 8, 1.000, trace bld  Bladder scan performed by Nurse Duina.   ml (patient did not void prior)      IMPRESSION:    Encounter Diagnoses   Name Primary?    Incomplete bladder  emptying Yes    Bladder distention     Renal cyst        PLAN:    Problem List Items Addressed This Visit    None  Visit Diagnoses       Incomplete bladder emptying    -  Primary    Relevant Orders    Ambulatory referral/consult to Physical/Occupational Therapy    Bladder distention        Relevant Orders    POCT URINE DIPSTICK WITHOUT MICROSCOPE (Completed)    POCT Bladder Scan (Completed)    Urinalysis Microscopic    Urine culture    Renal cyst                1. Bladder distention/incomplete bladder emptying    - PVR > 800 ml   - patient does not want to self cath due to infection risk   - will trial flomax daily   - refer to pelvic floor therapy   - send urine for analysis and culture  2. Rtc in 1 wk for PVR check, if >250 ml will plan to re-teach cic       Roxy Meyers NP      I spent over 45 minutes with the patient. Over 50% of the visit was spent in counseling.

## 2023-04-05 ENCOUNTER — OFFICE VISIT (OUTPATIENT)
Dept: URGENT CARE | Facility: CLINIC | Age: 83
End: 2023-04-05
Payer: MEDICARE

## 2023-04-05 VITALS
DIASTOLIC BLOOD PRESSURE: 69 MMHG | SYSTOLIC BLOOD PRESSURE: 138 MMHG | TEMPERATURE: 99 F | RESPIRATION RATE: 16 BRPM | WEIGHT: 126 LBS | HEIGHT: 67 IN | OXYGEN SATURATION: 98 % | BODY MASS INDEX: 19.78 KG/M2 | HEART RATE: 89 BPM

## 2023-04-05 DIAGNOSIS — N30.01 ACUTE CYSTITIS WITH HEMATURIA: ICD-10-CM

## 2023-04-05 DIAGNOSIS — R30.0 DYSURIA: Primary | ICD-10-CM

## 2023-04-05 DIAGNOSIS — N20.0 KIDNEY STONES: ICD-10-CM

## 2023-04-05 DIAGNOSIS — R35.0 FREQUENCY OF MICTURITION: ICD-10-CM

## 2023-04-05 LAB
BACTERIA UR CULT: NO GROWTH
BILIRUB UR QL STRIP: NEGATIVE
GLUCOSE UR QL STRIP: NEGATIVE
KETONES UR QL STRIP: NEGATIVE
LEUKOCYTE ESTERASE UR QL STRIP: NEGATIVE
PH, POC UA: 7 (ref 5–8)
POC BLOOD, URINE: POSITIVE
POC NITRATES, URINE: POSITIVE
PROT UR QL STRIP: POSITIVE
SP GR UR STRIP: 1.01 (ref 1–1.03)
UROBILINOGEN UR STRIP-ACNC: NORMAL (ref 0.1–1.1)

## 2023-04-05 PROCEDURE — 81003 URINALYSIS AUTO W/O SCOPE: CPT | Mod: QW,S$GLB,, | Performed by: FAMILY MEDICINE

## 2023-04-05 PROCEDURE — 87086 URINE CULTURE/COLONY COUNT: CPT | Performed by: FAMILY MEDICINE

## 2023-04-05 PROCEDURE — 81003 POCT URINALYSIS, DIPSTICK, MANUAL, W/O SCOPE: ICD-10-PCS | Mod: QW,S$GLB,, | Performed by: FAMILY MEDICINE

## 2023-04-05 PROCEDURE — 99213 PR OFFICE/OUTPT VISIT, EST, LEVL III, 20-29 MIN: ICD-10-PCS | Mod: S$GLB,,, | Performed by: FAMILY MEDICINE

## 2023-04-05 PROCEDURE — 99213 OFFICE O/P EST LOW 20 MIN: CPT | Mod: S$GLB,,, | Performed by: FAMILY MEDICINE

## 2023-04-05 RX ORDER — CEPHALEXIN 500 MG/1
500 CAPSULE ORAL EVERY 12 HOURS
Qty: 10 CAPSULE | Refills: 0 | Status: SHIPPED | OUTPATIENT
Start: 2023-04-05 | End: 2023-04-10

## 2023-04-05 NOTE — PROGRESS NOTES
"Subjective:      Patient ID: Shima Sorto is a 82 y.o. female.    Vitals:  height is 5' 7" (1.702 m) and weight is 57.2 kg (126 lb). Her temperature is 98.7 °F (37.1 °C). Her blood pressure is 138/69 and her pulse is 89. Her respiration is 16 and oxygen saturation is 98%.     Chief Complaint: Hematuria    Pt presents with c/o dysuria, increased frequency/urgency and blood in urine since this a.m.  Denies fever, chills, abd pain, N/V, vaginal discharge, weakness/dizziness.  Patient is currently under care of Urology for recurrent kidney stones and unstable bladder.  States the ultrasound done 1 week showed kidney stones.  Patient has a follow-up visit with urologist after 5 days.       Hematuria  This is a new problem. The current episode started yesterday. The problem is unchanged. She describes the hematuria as gross hematuria. She reports no clotting in her urine stream. Irritative symptoms include frequency. Pertinent negatives include no abdominal pain. Her past medical history is significant for kidney stones.     Gastrointestinal:  Negative for abdominal pain.   Genitourinary:  Positive for frequency and hematuria.    Objective:     Physical Exam   Constitutional: She is oriented to person, place, and time. She appears well-developed.   HENT:   Head: Normocephalic and atraumatic.   Ears:   Right Ear: External ear normal.   Left Ear: External ear normal.   Nose: Nose normal. No nasal deformity. No epistaxis.   Mouth/Throat: Oropharynx is clear and moist and mucous membranes are normal.   Eyes: Lids are normal.   Neck: Trachea normal and phonation normal. Neck supple.   Cardiovascular: Normal pulses.   No murmur heard.  Pulmonary/Chest: Effort normal. No stridor. No respiratory distress. She has no wheezes. She has no rhonchi. She has no rales.   Abdominal: Normal appearance and bowel sounds are normal. She exhibits no distension. Soft. There is no abdominal tenderness. There is no left CVA tenderness " and no right CVA tenderness.   Neurological: She is alert, oriented to person, place, and time and at baseline.   Skin: Skin is warm, dry and intact.   Psychiatric: Her speech is normal and behavior is normal.   Nursing note and vitals reviewed.    Assessment:     1. Dysuria    2. Acute cystitis with hematuria    3. Frequency of micturition    4. Kidney stones        Plan:   Discussed results/diagnosis/plan with patient in clinic. Advised to f/u with PCP/Urology within 2-5 days.  Considering patient's history of recurrent kidney stones, strict ER precautions given if symptoms get any worse. All questions answered. Patient verbally understood and agreed with treatment plan.     Dysuria  -     POCT Urinalysis, Dipstick, Manual, w/o Scope    Acute cystitis with hematuria  -     Urine culture    Frequency of micturition    Kidney stones    Other orders  -     cephALEXin (KEFLEX) 500 MG capsule; Take 1 capsule (500 mg total) by mouth every 12 (twelve) hours. for 5 days  Dispense: 10 capsule; Refill: 0

## 2023-04-08 ENCOUNTER — TELEPHONE (OUTPATIENT)
Dept: URGENT CARE | Facility: CLINIC | Age: 83
End: 2023-04-08
Payer: MEDICARE

## 2023-04-08 LAB — BACTERIA UR CULT: NORMAL

## 2023-04-08 NOTE — TELEPHONE ENCOUNTER
I spoke with patient and reported results of urine culture.  On cephalexin Day #3 and feeling better.  Plans to continue for full 5 days.  Has urology follow-up next week.

## 2023-04-09 ENCOUNTER — PATIENT MESSAGE (OUTPATIENT)
Dept: GASTROENTEROLOGY | Facility: CLINIC | Age: 83
End: 2023-04-09
Payer: MEDICARE

## 2023-04-10 ENCOUNTER — TELEPHONE (OUTPATIENT)
Dept: GASTROENTEROLOGY | Facility: CLINIC | Age: 83
End: 2023-04-10
Payer: MEDICARE

## 2023-04-10 ENCOUNTER — LAB VISIT (OUTPATIENT)
Dept: LAB | Facility: HOSPITAL | Age: 83
End: 2023-04-10
Payer: MEDICARE

## 2023-04-10 ENCOUNTER — OFFICE VISIT (OUTPATIENT)
Dept: URGENT CARE | Facility: CLINIC | Age: 83
End: 2023-04-10
Payer: MEDICARE

## 2023-04-10 VITALS
RESPIRATION RATE: 18 BRPM | TEMPERATURE: 98 F | HEART RATE: 64 BPM | HEIGHT: 67 IN | OXYGEN SATURATION: 99 % | SYSTOLIC BLOOD PRESSURE: 171 MMHG | BODY MASS INDEX: 19.8 KG/M2 | WEIGHT: 126.13 LBS | DIASTOLIC BLOOD PRESSURE: 71 MMHG

## 2023-04-10 DIAGNOSIS — R19.5 STOOL CONTENTS FINDING, ABNORMAL: Primary | ICD-10-CM

## 2023-04-10 DIAGNOSIS — Z13.0 SCREENING FOR IRON DEFICIENCY ANEMIA: ICD-10-CM

## 2023-04-10 DIAGNOSIS — Z79.899 ENCOUNTER FOR MONITORING LONG-TERM PROTON PUMP INHIBITOR THERAPY: ICD-10-CM

## 2023-04-10 DIAGNOSIS — B82.9 PARASITES IN STOOL: Primary | ICD-10-CM

## 2023-04-10 DIAGNOSIS — Z51.81 ENCOUNTER FOR MONITORING LONG-TERM PROTON PUMP INHIBITOR THERAPY: ICD-10-CM

## 2023-04-10 DIAGNOSIS — E87.1 HYPONATREMIA: ICD-10-CM

## 2023-04-10 DIAGNOSIS — Z87.440 HISTORY OF UTI: ICD-10-CM

## 2023-04-10 LAB
ALBUMIN SERPL BCP-MCNC: 3.9 G/DL (ref 3.5–5.2)
ANION GAP SERPL CALC-SCNC: 11 MMOL/L (ref 8–16)
BILIRUB UR QL STRIP: NEGATIVE
BUN SERPL-MCNC: 29 MG/DL (ref 8–23)
CALCIUM SERPL-MCNC: 9.5 MG/DL (ref 8.7–10.5)
CHLORIDE SERPL-SCNC: 95 MMOL/L (ref 95–110)
CO2 SERPL-SCNC: 24 MMOL/L (ref 23–29)
CREAT SERPL-MCNC: 1.6 MG/DL (ref 0.5–1.4)
EST. GFR  (NO RACE VARIABLE): 32 ML/MIN/1.73 M^2
GLUCOSE SERPL-MCNC: 97 MG/DL (ref 70–110)
GLUCOSE UR QL STRIP: NEGATIVE
KETONES UR QL STRIP: NEGATIVE
LEUKOCYTE ESTERASE UR QL STRIP: NEGATIVE
OSMOLALITY SERPL: 278 MOSM/KG (ref 275–295)
PH, POC UA: 7 (ref 5–8)
PHOSPHATE SERPL-MCNC: 4.1 MG/DL (ref 2.7–4.5)
POC BLOOD, URINE: POSITIVE
POC NITRATES, URINE: NEGATIVE
POTASSIUM SERPL-SCNC: 5.1 MMOL/L (ref 3.5–5.1)
PROT UR QL STRIP: NEGATIVE
SODIUM SERPL-SCNC: 130 MMOL/L (ref 136–145)
SP GR UR STRIP: 1 (ref 1–1.03)
UROBILINOGEN UR STRIP-ACNC: NORMAL (ref 0.1–1.1)

## 2023-04-10 PROCEDURE — 81003 URINALYSIS AUTO W/O SCOPE: CPT | Mod: QW,S$GLB,,

## 2023-04-10 PROCEDURE — 99213 OFFICE O/P EST LOW 20 MIN: CPT | Mod: S$GLB,,,

## 2023-04-10 PROCEDURE — 80069 RENAL FUNCTION PANEL: CPT | Performed by: INTERNAL MEDICINE

## 2023-04-10 PROCEDURE — 83930 ASSAY OF BLOOD OSMOLALITY: CPT | Performed by: INTERNAL MEDICINE

## 2023-04-10 PROCEDURE — 99213 PR OFFICE/OUTPT VISIT, EST, LEVL III, 20-29 MIN: ICD-10-PCS | Mod: S$GLB,,,

## 2023-04-10 PROCEDURE — 36415 COLL VENOUS BLD VENIPUNCTURE: CPT | Performed by: INTERNAL MEDICINE

## 2023-04-10 PROCEDURE — 81003 POCT URINALYSIS, DIPSTICK, AUTOMATED, W/O SCOPE: ICD-10-PCS | Mod: QW,S$GLB,,

## 2023-04-10 NOTE — TELEPHONE ENCOUNTER
----- Message from Jareth Harding sent at 4/10/2023  9:07 AM CDT -----  Type: Patient Call Back         Who called: Pt          What is the request in detail: Pt called in regarding call in regarding a message and Pic  that was send to the portal . Pt also would like a specimen to be collected          Can the clinic reply by [facility]?no          Would the patient rather a call back or a response via [facility]? Call back          Best call back number: [phone] (mobile)          Additional Information:           Thank You

## 2023-04-10 NOTE — PROGRESS NOTES
"Subjective:      Patient ID: Shima Sorto is a 82 y.o. female.    Vitals:  height is 5' 7" (1.702 m) and weight is 57.2 kg (126 lb 1.7 oz). Her tympanic temperature is 98 °F (36.7 °C). Her blood pressure is 171/71 (abnormal) and her pulse is 64. Her respiration is 18 and oxygen saturation is 99%.     Chief Complaint: Dysuria    Past Medical History:  No date: Allergy      Comment:  seasonal  No date: Anemia  7/2013: Basal cell carcinoma      Comment:  forehead  2018: Breast cancer  No date: Hx of colonic polyps  No date: Hypertension  No date: Medullary sponge kidney  No date: MVP (mitral valve prolapse)  No date: Osteoporosis, senile  No date: Pneumonia  No date: Renal calculi  No date: Sciatica  No date: Sleep apnea  No date: TMJ syndrome      Comment:  sometimes jaw clicking/jaw pain  No date: Urinary retention  1/17/2017: Vertigo  1/17/2017: Vestibular neuronitis  No date: Visual impairment      Comment:  reading glasses    Provider's note begins below:  Pt reports noticing worms in stool on Thursday 4/6 and Saturday 4/8. She uploaded pictures on her [employer] chart but did not hear back from her GI doctor. During today's visit, patient reported that she did hear back from the GI doctor and he ordered stool samples and a consult to infectious disease. She was seen by nephrologist last Monday 4/3 and urinary cath was placed. On Wednesday 4/5, she noticed dysuria, urinary frequency and blood in urine. She was seen here and prescribed with keflex x 5 days. She reports symptoms are better. No fevers, chills, body aches, abdominal pain, diarrhea, emesis.       Dysuria   This is a chronic problem. The problem occurs intermittently. The problem has been unchanged. The quality of the pain is described as aching. She is Not sexually active. There is No history of pyelonephritis. Pertinent negatives include no chills, frequency or nausea. Treatments tried: keflex. The treatment provided no relief.     Constitution: " Negative for chills, fatigue and fever.   Cardiovascular:  Negative for chest pain.   Gastrointestinal:  Negative for abdominal pain, nausea and diarrhea.   Genitourinary:  Negative for dysuria and frequency.   Musculoskeletal:  Negative for muscle ache.    Objective:     Physical Exam   Constitutional: She is oriented to person, place, and time. She appears well-developed.   HENT:   Head: Normocephalic and atraumatic.   Ears:   Right Ear: External ear normal.   Left Ear: External ear normal.   Nose: Nose normal.   Mouth/Throat: Mucous membranes are normal.   Eyes: Conjunctivae and lids are normal.   Neck: Trachea normal. Neck supple.   Cardiovascular: Normal rate, regular rhythm and normal heart sounds.   Pulmonary/Chest: Effort normal and breath sounds normal. No respiratory distress.   Abdominal: Normal appearance and bowel sounds are normal. She exhibits no distension and no mass. Soft. There is no abdominal tenderness. There is no rebound, no guarding, no left CVA tenderness and no right CVA tenderness.   Musculoskeletal: Normal range of motion.         General: Normal range of motion.   Neurological: She is alert and oriented to person, place, and time. She has normal strength.   Skin: Skin is warm, dry, intact, not diaphoretic and not pale.   Psychiatric: Her speech is normal and behavior is normal. Judgment and thought content normal.   Nursing note and vitals reviewed.      Results for orders placed or performed in visit on 04/10/23   POCT Urinalysis, Dipstick, Automated, W/O Scope   Result Value Ref Range    POC Blood, Urine Positive (A) Negative    POC Bilirubin, Urine Negative Negative    POC Urobilinogen, Urine normal 0.1 - 1.1    POC Ketones, Urine Negative Negative    POC Protein, Urine Negative Negative    POC Nitrates, Urine Negative Negative    POC Glucose, Urine Negative Negative    pH, UA 7.0 5 - 8    POC Specific Gravity, Urine 1.005 1.003 - 1.029    POC Leukocytes, Urine Negative Negative      *Note: Due to a large number of results and/or encounters for the requested time period, some results have not been displayed. A complete set of results can be found in Results Review.       Assessment:     1. Parasites in stool    2. History of UTI        Plan:       Parasites in stool    History of UTI  -     POCT Urinalysis, Dipstick, Automated, W/O Scope        Gave patient specimen container and instructions to bring to [facility] lab. She will follow up with infectious disease. Number given for her to contact tomorrow for appt. Strict ED precautions discussed.         Discussed results/diagnosis/plan with patient in clinic. Strict precautions given to patient to monitor for worsening signs and symptoms. Advised to follow up with PCP or specialist.  Explained side effects of medications prescribed with patient and informed him/her to discontinue use if he/she has any side effects and to inform UC or PCP if this occurs. All questions answered. Strict ED verses clinic return precautions stressed and given in depth. Advised if symptoms worsens of fail to improve he/she should go to the Emergency Room. Discharge and follow-up instructions given verbally/printed with the patient who expressed understanding and willingness to comply with my recommendations. Patient voiced understanding and in agreement with current treatment plan. Patient exits the exam room in no acute distress. Conversant and engaged during discharge discussion, verbalized understanding.

## 2023-04-10 NOTE — TELEPHONE ENCOUNTER
----- Message from Abram Finn sent at 4/10/2023  9:43 AM CDT -----  Regarding: RC  Contact: @[phone]  PT RC to camelia cabrera call and adv@[phone] or [phone](home)

## 2023-04-10 NOTE — PATIENT INSTRUCTIONS
Collect stool sample and bring to the [facility] main [facility] lab tomorrow. Your GI doctor placed an infectious disease consult. Call [phone] to schedule appointment.     Go to the ER if you have severe abdominal pain, unable to orally hydrate, palpitations, chest pain, shortness of breath.

## 2023-04-11 ENCOUNTER — TELEPHONE (OUTPATIENT)
Dept: NEPHROLOGY | Facility: CLINIC | Age: 83
End: 2023-04-11
Payer: MEDICARE

## 2023-04-11 ENCOUNTER — LAB VISIT (OUTPATIENT)
Dept: LAB | Facility: HOSPITAL | Age: 83
End: 2023-04-11
Attending: INTERNAL MEDICINE
Payer: MEDICARE

## 2023-04-11 DIAGNOSIS — Z51.81 ENCOUNTER FOR MONITORING LONG-TERM PROTON PUMP INHIBITOR THERAPY: ICD-10-CM

## 2023-04-11 DIAGNOSIS — Z79.899 ENCOUNTER FOR MONITORING LONG-TERM PROTON PUMP INHIBITOR THERAPY: ICD-10-CM

## 2023-04-11 DIAGNOSIS — R19.5 STOOL CONTENTS FINDING, ABNORMAL: ICD-10-CM

## 2023-04-11 DIAGNOSIS — E87.1 HYPONATREMIA: Primary | ICD-10-CM

## 2023-04-11 DIAGNOSIS — Z13.0 SCREENING FOR IRON DEFICIENCY ANEMIA: ICD-10-CM

## 2023-04-11 LAB
BASOPHILS # BLD AUTO: 0.02 K/UL (ref 0–0.2)
BASOPHILS NFR BLD: 0.8 % (ref 0–1.9)
DIFFERENTIAL METHOD: ABNORMAL
EOSINOPHIL # BLD AUTO: 0 K/UL (ref 0–0.5)
EOSINOPHIL NFR BLD: 0.8 % (ref 0–8)
ERYTHROCYTE [DISTWIDTH] IN BLOOD BY AUTOMATED COUNT: 12.5 % (ref 11.5–14.5)
HCT VFR BLD AUTO: 32 % (ref 37–48.5)
HGB BLD-MCNC: 10.6 G/DL (ref 12–16)
IGA SERPL-MCNC: 228 MG/DL (ref 40–350)
IMM GRANULOCYTES # BLD AUTO: 0.01 K/UL (ref 0–0.04)
IMM GRANULOCYTES NFR BLD AUTO: 0.4 % (ref 0–0.5)
LYMPHOCYTES # BLD AUTO: 1 K/UL (ref 1–4.8)
LYMPHOCYTES NFR BLD: 39.5 % (ref 18–48)
MCH RBC QN AUTO: 31.1 PG (ref 27–31)
MCHC RBC AUTO-ENTMCNC: 33.1 G/DL (ref 32–36)
MCV RBC AUTO: 94 FL (ref 82–98)
MONOCYTES # BLD AUTO: 0.4 K/UL (ref 0.3–1)
MONOCYTES NFR BLD: 15.4 % (ref 4–15)
NEUTROPHILS # BLD AUTO: 1.1 K/UL (ref 1.8–7.7)
NEUTROPHILS NFR BLD: 43.1 % (ref 38–73)
NRBC BLD-RTO: 0 /100 WBC
PLATELET # BLD AUTO: 191 K/UL (ref 150–450)
PMV BLD AUTO: 11.4 FL (ref 9.2–12.9)
RBC # BLD AUTO: 3.41 M/UL (ref 4–5.4)
VIT B12 SERPL-MCNC: 585 PG/ML (ref 210–950)
WBC # BLD AUTO: 2.53 K/UL (ref 3.9–12.7)

## 2023-04-11 PROCEDURE — 36415 COLL VENOUS BLD VENIPUNCTURE: CPT | Performed by: INTERNAL MEDICINE

## 2023-04-11 PROCEDURE — 86364 TISS TRNSGLTMNASE EA IG CLAS: CPT | Performed by: INTERNAL MEDICINE

## 2023-04-11 PROCEDURE — 85025 COMPLETE CBC W/AUTO DIFF WBC: CPT | Performed by: INTERNAL MEDICINE

## 2023-04-11 PROCEDURE — 82784 ASSAY IGA/IGD/IGG/IGM EACH: CPT | Performed by: INTERNAL MEDICINE

## 2023-04-11 PROCEDURE — 82607 VITAMIN B-12: CPT | Performed by: INTERNAL MEDICINE

## 2023-04-11 NOTE — TELEPHONE ENCOUNTER
Creatinine higher than last time.  Please have her hold valsartan and hydrate well and repeat rfp, urine osmolality, serum osmolality, urine sodium, urine chloride on Thursday.

## 2023-04-12 ENCOUNTER — DOCUMENTATION ONLY (OUTPATIENT)
Dept: GASTROENTEROLOGY | Facility: CLINIC | Age: 83
End: 2023-04-12
Payer: MEDICARE

## 2023-04-12 ENCOUNTER — TELEPHONE (OUTPATIENT)
Dept: GASTROENTEROLOGY | Facility: CLINIC | Age: 83
End: 2023-04-12
Payer: MEDICARE

## 2023-04-12 ENCOUNTER — PATIENT MESSAGE (OUTPATIENT)
Dept: GASTROENTEROLOGY | Facility: CLINIC | Age: 83
End: 2023-04-12
Payer: MEDICARE

## 2023-04-12 ENCOUNTER — CLINICAL SUPPORT (OUTPATIENT)
Dept: REHABILITATION | Facility: HOSPITAL | Age: 83
End: 2023-04-12
Payer: MEDICARE

## 2023-04-12 ENCOUNTER — TELEPHONE (OUTPATIENT)
Dept: ENDOSCOPY | Facility: HOSPITAL | Age: 83
End: 2023-04-12
Payer: MEDICARE

## 2023-04-12 DIAGNOSIS — R33.9 INCOMPLETE BLADDER EMPTYING: ICD-10-CM

## 2023-04-12 DIAGNOSIS — N81.89 PELVIC FLOOR WEAKNESS: Primary | ICD-10-CM

## 2023-04-12 DIAGNOSIS — A49.8 CLOSTRIDIUM DIFFICILE INFECTION: Primary | ICD-10-CM

## 2023-04-12 DIAGNOSIS — R27.9 LACK OF COORDINATION: ICD-10-CM

## 2023-04-12 PROCEDURE — 97112 NEUROMUSCULAR REEDUCATION: CPT | Mod: PN

## 2023-04-12 PROCEDURE — 97162 PT EVAL MOD COMPLEX 30 MIN: CPT | Mod: PN

## 2023-04-12 RX ORDER — VANCOMYCIN HYDROCHLORIDE 125 MG/1
125 CAPSULE ORAL EVERY 6 HOURS
Qty: 56 CAPSULE | Refills: 1 | Status: SHIPPED | OUTPATIENT
Start: 2023-04-12 | End: 2023-04-26

## 2023-04-12 NOTE — PROGRESS NOTES
[facility]   Pelvic Health Physical Therapy Initial Evaluation     Date: 4/12/2023   [facility] Number: [id number]    Therapy Diagnosis:   Encounter Diagnoses   Name Primary?    Incomplete bladder emptying     Pelvic floor weakness Yes    Lack of coordination      Physician: Roxy Meyers, NP    Physician Orders: PT Eval and Treat   Medical Diagnosis from Referral: incomplete bladder emptying  Evaluation Date: 4/12/2023  Authorization Period Expiration: 4/2/2024  Plan of Care Expiration: 7/12/2024  Progress Note Due: 5/12/2024  Visit # / Visits authorized: 1/ 1   FOTO: Issued Visit #: 1     Precautions: Standard    Time In: 2:20  Time Out: 3:25  Total Appointment Time (timed & untimed codes): 65 minutes    SUBJECTIVE     Date of onset: chronic condition    History of current condition - Shima reports:  is not emptying her bladder fully. Does have urinary urgency that is sometimes very significant. Does have some urinary leakage.     OB/GYN History: 2 live births and 1 miscarriage. Did have forceps removal with miscarriage.     Bladder/Bowel History:   Frequency of urination:   Daytime: every 2 hours           Nighttime: 4-5 times, but mostly 3 times per night  Difficulty initiating urine stream: Yes, sometimes has to wait for body to relax. If the bathroom is cold, finds it difficult to urinate. Her body will tense up.   Urine stream: strong  Complete emptying: No, but she feels like she does fully empty. Does have a distended bladder.   Bladder leakage: Yes  Frequency of incidents: happens with urge  Amount leaked (urine): teaspoon(s)  Urinary Urgency: Yes, Able to delay the urge for at least 20 minute(s).  Frequency of bowel movements: 1-2 times a day  Difficulty initiating BM: No  Quality/Shape of BM:  soft solid  Does Patient Feel Empty after BM? Yes  Fiber Supplements or Laxative Use? Yes, currently has a GI infection and is not as regular.   Colon leakage:  "No  Frequency of incidents: none   Amount leaked (bowels):  none  Form of protection: panty liner  Number of pads required in 24 hours: just started wearing them so not sure of how many she goes through    Pain:  Location: low back pain (says it's her kidneys)  Current 0/10, worst 2/10, best 0/10   Description: Dull  Aggravating Factors/Activities that cause symptoms:  exercise and chores    Easing Factors:  walking    Types of fluid intake: varies. Does not drink soda. Water, tea, cranberry and apple juice. Likes tonic water.   Diet: Does not eat a lot of sugar. Restricts salt intake due to kidneys. Does not eat a lot of meat. Does not eat fried foods. Eats a lot of seafood.   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 CKD3    Surgical History: Shima Sorto  has a past surgical history that includes Kidney stone surgery (2000); MOHS procedure; interstim placed stage 1; Breast cyst aspiration (Right, 1999); Colonoscopy (N/A, 7/24/2018); Mastectomy, partial (Right, 8/17/2018); Injection for sentinel node identification (Right, 8/17/2018); [place] lymph node biopsy (Right, 8/17/2018); Breast lumpectomy (Right, 2018); and Esophagogastroduodenoscopy (N/A, 3/17/2023).    Medications: Shima has a current medication list which includes the following prescription(s): amlodipine, anastrozole, coq10 (ubiquinol), denosumab, ferrous sulfate, fish oil-e-fatty [id number], pantoprazole, preservision areds-2, tamsulosin, UNABLE TO FIND, UNABLE TO FIND, " valsartan, vancomycin, and nephrocaps, and the following Facility-Administered Medications: sodium chloride 0.9%.    Allergies:   Review of patient's allergies indicates:   Allergen Reactions    Asparagus Rash        OBJECTIVE     See EMR under MEDIA for written consent provided 2023  Chaperone: declined    ORTHO SCREEN  Posture in sitting: slouched   Posture in standing: forward and rounded shoulders   SI Joint Palpation: Denies tenderness to SI joint palpation bilaterally.  Adductor Palpation: increased tension     ABDOMINAL WALL ASSESSMENT  Palpation: boggy and hypotonic   Abdominal strength: Rectus abdominus: 2/5     Transverse abdominus: 2/5  Scarring:    Pelvic Floor Muscle and Transverse Abdominus Synergy: absent  Diastasis: absent      BREATHING MECHANICS ASSESSMENT   Thorax Assessment During Quiet Respiration: Decreased excursion of abdominal wall   Thorax Assessment During Deep Respiration: Decreased excursion of abdominal wall  and Excessive excursion of sternum      Limitation/Restriction for FOTO Survey    Therapist reviewed FOTO scores for Shima Sorto on 2023.   FOTO documents entered into [employer] - see Media section.    Limitation Score:   PFDI Bowel 4%  Urinary Problem 62%  Bowel Constipation 68%  PFDI Urinary 0%       TREATMENT     Total Treatment time (time-based codes) separate from Evaluation: 30 minutes     Neuromuscular Re-education to improve Coordination and Control for 30 minutes includin breathing  and diaphragmatic breathing      PATIENT EDUCATION AND HOME EXERCISES     Education provided:   general anatomy/physiology of urinary/ bowel  system and benefits of treatment were discussed with the patient. Additionally, anatomy/physiology of pelvic floor, posture/body mechanices, bladder retraining, diaphragmatic breathing, double voiding techniques, fluid intake/dietary modifications, and behavior modifications were reviewed.     Written Home Exercises provided:  yes.  Exercises were reviewed and Shima was able to demonstrate them prior to the end of the session. Shima demonstrated fair  understanding of the education provided. See EMR under Patient Instructions for exercises provided during therapy sessions.  +Diaphragmatic breathing 5 minutes     ASSESSMENT     Shima is a 82 y.o. female referred to outpatient Physical Therapy with a medical diagnosis of incomplete bladder emptying. Pt presents with decreased strength and impaired motor coordination.     Patient prognosis is Good.   Patient will benefit from skilled outpatient Physical Therapy to address the deficits stated above and in the chart below, provide patient/family education, and to maximize patient's level of independence.     Plan of care discussed with patient: Yes  Patient's spiritual, cultural and educational needs considered and patient is agreeable to the plan of care and goals as stated below:     Anticipated Barriers for therapy: scheduling    Medical Necessity is demonstrated by the following:  History  Co-morbidities and personal factors that may impact the plan of care Co-morbidities   advanced age, CKD stage 3, excessive commute time/distance, history of cancer, HTN, and level of undertstanding of current condition    Personal Factors  age     moderate   Examination  Body structures and functions, activity limitations and participation restrictions that may impact the plan of care Body Regions/Systems/Functions:  altered posture, poor knowledge of body mechanics and posture, poor trunk stability, decreased pelvic muscle strength, decreased endurance of the pelvic muscles, decreased phasic ability of the pelvic muscles, poor quality of pelvic muscle contraction, poor coordination of pelvic floor muscles during ADL's leading to urinary or fecal leakage, poor fluid intake, incomplete urination, and dysfunctional voiding     Activity Limitations:  urgency , full bladder emptying, and incontinence with  "ADLs    Participation Restrictions:  ADLs affected by inability to fully empty bladder     Activity limitations:   Learning and applying knowledge  no deficits    General Tasks and Commands  no deficits    Communication  no deficits    Mobility  no deficits    Self care  no deficits    Domestic Life  no deficits    Interactions/Relationships  no deficits    Life Areas  no deficits    Community and Social Life  no deficits       moderate   Clinical Presentation evolving clinical presentation with changing clinical characteristics moderate   Decision Making/ Complexity Score: moderate     Goals:  Short Term Goals: 6 weeks   - Patient to be educated on pelvic muscle bracing and be able to consistently perform correctly and quickly to help decrease incontinence with cough/laugh/sneeze.  - Patient to perform "the knack" prior to coughing, laughing or sneezing to decrease risk of incontinence.  - Patient to report a decrease in pad usage to no more than 2 a day to demonstrate improving pelvic floor function needed for continence.  - Patient to be able to delay the urge to urinate at least 5 minutes with a strong urge to urinate in order to make it to the bathroom without leaking.  - Patient to report ability to fully empty bladder 40% of the time to decrease urinary retention.         Long Term Goals: 12 week  - Patient to be discharged with home plan for carry over after discharge.    - Patient to report a decrease in pad usage to 0 pads a day to demonstrate improving pelvic floor muscle controls as evidenced by decreased episodes of incontinence needed to improve confidence in social situations.  - Patient to be able to delay the urge to urinate at least 10 minutes with a strong urge to urinate in order to make it to the bathroom without leaking.  - Patient to report no longer feeling the need to urinate just in case when shopping or participating in social activities to demonstrate improving pelvic floor and bladder " control.   - Patient to increase pelvic floor strength to at least 3/5 to demonstrate improved strength needed for continence with ADLs.   - Patient to report ability to fully empty bladder 75% of the time to decrease urinary retention.      PLAN     Plan of care Certification: 4/12/2023 to 7/12/2023.    Outpatient Physical Therapy 1-2 time(s) every 1 week(s) for 8-12 weeks to include the following interventions: Electrical Stimulation biofeedback(SEMG), Manual Therapy, Moist Heat/ Ice, Neuromuscular Re-ed, Patient Education, Therapeutic Activities, and Therapeutic Exercise.     Ekaterina Rodriguez, PT      I CERTIFY THE NEED FOR THESE SERVICES FURNISHED UNDER THIS PLAN OF TREATMENT AND WHILE UNDER MY CARE   Physician's comments:     Physician's Signature: ___________________________________________________

## 2023-04-12 NOTE — TELEPHONE ENCOUNTER
----- Message from Jareth Harding sent at 4/12/2023  9:19 AM CDT -----  Type: Patient Call Back         Who called: Pt          What is the request in detail: Pt called in regarding a few questions and concerns for the nurse in Dr Garza's office          Can the clinic reply by [facility]?no          Would the patient rather a call back or a response via [facility]?call back          Best call back number:[phone] (mobile)          Additional Information:           Thank You

## 2023-04-12 NOTE — PROGRESS NOTES
Shima I sent in the mail to you some patient information beyond the basics from up-to-date on C diff to read it let you know about it in how to prevent spreading the infection  .  It can be spread fecal orally so good bathroom hygiene is extremely important washing her hands for enough time with soap and water for at least 30 seconds not sharing towels or bed linens etc..    Prescription sent for vancomycin to your pharmacy please check in with me in the next several days to let me know how your doing.

## 2023-04-13 ENCOUNTER — LAB VISIT (OUTPATIENT)
Dept: LAB | Facility: HOSPITAL | Age: 83
End: 2023-04-13
Attending: INTERNAL MEDICINE
Payer: MEDICARE

## 2023-04-13 DIAGNOSIS — E87.1 HYPONATREMIA: ICD-10-CM

## 2023-04-13 PROBLEM — R27.9 LACK OF COORDINATION: Status: ACTIVE | Noted: 2023-04-13

## 2023-04-13 PROBLEM — N81.89 PELVIC FLOOR WEAKNESS: Status: ACTIVE | Noted: 2023-04-13

## 2023-04-13 PROBLEM — R33.9 INCOMPLETE BLADDER EMPTYING: Status: ACTIVE | Noted: 2023-04-13

## 2023-04-13 LAB
ALBUMIN SERPL BCP-MCNC: 3.9 G/DL (ref 3.5–5.2)
ANION GAP SERPL CALC-SCNC: 8 MMOL/L (ref 8–16)
BUN SERPL-MCNC: 20 MG/DL (ref 8–23)
CALCIUM SERPL-MCNC: 9 MG/DL (ref 8.7–10.5)
CHLORIDE SERPL-SCNC: 95 MMOL/L (ref 95–110)
CO2 SERPL-SCNC: 26 MMOL/L (ref 23–29)
CREAT SERPL-MCNC: 1.5 MG/DL (ref 0.5–1.4)
E HISTOLYT AB SER QL: NEGATIVE
EST. GFR  (NO RACE VARIABLE): 34.6 ML/MIN/1.73 M^2
GLUCOSE SERPL-MCNC: 92 MG/DL (ref 70–110)
OSMOLALITY SERPL: 279 MOSM/KG (ref 275–295)
PHOSPHATE SERPL-MCNC: 3.4 MG/DL (ref 2.7–4.5)
POTASSIUM SERPL-SCNC: 5.2 MMOL/L (ref 3.5–5.1)
SODIUM SERPL-SCNC: 129 MMOL/L (ref 136–145)
STRONGYLOIDES ANTIBODY IGG: NEGATIVE
TTG IGA SER-ACNC: 0.4 U/ML

## 2023-04-13 PROCEDURE — 36415 COLL VENOUS BLD VENIPUNCTURE: CPT | Performed by: INTERNAL MEDICINE

## 2023-04-13 PROCEDURE — 80069 RENAL FUNCTION PANEL: CPT | Performed by: INTERNAL MEDICINE

## 2023-04-13 PROCEDURE — 83930 ASSAY OF BLOOD OSMOLALITY: CPT | Performed by: INTERNAL MEDICINE

## 2023-04-13 NOTE — PLAN OF CARE
[facility]   Pelvic Health Physical Therapy Initial Evaluation     Date: 4/12/2023   [facility] Number: [id number]    Therapy Diagnosis:   Encounter Diagnoses   Name Primary?    Incomplete bladder emptying     Pelvic floor weakness Yes    Lack of coordination      Physician: Roxy Meyers, NP    Physician Orders: PT Eval and Treat   Medical Diagnosis from Referral: incomplete bladder emptying  Evaluation Date: 4/12/2023  Authorization Period Expiration: 4/2/2024  Plan of Care Expiration: 7/12/2024  Progress Note Due: 5/12/2024  Visit # / Visits authorized: 1/ 1   FOTO: Issued Visit #: 1     Precautions: Standard    Time In: 2:20  Time Out: 3:25  Total Appointment Time (timed & untimed codes): 65 minutes    SUBJECTIVE     Date of onset: chronic condition    History of current condition - Shima reports:  is not emptying her bladder fully. Does have urinary urgency that is sometimes very significant. Does have some urinary leakage.     OB/GYN History: 2 live births and 1 miscarriage. Did have forceps removal with miscarriage.     Bladder/Bowel History:   Frequency of urination:   Daytime: every 2 hours           Nighttime: 4-5 times, but mostly 3 times per night  Difficulty initiating urine stream: Yes, sometimes has to wait for body to relax. If the bathroom is cold, finds it difficult to urinate. Her body will tense up.   Urine stream: strong  Complete emptying: No, but she feels like she does fully empty. Does have a distended bladder.   Bladder leakage: Yes  Frequency of incidents: happens with urge  Amount leaked (urine): teaspoon(s)  Urinary Urgency: Yes, Able to delay the urge for at least 20 minute(s).  Frequency of bowel movements: 1-2 times a day  Difficulty initiating BM: No  Quality/Shape of BM: soft solid  Does Patient Feel Empty after BM? Yes  Fiber Supplements or Laxative Use? Yes, currently has a GI infection and is not as regular.   Colon leakage:  "No  Frequency of incidents: none   Amount leaked (bowels): none  Form of protection: panty liner  Number of pads required in 24 hours: just started wearing them so not sure of how many she goes through    Pain:  Location: low back pain (says it's her kidneys)  Current 0/10, worst 2/10, best 0/10   Description: Dull  Aggravating Factors/Activities that cause symptoms: exercise and chores   Easing Factors: walking    Types of fluid intake: varies. Does not drink soda. Water, tea, cranberry and apple juice. Likes tonic water.   Diet: Does not eat a lot of sugar. Restricts salt intake due to kidneys. Does not eat a lot of meat. Does not eat fried foods. Eats a lot of seafood.   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 CKD3    Surgical History: Shima Sorto  has a past surgical history that includes Kidney stone surgery (2000); MOHS procedure; interstim placed stage 1; Breast cyst aspiration (Right, 1999); Colonoscopy (N/A, 7/24/2018); Mastectomy, partial (Right, 8/17/2018); Injection for sentinel node identification (Right, 8/17/2018); [place] lymph node biopsy (Right, 8/17/2018); Breast lumpectomy (Right, 2018); and Esophagogastroduodenoscopy (N/A, 3/17/2023).    Medications: Shima has a current medication list which includes the following prescription(s): amlodipine, anastrozole, coq10 (ubiquinol), denosumab, ferrous sulfate, fish oil-e-fatty [id number], pantoprazole, preservision areds-2, tamsulosin, UNABLE TO FIND, UNABLE TO FIND, " valsartan, vancomycin, and nephrocaps, and the following Facility-Administered Medications: sodium chloride 0.9%.    Allergies:   Review of patient's allergies indicates:   Allergen Reactions    Asparagus Rash        OBJECTIVE     See EMR under MEDIA for written consent provided 2023  Chaperone: declined    ORTHO SCREEN  Posture in sitting: slouched   Posture in standing: forward and rounded shoulders   SI Joint Palpation: Denies tenderness to SI joint palpation bilaterally.  Adductor Palpation: increased tension     ABDOMINAL WALL ASSESSMENT  Palpation: boggy and hypotonic   Abdominal strength: Rectus abdominus: 2/5     Transverse abdominus: 2/5  Scarring:    Pelvic Floor Muscle and Transverse Abdominus Synergy: absent  Diastasis: absent      BREATHING MECHANICS ASSESSMENT   Thorax Assessment During Quiet Respiration: Decreased excursion of abdominal wall   Thorax Assessment During Deep Respiration: Decreased excursion of abdominal wall  and Excessive excursion of sternum      Limitation/Restriction for FOTO Survey    Therapist reviewed FOTO scores for Shima Sorto on 2023.   FOTO documents entered into [employer] - see Media section.    Limitation Score:   PFDI Bowel 4%  Urinary Problem 62%  Bowel Constipation 68%  PFDI Urinary 0%       TREATMENT     Total Treatment time (time-based codes) separate from Evaluation: 30 minutes     Neuromuscular Re-education to improve Coordination and Control for 30 minutes includin breathing  and diaphragmatic breathing      PATIENT EDUCATION AND HOME EXERCISES     Education provided:   general anatomy/physiology of urinary/ bowel  system and benefits of treatment were discussed with the patient. Additionally, anatomy/physiology of pelvic floor, posture/body mechanices, bladder retraining, diaphragmatic breathing, double voiding techniques, fluid intake/dietary modifications, and behavior modifications were reviewed.     Written Home Exercises provided:  yes.  Exercises were reviewed and Shima was able to demonstrate them prior to the end of the session. Shima demonstrated fair  understanding of the education provided. See EMR under Patient Instructions for exercises provided during therapy sessions.  +Diaphragmatic breathing 5 minutes     ASSESSMENT     Shima is a 82 y.o. female referred to outpatient Physical Therapy with a medical diagnosis of incomplete bladder emptying. Pt presents with decreased strength and impaired motor coordination.     Patient prognosis is Good.   Patient will benefit from skilled outpatient Physical Therapy to address the deficits stated above and in the chart below, provide patient/family education, and to maximize patient's level of independence.     Plan of care discussed with patient: Yes  Patient's spiritual, cultural and educational needs considered and patient is agreeable to the plan of care and goals as stated below:     Anticipated Barriers for therapy: scheduling    Medical Necessity is demonstrated by the following:  History  Co-morbidities and personal factors that may impact the plan of care Co-morbidities   advanced age, CKD stage 3, excessive commute time/distance, history of cancer, HTN, and level of undertstanding of current condition    Personal Factors  age     moderate   Examination  Body structures and functions, activity limitations and participation restrictions that may impact the plan of care Body Regions/Systems/Functions:  altered posture, poor knowledge of body mechanics and posture, poor trunk stability, decreased pelvic muscle strength, decreased endurance of the pelvic muscles, decreased phasic ability of the pelvic muscles, poor quality of pelvic muscle contraction, poor coordination of pelvic floor muscles during ADL's leading to urinary or fecal leakage, poor fluid intake, incomplete urination, and dysfunctional voiding     Activity Limitations:  urgency , full bladder emptying, and incontinence with  "ADLs    Participation Restrictions:  ADLs affected by inability to fully empty bladder     Activity limitations:   Learning and applying knowledge  no deficits    General Tasks and Commands  no deficits    Communication  no deficits    Mobility  no deficits    Self care  no deficits    Domestic Life  no deficits    Interactions/Relationships  no deficits    Life Areas  no deficits    Community and Social Life  no deficits       moderate   Clinical Presentation evolving clinical presentation with changing clinical characteristics moderate   Decision Making/ Complexity Score: moderate     Goals:  Short Term Goals: 6 weeks   - Patient to be educated on pelvic muscle bracing and be able to consistently perform correctly and quickly to help decrease incontinence with cough/laugh/sneeze.  - Patient to perform "the knack" prior to coughing, laughing or sneezing to decrease risk of incontinence.  - Patient to report a decrease in pad usage to no more than 2 a day to demonstrate improving pelvic floor function needed for continence.  - Patient to be able to delay the urge to urinate at least 5 minutes with a strong urge to urinate in order to make it to the bathroom without leaking.  - Patient to report ability to fully empty bladder 40% of the time to decrease urinary retention.         Long Term Goals: 12 week  - Patient to be discharged with home plan for carry over after discharge.    - Patient to report a decrease in pad usage to 0 pads a day to demonstrate improving pelvic floor muscle controls as evidenced by decreased episodes of incontinence needed to improve confidence in social situations.  - Patient to be able to delay the urge to urinate at least 10 minutes with a strong urge to urinate in order to make it to the bathroom without leaking.  - Patient to report no longer feeling the need to urinate just in case when shopping or participating in social activities to demonstrate improving pelvic floor and bladder " control.   - Patient to increase pelvic floor strength to at least 3/5 to demonstrate improved strength needed for continence with ADLs.   - Patient to report ability to fully empty bladder 75% of the time to decrease urinary retention.      PLAN     Plan of care Certification: 4/12/2023 to 7/12/2023.    Outpatient Physical Therapy 1-2 time(s) every 1 week(s) for 8-12 weeks to include the following interventions: Electrical Stimulation biofeedback(SEMG), Manual Therapy, Moist Heat/ Ice, Neuromuscular Re-ed, Patient Education, Therapeutic Activities, and Therapeutic Exercise.     Ekaterina Rodriguez, PT      I CERTIFY THE NEED FOR THESE SERVICES FURNISHED UNDER THIS PLAN OF TREATMENT AND WHILE UNDER MY CARE   Physician's comments:     Physician's Signature: ___________________________________________________

## 2023-04-13 NOTE — PATIENT INSTRUCTIONS
Home Exercise Program: 04/13/2023    DIAPHRAGMATIC BREATHING     The diaphragm is a dome shaped muscle that forms the floor of the rib cage. It is the most efficient muscle for breathing and relaxation, although most people are not used to using the diaphragm. Diaphragmatic or belly breathing is an important technique to learn because it helps settle down or relax the autonomic nervous system. The correct use of diaphragmatic breathing can help to quiet brain activity resulting in the relaxation of all the muscles and organs of the body. This is accomplished by slow rhythmic breathing concentrated in the diaphragm muscle rather than the chest.    How to do proper relaxation breathing:    Start by lying on your back or reclining in a chair in a relaxed position. Place one hand on your chest and the other on your abdomen.  Relax your jaw by placing your tongue on the floor of your mouth and keeping your teeth slightly apart.   Take a deep breath in, letting the abdomen expand and rise while you keep your upper chest, neck and shoulders relaxed.   As you breathe out, allow your abdomen and chest to fall. Exhale completely.  It doesn't matter if you breathe in/out through your nose and/or mouth. Do whichever feels comfortable.  Remember to breathe slowly.  Do not force your breathing. Do not hold your breath.  Repeat for 5 minutes every day.

## 2023-04-14 ENCOUNTER — TELEPHONE (OUTPATIENT)
Dept: ALLERGY | Facility: CLINIC | Age: 83
End: 2023-04-14
Payer: MEDICARE

## 2023-04-14 ENCOUNTER — OFFICE VISIT (OUTPATIENT)
Dept: UROLOGY | Facility: CLINIC | Age: 83
End: 2023-04-14
Payer: MEDICARE

## 2023-04-14 VITALS
HEIGHT: 67 IN | BODY MASS INDEX: 19.25 KG/M2 | HEART RATE: 57 BPM | DIASTOLIC BLOOD PRESSURE: 66 MMHG | WEIGHT: 122.63 LBS | SYSTOLIC BLOOD PRESSURE: 128 MMHG

## 2023-04-14 DIAGNOSIS — R33.9 INCOMPLETE EMPTYING OF BLADDER: Primary | ICD-10-CM

## 2023-04-14 LAB
BACTERIA #/AREA URNS AUTO: ABNORMAL /HPF
BILIRUB SERPL-MCNC: ABNORMAL MG/DL
BLOOD URINE, POC: 250
CLARITY, POC UA: ABNORMAL
COLOR, POC UA: YELLOW
GLUCOSE UR QL STRIP: ABNORMAL
HYALINE CASTS UR QL AUTO: 15 /LPF
KETONES UR QL STRIP: ABNORMAL
LEUKOCYTE ESTERASE URINE, POC: ABNORMAL
MICROSCOPIC COMMENT: ABNORMAL
NITRITE, POC UA: ABNORMAL
PH, POC UA: 7
POC RESIDUAL URINE VOLUME: 333 ML (ref 0–100)
PROTEIN, POC: 100
RBC #/AREA URNS AUTO: 22 /HPF (ref 0–4)
SPECIFIC GRAVITY, POC UA: 1.01
SQUAMOUS #/AREA URNS AUTO: 1 /HPF
UROBILINOGEN, POC UA: ABNORMAL
WBC #/AREA URNS AUTO: >100 /HPF (ref 0–5)
WBC CLUMPS UR QL AUTO: ABNORMAL

## 2023-04-14 PROCEDURE — 81001 URINALYSIS AUTO W/SCOPE: CPT | Performed by: NURSE PRACTITIONER

## 2023-04-14 PROCEDURE — 99214 OFFICE O/P EST MOD 30 MIN: CPT | Mod: S$PBB,,, | Performed by: NURSE PRACTITIONER

## 2023-04-14 PROCEDURE — 81002 URINALYSIS NONAUTO W/O SCOPE: CPT | Mod: PBBFAC | Performed by: NURSE PRACTITIONER

## 2023-04-14 PROCEDURE — 99214 OFFICE O/P EST MOD 30 MIN: CPT | Mod: PBBFAC | Performed by: NURSE PRACTITIONER

## 2023-04-14 PROCEDURE — 99214 PR OFFICE/OUTPT VISIT, EST, LEVL IV, 30-39 MIN: ICD-10-PCS | Mod: S$PBB,,, | Performed by: NURSE PRACTITIONER

## 2023-04-14 PROCEDURE — 87086 URINE CULTURE/COLONY COUNT: CPT | Performed by: NURSE PRACTITIONER

## 2023-04-14 PROCEDURE — 99999 PR PBB SHADOW E&M-EST. PATIENT-LVL IV: ICD-10-PCS | Mod: PBBFAC,,, | Performed by: NURSE PRACTITIONER

## 2023-04-14 PROCEDURE — 51798 US URINE CAPACITY MEASURE: CPT | Mod: PBBFAC | Performed by: NURSE PRACTITIONER

## 2023-04-14 PROCEDURE — 99999 PR PBB SHADOW E&M-EST. PATIENT-LVL IV: CPT | Mod: PBBFAC,,, | Performed by: NURSE PRACTITIONER

## 2023-04-14 NOTE — PATIENT INSTRUCTIONS
Cranberry supplement w/ 36 mg of PAC-helps to block bacteria from attaching to the bacteria wall  Probiotic- [employer] Ultra 25 Billion CFU Probiotic Complex, Multi Strain.  The Multi Strain is specifically the one that is important as the greater variety of strains is better.        Perform catheterization bid

## 2023-04-14 NOTE — PROGRESS NOTES
[facility] COMPLAINT:    Mrs. Sorto is a 82 y.o. female presenting for bladder distention.    .  PRESENTING ILLNESS:    Shima Sorto is a 82 y.o. female with a PMH of htn, ckd 3, hx breast ca, sania who presents for bladder distention on imaging.     Established patient to urology department. Presents today for incomplete bladder emptying.  Tried intermittent catheterization in the past, but stopped due to recurrent symptomatic bladder infections as a result of catheterization.  Underwent the first stage InterStim but it was unsccessful so did not undergo the second stage and the lead was removed.  Started on tamsulosin last visit with some improvement.  PVR > 300 ml.  CIC indicated.    REVIEW OF SYSTEMS:    Review of Systems   Constitutional:  Negative for chills and fever.   Respiratory:  Negative for shortness of breath.    Cardiovascular:  Negative for chest pain.   Gastrointestinal:  Negative for constipation and diarrhea.   Genitourinary:  Negative for dysuria, flank pain, frequency, hematuria and urgency.   Neurological:  Negative for dizziness and weakness.      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       Family History   Problem Relation Age of Onset    Kidney disease Mother     Heart disease Father     Melanoma Father     Heart attack Father     Breast cancer Maternal Aunt     Hearing loss Son     Hyperlipidemia Son     Anesthesia problems Neg Hx     Malignant hypertension Neg Hx     Hypotension Neg Hx     Malignant hyperthermia  Neg Hx     Pseudochol deficiency Neg Hx     Colon cancer Neg Hx     Ovarian cancer Neg Hx     Psoriasis Neg Hx     Lupus Neg Hx     Eczema Neg Hx     Acne Neg Hx        Allergies:  Asparagus    Medications:    Current Outpatient Medications:     amLODIPine (NORVASC) 2.5 MG tablet, Take 1 tablet (2.5 mg total) by mouth once daily., Disp: 90 tablet, Rfl: 1    anastrozole (ARIMIDEX) 1 mg Tab, TAKE 1 TABLET(1 MG) BY MOUTH EVERY DAY., Disp: 90 tablet, Rfl: 3    coQ10, ubiquinol, 100 mg Cap, Take 100 mg by mouth once daily., Disp: , Rfl:     denosumab (PROLIA) 60 mg/mL Syrg, Inject 60 mg into the skin every 6 (six) months., Disp: , Rfl:     ferrous sulfate (SLOW RELEASE IRON) 142 mg (45 mg iron) TbSR, , Disp: , Rfl:     fish oil-E-fatty acid5-[id number] 400-5 mg-unit Cap, Take 1 capsule by mouth Daily., Disp: , Rfl:     pantoprazole (PROTONIX) 40 MG tablet, Take 1 tablet (40 mg total) by mouth before breakfast. Best taken 45-60 minutes before your first protein meal of the day - Breakfast, Disp: 90 tablet, Rfl: 0    PRESERVISION AREDS-2 250-90-40-1 mg Cap, Take 1 tablet by mouth 2 (two) times daily., Disp: , Rfl:     tamsulosin (FLOMAX) 0.4 mg Cap, Take 1 capsule (0.4 mg total) by mouth once daily., Disp: 30 capsule, Rfl: 11    UNABLE TO FIND, 2 tablets 2 (two) times daily. Rufus-Mag-Citrate with D3 & K2, Disp: , Rfl:     UNABLE TO FIND, 4 capsules Daily. Nutrafol, Disp: , Rfl:     valsartan (DIOVAN) 160 MG tablet, TAKE 1 TABLET(160 MG) BY MOUTH EVERY DAY, Disp: 90 tablet, Rfl: 2    vancomycin (VANCOCIN) 125 MG capsule, Take 1 capsule (125 mg total) by mouth every 6 (six) hours. for 14 days, Disp: 56 capsule, Rfl: 1    vitamin renal formula, B-complex-vitamin c-folic acid, (NEPHROCAPS) 1 mg Cap, Take 1 capsule by mouth once daily., Disp: 90 capsule, Rfl: 3  No current facility-administered medications for this visit.    Facility-Administered Medications Ordered in Other Visits:     0.9%  NaCl infusion, , Intravenous,  Continuous, James Carranza MD, Last Rate: 70 mL/hr at 08/17/18 0843, New Bag at 03/17/23 0719    PHYSICAL EXAMINATION:    Physical Exam  Vitals and nursing note reviewed.   Constitutional:       Appearance: Normal appearance. She is well-developed.   HENT:      Head: Normocephalic and atraumatic.   Eyes:      Pupils: Pupils are equal, round, and reactive to light.   Pulmonary:      Effort: Pulmonary effort is normal.   Musculoskeletal:         General: Normal range of motion.      Cervical back: Normal range of motion.   Skin:     General: Skin is warm and dry.   Neurological:      Mental Status: She is alert and oriented to person, place, and time.   Psychiatric:         Behavior: Behavior normal.        LABS:    U/a dipstick performed in office today: clear of infection  Bladder scan performed by Nurse Duque.   ml (patient did not void prior)      IMPRESSION:    Encounter Diagnoses   Name Primary?    Incomplete emptying of bladder Yes       PLAN:    Problem List Items Addressed This Visit    None  Visit Diagnoses       Incomplete emptying of bladder    -  Primary    Relevant Orders    POCT URINE DIPSTICK WITHOUT MICROSCOPE    POCT Bladder Scan            1. Bladder distention/incomplete bladder emptying    - PVR > 300 ml   - self cath 10 Fr twice daily indefinitely    - continue flomax daily   - refer to pelvic floor therapy   - send urine for analysis and culture  2. Rtc in 2 wk for PVR check     Roxy Meyers NP      I spent over 45 minutes with the patient. Over 50% of the visit was spent in counseling.

## 2023-04-14 NOTE — TELEPHONE ENCOUNTER
Patient Calls  (Newest Message First)  Mercy Sims DO routed conversation to You 52 minutes ago (11:03 AM)     Mercy Sims DO 52 minutes ago (11:03 AM)       Her sodium is still low but creatinien slightly better.  It looks like she is not drinking enough water so I need her to increase water intake which will help her sodium and creatinine.  Cont to hold valsartan and increase the amlodipine from 2.5 mg to 5 mg a day by taking 2 amldoipine pills a day and monitor bp's.  Rfp later next week to f/u on sodium and creatinine.          Note

## 2023-04-15 LAB — BACTERIA UR CULT: NO GROWTH

## 2023-04-16 ENCOUNTER — PATIENT MESSAGE (OUTPATIENT)
Dept: GASTROENTEROLOGY | Facility: CLINIC | Age: 83
End: 2023-04-16
Payer: MEDICARE

## 2023-04-16 DIAGNOSIS — D50.9 IRON DEFICIENCY ANEMIA, UNSPECIFIED IRON DEFICIENCY ANEMIA TYPE: Primary | ICD-10-CM

## 2023-04-16 NOTE — PROGRESS NOTES
Refugio please tell Shima that she is slightly anemic would like to repeat 12 hour fasting anemia labs in 4 weeks orders placed    Probably should schedule her for follow-up colonoscopy next available referral placed to endoscopy schedulers

## 2023-04-17 ENCOUNTER — PATIENT MESSAGE (OUTPATIENT)
Dept: UROLOGY | Facility: CLINIC | Age: 83
End: 2023-04-17
Payer: MEDICARE

## 2023-04-18 ENCOUNTER — TELEPHONE (OUTPATIENT)
Dept: GASTROENTEROLOGY | Facility: CLINIC | Age: 83
End: 2023-04-18
Payer: MEDICARE

## 2023-04-20 ENCOUNTER — PATIENT MESSAGE (OUTPATIENT)
Dept: UROLOGY | Facility: CLINIC | Age: 83
End: 2023-04-20
Payer: MEDICARE

## 2023-04-20 ENCOUNTER — PATIENT MESSAGE (OUTPATIENT)
Dept: GASTROENTEROLOGY | Facility: CLINIC | Age: 83
End: 2023-04-20
Payer: MEDICARE

## 2023-04-21 ENCOUNTER — CLINICAL SUPPORT (OUTPATIENT)
Dept: REHABILITATION | Facility: HOSPITAL | Age: 83
End: 2023-04-21
Payer: MEDICARE

## 2023-04-21 ENCOUNTER — LAB VISIT (OUTPATIENT)
Dept: LAB | Facility: HOSPITAL | Age: 83
End: 2023-04-21
Payer: MEDICARE

## 2023-04-21 DIAGNOSIS — R33.9 INCOMPLETE BLADDER EMPTYING: ICD-10-CM

## 2023-04-21 DIAGNOSIS — N81.89 PELVIC FLOOR WEAKNESS: Primary | ICD-10-CM

## 2023-04-21 DIAGNOSIS — I10 HYPERTENSION, UNSPECIFIED TYPE: ICD-10-CM

## 2023-04-21 DIAGNOSIS — R27.9 LACK OF COORDINATION: ICD-10-CM

## 2023-04-21 LAB
ALBUMIN SERPL BCP-MCNC: 3.8 G/DL (ref 3.5–5.2)
ANION GAP SERPL CALC-SCNC: 12 MMOL/L (ref 8–16)
BUN SERPL-MCNC: 18 MG/DL (ref 8–23)
CALCIUM SERPL-MCNC: 9.2 MG/DL (ref 8.7–10.5)
CHLORIDE SERPL-SCNC: 99 MMOL/L (ref 95–110)
CO2 SERPL-SCNC: 26 MMOL/L (ref 23–29)
CREAT SERPL-MCNC: 1.1 MG/DL (ref 0.5–1.4)
EST. GFR  (NO RACE VARIABLE): 50.2 ML/MIN/1.73 M^2
GLUCOSE SERPL-MCNC: 95 MG/DL (ref 70–110)
PHOSPHATE SERPL-MCNC: 3.2 MG/DL (ref 2.7–4.5)
POTASSIUM SERPL-SCNC: 3.8 MMOL/L (ref 3.5–5.1)
SODIUM SERPL-SCNC: 137 MMOL/L (ref 136–145)

## 2023-04-21 PROCEDURE — 36415 COLL VENOUS BLD VENIPUNCTURE: CPT | Performed by: INTERNAL MEDICINE

## 2023-04-21 PROCEDURE — 80069 RENAL FUNCTION PANEL: CPT | Performed by: INTERNAL MEDICINE

## 2023-04-21 PROCEDURE — 97140 MANUAL THERAPY 1/> REGIONS: CPT | Mod: PN

## 2023-04-21 PROCEDURE — 97530 THERAPEUTIC ACTIVITIES: CPT | Mod: PN

## 2023-04-21 NOTE — PATIENT INSTRUCTIONS
Home Exercise Program: 04/21/2023    DOUBLE VOIDING - Double voiding is a technique that may assist the bladder to empty more effectively when urine is left in the bladder. It involves passing urine more than once each time that you go to the toilet. This makes sure that the bladder is completely empty.     Here are 3 strategies you can try to fully empty your bladder.  You do not have to do all of these things every single time. Find which ones work best for you.     Check to make sure your pelvic floor is relaxed   Do a body scan - make sure your legs, buttocks, and abdominals are relaxed.  Take a couple deep, slow breaths to encourage your pelvic floor muscles to DROP (ie. Try Diaphragmatic Breathing).  Gently apply pressure over your bladder.  Change your pelvic position: lean forward, rock your pelvis forward and backward, stand up then sit back down.     Wait at least 30 seconds to 1 minute to see if a second urine stream begins.     Do not stop your urine stream or push/strain!

## 2023-04-21 NOTE — PROGRESS NOTES
Pelvic Health Physical Therapy   Treatment Note     Name: Shima Echols [facility]  Clinic Number: [id number]    Therapy Diagnosis:   Encounter Diagnoses   Name Primary?    Pelvic floor weakness Yes    Incomplete bladder emptying     Lack of coordination      Physician: Roxy Meyers NP    Visit Date: 4/21/2023    Physician Orders: Physical Therapy evaluate  Medical Diagnosis: incomplete bladder emptying  Evaluation Date: 4/12/2023  Authorization Period Expiration: 4/2/2024  Plan of Care Certification Period: 7/12/2023  Visit #/Visits authorized: 1/ 20 (+eval)       Time In: 1:13  Time Out: 2:06  Total Billable Time: 53 minutes    Precautions: Standard    Subjective     Pt reports: she has been feeling very busy and is now taking a medication from GI that she has to take 4 times per day including during the night.      She was compliant with home exercise program.  Response to previous treatment: good  Functional change: no change    Pain: 0/10    Objective     Shima received the following manual therapy techniques: to develop flexibility and extensibility for 30 minutes including: visceral mobilization of external bladder      Shima participated in dynamic functional therapeutic activities to improve functional performance for 23  minutes, including:  Double voiding techniques       Home Exercises Provided and Patient Education Provided     Education provided:   - bladder irritants, anatomy/physiology of pelvic floor, bladder retraining, diaphragmatic breathing, double voiding techniques, fluid intake/dietary modifications, and behavior modifications  Discussed progression of plan of care with patient; educated pt in activity modification; reviewed HEP with pt. Pt demonstrated and verbalized understanding of all instruction and was provided with a handout of HEP (see Patient Instructions).  - diaphragmatic breathing 5 minutes  - double voiding    Written Home Exercises Provided: Patient instructed to cont prior  "HEP.  Exercises were reviewed and Shima was able to demonstrate them prior to the end of the session.  Shima demonstrated good  understanding of the education provided.     See EMR under Patient Instructions for exercises provided 4/21/2023.    Assessment     Patient tolerated treatment well. After manual treatment, patient went to void and reported emptying a large amount of urine despite voiding just before the start of today's treatment. Reported decreased tension to abdomen post manual treatment.     Shima Is progressing well towards her goals.   Pt prognosis is Good.     Pt will continue to benefit from skilled outpatient physical therapy to address the deficits listed in the problem list box on initial evaluation, provide pt/family education and to maximize pt's level of independence in the home and community environment.     Pt's spiritual, cultural and educational needs considered and pt agreeable to plan of care and goals.     Anticipated barriers to physical therapy: scheduling    Goals:   Short Term Goals: 6 weeks   - Patient to be educated on pelvic muscle bracing and be able to consistently perform correctly and quickly to help decrease incontinence with cough/laugh/sneeze.  - Patient to perform "the knack" prior to coughing, laughing or sneezing to decrease risk of incontinence.  - Patient to report a decrease in pad usage to no more than 2 a day to demonstrate improving pelvic floor function needed for continence.  - Patient to be able to delay the urge to urinate at least 5 minutes with a strong urge to urinate in order to make it to the bathroom without leaking.  - Patient to report ability to fully empty bladder 40% of the time to decrease urinary retention.                    Long Term Goals: 12 week  - Patient to be discharged with home plan for carry over after discharge.    - Patient to report a decrease in pad usage to 0 pads a day to demonstrate improving pelvic floor muscle controls as " evidenced by decreased episodes of incontinence needed to improve confidence in social situations.  - Patient to be able to delay the urge to urinate at least 10 minutes with a strong urge to urinate in order to make it to the bathroom without leaking.  - Patient to report no longer feeling the need to urinate just in case when shopping or participating in social activities to demonstrate improving pelvic floor and bladder control.   - Patient to increase pelvic floor strength to at least 3/5 to demonstrate improved strength needed for continence with ADLs.   - Patient to report ability to fully empty bladder 75% of the time to decrease urinary retention.    Plan     Plan to continue current plan of care and progress as tolerated.     Ekaterina Rodriguez, PT

## 2023-04-23 NOTE — PROGRESS NOTES
Shima your EGD pathology was benign no evidence of celiac sprue no evidence of H pylori no intestinal metaplasia or dysplasia no evidence of eosinophilic esophagitis no evidence of Barretts esophagus.    Diagnosis      1. Duodenum, biopsy:   - Duodenal mucosa with gastric foveolar metaplasia, Brunner gland   hyperplasia, and reactive changes, consistent with peptic duodenitis   - Preserved villous architecture   - Negative for dysplasia or carcinoma   2. Stomach, biopsy:   - Mild chronic inactive gastritis with features suggestive of reactive   gastropathy   - Immunohistochemistry for Helicobacter pylori pending; results to follow in   a supplemental report   - Negative for intestinal metaplasia   - Negative for dysplasia or carcinoma   3. Esophagus, distal, mid, and proximal, biopsy:   - Esophageal mucosa without significant histopathologic abnormality   - No significantly increased eosinophils   - Negative for intestinal metaplasia   - Negative for dysplasia or carcinoma  VC    Comment: Interp By Sandro Davis M.D., Signed on 03/25/2023

## 2023-04-24 DIAGNOSIS — I10 PRIMARY HYPERTENSION: ICD-10-CM

## 2023-04-26 ENCOUNTER — TELEPHONE (OUTPATIENT)
Dept: NEPHROLOGY | Facility: CLINIC | Age: 83
End: 2023-04-26
Payer: MEDICARE

## 2023-04-26 RX ORDER — AMLODIPINE BESYLATE 2.5 MG/1
2.5 TABLET ORAL DAILY
Qty: 90 TABLET | Refills: 3 | Status: SHIPPED | OUTPATIENT
Start: 2023-04-26 | End: 2023-05-27

## 2023-04-26 NOTE — TELEPHONE ENCOUNTER
DO Elda Kahn RN  Caller: [phone] (Today, 12:17 PM)  Please see my tel note from last week.  This was only temporary.  Her kidney function is better and she can go back on losartan and resume one tab of amlodipine 2.5 mg a day and follow low K diet.

## 2023-04-27 ENCOUNTER — TELEPHONE (OUTPATIENT)
Dept: GASTROENTEROLOGY | Facility: CLINIC | Age: 83
End: 2023-04-27

## 2023-04-27 ENCOUNTER — DOCUMENTATION ONLY (OUTPATIENT)
Dept: GASTROENTEROLOGY | Facility: CLINIC | Age: 83
End: 2023-04-27

## 2023-04-27 DIAGNOSIS — R53.83 FATIGUE, UNSPECIFIED TYPE: Primary | ICD-10-CM

## 2023-04-27 DIAGNOSIS — R19.7 DIARRHEA IN ADULT PATIENT: ICD-10-CM

## 2023-04-27 DIAGNOSIS — E61.1 IRON DEFICIENCY: ICD-10-CM

## 2023-04-27 NOTE — PROGRESS NOTES
I spoke with patient on the phone today she is doing much better has not gotten a lot of sleep over the last several days taking her vancomycin every 6 hours she completed it yesterday her stools are now coming back to normal having 1 well-formed bowel movement a day no blood in her stool no chest pain no shortness of breath no nausea or vomiting no lightheadedness or dizziness will set her up for 12 hour fasting blood work next week and stool studies she seeing some spheres in her stool she is not sure what they are she does not think there her medication the perfectly round will recheck stool studies.    Recommend patient follow-up telemedicine video visit in 2 weeks sooner if any issues or concerns

## 2023-04-28 ENCOUNTER — OFFICE VISIT (OUTPATIENT)
Dept: UROLOGY | Facility: CLINIC | Age: 83
End: 2023-04-28
Payer: MEDICARE

## 2023-04-28 ENCOUNTER — TELEPHONE (OUTPATIENT)
Dept: GASTROENTEROLOGY | Facility: CLINIC | Age: 83
End: 2023-04-28
Payer: MEDICARE

## 2023-04-28 VITALS
SYSTOLIC BLOOD PRESSURE: 131 MMHG | HEART RATE: 63 BPM | DIASTOLIC BLOOD PRESSURE: 63 MMHG | HEIGHT: 65 IN | BODY MASS INDEX: 20.14 KG/M2 | WEIGHT: 120.88 LBS

## 2023-04-28 DIAGNOSIS — R33.9 INCOMPLETE EMPTYING OF BLADDER: ICD-10-CM

## 2023-04-28 DIAGNOSIS — N32.89 BLADDER DISTENTION: Primary | ICD-10-CM

## 2023-04-28 DIAGNOSIS — R31.0 GROSS HEMATURIA: ICD-10-CM

## 2023-04-28 LAB
BACTERIA #/AREA URNS AUTO: ABNORMAL /HPF
BILIRUB SERPL-MCNC: NORMAL MG/DL
BLOOD URINE, POC: 250
CLARITY, POC UA: NORMAL
COLOR, POC UA: NORMAL
GLUCOSE UR QL STRIP: NORMAL
KETONES UR QL STRIP: NORMAL
LEUKOCYTE ESTERASE URINE, POC: NORMAL
MICROSCOPIC COMMENT: ABNORMAL
NITRITE, POC UA: NORMAL
PH, POC UA: 8
POC RESIDUAL URINE VOLUME: 293 ML (ref 0–100)
PROTEIN, POC: NORMAL
RBC #/AREA URNS AUTO: >100 /HPF (ref 0–4)
SPECIFIC GRAVITY, POC UA: 1.01
UROBILINOGEN, POC UA: NORMAL
WBC #/AREA URNS AUTO: >100 /HPF (ref 0–5)
WBC CLUMPS UR QL AUTO: ABNORMAL

## 2023-04-28 PROCEDURE — 99999 PR PBB SHADOW E&M-EST. PATIENT-LVL III: ICD-10-PCS | Mod: PBBFAC,,, | Performed by: NURSE PRACTITIONER

## 2023-04-28 PROCEDURE — 51798 US URINE CAPACITY MEASURE: CPT | Mod: PBBFAC | Performed by: NURSE PRACTITIONER

## 2023-04-28 PROCEDURE — 99214 PR OFFICE/OUTPT VISIT, EST, LEVL IV, 30-39 MIN: ICD-10-PCS | Mod: S$PBB,,, | Performed by: NURSE PRACTITIONER

## 2023-04-28 PROCEDURE — 88112 CYTOPATH CELL ENHANCE TECH: CPT | Performed by: PATHOLOGY

## 2023-04-28 PROCEDURE — 99213 OFFICE O/P EST LOW 20 MIN: CPT | Mod: PBBFAC | Performed by: NURSE PRACTITIONER

## 2023-04-28 PROCEDURE — 81001 URINALYSIS AUTO W/SCOPE: CPT | Performed by: NURSE PRACTITIONER

## 2023-04-28 PROCEDURE — 99214 OFFICE O/P EST MOD 30 MIN: CPT | Mod: S$PBB,,, | Performed by: NURSE PRACTITIONER

## 2023-04-28 PROCEDURE — 81002 URINALYSIS NONAUTO W/O SCOPE: CPT | Mod: PBBFAC,59 | Performed by: NURSE PRACTITIONER

## 2023-04-28 PROCEDURE — 87086 URINE CULTURE/COLONY COUNT: CPT | Performed by: NURSE PRACTITIONER

## 2023-04-28 PROCEDURE — 88112 PR  CYTOPATH, CELL ENHANCE TECH: ICD-10-PCS | Mod: 26,,, | Performed by: PATHOLOGY

## 2023-04-28 PROCEDURE — 88112 CYTOPATH CELL ENHANCE TECH: CPT | Mod: 26,,, | Performed by: PATHOLOGY

## 2023-04-28 PROCEDURE — 99999 PR PBB SHADOW E&M-EST. PATIENT-LVL III: CPT | Mod: PBBFAC,,, | Performed by: NURSE PRACTITIONER

## 2023-04-28 RX ORDER — CIPROFLOXACIN 500 MG/1
500 TABLET ORAL ONCE
Status: CANCELLED | OUTPATIENT
Start: 2023-04-28 | End: 2023-04-28

## 2023-04-28 RX ORDER — LIDOCAINE HYDROCHLORIDE 20 MG/ML
JELLY TOPICAL ONCE
Status: CANCELLED | OUTPATIENT
Start: 2023-04-28 | End: 2023-04-28

## 2023-04-28 NOTE — TELEPHONE ENCOUNTER
----- Message from Tapan Rush sent at 4/28/2023  8:13 AM CDT -----  Contact: pt  Pt requesting call back RE: Pt states test were ordered for 5/15 but pt states that's too far out due to her symptoms being back, please call to further discuss      Confirmed contact below:  Contact Name:Shima Sorto  Phone Number: [phone]

## 2023-04-28 NOTE — PROGRESS NOTES
[facility] COMPLAINT:    Mrs. Sorto is a 82 y.o. female presenting for incomplete bladder emptying follow up.    .  PRESENTING ILLNESS:    Shima Sorto is a 82 y.o. female with a PMH of htn, ckd 3, hx breast ca, saina who presents for  incomplete bladder emptying.     Established patient to urology department. Presents today for incomplete bladder emptying follow up.  Tried intermittent catheterization in the past, but stopped due to recurrent symptomatic bladder infections as a result of catheterization.  Underwent the first stage InterStim but it was unsccessful so did not undergo the second stage and the lead was removed.  Started on tamsulosin last visit with some improvement.  Recommended to start CIC.  Was taught CIC by the nurse.  Patient reports has not started CIC due to feeling fatigued with treatment of cdiff.    Patient reports had an episode of gross hematuria at the beginning of April.  Reviewed chart with UA >22 RBC.  Will plan to complete hematuria work up.    Hematuria questions:  Previous/Current Smoker: socially when younger  Radiation therapy to pelvis: no, breast only  Chemotherapy: no  Personal/ family history of bladder/ kidney cancer: no  Exposure to harmful chemicals: no  History of kidney stones: yes    Patient currently being treated for Cdiff and followed by GI. Patient expresses concern about stool consistency.  Believes there may be worms or larvae in stool.  Stool has been analyzed by lab. Reassurance given and gi follow up already scheduled.      REVIEW OF SYSTEMS:    Review of Systems   Constitutional:  Negative for chills and fever.   Respiratory:  Negative for shortness of breath.    Cardiovascular:  Negative for chest pain.   Gastrointestinal:  Negative for constipation and diarrhea.   Genitourinary:  Negative for dysuria, flank pain, frequency, hematuria and urgency.   Neurological:  Negative for dizziness and weakness.      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       Family History   Problem Relation Age of Onset    Kidney disease Mother     Heart disease Father     Melanoma Father     Heart attack Father     Breast cancer Maternal Aunt     Hearing loss Son     Hyperlipidemia Son     Anesthesia problems Neg Hx     Malignant hypertension Neg Hx     Hypotension Neg Hx     Malignant hyperthermia Neg Hx     Pseudochol deficiency Neg Hx     Colon cancer Neg Hx     Ovarian cancer Neg Hx     Psoriasis Neg Hx     Lupus Neg Hx     Eczema Neg Hx     Acne Neg Hx        Allergies:  Asparagus    Medications:    Current Outpatient Medications:     amLODIPine (NORVASC) 2.5 MG tablet, Take 1 tablet (2.5 mg total) by mouth once daily., Disp: 90 tablet, Rfl: 3    anastrozole (ARIMIDEX) 1 mg Tab, TAKE 1 TABLET(1 MG) BY MOUTH EVERY DAY., Disp: 90 tablet, Rfl: 3    coQ10, ubiquinol, 100 mg Cap, Take 100 mg by mouth once daily., Disp: , Rfl:     denosumab (PROLIA) 60 mg/mL Syrg, Inject 60 mg into the skin every 6 (six) months., Disp: , Rfl:     ferrous sulfate (SLOW RELEASE IRON) 142 mg (45 mg iron) TbSR, , Disp: , Rfl:     fish oil-E-fatty acid5-[id number] 400-5 mg-unit Cap, Take 1 capsule by mouth Daily., Disp: , Rfl:     pantoprazole (PROTONIX) 40 MG tablet, Take 1 tablet (40 mg total) by mouth before breakfast. Best taken 45-60 minutes before your first protein meal of the day - Breakfast, Disp: 90 tablet, Rfl: 0    PRESERVISION AREDS-2 250-90-40-1 mg Cap, Take 1 tablet by mouth 2 (two) times daily., Disp: , Rfl:      tamsulosin (FLOMAX) 0.4 mg Cap, Take 1 capsule (0.4 mg total) by mouth once daily., Disp: 30 capsule, Rfl: 11    UNABLE TO FIND, 2 tablets 2 (two) times daily. Rufus-Mag-Citrate with D3 & K2, Disp: , Rfl:     UNABLE TO FIND, 4 capsules Daily. Nutrafol, Disp: , Rfl:     valsartan (DIOVAN) 160 MG tablet, TAKE 1 TABLET(160 MG) BY MOUTH EVERY DAY, Disp: 90 tablet, Rfl: 2    vitamin renal formula, B-complex-vitamin c-folic acid, (NEPHROCAPS) 1 mg Cap, Take 1 capsule by mouth once daily., Disp: 90 capsule, Rfl: 3  No current facility-administered medications for this visit.    Facility-Administered Medications Ordered in Other Visits:     0.9%  NaCl infusion, , Intravenous, Continuous, James Carranza MD, Last Rate: 70 mL/hr at 08/17/18 0843, New Bag at 03/17/23 0719    PHYSICAL EXAMINATION:    Physical Exam  Vitals and nursing note reviewed.   Constitutional:       Appearance: Normal appearance. She is well-developed.   HENT:      Head: Normocephalic and atraumatic.   Eyes:      Pupils: Pupils are equal, round, and reactive to light.   Pulmonary:      Effort: Pulmonary effort is normal.   Musculoskeletal:         General: Normal range of motion.      Cervical back: Normal range of motion.   Skin:     General: Skin is warm and dry.   Neurological:      Mental Status: She is alert and oriented to person, place, and time.   Psychiatric:         Behavior: Behavior normal.        LABS:    U/a dipstick performed in office today: straw, cloudy, ph 8, 1.010, ++ leukocytes, + nitrite, +++protein, + 250 bld    Bladder scan performed by Nurse Caitlyn.   ml     IMPRESSION:    Encounter Diagnoses   Name Primary?    Bladder distention Yes    Incomplete emptying of bladder     Gross hematuria      CT Enterography Abd 3/7/23:  Impression:     1. Findings suggestive for slow GI transit including small bowel feces sign and large volume stool burden throughout the colon.  Recommend clinical correlation.  2. No evidence of bowel  stricture or mechanical obstruction.  Note, limited evaluation given lack of intravenous contrast.  3. Stable nonobstructing calcifications within the right lower pole renal calyx.  Simple and hyperdense left renal cysts.  4. Moderate bladder distension.  Recommend correlation for urinary retention.    PLAN:    Problem List Items Addressed This Visit    None  Visit Diagnoses       Bladder distention    -  Primary    Relevant Orders    POCT Bladder Scan (Completed)    POCT URINE DIPSTICK WITHOUT MICROSCOPE (Completed)    Incomplete emptying of bladder        Relevant Orders    POCT URINE DIPSTICK WITHOUT MICROSCOPE (Completed)    Gross hematuria        Relevant Orders    Cystoscopy    Cytology, Urine    Urine culture    Urinalysis Microscopic (Completed)            1. Bladder distention/incomplete bladder emptying    - PVR approximately 300 ml   - self cath 10 Fr twice daily indefinitely    - continue flomax daily   - refer to pelvic floor therapy  2. Ua indicative of infection  - send urine for analysis and culture  3.  Gross hematuria  I explained to the patient that the causes of hematuria, whether it be gross hematuria or microhematuria, are many, including but not limited to  infections, kidney stones,  malignancy. Fortunately, for patients with microhematuria, the likelihood of finding an underlying  malignancy as the cause of the hematuria is very low at 1-2%. In patients with gross hematuria, the chances of an underlying  malignancy are higher but still low at 15-20%.    Nevertheless, I explained to the patient that the evaluation in both cases consists of upper tract imaging followed by flexible cystoscopy. I described the rationale and procedure for both and answered all questions.    Hematuria work up discussed in detail.  Will proceed with hematuria work up:  Cysto  CT Enterography Abd - reviewed  Urine cytology  Urine culture    4.  Rtc for above     Roxy Meyers NP      I spent over 30  minutes with the patient. Over 50% of the visit was spent in counseling.

## 2023-04-28 NOTE — TELEPHONE ENCOUNTER
Return call and spoke with patient. Inform patient per Dr. Garza, GI staff please schedule Shima for 12 hour fasting blood work next week as well as repeat stool studies orders have been placed thank you     Please schedule her telemedicine video visit with me in 2 weeks for follow-up thank you     Patient verbalized understanding.

## 2023-04-29 ENCOUNTER — TELEPHONE (OUTPATIENT)
Dept: GASTROENTEROLOGY | Facility: CLINIC | Age: 83
End: 2023-04-29
Payer: MEDICARE

## 2023-04-29 ENCOUNTER — PATIENT MESSAGE (OUTPATIENT)
Dept: ENDOSCOPY | Facility: HOSPITAL | Age: 83
End: 2023-04-29
Payer: MEDICARE

## 2023-04-29 ENCOUNTER — TELEPHONE (OUTPATIENT)
Dept: ENDOSCOPY | Facility: HOSPITAL | Age: 83
End: 2023-04-29
Payer: MEDICARE

## 2023-04-29 ENCOUNTER — LAB VISIT (OUTPATIENT)
Dept: LAB | Facility: HOSPITAL | Age: 83
End: 2023-04-29
Attending: INTERNAL MEDICINE
Payer: MEDICARE

## 2023-04-29 DIAGNOSIS — D50.9 IRON DEFICIENCY ANEMIA, UNSPECIFIED IRON DEFICIENCY ANEMIA TYPE: Primary | ICD-10-CM

## 2023-04-29 DIAGNOSIS — R53.83 FATIGUE, UNSPECIFIED TYPE: ICD-10-CM

## 2023-04-29 DIAGNOSIS — E61.1 IRON DEFICIENCY: ICD-10-CM

## 2023-04-29 DIAGNOSIS — R19.7 DIARRHEA IN ADULT PATIENT: ICD-10-CM

## 2023-04-29 DIAGNOSIS — N28.1 RENAL CYST: Primary | ICD-10-CM

## 2023-04-29 LAB
BASOPHILS # BLD AUTO: 0.02 K/UL (ref 0–0.2)
BASOPHILS NFR BLD: 0.6 % (ref 0–1.9)
CORTIS SERPL-MCNC: 10.9 UG/DL (ref 4.3–22.4)
DIFFERENTIAL METHOD: ABNORMAL
EOSINOPHIL # BLD AUTO: 0 K/UL (ref 0–0.5)
EOSINOPHIL NFR BLD: 0.3 % (ref 0–8)
ERYTHROCYTE [DISTWIDTH] IN BLOOD BY AUTOMATED COUNT: 12.6 % (ref 11.5–14.5)
FERRITIN SERPL-MCNC: 78 NG/ML (ref 20–300)
HCT VFR BLD AUTO: 30.8 % (ref 37–48.5)
HGB BLD-MCNC: 9.8 G/DL (ref 12–16)
IMM GRANULOCYTES # BLD AUTO: 0.01 K/UL (ref 0–0.04)
IMM GRANULOCYTES NFR BLD AUTO: 0.3 % (ref 0–0.5)
IRON SERPL-MCNC: 45 UG/DL (ref 30–160)
LYMPHOCYTES # BLD AUTO: 1 K/UL (ref 1–4.8)
LYMPHOCYTES NFR BLD: 31.4 % (ref 18–48)
MCH RBC QN AUTO: 31 PG (ref 27–31)
MCHC RBC AUTO-ENTMCNC: 31.8 G/DL (ref 32–36)
MCV RBC AUTO: 98 FL (ref 82–98)
MONOCYTES # BLD AUTO: 0.4 K/UL (ref 0.3–1)
MONOCYTES NFR BLD: 13.5 % (ref 4–15)
NEUTROPHILS # BLD AUTO: 1.7 K/UL (ref 1.8–7.7)
NEUTROPHILS NFR BLD: 53.9 % (ref 38–73)
NRBC BLD-RTO: 0 /100 WBC
PLATELET # BLD AUTO: 187 K/UL (ref 150–450)
PMV BLD AUTO: 11.6 FL (ref 9.2–12.9)
RBC # BLD AUTO: 3.16 M/UL (ref 4–5.4)
SATURATED IRON: 15 % (ref 20–50)
TOTAL IRON BINDING CAPACITY: 299 UG/DL (ref 250–450)
TRANSFERRIN SERPL-MCNC: 202 MG/DL (ref 200–375)
TSH SERPL DL<=0.005 MIU/L-ACNC: 2.94 UIU/ML (ref 0.4–4)
WBC # BLD AUTO: 3.18 K/UL (ref 3.9–12.7)

## 2023-04-29 PROCEDURE — 84466 ASSAY OF TRANSFERRIN: CPT | Performed by: INTERNAL MEDICINE

## 2023-04-29 PROCEDURE — 82533 TOTAL CORTISOL: CPT | Performed by: INTERNAL MEDICINE

## 2023-04-29 PROCEDURE — 84443 ASSAY THYROID STIM HORMONE: CPT | Performed by: INTERNAL MEDICINE

## 2023-04-29 PROCEDURE — 36415 COLL VENOUS BLD VENIPUNCTURE: CPT | Performed by: INTERNAL MEDICINE

## 2023-04-29 PROCEDURE — 85025 COMPLETE CBC W/AUTO DIFF WBC: CPT | Performed by: INTERNAL MEDICINE

## 2023-04-29 PROCEDURE — 82728 ASSAY OF FERRITIN: CPT | Performed by: INTERNAL MEDICINE

## 2023-04-29 RX ORDER — FERROUS GLUCONATE 324(38)MG
324 TABLET ORAL
Qty: 90 TABLET | Refills: 1 | Status: SHIPPED | OUTPATIENT
Start: 2023-04-29 | End: 2023-05-14 | Stop reason: SDUPTHER

## 2023-04-29 NOTE — PROGRESS NOTES
Rosie please schedule Shima for follow-up renal ultrasound in 6 months which would be November 2023 for follow-up of renal cyst orders placed.  Thank you      Shima recommend follow-up renal ultrasound to follow-up your renal cyst in 6 months around November 2023 orders placed I see that you followed up Urology recently    Impression:     1. Findings of chronic medical renal disease.  2. Mild left hydronephrosis which improved post voiding.  3. Nonobstructing right renal stones.  No right hydronephrosis.  4. Bilateral simple and minimally complex left renal cysts.     Electronically signed by resident: Prosper Antoine  Date:                                            03/29/2023  Time:                                           10:24     Electronically signed by: Abram Kurtz  Date:                                            03/29/2023

## 2023-04-29 NOTE — PROGRESS NOTES
Shima your anemia is slightly lower your recent EGD looked okay overall even in spotty your normal colonoscopy a few years ago I think it would be prudent to schedule you next available for a repeat colonoscopy for evaluation of your anemia referral to endoscopy schedulers placed.    Thank you

## 2023-04-29 NOTE — PROGRESS NOTES
Endoscopy MA team -please tell patient that they are iron deficient and anemic and recommend that they take ferrous gluconate one 324mg pill once daily for next 3 months.    Please order repeat fasting Hemoglobin, Iron/TIBC, and Ferritin in 8 weeks - Orders placed.

## 2023-04-30 NOTE — TELEPHONE ENCOUNTER
----- Message from Keven Garza MD sent at 4/29/2023  2:06 PM CDT -----  Endoscopy MA team -please tell patient that they are iron deficient and anemic and recommend that they take ferrous gluconate one 324mg pill once daily for next 3 months.    Please order repeat fasting Hemoglobin, Iron/TIBC, and Ferritin in 8 weeks - Orders placed.

## 2023-05-01 ENCOUNTER — PATIENT MESSAGE (OUTPATIENT)
Dept: ENDOSCOPY | Facility: HOSPITAL | Age: 83
End: 2023-05-01
Payer: MEDICARE

## 2023-05-01 ENCOUNTER — TELEPHONE (OUTPATIENT)
Dept: ENDOSCOPY | Facility: HOSPITAL | Age: 83
End: 2023-05-01
Payer: MEDICARE

## 2023-05-01 LAB
BACTERIA UR CULT: NORMAL
FINAL PATHOLOGIC DIAGNOSIS: NORMAL
Lab: NORMAL

## 2023-05-01 NOTE — TELEPHONE ENCOUNTER
"Called pt to schedule procedure, no answer. Left voicemail for pt to call the Endoscopy Scheduling Dept. [phone]           Keven Garza MD  [facility] 2 days ago       Procedure: Colonoscopy     Diagnosis: Iron deficiency anemia     Procedure Timing: 3-4 weeks     *If within 4 weeks selected, please patrick as high priority*     *If greater than 12 weeks, please select "5-12 weeks" and delay sending until 2 months prior to requested date*     Provider: Any endoscopist     Location:  Any location     Additional Scheduling Information: No scheduling concerns     Prep Specifications:Standard prep     Have you attached a patient to this message: yes      "

## 2023-05-02 ENCOUNTER — LAB VISIT (OUTPATIENT)
Dept: LAB | Facility: HOSPITAL | Age: 83
End: 2023-05-02
Attending: INTERNAL MEDICINE
Payer: MEDICARE

## 2023-05-02 ENCOUNTER — TELEPHONE (OUTPATIENT)
Dept: ENDOSCOPY | Facility: HOSPITAL | Age: 83
End: 2023-05-02
Payer: MEDICARE

## 2023-05-02 DIAGNOSIS — D50.9 IRON DEFICIENCY ANEMIA, UNSPECIFIED IRON DEFICIENCY ANEMIA TYPE: Primary | ICD-10-CM

## 2023-05-02 DIAGNOSIS — R53.83 FATIGUE, UNSPECIFIED TYPE: ICD-10-CM

## 2023-05-02 DIAGNOSIS — E61.1 IRON DEFICIENCY: ICD-10-CM

## 2023-05-02 DIAGNOSIS — R19.7 DIARRHEA IN ADULT PATIENT: ICD-10-CM

## 2023-05-02 DIAGNOSIS — Z12.11 SCREEN FOR COLON CANCER: ICD-10-CM

## 2023-05-02 LAB
C DIFF GDH STL QL: NEGATIVE
C DIFF TOX A+B STL QL IA: NEGATIVE

## 2023-05-02 PROCEDURE — 87449 NOS EACH ORGANISM AG IA: CPT | Performed by: INTERNAL MEDICINE

## 2023-05-02 PROCEDURE — 87449 NOS EACH ORGANISM AG IA: CPT | Mod: 91 | Performed by: INTERNAL MEDICINE

## 2023-05-02 PROCEDURE — 87329 GIARDIA AG IA: CPT | Performed by: INTERNAL MEDICINE

## 2023-05-02 PROCEDURE — 87046 STOOL CULTR AEROBIC BACT EA: CPT | Performed by: INTERNAL MEDICINE

## 2023-05-02 PROCEDURE — 87045 FECES CULTURE AEROBIC BACT: CPT | Performed by: INTERNAL MEDICINE

## 2023-05-02 PROCEDURE — 87427 SHIGA-LIKE TOXIN AG IA: CPT | Performed by: INTERNAL MEDICINE

## 2023-05-03 DIAGNOSIS — Z71.89 COMPLEX CARE COORDINATION: ICD-10-CM

## 2023-05-03 LAB
CRYPTOSP AG STL QL IA: NEGATIVE
E COLI SXT1 STL QL IA: NEGATIVE
E COLI SXT2 STL QL IA: NEGATIVE
E HISTOLYT AB SER QL: NEGATIVE
G LAMBLIA AG STL QL IA: NEGATIVE
STRONGYLOIDES ANTIBODY IGG: NEGATIVE

## 2023-05-04 LAB — BACTERIA STL CULT: NORMAL

## 2023-05-06 ENCOUNTER — PATIENT MESSAGE (OUTPATIENT)
Dept: ADMINISTRATIVE | Facility: OTHER | Age: 83
End: 2023-05-06
Payer: MEDICARE

## 2023-05-08 ENCOUNTER — IMMUNIZATION (OUTPATIENT)
Dept: PHARMACY | Facility: CLINIC | Age: 83
End: 2023-05-08
Payer: MEDICARE

## 2023-05-08 ENCOUNTER — PROCEDURE VISIT (OUTPATIENT)
Dept: UROLOGY | Facility: CLINIC | Age: 83
End: 2023-05-08
Payer: MEDICARE

## 2023-05-08 VITALS
TEMPERATURE: 98 F | DIASTOLIC BLOOD PRESSURE: 58 MMHG | RESPIRATION RATE: 18 BRPM | BODY MASS INDEX: 20.24 KG/M2 | SYSTOLIC BLOOD PRESSURE: 129 MMHG | HEART RATE: 72 BPM | WEIGHT: 121.5 LBS | HEIGHT: 65 IN

## 2023-05-08 DIAGNOSIS — Z23 NEED FOR VACCINATION: Primary | ICD-10-CM

## 2023-05-08 DIAGNOSIS — R31.0 GROSS HEMATURIA: ICD-10-CM

## 2023-05-08 PROCEDURE — 51701 INSERT BLADDER CATHETER: CPT | Mod: PBBFAC | Performed by: UROLOGY

## 2023-05-08 PROCEDURE — 52000 CYSTOURETHROSCOPY: CPT | Mod: PBBFAC | Performed by: UROLOGY

## 2023-05-08 PROCEDURE — 52000 CYSTOURETHROSCOPY: CPT | Mod: S$PBB,,, | Performed by: UROLOGY

## 2023-05-08 PROCEDURE — 52000 PR CYSTOURETHROSCOPY: ICD-10-PCS | Mod: S$PBB,,, | Performed by: UROLOGY

## 2023-05-08 RX ORDER — LIDOCAINE HYDROCHLORIDE 20 MG/ML
JELLY TOPICAL ONCE
Status: COMPLETED | OUTPATIENT
Start: 2023-05-08 | End: 2023-05-08

## 2023-05-08 RX ORDER — CIPROFLOXACIN 500 MG/1
500 TABLET ORAL ONCE
Status: COMPLETED | OUTPATIENT
Start: 2023-05-08 | End: 2023-05-08

## 2023-05-08 RX ORDER — POLYETHYLENE GLYCOL-3350 AND ELECTROLYTES WITH FLAVOR PACK 240; 5.84; 2.98; 6.72; 22.72 G/278.26G; G/278.26G; G/278.26G; G/278.26G; G/278.26G
POWDER, FOR SOLUTION ORAL
COMMUNITY
Start: 2023-05-02 | End: 2023-05-27

## 2023-05-08 RX ADMIN — LIDOCAINE HYDROCHLORIDE: 20 JELLY TOPICAL at 10:05

## 2023-05-08 RX ADMIN — CIPROFLOXACIN 500 MG: 500 TABLET ORAL at 10:05

## 2023-05-08 NOTE — PROCEDURES
Procedures    CYSTOSCOPY REPORT    Pre Procedure Diagnosis:  gross hematuria, in a patient with history of incomplete bladder emptying    Post Procedure Diagnosis:  small ecchymosis and erythema on the posterior wall.     Anesthesia: 10 cc 2% lidocaine jelly applied per urethra.    14 FR Flexible Olympus cystoscope used.    FINDINGS:  Dome, anterior, posterior, lateral walls and bladder base free of urothelial abnormalities. Right and left ureteral orifices in the normal postion and configuration, both effluxed clear urine.  Bladder neck and urethra were normal.     NOTE:  I had to catheterize the patient as there was a fair amount of debris in the bladder which precluded adequate visualization so a 16 Fr Red Flower catheter was placed, the bladder was irrigated with 150 ml and drained, which then allowed good visualization of the bladder.     Specimen:   none    The patient was taken to the cystoscopy suite and placed in dorsal lithotomy position.  The genitalia was prepped and draped  in the usual sterile fashion.  Two percent lidocaine jelly was inserted in the urethra.  After sufficent time had passed to allow good local anesthesia, the cystoscope was inserted in the urethra and passed into the bladder visualizing the urethra along its entire course.  The dome, anterior, posterior and lateral walls were examined systematically.  The ureteral orifices were in their usual position and configuration.  The cystoscope was turned upon itself 180 degrees to visualize the bladder neck.  The cystoscope was then brought to the level of the bladder neck, the water was turned on and the urethra was visualized.  A 14 Fr catheter was used to drain the bladder.      Post procedure medication:  doxycycline 100 mg x 1     ASSESSMENT/PLAN:  82 year old woman status post flexible cystoscopy.  1. Push fluids for 24 hours.  2. May see blood in the urine, this should gradually improve over the next 2-3 days.  3. The patient was  instructed to return to the office or go to the emergency should fever, chills, cloudy urine, or inability to urinate develop.  4. May resume intermittent catheterization .

## 2023-05-08 NOTE — PATIENT INSTRUCTIONS
What to Expect After a Cystoscopy  For the next 24-48 hours, you may feel a mild burning when you urinate. This burning is normal and expected. Drink plenty of water to dilute the urine to help relieve the burning sensation. You may also see a small amount of blood in your urine after the procedure.    Unless you are already taking antibiotics, you may be given an antibiotic after the test to prevent infection.    Signs and Symptoms to Report  Call the [facility] at [phone] if you develop any of the following:  Fever of 101 degrees or higher  Chills or persistent bleeding  Inability to urinate

## 2023-05-09 ENCOUNTER — TELEPHONE (OUTPATIENT)
Dept: ENDOSCOPY | Facility: HOSPITAL | Age: 83
End: 2023-05-09
Payer: MEDICARE

## 2023-05-10 ENCOUNTER — HOSPITAL ENCOUNTER (OUTPATIENT)
Facility: HOSPITAL | Age: 83
Discharge: HOME | End: 2023-05-10
Attending: INTERNAL MEDICINE | Admitting: INTERNAL MEDICINE
Payer: MEDICARE

## 2023-05-10 ENCOUNTER — TELEPHONE (OUTPATIENT)
Dept: ENDOSCOPY | Facility: HOSPITAL | Age: 83
End: 2023-05-10
Payer: MEDICARE

## 2023-05-10 ENCOUNTER — ANESTHESIA (OUTPATIENT)
Dept: ENDOSCOPY | Facility: HOSPITAL | Age: 83
End: 2023-05-10
Payer: MEDICARE

## 2023-05-10 ENCOUNTER — ANESTHESIA EVENT (OUTPATIENT)
Dept: ENDOSCOPY | Facility: HOSPITAL | Age: 83
End: 2023-05-10
Payer: MEDICARE

## 2023-05-10 VITALS
SYSTOLIC BLOOD PRESSURE: 156 MMHG | HEART RATE: 75 BPM | TEMPERATURE: 98 F | RESPIRATION RATE: 18 BRPM | DIASTOLIC BLOOD PRESSURE: 70 MMHG | OXYGEN SATURATION: 97 %

## 2023-05-10 DIAGNOSIS — D50.9 IRON DEFICIENCY ANEMIA, UNSPECIFIED IRON DEFICIENCY ANEMIA TYPE: Primary | ICD-10-CM

## 2023-05-10 DIAGNOSIS — D50.9 IRON DEFICIENCY ANEMIA: ICD-10-CM

## 2023-05-10 DIAGNOSIS — D50.9 IRON DEFICIENCY ANEMIA, UNSPECIFIED IRON DEFICIENCY ANEMIA TYPE: ICD-10-CM

## 2023-05-10 DIAGNOSIS — K64.8 INTERNAL HEMORRHOIDS: Primary | ICD-10-CM

## 2023-05-10 PROCEDURE — 25000003 PHARM REV CODE 250: Performed by: NURSE ANESTHETIST, CERTIFIED REGISTERED

## 2023-05-10 PROCEDURE — 37000008 HC ANESTHESIA 1ST 15 MINUTES: Performed by: INTERNAL MEDICINE

## 2023-05-10 PROCEDURE — 88305 TISSUE EXAM BY PATHOLOGIST: ICD-10-PCS | Mod: 26,,, | Performed by: PATHOLOGY

## 2023-05-10 PROCEDURE — 63600175 PHARM REV CODE 636 W HCPCS: Performed by: NURSE ANESTHETIST, CERTIFIED REGISTERED

## 2023-05-10 PROCEDURE — 45385 PR COLONOSCOPY,REMV LESN,SNARE: ICD-10-PCS | Mod: ,,, | Performed by: INTERNAL MEDICINE

## 2023-05-10 PROCEDURE — E9220 PRA ENDO ANESTHESIA: ICD-10-PCS | Mod: ,,, | Performed by: NURSE ANESTHETIST, CERTIFIED REGISTERED

## 2023-05-10 PROCEDURE — 37000009 HC ANESTHESIA EA ADD 15 MINS: Performed by: INTERNAL MEDICINE

## 2023-05-10 PROCEDURE — 88305 TISSUE EXAM BY PATHOLOGIST: CPT | Mod: 26,,, | Performed by: PATHOLOGY

## 2023-05-10 PROCEDURE — 88305 TISSUE EXAM BY PATHOLOGIST: CPT | Mod: 59 | Performed by: PATHOLOGY

## 2023-05-10 PROCEDURE — E9220 PRA ENDO ANESTHESIA: HCPCS | Mod: ,,, | Performed by: NURSE ANESTHETIST, CERTIFIED REGISTERED

## 2023-05-10 PROCEDURE — 45385 COLONOSCOPY W/LESION REMOVAL: CPT | Performed by: INTERNAL MEDICINE

## 2023-05-10 PROCEDURE — 45385 COLONOSCOPY W/LESION REMOVAL: CPT | Mod: ,,, | Performed by: INTERNAL MEDICINE

## 2023-05-10 PROCEDURE — 27201089 HC SNARE, DISP (ANY): Performed by: INTERNAL MEDICINE

## 2023-05-10 RX ORDER — PROPOFOL 10 MG/ML
VIAL (ML) INTRAVENOUS CONTINUOUS PRN
Status: DISCONTINUED | OUTPATIENT
Start: 2023-05-10 | End: 2023-05-10

## 2023-05-10 RX ORDER — SODIUM CHLORIDE 9 MG/ML
INJECTION, SOLUTION INTRAVENOUS CONTINUOUS
Status: DISCONTINUED | OUTPATIENT
Start: 2023-05-10 | End: 2023-05-10 | Stop reason: HOSPADM

## 2023-05-10 RX ORDER — LIDOCAINE HYDROCHLORIDE 20 MG/ML
INJECTION INTRAVENOUS
Status: DISCONTINUED | OUTPATIENT
Start: 2023-05-10 | End: 2023-05-10

## 2023-05-10 RX ORDER — PROPOFOL 10 MG/ML
VIAL (ML) INTRAVENOUS
Status: DISCONTINUED | OUTPATIENT
Start: 2023-05-10 | End: 2023-05-10

## 2023-05-10 RX ADMIN — PROPOFOL 30 MG: 10 INJECTION, EMULSION INTRAVENOUS at 12:05

## 2023-05-10 RX ADMIN — Medication 125 MCG/KG/MIN: at 12:05

## 2023-05-10 RX ADMIN — SODIUM CHLORIDE: 9 INJECTION, SOLUTION INTRAVENOUS at 11:05

## 2023-05-10 RX ADMIN — LIDOCAINE HYDROCHLORIDE 30 MG: 20 INJECTION INTRAVENOUS at 12:05

## 2023-05-10 NOTE — TRANSFER OF CARE
Anesthesia Transfer of Care Note    Patient: Shima Sorto    Procedure(s) Performed: Procedure(s) (LRB):  COLONOSCOPY (N/A)    Patient location: PACU    Anesthesia Type: general    Transport from OR: Transported from OR on room air with adequate spontaneous ventilation    Post pain: adequate analgesia    Post assessment: no apparent anesthetic complications and tolerated procedure well    Post vital signs: stable    Level of consciousness: awake, alert and oriented    Nausea/Vomiting: no nausea/vomiting    Complications: none    Transfer of care protocol was followed      Last vitals:   Visit Vitals  LMP  (LMP Unknown)   Breastfeeding No  bp 146/78  Hr 67  rr 18  Temp 97.6  sao2 99

## 2023-05-10 NOTE — H&P
[facility] 4[facility]  History & Physical    Subjective:      Chief Complaint/Reason for Admission:     Patient for colonoscopy today for iron deficiency anemia    Shima Sorto is a 82 y.o. female.    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     Past Surgical History:   Procedure Laterality Date    BREAST CYST ASPIRATION Right 1999    BREAST LUMPECTOMY Right 2018    with radiation    COLONOSCOPY N/A 7/24/2018    Procedure: COLONOSCOPY;  Surgeon: Juan Miguel Pena MD;  Location: [facility] ([address] [place] [address]);  Service: Endoscopy;  Laterality: N/A;    ESOPHAGOGASTRODUODENOSCOPY N/A 3/17/2023    Procedure: EGD (ESOPHAGOGASTRODUODENOSCOPY);  Surgeon: Keven Garza MD;  Location: [facility] ([address] [place] [address]);  Service: Endoscopy;  Laterality: N/A;  Medically Urgent  3/2 instructions to portal-st    pre call attempted, no answer from pt 3/13/23 -egh    INJECTION FOR SENTINEL NODE IDENTIFICATION Right 8/17/2018    Procedure: INJECTION, FOR SENTINEL NODE IDENTIFICATION;  Surgeon: Abby Mason MD;  Location: [facility] OR [address] [place] [address];  Service: General;  Laterality: Right;    interstim placed stage 1      and removed    KIDNEY STONE SURGERY  2000    @ [facility]    MASTECTOMY, PARTIAL Right 8/17/2018    Procedure: MASTECTOMY, PARTIAL-US guided;  Surgeon: Abby Mason MD;  Location: [facility] OR [address] [place] [address];  Service: General;  Laterality: Right;    MOHS procedure      SENTINEL LYMPH NODE BIOPSY Right 8/17/2018    Procedure: BIOPSY, LYMPH NODE, SENTINEL;  Surgeon: Abby Mason MD;   Location: [facility] OR [address] [place] [address];  Service: General;  Laterality: Right;     Family History   Problem Relation Age of Onset    Kidney disease Mother     Heart disease Father     Melanoma Father     Heart attack Father     Breast cancer Maternal Aunt     Hearing loss Son     Hyperlipidemia Son     Anesthesia problems Neg Hx     Malignant hypertension Neg Hx     Hypotension Neg Hx     Malignant hyperthermia Neg Hx     Pseudochol deficiency Neg Hx     Colon cancer Neg Hx     Ovarian cancer Neg Hx     Psoriasis Neg Hx     Lupus Neg Hx     Eczema Neg Hx     Acne Neg Hx      Social History     Tobacco Use    Smoking status: Former     Packs/day: 0.50     Years: 8.00     Pack years: 4.00     Types: Cigarettes     Quit date: 1964     Years since quittin.7    Smokeless tobacco: Never   Substance Use Topics    Alcohol use: Yes     Alcohol/week: 1.0 standard drink     Types: 1 Shots of liquor per week     Comment: less than one weekly    Drug use: No       PTA Medications   Medication Sig    amLODIPine (NORVASC) 2.5 MG tablet Take 1 tablet (2.5 mg total) by mouth once daily.    anastrozole (ARIMIDEX) 1 mg Tab TAKE 1 TABLET(1 MG) BY MOUTH EVERY DAY.    coQ10, ubiquinol, 100 mg Cap Take 100 mg by mouth once daily.    denosumab (PROLIA) 60 mg/mL Syrg Inject 60 mg into the skin every 6 (six) months.    ferrous gluconate (FERGON) 324 MG tablet Take 1 tablet (324 mg total) by mouth daily with breakfast.    fish oil-E-fatty [id number] 400-5 mg-unit Cap Take 1 capsule by mouth Daily.    GAVILYTE-C 240-22.72-6.72 -5.84 gram SolR TAKE 4000 ML BY MOUTH 1 TIME    pantoprazole (PROTONIX) 40 MG tablet Take 1 tablet (40 mg total) by mouth before breakfast. Best taken 45-60 minutes before your first protein meal of the day - Breakfast    PRESERVISION AREDS-2 250-90-40-1 mg Cap Take 1 tablet by mouth 2 (two) times daily.    [] sars-cov-2, covid-19 omicron, (MODERNA COVID BIVAL,6Y UP,,PF,) 50 mcg/0.5 mL injection Inject 0.5 mLs  into the muscle once. for 1 dose    tamsulosin (FLOMAX) 0.4 mg Cap Take 1 capsule (0.4 mg total) by mouth once daily.    UNABLE TO FIND 2 tablets 2 (two) times daily. Rufus-Mag-Citrate with D3 & K2    UNABLE TO FIND 4 capsules Daily. Nutrafol    vitamin renal formula, B-complex-vitamin c-folic acid, (NEPHROCAPS) 1 mg Cap Take 1 capsule by mouth once daily.     Review of patient's allergies indicates:   Allergen Reactions    Asparagus Rash        Review of Systems   Constitutional:  Negative for fever.   Respiratory:  Negative for shortness of breath.    Cardiovascular:  Negative for chest pain.   Gastrointestinal:  Negative for abdominal pain, blood in stool and melena.     Objective:      Vital Signs (Most Recent)       Vital Signs Range (Last 24H):       Physical Exam  Neurological:      Mental Status: She is alert and oriented to person, place, and time.         Assessment:        Patient for colonoscopy today for iron deficiency anemia      Plan:      Patient for colonoscopy today for iron deficiency anemia

## 2023-05-10 NOTE — ANESTHESIA PREPROCEDURE EVALUATION
05/10/2023  Shima Sorto is a 82 y.o., female.      Patient Name: Shima Sorto  YOB: 1940  MRN: [id number]  [facility]: [id number]      Code Status: Prior   Date of Procedure: 5/10/2023  Anesthesia: Choice Procedure: Procedure(s) (LRB):  COLONOSCOPY (N/A)  Pre-Operative Diagnosis: Iron deficiency anemia, unspecified iron deficiency anemia type [D50.9]  Proceduralist: Surgeon(s) and Role:     * Keven Garza MD - Primary        SUBJECTIVE:   Shima Sorto is a 82 y.o. female who  has a past medical history of Allergy, Anemia, Basal cell carcinoma (7/2013), Breast cancer (2018), colonic polyps, Hypertension, Medullary sponge kidney, MVP (mitral valve prolapse), Osteoporosis, senile, Pneumonia, Renal calculi, Sciatica, Sleep apnea, TMJ syndrome, Urinary retention, Vertigo (1/17/2017), 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 No notes on file    ALLERGIES:     Review of patient's allergies indicates:   Allergen Reactions    Asparagus Rash     MEDICATIONS:     Current Facility-Administered Medications   Medication Dose Route Frequency Provider Last Rate Last Admin    0.9%  NaCl infusion   Intravenous Continuous Keven Garza MD         Facility-Administered Medications Ordered in Other Encounters   Medication Dose Route Frequency Provider Last Rate Last Admin    0.9%  NaCl infusion   Intravenous Continuous James Carranza MD 70 mL/hr at 08/17/18 0843 New Bag at 03/17/23 0719    LIDOcaine (cardiac) injection   Intravenous PRN Esperanza Lucero, CRNA   30 mg at 05/10/23 1207    propofol (DIPRIVAN) 10 mg/mL infusion   Intravenous PRN Esperanza Lucero, CRNA   30 mg at 05/10/23 1207    propofol (DIPRIVAN) 10 mg/mL infusion   Intravenous Continuous PRN Esperanza Lucero, CRNA 49.5 mL/hr at 05/10/23 1207 150 mcg/kg/min at 05/10/23 1207    sodium chloride 0.9% infusion    Intravenous Continuous PRN Esperanza Lucero CRNA   New Bag at 05/10/23 1149          History:     Patient Active Problem List   Diagnosis    Hypertension    Hyperoxaluria    Hypocitraturic calcium nephrolithiasis    Cystic kidney disease    Urinary retention    Age-related osteoporosis with current pathological fracture with routine healing    Hypoglycemia    Trigger middle finger of right hand    Retinal hemorrhage of both eyes at about 1'oclock    Vestibular neuronitis    Obstructive sleep apnea    Venous insufficiency    Bradycardia    Malignant neoplasm of upper-outer quadrant of right breast in female, estrogen receptor positive    At risk for lymphedema    CKD (chronic kidney disease) stage 3, GFR 30-59 ml/min    Vitamin D deficiency    Use of aromatase inhibitors    Thrombocytopenia    Primary hyperoxaluria    Skin fissure    Impaired range of motion of cervical spine    Decreased range of motion of thoracic spine    Incomplete bladder emptying    Pelvic floor weakness    Lack of coordination     Surgical History:    has a past surgical history that includes Kidney stone surgery (2000); MOHS procedure; interstim placed stage 1; Breast cyst aspiration (Right, 1999); Colonoscopy (N/A, 7/24/2018); Mastectomy, partial (Right, 8/17/2018); Injection for sentinel node identification (Right, 8/17/2018); [place] lymph node biopsy (Right, 8/17/2018); Breast lumpectomy (Right, 2018); and Esophagogastroduodenoscopy (N/A, 3/17/2023).   Social History:    reports that she is not currently sexually active.  reports that she quit smoking about 58 years ago. Her smoking use included cigarettes. She has a 4.00 pack-year smoking history. She has never used smokeless tobacco. She reports current alcohol use of about 1.0 standard drink per week. She reports that she does not use drugs.       Pre-op Assessment    I have reviewed the Patient Summary Reports.     I have reviewed the Nursing Notes. I have  reviewed the NPO Status.   I have reviewed the Medications.     Review of Systems  Anesthesia Hx:  No problems with previous Anesthesia  Denies Family Hx of Anesthesia complications.   Denies Personal Hx of Anesthesia complications.   Hematology/Oncology:  Hematology Normal        Cardiovascular:   Hypertension    Pulmonary:   Sleep Apnea    Hepatic/GI:   Bowel Prep.    Musculoskeletal:  Musculoskeletal Normal    Neurological:  Neurology Normal    Dermatological:  Skin Normal        Physical Exam  General: Cooperative, Alert and Oriented    Airway:  Mallampati: II   Mouth Opening: Normal  TM Distance: Normal  Tongue: Normal  Neck ROM: Normal ROM    Dental:  Intact        Anesthesia Plan  Type of Anesthesia, risks & benefits discussed:    Anesthesia Type: Gen Natural Airway  Intra-op Monitoring Plan: Standard ASA Monitors  Induction:  IV  Informed Consent: Informed consent signed with the Patient and all parties understand the risks and agree with anesthesia plan.  All questions answered.   ASA Score: 3  Day of Surgery Review of History & Physical: H&P Update referred to the surgeon/provider.I have interviewed and examined the patient. I have reviewed the patient's H&P dated: There are no significant changes.     Ready For Surgery From Anesthesia Perspective.     .

## 2023-05-10 NOTE — PROVATION PATIENT INSTRUCTIONS
Discharge Summary/Instructions after an Endoscopic Procedure  Patient Name: Shima Sorto  Patient MRN: [id number]  Patient YOB: 1940  Wednesday, May 10, 2023  Keven Garza MD  Dear patient,  As a result of recent federal legislation (The Federal Cures Act), you may   receive lab or pathology results from your procedure in your [facility]   account before your physician is able to contact you. Your physician or   their representative will relay the results to you with their   recommendations at their soonest availability.  Thank you,  RESTRICTIONS:  During your procedure today, you received medications for sedation.  These   medications may affect your judgment, balance and coordination.  Therefore,   for 24 hours, you have the following restrictions:   - DO NOT drive a car, operate machinery, make legal/financial decisions,   sign important papers or drink alcohol.    ACTIVITY:  Today: no heavy lifting, straining or running due to procedural   sedation/anesthesia.  The following day: return to full activity including work.  DIET:  Eat and drink normally unless instructed otherwise.     TREATMENT FOR COMMON SIDE EFFECTS:  - Mild abdominal pain, nausea, belching, bloating or excessive gas:  rest,   eat lightly and use a heating pad.  - Sore Throat: treat with throat lozenges and/or gargle with warm salt   water.  - Because air was used during the procedure, expelling large amounts of air   from your rectum or belching is normal.  - If a bowel prep was taken, you may not have a bowel movement for 1-3 days.    This is normal.  SYMPTOMS TO WATCH FOR AND REPORT TO YOUR PHYSICIAN:  1. Abdominal pain or bloating, other than gas cramps.  2. Chest pain.  3. Back pain.  4. Signs of infection such as: chills or fever occurring within 24 hours   after the procedure.  5. Rectal bleeding, which would show as bright red, maroon, or black stools.   (A tablespoon of blood from the rectum is not serious, especially if    hemorrhoids are present.)  6. Vomiting.  7. Weakness or dizziness.  GO DIRECTLY TO THE NEAREST EMERGENCY ROOM IF YOU HAVE ANY OF THE FOLLOWING:      Difficulty breathing              Chills and/or fever over 101 F   Persistent vomiting and/or vomiting blood   Severe abdominal pain   Severe chest pain   Black, tarry stools   Bleeding- more than one tablespoon   Any other symptom or condition that you feel may need urgent attention  Your doctor recommends these additional instructions:  If any biopsies were taken, your doctors clinic will contact you in 1 to 2   weeks with any results.  - Discharge patient to home.   - Await pathology results.   - Telephone endoscopist for pathology results in 3 weeks.   - Repeat colonoscopy in 5 years for surveillance of multiple polyps.   - Use original regular Metamucil one tablespoon PO BID.   - Return to GI clinic in 4 weeks.   - Return to primary care physician.   - The findings and recommendations were discussed with the patient.  For questions, problems or results please call your physician - Keven Garza MD at Work:  [phone].  [facility], EMERGENCY ROOM PHONE NUMBER: [phone]  IF A COMPLICATION OR EMERGENCY SITUATION ARISES AND YOU ARE UNABLE TO REACH   YOUR PHYSICIAN - GO DIRECTLY TO THE EMERGENCY ROOM.  Keven Garza MD  5/10/2023 12:55:11 PM  This report has been verified and signed electronically.  Dear patient,  As a result of recent federal legislation (The Federal Cures Act), you may   receive lab or pathology results from your procedure in your [facility]   account before your physician is able to contact you. Your physician or   their representative will relay the results to you with their   recommendations at their soonest availability.  Thank you,  PROVATION

## 2023-05-10 NOTE — ANESTHESIA POSTPROCEDURE EVALUATION
Anesthesia Post Evaluation    Patient: Shima Sorto    Procedure(s) Performed: Procedure(s) (LRB):  COLONOSCOPY (N/A)    Final Anesthesia Type: general      Patient location during evaluation: GI PACU  Patient participation: Yes- Able to Participate  Level of consciousness: awake and alert and oriented  Post-procedure vital signs: reviewed and stable  Pain management: adequate  Airway patency: patent    PONV status at discharge: No PONV  Anesthetic complications: no      Cardiovascular status: stable  Respiratory status: unassisted, spontaneous ventilation and room air  Hydration status: euvolemic  Follow-up not needed.          Vitals Value Taken Time   /69 05/10/23 1247   Temp 36.5 °C (97.7 °F) 05/10/23 1247   Pulse 61 05/10/23 1247   Resp 18 05/10/23 1247   SpO2 100 % 05/10/23 1247         No case tracking events are documented in the log.      Pain/Eboni Score: Eboni Score: 10 (5/10/2023 12:49 PM)

## 2023-05-11 ENCOUNTER — PATIENT MESSAGE (OUTPATIENT)
Dept: GASTROENTEROLOGY | Facility: CLINIC | Age: 83
End: 2023-05-11
Payer: MEDICARE

## 2023-05-14 DIAGNOSIS — D50.9 IRON DEFICIENCY ANEMIA, UNSPECIFIED IRON DEFICIENCY ANEMIA TYPE: ICD-10-CM

## 2023-05-14 RX ORDER — FERROUS GLUCONATE 324(38)MG
324 TABLET ORAL
Qty: 90 TABLET | Refills: 1 | Status: SHIPPED | OUTPATIENT
Start: 2023-05-14 | End: 2023-05-21 | Stop reason: SDUPTHER

## 2023-05-14 NOTE — PROGRESS NOTES
GI MA team please refer Shima to Heme-Onc for evaluation of IV iron for her iron deficiency anemia referral placed thank you    GI MA team - please tell patient that they are iron deficient and anemic and recommend that they take ferrous gluconate one 324mg pill once daily for next 3 months.    [facility] team -  Please order repeat fasting Hemoglobin, Iron/TIBC, and Ferritin in 8 weeks - Orders placed.   Use ibuprofen as prescribed  Apply ice to area 10-15 minutes, 4-6 times a day  Keep splint on at all times  Follow up with orthopedic surgery as soon as possible  Try:  Dr. Al Jolly [phone]  Patient Education     Forearm Fracture  You have a break or fracture of both bones in the forearm. The bones are not out of place and will not need to be set. This fracture usually takes 6 to 8 weeks to heal completely. Initial treatment is with a splint or cast.    Home care  · Keep your arm elevated to reduce pain and swelling. When sitting or lying down elevate your arm above the level of your heart. You can do this by placing your arm on a pillow that rests on your chest or on a pillow at your side. This is most important during the first 48 hours after injury.  · Apply an ice pack over the injured area for 15 to 20 minutes every 3 to 6 hours. You should do this for the first 24 to 48 hours. You can make an ice pack by filling a plastic bag that seals at the top with ice cubes and then wrapping it with a thin towel. Be careful not to injure your skin with the ice treatments. Ice should never be applied directly to skin. As the ice melts, be careful that the cast or splint doesn’t get wet. You can place the ice pack inside the sling and directly over the splint or cast. Continue with ice packs as needed for the relief of pain and swelling.  · Keep the cast or splint completely dry at all times. Bathe with your cast or splint out of the water. Protect it with 2 large plastic bags, one outside of the other, each taped with duct tape at the top end. If a fiberglass splint or cast gets wet, you can dry it with a hair dryer on a cool setting.  · You may use over-the-counter pain medicine to control pain, unless another pain medicine was prescribed. If you have chronic liver or kidney disease or ever had a stomach ulcer or GI bleeding, talk with your healthcare provider before using these medicines.  Follow-up care  Follow  up with your healthcare provider, or as advised. If a splint was applied, it may be changed to a cast during your follow-up visit.  If X-rays were taken, you will be told of any new findings that may affect your care.  When to seek medical advice  Call your healthcare provider right away if any of the following occur:  · The plaster cast or splint becomes wet or soft  · The fiberglass cast or splint remains wet for more than 24 hours  · Increased tightness, looseness, or pain occurs under the cast or splint  · Fingers become swollen, cold, blue, numb or tingly  · The cast develops a foul odor  Date Last Reviewed: 12/3/2015  © 2816-3779 [employer]. [address] [place] [address], [place] [address]. All rights reserved. This information is not intended as a substitute for professional medical care. Always follow your healthcare professional's instructions.           Patient Education     Discharge Instructions: Caring for Your Child’s Splint  Your child will be coming home with a splint. This is sometimes called a removable cast. A splint helps your child’s body heal by holding his or her injured bones or joints in place. A damaged splint can prevent the injury from healing well. Take good care of your child’s splint. If the splint becomes damaged or loses its shape, it may need to be replaced. Here's what you need to know about home care.     Splint care  · Make sure your child wears his or her splint according to the healthcare provider's instructions.  · Keep the splint dry at all times. Bathe with your splint well out of the water. You can hold the splint outside the tub or shower when bathing. Protect it with a large plastic bag closed at the top end with a rubber band. Use two layers of plastic to help keep the splint dry. Or you can buy a waterproof shield.  · If a splint gets wet, dry it with a hair dryer on the “cool” setting. Don’t use the warm or hot setting, because those settings can burn your  skin.  · Always keep the splint clean and away from dirt.  · Wash the Velcro straps and inner cloth sleeve (stockinet) with soapy water and air-dry.  · Keep the splint away from open flames.  · Don’t expose the splint to heat, space heaters, or prolonged sunlight. Excessive heat will cause the splint to change shape.  · Don’t cut or tear the splint.   Exercise and elevation  · Encourage your child to exercise all the nearby joints not kept still by the splint. If your child has a long leg splint, help him or her to exercise the hip joint and toes. If your child has an arm splint, encourage your child to exercise his or her shoulder, elbow, thumb, and fingers.  · Elevate the part of the body that is in the splint. This helps reduce swelling.  Follow-up care  Make a follow-up appointment as directed by your healthcare provider.  When to call your child's healthcare provider  Call the healthcare provider right away if your child has any of the following:  · Tingling or numbness in the affected area  · Severe pain that cannot be relieved with medicine  · Splint that feels too tight or too loose  · Swelling, coldness, or blue-gray color in his or her fingers or toes  · Splint that is damaged, cracked, or has rough edges that hurt  · Pressure sores or red marks that don’t go away within 1 hour of removing the splint  · A bad odor comes from underneath the splint  · Blisters  · Fever, as directed by your healthcare provider or:  ? Your child is younger than 12 weeks and has a fever of 100.4°F (38°C) or higher because your baby may need to be seen by his or her healthcare provider  ? Your child has repeated fevers above 104°F (40°C) at any age.  ? Your child is younger than 2 years old and his or her fever continues for more than 24 hours or your child is 2 years old and older and his or her fever continues for more than 3 days   Date Last Reviewed: 11/15/2015  © 3592-9965 [employer]. [address] [facility],  [place] [address]. All rights reserved. This information is not intended as a substitute for professional medical care. Always follow your healthcare professional's instructions.

## 2023-05-15 ENCOUNTER — LAB VISIT (OUTPATIENT)
Dept: LAB | Facility: HOSPITAL | Age: 83
End: 2023-05-15
Attending: INTERNAL MEDICINE
Payer: MEDICARE

## 2023-05-15 DIAGNOSIS — D50.9 IRON DEFICIENCY ANEMIA, UNSPECIFIED IRON DEFICIENCY ANEMIA TYPE: ICD-10-CM

## 2023-05-15 LAB
FERRITIN SERPL-MCNC: 63 NG/ML (ref 20–300)
HGB BLD-MCNC: 10 G/DL (ref 12–16)
IRON SERPL-MCNC: 51 UG/DL (ref 30–160)
SATURATED IRON: 17 % (ref 20–50)
TOTAL IRON BINDING CAPACITY: 306 UG/DL (ref 250–450)
TRANSFERRIN SERPL-MCNC: 207 MG/DL (ref 200–375)

## 2023-05-15 PROCEDURE — 85018 HEMOGLOBIN: CPT | Performed by: INTERNAL MEDICINE

## 2023-05-15 PROCEDURE — 82728 ASSAY OF FERRITIN: CPT | Performed by: INTERNAL MEDICINE

## 2023-05-15 PROCEDURE — 36415 COLL VENOUS BLD VENIPUNCTURE: CPT | Performed by: INTERNAL MEDICINE

## 2023-05-15 PROCEDURE — 84466 ASSAY OF TRANSFERRIN: CPT | Performed by: INTERNAL MEDICINE

## 2023-05-16 LAB — O+P STL MICRO: NORMAL

## 2023-05-17 LAB
FINAL PATHOLOGIC DIAGNOSIS: NORMAL
GROSS: NORMAL
Lab: NORMAL

## 2023-05-21 DIAGNOSIS — D50.9 IRON DEFICIENCY ANEMIA, UNSPECIFIED IRON DEFICIENCY ANEMIA TYPE: Primary | ICD-10-CM

## 2023-05-21 RX ORDER — FERROUS GLUCONATE 324(38)MG
324 TABLET ORAL
Qty: 90 TABLET | Refills: 1 | Status: SHIPPED | OUTPATIENT
Start: 2023-05-21 | End: 2023-05-27 | Stop reason: SDUPTHER

## 2023-05-22 ENCOUNTER — OFFICE VISIT (OUTPATIENT)
Dept: HEMATOLOGY/ONCOLOGY | Facility: CLINIC | Age: 83
End: 2023-05-22
Payer: MEDICARE

## 2023-05-22 VITALS
DIASTOLIC BLOOD PRESSURE: 67 MMHG | RESPIRATION RATE: 18 BRPM | WEIGHT: 123 LBS | SYSTOLIC BLOOD PRESSURE: 146 MMHG | OXYGEN SATURATION: 100 % | TEMPERATURE: 98 F | BODY MASS INDEX: 20.49 KG/M2 | HEIGHT: 65 IN | HEART RATE: 51 BPM

## 2023-05-22 DIAGNOSIS — D53.1 MEGALOBLASTIC ANEMIA: ICD-10-CM

## 2023-05-22 DIAGNOSIS — Z17.0 MALIGNANT NEOPLASM OF UPPER-OUTER QUADRANT OF RIGHT BREAST IN FEMALE, ESTROGEN RECEPTOR POSITIVE: Primary | ICD-10-CM

## 2023-05-22 DIAGNOSIS — C50.411 MALIGNANT NEOPLASM OF UPPER-OUTER QUADRANT OF RIGHT BREAST IN FEMALE, ESTROGEN RECEPTOR POSITIVE: Primary | ICD-10-CM

## 2023-05-22 DIAGNOSIS — Z79.811 USE OF AROMATASE INHIBITORS: ICD-10-CM

## 2023-05-22 PROCEDURE — 99215 PR OFFICE/OUTPT VISIT, EST, LEVL V, 40-54 MIN: ICD-10-PCS | Mod: S$PBB,,, | Performed by: INTERNAL MEDICINE

## 2023-05-22 PROCEDURE — 99215 OFFICE O/P EST HI 40 MIN: CPT | Mod: S$PBB,,, | Performed by: INTERNAL MEDICINE

## 2023-05-22 PROCEDURE — 99999 PR PBB SHADOW E&M-EST. PATIENT-LVL IV: CPT | Mod: PBBFAC,,, | Performed by: INTERNAL MEDICINE

## 2023-05-22 PROCEDURE — 99999 PR PBB SHADOW E&M-EST. PATIENT-LVL IV: ICD-10-PCS | Mod: PBBFAC,,, | Performed by: INTERNAL MEDICINE

## 2023-05-22 PROCEDURE — 99214 OFFICE O/P EST MOD 30 MIN: CPT | Mod: PBBFAC | Performed by: INTERNAL MEDICINE

## 2023-05-22 NOTE — PROGRESS NOTES
Subjective:       Patient ID: Shima Sorto is a 82 y.o. female.    Chief Complaint: No chief complaint on file.    HPI    Ms. Sorto returns today for follow up.  I had last seen her in late June 2022.  She has been on anastrazole since mid November 2018, and so far has tolerated it well.    Briefly, she is an 82-year-old  female who diagnosed with breast cancer in 2018.  On 08/17/2018, she underwent a lumpectomy and sentinel lymph node biopsy for an 8 mm grade 1 invasive lobular carcinoma which was ER strongly positive, SC negative and HER-2 negative with a Ki-67 of 5%, with the closest margin being 2 mm.  Two of 2 sentinel lymph nodes were negative for involvement.      She completed her radiation therapy and was subsequently started on AIs.  Her DXA scan from 01/05/2023 shows osteopenia approaching osteoporosis (reviewed).  Of note, the patient is on Prolia.  A mammogram on 6/28/2022 was read as BIRADS II, and a one year follow up was recommended.    Review of her chart reveals that last month she was diagnosed with C diff and she was treated accordingly.  She underwent a colonoscopy and EGD by Dr. Garza.  The colonoscopy showed 3 polyps and extensive diverticulosis.  Biopsies were negative for malignancy.  The EGD showed gastritis and a hiatal hernia.     She also had recent lab work that showed a low iron saturation at 17% even though her ferritin was 63 ng/ml last week.  Her CBC on May 10, 2023 had shown a white count of 4,200 per cubic mm, hemoglobin 10.6 grams/deciliter, hematocrit 33.4% and platelets 004709 per cubic mm with an MCV of 97.  Review of her chart reveals that her anemia is chronic.        Review of Systems      Overall she feels fair and she complains of fatigue and states that she has not completely recovered since she had the C diff infections.  She has tolerated the anastrazole quite well so far.  ECOG PS is 1.   She denies any anxiety, depression, easy bruising, fevers,  chills, night  sweats, weight loss, nausea, vomiting, diarrhea, constipation, diplopia, blurred vision, headache, chest pain, palpitations, shortness of breath, breast pain, abdominal pain, extremity pain, or difficulty ambulating.  The remainder of the ten-point ROS, including general, skin, lymph, H/N, cardiorespiratory, GI, , Neuro, Endocrine, and psychiatric is negative.     Objective:      Physical Exam        She is alert, oriented to time, place, person, pleasant, well      nourished, in no acute physical distress.                                    VITAL SIGNS:  Reviewed                                      HEENT:  Normal.  There are no nasal, oral, lip, gingival, auricular, lid,    or conjunctival lesions.  Mucosae are moist and pink, and there is no        thrush.  Pupils are equal, reactive to light and accommodation.              Extraocular muscle movements are intact.  Dentition is good.                                    NECK:  Supple without JVD, adenopathy, or thyromegaly.                       LUNGS:  Clear to auscultation without wheezing, rales, or rhonchi.           CARDIOVASCULAR:  Reveals an S1, S2, no murmurs, no rubs, no gallops.         ABDOMEN:  Soft, nontender, without organomegaly.  Bowel sounds are    present.                                                                     EXTREMITIES:  No cyanosis, clubbing, or edema.                               BREASTS:  She is status post right lumpectomy with a well-healed incision in the upper outer quadrant.    There are no masses in either breast.                                     LYMPHATIC:  There is no cervical, axillary, or supraclavicular adenopathy.   SKIN:  Warm and moist, without petechiae, rashes, induration, or ecchymoses.        NEUROLOGIC:  DTRs are 0-1+ bilaterally, symmetrical, motor function is 5/5,and cranial nerves are  within normal limits.    Assessment:       1. Malignant neoplasm of upper-outer quadrant of right  breast in female, estrogen receptor positive    2. Use of aromatase inhibitors      3.    Osteopenia approaching osteoporosis    4.   Anemia, chronic, normochromic normocytic  Plan:           I had a long discussion with her.    In regards to the breast cancer, she will remain on anatsrazole through the end of October 2023, and see me again in 6 months.  Her mammogram will be repeated after June 28  In regards to her anemia, it appears to be chronic.  She is currently on oral iron supplements taking 325 mg of ferrous sulfate daily.  I asked her to submit a urine sample and have repeat labs in mid June and I will see her 3 days after.  Her multiple questions were answered to her satisfaction.  Duration of visit including time spent reviewing her chart was in excess of 40 minutes.     Route Chart for Scheduling    Med Onc Chart Routing      Follow up with physician 4 weeks and 1 month. See me in a month with labs and a urinalysis 3 days prior   Follow up with BASH[facility]    Infusion scheduling note    Injection scheduling note    Labs Other, CBC and ferritin   Scheduling:  Preferred lab:  Lab interval:     Imaging    Pharmacy appointment    Other referrals           Therapy Plan Information  Medications  denosumab (PROLIA) injection 60 mg  60 mg, Subcutaneous, Every 26 weeks

## 2023-05-24 ENCOUNTER — TELEPHONE (OUTPATIENT)
Dept: ENDOSCOPY | Facility: HOSPITAL | Age: 83
End: 2023-05-24
Payer: MEDICARE

## 2023-05-24 ENCOUNTER — PATIENT MESSAGE (OUTPATIENT)
Dept: NEPHROLOGY | Facility: CLINIC | Age: 83
End: 2023-05-24
Payer: MEDICARE

## 2023-05-24 NOTE — TELEPHONE ENCOUNTER
----- Message from Abram Finn sent at 5/24/2023  4:28 PM CDT -----  Regarding: adv  Contact: @[phone]  Pt returning call to maggy... pls call and adv@[phone] .... states the phone sent it to [facility] but ending up fixing it....

## 2023-05-24 NOTE — TELEPHONE ENCOUNTER
----- Message from Jareth Harding sent at 5/24/2023 11:04 AM CDT -----  Type: Patient Call Back         Who called: Pt          What is the request in detail: Pt called in regarding requesting to speak with the nurse          Can the clinic reply by [facility]?no          Would the patient rather a call back or a response via [facility]? Call back          Best call back number: [phone] (mobile) or [phone]         Additional Information:Pt didn't states the reason for the call            Thank You

## 2023-05-27 DIAGNOSIS — D50.9 IRON DEFICIENCY ANEMIA, UNSPECIFIED IRON DEFICIENCY ANEMIA TYPE: ICD-10-CM

## 2023-05-27 DIAGNOSIS — K44.9 HIATAL HERNIA: ICD-10-CM

## 2023-05-27 DIAGNOSIS — K22.10 EROSIVE ESOPHAGITIS: ICD-10-CM

## 2023-05-27 RX ORDER — FERROUS GLUCONATE 324(38)MG
324 TABLET ORAL
Qty: 90 TABLET | Refills: 1 | Status: SHIPPED | OUTPATIENT
Start: 2023-05-27 | End: 2023-06-23 | Stop reason: SDUPTHER

## 2023-05-27 RX ORDER — PANTOPRAZOLE SODIUM 40 MG/1
40 TABLET, DELAYED RELEASE ORAL
Qty: 90 TABLET | Refills: 0 | Status: SHIPPED | OUTPATIENT
Start: 2023-05-27 | End: 2023-09-14 | Stop reason: ALTCHOICE

## 2023-05-27 NOTE — PROGRESS NOTES
GI MA team - please tell patient that they are iron deficient and anemic and recommend that they take ferrous gluconate one 324mg pill once daily for next 3 months.    GI MA team -  Please order repeat fasting Hemoglobin, Iron/TIBC, and Ferritin in 8 weeks - Orders placed.

## 2023-05-28 NOTE — PROGRESS NOTES
Shima your colonoscopy pathology was benign but at least 1 your colon polyps was an adenoma recommend you next surveillance colonoscopy in 5 years.  Recommend you come to GI clinic 1st to discuss risks benefits at age 87.     1. ASCENDING COLON, POLYPECTOMY:   Tubular adenoma   Multiple separate fragments of benign colonic mucosa(s)     2. DESCENDING COLON, POLYPECTOMY:   Benign colonic mucosa with mild reactive changes     Deeper levels have also been evaluated   Comment: Interp By Fani Deluca M.D., Signed on 05/17/2023

## 2023-05-29 ENCOUNTER — PATIENT MESSAGE (OUTPATIENT)
Dept: HEMATOLOGY/ONCOLOGY | Facility: CLINIC | Age: 83
End: 2023-05-29
Payer: MEDICARE

## 2023-05-29 DIAGNOSIS — Z79.811 USE OF AROMATASE INHIBITORS: ICD-10-CM

## 2023-05-30 ENCOUNTER — TELEPHONE (OUTPATIENT)
Dept: GASTROENTEROLOGY | Facility: CLINIC | Age: 83
End: 2023-05-30
Payer: MEDICARE

## 2023-05-30 RX ORDER — ANASTROZOLE 1 MG/1
TABLET ORAL
Qty: 90 TABLET | Refills: 3 | Status: SHIPPED | OUTPATIENT
Start: 2023-05-30 | End: 2024-01-02

## 2023-05-30 RX ORDER — ANASTROZOLE 1 MG/1
TABLET ORAL
Qty: 90 TABLET | Refills: 3 | Status: SHIPPED | OUTPATIENT
Start: 2023-05-30 | End: 2023-06-28

## 2023-05-30 NOTE — TELEPHONE ENCOUNTER
----- Message from Candy Dickey MA sent at 5/30/2023  2:16 PM CDT -----  Contact: @[phone]  Do you mind calling her, please?  ----- Message -----  From: Marleny Wadsworth  Sent: 5/30/2023   9:47 AM CDT  To: Greg NUNEZ Staff    Pt is calling in from a missed call, please call to discuss further.

## 2023-05-30 NOTE — TELEPHONE ENCOUNTER
Spoke with patient.  Aware I am working on an appointment for her to see  in follow up to discuss scheduling a VCE.   In addition, she asked me to [facility] so they can stop calling her regarding refills on her iron and Pantoprazole.  She already has a 90 day supply.    I did reach out to [facility]. They stated she already called them this morning and they are aware of the situation.   Jennifer

## 2023-06-04 ENCOUNTER — PATIENT MESSAGE (OUTPATIENT)
Dept: ADMINISTRATIVE | Facility: OTHER | Age: 83
End: 2023-06-04
Payer: MEDICARE

## 2023-06-05 NOTE — TELEPHONE ENCOUNTER
Please see if the patient would like an ER follow up appt  
Spoke w/ pt, scheduled appt.   
Altered mentation

## 2023-06-06 ENCOUNTER — PES CALL (OUTPATIENT)
Dept: ADMINISTRATIVE | Facility: CLINIC | Age: 83
End: 2023-06-06
Payer: MEDICARE

## 2023-06-06 ENCOUNTER — TELEPHONE (OUTPATIENT)
Dept: INTERNAL MEDICINE | Facility: CLINIC | Age: 83
End: 2023-06-06
Payer: MEDICARE

## 2023-06-06 NOTE — TELEPHONE ENCOUNTER
----- Message from Job Clayton sent at 6/6/2023 12:50 PM CDT -----  Type:  Patient Returning Call    Who Called:pt  Who Left Message for Patient:  Does the patient know what this is regarding?:missed call  Would the patient rather a call back or a response via [facility]? Call  Best Call Back Number:[phone]  Additional Information: pt states she missed a call from office and would like a call back.

## 2023-06-06 NOTE — TELEPHONE ENCOUNTER
Spoke to patient and informed that we did not call her---she is not sure who called and they did not leave name

## 2023-06-08 ENCOUNTER — INFUSION (OUTPATIENT)
Dept: INFECTIOUS DISEASES | Facility: HOSPITAL | Age: 83
End: 2023-06-08
Attending: INTERNAL MEDICINE
Payer: MEDICARE

## 2023-06-08 VITALS
WEIGHT: 124.25 LBS | OXYGEN SATURATION: 97 % | HEART RATE: 58 BPM | BODY MASS INDEX: 19.5 KG/M2 | DIASTOLIC BLOOD PRESSURE: 68 MMHG | SYSTOLIC BLOOD PRESSURE: 171 MMHG | TEMPERATURE: 99 F | HEIGHT: 67 IN | RESPIRATION RATE: 16 BRPM

## 2023-06-08 DIAGNOSIS — M80.00XD AGE-RELATED OSTEOPOROSIS WITH CURRENT PATHOLOGICAL FRACTURE WITH ROUTINE HEALING: ICD-10-CM

## 2023-06-08 DIAGNOSIS — E55.9 VITAMIN D DEFICIENCY: Primary | ICD-10-CM

## 2023-06-08 DIAGNOSIS — N18.31 STAGE 3A CHRONIC KIDNEY DISEASE: ICD-10-CM

## 2023-06-08 PROCEDURE — 63600175 PHARM REV CODE 636 W HCPCS: Mod: JZ,JG | Performed by: NURSE PRACTITIONER

## 2023-06-08 PROCEDURE — 96372 THER/PROPH/DIAG INJ SC/IM: CPT

## 2023-06-08 RX ADMIN — DENOSUMAB 60 MG: 60 INJECTION SUBCUTANEOUS at 12:06

## 2023-06-13 ENCOUNTER — TELEPHONE (OUTPATIENT)
Dept: GASTROENTEROLOGY | Facility: CLINIC | Age: 83
End: 2023-06-13
Payer: MEDICARE

## 2023-06-13 DIAGNOSIS — Z12.31 ENCOUNTER FOR SCREENING MAMMOGRAM FOR MALIGNANT NEOPLASM OF BREAST: Primary | ICD-10-CM

## 2023-06-13 NOTE — TELEPHONE ENCOUNTER
----- Message from Candy Dickey MA sent at 5/22/2023 10:17 PM CDT -----    ----- Message -----  From: Keven Garza MD  Sent: 5/21/2023   3:40 PM CDT  To: Carmen Milton MD, Greg [facility]UNE[facility] Staff    GI MA team please make sure patient has a referral to Heme-Onc for evaluation of IV iron in light of her iron deficiency anemia on oral iron not correcting well referrals been placed    Please schedule patient GI clinic appointment to discuss possibility of small-bowel video capsule endoscopy for further evaluation of iron deficiency anemia.    GI MA team - please tell patient that they are iron deficient and anemic and recommend that they take ferrous gluconate one 324mg pill once daily for next 3 months.    GI MA team -  Please order repeat fasting Hemoglobin, Iron/TIBC, and Ferritin in 8 weeks - Orders placed.

## 2023-06-13 NOTE — TELEPHONE ENCOUNTER
Patient scheduled to see Dr.Sean Garza on 9/11 for 4:00 to discuss VCE.  Confirmation mailed.   Patient has also been placed on 's cancellation list.   Jennifer

## 2023-06-14 ENCOUNTER — TELEPHONE (OUTPATIENT)
Dept: SURGERY | Facility: CLINIC | Age: 83
End: 2023-06-14
Payer: MEDICARE

## 2023-06-14 ENCOUNTER — TELEPHONE (OUTPATIENT)
Dept: ENDOSCOPY | Facility: HOSPITAL | Age: 83
End: 2023-06-14
Payer: MEDICARE

## 2023-06-14 NOTE — TELEPHONE ENCOUNTER
Very confusing phone call. Patient was calling because she was told we schedule our own appointments? She kept repeating right, so I was not sure what she really meant. She stated she talked to someone that told her to call because we schedule our own appointments? Explained to her that it is ok, system allowed her to schedule because Dr. Munoz has openings. This is a referral for hemorrhoids. Patient seemed to verbalized understanding.

## 2023-06-14 NOTE — TELEPHONE ENCOUNTER
----- Message from Urbano Hanson sent at 6/14/2023  1:20 PM CDT -----  Contact: Patient  Type:  Patient Call          Who Called: patient         Does the patient know what this is regarding?: requesting a call back pt said jennyfer she received a message about a procedure that's scheduled for 12/8 and she's confused ; please advise           Would the patient rather a call back or a response via [facility]? Call           Best Call Back Number:[phone] (home)              Additional Information:

## 2023-06-14 NOTE — TELEPHONE ENCOUNTER
----- Message from Emkea Gould sent at 6/14/2023  1:45 PM CDT -----  Regarding: Appointment/ missed call  Contact: [phone]  Calling in regards to a missed call from Brennan lopez pts appt on 7/21. Please call to discuss

## 2023-06-14 NOTE — TELEPHONE ENCOUNTER
----- Message from Urbano Hanson sent at 6/14/2023  1:23 PM CDT -----  Contact: Patient  Type:  Patient Call          Who Called: patient         Does the patient know what this is regarding?: Requesting a call back about a message she received about her appt that she scheduled  ; please advise           Would the patient rather a call back or a response via [facility]? Call           Best Call Back Number: [phone] or [phone] ([place])              Additional Information:

## 2023-06-16 ENCOUNTER — TELEPHONE (OUTPATIENT)
Dept: SURGERY | Facility: CLINIC | Age: 83
End: 2023-06-16
Payer: MEDICARE

## 2023-06-16 ENCOUNTER — LAB VISIT (OUTPATIENT)
Dept: LAB | Facility: HOSPITAL | Age: 83
End: 2023-06-16
Attending: INTERNAL MEDICINE
Payer: MEDICARE

## 2023-06-16 DIAGNOSIS — C50.411 MALIGNANT NEOPLASM OF UPPER-OUTER QUADRANT OF RIGHT BREAST IN FEMALE, ESTROGEN RECEPTOR POSITIVE: ICD-10-CM

## 2023-06-16 DIAGNOSIS — Z17.0 MALIGNANT NEOPLASM OF UPPER-OUTER QUADRANT OF RIGHT BREAST IN FEMALE, ESTROGEN RECEPTOR POSITIVE: ICD-10-CM

## 2023-06-16 DIAGNOSIS — D53.1 MEGALOBLASTIC ANEMIA: ICD-10-CM

## 2023-06-16 DIAGNOSIS — Z79.811 USE OF AROMATASE INHIBITORS: ICD-10-CM

## 2023-06-16 LAB
ALBUMIN SERPL BCP-MCNC: 3.6 G/DL (ref 3.5–5.2)
ALBUMIN SERPL BCP-MCNC: 3.6 G/DL (ref 3.5–5.2)
ALP SERPL-CCNC: 64 U/L (ref 55–135)
ALP SERPL-CCNC: 64 U/L (ref 55–135)
ALT SERPL W/O P-5'-P-CCNC: 33 U/L (ref 10–44)
ALT SERPL W/O P-5'-P-CCNC: 33 U/L (ref 10–44)
ANION GAP SERPL CALC-SCNC: 7 MMOL/L (ref 8–16)
ANION GAP SERPL CALC-SCNC: 7 MMOL/L (ref 8–16)
AST SERPL-CCNC: 45 U/L (ref 10–40)
AST SERPL-CCNC: 45 U/L (ref 10–40)
BILIRUB SERPL-MCNC: 0.5 MG/DL (ref 0.1–1)
BILIRUB SERPL-MCNC: 0.5 MG/DL (ref 0.1–1)
BUN SERPL-MCNC: 18 MG/DL (ref 8–23)
BUN SERPL-MCNC: 18 MG/DL (ref 8–23)
CALCIUM SERPL-MCNC: 9.4 MG/DL (ref 8.7–10.5)
CALCIUM SERPL-MCNC: 9.4 MG/DL (ref 8.7–10.5)
CHLORIDE SERPL-SCNC: 100 MMOL/L (ref 95–110)
CHLORIDE SERPL-SCNC: 100 MMOL/L (ref 95–110)
CO2 SERPL-SCNC: 28 MMOL/L (ref 23–29)
CO2 SERPL-SCNC: 28 MMOL/L (ref 23–29)
CREAT SERPL-MCNC: 0.9 MG/DL (ref 0.5–1.4)
CREAT SERPL-MCNC: 0.9 MG/DL (ref 0.5–1.4)
DAT IGG-SP REAG RBC-IMP: NORMAL
ERYTHROCYTE [DISTWIDTH] IN BLOOD BY AUTOMATED COUNT: 13.6 % (ref 11.5–14.5)
ERYTHROCYTE [DISTWIDTH] IN BLOOD BY AUTOMATED COUNT: 13.6 % (ref 11.5–14.5)
EST. GFR  (NO RACE VARIABLE): >60 ML/MIN/1.73 M^2
EST. GFR  (NO RACE VARIABLE): >60 ML/MIN/1.73 M^2
FERRITIN SERPL-MCNC: 80 NG/ML (ref 20–300)
FERRITIN SERPL-MCNC: 80 NG/ML (ref 20–300)
GLUCOSE SERPL-MCNC: 85 MG/DL (ref 70–110)
GLUCOSE SERPL-MCNC: 85 MG/DL (ref 70–110)
HCT VFR BLD AUTO: 30 % (ref 37–48.5)
HCT VFR BLD AUTO: 30 % (ref 37–48.5)
HGB BLD-MCNC: 9.8 G/DL (ref 12–16)
HGB BLD-MCNC: 9.8 G/DL (ref 12–16)
IMM GRANULOCYTES # BLD AUTO: 0.03 K/UL (ref 0–0.04)
IMM GRANULOCYTES # BLD AUTO: 0.03 K/UL (ref 0–0.04)
MCH RBC QN AUTO: 31.2 PG (ref 27–31)
MCH RBC QN AUTO: 31.2 PG (ref 27–31)
MCHC RBC AUTO-ENTMCNC: 32.7 G/DL (ref 32–36)
MCHC RBC AUTO-ENTMCNC: 32.7 G/DL (ref 32–36)
MCV RBC AUTO: 96 FL (ref 82–98)
MCV RBC AUTO: 96 FL (ref 82–98)
NEUTROPHILS # BLD AUTO: 3.4 K/UL (ref 1.8–7.7)
NEUTROPHILS # BLD AUTO: 3.4 K/UL (ref 1.8–7.7)
PLATELET # BLD AUTO: 187 K/UL (ref 150–450)
PLATELET # BLD AUTO: 187 K/UL (ref 150–450)
PMV BLD AUTO: 10.6 FL (ref 9.2–12.9)
PMV BLD AUTO: 10.6 FL (ref 9.2–12.9)
POTASSIUM SERPL-SCNC: 4.9 MMOL/L (ref 3.5–5.1)
POTASSIUM SERPL-SCNC: 4.9 MMOL/L (ref 3.5–5.1)
PROT SERPL-MCNC: 6.8 G/DL (ref 6–8.4)
PROT SERPL-MCNC: 6.8 G/DL (ref 6–8.4)
RBC # BLD AUTO: 3.14 M/UL (ref 4–5.4)
RBC # BLD AUTO: 3.14 M/UL (ref 4–5.4)
SODIUM SERPL-SCNC: 135 MMOL/L (ref 136–145)
SODIUM SERPL-SCNC: 135 MMOL/L (ref 136–145)
VIT B12 SERPL-MCNC: 406 PG/ML (ref 210–950)
WBC # BLD AUTO: 5.77 K/UL (ref 3.9–12.7)
WBC # BLD AUTO: 5.77 K/UL (ref 3.9–12.7)

## 2023-06-16 PROCEDURE — 86880 COOMBS TEST DIRECT: CPT | Performed by: INTERNAL MEDICINE

## 2023-06-16 PROCEDURE — 80053 COMPREHEN METABOLIC PANEL: CPT | Performed by: INTERNAL MEDICINE

## 2023-06-16 PROCEDURE — 84165 PATHOLOGIST INTERPRETATION SPE: ICD-10-PCS | Mod: 26,,, | Performed by: PATHOLOGY

## 2023-06-16 PROCEDURE — 84165 PROTEIN E-PHORESIS SERUM: CPT | Performed by: INTERNAL MEDICINE

## 2023-06-16 PROCEDURE — 82728 ASSAY OF FERRITIN: CPT | Performed by: INTERNAL MEDICINE

## 2023-06-16 PROCEDURE — 36415 COLL VENOUS BLD VENIPUNCTURE: CPT | Performed by: INTERNAL MEDICINE

## 2023-06-16 PROCEDURE — 84165 PROTEIN E-PHORESIS SERUM: CPT | Mod: 26,,, | Performed by: PATHOLOGY

## 2023-06-16 PROCEDURE — 82607 VITAMIN B-12: CPT | Performed by: INTERNAL MEDICINE

## 2023-06-16 PROCEDURE — 85027 COMPLETE CBC AUTOMATED: CPT | Performed by: INTERNAL MEDICINE

## 2023-06-16 NOTE — TELEPHONE ENCOUNTER
Talked to patient, once again. She continues to call wanting to speak to Mulu? Patient was told we scheduled our own appointments? She is scheduled with Dr. Munoz on 07/21, I explained to her that we will see her then. No need to change anything. She seemed to verbalize understanding.

## 2023-06-16 NOTE — TELEPHONE ENCOUNTER
----- Message from Amy Hernandez sent at 6/16/2023 10:57 AM CDT -----  Regarding: question  Contact: @ [phone]  Pt called in regards to having some question for someone named Mulu .....Please call and adv @ [phone]  Or [phone]

## 2023-06-16 NOTE — TELEPHONE ENCOUNTER
----- Message from Lula Roland sent at 6/16/2023 12:16 PM CDT -----  Regarding: CallBack  Contact: Pt [phone]  Pt is calling to speak to the nurse please call

## 2023-06-19 LAB
ALBUMIN SERPL ELPH-MCNC: 3.68 G/DL (ref 3.35–5.55)
ALPHA1 GLOB SERPL ELPH-MCNC: 0.32 G/DL (ref 0.17–0.41)
ALPHA2 GLOB SERPL ELPH-MCNC: 0.65 G/DL (ref 0.43–0.99)
B-GLOBULIN SERPL ELPH-MCNC: 0.82 G/DL (ref 0.5–1.1)
GAMMA GLOB SERPL ELPH-MCNC: 1.12 G/DL (ref 0.67–1.58)
PATHOLOGIST INTERPRETATION SPE: NORMAL
PROT SERPL-MCNC: 6.6 G/DL (ref 6–8.4)

## 2023-06-20 ENCOUNTER — OFFICE VISIT (OUTPATIENT)
Dept: HEMATOLOGY/ONCOLOGY | Facility: CLINIC | Age: 83
End: 2023-06-20
Payer: MEDICARE

## 2023-06-20 VITALS
RESPIRATION RATE: 18 BRPM | DIASTOLIC BLOOD PRESSURE: 77 MMHG | OXYGEN SATURATION: 100 % | SYSTOLIC BLOOD PRESSURE: 189 MMHG | BODY MASS INDEX: 20.36 KG/M2 | TEMPERATURE: 98 F | HEART RATE: 65 BPM | HEIGHT: 67 IN | WEIGHT: 129.75 LBS

## 2023-06-20 DIAGNOSIS — Z17.0 MALIGNANT NEOPLASM OF UPPER-OUTER QUADRANT OF RIGHT BREAST IN FEMALE, ESTROGEN RECEPTOR POSITIVE: Primary | ICD-10-CM

## 2023-06-20 DIAGNOSIS — C50.411 MALIGNANT NEOPLASM OF UPPER-OUTER QUADRANT OF RIGHT BREAST IN FEMALE, ESTROGEN RECEPTOR POSITIVE: Primary | ICD-10-CM

## 2023-06-20 DIAGNOSIS — Z79.811 USE OF AROMATASE INHIBITORS: ICD-10-CM

## 2023-06-20 DIAGNOSIS — D50.9 IRON DEFICIENCY ANEMIA, UNSPECIFIED IRON DEFICIENCY ANEMIA TYPE: ICD-10-CM

## 2023-06-20 PROCEDURE — 99214 OFFICE O/P EST MOD 30 MIN: CPT | Mod: PBBFAC | Performed by: INTERNAL MEDICINE

## 2023-06-20 PROCEDURE — 99999 PR PBB SHADOW E&M-EST. PATIENT-LVL IV: CPT | Mod: PBBFAC,,, | Performed by: INTERNAL MEDICINE

## 2023-06-20 PROCEDURE — 99215 OFFICE O/P EST HI 40 MIN: CPT | Mod: S$PBB,,, | Performed by: INTERNAL MEDICINE

## 2023-06-20 PROCEDURE — 99215 PR OFFICE/OUTPT VISIT, EST, LEVL V, 40-54 MIN: ICD-10-PCS | Mod: S$PBB,,, | Performed by: INTERNAL MEDICINE

## 2023-06-20 PROCEDURE — 99999 PR PBB SHADOW E&M-EST. PATIENT-LVL IV: ICD-10-PCS | Mod: PBBFAC,,, | Performed by: INTERNAL MEDICINE

## 2023-06-20 NOTE — PROGRESS NOTES
Subjective:       Patient ID: Shima Sorto is a 82 y.o. female.    Chief Complaint: Malignant neoplasm of upper-outer quadrant of right breast     HPI    Ms. Sorto returns today for follow up.  I had last seen her 4 weeks ago and due to her anemia had requested that she present today with repeat labs.  She has been on anastrazole since mid November 2018, and so far has tolerated it well.    Her labs from June 16, 2023 are as follows:  WBCs are 5700 per cubic mm, hemoglobin 9.8 grams/deciliter, hematocrit 30%, MCV 96 and platelets 187 K  Urinalysis shows 2+ blood  Ferritin is 80 ng/mL  B12 is 406 pg/mL  SPEP shows no monoclonal spike  [address] Lane is negative    Briefly, she is an 82-year-old  female who diagnosed with breast cancer in 2018.  On 08/17/2018, she underwent a lumpectomy and sentinel lymph node biopsy for an 8 mm grade 1 invasive lobular carcinoma which was ER strongly positive, AZ negative and HER-2 negative with a Ki-67 of 5%, with the closest margin being 2 mm.  Two of 2 sentinel lymph nodes were negative for involvement.      She completed her radiation therapy and was subsequently started on AIs.  Her DXA scan from 01/05/2023 shows osteopenia approaching osteoporosis (reviewed).  Of note, the patient is on Prolia.  A mammogram on 6/28/2022 was read as BIRADS II, and a one year follow up was recommended.    Review of her chart reveals that last April she was diagnosed with C diff and she was treated accordingly.  She underwent a colonoscopy and EGD by Dr. Garza.  The colonoscopy showed 3 polyps and extensive diverticulosis.  Biopsies were negative for malignancy.  The EGD showed gastritis and a hiatal hernia.         Review of Systems      Overall she feels fair and she again complains of fatigue and generalized weakness.  She has tolerated the anastrazole quite well so far.  ECOG PS is 1.   She denies any anxiety, depression, easy bruising, fevers, chills, night  sweats, weight  loss, nausea, vomiting, diarrhea, constipation, diplopia, blurred vision, headache, chest pain, palpitations, shortness of breath, breast pain, abdominal pain, extremity pain, or difficulty ambulating.  The remainder of the ten-point ROS, including general, skin, lymph, H/N, cardiorespiratory, GI, , Neuro, Endocrine, and psychiatric is negative.     Objective:      Physical Exam        She is alert, oriented to time, place, person, pleasant, well      nourished, in no acute physical distress.                                    VITAL SIGNS:  Reviewed                                      HEENT:  Normal.  There are no nasal, oral, lip, gingival, auricular, lid,    or conjunctival lesions.  Mucosae are moist and pink, and there is no        thrush.  Pupils are equal, reactive to light and accommodation.              Extraocular muscle movements are intact.  Dentition is good.                                    NECK:  Supple without JVD, adenopathy, or thyromegaly.                       LUNGS:  Clear to auscultation without wheezing, rales, or rhonchi.           CARDIOVASCULAR:  Reveals an S1, S2, no murmurs, no rubs, no gallops.         ABDOMEN:  Soft, nontender, without organomegaly.  Bowel sounds are    present.                                                                     EXTREMITIES:  No cyanosis, clubbing, or edema.                               BREASTS:  She is status post right lumpectomy with a well-healed incision in the upper outer quadrant.    There are no masses in either breast.                                     LYMPHATIC:  There is no cervical, axillary, or supraclavicular adenopathy.   SKIN:  Warm and moist, without petechiae, rashes, induration, or ecchymoses.        NEUROLOGIC:  DTRs are 0-1+ bilaterally, symmetrical, motor function is 5/5,and cranial nerves are  within normal limits.    Assessment:       1. Malignant neoplasm of upper-outer quadrant of right breast in female, estrogen  receptor positive    2. Iron deficiency anemia, unspecified iron deficiency anemia type    3. Use of aromatase inhibitors      3.    Osteopenia approaching osteoporosis    4.   Anemia, chronic, normochromic normocytic  Plan:           I had a long discussion with her.    In regards to the breast cancer, she will remain on anatsrazole through the end of October 2023, and see me again in 6 months.  Her mammogram will be repeated after June 28th.  In regards to her anemia, it appears to be chronic.  She is currently on oral iron supplements taking 325 mg of ferrous sulfate daily.  She is scheduled to undergo workup by the urologist and has an appointment next week.  I asked her to submit 3 more stool samples at the time of her next visit.  We also had a discussion about the option of a bone marrow biopsy.  She elected to wait for now.  Her multiple questions were answered to her satisfaction.  Duration of visit including time spent reviewing her chart was in excess of 40 minutes.     Route Chart for Scheduling    Med Onc Chart Routing      Follow up with physician Other. See me in late July with labs 1 day prior.  Please give her 3 stool cards before she goes today and schedule a mammogram after the 28th of June   Follow up with BASHIR    Infusion scheduling note    Injection scheduling note    Labs CMP, CBC and ferritin   Scheduling:  Preferred lab:  Lab interval:     Imaging Mammogram      Pharmacy appointment    Other referrals          Therapy Plan Information  Medications  denosumab (PROLIA) injection 60 mg  60 mg, Subcutaneous, Every 26 weeks

## 2023-06-21 ENCOUNTER — OFFICE VISIT (OUTPATIENT)
Dept: UROLOGY | Facility: CLINIC | Age: 83
End: 2023-06-21
Payer: MEDICARE

## 2023-06-21 VITALS
WEIGHT: 129.88 LBS | BODY MASS INDEX: 20.38 KG/M2 | DIASTOLIC BLOOD PRESSURE: 74 MMHG | SYSTOLIC BLOOD PRESSURE: 174 MMHG | HEIGHT: 67 IN | HEART RATE: 60 BPM

## 2023-06-21 DIAGNOSIS — R33.9 INCOMPLETE EMPTYING OF BLADDER: ICD-10-CM

## 2023-06-21 DIAGNOSIS — N32.89 BLADDER DISTENTION: ICD-10-CM

## 2023-06-21 DIAGNOSIS — R35.0 INCREASED FREQUENCY OF URINATION: ICD-10-CM

## 2023-06-21 DIAGNOSIS — R30.0 DYSURIA: Primary | ICD-10-CM

## 2023-06-21 LAB
BACTERIA #/AREA URNS AUTO: ABNORMAL /HPF
MICROSCOPIC COMMENT: ABNORMAL
RBC #/AREA URNS AUTO: >100 /HPF (ref 0–4)
WBC #/AREA URNS AUTO: >100 /HPF (ref 0–5)

## 2023-06-21 PROCEDURE — 99999 PR PBB SHADOW E&M-EST. PATIENT-LVL III: ICD-10-PCS | Mod: PBBFAC,,, | Performed by: NURSE PRACTITIONER

## 2023-06-21 PROCEDURE — 87088 URINE BACTERIA CULTURE: CPT | Performed by: NURSE PRACTITIONER

## 2023-06-21 PROCEDURE — 99215 PR OFFICE/OUTPT VISIT, EST, LEVL V, 40-54 MIN: ICD-10-PCS | Mod: S$PBB,25,, | Performed by: NURSE PRACTITIONER

## 2023-06-21 PROCEDURE — 51701 INSERT BLADDER CATHETER: CPT | Mod: S$PBB,,, | Performed by: NURSE PRACTITIONER

## 2023-06-21 PROCEDURE — 99215 OFFICE O/P EST HI 40 MIN: CPT | Mod: S$PBB,25,, | Performed by: NURSE PRACTITIONER

## 2023-06-21 PROCEDURE — 87086 URINE CULTURE/COLONY COUNT: CPT | Performed by: NURSE PRACTITIONER

## 2023-06-21 PROCEDURE — 51701 INSERT BLADDER CATHETER: CPT | Mod: PBBFAC | Performed by: NURSE PRACTITIONER

## 2023-06-21 PROCEDURE — 99999 PR PBB SHADOW E&M-EST. PATIENT-LVL III: CPT | Mod: PBBFAC,,, | Performed by: NURSE PRACTITIONER

## 2023-06-21 PROCEDURE — 99213 OFFICE O/P EST LOW 20 MIN: CPT | Mod: PBBFAC | Performed by: NURSE PRACTITIONER

## 2023-06-21 PROCEDURE — 81514 NFCT DS BV&VAGINITIS DNA ALG: CPT | Performed by: NURSE PRACTITIONER

## 2023-06-21 PROCEDURE — 87186 SC STD MICRODIL/AGAR DIL: CPT | Performed by: NURSE PRACTITIONER

## 2023-06-21 PROCEDURE — 87077 CULTURE AEROBIC IDENTIFY: CPT | Performed by: NURSE PRACTITIONER

## 2023-06-21 PROCEDURE — 51701 PR INSERTION OF NON-INDWELLING BLADDER CATHETERIZATION FOR RESIDUAL UR: ICD-10-PCS | Mod: S$PBB,,, | Performed by: NURSE PRACTITIONER

## 2023-06-21 PROCEDURE — 81001 URINALYSIS AUTO W/SCOPE: CPT | Performed by: NURSE PRACTITIONER

## 2023-06-21 NOTE — PROGRESS NOTES
[facility] COMPLAINT:    Mrs. Sorto is a 82 y.o. female presenting for incomplete bladder emptying follow up.    .  PRESENTING ILLNESS:    Shima Sorto is a 82 y.o. female with a PMH of htn, ckd 3, hx breast ca, sania who presents for  incomplete bladder emptying.     Established patient to urology department. Presents today for incomplete bladder emptying and possible uti.  Previously seen 4/28/23.  Taught CIC due to incomplete bladder emptying.  Tried intermittent catheterization, but stopped due to discomfort.  Has continued tamsulosin as prescribed.  Again reiterated the importance of CIC.  Will call [facility] to find a catheter more suitable for her.    Patient had reported an episode of gross hematuria at the beginning of April.  Reviewed chart with UA >22 RBC.  Underwent hematuria work up. Cysto revealed small ecchymosis and erythema on posterior wall.     REVIEW OF SYSTEMS:    Review of Systems   Constitutional:  Negative for chills and fever.   Respiratory:  Negative for shortness of breath.    Cardiovascular:  Negative for chest pain.   Gastrointestinal:  Positive for abdominal pain (lower). Negative for constipation and diarrhea.   Genitourinary:  Positive for dysuria and frequency. Negative for flank pain, hematuria and urgency.   Neurological:  Negative for dizziness and weakness.      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       Family History   Problem Relation Age of Onset    Kidney disease Mother     Heart disease Father      Melanoma Father     Heart attack Father     Breast cancer Maternal Aunt     Hearing loss Son     Hyperlipidemia Son     Anesthesia problems Neg Hx     Malignant hypertension Neg Hx     Hypotension Neg Hx     Malignant hyperthermia Neg Hx     Pseudochol deficiency Neg Hx     Colon cancer Neg Hx     Ovarian cancer Neg Hx     Psoriasis Neg Hx     Lupus Neg Hx     Eczema Neg Hx     Acne Neg Hx        Allergies:  Asparagus    Medications:    Current Outpatient Medications:     anastrozole (ARIMIDEX) 1 mg Tab, TAKE 1 TABLET(1 MG) BY MOUTH EVERY DAY, Disp: 90 tablet, Rfl: 3    anastrozole (ARIMIDEX) 1 mg Tab, TAKE 1 TABLET(1 MG) BY MOUTH EVERY DAY., Disp: 90 tablet, Rfl: 3    coQ10, ubiquinol, 100 mg Cap, Take 100 mg by mouth once daily., Disp: , Rfl:     denosumab (PROLIA) 60 mg/mL Syrg, Inject 60 mg into the skin every 6 (six) months., Disp: , Rfl:     ferrous gluconate (FERGON) 324 MG tablet, Take 1 tablet (324 mg total) by mouth daily with breakfast., Disp: 90 tablet, Rfl: 1    fish oil-E-fatty acid5-hvgr524 400-5 mg-unit Cap, Take 1 capsule by mouth Daily., Disp: , Rfl:     pantoprazole (PROTONIX) 40 MG tablet, Take 1 tablet (40 mg total) by mouth before breakfast. Best taken 45-60 minutes before your first protein meal of the day - Breakfast, Disp: 90 tablet, Rfl: 0    PRESERVISION AREDS-2 250-90-40-1 mg Cap, Take 1 tablet by mouth 2 (two) times daily., Disp: , Rfl:     tamsulosin (FLOMAX) 0.4 mg Cap, Take 1 capsule (0.4 mg total) by mouth once daily., Disp: 30 capsule, Rfl: 11    UNABLE TO FIND, 2 tablets 2 (two) times daily. Rufus-Mag-Citrate with D3 & K2, Disp: , Rfl:     UNABLE TO FIND, 4 capsules Daily. Nutrafol, Disp: , Rfl:     valsartan (DIOVAN) 160 MG tablet, Take 160 mg by mouth once daily., Disp: , Rfl:     vitamin renal formula, B-complex-vitamin c-folic acid, (NEPHROCAPS) 1 mg Cap, Take 1 capsule by mouth once daily., Disp: 90 capsule, Rfl: 3  No current facility-administered medications for this  visit.    Facility-Administered Medications Ordered in Other Visits:     0.9%  NaCl infusion, , Intravenous, Continuous, James Carranza MD, Last Rate: 70 mL/hr at 08/17/18 0843, New Bag at 03/17/23 0719    PHYSICAL EXAMINATION:    Physical Exam  Vitals and nursing note reviewed. Exam conducted with a chaperone present.   Constitutional:       Appearance: Normal appearance. She is well-developed.   HENT:      Head: Normocephalic and atraumatic.   Eyes:      Pupils: Pupils are equal, round, and reactive to light.   Pulmonary:      Effort: Pulmonary effort is normal.   Genitourinary:     General: Normal vulva.      Exam position: Supine.      Urethra: Urethral pain present. No prolapse, urethral swelling or urethral lesion.      Rectum: Normal.       Musculoskeletal:         General: Normal range of motion.      Cervical back: Normal range of motion.   Skin:     General: Skin is warm and dry.   Neurological:      Mental Status: She is alert and oriented to person, place, and time.   Psychiatric:         Behavior: Behavior normal.      Consent verbally obtained.  Betadine prep was applied to the urethral meatus. An in and out cath was performed after voiding.  The PVR was 1300 ml.   Patient agreed to again start CIC twice daily.    LABS:    IMPRESSION:    Encounter Diagnoses   Name Primary?    Dysuria Yes    Bladder distention     Incomplete emptying of bladder     Increased frequency of urination          PLAN:    Problem List Items Addressed This Visit    None  Visit Diagnoses       Dysuria    -  Primary    Relevant Orders    VAGINOSIS SCREEN BY DNA PROBE    Urinalysis Microscopic    Urine culture    Mycoplasma genitalium Molecular Detection, PCR Urine    Ureaplasma PCR Urine    Bladder distention        Incomplete emptying of bladder        Increased frequency of urination                  1. Bladder distention/incomplete bladder emptying    - PVR approximately 1300 ml   - self cath 10 Fr twice daily indefinitely     - continue flomax daily     2. UTI suspected  - send urine for analysis, ureaplasma, mycoplasma and culture    3.  Rtc in 6 weeks     Roxy Meyers NP        I spent over 45 minutes with the patient. Over 50% of the visit was spent in counseling.

## 2023-06-22 LAB
BACTERIAL VAGINOSIS DNA: NEGATIVE
CANDIDA GLABRATA DNA: NEGATIVE
CANDIDA KRUSEI DNA: NEGATIVE
CANDIDA RRNA VAG QL PROBE: NEGATIVE
T VAGINALIS RRNA GENITAL QL PROBE: NEGATIVE

## 2023-06-23 ENCOUNTER — LAB VISIT (OUTPATIENT)
Dept: LAB | Facility: HOSPITAL | Age: 83
End: 2023-06-23
Attending: INTERNAL MEDICINE
Payer: MEDICARE

## 2023-06-23 DIAGNOSIS — D50.9 IRON DEFICIENCY ANEMIA, UNSPECIFIED IRON DEFICIENCY ANEMIA TYPE: ICD-10-CM

## 2023-06-23 LAB
BACTERIA UR CULT: ABNORMAL
FERRITIN SERPL-MCNC: 78 NG/ML (ref 20–300)
FERRITIN SERPL-MCNC: 78 NG/ML (ref 20–300)
HGB BLD-MCNC: 9.7 G/DL (ref 12–16)
HGB BLD-MCNC: 9.7 G/DL (ref 12–16)
IRON SERPL-MCNC: 38 UG/DL (ref 30–160)
IRON SERPL-MCNC: 38 UG/DL (ref 30–160)
M GENITALIUM DNA UR QL NAA+PROBE: NEGATIVE
SATURATED IRON: 13 % (ref 20–50)
SATURATED IRON: 13 % (ref 20–50)
SPECIMEN SOURCE: NORMAL
TOTAL IRON BINDING CAPACITY: 302 UG/DL (ref 250–450)
TOTAL IRON BINDING CAPACITY: 302 UG/DL (ref 250–450)
TRANSFERRIN SERPL-MCNC: 204 MG/DL (ref 200–375)
TRANSFERRIN SERPL-MCNC: 204 MG/DL (ref 200–375)

## 2023-06-23 PROCEDURE — 36415 COLL VENOUS BLD VENIPUNCTURE: CPT | Performed by: INTERNAL MEDICINE

## 2023-06-23 PROCEDURE — 84466 ASSAY OF TRANSFERRIN: CPT | Performed by: INTERNAL MEDICINE

## 2023-06-23 PROCEDURE — 82728 ASSAY OF FERRITIN: CPT | Performed by: INTERNAL MEDICINE

## 2023-06-23 PROCEDURE — 85018 HEMOGLOBIN: CPT | Performed by: INTERNAL MEDICINE

## 2023-06-23 RX ORDER — FERROUS GLUCONATE 324(38)MG
324 TABLET ORAL
Qty: 90 TABLET | Refills: 1 | Status: SHIPPED | OUTPATIENT
Start: 2023-06-23 | End: 2023-08-09 | Stop reason: SDUPTHER

## 2023-06-23 RX ORDER — CIPROFLOXACIN 500 MG/1
500 TABLET ORAL 2 TIMES DAILY
Qty: 14 TABLET | Refills: 0 | Status: SHIPPED | OUTPATIENT
Start: 2023-06-23 | End: 2023-06-30

## 2023-06-24 NOTE — PROGRESS NOTES
GI MA team please refer Shima to Heme-Onc for evaluation of IV iron for iron deficiency anemia apparently not responding as well as we would like to her oral iron.    GI MA team - please tell patient that they are iron deficient and anemic and recommend that they take ferrous gluconate one 324mg pill once daily for next 3 months.    GI MA team -  Please order repeat fasting Hemoglobin, Iron/TIBC, and Ferritin in 4 weeks - Orders placed.

## 2023-06-24 NOTE — PROGRESS NOTES
Rosie can you have Yamileth Vasquez in GI clinic this week to discuss small-bowel video capsule endoscopy for further evaluation of her iron deficiency anemia which was not apparent by recent EGD or colonoscopy she is rather anemic in up in age I would like to expedite this if possible.  Thank you

## 2023-06-26 ENCOUNTER — OFFICE VISIT (OUTPATIENT)
Dept: GASTROENTEROLOGY | Facility: CLINIC | Age: 83
End: 2023-06-26
Payer: MEDICARE

## 2023-06-26 ENCOUNTER — PATIENT MESSAGE (OUTPATIENT)
Dept: INTERNAL MEDICINE | Facility: CLINIC | Age: 83
End: 2023-06-26
Payer: MEDICARE

## 2023-06-26 VITALS
HEART RATE: 62 BPM | WEIGHT: 128 LBS | SYSTOLIC BLOOD PRESSURE: 182 MMHG | DIASTOLIC BLOOD PRESSURE: 83 MMHG | BODY MASS INDEX: 20.09 KG/M2 | HEIGHT: 67 IN

## 2023-06-26 DIAGNOSIS — D50.9 IRON DEFICIENCY ANEMIA, UNSPECIFIED IRON DEFICIENCY ANEMIA TYPE: Primary | ICD-10-CM

## 2023-06-26 LAB
SPECIMEN SOURCE: NORMAL
U PARVUM DNA SPEC QL NAA+PROBE: NEGATIVE
U UREALYTICUM DNA SPEC QL NAA+PROBE: NEGATIVE

## 2023-06-26 PROCEDURE — 99214 PR OFFICE/OUTPT VISIT, EST, LEVL IV, 30-39 MIN: ICD-10-PCS | Mod: S$PBB,,,

## 2023-06-26 PROCEDURE — 99999 PR PBB SHADOW E&M-EST. PATIENT-LVL IV: ICD-10-PCS | Mod: PBBFAC,,,

## 2023-06-26 PROCEDURE — 99999 PR PBB SHADOW E&M-EST. PATIENT-LVL IV: CPT | Mod: PBBFAC,,,

## 2023-06-26 PROCEDURE — 99214 OFFICE O/P EST MOD 30 MIN: CPT | Mod: S$PBB,,,

## 2023-06-26 PROCEDURE — 99214 OFFICE O/P EST MOD 30 MIN: CPT | Mod: PBBFAC

## 2023-06-26 NOTE — PATIENT INSTRUCTIONS
[facility]  High Fiber Diet    25-30 grams of fiber per day is recommended  Fiber cereal = 5 grams (Raisin Bran, Shredded Wheat, Grape Nuts)  Konsyl 1 teaspoon = 6 grams  Metamucil 1 tablespoon = 3 grams  Citrucel 1 tablespoon = 2 grams  Fiber Choice = 3-4 per day    Drink at least 4-5 glasses of liquids per day or the fiber can be constipating rather then stimulating to your gut.  Boil and bake potatoes in their skin. Eat the skin, too.  Include fresh fruits and raw vegetables in your daily diet. Raw fruits and vegetables have more useful fiber than those that have been peeled, cooked, pureed, or otherwise processed.  Eat a wide variety of fibrous foods in reasonable amounts. Increase fiber intake slowly especially if you have been on a low-fiber diet.  Eat more legumes-peas, beans, soybeans.  For snacks, try dried fruit, whole wheat and rye crackers.  Avoid instant-cook hot cereals. Use the longer cooking cereals.  Use bran whenever possible. Sprinkle it on top of cereal, mix it into mashed potatoes or hamburger meat, or use it in combination dishes such as meat loaf.   Substitute whole grain, whole wheat and bran products for white flour products.  Eat slowly and chew your food thoroughly.        Start daily fiber.  Take 1 tsp of fiber powder (psyllium or other sugar-free powder).  Mix in 8 oz of water.  Take x 3-5 days.  Then, increase fiber by 1 tsp every 3-5 days until stool is easy to pass.  Stop and continue at that dose.   Do not exceed 6 tsps/day. GOAL:  More well-formed stool (one continuous well-formed piece vs. Pellets) and minimize straining with initiation.        Foods High in Fiber    This diet furnishes adequate amounts of all the essential nutrients needed by the body and a very liberal fiber or roughage content. Roughage is indigestible fiber found in fruits, vegetables and whole grain cereals. It provides bulk to the large intestine and, accompanied by an adequate fluid intake,  is a stimulant to elimination. Regular eating and elimination habits are vital to good health.     Fruits:  Use all fruits and juices liberally; fresh, cooked, dried or canned. Eat fruit raw and with skins when possible. Have at least four servings of fruit daily including a citrus fruit and a stewed dried fruit. Hard seeds of fruits (berries, figs, Grapes, mangoes, tomatoes) etc. may be removed.    Vegetables: Use all vegetables liberally. Green leafy vegetables, such as cabbage, spinach, lettuce, broccoli, and other greens are particularly good.    Potato: As desired. Serve baked frequently and eat the skin. Other starchy foods such as rice, macaroni, etc., may be occasionally substituted. Chew popcorn well and do not eat hard kernels.    Meat, Fish, Poultry: One or two servings daily.    Eggs: One daily if you are not on a low cholesterol diet.    Milk: Include at least one-half pint daily. Whole milk or skimmed may be used.     Cereals: Use whole grain breads and cereals such as bran, bran flakes, grape nut flakes, puffed wheat, oatmeal, Wheaties, etc., as much as possible. Refined breaks and cereals are not restricted; however, they do not contain the bulk necessary for this diet.     Sugars, Sweets: Use very moderately. Depend on fruit as dessert.    Fats: Use butter or margarine as desired. Oil or dressing on salads as desired. Fried foods may be used in moderation. Nuts may be eaten if you chew them well and may be ground or finely chopped for cooking.   Sample Menu                                                                                 Breakfast                          Lunch  Orange juice, 4 ounces                                                Vegetable soup                    Stewed fruit                                                                 Fresh fruit plate with cottage cheese  Shredded wheat                                                           Whole wheat toast  Scrambled eggs                                                            Butter  Whole wheat toast                                                       Coffee or tea  Dinner                                                                         Bedtime  Large glass tomato juice                                             1 glass milk  Roast beef                                                                   stewed fruit  Baked potato with skin  Buttered spinach  Raw vegetable salad  Baked apple with skin   Coffee or tea

## 2023-06-26 NOTE — PROGRESS NOTES
"GENERAL GI PATIENT INTAKE:    COVID symptoms in the last 7 days (runny nose, sore throat, congestion, cough, fever): No  PCP: Carmen Milton  If not PCP-  number given to establish [phone]: No    ALLERGIES REVIEWED:  Yes    CHIEF COMPLAINT:    Chief Complaint   Patient presents with    Follow-up       VITAL SIGNS:  BP (!) 182/83   Pulse 62   Ht 5' 7" (1.702 m)   Wt 58.1 kg (128 lb)   LMP  (LMP Unknown)   BMI 20.05 kg/m²      Change in medical, surgical, family or social history: No      REVIEWED MEDICATION LIST RECONCILED INCLUDING ABOVE MEDS:  Yes     "

## 2023-06-26 NOTE — PROGRESS NOTES
Gastroenterology Clinic Consultation Note    Reason for Visit:  The encounter diagnosis was Iron deficiency anemia, unspecified iron deficiency anemia type.    PCP:   Carmen Milton         Initial HPI   This is a 82 y.o. female presenting for video capsule endoscopy for further evaluation of her iron deficiency anemia. LOV with DR. Garza for iron deficiency anemia. Has had EGD/Colon (report below). Patient states that she has had no signs of bleeding. Sees her oncologist regularly for her breast cancer treatment. Completed her treatment for her breast cancer. Had removed meat from her diet due to her renal function as per their recs. Taking iron daily. Last Hemoglobin 9.7 without any overt signs of bleeding.     Did express concern over her bowel movements being loose in consistency. Only fiber intake is fruit. Denies any fiber supplementation. Will have bowel movement 1-2 hours after eating. Denies blood in stool, unintentional weight loss.       ROS:  Review of Systems   Constitutional:  Negative for chills, diaphoresis, fever, malaise/fatigue and weight loss.   HENT:  Negative for sore throat.    Gastrointestinal:  Positive for diarrhea. Negative for abdominal pain, blood in stool, constipation, heartburn, melena, nausea and vomiting.   Skin:  Negative for itching and rash.   Neurological:  Negative for dizziness, loss of consciousness and weakness.      Medical History:  has a past medical history of Allergy, Anemia, Basal cell carcinoma (7/2013), Breast cancer (2018), colonic polyps, Hypertension, Medullary sponge kidney, MVP (mitral valve prolapse), Osteoporosis, senile, Pneumonia, Renal calculi, Sciatica, Sleep apnea, TMJ syndrome, Urinary retention, Vertigo (1/17/2017), 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    Surgical History:  has a past surgical history that includes Kidney stone surgery (2000); MOHS  procedure; interstim placed stage 1; Breast cyst aspiration (Right, 1999); Colonoscopy (N/A, 7/24/2018); Mastectomy, partial (Right, 8/17/2018); Injection for sentinel node identification (Right, 8/17/2018); [place] lymph node biopsy (Right, 8/17/2018); Breast lumpectomy (Right, 2018); Esophagogastroduodenoscopy (N/A, 3/17/2023); and Colonoscopy (N/A, 5/10/2023).    Family History: family history includes Breast cancer in her maternal aunt; Hearing loss in her son; Heart attack in her father; Heart disease in her father; Hyperlipidemia in her son; Kidney disease in her mother; Melanoma in her father..       Review of patient's allergies indicates:   Allergen Reactions    Asparagus Rash       Current Outpatient Medications on File Prior to Visit   Medication Sig Dispense Refill    anastrozole (ARIMIDEX) 1 mg Tab TAKE 1 TABLET(1 MG) BY MOUTH EVERY DAY 90 tablet 3    anastrozole (ARIMIDEX) 1 mg Tab TAKE 1 TABLET(1 MG) BY MOUTH EVERY DAY. 90 tablet 3    ciprofloxacin HCl (CIPRO) 500 MG tablet Take 1 tablet (500 mg total) by mouth 2 (two) times daily. for 7 days 14 tablet 0    coQ10, ubiquinol, 100 mg Cap Take 100 mg by mouth once daily.      denosumab (PROLIA) 60 mg/mL Syrg Inject 60 mg into the skin every 6 (six) months.      ferrous gluconate (FERGON) 324 MG tablet Take 1 tablet (324 mg total) by mouth daily with breakfast. 90 tablet 1    fish oil-E-fatty [id number] 400-5 mg-unit Cap Take 1 capsule by mouth Daily.      pantoprazole (PROTONIX) 40 MG tablet Take 1 tablet (40 mg total) by mouth before breakfast. Best taken 45-60 minutes before your first protein meal of the day - Breakfast 90 tablet 0    PRESERVISION AREDS-2 250-90-40-1 mg Cap Take 1 tablet by mouth 2 (two) times daily.      tamsulosin (FLOMAX) 0.4 mg Cap Take 1 capsule (0.4 mg total) by mouth once daily. 30 capsule 11    UNABLE TO FIND 2 tablets 2 (two) times daily. Rufus-Mag-Citrate with D3 & K2      UNABLE TO FIND 4 capsules Daily. Nutrafol       valsartan (DIOVAN) 160 MG tablet Take 160 mg by mouth once daily.      vitamin renal formula, B-complex-vitamin c-folic acid, (NEPHROCAPS) 1 mg Cap Take 1 capsule by mouth once daily. 90 capsule 3     Current Facility-Administered Medications on File Prior to Visit   Medication Dose Route Frequency Provider Last Rate Last Admin    0.9%  NaCl infusion   Intravenous Continuous James Carranza MD 70 mL/hr at 08/17/18 0843 New Bag at 03/17/23 0719         Objective Findings:    Vital Signs:  LMP  (LMP Unknown)   There is no height or weight on file to calculate BMI.    Physical Exam:  Physical Exam  Vitals reviewed.   Constitutional:       Appearance: She is normal weight.   HENT:      Mouth/Throat:      Mouth: Mucous membranes are moist.      Pharynx: Oropharynx is clear.   Eyes:      General: No scleral icterus.  Abdominal:      General: Abdomen is flat. Bowel sounds are normal. There is no distension.      Palpations: Abdomen is soft. There is no mass.      Tenderness: There is no abdominal tenderness.      Hernia: No hernia is present.   Skin:     General: Skin is warm and dry.      Capillary Refill: Capillary refill takes less than 2 seconds.      Coloration: Skin is not jaundiced or pale.      Findings: No bruising or erythema.   Neurological:      Mental Status: She is alert and oriented to person, place, and time. Mental status is at baseline.           Labs:  Lab Results   Component Value Date    WBC 5.77 06/16/2023    WBC 5.77 06/16/2023    HGB 9.7 (L) 06/23/2023    HGB 9.7 (L) 06/23/2023    HCT 30.0 (L) 06/16/2023    HCT 30.0 (L) 06/16/2023     06/16/2023     06/16/2023    CHOL 166 02/05/2021    TRIG 28 (L) 02/05/2021    HDL 67 02/05/2021    ALKPHOS 64 06/16/2023    ALKPHOS 64 06/16/2023    LIPASE 36 02/27/2023    ALT 33 06/16/2023    ALT 33 06/16/2023    AST 45 (H) 06/16/2023    AST 45 (H) 06/16/2023     (L) 06/16/2023     (L) 06/16/2023    K 4.9 06/16/2023    K 4.9 06/16/2023      06/16/2023     06/16/2023    CREATININE 0.9 06/16/2023    CREATININE 0.9 06/16/2023    BUN 18 06/16/2023    BUN 18 06/16/2023    CO2 28 06/16/2023    CO2 28 06/16/2023    TSH 2.945 04/29/2023    INR 1.0 02/05/2021       Imaging reviewed: No pertinent imaging reviewed.      Endoscopy reviewed:   Colonoscopy 5/10/2023  Impression:            - One 4 mm polyp in the ascending colon, removed                          with a cold snare. Resected and retrieved.                          - One 7 mm polyp in the ascending colon, removed                          with a cold snare. Resected and retrieved.                          - One 1 mm polyp in the descending colon, removed                          with a cold snare. Resected and retrieved.                          - Diverticulosis in the recto-sigmoid colon, in                          the sigmoid colon, in the descending colon, in the                          transverse colon and in the ascending colon.                          - Non-bleeding internal hemorrhoids.   Recommendation:        - Discharge patient to home.                          - Await pathology results.                          - Telephone endoscopist for pathology results in 3                          weeks.                          - Repeat colonoscopy in 5 years for surveillance                          of multiple polyps.                          - Use original regular Metamucil one tablespoon PO                          BID.                          - Return to GI clinic in 4 weeks.                          - Return to primary care physician.                          - The findings and recommendations were discussed                          with the patient.   Attending Participation:        I personally performed the entire procedure.   Keven Garza MD   5/10/2023 12:55:11 PM     EGD 3/17/2023  Impression:            - Normal examined duodenum. Biopsied.                          -  Erythematous mucosa in the greater curvature.                          Biopsied.                          - 2 cm hiatal hernia.                          - LA Grade A reflux esophagitis with no bleeding.   Recommendation:        - Discharge patient to home.                          - Await pathology results.                          - Telephone endoscopist for pathology results in 3                          weeks.                          - Return to GI clinic.                          - Use Protonix 40 mg once daily (or any other full                          strength proton pump inhibitor) - best taken 45                          minutes before your first protein containing meal.                          - The findings and recommendations were discussed                          with the patient.   Attending Participation:        I personally performed the entire procedure.   Keven Garza MD   3/17/2023 7:46:25 AM     Assessment:  1. Iron deficiency anemia, unspecified iron deficiency anemia type             Plan:  Order placed for Video capsule endoscopy. All risks and benefits of procedure reviewed with the patient. All questions answered. Consent signed.       Thank you for allowing me to participate in this patient's care.    Sincerely,     Christina Stephens NP  Gastroenterology Department  [facility]

## 2023-06-28 ENCOUNTER — OFFICE VISIT (OUTPATIENT)
Dept: CARDIOLOGY | Facility: CLINIC | Age: 83
End: 2023-06-28
Payer: MEDICARE

## 2023-06-28 VITALS
WEIGHT: 131.63 LBS | SYSTOLIC BLOOD PRESSURE: 176 MMHG | OXYGEN SATURATION: 100 % | HEART RATE: 59 BPM | HEIGHT: 66 IN | BODY MASS INDEX: 21.16 KG/M2 | DIASTOLIC BLOOD PRESSURE: 76 MMHG

## 2023-06-28 DIAGNOSIS — I10 HYPERTENSION, UNSPECIFIED TYPE: ICD-10-CM

## 2023-06-28 DIAGNOSIS — N18.30 STAGE 3 CHRONIC KIDNEY DISEASE, UNSPECIFIED WHETHER STAGE 3A OR 3B CKD: ICD-10-CM

## 2023-06-28 DIAGNOSIS — I87.2 VENOUS INSUFFICIENCY: Primary | ICD-10-CM

## 2023-06-28 PROCEDURE — 99213 OFFICE O/P EST LOW 20 MIN: CPT | Mod: PBBFAC | Performed by: INTERNAL MEDICINE

## 2023-06-28 PROCEDURE — 99999 PR PBB SHADOW E&M-EST. PATIENT-LVL III: CPT | Mod: PBBFAC,,, | Performed by: INTERNAL MEDICINE

## 2023-06-28 PROCEDURE — 99999 PR PBB SHADOW E&M-EST. PATIENT-LVL III: ICD-10-PCS | Mod: PBBFAC,,, | Performed by: INTERNAL MEDICINE

## 2023-06-28 PROCEDURE — 99214 OFFICE O/P EST MOD 30 MIN: CPT | Mod: S$PBB,,, | Performed by: INTERNAL MEDICINE

## 2023-06-28 PROCEDURE — 99214 PR OFFICE/OUTPT VISIT, EST, LEVL IV, 30-39 MIN: ICD-10-PCS | Mod: S$PBB,,, | Performed by: INTERNAL MEDICINE

## 2023-06-28 NOTE — ASSESSMENT & PLAN NOTE
-encouraged to wear compression stockings  -TTE and venous reflux study, has trialed lasix in the past with no relief  -can consider venous ablation

## 2023-06-28 NOTE — PROGRESS NOTES
"Referring provider: Dr. Carmen Milton     Patient ID:  Shima Sorto is a 82 y.o. y.o. female who presents for evaluation Hypertension and Leg Swelling      Shima Sorto is a 83 yo F with HTN, venous stasis, hx of breast ca and CKD who presents for evaluation of LE edema and discoloration. She has had LE edema for many years, and previously was prescribed compression stockings. She has not been wearing them lately, as she does not want to wear pants with the heat. However, she does not think these have provided much relief. She has also used lasix in the past which did not provide relief either. She is able to ambulate and do her ADLs, however, she does say that her legs feel heavy and tight. Over the past 2 months, she has noticed worsening swelling and to both legs, in addition to worsening discolorations. No other acute complaints.     Review of Systems   Cardiovascular:  Positive for leg swelling.   All other systems reviewed and are negative.     Objective:     BP (!) 176/76 (BP Location: Left arm, Patient Position: Sitting, BP Method: Large (Automatic))   Pulse (!) 59   Ht 5' 5.5" (1.664 m)   Wt 59.7 kg (131 lb 9.8 oz)   LMP  (LMP Unknown)   SpO2 100%   BMI 21.57 kg/m²     Physical Exam  Vitals and nursing note reviewed.   Constitutional:       Appearance: Normal appearance.   HENT:      Head: Normocephalic and atraumatic.      Right Ear: External ear normal.      Left Ear: External ear normal.      Nose: Nose normal.      Mouth/Throat:      Mouth: Mucous membranes are moist.   Eyes:      Extraocular Movements: Extraocular movements intact.      Pupils: Pupils are equal, round, and reactive to light.   Cardiovascular:      Rate and Rhythm: Normal rate and regular rhythm.      Pulses: Normal pulses.   Pulmonary:      Effort: Pulmonary effort is normal.   Abdominal:      General: Abdomen is flat.   Musculoskeletal:         General: Normal range of motion.      Cervical back: Normal range of " motion.      Right lower leg: Edema present.      Left lower leg: Edema present.      Comments: BL le pitting edema that extends to the knees with venous stasis changes to the skin   Skin:     General: Skin is warm and dry.      Capillary Refill: Capillary refill takes less than 2 seconds.   Neurological:      General: No focal deficit present.      Mental Status: She is alert and oriented to person, place, and time. Mental status is at baseline.     Labs:     Lab Results   Component Value Date     (L) 06/16/2023     (L) 06/16/2023    K 4.9 06/16/2023    K 4.9 06/16/2023     06/16/2023     06/16/2023    CO2 28 06/16/2023    CO2 28 06/16/2023    BUN 18 06/16/2023    BUN 18 06/16/2023    CREATININE 0.9 06/16/2023    CREATININE 0.9 06/16/2023    ANIONGAP 7 (L) 06/16/2023    ANIONGAP 7 (L) 06/16/2023     No results found for: HGBA1C  Lab Results   Component Value Date     (H) 01/17/2017       Lab Results   Component Value Date    WBC 5.77 06/16/2023    WBC 5.77 06/16/2023    HGB 9.7 (L) 06/23/2023    HGB 9.7 (L) 06/23/2023    HCT 30.0 (L) 06/16/2023    HCT 30.0 (L) 06/16/2023    HCT 33 (L) 02/05/2021     06/16/2023     06/16/2023    GRAN 3.4 06/16/2023    GRAN 3.4 06/16/2023     Lab Results   Component Value Date    CHOL 166 02/05/2021    HDL 67 02/05/2021    LDLCALC 93.4 02/05/2021    TRIG 28 (L) 02/05/2021       Meds:     Current Outpatient Medications:     ciprofloxacin HCl (CIPRO) 500 MG tablet, Take 1 tablet (500 mg total) by mouth 2 (two) times daily. for 7 days, Disp: 14 tablet, Rfl: 0    coQ10, ubiquinol, 100 mg Cap, Take 100 mg by mouth once daily., Disp: , Rfl:     denosumab (PROLIA) 60 mg/mL Syrg, Inject 60 mg into the skin every 6 (six) months., Disp: , Rfl:     ferrous gluconate (FERGON) 324 MG tablet, Take 1 tablet (324 mg total) by mouth daily with breakfast., Disp: 90 tablet, Rfl: 1    fish oil-E-fatty [id number] 400-5 mg-unit Cap, Take 1 capsule by  mouth Daily., Disp: , Rfl:     pantoprazole (PROTONIX) 40 MG tablet, Take 1 tablet (40 mg total) by mouth before breakfast. Best taken 45-60 minutes before your first protein meal of the day - Breakfast, Disp: 90 tablet, Rfl: 0    PRESERVISION AREDS-2 250-90-40-1 mg Cap, Take 1 tablet by mouth 2 (two) times daily., Disp: , Rfl:     tamsulosin (FLOMAX) 0.4 mg Cap, Take 1 capsule (0.4 mg total) by mouth once daily., Disp: 30 capsule, Rfl: 11    UNABLE TO FIND, 2 tablets 2 (two) times daily. Rufus-Mag-Citrate with D3 & K2, Disp: , Rfl:     UNABLE TO FIND, 4 capsules Daily. Nutrafol, Disp: , Rfl:     valsartan (DIOVAN) 160 MG tablet, Take 160 mg by mouth once daily., Disp: , Rfl:     vitamin renal formula, B-complex-vitamin c-folic acid, (NEPHROCAPS) 1 mg Cap, Take 1 capsule by mouth once daily., Disp: 90 capsule, Rfl: 3    anastrozole (ARIMIDEX) 1 mg Tab, TAKE 1 TABLET(1 MG) BY MOUTH EVERY DAY (Patient not taking: Reported on 6/28/2023.), Disp: 90 tablet, Rfl: 3    anastrozole (ARIMIDEX) 1 mg Tab, TAKE 1 TABLET(1 MG) BY MOUTH EVERY DAY., Disp: 90 tablet, Rfl: 3  No current facility-administered medications for this visit.    Facility-Administered Medications Ordered in Other Visits:     0.9%  NaCl infusion, , Intravenous, Continuous, James Carranza MD, Last Rate: 70 mL/hr at 08/17/18 0843, New Bag at 03/17/23 0719      Assessment & Plan:     Hypertension  -enrolled in digital htn program, will fu with pcp   -SBP mostly low 140s at home     Venous insufficiency  -encouraged to wear compression stockings  -TTE and venous reflux study, has trialed lasix in the past with no relief  -can consider venous ablation     CKD (chronic kidney disease) stage 3, GFR 30-59 ml/min  -avoid nephrotoxins

## 2023-06-30 ENCOUNTER — TELEPHONE (OUTPATIENT)
Dept: GASTROENTEROLOGY | Facility: CLINIC | Age: 83
End: 2023-06-30
Payer: MEDICARE

## 2023-06-30 NOTE — TELEPHONE ENCOUNTER
Spoke w/pt regarding labs ordered by Dr. Garza. There are many of the same lab orders and the phone staff informed the pt to speak to gastro department.   Rosie- you scheduled the pt fasting labs on 7/21 at 9:05 am.     Labs below;    Ferritin   Iron and TIBC  Hemoglobin     Last labs were drawn on 6/23. Per Dr. Garza repeat 12 hours fasting labs in 8 weeks which will be 8/18/23. Informed the pt that I will send a message to Candy and Dr. Garza to advise.   Informed pt per Dr. Garza - please make sure patient has a referral to Heme-Onc for evaluation of IV iron in light of her iron deficiency anemia on oral iron not correcting well. Appt scheduled on 8/2 at 1:40 pm with Dr. Gould, place pt on their waiting list and gave the pt Hem-Onc department number to request sooner appt per pt request.     Message routed to Rosie to contact the patient.     ----- Message from Candy Dickey MA sent at 6/30/2023  3:34 PM CDT -----  Regarding: RE: missed call  Contact: @[phone]  No  ----- Message -----  From: Cara Peterson MA  Sent: 6/30/2023   3:28 PM CDT  To: Candy Dickey MA  Subject: FW: missed call                                  Did you contact this patient?   ----- Message -----  From: Marleny Wadsworth  Sent: 6/30/2023   3:21 PM CDT  To: Greg NUNEZ Staff  Subject: missed call                                      Pt is calling in from a missed call, please call to discuss further.

## 2023-07-03 ENCOUNTER — PES CALL (OUTPATIENT)
Dept: ADMINISTRATIVE | Facility: CLINIC | Age: 83
End: 2023-07-03
Payer: MEDICARE

## 2023-07-05 ENCOUNTER — PES CALL (OUTPATIENT)
Dept: ADMINISTRATIVE | Facility: CLINIC | Age: 83
End: 2023-07-05
Payer: MEDICARE

## 2023-07-06 ENCOUNTER — HOSPITAL ENCOUNTER (OUTPATIENT)
Dept: RADIOLOGY | Facility: HOSPITAL | Age: 83
Discharge: HOME | End: 2023-07-06
Attending: OBSTETRICS & GYNECOLOGY
Payer: MEDICARE

## 2023-07-06 ENCOUNTER — TELEPHONE (OUTPATIENT)
Dept: ENDOSCOPY | Facility: HOSPITAL | Age: 83
End: 2023-07-06
Payer: MEDICARE

## 2023-07-06 DIAGNOSIS — Z12.31 ENCOUNTER FOR SCREENING MAMMOGRAM FOR MALIGNANT NEOPLASM OF BREAST: ICD-10-CM

## 2023-07-06 PROCEDURE — 77063 BREAST TOMOSYNTHESIS BI: CPT | Mod: 26,,, | Performed by: RADIOLOGY

## 2023-07-06 PROCEDURE — 77067 MAMMO DIGITAL SCREENING BILAT WITH TOMO: ICD-10-PCS | Mod: 26,,, | Performed by: RADIOLOGY

## 2023-07-06 PROCEDURE — 77063 MAMMO DIGITAL SCREENING BILAT WITH TOMO: ICD-10-PCS | Mod: 26,,, | Performed by: RADIOLOGY

## 2023-07-06 PROCEDURE — 77067 SCR MAMMO BI INCL CAD: CPT | Mod: 26,,, | Performed by: RADIOLOGY

## 2023-07-06 PROCEDURE — 77067 SCR MAMMO BI INCL CAD: CPT | Mod: TC

## 2023-07-06 NOTE — TELEPHONE ENCOUNTER
----- Message from Savannah Edwards MA sent at 7/6/2023  9:49 AM CDT -----  Regarding: FW: Missed call  Contact: [phone]    ----- Message -----  From: Emeka Gould  Sent: 7/6/2023   9:41 AM CDT  To: Greg NUNZE Staff  Subject: Missed call                                      Pt calling in regards to a missed call From Candy. Please call pt back on home phone number [phone]

## 2023-07-07 ENCOUNTER — HOSPITAL ENCOUNTER (OUTPATIENT)
Dept: CARDIOLOGY | Facility: HOSPITAL | Age: 83
Discharge: HOME | End: 2023-07-07
Attending: STUDENT IN AN ORGANIZED HEALTH CARE EDUCATION/TRAINING PROGRAM
Payer: MEDICARE

## 2023-07-07 VITALS
SYSTOLIC BLOOD PRESSURE: 120 MMHG | BODY MASS INDEX: 21.05 KG/M2 | HEIGHT: 66 IN | DIASTOLIC BLOOD PRESSURE: 61 MMHG | HEART RATE: 61 BPM | WEIGHT: 131 LBS

## 2023-07-07 DIAGNOSIS — I87.2 VENOUS INSUFFICIENCY: ICD-10-CM

## 2023-07-07 LAB
ASCENDING AORTA: 2.56 CM
AV INDEX (PROSTH): 0.93
AV MEAN GRADIENT: 7 MMHG
AV PEAK GRADIENT: 10 MMHG
AV VALVE AREA: 2.52 CM2
AV VELOCITY RATIO: 0.98
BSA FOR ECHO PROCEDURE: 1.66 M2
CV ECHO LV RWT: 0.35 CM
DOP CALC AO PEAK VEL: 1.62 M/S
DOP CALC AO VTI: 41.38 CM
DOP CALC LVOT AREA: 2.7 CM2
DOP CALC LVOT DIAMETER: 1.86 CM
DOP CALC LVOT PEAK VEL: 1.59 M/S
DOP CALC LVOT STROKE VOLUME: 104.1 CM3
DOP CALCLVOT PEAK VEL VTI: 38.33 CM
E WAVE DECELERATION TIME: 263.12 MSEC
E/A RATIO: 1.27
E/E' RATIO: 13.69 M/S
ECHO LV POSTERIOR WALL: 0.75 CM (ref 0.6–1.1)
EJECTION FRACTION: 60 %
FRACTIONAL SHORTENING: 33 % (ref 28–44)
INTERVENTRICULAR SEPTUM: 0.76 CM (ref 0.6–1.1)
IVRT: 117.03 MSEC
LA MAJOR: 5.44 CM
LA MINOR: 6.06 CM
LA WIDTH: 3.58 CM
LEFT ATRIUM SIZE: 4.18 CM
LEFT ATRIUM VOLUME INDEX MOD: 26.3 ML/M2
LEFT ATRIUM VOLUME INDEX: 43.9 ML/M2
LEFT ATRIUM VOLUME MOD: 43.66 CM3
LEFT ATRIUM VOLUME: 72.93 CM3
LEFT GIAC DIA: 0.13 MM
LEFT GREAT SAPHENOUS JUNCTION DIA: 0.56 CM
LEFT GREAT SAPHENOUS KNEE DIA: 0.23 CM
LEFT GREAT SAPHENOUS KNEE REFLUX: 1422 MS
LEFT GREAT SAPHENOUS MIDDLE THIGH DIA: 0.38 CM
LEFT INTERNAL DIMENSION IN SYSTOLE: 2.9 CM (ref 2.1–4)
LEFT VENTRICLE DIASTOLIC VOLUME INDEX: 51.18 ML/M2
LEFT VENTRICLE DIASTOLIC VOLUME: 84.96 ML
LEFT VENTRICLE MASS INDEX: 60 G/M2
LEFT VENTRICLE SYSTOLIC VOLUME INDEX: 19.4 ML/M2
LEFT VENTRICLE SYSTOLIC VOLUME: 32.18 ML
LEFT VENTRICULAR INTERNAL DIMENSION IN DIASTOLE: 4.34 CM (ref 3.5–6)
LEFT VENTRICULAR MASS: 99.15 G
LV LATERAL E/E' RATIO: 14.83 M/S
LV SEPTAL E/E' RATIO: 12.71 M/S
MV A" WAVE DURATION": 17.13 MSEC
MV PEAK A VEL: 0.7 M/S
MV PEAK E VEL: 0.89 M/S
MV STENOSIS PRESSURE HALF TIME: 76.3 MS
MV VALVE AREA P 1/2 METHOD: 2.88 CM2
PISA TR MAX VEL: 3.31 M/S
PULM VEIN S/D RATIO: 2
PV PEAK D VEL: 0.53 M/S
PV PEAK S VEL: 1.06 M/S
RA MAJOR: 5.27 CM
RA PRESSURE: 8 MMHG
RA WIDTH: 3.63 CM
RIGHT GIAC DIA: 0.23 MM
RIGHT GREAT SAPHENOUS JUNCTION DIA: 0.58 CM
RIGHT GREAT SAPHENOUS KNEE DIA: 0.21 CM
RIGHT GREAT SAPHENOUS KNEE REFLUX: 3265 MS
RIGHT GREAT SAPHENOUS MIDDLE THIGH DIA: 0.37 CM
RIGHT GREAT SAPHENOUS PROXIMAL CALF DIA: 0.24 CM
RIGHT VENTRICULAR END-DIASTOLIC DIMENSION: 3.6 CM
SINUS: 2.96 CM
STJ: 2.4 CM
TDI LATERAL: 0.06 M/S
TDI SEPTAL: 0.07 M/S
TDI: 0.07 M/S
TR MAX PG: 44 MMHG
TRICUSPID ANNULAR PLANE SYSTOLIC EXCURSION: 2.54 CM
TV REST PULMONARY ARTERY PRESSURE: 52 MMHG

## 2023-07-07 PROCEDURE — 93306 ECHO (CUPID ONLY): ICD-10-PCS | Mod: 26,,, | Performed by: INTERNAL MEDICINE

## 2023-07-07 PROCEDURE — 93970 CV US LOWER VENOUS INSUFFICIENCY BILATERAL (CUPID ONLY): ICD-10-PCS | Mod: 26,,, | Performed by: INTERNAL MEDICINE

## 2023-07-07 PROCEDURE — 93306 TTE W/DOPPLER COMPLETE: CPT

## 2023-07-07 PROCEDURE — 93970 EXTREMITY STUDY: CPT | Mod: 26,,, | Performed by: INTERNAL MEDICINE

## 2023-07-07 PROCEDURE — 93970 EXTREMITY STUDY: CPT | Mod: TC

## 2023-07-07 PROCEDURE — 93306 TTE W/DOPPLER COMPLETE: CPT | Mod: 26,,, | Performed by: INTERNAL MEDICINE

## 2023-07-10 ENCOUNTER — TELEPHONE (OUTPATIENT)
Dept: GASTROENTEROLOGY | Facility: CLINIC | Age: 83
End: 2023-07-10
Payer: MEDICARE

## 2023-07-10 NOTE — PATIENT INSTRUCTIONS
Osteoporosis   Check labs today  Discussed her bone density worsened in her hip. Consider switching to bone building medication   Evenity. She will read about evenity and will let us know.   Continue current medications supplements- vitmain D etc    Evenity (210mg) once a month subcutaneous injection used for one year. Blocks the effects of sclerostin, a protein that prevents bone production and causes bone breakdown. Dual action of bone building and antiresorptive.  Trials did show there was an increased risk of cardiovascular disease (CI in patients with history of MI or stroke).  Increased risk is 50% from baseline and 1.5% relative risk.  Rare, but a possibility.   Most common side effects are joint pain and headaches.  Use it for one year followed by antiresorptive - Prolia or bisphosphonate. Use prolia.     Receive injection in infusion center.     For more information on medications: https://[email or web address].org/patients/treatment/[email or web address]/     room air

## 2023-07-10 NOTE — TELEPHONE ENCOUNTER
Instructions for the day reviewed with patient.   All questions answered to patients satisfaction.   Patient swallowed capsule without incident.   Jennifer

## 2023-07-11 ENCOUNTER — TELEPHONE (OUTPATIENT)
Dept: CARDIOLOGY | Facility: CLINIC | Age: 83
End: 2023-07-11
Payer: MEDICARE

## 2023-07-11 DIAGNOSIS — I87.2 VENOUS INSUFFICIENCY: Primary | ICD-10-CM

## 2023-07-12 ENCOUNTER — PATIENT MESSAGE (OUTPATIENT)
Dept: HEMATOLOGY/ONCOLOGY | Facility: CLINIC | Age: 83
End: 2023-07-12
Payer: MEDICARE

## 2023-07-13 DIAGNOSIS — D64.9 NORMOCYTIC ANEMIA: ICD-10-CM

## 2023-07-13 NOTE — TELEPHONE ENCOUNTER
Spoke to patient discussed that based on Dr. Conroy's note IV iron is not needed at this time. Patient states that GI feels it is necessary.     Discussed that he will most likely need to see the lab work next week to determine if IV iron is necessary at this time.

## 2023-07-14 ENCOUNTER — OFFICE VISIT (OUTPATIENT)
Dept: CARDIOLOGY | Facility: CLINIC | Age: 83
End: 2023-07-14
Payer: MEDICARE

## 2023-07-14 VITALS
BODY MASS INDEX: 20.83 KG/M2 | DIASTOLIC BLOOD PRESSURE: 84 MMHG | WEIGHT: 125 LBS | HEIGHT: 65 IN | RESPIRATION RATE: 20 BRPM | HEART RATE: 65 BPM | SYSTOLIC BLOOD PRESSURE: 193 MMHG

## 2023-07-14 DIAGNOSIS — I10 HYPERTENSION, UNSPECIFIED TYPE: ICD-10-CM

## 2023-07-14 DIAGNOSIS — I50.30 HEART FAILURE WITH PRESERVED EJECTION FRACTION, UNSPECIFIED HF CHRONICITY: ICD-10-CM

## 2023-07-14 DIAGNOSIS — I87.2 VENOUS INSUFFICIENCY: Primary | ICD-10-CM

## 2023-07-14 PROCEDURE — 99215 PR OFFICE/OUTPT VISIT, EST, LEVL V, 40-54 MIN: ICD-10-PCS | Mod: S$PBB,,, | Performed by: INTERNAL MEDICINE

## 2023-07-14 PROCEDURE — 99215 OFFICE O/P EST HI 40 MIN: CPT | Mod: S$PBB,,, | Performed by: INTERNAL MEDICINE

## 2023-07-14 PROCEDURE — 99214 OFFICE O/P EST MOD 30 MIN: CPT | Mod: PBBFAC | Performed by: INTERNAL MEDICINE

## 2023-07-14 PROCEDURE — 99999 PR PBB SHADOW E&M-EST. PATIENT-LVL IV: CPT | Mod: PBBFAC,,, | Performed by: INTERNAL MEDICINE

## 2023-07-14 PROCEDURE — 99999 PR PBB SHADOW E&M-EST. PATIENT-LVL IV: ICD-10-PCS | Mod: PBBFAC,,, | Performed by: INTERNAL MEDICINE

## 2023-07-14 RX ORDER — FUROSEMIDE 20 MG/1
20 TABLET ORAL DAILY
Qty: 90 TABLET | Refills: 3 | Status: SHIPPED | OUTPATIENT
Start: 2023-07-14 | End: 2023-07-26 | Stop reason: SDUPTHER

## 2023-07-14 NOTE — PROGRESS NOTES
HISTORY:    82-year-old female with a history of hypertension, venous insufficiency s/p LLE GSV EVLT '17, breast cancer presenting for initial evaluation by me.    The patient reports B LE edema for years, intermittent. In '17 underwent LLE GSV EVLT with improvement in symptoms that she had at that time. Had a medial venous ulcer at that time. That healed without issue. Was doing well, but had a hospitalization for c diff in early '23. After treatment for that she was debilitated and this resulted in a progression of her edema most recently. 1 year ago she had no issues with edema. No recurrent wounds.     Activity is slowly improving. She is having continued abdominal issues and bloating that limits her.     No h/o VTE. B LE varicosities, small above and below the knee present for years. Swelling is worse at the end of the day and best in the morning. Will have erythema and a heavy feeling late in the day.    Tolerates valsartan 160x1 and tamsulosin 0.4x1. Bps usually 130-140s/60-70s.     PHYSICAL EXAM:    Vitals:    07/14/23 1006   BP: (!) 193/84   Pulse: 65   Resp: 20       NAD, A+Ox3.  No jvd, no bruit.  RRR nml s1,s2. No murmurs.  CTA B no wheezes or crackles.  B LE edema. B chronic skin changes. B varicosities above and below the knee.     LABS/STUDIES (imaging reviewed during clinic visit):    June 2023 hemoglobin 9.7 with MCV 96.  Platelets 187.  Creatinine 0.9 with a BUN of 18.  Albumin 3.6.  2021 LDL 93 and HDL 67.  Triglycerides 28.   EKG 2022 sinus rhythm with PACs.  No Q-waves or ST changes.    TTE July 2023 normal LV size and function with EF 60%.  Grade 2 diastology.  CVP 8 with estimated PASP of 52.  SAVANAH 2022 10 minutes on a medium ramp protocol.  Normal TTE with stress.  Inferolateral ST depressions with stress.  Venous duplex July 2023 no evidence of DVT bilaterally. L GSV not present at level of the knee. Bilateral GSV reflux BTK present.      ASSESSMENT & PLAN:    1. Venous insufficiency    2.  Hypertension, unspecified type    3. Heart failure with preserved ejection fraction, unspecified HF chronicity        Orders Placed This Encounter    Basic Metabolic Panel    BNP    furosemide (LASIX) 20 MG tablet        Patient with recurred edema. Likely triggered by recent illness with decreased activities. Does have grade 2 diastology on TTE w elevated PASPs/CVP and HFpEF maybe a contributor. Will start with low dose furosemide 20x2 and Kcl 20 for 1 week and then furosemide 20x1. BMP and BNP in 2 weeks.    Increase activity and cont compression stockings 20-30. No need fro repeat L GSV ablation as it remains occluded post EVLT. Can consider R GSV ablation in future if symptoms persist, but marginal BTK reflux noted.      Follow up in about 3 months (around 10/14/2023).      Mariaelena Wong MD

## 2023-07-19 ENCOUNTER — TELEPHONE (OUTPATIENT)
Dept: HEMATOLOGY/ONCOLOGY | Facility: CLINIC | Age: 83
End: 2023-07-19
Payer: MEDICARE

## 2023-07-19 NOTE — TELEPHONE ENCOUNTER
"Called to let pt know that stool card needs to be brought in to clinic and we will process it here.     No answer. Left voicemail.         ----- Message from Clementina Wagner sent at 7/19/2023 11:12 AM CDT -----  Regarding: Pt advice  Contact: Pt     Pt requesting call back in regards to stool sample that's suppose to be mailed in.  Please call and adv @       Confirmed contact below:   Contact Name: Shima Sorto  Phone Number: [phone]               Additional Notes:  "Thank you for all that you do for our patients"                                           "

## 2023-07-20 NOTE — PROGRESS NOTES
CRS Office Visit History and Physical    Referring MD:   Keven Garza Md  [address] [place] [address]    SUBJECTIVE:     Chief Complaint: Hemorrhoids?  She is not entirely sure why she is here today.    History of Present Illness:  The patient is new patient to this practice.   Course is as follows:  Patient is a 83 y.o. female presents with possible hemorrhoids.  Denies any pain in that area with BM. Does not have an blood in her stool.  Saw blood 1 time maybe 5 years ago.    Prior colonoscopy: Yes - May 2023  Prior abdominal surgery: No  Prior pelvic or anorectal surgery: No  Family history of colon/rectal cancer: No  Family history of IBD: No  Steroid or other immunosuppression: No  Blood thinners: No  Current stool softeners or fiber supplement: No  Active smoking: No    [place] (FI):  Leakage of solid stool:  Never (0)        Leakage of liquid stool:  Never (0)         Leakage of flatus:      Rarely (1)        Wear a pad:    Never (0)        Alter life:    Never (0)        Total: 1    Altomare (ODS):  Total: 0      Review of patient's allergies indicates:   Allergen Reactions    Asparagus Rash       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     Past Surgical History:   Procedure Laterality Date    BREAST CYST ASPIRATION Right 1999    BREAST LUMPECTOMY Right 2018    with radiation    COLONOSCOPY N/A 7/24/2018    Procedure: COLONOSCOPY;  Surgeon: Juan Miguel Pena MD;  Location: [facility] ([address] [place] [address]);  Service: Endoscopy;  Laterality: N/A;     COLONOSCOPY N/A 5/10/2023    Procedure: COLONOSCOPY;  Surgeon: Keven Garza MD;  Location: [facility] ENDO (4TH FLR);  Service: Endoscopy;  Laterality: N/A;  *Pending C-Diff*  Request Greg  procedure order telephone encounter 5/1  PEG prep, instructions portal -LW  5/4/23 no answer for precall/mleone lpn  5/9lm    ESOPHAGOGASTRODUODENOSCOPY N/A 3/17/2023    Procedure: EGD (ESOPHAGOGASTRODUODENOSCOPY);  Surgeon: Keven Garza MD;  Location: [facility] ENDO (4TH FLR);  Service: Endoscopy;  Laterality: N/A;  Medically Urgent  3/2 instructions to portal-st    pre call attempted, no answer from pt 3/13/23 -egh    INJECTION FOR SENTINEL NODE IDENTIFICATION Right 8/17/2018    Procedure: INJECTION, FOR SENTINEL NODE IDENTIFICATION;  Surgeon: Abby Mason MD;  [facility];  Service: General;  Laterality: Right;    interstim placed stage 1      and removed    KIDNEY STONE SURGERY  2000    @ [facility]    MASTECTOMY, PARTIAL Right 8/17/2018    Procedure: MASTECTOMY, PARTIAL-US guided;  Surgeon: Abby Mason MD;  [facility];  Service: General;  Laterality: Right;    MOHS procedure      SENTINEL LYMPH NODE BIOPSY Right 8/17/2018    Procedure: BIOPSY, LYMPH NODE, SENTINEL;  Surgeon: Abby Mason MD;  Location: [facility] OR [address] [place] [address];  Service: General;  Laterality: Right;     Family History   Problem Relation Age of Onset    Kidney disease Mother     Heart disease Father     Melanoma Father     Heart attack Father     Breast cancer Maternal Aunt     Hearing loss Son     Hyperlipidemia Son     Anesthesia problems Neg Hx     Malignant hypertension Neg Hx     Hypotension Neg Hx     Malignant hyperthermia Neg Hx     Pseudochol deficiency Neg Hx     Colon cancer Neg Hx     Ovarian cancer Neg Hx     Psoriasis Neg Hx     Lupus Neg Hx     Eczema Neg Hx     Acne Neg Hx     Esophageal cancer Neg Hx      Social History     Tobacco Use    Smoking status: Former     Packs/day: 0.50     Years: 8.00     Pack years:  "4.00     Types: Cigarettes     Quit date: 1964     Years since quittin.9    Smokeless tobacco: Never   Substance Use Topics    Alcohol use: Yes     Alcohol/week: 1.0 standard drink     Types: 1 Shots of liquor per week     Comment: less than one weekly    Drug use: No        Review of Systems:  ROS    OBJECTIVE:     Vital Signs (Most Recent)  BP (!) 146/72 (BP Location: Left arm, Patient Position: Sitting, BP Method: Small (Automatic))   Pulse 62   Resp 18   Ht 5' 5" (1.651 m)   Wt 56.4 kg (124 lb 6.4 oz)   LMP  (LMP Unknown)   SpO2 96%   BMI 20.70 kg/m²     Physical Exam:  General: White female in no distress   Neuro: Alert and oriented x 4.  Moves all extremities.     HEENT: No icterus.  Trachea midline  Respiratory: Respirations are even and unlabored  Cardiac: Regular rate  Extremities: Warm dry and intact  Skin: No rashes  Anorectal: Perianal skin healthy, soft skin tags, no fissure  Digital exam with normal resting tone, no masses, non-bloody stool in vault, no pain with exam. Appropriate relaxation with Valsalva, no large rectocele.    Anoscopy: Grade II internal hemorrhoids, no stigmata of recent bleeding.        ASSESSMENT/PLAN:     84yo F w/ internal hemorrhoids    Agree with GI work-up of iron deficiency anemia  She is currently not symptomatic from hemorrhoids and denies any rectal bleeding  RTC prn      Elsa Munoz MD  Staff Surgeon  Colon & Rectal Surgery     "

## 2023-07-21 ENCOUNTER — LAB VISIT (OUTPATIENT)
Dept: LAB | Facility: HOSPITAL | Age: 83
End: 2023-07-21
Attending: INTERNAL MEDICINE
Payer: MEDICARE

## 2023-07-21 ENCOUNTER — TELEPHONE (OUTPATIENT)
Dept: HEMATOLOGY/ONCOLOGY | Facility: CLINIC | Age: 83
End: 2023-07-21
Payer: MEDICARE

## 2023-07-21 ENCOUNTER — OFFICE VISIT (OUTPATIENT)
Dept: SURGERY | Facility: CLINIC | Age: 83
End: 2023-07-21
Attending: COLON & RECTAL SURGERY
Payer: MEDICARE

## 2023-07-21 VITALS
WEIGHT: 124.38 LBS | DIASTOLIC BLOOD PRESSURE: 72 MMHG | OXYGEN SATURATION: 96 % | HEIGHT: 65 IN | HEART RATE: 62 BPM | RESPIRATION RATE: 18 BRPM | BODY MASS INDEX: 20.72 KG/M2 | SYSTOLIC BLOOD PRESSURE: 146 MMHG

## 2023-07-21 DIAGNOSIS — K64.8 INTERNAL HEMORRHOIDS: ICD-10-CM

## 2023-07-21 DIAGNOSIS — D50.9 IRON DEFICIENCY ANEMIA, UNSPECIFIED IRON DEFICIENCY ANEMIA TYPE: ICD-10-CM

## 2023-07-21 DIAGNOSIS — I87.2 VENOUS INSUFFICIENCY: ICD-10-CM

## 2023-07-21 DIAGNOSIS — D64.9 NORMOCYTIC ANEMIA: ICD-10-CM

## 2023-07-21 LAB
ANION GAP SERPL CALC-SCNC: 11 MMOL/L (ref 8–16)
BNP SERPL-MCNC: 316 PG/ML (ref 0–99)
BUN SERPL-MCNC: 22 MG/DL (ref 8–23)
CALCIUM SERPL-MCNC: 9.5 MG/DL (ref 8.7–10.5)
CHLORIDE SERPL-SCNC: 102 MMOL/L (ref 95–110)
CO2 SERPL-SCNC: 29 MMOL/L (ref 23–29)
CREAT SERPL-MCNC: 1.2 MG/DL (ref 0.5–1.4)
ERYTHROCYTE [DISTWIDTH] IN BLOOD BY AUTOMATED COUNT: 13.3 % (ref 11.5–14.5)
EST. GFR  (NO RACE VARIABLE): 44.9 ML/MIN/1.73 M^2
FERRITIN SERPL-MCNC: 54 NG/ML (ref 20–300)
GLUCOSE SERPL-MCNC: 88 MG/DL (ref 70–110)
HCT VFR BLD AUTO: 32.7 % (ref 37–48.5)
HGB BLD-MCNC: 10.4 G/DL (ref 12–16)
HGB BLD-MCNC: 10.4 G/DL (ref 12–16)
IMM GRANULOCYTES # BLD AUTO: 0.04 K/UL (ref 0–0.04)
IRON SERPL-MCNC: 61 UG/DL (ref 30–160)
MCH RBC QN AUTO: 30.9 PG (ref 27–31)
MCHC RBC AUTO-ENTMCNC: 31.8 G/DL (ref 32–36)
MCV RBC AUTO: 97 FL (ref 82–98)
NEUTROPHILS # BLD AUTO: 2 K/UL (ref 1.8–7.7)
PLATELET # BLD AUTO: 165 K/UL (ref 150–450)
PMV BLD AUTO: 11 FL (ref 9.2–12.9)
POTASSIUM SERPL-SCNC: 4.2 MMOL/L (ref 3.5–5.1)
RBC # BLD AUTO: 3.37 M/UL (ref 4–5.4)
SATURATED IRON: 18 % (ref 20–50)
SODIUM SERPL-SCNC: 142 MMOL/L (ref 136–145)
TOTAL IRON BINDING CAPACITY: 333 UG/DL (ref 250–450)
TRANSFERRIN SERPL-MCNC: 225 MG/DL (ref 200–375)
WBC # BLD AUTO: 5.13 K/UL (ref 3.9–12.7)

## 2023-07-21 PROCEDURE — 99203 PR OFFICE/OUTPT VISIT, NEW, LEVL III, 30-44 MIN: ICD-10-PCS | Mod: 25,S$PBB,, | Performed by: COLON & RECTAL SURGERY

## 2023-07-21 PROCEDURE — 99999 PR PBB SHADOW E&M-EST. PATIENT-LVL IV: ICD-10-PCS | Mod: PBBFAC,,, | Performed by: COLON & RECTAL SURGERY

## 2023-07-21 PROCEDURE — 46600 PR DIAG2STIC A2SCOPY: ICD-10-PCS | Mod: S$PBB,,, | Performed by: COLON & RECTAL SURGERY

## 2023-07-21 PROCEDURE — 46600 DIAGNOSTIC ANOSCOPY SPX: CPT | Mod: S$PBB,,, | Performed by: COLON & RECTAL SURGERY

## 2023-07-21 PROCEDURE — 99214 OFFICE O/P EST MOD 30 MIN: CPT | Mod: PBBFAC | Performed by: COLON & RECTAL SURGERY

## 2023-07-21 PROCEDURE — 80048 BASIC METABOLIC PNL TOTAL CA: CPT | Performed by: INTERNAL MEDICINE

## 2023-07-21 PROCEDURE — 83880 ASSAY OF NATRIURETIC PEPTIDE: CPT | Performed by: INTERNAL MEDICINE

## 2023-07-21 PROCEDURE — 99203 OFFICE O/P NEW LOW 30 MIN: CPT | Mod: 25,S$PBB,, | Performed by: COLON & RECTAL SURGERY

## 2023-07-21 PROCEDURE — 46600 DIAGNOSTIC ANOSCOPY SPX: CPT | Mod: PBBFAC | Performed by: COLON & RECTAL SURGERY

## 2023-07-21 PROCEDURE — 85027 COMPLETE CBC AUTOMATED: CPT | Performed by: INTERNAL MEDICINE

## 2023-07-21 PROCEDURE — 82728 ASSAY OF FERRITIN: CPT | Performed by: INTERNAL MEDICINE

## 2023-07-21 PROCEDURE — 36415 COLL VENOUS BLD VENIPUNCTURE: CPT | Performed by: INTERNAL MEDICINE

## 2023-07-21 PROCEDURE — 99999 PR PBB SHADOW E&M-EST. PATIENT-LVL IV: CPT | Mod: PBBFAC,,, | Performed by: COLON & RECTAL SURGERY

## 2023-07-21 PROCEDURE — 84466 ASSAY OF TRANSFERRIN: CPT | Performed by: INTERNAL MEDICINE

## 2023-07-21 NOTE — TELEPHONE ENCOUNTER
Spoke with dario regarding her appointment scheduled with  on 7/26/23 at 12:20 pm. Pt was advised that  would not be able to see her due to a family emergency. Pt verbalized understanding and agreement to new appointment date,time, and location. Pt accepted offer of new appointment with  on 7/27/23 at 3 pm.

## 2023-07-21 NOTE — TELEPHONE ENCOUNTER
Reached out to patient regarding her appointment for 7/26/23. Requested callback and provided callback number.

## 2023-07-22 DIAGNOSIS — D50.9 IRON DEFICIENCY ANEMIA, UNSPECIFIED IRON DEFICIENCY ANEMIA TYPE: ICD-10-CM

## 2023-07-22 RX ORDER — FERROUS GLUCONATE 324(38)MG
324 TABLET ORAL
Qty: 90 TABLET | Refills: 1 | Status: SHIPPED | OUTPATIENT
Start: 2023-07-22 | End: 2023-10-19 | Stop reason: SDUPTHER

## 2023-07-23 ENCOUNTER — TELEPHONE (OUTPATIENT)
Dept: ENDOSCOPY | Facility: HOSPITAL | Age: 83
End: 2023-07-23
Payer: MEDICARE

## 2023-07-23 NOTE — TELEPHONE ENCOUNTER
----- Message from Keven Garza MD sent at 7/22/2023  4:54 PM CDT -----  GI MA team - please tell patient that they are iron deficient and anemic and recommend that they take ferrous gluconate one 324mg pill once daily for next 3 months.    GI MA team -  Please order repeat fasting Hemoglobin, Iron/TIBC, and Ferritin in 8 weeks - Orders placed.

## 2023-07-24 ENCOUNTER — TELEPHONE (OUTPATIENT)
Dept: HEMATOLOGY/ONCOLOGY | Facility: CLINIC | Age: 83
End: 2023-07-24
Payer: MEDICARE

## 2023-07-24 ENCOUNTER — PATIENT OUTREACH (OUTPATIENT)
Dept: ADMINISTRATIVE | Facility: HOSPITAL | Age: 83
End: 2023-07-24
Payer: MEDICARE

## 2023-07-24 DIAGNOSIS — D64.9 NORMOCYTIC ANEMIA: Primary | ICD-10-CM

## 2023-07-24 LAB
CTP QC/QA: YES
FECAL OCCULT BLOOD, POC: NEGATIVE

## 2023-07-24 NOTE — TELEPHONE ENCOUNTER
"----- Message from Kelsie Epps sent at 7/24/2023  9:49 AM CDT -----  Regarding: Consult/Advisory:      Name Of Caller: Self      Contact Preference?:   [phone] or [phone]      What is the nature of the call?: Calling to speak w/ nurse. Pt has a specimen that the lab won't accept. They don't have any orders.      "Thank you for all that you do for our patients"     "

## 2023-07-25 ENCOUNTER — PATIENT MESSAGE (OUTPATIENT)
Dept: ENDOSCOPY | Facility: HOSPITAL | Age: 83
End: 2023-07-25
Payer: MEDICARE

## 2023-07-26 ENCOUNTER — PATIENT MESSAGE (OUTPATIENT)
Dept: CARDIOLOGY | Facility: CLINIC | Age: 83
End: 2023-07-26
Payer: MEDICARE

## 2023-07-26 DIAGNOSIS — I50.30 HEART FAILURE WITH PRESERVED EJECTION FRACTION, UNSPECIFIED HF CHRONICITY: Primary | ICD-10-CM

## 2023-07-26 RX ORDER — FUROSEMIDE 20 MG/1
40 TABLET ORAL DAILY
Qty: 90 TABLET | Refills: 3 | Status: ON HOLD | OUTPATIENT
Start: 2023-07-26 | End: 2023-10-14 | Stop reason: SDUPTHER

## 2023-07-26 RX ORDER — SPIRONOLACTONE 25 MG/1
25 TABLET ORAL DAILY
Qty: 30 TABLET | Refills: 11 | Status: ON HOLD | OUTPATIENT
Start: 2023-07-26 | End: 2023-10-14 | Stop reason: SDUPTHER

## 2023-07-26 NOTE — TELEPHONE ENCOUNTER
Elevated BNP noted. Have switched furosemide to 40x1 and added spironolactone 25x1. Repeat BMP in 2 weeks.

## 2023-07-27 ENCOUNTER — PATIENT MESSAGE (OUTPATIENT)
Dept: NEPHROLOGY | Facility: CLINIC | Age: 83
End: 2023-07-27
Payer: MEDICARE

## 2023-07-27 ENCOUNTER — OFFICE VISIT (OUTPATIENT)
Dept: HEMATOLOGY/ONCOLOGY | Facility: CLINIC | Age: 83
End: 2023-07-27
Payer: MEDICARE

## 2023-07-27 VITALS
TEMPERATURE: 98 F | RESPIRATION RATE: 18 BRPM | BODY MASS INDEX: 20.68 KG/M2 | SYSTOLIC BLOOD PRESSURE: 170 MMHG | HEIGHT: 65 IN | OXYGEN SATURATION: 100 % | DIASTOLIC BLOOD PRESSURE: 79 MMHG | WEIGHT: 124.13 LBS | HEART RATE: 54 BPM

## 2023-07-27 DIAGNOSIS — D64.9 NORMOCYTIC ANEMIA: Primary | ICD-10-CM

## 2023-07-27 DIAGNOSIS — E43 UNSPECIFIED SEVERE PROTEIN-CALORIE MALNUTRITION: ICD-10-CM

## 2023-07-27 DIAGNOSIS — D50.9 IRON DEFICIENCY ANEMIA, UNSPECIFIED IRON DEFICIENCY ANEMIA TYPE: ICD-10-CM

## 2023-07-27 PROCEDURE — 99215 OFFICE O/P EST HI 40 MIN: CPT | Mod: PBBFAC | Performed by: INTERNAL MEDICINE

## 2023-07-27 PROCEDURE — 82270 OCCULT BLOOD FECES: CPT | Mod: PBBFAC | Performed by: INTERNAL MEDICINE

## 2023-07-27 PROCEDURE — 99215 PR OFFICE/OUTPT VISIT, EST, LEVL V, 40-54 MIN: ICD-10-PCS | Mod: S$PBB,,, | Performed by: INTERNAL MEDICINE

## 2023-07-27 PROCEDURE — 99999 PR PBB SHADOW E&M-EST. PATIENT-LVL V: CPT | Mod: PBBFAC,,, | Performed by: INTERNAL MEDICINE

## 2023-07-27 PROCEDURE — 99999PBSHW POCT HEMOCULT STOOL X3: ICD-10-PCS | Mod: PBBFAC,,, | Performed by: INTERNAL MEDICINE

## 2023-07-27 PROCEDURE — 99215 OFFICE O/P EST HI 40 MIN: CPT | Mod: S$PBB,,, | Performed by: INTERNAL MEDICINE

## 2023-07-27 PROCEDURE — 99999PBSHW POCT HEMOCULT STOOL X3: Mod: PBBFAC,,, | Performed by: INTERNAL MEDICINE

## 2023-07-27 PROCEDURE — 99999 PR PBB SHADOW E&M-EST. PATIENT-LVL V: ICD-10-PCS | Mod: PBBFAC,,, | Performed by: INTERNAL MEDICINE

## 2023-07-27 NOTE — PROGRESS NOTES
Subjective:       Patient ID: Shima Sorto is a 83 y.o. female.    Chief Complaint: History of Stage 1A Right Breast Cancer - [facility], ER:100%+, MT:Negative, HER2: Negative, pT1b (8 mm) N0    HPI    Ms. Sorto returns today for routine follow up. She has been on anastrazole since mid November 2018, and so far has tolerated it well.    Briefly, she is an 82-year-old  female who diagnosed with breast cancer in 2018.  On 08/17/2018, she underwent a lumpectomy and sentinel lymph node biopsy for an 8 mm grade 1 invasive lobular carcinoma which was ER strongly positive, MT negative and HER-2 negative with a Ki-67 of 5%, with the closest margin being 2 mm.  Two of 2 sentinel lymph nodes were negative for involvement.      She completed her radiation therapy and was subsequently started on AIs.Her DXA scan from 01/05/2023 shows osteopenia approaching osteoporosis (reviewed).  Of note, the patient is on Prolia. A mammogram on 07/06/2023 was read as BIRADS II, and a one year follow up was recommended.    Review of her chart reveals that last April she was diagnosed with C diff and she was treated accordingly.  She underwent a colonoscopy and EGD by Dr. Garza.  The colonoscopy showed 3 polyps and extensive diverticulosis.  Biopsies were negative for malignancy.  The EGD showed gastritis and a hiatal hernia.    Oncology History:  Aug 2018: lumpectomy + SLNBx - pT1b (8mm)N0 (0/2)  Sept 2018: Radiation   Nov 2018: Anastrazole  -PMH: Osteoporosis (Prolia), C diff colitis (May 2023), HTN/CKD3     Review of Systems    Overall she feels fair with stable fatigue, generalized weakness, and symmetric bilateral lower extremity edema  She has tolerated the anastrazole quite well so far.  ECOG PS is 1.   She denies any anxiety, depression, easy bruising, fevers, chills, night  sweats, weight loss, nausea, vomiting, diarrhea, constipation, diplopia, blurred vision, headache, chest pain, palpitations, shortness of breath,  breast pain, abdominal pain, extremity pain, or difficulty ambulating.  The remainder of the ten-point ROS, including general, skin, lymph, H/N, cardiorespiratory, GI, , Neuro, Endocrine, and psychiatric is negative. No hematochezia or melena.     Objective:      Physical Exam      Vitals:    07/27/23 1521   BP: (!) 170/79   Pulse: (!) 54   Resp: 18   Temp: 97.6 °F (36.4 °C)       She is alert, oriented to time, place, person, pleasant, well      nourished, in no acute physical distress.                                    VITAL SIGNS:  Reviewed                                      HEENT:  Normal.  There are no nasal, oral, lip, gingival, auricular, lid,    or conjunctival lesions.  Mucosae are moist and pink, and there is no        thrush.  Pupils are equal, reactive to light and accommodation.              Extraocular muscle movements are intact.  Dentition is good.                                    NECK:  Supple without JVD, adenopathy, or thyromegaly.                       LUNGS:  Clear to auscultation without wheezing, rales, or rhonchi.           CARDIOVASCULAR:  Reveals an S1, S2, no murmurs, no rubs, no gallops.         ABDOMEN:  Soft, nontender, without organomegaly.  Bowel sounds are    present.                                                                     EXTREMITIES:  No cyanosis, clubbing, or edema.                               BREASTS:  She is status post right lumpectomy with a well-healed incision in the upper outer quadrant.    There are no masses in either breast.                                     LYMPHATIC:  There is no cervical, axillary, or supraclavicular adenopathy.   SKIN:  Warm and moist, without petechiae, rashes, induration, or ecchymoses.        NEUROLOGIC:  DTRs are 0-1+ bilaterally, symmetrical, motor function is 5/5,and cranial nerves are  within normal limits.    Assessment:       Mrs. Sorto is an 83 y.o.F w/ a history of Stage 1A Right Breast Cancer - [facility], ER:100%+,  NV:Negative, HER2: Negative. She is s/p R lumpectomy/SLNBx in 2018, pT1b (8 mm) N0 disease, adjuvant radiation. She is on adjuvant anastrazole started in Nov 2018, and presents today for routine follow up.     Plan:           I had a long discussion with her.    Breast Cancer: In regards to the breast cancer, she will remain on anatsrazole through the end of October 2023, and see Dr. Conroy at that time. Mammogram in July 2024 pending other co-morbidities at the time.   Multifactorial Normocytic Anemia: Hgb: 10.4 (9.7), MCV: 97, TSAT: 18%, Ferritin: 54 (while on PO iron);   Mrs. Sotelo anemia is multifactorial with a component of iron deficiency (TSAT: 18%), anemia of renal disease, anemia of chronic disease/inflammation, and possible early MDS based on age  Colonoscopy and EGD in March 2023 unremarkable; planning for capsule endoscopy, per GI.   Will also obtain full anemia workup as below.    Microscopic Hematuria: UA >100 RBCs in June 2023, Cystoscopy unremarkable - per urology    Other Co-Morbidities: HTN/CKD, Osteoporosis, Diastolic HF: per pcp     Follow Up:   - Patient has further questions regarding etiology of her anemia. Will obtain anemia labs: CBC, CMP, Peripheral Smear, Ferritin, Iron studies, Vit B12, MMA, folate, copper, zinc, HIV, hepatitis serologies, TSH, hemolysis (LDH, haptoglobin, retic count), SPEP/IF, FLC, epo   -RTC to review results (at least 2 hours post labs)      - Her multiple questions were answered to her satisfaction.   - Overall, I discussed the diagnosis, history, stage, labs/imaging, prognosis, management, and treatment plan as applicable. I reviewed adverse short and long term effects as applicable.   - Informed patient if symptoms are getting worse that it is their responsibility to call the clinic and schedule follow up sooner than stated follow up. Also informed patient if they do not hear from the appointment center in 2-5 business days for their referrals, the patient must  call the Oncology clinic so we can follow up on procedures or referral scheduling. Patient was fully informed of current medical plan, all questions were answered and patient verbalized understanding. No further questions.   - Face to Face Visit time I spent with the patient: 40 minutes of total time spent on the encounter, including counseling patient and/or coordinating care, which includes face to face time and non-face to face time preparing to see the patient (eg, review of tests), Obtaining and/or reviewing separately obtained history, Documenting clinical information in the electronic or other health record, Independently interpreting results (not separately reported) and communicating results to the patient/family/caregiver, or Care coordination (not separately reported).      Signed,  Gifty Prasad MD  [facility]

## 2023-07-28 ENCOUNTER — TELEPHONE (OUTPATIENT)
Dept: NEPHROLOGY | Facility: CLINIC | Age: 83
End: 2023-07-28
Payer: MEDICARE

## 2023-07-29 ENCOUNTER — TELEPHONE (OUTPATIENT)
Dept: GASTROENTEROLOGY | Facility: CLINIC | Age: 83
End: 2023-07-29
Payer: MEDICARE

## 2023-07-29 ENCOUNTER — PATIENT MESSAGE (OUTPATIENT)
Dept: GASTROENTEROLOGY | Facility: CLINIC | Age: 83
End: 2023-07-29
Payer: MEDICARE

## 2023-07-29 NOTE — PROVATION PATIENT INSTRUCTIONS
Discharge Summary/Instructions for after Video Capsule Endoscopy  Patient Name: Shima Sorto  Patient MRN: [id number]  Patient YOB: 1940  Monday, July 10, 2023  Keven Garza MD  This is an 83 year old female.  Refer to note in patient chart for   documentation of history and physical.  1.  Do Not eat or drink anything for 1 hour.  Try sips of water first.  If   tolerated, resume your regular diet or one recommended by your physician.  2.  Do not drive, operate machinery, make critical decisions, or do   activities that require coordination or balance for 24 hours.  3.   You may experience a sore throat for 24 to 48 hours.  You may use   throat lozenges or gargle with warm salt water to relieve the discomfort.  4.  Because air was put into your stomach during the procedure, you may   experience some belching.  5.  Do not use any medication containing aspirin for 10 days.  6.  Go directly to the emergency room if you notice any of the following:   Chills and/or fever over 101 F   Persistent vomiting or vomiting with blood/nasal regurgitation   Severe abdominal pain, other than gas cramps   Severe chest pain   Black, tarry stools     Your doctor recommends these additional instructions:  You are being discharged to home.   Return to your GI clinic at the next available appointment.   Return to your primary care physician.   The findings and recommendations have been discussed with you.  If you have any questions or problems, please call your physician.  EMERGENCY PHONE NUMBER: [phone]  LAB RESULTS: [phone]  Keven Garza MD  7/29/2023 1:13:04 PM  This report has been verified and signed electronically.  Dear patient,  As a result of recent federal legislation (The Federal Cures Act), you may   receive lab or pathology results from your procedure in your [facility]   account before your physician is able to contact you. Your physician or   their representative will relay the results to you  with their   recommendations at their soonest availability.  Thank you,  PROVATION

## 2023-08-02 ENCOUNTER — OFFICE VISIT (OUTPATIENT)
Dept: UROLOGY | Facility: CLINIC | Age: 83
End: 2023-08-02
Payer: MEDICARE

## 2023-08-02 ENCOUNTER — LAB VISIT (OUTPATIENT)
Dept: LAB | Facility: HOSPITAL | Age: 83
End: 2023-08-02
Attending: INTERNAL MEDICINE
Payer: MEDICARE

## 2023-08-02 VITALS
BODY MASS INDEX: 19.5 KG/M2 | WEIGHT: 124.25 LBS | DIASTOLIC BLOOD PRESSURE: 63 MMHG | HEART RATE: 58 BPM | HEIGHT: 67 IN | SYSTOLIC BLOOD PRESSURE: 156 MMHG

## 2023-08-02 DIAGNOSIS — N32.89 BLADDER DISTENTION: ICD-10-CM

## 2023-08-02 DIAGNOSIS — R33.9 INCOMPLETE BLADDER EMPTYING: Primary | ICD-10-CM

## 2023-08-02 DIAGNOSIS — I50.30 HEART FAILURE WITH PRESERVED EJECTION FRACTION, UNSPECIFIED HF CHRONICITY: ICD-10-CM

## 2023-08-02 LAB
ANION GAP SERPL CALC-SCNC: 11 MMOL/L (ref 8–16)
BUN SERPL-MCNC: 20 MG/DL (ref 8–23)
CALCIUM SERPL-MCNC: 9.3 MG/DL (ref 8.7–10.5)
CHLORIDE SERPL-SCNC: 96 MMOL/L (ref 95–110)
CO2 SERPL-SCNC: 29 MMOL/L (ref 23–29)
CREAT SERPL-MCNC: 1.1 MG/DL (ref 0.5–1.4)
EST. GFR  (NO RACE VARIABLE): 49.9 ML/MIN/1.73 M^2
GLUCOSE SERPL-MCNC: 81 MG/DL (ref 70–110)
POTASSIUM SERPL-SCNC: 3.9 MMOL/L (ref 3.5–5.1)
SODIUM SERPL-SCNC: 136 MMOL/L (ref 136–145)

## 2023-08-02 PROCEDURE — 99213 OFFICE O/P EST LOW 20 MIN: CPT | Mod: PBBFAC | Performed by: NURSE PRACTITIONER

## 2023-08-02 PROCEDURE — 99214 OFFICE O/P EST MOD 30 MIN: CPT | Mod: S$PBB,,, | Performed by: NURSE PRACTITIONER

## 2023-08-02 PROCEDURE — 36415 COLL VENOUS BLD VENIPUNCTURE: CPT | Performed by: INTERNAL MEDICINE

## 2023-08-02 PROCEDURE — 99999 PR PBB SHADOW E&M-EST. PATIENT-LVL III: ICD-10-PCS | Mod: PBBFAC,,, | Performed by: NURSE PRACTITIONER

## 2023-08-02 PROCEDURE — 99214 PR OFFICE/OUTPT VISIT, EST, LEVL IV, 30-39 MIN: ICD-10-PCS | Mod: S$PBB,,, | Performed by: NURSE PRACTITIONER

## 2023-08-02 PROCEDURE — 99999 PR PBB SHADOW E&M-EST. PATIENT-LVL III: CPT | Mod: PBBFAC,,, | Performed by: NURSE PRACTITIONER

## 2023-08-02 PROCEDURE — 80048 BASIC METABOLIC PNL TOTAL CA: CPT | Performed by: INTERNAL MEDICINE

## 2023-08-02 NOTE — PROGRESS NOTES
[facility] COMPLAINT:    Mrs. Sorto is a 83 y.o. female presenting for incomplete bladder emptying follow up.    .  PRESENTING ILLNESS:    Shima Sorto is a 83 y.o. female with a PMH of htn, ckd 3, hx breast ca, sania who presents for  incomplete bladder emptying follow up.     Established patient to urology department. Presents today for incomplete bladder emptying and possible uti.  Previously seen 6/21/23.  Taught CIC due to incomplete bladder emptying.  Patient has been performing CIC twice daily as instructed.  PVR elevated today. Patient reports did not catheterize prior to appt.  Has continued tamsulosin as prescribed.  Again reiterated the importance of CIC.        REVIEW OF SYSTEMS:    Review of Systems   Constitutional:  Negative for chills and fever.   Respiratory:  Negative for shortness of breath.    Cardiovascular:  Negative for chest pain.   Gastrointestinal:  Negative for abdominal pain, constipation and diarrhea.   Genitourinary:  Positive for frequency. Negative for dysuria, flank pain, hematuria and urgency.   Neurological:  Negative for dizziness and weakness.        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       Family History   Problem Relation Age of Onset    Kidney disease Mother     Heart disease Father     Melanoma Father     Heart attack Father     Breast cancer Maternal Aunt     Hearing loss Son     Hyperlipidemia Son     Anesthesia problems Neg Hx     Malignant hypertension Neg Hx     Hypotension Neg Hx      Malignant hyperthermia Neg Hx     Pseudochol deficiency Neg Hx     Colon cancer Neg Hx     Ovarian cancer Neg Hx     Psoriasis Neg Hx     Lupus Neg Hx     Eczema Neg Hx     Acne Neg Hx     Esophageal cancer Neg Hx        Allergies:  Asparagus    Medications:    Current Outpatient Medications:     anastrozole (ARIMIDEX) 1 mg Tab, TAKE 1 TABLET(1 MG) BY MOUTH EVERY DAY, Disp: 90 tablet, Rfl: 3    coQ10, ubiquinol, 100 mg Cap, Take 100 mg by mouth once daily., Disp: , Rfl:     denosumab (PROLIA) 60 mg/mL Syrg, Inject 60 mg into the skin every 6 (six) months., Disp: , Rfl:     ferrous gluconate (FERGON) 324 MG tablet, Take 1 tablet (324 mg total) by mouth daily with breakfast., Disp: 90 tablet, Rfl: 1    ferrous gluconate (FERGON) 324 MG tablet, Take 1 tablet (324 mg total) by mouth daily with breakfast., Disp: 90 tablet, Rfl: 1    fish oil-E-fatty acid5-bgif834 400-5 mg-unit Cap, Take 1 capsule by mouth Daily., Disp: , Rfl:     furosemide (LASIX) 20 MG tablet, Take 2 tablets (40 mg total) by mouth once daily., Disp: 90 tablet, Rfl: 3    pantoprazole (PROTONIX) 40 MG tablet, Take 1 tablet (40 mg total) by mouth before breakfast. Best taken 45-60 minutes before your first protein meal of the day - Breakfast, Disp: 90 tablet, Rfl: 0    PRESERVISION AREDS-2 250-90-40-1 mg Cap, Take 1 tablet by mouth 2 (two) times daily., Disp: , Rfl:     spironolactone (ALDACTONE) 25 MG tablet, Take 1 tablet (25 mg total) by mouth once daily., Disp: 30 tablet, Rfl: 11    tamsulosin (FLOMAX) 0.4 mg Cap, Take 1 capsule (0.4 mg total) by mouth once daily. (Patient not taking: Reported on 8/2/2023), Disp: 30 capsule, Rfl: 11    UNABLE TO FIND, 2 tablets 2 (two) times daily. Rufus-Mag-Citrate with D3 & K2, Disp: , Rfl:     UNABLE TO FIND, 4 capsules Daily. Nutrafol, Disp: , Rfl:     valsartan (DIOVAN) 160 MG tablet, Take 1 tablet (160 mg total) by mouth once daily., Disp: 90 tablet, Rfl: 1    vitamin renal formula, B-complex-vitamin c-folic acid,  (NEPHROCAPS) 1 mg Cap, Take 1 capsule by mouth once daily., Disp: 90 capsule, Rfl: 3  No current facility-administered medications for this visit.    Facility-Administered Medications Ordered in Other Visits:     0.9%  NaCl infusion, , Intravenous, Continuous, James Carranza MD, Last Rate: 70 mL/hr at 08/17/18 0843, New Bag at 03/17/23 0719    PHYSICAL EXAMINATION:    Physical Exam  Vitals and nursing note reviewed.   Constitutional:       Appearance: Normal appearance. She is well-developed.   HENT:      Head: Normocephalic and atraumatic.   Eyes:      Pupils: Pupils are equal, round, and reactive to light.   Pulmonary:      Effort: Pulmonary effort is normal.   Musculoskeletal:         General: Normal range of motion.      Cervical back: Normal range of motion.   Skin:     General: Skin is warm and dry.   Neurological:      Mental Status: She is alert and oriented to person, place, and time.   Psychiatric:         Behavior: Behavior normal.          LABS:    IMPRESSION:    Encounter Diagnoses   Name Primary?    Incomplete bladder emptying Yes    Bladder distention            PLAN:    Problem List Items Addressed This Visit       Incomplete bladder emptying - Primary     Other Visit Diagnoses       Bladder distention                    1. Bladder distention/incomplete bladder emptying    - PVR approximately 752 ml   - self cath 10 Fr twice daily indefinitely    - continue flomax daily     2. Fluid retention   Continue lasix 20 mg bid as prescribed    3.  Rtc in 3 mths     Roxy Meyers NP        I spent over 30 minutes with the patient. Over 50% of the visit was spent in counseling.

## 2023-08-04 DIAGNOSIS — N18.30 STAGE 3 CHRONIC KIDNEY DISEASE, UNSPECIFIED WHETHER STAGE 3A OR 3B CKD: Primary | ICD-10-CM

## 2023-08-09 ENCOUNTER — OFFICE VISIT (OUTPATIENT)
Dept: INTERNAL MEDICINE | Facility: CLINIC | Age: 83
End: 2023-08-09
Payer: MEDICARE

## 2023-08-09 VITALS
OXYGEN SATURATION: 97 % | SYSTOLIC BLOOD PRESSURE: 130 MMHG | DIASTOLIC BLOOD PRESSURE: 80 MMHG | HEIGHT: 67 IN | WEIGHT: 125 LBS | HEART RATE: 53 BPM | BODY MASS INDEX: 19.62 KG/M2

## 2023-08-09 DIAGNOSIS — I27.9 CHRONIC PULMONARY HEART DISEASE: ICD-10-CM

## 2023-08-09 DIAGNOSIS — C50.411 MALIGNANT NEOPLASM OF UPPER-OUTER QUADRANT OF RIGHT BREAST IN FEMALE, ESTROGEN RECEPTOR POSITIVE: ICD-10-CM

## 2023-08-09 DIAGNOSIS — I87.2 VENOUS INSUFFICIENCY: ICD-10-CM

## 2023-08-09 DIAGNOSIS — D69.6 THROMBOCYTOPENIA: ICD-10-CM

## 2023-08-09 DIAGNOSIS — M80.00XD AGE-RELATED OSTEOPOROSIS WITH CURRENT PATHOLOGICAL FRACTURE WITH ROUTINE HEALING: ICD-10-CM

## 2023-08-09 DIAGNOSIS — H81.20 VESTIBULAR NEURONITIS, UNSPECIFIED LATERALITY: ICD-10-CM

## 2023-08-09 DIAGNOSIS — I70.0 AORTO-ILIAC ATHEROSCLEROSIS: ICD-10-CM

## 2023-08-09 DIAGNOSIS — I10 PRIMARY HYPERTENSION: ICD-10-CM

## 2023-08-09 DIAGNOSIS — N18.30 STAGE 3 CHRONIC KIDNEY DISEASE, UNSPECIFIED WHETHER STAGE 3A OR 3B CKD: ICD-10-CM

## 2023-08-09 DIAGNOSIS — E55.9 VITAMIN D DEFICIENCY: ICD-10-CM

## 2023-08-09 DIAGNOSIS — G47.33 OBSTRUCTIVE SLEEP APNEA: ICD-10-CM

## 2023-08-09 DIAGNOSIS — Z79.811 USE OF AROMATASE INHIBITORS: ICD-10-CM

## 2023-08-09 DIAGNOSIS — Z17.0 MALIGNANT NEOPLASM OF UPPER-OUTER QUADRANT OF RIGHT BREAST IN FEMALE, ESTROGEN RECEPTOR POSITIVE: ICD-10-CM

## 2023-08-09 DIAGNOSIS — Z00.00 ENCOUNTER FOR PREVENTIVE HEALTH EXAMINATION: Primary | ICD-10-CM

## 2023-08-09 DIAGNOSIS — D64.9 NORMOCYTIC ANEMIA: ICD-10-CM

## 2023-08-09 DIAGNOSIS — I70.8 AORTO-ILIAC ATHEROSCLEROSIS: ICD-10-CM

## 2023-08-09 PROCEDURE — 99999 PR PBB SHADOW E&M-EST. PATIENT-LVL V: CPT | Mod: PBBFAC,,, | Performed by: NURSE PRACTITIONER

## 2023-08-09 PROCEDURE — G0439 PR MEDICARE ANNUAL WELLNESS SUBSEQUENT VISIT: ICD-10-PCS | Mod: ,,, | Performed by: NURSE PRACTITIONER

## 2023-08-09 PROCEDURE — 99999 PR PBB SHADOW E&M-EST. PATIENT-LVL V: ICD-10-PCS | Mod: PBBFAC,,, | Performed by: NURSE PRACTITIONER

## 2023-08-09 PROCEDURE — 99215 OFFICE O/P EST HI 40 MIN: CPT | Mod: PBBFAC | Performed by: NURSE PRACTITIONER

## 2023-08-09 PROCEDURE — G0439 PPPS, SUBSEQ VISIT: HCPCS | Mod: ,,, | Performed by: NURSE PRACTITIONER

## 2023-08-09 NOTE — PATIENT INSTRUCTIONS
Counseling and Referral of Other Preventative  (Italic type indicates deductible and co-insurance are waived)    Patient Name: Shima Sorto  Today's Date: 8/9/2023    Health Maintenance       Date Due Completion Date    COVID-19 Vaccine (7 - Moderna risk series) 07/03/2023 5/8/2023    Influenza Vaccine (1) 09/01/2023 9/12/2022    TETANUS VACCINE 12/06/2024 12/6/2014    DEXA Scan 01/05/2025 1/5/2023    Lipid Panel 02/05/2026 2/5/2021    Colonoscopy 05/10/2030 5/10/2023        No orders of the defined types were placed in this encounter.    The following information is provided to all patients.  This information is to help you find resources for any of the problems found today that may be affecting your health:                Living healthy guide: www.[facility].gov      Understanding Diabetes: www.diabetes.org      Eating healthy: www.cdc.gov/healthyweight      CDC home safety checklist: www.cdc.gov/steadi/patient.html      Agency on Aging: www.goea.[place].[facility]      Alcoholics anonymous (AA): www.aa.org      Physical Activity: www.[place].nih.gov/ur1vwmg      Tobacco use: www.quitwithusla.org

## 2023-08-09 NOTE — PROGRESS NOTES
"  Shima Sorto presented for a  Medicare AWV and comprehensive Health Risk Assessment today. The following components were reviewed and updated:    Medical history  Family History  Social history  Allergies and Current Medications  Health Risk Assessment  Health Maintenance  Care Team         ** See Completed Assessments for Annual Wellness Visit within the encounter summary.**         The following assessments were completed:  Living Situation  CAGE  Depression Screening  Timed Get Up and Go  Whisper Test  Cognitive Function Screening      Nutrition Screening  ADL Screening  PAQ Screening  OPIOID Screening: Patient does not have a prescription for narcotics. Patient does not use substance         Vitals:    08/09/23 1105   BP: 130/80   BP Location: Left arm   Pulse: (!) 53   SpO2: 97%   Weight: 56.7 kg (125 lb)   Height: 5' 7" (1.702 m)     Body mass index is 19.58 kg/m².  Physical Exam  Vitals and nursing note reviewed.   Constitutional:       Appearance: She is well-developed.   HENT:      Head: Normocephalic.   Cardiovascular:      Rate and Rhythm: Normal rate and regular rhythm.      Heart sounds: Normal heart sounds. No murmur heard.  Pulmonary:      Effort: Pulmonary effort is normal.      Breath sounds: Normal breath sounds.   Abdominal:      General: Bowel sounds are normal.      Palpations: Abdomen is soft.   Musculoskeletal:         General: Normal range of motion.   Skin:     General: Skin is warm and dry.   Neurological:      Mental Status: She is alert and oriented to person, place, and time.      Motor: No abnormal muscle tone.   Psychiatric:         Mood and Affect: Mood normal.               Diagnoses and health risks identified today and associated recommendations/orders:    1. Encounter for preventive health examination  Here for Health Risk Assessment/Annual Wellness Visit.  Health maintenance reviewed and updated. Follow up in one year.     2. Primary hypertension  Chronic, stable on current " medications. Followed by PCP, Cardiology.    3. Aorto-iliac atherosclerosis  Chronic, stable on current medications. Noted CT Enterography 3/07/23. Followed by PCP, Cardiology.    4. Venous insufficiency  Chronic, stable on current medications. Followed by PCP, Cardiology.    5. Chronic pulmonary heart disease  Chronic, stable on current medications. Estimated PA Systolic Pressure 53 noted on TTE 7/07/23. Followed by PCP, Cardiology.    6. Vestibular neuronitis, unspecified laterality  Chronic, stable. Followed by PCP, ENT.    7. Stage 3 chronic kidney disease, unspecified whether stage 3a or 3b CKD  Chronic, stable on current medications. Followed by PCP, Nephrology.     8. Malignant neoplasm of upper-outer quadrant of right breast in female, estrogen receptor positive  Chronic, stable on current medication. Followed by Oncology, PCP.    9. Use of aromatase inhibitors  Followed by Oncology.    10. Normocytic anemia  Chronic, stable. Followed by PCP, Oncology.    11. Thrombocytopenia  Chronic, stable. Followed by PCP, Oncology.    12. Age-related osteoporosis with current pathological fracture with routine healing  Chronic, stable on current medications/Prolia. Followed by PCP, Endocrinology    13. Vitamin D deficiency  Chronic, stable on current medication. Followed by PCP.     14. Obstructive sleep apnea  Chronic, no CPAP use. Followed by PCP.      Provided Shima with a 5-10 year written screening schedule and personal prevention plan. Recommendations were developed using the USPSTF age appropriate recommendations. Education, counseling, and referrals were provided as needed. After Visit Summary printed and given to patient which includes a list of additional screenings\tests needed.    Follow up in 1 month (on 9/11/2023).with PCP    Deana Terry NP

## 2023-08-10 ENCOUNTER — LAB VISIT (OUTPATIENT)
Dept: LAB | Facility: HOSPITAL | Age: 83
End: 2023-08-10
Attending: INTERNAL MEDICINE
Payer: MEDICARE

## 2023-08-10 ENCOUNTER — OFFICE VISIT (OUTPATIENT)
Dept: HEMATOLOGY/ONCOLOGY | Facility: CLINIC | Age: 83
End: 2023-08-10
Payer: MEDICARE

## 2023-08-10 VITALS
TEMPERATURE: 98 F | RESPIRATION RATE: 18 BRPM | DIASTOLIC BLOOD PRESSURE: 67 MMHG | BODY MASS INDEX: 19.51 KG/M2 | OXYGEN SATURATION: 98 % | SYSTOLIC BLOOD PRESSURE: 155 MMHG | HEART RATE: 65 BPM | HEIGHT: 67 IN | WEIGHT: 124.31 LBS

## 2023-08-10 DIAGNOSIS — L97.502 NON-PRESSURE CHRONIC ULCER OF OTHER PART OF UNSPECIFIED FOOT WITH FAT LAYER EXPOSED: ICD-10-CM

## 2023-08-10 DIAGNOSIS — M53.84 DECREASED RANGE OF MOTION OF THORACIC SPINE: ICD-10-CM

## 2023-08-10 DIAGNOSIS — C50.411 MALIGNANT NEOPLASM OF UPPER-OUTER QUADRANT OF RIGHT BREAST IN FEMALE, ESTROGEN RECEPTOR POSITIVE: ICD-10-CM

## 2023-08-10 DIAGNOSIS — D64.9 NORMOCYTIC ANEMIA: ICD-10-CM

## 2023-08-10 DIAGNOSIS — E43 UNSPECIFIED SEVERE PROTEIN-CALORIE MALNUTRITION: ICD-10-CM

## 2023-08-10 DIAGNOSIS — Z17.0 MALIGNANT NEOPLASM OF UPPER-OUTER QUADRANT OF RIGHT BREAST IN FEMALE, ESTROGEN RECEPTOR POSITIVE: ICD-10-CM

## 2023-08-10 DIAGNOSIS — D64.9 NORMOCHROMIC NORMOCYTIC ANEMIA: Primary | ICD-10-CM

## 2023-08-10 DIAGNOSIS — D70.9 NEUTROPENIA, UNSPECIFIED TYPE: ICD-10-CM

## 2023-08-10 LAB
ALBUMIN SERPL BCP-MCNC: 3.8 G/DL (ref 3.5–5.2)
ALP SERPL-CCNC: 60 U/L (ref 55–135)
ALT SERPL W/O P-5'-P-CCNC: 22 U/L (ref 10–44)
ANION GAP SERPL CALC-SCNC: 11 MMOL/L (ref 8–16)
AST SERPL-CCNC: 31 U/L (ref 10–40)
BASOPHILS # BLD AUTO: 0.02 K/UL (ref 0–0.2)
BASOPHILS NFR BLD: 0.6 % (ref 0–1.9)
BILIRUB SERPL-MCNC: 0.5 MG/DL (ref 0.1–1)
BUN SERPL-MCNC: 30 MG/DL (ref 8–23)
CALCIUM SERPL-MCNC: 9.7 MG/DL (ref 8.7–10.5)
CHLORIDE SERPL-SCNC: 99 MMOL/L (ref 95–110)
CO2 SERPL-SCNC: 30 MMOL/L (ref 23–29)
CREAT SERPL-MCNC: 1.2 MG/DL (ref 0.5–1.4)
DIFFERENTIAL METHOD: ABNORMAL
EOSINOPHIL # BLD AUTO: 0 K/UL (ref 0–0.5)
EOSINOPHIL NFR BLD: 0.6 % (ref 0–8)
ERYTHROCYTE [DISTWIDTH] IN BLOOD BY AUTOMATED COUNT: 13.3 % (ref 11.5–14.5)
EST. GFR  (NO RACE VARIABLE): 44.9 ML/MIN/1.73 M^2
FERRITIN SERPL-MCNC: 64 NG/ML (ref 20–300)
FOLATE SERPL-MCNC: 36.3 NG/ML (ref 4–24)
GLUCOSE SERPL-MCNC: 60 MG/DL (ref 70–110)
HAPTOGLOB SERPL-MCNC: 149 MG/DL (ref 30–250)
HBV CORE AB SERPL QL IA: NORMAL
HBV SURFACE AB SER-ACNC: <3 MIU/ML
HBV SURFACE AB SER-ACNC: NORMAL M[IU]/ML
HBV SURFACE AG SERPL QL IA: NORMAL
HCT VFR BLD AUTO: 31.3 % (ref 37–48.5)
HCV AB SERPL QL IA: NORMAL
HGB BLD-MCNC: 10.2 G/DL (ref 12–16)
HIV 1+2 AB+HIV1 P24 AG SERPL QL IA: NORMAL
IMM GRANULOCYTES # BLD AUTO: 0 K/UL (ref 0–0.04)
IMM GRANULOCYTES NFR BLD AUTO: 0 % (ref 0–0.5)
IRON SERPL-MCNC: 59 UG/DL (ref 30–160)
LDH SERPL L TO P-CCNC: 277 U/L (ref 110–260)
LYMPHOCYTES # BLD AUTO: 1.8 K/UL (ref 1–4.8)
LYMPHOCYTES NFR BLD: 51.6 % (ref 18–48)
MCH RBC QN AUTO: 30.6 PG (ref 27–31)
MCHC RBC AUTO-ENTMCNC: 32.6 G/DL (ref 32–36)
MCV RBC AUTO: 94 FL (ref 82–98)
MONOCYTES # BLD AUTO: 0.5 K/UL (ref 0.3–1)
MONOCYTES NFR BLD: 13.5 % (ref 4–15)
NEUTROPHILS # BLD AUTO: 1.2 K/UL (ref 1.8–7.7)
NEUTROPHILS NFR BLD: 33.7 % (ref 38–73)
NRBC BLD-RTO: 0 /100 WBC
PATH REV BLD -IMP: NORMAL
PATH REV BLD -IMP: NORMAL
PLATELET # BLD AUTO: 194 K/UL (ref 150–450)
PMV BLD AUTO: 10.6 FL (ref 9.2–12.9)
POTASSIUM SERPL-SCNC: 4.2 MMOL/L (ref 3.5–5.1)
PROT SERPL-MCNC: 7.3 G/DL (ref 6–8.4)
RBC # BLD AUTO: 3.33 M/UL (ref 4–5.4)
RETICS/RBC NFR AUTO: 1.6 % (ref 0.5–2.5)
SATURATED IRON: 17 % (ref 20–50)
SODIUM SERPL-SCNC: 140 MMOL/L (ref 136–145)
TOTAL IRON BINDING CAPACITY: 348 UG/DL (ref 250–450)
TRANSFERRIN SERPL-MCNC: 235 MG/DL (ref 200–375)
WBC # BLD AUTO: 3.41 K/UL (ref 3.9–12.7)

## 2023-08-10 PROCEDURE — 82746 ASSAY OF FOLIC ACID SERUM: CPT | Performed by: INTERNAL MEDICINE

## 2023-08-10 PROCEDURE — 85045 AUTOMATED RETICULOCYTE COUNT: CPT | Performed by: INTERNAL MEDICINE

## 2023-08-10 PROCEDURE — 99999 PR PBB SHADOW E&M-EST. PATIENT-LVL V: ICD-10-PCS | Mod: PBBFAC,,, | Performed by: INTERNAL MEDICINE

## 2023-08-10 PROCEDURE — 85025 COMPLETE CBC W/AUTO DIFF WBC: CPT | Performed by: INTERNAL MEDICINE

## 2023-08-10 PROCEDURE — 86704 HEP B CORE ANTIBODY TOTAL: CPT | Performed by: INTERNAL MEDICINE

## 2023-08-10 PROCEDURE — 86334 IMMUNOFIX E-PHORESIS SERUM: CPT | Mod: 26,,, | Performed by: PATHOLOGY

## 2023-08-10 PROCEDURE — 87389 HIV-1 AG W/HIV-1&-2 AB AG IA: CPT | Performed by: INTERNAL MEDICINE

## 2023-08-10 PROCEDURE — 82668 ASSAY OF ERYTHROPOIETIN: CPT | Performed by: INTERNAL MEDICINE

## 2023-08-10 PROCEDURE — 84466 ASSAY OF TRANSFERRIN: CPT | Performed by: INTERNAL MEDICINE

## 2023-08-10 PROCEDURE — 83615 LACTATE (LD) (LDH) ENZYME: CPT | Performed by: INTERNAL MEDICINE

## 2023-08-10 PROCEDURE — 86334 PATHOLOGIST INTERPRETATION IFE: ICD-10-PCS | Mod: 26,,, | Performed by: PATHOLOGY

## 2023-08-10 PROCEDURE — 84165 PROTEIN E-PHORESIS SERUM: CPT | Mod: 26,,, | Performed by: PATHOLOGY

## 2023-08-10 PROCEDURE — 85060 BLOOD SMEAR INTERPRETATION: CPT | Mod: ,,, | Performed by: PATHOLOGY

## 2023-08-10 PROCEDURE — 86706 HEP B SURFACE ANTIBODY: CPT | Performed by: INTERNAL MEDICINE

## 2023-08-10 PROCEDURE — 80053 COMPREHEN METABOLIC PANEL: CPT | Performed by: INTERNAL MEDICINE

## 2023-08-10 PROCEDURE — 85060 PATHOLOGIST REVIEW: ICD-10-PCS | Mod: ,,, | Performed by: PATHOLOGY

## 2023-08-10 PROCEDURE — 99999 PR PBB SHADOW E&M-EST. PATIENT-LVL V: CPT | Mod: PBBFAC,,, | Performed by: INTERNAL MEDICINE

## 2023-08-10 PROCEDURE — 86803 HEPATITIS C AB TEST: CPT | Performed by: INTERNAL MEDICINE

## 2023-08-10 PROCEDURE — 84165 PROTEIN E-PHORESIS SERUM: CPT | Performed by: INTERNAL MEDICINE

## 2023-08-10 PROCEDURE — 86334 IMMUNOFIX E-PHORESIS SERUM: CPT | Performed by: INTERNAL MEDICINE

## 2023-08-10 PROCEDURE — 99215 OFFICE O/P EST HI 40 MIN: CPT | Mod: PBBFAC | Performed by: INTERNAL MEDICINE

## 2023-08-10 PROCEDURE — 82728 ASSAY OF FERRITIN: CPT | Performed by: INTERNAL MEDICINE

## 2023-08-10 PROCEDURE — 87340 HEPATITIS B SURFACE AG IA: CPT | Performed by: INTERNAL MEDICINE

## 2023-08-10 PROCEDURE — 83010 ASSAY OF HAPTOGLOBIN QUANT: CPT | Performed by: INTERNAL MEDICINE

## 2023-08-10 PROCEDURE — 84630 ASSAY OF ZINC: CPT | Performed by: INTERNAL MEDICINE

## 2023-08-10 PROCEDURE — 99215 OFFICE O/P EST HI 40 MIN: CPT | Mod: S$PBB,,, | Performed by: INTERNAL MEDICINE

## 2023-08-10 PROCEDURE — 36415 COLL VENOUS BLD VENIPUNCTURE: CPT | Performed by: INTERNAL MEDICINE

## 2023-08-10 PROCEDURE — 84165 PATHOLOGIST INTERPRETATION SPE: ICD-10-PCS | Mod: 26,,, | Performed by: PATHOLOGY

## 2023-08-10 PROCEDURE — 99215 PR OFFICE/OUTPT VISIT, EST, LEVL V, 40-54 MIN: ICD-10-PCS | Mod: S$PBB,,, | Performed by: INTERNAL MEDICINE

## 2023-08-10 PROCEDURE — 83521 IG LIGHT CHAINS FREE EACH: CPT | Mod: 59 | Performed by: INTERNAL MEDICINE

## 2023-08-10 PROCEDURE — 82525 ASSAY OF COPPER: CPT | Performed by: INTERNAL MEDICINE

## 2023-08-10 PROCEDURE — 83921 ORGANIC ACID SINGLE QUANT: CPT | Performed by: INTERNAL MEDICINE

## 2023-08-10 NOTE — PROGRESS NOTES
Subjective:       Patient ID: Shima Sorto is a 83 y.o. female.    Chief Complaint: History of Stage 1A Right Breast Cancer - ILC, ER:100%+, SC:Negative, HER2: Negative, pT1b (8 mm) N0    HPI    Ms. Sorto returns today for routine follow up and to perform labs performed in evaluation of normocytic anemia. She has been on anastrazole since mid November 2018, and so far has tolerated it well.    Briefly, she is an 82-year-old  female who diagnosed with breast cancer in 2018.  On 08/17/2018, she underwent a lumpectomy and sentinel lymph node biopsy for an 8 mm grade 1 invasive lobular carcinoma which was ER strongly positive, SC negative and HER-2 negative with a Ki-67 of 5%, with the closest margin being 2 mm.  Two of 2 sentinel lymph nodes were negative for involvement.      She completed her radiation therapy and was subsequently started on AIs.Her DXA scan from 01/05/2023 shows osteopenia approaching osteoporosis (reviewed).  Of note, the patient is on Prolia. A mammogram on 07/06/2023 was read as BIRADS II, and a one year follow up was recommended.    Review of her chart reveals that last April she was diagnosed with C diff and she was treated accordingly.  She underwent a colonoscopy and EGD by Dr. Garza.  The colonoscopy showed 3 polyps and extensive diverticulosis.  Biopsies were negative for malignancy.  The EGD showed gastritis and a hiatal hernia.    Oncology History:  Aug 2018: lumpectomy + SLNBx - pT1b (8mm)N0 (0/2)  Sept 2018: Radiation   Nov 2018: Anastrazole  -PMH: Osteoporosis (Prolia), C diff colitis (May 2023), HTN/CKD3     Review of Systems    Overall she feels fair with stable fatigue, generalized weakness, and symmetric bilateral lower extremity edema  She has tolerated the anastrazole quite well so far.  ECOG PS is 1.   She denies any anxiety, depression, easy bruising, fevers, chills, night  sweats, weight loss, nausea, vomiting, diarrhea, constipation, diplopia, blurred  vision, headache, chest pain, palpitations, shortness of breath, breast pain, abdominal pain, extremity pain, or difficulty ambulating.  The remainder of the ten-point ROS, including general, skin, lymph, H/N, cardiorespiratory, GI, , Neuro, Endocrine, and psychiatric is negative. No hematochezia or melena.     Objective:      Physical Exam      Vitals:    08/10/23 1540   BP: (!) 155/67   Pulse: 65   Resp: 18   Temp: 98.1 °F (36.7 °C)         She is alert, oriented to time, place, person, pleasant, well      nourished, in no acute physical distress.                                    VITAL SIGNS:  Reviewed                                      HEENT:  Normal.  There are no nasal, oral, lip, gingival, auricular, lid,    or conjunctival lesions.  Mucosae are moist and pink, and there is no        thrush.  Pupils are equal, reactive to light and accommodation.              Extraocular muscle movements are intact.  Dentition is good.                                    NECK:  Supple without JVD, adenopathy, or thyromegaly.                       LUNGS:  Clear to auscultation without wheezing, rales, or rhonchi.           CARDIOVASCULAR:  Reveals an S1, S2, no murmurs, no rubs, no gallops.         ABDOMEN:  Soft, nontender, without organomegaly.  Bowel sounds are    present.                                                                     EXTREMITIES:  No cyanosis, clubbing, or edema.                               BREASTS:  She is status post right lumpectomy with a well-healed incision in the upper outer quadrant.    There are no masses in either breast.                                     LYMPHATIC:  There is no cervical, axillary, or supraclavicular adenopathy.   SKIN:  Warm and moist, without petechiae, rashes, induration, or ecchymoses.        NEUROLOGIC:  DTRs are 0-1+ bilaterally, symmetrical, motor function is 5/5,and cranial nerves are  within normal limits.    Assessment:       Mrs. Sorto is an 83 y.o.F w/ a  history of Stage 1A Right Breast Cancer - ILC, ER:100%+, TX:Negative, HER2: Negative. She is s/p R lumpectomy/SLNBx in 2018, pT1b (8 mm) N0 disease, adjuvant radiation. She is on adjuvant anastrazole started in Nov 2018, and presents today for routine follow up.     Plan:           I had a long discussion with her.    Breast Cancer: In regards to the breast cancer, she will remain on anatsrazole through the end of October 2023, and see Dr. Conroy at that time. Mammogram in July 2024 pending other co-morbidities at the time.   Multifactorial Chronic Normocytic Anemia:   Hgb: 10.4 (9.7), MCV: 97, TSAT: 18%, Ferritin: 54 (while on PO iron); soluble transferrin receptor: 2.5 (wnl), smear: unremarkable. WBC: 3, ANC: 1200,   Mrs. Sotelo anemia is multifactorial with a component of iron deficiency (TSAT: 18%), anemia of renal disease, anemia of chronic disease/inflammation, and possible early MDS based on age  Colonoscopy and EGD in March 2023 unremarkable; planning for capsule endoscopy, per GI.   SPEP, IF pending  Microscopic Hematuria: UA >100 RBCs in June 2023, Cystoscopy unremarkable - per urology    Other Co-Morbidities: HTN/CKD, Osteoporosis, Diastolic HF: per pcp     Follow Up:   - RTC on 8/18 to review remainder of anemia labs   - RTC in Oct 2023 with Dr. Conroy for follow up of her breast cancer       - Her multiple questions were answered to her satisfaction.   - Overall, I discussed the diagnosis, history, stage, labs/imaging, prognosis, management, and treatment plan as applicable. I reviewed adverse short and long term effects as applicable.   - Informed patient if symptoms are getting worse that it is their responsibility to call the clinic and schedule follow up sooner than stated follow up. Also informed patient if they do not hear from the appointment center in 2-5 business days for their referrals, the patient must call the Oncology clinic so we can follow up on procedures or referral scheduling. Patient  was fully informed of current medical plan, all questions were answered and patient verbalized understanding. No further questions.   - Face to Face Visit time I spent with the patient: 40 minutes of total time spent on the encounter, including counseling patient and/or coordinating care, which includes face to face time and non-face to face time preparing to see the patient (eg, review of tests), Obtaining and/or reviewing separately obtained history, Documenting clinical information in the electronic or other health record, Independently interpreting results (not separately reported) and communicating results to the patient/family/caregiver, or Care coordination (not separately reported).      Signed,  Gifty Prasad MD  [facility]

## 2023-08-11 ENCOUNTER — PATIENT MESSAGE (OUTPATIENT)
Dept: UROLOGY | Facility: CLINIC | Age: 83
End: 2023-08-11

## 2023-08-11 ENCOUNTER — OFFICE VISIT (OUTPATIENT)
Dept: UROLOGY | Facility: CLINIC | Age: 83
End: 2023-08-11
Payer: MEDICARE

## 2023-08-11 VITALS
SYSTOLIC BLOOD PRESSURE: 146 MMHG | HEART RATE: 67 BPM | HEIGHT: 67 IN | WEIGHT: 124.25 LBS | BODY MASS INDEX: 19.5 KG/M2 | DIASTOLIC BLOOD PRESSURE: 65 MMHG

## 2023-08-11 DIAGNOSIS — R30.0 DYSURIA: Primary | ICD-10-CM

## 2023-08-11 DIAGNOSIS — N90.89: ICD-10-CM

## 2023-08-11 LAB
ALBUMIN SERPL ELPH-MCNC: 3.95 G/DL (ref 3.35–5.55)
ALPHA1 GLOB SERPL ELPH-MCNC: 0.32 G/DL (ref 0.17–0.41)
ALPHA2 GLOB SERPL ELPH-MCNC: 0.68 G/DL (ref 0.43–0.99)
B-GLOBULIN SERPL ELPH-MCNC: 1.33 G/DL (ref 0.5–1.1)
BACTERIA #/AREA URNS AUTO: ABNORMAL /HPF
GAMMA GLOB SERPL ELPH-MCNC: 1.12 G/DL (ref 0.67–1.58)
INTERPRETATION SERPL IFE-IMP: NORMAL
KAPPA LC SER QL IA: 8.86 MG/DL (ref 0.33–1.94)
KAPPA LC/LAMBDA SER IA: 3.06 (ref 0.26–1.65)
LAMBDA LC SER QL IA: 2.9 MG/DL (ref 0.57–2.63)
MICROSCOPIC COMMENT: ABNORMAL
PROT SERPL-MCNC: 7.4 G/DL (ref 6–8.4)
RBC #/AREA URNS AUTO: 3 /HPF (ref 0–4)
WBC #/AREA URNS AUTO: 84 /HPF (ref 0–5)
WBC CLUMPS UR QL AUTO: ABNORMAL

## 2023-08-11 PROCEDURE — 51701 INSERT BLADDER CATHETER: CPT | Mod: S$PBB,,, | Performed by: NURSE PRACTITIONER

## 2023-08-11 PROCEDURE — 99999 PR PBB SHADOW E&M-EST. PATIENT-LVL III: ICD-10-PCS | Mod: PBBFAC,,, | Performed by: NURSE PRACTITIONER

## 2023-08-11 PROCEDURE — 99999 PR PBB SHADOW E&M-EST. PATIENT-LVL III: CPT | Mod: PBBFAC,,, | Performed by: NURSE PRACTITIONER

## 2023-08-11 PROCEDURE — 87077 CULTURE AEROBIC IDENTIFY: CPT | Performed by: NURSE PRACTITIONER

## 2023-08-11 PROCEDURE — 99215 PR OFFICE/OUTPT VISIT, EST, LEVL V, 40-54 MIN: ICD-10-PCS | Mod: S$PBB,25,, | Performed by: NURSE PRACTITIONER

## 2023-08-11 PROCEDURE — 51701 PR INSERTION OF NON-INDWELLING BLADDER CATHETERIZATION FOR RESIDUAL UR: ICD-10-PCS | Mod: S$PBB,,, | Performed by: NURSE PRACTITIONER

## 2023-08-11 PROCEDURE — 87088 URINE BACTERIA CULTURE: CPT | Performed by: NURSE PRACTITIONER

## 2023-08-11 PROCEDURE — 87186 SC STD MICRODIL/AGAR DIL: CPT | Performed by: NURSE PRACTITIONER

## 2023-08-11 PROCEDURE — 99213 OFFICE O/P EST LOW 20 MIN: CPT | Mod: PBBFAC | Performed by: NURSE PRACTITIONER

## 2023-08-11 PROCEDURE — 81514 NFCT DS BV&VAGINITIS DNA ALG: CPT | Performed by: NURSE PRACTITIONER

## 2023-08-11 PROCEDURE — 87086 URINE CULTURE/COLONY COUNT: CPT | Performed by: NURSE PRACTITIONER

## 2023-08-11 PROCEDURE — 51701 INSERT BLADDER CATHETER: CPT | Mod: PBBFAC | Performed by: NURSE PRACTITIONER

## 2023-08-11 PROCEDURE — 81001 URINALYSIS AUTO W/SCOPE: CPT | Performed by: NURSE PRACTITIONER

## 2023-08-11 PROCEDURE — 99215 OFFICE O/P EST HI 40 MIN: CPT | Mod: S$PBB,25,, | Performed by: NURSE PRACTITIONER

## 2023-08-11 NOTE — PROGRESS NOTES
[facility] COMPLAINT:    Mrs. Sorto is a 83 y.o. female presenting for pain catheterization.    .  PRESENTING ILLNESS:    Shima Sorto is a 83 y.o. female with a PMH of htn, ckd 3, hx breast ca, sania who presents for painful catheterization.     Established patient to urology department. Presents today for painful catheterization and possible uti.  Previously seen 8/2/23 for incomplete bladder emptying and possible uti.  Taught CIC due to incomplete bladder emptying.  Patient has been performing CIC twice daily as instructed.  However over the last couple of days having pain with insertion.  Believes the catheter is scraping her during insertion.  Has continued tamsulosin as prescribed.  Again reiterated the importance of CIC.        REVIEW OF SYSTEMS:    Review of Systems   Constitutional:  Negative for chills and fever.   Respiratory:  Negative for shortness of breath.    Cardiovascular:  Negative for chest pain.   Gastrointestinal:  Negative for abdominal pain, constipation and diarrhea.   Genitourinary:  Positive for frequency. Negative for dysuria, flank pain, hematuria and urgency.   Neurological:  Negative for dizziness and weakness.        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       Family History   Problem Relation Age of Onset    Kidney disease Mother     Heart disease Father     Melanoma Father     Heart attack Father     Breast cancer Maternal Aunt     Hearing loss Son     Hyperlipidemia Son     Anesthesia problems Neg Hx      Malignant hypertension Neg Hx     Hypotension Neg Hx     Malignant hyperthermia Neg Hx     Pseudochol deficiency Neg Hx     Colon cancer Neg Hx     Ovarian cancer Neg Hx     Psoriasis Neg Hx     Lupus Neg Hx     Eczema Neg Hx     Acne Neg Hx     Esophageal cancer Neg Hx        Allergies:  Asparagus    Medications:    Current Outpatient Medications:     anastrozole (ARIMIDEX) 1 mg Tab, TAKE 1 TABLET(1 MG) BY MOUTH EVERY DAY, Disp: 90 tablet, Rfl: 3    coQ10, ubiquinol, 100 mg Cap, Take 100 mg by mouth once daily., Disp: , Rfl:     denosumab (PROLIA) 60 mg/mL Syrg, Inject 60 mg into the skin every 6 (six) months., Disp: , Rfl:     ferrous gluconate (FERGON) 324 MG tablet, Take 1 tablet (324 mg total) by mouth daily with breakfast., Disp: 90 tablet, Rfl: 1    fish oil-E-fatty [id number] 400-5 mg-unit Cap, Take 1 capsule by mouth Daily., Disp: , Rfl:     furosemide (LASIX) 20 MG tablet, Take 2 tablets (40 mg total) by mouth once daily., Disp: 90 tablet, Rfl: 3    pantoprazole (PROTONIX) 40 MG tablet, Take 1 tablet (40 mg total) by mouth before breakfast. Best taken 45-60 minutes before your first protein meal of the day - Breakfast, Disp: 90 tablet, Rfl: 0    PRESERVISION AREDS-2 250-90-40-1 mg Cap, Take 1 tablet by mouth 2 (two) times daily., Disp: , Rfl:     spironolactone (ALDACTONE) 25 MG tablet, Take 1 tablet (25 mg total) by mouth once daily., Disp: 30 tablet, Rfl: 11    tamsulosin (FLOMAX) 0.4 mg Cap, Take 1 capsule (0.4 mg total) by mouth once daily., Disp: 30 capsule, Rfl: 11    UNABLE TO FIND, Take 1 tablet by mouth 3 (three) times daily. Rufus-Mag-Citrate with D3 & K2, Disp: , Rfl:     UNABLE TO FIND, Take 4 capsules by mouth Daily. Nutrafol, Disp: , Rfl:     valsartan (DIOVAN) 160 MG tablet, Take 1 tablet (160 mg total) by mouth once daily., Disp: 90 tablet, Rfl: 1    vitamin renal formula, B-complex-vitamin c-folic acid, (NEPHROCAPS) 1 mg Cap, Take 1 capsule by mouth once daily. (Patient taking  differently: Take 1 capsule by mouth once daily. Taking Nature's Bounty equivalent), Disp: 90 capsule, Rfl: 3  No current facility-administered medications for this visit.    Facility-Administered Medications Ordered in Other Visits:     0.9%  NaCl infusion, , Intravenous, Continuous, James Carranza MD, Last Rate: 70 mL/hr at 08/17/18 0843, New Bag at 03/17/23 0719    PHYSICAL EXAMINATION:    Physical Exam  Vitals and nursing note reviewed. Exam conducted with a chaperone present.   Constitutional:       Appearance: Normal appearance. She is well-developed.   HENT:      Head: Normocephalic and atraumatic.   Eyes:      Pupils: Pupils are equal, round, and reactive to light.   Pulmonary:      Effort: Pulmonary effort is normal.   Genitourinary:     General: Normal vulva.      Urethra: No prolapse, urethral pain, urethral swelling or urethral lesion.       Musculoskeletal:         General: Normal range of motion.      Cervical back: Normal range of motion.   Skin:     General: Skin is warm and dry.   Neurological:      Mental Status: She is alert and oriented to person, place, and time.   Psychiatric:         Behavior: Behavior normal.        Consent verbally obtained.  Betadine prep was applied to the urethral meatus. An in and out cath was performed after voiding.  The PVR was 650 ml.       LABS:    IMPRESSION:    Encounter Diagnoses   Name Primary?    Dysuria Yes    Caruncle of labium          PLAN:    Problem List Items Addressed This Visit    None  Visit Diagnoses       Dysuria    -  Primary    Relevant Orders    VAGINOSIS SCREEN BY DNA PROBE    Urinalysis Microscopic    Urine culture    Caruncle of labium                  1. Bladder distention/incomplete bladder emptying    - PVR approximately > 600 ml   - self cath 10 Fr twice daily indefinitely    - continue flomax daily     2. Painful catheterization  - send urine for analysis and culture  - send vaginosis swab    3.  Caruncle  Unable to use estrace cream  due to history of breast cancer  Can use olive/coconut oil for moisterizer    4.  Rtc  based labs    Roxy Meyers NP        I spent over 45 minutes with the patient. Over 50% of the visit was spent in counseling.

## 2023-08-12 LAB
BACTERIAL VAGINOSIS DNA: NEGATIVE
CANDIDA GLABRATA DNA: NEGATIVE
CANDIDA KRUSEI DNA: NEGATIVE
CANDIDA RRNA VAG QL PROBE: NEGATIVE
STFR SERPL-MCNC: 2.5 MG/L (ref 1.8–4.6)
T VAGINALIS RRNA GENITAL QL PROBE: NEGATIVE

## 2023-08-13 ENCOUNTER — PATIENT MESSAGE (OUTPATIENT)
Dept: NEPHROLOGY | Facility: CLINIC | Age: 83
End: 2023-08-13
Payer: MEDICARE

## 2023-08-13 LAB — BACTERIA UR CULT: ABNORMAL

## 2023-08-14 LAB
EPO SERPL-ACNC: 12.1 MIU/ML (ref 2.6–18.5)
PATHOLOGIST INTERPRETATION IFE: NORMAL
PATHOLOGIST INTERPRETATION SPE: NORMAL
ZINC SERPL-MCNC: 62 UG/DL (ref 60–130)

## 2023-08-14 RX ORDER — CIPROFLOXACIN 500 MG/1
500 TABLET ORAL EVERY 12 HOURS
Qty: 14 TABLET | Refills: 0 | Status: SHIPPED | OUTPATIENT
Start: 2023-08-14 | End: 2023-08-21

## 2023-08-15 ENCOUNTER — PATIENT MESSAGE (OUTPATIENT)
Dept: HEMATOLOGY/ONCOLOGY | Facility: CLINIC | Age: 83
End: 2023-08-15
Payer: MEDICARE

## 2023-08-15 LAB
COPPER SERPL-MCNC: 1193 UG/L (ref 810–1990)
METHYLMALONATE SERPL-SCNC: 0.5 UMOL/L

## 2023-08-16 ENCOUNTER — DOCUMENTATION ONLY (OUTPATIENT)
Dept: REHABILITATION | Facility: HOSPITAL | Age: 83
End: 2023-08-16
Payer: MEDICARE

## 2023-08-16 DIAGNOSIS — N81.89 PELVIC FLOOR WEAKNESS: Primary | ICD-10-CM

## 2023-08-16 DIAGNOSIS — R27.9 LACK OF COORDINATION: ICD-10-CM

## 2023-08-16 DIAGNOSIS — R33.9 INCOMPLETE BLADDER EMPTYING: ICD-10-CM

## 2023-08-16 PROBLEM — L97.502 NON-PRESSURE CHRONIC ULCER OF OTHER PART OF UNSPECIFIED FOOT WITH FAT LAYER EXPOSED: Status: ACTIVE | Noted: 2023-08-16

## 2023-08-16 PROBLEM — D70.9 NEUTROPENIA, UNSPECIFIED TYPE: Status: ACTIVE | Noted: 2023-08-16

## 2023-08-16 NOTE — PROGRESS NOTES
"    Outpatient Therapy Discharge Summary     Name: Shima Sorto  Clinic Number: [id number]    Therapy Diagnosis:   Encounter Diagnoses   Name Primary?    Pelvic floor weakness Yes    Incomplete bladder emptying     Lack of coordination      Physician: Roxy Meyers NP    Physician Orders: Physical Therapy eval and treat  Medical Diagnosis: incomplete bladder emptying  Evaluation Date: 4/12/2023      Date of Last visit: 4/21/2023  Total Visits Received: 2  Cancelled Visits: 0  No Show Visits: 0    Assessment    Goals:   Short Term Goals: 6 weeks   - Patient to be educated on pelvic muscle bracing and be able to consistently perform correctly and quickly to help decrease incontinence with cough/laugh/sneeze.  - Patient to perform "the knack" prior to coughing, laughing or sneezing to decrease risk of incontinence.  - Patient to report a decrease in pad usage to no more than 2 a day to demonstrate improving pelvic floor function needed for continence.  - Patient to be able to delay the urge to urinate at least 5 minutes with a strong urge to urinate in order to make it to the bathroom without leaking.  - Patient to report ability to fully empty bladder 40% of the time to decrease urinary retention.                    Long Term Goals: 12 week  - Patient to be discharged with home plan for carry over after discharge.    - Patient to report a decrease in pad usage to 0 pads a day to demonstrate improving pelvic floor muscle controls as evidenced by decreased episodes of incontinence needed to improve confidence in social situations.  - Patient to be able to delay the urge to urinate at least 10 minutes with a strong urge to urinate in order to make it to the bathroom without leaking.  - Patient to report no longer feeling the need to urinate just in case when shopping or participating in social activities to demonstrate improving pelvic floor and bladder control.   - Patient to increase pelvic floor strength to " at least 3/5 to demonstrate improved strength needed for continence with ADLs.   - Patient to report ability to fully empty bladder 75% of the time to decrease urinary retention.    Discharge reason: Patient has not attended therapy since 4/21/2023    Plan   This patient is discharged from Physical Therapy

## 2023-08-18 ENCOUNTER — OFFICE VISIT (OUTPATIENT)
Dept: HEMATOLOGY/ONCOLOGY | Facility: CLINIC | Age: 83
End: 2023-08-18
Payer: MEDICARE

## 2023-08-18 ENCOUNTER — TELEPHONE (OUTPATIENT)
Dept: HEMATOLOGY/ONCOLOGY | Facility: CLINIC | Age: 83
End: 2023-08-18
Payer: MEDICARE

## 2023-08-18 VITALS
SYSTOLIC BLOOD PRESSURE: 152 MMHG | HEART RATE: 56 BPM | TEMPERATURE: 98 F | WEIGHT: 125.69 LBS | OXYGEN SATURATION: 100 % | BODY MASS INDEX: 20.94 KG/M2 | HEIGHT: 65 IN | RESPIRATION RATE: 18 BRPM | DIASTOLIC BLOOD PRESSURE: 72 MMHG

## 2023-08-18 DIAGNOSIS — D64.9 NORMOCHROMIC NORMOCYTIC ANEMIA: Primary | ICD-10-CM

## 2023-08-18 DIAGNOSIS — Z79.811 USE OF AROMATASE INHIBITORS: ICD-10-CM

## 2023-08-18 DIAGNOSIS — Z17.0 MALIGNANT NEOPLASM OF UPPER-OUTER QUADRANT OF RIGHT BREAST IN FEMALE, ESTROGEN RECEPTOR POSITIVE: ICD-10-CM

## 2023-08-18 DIAGNOSIS — D70.9 NEUTROPENIA, UNSPECIFIED TYPE: ICD-10-CM

## 2023-08-18 DIAGNOSIS — C50.411 MALIGNANT NEOPLASM OF UPPER-OUTER QUADRANT OF RIGHT BREAST IN FEMALE, ESTROGEN RECEPTOR POSITIVE: ICD-10-CM

## 2023-08-18 PROCEDURE — 99215 OFFICE O/P EST HI 40 MIN: CPT | Mod: S$PBB,,, | Performed by: INTERNAL MEDICINE

## 2023-08-18 PROCEDURE — 99999 PR PBB SHADOW E&M-EST. PATIENT-LVL IV: CPT | Mod: PBBFAC,,, | Performed by: INTERNAL MEDICINE

## 2023-08-18 PROCEDURE — 99215 PR OFFICE/OUTPT VISIT, EST, LEVL V, 40-54 MIN: ICD-10-PCS | Mod: S$PBB,,, | Performed by: INTERNAL MEDICINE

## 2023-08-18 PROCEDURE — 99214 OFFICE O/P EST MOD 30 MIN: CPT | Mod: PBBFAC | Performed by: INTERNAL MEDICINE

## 2023-08-18 PROCEDURE — 99999 PR PBB SHADOW E&M-EST. PATIENT-LVL IV: ICD-10-PCS | Mod: PBBFAC,,, | Performed by: INTERNAL MEDICINE

## 2023-08-18 NOTE — TELEPHONE ENCOUNTER
----- Message from Gifty Prasad MD sent at 8/18/2023 12:54 PM CDT -----  Hi there Kristina,  I was hoping you could help me with another patient, thank you! 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     Not urgent and patient is ok with local. Would you mind helping me coordinate?     Thanks!  -Amaris

## 2023-08-18 NOTE — TELEPHONE ENCOUNTER
Spoke with patient regarding bmbx. Reviewed procedure, where to check in, fasting is not needed, able to drive self, keeping banaid dry and intact after procedure. Reviewed need for CBC same day per hematopathology request. Confirmed pt is not on blood thinning medication. Scheduled appointment on day and time that is best for pt.

## 2023-08-22 ENCOUNTER — LAB VISIT (OUTPATIENT)
Dept: LAB | Facility: HOSPITAL | Age: 83
End: 2023-08-22
Payer: MEDICARE

## 2023-08-22 ENCOUNTER — PROCEDURE VISIT (OUTPATIENT)
Dept: HEMATOLOGY/ONCOLOGY | Facility: CLINIC | Age: 83
End: 2023-08-22
Payer: MEDICARE

## 2023-08-22 ENCOUNTER — PATIENT MESSAGE (OUTPATIENT)
Dept: HEMATOLOGY/ONCOLOGY | Facility: CLINIC | Age: 83
End: 2023-08-22
Payer: MEDICARE

## 2023-08-22 VITALS
RESPIRATION RATE: 16 BRPM | DIASTOLIC BLOOD PRESSURE: 65 MMHG | OXYGEN SATURATION: 100 % | TEMPERATURE: 98 F | HEART RATE: 60 BPM | SYSTOLIC BLOOD PRESSURE: 155 MMHG

## 2023-08-22 DIAGNOSIS — D46.4 REFRACTORY ANEMIA, UNSPECIFIED: ICD-10-CM

## 2023-08-22 DIAGNOSIS — D64.9 NORMOCHROMIC NORMOCYTIC ANEMIA: ICD-10-CM

## 2023-08-22 DIAGNOSIS — D47.2 MGUS (MONOCLONAL GAMMOPATHY OF UNKNOWN SIGNIFICANCE): Primary | ICD-10-CM

## 2023-08-22 LAB
BASOPHILS # BLD AUTO: 0.02 K/UL (ref 0–0.2)
BASOPHILS NFR BLD: 0.3 % (ref 0–1.9)
DIFFERENTIAL METHOD: ABNORMAL
EOSINOPHIL # BLD AUTO: 0 K/UL (ref 0–0.5)
EOSINOPHIL NFR BLD: 0.5 % (ref 0–8)
ERYTHROCYTE [DISTWIDTH] IN BLOOD BY AUTOMATED COUNT: 13.3 % (ref 11.5–14.5)
HCT VFR BLD AUTO: 32.4 % (ref 37–48.5)
HGB BLD-MCNC: 10.7 G/DL (ref 12–16)
IMM GRANULOCYTES # BLD AUTO: 0.01 K/UL (ref 0–0.04)
IMM GRANULOCYTES NFR BLD AUTO: 0.2 % (ref 0–0.5)
LYMPHOCYTES # BLD AUTO: 2.3 K/UL (ref 1–4.8)
LYMPHOCYTES NFR BLD: 38 % (ref 18–48)
MCH RBC QN AUTO: 31.4 PG (ref 27–31)
MCHC RBC AUTO-ENTMCNC: 33 G/DL (ref 32–36)
MCV RBC AUTO: 95 FL (ref 82–98)
MONOCYTES # BLD AUTO: 0.6 K/UL (ref 0.3–1)
MONOCYTES NFR BLD: 9.8 % (ref 4–15)
NEUTROPHILS # BLD AUTO: 3 K/UL (ref 1.8–7.7)
NEUTROPHILS NFR BLD: 51.2 % (ref 38–73)
NRBC BLD-RTO: 0 /100 WBC
PLATELET # BLD AUTO: 197 K/UL (ref 150–450)
PMV BLD AUTO: 10.4 FL (ref 9.2–12.9)
RBC # BLD AUTO: 3.41 M/UL (ref 4–5.4)
WBC # BLD AUTO: 5.92 K/UL (ref 3.9–12.7)

## 2023-08-22 PROCEDURE — 88185 FLOWCYTOMETRY/TC ADD-ON: CPT | Mod: 59 | Performed by: PATHOLOGY

## 2023-08-22 PROCEDURE — 88305 TISSUE EXAM BY PATHOLOGIST: ICD-10-PCS | Mod: 26,,, | Performed by: PATHOLOGY

## 2023-08-22 PROCEDURE — 88364 INSITU HYBRIDIZATION (FISH): CPT | Performed by: PATHOLOGY

## 2023-08-22 PROCEDURE — 88341 IMHCHEM/IMCYTCHM EA ADD ANTB: CPT | Mod: 59 | Performed by: PATHOLOGY

## 2023-08-22 PROCEDURE — 36415 COLL VENOUS BLD VENIPUNCTURE: CPT | Performed by: INTERNAL MEDICINE

## 2023-08-22 PROCEDURE — 88364 CHG INSITU HYBRIDIZATION (FISH: ICD-10-PCS | Mod: 26,,, | Performed by: PATHOLOGY

## 2023-08-22 PROCEDURE — 88313 PR  SPECIAL STAINS,GROUP II: ICD-10-PCS | Mod: 26,,, | Performed by: PATHOLOGY

## 2023-08-22 PROCEDURE — 88271 CYTOGENETICS DNA PROBE: CPT | Mod: 59 | Performed by: NURSE PRACTITIONER

## 2023-08-22 PROCEDURE — 88184 FLOWCYTOMETRY/ TC 1 MARKER: CPT | Mod: 59 | Performed by: PATHOLOGY

## 2023-08-22 PROCEDURE — 88189 FLOWCYTOMETRY/READ 16 & >: CPT | Mod: ,,, | Performed by: PATHOLOGY

## 2023-08-22 PROCEDURE — 88184 FLOWCYTOMETRY/ TC 1 MARKER: CPT | Performed by: NURSE PRACTITIONER

## 2023-08-22 PROCEDURE — 88313 SPECIAL STAINS GROUP 2: CPT | Mod: 26,,, | Performed by: PATHOLOGY

## 2023-08-22 PROCEDURE — 99999PBSHW PR PBB SHADOW TECHNICAL ONLY FILED TO HB: Mod: PBBFAC,,,

## 2023-08-22 PROCEDURE — 88365 INSITU HYBRIDIZATION (FISH): CPT | Mod: 26,,, | Performed by: PATHOLOGY

## 2023-08-22 PROCEDURE — 38222 PR BONE MARROW BIOPSY(IES) W/ASPIRATION(S); DIAGNOSTIC: ICD-10-PCS | Mod: S$PBB,RT,, | Performed by: NURSE PRACTITIONER

## 2023-08-22 PROCEDURE — 88342 IMHCHEM/IMCYTCHM 1ST ANTB: CPT | Mod: 26,59,, | Performed by: PATHOLOGY

## 2023-08-22 PROCEDURE — 88311 PR  DECALCIFY TISSUE: ICD-10-PCS | Mod: 26,,, | Performed by: PATHOLOGY

## 2023-08-22 PROCEDURE — 38222 DX BONE MARROW BX & ASPIR: CPT | Mod: S$PBB,RT,, | Performed by: NURSE PRACTITIONER

## 2023-08-22 PROCEDURE — 85097 PR  BONE MARROW,SMEAR INTERPRETATION: ICD-10-PCS | Mod: ,,, | Performed by: PATHOLOGY

## 2023-08-22 PROCEDURE — 88341 IMHCHEM/IMCYTCHM EA ADD ANTB: CPT | Mod: 26,,, | Performed by: PATHOLOGY

## 2023-08-22 PROCEDURE — 85025 COMPLETE CBC W/AUTO DIFF WBC: CPT | Performed by: INTERNAL MEDICINE

## 2023-08-22 PROCEDURE — 85097 BONE MARROW INTERPRETATION: CPT | Mod: ,,, | Performed by: PATHOLOGY

## 2023-08-22 PROCEDURE — 88342 IMHCHEM/IMCYTCHM 1ST ANTB: CPT | Mod: 59 | Performed by: PATHOLOGY

## 2023-08-22 PROCEDURE — 88305 TISSUE EXAM BY PATHOLOGIST: CPT | Performed by: PATHOLOGY

## 2023-08-22 PROCEDURE — 88342 CHG IMMUNOCYTOCHEMISTRY: ICD-10-PCS | Mod: 26,59,, | Performed by: PATHOLOGY

## 2023-08-22 PROCEDURE — 88311 DECALCIFY TISSUE: CPT | Mod: 26,,, | Performed by: PATHOLOGY

## 2023-08-22 PROCEDURE — 88364 INSITU HYBRIDIZATION (FISH): CPT | Mod: 26,,, | Performed by: PATHOLOGY

## 2023-08-22 PROCEDURE — 88311 DECALCIFY TISSUE: CPT | Performed by: PATHOLOGY

## 2023-08-22 PROCEDURE — 88305 TISSUE EXAM BY PATHOLOGIST: CPT | Mod: 26,,, | Performed by: PATHOLOGY

## 2023-08-22 PROCEDURE — 88189 PR  FLOWCYTOMETRY/READ, 16 & > MARKERS: ICD-10-PCS | Mod: ,,, | Performed by: PATHOLOGY

## 2023-08-22 PROCEDURE — 38222 DX BONE MARROW BX & ASPIR: CPT | Mod: PBBFAC | Performed by: NURSE PRACTITIONER

## 2023-08-22 PROCEDURE — 99999PBSHW PR PBB SHADOW TECHNICAL ONLY FILED TO HB: ICD-10-PCS | Mod: PBBFAC,,,

## 2023-08-22 PROCEDURE — 88274 CYTOGENETICS 25-99: CPT | Performed by: NURSE PRACTITIONER

## 2023-08-22 PROCEDURE — 88365 PR  TISSUE HYBRIDIZATION: ICD-10-PCS | Mod: 26,,, | Performed by: PATHOLOGY

## 2023-08-22 PROCEDURE — 88365 INSITU HYBRIDIZATION (FISH): CPT | Performed by: PATHOLOGY

## 2023-08-22 PROCEDURE — 88341 PR IHC OR ICC EACH ADD'L SINGLE ANTIBODY  STAINPR: ICD-10-PCS | Mod: 26,,, | Performed by: PATHOLOGY

## 2023-08-22 PROCEDURE — 88313 SPECIAL STAINS GROUP 2: CPT | Mod: 59 | Performed by: PATHOLOGY

## 2023-08-22 RX ORDER — LIDOCAINE HYDROCHLORIDE 20 MG/ML
8 INJECTION, SOLUTION INFILTRATION; PERINEURAL
Status: COMPLETED | OUTPATIENT
Start: 2023-08-22 | End: 2023-08-22

## 2023-08-22 RX ADMIN — LIDOCAINE HYDROCHLORIDE 8 ML: 20 INJECTION, SOLUTION INFILTRATION; PERINEURAL at 03:08

## 2023-08-22 NOTE — PROCEDURES
Procedures    PROCEDURE NOTE:  Date of Procedure: 08/22/2023  Bone Marrow Biopsy and Aspiration  Indication: SMM  Consent: Informed consent was obtained from patient.  Timeout: Done and documented.  Position: left lateral  Site: right posterior illiac crest.  Prep: Betadine.  Needle used: 11 gauge Jamshidi needle.  Anesthetic: 2% lidocaine 8 cc.  Biopsy: The biopsy needle was introduced into the marrow cavity and an aspirate was obtained without complications and sent for flow cytometry, PCPD fish, DNA/RNA hold, and cytogenetics. Core biopsy obtained without difficulty and sent for routine histologic examination.  Complications: None.  Disposition: The patient was placed supine for 15min following procedure. MA to assess bandaid for bleeding prior to discharge home. Patient aware to keep bandaid dry and intact for 24hrs and to call clinic for any bleeding, fevers, pain, or signs of infection.  Blood loss: Minimal.     Martha Canales NP  Hematology/Oncology/BMT

## 2023-08-22 NOTE — PROGRESS NOTES
Ms. Sorto is a 83 y.o. female, referred by Dr. Prasad, for a bone marrow biopsy for possible smoldering myeloma. Procedure explained, consent obtained. See procedure note    Martha Canales NP  Hematology/Oncology/BMT

## 2023-08-23 LAB
CHROM BANDING METHOD: NORMAL
CHROMOSOME ANALYSIS BM ADDITIONAL INFORMATION: NORMAL
CHROMOSOME ANALYSIS BM RELEASED BY: NORMAL
CHROMOSOME ANALYSIS BM RESULT SUMMARY: NORMAL
CLINICAL CYTOGENETICIST REVIEW: NORMAL
KARYOTYP MAR: NORMAL
REASON FOR REFERRAL (NARRATIVE): NORMAL
REF LAB TEST METHOD: NORMAL
SPECIMEN SOURCE: NORMAL
SPECIMEN: NORMAL

## 2023-08-23 NOTE — PROGRESS NOTES
Subjective:       Patient ID: Sihma Sorto is a 83 y.o. female.    Chief Complaint: Review remaining results of Anemia Workup - History of Stage 1A Right Breast Cancer - ILC, ER:100%+, RI:Negative, HER2: Negative, pT1b (8 mm) N0    HPI    Ms. Sorto returns today for routine follow up and to perform labs performed in evaluation of normocytic anemia. She has been on anastrazole since mid November 2018, and so far has tolerated it well.    Briefly, she is an 82-year-old  female who diagnosed with breast cancer in 2018.  On 08/17/2018, she underwent a lumpectomy and sentinel lymph node biopsy for an 8 mm grade 1 invasive lobular carcinoma which was ER strongly positive, RI negative and HER-2 negative with a Ki-67 of 5%, with the closest margin being 2 mm.  Two of 2 sentinel lymph nodes were negative for involvement.      She completed her radiation therapy and was subsequently started on AIs.Her DXA scan from 01/05/2023 shows osteopenia approaching osteoporosis (reviewed).  Of note, the patient is on Prolia. A mammogram on 07/06/2023 was read as BIRADS II, and a one year follow up was recommended.    Review of her chart reveals that last April she was diagnosed with C diff and she was treated accordingly.  She underwent a colonoscopy and EGD by Dr. Garza.  The colonoscopy showed 3 polyps and extensive diverticulosis.  Biopsies were negative for malignancy.  The EGD showed gastritis and a hiatal hernia.    Oncology History:  Aug 2018: lumpectomy + SLNBx - pT1b (8mm)N0 (0/2)  Sept 2018: Radiation   Nov 2018: Anastrazole  -PMH: Osteoporosis (Prolia), C diff colitis (May 2023), HTN/CKD3     Review of Systems    Overall she feels fair with stable fatigue, generalized weakness, and symmetric bilateral lower extremity edema. She has occasional paresthesias in her bilateral ankles at night but no overt neuropathy.  She has tolerated the anastrazole quite well so far.  ECOG PS is 1.   She denies any  anxiety, depression, easy bruising, fevers, chills, night  sweats, weight loss, nausea, vomiting, diarrhea, constipation, diplopia, blurred vision, headache, chest pain, palpitations, shortness of breath, breast pain, abdominal pain, extremity pain, or difficulty ambulating.  The remainder of the ten-point ROS, including general, skin, lymph, H/N, cardiorespiratory, GI, , Neuro, Endocrine, and psychiatric is negative. No hematochezia or melena.     Objective:      Physical Exam      Vitals:    08/18/23 1010   BP: (!) 152/72   Pulse: (!) 56   Resp: 18   Temp: 97.6 °F (36.4 °C)     She is alert, oriented to time, place, person, pleasant, well      nourished, in no acute physical distress.                                    VITAL SIGNS:  Reviewed                                      HEENT:  Normal.  There are no nasal, oral, lip, gingival, auricular, lid,    or conjunctival lesions.  Mucosae are moist and pink, and there is no        thrush.  Pupils are equal, reactive to light and accommodation.              Extraocular muscle movements are intact.  Dentition is good.                                    NECK:  Supple without JVD, adenopathy, or thyromegaly.                       LUNGS:  Clear to auscultation without wheezing, rales, or rhonchi.           CARDIOVASCULAR:  Reveals an S1, S2, no murmurs, no rubs, no gallops.         ABDOMEN:  Soft, nontender, without organomegaly.  Bowel sounds are    present.                                                                     EXTREMITIES:  No cyanosis, clubbing, or edema.                               BREASTS:  She is status post right lumpectomy with a well-healed incision in the upper outer quadrant.    There are no masses in either breast.                                     LYMPHATIC:  There is no cervical, axillary, or supraclavicular adenopathy.   SKIN:  Warm and moist, without petechiae, rashes, induration, or ecchymoses.        NEUROLOGIC:  DTRs are 0-1+  bilaterally, symmetrical, motor function is 5/5,and cranial nerves are  within normal limits.         Assessment:       Mrs. Sorto is an 83 y.o.F w/ a history of Stage 1A Right Breast Cancer - ILC, ER:100%+, AR:Negative, HER2: Negative. She is s/p R lumpectomy/SLNBx in 2018, pT1b (8 mm) N0 disease, adjuvant radiation. She is on adjuvant anastrazole started in 2018, and presents today for review results of anemia workup.     Plan:           I had a long discussion with her.    Breast Cancer: In regards to her hx of breast cancer, she will remain on anatsrazole through the end of 2023, and see Dr. Conroy at that time. Mammogram in 2024   Multifactorial Chronic Normocytic Anemia:   Hgb: 10.4 (9.7), MCV: 97, TSAT: 18%, Ferritin: 54 (while on PO iron); soluble transferrin receptor: 2.5 (wnl), smear: unremarkable. WBC: 3, ANC: 1200   Mrs. Sotelo anemia is multifactorial with a component of iron deficiency (TSAT: 18%), anemia of renal disease, anemia of chronic disease/inflammation, and possible early MDS based on age  Colonoscopy and EGD in 2023 unremarkable; planning for capsule endoscopy, per GI.   Kappa F.8, Lambda F, K/L FLC: 3, SPEP: normal total protein, irregularity within the beta 2 band = 0.41 g/dL, IF: faint linear irregularity showing kappa light chain specificity only. As FLC [] is affected by renal function, taking into account her GFR of 45, FLC are just out of range but ratio is >3  After discussion, pt would like to proceed with bone marrow biopsy to assess for plasma cell dyscrasia vs. Other. Risks, benefits, and alternatives discussed.   Microscopic Hematuria: UA >100 RBCs in 2023, Cystoscopy unremarkable - per urology    Other Co-Morbidities: HTN/CKD (on valsartan), Osteoporosis (aromatase inhibitor, on prolia), Diastolic HF (on lasix and spironolactone): per pcp     Follow Up:   - Bone marrow biopsy  - RTC two weeks following bone marrow biopsy to review results -[]  [facility]  - RTC in Oct 2023 with Dr. Conroy for follow up of her breast cancer       - Her multiple questions were answered to her satisfaction.   - Overall, I discussed the diagnosis, history, stage, labs/imaging, prognosis, management, and treatment plan as applicable. I reviewed adverse short and long term effects as applicable.   - Informed patient if symptoms are getting worse that it is their responsibility to call the clinic and schedule follow up sooner than stated follow up. Also informed patient if they do not hear from the appointment center in 2-5 business days for their referrals, the patient must call the Oncology clinic so we can follow up on procedures or referral scheduling. Patient was fully informed of current medical plan, all questions were answered and patient verbalized understanding. No further questions.   - Face to Face Visit time I spent with the patient: 40 minutes of total time spent on the encounter, including counseling patient and/or coordinating care, which includes face to face time and non-face to face time preparing to see the patient (eg, review of tests), Obtaining and/or reviewing separately obtained history, Documenting clinical information in the electronic or other health record, Independently interpreting results (not separately reported) and communicating results to the patient/family/caregiver, or Care coordination (not separately reported).      Signed,  Gifty Prasad MD  [facility]

## 2023-08-24 LAB
DNA/RNA EXTRACT AND HOLD RESULT: NORMAL
DNA/RNA EXTRACTION: NORMAL
EXHR SPECIMEN TYPE: NORMAL
PCPDS FINAL DIAGNOSIS: NORMAL
PCPDS PRE-ANALYSIS PRE-SORT: NORMAL

## 2023-08-26 LAB
BODY SITE - BONE MARROW: NORMAL
CLINICAL DIAGNOSIS - BONE MARROW: NORMAL
FLOW CYTOMETRY ANTIBODIES ANALYZED - BONE MARROW: NORMAL
FLOW CYTOMETRY COMMENT - BONE MARROW: NORMAL
FLOW CYTOMETRY INTERPRETATION - BONE MARROW: NORMAL

## 2023-08-29 LAB
GENETICIST REVIEW: NORMAL
PLASMA CELL PROLIF RELEASED BY: NORMAL
PLASMA CELL PROLIF RESULT SUMMARY: NORMAL
PLASMA CELL PROLIF RESULT TABLE: NORMAL
REASON FOR REFERRAL, PLASMA CELL PROLIF (PCPD), FISH: NORMAL
REF LAB TEST METHOD: NORMAL
RESULTS, PLASMA CELL PROLIF (PCPD), FISH: NORMAL
SERVICE CMNT-IMP: NORMAL
SERVICE CMNT-IMP: NORMAL
SPECIMEN SOURCE: NORMAL
SPECIMEN, PLASMA CELL PROLIF (PCPD), FISH: NORMAL

## 2023-09-01 ENCOUNTER — NURSE TRIAGE (OUTPATIENT)
Dept: ADMINISTRATIVE | Facility: CLINIC | Age: 83
End: 2023-09-01
Payer: MEDICARE

## 2023-09-01 ENCOUNTER — PATIENT MESSAGE (OUTPATIENT)
Dept: HEMATOLOGY/ONCOLOGY | Facility: CLINIC | Age: 83
End: 2023-09-01
Payer: MEDICARE

## 2023-09-01 NOTE — TELEPHONE ENCOUNTER
Pt states that she had a bone biopsy done last Wednesday. Pt c/o pain 3/10 to right side. Pt asking if it is okay to take two Tylenol 650 mg pills. Advised to receive callback from provider within one hour per protocol. Verbalized understanding. Encounter routed to provider.       Reason for Disposition   Nursing judgment    Protocols used: Information Only Call - No Triage-A-[place]

## 2023-09-03 ENCOUNTER — PATIENT MESSAGE (OUTPATIENT)
Dept: HEMATOLOGY/ONCOLOGY | Facility: CLINIC | Age: 83
End: 2023-09-03
Payer: MEDICARE

## 2023-09-05 ENCOUNTER — PATIENT MESSAGE (OUTPATIENT)
Dept: HEMATOLOGY/ONCOLOGY | Facility: CLINIC | Age: 83
End: 2023-09-05
Payer: MEDICARE

## 2023-09-06 ENCOUNTER — CLINICAL SUPPORT (OUTPATIENT)
Dept: REHABILITATION | Facility: HOSPITAL | Age: 83
End: 2023-09-06
Payer: MEDICARE

## 2023-09-06 ENCOUNTER — TELEPHONE (OUTPATIENT)
Dept: HEMATOLOGY/ONCOLOGY | Facility: CLINIC | Age: 83
End: 2023-09-06
Payer: MEDICARE

## 2023-09-06 DIAGNOSIS — R29.3 POSTURE ABNORMALITY: ICD-10-CM

## 2023-09-06 DIAGNOSIS — M53.84 DECREASED RANGE OF MOTION OF THORACIC SPINE: ICD-10-CM

## 2023-09-06 PROCEDURE — 97161 PT EVAL LOW COMPLEX 20 MIN: CPT

## 2023-09-06 PROCEDURE — 97110 THERAPEUTIC EXERCISES: CPT

## 2023-09-06 NOTE — TELEPHONE ENCOUNTER
----- Message from Lanny Elias sent at 9/6/2023  8:12 AM CDT -----  Regarding: tash Ward  Contact: pt  Type: Requesting to speak with nurse    Who Called: PT  Regarding:  Clarity on a Appointment  Would the patient rather a call back or a response via [facility]? Call back  Best Call Back Number:  [phone], [phone]  Additional Information: Pt would like to speak w/ Nurse Morales, Please advise, Thank You

## 2023-09-06 NOTE — PLAN OF CARE
[facility]   Physical Therapy Initial Evaluation      Name: Shima Echols [facility] Number: [id number]    Therapy Diagnosis:   Encounter Diagnoses   Name Primary?    Decreased range of motion of thoracic spine     Posture abnormality      Physician: Gifty Prasad MD     Physician Orders: PT Eval and Treat   Medical Diagnosis from Referral: M53.84 (ICD-10-CM) - Decreased range of motion of thoracic spine  Evaluation Date: 9/6/2023  Authorization Period Expiration: 8/9/2024  Plan of Care Expiration: 11/01/2023  Progress Note Due: 10/10/2023  Visit # / Visits authorized: 1/1   FOTO: 1/3    Precautions: Standard, cancer, and CKD stage 3      Time In: 2:10 pm    Time Out: 3:01 pm   Total Appointment Time (timed & untimed codes): 51 minutes    Subjective     Date of onset: Several years     History of current condition - Shima reports: She has had some discomfort in her mid-back down into her low back which has increased more recently. She reports discomfort with prolonged positions such as when reading or looking down and with lifting. The pain is mild and dull with the low back pain more recently following a biopsy. She reports she has sleep apnea as well and would like to improve her posture and strength to help with her breathing and improve her discomfort with prolonged positions. She reports she used to be more active walking, doing weights at home and biking but has since covid not been as active and thinks that plays a role in her weakness and discomfort/posture.   She reports a past medical history of breast cancer, HTN, osteopenia, Stage 3a kidney patient, and currently taking two medications to help with edema management in her legs.     Falls: Last fall August 2nd slipped on a slick floor hitting her left knee but able to get up and continue moving while at a doctor appointment walking in the hallway. Only fall reported in the last year.     Imaging: See Epic     Prior  Therapy: Yes, several times   Social History: Lives alone in a raised house and has stairs to get in and to get up into her attic  Occupation: Retired, several years as a    Prior Level of Function: Independent in ADLs with mild discomfort in her back.   Current Level of Function: Mild discomfort in mid thoracic spine into lumbar increased more recently causing discomfort with lifting and prolonged positions.     Pain:  Current 1/10, worst 2/10, best 1/10   Location: Mid thoracic spine into lumbar   Description: Aching and Dull  Aggravating Factors: leaning over, lifting   Easing Factors: rest and Adjusting her posture    Patients goals: To improve her posture and strength.      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    Medications:   Shima has a current medication list which includes the following prescription(s): anastrozole, coq10 (ubiquinol), denosumab, ferrous gluconate, fish oil-e-fatty [id number], furosemide, pantoprazole, preservision  "areds-2, moderna covid bival(6m up)(pf), spironolactone, tamsulosin, UNABLE TO FIND, UNABLE TO FIND, valsartan, and nephrocaps, and the following Facility-Administered Medications: sodium chloride 0.9%.    Allergies:   Review of patient's allergies indicates:   Allergen Reactions    Asparagus Rash        Objective      Observation: Patient presents with an overall pleasant general affect who does not appear to be in any acute distress.     Posture: Static posture displays forward head, rounded shoulders, increased thoracic kyphosis, and bilateral scapular abduction with lateral lean to the left.     Gait: Patient ambulates independently with no assistance and displays lateral lean to the left, decreased hip extension, and decreased hip flexion and step length.     Range of Motion:  Cervical   Degrees  Pain    Flexion 70 degrees  No Pain   Extension 30 degrees  No Pain   Right Rotation 65 degrees  No Pain   Left Rotation 70 degrees  No Pain   *Patient with noticeable hinge point in mid cervical spine with significant shearing through with cervical flexion and noticeable hinge with cervical extension.     Shoulder   Right  Left    Flexion 170 degrees  170 degrees    Abduction 165 degrees  165 degrees    ER at 0  50 degrees  50 degrees      Thoracic   Percentage  Pain   Flexion 50% No Pain   Extension 25% No Pain   Right Sidebend 75% No Pain   Left Sidebend  75% No Pain   Right Rotation 75% Discomfort   Left Rotation 75% Discomfort      Lumbar:    Percentage Pain   Flexion 70% No Pain   Extension 60% "Felt good"   Right Sidebend  60% No Pain   Left Sidebend 75% No Pain   Right Rotation 70% Discomfort   Left Rotation 70% Discomfort      Hip:    Right  Left    Flexion 90 degrees  95 degrees    Internal Rotation 20 degrees  30 degrees    External Rotation  40 degrees  30 degrees      Upper and Lower Extremity Strength (MMT):   Right  Left    Shoulder Flexion 4/5 4-/5   Shoulder Abduction 4-/5 4-/5   ER at 0 4-/5 3+/5   IR " at 0 4/5 4-/5   Hip Flexion 3+/5 3+/5   Hip Abduction 3+/5  3+/5   Knee Flexion 4-/5 4/5   Knee Extension 4/5 4-/5     Joint Mobility: Decreased upper cervical mobility, decreased thoracic PA mobility, and decreased hip mobility inferior mobilizations. Limited lower lumbar PA mobility.     Palpation: No notable tenderness to palpation     Functional Tests:  - TUG: 10 sec no AD  - 30 Sec Chair Rise: 9 reps with B UE assist and cues to fully stand   - SL Balance: Left = 9 sec, Right = 2 sec   - Tandem Balance: R in front of L =  12 sec L in front of R = 5 sec      Intake Outcome Measure for FOTO Thoracic Spine Survey    Therapist reviewed FOTO scores for Shima Sorto on 9/6/2023.   FOTO documents entered into EPIC - see Media section.    Intake Score: 69%          Treatment     Total Treatment time (time-based codes) separate from Evaluation: 15 minutes     Shima received the treatments listed below:      therapeutic exercises to develop strength, endurance, ROM, flexibility, posture, and core stabilization for 15 minutes including:    Pt education on PT POC, Prognosis, and HEP   Sidelying open books 1x10 reps   Sidelying clamshells 1x10 reps YellowTB   Scapular retractions with RedTB 1x10 reps   Paloff press GreenTB 1x10 reps each     manual therapy techniques: Joint mobilizations and Soft tissue Mobilization were applied to the:  for 00 minutes, including:      neuromuscular re-education activities to improve: Balance, Coordination, Kinesthetic, Sense, Proprioception, and Posture for 00 minutes. The following activities were included:      therapeutic activities to improve functional performance for 00  minutes, including:      Patient Education and Home Exercises     Education provided:   - PT POC, Prognosis, and HEP     Written Home Exercises Provided: yes. Exercises were reviewed and Shima was able to demonstrate them prior to the end of the session.  Shima demonstrated good  understanding of the  education provided. See EMR under Patient Instructions for exercises provided during therapy sessions.    Assessment     Shima is a 83 y.o. female referred to outpatient Physical Therapy with a medical diagnosis of M53.84 (ICD-10-CM) - Decreased range of motion of thoracic spine. Patient presents with posture abnormality, decreased range of motion, decreased strength, decreased balance, and functional limitations with prolonged positions and ADLs. Patient would benefit from skilled PT intervention to address deficits and improve overall functional mobility.     Patient prognosis is Good.   Patient will benefit from skilled outpatient Physical Therapy to address the deficits stated above and in the chart below, provide patient /family education, and to maximize patientt's level of independence.     Plan of care discussed with patient: Yes  Patient's spiritual, cultural and educational needs considered and patient is agreeable to the plan of care and goals as stated below:     Anticipated Barriers for therapy: Scheduling, chronicity of symptoms, age     Medical Necessity is demonstrated by the following  History  Co-morbidities and personal factors that may impact the plan of care [] LOW: no personal factors / co-morbidities  [] MODERATE: 1-2 personal factors / co-morbidities  [x] HIGH: 3+ personal factors / co-morbidities    Moderate / High Support Documentation: Age, HTN, venous insufficiency, chronic pulmonary heart disease, CKD stage 3, Hx of breast cancer, hypoglycemia      Examination  Body Structures and Functions, activity limitations and participation restrictions that may impact the plan of care [] LOW: addressing 1-2 elements  [x] MODERATE: 3+ elements  [] HIGH: 3+ elements (please support below)    Moderate / High Support Documentation: Range of motion, posture, strength, balance, motor control      Clinical Presentation [x] LOW: stable  [] MODERATE: Evolving  [] HIGH: Unstable     Decision Making/  Complexity Score: low       Goals:  Short Term Goals: 3 weeks   Patient will be independent in initial HEP to help supplement PT.   Patient will demonstrate improvement thoracic mobility to </= 50% limited to help with posture.    Long Term Goals: 6 weeks   Patient will be independent in updated HEP to help supplement PT and maintain gains made in PT.   Patient will improve FOTO score to >/= predicted to show improvement in condition.   Patient will improve shoulder strength to >/= 4/5 in all planes to help with lifting.   Patient will demonstrate improvement in posture.   Plan     Plan of care Certification: 9/6/2023 to 11/01/2023.    Outpatient Physical Therapy 1 times weekly for 6 weeks to include the following interventions: Gait Training, Manual Therapy, Moist Heat/ Ice, Neuromuscular Re-ed, Patient Education, Self Care, Therapeutic Activities, and Therapeutic Exercise.     Sheri Kamara, PT, DPT, OCS

## 2023-09-08 ENCOUNTER — OFFICE VISIT (OUTPATIENT)
Dept: HEMATOLOGY/ONCOLOGY | Facility: CLINIC | Age: 83
End: 2023-09-08
Payer: MEDICARE

## 2023-09-08 VITALS
BODY MASS INDEX: 20.2 KG/M2 | HEART RATE: 59 BPM | OXYGEN SATURATION: 100 % | SYSTOLIC BLOOD PRESSURE: 130 MMHG | DIASTOLIC BLOOD PRESSURE: 60 MMHG | HEIGHT: 65 IN | WEIGHT: 121.25 LBS | RESPIRATION RATE: 16 BRPM

## 2023-09-08 DIAGNOSIS — I10 PRIMARY HYPERTENSION: ICD-10-CM

## 2023-09-08 DIAGNOSIS — Z79.811 USE OF AROMATASE INHIBITORS: ICD-10-CM

## 2023-09-08 DIAGNOSIS — N18.32 STAGE 3B CHRONIC KIDNEY DISEASE: ICD-10-CM

## 2023-09-08 DIAGNOSIS — M80.00XD AGE-RELATED OSTEOPOROSIS WITH CURRENT PATHOLOGICAL FRACTURE WITH ROUTINE HEALING: ICD-10-CM

## 2023-09-08 DIAGNOSIS — D64.9 NORMOCHROMIC NORMOCYTIC ANEMIA: Primary | ICD-10-CM

## 2023-09-08 DIAGNOSIS — Z17.0 MALIGNANT NEOPLASM OF UPPER-OUTER QUADRANT OF RIGHT BREAST IN FEMALE, ESTROGEN RECEPTOR POSITIVE: ICD-10-CM

## 2023-09-08 DIAGNOSIS — C50.411 MALIGNANT NEOPLASM OF UPPER-OUTER QUADRANT OF RIGHT BREAST IN FEMALE, ESTROGEN RECEPTOR POSITIVE: ICD-10-CM

## 2023-09-08 LAB
COMMENT: NORMAL
FINAL PATHOLOGIC DIAGNOSIS: NORMAL
GROSS: NORMAL
Lab: NORMAL
MICROSCOPIC EXAM: NORMAL
SUPPLEMENTAL DIAGNOSIS: NORMAL

## 2023-09-08 PROCEDURE — 99999 PR PBB SHADOW E&M-EST. PATIENT-LVL III: ICD-10-PCS | Mod: PBBFAC,,, | Performed by: INTERNAL MEDICINE

## 2023-09-08 PROCEDURE — 99999 PR PBB SHADOW E&M-EST. PATIENT-LVL III: CPT | Mod: PBBFAC,,, | Performed by: INTERNAL MEDICINE

## 2023-09-08 PROCEDURE — 99215 PR OFFICE/OUTPT VISIT, EST, LEVL V, 40-54 MIN: ICD-10-PCS | Mod: S$PBB,,, | Performed by: INTERNAL MEDICINE

## 2023-09-08 PROCEDURE — 99215 OFFICE O/P EST HI 40 MIN: CPT | Mod: S$PBB,,, | Performed by: INTERNAL MEDICINE

## 2023-09-08 PROCEDURE — 99213 OFFICE O/P EST LOW 20 MIN: CPT | Mod: PBBFAC | Performed by: INTERNAL MEDICINE

## 2023-09-08 NOTE — Clinical Note
Primitivo Leblanc, I saw Mrs. Sorto for her breast cancer, covering for her primary oncologist. We did anemia workup which was largely unrevealing, likely multifactorial.  Her MMA is slighty high at 0.5, so I was hoping you could coordinate B12 injections. I forgot to mention this during her appointment last week.   Thanks! -Gifty

## 2023-09-08 NOTE — PROGRESS NOTES
Subjective:       Patient ID: Shima Sorto is a 83 y.o. female.    Chief Complaint: Review remaining results including bone marrow biopsy performed in evaluation of Normocytic Anemia/Mild Neutropenia Workup - History of Stage 1A Right Breast Cancer - [facility], ER:100%+, WV:Negative, HER2: Negative, pT1b (8 mm) N0    HPI   Ms. Sorto returns today to review results of bone marrow biopsy performed in evaluation of normocytic anemia and mild intermittent neutropenia. She has been on anastrazole since mid November 2018, and so far has tolerated it well.    Briefly, she is an 82-year-old  female who diagnosed with breast cancer in 2018.  On 08/17/2018, she underwent a lumpectomy and sentinel lymph node biopsy for an 8 mm grade 1 invasive lobular carcinoma which was ER strongly positive, WV negative and HER-2 negative with a Ki-67 of 5%, with the closest margin being 2 mm.  Two of 2 sentinel lymph nodes were negative for involvement.      She completed her radiation therapy and was subsequently started on AIs.Her DXA scan from 01/05/2023 shows osteopenia approaching osteoporosis (reviewed).  Of note, the patient is on Prolia. A mammogram on 07/06/2023 was read as BIRADS II, and a one year follow up was recommended.    Review of her chart reveals that last April she was diagnosed with C diff and she was treated accordingly.  She underwent a colonoscopy and EGD by Dr. Garza.  The colonoscopy showed 3 polyps and extensive diverticulosis.  Biopsies were negative for malignancy.  The EGD showed gastritis and a hiatal hernia.    Oncology History:  Aug 2018: lumpectomy + SLNBx - pT1b (8mm)N0 (0/2)  Sept 2018: Radiation   Nov 2018: Anastrazole  -PMH: Osteoporosis (Prolia), C diff colitis (May 2023), HTN/CKD3     Review of Systems    Overall she feels fair with stable fatigue, generalized weakness, and symmetric bilateral lower extremity edema. She has occasional paresthesias in her bilateral ankles at night but no  overt neuropathy.  She has tolerated the anastrazole quite well so far.  ECOG PS is 1.   She denies any anxiety, depression, easy bruising, fevers, chills, night  sweats, weight loss, nausea, vomiting, diarrhea, constipation, diplopia, blurred vision, headache, chest pain, palpitations, shortness of breath, breast pain, abdominal pain, extremity pain, or difficulty ambulating.  The remainder of the ten-point ROS, including general, skin, lymph, H/N, cardiorespiratory, GI, , Neuro, Endocrine, and psychiatric is negative. No hematochezia or melena.     Objective:      Physical Exam      Vitals:    09/08/23 1120   BP: 130/60   Pulse: (!) 59   Resp: 16     She is alert, oriented to time, place, person, pleasant, well      nourished, in no acute physical distress.                                    VITAL SIGNS:  Reviewed                                      HEENT:  Normal.  There are no nasal, oral, lip, gingival, auricular, lid,    or conjunctival lesions.  Mucosae are moist and pink, and there is no        thrush.  Pupils are equal, reactive to light and accommodation.              Extraocular muscle movements are intact.  Dentition is good.                                    NECK:  Supple without JVD, adenopathy, or thyromegaly.                       LUNGS:  Clear to auscultation without wheezing, rales, or rhonchi.           CARDIOVASCULAR:  Reveals an S1, S2, no murmurs, no rubs, no gallops.         ABDOMEN:  Soft, nontender, without organomegaly.  Bowel sounds are    present.                                                                     EXTREMITIES:  No cyanosis, clubbing, or edema.                               BREASTS:  She is status post right lumpectomy with a well-healed incision in the upper outer quadrant.    There are no masses in either breast. Breast exam deferred today since we did an exam in Aug 2023                                    LYMPHATIC:  There is no cervical, axillary, or  supraclavicular adenopathy.   SKIN:  Warm and moist, without petechiae, rashes, induration, or ecchymoses.        NEUROLOGIC:  DTRs are 0-1+ bilaterally, symmetrical, motor function is 5/5,and cranial nerves are  within normal limits.         Assessment:       Mrs. Sorto is an 83 y.o.F w/ a history of Stage 1A Right Breast Cancer - [facility], ER:100%+, IL:Negative, HER2: Negative. She is s/p R lumpectomy/SLNBx in 2018, pT1b (8 mm) N0 disease, adjuvant radiation. She is on adjuvant anastrazole started in 2018, and presents today for review results of anemia workup.     Plan:           I had a long discussion with her.    Breast Cancer: In regards to her hx of breast cancer, she will remain on anatsrazole through the end of 2023, and see Dr. Conroy at that time. Mammogram due in 2024   Multifactorial Chronic Normocytic Anemia:   Hgb: 10.4 (9.7), MCV: 97, TSAT: 18%, Ferritin: 54 (while on PO iron); soluble transferrin receptor: 2.5 (wnl), smear: unremarkable. WBC: 3, ANC: 1200, GFR: 40, B12: 406. MMA: 0.5  Mrs. Sotelo anemia is likely multifactorial and secondary to anemia of renal disease (GFR: <40), component of iron deficiency (TSAT: 18%), anemia of chronic disease/inflammation, B12 deficiency (MMA: >0.5) and possible early MDS    Colonoscopy and EGD in 2023 unremarkable; planning for capsule endoscopy, per GI.   Kappa F.8, Lambda F, K/L FLC: 3, SPEP: normal total protein, irregularity within the beta 2 band = 0.41 g/dL, IF: faint linear irregularity showing kappa light chain specificity only. As FLC [] is affected by renal function, taking into account her GFR of 45, FLC are just out of range but ratio is >3. Bone marrow biopsy performed due to this. Results relatively unremarkable. Normocellular marrow w/ 1-2% polytypic plasma cells; adequate hematopoiesis w/ no significant dyspoiesis. There is note of mild dyspoiesis in <10% of each cell line; does not currently meet criteria for MDS,  overall, no evidence of carcinoma, myeloma, etc.  Continue to follow CBC with Dr. Conroy   Microscopic Hematuria: UA >100 RBCs in June 2023, Cystoscopy unremarkable - per urology    Other Co-Morbidities: HTN/CKD (on valsartan), Osteoporosis (aromatase inhibitor, on prolia; last on 6/8/23), Diastolic HF (on lasix and spironolactone): per pcp       Med Onc Chart Routing      Follow up with physician . RTC with Dr. Conroy at the end of Oct 2023   Follow up with BASHIR    Infusion scheduling note    Injection scheduling note    Labs    Imaging    Pharmacy appointment    Other referrals              Follow Up:   - RTC in Oct 2023 with Dr. Conroy for follow up of her breast cancer and monitoring of her normocytic anemia  - Message forwarded to PCP to help w/ B12 injections      - Her multiple questions were answered to her satisfaction.   - Overall, I discussed the diagnosis, history, stage, labs/imaging, prognosis, management, and treatment plan as applicable. I reviewed adverse short and long term effects as applicable.   - Informed patient if symptoms are getting worse that it is their responsibility to call the clinic and schedule follow up sooner than stated follow up. Also informed patient if they do not hear from the appointment center in 2-5 business days for their referrals, the patient must call the Oncology clinic so we can follow up on procedures or referral scheduling. Patient was fully informed of current medical plan, all questions were answered and patient verbalized understanding. No further questions.   - Face to Face Visit time I spent with the patient: 40 minutes of total time spent on the encounter, including counseling patient and/or coordinating care, which includes face to face time and non-face to face time preparing to see the patient (eg, review of tests), Obtaining and/or reviewing separately obtained history, Documenting clinical information in the electronic or other health record, Independently  interpreting results (not separately reported) and communicating results to the patient/family/caregiver, or Care coordination (not separately reported).      Signed,  Gifty Prasad MD  [facility]

## 2023-09-08 NOTE — Clinical Note
Primitivo Sims, My name is Gifty Prasad - one of the medical oncologists. You're seeing a mutual patient tomorrow, Mrs. Sorto. I recently did workup of her anemia - likely from anemia of renal disease as other workup is negative. Bone marrow biopsy w/ mild dysplasia (see comment) but no overt MDS.   Just wanted to let you know in anticipation of your visit tomorrow . Thanks!

## 2023-09-11 ENCOUNTER — OFFICE VISIT (OUTPATIENT)
Dept: INTERNAL MEDICINE | Facility: CLINIC | Age: 83
End: 2023-09-11
Payer: MEDICARE

## 2023-09-11 DIAGNOSIS — I10 HYPERTENSION, UNSPECIFIED TYPE: Primary | ICD-10-CM

## 2023-09-11 DIAGNOSIS — M81.0 OSTEOPOROSIS, UNSPECIFIED OSTEOPOROSIS TYPE, UNSPECIFIED PATHOLOGICAL FRACTURE PRESENCE: ICD-10-CM

## 2023-09-11 PROCEDURE — 99214 OFFICE O/P EST MOD 30 MIN: CPT | Mod: S$PBB,,, | Performed by: INTERNAL MEDICINE

## 2023-09-11 PROCEDURE — 99214 OFFICE O/P EST MOD 30 MIN: CPT | Mod: PBBFAC | Performed by: INTERNAL MEDICINE

## 2023-09-11 PROCEDURE — 99999 PR PBB SHADOW E&M-EST. PATIENT-LVL IV: ICD-10-PCS | Mod: PBBFAC,,, | Performed by: INTERNAL MEDICINE

## 2023-09-11 PROCEDURE — 99214 PR OFFICE/OUTPT VISIT, EST, LEVL IV, 30-39 MIN: ICD-10-PCS | Mod: S$PBB,,, | Performed by: INTERNAL MEDICINE

## 2023-09-11 PROCEDURE — 99999 PR PBB SHADOW E&M-EST. PATIENT-LVL IV: CPT | Mod: PBBFAC,,, | Performed by: INTERNAL MEDICINE

## 2023-09-11 NOTE — PROGRESS NOTES
[facility]   Physical Therapy Treatment Note      Name: Shima Echols [facility]  Clinic Number: [id number]    Therapy Diagnosis:   Encounter Diagnosis   Name Primary?    Posture abnormality Yes     Physician: Gifty Prasad MD    Visit Date: 9/12/2023    Physician Orders: PT Eval and Treat   Medical Diagnosis from Referral: M53.84 (ICD-10-CM) - Decreased range of motion of thoracic spine  Evaluation Date: 9/6/2023  Authorization Period Expiration: 8/9/2024  Plan of Care Expiration: 11/01/2023  Progress Note Due: 10/10/2023  Visit # / Visits authorized: 1/20  FOTO: 1/3     PTA Visit #: 1/5      Time In: 2:10 pm  Time Out: 3:00 pm  Total Appointment Time (timed & untimed codes): 50 minutes     Precautions: Standard, cancer, and CKD stage 3      Subjective     Patient reports: she did not do the exercises due to having a busy weekend with family in town . Pt stated she is doing well and not really having much pain currently. The pain will go from below her shoulder blades down to her low back. Pt stated she got good news from her bone marrow biopsy that she does not have leukemia but bad news that she does have something wrong with her kidneys. She wonders if her kidneys are causing her back pain.    She was compliant with home exercise program.  Response to previous treatment: eval - reports fatigue after   Functional change: none    Pain: 0/10  Location: mid thoracic into lumbar spine    Objective      Objective Measures updated at progress report unless specified.     Treatment     Shima received the treatments listed below:        manual therapy techniques: Joint mobilizations were applied to the: Low back for 0 minutes, including:      neuromuscular re-education activities to improve: Coordination, Kinesthetic, Sense, Proprioception, and Posture for 50 minutes. The following activities were included:    Pt education on PT POC, Prognosis, and HEP   Sidelying open books: x 15 reps each    Sidelying clamshells: 2 x10 reps YellowTB   TrA brace : 2 x 10 reps , 5'' hold   TrA brace with swiss ball isometric : 2 x 10 reps , 5'' hold   Scapular retractions with RedTB 1x10 reps   Paloff press GreenTB 1x10 reps each        therapeutic activities to improve functional performance for 0  minutes, including:        Patient Education and Home Exercises       Education provided:   - HEP - updated HEP to be provided at next session     Written Home Exercises Provided: yes. Exercises were reviewed and Shima was able to demonstrate them prior to the end of the session.  Shima demonstrated good  understanding of the education provided. See Electronic Medical Record under Patient Instructions for exercises provided during therapy sessions    Assessment     Pt presents for first session following initial evaluation with no pain upon arrival. Progressed with HEP to incorporate postural strengthening as well as core and hip strengthening . Pt was fairly challenged with maintaining core bracing with dual activity although with cues this improved as activity progressed. Will continue to monitor and progress next.     Shima Is progressing well towards her goals.   Patient prognosis is Good.     Patient will continue to benefit from skilled outpatient physical therapy to address the deficits listed in the problem list box on initial evaluation, provide pt/family education and to maximize pt's level of independence in the home and community environment.   Patient's spiritual, cultural and educational needs considered and pt agreeable to plan of care and goals.     Anticipated Barriers for therapy: Scheduling, chronicity of symptoms, age     Goals:  Short Term Goals: 3 weeks   Patient will be independent in initial HEP to help supplement PT.   Patient will demonstrate improvement thoracic mobility to </= 50% limited to help with posture.     Long Term Goals: 6 weeks   Patient will be independent in updated HEP to help  supplement PT and maintain gains made in PT.   Patient will improve FOTO score to >/= predicted to show improvement in condition.   Patient will improve shoulder strength to >/= 4/5 in all planes to help with lifting.   Patient will demonstrate improvement in posture.     Plan     Continue to progress per PT POC.     Ally Baker, PTA

## 2023-09-12 ENCOUNTER — IMMUNIZATION (OUTPATIENT)
Dept: INTERNAL MEDICINE | Facility: CLINIC | Age: 83
End: 2023-09-12
Payer: MEDICARE

## 2023-09-12 ENCOUNTER — CLINICAL SUPPORT (OUTPATIENT)
Dept: REHABILITATION | Facility: HOSPITAL | Age: 83
End: 2023-09-12
Payer: MEDICARE

## 2023-09-12 DIAGNOSIS — R29.3 POSTURE ABNORMALITY: Primary | ICD-10-CM

## 2023-09-12 PROCEDURE — 99999PBSHW FLU VACCINE - QUADRIVALENT - ADJUVANTED: Mod: PBBFAC,,,

## 2023-09-12 PROCEDURE — 99999PBSHW FLU VACCINE - QUADRIVALENT - ADJUVANTED: ICD-10-PCS | Mod: PBBFAC,,,

## 2023-09-12 PROCEDURE — 97112 NEUROMUSCULAR REEDUCATION: CPT | Mod: CQ

## 2023-09-12 PROCEDURE — G0008 ADMIN INFLUENZA VIRUS VAC: HCPCS | Mod: PBBFAC

## 2023-09-13 ENCOUNTER — TELEPHONE (OUTPATIENT)
Dept: NEPHROLOGY | Facility: HOSPITAL | Age: 83
End: 2023-09-13
Payer: MEDICARE

## 2023-09-13 ENCOUNTER — LAB VISIT (OUTPATIENT)
Dept: LAB | Facility: HOSPITAL | Age: 83
End: 2023-09-13
Attending: INTERNAL MEDICINE
Payer: MEDICARE

## 2023-09-13 DIAGNOSIS — N18.30 STAGE 3 CHRONIC KIDNEY DISEASE, UNSPECIFIED WHETHER STAGE 3A OR 3B CKD: ICD-10-CM

## 2023-09-13 LAB
ALBUMIN SERPL BCP-MCNC: 3.8 G/DL (ref 3.5–5.2)
ANION GAP SERPL CALC-SCNC: 9 MMOL/L (ref 8–16)
BUN SERPL-MCNC: 63 MG/DL (ref 8–23)
CALCIUM SERPL-MCNC: 9.6 MG/DL (ref 8.7–10.5)
CHLORIDE SERPL-SCNC: 102 MMOL/L (ref 95–110)
CO2 SERPL-SCNC: 27 MMOL/L (ref 23–29)
CREAT SERPL-MCNC: 1.7 MG/DL (ref 0.5–1.4)
EST. GFR  (NO RACE VARIABLE): 29.6 ML/MIN/1.73 M^2
GLUCOSE SERPL-MCNC: 106 MG/DL (ref 70–110)
HCT VFR BLD AUTO: 33.3 % (ref 37–48.5)
HGB BLD-MCNC: 10.5 G/DL (ref 12–16)
PHOSPHATE SERPL-MCNC: 4.6 MG/DL (ref 2.7–4.5)
POTASSIUM SERPL-SCNC: 5 MMOL/L (ref 3.5–5.1)
PTH-INTACT SERPL-MCNC: 58 PG/ML (ref 9–77)
SODIUM SERPL-SCNC: 138 MMOL/L (ref 136–145)

## 2023-09-13 PROCEDURE — 80069 RENAL FUNCTION PANEL: CPT | Performed by: INTERNAL MEDICINE

## 2023-09-13 PROCEDURE — 85018 HEMOGLOBIN: CPT | Performed by: INTERNAL MEDICINE

## 2023-09-13 PROCEDURE — 83970 ASSAY OF PARATHORMONE: CPT | Performed by: INTERNAL MEDICINE

## 2023-09-13 PROCEDURE — 36415 COLL VENOUS BLD VENIPUNCTURE: CPT | Performed by: INTERNAL MEDICINE

## 2023-09-13 PROCEDURE — 85014 HEMATOCRIT: CPT | Performed by: INTERNAL MEDICINE

## 2023-09-13 NOTE — TELEPHONE ENCOUNTER
Please have her hydrate well as her creatinine is higher.  She can hold the valsartan today if she hasn't taken it.  otherwise, she can hold it tomorrow.

## 2023-09-14 ENCOUNTER — TELEPHONE (OUTPATIENT)
Dept: NEPHROLOGY | Facility: HOSPITAL | Age: 83
End: 2023-09-14
Payer: MEDICARE

## 2023-09-14 ENCOUNTER — OFFICE VISIT (OUTPATIENT)
Dept: NEPHROLOGY | Facility: CLINIC | Age: 83
End: 2023-09-14
Payer: MEDICARE

## 2023-09-14 VITALS
DIASTOLIC BLOOD PRESSURE: 69 MMHG | HEART RATE: 56 BPM | SYSTOLIC BLOOD PRESSURE: 137 MMHG | BODY MASS INDEX: 19.81 KG/M2 | WEIGHT: 119.06 LBS

## 2023-09-14 DIAGNOSIS — I10 HYPERTENSION, UNSPECIFIED TYPE: ICD-10-CM

## 2023-09-14 DIAGNOSIS — N17.9 AKI (ACUTE KIDNEY INJURY): ICD-10-CM

## 2023-09-14 DIAGNOSIS — N18.31 STAGE 3A CHRONIC KIDNEY DISEASE: Primary | ICD-10-CM

## 2023-09-14 DIAGNOSIS — N20.0 KIDNEY STONES: ICD-10-CM

## 2023-09-14 PROBLEM — R23.4 SKIN FISSURE: Status: RESOLVED | Noted: 2021-04-09 | Resolved: 2023-09-14

## 2023-09-14 PROCEDURE — 99999 PR PBB SHADOW E&M-EST. PATIENT-LVL III: CPT | Mod: PBBFAC,,, | Performed by: INTERNAL MEDICINE

## 2023-09-14 PROCEDURE — 99999 PR PBB SHADOW E&M-EST. PATIENT-LVL III: ICD-10-PCS | Mod: PBBFAC,,, | Performed by: INTERNAL MEDICINE

## 2023-09-14 PROCEDURE — 99215 OFFICE O/P EST HI 40 MIN: CPT | Mod: S$PBB,,, | Performed by: INTERNAL MEDICINE

## 2023-09-14 PROCEDURE — 99213 OFFICE O/P EST LOW 20 MIN: CPT | Mod: PBBFAC | Performed by: INTERNAL MEDICINE

## 2023-09-14 PROCEDURE — 99215 PR OFFICE/OUTPT VISIT, EST, LEVL V, 40-54 MIN: ICD-10-PCS | Mod: S$PBB,,, | Performed by: INTERNAL MEDICINE

## 2023-09-14 NOTE — TELEPHONE ENCOUNTER
Kathy. Mrs. Ronquillo's creatinine is higher and is only doing self caths once a day instead of twice a day which likely isn't helping and I also asked her to hydrate well. I wanted to reach out to you to update.  I expalined the importance of PVR and adverse affects on the kidneys and the need to do frequent caths. Thanks.

## 2023-09-14 NOTE — PROGRESS NOTES
HISTORY OF PRESENT ILLNESS:  This is a 83  -year-old white female with a   longstanding history of nephrolithiasis, but no recent symptomatic   nephrolithiasis episodes who is coming in for a followup.  She also has   hypertension with stable blood pressures at home and had failed InterStim   therapy with her bladder in the past and had seen Dr. Stoddard in Urology.  No   recent symptomatic stones.  Her creatinine is 1.7.   Denies NSAID's.  Her blood pressure is stable at home in the 105's-120's/50's-60's. C/o abd distension and is being worked up in GI.     PAST MEDICAL HISTORY:  Significant for hypertension for over 10 years, history   of kidney stones, failed InterStim therapy, breast cancer, status post   lumpectomy, XRT and adjuvant hormonal therapy and osteoporosis.  KATHERIN-CPAP    REVIEW OF SYSTEMS:  Abd distension. She states that she is feeling well but some fatigue.  Apetite good.  Weight stable. Denies any back pain or kidney stone pain.  She denies any blood in the urine, frequency, urgency, hematuria, dysuria, nausea, vomiting, chest pain or shortness of breath.    PHYSICAL EXAMINATION:limited  GENERAL:  A well-nourished, well-developed individual, in no acute distress.  CARDIOVASCULAR:  Rate and rhythm regular.  No murmurs, gallops or rubs.  LUNGS:  Clear to auscultation bilaterally.  Normal respiratory effort.  ABDOMEN:  Soft, nontender,some distension.  No edema    EXTREMITIES:  Edema.    ASSESSMENT AND PLAN:  This is a 83-year-old white female with a longstanding   history of hypertension who is coming in for a followup visit.  1.   Nephrolithiasis.  No recent symptomatic nephrolithiasis.  She had a   procedure in 2001, for a kidney stone.  Her most recent CAT scan showed small   calcifications with 0.6 cm stone and 0.4 cm stone in the past but recent one showed only 0.4 cm stone on the left(one mildly complex cyst), which were not causing her   problem.  3 liters of urine output per last 24-hour    urine per the patient.  I advised her to drink lemon juice for citrate.  She   also follows a low oxalate diet for high oxalate.  I also advised her to decrease her   meat intake.  Last US from 6/22 showed 0.5 cm on right side  and mild hydro frances and sees urology.    Sodium nml now. H/o breast cancer. Had EGD, colonoscopy, mammogram, pap smear.  Started on PPI by GI but stopped it and now on gas-x per GI.   2.  Renal/ckd stg 3:  Her creatinines have been stable around 1.2-1.3 with GFR of 45  Ml/min but now 1.7-hydrate well and repeat.    per urology, has PVR approximately > 600 ml              - self cath 10 Fr twice daily indefinitely but only doing it once a day-asked her to do it bid.                - continue flomax daily I emphasized good   blood pressure control.   RBC's on UA's of and  likely sec to stones-had seen urology in 1/22. Had PVR and failed interstim.  No RBC's on UA's overall since then.  3.  Hypertension.  Blood pressures are stable.     She has   no urine protein.  She is on Benicar for nephroprotection.  4.  Renal osteodystrophy.  Vitamin D levels are stable.   On OTC vit D. Her PTH is stable.  Calcium and phosphorus are stable.    5.  Anemia: stable. On  iron.  Saw hematology and bone marrow biopsy.    She can be  followed up in 3-4 months.  RFP on Monday.

## 2023-09-16 VITALS
WEIGHT: 120.56 LBS | OXYGEN SATURATION: 99 % | HEIGHT: 65 IN | HEART RATE: 62 BPM | SYSTOLIC BLOOD PRESSURE: 136 MMHG | TEMPERATURE: 99 F | BODY MASS INDEX: 20.09 KG/M2 | DIASTOLIC BLOOD PRESSURE: 64 MMHG

## 2023-09-16 NOTE — PROGRESS NOTES
Subjective:       Patient ID: Shima Sorto is a 83 y.o. female.    Chief Complaint: Hypertension    HPI  She returns for management of hypertension.  She has had hypertension for over a year.  Current treatment has included medications outlined in medication list.  She denies chest pain or shortness of breath.  No palpitations.  Denies left arm or neck pain.  She has osteoporosis.  Currently on Prolia     Past medical history:  Hypertension, nephrolithiasis, osteoporosis, polycystic kidney disease, sleep apnea, breast cancer, skin cancer,  status post lumpectomy     Medications:  Diovan, Prolia, Lasix, Arimidex     No known drug allergies       Review of Systems   Constitutional:  Negative for chills, fatigue, fever and unexpected weight change.   Respiratory:  Negative for chest tightness and shortness of breath.    Cardiovascular:  Negative for chest pain and palpitations.   Gastrointestinal:  Negative for abdominal pain and blood in stool.   Neurological:  Negative for dizziness, syncope, numbness and headaches.       Objective:      Physical Exam  HENT:      Right Ear: External ear normal.      Left Ear: External ear normal.      Nose: Nose normal.      Mouth/Throat:      Mouth: Mucous membranes are moist.      Pharynx: Oropharynx is clear.   Eyes:      Pupils: Pupils are equal, round, and reactive to light.   Cardiovascular:      Rate and Rhythm: Normal rate and regular rhythm.      Heart sounds: No murmur heard.  Pulmonary:      Breath sounds: Normal breath sounds.   Abdominal:      General: There is no distension.      Palpations: There is no hepatomegaly or splenomegaly.      Tenderness: There is no abdominal tenderness.   Musculoskeletal:      Cervical back: Normal range of motion.   Lymphadenopathy:      Cervical: No cervical adenopathy.      Upper Body:      Right upper body: No axillary adenopathy.      Left upper body: No axillary adenopathy.   Neurological:      Cranial Nerves: No cranial nerve  deficit.      Sensory: No sensory deficit.      Motor: Motor function is intact.      Deep Tendon Reflexes: Reflexes are normal and symmetric.         Assessment/Plan       Assessment and plan:  1.  Hypertension:  Controlled.  Continue Diovan  2.  Osteoporosis:  Continue Prolia  3.  She reports having her Pap smear within the past year

## 2023-09-18 ENCOUNTER — LAB VISIT (OUTPATIENT)
Dept: LAB | Facility: HOSPITAL | Age: 83
End: 2023-09-18
Attending: INTERNAL MEDICINE
Payer: MEDICARE

## 2023-09-18 DIAGNOSIS — D50.9 IRON DEFICIENCY ANEMIA, UNSPECIFIED IRON DEFICIENCY ANEMIA TYPE: ICD-10-CM

## 2023-09-18 LAB
FERRITIN SERPL-MCNC: 72 NG/ML (ref 20–300)
HGB BLD-MCNC: 11 G/DL (ref 12–16)
IRON SERPL-MCNC: 95 UG/DL (ref 30–160)
SATURATED IRON: 24 % (ref 20–50)
TOTAL IRON BINDING CAPACITY: 395 UG/DL (ref 250–450)
TRANSFERRIN SERPL-MCNC: 267 MG/DL (ref 200–375)

## 2023-09-18 PROCEDURE — 84466 ASSAY OF TRANSFERRIN: CPT | Performed by: INTERNAL MEDICINE

## 2023-09-18 PROCEDURE — 85018 HEMOGLOBIN: CPT | Performed by: INTERNAL MEDICINE

## 2023-09-18 PROCEDURE — 36415 COLL VENOUS BLD VENIPUNCTURE: CPT | Performed by: INTERNAL MEDICINE

## 2023-09-18 PROCEDURE — 82728 ASSAY OF FERRITIN: CPT | Performed by: INTERNAL MEDICINE

## 2023-09-18 PROCEDURE — 83540 ASSAY OF IRON: CPT | Performed by: INTERNAL MEDICINE

## 2023-09-19 ENCOUNTER — TELEPHONE (OUTPATIENT)
Dept: INTERNAL MEDICINE | Facility: CLINIC | Age: 83
End: 2023-09-19
Payer: MEDICARE

## 2023-09-19 ENCOUNTER — TELEPHONE (OUTPATIENT)
Dept: NEPHROLOGY | Facility: HOSPITAL | Age: 83
End: 2023-09-19
Payer: MEDICARE

## 2023-09-19 DIAGNOSIS — N17.9 AKI (ACUTE KIDNEY INJURY): Primary | ICD-10-CM

## 2023-09-19 NOTE — TELEPHONE ENCOUNTER
Hello. Her creatinine is still high on repeat at 1.6 (compared to her baseline) and I am concerned this is because of her PVR and is not cathing frequently enough. Can you follow up with her. Thanks.

## 2023-09-19 NOTE — TELEPHONE ENCOUNTER
Her creatinine is still almost the same at 1.6 and she need to straight cath more frequently and I messaged the urology provider as well.  Please have her follow up with them.  Please obtain a Renal US tomorrow or Thursday if possible.

## 2023-09-19 NOTE — TELEPHONE ENCOUNTER
----- Message from Gifty Prasad MD sent at 9/19/2023 12:10 PM CDT -----  Regarding: RE: Abnormal symptoms  Hi Ally,  Thanks for reaching out and for being so thorough. I have cc'd Dr. Milton, Mrs. Sorto's PCP on this message to see if she can schedule an urgent appointment/labs to address    -Gifty  ----- Message -----  From: Ally Baker PTA  Sent: 9/19/2023  12:05 PM CDT  To: Sheri Kamara, PT; Gifty Prasad MD  Subject: Abnormal symptoms                                Good morning ,   I was messaging in regards to a patient that was referred to us for physical therapy. She did well her first two sessions with no abnormal symptoms.  She called yesterday to cancel her appointment because she said she was unable to walk . She reported she was advised to stop taking her Valsartan last Thursday by her nephrologist so that it wouldn't interfere with her labs that she had to get yesterday. She said yesterday she has barely been able to get up to walk. She has been extremely thirsty , weak and lightheaded . This was concerning to me and I encouraged her to reach out to her doctor immediately although I didn't see if she was able to get in touch with someone regarding this and wanted to make sure you were aware.   Please advise , thank you   Ally Baker PTA , CLT      
Called patient . Patient denies dizziness at the test. Dizziness has stopped at this time. Is concerned about her back pain. Will reach out to her cardiologist for her medication changes with the valsartan and aldactone. Also ith back pain. Offered patient appts with other providers as well as PCP recommendation to go to the ER for an evaluation. Patient declined medical advice at this time. Encouraged her that if the symptoms do return to please go have them evaluated. Patient verbalized understanding with a yes.  
Please advise her to go to the ER  
no overt signs of muscle and/or fat depletion noted upon visual assessment

## 2023-09-20 ENCOUNTER — HOSPITAL ENCOUNTER (OUTPATIENT)
Dept: RADIOLOGY | Facility: HOSPITAL | Age: 83
Discharge: HOME | End: 2023-09-20
Attending: INTERNAL MEDICINE
Payer: MEDICARE

## 2023-09-20 DIAGNOSIS — N17.9 AKI (ACUTE KIDNEY INJURY): ICD-10-CM

## 2023-09-20 PROCEDURE — 76770 US EXAM ABDO BACK WALL COMP: CPT | Mod: TC

## 2023-09-20 PROCEDURE — 76770 US KIDNEY: ICD-10-PCS | Mod: 26,,, | Performed by: STUDENT IN AN ORGANIZED HEALTH CARE EDUCATION/TRAINING PROGRAM

## 2023-09-20 PROCEDURE — 76770 US EXAM ABDO BACK WALL COMP: CPT | Mod: 26,,, | Performed by: STUDENT IN AN ORGANIZED HEALTH CARE EDUCATION/TRAINING PROGRAM

## 2023-09-20 NOTE — TELEPHONE ENCOUNTER
Dr. Sims,    I have recommended several times the need to self catheterize throughout the day.  We have had several follow up appointments to discuss high PVR.  Unfortunately she has not been compliant with recommendations.  Unsure another follow up will change things.    Roxy

## 2023-09-21 ENCOUNTER — TELEPHONE (OUTPATIENT)
Dept: NEPHROLOGY | Facility: HOSPITAL | Age: 83
End: 2023-09-21
Payer: MEDICARE

## 2023-09-21 ENCOUNTER — TELEPHONE (OUTPATIENT)
Dept: NEPHROLOGY | Facility: CLINIC | Age: 83
End: 2023-09-21
Payer: MEDICARE

## 2023-09-21 NOTE — TELEPHONE ENCOUNTER
Her creatinine is still almost the same at 1.6 and she need to straight cath more frequently which it looks like she started doing which  is good. Renal US showed a 1 cm kidney stone which is a decent size and have her f/u with urology to see if anything needs to be done about that because of the size.  I will also message them.

## 2023-09-21 NOTE — TELEPHONE ENCOUNTER
9/21/23  9:41 AM  Note  Her creatinine is still almost the same at 1.6 and she need to straight cath more frequently which it looks like she started doing which  is good. Renal US showed a 1 cm kidney stone which is a decent size and have her f/u with urology to see if anything needs to be done about that because of the size.  I will also message them.

## 2023-09-21 NOTE — TELEPHONE ENCOUNTER
----- Message from Elda Pulliam RN sent at 9/20/2023  2:54 PM CDT -----  Regarding: concerns of pt  She is asking for you to view the labs---stated her GFR improved 2 points and also asking for your thoughts about the U/S results, please. Stated she is now self-cathing QID.

## 2023-09-21 NOTE — TELEPHONE ENCOUNTER
It looks like she started to cath more frequently now and she said 4 times a day.  Her renal US shows 1 cm stone with mild pelvicaliectasis.  Because of the size of the stone, I wanted urology to be aware to see if anything needs to be done preemptively before it may become a problem.

## 2023-09-22 ENCOUNTER — OFFICE VISIT (OUTPATIENT)
Dept: CARDIOLOGY | Facility: CLINIC | Age: 83
End: 2023-09-22
Payer: MEDICARE

## 2023-09-22 VITALS
HEART RATE: 56 BPM | WEIGHT: 122.38 LBS | BODY MASS INDEX: 20.39 KG/M2 | DIASTOLIC BLOOD PRESSURE: 60 MMHG | SYSTOLIC BLOOD PRESSURE: 105 MMHG | HEIGHT: 65 IN

## 2023-09-22 DIAGNOSIS — I70.8 AORTO-ILIAC ATHEROSCLEROSIS: ICD-10-CM

## 2023-09-22 DIAGNOSIS — I70.0 AORTO-ILIAC ATHEROSCLEROSIS: ICD-10-CM

## 2023-09-22 DIAGNOSIS — I50.30 ACC/AHA STAGE C HEART FAILURE WITH PRESERVED EJECTION FRACTION: ICD-10-CM

## 2023-09-22 DIAGNOSIS — N18.32 STAGE 3B CHRONIC KIDNEY DISEASE: ICD-10-CM

## 2023-09-22 DIAGNOSIS — I87.2 VENOUS INSUFFICIENCY: ICD-10-CM

## 2023-09-22 DIAGNOSIS — I10 PRIMARY HYPERTENSION: ICD-10-CM

## 2023-09-22 DIAGNOSIS — L97.521 SKIN ULCER OF SECOND TOE OF LEFT FOOT, LIMITED TO BREAKDOWN OF SKIN: ICD-10-CM

## 2023-09-22 DIAGNOSIS — R06.02 SOB (SHORTNESS OF BREATH): Primary | ICD-10-CM

## 2023-09-22 DIAGNOSIS — G47.33 OSA (OBSTRUCTIVE SLEEP APNEA): ICD-10-CM

## 2023-09-22 PROCEDURE — 99999 PR PBB SHADOW E&M-EST. PATIENT-LVL IV: ICD-10-PCS | Mod: PBBFAC,,, | Performed by: INTERNAL MEDICINE

## 2023-09-22 PROCEDURE — 99214 OFFICE O/P EST MOD 30 MIN: CPT | Mod: S$PBB,,, | Performed by: INTERNAL MEDICINE

## 2023-09-22 PROCEDURE — 99999 PR PBB SHADOW E&M-EST. PATIENT-LVL IV: CPT | Mod: PBBFAC,,, | Performed by: INTERNAL MEDICINE

## 2023-09-22 PROCEDURE — 99214 OFFICE O/P EST MOD 30 MIN: CPT | Mod: PBBFAC | Performed by: INTERNAL MEDICINE

## 2023-09-22 PROCEDURE — 99214 PR OFFICE/OUTPT VISIT, EST, LEVL IV, 30-39 MIN: ICD-10-PCS | Mod: S$PBB,,, | Performed by: INTERNAL MEDICINE

## 2023-09-22 NOTE — PROGRESS NOTES
HISTORY:    83-year-old female with a history of HFpEF, hypertension, CKD, venous insufficiency s/p LLE GSV EVLT '17, breast cancer presenting for initial evaluation by me.    The patient was initially evaluated for B LE edema for years, intermittent. In '17 underwent LLE GSV EVLT with improvement in symptoms that she had at that time. Had a medial venous ulcer at that time. That healed without issue. Was doing well, but had c diff in early '23. After treatment for that she was debilitated and this resulted in a progression of her edema most recently. 1 year ago she had no issues with edema. No recurrent wounds.     No h/o VTE. B LE varicosities, small above and below the knee present for years. Swelling is worse at the end of the day and best in the morning. Will have erythema and a heavy feeling late in the day.    After initial evaluation we noted an elevated BNP and started tx for HF with furosemide 20x1 and spironolactone 25x1. LE edema has improved significantly.    Activity levels remain reduced. Sluggish with fatigue. No CP. +SOB/MONTERO. She is having continued abdominal issues and bloating that limits her.     Tolerates valsartan 160x1, furosemide 40x1, spironolactone 25x1, and tamsulosin 0.4x1. Bps usually 130-140s/60-70s.     PHYSICAL EXAM:    Vitals:    09/22/23 1134   BP: 105/60   Pulse: (!) 56       NAD, A+Ox3.  No jvd, no bruit.  RRR nml s1,s2. No murmurs.  CTA B no wheezes or crackles.  No edema. B chronic skin changes. B varicosities above and below the knee. Left 2nd toe ulcer.     LABS/STUDIES (imaging reviewed during clinic visit):    August 2023 hemoglobin 11.0 with an MCV of 95.  September 2023 creatinine 1.6 with a BUN of 72.  Sodium 131 potassium 4.9.  Albumin 3.9.  BNP in July 316.  TSH normal.  2021 LDL 93 and HDL 67.  Triglycerides 28.   EKG 2022 sinus rhythm with PACs.  No Q-waves or ST changes.    TTE July 2023 normal LV size and function with EF 60%.  Grade 2 diastology.  CVP 8 with  estimated PASP of 52.  SAVANAH 2022 10 minutes on a medium ramp protocol.  Normal TTE with stress.  Inferolateral ST depressions with stress.  Venous duplex July 2023 no evidence of DVT bilaterally. L GSV not present at level of the knee. Bilateral GSV reflux BTK present.      ASSESSMENT & PLAN:    1. SOB (shortness of breath)    2. Aorto-iliac atherosclerosis    3. Primary hypertension    4. Venous insufficiency    5. ACC/AHA stage C heart failure with preserved ejection fraction    6. Stage 3b chronic kidney disease    7. Skin ulcer of second toe of left foot, limited to breakdown of skin    8. KATHERIN (obstructive sleep apnea)          Orders Placed This Encounter    Basic Metabolic Panel    BNP    Ambulatory referral/consult to Podiatry    Ambulatory referral/consult to Sleep Disorders    Cardiac PET Scan Stress    CV Ultrasound doppler arterial legs bilat    empagliflozin (JARDIANCE) 10 mg tablet          HFpEF on spironolactone 25x1 and furosemide 40x1. Swelling much improved with recent h/o MONTERO. TTE w preserved LVEF and grade 2 diastology. Will add empagliflozin 10x1. Check a PET stress.     Unclear why she has so much fatigue.    Bps controlled.     CKD and following with nephro. Self-catheterizing 4x/day.    Venous insufficiency not a major issue at this time. Utilizing compression stockings and diuretic tx with mild LE edema.    Has a 2nd toe ulcer. Will check an Arterial duplex and refer to podiatry.       Follow up in about 2 months (around 11/30/2023).      Mariaelena Wong MD

## 2023-09-24 ENCOUNTER — PATIENT MESSAGE (OUTPATIENT)
Dept: NEPHROLOGY | Facility: CLINIC | Age: 83
End: 2023-09-24
Payer: MEDICARE

## 2023-09-25 ENCOUNTER — TELEPHONE (OUTPATIENT)
Dept: DERMATOLOGY | Facility: CLINIC | Age: 83
End: 2023-09-25
Payer: MEDICARE

## 2023-09-25 NOTE — TELEPHONE ENCOUNTER
----- Message from Elizabeth Cortez sent at 9/25/2023  2:11 PM CDT -----  Regarding: apt  Contact: [phone]  Pt calling in pt is not feeling well  to make her apt for  1:40 please call to discuss Further
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
## 2023-09-28 ENCOUNTER — PATIENT MESSAGE (OUTPATIENT)
Dept: CARDIOLOGY | Facility: CLINIC | Age: 83
End: 2023-09-28
Payer: MEDICARE

## 2023-09-28 ENCOUNTER — TELEPHONE (OUTPATIENT)
Dept: UROLOGY | Facility: CLINIC | Age: 83
End: 2023-09-28
Payer: MEDICARE

## 2023-09-29 ENCOUNTER — TELEPHONE (OUTPATIENT)
Dept: CARDIOLOGY | Facility: CLINIC | Age: 83
End: 2023-09-29
Payer: MEDICARE

## 2023-09-29 NOTE — TELEPHONE ENCOUNTER
----- Message from Judith Rodrigues sent at 9/29/2023 11:16 AM CDT -----  Regarding: Questions About Meds  Pt read that, there maybe a problem with taking an antibiotics with meds.  She would also like to know, why is she schedule with Dr. Estrada.    empagliflozin (JARDIANCE) 10 mg tablet    Contact @ [phone] & [phone]    Thanks

## 2023-10-06 ENCOUNTER — PATIENT MESSAGE (OUTPATIENT)
Dept: UROLOGY | Facility: CLINIC | Age: 83
End: 2023-10-06

## 2023-10-06 ENCOUNTER — OFFICE VISIT (OUTPATIENT)
Dept: UROLOGY | Facility: CLINIC | Age: 83
End: 2023-10-06
Payer: MEDICARE

## 2023-10-06 VITALS
HEIGHT: 67 IN | SYSTOLIC BLOOD PRESSURE: 122 MMHG | DIASTOLIC BLOOD PRESSURE: 107 MMHG | BODY MASS INDEX: 18.26 KG/M2 | HEART RATE: 59 BPM | WEIGHT: 116.31 LBS

## 2023-10-06 DIAGNOSIS — R30.0 DYSURIA: Primary | ICD-10-CM

## 2023-10-06 LAB
BACTERIA #/AREA URNS AUTO: ABNORMAL /HPF
MICROSCOPIC COMMENT: ABNORMAL
RBC #/AREA URNS AUTO: 65 /HPF (ref 0–4)
WBC #/AREA URNS AUTO: >100 /HPF (ref 0–5)
WBC CLUMPS UR QL AUTO: ABNORMAL

## 2023-10-06 PROCEDURE — 87086 URINE CULTURE/COLONY COUNT: CPT | Performed by: NURSE PRACTITIONER

## 2023-10-06 PROCEDURE — 99215 OFFICE O/P EST HI 40 MIN: CPT | Mod: S$PBB,25,, | Performed by: NURSE PRACTITIONER

## 2023-10-06 PROCEDURE — 81001 URINALYSIS AUTO W/SCOPE: CPT | Performed by: NURSE PRACTITIONER

## 2023-10-06 PROCEDURE — 99999 PR PBB SHADOW E&M-EST. PATIENT-LVL III: ICD-10-PCS | Mod: PBBFAC,,, | Performed by: NURSE PRACTITIONER

## 2023-10-06 PROCEDURE — 87077 CULTURE AEROBIC IDENTIFY: CPT | Performed by: NURSE PRACTITIONER

## 2023-10-06 PROCEDURE — 51701 INSERT BLADDER CATHETER: CPT | Mod: S$PBB,,, | Performed by: NURSE PRACTITIONER

## 2023-10-06 PROCEDURE — 99215 PR OFFICE/OUTPT VISIT, EST, LEVL V, 40-54 MIN: ICD-10-PCS | Mod: S$PBB,25,, | Performed by: NURSE PRACTITIONER

## 2023-10-06 PROCEDURE — 87088 URINE BACTERIA CULTURE: CPT | Performed by: NURSE PRACTITIONER

## 2023-10-06 PROCEDURE — 51701 INSERT BLADDER CATHETER: CPT | Mod: PBBFAC | Performed by: NURSE PRACTITIONER

## 2023-10-06 PROCEDURE — 51701 PR INSERTION OF NON-INDWELLING BLADDER CATHETERIZATION FOR RESIDUAL UR: ICD-10-PCS | Mod: S$PBB,,, | Performed by: NURSE PRACTITIONER

## 2023-10-06 PROCEDURE — 99213 OFFICE O/P EST LOW 20 MIN: CPT | Mod: PBBFAC | Performed by: NURSE PRACTITIONER

## 2023-10-06 PROCEDURE — 99999 PR PBB SHADOW E&M-EST. PATIENT-LVL III: CPT | Mod: PBBFAC,,, | Performed by: NURSE PRACTITIONER

## 2023-10-06 PROCEDURE — 87186 SC STD MICRODIL/AGAR DIL: CPT | Performed by: NURSE PRACTITIONER

## 2023-10-06 RX ORDER — CEFPODOXIME PROXETIL 100 MG/1
100 TABLET, FILM COATED ORAL 2 TIMES DAILY
Qty: 14 TABLET | Refills: 0 | Status: ON HOLD | OUTPATIENT
Start: 2023-10-06 | End: 2023-10-14 | Stop reason: HOSPADM

## 2023-10-06 NOTE — PROGRESS NOTES
CHIEF COMPLAINT:    Mrs. Sorto is a 83 y.o. female presenting for No chief complaint on file.    .  PRESENTING ILLNESS:    Shima Sorto is a 83 y.o. female with a PMH of *** who presents for ***.     She reports a good urinary stream and complete bladder emptying***.  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       Family History   Problem Relation Age of Onset    Kidney disease Mother     Heart disease Father     Melanoma Father     Heart attack Father     Breast cancer Maternal Aunt     Hearing loss Son     Hyperlipidemia Son     Anesthesia problems Neg Hx     Malignant hypertension Neg Hx     Hypotension Neg Hx     Malignant hyperthermia Neg Hx     Pseudochol deficiency Neg Hx     Colon cancer Neg Hx     Ovarian cancer Neg Hx     Psoriasis Neg Hx     Lupus Neg Hx     Eczema Neg Hx     Acne Neg Hx     Esophageal cancer Neg Hx        Allergies:  Asparagus    Medications:    Current Outpatient Medications:     anastrozole (ARIMIDEX) 1 mg Tab, TAKE 1 TABLET(1 MG) BY MOUTH EVERY DAY, Disp: 90 tablet, Rfl: 3    coQ10, ubiquinol, 100 mg Cap, Take 100 mg by mouth once daily., Disp: , Rfl:     denosumab (PROLIA) 60 mg/mL Syrg, Inject 60 mg into the skin every 6 (six) months., Disp: , Rfl:     empagliflozin (JARDIANCE) 10 mg tablet, Take 1 tablet (10 mg total) by mouth once daily., Disp: 90 tablet, Rfl: 3    ferrous gluconate (FERGON) 324 MG tablet, Take 1 tablet (324 mg total) by mouth daily  with breakfast., Disp: 90 tablet, Rfl: 1    fish oil-E-fatty acid5-jujw251 400-5 mg-unit Cap, Take 1 capsule by mouth Daily., Disp: , Rfl:     furosemide (LASIX) 20 MG tablet, Take 2 tablets (40 mg total) by mouth once daily., Disp: 90 tablet, Rfl: 3    PRESERVISION AREDS-2 250-90-40-1 mg Cap, Take 1 tablet by mouth 2 (two) times daily., Disp: , Rfl:     spironolactone (ALDACTONE) 25 MG tablet, Take 1 tablet (25 mg total) by mouth once daily., Disp: 30 tablet, Rfl: 11    tamsulosin (FLOMAX) 0.4 mg Cap, Take 1 capsule (0.4 mg total) by mouth once daily., Disp: 30 capsule, Rfl: 11    UNABLE TO FIND, Take 1 tablet by mouth 3 (three) times daily. Rufus-Mag-Citrate with D3 & K2, Disp: , Rfl:     UNABLE TO FIND, Take 4 capsules by mouth Daily. Nutrafol, Disp: , Rfl:     valsartan (DIOVAN) 160 MG tablet, Take 1 tablet (160 mg total) by mouth once daily., Disp: 90 tablet, Rfl: 1    vitamin renal formula, B-complex-vitamin c-folic acid, (NEPHROCAPS) 1 mg Cap, Take 1 capsule by mouth once daily. (Patient taking differently: Take 1 capsule by mouth once daily. Taking Nature's Bounty equivalent), Disp: 90 capsule, Rfl: 3  No current facility-administered medications for this visit.    Facility-Administered Medications Ordered in Other Visits:     0.9%  NaCl infusion, , Intravenous, Continuous, James Carranza MD, Last Rate: 70 mL/hr at 08/17/18 0843, New Bag at 03/17/23 0719    PHYSICAL EXAMINATION:  ***    Physical Exam      LABS:    U/a dipstick performed in office today: ***    IMPRESSION:    No diagnosis found.    PLAN:    Problem List Items Addressed This Visit    None      1. ***    Roxy Meyers NP

## 2023-10-06 NOTE — PROGRESS NOTES
[facility] COMPLAINT:    Mrs. Sorto is a 83 y.o. female presenting for follow up    .  PRESENTING ILLNESS:    Shima Sorto is a 83 y.o. female with a PMH of htn, ckd 3, hx breast ca, sania who presents for follow up.     Established patient to urology department. Presents today for follow up.    Previously seen 8/11/23 for incomplete bladder emptying and possible uti.  Taught CIC due to incomplete bladder emptying.  Patient has been performing CIC twice daily as instructed.   However PVR remains elevated despite CIC. Instructed to increase catheterization to TID.  Having some lower abdominal discomfort and burning this am.  Will send urine for analysis and culture.    Recent kidney ultrasound noted 1 cm right kidney stone.  Of note recently had CT enterography which better modality for stones.  No obstructing stones noted on either imaging.  Can continue to monitor for now.    REVIEW OF SYSTEMS:    Review of Systems   Constitutional:  Positive for malaise/fatigue. Negative for chills and fever.   Respiratory:  Negative for shortness of breath.    Cardiovascular:  Negative for chest pain.   Gastrointestinal:  Positive for diarrhea. Negative for abdominal pain and constipation.   Genitourinary:  Positive for dysuria and frequency. Negative for flank pain, hematuria and urgency.   Neurological:  Negative for dizziness and weakness.        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       Family History   Problem Relation Age of Onset    Kidney  disease Mother     Heart disease Father     Melanoma Father     Heart attack Father     Breast cancer Maternal Aunt     Hearing loss Son     Hyperlipidemia Son     Anesthesia problems Neg Hx     Malignant hypertension Neg Hx     Hypotension Neg Hx     Malignant hyperthermia Neg Hx     Pseudochol deficiency Neg Hx     Colon cancer Neg Hx     Ovarian cancer Neg Hx     Psoriasis Neg Hx     Lupus Neg Hx     Eczema Neg Hx     Acne Neg Hx     Esophageal cancer Neg Hx        Allergies:  Asparagus    Medications:    Current Outpatient Medications:     anastrozole (ARIMIDEX) 1 mg Tab, TAKE 1 TABLET(1 MG) BY MOUTH EVERY DAY, Disp: 90 tablet, Rfl: 3    cefpodoxime (VANTIN) 100 MG tablet, Take 1 tablet (100 mg total) by mouth 2 (two) times daily. for 7 days, Disp: 14 tablet, Rfl: 0    coQ10, ubiquinol, 100 mg Cap, Take 100 mg by mouth once daily., Disp: , Rfl:     denosumab (PROLIA) 60 mg/mL Syrg, Inject 60 mg into the skin every 6 (six) months., Disp: , Rfl:     empagliflozin (JARDIANCE) 10 mg tablet, Take 1 tablet (10 mg total) by mouth once daily., Disp: 90 tablet, Rfl: 3    ferrous gluconate (FERGON) 324 MG tablet, Take 1 tablet (324 mg total) by mouth daily with breakfast., Disp: 90 tablet, Rfl: 1    fish oil-E-fatty [id number] 400-5 mg-unit Cap, Take 1 capsule by mouth Daily., Disp: , Rfl:     furosemide (LASIX) 20 MG tablet, Take 2 tablets (40 mg total) by mouth once daily., Disp: 90 tablet, Rfl: 3    PRESERVISION AREDS-2 250-90-40-1 mg Cap, Take 1 tablet by mouth 2 (two) times daily., Disp: , Rfl:     spironolactone (ALDACTONE) 25 MG tablet, Take 1 tablet (25 mg total) by mouth once daily., Disp: 30 tablet, Rfl: 11    tamsulosin (FLOMAX) 0.4 mg Cap, Take 1 capsule (0.4 mg total) by mouth once daily., Disp: 30 capsule, Rfl: 11    UNABLE TO FIND, Take 1 tablet by mouth 3 (three) times daily. Rufus-Mag-Citrate with D3 & K2, Disp: , Rfl:     UNABLE TO FIND, Take 4 capsules by mouth Daily. Nutrafol, Disp: , Rfl:      valsartan (DIOVAN) 160 MG tablet, Take 1 tablet (160 mg total) by mouth once daily., Disp: 90 tablet, Rfl: 1    vitamin renal formula, B-complex-vitamin c-folic acid, (NEPHROCAPS) 1 mg Cap, Take 1 capsule by mouth once daily. (Patient taking differently: Take 1 capsule by mouth once daily. Taking Nature's Bounty equivalent), Disp: 90 capsule, Rfl: 3  No current facility-administered medications for this visit.    Facility-Administered Medications Ordered in Other Visits:     0.9%  NaCl infusion, , Intravenous, Continuous, James Carranza MD, Last Rate: 70 mL/hr at 08/17/18 0843, New Bag at 03/17/23 0719    PHYSICAL EXAMINATION:    Physical Exam  Vitals and nursing note reviewed. Exam conducted with a chaperone present.   Constitutional:       Appearance: Normal appearance. She is well-developed.   HENT:      Head: Normocephalic and atraumatic.   Eyes:      Pupils: Pupils are equal, round, and reactive to light.   Pulmonary:      Effort: Pulmonary effort is normal.   Genitourinary:     General: Normal vulva.      Exam position: Supine.      Urethra: No prolapse, urethral pain, urethral swelling or urethral lesion.      Vagina: Normal.      Rectum: Normal.       Musculoskeletal:         General: Normal range of motion.      Cervical back: Normal range of motion.   Skin:     General: Skin is warm and dry.   Neurological:      Mental Status: She is alert and oriented to person, place, and time.   Psychiatric:         Behavior: Behavior normal.        Consent verbally obtained.  Betadine prep was applied to the urethral meatus. An in and out cath was performed after voiding.  The PVR was 550 ml.       LABS:    IMPRESSION:    No diagnosis found.        PLAN:    Problem List Items Addressed This Visit    None          1. Bladder distention/incomplete bladder emptying    - PVR approximately > 500 ml   - self cath 10 Fr three times daily indefinitely    - continue flomax daily     2. Painful catheterization  - send urine  for analysis and culture  - treat empirically with antibiotics, may need to tailor once culture resulted    3.  Caruncle  Unable to use estrace cream due to history of breast cancer  Can use olive/coconut oil for moisterizer    4.  Rtc in 3 months    Roxy Meyers NP        I spent over 45 minutes with the patient. Over 50% of the visit was spent in counseling.           (3) no apparent problem

## 2023-10-09 ENCOUNTER — HOSPITAL ENCOUNTER (INPATIENT)
Facility: HOSPITAL | Age: 83
LOS: 4 days | Discharge: HOME HEALTH | DRG: 641 | End: 2023-10-14
Attending: STUDENT IN AN ORGANIZED HEALTH CARE EDUCATION/TRAINING PROGRAM | Admitting: STUDENT IN AN ORGANIZED HEALTH CARE EDUCATION/TRAINING PROGRAM
Payer: MEDICARE

## 2023-10-09 ENCOUNTER — TELEPHONE (OUTPATIENT)
Dept: UROLOGY | Facility: CLINIC | Age: 83
End: 2023-10-09
Payer: MEDICARE

## 2023-10-09 DIAGNOSIS — N18.32 STAGE 3B CHRONIC KIDNEY DISEASE: Chronic | ICD-10-CM

## 2023-10-09 DIAGNOSIS — R07.9 CHEST PAIN: ICD-10-CM

## 2023-10-09 DIAGNOSIS — N17.9 AKI (ACUTE KIDNEY INJURY): ICD-10-CM

## 2023-10-09 DIAGNOSIS — R33.9 URINARY RETENTION: ICD-10-CM

## 2023-10-09 DIAGNOSIS — I10 HYPERTENSION, UNSPECIFIED TYPE: ICD-10-CM

## 2023-10-09 DIAGNOSIS — N30.00 ACUTE CYSTITIS WITHOUT HEMATURIA: Primary | ICD-10-CM

## 2023-10-09 DIAGNOSIS — E87.1 HYPONATREMIA: ICD-10-CM

## 2023-10-09 DIAGNOSIS — J47.9 BRONCHIECTASIS WITHOUT COMPLICATION: ICD-10-CM

## 2023-10-09 DIAGNOSIS — R42 DIZZINESS: ICD-10-CM

## 2023-10-09 LAB
ALBUMIN SERPL BCP-MCNC: 3.9 G/DL (ref 3.5–5.2)
ALBUMIN SERPL BCP-MCNC: 4 G/DL (ref 3.5–5.2)
ALP SERPL-CCNC: 57 U/L (ref 55–135)
ALP SERPL-CCNC: 62 U/L (ref 55–135)
ALT SERPL W/O P-5'-P-CCNC: 31 U/L (ref 10–44)
ALT SERPL W/O P-5'-P-CCNC: 36 U/L (ref 10–44)
ANION GAP SERPL CALC-SCNC: 11 MMOL/L (ref 8–16)
ANION GAP SERPL CALC-SCNC: 12 MMOL/L (ref 8–16)
AST SERPL-CCNC: 47 U/L (ref 10–40)
AST SERPL-CCNC: 56 U/L (ref 10–40)
BACTERIA UR CULT: ABNORMAL
BASOPHILS # BLD AUTO: 0.01 K/UL (ref 0–0.2)
BASOPHILS # BLD AUTO: 0.02 K/UL (ref 0–0.2)
BASOPHILS NFR BLD: 0.2 % (ref 0–1.9)
BASOPHILS NFR BLD: 0.3 % (ref 0–1.9)
BILIRUB SERPL-MCNC: 1 MG/DL (ref 0.1–1)
BILIRUB SERPL-MCNC: 1 MG/DL (ref 0.1–1)
BUN SERPL-MCNC: 38 MG/DL (ref 8–23)
BUN SERPL-MCNC: 39 MG/DL (ref 8–23)
CALCIUM SERPL-MCNC: 10 MG/DL (ref 8.7–10.5)
CALCIUM SERPL-MCNC: 9.9 MG/DL (ref 8.7–10.5)
CHLORIDE SERPL-SCNC: 85 MMOL/L (ref 95–110)
CHLORIDE SERPL-SCNC: 85 MMOL/L (ref 95–110)
CO2 SERPL-SCNC: 22 MMOL/L (ref 23–29)
CO2 SERPL-SCNC: 23 MMOL/L (ref 23–29)
CREAT SERPL-MCNC: 1.5 MG/DL (ref 0.5–1.4)
CREAT SERPL-MCNC: 1.5 MG/DL (ref 0.5–1.4)
DIFFERENTIAL METHOD: ABNORMAL
DIFFERENTIAL METHOD: ABNORMAL
EOSINOPHIL # BLD AUTO: 0 K/UL (ref 0–0.5)
EOSINOPHIL # BLD AUTO: 0 K/UL (ref 0–0.5)
EOSINOPHIL NFR BLD: 0.2 % (ref 0–8)
EOSINOPHIL NFR BLD: 0.7 % (ref 0–8)
ERYTHROCYTE [DISTWIDTH] IN BLOOD BY AUTOMATED COUNT: 12.6 % (ref 11.5–14.5)
ERYTHROCYTE [DISTWIDTH] IN BLOOD BY AUTOMATED COUNT: 12.7 % (ref 11.5–14.5)
EST. GFR  (NO RACE VARIABLE): 34.4 ML/MIN/1.73 M^2
EST. GFR  (NO RACE VARIABLE): 34.4 ML/MIN/1.73 M^2
GLUCOSE SERPL-MCNC: 107 MG/DL (ref 70–110)
GLUCOSE SERPL-MCNC: 109 MG/DL (ref 70–110)
HCT VFR BLD AUTO: 32 % (ref 37–48.5)
HCT VFR BLD AUTO: 33.3 % (ref 37–48.5)
HGB BLD-MCNC: 11.8 G/DL (ref 12–16)
HGB BLD-MCNC: 11.9 G/DL (ref 12–16)
IMM GRANULOCYTES # BLD AUTO: 0.02 K/UL (ref 0–0.04)
IMM GRANULOCYTES # BLD AUTO: 0.03 K/UL (ref 0–0.04)
IMM GRANULOCYTES NFR BLD AUTO: 0.3 % (ref 0–0.5)
IMM GRANULOCYTES NFR BLD AUTO: 0.5 % (ref 0–0.5)
LIPASE SERPL-CCNC: 49 U/L (ref 4–60)
LYMPHOCYTES # BLD AUTO: 0.7 K/UL (ref 1–4.8)
LYMPHOCYTES # BLD AUTO: 0.7 K/UL (ref 1–4.8)
LYMPHOCYTES NFR BLD: 11.1 % (ref 18–48)
LYMPHOCYTES NFR BLD: 11.3 % (ref 18–48)
MCH RBC QN AUTO: 31.1 PG (ref 27–31)
MCH RBC QN AUTO: 31.5 PG (ref 27–31)
MCHC RBC AUTO-ENTMCNC: 35.7 G/DL (ref 32–36)
MCHC RBC AUTO-ENTMCNC: 36.9 G/DL (ref 32–36)
MCV RBC AUTO: 85 FL (ref 82–98)
MCV RBC AUTO: 87 FL (ref 82–98)
MONOCYTES # BLD AUTO: 0.3 K/UL (ref 0.3–1)
MONOCYTES # BLD AUTO: 0.4 K/UL (ref 0.3–1)
MONOCYTES NFR BLD: 5.3 % (ref 4–15)
MONOCYTES NFR BLD: 5.9 % (ref 4–15)
NEUTROPHILS # BLD AUTO: 5 K/UL (ref 1.8–7.7)
NEUTROPHILS # BLD AUTO: 5 K/UL (ref 1.8–7.7)
NEUTROPHILS NFR BLD: 81.4 % (ref 38–73)
NEUTROPHILS NFR BLD: 82.8 % (ref 38–73)
NRBC BLD-RTO: 0 /100 WBC
NRBC BLD-RTO: 0 /100 WBC
PLATELET # BLD AUTO: 153 K/UL (ref 150–450)
PLATELET # BLD AUTO: 168 K/UL (ref 150–450)
PLATELET BLD QL SMEAR: ABNORMAL
PMV BLD AUTO: 11 FL (ref 9.2–12.9)
PMV BLD AUTO: 11.4 FL (ref 9.2–12.9)
POTASSIUM SERPL-SCNC: 4.8 MMOL/L (ref 3.5–5.1)
POTASSIUM SERPL-SCNC: 5 MMOL/L (ref 3.5–5.1)
PROT SERPL-MCNC: 7.5 G/DL (ref 6–8.4)
PROT SERPL-MCNC: 8.1 G/DL (ref 6–8.4)
RBC # BLD AUTO: 3.75 M/UL (ref 4–5.4)
RBC # BLD AUTO: 3.83 M/UL (ref 4–5.4)
SODIUM SERPL-SCNC: 118 MMOL/L (ref 136–145)
SODIUM SERPL-SCNC: 120 MMOL/L (ref 136–145)
TROPONIN I SERPL DL<=0.01 NG/ML-MCNC: 0.01 NG/ML (ref 0–0.03)
WBC # BLD AUTO: 6.01 K/UL (ref 3.9–12.7)
WBC # BLD AUTO: 6.1 K/UL (ref 3.9–12.7)
WBC TOXIC VACUOLES BLD QL SMEAR: PRESENT

## 2023-10-09 PROCEDURE — 51702 INSERT TEMP BLADDER CATH: CPT

## 2023-10-09 PROCEDURE — 87077 CULTURE AEROBIC IDENTIFY: CPT | Performed by: EMERGENCY MEDICINE

## 2023-10-09 PROCEDURE — 87086 URINE CULTURE/COLONY COUNT: CPT | Performed by: EMERGENCY MEDICINE

## 2023-10-09 PROCEDURE — 85025 COMPLETE CBC W/AUTO DIFF WBC: CPT

## 2023-10-09 PROCEDURE — 87186 SC STD MICRODIL/AGAR DIL: CPT | Performed by: EMERGENCY MEDICINE

## 2023-10-09 PROCEDURE — 63600175 PHARM REV CODE 636 W HCPCS

## 2023-10-09 PROCEDURE — 93010 ELECTROCARDIOGRAM REPORT: CPT | Mod: ,,, | Performed by: INTERNAL MEDICINE

## 2023-10-09 PROCEDURE — 96374 THER/PROPH/DIAG INJ IV PUSH: CPT

## 2023-10-09 PROCEDURE — 87088 URINE BACTERIA CULTURE: CPT | Performed by: EMERGENCY MEDICINE

## 2023-10-09 PROCEDURE — 80053 COMPREHEN METABOLIC PANEL: CPT | Mod: 91 | Performed by: EMERGENCY MEDICINE

## 2023-10-09 PROCEDURE — 85025 COMPLETE CBC W/AUTO DIFF WBC: CPT | Mod: 91 | Performed by: EMERGENCY MEDICINE

## 2023-10-09 PROCEDURE — 93005 ELECTROCARDIOGRAM TRACING: CPT

## 2023-10-09 PROCEDURE — 80053 COMPREHEN METABOLIC PANEL: CPT

## 2023-10-09 PROCEDURE — 84484 ASSAY OF TROPONIN QUANT: CPT | Performed by: EMERGENCY MEDICINE

## 2023-10-09 PROCEDURE — 93010 EKG 12-LEAD: ICD-10-PCS | Mod: ,,, | Performed by: INTERNAL MEDICINE

## 2023-10-09 PROCEDURE — 81001 URINALYSIS AUTO W/SCOPE: CPT | Performed by: EMERGENCY MEDICINE

## 2023-10-09 PROCEDURE — 83690 ASSAY OF LIPASE: CPT | Performed by: EMERGENCY MEDICINE

## 2023-10-09 PROCEDURE — 99285 EMERGENCY DEPT VISIT HI MDM: CPT | Mod: 25

## 2023-10-09 RX ORDER — KETOROLAC TROMETHAMINE 30 MG/ML
15 INJECTION, SOLUTION INTRAMUSCULAR; INTRAVENOUS
Status: COMPLETED | OUTPATIENT
Start: 2023-10-09 | End: 2023-10-09

## 2023-10-09 RX ADMIN — KETOROLAC TROMETHAMINE 15 MG: 30 INJECTION INTRAMUSCULAR; INTRAVENOUS at 10:10

## 2023-10-09 NOTE — TELEPHONE ENCOUNTER
"Pt complaining of multiple things, vomiting, diarrhea, room spinning, weakness, inability to get out of bed. Will contact a friend to get her to the ED, asking me multiple times "if this could be due to her heart".  I did advise that she hold off on any medications at this time, til checked out.  "

## 2023-10-09 NOTE — TELEPHONE ENCOUNTER
----- Message from Deepika Gaines LPN sent at 10/9/2023  4:23 PM CDT -----  Regarding: FW: greg  Contact: @[phone]    ----- Message -----  From: Abram Finn  Sent: 10/9/2023  10:45 AM CDT  To: [facility]ILL[facility] Staff  Subject: adv                                              Pt calling In regards to reaction she is having she believes towards cefpodoxime (VANTIN) 100 MG tablet would like to speak with provider ..... Pls call and adv@[phone]

## 2023-10-09 NOTE — FIRST PROVIDER EVALUATION
"Medical screening examination initiated.  I have conducted a focused provider triage encounter, findings are as follows:    Brief history of present illness:  Pt states taking abx for UTI.  She  is worried she maybe  having a reaction to the medicine.  Has had dizziness, nausea/vomiting, diarrhea, headache.  Feeling of vertigo.      Vitals:    10/09/23 1848   BP: (!) 140/63   Pulse: 87   Resp: 16   Temp: 97.7 °F (36.5 °C)   TempSrc: Oral   SpO2: 99%   Weight: 50.8 kg (112 lb)   Height: 5' 7" (1.702 m)       Pertinent physical exam:  Able to walk independently, no obvious focal deficits noted.    Brief workup plan:  Screening labs, EKG to start with.  Given complaint of headache and vomiting will also CT head given age.  Further eval per primary ED team.    Preliminary workup initiated; this workup will be continued and followed by the physician or advanced practice provider that is assigned to the patient when roomed.  "

## 2023-10-10 PROBLEM — C50.411 MALIGNANT NEOPLASM OF UPPER-OUTER QUADRANT OF RIGHT BREAST IN FEMALE, ESTROGEN RECEPTOR POSITIVE: Chronic | Status: ACTIVE | Noted: 2018-08-05

## 2023-10-10 PROBLEM — N18.30 CKD (CHRONIC KIDNEY DISEASE) STAGE 3, GFR 30-59 ML/MIN: Chronic | Status: ACTIVE | Noted: 2018-09-12

## 2023-10-10 PROBLEM — R11.0 NAUSEA: Status: ACTIVE | Noted: 2023-10-10

## 2023-10-10 PROBLEM — Z17.0 MALIGNANT NEOPLASM OF UPPER-OUTER QUADRANT OF RIGHT BREAST IN FEMALE, ESTROGEN RECEPTOR POSITIVE: Chronic | Status: ACTIVE | Noted: 2018-08-05

## 2023-10-10 PROBLEM — I50.30 ACC/AHA STAGE C HEART FAILURE WITH PRESERVED EJECTION FRACTION: Chronic | Status: ACTIVE | Noted: 2023-08-09

## 2023-10-10 PROBLEM — N30.00 ACUTE CYSTITIS: Status: ACTIVE | Noted: 2023-10-10

## 2023-10-10 PROBLEM — E87.1 HYPONATREMIA: Status: ACTIVE | Noted: 2023-10-10

## 2023-10-10 LAB
ALBUMIN SERPL BCP-MCNC: 3.6 G/DL (ref 3.5–5.2)
ALP SERPL-CCNC: 56 U/L (ref 55–135)
ALT SERPL W/O P-5'-P-CCNC: 30 U/L (ref 10–44)
ANION GAP SERPL CALC-SCNC: 9 MMOL/L (ref 8–16)
AST SERPL-CCNC: 44 U/L (ref 10–40)
BACTERIA #/AREA URNS AUTO: ABNORMAL /HPF
BASOPHILS # BLD AUTO: 0.01 K/UL (ref 0–0.2)
BASOPHILS NFR BLD: 0.2 % (ref 0–1.9)
BILIRUB SERPL-MCNC: 0.8 MG/DL (ref 0.1–1)
BILIRUB UR QL STRIP: NEGATIVE
BUN SERPL-MCNC: 34 MG/DL (ref 8–23)
CALCIUM SERPL-MCNC: 9.5 MG/DL (ref 8.7–10.5)
CHLORIDE SERPL-SCNC: 87 MMOL/L (ref 95–110)
CHOLEST SERPL-MCNC: 164 MG/DL (ref 120–199)
CHOLEST/HDLC SERPL: 2.4 {RATIO} (ref 2–5)
CLARITY UR REFRACT.AUTO: ABNORMAL
CO2 SERPL-SCNC: 25 MMOL/L (ref 23–29)
COLOR UR AUTO: YELLOW
CREAT SERPL-MCNC: 1.2 MG/DL (ref 0.5–1.4)
DIFFERENTIAL METHOD: ABNORMAL
EOSINOPHIL # BLD AUTO: 0 K/UL (ref 0–0.5)
EOSINOPHIL NFR BLD: 0.4 % (ref 0–8)
ERYTHROCYTE [DISTWIDTH] IN BLOOD BY AUTOMATED COUNT: 12.7 % (ref 11.5–14.5)
EST. GFR  (NO RACE VARIABLE): 44.9 ML/MIN/1.73 M^2
GLUCOSE SERPL-MCNC: 92 MG/DL (ref 70–110)
GLUCOSE UR QL STRIP: NEGATIVE
HCT VFR BLD AUTO: 33 % (ref 37–48.5)
HDLC SERPL-MCNC: 68 MG/DL (ref 40–75)
HDLC SERPL: 41.5 % (ref 20–50)
HGB BLD-MCNC: 11.9 G/DL (ref 12–16)
HGB UR QL STRIP: ABNORMAL
HYALINE CASTS UR QL AUTO: 3 /LPF
IMM GRANULOCYTES # BLD AUTO: 0.02 K/UL (ref 0–0.04)
IMM GRANULOCYTES NFR BLD AUTO: 0.4 % (ref 0–0.5)
KETONES UR QL STRIP: NEGATIVE
LDLC SERPL CALC-MCNC: 88.6 MG/DL (ref 63–159)
LEUKOCYTE ESTERASE UR QL STRIP: ABNORMAL
LYMPHOCYTES # BLD AUTO: 0.8 K/UL (ref 1–4.8)
LYMPHOCYTES NFR BLD: 15.3 % (ref 18–48)
MAGNESIUM SERPL-MCNC: 2.2 MG/DL (ref 1.6–2.6)
MCH RBC QN AUTO: 31.2 PG (ref 27–31)
MCHC RBC AUTO-ENTMCNC: 36.1 G/DL (ref 32–36)
MCV RBC AUTO: 87 FL (ref 82–98)
MICROSCOPIC COMMENT: ABNORMAL
MONOCYTES # BLD AUTO: 0.5 K/UL (ref 0.3–1)
MONOCYTES NFR BLD: 9.3 % (ref 4–15)
NEUTROPHILS # BLD AUTO: 3.7 K/UL (ref 1.8–7.7)
NEUTROPHILS NFR BLD: 74.4 % (ref 38–73)
NITRITE UR QL STRIP: NEGATIVE
NONHDLC SERPL-MCNC: 96 MG/DL
NRBC BLD-RTO: 0 /100 WBC
OSMOLALITY SERPL: 278 MOSM/KG (ref 275–295)
OSMOLALITY UR: 188 MOSM/KG (ref 50–1200)
PH UR STRIP: 6 [PH] (ref 5–8)
PHOSPHATE SERPL-MCNC: 3.7 MG/DL (ref 2.7–4.5)
PLATELET # BLD AUTO: 149 K/UL (ref 150–450)
PMV BLD AUTO: 10.9 FL (ref 9.2–12.9)
POTASSIUM SERPL-SCNC: 4.7 MMOL/L (ref 3.5–5.1)
PROT SERPL-MCNC: 7 G/DL (ref 6–8.4)
PROT UR QL STRIP: ABNORMAL
RBC # BLD AUTO: 3.81 M/UL (ref 4–5.4)
RBC #/AREA URNS AUTO: 67 /HPF (ref 0–4)
SODIUM SERPL-SCNC: 121 MMOL/L (ref 136–145)
SODIUM SERPL-SCNC: 123 MMOL/L (ref 136–145)
SODIUM SERPL-SCNC: 123 MMOL/L (ref 136–145)
SODIUM SERPL-SCNC: 127 MMOL/L (ref 136–145)
SODIUM UR-SCNC: 24 MMOL/L (ref 20–250)
SP GR UR STRIP: 1.01 (ref 1–1.03)
TRIGL SERPL-MCNC: 37 MG/DL (ref 30–150)
URN SPEC COLLECT METH UR: ABNORMAL
WBC # BLD AUTO: 4.97 K/UL (ref 3.9–12.7)
WBC #/AREA URNS AUTO: >100 /HPF (ref 0–5)
WBC CLUMPS UR QL AUTO: ABNORMAL

## 2023-10-10 PROCEDURE — 25000003 PHARM REV CODE 250

## 2023-10-10 PROCEDURE — 25000003 PHARM REV CODE 250: Performed by: STUDENT IN AN ORGANIZED HEALTH CARE EDUCATION/TRAINING PROGRAM

## 2023-10-10 PROCEDURE — 80053 COMPREHEN METABOLIC PANEL: CPT

## 2023-10-10 PROCEDURE — 84300 ASSAY OF URINE SODIUM: CPT

## 2023-10-10 PROCEDURE — 99223 PR INITIAL HOSPITAL CARE,LEVL III: ICD-10-PCS | Mod: AI,GC,, | Performed by: STUDENT IN AN ORGANIZED HEALTH CARE EDUCATION/TRAINING PROGRAM

## 2023-10-10 PROCEDURE — 83735 ASSAY OF MAGNESIUM: CPT

## 2023-10-10 PROCEDURE — 80061 LIPID PANEL: CPT

## 2023-10-10 PROCEDURE — 63600175 PHARM REV CODE 636 W HCPCS

## 2023-10-10 PROCEDURE — 83930 ASSAY OF BLOOD OSMOLALITY: CPT

## 2023-10-10 PROCEDURE — 83935 ASSAY OF URINE OSMOLALITY: CPT

## 2023-10-10 PROCEDURE — 85025 COMPLETE CBC W/AUTO DIFF WBC: CPT

## 2023-10-10 PROCEDURE — 84295 ASSAY OF SERUM SODIUM: CPT | Mod: 91

## 2023-10-10 PROCEDURE — 12000002 HC ACUTE/MED SURGE SEMI-PRIVATE ROOM

## 2023-10-10 PROCEDURE — 36415 COLL VENOUS BLD VENIPUNCTURE: CPT

## 2023-10-10 PROCEDURE — 84100 ASSAY OF PHOSPHORUS: CPT

## 2023-10-10 PROCEDURE — 99223 1ST HOSP IP/OBS HIGH 75: CPT | Mod: AI,GC,, | Performed by: STUDENT IN AN ORGANIZED HEALTH CARE EDUCATION/TRAINING PROGRAM

## 2023-10-10 PROCEDURE — 63600175 PHARM REV CODE 636 W HCPCS: Performed by: STUDENT IN AN ORGANIZED HEALTH CARE EDUCATION/TRAINING PROGRAM

## 2023-10-10 PROCEDURE — 87040 BLOOD CULTURE FOR BACTERIA: CPT | Performed by: STUDENT IN AN ORGANIZED HEALTH CARE EDUCATION/TRAINING PROGRAM

## 2023-10-10 RX ORDER — SPIRONOLACTONE 25 MG/1
25 TABLET ORAL DAILY
Status: DISCONTINUED | OUTPATIENT
Start: 2023-10-10 | End: 2023-10-11

## 2023-10-10 RX ORDER — VALSARTAN 160 MG/1
160 TABLET ORAL DAILY
Status: DISCONTINUED | OUTPATIENT
Start: 2023-10-10 | End: 2023-10-11

## 2023-10-10 RX ORDER — IBUPROFEN 200 MG
24 TABLET ORAL
Status: DISCONTINUED | OUTPATIENT
Start: 2023-10-10 | End: 2023-10-15 | Stop reason: HOSPADM

## 2023-10-10 RX ORDER — ENOXAPARIN SODIUM 100 MG/ML
30 INJECTION SUBCUTANEOUS EVERY 24 HOURS
Status: DISCONTINUED | OUTPATIENT
Start: 2023-10-10 | End: 2023-10-12

## 2023-10-10 RX ORDER — DEXTROSE MONOHYDRATE 50 MG/ML
INJECTION, SOLUTION INTRAVENOUS CONTINUOUS
Status: DISCONTINUED | OUTPATIENT
Start: 2023-10-10 | End: 2023-10-10

## 2023-10-10 RX ORDER — NALOXONE HCL 0.4 MG/ML
0.02 VIAL (ML) INJECTION
Status: DISCONTINUED | OUTPATIENT
Start: 2023-10-10 | End: 2023-10-15 | Stop reason: HOSPADM

## 2023-10-10 RX ORDER — ACETAMINOPHEN 325 MG/1
650 TABLET ORAL EVERY 4 HOURS PRN
Status: DISCONTINUED | OUTPATIENT
Start: 2023-10-10 | End: 2023-10-15 | Stop reason: HOSPADM

## 2023-10-10 RX ORDER — ANASTROZOLE 1 MG/1
1 TABLET ORAL DAILY
Status: DISCONTINUED | OUTPATIENT
Start: 2023-10-10 | End: 2023-10-15 | Stop reason: HOSPADM

## 2023-10-10 RX ORDER — MUPIROCIN 20 MG/G
OINTMENT TOPICAL 2 TIMES DAILY
Status: DISCONTINUED | OUTPATIENT
Start: 2023-10-10 | End: 2023-10-15 | Stop reason: HOSPADM

## 2023-10-10 RX ORDER — IBUPROFEN 200 MG
16 TABLET ORAL
Status: DISCONTINUED | OUTPATIENT
Start: 2023-10-10 | End: 2023-10-15 | Stop reason: HOSPADM

## 2023-10-10 RX ORDER — ACETAMINOPHEN 500 MG
1000 TABLET ORAL EVERY 8 HOURS PRN
Status: DISCONTINUED | OUTPATIENT
Start: 2023-10-10 | End: 2023-10-12

## 2023-10-10 RX ORDER — ONDANSETRON 2 MG/ML
4 INJECTION INTRAMUSCULAR; INTRAVENOUS EVERY 6 HOURS PRN
Status: DISCONTINUED | OUTPATIENT
Start: 2023-10-10 | End: 2023-10-15 | Stop reason: HOSPADM

## 2023-10-10 RX ORDER — GLUCAGON 1 MG
1 KIT INJECTION
Status: DISCONTINUED | OUTPATIENT
Start: 2023-10-10 | End: 2023-10-15 | Stop reason: HOSPADM

## 2023-10-10 RX ORDER — SODIUM CHLORIDE 0.9 % (FLUSH) 0.9 %
10 SYRINGE (ML) INJECTION EVERY 12 HOURS PRN
Status: DISCONTINUED | OUTPATIENT
Start: 2023-10-10 | End: 2023-10-15 | Stop reason: HOSPADM

## 2023-10-10 RX ORDER — DEXTROMETHORPHAN HYDROBROMIDE, GUAIFENESIN 5; 100 MG/5ML; MG/5ML
1300 LIQUID ORAL 2 TIMES DAILY PRN
COMMUNITY

## 2023-10-10 RX ORDER — TAMSULOSIN HYDROCHLORIDE 0.4 MG/1
0.4 CAPSULE ORAL DAILY
Status: DISCONTINUED | OUTPATIENT
Start: 2023-10-10 | End: 2023-10-12

## 2023-10-10 RX ADMIN — SPIRONOLACTONE 25 MG: 25 TABLET ORAL at 09:10

## 2023-10-10 RX ADMIN — ANASTROZOLE 1 MG: 1 TABLET, COATED ORAL at 10:10

## 2023-10-10 RX ADMIN — TAMSULOSIN HYDROCHLORIDE 0.4 MG: 0.4 CAPSULE ORAL at 09:10

## 2023-10-10 RX ADMIN — ACETAMINOPHEN 1000 MG: 500 TABLET ORAL at 05:10

## 2023-10-10 RX ADMIN — ONDANSETRON 4 MG: 2 INJECTION INTRAMUSCULAR; INTRAVENOUS at 05:10

## 2023-10-10 RX ADMIN — CEFTRIAXONE 1 G: 1 INJECTION, POWDER, FOR SOLUTION INTRAMUSCULAR; INTRAVENOUS at 02:10

## 2023-10-10 RX ADMIN — ENOXAPARIN SODIUM 30 MG: 30 INJECTION SUBCUTANEOUS at 05:10

## 2023-10-10 RX ADMIN — MUPIROCIN: 20 OINTMENT TOPICAL at 11:10

## 2023-10-10 RX ADMIN — DEXTROSE: 5 SOLUTION INTRAVENOUS at 04:10

## 2023-10-10 RX ADMIN — VALSARTAN 160 MG: 160 TABLET, FILM COATED ORAL at 09:10

## 2023-10-10 RX ADMIN — MUPIROCIN: 20 OINTMENT TOPICAL at 09:10

## 2023-10-10 NOTE — ASSESSMENT & PLAN NOTE
Patient with ER+ breast cancer diagnosed in 2017, s/p lumpectomy, now on hormone therapy.    - Continue home anastrozole

## 2023-10-10 NOTE — ASSESSMENT & PLAN NOTE
"83F with history of HFpEF, CKD, breast cancer, who presents with 1 day of intractable nausea with right flank pain. Patient found to be hyponatremic 118, but Na levels as high as 131 less than 1 month ago, and prior to that her sodium levels were generally normal. Hyponatremia may be contributing to patient's persistent nausea as well as feelings of lightheadedness. Unclear etiology, however, patient does not appear extremely volume down on exam, and does not have elements in her history that would lead one to suspect a "tea or toast" diet or polydipsia. Possibly more likely an SIADH picture.    Plan:  - Daily Na levels  - Serum osmolality, urine osm, and urine sodium to assess for etiology of new-onset hyponatremia  - 1L fluid restriction for the time being  - Consider Nephrology consult if patient's Na does not correct with fluid restriction and/or salt tabs.  "

## 2023-10-10 NOTE — SUBJECTIVE & OBJECTIVE
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       Past Surgical History:   Procedure Laterality Date    BREAST CYST ASPIRATION Right 1999    BREAST LUMPECTOMY Right 2018    with radiation    COLONOSCOPY N/A 7/24/2018    Procedure: COLONOSCOPY;  Surgeon: Juan Miguel Pena MD;  Location: [facility] ENDO (4TH FLR);  Service: Endoscopy;  Laterality: N/A;    COLONOSCOPY N/A 5/10/2023    Procedure: COLONOSCOPY;  Surgeon: Keven Garza MD;  Location: [facility] (4TH FLR);  Service: Endoscopy;  Laterality: N/A;  *Pending C-Diff*  Request Greg  procedure order telephone encounter 5/1  PEG prep, instructions portal -LW  5/4/23 no answer for precall/mleone lpn  5/9lm    ESOPHAGOGASTRODUODENOSCOPY N/A 3/17/2023    Procedure: EGD (ESOPHAGOGASTRODUODENOSCOPY);  Surgeon: Keven Garza MD;  Location: [facility] (4TH FLR);  Service: Endoscopy;  Laterality: N/A;  Medically Urgent  3/2 instructions to portal-st    pre call attempted, no answer from pt 3/13/23 -egh    INJECTION FOR SENTINEL NODE IDENTIFICATION Right 8/17/2018    Procedure: INJECTION, FOR SENTINEL NODE IDENTIFICATION;  Surgeon: Abby Mason MD;  Location: [facility] OR [address] [place] [address];  Service: General;  Laterality: Right;    interstim placed stage 1      and removed    KIDNEY STONE SURGERY  2000    @ [facility], [place]    MASTECTOMY, PARTIAL Right 8/17/2018    Procedure: MASTECTOMY, PARTIAL-US guided;  Surgeon: Abby Mason MD;  Location: [facility] OR [address] [place] [address];  Service: General;  Laterality: Right;    MOHS procedure      SENTINEL LYMPH NODE  BIOPSY Right 8/17/2018    Procedure: BIOPSY, LYMPH NODE, SENTINEL;  Surgeon: Abby Mason MD;  Location: [facility] OR [address] [place] [address];  Service: General;  Laterality: Right;       Review of patient's allergies indicates:   Allergen Reactions    Asparagus Rash       Current Facility-Administered Medications on File Prior to Encounter   Medication    0.9%  NaCl infusion     Current Outpatient Medications on File Prior to Encounter   Medication Sig    anastrozole (ARIMIDEX) 1 mg Tab TAKE 1 TABLET(1 MG) BY MOUTH EVERY DAY    cefpodoxime (VANTIN) 100 MG tablet Take 1 tablet (100 mg total) by mouth 2 (two) times daily. for 7 days    coQ10, ubiquinol, 100 mg Cap Take 100 mg by mouth once daily.    ferrous gluconate (FERGON) 324 MG tablet Take 1 tablet (324 mg total) by mouth daily with breakfast.    furosemide (LASIX) 20 MG tablet Take 2 tablets (40 mg total) by mouth once daily.    spironolactone (ALDACTONE) 25 MG tablet Take 1 tablet (25 mg total) by mouth once daily.    tamsulosin (FLOMAX) 0.4 mg Cap Take 1 capsule (0.4 mg total) by mouth once daily.    valsartan (DIOVAN) 160 MG tablet Take 1 tablet (160 mg total) by mouth once daily.    denosumab (PROLIA) 60 mg/mL Syrg Inject 60 mg into the skin every 6 (six) months.    empagliflozin (JARDIANCE) 10 mg tablet Take 1 tablet (10 mg total) by mouth once daily.    fish oil-E-fatty acid5-ugob161 400-5 mg-unit Cap Take 1 capsule by mouth Daily.    PRESERVISION AREDS-2 250-90-40-1 mg Cap Take 1 tablet by mouth 2 (two) times daily.    UNABLE TO FIND Take 1 tablet by mouth 3 (three) times daily. Rufus-Mag-Citrate with D3 & K2    UNABLE TO FIND Take 4 capsules by mouth Daily. Nutrafol    vitamin renal formula, B-complex-vitamin c-folic acid, (NEPHROCAPS) 1 mg Cap Take 1 capsule by mouth once daily. (Patient taking differently: Take 1 capsule by mouth once daily. Taking Nature's Bounty equivalent)     Family History       Problem Relation (Age of Onset)    Breast cancer Maternal Aunt     Hearing loss Son    Heart attack Father    Heart disease Father    Hyperlipidemia Son    Kidney disease Mother    Melanoma Father          Tobacco Use    Smoking status: Former     Current packs/day: 0.00     Average packs/day: 0.5 packs/day for 8.0 years (4.0 ttl pk-yrs)     Types: Cigarettes     Start date: 1956     Quit date: 1964     Years since quittin.1    Smokeless tobacco: Never   Substance and Sexual Activity    Alcohol use: Not Currently    Drug use: No    Sexual activity: Not Currently     Review of Systems   Constitutional:  Positive for chills. Negative for fever.   HENT:  Negative for congestion and sore throat.    Eyes:  Negative for photophobia and visual disturbance.   Respiratory:  Negative for cough and shortness of breath.    Cardiovascular:  Negative for chest pain and palpitations.   Gastrointestinal:  Positive for diarrhea and nausea. Negative for abdominal pain and vomiting.   Genitourinary:  Positive for flank pain and urgency. Negative for dysuria and hematuria.   Musculoskeletal:  Positive for back pain. Negative for arthralgias and gait problem.   Skin:  Negative for rash and wound.   Neurological:  Positive for headaches. Negative for syncope.   Psychiatric/Behavioral:  Positive for confusion. Negative for agitation and behavioral problems.      Objective:     Vital Signs (Most Recent):  Temp: 98.1 °F (36.7 °C) (10/10/23 0330)  Pulse: 63 (10/10/23 0330)  Resp: 20 (10/10/23 0330)  BP: 136/82 (10/10/23 0330)  SpO2: 100 % (10/10/23 0330) Vital Signs (24h Range):  Temp:  [97.7 °F (36.5 °C)-98.6 °F (37 °C)] 98.1 °F (36.7 °C)  Pulse:  [63-87] 63  Resp:  [14-20] 20  SpO2:  [99 %-100 %] 100 %  BP: (122-161)/(57-82) 136/82     Weight: 50.8 kg (112 lb)  Body mass index is 17.54 kg/m².     Physical Exam  Vitals reviewed.   Constitutional:       General: She is not in acute distress.     Appearance: Normal appearance.   HENT:      Head: Normocephalic and atraumatic.      Nose: No  congestion or rhinorrhea.      Mouth/Throat:      Mouth: Mucous membranes are moist.      Pharynx: Oropharynx is clear.   Eyes:      Extraocular Movements: Extraocular movements intact.      Pupils: Pupils are equal, round, and reactive to light.   Cardiovascular:      Rate and Rhythm: Normal rate and regular rhythm.      Pulses: Normal pulses.      Heart sounds: Normal heart sounds.   Pulmonary:      Effort: Pulmonary effort is normal. No respiratory distress.      Breath sounds: Normal breath sounds.   Abdominal:      General: Bowel sounds are normal.      Palpations: Abdomen is soft.      Tenderness: There is no abdominal tenderness.   Musculoskeletal:         General: Tenderness present. No swelling or signs of injury.      Comments: Some tenderness reported on palpation of the posterior right ilium/iliac crest  No bruising or deformity noticed on exam   Skin:     Findings: No bruising or rash.   Neurological:      General: No focal deficit present.      Mental Status: She is alert and oriented to person, place, and time.   Psychiatric:         Mood and Affect: Mood normal.         Behavior: Behavior normal.      Comments: Tangential              CRANIAL NERVES     CN III, IV, VI   Pupils are equal, round, and reactive to light.       Significant Labs: All pertinent labs within the past 24 hours have been reviewed.  CBC:   Recent Labs   Lab 10/09/23  2002 10/09/23  2040   WBC 6.01 6.10   HGB 11.9* 11.8*   HCT 33.3* 32.0*    153     CMP:   Recent Labs   Lab 10/09/23  2002 10/09/23  2040   * 120*   K 5.0 4.8   CL 85* 85*   CO2 22* 23    109   BUN 39* 38*   CREATININE 1.5* 1.5*   CALCIUM 9.9 10.0   PROT 8.1 7.5   ALBUMIN 4.0 3.9   BILITOT 1.0 1.0   ALKPHOS 62 57   AST 56* 47*   ALT 36 31   ANIONGAP 11 12       Significant Imaging: I have reviewed all pertinent imaging results/findings within the past 24 hours.

## 2023-10-10 NOTE — ASSESSMENT & PLAN NOTE
Patient diagnosed with UTI on 10/6 after presenting to Urology clinic with abdominal tenderness and dysuria on self-cath. Was started on empiric cefpodoxime, and urine cx returned with almost-pan-sensitive E coli. Patient had taken 3 days of PO antibiotics before feeling like she was developing adverse reaction to the medication and stopped.    - Patient received 1 dose of IV CTX. Patient currently asymptomatic. Will hold on continuing antibiotics given that pt has received 4 days total of UTI treatment.  - Pending repeat urine culture on admission

## 2023-10-10 NOTE — ED TRIAGE NOTES
Shima Sorto, a 83 y.o. female presents to the ED w/ complaint of back pain and nausea since this AM. Pt was taking antibiotics for uti but  Told her to stop taking.     Triage note:  Chief Complaint   Patient presents with    Female  Problem     Taking antibiotics for uti,  told me stop taking , dizziness     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

## 2023-10-10 NOTE — ASSESSMENT & PLAN NOTE
Patient with HFpEF, follows with Dr. Wong outpatient, who wanted to recently start Jardiance, however patient has not yet started.     - Given recent UTI, may defer on starting Jardiance right now  - Continue Valsartan 160mg qd and Aldactone 25mg qd.

## 2023-10-10 NOTE — ASSESSMENT & PLAN NOTE
"83F with history of HFpEF, CKD, breast cancer, who presents with 1 day of intractable nausea with right flank pain. Patient reports symptoms came on suddenly on 10/9 after a bout of diarrhea with persistent nausea since. She reports she had dysuria and abdominal tenderness and was seen by Urology on 10/6, where she was given prescription of cefpodoxime for UTI. Patient believes that it may be the antibiotic that led to her symptoms, however she was found to be significantly hyponatremic as well, down to 118. Nausea could be from the UTI, or antibiotic therapy, or may be from her hyponatremia.    Plan:  - Continue CTX for symptomatic UTI  - Zofran PRN for nausea, continue to monitor  - See "Hyponatremia" for further management  "

## 2023-10-10 NOTE — ED NOTES
Assumed care of patient.  carlo Wood 83 y.o. female presents to the ED w/ complaint of uti symptoms    Triage note:  Chief Complaint   Patient presents with    Female  Problem     Taking antibiotics for uti,  told me stop taking , dizziness     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

## 2023-10-10 NOTE — ED NOTES
Assumed care of pt. Received report from FREDY Wolf.    The patient is resting quietly in ED stretcher, and is awake and alert at this time. Respirations are even and unlabored, with no distress noted. The patient remains on continuous cardiac monitor, automated BP cuff cycling Q30 minutes, and continuous pulse oximeter. The patient is aware of POC and all questions and concerns addressed at this time. The patient was offered restroom assistance and denies need to void. The patient denies further needs and has no complaints at this time. SR raised x2, bed locked and in low position with brake engaged. Call bell within reach and the patient verbalized she would call for assistance if needed. Personal belongings are at bedside within reach.

## 2023-10-10 NOTE — ED PROVIDER NOTES
Encounter Date: 10/9/2023       History     Chief Complaint   Patient presents with    Female  Problem     Taking antibiotics for uti, dr told me stop taking , dizziness     Patient is an 83-year-old female presenting with symptomatic UTI currently being treated by urology clinic 3 days ago with antibiotics.  Past medical history includes chronic kidney disease stage IIIB, breast CA, anemia, vestibular neuritis, HTN and urinary retention. Patient states that she was given an antibiotic by her urologist but became dizzy with the first dose and was instructed to stop as per urology staff NP. Patient was then instructed to go to the ED for further evaluation. Patient stats that she self caths at home but recently has been unable to void her usual 650mls and has become markedly distended. Patient toi HA, n/v/d, CP, palpitations, night sweats, chills, fever, hematuria or syncope.  Patient does endorse flank tenderness and bladder fullness with dysuria.     The history is provided by the patient and medical records.     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     Past Surgical History:   Procedure Laterality Date    BREAST CYST ASPIRATION Right 1999    BREAST LUMPECTOMY Right 2018    with radiation    COLONOSCOPY N/A 7/24/2018    Procedure: COLONOSCOPY;  Surgeon: Juan Miguel Pena MD;  Location: [address]);  Service: Endoscopy;  Laterality: N/A;    COLONOSCOPY N/A 5/10/2023     Procedure: COLONOSCOPY;  Surgeon: Keven Garza MD;  Location: [facility] ENDO (4TH FLR);  Service: Endoscopy;  Laterality: N/A;  *Pending C-Diff*  Request Greg  procedure order telephone encounter 5/1  PEG prep, instructions portal -LW  5/4/23 no answer for precall/mleone lpn  5/9lm    ESOPHAGOGASTRODUODENOSCOPY N/A 3/17/2023    Procedure: EGD (ESOPHAGOGASTRODUODENOSCOPY);  Surgeon: Keven Garza MD;  Location: [facility] ENDO (4TH FLR);  Service: Endoscopy;  Laterality: N/A;  Medically Urgent  3/2 instructions to portal-st    pre call attempted, no answer from pt 3/13/23 -egh    INJECTION FOR SENTINEL NODE IDENTIFICATION Right 8/17/2018    Procedure: INJECTION, FOR SENTINEL NODE IDENTIFICATION;  Surgeon: Abby Mason MD;  Location: [facility] OR [address] [place] [address];  Service: General;  Laterality: Right;    interstim placed stage 1      and removed    KIDNEY STONE SURGERY  2000    @ [facility]    MASTECTOMY, PARTIAL Right 8/17/2018    Procedure: MASTECTOMY, PARTIAL-US guided;  Surgeon: Abby Mason MD;  Location: [facility] OR [address] [place] [address];  Service: General;  Laterality: Right;    MOHS procedure      SENTINEL LYMPH NODE BIOPSY Right 8/17/2018    Procedure: BIOPSY, LYMPH NODE, SENTINEL;  Surgeon: Abby Mason MD;  Location: [facility] OR [address] [place] [address];  Service: General;  Laterality: Right;     Family History   Problem Relation Age of Onset    Kidney disease Mother     Heart disease Father     Melanoma Father     Heart attack Father     Breast cancer Maternal Aunt     Hearing loss Son     Hyperlipidemia Son     Anesthesia problems Neg Hx     Malignant hypertension Neg Hx     Hypotension Neg Hx     Malignant hyperthermia Neg Hx     Pseudochol deficiency Neg Hx     Colon cancer Neg Hx     Ovarian cancer Neg Hx     Psoriasis Neg Hx     Lupus Neg Hx     Eczema Neg Hx     Acne Neg Hx     Esophageal cancer Neg Hx      Social History     Tobacco Use    Smoking status: Former     Current packs/day: 0.00     Average packs/day: 0.5 packs/day for 8.0 years (4.0  ttl pk-yrs)     Types: Cigarettes     Start date: 1956     Quit date: 1964     Years since quittin.1    Smokeless tobacco: Never   Substance Use Topics    Alcohol use: Not Currently    Drug use: No     Review of Systems   All other systems reviewed and are negative.      Physical Exam     Initial Vitals [10/09/23 1848]   BP Pulse Resp Temp SpO2   (!) 140/63 87 16 97.7 °F (36.5 °C) 99 %      MAP       --         Physical Exam    Nursing note and vitals reviewed.  Constitutional: She appears well-developed and well-nourished.   HENT:   Head: Normocephalic and atraumatic.   Eyes: Conjunctivae and EOM are normal. Pupils are equal, round, and reactive to light.   Neck: Neck supple.   Normal range of motion.  Cardiovascular:  Normal rate, regular rhythm, normal heart sounds and intact distal pulses.           Pulmonary/Chest: Breath sounds normal.   Abdominal: Abdomen is soft. Bowel sounds are normal.   Distended bladder    Musculoskeletal:         General: Normal range of motion.      Cervical back: Normal range of motion and neck supple.      Comments: OA noted on hands bilaterally      Neurological: She is alert and oriented to person, place, and time. She has normal strength. GCS score is 15. GCS eye subscore is 4. GCS verbal subscore is 5. GCS motor subscore is 6.   Skin: Skin is warm and dry. Capillary refill takes less than 2 seconds.   Psychiatric: She has a normal mood and affect. Her behavior is normal. Judgment and thought content normal.         ED Course   Procedures  Labs Reviewed   CBC W/ AUTO DIFFERENTIAL   COMPREHENSIVE METABOLIC PANEL   TROPONIN I   URINALYSIS, REFLEX TO URINE CULTURE   LIPASE          Imaging Results    None          Medications - No data to display  Medical Decision Making  83-year-old female past medical history of sleep apnea, mitral valve prolapse CKD stage IIIB, hypertension and hyperlipidemia.  Positive UA for UTI, and profound hyponatremia 112-120. VSS/HS.  Ceftriaxone, fluids and Toradol.     Plan:   Given the patients presentation, history and hyponatremia, she will benefit from hospital admit for further management and observation. Patient remained stable throughout ed encounter.     Amount and/or Complexity of Data Reviewed  Labs: ordered. Decision-making details documented in ED Course.  Radiology: ordered. Decision-making details documented in ED Course.  ECG/medicine tests:  Decision-making details documented in ED Course.    Risk  OTC drugs.  Prescription drug management.  Parenteral controlled substances.  Decision regarding hospitalization.                               Clinical Impression:     1. Acute cystitis without hematuria    2. Dizziness    3. Hyponatremia    4. Chest pain    5. Urinary retention                         Paulina Seth MD  Resident  10/10/23 2474

## 2023-10-10 NOTE — ASSESSMENT & PLAN NOTE
Chronic issue, followed by Urology outpatient. Patient mainly complaints of incomplete bladder emptying and was taught CIC due to incomplete bladder emptying. Patient had been performing CIC twice daily at home, however on recent visit, PVR was noted to be elevated despite CIC, so she was instructed to increase catheterization to TID. She tells me however that she is still continuing to only self-cath twice a day.    - Tejada for urinary retention  - Continue home flomax

## 2023-10-10 NOTE — ED NOTES
Received bedside report from FREDY Campos  Pt AAOx4, resting comfortably in bed, NAD, respirations E/UL, updated on POC, wheels locked and in low position, call bell with in reach, Comfort positioning and restroom needs were addressed. Necessary items were placed with in reach and was advised when a reassessment would take place.

## 2023-10-10 NOTE — PROGRESS NOTES
Pharmacist Renal Dose Adjustment Note    Shima Sorto is a 83 y.o. female being treated with the medication enoxaparin    Patient Data:    Vital Signs (Most Recent):  Temp: 98.1 °F (36.7 °C) (10/10/23 0330)  Pulse: 63 (10/10/23 0330)  Resp: 20 (10/10/23 0330)  BP: 136/82 (10/10/23 0330)  SpO2: 100 % (10/10/23 0330) Vital Signs (72h Range):  Temp:  [97.7 °F (36.5 °C)-98.6 °F (37 °C)]   Pulse:  [63-87]   Resp:  [14-20]   BP: (122-161)/(57-82)   SpO2:  [99 %-100 %]      Recent Labs   Lab 10/09/23  2002 10/09/23  2040   CREATININE 1.5* 1.5*     Serum creatinine: 1.5 mg/dL (H) 10/09/23 2040  Estimated creatinine clearance: 22.8 mL/min (A)    Enoxaparin 40 mg every 24 hours will be changed to enoxaparin 30 mg every 24 hours per pharmacy protocol    Pharmacist's Name: Polina Arizmendi  Pharmacist's Extension: [phone]

## 2023-10-10 NOTE — PHARMACY MED REC
"  Admission Medication History     The home medication history was taken by Federica Gaston.    You may go to "Admission" then "Reconcile Home Medications" tabs to review and/or act upon these items.     The home medication list has been updated by the Pharmacy department.   Please read ALL comments highlighted in yellow.   Please address this information as you see fit.    Feel free to contact us if you have any questions or require assistance.      Medications listed below were obtained from: Patient  Current Outpatient Medications on File Prior to Encounter   Medication Sig    acetaminophen (TYLENOL) 650 MG TbSR Take 1,300 mg by mouth 2 (two) times daily as needed (Severe pain).      anastrozole (ARIMIDEX) 1 mg Tab TAKE 1 TABLET(1 MG) BY MOUTH EVERY DAY.      coQ10, ubiquinol, 100 mg Cap Take 100 mg by mouth once daily.      denosumab (PROLIA) 60 mg/mL Syrg Inject 60 mg into the skin every 6 (six) months.      ferrous gluconate (FERGON) 324 MG tablet Take 1 tablet (324 mg total) by mouth daily with breakfast.      fish oil-E-fatty [id number] 400-5 mg-unit Cap   Take 1 capsule by mouth Daily.    furosemide (LASIX) 20 MG tablet   Take 2 tablets (40 mg total) by mouth once daily.    PRESERVISION AREDS-2 250-90-40-1 mg Cap   Take 1 tablet by mouth 2 (two) times daily.    propylene glycol/peg 400/PF (SYSTANE, PF, OPHT) Place 1 drop into both eyes 2 (two) times daily as needed (Dry eye).      SALONPAS, LIDOCAINE, TOP Apply 1 patch topically daily as needed (Pain).      spironolactone (ALDACTONE) 25 MG tablet Take 1 tablet (25 mg total) by mouth once daily.      tamsulosin (FLOMAX) 0.4 mg Cap Take 1 capsule (0.4 mg total) by mouth once daily.      UNABLE TO FIND Rufus-Mag-Citrate with D3 & K2 - Takes 2 tablets by mouth every morning, 1 at midday and 1 every evening.      UNABLE TO FIND Take 4 capsules by mouth Daily. Nutrafol      valsartan (DIOVAN) 160 MG tablet Take 1 tablet (160 mg total) by mouth once daily.      " vitamin renal formula, B-complex-vitamin c-folic acid, (NEPHROCAPS) 1 mg Cap Take 1 capsule by mouth once daily. Taking Nature's Bounty equivalent.      cefpodoxime (VANTIN) 100 MG tablet Take 1 tablet (100 mg total) by mouth 2 (two) times daily. for 7 days.       empagliflozin (JARDIANCE) 10 mg tablet Take 1 tablet (10 mg total) by mouth once daily.     Potential issues to be addressed PRIOR TO DISCHARGE  Drug cost interfering with therapy: (JARDIANCE)            Federica Gaston  EXT [id number]                .

## 2023-10-10 NOTE — H&P
"[facility] - Emergency Dept  [facility]  History & Physical    Patient Name: Shima Sorto  MRN: [id number]  Patient Class: IP- Inpatient  Admission Date: 10/9/2023  Attending Physician: Cedrick Alarcon, *   Primary Care Provider: Carmen Milton MD         Patient information was obtained from patient and ER records.     Subjective:     Principal Problem:Hyponatremia    Chief Complaint:   Chief Complaint   Patient presents with    Female  Problem     Taking antibiotics for uti, dr told me stop taking , dizziness        HPI: Shima Sorto is an 83-year-old woman with history of breast cancer s/p lumpectomy, CKD stage 3, and HFpEF who presents with complaints of right flank pain and intractable nausea. Patient was a poor historian on my interview. She reports that she has been dealing with a urinary tract infection for the last few days, reporting some lower abdominal discomfort and dysuria, and she was given antibiotics to treat the UTI on 10/6. She says that on the morning of 10/9, after breakfast, she developed significant nausea and had one episode of diarrhea. She felt nauseated the rest of the day but had no further diarrhea and no emesis. She reports that same morning, she sat down too quickly and feels as though she hurt her low back, and says that she has been having pain in the Right lower back since then. She then reports that she has been experiencing nausea and this back pain for "quite some time", even before 10/9. Additionally, she says that she feels that with the nausea, she has felt somewhat lightheaded, but denies syncope or sensation of dizziness. Of note, urine culture from 10/6 returned with E. Coli that was largely sensitive. Patient denies any recent fevers, chest pain, shortness of breath, abdominal pain, or vomiting.    In the ED, patient's vital signs were stable and hemodynamically stable. Initial labs revealed hyponatremia of 118 and then 120 on repeat. CT " abdomen pelvis demonstrated no acute findings, but demonstrated significant degenerative disease of the lumbar spine, as well as non-obstructive renal stones. UA with hematuria and pyuria, and pt was given a dose of IV CTX. Patient was admitted to Hospital Medicine for treatment of presumed UTI and management of hyponatremia.      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    Visual impairment     reading glasses       Past Surgical History:   Procedure Laterality Date    BREAST CYST ASPIRATION Right 1999    BREAST LUMPECTOMY Right 2018    with radiation    COLONOSCOPY N/A 7/24/2018    Procedure: COLONOSCOPY;  Surgeon: Juan Miguel Pena MD;  Location: [facility] (4TH FLR);  Service: Endoscopy;  Laterality: N/A;    COLONOSCOPY N/A 5/10/2023    Procedure: COLONOSCOPY;  Surgeon: Keven Garza MD;  Location: [facility] (4TH FLR);  Service: Endoscopy;  Laterality: N/A;  *Pending C-Diff*  Request Greg  procedure order telephone encounter 5/1  PEG prep, instructions portal -LW  5/4/23 no answer for precall/mleone lpn  5/9lm    ESOPHAGOGASTRODUODENOSCOPY N/A 3/17/2023    Procedure: EGD (ESOPHAGOGASTRODUODENOSCOPY);  Surgeon: Keven Garza MD;  Location: [facility] (4TH FLR);  Service: Endoscopy;  Laterality: N/A;  Medically Urgent  3/2 instructions to portal-st    pre call attempted, no answer from pt 3/13/23 -egh    INJECTION FOR SENTINEL NODE IDENTIFICATION Right 8/17/2018    Procedure: INJECTION, FOR SENTINEL NODE IDENTIFICATION;  Surgeon: Abby Mason MD;  Location: [address]  FLR;  Service: General;  Laterality: Right;    interstim placed stage 1      and removed    KIDNEY STONE SURGERY  2000    @ [facility]    MASTECTOMY, PARTIAL Right 8/17/2018    Procedure: MASTECTOMY, PARTIAL-US guided;  Surgeon: Abby Mason MD;  Location: [facility] OR [address] [place] [address];  Service: General;  Laterality: Right;    MOHS procedure      SENTINEL LYMPH NODE BIOPSY Right 8/17/2018    Procedure: BIOPSY, LYMPH NODE, SENTINEL;  Surgeon: Abby Mason MD;  Location: [facility] OR [address] [place] [address];  Service: General;  Laterality: Right;       Review of patient's allergies indicates:   Allergen Reactions    Asparagus Rash       Current Facility-Administered Medications on File Prior to Encounter   Medication    0.9%  NaCl infusion     Current Outpatient Medications on File Prior to Encounter   Medication Sig    anastrozole (ARIMIDEX) 1 mg Tab TAKE 1 TABLET(1 MG) BY MOUTH EVERY DAY    cefpodoxime (VANTIN) 100 MG tablet Take 1 tablet (100 mg total) by mouth 2 (two) times daily. for 7 days    coQ10, ubiquinol, 100 mg Cap Take 100 mg by mouth once daily.    ferrous gluconate (FERGON) 324 MG tablet Take 1 tablet (324 mg total) by mouth daily with breakfast.    furosemide (LASIX) 20 MG tablet Take 2 tablets (40 mg total) by mouth once daily.    spironolactone (ALDACTONE) 25 MG tablet Take 1 tablet (25 mg total) by mouth once daily.    tamsulosin (FLOMAX) 0.4 mg Cap Take 1 capsule (0.4 mg total) by mouth once daily.    valsartan (DIOVAN) 160 MG tablet Take 1 tablet (160 mg total) by mouth once daily.    denosumab (PROLIA) 60 mg/mL Syrg Inject 60 mg into the skin every 6 (six) months.    empagliflozin (JARDIANCE) 10 mg tablet Take 1 tablet (10 mg total) by mouth once daily.    fish oil-E-fatty [id number] 400-5 mg-unit Cap Take 1 capsule by mouth Daily.    PRESERVISION AREDS-2 250-90-40-1 mg Cap Take 1 tablet by mouth 2 (two) times daily.    UNABLE TO FIND Take 1 tablet by mouth 3 (three) times daily. Rufus-Mag-Citrate with D3 &  K2    UNABLE TO FIND Take 4 capsules by mouth Daily. Nutrafol    vitamin renal formula, B-complex-vitamin c-folic acid, (NEPHROCAPS) 1 mg Cap Take 1 capsule by mouth once daily. (Patient taking differently: Take 1 capsule by mouth once daily. Taking Nature's Bounty equivalent)     Family History       Problem Relation (Age of Onset)    Breast cancer Maternal Aunt    Hearing loss Son    Heart attack Father    Heart disease Father    Hyperlipidemia Son    Kidney disease Mother    Melanoma Father          Tobacco Use    Smoking status: Former     Current packs/day: 0.00     Average packs/day: 0.5 packs/day for 8.0 years (4.0 ttl pk-yrs)     Types: Cigarettes     Start date: 1956     Quit date: 1964     Years since quittin.1    Smokeless tobacco: Never   Substance and Sexual Activity    Alcohol use: Not Currently    Drug use: No    Sexual activity: Not Currently     Review of Systems   Constitutional:  Positive for chills. Negative for fever.   HENT:  Negative for congestion and sore throat.    Eyes:  Negative for photophobia and visual disturbance.   Respiratory:  Negative for cough and shortness of breath.    Cardiovascular:  Negative for chest pain and palpitations.   Gastrointestinal:  Positive for diarrhea and nausea. Negative for abdominal pain and vomiting.   Genitourinary:  Positive for flank pain and urgency. Negative for dysuria and hematuria.   Musculoskeletal:  Positive for back pain. Negative for arthralgias and gait problem.   Skin:  Negative for rash and wound.   Neurological:  Positive for headaches. Negative for syncope.   Psychiatric/Behavioral:  Positive for confusion. Negative for agitation and behavioral problems.      Objective:     Vital Signs (Most Recent):  Temp: 98.1 °F (36.7 °C) (10/10/23 0330)  Pulse: 63 (10/10/23 0330)  Resp: 20 (10/10/23 0330)  BP: 136/82 (10/10/23 0330)  SpO2: 100 % (10/10/23 0330) Vital Signs (24h Range):  Temp:  [97.7 °F (36.5 °C)-98.6 °F (37 °C)]  98.1 °F (36.7 °C)  Pulse:  [63-87] 63  Resp:  [14-20] 20  SpO2:  [99 %-100 %] 100 %  BP: (122-161)/(57-82) 136/82     Weight: 50.8 kg (112 lb)  Body mass index is 17.54 kg/m².     Physical Exam  Vitals reviewed.   Constitutional:       General: She is not in acute distress.     Appearance: Normal appearance.   HENT:      Head: Normocephalic and atraumatic.      Nose: No congestion or rhinorrhea.      Mouth/Throat:      Mouth: Mucous membranes are moist.      Pharynx: Oropharynx is clear.   Eyes:      Extraocular Movements: Extraocular movements intact.      Pupils: Pupils are equal, round, and reactive to light.   Cardiovascular:      Rate and Rhythm: Normal rate and regular rhythm.      Pulses: Normal pulses.      Heart sounds: Normal heart sounds.   Pulmonary:      Effort: Pulmonary effort is normal. No respiratory distress.      Breath sounds: Normal breath sounds.   Abdominal:      General: Bowel sounds are normal.      Palpations: Abdomen is soft.      Tenderness: There is no abdominal tenderness.   Musculoskeletal:         General: Tenderness present. No swelling or signs of injury.      Comments: Some tenderness reported on palpation of the posterior right ilium/iliac crest  No bruising or deformity noticed on exam   Skin:     Findings: No bruising or rash.   Neurological:      General: No focal deficit present.      Mental Status: She is alert and oriented to person, place, and time.   Psychiatric:         Mood and Affect: Mood normal.         Behavior: Behavior normal.      Comments: Tangential              CRANIAL NERVES     CN III, IV, VI   Pupils are equal, round, and reactive to light.       Significant Labs: All pertinent labs within the past 24 hours have been reviewed.  CBC:   Recent Labs   Lab 10/09/23  2002 10/09/23  2040   WBC 6.01 6.10   HGB 11.9* 11.8*   HCT 33.3* 32.0*    153     CMP:   Recent Labs   Lab 10/09/23  2002 10/09/23  2040   * 120*   K 5.0 4.8   CL 85* 85*   CO2 22* 23  "   109   BUN 39* 38*   CREATININE 1.5* 1.5*   CALCIUM 9.9 10.0   PROT 8.1 7.5   ALBUMIN 4.0 3.9   BILITOT 1.0 1.0   ALKPHOS 62 57   AST 56* 47*   ALT 36 31   ANIONGAP 11 12       Significant Imaging: I have reviewed all pertinent imaging results/findings within the past 24 hours.    Assessment/Plan:     * Hyponatremia  83F with history of HFpEF, CKD, breast cancer, who presents with 1 day of intractable nausea with right flank pain. Patient found to be hyponatremic 118, but Na levels as high as 131 less than 1 month ago, and prior to that her sodium levels were generally normal. Hyponatremia may be contributing to patient's persistent nausea as well as feelings of lightheadedness. Unclear etiology, however, patient does not appear extremely volume down on exam, and does not have elements in her history that would lead one to suspect a "tea or toast" diet or polydipsia. Possibly more likely an SIADH picture.    Plan:  - Daily Na levels  - Serum osmolality, urine osm, and urine sodium to assess for etiology of new-onset hyponatremia  - 1L fluid restriction for the time being  - Consider Nephrology consult if patient's Na does not correct with fluid restriction and/or salt tabs.    Nausea  83F with history of HFpEF, CKD, breast cancer, who presents with 1 day of intractable nausea with right flank pain. Patient reports symptoms came on suddenly on 10/9 after a bout of diarrhea with persistent nausea since. She reports she had dysuria and abdominal tenderness and was seen by Urology on 10/6, where she was given prescription of cefpodoxime for UTI. Patient believes that it may be the antibiotic that led to her symptoms, however she was found to be significantly hyponatremic as well, down to 118. Nausea could be from the UTI, or antibiotic therapy, or may be from her hyponatremia.    Plan:  - Continue CTX for symptomatic UTI  - Zofran PRN for nausea, continue to monitor  - See "Hyponatremia" for further " management    Acute cystitis  Patient diagnosed with UTI on 10/6 after presenting to Urology clinic with abdominal tenderness and dysuria on self-cath. Was started on empiric cefpodoxime, and urine cx returned with almost-pan-sensitive E coli. Patient had taken 3 days of PO antibiotics before feeling like she was developing adverse reaction to the medication and stopped.    - Patient received 1 dose of IV CTX. Patient currently asymptomatic. Will hold on continuing antibiotics given that pt has received 4 days total of UTI treatment.  - Pending repeat urine culture on admission    ACC/AHA stage C heart failure with preserved ejection fraction  Patient with HFpEF, follows with Dr. Wong outpatient, who wanted to recently start Jardiance, however patient has not yet started.     - Given recent UTI, may defer on starting Jardiance right now  - Continue Valsartan 160mg qd and Aldactone 25mg qd.    Malignant neoplasm of upper-outer quadrant of right breast in female, estrogen receptor positive  Patient with ER+ breast cancer diagnosed in 2017, s/p lumpectomy, now on hormone therapy.    - Continue home anastrozole     Urinary retention  Chronic issue, followed by Urology outpatient. Patient mainly complaints of incomplete bladder emptying and was taught CIC due to incomplete bladder emptying. Patient had been performing CIC twice daily at home, however on recent visit, PVR was noted to be elevated despite CIC, so she was instructed to increase catheterization to TID. She tells me however that she is still continuing to only self-cath twice a day.    - Tejada for urinary retention  - Continue home flomax    Hypertension  Chronic condition, stable.     Continue home valsartan.        VTE Risk Mitigation (From admission, onward)         Ordered     enoxaparin injection 30 mg  Daily         10/10/23 0356     IP VTE HIGH RISK PATIENT  Once         10/10/23 0356     Place sequential compression device  Until discontinued          10/10/23 0356                   Romaine Yeboah MD  Department of Hospital Medicine  [facility] - Emergency Dept

## 2023-10-10 NOTE — HPI
"Shima Sorto is an 83-year-old woman with history of breast cancer s/p lumpectomy, CKD stage 3, and HFpEF who presents with complaints of right flank pain and intractable nausea. Patient was a poor historian on my interview. She reports that she has been dealing with a urinary tract infection for the last few days, reporting some lower abdominal discomfort and dysuria, and she was given antibiotics to treat the UTI on 10/6. She says that on the morning of 10/9, after breakfast, she developed significant nausea and had one episode of diarrhea. She felt nauseated the rest of the day but had no further diarrhea and no emesis. She reports that same morning, she sat down too quickly and feels as though she hurt her low back, and says that she has been having pain in the Right lower back since then. She then reports that she has been experiencing nausea and this back pain for "quite some time", even before 10/9. Additionally, she says that she feels that with the nausea, she has felt somewhat lightheaded, but denies syncope or sensation of dizziness. Of note, urine culture from 10/6 returned with E. Coli that was largely sensitive. Patient denies any recent fevers, chest pain, shortness of breath, abdominal pain, or vomiting.    In the ED, patient's vital signs were stable and hemodynamically stable. Initial labs revealed hyponatremia of 118 and then 120 on repeat. CT abdomen pelvis demonstrated no acute findings, but demonstrated significant degenerative disease of the lumbar spine, as well as non-obstructive renal stones. UA with hematuria and pyuria, and pt was given a dose of IV CTX. Patient was admitted to Hospital Medicine for treatment of presumed UTI and management of hyponatremia.  "

## 2023-10-11 LAB
ALBUMIN SERPL BCP-MCNC: 3.3 G/DL (ref 3.5–5.2)
ALP SERPL-CCNC: 54 U/L (ref 55–135)
ALT SERPL W/O P-5'-P-CCNC: 27 U/L (ref 10–44)
ANION GAP SERPL CALC-SCNC: 12 MMOL/L (ref 8–16)
AST SERPL-CCNC: 40 U/L (ref 10–40)
BACTERIA UR CULT: ABNORMAL
BASOPHILS # BLD AUTO: 0.03 K/UL (ref 0–0.2)
BASOPHILS NFR BLD: 0.7 % (ref 0–1.9)
BILIRUB SERPL-MCNC: 0.6 MG/DL (ref 0.1–1)
BUN SERPL-MCNC: 45 MG/DL (ref 8–23)
CALCIUM SERPL-MCNC: 8.6 MG/DL (ref 8.7–10.5)
CHLORIDE SERPL-SCNC: 89 MMOL/L (ref 95–110)
CO2 SERPL-SCNC: 22 MMOL/L (ref 23–29)
CREAT SERPL-MCNC: 1.7 MG/DL (ref 0.5–1.4)
DIFFERENTIAL METHOD: ABNORMAL
EOSINOPHIL # BLD AUTO: 0 K/UL (ref 0–0.5)
EOSINOPHIL NFR BLD: 0.9 % (ref 0–8)
ERYTHROCYTE [DISTWIDTH] IN BLOOD BY AUTOMATED COUNT: 13.1 % (ref 11.5–14.5)
EST. GFR  (NO RACE VARIABLE): 29.6 ML/MIN/1.73 M^2
GLUCOSE SERPL-MCNC: 85 MG/DL (ref 70–110)
HCT VFR BLD AUTO: 31.5 % (ref 37–48.5)
HGB BLD-MCNC: 11.2 G/DL (ref 12–16)
IMM GRANULOCYTES # BLD AUTO: 0.02 K/UL (ref 0–0.04)
IMM GRANULOCYTES NFR BLD AUTO: 0.4 % (ref 0–0.5)
LYMPHOCYTES # BLD AUTO: 1 K/UL (ref 1–4.8)
LYMPHOCYTES NFR BLD: 22.5 % (ref 18–48)
MAGNESIUM SERPL-MCNC: 2 MG/DL (ref 1.6–2.6)
MCH RBC QN AUTO: 31.3 PG (ref 27–31)
MCHC RBC AUTO-ENTMCNC: 35.6 G/DL (ref 32–36)
MCV RBC AUTO: 88 FL (ref 82–98)
MONOCYTES # BLD AUTO: 0.5 K/UL (ref 0.3–1)
MONOCYTES NFR BLD: 11.7 % (ref 4–15)
NEUTROPHILS # BLD AUTO: 2.9 K/UL (ref 1.8–7.7)
NEUTROPHILS NFR BLD: 63.8 % (ref 38–73)
NRBC BLD-RTO: 0 /100 WBC
OSMOLALITY SERPL: 285 MOSM/KG (ref 275–295)
OSMOLALITY UR: 250 MOSM/KG (ref 50–1200)
PHOSPHATE SERPL-MCNC: 3.5 MG/DL (ref 2.7–4.5)
PLATELET # BLD AUTO: 156 K/UL (ref 150–450)
PMV BLD AUTO: 11.3 FL (ref 9.2–12.9)
POTASSIUM SERPL-SCNC: 4.3 MMOL/L (ref 3.5–5.1)
PROT SERPL-MCNC: 6.6 G/DL (ref 6–8.4)
RBC # BLD AUTO: 3.58 M/UL (ref 4–5.4)
SODIUM SERPL-SCNC: 121 MMOL/L (ref 136–145)
SODIUM SERPL-SCNC: 122 MMOL/L (ref 136–145)
SODIUM SERPL-SCNC: 123 MMOL/L (ref 136–145)
SODIUM UR-SCNC: 15 MMOL/L (ref 20–250)
TSH SERPL DL<=0.005 MIU/L-ACNC: 3.85 UIU/ML (ref 0.4–4)
WBC # BLD AUTO: 4.54 K/UL (ref 3.9–12.7)

## 2023-10-11 PROCEDURE — 99233 SBSQ HOSP IP/OBS HIGH 50: CPT | Mod: GC,,, | Performed by: STUDENT IN AN ORGANIZED HEALTH CARE EDUCATION/TRAINING PROGRAM

## 2023-10-11 PROCEDURE — 25000003 PHARM REV CODE 250

## 2023-10-11 PROCEDURE — 36415 COLL VENOUS BLD VENIPUNCTURE: CPT

## 2023-10-11 PROCEDURE — 12000002 HC ACUTE/MED SURGE SEMI-PRIVATE ROOM

## 2023-10-11 PROCEDURE — 63600175 PHARM REV CODE 636 W HCPCS: Performed by: STUDENT IN AN ORGANIZED HEALTH CARE EDUCATION/TRAINING PROGRAM

## 2023-10-11 PROCEDURE — 84300 ASSAY OF URINE SODIUM: CPT | Performed by: STUDENT IN AN ORGANIZED HEALTH CARE EDUCATION/TRAINING PROGRAM

## 2023-10-11 PROCEDURE — 84100 ASSAY OF PHOSPHORUS: CPT

## 2023-10-11 PROCEDURE — 83935 ASSAY OF URINE OSMOLALITY: CPT | Performed by: STUDENT IN AN ORGANIZED HEALTH CARE EDUCATION/TRAINING PROGRAM

## 2023-10-11 PROCEDURE — 84443 ASSAY THYROID STIM HORMONE: CPT

## 2023-10-11 PROCEDURE — 80053 COMPREHEN METABOLIC PANEL: CPT

## 2023-10-11 PROCEDURE — 84295 ASSAY OF SERUM SODIUM: CPT

## 2023-10-11 PROCEDURE — 63600175 PHARM REV CODE 636 W HCPCS

## 2023-10-11 PROCEDURE — 25000003 PHARM REV CODE 250: Performed by: STUDENT IN AN ORGANIZED HEALTH CARE EDUCATION/TRAINING PROGRAM

## 2023-10-11 PROCEDURE — 84295 ASSAY OF SERUM SODIUM: CPT | Mod: 91

## 2023-10-11 PROCEDURE — 99233 PR SUBSEQUENT HOSPITAL CARE,LEVL III: ICD-10-PCS | Mod: GC,,, | Performed by: STUDENT IN AN ORGANIZED HEALTH CARE EDUCATION/TRAINING PROGRAM

## 2023-10-11 PROCEDURE — 83735 ASSAY OF MAGNESIUM: CPT

## 2023-10-11 PROCEDURE — 83930 ASSAY OF BLOOD OSMOLALITY: CPT

## 2023-10-11 PROCEDURE — 85025 COMPLETE CBC W/AUTO DIFF WBC: CPT

## 2023-10-11 RX ADMIN — CEFTRIAXONE 1 G: 1 INJECTION, POWDER, FOR SOLUTION INTRAMUSCULAR; INTRAVENOUS at 08:10

## 2023-10-11 RX ADMIN — SODIUM CHLORIDE 250 ML: 9 INJECTION, SOLUTION INTRAVENOUS at 08:10

## 2023-10-11 RX ADMIN — VALSARTAN 160 MG: 160 TABLET, FILM COATED ORAL at 08:10

## 2023-10-11 RX ADMIN — SPIRONOLACTONE 25 MG: 25 TABLET ORAL at 08:10

## 2023-10-11 RX ADMIN — VANCOMYCIN HYDROCHLORIDE 1000 MG: 1 INJECTION, POWDER, LYOPHILIZED, FOR SOLUTION INTRAVENOUS at 10:10

## 2023-10-11 RX ADMIN — ANASTROZOLE 1 MG: 1 TABLET, COATED ORAL at 08:10

## 2023-10-11 RX ADMIN — MUPIROCIN: 20 OINTMENT TOPICAL at 10:10

## 2023-10-11 RX ADMIN — SODIUM CHLORIDE 250 ML: 9 INJECTION, SOLUTION INTRAVENOUS at 12:10

## 2023-10-11 RX ADMIN — TAMSULOSIN HYDROCHLORIDE 0.4 MG: 0.4 CAPSULE ORAL at 08:10

## 2023-10-11 NOTE — PROGRESS NOTES
"[facility] ([address] Medicine  Progress Note    Patient Name: Shima Sorto  MRN: [id number]  Patient Class: IP- Inpatient   Admission Date: 10/9/2023  Length of Stay: 1 days  Attending Physician: Cedrick Alarcon, *  Primary Care Provider: Carmen Milton MD        Subjective:     Principal Problem:Hyponatremia        HPI:  Shima Sorto is an 83-year-old woman with history of breast cancer s/p lumpectomy, CKD stage 3, and HFpEF who presents with complaints of right flank pain and intractable nausea. Patient was a poor historian on my interview. She reports that she has been dealing with a urinary tract infection for the last few days, reporting some lower abdominal discomfort and dysuria, and she was given antibiotics to treat the UTI on 10/6. She says that on the morning of 10/9, after breakfast, she developed significant nausea and had one episode of diarrhea. She felt nauseated the rest of the day but had no further diarrhea and no emesis. She reports that same morning, she sat down too quickly and feels as though she hurt her low back, and says that she has been having pain in the Right lower back since then. She then reports that she has been experiencing nausea and this back pain for "quite some time", even before 10/9. Additionally, she says that she feels that with the nausea, she has felt somewhat lightheaded, but denies syncope or sensation of dizziness. Of note, urine culture from 10/6 returned with E. Coli that was largely sensitive. Patient denies any recent fevers, chest pain, shortness of breath, abdominal pain, or vomiting.    In the ED, patient's vital signs were stable and hemodynamically stable. Initial labs revealed hyponatremia of 118 and then 120 on repeat. CT abdomen pelvis demonstrated no acute findings, but demonstrated significant degenerative disease of the lumbar spine, as well as non-obstructive renal stones. UA with hematuria and " pyuria, and pt was given a dose of IV CTX. Patient was admitted to Hospital Medicine for treatment of presumed UTI and management of hyponatremia.      Overview/Hospital Course:  Hyponatremia 2/2 to Tea and toast vs polydipsia vs hypovolemia. Sodium jump from 118 to 127 after fluid restriction, D5 given to slow correction to 8meq per 24 hours. Na stablized to 123.  Repeat Osms and Urine lytes. New JOCELIN 2/2 to hypovolemia vs intrinsic cause. D/c valsartan and spironolactone. Started pt on very moderate NS infusion.       Interval History:    NAEON. UTI sxs are improving on CTX. New JOCELIN today, d/c home ARB and spironolactone. Hyponatremia stable, will start a moderate dose of IV fluids and assess for improvement. Very specific dietary requirements contributing to poor PO intake    Review of Systems   Constitutional:  Positive for chills. Negative for fever.   HENT:  Negative for congestion and sore throat.    Eyes:  Negative for photophobia and visual disturbance.   Respiratory:  Negative for cough and shortness of breath.    Cardiovascular:  Negative for chest pain and palpitations.   Gastrointestinal:  Positive for diarrhea and nausea. Negative for abdominal pain and vomiting.   Genitourinary:  Positive for flank pain and urgency. Negative for dysuria and hematuria.   Musculoskeletal:  Positive for back pain. Negative for arthralgias and gait problem.   Skin:  Negative for rash and wound.   Neurological:  Positive for headaches. Negative for syncope.   Psychiatric/Behavioral:  Positive for confusion. Negative for agitation and behavioral problems.      Objective:     Vital Signs (Most Recent):  Temp: 97.8 °F (36.6 °C) (10/11/23 1143)  Pulse: 69 (10/11/23 1143)  Resp: 18 (10/11/23 1143)  BP: (!) 102/59 (10/11/23 1154)  SpO2: 97 % (10/11/23 1143) Vital Signs (24h Range):  Temp:  [97.3 °F (36.3 °C)-98.3 °F (36.8 °C)] 97.8 °F (36.6 °C)  Pulse:  [59-72] 69  Resp:  [17-18] 18  SpO2:  [97 %-100 %] 97 %  BP: ()/(50-77)  102/59     Weight: 50.8 kg (112 lb)  Body mass index is 17.54 kg/m².     Physical Exam  Vitals reviewed.   Constitutional:       General: She is not in acute distress.     Appearance: Normal appearance.   HENT:      Head: Normocephalic and atraumatic.      Nose: No congestion or rhinorrhea.      Mouth/Throat:      Mouth: Mucous membranes are moist.      Pharynx: Oropharynx is clear.   Eyes:      Extraocular Movements: Extraocular movements intact.      Pupils: Pupils are equal, round, and reactive to light.   Cardiovascular:      Rate and Rhythm: Normal rate and regular rhythm.      Pulses: Normal pulses.      Heart sounds: Normal heart sounds.   Pulmonary:      Effort: Pulmonary effort is normal. No respiratory distress.      Breath sounds: Normal breath sounds.   Abdominal:      General: Bowel sounds are normal.      Palpations: Abdomen is soft.      Tenderness: There is no abdominal tenderness.   Musculoskeletal:         General: Tenderness present. No swelling or signs of injury.      Comments: Some tenderness reported on palpation of the posterior right ilium/iliac crest  No bruising or deformity noticed on exam   Skin:     Findings: No bruising or rash.   Neurological:      General: No focal deficit present.      Mental Status: She is alert and oriented to person, place, and time.   Psychiatric:         Mood and Affect: Mood normal.         Behavior: Behavior normal.      Comments: Tangential              CRANIAL NERVES     CN III, IV, VI   Pupils are equal, round, and reactive to light.       Significant Labs: All pertinent labs within the past 24 hours have been reviewed.  CBC:   Recent Labs   Lab 10/09/23  2040 10/10/23  0438 10/11/23  0451   WBC 6.10 4.97 4.54   HGB 11.8* 11.9* 11.2*   HCT 32.0* 33.0* 31.5*    149* 156       CMP:   Recent Labs   Lab 10/09/23  2040 10/10/23  0438 10/10/23  1304 10/11/23  0008 10/11/23  0451 10/11/23  0839   * 121*   < > 123* 123*  123* 122*   K 4.8 4.7  --    "--  4.3  --    CL 85* 87*  --   --  89*  --    CO2 23 25  --   --  22*  --     92  --   --  85  --    BUN 38* 34*  --   --  45*  --    CREATININE 1.5* 1.2  --   --  1.7*  --    CALCIUM 10.0 9.5  --   --  8.6*  --    PROT 7.5 7.0  --   --  6.6  --    ALBUMIN 3.9 3.6  --   --  3.3*  --    BILITOT 1.0 0.8  --   --  0.6  --    ALKPHOS 57 56  --   --  54*  --    AST 47* 44*  --   --  40  --    ALT 31 30  --   --  27  --    ANIONGAP 12 9  --   --  12  --     < > = values in this interval not displayed.         Significant Imaging: I have reviewed all pertinent imaging results/findings within the past 24 hours.      Assessment/Plan:      * Hyponatremia  83F with history of HFpEF, CKD, breast cancer, who presents with 1 day of intractable nausea with right flank pain. Patient found to be hyponatremic 118, but Na levels as high as 131 less than 1 month ago, and prior to that her sodium levels were generally normal. Hyponatremia may be contributing to patient's persistent nausea as well as feelings of lightheadedness. Unclear etiology, however, patient does not appear extremely volume down on exam, and does not have elements in her history that would lead one to suspect a "tea or toast" diet or polydipsia. Possibly more likely an SIADH picture.    Plan:  - Daily Na levels  - Serum osmolality, urine osm, and urine sodium to assess for etiology of new-onset hyponatremia  - 250cc IV NS over 3 hours today, will continue to assess  - Consider Nephrology consult     Acute cystitis  Patient diagnosed with UTI on 10/6 after presenting to Urology clinic with abdominal tenderness and dysuria on self-cath. Was started on empiric cefpodoxime, and urine cx returned with almost-pan-sensitive E coli. Patient had taken 3 days of PO antibiotics before feeling like she was developing adverse reaction to the medication and stopped.    - Continue tx with CTX  - Pending repeat urine culture on admission    Nausea  83F with history of " "HFpEF, CKD, breast cancer, who presents with 1 day of intractable nausea with right flank pain. Patient reports symptoms came on suddenly on 10/9 after a bout of diarrhea with persistent nausea since. She reports she had dysuria and abdominal tenderness and was seen by Urology on 10/6, where she was given prescription of cefpodoxime for UTI. Patient believes that it may be the antibiotic that led to her symptoms, however she was found to be significantly hyponatremic as well, down to 118. Nausea could be from the UTI, or antibiotic therapy, or may be from her hyponatremia.    Plan:  - Continue CTX for symptomatic UTI  - Zofran PRN for nausea, continue to monitor  - See "Hyponatremia" for further management    ACC/AHA stage C heart failure with preserved ejection fraction  Patient with HFpEF, follows with Dr. Wong outpatient, who wanted to recently start Jardiance, however patient has not yet started.     - Given recent UTI, may defer on starting Jardiance right now  - Holding gdmt meds at this time in setting of JOCELIN    CKD (chronic kidney disease) stage 3, GFR 30-59 ml/min  JOCELIN noted on 10/11  Holding valsartan and spironolactone      Malignant neoplasm of upper-outer quadrant of right breast in female, estrogen receptor positive  Patient with ER+ breast cancer diagnosed in 2017, s/p lumpectomy, now on hormone therapy.    - Continue home anastrozole     Urinary retention  Chronic issue, followed by Urology outpatient. Patient mainly complaints of incomplete bladder emptying and was taught CIC due to incomplete bladder emptying. Patient had been performing CIC twice daily at home, however on recent visit, PVR was noted to be elevated despite CIC, so she was instructed to increase catheterization to TID. She tells me however that she is still continuing to only self-cath twice a day.    - Tejada for urinary retention  - Continue home flomax    Hypertension  Chronic condition, stable.     Holding valsartan 2/2 Cr " spike        VTE Risk Mitigation (From admission, onward)         Ordered     enoxaparin injection 30 mg  Daily         10/10/23 0356     IP VTE HIGH RISK PATIENT  Once         10/10/23 0356     Place sequential compression device  Until discontinued         10/10/23 0356                Discharge Planning   PHILLIP: 10/12/2023     Code Status: Full Code   Is the patient medically ready for discharge?: No    Reason for patient still in hospital (select all that apply): Patient trending condition  Discharge Plan A: Home Health          Vitaliy Perez MD  Department of Hospital Medicine   [facility] - Intensive Care ([place]-[facility])

## 2023-10-11 NOTE — HOSPITAL COURSE
Mixed hyponatremia 2/2 to Tea and toast vs polydipsia vs hypovolemia. Sodium jump from 118 to 127 after fluid restriction, D5 given to slow correction to 8meq per 24 hours. Na stablized to 123. Repeat Osms and Urine lytes. New JOCELIN 2/2 to hypovolemia vs intrinsic cause. D/c valsartan and spironolactone. Started pt on very moderate NS infusion, with decreasing Na (downtrending to 120). UA positive for amp-sensitive Enterococcus, E. Coli. Transitioned from CTX and Vanc to Amp. Some hypotensive episodes at 107/50. Uptrending BUN/Cr to 26.8 c/w pre-renal JOCELIN. D/c tamsulosin. Nephrology consulted for further workup and evaluation for complex mixed hyponatremia and pre-renal JOCELIN. Cr and and sodium levels improving on continuous NS infusion. Sodium Bicarb tablets giving for low bicarb levels. Plan on following up with Pulm on discharge for bronchiectasis. Patient medically stable for discharge

## 2023-10-11 NOTE — ASSESSMENT & PLAN NOTE
"83F with history of HFpEF, CKD, breast cancer, who presents with 1 day of intractable nausea with right flank pain. Patient found to be hyponatremic 118, but Na levels as high as 131 less than 1 month ago, and prior to that her sodium levels were generally normal. Hyponatremia may be contributing to patient's persistent nausea as well as feelings of lightheadedness. Unclear etiology, however, patient does not appear extremely volume down on exam, and does not have elements in her history that would lead one to suspect a "tea or toast" diet or polydipsia. Possibly more likely an SIADH picture.    Plan:  - Daily Na levels  - Serum osmolality, urine osm, and urine sodium to assess for etiology of new-onset hyponatremia  - 250cc IV NS over 3 hours today, will continue to assess  - Consider Nephrology consult   "

## 2023-10-11 NOTE — PROGRESS NOTES
Pharmacokinetic Initial Assessment: IV Vancomycin    Assessment/Plan:    Initiate intravenous vancomycin with loading dose of 1000 mg once with subsequent doses when random concentrations are less than 20 mcg/mL  Desired empiric serum trough concentration is 10 to 15 mcg/mL  Draw vancomycin random level on 10/12 at 0500.  Pharmacy will continue to follow and monitor vancomycin.      Please contact pharmacy at extension [phone] with any questions regarding this assessment.     Thank you for the consult,   Christina Davies       Patient brief summary:  Shima Sorto is a 83 y.o. female initiated on antimicrobial therapy with IV Vancomycin for treatment of suspected urinary tract infection    Drug Allergies:   Review of patient's allergies indicates:   Allergen Reactions    Asparagus Rash       Actual Body Weight:   50.8 kg    Renal Function:   Estimated Creatinine Clearance: 20.1 mL/min (A) (based on SCr of 1.7 mg/dL (H)).,     Dialysis Method (if applicable):  N/A    CBC (last 72 hours):  Recent Labs   Lab Result Units 10/09/23  2002 10/09/23  2040 10/10/23  0438 10/11/23  0451   WBC K/uL 6.01 6.10 4.97 4.54   Hemoglobin g/dL 11.9* 11.8* 11.9* 11.2*   Hematocrit % 33.3* 32.0* 33.0* 31.5*   Platelets K/uL 168 153 149* 156   Gran % % 82.8* 81.4* 74.4* 63.8   Lymph % % 11.1* 11.3* 15.3* 22.5   Mono % % 5.3 5.9 9.3 11.7   Eosinophil % % 0.2 0.7 0.4 0.9   Basophil % % 0.3 0.2 0.2 0.7   Differential Method  Automated Automated Automated Automated       Metabolic Panel (last 72 hours):  Recent Labs   Lab Result Units 10/09/23  2002 10/09/23  2040 10/09/23  2214 10/10/23  0438 10/10/23  0459 10/10/23  1304 10/10/23  1639 10/10/23  1937 10/11/23  0008 10/11/23  0451 10/11/23  0839   Sodium mmol/L 118* 120*  --  121*  --  127* 123* 123* 123* 123*  123* 122*   Sodium, Urine mmol/L  --   --   --   --  24  --   --   --   --   --  15*   Potassium mmol/L 5.0 4.8  --  4.7  --   --   --   --   --  4.3  --    Chloride mmol/L 85*  "85*  --  87*  --   --   --   --   --  89*  --    CO2 mmol/L 22* 23  --  25  --   --   --   --   --  22*  --    Glucose mg/dL 107 109  --  92  --   --   --   --   --  85  --    Glucose, UA   --   --  Negative  --   --   --   --   --   --   --   --    BUN mg/dL 39* 38*  --  34*  --   --   --   --   --  45*  --    Creatinine mg/dL 1.5* 1.5*  --  1.2  --   --   --   --   --  1.7*  --    Albumin g/dL 4.0 3.9  --  3.6  --   --   --   --   --  3.3*  --    Total Bilirubin mg/dL 1.0 1.0  --  0.8  --   --   --   --   --  0.6  --    Alkaline Phosphatase U/L 62 57  --  56  --   --   --   --   --  54*  --    AST U/L 56* 47*  --  44*  --   --   --   --   --  40  --    ALT U/L 36 31  --  30  --   --   --   --   --  27  --    Magnesium mg/dL  --   --   --  2.2  --   --   --   --   --  2.0  --    Phosphorus mg/dL  --   --   --  3.7  --   --   --   --   --  3.5  --        Drug levels (last 3 results):  No results for input(s): "VANCOMYCINRA", "VANCORANDOM", "VANCOMYCINPE", "VANCOPEAK", "VANCOMYCINTR", "VANCOTROUGH" in the last 72 hours.    Microbiologic Results:  Microbiology Results (last 7 days)       Procedure Component Value Units Date/Time    Blood culture #1 **CANNOT BE ORDERED STAT** [[id number]] Collected: 10/10/23 0208    Order Status: Completed Specimen: Blood from Peripheral, Wrist, Left Updated: 10/11/23 0612     Blood Culture, Routine No Growth to date      No Growth to date    Blood culture #2 **CANNOT BE ORDERED STAT** [[id number]] Collected: 10/10/23 0208    Order Status: Completed Specimen: Blood from Peripheral, Wrist, Left Updated: 10/11/23 0612     Blood Culture, Routine No Growth to date      No Growth to date    Urine culture [[id number]]  (Abnormal) Collected: 10/09/23 2214    Order Status: Completed Specimen: Urine Updated: 10/10/23 2045     Urine Culture, Routine ENTEROCOCCUS SPECIES  50,000 - 99,999 cfu/ml  Identification and susceptibility pending      Narrative:      Specimen Source->Urine            "

## 2023-10-11 NOTE — MEDICAL/APP STUDENT
Progress Note  Hospital Medicine    Patient Name: Shima Sorto  YOB: 1940    Admit Date: 10/9/2023                     LOS: 1    SUBJECTIVE:     Reason for Admission:  Hyponatremia  82 yo F w/ CKD3b, non-obstructive Ca oxalate R lower pole nephrolithiasis, and urinary retention secondary to medullary sponge kidneys, lumbar DJD, HFpEF, and s/p R lumpectomy for treatment of breast cancer here for hyponatremia and acute cystitis management.   See H&P for detailed presentating history and ROS.      Interval history: NAEON. She has had some SBP spikes up to 160-170, but has mostly been sitting at a blood pressure of 130/70. Her UTI symptoms are improving well on Ceftriaxone. Her lower back pain is improving well with rest. Denies any resting dizziness, chest pain, shortness of breath, confusion, fevers, chills, nausea, vomiting, constipation, diarrhea, dysuria, or hematuria. Overall, she believes she is improving well. Of note, she has specific dietary requirements that may complicate her PO intake during her inpatient stay.    OBJECTIVE:     Vital Signs Range (Last 24H):  Temp:  [97.3 °F (36.3 °C)-98.3 °F (36.8 °C)]   Pulse:  [59-74]   Resp:  [17-20]   BP: ()/(50-77)   SpO2:  [97 %-100 %] Body mass index is 17.54 kg/m².  Wt Readings from Last 3 Encounters:   10/09/23 1848 50.8 kg (112 lb)   10/06/23 1045 52.7 kg (116 lb 4.7 oz)   09/22/23 1134 55.5 kg (122 lb 5.7 oz)     I & O (Last 24H):  Intake/Output Summary (Last 24 hours) at 10/11/2023 1240  Last data filed at 10/11/2023 0445  Gross per 24 hour   Intake 240 ml   Output 1250 ml   Net -1010 ml     Physical Exam:  General: well developed, well nourished, cachectic  Lungs:  clear to auscultation bilaterally and normal respiratory effort  Cardiovascular: Heart: regularly irregular rhythm, S3 present, no click, no rub, and PVCs present. Chest Wall: no tenderness. Extremities: no cyanosis or edema, or clubbing. Pulses: 2+ and  symmetric.  Abdomen/Rectal: Abdomen: soft, non-tender non-distented; bowel sounds normal; no masses,  no organomegaly. Rectal: not examined  Skin: Skin color, texture, turgor normal. No rashes or lesions  Musculoskeletal: Bed-bound.    Diagnostic Results:  Hgb downtrending to 11.2 from 11.9  Na stable at 123  Cl uptrending to 89 from 87  CO2 downtrending to 22 from 25  BUN uptrending to 45 from 34  Cr uptrending to 1.7 from 1.2  GFR downtrending to 29.6 from 29.6  Urine Na downtrending to 15 from 24  Urine Osm uptrending to 250 from 188  Urine culture (10/9) positive for Enterococcus, identification and susceptibility pending  Urine culture (10/6) positive for E. 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  Uptrending BUN/CR >20, consistent with pre-renal JOCELIN  Continue CKD3b diet, reinforced importance of PO intake  Continue Tejada  Continue Tamsulosin  Continue monitoring BMP  Continue monitoring I/O's  Hold home Jardiance, Valsartan, and Aldactone in the setting of JOCELIN and UTI  Consider Ca oxalate nephrolithiasis diet: low meats, high citrus fruits, +/- thiazides; defer to primary nephrologist    Lower back pain secondary to recent falls and chronic DJD  Improving well  Start PT/OT    CHF  Stable  Hold home Jardiance,  Valsartan, and Aldactone in the setting of JOCELIN and UTI    HTN  Stable  Hold home Valsartan in the setting of JOCELIN and UTI    S/P R lumpectomy for treatment of breast cancer  Stable  Continue home Anastrozole     High Risk Conditions:  Patient has a condition that poses threat to life and bodily function: Hyponatremia.    Problems Addressed Today: Mixed hyponatremia, acute cystitis, CKD3b, non-obstructive Ca oxalate R lower pole nephrolithiasis, urinary retention, CHF, HTN, s/p R lumpectomy for treatment of breast cancer.    DISCHARGE PLANNING: When hyponatremia has resolved and levels are stable.    TIME SPENT: I spent 30 minutes evaluating, treating, counseling, and coordinating care for the patient.    Signing Physician:  Gigi Trevino MS3

## 2023-10-11 NOTE — ASSESSMENT & PLAN NOTE
Patient diagnosed with UTI on 10/6 after presenting to Urology clinic with abdominal tenderness and dysuria on self-cath. Was started on empiric cefpodoxime, and urine cx returned with almost-pan-sensitive E coli. Patient had taken 3 days of PO antibiotics before feeling like she was developing adverse reaction to the medication and stopped.    - Continue tx with CTX  - Pending repeat urine culture on admission

## 2023-10-11 NOTE — SUBJECTIVE & OBJECTIVE
Interval History:    NAEON. UTI sxs are improving on CTX. New JOCELIN today, d/c home ARB and spironolactone. Hyponatremia stable, will start a moderate dose of IV fluids and assess for improvement. Very specific dietary requirements contributing to poor PO intake    Review of Systems   Constitutional:  Positive for chills. Negative for fever.   HENT:  Negative for congestion and sore throat.    Eyes:  Negative for photophobia and visual disturbance.   Respiratory:  Negative for cough and shortness of breath.    Cardiovascular:  Negative for chest pain and palpitations.   Gastrointestinal:  Positive for diarrhea and nausea. Negative for abdominal pain and vomiting.   Genitourinary:  Positive for flank pain and urgency. Negative for dysuria and hematuria.   Musculoskeletal:  Positive for back pain. Negative for arthralgias and gait problem.   Skin:  Negative for rash and wound.   Neurological:  Positive for headaches. Negative for syncope.   Psychiatric/Behavioral:  Positive for confusion. Negative for agitation and behavioral problems.      Objective:     Vital Signs (Most Recent):  Temp: 97.8 °F (36.6 °C) (10/11/23 1143)  Pulse: 69 (10/11/23 1143)  Resp: 18 (10/11/23 1143)  BP: (!) 102/59 (10/11/23 1154)  SpO2: 97 % (10/11/23 1143) Vital Signs (24h Range):  Temp:  [97.3 °F (36.3 °C)-98.3 °F (36.8 °C)] 97.8 °F (36.6 °C)  Pulse:  [59-72] 69  Resp:  [17-18] 18  SpO2:  [97 %-100 %] 97 %  BP: ()/(50-77) 102/59     Weight: 50.8 kg (112 lb)  Body mass index is 17.54 kg/m².     Physical Exam  Vitals reviewed.   Constitutional:       General: She is not in acute distress.     Appearance: Normal appearance.   HENT:      Head: Normocephalic and atraumatic.      Nose: No congestion or rhinorrhea.      Mouth/Throat:      Mouth: Mucous membranes are moist.      Pharynx: Oropharynx is clear.   Eyes:      Extraocular Movements: Extraocular movements intact.      Pupils: Pupils are equal, round, and reactive to light.    Cardiovascular:      Rate and Rhythm: Normal rate and regular rhythm.      Pulses: Normal pulses.      Heart sounds: Normal heart sounds.   Pulmonary:      Effort: Pulmonary effort is normal. No respiratory distress.      Breath sounds: Normal breath sounds.   Abdominal:      General: Bowel sounds are normal.      Palpations: Abdomen is soft.      Tenderness: There is no abdominal tenderness.   Musculoskeletal:         General: Tenderness present. No swelling or signs of injury.      Comments: Some tenderness reported on palpation of the posterior right ilium/iliac crest  No bruising or deformity noticed on exam   Skin:     Findings: No bruising or rash.   Neurological:      General: No focal deficit present.      Mental Status: She is alert and oriented to person, place, and time.   Psychiatric:         Mood and Affect: Mood normal.         Behavior: Behavior normal.      Comments: Tangential              CRANIAL NERVES     CN III, IV, VI   Pupils are equal, round, and reactive to light.       Significant Labs: All pertinent labs within the past 24 hours have been reviewed.  CBC:   Recent Labs   Lab 10/09/23  2040 10/10/23  0438 10/11/23  0451   WBC 6.10 4.97 4.54   HGB 11.8* 11.9* 11.2*   HCT 32.0* 33.0* 31.5*    149* 156       CMP:   Recent Labs   Lab 10/09/23  2040 10/10/23  0438 10/10/23  1304 10/11/23  0008 10/11/23  0451 10/11/23  0839   * 121*   < > 123* 123*  123* 122*   K 4.8 4.7  --   --  4.3  --    CL 85* 87*  --   --  89*  --    CO2 23 25  --   --  22*  --     92  --   --  85  --    BUN 38* 34*  --   --  45*  --    CREATININE 1.5* 1.2  --   --  1.7*  --    CALCIUM 10.0 9.5  --   --  8.6*  --    PROT 7.5 7.0  --   --  6.6  --    ALBUMIN 3.9 3.6  --   --  3.3*  --    BILITOT 1.0 0.8  --   --  0.6  --    ALKPHOS 57 56  --   --  54*  --    AST 47* 44*  --   --  40  --    ALT 31 30  --   --  27  --    ANIONGAP 12 9  --   --  12  --     < > = values in this interval not displayed.          Significant Imaging: I have reviewed all pertinent imaging results/findings within the past 24 hours.

## 2023-10-11 NOTE — ASSESSMENT & PLAN NOTE
Patient with HFpEF, follows with Dr. Wong outpatient, who wanted to recently start Jardiance, however patient has not yet started.     - Given recent UTI, may defer on starting Jardiance right now  - Holding gdmt meds at this time in setting of JOCELIN

## 2023-10-11 NOTE — PLAN OF CARE
[facility] ([place] [address])  Initial Discharge Assessment       Primary Care Provider: Carmen Milton MD    Admission Diagnosis: Urinary retention [R33.9]  Dizziness [R42]  Hyponatremia [E87.1]  Acute cystitis without hematuria [N30.00]  Chest pain [R07.9]    Admission Date: 10/9/2023  Expected Discharge Date: 10/12/2023    Transition of Care Barriers: (P) None    Payor: MEDICARE / Plan: MEDICARE PART A & B / Product Type: Government /     Extended Emergency Contact Information  Primary Emergency Contact: Chi Sorto  Address: [address] [place] [address] [facility]  Work Phone: [phone]  Mobile Phone: [phone]  Relation: Son  Secondary Emergency Contact: CharodarioCassi  Address: [facility] [place], [place] [address] [place] [address] [facility]  Home Phone: [phone]  Mobile Phone: [phone]  Relation: Daughter    Discharge Plan A: (P) Home Health  Discharge Plan B: (P) Home with family      [id number] [address] AT [facility] [place]  [address]  [facility] [address]  Phone: [phone] Fax: [phone]    [id number] [place], [place] - [address] AT [facility] [place]  [address]  [facility] [address]  Phone: [phone] Fax: [phone]      Initial Assessment (most recent)       Adult Discharge Assessment - 10/11/23 1350          Discharge Assessment    Assessment Type Discharge Planning Assessment (P)      Confirmed/corrected address, phone number and insurance Yes (P)      Confirmed Demographics Correct on Facesheet (P)      Source of Information patient (P)      When was your last doctors appointment? 10/05/23 (P)      Communicated PHILLIP with patient/caregiver Yes (P)      Reason For Admission Feeling sluggish, weak due to UTI medication side effects (P)      People in Home alone  (P)      Do you expect to return to your current living situation? Yes (P)      Do you have help at home or someone to help you manage your care at home? No (P)      Prior to hospitilization cognitive status: No Deficits (P)      Current cognitive status: No Deficits (P)      Home Accessibility stairs within home (P)      Stairs, Within Home, Primary Flight of stairs within house. (P)      Home Layout Able to live on 1st floor (P)      Equipment Currently Used at Home none (P)      Readmission within 30 days? No (P)      Patient currently being followed by outpatient case management? No (P)      Do you currently have service(s) that help you manage your care at home? No (P)      Do you take prescription medications? Yes (P)      Do you have prescription coverage? Yes (P)      Coverage MEDICARE - MEDICARE PART A & B (P)      Do you have any problems affording any of your prescribed medications? No (P)      Is the patient taking medications as prescribed? yes (P)      Are you on dialysis? No (P)      Do you take coumadin? No (P)      DME Needed Upon Discharge  none (P)      Discharge Plan discussed with: Patient (P)      Transition of Care Barriers None (P)      Discharge Plan A Home Health (P)      Discharge Plan B Home with family (P)         Physical Activity    On average, how many days per week do you engage in moderate to strenuous exercise (like a brisk walk)? 4 days (P)      On average, how many minutes do you engage in exercise at this level? 30 min (P)         Financial Resource Strain    How hard is it for you to pay for the very basics like food, housing, medical care, and heating? Not very hard (P)         Housing Stability    In the last 12 months, was there a time when you were not able to pay the mortgage or rent on time? No (P)      In the last 12 months, how many places have you lived? 1 (P)      In the last 12 months, was there a time when you did not have a steady place to sleep or slept in a  shelter (including now)? No (P)         Transportation Needs    In the past 12 months, has lack of transportation kept you from medical appointments or from getting medications? No (P)      In the past 12 months, has lack of transportation kept you from meetings, work, or from getting things needed for daily living? No (P)         Food Insecurity    Within the past 12 months, you worried that your food would run out before you got the money to buy more. Never true (P)      Within the past 12 months, the food you bought just didn't last and you didn't have money to get more. Never true (P)         Stress    Do you feel stress - tense, restless, nervous, or anxious, or unable to sleep at night because your mind is troubled all the time - these days? Not at all (P)         Social Connections    In a typical week, how many times do you talk on the phone with family, friends, or neighbors? More than three times a week (P)      How often do you get together with friends or relatives? More than three times a week (P)      Are you , , , , never , or living with a partner?  (P)         Alcohol Use    Q2: How many drinks containing alcohol do you have on a typical day when you are drinking? Patient does not drink (P)                                   MARLI Cano, SEBLESW  [facility]  Case Management  Ext. [phone]

## 2023-10-12 PROBLEM — N17.9 AKI (ACUTE KIDNEY INJURY): Status: ACTIVE | Noted: 2023-10-12

## 2023-10-12 LAB
ALBUMIN SERPL BCP-MCNC: 3.1 G/DL (ref 3.5–5.2)
ALP SERPL-CCNC: 47 U/L (ref 55–135)
ALT SERPL W/O P-5'-P-CCNC: 24 U/L (ref 10–44)
ANION GAP SERPL CALC-SCNC: 8 MMOL/L (ref 8–16)
AST SERPL-CCNC: 30 U/L (ref 10–40)
BASOPHILS # BLD AUTO: 0.02 K/UL (ref 0–0.2)
BASOPHILS NFR BLD: 0.4 % (ref 0–1.9)
BILIRUB SERPL-MCNC: 0.3 MG/DL (ref 0.1–1)
BUN SERPL-MCNC: 59 MG/DL (ref 8–23)
CALCIUM SERPL-MCNC: 8 MG/DL (ref 8.7–10.5)
CHLORIDE SERPL-SCNC: 91 MMOL/L (ref 95–110)
CO2 SERPL-SCNC: 21 MMOL/L (ref 23–29)
CORTIS SERPL-MCNC: 15.3 UG/DL (ref 4.3–22.4)
CREAT SERPL-MCNC: 2.2 MG/DL (ref 0.5–1.4)
DIFFERENTIAL METHOD: ABNORMAL
EOSINOPHIL # BLD AUTO: 0 K/UL (ref 0–0.5)
EOSINOPHIL NFR BLD: 0.8 % (ref 0–8)
ERYTHROCYTE [DISTWIDTH] IN BLOOD BY AUTOMATED COUNT: 13.1 % (ref 11.5–14.5)
EST. GFR  (NO RACE VARIABLE): 21.7 ML/MIN/1.73 M^2
GLUCOSE SERPL-MCNC: 94 MG/DL (ref 70–110)
HCT VFR BLD AUTO: 29.6 % (ref 37–48.5)
HGB BLD-MCNC: 10.4 G/DL (ref 12–16)
IMM GRANULOCYTES # BLD AUTO: 0.02 K/UL (ref 0–0.04)
IMM GRANULOCYTES NFR BLD AUTO: 0.4 % (ref 0–0.5)
LYMPHOCYTES # BLD AUTO: 1.2 K/UL (ref 1–4.8)
LYMPHOCYTES NFR BLD: 22.8 % (ref 18–48)
MAGNESIUM SERPL-MCNC: 2 MG/DL (ref 1.6–2.6)
MCH RBC QN AUTO: 30.9 PG (ref 27–31)
MCHC RBC AUTO-ENTMCNC: 35.1 G/DL (ref 32–36)
MCV RBC AUTO: 88 FL (ref 82–98)
MONOCYTES # BLD AUTO: 0.6 K/UL (ref 0.3–1)
MONOCYTES NFR BLD: 12.1 % (ref 4–15)
NEUTROPHILS # BLD AUTO: 3.3 K/UL (ref 1.8–7.7)
NEUTROPHILS NFR BLD: 63.5 % (ref 38–73)
NRBC BLD-RTO: 0 /100 WBC
OSMOLALITY SERPL: 288 MOSM/KG (ref 275–295)
OSMOLALITY UR: 254 MOSM/KG (ref 50–1200)
PHOSPHATE SERPL-MCNC: 3.8 MG/DL (ref 2.7–4.5)
PLATELET # BLD AUTO: 150 K/UL (ref 150–450)
PMV BLD AUTO: 10.6 FL (ref 9.2–12.9)
POTASSIUM SERPL-SCNC: 4 MMOL/L (ref 3.5–5.1)
PROT SERPL-MCNC: 6.1 G/DL (ref 6–8.4)
RBC # BLD AUTO: 3.37 M/UL (ref 4–5.4)
SODIUM SERPL-SCNC: 120 MMOL/L (ref 136–145)
SODIUM SERPL-SCNC: 121 MMOL/L (ref 136–145)
SODIUM SERPL-SCNC: 121 MMOL/L (ref 136–145)
SODIUM SERPL-SCNC: 124 MMOL/L (ref 136–145)
SODIUM UR-SCNC: 16 MMOL/L (ref 20–250)
VANCOMYCIN SERPL-MCNC: 12.5 UG/ML
WBC # BLD AUTO: 5.13 K/UL (ref 3.9–12.7)

## 2023-10-12 PROCEDURE — 83735 ASSAY OF MAGNESIUM: CPT

## 2023-10-12 PROCEDURE — 84100 ASSAY OF PHOSPHORUS: CPT

## 2023-10-12 PROCEDURE — 85025 COMPLETE CBC W/AUTO DIFF WBC: CPT

## 2023-10-12 PROCEDURE — 36415 COLL VENOUS BLD VENIPUNCTURE: CPT

## 2023-10-12 PROCEDURE — 36415 COLL VENOUS BLD VENIPUNCTURE: CPT | Performed by: STUDENT IN AN ORGANIZED HEALTH CARE EDUCATION/TRAINING PROGRAM

## 2023-10-12 PROCEDURE — 83935 ASSAY OF URINE OSMOLALITY: CPT | Performed by: STUDENT IN AN ORGANIZED HEALTH CARE EDUCATION/TRAINING PROGRAM

## 2023-10-12 PROCEDURE — 99223 1ST HOSP IP/OBS HIGH 75: CPT | Mod: ,,, | Performed by: INTERNAL MEDICINE

## 2023-10-12 PROCEDURE — 25000003 PHARM REV CODE 250

## 2023-10-12 PROCEDURE — 80053 COMPREHEN METABOLIC PANEL: CPT

## 2023-10-12 PROCEDURE — 83930 ASSAY OF BLOOD OSMOLALITY: CPT | Performed by: STUDENT IN AN ORGANIZED HEALTH CARE EDUCATION/TRAINING PROGRAM

## 2023-10-12 PROCEDURE — 97165 OT EVAL LOW COMPLEX 30 MIN: CPT

## 2023-10-12 PROCEDURE — 99233 SBSQ HOSP IP/OBS HIGH 50: CPT | Mod: GC,,, | Performed by: STUDENT IN AN ORGANIZED HEALTH CARE EDUCATION/TRAINING PROGRAM

## 2023-10-12 PROCEDURE — 25000003 PHARM REV CODE 250: Performed by: STUDENT IN AN ORGANIZED HEALTH CARE EDUCATION/TRAINING PROGRAM

## 2023-10-12 PROCEDURE — 63600175 PHARM REV CODE 636 W HCPCS

## 2023-10-12 PROCEDURE — 80202 ASSAY OF VANCOMYCIN: CPT | Performed by: STUDENT IN AN ORGANIZED HEALTH CARE EDUCATION/TRAINING PROGRAM

## 2023-10-12 PROCEDURE — 84295 ASSAY OF SERUM SODIUM: CPT

## 2023-10-12 PROCEDURE — 99233 PR SUBSEQUENT HOSPITAL CARE,LEVL III: ICD-10-PCS | Mod: GC,,, | Performed by: STUDENT IN AN ORGANIZED HEALTH CARE EDUCATION/TRAINING PROGRAM

## 2023-10-12 PROCEDURE — 12000002 HC ACUTE/MED SURGE SEMI-PRIVATE ROOM

## 2023-10-12 PROCEDURE — 99223 PR INITIAL HOSPITAL CARE,LEVL III: ICD-10-PCS | Mod: ,,, | Performed by: INTERNAL MEDICINE

## 2023-10-12 PROCEDURE — 82533 TOTAL CORTISOL: CPT

## 2023-10-12 PROCEDURE — 63600175 PHARM REV CODE 636 W HCPCS: Performed by: STUDENT IN AN ORGANIZED HEALTH CARE EDUCATION/TRAINING PROGRAM

## 2023-10-12 PROCEDURE — 97535 SELF CARE MNGMENT TRAINING: CPT

## 2023-10-12 PROCEDURE — 84300 ASSAY OF URINE SODIUM: CPT | Performed by: STUDENT IN AN ORGANIZED HEALTH CARE EDUCATION/TRAINING PROGRAM

## 2023-10-12 RX ORDER — AMOXICILLIN 250 MG
1 CAPSULE ORAL DAILY PRN
Status: DISCONTINUED | OUTPATIENT
Start: 2023-10-12 | End: 2023-10-15 | Stop reason: HOSPADM

## 2023-10-12 RX ORDER — HEPARIN SODIUM 5000 [USP'U]/ML
5000 INJECTION, SOLUTION INTRAVENOUS; SUBCUTANEOUS EVERY 12 HOURS
Status: DISCONTINUED | OUTPATIENT
Start: 2023-10-12 | End: 2023-10-15 | Stop reason: HOSPADM

## 2023-10-12 RX ORDER — SODIUM CHLORIDE 9 MG/ML
INJECTION, SOLUTION INTRAVENOUS CONTINUOUS
Status: DISCONTINUED | OUTPATIENT
Start: 2023-10-12 | End: 2023-10-13

## 2023-10-12 RX ADMIN — AMPICILLIN 2 G: 2 INJECTION, POWDER, FOR SOLUTION INTRAMUSCULAR; INTRAVENOUS at 08:10

## 2023-10-12 RX ADMIN — AMPICILLIN 2 G: 2 INJECTION, POWDER, FOR SOLUTION INTRAMUSCULAR; INTRAVENOUS at 09:10

## 2023-10-12 RX ADMIN — MUPIROCIN: 20 OINTMENT TOPICAL at 09:10

## 2023-10-12 RX ADMIN — SODIUM CHLORIDE: 9 INJECTION, SOLUTION INTRAVENOUS at 04:10

## 2023-10-12 RX ADMIN — HEPARIN SODIUM 5000 UNITS: 5000 INJECTION INTRAVENOUS; SUBCUTANEOUS at 09:10

## 2023-10-12 RX ADMIN — ANASTROZOLE 1 MG: 1 TABLET, COATED ORAL at 09:10

## 2023-10-12 RX ADMIN — HEPARIN SODIUM 5000 UNITS: 5000 INJECTION INTRAVENOUS; SUBCUTANEOUS at 08:10

## 2023-10-12 RX ADMIN — CEFTRIAXONE 1 G: 1 INJECTION, POWDER, FOR SOLUTION INTRAMUSCULAR; INTRAVENOUS at 12:10

## 2023-10-12 NOTE — ASSESSMENT & PLAN NOTE
JOCELIN noted on 10/11  Holding valsartan and spironolactone  Nephrology following, Suspect intial JOCELIN from prerenal insult (patient poor historian suspect poor PO intake). Further insult from IV ketorolac on 10/09, followed by 2 doses of vastartan    - Repeat osms and lytes  - Obtaining Retroperitoneal U/S

## 2023-10-12 NOTE — PROGRESS NOTES
"[facility] ([address] Medicine  Progress Note    Patient Name: Shima Sorto  MRN: [id number]  Patient Class: IP- Inpatient   Admission Date: 10/9/2023  Length of Stay: 2 days  Attending Physician: Cedrick Alarcon, *  Primary Care Provider: Carmen Milton MD        Subjective:     Principal Problem:Hyponatremia        HPI:  Shima Sorto is an 83-year-old woman with history of breast cancer s/p lumpectomy, CKD stage 3, and HFpEF who presents with complaints of right flank pain and intractable nausea. Patient was a poor historian on my interview. She reports that she has been dealing with a urinary tract infection for the last few days, reporting some lower abdominal discomfort and dysuria, and she was given antibiotics to treat the UTI on 10/6. She says that on the morning of 10/9, after breakfast, she developed significant nausea and had one episode of diarrhea. She felt nauseated the rest of the day but had no further diarrhea and no emesis. She reports that same morning, she sat down too quickly and feels as though she hurt her low back, and says that she has been having pain in the Right lower back since then. She then reports that she has been experiencing nausea and this back pain for "quite some time", even before 10/9. Additionally, she says that she feels that with the nausea, she has felt somewhat lightheaded, but denies syncope or sensation of dizziness. Of note, urine culture from 10/6 returned with E. Coli that was largely sensitive. Patient denies any recent fevers, chest pain, shortness of breath, abdominal pain, or vomiting.    In the ED, patient's vital signs were stable and hemodynamically stable. Initial labs revealed hyponatremia of 118 and then 120 on repeat. CT abdomen pelvis demonstrated no acute findings, but demonstrated significant degenerative disease of the lumbar spine, as well as non-obstructive renal stones. UA with hematuria and " pyuria, and pt was given a dose of IV CTX. Patient was admitted to Hospital Medicine for treatment of presumed UTI and management of hyponatremia.      Overview/Hospital Course:  Mixed hyponatremia 2/2 to Tea and toast vs polydipsia vs hypovolemia. Sodium jump from 118 to 127 after fluid restriction, D5 given to slow correction to 8meq per 24 hours. Na stablized to 123. Repeat Osms and Urine lytes. New JOCELIN 2/2 to hypovolemia vs intrinsic cause. D/c valsartan and spironolactone. Started pt on very moderate NS infusion, with decreasing Na (downtrending to 120). UA positive for amp-sensitive Enterococcus, E. Coli. Transitioned from CTX and Vanc to Amp. Some hypotensive episodes at 107/50. Uptrending BUN/Cr to 26.8 c/w pre-renal JOCELIN. D/c tamsulosin. Nephrology consulted for further workup and evaluation for complex mixed hyponatremia and pre-renal JOCELIN.      Interval History:    NAEON. Cx came back positive for E. Faecalis, transition CTX and Vanc to Ampicillin. Worsening JOCELIN, renal consulted. D/c flomax 2/2 hypotension. No patient complaints.     Review of Systems   Constitutional:  Positive for chills. Negative for fever.   HENT:  Negative for congestion and sore throat.    Eyes:  Negative for photophobia and visual disturbance.   Respiratory:  Negative for cough and shortness of breath.    Cardiovascular:  Negative for chest pain and palpitations.   Gastrointestinal:  Positive for constipation. Negative for abdominal pain and vomiting.   Genitourinary:  Positive for flank pain and urgency. Negative for dysuria and hematuria.   Musculoskeletal:  Positive for back pain. Negative for arthralgias and gait problem.   Skin:  Negative for rash and wound.   Neurological:  Negative for syncope.   Psychiatric/Behavioral:  Negative for agitation and behavioral problems.      Objective:     Vital Signs (Most Recent):  Temp: 97.5 °F (36.4 °C) (10/12/23 1115)  Pulse: 72 (10/12/23 1115)  Resp: 17 (10/12/23 1115)  BP: (!) 119/57  (10/12/23 1115)  SpO2: 98 % (10/12/23 1115) Vital Signs (24h Range):  Temp:  [97.5 °F (36.4 °C)-98.6 °F (37 °C)] 97.5 °F (36.4 °C)  Pulse:  [62-76] 72  Resp:  [17-18] 17  SpO2:  [97 %-100 %] 98 %  BP: (107-124)/(51-76) 119/57     Weight: 50.8 kg (112 lb)  Body mass index is 17.54 kg/m².     Physical Exam  Vitals reviewed.   Constitutional:       General: She is not in acute distress.     Appearance: Normal appearance.   HENT:      Head: Normocephalic and atraumatic.      Nose: No congestion or rhinorrhea.      Mouth/Throat:      Mouth: Mucous membranes are moist.      Pharynx: Oropharynx is clear.   Eyes:      Extraocular Movements: Extraocular movements intact.      Pupils: Pupils are equal, round, and reactive to light.   Cardiovascular:      Rate and Rhythm: Normal rate and regular rhythm.      Pulses: Normal pulses.      Heart sounds: Normal heart sounds.   Pulmonary:      Effort: Pulmonary effort is normal. No respiratory distress.      Breath sounds: Normal breath sounds.   Abdominal:      General: Bowel sounds are normal.      Palpations: Abdomen is soft.      Tenderness: There is no abdominal tenderness.   Musculoskeletal:         General: Tenderness present. No swelling or signs of injury.      Comments: Some tenderness reported on palpation of the posterior right ilium/iliac crest  No bruising or deformity noticed on exam   Skin:     Findings: No bruising or rash.   Neurological:      General: No focal deficit present.      Mental Status: She is alert and oriented to person, place, and time.   Psychiatric:         Mood and Affect: Mood normal.         Behavior: Behavior normal.      Comments: Tangential              CRANIAL NERVES     CN III, IV, VI   Pupils are equal, round, and reactive to light.       Significant Labs: All pertinent labs within the past 24 hours have been reviewed.  CBC:   Recent Labs   Lab 10/11/23  0451 10/12/23  0556   WBC 4.54 5.13   HGB 11.2* 10.4*   HCT 31.5* 29.6*    150  "      CMP:   Recent Labs   Lab 10/11/23  0451 10/11/23  0839 10/11/23  1629 10/12/23  0556   *  123* 122* 121* 120*   K 4.3  --   --  4.0   CL 89*  --   --  91*   CO2 22*  --   --  21*   GLU 85  --   --  94   BUN 45*  --   --  59*   CREATININE 1.7*  --   --  2.2*   CALCIUM 8.6*  --   --  8.0*   PROT 6.6  --   --  6.1   ALBUMIN 3.3*  --   --  3.1*   BILITOT 0.6  --   --  0.3   ALKPHOS 54*  --   --  47*   AST 40  --   --  30   ALT 27  --   --  24   ANIONGAP 12  --   --  8         Significant Imaging: I have reviewed all pertinent imaging results/findings within the past 24 hours.      Assessment/Plan:      * Hyponatremia  83F with history of HFpEF, CKD, breast cancer, who presents with 1 day of intractable nausea with right flank pain. Patient found to be hyponatremic 118, but Na levels as high as 131 less than 1 month ago, and prior to that her sodium levels were generally normal. Hyponatremia may be contributing to patient's persistent nausea as well as feelings of lightheadedness. Unclear etiology, however, patient does not appear extremely volume down on exam, and does not have elements in her history that would lead one to suspect a "tea or toast" diet or polydipsia. Possibly more likely an SIADH picture.    Plan:  - Na levels q6  - Serum osmolality, urine osm, and urine sodium to assess for etiology of new-onset hyponatremia  - Nephrology consulted  - Fluid restrict to <1.2L daily  - Will decide on IVFs following urine studies    Acute cystitis  Patient diagnosed with UTI on 10/6 after presenting to Urology clinic with abdominal tenderness and dysuria on self-cath. Was started on empiric cefpodoxime, and urine cx returned with almost-pan-sensitive E coli. Patient had taken 3 days of PO antibiotics before feeling like she was developing adverse reaction to the medication and stopped.    - Cx positive for E. Faecalis and E Coli  - Transition from Vanc and CTX to Ampicillin      Nausea  83F with history of " "HFpEF, CKD, breast cancer, who presents with 1 day of intractable nausea with right flank pain. Patient reports symptoms came on suddenly on 10/9 after a bout of diarrhea with persistent nausea since. She reports she had dysuria and abdominal tenderness and was seen by Urology on 10/6, where she was given prescription of cefpodoxime for UTI. Patient believes that it may be the antibiotic that led to her symptoms, however she was found to be significantly hyponatremic as well, down to 118. Nausea could be from the UTI, or antibiotic therapy, or may be from her hyponatremia.    Plan:  - Zofran PRN for nausea, continue to monitor  - See "Hyponatremia" for further management    ACC/AHA stage C heart failure with preserved ejection fraction  Patient with HFpEF, follows with Dr. Wong outpatient, who wanted to recently start Jardiance, however patient has not yet started.     - Given recent UTI, may defer on starting Jardiance right now  - Holding gdmt meds at this time in setting of JOCELIN    CKD (chronic kidney disease) stage 3, GFR 30-59 ml/min  JOCELIN noted on 10/11  Holding valsartan and spironolactone  Nephrology following, Suspect intial JOCELIN from prerenal insult (patient poor historian suspect poor PO intake). Further insult from IV ketorolac on 10/09, followed by 2 doses of vastartan    - Repeat osms and lytes  - Obtaining Retroperitoneal U/S    Malignant neoplasm of upper-outer quadrant of right breast in female, estrogen receptor positive  Patient with ER+ breast cancer diagnosed in 2017, s/p lumpectomy, now on hormone therapy.    - Continue home anastrozole     Urinary retention  Chronic issue, followed by Urology outpatient. Patient mainly complaints of incomplete bladder emptying and was taught CIC due to incomplete bladder emptying. Patient had been performing CIC twice daily at home, however on recent visit, PVR was noted to be elevated despite CIC, so she was instructed to increase catheterization to TID. She " tells me however that she is still continuing to only self-cath twice a day.    - Tejada for urinary retention  - d/c home flomax 2/2 hypotension    Hypertension  Chronic condition, stable.     Holding valsartan 2/2 Cr spike        VTE Risk Mitigation (From admission, onward)         Ordered     heparin (porcine) injection 5,000 Units  Every 12 hours         10/12/23 0906     IP VTE HIGH RISK PATIENT  Once         10/10/23 0356     Place sequential compression device  Until discontinued         10/10/23 0356                Discharge Planning   PHILLIP: 10/15/2023     Code Status: Full Code   Is the patient medically ready for discharge?: No    Reason for patient still in hospital (select all that apply): Patient trending condition  Discharge Plan A: Home Health            Vitaliy Perez MD  Department of Hospital Medicine   [facility] - Intensive Care ([place])

## 2023-10-12 NOTE — ASSESSMENT & PLAN NOTE
"83F with history of HFpEF, CKD, breast cancer, who presents with 1 day of intractable nausea with right flank pain. Patient reports symptoms came on suddenly on 10/9 after a bout of diarrhea with persistent nausea since. She reports she had dysuria and abdominal tenderness and was seen by Urology on 10/6, where she was given prescription of cefpodoxime for UTI. Patient believes that it may be the antibiotic that led to her symptoms, however she was found to be significantly hyponatremic as well, down to 118. Nausea could be from the UTI, or antibiotic therapy, or may be from her hyponatremia.    Plan:  - Zofran PRN for nausea, continue to monitor  - See "Hyponatremia" for further management  "

## 2023-10-12 NOTE — PLAN OF CARE
Problem: Occupational Therapy  Goal: Occupational Therapy Goal  Description: Goals to be met by: 10-19-23     Patient will increase functional independence with ADLs by performing:    LE Dressing with [place].  Grooming while standing at sink with [place].  Toileting from toilet with [place] for hygiene and clothing management.   Stand pivot transfers with Modified [place].  Toilet transfer to toilet with Modified [place].  Pt. To be I with HEP to improve level of endurance    Outcome: Ongoing, Progressing

## 2023-10-12 NOTE — ASSESSMENT & PLAN NOTE
"83F with history of HFpEF, CKD, breast cancer, who presents with 1 day of intractable nausea with right flank pain. Patient found to be hyponatremic 118, but Na levels as high as 131 less than 1 month ago, and prior to that her sodium levels were generally normal. Hyponatremia may be contributing to patient's persistent nausea as well as feelings of lightheadedness. Unclear etiology, however, patient does not appear extremely volume down on exam, and does not have elements in her history that would lead one to suspect a "tea or toast" diet or polydipsia. Possibly more likely an SIADH picture.    Plan:  - Na levels q6  - Serum osmolality, urine osm, and urine sodium to assess for etiology of new-onset hyponatremia  - Nephrology consulted  - Fluid restrict to <1.2L daily  - Will decide on IVFs following urine studies  "

## 2023-10-12 NOTE — ASSESSMENT & PLAN NOTE
Chronic issue, followed by Urology outpatient. Patient mainly complaints of incomplete bladder emptying and was taught CIC due to incomplete bladder emptying. Patient had been performing CIC twice daily at home, however on recent visit, PVR was noted to be elevated despite CIC, so she was instructed to increase catheterization to TID. She tells me however that she is still continuing to only self-cath twice a day.    - Tejada for urinary retention  - d/c home flomax 2/2 hypotension

## 2023-10-12 NOTE — ASSESSMENT & PLAN NOTE
Suspect hypotonic hyponatremia with poor solute intake.   - Unfortunately the Urine osm, urine Na were not obtained at admission. Unclear if the lab values were altered by treatment therapies.   - patient appears asymptomatic at this time.   Plan:   - Urine Osm, Urine Na, plasma Osm ordered  - Fluid restrict to <1.2L daily  - Will decide on IVFs following urine studies.   - obtain Na q4hrs  - goal correction no more than 6-8meq in a day (no more than 128 in next 24hrs).

## 2023-10-12 NOTE — MEDICAL/APP STUDENT
Progress Note  Hospital Medicine    Patient Name: Shima Sorto  YOB: 1940    Admit Date: 10/9/2023                     LOS: 2    SUBJECTIVE:     Reason for Admission:  Hyponatremia  84 yo F w/ CKD3b, non-obstructive Ca oxalate R lower pole nephrolithiasis, and urinary retention secondary to medullary sponge kidneys, lumbar DJD, HFpEF, and s/p R lumpectomy for treatment of breast cancer here for hyponatremia and acute cystitis management.   See H&P for detailed presentating history and ROS.      Interval history: NAEON. Some hypotensive episodes at 107/50. Improved UTI and lower back pain. On CTX and Vancomycin. Denies standing lightheadedness, CP, SOB, confusion, fevers, chills, constipation, diarrhea, nausea, or vomiting. Overall, she feels she is improving well.    OBJECTIVE:     Vital Signs Range (Last 24H):  Temp:  [97.5 °F (36.4 °C)-98.6 °F (37 °C)]   Pulse:  [62-76]   Resp:  [17-18]   BP: ()/(50-76)   SpO2:  [97 %-100 %] Body mass index is 17.54 kg/m².  Wt Readings from Last 3 Encounters:   10/09/23 1848 50.8 kg (112 lb)   10/06/23 1045 52.7 kg (116 lb 4.7 oz)   09/22/23 1134 55.5 kg (122 lb 5.7 oz)       I & O (Last 24H):  Intake/Output Summary (Last 24 hours) at 10/12/2023 0931  Last data filed at 10/12/2023 0450  Gross per 24 hour   Intake --   Output 750 ml   Net -750 ml       Physical Exam:  General: well developed, well nourished, cachectic  Lungs:  clear to auscultation bilaterally and normal respiratory effort.  Cardiovascular: Heart: regularly irregular rhythm, S3 present, no click, no rub, and PVCs present. Chest Wall: no tenderness. Extremities: no cyanosis or edema, or clubbing. Pulses: 2+ and symmetric.  Abdomen/Rectal: Abdomen: soft, non-tender non-distented; bowel sounds normal; no masses,  no organomegaly.  Skin: Skin color, texture, turgor normal. No rashes or lesions.  Musculoskeletal: Normal gait.    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  Uptrending BUN/CR >20, consistent with pre-renal JOCELIN  Repeat UA and U-Na  Continue CKD3b diet, reinforced importance of PO intake  Continue [place]  Hold Tamsulosin for hypotensive episodes  Continue monitoring BMP  Continue monitoring I/O's  Hold home Jardiance, Valsartan, and Aldactone in the setting of JOCELIN and UTI  Consider Ca oxalate nephrolithiasis diet: low meats, high citrus fruits, +/- thiazides; defer to primary nephrologist     Lower back pain secondary to recent falls and chronic DJD  Improving well  Continue ambulating, as tolerated  Start PT/OT     CHF  Stable  Hold home Jardiance, Valsartan, and Aldactone in the setting of JOCELIN and UTI     HTN  Stable  Hold home Valsartan in the setting of JOCELIN and UTI     S/P R lumpectomy for treatment of breast cancer  Stable  Continue home Anastrozole     High Risk  Conditions:  Patient has a condition that poses threat to life and bodily function: Hyponatremia.    Problems Addressed Today: Mixed hyponatremia, acute cystitis, JOCELIN on CKD3b, non-obstructive Ca oxalate R lower pole nephrolithiasis, urinary retention, CHF, HTN, s/p R lumpectomy for treatment of breast cancer.     DISCHARGE PLANNING: When hyponatremia has resolved and levels are stable.    TIME SPENT: I spent 30 minutes evaluating, treating, counseling, and coordinating care for the patient.    Signing Physician:  Gigi Trevino MS3

## 2023-10-12 NOTE — PROGRESS NOTES
VANCOMYCIN DOSING BY PHARMACY DISCONTINUATION NOTE    Shima Sorto is a 83 y.o. female who had been consulted for vancomycin dosing.    The pharmacy consult for vancomycin dosing has been discontinued.     Vancomycin Dosing by Pharmacy Consult will sign-off. Please reconsult if necessary. Thank you for allowing us to participate in this patient's care.       Rosalinda Tony, Pharm.D.,[facility]  Phone: [phone]

## 2023-10-12 NOTE — PT/OT/SLP EVAL
Occupational Therapy   Evaluation/tx    Name: Shima Sorto  MRN: [id number]  Admitting Diagnosis: Hyponatremia  Recent Surgery: * No surgery found *      Recommendations:     Discharge Recommendations: other (see comments)  Discharge Equipment Recommendations:  none  Barriers to discharge:  Decreased caregiver support    Assessment:     Shima Sorto is a 83 y.o. female with a medical diagnosis of Hyponatremia.  She presents with mild deficits in functional mobility and standing ADL tasks. Pt. With some difficulty noted on this date standing from lower surface (bedside chair).  Overall, appears to be weak from being in bed for past few days and is anticipated to have a good recovery. Pt. Anxious to get up and moving.  Performance deficits affecting function: weakness, impaired endurance, impaired self care skills, impaired functional mobility, gait instability.      Rehab Prognosis: Good; patient would benefit from acute skilled OT services to address these deficits and reach maximum level of function.       Plan:     Patient to be seen 3 x/week to address the above listed problems via self-care/home management, therapeutic activities, therapeutic exercises, neuromuscular re-education  Plan of Care Expires: 11/11/23  Plan of Care Reviewed with: patient    Subjective     Chief Complaint: I have been in bed since I got her   Patient/Family Comments/goals: to get better and return home    Occupational Profile:  Living Environment: Pt. Resides alone in multilevel house . Pt. Has 11 steps with LHR to get to main floor. Pt. Has a tub shower and a seat she can put in tub if needed. No grab bar but plans to have 1 installed  Previous level of function: Pt. Reported complete I with all ADL/IADL tasks, walking a mile a day and + driving  Roles and Routines: caretaker of self, mother, grand mother, retired, , likes to walk in the park  Equipment Used at Home:  (has shower chair available as well as  RW)  Assistance upon Discharge: neighbors and friends    Pain/Comfort:  Pain Rating 1: 0/10  Pain Rating Post-Intervention 1: 0/10    Patients cultural, spiritual, Zoroastrianism conflicts given the current situation: no    Objective:     Communicated with: nurse prior to session.  Patient found supine with lomas catheter upon OT entry to room.    General Precautions: Standard, fall  Orthopedic Precautions: N/A  Braces: N/A  Respiratory Status: Room air    Occupational Performance:    Bed Mobility:    Patient completed Supine to Sit with independence    Functional Mobility/Transfers:  Patient completed Sit <> Stand Transfer with stand by assistance  with  no assistive device   Patient completed Bed <> Chair Transfer using Stand Pivot technique with stand by assistance with rolling walker  Patient completed Toilet Transfer Stand Pivot technique with contact guard assistance with  rolling walker and grab bars  Functional Mobility: Pt. Ambulated in room to and from bathroom with RW and SBA    Activities of Daily Living:  Grooming: stand by assistance in stand at sink  Upper Body Dressing: stand by assistance to don gown like robe in stand     Cognitive/Visual Perceptual:  Cognitive/Psychosocial Skills:     -       Oriented to: Person, Place, Time, and Situation   -       Follows Commands/attention:Follows multistep  commands  -       Communication: clear/fluent  -       Memory: No Deficits noted  -       Safety awareness/insight to disability: intact   -       Mood/Affect/Coping skills/emotional control: Appropriate to situation  Visual/Perceptual:      -Intact .    Physical Exam:  Balance: -       sit: good; stand: fair  Postural examination/scapula alignment:    -       Rounded shoulders  -       Posterior pelvic tilt  Skin integrity: Visible skin intact  Upper Extremity Range of Motion:     -       Right Upper Extremity: WNL  -       Left Upper Extremity: WNL  Upper Extremity Strength:    -       Right Upper Extremity:  WNL  -       Left Upper Extremity: WNL   Strength:    -       Right Upper Extremity: WNL  -       Left Upper Extremity: WNL    AMPAC 6 Click ADL:  AMPAC Total Score: 21    Treatment & Education:  Pt. Educated on role of OT and pOC  Pt. Educated on safety with mobility and need for staff assist    Patient left up in chair with all lines intact and call button in reach    GOALS:   Multidisciplinary Problems       Occupational Therapy Goals          Problem: Occupational Therapy    Goal Priority Disciplines Outcome Interventions   Occupational Therapy Goal     OT, PT/OT Ongoing, Progressing    Description: Goals to be met by: 10-19-23     Patient will increase functional independence with ADLs by performing:    LE Dressing with Carman.  Grooming while standing at sink with Carman.  Toileting from toilet with Carman for hygiene and clothing management.   Stand pivot transfers with Modified Carman.  Toilet transfer to toilet with Modified Carman.  Pt. 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         Past Surgical History:   Procedure Laterality Date    BREAST CYST ASPIRATION Right 1999    BREAST LUMPECTOMY Right 2018    with radiation    COLONOSCOPY N/A 7/24/2018    Procedure: COLONOSCOPY;  Surgeon: Juan Miguel Pena MD;  Location: [address];  Service: Endoscopy;  Laterality: N/A;    COLONOSCOPY N/A 5/10/2023    Procedure: COLONOSCOPY;  Surgeon:  Keven Garza MD;  Location: [facility] ENDO (4TH FLR);  Service: Endoscopy;  Laterality: N/A;  *Pending C-Diff*  Request Greg  procedure order telephone encounter 5/1  PEG prep, instructions portal -LW  5/4/23 no answer for precall/mleone lpn  5/9lm    ESOPHAGOGASTRODUODENOSCOPY N/A 3/17/2023    Procedure: EGD (ESOPHAGOGASTRODUODENOSCOPY);  Surgeon: Keven Garza MD;  Location: [facility] ENDO (4TH FLR);  Service: Endoscopy;  Laterality: N/A;  Medically Urgent  3/2 instructions to portal-st    pre call attempted, no answer from pt 3/13/23 -egh    INJECTION FOR SENTINEL NODE IDENTIFICATION Right 8/17/2018    Procedure: INJECTION, FOR SENTINEL NODE IDENTIFICATION;  Surgeon: Abby Mason MD;  Location: [facility] OR [address] [place] [address];  Service: General;  Laterality: Right;    interstim placed stage 1      and removed    KIDNEY STONE SURGERY  2000    @ [facility]    MASTECTOMY, PARTIAL Right 8/17/2018    Procedure: MASTECTOMY, PARTIAL-US guided;  Surgeon: Abby Mason MD;  Location: [facility] OR [address] [place] [address];  Service: General;  Laterality: Right;    MOHS procedure      SENTINEL LYMPH NODE BIOPSY Right 8/17/2018    Procedure: BIOPSY, LYMPH NODE, SENTINEL;  Surgeon: Abby Mason MD;  Location: [facility] OR [address] [place] [address];  Service: General;  Laterality: Right;       Time Tracking:     OT Date of Treatment: 10/12/23  OT Start Time: 1118  OT Stop Time: 1146  OT Total Time (min): 28 min    Billable Minutes: Eval: 15    Self-care 13    10/12/2023

## 2023-10-12 NOTE — CONSULTS
[facility] - Int[facility] ([place] [address])  Nephrology  Consult Note    Patient Name: Shima Sorto  MRN: [id number]  Admission Date: 10/9/2023  Hospital Length of Stay: 2 days  Attending Provider: Cedrick Alarcon, *   Primary Care Physician: Carmen Milton MD  Principal Problem:Hyponatremia    Inpatient consult to Nephrology  Consult performed by: Kirk Quintero DO  Consult ordered by: Vitaliy Perez MD        Subjective:     HPI: 83-year-old woman with history of breast cancer s/p lumpectomy, CKD stage 3, and HFpEF who presents with complaints of right flank pain and intractable nausea. Initial labs revealed hyponatremia of 118 and then 120 on repeat. Patient was started on fluid restriction and Na increased to 127. She was then given D5W due to concerns of overcorrection. Her Na went down to 123.     On exam today, patient denies significant diarrhea. She endorses profound nausea but denies vomiting. Regarding her diet, she states that she has not made any significant changes to her diet recently. She reports eating protein of chicken/fish, but has been extremely concious regarding her vegetables (limiting spinach). She denies any changes in water habits.     Nephrology consulted for hyponatremia and JOCELIN.       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       Past Surgical History:   Procedure Laterality Date    BREAST CYST ASPIRATION Right 1999    BREAST LUMPECTOMY Right 2018    with radiation    COLONOSCOPY N/A 7/24/2018    Procedure: COLONOSCOPY;   Surgeon: Juan Miguel Pena MD;  Location: [facility] (4TH FLR);  Service: Endoscopy;  Laterality: N/A;    COLONOSCOPY N/A 5/10/2023    Procedure: COLONOSCOPY;  Surgeon: Keven Garza MD;  Location: [facility] (4TH FLR);  Service: Endoscopy;  Laterality: N/A;  *Pending C-Diff*  Request Greg  procedure order telephone encounter 5/1  PEG prep, instructions portal -LW  5/4/23 no answer for precall/mleone lpn  5/9lm    ESOPHAGOGASTRODUODENOSCOPY N/A 3/17/2023    Procedure: EGD (ESOPHAGOGASTRODUODENOSCOPY);  Surgeon: Keven Garza MD;  Location: [facility] (4TH FLR);  Service: Endoscopy;  Laterality: N/A;  Medically Urgent  3/2 instructions to portal-st    pre call attempted, no answer from pt 3/13/23 -egh    INJECTION FOR SENTINEL NODE IDENTIFICATION Right 8/17/2018    Procedure: INJECTION, FOR SENTINEL NODE IDENTIFICATION;  Surgeon: Abby Mason MD;  [facility];  Service: General;  Laterality: Right;    interstim placed stage 1      and removed    KIDNEY STONE SURGERY  2000    @ [facility]    MASTECTOMY, PARTIAL Right 8/17/2018    Procedure: MASTECTOMY, PARTIAL-US guided;  Surgeon: Abby Mason MD;  Location: [facility] OR [address] [place] [address];  Service: General;  Laterality: Right;    MOHS procedure      SENTINEL LYMPH NODE BIOPSY Right 8/17/2018    Procedure: BIOPSY, LYMPH NODE, SENTINEL;  Surgeon: Abby Mason MD;  [facility];  Service: General;  Laterality: Right;       Review of patient's allergies indicates:   Allergen Reactions    Asparagus Rash     Current Facility-Administered Medications   Medication Frequency    acetaminophen tablet 650 mg Q4H PRN    ampicillin (OMNIPEN) 2 g in sodium chloride 0.9 % 100 mL IVPB (MB+) Q12H    anastrozole tablet 1 mg Daily    dextrose 10% bolus 125 mL 125 mL PRN    dextrose 10% bolus 250 mL 250 mL PRN    glucagon (human recombinant) injection 1 mg PRN    glucose chewable tablet 16 g PRN    glucose chewable tablet 24 g PRN    heparin (porcine) injection  5,000 Units Q12H    mupirocin 2 % ointment BID    naloxone 0.4 mg/mL injection 0.02 mg PRN    ondansetron injection 4 mg Q6H PRN    senna-docusate 8.6-50 mg per tablet 1 tablet Daily PRN    sodium chloride 0.9% flush 10 mL Q12H PRN     Family History       Problem Relation (Age of Onset)    Breast cancer Maternal Aunt    Hearing loss Son    Heart attack Father    Heart disease Father    Hyperlipidemia Son    Kidney disease Mother    Melanoma Father          Tobacco Use    Smoking status: Former     Current packs/day: 0.00     Average packs/day: 0.5 packs/day for 8.0 years (4.0 ttl pk-yrs)     Types: Cigarettes     Start date: 1956     Quit date: 1964     Years since quittin.1    Smokeless tobacco: Never   Substance and Sexual Activity    Alcohol use: Not Currently    Drug use: No    Sexual activity: Not Currently     Review of Systems  Objective:     Vital Signs (Most Recent):  Temp: 97.5 °F (36.4 °C) (10/12/23 1115)  Pulse: 72 (10/12/23 1115)  Resp: 17 (10/12/23 1115)  BP: (!) 119/57 (10/12/23 1115)  SpO2: 98 % (10/12/23 1115) Vital Signs (24h Range):  Temp:  [97.5 °F (36.4 °C)-98.6 °F (37 °C)] 97.5 °F (36.4 °C)  Pulse:  [62-76] 72  Resp:  [17-18] 17  SpO2:  [97 %-100 %] 98 %  BP: (107-124)/(51-76) 119/57     Weight: 50.8 kg (112 lb) (10/09/23 1848)  Body mass index is 17.54 kg/m².  Body surface area is 1.55 meters squared.    I/O last 3 completed shifts:  In: 240 [P.O.:240]  Out: 1400 [Urine:1400]     Physical Exam  Vitals reviewed.   Constitutional:       General: She is not in acute distress.     Appearance: Normal appearance.   HENT:      Head: Normocephalic and atraumatic.      Nose: No congestion or rhinorrhea.      Mouth/Throat:      Mouth: Mucous membranes are moist.      Pharynx: Oropharynx is clear.   Eyes:      Extraocular Movements: Extraocular movements intact.      Pupils: Pupils are equal, round, and reactive to light.   Cardiovascular:      Rate and Rhythm: Normal rate and regular  rhythm.      Pulses: Normal pulses.      Heart sounds: Normal heart sounds.   Pulmonary:      Effort: Pulmonary effort is normal. No respiratory distress.      Breath sounds: Normal breath sounds.   Abdominal:      General: Bowel sounds are normal.      Palpations: Abdomen is soft.      Tenderness: There is no abdominal tenderness.   Musculoskeletal:         General: Tenderness present. No swelling or signs of injury.      Comments: Some tenderness reported on palpation of the posterior right ilium/iliac crest  No bruising or deformity noticed on exam   Skin:     Findings: No bruising or rash.   Neurological:      General: No focal deficit present.      Mental Status: She is alert and oriented to person, place, and time.   Psychiatric:         Mood and Affect: Mood normal.         Behavior: Behavior normal.      Comments: Tangential          Significant Labs:  All labs within the past 24 hours have been reviewed.    Significant Imaging:  Labs: Reviewed    Assessment/Plan:     Renal/  * Hyponatremia  Suspect hypotonic hyponatremia with poor solute intake.   - Unfortunately the Urine osm, urine Na were not obtained at admission. Unclear if the lab values were altered by treatment therapies.   - patient appears asymptomatic at this time.   Plan:   - Urine Osm, Urine Na, plasma Osm ordered  - Fluid restrict to <1.2L daily  - Will decide on IVFs following urine studies.   - obtain Na q4hrs  - goal correction no more than 6-8meq in a day (no more than 128 in next 24hrs).     JOCELIN (acute kidney injury)  Non oliguric JOCELIN on baseline CKD IIIb   - CKD IIIb thought to be 2/2 to age related nephron loss and nephrolithiasis.   - baseline Scr 1.1-1.3; Scr on admission ~1.7.   - UPCR negative  - UA: 1+ protein; 2+ blood; 67 WBC: >100 WBC  - Suspect intial JOCELIN from prerenal insult (patient poor historian suspect poor PO intake). Further insult from IV ketorolac on 10/09, followed by 2 doses of vastartan.   Plan:  - Will follow up  urine studies prior to deciding on IVFs.   - Obtain Retroperitoneal US.   - maintain lomas cath  - strict ins and outs  - renally dose all meds  - avoid nephrotoxins (NO NSAIDS)  - keep MAP>65    Oncology  Malignant neoplasm of upper-outer quadrant of right breast in female, estrogen receptor positive  Per primary team.     GI  Nausea  Per primary team.         Thank you for your consult. I will follow-up with patient. Please contact us if you have any additional questions.     Case discussed with attending. Attestation to follow.       Kirk Quintero DO  Nephrology  [facility] - Intensive Care ([facility]    ATTENDING PHYSICIAN ATTESTATION  I have personally verified the history and examined the patient. I thoroughly reviewed the demographic, clinical, laboratorial and imaging information available in medical records. I agree with the assessment and recommendations provided by the subspecialty resident who was under my supervision.

## 2023-10-12 NOTE — SUBJECTIVE & OBJECTIVE
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       Past Surgical History:   Procedure Laterality Date    BREAST CYST ASPIRATION Right 1999    BREAST LUMPECTOMY Right 2018    with radiation    COLONOSCOPY N/A 7/24/2018    Procedure: COLONOSCOPY;  Surgeon: Juan Miguel Pena MD;  Location: [facility] ENDO (4TH FLR);  Service: Endoscopy;  Laterality: N/A;    COLONOSCOPY N/A 5/10/2023    Procedure: COLONOSCOPY;  Surgeon: Keven Garza MD;  Location: [facility] (4TH FLR);  Service: Endoscopy;  Laterality: N/A;  *Pending C-Diff*  Request Greg  procedure order telephone encounter 5/1  PEG prep, instructions portal -LW  5/4/23 no answer for precall/mleone lpn  5/9lm    ESOPHAGOGASTRODUODENOSCOPY N/A 3/17/2023    Procedure: EGD (ESOPHAGOGASTRODUODENOSCOPY);  Surgeon: Keven Garza MD;  Location: [facility] (4TH FLR);  Service: Endoscopy;  Laterality: N/A;  Medically Urgent  3/2 instructions to portal-st    pre call attempted, no answer from pt 3/13/23 -egh    INJECTION FOR SENTINEL NODE IDENTIFICATION Right 8/17/2018    Procedure: INJECTION, FOR SENTINEL NODE IDENTIFICATION;  Surgeon: Abby Mason MD;  Location: [facility] OR [address] [place] [address];  Service: General;  Laterality: Right;    interstim placed stage 1      and removed    KIDNEY STONE SURGERY  2000    @ [facility]    MASTECTOMY, PARTIAL Right 8/17/2018    Procedure: MASTECTOMY, PARTIAL-US guided;  Surgeon: Abby Mason MD;  Location: [facility] OR [address] [place] [address];  Service: General;  Laterality: Right;    MOHS procedure      SENTINEL LYMPH NODE  BIOPSY Right 2018    Procedure: BIOPSY, LYMPH NODE, SENTINEL;  Surgeon: Abby Mason MD;  Location: [facility] OR [address] [place] [address];  Service: General;  Laterality: Right;       Review of patient's allergies indicates:   Allergen Reactions    Asparagus Rash     Current Facility-Administered Medications   Medication Frequency    acetaminophen tablet 650 mg Q4H PRN    ampicillin (OMNIPEN) 2 g in sodium chloride 0.9 % 100 mL IVPB (MB+) Q12H    anastrozole tablet 1 mg Daily    dextrose 10% bolus 125 mL 125 mL PRN    dextrose 10% bolus 250 mL 250 mL PRN    glucagon (human recombinant) injection 1 mg PRN    glucose chewable tablet 16 g PRN    glucose chewable tablet 24 g PRN    heparin (porcine) injection 5,000 Units Q12H    mupirocin 2 % ointment BID    naloxone 0.4 mg/mL injection 0.02 mg PRN    ondansetron injection 4 mg Q6H PRN    senna-docusate 8.6-50 mg per tablet 1 tablet Daily PRN    sodium chloride 0.9% flush 10 mL Q12H PRN     Family History       Problem Relation (Age of Onset)    Breast cancer Maternal Aunt    Hearing loss Son    Heart attack Father    Heart disease Father    Hyperlipidemia Son    Kidney disease Mother    Melanoma Father          Tobacco Use    Smoking status: Former     Current packs/day: 0.00     Average packs/day: 0.5 packs/day for 8.0 years (4.0 ttl pk-yrs)     Types: Cigarettes     Start date: 1956     Quit date: 1964     Years since quittin.1    Smokeless tobacco: Never   Substance and Sexual Activity    Alcohol use: Not Currently    Drug use: No    Sexual activity: Not Currently     Review of Systems  Objective:     Vital Signs (Most Recent):  Temp: 97.5 °F (36.4 °C) (10/12/23 1115)  Pulse: 72 (10/12/23 1115)  Resp: 17 (10/12/23 1115)  BP: (!) 119/57 (10/12/23 1115)  SpO2: 98 % (10/12/23 1115) Vital Signs (24h Range):  Temp:  [97.5 °F (36.4 °C)-98.6 °F (37 °C)] 97.5 °F (36.4 °C)  Pulse:  [62-76] 72  Resp:  [17-18] 17  SpO2:  [97 %-100 %] 98 %  BP: (107-124)/(51-76) 119/57      Weight: 50.8 kg (112 lb) (10/09/23 1848)  Body mass index is 17.54 kg/m².  Body surface area is 1.55 meters squared.    I/O last 3 completed shifts:  In: 240 [P.O.:240]  Out: 1400 [Urine:1400]     Physical Exam  Vitals reviewed.   Constitutional:       General: She is not in acute distress.     Appearance: Normal appearance.   HENT:      Head: Normocephalic and atraumatic.      Nose: No congestion or rhinorrhea.      Mouth/Throat:      Mouth: Mucous membranes are moist.      Pharynx: Oropharynx is clear.   Eyes:      Extraocular Movements: Extraocular movements intact.      Pupils: Pupils are equal, round, and reactive to light.   Cardiovascular:      Rate and Rhythm: Normal rate and regular rhythm.      Pulses: Normal pulses.      Heart sounds: Normal heart sounds.   Pulmonary:      Effort: Pulmonary effort is normal. No respiratory distress.      Breath sounds: Normal breath sounds.   Abdominal:      General: Bowel sounds are normal.      Palpations: Abdomen is soft.      Tenderness: There is no abdominal tenderness.   Musculoskeletal:         General: Tenderness present. No swelling or signs of injury.      Comments: Some tenderness reported on palpation of the posterior right ilium/iliac crest  No bruising or deformity noticed on exam   Skin:     Findings: No bruising or rash.   Neurological:      General: No focal deficit present.      Mental Status: She is alert and oriented to person, place, and time.   Psychiatric:         Mood and Affect: Mood normal.         Behavior: Behavior normal.      Comments: Tangential          Significant Labs:  All labs within the past 24 hours have been reviewed.    Significant Imaging:  Labs: Reviewed

## 2023-10-12 NOTE — HPI
83-year-old woman with history of breast cancer s/p lumpectomy, CKD stage 3, and HFpEF who presents with complaints of right flank pain and intractable nausea. Initial labs revealed hyponatremia of 118 and then 120 on repeat. Patient was started on fluid restriction and Na increased to 127. She was then given D5W due to concerns of overcorrection. Her Na went down to 123.     On exam today, patient denies significant diarrhea. She endorses profound nausea but denies vomiting. Regarding her diet, she states that she has not made any significant changes to her diet recently. She reports eating protein of chicken/fish, but has been extremely concious regarding her vegetables (limiting spinach). She denies any changes in water habits.     Nephrology consulted for hyponatremia and JOCELIN.

## 2023-10-12 NOTE — ASSESSMENT & PLAN NOTE
Patient diagnosed with UTI on 10/6 after presenting to Urology clinic with abdominal tenderness and dysuria on self-cath. Was started on empiric cefpodoxime, and urine cx returned with almost-pan-sensitive E coli. Patient had taken 3 days of PO antibiotics before feeling like she was developing adverse reaction to the medication and stopped.    - Cx positive for E. Faecalis and E Coli  - Transition from Vanc and CTX to Ampicillin

## 2023-10-12 NOTE — CARE UPDATE
Urine Osm 254; urine Na 16    Will start normal saline @ 60cc/hr. Goal correction no more than 6-8meq (no more than 128). See progress note for further information.

## 2023-10-12 NOTE — ASSESSMENT & PLAN NOTE
Non oliguric JOCELIN on baseline CKD IIIb   - CKD IIIb thought to be 2/2 to age related nephron loss and nephrolithiasis.   - baseline Scr 1.1-1.3; Scr on admission ~1.7.   - UPCR negative  - UA: 1+ protein; 2+ blood; 67 WBC: >100 WBC  - Suspect intial JOCELIN from prerenal insult (patient poor historian suspect poor PO intake). Further insult from IV ketorolac on 10/09, followed by 2 doses of vastartan.   Plan:  - Will follow up urine studies prior to deciding on IVFs.   - Obtain Retroperitoneal US.   - maintain lomas cath  - strict ins and outs  - renally dose all meds  - avoid nephrotoxins (NO NSAIDS)  - keep MAP>65

## 2023-10-12 NOTE — SUBJECTIVE & OBJECTIVE
Interval History:    NAEON. Cx came back positive for E. Faecalis, transition CTX and Vanc to Ampicillin. Worsening JOCELIN, renal consulted. D/c flomax 2/2 hypotension. No patient complaints.     Review of Systems   Constitutional:  Positive for chills. Negative for fever.   HENT:  Negative for congestion and sore throat.    Eyes:  Negative for photophobia and visual disturbance.   Respiratory:  Negative for cough and shortness of breath.    Cardiovascular:  Negative for chest pain and palpitations.   Gastrointestinal:  Positive for constipation. Negative for abdominal pain and vomiting.   Genitourinary:  Positive for flank pain and urgency. Negative for dysuria and hematuria.   Musculoskeletal:  Positive for back pain. Negative for arthralgias and gait problem.   Skin:  Negative for rash and wound.   Neurological:  Negative for syncope.   Psychiatric/Behavioral:  Negative for agitation and behavioral problems.      Objective:     Vital Signs (Most Recent):  Temp: 97.5 °F (36.4 °C) (10/12/23 1115)  Pulse: 72 (10/12/23 1115)  Resp: 17 (10/12/23 1115)  BP: (!) 119/57 (10/12/23 1115)  SpO2: 98 % (10/12/23 1115) Vital Signs (24h Range):  Temp:  [97.5 °F (36.4 °C)-98.6 °F (37 °C)] 97.5 °F (36.4 °C)  Pulse:  [62-76] 72  Resp:  [17-18] 17  SpO2:  [97 %-100 %] 98 %  BP: (107-124)/(51-76) 119/57     Weight: 50.8 kg (112 lb)  Body mass index is 17.54 kg/m².     Physical Exam  Vitals reviewed.   Constitutional:       General: She is not in acute distress.     Appearance: Normal appearance.   HENT:      Head: Normocephalic and atraumatic.      Nose: No congestion or rhinorrhea.      Mouth/Throat:      Mouth: Mucous membranes are moist.      Pharynx: Oropharynx is clear.   Eyes:      Extraocular Movements: Extraocular movements intact.      Pupils: Pupils are equal, round, and reactive to light.   Cardiovascular:      Rate and Rhythm: Normal rate and regular rhythm.      Pulses: Normal pulses.      Heart sounds: Normal heart  sounds.   Pulmonary:      Effort: Pulmonary effort is normal. No respiratory distress.      Breath sounds: Normal breath sounds.   Abdominal:      General: Bowel sounds are normal.      Palpations: Abdomen is soft.      Tenderness: There is no abdominal tenderness.   Musculoskeletal:         General: Tenderness present. No swelling or signs of injury.      Comments: Some tenderness reported on palpation of the posterior right ilium/iliac crest  No bruising or deformity noticed on exam   Skin:     Findings: No bruising or rash.   Neurological:      General: No focal deficit present.      Mental Status: She is alert and oriented to person, place, and time.   Psychiatric:         Mood and Affect: Mood normal.         Behavior: Behavior normal.      Comments: Tangential              CRANIAL NERVES     CN III, IV, VI   Pupils are equal, round, and reactive to light.       Significant Labs: All pertinent labs within the past 24 hours have been reviewed.  CBC:   Recent Labs   Lab 10/11/23  0451 10/12/23  0556   WBC 4.54 5.13   HGB 11.2* 10.4*   HCT 31.5* 29.6*    150       CMP:   Recent Labs   Lab 10/11/23  0451 10/11/23  0839 10/11/23  1629 10/12/23  0556   *  123* 122* 121* 120*   K 4.3  --   --  4.0   CL 89*  --   --  91*   CO2 22*  --   --  21*   GLU 85  --   --  94   BUN 45*  --   --  59*   CREATININE 1.7*  --   --  2.2*   CALCIUM 8.6*  --   --  8.0*   PROT 6.6  --   --  6.1   ALBUMIN 3.3*  --   --  3.1*   BILITOT 0.6  --   --  0.3   ALKPHOS 54*  --   --  47*   AST 40  --   --  30   ALT 27  --   --  24   ANIONGAP 12  --   --  8         Significant Imaging: I have reviewed all pertinent imaging results/findings within the past 24 hours.   Acute renal failure (ARF)    Asthma    Hypertension    UTI (urinary tract infection)

## 2023-10-13 ENCOUNTER — TELEPHONE (OUTPATIENT)
Dept: DERMATOLOGY | Facility: CLINIC | Age: 83
End: 2023-10-13
Payer: MEDICARE

## 2023-10-13 ENCOUNTER — TELEPHONE (OUTPATIENT)
Dept: CARDIOLOGY | Facility: CLINIC | Age: 83
End: 2023-10-13
Payer: MEDICARE

## 2023-10-13 LAB
ALBUMIN SERPL BCP-MCNC: 3 G/DL (ref 3.5–5.2)
ALP SERPL-CCNC: 46 U/L (ref 55–135)
ALT SERPL W/O P-5'-P-CCNC: 21 U/L (ref 10–44)
ANION GAP SERPL CALC-SCNC: 10 MMOL/L (ref 8–16)
AST SERPL-CCNC: 25 U/L (ref 10–40)
BASOPHILS # BLD AUTO: 0.02 K/UL (ref 0–0.2)
BASOPHILS NFR BLD: 0.4 % (ref 0–1.9)
BILIRUB SERPL-MCNC: 0.4 MG/DL (ref 0.1–1)
BUN SERPL-MCNC: 58 MG/DL (ref 8–23)
CALCIUM SERPL-MCNC: 7.8 MG/DL (ref 8.7–10.5)
CHLORIDE SERPL-SCNC: 97 MMOL/L (ref 95–110)
CO2 SERPL-SCNC: 18 MMOL/L (ref 23–29)
CREAT SERPL-MCNC: 1.8 MG/DL (ref 0.5–1.4)
DIFFERENTIAL METHOD: ABNORMAL
EOSINOPHIL # BLD AUTO: 0 K/UL (ref 0–0.5)
EOSINOPHIL NFR BLD: 0.4 % (ref 0–8)
ERYTHROCYTE [DISTWIDTH] IN BLOOD BY AUTOMATED COUNT: 13.1 % (ref 11.5–14.5)
EST. GFR  (NO RACE VARIABLE): 27.6 ML/MIN/1.73 M^2
GLUCOSE SERPL-MCNC: 102 MG/DL (ref 70–110)
HCT VFR BLD AUTO: 29.3 % (ref 37–48.5)
HGB BLD-MCNC: 10.1 G/DL (ref 12–16)
IMM GRANULOCYTES # BLD AUTO: 0.03 K/UL (ref 0–0.04)
IMM GRANULOCYTES NFR BLD AUTO: 0.6 % (ref 0–0.5)
INFLUENZA A, MOLECULAR: NEGATIVE
INFLUENZA B, MOLECULAR: NEGATIVE
LYMPHOCYTES # BLD AUTO: 0.9 K/UL (ref 1–4.8)
LYMPHOCYTES NFR BLD: 16.5 % (ref 18–48)
MAGNESIUM SERPL-MCNC: 2.1 MG/DL (ref 1.6–2.6)
MCH RBC QN AUTO: 31.2 PG (ref 27–31)
MCHC RBC AUTO-ENTMCNC: 34.5 G/DL (ref 32–36)
MCV RBC AUTO: 90 FL (ref 82–98)
MONOCYTES # BLD AUTO: 0.6 K/UL (ref 0.3–1)
MONOCYTES NFR BLD: 11.6 % (ref 4–15)
NEUTROPHILS # BLD AUTO: 3.8 K/UL (ref 1.8–7.7)
NEUTROPHILS NFR BLD: 70.5 % (ref 38–73)
NRBC BLD-RTO: 0 /100 WBC
PHOSPHATE SERPL-MCNC: 3.2 MG/DL (ref 2.7–4.5)
PLATELET # BLD AUTO: 166 K/UL (ref 150–450)
PMV BLD AUTO: 11.1 FL (ref 9.2–12.9)
POTASSIUM SERPL-SCNC: 4 MMOL/L (ref 3.5–5.1)
PROT SERPL-MCNC: 5.9 G/DL (ref 6–8.4)
RBC # BLD AUTO: 3.24 M/UL (ref 4–5.4)
SARS-COV-2 RNA RESP QL NAA+PROBE: NOT DETECTED
SODIUM SERPL-SCNC: 125 MMOL/L (ref 136–145)
SODIUM SERPL-SCNC: 127 MMOL/L (ref 136–145)
SODIUM SERPL-SCNC: 128 MMOL/L (ref 136–145)
SODIUM SERPL-SCNC: 130 MMOL/L (ref 136–145)
SPECIMEN SOURCE: NORMAL
WBC # BLD AUTO: 5.33 K/UL (ref 3.9–12.7)

## 2023-10-13 PROCEDURE — 99232 PR SUBSEQUENT HOSPITAL CARE,LEVL II: ICD-10-PCS | Mod: ,,, | Performed by: INTERNAL MEDICINE

## 2023-10-13 PROCEDURE — 85025 COMPLETE CBC W/AUTO DIFF WBC: CPT

## 2023-10-13 PROCEDURE — 99233 SBSQ HOSP IP/OBS HIGH 50: CPT | Mod: GC,,, | Performed by: STUDENT IN AN ORGANIZED HEALTH CARE EDUCATION/TRAINING PROGRAM

## 2023-10-13 PROCEDURE — 87502 INFLUENZA DNA AMP PROBE: CPT

## 2023-10-13 PROCEDURE — 99232 SBSQ HOSP IP/OBS MODERATE 35: CPT | Mod: ,,, | Performed by: INTERNAL MEDICINE

## 2023-10-13 PROCEDURE — 84295 ASSAY OF SERUM SODIUM: CPT | Mod: 91

## 2023-10-13 PROCEDURE — 36415 COLL VENOUS BLD VENIPUNCTURE: CPT

## 2023-10-13 PROCEDURE — 25000003 PHARM REV CODE 250: Performed by: STUDENT IN AN ORGANIZED HEALTH CARE EDUCATION/TRAINING PROGRAM

## 2023-10-13 PROCEDURE — 83735 ASSAY OF MAGNESIUM: CPT

## 2023-10-13 PROCEDURE — 63600175 PHARM REV CODE 636 W HCPCS

## 2023-10-13 PROCEDURE — 80053 COMPREHEN METABOLIC PANEL: CPT

## 2023-10-13 PROCEDURE — 63600175 PHARM REV CODE 636 W HCPCS: Performed by: STUDENT IN AN ORGANIZED HEALTH CARE EDUCATION/TRAINING PROGRAM

## 2023-10-13 PROCEDURE — 25000003 PHARM REV CODE 250

## 2023-10-13 PROCEDURE — 87635 SARS-COV-2 COVID-19 AMP PRB: CPT

## 2023-10-13 PROCEDURE — 12000002 HC ACUTE/MED SURGE SEMI-PRIVATE ROOM

## 2023-10-13 PROCEDURE — 97162 PT EVAL MOD COMPLEX 30 MIN: CPT

## 2023-10-13 PROCEDURE — 84100 ASSAY OF PHOSPHORUS: CPT

## 2023-10-13 PROCEDURE — 99233 PR SUBSEQUENT HOSPITAL CARE,LEVL III: ICD-10-PCS | Mod: GC,,, | Performed by: STUDENT IN AN ORGANIZED HEALTH CARE EDUCATION/TRAINING PROGRAM

## 2023-10-13 PROCEDURE — 97116 GAIT TRAINING THERAPY: CPT

## 2023-10-13 PROCEDURE — 84295 ASSAY OF SERUM SODIUM: CPT

## 2023-10-13 RX ADMIN — ANASTROZOLE 1 MG: 1 TABLET, COATED ORAL at 10:10

## 2023-10-13 RX ADMIN — AMPICILLIN 2 G: 2 INJECTION, POWDER, FOR SOLUTION INTRAMUSCULAR; INTRAVENOUS at 09:10

## 2023-10-13 RX ADMIN — MUPIROCIN: 20 OINTMENT TOPICAL at 09:10

## 2023-10-13 RX ADMIN — MUPIROCIN: 20 OINTMENT TOPICAL at 10:10

## 2023-10-13 RX ADMIN — HEPARIN SODIUM 5000 UNITS: 5000 INJECTION INTRAVENOUS; SUBCUTANEOUS at 10:10

## 2023-10-13 RX ADMIN — AMPICILLIN 2 G: 2 INJECTION, POWDER, FOR SOLUTION INTRAMUSCULAR; INTRAVENOUS at 10:10

## 2023-10-13 NOTE — ASSESSMENT & PLAN NOTE
Improving.   Suspect hypotonic hyponatremia with poor solute intake.   - patient appears asymptomatic at this time.   Plan:   - Fluid restrict to <1.5L daily  - IV NS @ 50cc/hr  - obtain Na TID.   - goal correction no more than 6-8meq in a day

## 2023-10-13 NOTE — TELEPHONE ENCOUNTER
Pt need to know what medication she can take to stop the SOB so she can go home. She would like for you to speak with the hospital team

## 2023-10-13 NOTE — PROGRESS NOTES
"[facility] - [facility] ([address] Medicine  Progress Note    Patient Name: Shima Sorto  MRN: [id number]  Patient Class: IP- Inpatient   Admission Date: 10/9/2023  Length of Stay: 3 days  Attending Physician: Cedrick Alarcon, *  Primary Care Provider: Carmen Milton MD        Subjective:     Principal Problem:Hyponatremia        HPI:  Shima oSrto is an 83-year-old woman with history of breast cancer s/p lumpectomy, CKD stage 3, and HFpEF who presents with complaints of right flank pain and intractable nausea. Patient was a poor historian on my interview. She reports that she has been dealing with a urinary tract infection for the last few days, reporting some lower abdominal discomfort and dysuria, and she was given antibiotics to treat the UTI on 10/6. She says that on the morning of 10/9, after breakfast, she developed significant nausea and had one episode of diarrhea. She felt nauseated the rest of the day but had no further diarrhea and no emesis. She reports that same morning, she sat down too quickly and feels as though she hurt her low back, and says that she has been having pain in the Right lower back since then. She then reports that she has been experiencing nausea and this back pain for "quite some time", even before 10/9. Additionally, she says that she feels that with the nausea, she has felt somewhat lightheaded, but denies syncope or sensation of dizziness. Of note, urine culture from 10/6 returned with E. Coli that was largely sensitive. Patient denies any recent fevers, chest pain, shortness of breath, abdominal pain, or vomiting.    In the ED, patient's vital signs were stable and hemodynamically stable. Initial labs revealed hyponatremia of 118 and then 120 on repeat. CT abdomen pelvis demonstrated no acute findings, but demonstrated significant degenerative disease of the lumbar spine, as well as non-obstructive renal stones. UA with hematuria and " pyuria, and pt was given a dose of IV CTX. Patient was admitted to Hospital Medicine for treatment of presumed UTI and management of hyponatremia.      Overview/Hospital Course:  Mixed hyponatremia 2/2 to Tea and toast vs polydipsia vs hypovolemia. Sodium jump from 118 to 127 after fluid restriction, D5 given to slow correction to 8meq per 24 hours. Na stablized to 123. Repeat Osms and Urine lytes. New JOCELIN 2/2 to hypovolemia vs intrinsic cause. D/c valsartan and spironolactone. Started pt on very moderate NS infusion, with decreasing Na (downtrending to 120). UA positive for amp-sensitive Enterococcus, E. Coli. Transitioned from CTX and Vanc to Amp. Some hypotensive episodes at 107/50. Uptrending BUN/Cr to 26.8 c/w pre-renal JOCELIN. D/c tamsulosin. Nephrology consulted for further workup and evaluation for complex mixed hyponatremia and pre-renal JOCELIN.      Interval History:    NAEON. Continue on Ampicillin. Na improving on IVF, no signs of volume overload.     Review of Systems   Constitutional:  Positive for chills. Negative for fever.   HENT:  Negative for congestion and sore throat.    Eyes:  Negative for photophobia and visual disturbance.   Respiratory:  Negative for cough and shortness of breath.    Cardiovascular:  Negative for chest pain and palpitations.   Gastrointestinal:  Positive for constipation. Negative for abdominal pain and vomiting.   Genitourinary:  Positive for flank pain and urgency. Negative for dysuria and hematuria.   Musculoskeletal:  Positive for back pain. Negative for arthralgias and gait problem.   Skin:  Negative for rash and wound.   Neurological:  Negative for syncope.   Psychiatric/Behavioral:  Negative for agitation and behavioral problems.      Objective:     Vital Signs (Most Recent):  Temp: 97.6 °F (36.4 °C) (10/13/23 1216)  Pulse: 68 (10/13/23 1216)  Resp: 17 (10/13/23 1216)  BP: (!) 109/53 (10/13/23 1216)  SpO2: 100 % (10/13/23 1216) Vital Signs (24h Range):  Temp:  [97.6 °F  (36.4 °C)-98.8 °F (37.1 °C)] 97.6 °F (36.4 °C)  Pulse:  [63-73] 68  Resp:  [17-18] 17  SpO2:  [98 %-100 %] 100 %  BP: (109-139)/(53-63) 109/53     Weight: 50.8 kg (111 lb 15.9 oz)  Body mass index is 17.54 kg/m².     Physical Exam  Vitals reviewed.   Constitutional:       General: She is not in acute distress.     Appearance: Normal appearance.   HENT:      Head: Normocephalic and atraumatic.      Nose: No congestion or rhinorrhea.      Mouth/Throat:      Mouth: Mucous membranes are moist.      Pharynx: Oropharynx is clear.   Eyes:      Extraocular Movements: Extraocular movements intact.      Pupils: Pupils are equal, round, and reactive to light.   Cardiovascular:      Rate and Rhythm: Normal rate and regular rhythm.      Pulses: Normal pulses.      Heart sounds: Normal heart sounds.   Pulmonary:      Effort: Pulmonary effort is normal. No respiratory distress.      Breath sounds: Normal breath sounds.   Abdominal:      General: Bowel sounds are normal.      Palpations: Abdomen is soft.      Tenderness: There is no abdominal tenderness.   Musculoskeletal:         General: Tenderness present. No swelling or signs of injury.      Comments: Some tenderness reported on palpation of the posterior right ilium/iliac crest  No bruising or deformity noticed on exam   Skin:     Findings: No bruising or rash.   Neurological:      General: No focal deficit present.      Mental Status: She is alert and oriented to person, place, and time.   Psychiatric:         Mood and Affect: Mood normal.         Behavior: Behavior normal.      Comments: Tangential              CRANIAL NERVES     CN III, IV, VI   Pupils are equal, round, and reactive to light.       Significant Labs: All pertinent labs within the past 24 hours have been reviewed.  CBC:   Recent Labs   Lab 10/12/23  0556 10/13/23  0520   WBC 5.13 5.33   HGB 10.4* 10.1*   HCT 29.6* 29.3*    166       CMP:   Recent Labs   Lab 10/12/23  0556 10/12/23  1147 10/13/23  0520  "10/13/23  0939 10/13/23  1212   *   < > 125*  125* 127* 128*   K 4.0  --  4.0  --   --    CL 91*  --  97  --   --    CO2 21*  --  18*  --   --    GLU 94  --  102  --   --    BUN 59*  --  58*  --   --    CREATININE 2.2*  --  1.8*  --   --    CALCIUM 8.0*  --  7.8*  --   --    PROT 6.1  --  5.9*  --   --    ALBUMIN 3.1*  --  3.0*  --   --    BILITOT 0.3  --  0.4  --   --    ALKPHOS 47*  --  46*  --   --    AST 30  --  25  --   --    ALT 24  --  21  --   --    ANIONGAP 8  --  10  --   --     < > = values in this interval not displayed.         Significant Imaging: I have reviewed all pertinent imaging results/findings within the past 24 hours.      Assessment/Plan:      * Hyponatremia  83F with history of HFpEF, CKD, breast cancer, who presents with 1 day of intractable nausea with right flank pain. Patient found to be hyponatremic 118, but Na levels as high as 131 less than 1 month ago, and prior to that her sodium levels were generally normal. Hyponatremia may be contributing to patient's persistent nausea as well as feelings of lightheadedness. Unclear etiology, however, patient does not appear extremely volume down on exam, and does not have elements in her history that would lead one to suspect a "tea or toast" diet or polydipsia. Possibly more likely an SIADH picture.    Plan:  - Na levels 3x daily  - mixed hypoNa picture  - Nephrology consulted  - Fluid restrict to <1.5L daily  - 50cc continuous NS    Acute cystitis  Patient diagnosed with UTI on 10/6 after presenting to Urology clinic with abdominal tenderness and dysuria on self-cath. Was started on empiric cefpodoxime, and urine cx returned with almost-pan-sensitive E coli. Patient had taken 3 days of PO antibiotics before feeling like she was developing adverse reaction to the medication and stopped.    - Cx positive for E. Faecalis and E Coli  - Transition from Vanc and CTX to Ampicillin      Nausea  83F with history of HFpEF, CKD, breast cancer, who " "presents with 1 day of intractable nausea with right flank pain. Patient reports symptoms came on suddenly on 10/9 after a bout of diarrhea with persistent nausea since. She reports she had dysuria and abdominal tenderness and was seen by Urology on 10/6, where she was given prescription of cefpodoxime for UTI. Patient believes that it may be the antibiotic that led to her symptoms, however she was found to be significantly hyponatremic as well, down to 118. Nausea could be from the UTI, or antibiotic therapy, or may be from her hyponatremia.    Plan:  - Zofran PRN for nausea, continue to monitor  - See "Hyponatremia" for further management    ACC/AHA stage C heart failure with preserved ejection fraction  Patient with HFpEF, follows with Dr. Wong outpatient, who wanted to recently start Jardiance, however patient has not yet started.     - Given recent UTI, may defer on starting Jardiance right now  - Holding gdmt meds at this time in setting of JOCELIN    CKD (chronic kidney disease) stage 3, GFR 30-59 ml/min  JOCELIN noted on 10/11  Holding valsartan and spironolactone  Nephrology following, Suspect intial JOCELIN from prerenal insult (patient poor historian suspect poor PO intake). Further insult from IV ketorolac on 10/09, followed by 2 doses of vastartan    - Repeat osms and lytes  - Obtaining Retroperitoneal U/S    Malignant neoplasm of upper-outer quadrant of right breast in female, estrogen receptor positive  Patient with ER+ breast cancer diagnosed in 2017, s/p lumpectomy, now on hormone therapy.    - Continue home anastrozole     Urinary retention  Chronic issue, followed by Urology outpatient. Patient mainly complaints of incomplete bladder emptying and was taught CIC due to incomplete bladder emptying. Patient had been performing CIC twice daily at home, however on recent visit, PVR was noted to be elevated despite CIC, so she was instructed to increase catheterization to TID. She tells me however that she is " still continuing to only self-cath twice a day.    - Tejada for urinary retention  - d/c home flomax 2/2 hypotension    Hypertension  Chronic condition, stable.     Holding valsartan 2/2 Cr spike        VTE Risk Mitigation (From admission, onward)         Ordered     heparin (porcine) injection 5,000 Units  Every 12 hours         10/12/23 0906     IP VTE HIGH RISK PATIENT  Once         10/10/23 0356     Place sequential compression device  Until discontinued         10/10/23 0356                Discharge Planning   PHILLIP: 10/15/2023     Code Status: Full Code   Is the patient medically ready for discharge?: No    Reason for patient still in hospital (select all that apply): Patient trending condition  Discharge Plan A: Home Health            Vitaliy Perez MD  Department of Hospital Medicine   [facility] - Intensive Care ([place]-16)

## 2023-10-13 NOTE — MEDICAL/APP STUDENT
Progress Note  Hospital Medicine    Patient Name: Shima Sorto  YOB: 1940    Admit Date: 10/9/2023                     LOS: 3    SUBJECTIVE:     Reason for Admission:  Hyponatremia  84 yo F w/ CKD3b, non-obstructive Ca oxalate R lower pole nephrolithiasis, and urinary retention secondary to medullary sponge kidneys, lumbar DJD, HFpEF, and s/p R lumpectomy for treatment of breast cancer here for complex mixed hyponatremia, acute cystitis, and pre-renal JOCELIN management.   See H&P for detailed presentating history and ROS.      Interval history:   NAEON.  Improved UTI symptoms, on ampicillin.  Denies any complaints.  Seen by Nephrology, on NS @ 60cc/hr.  Overall, feels she is improving well.     OBJECTIVE:     Vital Signs Range (Last 24H):  Temp:  [97.5 °F (36.4 °C)-98.7 °F (37.1 °C)]   Pulse:  [62-73]   Resp:  [17-18]   BP: (104-139)/(53-63)   SpO2:  [98 %-100 %] Body mass index is 17.54 kg/m².  Wt Readings from Last 3 Encounters:   10/13/23 0424 50.8 kg (111 lb 15.9 oz)   10/13/23 0423 50.8 kg (111 lb 15.9 oz)   10/09/23 1848 50.8 kg (112 lb)   10/06/23 1045 52.7 kg (116 lb 4.7 oz)   09/22/23 1134 55.5 kg (122 lb 5.7 oz)     I & O (Last 24H):  Intake/Output Summary (Last 24 hours) at 10/13/2023 0709  Last data filed at 10/13/2023 0658  Gross per 24 hour   Intake 1553.11 ml   Output 2650 ml   Net -1096.89 ml     Physical Exam:  General: well-developed, cachectic.  Pulm: clear to auscultation bilaterally and normal respiratory effort.  Cards: regularly irregular rhythm, S3 and PVCs, no click, rubs, gallops, or other murmurs.   Abdo: soft, non-tender, non-distended; bowel sounds normal; no masses or organomegaly.  Skin: color, texture, turgor normal; no rashes or lesions.  MSK: normal gait; no cyanosis or edema; 2+ symmetric pulses.  Psych: tangential, rambling, hyper-focused on diet.    Diagnostic Results:  Labs  Hgb: downtrending, 10.1  Na: uptrending yet stable, 125  CO2: downtrending, 18  BUN:  downtrending, 58  Cr: downtrending, 1.8  Corrected Ca: 8.6  Sosm: 288  Uosm: 254  Ava: 16    Imaging  Retroperitoneal US: Bilateral medical renal disease. Non-obstructing right renal stones measuring up to 6 mm. No hydronephrosis. 1.8 cm minimally complex left renal cyst.     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   Improving pre-renal JOCELIN  Retroperitoneal US complete, stable  Continue CKD3b diet, reinforced importance of PO intake  Continue trending BMP  Continue Tejada  Continue strict I/O's  Continue renally-dosing meds, avoid nephrotoxins  Goal MAP >65  Continue holding Tamsulosin for hypotensive episodes  Continue holding home Jardiance, Valsartan, and Aldactone in the setting of JOCELIN and UTI  Consider Ca oxalate nephrolithiasis diet: low meats, high citrus fruits, +/- thiazides; defer to primary nephrologist    Lower back pain secondary to recent falls and chronic DJD  Resolved  Continue PT/OT  Continue ambulating, as tolerated    HFpEF  Stable  Continue holding home Jardiance, Valsartan, and Aldactone in the setting of JOCELIN and UTI     HTN  Stable  Continue holding home Valsartan in the setting of JOCELIN and UTI     S/P R lumpectomy for treatment of breast  cancer  Stable  Continue home Anastrozole     High Risk Conditions:  Patient has a condition that poses threat to life and bodily function: Patient has a condition that poses threat to life and bodily function: Hypotonic hyponatremia.    Problems Addressed Today: Complex mixed hyponatremia, acute cystitis, pre-renal JOCELIN, CKD3b, non-obstructive Ca oxalate R lower pole nephrolithiasis, urinary retention, lower back pain secondary to recent falls and chronic DJD, HFpEF, HTN, s/p R lumpectomy for treatment of breast cancer.     DISCHARGE PLANNING: When hyponatremia has resolved and levels are stable.     TIME SPENT: I spent 30 minutes evaluating, treating, counseling, and coordinating care for the patient.    Signing Physician:  Gigi Trevino MS3

## 2023-10-13 NOTE — ASSESSMENT & PLAN NOTE
"83F with history of HFpEF, CKD, breast cancer, who presents with 1 day of intractable nausea with right flank pain. Patient found to be hyponatremic 118, but Na levels as high as 131 less than 1 month ago, and prior to that her sodium levels were generally normal. Hyponatremia may be contributing to patient's persistent nausea as well as feelings of lightheadedness. Unclear etiology, however, patient does not appear extremely volume down on exam, and does not have elements in her history that would lead one to suspect a "tea or toast" diet or polydipsia. Possibly more likely an SIADH picture.    Plan:  - Na levels 3x daily  - mixed hypoNa picture  - Nephrology consulted  - Fluid restrict to <1.5L daily  - 50cc continuous NS  "

## 2023-10-13 NOTE — PLAN OF CARE
Problem: Fluid and Electrolyte Imbalance (Acute Kidney Injury/Impairment)  Goal: Fluid and Electrolyte Balance  Outcome: Ongoing, Progressing     Problem: Fluid and Electrolyte Imbalance (Acute Kidney Injury/Impairment)  Goal: Fluid and Electrolyte Balance  Outcome: Ongoing, Progressing     Problem: Oral Intake Inadequate (Acute Kidney Injury/Impairment)  Goal: Optimal Nutrition Intake  Outcome: Ongoing, Progressing     Problem: Renal Function Impairment (Acute Kidney Injury/Impairment)  Goal: Effective Renal Function  Outcome: Ongoing, Progressing     Problem: Renal Function Impairment (Acute Kidney Injury/Impairment)  Goal: Effective Renal Function  Outcome: Ongoing, Progressing  Pt AAO X 4; able to express needs.  No c/o pain .  IV Fluids infusing at 60cc/hr.  IV ABX  for UTI.  Tylenol for pain.  Tejada care complete.  Huge BM during the night.  Safety maintained.  Bed in low position,  call  light in reach.

## 2023-10-13 NOTE — PROGRESS NOTES
[facility] ([place] [id number])  Nephrology  Progress Note    Patient Name: Shima Sorto  MRN: [id number]  Admission Date: 10/9/2023  Hospital Length of Stay: 3 days  Attending Provider: Cedrick Alarcon, *   Primary Care Physician: Carmen Milton MD  Principal Problem:Hyponatremia    Subjective:     HPI: 83-year-old woman with history of breast cancer s/p lumpectomy, CKD stage 3, and HFpEF who presents with complaints of right flank pain and intractable nausea. Initial labs revealed hyponatremia of 118 and then 120 on repeat. Patient was started on fluid restriction and Na increased to 127. She was then given D5W due to concerns of overcorrection. Her Na went down to 123.     On exam today, patient denies significant diarrhea. She endorses profound nausea but denies vomiting. Regarding her diet, she states that she has not made any significant changes to her diet recently. She reports eating protein of chicken/fish, but has been extremely concious regarding her vegetables (limiting spinach). She denies any changes in water habits.     Nephrology consulted for hyponatremia and JOCELIN.       Interval History:     Patient reports diarrhea last night. Is upset at her diet. Tolerating IVFs fine. Na is 128; Scr 1.8.     Review of patient's allergies indicates:   Allergen Reactions    Asparagus Rash     Current Facility-Administered Medications   Medication Frequency    0.9%  NaCl infusion Continuous    acetaminophen tablet 650 mg Q4H PRN    ampicillin (OMNIPEN) 2 g in sodium chloride 0.9 % 100 mL IVPB (MB+) Q12H    anastrozole tablet 1 mg Daily    dextrose 10% bolus 125 mL 125 mL PRN    dextrose 10% bolus 250 mL 250 mL PRN    glucagon (human recombinant) injection 1 mg PRN    glucose chewable tablet 16 g PRN    glucose chewable tablet 24 g PRN    heparin (porcine) injection 5,000 Units Q12H    mupirocin 2 % ointment BID    naloxone 0.4 mg/mL injection 0.02 mg PRN    ondansetron injection 4 mg Q6H  PRN    senna-docusate 8.6-50 mg per tablet 1 tablet Daily PRN    sodium chloride 0.9% flush 10 mL Q12H PRN       Objective:     Vital Signs (Most Recent):  Temp: 97.6 °F (36.4 °C) (10/13/23 1216)  Pulse: 68 (10/13/23 1216)  Resp: 17 (10/13/23 1216)  BP: (!) 109/53 (10/13/23 1216)  SpO2: 100 % (10/13/23 1216) Vital Signs (24h Range):  Temp:  [97.6 °F (36.4 °C)-98.8 °F (37.1 °C)] 97.6 °F (36.4 °C)  Pulse:  [63-73] 68  Resp:  [17-18] 17  SpO2:  [98 %-100 %] 100 %  BP: (104-139)/(53-63) 109/53     Weight: 50.8 kg (111 lb 15.9 oz) (10/13/23 0424)  Body mass index is 17.54 kg/m².  Body surface area is 1.55 meters squared.    I/O last 3 completed shifts:  In: 1553.1 [P.O.:480; I.V.:876; IV Piggyback:197.1]  Out: 3100 [Urine:3100]     Physical Exam  Vitals reviewed.   Constitutional:       General: She is not in acute distress.     Appearance: Normal appearance.   HENT:      Head: Normocephalic and atraumatic.      Nose: No congestion or rhinorrhea.      Mouth/Throat:      Mouth: Mucous membranes are moist.      Pharynx: Oropharynx is clear.   Eyes:      Extraocular Movements: Extraocular movements intact.      Pupils: Pupils are equal, round, and reactive to light.   Cardiovascular:      Rate and Rhythm: Normal rate and regular rhythm.      Pulses: Normal pulses.      Heart sounds: Normal heart sounds.   Pulmonary:      Effort: Pulmonary effort is normal. No respiratory distress.      Breath sounds: Normal breath sounds.   Abdominal:      General: Bowel sounds are normal.      Palpations: Abdomen is soft.      Tenderness: There is no abdominal tenderness.   Musculoskeletal:         General: Tenderness present. No swelling or signs of injury.      Comments: Some tenderness reported on palpation of the posterior right ilium/iliac crest  No bruising or deformity noticed on exam   Skin:     Findings: No bruising or rash.   Neurological:      General: No focal deficit present.      Mental Status: She is alert and oriented to  person, place, and time.   Psychiatric:         Mood and Affect: Mood normal.         Behavior: Behavior normal.      Comments: Tangential          Significant Labs:  All labs within the past 24 hours have been reviewed.     Significant Imaging:  Labs: Reviewed    Assessment/Plan:     Renal/  * Hyponatremia  Improving.   Suspect hypotonic hyponatremia with poor solute intake.   - patient appears asymptomatic at this time.   Plan:   - Fluid restrict to <1.5L daily  - IV NS @ 50cc/hr  - obtain Na TID.   - goal correction no more than 6-8meq in a day     JOCELIN (acute kidney injury)  Non oliguric JOCELIN on baseline CKD IIIb   - CKD IIIb thought to be 2/2 to age related nephron loss and nephrolithiasis.   - baseline Scr 1.1-1.3; Scr on admission ~1.7.   - UPCR negative  - UA: 1+ protein; 2+ blood; 67 WBC: >100 WBC  - Suspect intial JOCELIN from prerenal insult (patient poor historian suspect poor PO intake). Further insult from IV ketorolac on 10/09, followed by 2 doses of vastartan.   Plan:  - Cont IV NS @ 50cc/hr.   - maintain lomas cath  - strict ins and outs  - renally dose all meds  - avoid nephrotoxins (NO NSAIDS)  - keep MAP>65        Thank you for your consult. I will follow-up with patient. Please contact us if you have any additional questions.     Case discussed with attending. Attestation to follow.         Kirk Quintero DO  Nephrology  [facility] ([place] [address])    ATTENDING PHYSICIAN ATTESTATION  I have personally verified the history and examined the patient. I thoroughly reviewed the demographic, clinical, laboratorial and imaging information available in medical records. I agree with the assessment and recommendations provided by the subspecialty resident who was under my supervision.

## 2023-10-13 NOTE — TELEPHONE ENCOUNTER
----- Message from Lula Roland sent at 10/13/2023  9:10 AM CDT -----  Regarding: Appt  Contact: Pt [phone]  Pt is calling to reschedule appt please call

## 2023-10-13 NOTE — SUBJECTIVE & OBJECTIVE
Interval History:    NAEON. Continue on Ampicillin. Na improving on IVF, no signs of volume overload.     Review of Systems   Constitutional:  Positive for chills. Negative for fever.   HENT:  Negative for congestion and sore throat.    Eyes:  Negative for photophobia and visual disturbance.   Respiratory:  Negative for cough and shortness of breath.    Cardiovascular:  Negative for chest pain and palpitations.   Gastrointestinal:  Positive for constipation. Negative for abdominal pain and vomiting.   Genitourinary:  Positive for flank pain and urgency. Negative for dysuria and hematuria.   Musculoskeletal:  Positive for back pain. Negative for arthralgias and gait problem.   Skin:  Negative for rash and wound.   Neurological:  Negative for syncope.   Psychiatric/Behavioral:  Negative for agitation and behavioral problems.      Objective:     Vital Signs (Most Recent):  Temp: 97.6 °F (36.4 °C) (10/13/23 1216)  Pulse: 68 (10/13/23 1216)  Resp: 17 (10/13/23 1216)  BP: (!) 109/53 (10/13/23 1216)  SpO2: 100 % (10/13/23 1216) Vital Signs (24h Range):  Temp:  [97.6 °F (36.4 °C)-98.8 °F (37.1 °C)] 97.6 °F (36.4 °C)  Pulse:  [63-73] 68  Resp:  [17-18] 17  SpO2:  [98 %-100 %] 100 %  BP: (109-139)/(53-63) 109/53     Weight: 50.8 kg (111 lb 15.9 oz)  Body mass index is 17.54 kg/m².     Physical Exam  Vitals reviewed.   Constitutional:       General: She is not in acute distress.     Appearance: Normal appearance.   HENT:      Head: Normocephalic and atraumatic.      Nose: No congestion or rhinorrhea.      Mouth/Throat:      Mouth: Mucous membranes are moist.      Pharynx: Oropharynx is clear.   Eyes:      Extraocular Movements: Extraocular movements intact.      Pupils: Pupils are equal, round, and reactive to light.   Cardiovascular:      Rate and Rhythm: Normal rate and regular rhythm.      Pulses: Normal pulses.      Heart sounds: Normal heart sounds.   Pulmonary:      Effort: Pulmonary effort is normal. No respiratory  distress.      Breath sounds: Normal breath sounds.   Abdominal:      General: Bowel sounds are normal.      Palpations: Abdomen is soft.      Tenderness: There is no abdominal tenderness.   Musculoskeletal:         General: Tenderness present. No swelling or signs of injury.      Comments: Some tenderness reported on palpation of the posterior right ilium/iliac crest  No bruising or deformity noticed on exam   Skin:     Findings: No bruising or rash.   Neurological:      General: No focal deficit present.      Mental Status: She is alert and oriented to person, place, and time.   Psychiatric:         Mood and Affect: Mood normal.         Behavior: Behavior normal.      Comments: Tangential              CRANIAL NERVES     CN III, IV, VI   Pupils are equal, round, and reactive to light.       Significant Labs: All pertinent labs within the past 24 hours have been reviewed.  CBC:   Recent Labs   Lab 10/12/23  0556 10/13/23  0520   WBC 5.13 5.33   HGB 10.4* 10.1*   HCT 29.6* 29.3*    166       CMP:   Recent Labs   Lab 10/12/23  0556 10/12/23  1147 10/13/23  0520 10/13/23  0939 10/13/23  1212   *   < > 125*  125* 127* 128*   K 4.0  --  4.0  --   --    CL 91*  --  97  --   --    CO2 21*  --  18*  --   --    GLU 94  --  102  --   --    BUN 59*  --  58*  --   --    CREATININE 2.2*  --  1.8*  --   --    CALCIUM 8.0*  --  7.8*  --   --    PROT 6.1  --  5.9*  --   --    ALBUMIN 3.1*  --  3.0*  --   --    BILITOT 0.3  --  0.4  --   --    ALKPHOS 47*  --  46*  --   --    AST 30  --  25  --   --    ALT 24  --  21  --   --    ANIONGAP 8  --  10  --   --     < > = values in this interval not displayed.         Significant Imaging: I have reviewed all pertinent imaging results/findings within the past 24 hours.

## 2023-10-13 NOTE — SUBJECTIVE & OBJECTIVE
Interval History:     Patient reports diarrhea last night. Is upset at her diet. Tolerating IVFs fine. Na is 128; Scr 1.8.     Review of patient's allergies indicates:   Allergen Reactions    Asparagus Rash     Current Facility-Administered Medications   Medication Frequency    0.9%  NaCl infusion Continuous    acetaminophen tablet 650 mg Q4H PRN    ampicillin (OMNIPEN) 2 g in sodium chloride 0.9 % 100 mL IVPB (MB+) Q12H    anastrozole tablet 1 mg Daily    dextrose 10% bolus 125 mL 125 mL PRN    dextrose 10% bolus 250 mL 250 mL PRN    glucagon (human recombinant) injection 1 mg PRN    glucose chewable tablet 16 g PRN    glucose chewable tablet 24 g PRN    heparin (porcine) injection 5,000 Units Q12H    mupirocin 2 % ointment BID    naloxone 0.4 mg/mL injection 0.02 mg PRN    ondansetron injection 4 mg Q6H PRN    senna-docusate 8.6-50 mg per tablet 1 tablet Daily PRN    sodium chloride 0.9% flush 10 mL Q12H PRN       Objective:     Vital Signs (Most Recent):  Temp: 97.6 °F (36.4 °C) (10/13/23 1216)  Pulse: 68 (10/13/23 1216)  Resp: 17 (10/13/23 1216)  BP: (!) 109/53 (10/13/23 1216)  SpO2: 100 % (10/13/23 1216) Vital Signs (24h Range):  Temp:  [97.6 °F (36.4 °C)-98.8 °F (37.1 °C)] 97.6 °F (36.4 °C)  Pulse:  [63-73] 68  Resp:  [17-18] 17  SpO2:  [98 %-100 %] 100 %  BP: (104-139)/(53-63) 109/53     Weight: 50.8 kg (111 lb 15.9 oz) (10/13/23 0424)  Body mass index is 17.54 kg/m².  Body surface area is 1.55 meters squared.    I/O last 3 completed shifts:  In: 1553.1 [P.O.:480; I.V.:876; IV Piggyback:197.1]  Out: 3100 [Urine:3100]     Physical Exam  Vitals reviewed.   Constitutional:       General: She is not in acute distress.     Appearance: Normal appearance.   HENT:      Head: Normocephalic and atraumatic.      Nose: No congestion or rhinorrhea.      Mouth/Throat:      Mouth: Mucous membranes are moist.      Pharynx: Oropharynx is clear.   Eyes:      Extraocular Movements: Extraocular movements intact.      Pupils:  Pupils are equal, round, and reactive to light.   Cardiovascular:      Rate and Rhythm: Normal rate and regular rhythm.      Pulses: Normal pulses.      Heart sounds: Normal heart sounds.   Pulmonary:      Effort: Pulmonary effort is normal. No respiratory distress.      Breath sounds: Normal breath sounds.   Abdominal:      General: Bowel sounds are normal.      Palpations: Abdomen is soft.      Tenderness: There is no abdominal tenderness.   Musculoskeletal:         General: Tenderness present. No swelling or signs of injury.      Comments: Some tenderness reported on palpation of the posterior right ilium/iliac crest  No bruising or deformity noticed on exam   Skin:     Findings: No bruising or rash.   Neurological:      General: No focal deficit present.      Mental Status: She is alert and oriented to person, place, and time.   Psychiatric:         Mood and Affect: Mood normal.         Behavior: Behavior normal.      Comments: Tangential          Significant Labs:  All labs within the past 24 hours have been reviewed.     Significant Imaging:  Labs: Reviewed

## 2023-10-13 NOTE — PT/OT/SLP EVAL
Physical Therapy Evaluation    Patient Name:  Shima Sorto   MRN:  [id number]    Recommendations:     Discharge Recommendations: other (see comments)   Discharge Equipment Recommendations: none   Barriers to discharge: Inaccessible home    Assessment:     Shima Sorto is a 83 y.o. female admitted with a medical diagnosis of Hyponatremia.  She presents with the following impairments/functional limitations: weakness, gait instability, impaired cardiopulmonary response to activity, impaired endurance, impaired balance, impaired functional mobility.    Pt tolerated session well with good participation. Pt required CGA for ambulation in hallway, but became SOB after ~80ft. Pt lives alone and has 11 steps to enter raised house. Pt to perform stair training next session in preparation for d/c. Pt is safe to ambulate with assist for line management.    Rehab Prognosis: Good; patient would benefit from acute skilled PT services to address these deficits and reach maximum level of function.    Recent Surgery: * No surgery found *      Plan:     During this hospitalization, patient to be seen 3 x/week to address the identified rehab impairments via gait training, therapeutic activities, therapeutic exercises, neuromuscular re-education and progress toward the following goals:    Plan of Care Expires:  10/27/23    Subjective     Chief Complaint: None stated  Patient/Family Comments/goals: To go home  Pain/Comfort:  Pain Rating 1: 0/10    Patients cultural, spiritual, Catholic conflicts given the current situation: no    Living Environment:  Pt lives alone in raised home with 11 steps to enter with LHR. Tub/shower combo with seat. Pt reports walking a mile a day and currently drives. Pt does not use AD to ambulate currently.   Prior to admission, patients level of function was independent.  Equipment used at home: walker, rolling, shower chair.  DME owned (not currently used): none.  Upon discharge, patient will  have MINIMAL assistance from family.    Objective:     Communicated with nurse prior to session.  Patient found HOB elevated with peripheral IV, lomas catheter  upon PT entry to room.    General Precautions: Standard, fall  Orthopedic Precautions:N/A   Braces: N/A  Respiratory Status: Room air    Exams:  Cognitive Exam:  Patient is oriented to Person, Place, Time, and Situation  RLE ROM: WFL  RLE Strength: WFL  LLE ROM: WFL  LLE Strength: WFL    Functional Mobility:  Bed Mobility:     Supine to Sit: stand by assistance  Transfers:     Sit to Stand:  stand by assistance with rolling walker  Cueing for technique   Gait: x40ft with RW & 40ft without AD with CGA  Onset of SOB, limiting further activity     AM-PAC 6 CLICK MOBILITY  Total Score:20       Treatment & Education:  Patient educated on role of therapy, goals of session, and benefits of mobilizing.   Patient educated on importance of sitting up in chair/OOB activity.  Discussed PT plan of care during hospitalization.   Patient educated on calling for assistance.   Patient educated on how their diagnosis impacts their mobility within PT scope of practice.   Communication board up to date.  All questions answered within PT scope of practice.     Patient left sitting edge of bed with all lines intact, call button in reach, RN notified, and CM present.    GOALS:   Multidisciplinary Problems       Physical Therapy Goals          Problem: Physical Therapy    Goal Priority Disciplines Outcome Goal Variances Interventions   Physical Therapy Goal     PT, PT/OT Ongoing, Progressing     Description: Goals to be met by: 10/27/2023    Patient will increase functional independence with mobility by performin. Sit to stand transfer with [place]  2. Gait  x 200 feet with [place] using LRAD.   3. Ascend/descend 11 stairs with left Handrails Supervision using LRAD.                          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       Past Surgical History:   Procedure Laterality Date    BREAST CYST ASPIRATION Right 1999    BREAST LUMPECTOMY Right 2018    with radiation    COLONOSCOPY N/A 7/24/2018    Procedure: COLONOSCOPY;  Surgeon: Juan Miguel Pena MD;  Location: [facility] ENDO (4TH FLR);  Service: Endoscopy;  Laterality: N/A;    COLONOSCOPY N/A 5/10/2023    Procedure: COLONOSCOPY;  Surgeon: Keven Garza MD;  Location: [facility] (4TH FLR);  Service: Endoscopy;  Laterality: N/A;  *Pending C-Diff*  Request Greg  procedure order telephone encounter 5/1  PEG prep, instructions portal -LW  5/4/23 no answer for precall/mleone lpn  5/9lm    ESOPHAGOGASTRODUODENOSCOPY N/A 3/17/2023    Procedure: EGD (ESOPHAGOGASTRODUODENOSCOPY);  Surgeon: Keven Garza MD;  Location: [facility] (4TH FLR);  Service: Endoscopy;  Laterality: N/A;  Medically Urgent  3/2 instructions to portal-st    pre call attempted, no answer from pt 3/13/23 -egh    INJECTION FOR SENTINEL NODE IDENTIFICATION Right 8/17/2018    Procedure: INJECTION, FOR SENTINEL NODE IDENTIFICATION;  Surgeon: Abby Mason MD;  [facility];  Service: General;  Laterality: Right;    interstim placed stage 1      and removed    KIDNEY STONE SURGERY  2000    @ [facility]    MASTECTOMY, PARTIAL Right 8/17/2018    Procedure: MASTECTOMY, PARTIAL-US guided;  Surgeon: Abby Mason MD;  Location: [facility] OR 2ND FLR;  Service: General;  Laterality: Right;    MOHS procedure      SENTINEL LYMPH NODE BIOPSY Right 8/17/2018    Procedure: BIOPSY, LYMPH NODE, SENTINEL;   Surgeon: Abby Mason MD;  Location: [facility] OR [address] [place] [address];  Service: General;  Laterality: Right;       Time Tracking:     PT Received On: 10/13/23  PT Start Time: 1141     PT Stop Time: 1206  PT Total Time (min): 25 min     Billable Minutes: Evaluation 10 and Gait Training 15      10/13/2023

## 2023-10-13 NOTE — ASSESSMENT & PLAN NOTE
Non oliguric JOCELIN on baseline CKD IIIb   - CKD IIIb thought to be 2/2 to age related nephron loss and nephrolithiasis.   - baseline Scr 1.1-1.3; Scr on admission ~1.7.   - UPCR negative  - UA: 1+ protein; 2+ blood; 67 WBC: >100 WBC  - Suspect intial JOCELIN from prerenal insult (patient poor historian suspect poor PO intake). Further insult from IV ketorolac on 10/09, followed by 2 doses of vastartan.   Plan:  - Cont IV NS @ 50cc/hr.   - maintain lomas cath  - strict ins and outs  - renally dose all meds  - avoid nephrotoxins (NO NSAIDS)  - keep MAP>65

## 2023-10-13 NOTE — PLAN OF CARE
Eval completed; POC established    Problem: Physical Therapy  Goal: Physical Therapy Goal  Description: Goals to be met by: 10/27/2023    Patient will increase functional independence with mobility by performin. Sit to stand transfer with [place]  2. Gait  x 200 feet with [place] using LRAD.   3. Ascend/descend 11 stairs with left Handrails Supervision using LRAD.     Outcome: Ongoing, Progressing

## 2023-10-14 VITALS
BODY MASS INDEX: 17.58 KG/M2 | SYSTOLIC BLOOD PRESSURE: 160 MMHG | TEMPERATURE: 98 F | OXYGEN SATURATION: 100 % | WEIGHT: 112 LBS | HEART RATE: 72 BPM | HEIGHT: 67 IN | DIASTOLIC BLOOD PRESSURE: 72 MMHG | RESPIRATION RATE: 18 BRPM

## 2023-10-14 LAB
ALBUMIN SERPL BCP-MCNC: 3.2 G/DL (ref 3.5–5.2)
ALP SERPL-CCNC: 51 U/L (ref 55–135)
ALT SERPL W/O P-5'-P-CCNC: 21 U/L (ref 10–44)
ANION GAP SERPL CALC-SCNC: 10 MMOL/L (ref 8–16)
ANION GAP SERPL CALC-SCNC: 7 MMOL/L (ref 8–16)
AST SERPL-CCNC: 25 U/L (ref 10–40)
BASOPHILS # BLD AUTO: 0.02 K/UL (ref 0–0.2)
BASOPHILS NFR BLD: 0.4 % (ref 0–1.9)
BILIRUB SERPL-MCNC: 0.5 MG/DL (ref 0.1–1)
BUN SERPL-MCNC: 52 MG/DL (ref 8–23)
BUN SERPL-MCNC: 55 MG/DL (ref 8–23)
CALCIUM SERPL-MCNC: 7.7 MG/DL (ref 8.7–10.5)
CALCIUM SERPL-MCNC: 7.9 MG/DL (ref 8.7–10.5)
CHLORIDE SERPL-SCNC: 103 MMOL/L (ref 95–110)
CHLORIDE SERPL-SCNC: 103 MMOL/L (ref 95–110)
CO2 SERPL-SCNC: 18 MMOL/L (ref 23–29)
CO2 SERPL-SCNC: 20 MMOL/L (ref 23–29)
CREAT SERPL-MCNC: 1.4 MG/DL (ref 0.5–1.4)
CREAT SERPL-MCNC: 1.6 MG/DL (ref 0.5–1.4)
DIFFERENTIAL METHOD: ABNORMAL
EOSINOPHIL # BLD AUTO: 0 K/UL (ref 0–0.5)
EOSINOPHIL NFR BLD: 0.2 % (ref 0–8)
ERYTHROCYTE [DISTWIDTH] IN BLOOD BY AUTOMATED COUNT: 13.4 % (ref 11.5–14.5)
EST. GFR  (NO RACE VARIABLE): 31.8 ML/MIN/1.73 M^2
EST. GFR  (NO RACE VARIABLE): 37.3 ML/MIN/1.73 M^2
GLUCOSE SERPL-MCNC: 140 MG/DL (ref 70–110)
GLUCOSE SERPL-MCNC: 96 MG/DL (ref 70–110)
HCT VFR BLD AUTO: 29.4 % (ref 37–48.5)
HGB BLD-MCNC: 10.2 G/DL (ref 12–16)
IMM GRANULOCYTES # BLD AUTO: 0.02 K/UL (ref 0–0.04)
IMM GRANULOCYTES NFR BLD AUTO: 0.4 % (ref 0–0.5)
LYMPHOCYTES # BLD AUTO: 1.3 K/UL (ref 1–4.8)
LYMPHOCYTES NFR BLD: 27.5 % (ref 18–48)
MAGNESIUM SERPL-MCNC: 2.2 MG/DL (ref 1.6–2.6)
MCH RBC QN AUTO: 31.4 PG (ref 27–31)
MCHC RBC AUTO-ENTMCNC: 34.7 G/DL (ref 32–36)
MCV RBC AUTO: 91 FL (ref 82–98)
MONOCYTES # BLD AUTO: 0.6 K/UL (ref 0.3–1)
MONOCYTES NFR BLD: 12.6 % (ref 4–15)
NEUTROPHILS # BLD AUTO: 2.7 K/UL (ref 1.8–7.7)
NEUTROPHILS NFR BLD: 58.9 % (ref 38–73)
NRBC BLD-RTO: 0 /100 WBC
OSMOLALITY UR: 343 MOSM/KG (ref 50–1200)
PHOSPHATE SERPL-MCNC: 2.6 MG/DL (ref 2.7–4.5)
PLATELET # BLD AUTO: 180 K/UL (ref 150–450)
PMV BLD AUTO: 11.1 FL (ref 9.2–12.9)
POTASSIUM SERPL-SCNC: 3.9 MMOL/L (ref 3.5–5.1)
POTASSIUM SERPL-SCNC: 3.9 MMOL/L (ref 3.5–5.1)
PROT SERPL-MCNC: 6.3 G/DL (ref 6–8.4)
RBC # BLD AUTO: 3.25 M/UL (ref 4–5.4)
SODIUM SERPL-SCNC: 130 MMOL/L (ref 136–145)
SODIUM SERPL-SCNC: 131 MMOL/L (ref 136–145)
SODIUM SERPL-SCNC: 133 MMOL/L (ref 136–145)
SODIUM UR-SCNC: 24 MMOL/L (ref 20–250)
WBC # BLD AUTO: 4.54 K/UL (ref 3.9–12.7)

## 2023-10-14 PROCEDURE — 36415 COLL VENOUS BLD VENIPUNCTURE: CPT

## 2023-10-14 PROCEDURE — 80048 BASIC METABOLIC PNL TOTAL CA: CPT | Mod: XB

## 2023-10-14 PROCEDURE — 97530 THERAPEUTIC ACTIVITIES: CPT

## 2023-10-14 PROCEDURE — 99239 HOSP IP/OBS DSCHRG MGMT >30: CPT | Mod: ,,, | Performed by: STUDENT IN AN ORGANIZED HEALTH CARE EDUCATION/TRAINING PROGRAM

## 2023-10-14 PROCEDURE — 84295 ASSAY OF SERUM SODIUM: CPT | Mod: 91

## 2023-10-14 PROCEDURE — 63600175 PHARM REV CODE 636 W HCPCS: Performed by: STUDENT IN AN ORGANIZED HEALTH CARE EDUCATION/TRAINING PROGRAM

## 2023-10-14 PROCEDURE — 85025 COMPLETE CBC W/AUTO DIFF WBC: CPT

## 2023-10-14 PROCEDURE — 83735 ASSAY OF MAGNESIUM: CPT

## 2023-10-14 PROCEDURE — 63600175 PHARM REV CODE 636 W HCPCS

## 2023-10-14 PROCEDURE — 25000003 PHARM REV CODE 250

## 2023-10-14 PROCEDURE — 25000003 PHARM REV CODE 250: Performed by: STUDENT IN AN ORGANIZED HEALTH CARE EDUCATION/TRAINING PROGRAM

## 2023-10-14 PROCEDURE — 83935 ASSAY OF URINE OSMOLALITY: CPT | Performed by: STUDENT IN AN ORGANIZED HEALTH CARE EDUCATION/TRAINING PROGRAM

## 2023-10-14 PROCEDURE — 80053 COMPREHEN METABOLIC PANEL: CPT

## 2023-10-14 PROCEDURE — 97535 SELF CARE MNGMENT TRAINING: CPT

## 2023-10-14 PROCEDURE — 84300 ASSAY OF URINE SODIUM: CPT | Performed by: STUDENT IN AN ORGANIZED HEALTH CARE EDUCATION/TRAINING PROGRAM

## 2023-10-14 PROCEDURE — 84100 ASSAY OF PHOSPHORUS: CPT

## 2023-10-14 PROCEDURE — 99239 PR HOSPITAL DISCHARGE DAY,>30 MIN: ICD-10-PCS | Mod: ,,, | Performed by: STUDENT IN AN ORGANIZED HEALTH CARE EDUCATION/TRAINING PROGRAM

## 2023-10-14 RX ORDER — SPIRONOLACTONE 25 MG/1
25 TABLET ORAL DAILY
Qty: 30 TABLET | Refills: 11 | Status: SHIPPED | OUTPATIENT
Start: 2023-10-14 | End: 2024-01-02

## 2023-10-14 RX ORDER — VALSARTAN 160 MG/1
160 TABLET ORAL DAILY
Qty: 90 TABLET | Refills: 1 | Status: SHIPPED | OUTPATIENT
Start: 2023-10-14 | End: 2024-01-02

## 2023-10-14 RX ORDER — SODIUM CHLORIDE 9 MG/ML
INJECTION, SOLUTION INTRAVENOUS CONTINUOUS
Status: DISCONTINUED | OUTPATIENT
Start: 2023-10-14 | End: 2023-10-15 | Stop reason: HOSPADM

## 2023-10-14 RX ORDER — AMOXICILLIN 500 MG/1
500 TABLET, FILM COATED ORAL EVERY 12 HOURS
Qty: 1 TABLET | Refills: 0 | Status: SHIPPED | OUTPATIENT
Start: 2023-10-14 | End: 2023-10-15

## 2023-10-14 RX ORDER — FUROSEMIDE 20 MG/1
40 TABLET ORAL DAILY
Qty: 90 TABLET | Refills: 3 | Status: SHIPPED | OUTPATIENT
Start: 2023-10-14 | End: 2024-01-02

## 2023-10-14 RX ORDER — SODIUM BICARBONATE 650 MG/1
650 TABLET ORAL 3 TIMES DAILY
Status: DISCONTINUED | OUTPATIENT
Start: 2023-10-14 | End: 2023-10-15 | Stop reason: HOSPADM

## 2023-10-14 RX ORDER — TAMSULOSIN HYDROCHLORIDE 0.4 MG/1
0.4 CAPSULE ORAL DAILY
Qty: 30 CAPSULE | Refills: 11 | Status: SHIPPED | OUTPATIENT
Start: 2023-10-14 | End: 2024-01-02

## 2023-10-14 RX ADMIN — ANASTROZOLE 1 MG: 1 TABLET, COATED ORAL at 10:10

## 2023-10-14 RX ADMIN — MUPIROCIN: 20 OINTMENT TOPICAL at 10:10

## 2023-10-14 RX ADMIN — SODIUM BICARBONATE 650 MG TABLET 650 MG: at 02:10

## 2023-10-14 RX ADMIN — HEPARIN SODIUM 5000 UNITS: 5000 INJECTION INTRAVENOUS; SUBCUTANEOUS at 10:10

## 2023-10-14 RX ADMIN — SODIUM CHLORIDE: 9 INJECTION, SOLUTION INTRAVENOUS at 10:10

## 2023-10-14 RX ADMIN — AMPICILLIN 2 G: 2 INJECTION, POWDER, FOR SOLUTION INTRAMUSCULAR; INTRAVENOUS at 10:10

## 2023-10-14 NOTE — PLAN OF CARE
Problem: Fluid and Electrolyte Imbalance (Acute Kidney Injury/Impairment)  Goal: Fluid and Electrolyte Balance  Outcome: Ongoing, Progressing     Problem: Fluid and Electrolyte Imbalance (Acute Kidney Injury/Impairment)  Goal: Fluid and Electrolyte Balance  Outcome: Ongoing, Progressing     Problem: Oral Intake Inadequate (Acute Kidney Injury/Impairment)  Goal: Optimal Nutrition Intake  Outcome: Ongoing, Progressing     Problem: Oral Intake Inadequate (Acute Kidney Injury/Impairment)  Goal: Optimal Nutrition Intake  Outcome: Ongoing, Progressing     Problem: Renal Function Impairment (Acute Kidney Injury/Impairment)  Goal: Effective Renal Function  Outcome: Ongoing, Progressing     Problem: Renal Function Impairment (Acute Kidney Injury/Impairment)  Goal: Effective Renal Function  Outcome: Ongoing, Progressing   Hospitalized for Hyponatremia.  Pt. AAO X 4; able to express needs.  IV ABX for current UTI.  [place] for retention.  Catheter care complete.  BM 's reported by patient during the night.  Daughter and Son call from out of state to talk to & check on Mom.  Bed alarm on.  Safety maintained.  Bed in low position,  call  light in reach.

## 2023-10-14 NOTE — PT/OT/SLP PROGRESS
Occupational Therapy   Treatment    Name: Shima Sorto  MRN: [id number]  Admitting Diagnosis:  Hyponatremia       Recommendations:     Discharge Recommendations: other (see comments)  Discharge Equipment Recommendations:  none  Barriers to discharge:  Decreased caregiver support    Assessment:     Shima Sorto is a 83 y.o. female with a medical diagnosis of Hyponatremia.  She presents with improvements noted in self-care tasks and mobility on this date. Pt. Anxious to ambulate and get out of bed. Patient would benefit from continued OT services to maximize level of safety and independence with self-care tasks.   . Performance deficits affecting function are weakness, impaired endurance, impaired self care skills, impaired functional mobility, gait instability.     Rehab Prognosis:  Good; patient would benefit from acute skilled OT services to address these deficits and reach maximum level of function.       Plan:     Patient to be seen 3 x/week to address the above listed problems via self-care/home management, therapeutic activities, therapeutic exercises  Plan of Care Expires: 11/11/23  Plan of Care Reviewed with: patient    Subjective     Chief Complaint: Pt. Reported she felt like her head was stuffy  Patient/Family Comments/goals: to get back home  Pain/Comfort:  Pain Rating 1:  (not rated but reported a little)  Location 1: back  Pain Addressed 1: Reposition, Distraction  Pain Rating Post-Intervention 1: 0/10    Objective:     Communicated with: nurse prior to session.  Patient found supine with lomas catheter, peripheral IV upon OT entry to room.    General Precautions: Standard, fall    Orthopedic Precautions:N/A  Braces: N/A  Respiratory Status: Room air     Occupational Performance:     Bed Mobility:    Patient completed Supine to Sit with independence     Functional Mobility/Transfers:  Patient completed Sit <> Stand Transfer with supervision  with  rolling walker   Patient completed Bed <>  Chair Transfer using Stand Pivot technique with supervision with IV pole  Functional Mobility: Pt. Ambulated hallway x greater than 300 feet pushing IV pole with S/SBA    Activities of Daily Living:  Lower Body Dressing: modified independence to don and doff socks seated EOB      [facility] 6 Click ADL: 21    Treatment & Education:  Pt. Educated on safety with transfers and standing ADL tasks  OT gave charge nurse pt. 's daughter's number on this date as daughter requested for charge to call her about pt. Pt. Reported ok to give charge nurse the number.     Patient left up in chair with all lines intact and call button in reach    GOALS:   Multidisciplinary Problems       Occupational Therapy Goals          Problem: Occupational Therapy    Goal Priority Disciplines Outcome Interventions   Occupational Therapy Goal     OT, PT/OT Ongoing, Progressing    Description: Goals to be met by: 10-19-23     Patient will increase functional independence with ADLs by performing:    LE Dressing with [place].  Grooming while standing at sink with [place].  Toileting from toilet with [place] for hygiene and clothing management.   Stand pivot transfers with Modified [place].  Toilet transfer to toilet with Modified [place].  Pt. To be I with HEP to improve level of endurance                         Time Tracking:     OT Date of Treatment: 10/14/23  OT Start Time: 1142  OT Stop Time: 1207  OT Total Time (min): 25 min    Billable Minutes:Self Care/Home Management 10  Therapeutic Activity 15    OT/JAZMIN: OT          10/14/2023

## 2023-10-14 NOTE — PLAN OF CARE
[facility] ([place] [id number])      HOME HEALTH ORDERS  FACE TO FACE ENCOUNTER    Patient Name: Shima Sorto  YOB: 1940    PCP: Carmen Milton MD   PCP Address: [address] / [place] [address]  PCP Phone Number: [phone]  PCP Fax: [phone]    Encounter Date: 10/9/23    Admit to Home Health    Diagnoses:  Active Hospital Problems    Diagnosis  POA    *Hyponatremia [E87.1]  Yes    JOCELIN (acute kidney injury) [N17.9]  Unknown    Nausea [R11.0]  Yes    Acute cystitis [N30.00]  Yes    ACC/AHA stage C heart failure with preserved ejection fraction [I50.30]  Yes     Chronic    CKD (chronic kidney disease) stage 3, GFR 30-59 ml/min [N18.30]  Yes     Chronic    Malignant neoplasm of upper-outer quadrant of right breast in female, estrogen receptor positive [C50.411, Z17.0]  Not Applicable     Chronic    Obstructive sleep apnea [G47.33]  Yes     Chronic    Urinary retention [R33.9]  Yes     Chronic    Hypertension [I10]  Yes     Chronic      Resolved Hospital Problems   No resolved problems to display.       Follow Up Appointments:  Future Appointments   Date Time Provider Department Center   10/19/2023  1:00 PM CV [facility] VASCULAR [facility] [place]   10/23/2023 11:30 AM Robert Hernandez MD [facility] [id number] [facility]   10/25/2023 12:15 PM Sejal Choe DPM [facility] POD [facility]   11/2/2023  4:30 PM Dewayne Rhodes MD [facility] SLEEP [facility]   11/10/2023 12:30 PM CARDIAC, PET IMAGING [facility] CARDPET [facility]   11/29/2023 11:00 AM [facility] OIC-US1 MASTER [facility] ULTR [facility]   12/11/2023 11:45 AM INJECTION [facility] AMB INF [facility]       Allergies:  Review of patient's allergies indicates:   Allergen Reactions    Asparagus Rash       Medications: Review discharge medications with patient and family and provide education.    Current Facility-Administered Medications   Medication Dose Route Frequency Provider Last Rate Last Admin    0.9%  NaCl infusion   Intravenous  Continuous Vitaliy Perez MD 50 mL/hr at 10/14/23 1014 New Bag at 10/14/23 1014    acetaminophen tablet 650 mg  650 mg Oral Q4H PRN Romaine Yeboah MD        ampicillin (OMNIPEN) 2 g in sodium chloride 0.9 % 100 mL IVPB (MB+)  2 g Intravenous Q12H Cedrick Alarcon MD   Stopped at 10/14/23 1118    anastrozole tablet 1 mg  1 mg Oral Daily Romaine Yeboah MD   1 mg at 10/14/23 1004    dextrose 10% bolus 125 mL 125 mL  12.5 g Intravenous PRN Romaine Yeboah MD        dextrose 10% bolus 250 mL 250 mL  25 g Intravenous PRN Romaine Yeboah MD        glucagon (human recombinant) injection 1 mg  1 mg Intramuscular PRN Romaine Yeboah MD        glucose chewable tablet 16 g  16 g Oral PRN Romaine Yeboah MD        glucose chewable tablet 24 g  24 g Oral PRN Romaine Yeboah MD        heparin (porcine) injection 5,000 Units  5,000 Units Subcutaneous Q12H Vitaliy Perez MD   5,000 Units at 10/14/23 1004    mupirocin 2 % ointment   Nasal BID Cedrick Alarcon MD   Given at 10/14/23 1005    naloxone 0.4 mg/mL injection 0.02 mg  0.02 mg Intravenous PRN Romaine Yeboah MD        ondansetron injection 4 mg  4 mg Intravenous Q6H PRN Romaine Yeboah MD   4 mg at 10/10/23 1754    senna-docusate 8.6-50 mg per tablet 1 tablet  1 tablet Oral Daily PRN Vitaliy Perez MD        sodium bicarbonate tablet 650 mg  650 mg Oral TID Vitaliy Perez MD   650 mg at 10/14/23 1412    sodium chloride 0.9% flush 10 mL  10 mL Intravenous Q12H PRN Romaine Yeboah MD         Current Discharge Medication List        START taking these medications    Details   amoxicillin (AMOXIL) 500 MG Tab Take 1 tablet (500 mg total) by mouth every 12 (twelve) hours. Take one pill tonight for 1 dose  Qty: 1 tablet, Refills: 0           CONTINUE these medications which have CHANGED    Details   furosemide (LASIX) 20 MG tablet Take 2 tablets (40 mg total) by mouth once daily. Hold until you see the doctor for you follow up appointment  Qty: 90 tablet, Refills: 3       spironolactone (ALDACTONE) 25 MG tablet Take 1 tablet (25 mg total) by mouth once daily. Hold until you see the doctor for you follow up appointment  Qty: 30 tablet, Refills: 11    Comments: .      tamsulosin (FLOMAX) 0.4 mg Cap Take 1 capsule (0.4 mg total) by mouth once daily. Hold until you see the doctor for you follow up appointment  Qty: 30 capsule, Refills: 11      valsartan (DIOVAN) 160 MG tablet Take 1 tablet (160 mg total) by mouth once daily. Hold until you see the doctor for you follow up appointment  Qty: 90 tablet, Refills: 1    Comments: .  Associated Diagnoses: Hypertension, unspecified type           CONTINUE these medications which have NOT CHANGED    Details   acetaminophen (TYLENOL) 650 MG TbSR Take 1,300 mg by mouth 2 (two) times daily as needed (Severe pain).      anastrozole (ARIMIDEX) 1 mg Tab TAKE 1 TABLET(1 MG) BY MOUTH EVERY DAY  Qty: 90 tablet, Refills: 3    Associated Diagnoses: Use of aromatase inhibitors      coQ10, ubiquinol, 100 mg Cap Take 100 mg by mouth once daily.      denosumab (PROLIA) 60 mg/mL Syrg Inject 60 mg into the skin every 6 (six) months.      ferrous gluconate (FERGON) 324 MG tablet Take 1 tablet (324 mg total) by mouth daily with breakfast.  Qty: 90 tablet, Refills: 1    Associated Diagnoses: Iron deficiency anemia, unspecified iron deficiency anemia type      fish oil-E-fatty [id number] 400-5 mg-unit Cap Take 1 capsule by mouth Daily.      PRESERVISION AREDS-2 250-90-40-1 mg Cap Take 1 tablet by mouth 2 (two) times daily.      propylene glycol/peg 400/PF (SYSTANE, PF, OPHT) Place 1 drop into both eyes 2 (two) times daily as needed (Dry eye).      SALONPAS, LIDOCAINE, TOP Apply 1 patch topically daily as needed (Pain).      !! UNABLE TO FIND Rufus-Mag-Citrate with D3 & K2 - Takes 2 tablets by mouth every morning, 1 at midday and 1 every evening.      !! UNABLE TO FIND Take 4 capsules by mouth Daily. Nutrafol      vitamin renal formula, B-complex-vitamin c-folic  acid, (NEPHROCAPS) 1 mg Cap Take 1 capsule by mouth once daily.  Qty: 90 capsule, Refills: 3      empagliflozin (JARDIANCE) 10 mg tablet Take 1 tablet (10 mg total) by mouth once daily.  Qty: 90 tablet, Refills: 3    Associated Diagnoses: ACC/AHA stage C heart failure with preserved ejection fraction; Stage 3b chronic kidney disease       !! - Potential duplicate medications found. Please discuss with provider.        STOP taking these medications       cefpodoxime (VANTIN) 100 MG tablet Comments:   Reason for Stopping:                 I have seen and examined this patient within the last 30 days. My clinical findings that support the need for the home health skilled services and home bound status are the following:no   Weakness/numbness causing balance and gait disturbance due to Weakness/Debility making it taxing to leave home.     Diet:   renal diet    Labs:  SN to perform labs:  CBC: Weekly; 1 week(s) and BMP: Weekly; 1 week(s) and Report Lab results to PCP.    Referrals/ Consults  Physical Therapy to evaluate and treat. Evaluate for home safety and equipment needs; Establish/upgrade home exercise program. Perform / instruct on therapeutic exercises, gait training, transfer training, and Range of Motion.  Occupational Therapy to evaluate and treat. Evaluate home environment for safety and equipment needs. Perform/Instruct on transfers, ADL training, ROM, and therapeutic exercises.    Activities:   activity as tolerated    Nursing:   Agency to admit patient within 24 hours of hospital discharge unless specified on physician order or at patient request    SN to complete comprehensive assessment including routine vital signs. Instruct on disease process and s/s of complications to report to MD. Review/verify medication list sent home with the patient at time of discharge  and instruct patient/caregiver as needed. Frequency may be adjusted depending on start of care date.     Skilled nurse to perform up to 3 visits  PRN for symptoms related to diagnosis    Notify MD if SBP > 160 or < 90; DBP > 90 or < 50; HR > 120 or < 50; Temp > 101; O2 < 88%  Ok to schedule additional visits based on staff availability and patient request on consecutive days within the home health episode.    When multiple disciplines ordered:    Start of Care occurs on Sunday - Wednesday schedule remaining discipline evaluations as ordered on separate consecutive days following the start of care.    Thursday SOC -schedule subsequent evaluations Friday and Monday the following week.     Friday - Saturday SOC - schedule subsequent discipline evaluations on consecutive days starting Monday of the following week.    For all post-discharge communication and subsequent orders please contact patient's primary care physician.       Home Health Aide:  Physical Therapy Three times weekly and Occupational Therapy Weekly      I certify that this patient is confined to her home and needs intermittent skilled nursing care, physical therapy, and occupational therapy.      Vitaliy Perez M.D.

## 2023-10-14 NOTE — PROGRESS NOTES
"[facility] ([address] Medicine  Progress Note    Patient Name: Shima Sorto  MRN: [id number]  Patient Class: IP- Inpatient   Admission Date: 10/9/2023  Length of Stay: 4 days  Attending Physician: Tl Harvey MD  Primary Care Provider: Carmen Milton MD        Subjective:     Principal Problem:Hyponatremia        HPI:  Shima Sorto is an 83-year-old woman with history of breast cancer s/p lumpectomy, CKD stage 3, and HFpEF who presents with complaints of right flank pain and intractable nausea. Patient was a poor historian on my interview. She reports that she has been dealing with a urinary tract infection for the last few days, reporting some lower abdominal discomfort and dysuria, and she was given antibiotics to treat the UTI on 10/6. She says that on the morning of 10/9, after breakfast, she developed significant nausea and had one episode of diarrhea. She felt nauseated the rest of the day but had no further diarrhea and no emesis. She reports that same morning, she sat down too quickly and feels as though she hurt her low back, and says that she has been having pain in the Right lower back since then. She then reports that she has been experiencing nausea and this back pain for "quite some time", even before 10/9. Additionally, she says that she feels that with the nausea, she has felt somewhat lightheaded, but denies syncope or sensation of dizziness. Of note, urine culture from 10/6 returned with E. Coli that was largely sensitive. Patient denies any recent fevers, chest pain, shortness of breath, abdominal pain, or vomiting.    In the ED, patient's vital signs were stable and hemodynamically stable. Initial labs revealed hyponatremia of 118 and then 120 on repeat. CT abdomen pelvis demonstrated no acute findings, but demonstrated significant degenerative disease of the lumbar spine, as well as non-obstructive renal stones. UA with hematuria and pyuria, and " pt was given a dose of IV CTX. Patient was admitted to Hospital Medicine for treatment of presumed UTI and management of hyponatremia.      Overview/Hospital Course:  Mixed hyponatremia 2/2 to Tea and toast vs polydipsia vs hypovolemia. Sodium jump from 118 to 127 after fluid restriction, D5 given to slow correction to 8meq per 24 hours. Na stablized to 123. Repeat Osms and Urine lytes. New JOCELIN 2/2 to hypovolemia vs intrinsic cause. D/c valsartan and spironolactone. Started pt on very moderate NS infusion, with decreasing Na (downtrending to 120). UA positive for amp-sensitive Enterococcus, E. Coli. Transitioned from CTX and Vanc to Amp. Some hypotensive episodes at 107/50. Uptrending BUN/Cr to 26.8 c/w pre-renal JOCELIN. D/c tamsulosin. Nephrology consulted for further workup and evaluation for complex mixed hyponatremia and pre-renal JOCELIN. Cr and and sodium levels improving on continuous NS infusion. Sodium Bicarb tablets giving for low bicarb levels. Plan on following up with Pulm on discharge for bronchiectasis. Patient medically stable for discharge      Interval History:    NAEON. Continue on Ampicillin. Na improving on IVF, no signs of volume overload. Bicarb low, supplementing    Review of Systems   Constitutional:  Positive for chills. Negative for fever.   HENT:  Negative for congestion and sore throat.    Eyes:  Negative for photophobia and visual disturbance.   Respiratory:  Negative for cough and shortness of breath.    Cardiovascular:  Negative for chest pain and palpitations.   Gastrointestinal:  Positive for constipation. Negative for abdominal pain and vomiting.   Genitourinary:  Positive for flank pain and urgency. Negative for dysuria and hematuria.   Musculoskeletal:  Positive for back pain. Negative for arthralgias and gait problem.   Skin:  Negative for rash and wound.   Neurological:  Negative for syncope.   Psychiatric/Behavioral:  Negative for agitation and behavioral problems.      Objective:      Vital Signs (Most Recent):  Temp: 98.6 °F (37 °C) (10/14/23 0815)  Pulse: 66 (10/14/23 0815)  Resp: 18 (10/14/23 0815)  BP: (!) 121/58 (10/14/23 0815)  SpO2: 100 % (10/14/23 0815) Vital Signs (24h Range):  Temp:  [97.6 °F (36.4 °C)-98.7 °F (37.1 °C)] 98.6 °F (37 °C)  Pulse:  [66-81] 66  Resp:  [17-18] 18  SpO2:  [99 %-100 %] 100 %  BP: (121-166)/(58-79) 121/58     Weight: 50.8 kg (111 lb 15.9 oz)  Body mass index is 17.54 kg/m².     Physical Exam  Vitals reviewed.   Constitutional:       General: She is not in acute distress.     Appearance: Normal appearance.   HENT:      Head: Normocephalic and atraumatic.      Nose: No congestion or rhinorrhea.      Mouth/Throat:      Mouth: Mucous membranes are moist.      Pharynx: Oropharynx is clear.   Eyes:      Extraocular Movements: Extraocular movements intact.      Pupils: Pupils are equal, round, and reactive to light.   Cardiovascular:      Rate and Rhythm: Normal rate and regular rhythm.      Pulses: Normal pulses.      Heart sounds: Normal heart sounds.   Pulmonary:      Effort: Pulmonary effort is normal. No respiratory distress.      Breath sounds: Normal breath sounds.   Abdominal:      General: Bowel sounds are normal.      Palpations: Abdomen is soft.      Tenderness: There is no abdominal tenderness.   Musculoskeletal:         General: Tenderness present. No swelling or signs of injury.      Comments: Some tenderness reported on palpation of the posterior right ilium/iliac crest  No bruising or deformity noticed on exam   Skin:     Findings: No bruising or rash.   Neurological:      General: No focal deficit present.      Mental Status: She is alert and oriented to person, place, and time.   Psychiatric:         Mood and Affect: Mood normal.         Behavior: Behavior normal.      Comments: Tangential              CRANIAL NERVES     CN III, IV, VI   Pupils are equal, round, and reactive to light.       Significant Labs: All pertinent labs within the past 24  "hours have been reviewed.  CBC:   Recent Labs   Lab 10/13/23  0520 10/14/23  0601   WBC 5.33 4.54   HGB 10.1* 10.2*   HCT 29.3* 29.4*    180       CMP:   Recent Labs   Lab 10/13/23  0520 10/13/23  0939 10/14/23  0601 10/14/23  0910 10/14/23  1325   *  125*   < > 131* 133* 133*   K 4.0  --  3.9  --   --    CL 97  --  103  --   --    CO2 18*  --  18*  --   --      --  96  --   --    BUN 58*  --  52*  --   --    CREATININE 1.8*  --  1.4  --   --    CALCIUM 7.8*  --  7.9*  --   --    PROT 5.9*  --  6.3  --   --    ALBUMIN 3.0*  --  3.2*  --   --    BILITOT 0.4  --  0.5  --   --    ALKPHOS 46*  --  51*  --   --    AST 25  --  25  --   --    ALT 21  --  21  --   --    ANIONGAP 10  --  10  --   --     < > = values in this interval not displayed.         Significant Imaging: I have reviewed all pertinent imaging results/findings within the past 24 hours.      Assessment/Plan:      * Hyponatremia  83F with history of HFpEF, CKD, breast cancer, who presents with 1 day of intractable nausea with right flank pain. Patient found to be hyponatremic 118, but Na levels as high as 131 less than 1 month ago, and prior to that her sodium levels were generally normal. Hyponatremia may be contributing to patient's persistent nausea as well as feelings of lightheadedness. Unclear etiology, however, patient does not appear extremely volume down on exam, and does not have elements in her history that would lead one to suspect a "tea or toast" diet or polydipsia. Possibly more likely an SIADH picture.    Plan:  - Na levels 3x daily  - mixed hypoNa picture  - Nephrology consulted  - Fluid restrict to <1.5L daily  - 50cc continuous NS  - Continuing to improve    Acute cystitis  Patient diagnosed with UTI on 10/6 after presenting to Urology clinic with abdominal tenderness and dysuria on self-cath. Was started on empiric cefpodoxime, and urine cx returned with almost-pan-sensitive E coli. Patient had taken 3 days of PO " "antibiotics before feeling like she was developing adverse reaction to the medication and stopped.    - Cx positive for E. Faecalis and E Coli  - Transition from Vanc and CTX to Ampicillin      Nausea  83F with history of HFpEF, CKD, breast cancer, who presents with 1 day of intractable nausea with right flank pain. Patient reports symptoms came on suddenly on 10/9 after a bout of diarrhea with persistent nausea since. She reports she had dysuria and abdominal tenderness and was seen by Urology on 10/6, where she was given prescription of cefpodoxime for UTI. Patient believes that it may be the antibiotic that led to her symptoms, however she was found to be significantly hyponatremic as well, down to 118. Nausea could be from the UTI, or antibiotic therapy, or may be from her hyponatremia.    Plan:  - Zofran PRN for nausea, continue to monitor  - See "Hyponatremia" for further management    ACC/AHA stage C heart failure with preserved ejection fraction  Patient with HFpEF, follows with Dr. Wong outpatient, who wanted to recently start Jardiance, however patient has not yet started.     - Given recent UTI, may defer on starting Jardiance right now  - Holding gdmt meds at this time in setting of JOCELIN    CKD (chronic kidney disease) stage 3, GFR 30-59 ml/min  JOCELIN noted on 10/11  Holding valsartan and spironolactone  Nephrology following, Suspect intial JOCELIN from prerenal insult (patient poor historian suspect poor PO intake). Further insult from IV ketorolac on 10/09, followed by 2 doses of vastartan    - Repeat osms and lytes  - Obtaining Retroperitoneal U/S    Malignant neoplasm of upper-outer quadrant of right breast in female, estrogen receptor positive  Patient with ER+ breast cancer diagnosed in 2017, s/p lumpectomy, now on hormone therapy.    - Continue home anastrozole     Urinary retention  Chronic issue, followed by Urology outpatient. Patient mainly complaints of incomplete bladder emptying and was taught " CIC due to incomplete bladder emptying. Patient had been performing CIC twice daily at home, however on recent visit, PVR was noted to be elevated despite CIC, so she was instructed to increase catheterization to TID. She tells me however that she is still continuing to only self-cath twice a day.    - Tejada for urinary retention  - d/c home flomax 2/2 hypotension    Hypertension  Chronic condition, stable.     Holding valsartan 2/2 Cr spike        VTE Risk Mitigation (From admission, onward)         Ordered     heparin (porcine) injection 5,000 Units  Every 12 hours         10/12/23 0906     IP VTE HIGH RISK PATIENT  Once         10/10/23 0356     Place sequential compression device  Until discontinued         10/10/23 0356                Discharge Planning   PHILLIP: 10/15/2023     Code Status: Full Code   Is the patient medically ready for discharge?: No    Reason for patient still in hospital (select all that apply): Patient trending condition  Discharge Plan A: Home Health            Vitaliy Perez MD  Department of Hospital Medicine   [facility] - Intensive Care ([place]-[facility])

## 2023-10-14 NOTE — SUBJECTIVE & OBJECTIVE
Interval History:    NAEON. Continue on Ampicillin. Na improving on IVF, no signs of volume overload. Bicarb low, supplementing    Review of Systems   Constitutional:  Positive for chills. Negative for fever.   HENT:  Negative for congestion and sore throat.    Eyes:  Negative for photophobia and visual disturbance.   Respiratory:  Negative for cough and shortness of breath.    Cardiovascular:  Negative for chest pain and palpitations.   Gastrointestinal:  Positive for constipation. Negative for abdominal pain and vomiting.   Genitourinary:  Positive for flank pain and urgency. Negative for dysuria and hematuria.   Musculoskeletal:  Positive for back pain. Negative for arthralgias and gait problem.   Skin:  Negative for rash and wound.   Neurological:  Negative for syncope.   Psychiatric/Behavioral:  Negative for agitation and behavioral problems.      Objective:     Vital Signs (Most Recent):  Temp: 98.6 °F (37 °C) (10/14/23 0815)  Pulse: 66 (10/14/23 0815)  Resp: 18 (10/14/23 0815)  BP: (!) 121/58 (10/14/23 0815)  SpO2: 100 % (10/14/23 0815) Vital Signs (24h Range):  Temp:  [97.6 °F (36.4 °C)-98.7 °F (37.1 °C)] 98.6 °F (37 °C)  Pulse:  [66-81] 66  Resp:  [17-18] 18  SpO2:  [99 %-100 %] 100 %  BP: (121-166)/(58-79) 121/58     Weight: 50.8 kg (111 lb 15.9 oz)  Body mass index is 17.54 kg/m².     Physical Exam  Vitals reviewed.   Constitutional:       General: She is not in acute distress.     Appearance: Normal appearance.   HENT:      Head: Normocephalic and atraumatic.      Nose: No congestion or rhinorrhea.      Mouth/Throat:      Mouth: Mucous membranes are moist.      Pharynx: Oropharynx is clear.   Eyes:      Extraocular Movements: Extraocular movements intact.      Pupils: Pupils are equal, round, and reactive to light.   Cardiovascular:      Rate and Rhythm: Normal rate and regular rhythm.      Pulses: Normal pulses.      Heart sounds: Normal heart sounds.   Pulmonary:      Effort: Pulmonary effort is  normal. No respiratory distress.      Breath sounds: Normal breath sounds.   Abdominal:      General: Bowel sounds are normal.      Palpations: Abdomen is soft.      Tenderness: There is no abdominal tenderness.   Musculoskeletal:         General: Tenderness present. No swelling or signs of injury.      Comments: Some tenderness reported on palpation of the posterior right ilium/iliac crest  No bruising or deformity noticed on exam   Skin:     Findings: No bruising or rash.   Neurological:      General: No focal deficit present.      Mental Status: She is alert and oriented to person, place, and time.   Psychiatric:         Mood and Affect: Mood normal.         Behavior: Behavior normal.      Comments: Tangential              CRANIAL NERVES     CN III, IV, VI   Pupils are equal, round, and reactive to light.       Significant Labs: All pertinent labs within the past 24 hours have been reviewed.  CBC:   Recent Labs   Lab 10/13/23  0520 10/14/23  0601   WBC 5.33 4.54   HGB 10.1* 10.2*   HCT 29.3* 29.4*    180       CMP:   Recent Labs   Lab 10/13/23  0520 10/13/23  0939 10/14/23  0601 10/14/23  0910 10/14/23  1325   *  125*   < > 131* 133* 133*   K 4.0  --  3.9  --   --    CL 97  --  103  --   --    CO2 18*  --  18*  --   --      --  96  --   --    BUN 58*  --  52*  --   --    CREATININE 1.8*  --  1.4  --   --    CALCIUM 7.8*  --  7.9*  --   --    PROT 5.9*  --  6.3  --   --    ALBUMIN 3.0*  --  3.2*  --   --    BILITOT 0.4  --  0.5  --   --    ALKPHOS 46*  --  51*  --   --    AST 25  --  25  --   --    ALT 21  --  21  --   --    ANIONGAP 10  --  10  --   --     < > = values in this interval not displayed.         Significant Imaging: I have reviewed all pertinent imaging results/findings within the past 24 hours.

## 2023-10-14 NOTE — ASSESSMENT & PLAN NOTE
"83F with history of HFpEF, CKD, breast cancer, who presents with 1 day of intractable nausea with right flank pain. Patient found to be hyponatremic 118, but Na levels as high as 131 less than 1 month ago, and prior to that her sodium levels were generally normal. Hyponatremia may be contributing to patient's persistent nausea as well as feelings of lightheadedness. Unclear etiology, however, patient does not appear extremely volume down on exam, and does not have elements in her history that would lead one to suspect a "tea or toast" diet or polydipsia. Possibly more likely an SIADH picture.    Plan:  - Na levels 3x daily  - mixed hypoNa picture  - Nephrology consulted  - Fluid restrict to <1.5L daily  - 50cc continuous NS  - Continuing to improve  "

## 2023-10-15 LAB
BACTERIA BLD CULT: NORMAL
BACTERIA BLD CULT: NORMAL

## 2023-10-15 NOTE — NURSING
Notified MD about patient never voided since lomas catheter removed. MD stated pt okay to discharge since patient self cath at home.

## 2023-10-15 NOTE — PLAN OF CARE
Problem: Infection  Goal: Absence of Infection Signs and Symptoms  10/14/2023 1930 by Pascual Cox RN  Outcome: Ongoing, Progressing  10/14/2023 1930 by Pascual Cox RN  Outcome: Ongoing, Progressing  Intervention: Prevent or Manage Infection  Flowsheets (Taken 10/14/2023 1930)  Isolation Precautions: precautions maintained     Problem: Adult Inpatient Plan of Care  Goal: Plan of Care Review  10/14/2023 1930 by Pascual Cox RN  Outcome: Ongoing, Progressing  10/14/2023 1930 by Pascual Cox RN  Outcome: Ongoing, Progressing     Problem: Skin Injury Risk Increased  Goal: Skin Health and Integrity  Outcome: Ongoing, Progressing     Problem: Fluid and Electrolyte Imbalance (Acute Kidney Injury/Impairment)  Goal: Fluid and Electrolyte Balance  Intervention: Monitor and Manage Fluid and Electrolyte Balance  Flowsheets (Taken 10/14/2023 1930)  Fluid/Electrolyte Management: fluids provided

## 2023-10-16 ENCOUNTER — PATIENT OUTREACH (OUTPATIENT)
Dept: ADMINISTRATIVE | Facility: CLINIC | Age: 83
End: 2023-10-16
Payer: MEDICARE

## 2023-10-16 ENCOUNTER — TELEPHONE (OUTPATIENT)
Dept: PULMONOLOGY | Facility: CLINIC | Age: 83
End: 2023-10-16
Payer: MEDICARE

## 2023-10-16 NOTE — PROGRESS NOTES
C3 nurse attempted to contact Shima Sorto for a TCC post hospital discharge follow up call. No answer. Left voicemail with callback information. The patient does not have a scheduled HOSFU appointment. Message sent to PCP staff for assistance with scheduling visit with patient.

## 2023-10-16 NOTE — DISCHARGE SUMMARY
"[facility] - Intensive Care ([place] [address])  [facility] Medicine  Discharge Summary      Patient Name: Shima Sorto  MRN: [id number]  BIANCA: [id number]  Patient Class: IP- Inpatient  Admission Date: 10/9/2023  Hospital Length of Stay: 4 days  Discharge Date and Time: 10/14/2023 11:04 PM  Attending Physician: Cary att. providers found   Discharging Provider: Tl Harvey MD  Primary Care Provider: Carmen Milton MD  [facility] Medicine Team: [facility] MED 5 Tl Harvey MD  Primary Care Team: [facility] MED 5    HPI:   Shima Sorto is an 83-year-old woman with history of breast cancer s/p lumpectomy, CKD stage 3, and HFpEF who presents with complaints of right flank pain and intractable nausea. Patient was a poor historian on my interview. She reports that she has been dealing with a urinary tract infection for the last few days, reporting some lower abdominal discomfort and dysuria, and she was given antibiotics to treat the UTI on 10/6. She says that on the morning of 10/9, after breakfast, she developed significant nausea and had one episode of diarrhea. She felt nauseated the rest of the day but had no further diarrhea and no emesis. She reports that same morning, she sat down too quickly and feels as though she hurt her low back, and says that she has been having pain in the Right lower back since then. She then reports that she has been experiencing nausea and this back pain for "quite some time", even before 10/9. Additionally, she says that she feels that with the nausea, she has felt somewhat lightheaded, but denies syncope or sensation of dizziness. Of note, urine culture from 10/6 returned with E. Coli that was largely sensitive. Patient denies any recent fevers, chest pain, shortness of breath, abdominal pain, or vomiting.    In the ED, patient's vital signs were stable and hemodynamically stable. Initial labs revealed hyponatremia of 118 and then 120 on repeat. CT abdomen pelvis demonstrated no " acute findings, but demonstrated significant degenerative disease of the lumbar spine, as well as non-obstructive renal stones. UA with hematuria and pyuria, and pt was given a dose of IV CTX. Patient was admitted to Hospital Medicine for treatment of presumed UTI and management of hyponatremia.      * No surgery found *      Hospital Course:   Mixed hyponatremia 2/2 to Tea and toast vs polydipsia vs hypovolemia. Sodium jump from 118 to 127 after fluid restriction, D5 given to slow correction to 8meq per 24 hours. Na stablized to 123. Repeat Osms and Urine lytes. New JOCELIN 2/2 to hypovolemia vs intrinsic cause. D/c valsartan and spironolactone. Started pt on very moderate NS infusion, with decreasing Na (downtrending to 120). UA positive for amp-sensitive Enterococcus, E. Coli. Transitioned from CTX and Vanc to Amp. Some hypotensive episodes at 107/50. Uptrending BUN/Cr to 26.8 c/w pre-renal JOCELIN. D/c tamsulosin. Nephrology consulted for further workup and evaluation for complex mixed hyponatremia and pre-renal JOCELIN. Cr and and sodium levels improving on continuous NS infusion. Sodium Bicarb tablets giving for low bicarb levels. Plan on following up with Pulm on discharge for bronchiectasis. Patient medically stable for discharge       Goals of Care Treatment Preferences:  Code Status: Full Code      Consults:   Consults (From admission, onward)        Status Ordering Provider     Inpatient consult to Nephrology  Once        Provider:  (Not yet assigned)    Completed MAKAYLA TEJEDA          No new Assessment & Plan notes have been filed under this hospital service since the last note was generated.  Service: Hospital Medicine    Final Active Diagnoses:    Diagnosis Date Noted POA    PRINCIPAL PROBLEM:  Hyponatremia [E87.1] 10/10/2023 Yes    JOCELIN (acute kidney injury) [N17.9] 10/12/2023 Unknown    Nausea [R11.0] 10/10/2023 Yes    Acute cystitis [N30.00] 10/10/2023 Yes    ACC/AHA stage C heart failure with preserved  ejection fraction [I50.30] 08/09/2023 Yes     Chronic    CKD (chronic kidney disease) stage 3, GFR 30-59 ml/min [N18.30] 09/12/2018 Yes     Chronic    Malignant neoplasm of upper-outer quadrant of right breast in female, estrogen receptor positive [C50.411, Z17.0] 08/05/2018 Not Applicable     Chronic    Obstructive sleep apnea [G47.33]  Yes     Chronic    Urinary retention [R33.9] 07/02/2013 Yes     Chronic    Hypertension [I10] 02/20/2013 Yes     Chronic      Problems Resolved During this Admission:       Discharged Condition: fair    Disposition: Home or Self Care    Follow Up:    Patient Instructions:      BASIC METABOLIC PANEL   Standing Status: Future Standing Exp. Date: 12/12/24   Order Comments: Get blood drawn some time next week before your doctors appointment     Ambulatory referral/consult to Internal Medicine   Standing Status: Future   Referral Priority: Routine Referral Type: Consultation   Referral Reason: Specialty Services Required   Requested Specialty: Internal Medicine   Number of Visits Requested: 1     Ambulatory referral/consult to Pulmonology   Standing Status: Future   Referral Priority: Routine Referral Type: Consultation   Referral Reason: Specialty Services Required   Requested Specialty: Pulmonary Disease   Number of Visits Requested: 1       Significant Diagnostic Studies: N/A    Pending Diagnostic Studies:     None         Medications:  Reconciled Home Medications:      Medication List      CHANGE how you take these medications    furosemide 20 MG tablet  Commonly known as: LASIX  Take 2 tablets (40 mg total) by mouth once daily. Hold until you see the doctor for you follow up appointment  What changed: additional instructions     NEPHROCAPS 1 mg Cap  Generic drug: vitamin renal formula (B-complex-vitamin c-folic acid)  Take 1 capsule by mouth once daily.  What changed: additional instructions     spironolactone 25 MG tablet  Commonly known as: ALDACTONE  Take 1 tablet (25 mg  total) by mouth once daily. Hold until you see the doctor for you follow up appointment  What changed: additional instructions     tamsulosin 0.4 mg Cap  Commonly known as: FLOMAX  Take 1 capsule (0.4 mg total) by mouth once daily. Hold until you see the doctor for you follow up appointment  What changed: additional instructions     valsartan 160 MG tablet  Commonly known as: DIOVAN  Take 1 tablet (160 mg total) by mouth once daily. Hold until you see the doctor for you follow up appointment  What changed: additional instructions        CONTINUE taking these medications    acetaminophen 650 MG Tbsr  Commonly known as: TYLENOL  Take 1,300 mg by mouth 2 (two) times daily as needed (Severe pain).     anastrozole 1 mg Tab  Commonly known as: ARIMIDEX  TAKE 1 TABLET(1 MG) BY MOUTH EVERY DAY     coQ10 (ubiquinol) 100 mg Cap  Take 100 mg by mouth once daily.     denosumab 60 mg/mL Syrg  Commonly known as: PROLIA  Inject 60 mg into the skin every 6 (six) months.     empagliflozin 10 mg tablet  Commonly known as: Jardiance  Take 1 tablet (10 mg total) by mouth once daily.     ferrous gluconate 324 MG tablet  Commonly known as: FERGON  Take 1 tablet (324 mg total) by mouth daily with breakfast.     fish oil-E-fatty acid5-eyqt055 400-5 mg-unit Cap  Take 1 capsule by mouth Daily.     PRESERVISION AREDS-2 250-90-40-1 mg Cap  Generic drug: vit C,D-Qq-ehmgj-lutein-zeaxan  Take 1 tablet by mouth 2 (two) times daily.     SALONPAS (LIDOCAINE) TOP  Apply 1 patch topically daily as needed (Pain).     SYSTANE (PF) OPHT  Place 1 drop into both eyes 2 (two) times daily as needed (Dry eye).     UNABLE TO FIND  Rufus-Mag-Citrate with D3 & K2 - Takes 2 tablets by mouth every morning, 1 at midday and 1 every evening.     UNABLE TO FIND  Take 4 capsules by mouth Daily. Nutrafol        STOP taking these medications    cefpodoxime 100 MG tablet  Commonly known as: VANTIN        ASK your doctor about these medications    amoxicillin 500 MG  Tab  Commonly known as: AMOXIL  Take 1 tablet (500 mg total) by mouth every 12 (twelve) hours. Take one pill tonight for 1 dose  Ask about: Should I take this medication?            Indwelling Lines/Drains at time of discharge:   Lines/Drains/Airways     None                 Time spent on the discharge of patient: 35 minutes         Tl Harvey MD  Department of Hospital Medicine  [facility] - Intensive Care ([place]-16)

## 2023-10-16 NOTE — PROGRESS NOTES
C3 nurse 2nd attempt to contact Shima Sorto for a TCC post hospital discharge follow up call. The patient is unable to conduct the call @ this time. The patient requested a callback.    The patient does not have a scheduled HOSFU appointment within 5-7 days post hospital discharge date 10/14/23. Message sent to Physician staff to assist with HOSFU appointment scheduling.

## 2023-10-16 NOTE — PLAN OF CARE
10/16/23 0917   Post-Acute Status   Post-Acute Authorization Placement   Post-Acute Placement Status Set-up Complete/Auth obtained   Coverage MEDICARE - MEDICARE PART A & B   Discharge Plan   Discharge Plan A Home Health   Discharge Plan B Home with family     FER received notification that [facility] accepts patient for HH services. SW advised [facility] of patient's dc date and submitted HH orders via careport. Set-up complete/auth obtained.            MARLI Cano, LMSW  [facility]  Case Management  Ext. [phone]

## 2023-10-16 NOTE — TELEPHONE ENCOUNTER
Received a Epic message that Mrs Sorto needs a pulmonary consult visit for Bronchiectasis, she is being referred by Dr Carmen Milton. Dr Olguin had availability on Thursday 10-19-23 at 11am. I left a message on Mrs Sorto's voicemail about this visit and asked her to call me if she cannot make this appointment. Haley Webster

## 2023-10-16 NOTE — PLAN OF CARE
[facility] - Intensive Care ([facility]-16)  Discharge Final Note    Primary Care Provider: Carmen Milton MD    Expected Discharge Date: 10/14/2023    Final Discharge Note (most recent)       Final Note - 10/16/23 0920          Final Note    Assessment Type Final Discharge Note (P)      Anticipated Discharge Disposition Home-Health Care Svc (P)      What phone number can be called within the next 1-3 days to see how you are doing after discharge? [phone] (P)         Post-Acute Status    Post-Acute Authorization Home Health (P)    [facility]     Home Health Status Set-up Complete/Auth obtained (P)      Coverage MEDICARE - MEDICARE PART A & B (P)                      Important Message from Medicare  Important Message from Medicare regarding Discharge Appeal Rights: Given to patient/caregiver, Explained to patient/caregiver, Signed/date by patient/caregiver     Date IMM was signed: 10/13/23  Time IMM was signed: 1208        Patient dc w/ [facility] services.                MARLI Cano, LMSW  [facility]  Case Management  Ext. [phone]

## 2023-10-17 PROCEDURE — G0180 MD CERTIFICATION HHA PATIENT: HCPCS | Mod: ,,, | Performed by: INTERNAL MEDICINE

## 2023-10-17 PROCEDURE — G0180 PR HOME HEALTH MD CERTIFICATION: ICD-10-PCS | Mod: ,,, | Performed by: INTERNAL MEDICINE

## 2023-10-17 NOTE — PROGRESS NOTES
C3 nurse spoke with Shima Sorto for a TCC post hospital discharge follow up call. The patient has a scheduled HOSFU appointment with Carmen Milton MD on 10/20/23 @ 1030.

## 2023-10-17 NOTE — PROGRESS NOTES
C3 nurse 3rd attempt to contact Shima Sorto for a TCC post hospital discharge follow up call. The patient is unable to conduct the call @ this time. The patient requested a callback.    The patient does not have a scheduled HOSFU appointment within 5-7 days post hospital discharge date 10/14/23. Message sent to Physician staff to assist with HOSFU appointment scheduling.

## 2023-10-18 ENCOUNTER — LAB VISIT (OUTPATIENT)
Dept: LAB | Facility: HOSPITAL | Age: 83
End: 2023-10-18
Payer: MEDICARE

## 2023-10-18 ENCOUNTER — PATIENT MESSAGE (OUTPATIENT)
Dept: CARDIOLOGY | Facility: CLINIC | Age: 83
End: 2023-10-18
Payer: MEDICARE

## 2023-10-18 DIAGNOSIS — N17.9 AKI (ACUTE KIDNEY INJURY): ICD-10-CM

## 2023-10-18 LAB
ANION GAP SERPL CALC-SCNC: 12 MMOL/L (ref 8–16)
BUN SERPL-MCNC: 63 MG/DL (ref 8–23)
CALCIUM SERPL-MCNC: 9.7 MG/DL (ref 8.7–10.5)
CHLORIDE SERPL-SCNC: 109 MMOL/L (ref 95–110)
CO2 SERPL-SCNC: 18 MMOL/L (ref 23–29)
CREAT SERPL-MCNC: 2.3 MG/DL (ref 0.5–1.4)
EST. GFR  (NO RACE VARIABLE): 20.6 ML/MIN/1.73 M^2
GLUCOSE SERPL-MCNC: 101 MG/DL (ref 70–110)
POTASSIUM SERPL-SCNC: 4.3 MMOL/L (ref 3.5–5.1)
SODIUM SERPL-SCNC: 139 MMOL/L (ref 136–145)

## 2023-10-18 PROCEDURE — 36415 COLL VENOUS BLD VENIPUNCTURE: CPT

## 2023-10-18 PROCEDURE — 80048 BASIC METABOLIC PNL TOTAL CA: CPT

## 2023-10-19 ENCOUNTER — HOSPITAL ENCOUNTER (OUTPATIENT)
Dept: CARDIOLOGY | Facility: HOSPITAL | Age: 83
Discharge: HOME | End: 2023-10-19
Attending: INTERNAL MEDICINE
Payer: MEDICARE

## 2023-10-19 ENCOUNTER — OFFICE VISIT (OUTPATIENT)
Dept: PULMONOLOGY | Facility: CLINIC | Age: 83
End: 2023-10-19
Payer: MEDICARE

## 2023-10-19 VITALS
OXYGEN SATURATION: 94 % | BODY MASS INDEX: 17.4 KG/M2 | WEIGHT: 110.88 LBS | HEIGHT: 67 IN | HEART RATE: 64 BPM | DIASTOLIC BLOOD PRESSURE: 82 MMHG | SYSTOLIC BLOOD PRESSURE: 134 MMHG

## 2023-10-19 DIAGNOSIS — D50.9 IRON DEFICIENCY ANEMIA, UNSPECIFIED IRON DEFICIENCY ANEMIA TYPE: ICD-10-CM

## 2023-10-19 DIAGNOSIS — L97.521 SKIN ULCER OF SECOND TOE OF LEFT FOOT, LIMITED TO BREAKDOWN OF SKIN: ICD-10-CM

## 2023-10-19 DIAGNOSIS — J47.9 BRONCHIECTASIS WITHOUT COMPLICATION: ICD-10-CM

## 2023-10-19 DIAGNOSIS — R06.02 SHORTNESS OF BREATH: Primary | ICD-10-CM

## 2023-10-19 LAB
LEFT ANT TIBIAL SYS PSV: 68 CM/S
LEFT CFA PSV: 133 CM/S
LEFT DORSALIS PEDIS PSV: 115 CM/S
LEFT EXTERNAL ILIAC PSV: 177 CM/S
LEFT PERONEAL SYS PSV: 59 CM/S
LEFT POPLITEAL PSV: 84 CM/S
LEFT POST TIBIAL SYS PSV: 93 CM/S
LEFT PROFUNDA SYS PSV: 104 CM/S
LEFT SUPER FEMORAL DIST SYS PSV: 98 CM/S
LEFT SUPER FEMORAL MID SYS PSV: 115 CM/S
LEFT SUPER FEMORAL OSTIAL SYS PSV: 109 CM/S
LEFT SUPER FEMORAL PROX SYS PSV: 130 CM/S
LEFT TIB/PER TRUNK SYS PSV: 91 CM/S
OHS CV LEFT LOWER EXTREMITY ABI (NO CALC): 1.2
OHS CV RIGHT ABI LOWER EXTREMITY (NO CALC): 1.2
RIGHT ANT TIBIAL SYS PSV: 50 CM/S
RIGHT CFA PSV: 139 CM/S
RIGHT DORSALIS PEDIS PSV: 37 CM/S
RIGHT EXTERNAL ILLIAC PSV: 146 CM/S
RIGHT PERONEAL SYS PSV: 63 CM/S
RIGHT POPLITEAL PSV: 97 CM/S
RIGHT POST TIBIAL SYS PSV: 89 CM/S
RIGHT PROFUNDA SYS PSV: 83 CM/S
RIGHT SUPER FEMORAL DIST SYS PSV: 108 CM/S
RIGHT SUPER FEMORAL MID SYS PSV: 91 CM/S
RIGHT SUPER FEMORAL OSTIAL SYS PSV: 107 CM/S
RIGHT SUPER FEMORAL PROX SYS PSV: 95 CM/S
RIGHT TIB/PER TRUNK SYS PSV: 91 CM/S

## 2023-10-19 PROCEDURE — 99204 OFFICE O/P NEW MOD 45 MIN: CPT | Mod: S$PBB,,, | Performed by: INTERNAL MEDICINE

## 2023-10-19 PROCEDURE — 99999 PR PBB SHADOW E&M-EST. PATIENT-LVL V: ICD-10-PCS | Mod: PBBFAC,,, | Performed by: INTERNAL MEDICINE

## 2023-10-19 PROCEDURE — 93925 LOWER EXTREMITY STUDY: CPT | Mod: 26,,, | Performed by: INTERNAL MEDICINE

## 2023-10-19 PROCEDURE — 99204 PR OFFICE/OUTPT VISIT, NEW, LEVL IV, 45-59 MIN: ICD-10-PCS | Mod: S$PBB,,, | Performed by: INTERNAL MEDICINE

## 2023-10-19 PROCEDURE — 93925 CV US DOPPLER ARTERIAL LEGS BILATERAL (CUPID ONLY): ICD-10-PCS | Mod: 26,,, | Performed by: INTERNAL MEDICINE

## 2023-10-19 PROCEDURE — 99215 OFFICE O/P EST HI 40 MIN: CPT | Mod: PBBFAC | Performed by: INTERNAL MEDICINE

## 2023-10-19 PROCEDURE — 99999 PR PBB SHADOW E&M-EST. PATIENT-LVL V: CPT | Mod: PBBFAC,,, | Performed by: INTERNAL MEDICINE

## 2023-10-19 PROCEDURE — 93925 LOWER EXTREMITY STUDY: CPT

## 2023-10-19 RX ORDER — FERROUS GLUCONATE 324(38)MG
324 TABLET ORAL
Qty: 90 TABLET | Refills: 1 | Status: SHIPPED | OUTPATIENT
Start: 2023-10-19 | End: 2024-01-13 | Stop reason: SDUPTHER

## 2023-10-19 NOTE — ASSESSMENT & PLAN NOTE
Per the patient's description, she is having increased shortness of breath that is preventing her from walking very long distances, but it does not seem to be preventing her from regularly walking as part of her exercise routine.  Physical exam was unremarkable.  Plan for PFTs to assess lung function.

## 2023-10-19 NOTE — ASSESSMENT & PLAN NOTE
Bronchiectasis reported on a chest xray read.  Patient denies cough- which would be the major symptom associated with the finding.  Review of her CT Chest from 2021 showed no bronchiectasis of consequence.

## 2023-10-19 NOTE — PROGRESS NOTES
Subjective:       Patient ID: Shima Sorto is a 83 y.o. female.    Chief Complaint: Bronchiectasis    HPI:   Shima Sorto is a 83 y.o. female who presents for evaluation of shortness of breath.    She has been short of breath for the last 2 months.   Typically takes the stairs everywhere- but now she's finding more of a challenge.   Currently she can walk up three flights of steps.  She is a very active person- walking to run errands.     Lives in raised house with multiple levels.     Recently admitted for treatment of a urinary tract infection.    No family history of lung disease.   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  Quit smoking in ; 4 pack year total of smoking.    Review of Systems   Respiratory:  Positive for chest tightness (describes chest heaviness when exerting herself). Negative for cough and wheezing.          Social History     Tobacco Use    Smoking status: Former     Current packs/day: 0.00     Average packs/day: 0.5 packs/day for 8.0 years (4.0 ttl pk-yrs)     Types: Cigarettes     Start date: 1956     Quit date: 1964     Years since quittin.1    Smokeless tobacco: Never   Substance Use Topics    Alcohol use: Not Currently       Review of patient's allergies indicates:   Allergen Reactions    Asparagus Rash     Past Medical History:   Diagnosis Date    Allergy     seasonal    Anemia     Basal cell carcinoma 2013    forehead    Breast cancer 2018    Hx of colonic polyps     Hypertension     Medullary sponge kidney     MVP (mitral valve prolapse)     Osteoporosis, senile     Pneumonia     Renal calculi     Sciatica     Sleep apnea     TMJ syndrome     sometimes jaw clicking/jaw pain    Urinary retention     Vertigo 2017    Vestibular neuronitis 2017    Visual impairment     reading glasses     Past Surgical History:   Procedure Laterality Date    BREAST CYST ASPIRATION Right     BREAST LUMPECTOMY Right     with radiation     COLONOSCOPY N/A 7/24/2018    Procedure: COLONOSCOPY;  Surgeon: Juan Miguel Pena MD;  Location: [facility] ENDO (4TH FLR);  Service: Endoscopy;  Laterality: N/A;    COLONOSCOPY N/A 5/10/2023    Procedure: COLONOSCOPY;  Surgeon: Keven Garza MD;  Location: [facility] (4TH FLR);  Service: Endoscopy;  Laterality: N/A;  *Pending C-Diff*  Request Greg  procedure order telephone encounter 5/1  PEG prep, instructions portal -LW  5/4/23 no answer for precall/mleone lpn  5/9lm    ESOPHAGOGASTRODUODENOSCOPY N/A 3/17/2023    Procedure: EGD (ESOPHAGOGASTRODUODENOSCOPY);  Surgeon: Keven Garza MD;  Location: [facility] (4TH FLR);  Service: Endoscopy;  Laterality: N/A;  Medically Urgent  3/2 instructions to portal-st    pre call attempted, no answer from pt 3/13/23 -egh    INJECTION FOR SENTINEL NODE IDENTIFICATION Right 8/17/2018    Procedure: INJECTION, FOR SENTINEL NODE IDENTIFICATION;  Surgeon: Abby Mason MD;  Location: [facility] OR [address] [place] [address];  Service: General;  Laterality: Right;    interstim placed stage 1      and removed    KIDNEY STONE SURGERY  2000    @ [facility]    MASTECTOMY, PARTIAL Right 8/17/2018    Procedure: MASTECTOMY, PARTIAL-US guided;  Surgeon: Abby Mason MD;  Location: [facility] OR [address] [place] [address];  Service: General;  Laterality: Right;    MOHS procedure      SENTINEL LYMPH NODE BIOPSY Right 8/17/2018    Procedure: BIOPSY, LYMPH NODE, SENTINEL;  Surgeon: Abby Mason MD;  Location: [facility] OR [address] [place] [address];  Service: General;  Laterality: Right;     Current Outpatient Medications on File Prior to Visit   Medication Sig    acetaminophen (TYLENOL) 650 MG TbSR Take 1,300 mg by mouth 2 (two) times daily as needed (Severe pain).    anastrozole (ARIMIDEX) 1 mg Tab TAKE 1 TABLET(1 MG) BY MOUTH EVERY DAY    coQ10, ubiquinol, 100 mg Cap Take 100 mg by mouth once daily.    denosumab (PROLIA) 60 mg/mL Syrg Inject 60 mg into the skin every 6 (six) months.    fish oil-E-fatty acid5-evzp866 400-5 mg-unit Cap Take 1 capsule by mouth  "Daily.    furosemide (LASIX) 20 MG tablet Take 2 tablets (40 mg total) by mouth once daily. Hold until you see the doctor for you follow up appointment    PRESERVISION AREDS-2 250-90-40-1 mg Cap Take 1 tablet by mouth 2 (two) times daily.    propylene glycol/peg 400/PF (SYSTANE, PF, OPHT) Place 1 drop into both eyes 2 (two) times daily as needed (Dry eye).    SALONPAS, LIDOCAINE, TOP Apply 1 patch topically daily as needed (Pain).    spironolactone (ALDACTONE) 25 MG tablet Take 1 tablet (25 mg total) by mouth once daily. Hold until you see the doctor for you follow up appointment    tamsulosin (FLOMAX) 0.4 mg Cap Take 1 capsule (0.4 mg total) by mouth once daily. Hold until you see the doctor for you follow up appointment    UNABLE TO FIND Rufus-Mag-Citrate with D3 & K2 - Takes 2 tablets by mouth every morning, 1 at midday and 1 every evening.    UNABLE TO FIND Take 4 capsules by mouth Daily. Nutrafol    valsartan (DIOVAN) 160 MG tablet Take 1 tablet (160 mg total) by mouth once daily. Hold until you see the doctor for you follow up appointment    vitamin renal formula, B-complex-vitamin c-folic acid, (NEPHROCAPS) 1 mg Cap Take 1 capsule by mouth once daily.    empagliflozin (JARDIANCE) 10 mg tablet Take 1 tablet (10 mg total) by mouth once daily. (Patient not taking: Reported on 10/19/2023)    [DISCONTINUED] ferrous gluconate (FERGON) 324 MG tablet Take 1 tablet (324 mg total) by mouth daily with breakfast.     No current facility-administered medications on file prior to visit.       Objective:      Vitals:    10/19/23 1039   BP: 134/82   BP Location: Right arm   Patient Position: Sitting   Pulse: 64   SpO2: (!) 94%   Weight: 50.3 kg (110 lb 14.3 oz)   Height: 5' 7" (1.702 m)     Physical Exam   Constitutional: She is oriented to person, place, and time. She appears well-developed and well-nourished.   HENT:   Mouth/Throat: Mallampati Score: IV.   Cardiovascular: Normal rate and regular rhythm.   Murmur " "heard.  Pulmonary/Chest: Normal expansion. She has no wheezes. She has no rhonchi.   Musculoskeletal:         General: No edema.   Neurological: She is alert and oriented to person, place, and time.   Skin: Skin is warm and dry.   Psychiatric: She has a normal mood and affect. Her behavior is normal. Judgment and thought content normal.     Personal Diagnostic Review        10/19/2023    10:39 AM 10/14/2023     7:29 PM 10/14/2023     4:50 PM 10/14/2023     8:15 AM 10/14/2023     6:04 AM 10/13/2023     7:19 PM 10/13/2023     4:12 PM   Pulmonary Function Tests   SpO2 94 % 100 % 100 % 100 % 99 % 100 % 100 %   Height 5' 7" (1.702 m)         Weight 50.3 kg (110 lb 14.3 oz)         BMI (Calculated) 17.4               X-Ray Chest PA And Lateral  Narrative: EXAMINATION:  XR CHEST PA AND LATERAL    CLINICAL HISTORY:  sob;    TECHNIQUE:  PA and lateral views of the chest were performed.    COMPARISON:  06/28/2022    FINDINGS:  The cardiac silhouette is normal in size. Atherosclerosis and tortuosity of the thoracic aorta.    Stable perihilar bronchiectasis with some associated volume loss.  Lungs otherwise clear.    Bones are intact.  Impression: No acute cardiopulmonary abnormality.    Electronically signed by: Jabari Abdul Jr  Date:    10/14/2023  Time:    08:07      Assessment:     Orders Placed This Encounter   Procedures    DLCO-Carbon Monoxide Diffusing Capacity     Standing Status:   Future     Standing Expiration Date:   10/19/2024     Order Specific Question:   Release to patient     Answer:   Immediate    Lung Volumes     Standing Status:   Future     Standing Expiration Date:   10/18/2024     Order Specific Question:   Release to patient     Answer:   Immediate    Spirometry with/without bronchodilator     Standing Status:   Future     Standing Expiration Date:   10/19/2024     Order Specific Question:   Release to patient     Answer:   Immediate     1. Shortness of breath    2. Bronchiectasis without " complication        Plan:         Problem List Items Addressed This Visit          Pulmonary    Bronchiectasis without complication    Current Assessment & Plan     Bronchiectasis reported on a chest xray read.  Patient denies cough- which would be the major symptom associated with the finding.  Review of her CT Chest from 2021 showed no bronchiectasis of consequence.         Shortness of breath - Primary    Current Assessment & Plan     Per the patient's description, she is having increased shortness of breath that is preventing her from walking very long distances, but it does not seem to be preventing her from regularly walking as part of her exercise routine.  Physical exam was unremarkable.  Plan for PFTs to assess lung function.         Relevant Orders    DLCO-Carbon Monoxide Diffusing Capacity    Lung Volumes    Spirometry with/without bronchodilator

## 2023-10-19 NOTE — PROGRESS NOTES
GI MA team - please tell patient that they are iron deficient and anemic and recommend that they take ferrous gluconate one 324mg pill once daily for next 3 months.    GI MA team -  Please order repeat fasting Hemoglobin, Iron/TIBC, and Ferritin in 12 weeks - Orders placed.

## 2023-10-20 ENCOUNTER — TELEPHONE (OUTPATIENT)
Dept: CARDIOLOGY | Facility: CLINIC | Age: 83
End: 2023-10-20
Payer: MEDICARE

## 2023-10-20 ENCOUNTER — TELEPHONE (OUTPATIENT)
Dept: ENDOSCOPY | Facility: HOSPITAL | Age: 83
End: 2023-10-20
Payer: MEDICARE

## 2023-10-20 NOTE — TELEPHONE ENCOUNTER
----- Message from Christina Peterson sent at 10/20/2023 12:13 PM CDT -----  Regarding: pt advice  Contact: pt [phone]  Pt calling to speak to nurse regarding severe diarrhea. Pls call

## 2023-10-20 NOTE — TELEPHONE ENCOUNTER
Called patient 2x and got VM.     ----- Message from Griselda Christiansen MA sent at 10/20/2023  4:14 PM CDT -----  Regarding: FW: Please call    ----- Message -----  From: Inna Pollack  Sent: 10/20/2023   4:08 PM CDT  To: [facility] Staff  Subject: Please call                                      Pt [phone] would like to let the doctor that Pulmonary can't find a reason for her SOB. Also no one has addressed the issue of her falling the day before she went into the hospital, and there's a number of things she would like to speak with him about. Please have him give her a call today please.    Thanks

## 2023-10-23 ENCOUNTER — OFFICE VISIT (OUTPATIENT)
Dept: HEMATOLOGY/ONCOLOGY | Facility: CLINIC | Age: 83
End: 2023-10-23
Payer: MEDICARE

## 2023-10-23 ENCOUNTER — TELEPHONE (OUTPATIENT)
Dept: ENDOSCOPY | Facility: HOSPITAL | Age: 83
End: 2023-10-23
Payer: MEDICARE

## 2023-10-23 VITALS
RESPIRATION RATE: 18 BRPM | OXYGEN SATURATION: 100 % | TEMPERATURE: 98 F | HEART RATE: 121 BPM | WEIGHT: 108.44 LBS | HEIGHT: 67 IN | DIASTOLIC BLOOD PRESSURE: 58 MMHG | BODY MASS INDEX: 17.02 KG/M2 | SYSTOLIC BLOOD PRESSURE: 112 MMHG

## 2023-10-23 DIAGNOSIS — A49.8 CLOSTRIDIUM DIFFICILE INFECTION: Primary | ICD-10-CM

## 2023-10-23 DIAGNOSIS — Z79.811 USE OF AROMATASE INHIBITORS: ICD-10-CM

## 2023-10-23 DIAGNOSIS — Z17.0 MALIGNANT NEOPLASM OF UPPER-OUTER QUADRANT OF RIGHT BREAST IN FEMALE, ESTROGEN RECEPTOR POSITIVE: Primary | Chronic | ICD-10-CM

## 2023-10-23 DIAGNOSIS — C50.411 MALIGNANT NEOPLASM OF UPPER-OUTER QUADRANT OF RIGHT BREAST IN FEMALE, ESTROGEN RECEPTOR POSITIVE: Primary | Chronic | ICD-10-CM

## 2023-10-23 PROCEDURE — 99214 OFFICE O/P EST MOD 30 MIN: CPT | Mod: PBBFAC | Performed by: INTERNAL MEDICINE

## 2023-10-23 PROCEDURE — 99214 OFFICE O/P EST MOD 30 MIN: CPT | Mod: S$PBB,,, | Performed by: INTERNAL MEDICINE

## 2023-10-23 PROCEDURE — 99214 PR OFFICE/OUTPT VISIT, EST, LEVL IV, 30-39 MIN: ICD-10-PCS | Mod: S$PBB,,, | Performed by: INTERNAL MEDICINE

## 2023-10-23 PROCEDURE — 99999 PR PBB SHADOW E&M-EST. PATIENT-LVL IV: ICD-10-PCS | Mod: PBBFAC,,, | Performed by: INTERNAL MEDICINE

## 2023-10-23 PROCEDURE — 99999 PR PBB SHADOW E&M-EST. PATIENT-LVL IV: CPT | Mod: PBBFAC,,, | Performed by: INTERNAL MEDICINE

## 2023-10-23 NOTE — TELEPHONE ENCOUNTER
----- Message from Shara Escobedo sent at 10/23/2023  3:58 PM CDT -----  Regarding: Infection questions  Contact: @ [phone]  Pt is calling in wanting to speak with Rosie or any nurse in regards to having another infection that she's had before that she thinks is coming back. She is looking to speak with someone today. Please call to advise

## 2023-10-23 NOTE — TELEPHONE ENCOUNTER
This is a patient of Dr Garza.  She thinks she may have C-Diff. She does not seem to have liquid stools, through.  Could you order stool studies?

## 2023-10-23 NOTE — PROGRESS NOTES
Subjective:       Patient ID: Shima Sorto is a 83 y.o. female.    Chief Complaint: No chief complaint on file.    HPI    Ms. Sorto returns today for follow up.  I had last seen her in June 2023 and she has seen Dr. Prasad during my absence.  She has been on anastrazole since mid November 2018, and so far has tolerated it well.  In late August 2023 she underwent a bone marrow biopsy which was essentially unremarkable.  There was no evidence of MDS, leukemia, lymphoma, myeloma, or metastatic carcinoma..  Cytogenetics were normal and reticulin was not increased.  Cellularity was 35 %.    Her most recent labs from October 18, 2023 are as follows:  WBCs are 4,500 per cubic mm, hemoglobin 10.2 grams/deciliter, hematocrit 29.4%, MCV 91 and platelets 180K.  Her creatinine was 2.3 mg/dL  Recent urinalysis from 2 weeks ago again showed 2+ blood.  Of note, the patient self catheterizes 3 times a day.      Other recent pertinent labs are as follows:  B12 is 406 pg/mL  SPEP shows no monoclonal spike  Direct Lane is negative    Briefly, she is an 83-year-old  female who diagnosed with breast cancer in 2018.  On 08/17/2018, she underwent a lumpectomy and sentinel lymph node biopsy for an 8 mm grade 1 invasive lobular carcinoma which was ER strongly positive, MD negative and HER-2 negative with a Ki-67 of 5%, with the closest margin being 2 mm.  Two of 2 sentinel lymph nodes were negative for involvement.      She completed her radiation therapy and was subsequently started on AIs.  Her DXA scan from 01/05/2023 shows osteopenia approaching osteoporosis (reviewed).  Of note, the patient is on Prolia.  A mammogram on 7/6/2023 was read as BIRADS II, and a one year follow up was recommended.    Review of her chart reveals that last April she was diagnosed with C diff and she was treated accordingly.  She underwent a colonoscopy and EGD by Dr. Garza.  The colonoscopy showed 3 polyps and extensive diverticulosis.   Biopsies were negative for malignancy.  The EGD showed gastritis and a hiatal hernia.  A video capsule swallow she identified no source of bleeding the small intestine         Review of Systems      Overall she feels fair and she again complains of fatigue and generalized weakness.  She has tolerated the anastrazole quite well so far.  ECOG PS is 1.   She denies any anxiety, depression, easy bruising, fevers, chills, night  sweats, weight loss, nausea, vomiting, diarrhea, constipation, diplopia, blurred vision, headache, chest pain, palpitations, shortness of breath, breast pain, abdominal pain, extremity pain, or difficulty ambulating.  The remainder of the ten-point ROS, including general, skin, lymph, H/N, cardiorespiratory, GI, , Neuro, Endocrine, and psychiatric is negative.     Objective:      Physical Exam        She is alert, oriented to time, place, person, pleasant, well      nourished, in no acute physical distress.                                    VITAL SIGNS:  Reviewed                                      HEENT:  Normal.  There are no nasal, oral, lip, gingival, auricular, lid,    or conjunctival lesions.  Mucosae are moist and pink, and there is no        thrush.  Pupils are equal, reactive to light and accommodation.              Extraocular muscle movements are intact.  Dentition is good.                                    NECK:  Supple without JVD, adenopathy, or thyromegaly.                       LUNGS:  Clear to auscultation without wheezing, rales, or rhonchi.           CARDIOVASCULAR:  Reveals an S1, S2, no murmurs, no rubs, no gallops.         ABDOMEN:  Soft, nontender, without organomegaly.  Bowel sounds are    present.                                                                     EXTREMITIES:  No cyanosis, clubbing, or edema.                               BREASTS:  She is status post right lumpectomy with a well-healed incision in the upper outer quadrant.    There are no masses in  either breast.                                     LYMPHATIC:  There is no cervical, axillary, or supraclavicular adenopathy.   SKIN:  Warm and moist, without petechiae, rashes, induration, or ecchymoses.        NEUROLOGIC:  DTRs are 0-1+ bilaterally, symmetrical, motor function is 5/5,and cranial nerves are  within normal limits.    Assessment:       1. Malignant neoplasm of upper-outer quadrant of right breast in female, estrogen receptor positive    2. Use of aromatase inhibitors      3.    Osteopenia approaching osteoporosis    4.   Anemia, chronic, normochromic normocytic     5.  CDK  Plan:           I had a long discussion with her.    In regards to the breast cancer, she will remain on anatsrazole through the end of October 2023, and see me again in 6 months.  Her mammogram will be repeated after June 28th.  In regards to her anemia, it appears to be chronic and it is probably multifactorial.  Even though she does appear to have CKD with a hemoglobin being 10.2 she would not qualify for erythropoietin.  At this point we will proceed as follows:    1. She will return to see me in early December with a repeat CBC, CMP, urinalysis and ferritin.  2. As mentioned above she will discontinue the anastrozole at the end of October  Her multiple questions were answered to her satisfaction.         Route Chart for Scheduling    Med Onc Chart Routing      Follow up with physician Other. See me 1st week of December with labs and urinalysis 2 days prior   Follow up with BASHIR    Infusion scheduling note    Injection scheduling note    Labs CBC, CMP and ferritin   Scheduling:  Preferred lab:  Lab interval:     Imaging    Pharmacy appointment    Other referrals            Therapy Plan Information  Medications  denosumab (PROLIA) injection 60 mg  60 mg, Subcutaneous, Every 26 weeks

## 2023-10-24 ENCOUNTER — PATIENT MESSAGE (OUTPATIENT)
Dept: GASTROENTEROLOGY | Facility: CLINIC | Age: 83
End: 2023-10-24
Payer: MEDICARE

## 2023-10-24 ENCOUNTER — PATIENT MESSAGE (OUTPATIENT)
Dept: CARDIOLOGY | Facility: CLINIC | Age: 83
End: 2023-10-24
Payer: MEDICARE

## 2023-10-24 ENCOUNTER — TELEPHONE (OUTPATIENT)
Dept: DERMATOLOGY | Facility: CLINIC | Age: 83
End: 2023-10-24
Payer: MEDICARE

## 2023-10-24 NOTE — TELEPHONE ENCOUNTER
----- Message from Navya Norman MA sent at 10/20/2023  2:53 PM CDT -----  Regarding: FW: returning c/b  Contact: pt @[phone] or [phone]    ----- Message -----  From: Taty Ho  Sent: 10/20/2023  12:38 PM CDT  To: [facility] Staff  Subject: returning c/b                                    Pt is returning a missed call from someone in the office and is asking for a return call back soon. Thanks.         Reason for call: returning missed call         Patient's DX: n/a         Patient requesting call back or [facility] [phone]

## 2023-10-25 ENCOUNTER — TELEPHONE (OUTPATIENT)
Dept: ENDOSCOPY | Facility: HOSPITAL | Age: 83
End: 2023-10-25
Payer: MEDICARE

## 2023-10-25 ENCOUNTER — TELEPHONE (OUTPATIENT)
Dept: GASTROENTEROLOGY | Facility: CLINIC | Age: 83
End: 2023-10-25
Payer: MEDICARE

## 2023-10-25 DIAGNOSIS — A49.8 CLOSTRIDIUM DIFFICILE INFECTION: Primary | ICD-10-CM

## 2023-10-25 RX ORDER — VANCOMYCIN HYDROCHLORIDE 125 MG/1
125 CAPSULE ORAL EVERY 6 HOURS
Qty: 56 CAPSULE | Refills: 0 | Status: ON HOLD | OUTPATIENT
Start: 2023-10-25 | End: 2023-10-29 | Stop reason: SDUPTHER

## 2023-10-25 NOTE — TELEPHONE ENCOUNTER
----- Message from Krys Pizarro sent at 10/25/2023  1:07 PM CDT -----  KEITH HAJI calling regarding Results from the labs she had done today, PT stated that Dr. Elkins informed her to call for Candy to get further instructions, about the medication that is waiting for her at the pharmacy , call back [phone]

## 2023-10-25 NOTE — TELEPHONE ENCOUNTER
The patient's C diff stool test came back positive.  I recommend CBC, CMP, and CRP to evaluate severity.  Our clinic staff will reach out to the patient.  If she has severe pain or diarrhea, she may need to come to the hospital.  If symptoms are mild, outpatient treatment is appropriate.  Rx for vancomycin 125 mg p.o. q.i.d. for 14 days sent to her pharmacy.

## 2023-10-26 ENCOUNTER — HOSPITAL ENCOUNTER (INPATIENT)
Facility: HOSPITAL | Age: 83
LOS: 2 days | Discharge: HOME | DRG: 683 | End: 2023-10-29
Attending: EMERGENCY MEDICINE | Admitting: HOSPITALIST
Payer: MEDICARE

## 2023-10-26 ENCOUNTER — TELEPHONE (OUTPATIENT)
Dept: NEPHROLOGY | Facility: CLINIC | Age: 83
End: 2023-10-26
Payer: MEDICARE

## 2023-10-26 DIAGNOSIS — A49.8 CLOSTRIDIUM DIFFICILE INFECTION: ICD-10-CM

## 2023-10-26 DIAGNOSIS — R07.9 CHEST PAIN: ICD-10-CM

## 2023-10-26 DIAGNOSIS — N17.9 AKI (ACUTE KIDNEY INJURY): Primary | ICD-10-CM

## 2023-10-26 DIAGNOSIS — A04.72 C. DIFFICILE COLITIS: ICD-10-CM

## 2023-10-26 DIAGNOSIS — N30.00 ACUTE CYSTITIS WITHOUT HEMATURIA: ICD-10-CM

## 2023-10-26 PROBLEM — N18.32 ACUTE RENAL FAILURE SUPERIMPOSED ON STAGE 3B CHRONIC KIDNEY DISEASE: Status: ACTIVE | Noted: 2018-09-12

## 2023-10-26 LAB
ALBUMIN SERPL BCP-MCNC: 3.3 G/DL (ref 3.5–5.2)
ALP SERPL-CCNC: 87 U/L (ref 55–135)
ALT SERPL W/O P-5'-P-CCNC: 14 U/L (ref 10–44)
ANION GAP SERPL CALC-SCNC: 17 MMOL/L (ref 8–16)
ANISOCYTOSIS BLD QL SMEAR: SLIGHT
AST SERPL-CCNC: 19 U/L (ref 10–40)
BASOPHILS # BLD AUTO: 0.04 K/UL (ref 0–0.2)
BASOPHILS NFR BLD: 0.7 % (ref 0–1.9)
BILIRUB SERPL-MCNC: 0.4 MG/DL (ref 0.1–1)
BUN SERPL-MCNC: 102 MG/DL (ref 8–23)
BURR CELLS BLD QL SMEAR: ABNORMAL
CALCIUM SERPL-MCNC: 8.5 MG/DL (ref 8.7–10.5)
CHLORIDE SERPL-SCNC: 107 MMOL/L (ref 95–110)
CO2 SERPL-SCNC: 17 MMOL/L (ref 23–29)
CREAT SERPL-MCNC: 3.4 MG/DL (ref 0.5–1.4)
DIFFERENTIAL METHOD: ABNORMAL
EOSINOPHIL # BLD AUTO: 0 K/UL (ref 0–0.5)
EOSINOPHIL NFR BLD: 0.7 % (ref 0–8)
ERYTHROCYTE [DISTWIDTH] IN BLOOD BY AUTOMATED COUNT: 14.5 % (ref 11.5–14.5)
EST. GFR  (NO RACE VARIABLE): 12.9 ML/MIN/1.73 M^2
GLUCOSE SERPL-MCNC: 96 MG/DL (ref 70–110)
HCT VFR BLD AUTO: 34.3 % (ref 37–48.5)
HGB BLD-MCNC: 11.2 G/DL (ref 12–16)
IMM GRANULOCYTES # BLD AUTO: 0.03 K/UL (ref 0–0.04)
IMM GRANULOCYTES NFR BLD AUTO: 0.5 % (ref 0–0.5)
LYMPHOCYTES # BLD AUTO: 1.5 K/UL (ref 1–4.8)
LYMPHOCYTES NFR BLD: 25.5 % (ref 18–48)
MAGNESIUM SERPL-MCNC: 2.8 MG/DL (ref 1.6–2.6)
MCH RBC QN AUTO: 30.9 PG (ref 27–31)
MCHC RBC AUTO-ENTMCNC: 32.7 G/DL (ref 32–36)
MCV RBC AUTO: 95 FL (ref 82–98)
MONOCYTES # BLD AUTO: 0.6 K/UL (ref 0.3–1)
MONOCYTES NFR BLD: 10.6 % (ref 4–15)
NEUTROPHILS # BLD AUTO: 3.7 K/UL (ref 1.8–7.7)
NEUTROPHILS NFR BLD: 62 % (ref 38–73)
NRBC BLD-RTO: 0 /100 WBC
OVALOCYTES BLD QL SMEAR: ABNORMAL
PHOSPHATE SERPL-MCNC: 5.8 MG/DL (ref 2.7–4.5)
PLATELET # BLD AUTO: 252 K/UL (ref 150–450)
PLATELET BLD QL SMEAR: ABNORMAL
PMV BLD AUTO: 11.1 FL (ref 9.2–12.9)
POIKILOCYTOSIS BLD QL SMEAR: SLIGHT
POTASSIUM SERPL-SCNC: 4.6 MMOL/L (ref 3.5–5.1)
PROT SERPL-MCNC: 7.2 G/DL (ref 6–8.4)
RBC # BLD AUTO: 3.63 M/UL (ref 4–5.4)
SCHISTOCYTES BLD QL SMEAR: ABNORMAL
SODIUM SERPL-SCNC: 141 MMOL/L (ref 136–145)
WBC # BLD AUTO: 5.97 K/UL (ref 3.9–12.7)

## 2023-10-26 PROCEDURE — 96374 THER/PROPH/DIAG INJ IV PUSH: CPT

## 2023-10-26 PROCEDURE — 25000003 PHARM REV CODE 250: Performed by: NURSE PRACTITIONER

## 2023-10-26 PROCEDURE — G0378 HOSPITAL OBSERVATION PER HR: HCPCS

## 2023-10-26 PROCEDURE — 63600175 PHARM REV CODE 636 W HCPCS: Performed by: NURSE PRACTITIONER

## 2023-10-26 PROCEDURE — 96361 HYDRATE IV INFUSION ADD-ON: CPT

## 2023-10-26 PROCEDURE — 99285 EMERGENCY DEPT VISIT HI MDM: CPT | Mod: 25

## 2023-10-26 PROCEDURE — 80053 COMPREHEN METABOLIC PANEL: CPT | Performed by: PHYSICIAN ASSISTANT

## 2023-10-26 PROCEDURE — 63600175 PHARM REV CODE 636 W HCPCS: Performed by: PHYSICIAN ASSISTANT

## 2023-10-26 PROCEDURE — 96372 THER/PROPH/DIAG INJ SC/IM: CPT | Performed by: NURSE PRACTITIONER

## 2023-10-26 PROCEDURE — 85025 COMPLETE CBC W/AUTO DIFF WBC: CPT | Performed by: PHYSICIAN ASSISTANT

## 2023-10-26 PROCEDURE — 84100 ASSAY OF PHOSPHORUS: CPT | Performed by: PHYSICIAN ASSISTANT

## 2023-10-26 PROCEDURE — 83735 ASSAY OF MAGNESIUM: CPT | Performed by: PHYSICIAN ASSISTANT

## 2023-10-26 RX ORDER — ONDANSETRON 2 MG/ML
4 INJECTION INTRAMUSCULAR; INTRAVENOUS
Status: COMPLETED | OUTPATIENT
Start: 2023-10-26 | End: 2023-10-26

## 2023-10-26 RX ORDER — IBUPROFEN 200 MG
24 TABLET ORAL
Status: DISCONTINUED | OUTPATIENT
Start: 2023-10-26 | End: 2023-10-29 | Stop reason: HOSPADM

## 2023-10-26 RX ORDER — ANASTROZOLE 1 MG/1
1 TABLET ORAL DAILY
Status: DISCONTINUED | OUTPATIENT
Start: 2023-10-27 | End: 2023-10-29 | Stop reason: HOSPADM

## 2023-10-26 RX ORDER — EPINEPHRINE 0.22MG
100 AEROSOL WITH ADAPTER (ML) INHALATION DAILY
Status: DISCONTINUED | OUTPATIENT
Start: 2023-10-27 | End: 2023-10-29 | Stop reason: HOSPADM

## 2023-10-26 RX ORDER — IBUPROFEN 200 MG
16 TABLET ORAL
Status: DISCONTINUED | OUTPATIENT
Start: 2023-10-26 | End: 2023-10-29 | Stop reason: HOSPADM

## 2023-10-26 RX ORDER — GLUCAGON 1 MG
1 KIT INJECTION
Status: DISCONTINUED | OUTPATIENT
Start: 2023-10-26 | End: 2023-10-29 | Stop reason: HOSPADM

## 2023-10-26 RX ORDER — HYDRALAZINE HYDROCHLORIDE 20 MG/ML
5 INJECTION INTRAMUSCULAR; INTRAVENOUS EVERY 8 HOURS PRN
Status: DISCONTINUED | OUTPATIENT
Start: 2023-10-27 | End: 2023-10-29 | Stop reason: HOSPADM

## 2023-10-26 RX ORDER — SODIUM CHLORIDE 0.9 % (FLUSH) 0.9 %
10 SYRINGE (ML) INJECTION EVERY 12 HOURS PRN
Status: DISCONTINUED | OUTPATIENT
Start: 2023-10-26 | End: 2023-10-29 | Stop reason: HOSPADM

## 2023-10-26 RX ORDER — NALOXONE HCL 0.4 MG/ML
0.02 VIAL (ML) INJECTION
Status: DISCONTINUED | OUTPATIENT
Start: 2023-10-26 | End: 2023-10-29 | Stop reason: HOSPADM

## 2023-10-26 RX ORDER — ACETAMINOPHEN 325 MG/1
650 TABLET ORAL EVERY 6 HOURS PRN
Status: DISCONTINUED | OUTPATIENT
Start: 2023-10-26 | End: 2023-10-29 | Stop reason: HOSPADM

## 2023-10-26 RX ORDER — ONDANSETRON 2 MG/ML
4 INJECTION INTRAMUSCULAR; INTRAVENOUS EVERY 8 HOURS PRN
Status: DISCONTINUED | OUTPATIENT
Start: 2023-10-26 | End: 2023-10-29 | Stop reason: HOSPADM

## 2023-10-26 RX ORDER — HEPARIN SODIUM 5000 [USP'U]/ML
5000 INJECTION, SOLUTION INTRAVENOUS; SUBCUTANEOUS EVERY 8 HOURS
Status: DISCONTINUED | OUTPATIENT
Start: 2023-10-26 | End: 2023-10-29 | Stop reason: HOSPADM

## 2023-10-26 RX ORDER — SODIUM CHLORIDE, SODIUM LACTATE, POTASSIUM CHLORIDE, CALCIUM CHLORIDE 600; 310; 30; 20 MG/100ML; MG/100ML; MG/100ML; MG/100ML
INJECTION, SOLUTION INTRAVENOUS CONTINUOUS
Status: DISCONTINUED | OUTPATIENT
Start: 2023-10-26 | End: 2023-10-28

## 2023-10-26 RX ORDER — FERROUS GLUCONATE 324(37.5)
324 TABLET ORAL
Status: DISCONTINUED | OUTPATIENT
Start: 2023-10-27 | End: 2023-10-29 | Stop reason: HOSPADM

## 2023-10-26 RX ADMIN — HEPARIN SODIUM 5000 UNITS: 5000 INJECTION INTRAVENOUS; SUBCUTANEOUS at 11:10

## 2023-10-26 RX ADMIN — SODIUM CHLORIDE, SODIUM LACTATE, POTASSIUM CHLORIDE, AND CALCIUM CHLORIDE: 600; 310; 30; 20 INJECTION, SOLUTION INTRAVENOUS at 07:10

## 2023-10-26 RX ADMIN — VANCOMYCIN HYDROCHLORIDE 125 MG: KIT at 10:10

## 2023-10-26 RX ADMIN — ONDANSETRON 4 MG: 2 INJECTION INTRAMUSCULAR; INTRAVENOUS at 05:10

## 2023-10-26 NOTE — ED PROVIDER NOTES
Encounter Date: 10/26/2023       History     Chief Complaint   Patient presents with    Abnormal Lab     Elevated kidney function, had lab drawn yest, diarrhea, , has c diff and on vancomycin     Shima Sorto is an 83-year-old woman with history of breast cancer s/p lumpectomy, CKD stage 3, and HFpEF who presents emergency department for abnormal labs.  Was advised by Nephrology to come to the ED due to elevated kidney function.  Recently diagnosed with C diff colitis and started on oral vancomycin yesterday.  Patient has been having about 10 episodes of watery nonbloody stool since 10/16.  Denies any fevers/chills, or abdominal pain.        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     Past Surgical History:   Procedure Laterality Date    BREAST CYST ASPIRATION Right 1999    BREAST LUMPECTOMY Right 2018    with radiation    COLONOSCOPY N/A 7/24/2018    Procedure: COLONOSCOPY;  Surgeon: Juan Miguel Pena MD;  Location: [address]);  Service: Endoscopy;  Laterality: N/A;    COLONOSCOPY N/A 5/10/2023    Procedure: COLONOSCOPY;  Surgeon: Keven Garza MD;  Location: [address]);  Service: Endoscopy;  Laterality: N/A;  *Pending C-Diff*  Request Greg  procedure order telephone encounter 5/1  PEG prep, instructions portal -LW  5/4/23 no answer for precall/mleone lpn  5/9lm    ESOPHAGOGASTRODUODENOSCOPY N/A 3/17/2023    Procedure: EGD  (ESOPHAGOGASTRODUODENOSCOPY);  Surgeon: Keven Garza MD;  Location: [facility] ENDO (4TH FLR);  Service: Endoscopy;  Laterality: N/A;  Medically Urgent  3/2 instructions to portal-st    pre call attempted, no answer from pt 3/13/23 -egh    INJECTION FOR SENTINEL NODE IDENTIFICATION Right 2018    Procedure: INJECTION, FOR SENTINEL NODE IDENTIFICATION;  Surgeon: Abby Mason MD;  [facility];  Service: General;  Laterality: Right;    interstim placed stage 1      and removed    KIDNEY STONE SURGERY      @ [facility]    MASTECTOMY, PARTIAL Right 2018    Procedure: MASTECTOMY, PARTIAL-US guided;  Surgeon: Abby Mason MD;  [facility];  Service: General;  Laterality: Right;    MOHS procedure      SENTINEL LYMPH NODE BIOPSY Right 2018    Procedure: BIOPSY, LYMPH NODE, SENTINEL;  Surgeon: Abby Mason MD;  Location: [facility] OR [facility];  Service: General;  Laterality: Right;     Family History   Problem Relation Age of Onset    Kidney disease Mother     Heart disease Father     Melanoma Father     Heart attack Father     Breast cancer Maternal Aunt     Hearing loss Son     Hyperlipidemia Son     Anesthesia problems Neg Hx     Malignant hypertension Neg Hx     Hypotension Neg Hx     Malignant hyperthermia Neg Hx     Pseudochol deficiency Neg Hx     Colon cancer Neg Hx     Ovarian cancer Neg Hx     Psoriasis Neg Hx     Lupus Neg Hx     Eczema Neg Hx     Acne Neg Hx     Esophageal cancer Neg Hx      Social History     Tobacco Use    Smoking status: Former     Current packs/day: 0.00     Average packs/day: 0.5 packs/day for 8.0 years (4.0 ttl pk-yrs)     Types: Cigarettes     Start date: 1956     Quit date: 1964     Years since quittin.2    Smokeless tobacco: Never   Substance Use Topics    Alcohol use: Not Currently    Drug use: No     Review of Systems   Constitutional:  Negative for chills and fever.   HENT:  Negative for sore throat.    Respiratory:  Negative for  cough and shortness of breath.    Cardiovascular:  Negative for chest pain.   Gastrointestinal:  Positive for diarrhea and nausea. Negative for abdominal pain.   Genitourinary:  Negative for difficulty urinating and dysuria.   Musculoskeletal:  Negative for arthralgias and back pain.   Skin: Negative.    Neurological:  Negative for weakness.   Psychiatric/Behavioral:  Negative for confusion.        Physical Exam     Initial Vitals [10/26/23 1551]   BP Pulse Resp Temp SpO2   (!) 94/53 66 20 97.8 °F (36.6 °C) 100 %      MAP       --         Physical Exam    Nursing note and vitals reviewed.  Constitutional: She appears well-developed and well-nourished.   HENT:   Head: Normocephalic and atraumatic.   Eyes: Conjunctivae are normal.   Neck: Neck supple.   Normal range of motion.  Cardiovascular:  Normal rate.           Pulmonary/Chest: Breath sounds normal.   Abdominal: Abdomen is soft. There is no abdominal tenderness.   Musculoskeletal:         General: Normal range of motion.      Cervical back: Normal range of motion and neck supple.     Neurological: She is alert and oriented to person, place, and time. GCS score is 15. GCS eye subscore is 4. GCS verbal subscore is 5. GCS motor subscore is 6.   Skin: Skin is warm and dry. Capillary refill takes less than 2 seconds.         ED Course   Procedures  Labs Reviewed   CBC W/ AUTO DIFFERENTIAL - Abnormal; Notable for the following components:       Result Value    RBC 3.63 (*)     Hemoglobin 11.2 (*)     Hematocrit 34.3 (*)     All other components within normal limits   COMPREHENSIVE METABOLIC PANEL - Abnormal; Notable for the following components:    CO2 17 (*)      (*)     Creatinine 3.4 (*)     Calcium 8.5 (*)     Albumin 3.3 (*)     eGFR 12.9 (*)     Anion Gap 17 (*)     All other components within normal limits   URINALYSIS, REFLEX TO URINE CULTURE          Imaging Results    None          Medications   lactated ringers bolus 1,000 mL (1,000 mLs Intravenous  Bolus from Bag 10/26/23 1720)   ondansetron injection 4 mg (4 mg Intravenous Given 10/26/23 1720)     Medical Decision Making  83-year-old female who was referred to the ED due to elevated creatinine.  Recently diagnosed with C diff and was started on p.o. vancomycin yesterday.      Differential includes but not limited to JOCELIN, electrolyte derangement, dehydration    On exam patient has a benign abdominal exam.  She reports poor p.o. intake as well as a significant amount of diarrhea.  Denies any fever chills.  Creatinine today is 3.4 which is up trending from her baseline which appears to be about 1.6.  Started on IV fluids in the ED and it is well as Zofran for nausea.  Given her worsening kidney function will admit to hospital medicine for IV fluids.    Amount and/or Complexity of Data Reviewed  Labs: ordered. Decision-making details documented in ED Course.    Risk  Prescription drug management.              Attending Attestation:     Physician Attestation Statement for NP/PA:   I have directed and reviewed the workup performed by the PA/NP.  I performed the substantive portion of the medical decision making.               ED Course as of 10/26/23 1812   Thu Oct 26, 2023   1747 Creatinine(!): 3.4 [HJ]      ED Course User Index  [HJ] Dasia Davis PA-C                    Clinical Impression:   Final diagnoses:  [N17.9] JOCELIN (acute kidney injury) (Primary)  [A04.72] C. difficile colitis        ED Disposition Condition    Observation Stable                Dasia Davis PA-C  10/26/23 1812       Letha Tello Jr., MD  10/31/23 1821

## 2023-10-26 NOTE — SUBJECTIVE & OBJECTIVE
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       Past Surgical History:   Procedure Laterality Date    BREAST CYST ASPIRATION Right 1999    BREAST LUMPECTOMY Right 2018    with radiation    COLONOSCOPY N/A 7/24/2018    Procedure: COLONOSCOPY;  Surgeon: Juan Miguel Pena MD;  Location: [facility] ENDO (4TH FLR);  Service: Endoscopy;  Laterality: N/A;    COLONOSCOPY N/A 5/10/2023    Procedure: COLONOSCOPY;  Surgeon: Keven Garza MD;  Location: [facility] (4TH FLR);  Service: Endoscopy;  Laterality: N/A;  *Pending C-Diff*  Request Greg  procedure order telephone encounter 5/1  PEG prep, instructions portal -LW  5/4/23 no answer for precall/mleone lpn  5/9lm    ESOPHAGOGASTRODUODENOSCOPY N/A 3/17/2023    Procedure: EGD (ESOPHAGOGASTRODUODENOSCOPY);  Surgeon: Keven Garza MD;  Location: [facility] (4TH FLR);  Service: Endoscopy;  Laterality: N/A;  Medically Urgent  3/2 instructions to portal-st    pre call attempted, no answer from pt 3/13/23 -egh    INJECTION FOR SENTINEL NODE IDENTIFICATION Right 8/17/2018    Procedure: INJECTION, FOR SENTINEL NODE IDENTIFICATION;  Surgeon: Abby Mason MD;  Location: [facility] OR [address] [place] [address];  Service: General;  Laterality: Right;    interstim placed stage 1      and removed    KIDNEY STONE SURGERY  2000    @ [facility]    MASTECTOMY, PARTIAL Right 8/17/2018    Procedure: MASTECTOMY, PARTIAL-US guided;  Surgeon: Abby Mason MD;  Location: [facility] OR [address] [place] [address];  Service: General;  Laterality: Right;    MOHS procedure      SENTINEL LYMPH NODE  BIOPSY Right 8/17/2018    Procedure: BIOPSY, LYMPH NODE, SENTINEL;  Surgeon: Abby Mason MD;  Location: [facility] OR [address] [place] [address];  Service: General;  Laterality: Right;       Review of patient's allergies indicates:   Allergen Reactions    Asparagus Rash       No current facility-administered medications on file prior to encounter.     Current Outpatient Medications on File Prior to Encounter   Medication Sig    acetaminophen (TYLENOL) 650 MG TbSR Take 1,300 mg by mouth 2 (two) times daily as needed (Severe pain).    anastrozole (ARIMIDEX) 1 mg Tab TAKE 1 TABLET(1 MG) BY MOUTH EVERY DAY    coQ10, ubiquinol, 100 mg Cap Take 100 mg by mouth once daily.    denosumab (PROLIA) 60 mg/mL Syrg Inject 60 mg into the skin every 6 (six) months.    empagliflozin (JARDIANCE) 10 mg tablet Take 1 tablet (10 mg total) by mouth once daily.    ferrous gluconate (FERGON) 324 MG tablet Take 1 tablet (324 mg total) by mouth daily with breakfast.    fish oil-E-fatty [id number] 400-5 mg-unit Cap Take 1 capsule by mouth Daily.    furosemide (LASIX) 20 MG tablet Take 2 tablets (40 mg total) by mouth once daily. Hold until you see the doctor for you follow up appointment    PRESERVISION AREDS-2 250-90-40-1 mg Cap Take 1 tablet by mouth 2 (two) times daily.    propylene glycol/peg 400/PF (SYSTANE, PF, OPHT) Place 1 drop into both eyes 2 (two) times daily as needed (Dry eye).    SALONPAS, LIDOCAINE, TOP Apply 1 patch topically daily as needed (Pain).    spironolactone (ALDACTONE) 25 MG tablet Take 1 tablet (25 mg total) by mouth once daily. Hold until you see the doctor for you follow up appointment    tamsulosin (FLOMAX) 0.4 mg Cap Take 1 capsule (0.4 mg total) by mouth once daily. Hold until you see the doctor for you follow up appointment    UNABLE TO FIND Rufus-Mag-Citrate with D3 & K2 - Takes 2 tablets by mouth every morning, 1 at midday and 1 every evening.    UNABLE TO FIND Take 4 capsules by mouth Daily. Nutrafol    valsartan (DIOVAN) 160  MG tablet Take 1 tablet (160 mg total) by mouth once daily. Hold until you see the doctor for you follow up appointment    vancomycin (VANCOCIN) 125 MG capsule Take 1 capsule (125 mg total) by mouth every 6 (six) hours. for 14 days    vitamin renal formula, B-complex-vitamin c-folic acid, (NEPHROCAPS) 1 mg Cap Take 1 capsule by mouth once daily.     Family History       Problem Relation (Age of Onset)    Breast cancer Maternal Aunt    Hearing loss Son    Heart attack Father    Heart disease Father    Hyperlipidemia Son    Kidney disease Mother    Melanoma Father          Tobacco Use    Smoking status: Former     Current packs/day: 0.00     Average packs/day: 0.5 packs/day for 8.0 years (4.0 ttl pk-yrs)     Types: Cigarettes     Start date: 1956     Quit date: 1964     Years since quittin.2    Smokeless tobacco: Never   Substance and Sexual Activity    Alcohol use: Not Currently    Drug use: No    Sexual activity: Not Currently     Review of Systems   Constitutional:  Positive for appetite change (decreased) and fatigue. Negative for chills, diaphoresis and fever.   HENT:  Negative for congestion, rhinorrhea and sneezing.    Eyes:  Negative for photophobia and visual disturbance.   Respiratory:  Negative for cough, shortness of breath and wheezing.    Cardiovascular:  Negative for chest pain, palpitations and leg swelling.   Gastrointestinal:  Positive for diarrhea and nausea. Negative for abdominal distention, abdominal pain, blood in stool and vomiting.   Genitourinary:  Negative for dysuria, frequency and hematuria.   Musculoskeletal:  Positive for arthralgias and back pain. Negative for gait problem, myalgias and neck pain.   Skin:  Negative for color change, pallor, rash and wound.   Neurological:  Positive for light-headedness. Negative for dizziness, syncope and weakness.   Psychiatric/Behavioral:  Negative for confusion and hallucinations. The patient is not nervous/anxious.      Objective:      Vital Signs (Most Recent):  Temp: 97.8 °F (36.6 °C) (10/26/23 1551)  Pulse: 68 (10/26/23 1833)  Resp: 18 (10/26/23 1833)  BP: 135/61 (10/26/23 1833)  SpO2: 97 % (10/26/23 1833) Vital Signs (24h Range):  Temp:  [97.8 °F (36.6 °C)] 97.8 °F (36.6 °C)  Pulse:  [60-76] 68  Resp:  [18-20] 18  SpO2:  [97 %-100 %] 97 %  BP: ()/(53-67) 135/61     Weight: 49 kg (108 lb)  Body mass index is 16.92 kg/m².     Physical Exam  Vitals and nursing note reviewed.   Constitutional:       General: She is not in acute distress.     Appearance: She is not toxic-appearing or diaphoretic.   HENT:      Head: Normocephalic and atraumatic.      Nose: Nose normal.      Mouth/Throat:      Mouth: Mucous membranes are dry.   Eyes:      Pupils: Pupils are equal, round, and reactive to light.   Cardiovascular:      Rate and Rhythm: Normal rate and regular rhythm.      Pulses: Normal pulses.   Pulmonary:      Effort: Pulmonary effort is normal. No respiratory distress.      Breath sounds: No wheezing, rhonchi or rales.   Abdominal:      General: Bowel sounds are normal. There is no distension.      Palpations: Abdomen is soft.      Tenderness: There is no abdominal tenderness. There is no guarding.   Musculoskeletal:         General: No swelling, tenderness or deformity. Normal range of motion.      Cervical back: Normal range of motion.   Skin:     General: Skin is warm and dry.      Capillary Refill: Capillary refill takes less than 2 seconds.      Findings: Bruising present.   Neurological:      General: No focal deficit present.      Mental Status: She is alert and oriented to person, place, and time.      Sensory: No sensory deficit.      Motor: No weakness.   Psychiatric:         Mood and Affect: Mood normal.         Speech: Speech is tangential.         Behavior: Behavior normal.      Comments: Poor historian              CRANIAL NERVES     CN III, IV, VI   Pupils are equal, round, and reactive to light.       Significant Labs: All  pertinent labs within the past 24 hours have been reviewed.  CBC:   Recent Labs   Lab 10/25/23  1228 10/26/23  1659   WBC 6.46 5.97   HGB 11.0* 11.2*   HCT 34.3* 34.3*    252     CMP:   Recent Labs   Lab 10/25/23  1228 10/26/23  1659    141   K 4.4 4.6    107   CO2 17* 17*   * 96   BUN 89* 102*   CREATININE 3.3* 3.4*   CALCIUM 8.7 8.5*   PROT 7.1 7.2   ALBUMIN 3.3* 3.3*   BILITOT 0.4 0.4   ALKPHOS 100 87   AST 20 19   ALT 15 14   ANIONGAP 17* 17*       Significant Imaging: I have reviewed all pertinent imaging results/findings within the past 24 hours.  Imaging Results    None

## 2023-10-26 NOTE — H&P
[facility] - Emergency Dept  [facility]  History & Physical    Patient Name: Shima Sorto  MRN: [id number]  Patient Class: OP- Observation  Admission Date: 10/26/2023  Attending Physician: Beth Olguin MD   Primary Care Provider: Carmen Milton MD         Patient information was obtained from patient, past medical records, and ER records.     Subjective:     Principal Problem:Acute renal failure superimposed on stage 3b chronic kidney disease    Chief Complaint:   Chief Complaint   Patient presents with    Abnormal Lab     Elevated kidney function, had lab drawn yest, diarrhea, , has c diff and on vancomycin        HPI: Shima Sorto is a 83 y.o. with a PMHx of CKD3, HTN, breast cancer s/p lumpectomy, CHF (EF 60%), and osteoporosis who presents to the ED with elevated creatinine. Was advised by Nephrology to come to the ED due to elevated kidney function. The patient was recently admitted 10/9-10/14 for acute cystitis and JOCELIN. She reports she developed diarrhea on 10/16, noting ~10 episodes of watery, non bloody stool daily. She was diagnosed with c diff and started on oral vancomycin yesterday. She denies any fever/chills or abdominal pain. The patient does endorse lightheadedness, fatigue, nausea, and poor PO intake both due to decreased appetite but also because she is scared to eat because it increases the amount of diarrhea she is having. The patient denies CP, SOB, cough, vomiting, dizziness, syncope, or dysuria.    In the ED, patient initially mildly hypotensive which improved with IVF otherwise vitals stable, afebrile. CBC with stable anemia. Bicarb 17. Anion gap 17. Cr 3.4 (bl 1.2). Calcium 8.5. Albumin 3.3. The patient received LR bolus and zofran.     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       Past Surgical History:   Procedure Laterality Date    BREAST CYST ASPIRATION Right 1999    BREAST LUMPECTOMY Right 2018    with radiation    COLONOSCOPY N/A 7/24/2018    Procedure: COLONOSCOPY;  Surgeon: Juan Miguel Pena MD;  Location: [facility] ENDO (4TH FLR);  Service: Endoscopy;  Laterality: N/A;    COLONOSCOPY N/A 5/10/2023    Procedure: COLONOSCOPY;  Surgeon: Keven Garza MD;  Location: [facility] ENDO (4TH FLR);  Service: Endoscopy;  Laterality: N/A;  *Pending C-Diff*  Request Greg  procedure order telephone encounter 5/1  PEG prep, instructions portal -LW  5/4/23 no answer for precall/mleone lpn  5/9lm    ESOPHAGOGASTRODUODENOSCOPY N/A 3/17/2023    Procedure: EGD (ESOPHAGOGASTRODUODENOSCOPY);  Surgeon: Keven Garza MD;  Location: [facility] (4TH FLR);  Service: Endoscopy;  Laterality: N/A;  Medically Urgent  3/2 instructions to portal-st    pre call attempted, no answer from pt 3/13/23 -egh    INJECTION FOR SENTINEL NODE IDENTIFICATION Right 8/17/2018    Procedure: INJECTION, FOR SENTINEL NODE IDENTIFICATION;  Surgeon: Abby Mason MD;  Location: [facility] OR [address] [place] [address];  Service: General;  Laterality: Right;    interstim placed stage 1      and removed    KIDNEY STONE SURGERY  2000    @ [facility]    MASTECTOMY, PARTIAL Right 8/17/2018    Procedure: MASTECTOMY, PARTIAL-US guided;  Surgeon: Abby Mason MD;  Location: [facility] OR [address] [place] [address];  Service: General;  Laterality: Right;    MOHS procedure      SENTINEL LYMPH NODE BIOPSY Right 8/17/2018    Procedure: BIOPSY, LYMPH NODE, SENTINEL;  Surgeon: Abby Mason MD;  Location: [facility] OR [address] [place] [address];  Service: General;  Laterality: Right;       Review of patient's allergies indicates:   Allergen Reactions     Asparagus Rash       No current facility-administered medications on file prior to encounter.     Current Outpatient Medications on File Prior to Encounter   Medication Sig    acetaminophen (TYLENOL) 650 MG TbSR Take 1,300 mg by mouth 2 (two) times daily as needed (Severe pain).    anastrozole (ARIMIDEX) 1 mg Tab TAKE 1 TABLET(1 MG) BY MOUTH EVERY DAY    coQ10, ubiquinol, 100 mg Cap Take 100 mg by mouth once daily.    denosumab (PROLIA) 60 mg/mL Syrg Inject 60 mg into the skin every 6 (six) months.    empagliflozin (JARDIANCE) 10 mg tablet Take 1 tablet (10 mg total) by mouth once daily.    ferrous gluconate (FERGON) 324 MG tablet Take 1 tablet (324 mg total) by mouth daily with breakfast.    fish oil-E-fatty [id number] 400-5 mg-unit Cap Take 1 capsule by mouth Daily.    furosemide (LASIX) 20 MG tablet Take 2 tablets (40 mg total) by mouth once daily. Hold until you see the doctor for you follow up appointment    PRESERVISION AREDS-2 250-90-40-1 mg Cap Take 1 tablet by mouth 2 (two) times daily.    propylene glycol/peg 400/PF (SYSTANE, PF, OPHT) Place 1 drop into both eyes 2 (two) times daily as needed (Dry eye).    SALONPAS, LIDOCAINE, TOP Apply 1 patch topically daily as needed (Pain).    spironolactone (ALDACTONE) 25 MG tablet Take 1 tablet (25 mg total) by mouth once daily. Hold until you see the doctor for you follow up appointment    tamsulosin (FLOMAX) 0.4 mg Cap Take 1 capsule (0.4 mg total) by mouth once daily. Hold until you see the doctor for you follow up appointment    UNABLE TO FIND Rufus-Mag-Citrate with D3 & K2 - Takes 2 tablets by mouth every morning, 1 at midday and 1 every evening.    UNABLE TO FIND Take 4 capsules by mouth Daily. Nutrafol    valsartan (DIOVAN) 160 MG tablet Take 1 tablet (160 mg total) by mouth once daily. Hold until you see the doctor for you follow up appointment    vancomycin (VANCOCIN) 125 MG capsule Take 1 capsule (125 mg total) by mouth every 6 (six) hours. for 14 days     vitamin renal formula, B-complex-vitamin c-folic acid, (NEPHROCAPS) 1 mg Cap Take 1 capsule by mouth once daily.     Family History       Problem Relation (Age of Onset)    Breast cancer Maternal Aunt    Hearing loss Son    Heart attack Father    Heart disease Father    Hyperlipidemia Son    Kidney disease Mother    Melanoma Father          Tobacco Use    Smoking status: Former     Current packs/day: 0.00     Average packs/day: 0.5 packs/day for 8.0 years (4.0 ttl pk-yrs)     Types: Cigarettes     Start date: 1956     Quit date: 1964     Years since quittin.2    Smokeless tobacco: Never   Substance and Sexual Activity    Alcohol use: Not Currently    Drug use: No    Sexual activity: Not Currently     Review of Systems   Constitutional:  Positive for appetite change (decreased) and fatigue. Negative for chills, diaphoresis and fever.   HENT:  Negative for congestion, rhinorrhea and sneezing.    Eyes:  Negative for photophobia and visual disturbance.   Respiratory:  Negative for cough, shortness of breath and wheezing.    Cardiovascular:  Negative for chest pain, palpitations and leg swelling.   Gastrointestinal:  Positive for diarrhea and nausea. Negative for abdominal distention, abdominal pain, blood in stool and vomiting.   Genitourinary:  Negative for dysuria, frequency and hematuria.   Musculoskeletal:  Positive for arthralgias and back pain. Negative for gait problem, myalgias and neck pain.   Skin:  Negative for color change, pallor, rash and wound.   Neurological:  Positive for light-headedness. Negative for dizziness, syncope and weakness.   Psychiatric/Behavioral:  Negative for confusion and hallucinations. The patient is not nervous/anxious.      Objective:     Vital Signs (Most Recent):  Temp: 97.8 °F (36.6 °C) (10/26/23 1551)  Pulse: 68 (10/26/23 1833)  Resp: 18 (10/26/23 1833)  BP: 135/61 (10/26/23 1833)  SpO2: 97 % (10/26/23 1833) Vital Signs (24h Range):  Temp:  [97.8 °F (36.6 °C)] 97.8  °F (36.6 °C)  Pulse:  [60-76] 68  Resp:  [18-20] 18  SpO2:  [97 %-100 %] 97 %  BP: ()/(53-67) 135/61     Weight: 49 kg (108 lb)  Body mass index is 16.92 kg/m².     Physical Exam  Vitals and nursing note reviewed.   Constitutional:       General: She is not in acute distress.     Appearance: She is not toxic-appearing or diaphoretic.   HENT:      Head: Normocephalic and atraumatic.      Nose: Nose normal.      Mouth/Throat:      Mouth: Mucous membranes are dry.   Eyes:      Pupils: Pupils are equal, round, and reactive to light.   Cardiovascular:      Rate and Rhythm: Normal rate and regular rhythm.      Pulses: Normal pulses.   Pulmonary:      Effort: Pulmonary effort is normal. No respiratory distress.      Breath sounds: No wheezing, rhonchi or rales.   Abdominal:      General: Bowel sounds are normal. There is no distension.      Palpations: Abdomen is soft.      Tenderness: There is no abdominal tenderness. There is no guarding.   Musculoskeletal:         General: No swelling, tenderness or deformity. Normal range of motion.      Cervical back: Normal range of motion.   Skin:     General: Skin is warm and dry.      Capillary Refill: Capillary refill takes less than 2 seconds.      Findings: Bruising present.   Neurological:      General: No focal deficit present.      Mental Status: She is alert and oriented to person, place, and time.      Sensory: No sensory deficit.      Motor: No weakness.   Psychiatric:         Mood and Affect: Mood normal.         Speech: Speech is tangential.         Behavior: Behavior normal.      Comments: Poor historian              CRANIAL NERVES     CN III, IV, VI   Pupils are equal, round, and reactive to light.       Significant Labs: All pertinent labs within the past 24 hours have been reviewed.  CBC:   Recent Labs   Lab 10/25/23  1228 10/26/23  1659   WBC 6.46 5.97   HGB 11.0* 11.2*   HCT 34.3* 34.3*    252     CMP:   Recent Labs   Lab 10/25/23  1228 10/26/23  1659     141   K 4.4 4.6    107   CO2 17* 17*   * 96   BUN 89* 102*   CREATININE 3.3* 3.4*   CALCIUM 8.7 8.5*   PROT 7.1 7.2   ALBUMIN 3.3* 3.3*   BILITOT 0.4 0.4   ALKPHOS 100 87   AST 20 19   ALT 15 14   ANIONGAP 17* 17*       Significant Imaging: I have reviewed all pertinent imaging results/findings within the past 24 hours.  Imaging Results    None         Assessment/Plan:     * Acute renal failure superimposed on stage 3b chronic kidney disease  JOCELIN noted during previous admission, discharged on 10/14 with Cr of 1.6.  -Cr 3.4 on admit, bl ~1.2  -JOCELIN likely due to significant GI losses and poor oral intake.   -Continue IVF hydration.  -Follow renal panel and electrolytes closely.  -Check Renal Ultrasound.  -Nephrology consulted, appreciate assistance.  -Adjust renal dose medications for Estimated Creatinine Clearance: 9.7 mL/min (A) (based on SCr of 3.4 mg/dL (H)).   -Avoid NSAIDs, Lovelace-II inhibitors, ACE-I, Angiotensin Receptor Blockers, or Aminoglycosides.  -UA  -Check  Urine Na, Cr, Protein.    Clostridium difficile infection  -Reports diarrhea starting 10/16. Recent stool studies +c diff. Started on oral vancomycin for 14 days yesterday.  -Continue oral vancomycin qid.  -Afebrile, no leukocytosis.  -Special contact precautions.    ACC/AHA stage C heart failure with preserved ejection fraction  Patient is identified as having Diastolic (HFpEF) heart failure that is Chronic. CHF is currently controlled. Latest ECHO performed and demonstrates- Results for orders placed during the hospital encounter of 07/07/23    Echo    Interpretation Summary  · The left ventricle is normal in size with normal systolic function. The estimated ejection fraction is 60%.  · Normal right ventricular size with normal right ventricular systolic function.  · Grade II left ventricular diastolic dysfunction.  · Biatrial enlargement.  · The estimated PA systolic pressure is 52 mmHg.  · Intermediate central venous pressure  "(8 mmHg).  Monitor clinical status closely. Monitor on telemetry. Patient is off CHF pathway.  Monitor strict Is&Os and daily weights. Continue to stress to patient importance of self efficacy and  on diet for CHF. Last BNP reviewed- and noted below No results for input(s): "BNP", "BNPTRIAGEBLO" in the last 168 hours..  -Appears hypovolemic on exam.  -Hold jardiance, aldactone, valsartan and lasix in the setting of dehydration and JOCELIN.    Normochromic normocytic anemia  Patient's anemia is currently controlled. S/p 0 units of PRBCs.   Current CBC reviewed-   Lab Results   Component Value Date    HGB 11.2 (L) 10/26/2023    HCT 34.3 (L) 10/26/2023     Monitor serial CBC and transfuse if patient becomes hemodynamically unstable, symptomatic or H/H drops below 7/21.     Malignant neoplasm of upper-outer quadrant of right breast in female, estrogen receptor positive  -Continue anastrazole daily.  -Follows with hem/onc outpatient.    Age-related osteoporosis with current pathological fracture with routine healing  -Continue calcium and Vit D supplementation.  -Receives prolia q6 months.    Urinary retention  Chronic issue, followed by Urology outpatient. Patient mainly complaints of incomplete bladder emptying and was taught CIC due to incomplete bladder emptying. Patient had been performing CIC twice daily at home, however on recent visit, PVR was noted to be elevated despite CIC, so she was instructed to increase catheterization to TID. She tells me however that she is still continuing to only self-cath twice a day.     - Straight cath TID and PRN  - Continue home flomax.    Hypertension  -Chronic issue. Initially hypotensive which improved with IVF.  -Hold valsartan in the setting of JOCELIN.  -PRN hydralazine for SBP >180.  -Continue to monitor BP closely.      VTE Risk Mitigation (From admission, onward)           Ordered     heparin (porcine) injection 5,000 Units  Every 8 hours         10/26/23 1846     IP VTE " HIGH RISK PATIENT  Once         10/26/23 1845     Place sequential compression device  Until discontinued         10/26/23 1845                         On 10/26/2023, patient should be placed in hospital observation services under my care in collaboration with Tha Rico MD.          Blanca Mazariegos NP  Department of Hospital Medicine  [facility] - Emergency Dept    COMPLETED  Family history is reviewed and has not changed

## 2023-10-26 NOTE — TELEPHONE ENCOUNTER
She has an JOCELIN, likely because of diahrrea you mentioned with C.Diff and needs IV hydration.  Please have her go to the hopsital if you haven't already sent her.    I will have gage try to call her as well to go to ER for hydration.

## 2023-10-26 NOTE — TELEPHONE ENCOUNTER
She has an JOCELIN, likely because of diahrrea you mentioned with C.Diff and needs IV hydration.  Please have her go to the hopsital if you haven't already sent her.    I will have gage try to call her as well to go to ER for hydration.          Note

## 2023-10-27 PROBLEM — E44.0 MODERATE MALNUTRITION: Status: ACTIVE | Noted: 2023-10-27

## 2023-10-27 LAB
ALBUMIN SERPL BCP-MCNC: 2.8 G/DL (ref 3.5–5.2)
ALP SERPL-CCNC: 92 U/L (ref 55–135)
ALT SERPL W/O P-5'-P-CCNC: 13 U/L (ref 10–44)
ANION GAP SERPL CALC-SCNC: 15 MMOL/L (ref 8–16)
ANISOCYTOSIS BLD QL SMEAR: SLIGHT
AST SERPL-CCNC: 20 U/L (ref 10–40)
BACTERIA #/AREA URNS AUTO: ABNORMAL /HPF
BASOPHILS # BLD AUTO: 0.03 K/UL (ref 0–0.2)
BASOPHILS NFR BLD: 0.5 % (ref 0–1.9)
BILIRUB SERPL-MCNC: 0.3 MG/DL (ref 0.1–1)
BILIRUB UR QL STRIP: NEGATIVE
BILIRUB UR QL STRIP: NEGATIVE
BUN SERPL-MCNC: 92 MG/DL (ref 8–23)
BURR CELLS BLD QL SMEAR: ABNORMAL
CALCIUM SERPL-MCNC: 8 MG/DL (ref 8.7–10.5)
CHLORIDE SERPL-SCNC: 113 MMOL/L (ref 95–110)
CLARITY UR REFRACT.AUTO: ABNORMAL
CLARITY UR REFRACT.AUTO: ABNORMAL
CO2 SERPL-SCNC: 16 MMOL/L (ref 23–29)
COLOR UR AUTO: YELLOW
COLOR UR AUTO: YELLOW
CREAT SERPL-MCNC: 2.4 MG/DL (ref 0.5–1.4)
CREAT UR-MCNC: 121 MG/DL (ref 15–325)
CRP SERPL-MCNC: 59.2 MG/L (ref 0–8.2)
DIFFERENTIAL METHOD: ABNORMAL
EOSINOPHIL # BLD AUTO: 0.1 K/UL (ref 0–0.5)
EOSINOPHIL NFR BLD: 1.2 % (ref 0–8)
ERYTHROCYTE [DISTWIDTH] IN BLOOD BY AUTOMATED COUNT: 14.6 % (ref 11.5–14.5)
EST. GFR  (NO RACE VARIABLE): 19.6 ML/MIN/1.73 M^2
GLUCOSE SERPL-MCNC: 71 MG/DL (ref 70–110)
GLUCOSE UR QL STRIP: NEGATIVE
GLUCOSE UR QL STRIP: NEGATIVE
HCT VFR BLD AUTO: 31.6 % (ref 37–48.5)
HGB BLD-MCNC: 10.5 G/DL (ref 12–16)
HGB UR QL STRIP: ABNORMAL
HGB UR QL STRIP: ABNORMAL
HYALINE CASTS UR QL AUTO: 0 /LPF
IMM GRANULOCYTES # BLD AUTO: 0.07 K/UL (ref 0–0.04)
IMM GRANULOCYTES NFR BLD AUTO: 1.2 % (ref 0–0.5)
KETONES UR QL STRIP: ABNORMAL
KETONES UR QL STRIP: ABNORMAL
LEUKOCYTE ESTERASE UR QL STRIP: ABNORMAL
LEUKOCYTE ESTERASE UR QL STRIP: ABNORMAL
LYMPHOCYTES # BLD AUTO: 1 K/UL (ref 1–4.8)
LYMPHOCYTES NFR BLD: 18.3 % (ref 18–48)
MAGNESIUM SERPL-MCNC: 2.7 MG/DL (ref 1.6–2.6)
MCH RBC QN AUTO: 30.7 PG (ref 27–31)
MCHC RBC AUTO-ENTMCNC: 33.2 G/DL (ref 32–36)
MCV RBC AUTO: 92 FL (ref 82–98)
MICROSCOPIC COMMENT: ABNORMAL
MONOCYTES # BLD AUTO: 0.6 K/UL (ref 0.3–1)
MONOCYTES NFR BLD: 9.7 % (ref 4–15)
NEUTROPHILS # BLD AUTO: 3.9 K/UL (ref 1.8–7.7)
NEUTROPHILS NFR BLD: 69.1 % (ref 38–73)
NITRITE UR QL STRIP: POSITIVE
NITRITE UR QL STRIP: POSITIVE
NRBC BLD-RTO: 0 /100 WBC
OVALOCYTES BLD QL SMEAR: ABNORMAL
PH UR STRIP: 6 [PH] (ref 5–8)
PH UR STRIP: 6 [PH] (ref 5–8)
PHOSPHATE SERPL-MCNC: 3.6 MG/DL (ref 2.7–4.5)
PLATELET # BLD AUTO: 256 K/UL (ref 150–450)
PLATELET BLD QL SMEAR: ABNORMAL
PMV BLD AUTO: 11.3 FL (ref 9.2–12.9)
POIKILOCYTOSIS BLD QL SMEAR: SLIGHT
POTASSIUM SERPL-SCNC: 4.2 MMOL/L (ref 3.5–5.1)
PROT SERPL-MCNC: 6.2 G/DL (ref 6–8.4)
PROT UR QL STRIP: ABNORMAL
PROT UR QL STRIP: ABNORMAL
PROT UR-MCNC: 40 MG/DL (ref 0–15)
RBC # BLD AUTO: 3.42 M/UL (ref 4–5.4)
RBC #/AREA URNS AUTO: 6 /HPF (ref 0–4)
SODIUM SERPL-SCNC: 144 MMOL/L (ref 136–145)
SODIUM UR-SCNC: 36 MMOL/L (ref 20–250)
SP GR UR STRIP: 1.02 (ref 1–1.03)
SP GR UR STRIP: 1.02 (ref 1–1.03)
URN SPEC COLLECT METH UR: ABNORMAL
URN SPEC COLLECT METH UR: ABNORMAL
WBC # BLD AUTO: 5.68 K/UL (ref 3.9–12.7)
WBC #/AREA URNS AUTO: 74 /HPF (ref 0–5)

## 2023-10-27 PROCEDURE — 80053 COMPREHEN METABOLIC PANEL: CPT | Performed by: NURSE PRACTITIONER

## 2023-10-27 PROCEDURE — 36415 COLL VENOUS BLD VENIPUNCTURE: CPT | Performed by: NURSE PRACTITIONER

## 2023-10-27 PROCEDURE — 87186 SC STD MICRODIL/AGAR DIL: CPT | Performed by: PHYSICIAN ASSISTANT

## 2023-10-27 PROCEDURE — 85025 COMPLETE CBC W/AUTO DIFF WBC: CPT | Performed by: NURSE PRACTITIONER

## 2023-10-27 PROCEDURE — 82570 ASSAY OF URINE CREATININE: CPT | Performed by: NURSE PRACTITIONER

## 2023-10-27 PROCEDURE — 51798 US URINE CAPACITY MEASURE: CPT

## 2023-10-27 PROCEDURE — 27000207 HC ISOLATION

## 2023-10-27 PROCEDURE — 11000001 HC ACUTE MED/SURG PRIVATE ROOM

## 2023-10-27 PROCEDURE — 96372 THER/PROPH/DIAG INJ SC/IM: CPT | Performed by: NURSE PRACTITIONER

## 2023-10-27 PROCEDURE — 83735 ASSAY OF MAGNESIUM: CPT | Performed by: NURSE PRACTITIONER

## 2023-10-27 PROCEDURE — 63600175 PHARM REV CODE 636 W HCPCS: Performed by: NURSE PRACTITIONER

## 2023-10-27 PROCEDURE — 84100 ASSAY OF PHOSPHORUS: CPT | Performed by: NURSE PRACTITIONER

## 2023-10-27 PROCEDURE — 81001 URINALYSIS AUTO W/SCOPE: CPT | Performed by: PHYSICIAN ASSISTANT

## 2023-10-27 PROCEDURE — 87088 URINE BACTERIA CULTURE: CPT | Performed by: PHYSICIAN ASSISTANT

## 2023-10-27 PROCEDURE — 63600175 PHARM REV CODE 636 W HCPCS

## 2023-10-27 PROCEDURE — 84156 ASSAY OF PROTEIN URINE: CPT | Performed by: NURSE PRACTITIONER

## 2023-10-27 PROCEDURE — 87077 CULTURE AEROBIC IDENTIFY: CPT | Performed by: PHYSICIAN ASSISTANT

## 2023-10-27 PROCEDURE — 87086 URINE CULTURE/COLONY COUNT: CPT | Performed by: PHYSICIAN ASSISTANT

## 2023-10-27 PROCEDURE — 84300 ASSAY OF URINE SODIUM: CPT | Performed by: NURSE PRACTITIONER

## 2023-10-27 PROCEDURE — 86140 C-REACTIVE PROTEIN: CPT | Performed by: NURSE PRACTITIONER

## 2023-10-27 PROCEDURE — 25000003 PHARM REV CODE 250: Performed by: NURSE PRACTITIONER

## 2023-10-27 RX ADMIN — HEPARIN SODIUM 5000 UNITS: 5000 INJECTION INTRAVENOUS; SUBCUTANEOUS at 10:10

## 2023-10-27 RX ADMIN — HEPARIN SODIUM 5000 UNITS: 5000 INJECTION INTRAVENOUS; SUBCUTANEOUS at 01:10

## 2023-10-27 RX ADMIN — SODIUM CHLORIDE, SODIUM LACTATE, POTASSIUM CHLORIDE, AND CALCIUM CHLORIDE: 600; 310; 30; 20 INJECTION, SOLUTION INTRAVENOUS at 11:10

## 2023-10-27 RX ADMIN — ONDANSETRON 4 MG: 2 INJECTION INTRAMUSCULAR; INTRAVENOUS at 10:10

## 2023-10-27 RX ADMIN — ANASTROZOLE 1 MG: 1 TABLET, COATED ORAL at 09:10

## 2023-10-27 RX ADMIN — NEPHROCAP 1 CAPSULE: 1 CAP ORAL at 09:10

## 2023-10-27 RX ADMIN — Medication 324 MG: at 09:10

## 2023-10-27 RX ADMIN — HEPARIN SODIUM 5000 UNITS: 5000 INJECTION INTRAVENOUS; SUBCUTANEOUS at 05:10

## 2023-10-27 RX ADMIN — VANCOMYCIN HYDROCHLORIDE 125 MG: KIT at 11:10

## 2023-10-27 RX ADMIN — AMPICILLIN 2 G: 2 INJECTION, POWDER, FOR SOLUTION INTRAMUSCULAR; INTRAVENOUS at 09:10

## 2023-10-27 RX ADMIN — VANCOMYCIN HYDROCHLORIDE 125 MG: KIT at 06:10

## 2023-10-27 RX ADMIN — VANCOMYCIN HYDROCHLORIDE 125 MG: KIT at 05:10

## 2023-10-27 RX ADMIN — Medication 100 MG: at 05:10

## 2023-10-27 NOTE — ASSESSMENT & PLAN NOTE
-Reports diarrhea starting 10/16. Recent stool studies +c diff. Started on oral vancomycin for 14 days yesterday.  -Continue oral vancomycin qid.  -Afebrile, no leukocytosis.  -Special contact precautions.

## 2023-10-27 NOTE — ASSESSMENT & PLAN NOTE
Malnutrition Type:  Context: acute illness or injury  Level: moderate    Related to (etiology):   C Diff dx/diarrhea and poor PO intake    Signs and Symptoms (as evidenced by):   Moderate BLE edema, -11% wt loss x 1 mo, and poor PO intake x 10 days.    Malnutrition Characteristic Summary:  Weight Loss (Malnutrition): greater than 5% in 1 month  Energy Intake (Malnutrition): less than 75% for greater than 7 days  Fluid Accumulation (Malnutrition): moderate      Interventions/Recommendations (treatment strategy):  1.) Recommend continuing with Renal diet, fluid per MD. - may liberalize diet as needed to help increase PO intake. 2.) Recommend adding Novasource Renal QD to provide an additional 22g PRO and 475 kcal to help optimize PRO/Kcal intake. 3.) Consider probiotic daily to help with diarrhea. 4.) RD to monitor wt, PO intake, skin, labs.    Nutrition Diagnosis Status:   new

## 2023-10-27 NOTE — PLAN OF CARE
[facility] - Observation 11H  Initial Discharge Assessment       Primary Care Provider: Carmen Milton MD    Admission Diagnosis: JOCELIN (acute kidney injury) [N17.9]  Chest pain [R07.9]  C. difficile colitis [A04.72]    Admission Date: 10/26/2023  Expected Discharge Date: 10/28/2023         Payor: MEDICARE / Plan: MEDICARE PART A & B / Product Type: Government /     Extended Emergency Contact Information  Primary Emergency Contact: CharodarioDrewChi  Address: [address] [place]  Work Phone: [phone]  Mobile Phone: [phone]  Relation: Son  Secondary Emergency Contact: CharoCassi bishop  Address: [address] [place] [address] [id number] [place] [address] [facility]  Home Phone: [phone]  Mobile Phone: [phone]  Relation: Daughter    Discharge Plan A: Home  Discharge Plan B: Home      [id number] - [facility] [address] AT [facility] [address]  [address]  [facility] [address]  Phone: [phone] Fax: [phone]    [id number] - [place], [place] - [address] AT [facility] [address]  [address]  [facility] [address]  Phone: [phone] Fax: [phone]      Initial Assessment (most recent)       Adult Discharge Assessment - 10/27/23 1127          Discharge Assessment    Assessment Type Discharge Planning Assessment     Confirmed/corrected address, phone number and insurance Yes     Confirmed Demographics Correct on Facesheet     Source of Information patient     Reason For Admission Acute renal failure superimposed on stage 3b chronic kidney disease     People in Home alone     Facility Arrived From: Home     Do you expect to return to your current living situation? Yes     Do you have help at home or someone to help you manage your care at home? No     Prior to hospitilization cognitive status: Alert/Oriented      Current cognitive status: Alert/Oriented     Home Accessibility stairs to enter home;stairs within home     Equipment Currently Used at Home none     Readmission within 30 days? Yes     Patient currently being followed by outpatient case management? No     Do you take prescription medications? Yes     Do you have prescription coverage? Yes     Do you have any problems affording any of your prescribed medications? TBD     How do you get to doctors appointments? car, drives self     Are you on dialysis? No     Do you take coumadin? No     Discharge Plan A Home     Discharge Plan B Home                     Readmission Assessment (most recent)       Readmission Assessment - 10/27/23 1133          Readmission    Why were you hospitalized in the last 30 days? Acute cystitis without hematuria (P)      Why were you readmitted? New medical problem (P)      When you left the hospital where did you go? Home Alone (P)      Was this a planned readmission? No (P)                             SW completed Discharge Planning Assessment with patient via bedside. Discharge planning booklet given to patient/family and whiteboard updated with PHILLIP and phone #. All questions answered.    Patient reported that her neighbor will provide transportation upon discharge.     Patient reported that she lives alone, and prior to hospitalization she was independent with her ADL's. Patient reported that she is not on dialysis and does not go to a Coumadin clinic.     Patient lives in a two story home with 14 steps to enter with railings.       Berna Kelley LMSW  [facility]  Ext. [phone]

## 2023-10-27 NOTE — PROGRESS NOTES
[facility] - Observation [address] [place] [address] Medicine  Progress Note    Patient Name: Shima Sorto  MRN: [id number]  Patient Class: IP- Inpatient   Admission Date: 10/26/2023  Length of Stay: 0 days  Attending Physician: Beth Olguin MD  Primary Care Provider: Carmen Milton MD        Subjective:     Principal Problem:Acute renal failure superimposed on stage 3b chronic kidney disease        HPI:  Shima Sorto is a 83 y.o. with a PMHx of CKD3, HTN, breast cancer s/p lumpectomy, CHF (EF 60%), and osteoporosis who presents to the ED with elevated creatinine. Was advised by Nephrology to come to the ED due to elevated kidney function. The patient was recently admitted 10/9-10/14 for acute cystitis and JOCELIN. She reports she developed diarrhea on 10/16, noting ~10 episodes of watery, non bloody stool daily. She was diagnosed with c diff and started on oral vancomycin yesterday. She denies any fever/chills or abdominal pain. The patient does endorse lightheadedness, fatigue, nausea, and poor PO intake both due to decreased appetite but also because she is scared to eat because it increases the amount of diarrhea she is having. The patient denies CP, SOB, cough, vomiting, dizziness, syncope, or dysuria.    In the ED, patient initially mildly hypotensive which improved with IVF otherwise vitals stable, afebrile. CBC with stable anemia. Bicarb 17. Anion gap 17. Cr 3.4 (bl 1.2). Calcium 8.5. Albumin 3.3. The patient received LR bolus and zofran.       Overview/Hospital Course:  Shima Sorto was placed in  observation for management of JOCELIN in setting of acute c. diff infection. Continue oral vanc. Diarrhea persistent, monitor for improvement. Given 1 L iVF in ED followed by continuous IVF overnight. Cr 3.5 -> 2.4. Continue IVF and repeat bmp. Prior urine cultures grew variable organisms with resistance. Urine cultures pending. Started on ampicillin, continue for now.      Interval History: NAEON. AFVSS.  Patient reports continued large volume diarrhea. No abdominal pain, fevers, chills. JOCELIN improving with IVF, Cr still ~2x above baseline. Treating UTI with ampicillin. Follow urine cultures. Anticipate dc tomorrow.    Review of Systems   Constitutional:  Positive for appetite change (decreased) and fatigue. Negative for chills, diaphoresis and fever.   HENT:  Negative for congestion, rhinorrhea and sneezing.    Eyes:  Negative for photophobia and visual disturbance.   Respiratory:  Negative for cough, shortness of breath and wheezing.    Cardiovascular:  Negative for chest pain, palpitations and leg swelling.   Gastrointestinal:  Positive for diarrhea and nausea. Negative for abdominal distention, abdominal pain, blood in stool and vomiting.   Genitourinary:  Negative for dysuria, frequency and hematuria.   Musculoskeletal:  Positive for arthralgias and back pain. Negative for gait problem, myalgias and neck pain.   Skin:  Negative for color change, pallor, rash and wound.   Neurological:  Positive for light-headedness. Negative for dizziness, syncope and weakness.   Psychiatric/Behavioral:  Negative for confusion and hallucinations. The patient is not nervous/anxious.      Objective:     Vital Signs (Most Recent):  Temp: 97.8 °F (36.6 °C) (10/27/23 1205)  Pulse: 76 (10/27/23 1205)  Resp: 18 (10/27/23 1205)  BP: 126/60 (10/27/23 1205)  SpO2: 99 % (10/27/23 1205) Vital Signs (24h Range):  Temp:  [97.5 °F (36.4 °C)-97.8 °F (36.6 °C)] 97.8 °F (36.6 °C)  Pulse:  [60-77] 76  Resp:  [16-20] 18  SpO2:  [96 %-100 %] 99 %  BP: ()/(53-86) 126/60     Weight: 49.9 kg (109 lb 15.1 oz)  Body mass index is 17.22 kg/m².    Intake/Output Summary (Last 24 hours) at 10/27/2023 1339  Last data filed at 10/27/2023 0106  Gross per 24 hour   Intake --   Output 1800 ml   Net -1800 ml         Physical Exam  Vitals and nursing note reviewed.   Constitutional:       General: She is not in acute distress.     Appearance: She is not  "toxic-appearing or diaphoretic.   HENT:      Head: Normocephalic and atraumatic.      Nose: Nose normal.      Mouth/Throat:      Mouth: Mucous membranes are dry.   Eyes:      Pupils: Pupils are equal, round, and reactive to light.   Cardiovascular:      Rate and Rhythm: Normal rate and regular rhythm.      Pulses: Normal pulses.   Pulmonary:      Effort: Pulmonary effort is normal. No respiratory distress.      Breath sounds: No wheezing, rhonchi or rales.   Abdominal:      General: Bowel sounds are normal. There is no distension.      Palpations: Abdomen is soft.      Tenderness: There is no abdominal tenderness. There is no guarding.   Musculoskeletal:         General: No swelling, tenderness or deformity. Normal range of motion.      Cervical back: Normal range of motion.   Skin:     General: Skin is warm and dry.      Capillary Refill: Capillary refill takes less than 2 seconds.      Findings: Bruising present.   Neurological:      General: No focal deficit present.      Mental Status: She is alert and oriented to person, place, and time.      Sensory: No sensory deficit.      Motor: No weakness.   Psychiatric:         Mood and Affect: Mood normal.         Speech: Speech is tangential.         Behavior: Behavior normal.      Comments: Poor historian             Significant Labs: All pertinent labs within the past 24 hours have been reviewed.  BMP:   Recent Labs   Lab 10/27/23  0339   GLU 71      K 4.2   *   CO2 16*   BUN 92*   CREATININE 2.4*   CALCIUM 8.0*   MG 2.7*     Lactic Acid: No results for input(s): "LACTATE" in the last 48 hours.    Significant Imaging: I have reviewed all pertinent imaging results/findings within the past 24 hours.      Assessment/Plan:      * Acute renal failure superimposed on stage 3b chronic kidney disease  Acute cystitis     JOCELIN noted during previous admission, discharged on 10/14 with Cr of 1.6.  -Cr 3.4 on admit, bl ~1.2-1.6  -JOCELIN likely due to significant GI losses " "and poor oral intake.   -Continue IVF hydration.  -Follow renal panel and electrolytes closely.  -Check Renal Ultrasound - stable appearance of kidneys, does reveal debris in bladder.   -Adjust renal dose medications for Estimated Creatinine Clearance: 14 mL/min (A) (based on SCr of 2.4 mg/dL (H)).   -Avoid NSAIDs, Lovelace-II inhibitors, ACE-I, Angiotensin Receptor Blockers, or Aminoglycosides.  -UA infectious. Prior cultures reviewed, most recent gram+ bacteria sensitive to ampicillin. Treating with ampicillin for now. F/u urine cultures, pending.  -Check Urine Na 36, Cr 121, Protein 40.  - FeNa 0.5% -  JOCELIN likely pre-renal. Good response to volume repletion    Clostridium difficile infection  -Reports diarrhea starting 10/16. Recent stool studies +c diff. Started on oral vancomycin for 14 days yesterday.  -Continue oral vancomycin qid.  -Afebrile, no leukocytosis.  -Special contact precautions.    ACC/AHA stage C heart failure with preserved ejection fraction  Patient is identified as having Diastolic (HFpEF) heart failure that is Chronic. CHF is currently controlled. Latest ECHO performed and demonstrates- Results for orders placed during the hospital encounter of 07/07/23    Echo    Interpretation Summary  · The left ventricle is normal in size with normal systolic function. The estimated ejection fraction is 60%.  · Normal right ventricular size with normal right ventricular systolic function.  · Grade II left ventricular diastolic dysfunction.  · Biatrial enlargement.  · The estimated PA systolic pressure is 52 mmHg.  · Intermediate central venous pressure (8 mmHg).  Monitor clinical status closely. Monitor on telemetry. Patient is off CHF pathway.  Monitor strict Is&Os and daily weights. Continue to stress to patient importance of self efficacy and  on diet for CHF. Last BNP reviewed- and noted below No results for input(s): "BNP", "BNPTRIAGEBLO" in the last 168 hours..  -Appears hypovolemic on " exam.  -Hold jardiance, aldactone, valsartan and lasix in the setting of dehydration and JOCELIN.    Normochromic normocytic anemia  Patient's anemia is currently controlled. S/p 0 units of PRBCs.   Current CBC reviewed-   Lab Results   Component Value Date    HGB 11.2 (L) 10/26/2023    HCT 34.3 (L) 10/26/2023     Monitor serial CBC and transfuse if patient becomes hemodynamically unstable, symptomatic or H/H drops below 7/21.     Malignant neoplasm of upper-outer quadrant of right breast in female, estrogen receptor positive  -Continue anastrazole daily.  -Follows with hem/onc outpatient.    Age-related osteoporosis with current pathological fracture with routine healing  -Continue calcium and Vit D supplementation.  -Receives prolia q6 months.    Urinary retention  Chronic issue, followed by Urology outpatient. Patient mainly complaints of incomplete bladder emptying and was taught CIC due to incomplete bladder emptying. Patient had been performing CIC twice daily at home, however on recent visit, PVR was noted to be elevated despite CIC, so she was instructed to increase catheterization to TID. She tells me however that she is still continuing to only self-cath twice a day.     - Straight cath TID and PRN  - Continue home flomax.    Hypertension  -Chronic issue. Initially hypotensive which improved with IVF.  -Hold valsartan in the setting of JOCELIN.  -PRN hydralazine for SBP >180.  -Continue to monitor BP closely.      VTE Risk Mitigation (From admission, onward)         Ordered     heparin (porcine) injection 5,000 Units  Every 8 hours         10/26/23 1846     IP VTE HIGH RISK PATIENT  Once         10/26/23 1845     Place sequential compression device  Until discontinued         10/26/23 1845                Discharge Planning   PHILLIP: 10/28/2023     Code Status: Full Code   Is the patient medically ready for discharge?:     Reason for patient still in hospital (select all that apply): Patient trending condition,  Laboratory test, Treatment and Imaging  Discharge Plan A: Home                  Jazmine Moura PA-C  Department of Hospital Medicine   [facility] - Observation 11H

## 2023-10-27 NOTE — CONSULTS
[facility] 11H  Nephrology  Consult Note    Patient Name: Shima Sorto  MRN: [id number]  Admission Date: 10/26/2023  Hospital Length of Stay: 0 days  Attending Provider: Beth Olguin MD   Primary Care Physician: Carmen Milton MD  Principal Problem:Acute renal failure superimposed on stage 3b chronic kidney disease    Inpatient consult to Nephrology  Consult performed by: Micki Hollins DO  Consult ordered by: Blanca Mazariegos NP        Subjective:     HPI: Ms. Sorto is a 83 YOF with CKD3, HTN, breast cancer s/p lumpectomy, CHF (EF 60%), and osteoporosis who presents to the ED with elevated creatinine. Was advised by Nephrology to come to the ED due to an JOCELIN (Cr 3.3 with bl 1.2) needing IV hydration. The pt was admitted from 10/9 - 10/14 for acute cystitis and JOCELIN and there developed diarrhea 2/2 to c diff. Started on PO vanc 10/25. ROS positive for lightheadedness, fatigue, nausea, continued diarrhea (10+ BM per day), decreased PO intake to prevent further diarrhea. Got 1 L LR bolus in ED.       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    Visual impairment     reading glasses       Past Surgical History:   Procedure Laterality Date    BREAST CYST ASPIRATION Right 1999    BREAST LUMPECTOMY Right 2018    with radiation    COLONOSCOPY N/A 7/24/2018    Procedure: COLONOSCOPY;  Surgeon: Juan Miguel Pena MD;  Location: [address]);  Service: Endoscopy;  Laterality: N/A;    COLONOSCOPY N/A 5/10/2023    Procedure: COLONOSCOPY;  Surgeon: Keven Garza MD;   Location: [facility] ENDO (4TH FLR);  Service: Endoscopy;  Laterality: N/A;  *Pending C-Diff*  Request Greg  procedure order telephone encounter 5/1  PEG prep, instructions portal -LW  5/4/23 no answer for precall/mleone lpn  5/9lm    ESOPHAGOGASTRODUODENOSCOPY N/A 3/17/2023    Procedure: EGD (ESOPHAGOGASTRODUODENOSCOPY);  Surgeon: Keven Garza MD;  Location: [facility] ENDO (4TH FLR);  Service: Endoscopy;  Laterality: N/A;  Medically Urgent  3/2 instructions to portal-st    pre call attempted, no answer from pt 3/13/23 -egh    INJECTION FOR SENTINEL NODE IDENTIFICATION Right 8/17/2018    Procedure: INJECTION, FOR SENTINEL NODE IDENTIFICATION;  Surgeon: Abby Mason MD;  Location: [facility] OR 2ND FLR;  Service: General;  Laterality: Right;    interstim placed stage 1      and removed    KIDNEY STONE SURGERY  2000    @ [facility]    MASTECTOMY, PARTIAL Right 8/17/2018    Procedure: MASTECTOMY, PARTIAL-US guided;  Surgeon: bAby Mason MD;  Location: [facility] OR [address] [place] [address];  Service: General;  Laterality: Right;    MOHS procedure      SENTINEL LYMPH NODE BIOPSY Right 8/17/2018    Procedure: BIOPSY, LYMPH NODE, SENTINEL;  Surgeon: Abby Mason MD;  Location: [facility] OR [address] [place] [address];  Service: General;  Laterality: Right;       Review of patient's allergies indicates:   Allergen Reactions    Asparagus Rash     Current Facility-Administered Medications   Medication Frequency    acetaminophen tablet 650 mg Q6H PRN    ampicillin (OMNIPEN) 2 g in sodium chloride 0.9 % 100 mL IVPB (MB+) Daily    anastrozole tablet 1 mg Daily    coenzyme Q10 100 mg Daily    dextrose 10% bolus 125 mL 125 mL PRN    dextrose 10% bolus 250 mL 250 mL PRN    ferrous gluconate tablet 324 mg Daily with breakfast    glucagon (human recombinant) injection 1 mg PRN    glucose chewable tablet 16 g PRN    glucose chewable tablet 24 g PRN    heparin (porcine) injection 5,000 Units Q8H    hydrALAZINE injection 5 mg Q8H PRN    lactated ringers infusion  Continuous    naloxone 0.4 mg/mL injection 0.02 mg PRN    ondansetron injection 4 mg Q8H PRN    sodium chloride 0.9% flush 10 mL Q12H PRN    vancomycin 125 mg/5 mL oral solution 125 mg Q6H    vitamin renal formula (B-complex-vitamin c-folic acid) 1 mg per capsule 1 capsule Daily     Family History       Problem Relation (Age of Onset)    Breast cancer Maternal Aunt    Hearing loss Son    Heart attack Father    Heart disease Father    Hyperlipidemia Son    Kidney disease Mother    Melanoma Father          Tobacco Use    Smoking status: Former     Current packs/day: 0.00     Average packs/day: 0.5 packs/day for 8.0 years (4.0 ttl pk-yrs)     Types: Cigarettes     Start date: 1956     Quit date: 1964     Years since quittin.2    Smokeless tobacco: Never   Substance and Sexual Activity    Alcohol use: Not Currently    Drug use: No    Sexual activity: Not Currently     Review of Systems   Constitutional:  Positive for appetite change (decreased). Negative for chills, diaphoresis, fatigue and fever.   HENT:  Negative for congestion, rhinorrhea and sneezing.    Eyes:  Negative for photophobia and visual disturbance.   Respiratory:  Negative for cough, shortness of breath and wheezing.    Cardiovascular:  Negative for chest pain, palpitations and leg swelling.   Gastrointestinal:  Positive for diarrhea and nausea. Negative for abdominal distention, abdominal pain, blood in stool and vomiting.   Genitourinary:  Negative for dysuria, frequency and hematuria.   Musculoskeletal:  Negative for arthralgias, back pain, gait problem, myalgias and neck pain.   Skin:  Negative for color change, pallor, rash and wound.   Neurological:  Negative for dizziness, syncope, weakness and light-headedness.   Psychiatric/Behavioral:  Negative for confusion and hallucinations. The patient is not nervous/anxious.      Objective:     Vital Signs (Most Recent):  Temp: 97.8 °F (36.6 °C) (10/27/23 1205)  Pulse: 76 (10/27/23  1205)  Resp: 18 (10/27/23 1205)  BP: 126/60 (10/27/23 1205)  SpO2: 99 % (10/27/23 1205) Vital Signs (24h Range):  Temp:  [97.5 °F (36.4 °C)-97.8 °F (36.6 °C)] 97.8 °F (36.6 °C)  Pulse:  [60-77] 76  Resp:  [16-19] 18  SpO2:  [96 %-100 %] 99 %  BP: (126-150)/(58-86) 126/60     Weight: 49.9 kg (109 lb 15.1 oz) (10/26/23 2315)  Body mass index is 17.22 kg/m².  Body surface area is 1.54 meters squared.    I/O last 3 completed shifts:  In: -   Out: 1800 [Urine:1800]     Physical Exam  Vitals and nursing note reviewed.   Constitutional:       General: She is not in acute distress.     Appearance: She is not toxic-appearing or diaphoretic.   HENT:      Head: Normocephalic and atraumatic.      Nose: Nose normal.      Mouth/Throat:      Mouth: Mucous membranes are dry.   Eyes:      Pupils: Pupils are equal, round, and reactive to light.   Cardiovascular:      Rate and Rhythm: Normal rate and regular rhythm.      Pulses: Normal pulses.   Pulmonary:      Effort: Pulmonary effort is normal. No respiratory distress.      Breath sounds: No wheezing, rhonchi or rales.   Abdominal:      General: Bowel sounds are normal. There is no distension.      Palpations: Abdomen is soft.      Tenderness: There is no abdominal tenderness. There is no guarding.   Musculoskeletal:         General: No swelling, tenderness or deformity. Normal range of motion.      Cervical back: Normal range of motion.   Skin:     General: Skin is warm and dry.      Capillary Refill: Capillary refill takes less than 2 seconds.      Findings: Bruising present.   Neurological:      General: No focal deficit present.      Mental Status: She is alert and oriented to person, place, and time.      Sensory: No sensory deficit.      Motor: No weakness.   Psychiatric:         Mood and Affect: Mood normal.         Behavior: Behavior normal.          Significant Labs:  All labs within the past 24 hours have been reviewed.    Significant Imaging:  Labs:  Reviewed    Assessment/Plan:     Renal/  * Acute renal failure superimposed on stage 3b chronic kidney disease  Ms. Sorto is a 83 YOF with CKD3, HTN, breast cancer s/p lumpectomy, CHF (EF 60%), and osteoporosis who presents to the ED with elevated creatinine. Was advised by Nephrology to come to the ED due to an JOCELIN (Cr 3.3 with bl 1.2) needing IV hydration. Had continued diarrhea (10+ BM per day) after recent discharge from [facility], decreased PO intake to prevent further diarrhea. Got 1 L LR bolus in ED. Was placed on LR 50 cc/her with an improvement of her Cr to 2.4. Low urine sodium. UR renal without hydronephrosis. Bladder debris likely from known R nephrolithiasis and UTI.     - can increase IVF to 75 cc/hr  - Bicarbonate tabs 650 TID for 1 day   - ok to discharge from nephrology's stand point  - outpatient nephrology follow up  - needs 1 week follow up lab        Thank you for your consult. I will sign off. Please contact us if you have any additional questions.    Micki Hollins, DO  Nephrology  [facility]

## 2023-10-27 NOTE — HOSPITAL COURSE
Shima Sorto was placed in  observation for management of JOCELIN in setting of acute c. diff infection. Continue oral vanc. Diarrhea persistent, monitor for improvement. Given 1 L iVF in ED followed by continuous IVF overnight. Cr 3.5 -> 2.4. Continue IVF and repeat bmp. Cr improved to baseline. Prior urine cultures grew variable organisms with resistance. Urine cultures pending. Started on ampicillin, continue for now. Urine culture growing ESBL. Switched to ertapenum. ID consulted. Will have patient continue cipro x 7 days. Will extend vanc course for cdiff to cover additional days of abx use. Patient is medically ready for discharge. All questions answered at bedside. Return precautions given.

## 2023-10-27 NOTE — ASSESSMENT & PLAN NOTE
Acute cystitis     JOCELIN noted during previous admission, discharged on 10/14 with Cr of 1.6.  -Cr 3.4 on admit, bl ~1.2-1.6  -JOCELIN likely due to significant GI losses and poor oral intake.   -Continue IVF hydration.  -Follow renal panel and electrolytes closely.  -Check Renal Ultrasound - stable appearance of kidneys, does reveal debris in bladder.   -Adjust renal dose medications for Estimated Creatinine Clearance: 14 mL/min (A) (based on SCr of 2.4 mg/dL (H)).   -Avoid NSAIDs, Lovelace-II inhibitors, ACE-I, Angiotensin Receptor Blockers, or Aminoglycosides.  -UA infectious. Prior cultures reviewed, most recent gram+ bacteria sensitive to ampicillin. Treating with ampicillin for now. F/u urine cultures, pending.  -Check Urine Na 36, Cr 121, Protein 40.  - FeNa 0.5% -  JOCELIN likely pre-renal. Good response to volume repletion

## 2023-10-27 NOTE — PLAN OF CARE
Recommendations     1.) Recommend continuing with Renal diet, fluid per MD. - may liberalize diet as needed to help increase PO intake.      2.) Recommend adding Novasource Renal QD to provide an additional 22g PRO and 475 kcal to help optimize PRO/Kcal intake.      3.) Consider probiotic daily to help with diarrhea.      4.) RD to monitor wt, PO intake, skin, labs.        Goals: to meet % of EEN/EPN by next RD f/u  Nutrition Goal Status: new  Communication of RD Recs:  (POC)

## 2023-10-27 NOTE — HPI
Shima Sorto is a 83 y.o. with a PMHx of CKD3, HTN, breast cancer s/p lumpectomy, CHF (EF 60%), and osteoporosis who presents to the ED with elevated creatinine. Was advised by Nephrology to come to the ED due to elevated kidney function. The patient was recently admitted 10/9-10/14 for acute cystitis and JOCELIN. She reports she developed diarrhea on 10/16, noting ~10 episodes of watery, non bloody stool daily. She was diagnosed with c diff and started on oral vancomycin yesterday. She denies any fever/chills or abdominal pain. The patient does endorse lightheadedness, fatigue, nausea, and poor PO intake both due to decreased appetite but also because she is scared to eat because it increases the amount of diarrhea she is having. The patient denies CP, SOB, cough, vomiting, dizziness, syncope, or dysuria.    In the ED, patient initially mildly hypotensive which improved with IVF otherwise vitals stable, afebrile. CBC with stable anemia. Bicarb 17. Anion gap 17. Cr 3.4 (bl 1.2). Calcium 8.5. Albumin 3.3. The patient received LR bolus and zofran.

## 2023-10-27 NOTE — SUBJECTIVE & OBJECTIVE
Interval History: NAEON. AFVSS. Patient reports continued large volume diarrhea. No abdominal pain, fevers, chills. JOCELIN improving with IVF, Cr still ~2x above baseline. Treating UTI with ampicillin. Follow urine cultures. Anticipate dc tomorrow.    Review of Systems   Constitutional:  Positive for appetite change (decreased) and fatigue. Negative for chills, diaphoresis and fever.   HENT:  Negative for congestion, rhinorrhea and sneezing.    Eyes:  Negative for photophobia and visual disturbance.   Respiratory:  Negative for cough, shortness of breath and wheezing.    Cardiovascular:  Negative for chest pain, palpitations and leg swelling.   Gastrointestinal:  Positive for diarrhea and nausea. Negative for abdominal distention, abdominal pain, blood in stool and vomiting.   Genitourinary:  Negative for dysuria, frequency and hematuria.   Musculoskeletal:  Positive for arthralgias and back pain. Negative for gait problem, myalgias and neck pain.   Skin:  Negative for color change, pallor, rash and wound.   Neurological:  Positive for light-headedness. Negative for dizziness, syncope and weakness.   Psychiatric/Behavioral:  Negative for confusion and hallucinations. The patient is not nervous/anxious.      Objective:     Vital Signs (Most Recent):  Temp: 97.8 °F (36.6 °C) (10/27/23 1205)  Pulse: 76 (10/27/23 1205)  Resp: 18 (10/27/23 1205)  BP: 126/60 (10/27/23 1205)  SpO2: 99 % (10/27/23 1205) Vital Signs (24h Range):  Temp:  [97.5 °F (36.4 °C)-97.8 °F (36.6 °C)] 97.8 °F (36.6 °C)  Pulse:  [60-77] 76  Resp:  [16-20] 18  SpO2:  [96 %-100 %] 99 %  BP: ()/(53-86) 126/60     Weight: 49.9 kg (109 lb 15.1 oz)  Body mass index is 17.22 kg/m².    Intake/Output Summary (Last 24 hours) at 10/27/2023 1339  Last data filed at 10/27/2023 0106  Gross per 24 hour   Intake --   Output 1800 ml   Net -1800 ml         Physical Exam  Vitals and nursing note reviewed.   Constitutional:       General: She is not in acute distress.    "  Appearance: She is not toxic-appearing or diaphoretic.   HENT:      Head: Normocephalic and atraumatic.      Nose: Nose normal.      Mouth/Throat:      Mouth: Mucous membranes are dry.   Eyes:      Pupils: Pupils are equal, round, and reactive to light.   Cardiovascular:      Rate and Rhythm: Normal rate and regular rhythm.      Pulses: Normal pulses.   Pulmonary:      Effort: Pulmonary effort is normal. No respiratory distress.      Breath sounds: No wheezing, rhonchi or rales.   Abdominal:      General: Bowel sounds are normal. There is no distension.      Palpations: Abdomen is soft.      Tenderness: There is no abdominal tenderness. There is no guarding.   Musculoskeletal:         General: No swelling, tenderness or deformity. Normal range of motion.      Cervical back: Normal range of motion.   Skin:     General: Skin is warm and dry.      Capillary Refill: Capillary refill takes less than 2 seconds.      Findings: Bruising present.   Neurological:      General: No focal deficit present.      Mental Status: She is alert and oriented to person, place, and time.      Sensory: No sensory deficit.      Motor: No weakness.   Psychiatric:         Mood and Affect: Mood normal.         Speech: Speech is tangential.         Behavior: Behavior normal.      Comments: Poor historian             Significant Labs: All pertinent labs within the past 24 hours have been reviewed.  BMP:   Recent Labs   Lab 10/27/23  0339   GLU 71      K 4.2   *   CO2 16*   BUN 92*   CREATININE 2.4*   CALCIUM 8.0*   MG 2.7*     Lactic Acid: No results for input(s): "LACTATE" in the last 48 hours.    Significant Imaging: I have reviewed all pertinent imaging results/findings within the past 24 hours.  "

## 2023-10-27 NOTE — ASSESSMENT & PLAN NOTE
JOCELIN noted during previous admission, discharged on 10/14 with Cr of 1.6.  -Cr 3.4 on admit, bl ~1.2  -JOCELIN likely due to significant GI losses and poor oral intake.   -Continue IVF hydration.  -Follow renal panel and electrolytes closely.  -Check Renal Ultrasound.  -Nephrology consulted, appreciate assistance.  -Adjust renal dose medications for Estimated Creatinine Clearance: 9.7 mL/min (A) (based on SCr of 3.4 mg/dL (H)).   -Avoid NSAIDs, Lovelace-II inhibitors, ACE-I, Angiotensin Receptor Blockers, or Aminoglycosides.  -UA  -Check  Urine Na, Cr, Protein.

## 2023-10-27 NOTE — PLAN OF CARE
Problem: Adult Inpatient Plan of Care  Goal: Patient-Specific Goal (Individualized)  Outcome: Ongoing, Progressing  Goal: Readiness for Transition of Care  Outcome: Ongoing, Progressing     Problem: Functional Decline (Chronic Kidney Disease)  Goal: Optimal Functional Ability  Outcome: Ongoing, Progressing     Problem: Hematologic Alteration (Chronic Kidney Disease)  Goal: Absence of Anemia Signs and Symptoms  Outcome: Ongoing, Progressing     Problem: Oral Intake Inadequate (Chronic Kidney Disease)  Goal: Optimal Oral Intake  Outcome: Ongoing, Progressing

## 2023-10-27 NOTE — CONSULTS
[facility] - Observation 11[facility]  Adult Nutrition  Consult Note    SUMMARY     Recommendations    1.) Recommend continuing with Renal diet, fluid per MD. - may liberalize diet as needed to help increase PO intake.     2.) Recommend adding Novasource Renal QD to provide an additional 22g PRO and 475 kcal to help optimize PRO/Kcal intake.     3.) Consider probiotic daily to help with diarrhea.     4.) RD to monitor wt, PO intake, skin, labs.      Goals: to meet % of EEN/EPN by next RD f/u  Nutrition Goal Status: new  Communication of RD Recs:  (POC)    Assessment and Plan    Endocrine  Moderate malnutrition  Malnutrition Type:  Context: acute illness or injury  Level: moderate    Related to (etiology):   C Diff dx/diarrhea and poor PO intake    Signs and Symptoms (as evidenced by):   Moderate BLE edema, -11% wt loss x 1 mo, and poor PO intake x 10 days.    Malnutrition Characteristic Summary:  Weight Loss (Malnutrition): greater than 5% in 1 month  Energy Intake (Malnutrition): less than 75% for greater than 7 days  Fluid Accumulation (Malnutrition): moderate      Interventions/Recommendations (treatment strategy):  1.) Recommend continuing with Renal diet, fluid per MD. - may liberalize diet as needed to help increase PO intake. 2.) Recommend adding Novasource Renal QD to provide an additional 22g PRO and 475 kcal to help optimize PRO/Kcal intake. 3.) Consider probiotic daily to help with diarrhea. 4.) RD to monitor wt, PO intake, skin, labs.    Nutrition Diagnosis Status:   new    Malnutrition Assessment  Malnutrition Context: acute illness or injury  Malnutrition Level: moderate          Weight Loss (Malnutrition): greater than 5% in 1 month  Energy Intake (Malnutrition): less than 75% for greater than 7 days  Fluid Accumulation (Malnutrition): moderate     Reason for Assessment    Reason For Assessment: consult  Diagnosis:  (Acute renal failure superimposed on stage 3b chronic kidney disease)  Relevant Medical  "History: CKD3, HTN, breast cancer s/p lumpectomy, CHF, osteoporosis  Interdisciplinary Rounds: did not attend  General Information Comments: RD consulted for poor appetite. Pt was not seen d/t aid in the room cleaning pt. Pt has been dx w/ C. Diff. As per MD's note, pt has been having persistent diarrhea since 10/16. As per note, pt stated to MD that they have been afraid to eat food since d/t the increased volume of feces. Wt loss has been noted per chart review: -11% x 1mo (sig). Moderate BLE edema noted. With current wt loss, poor appetite and edema status, pt meets criteria for moderate malnutrition in the context of acute illness. NFPE to be performed in a more appropriate time/next RD f/u. RD to monitor.  Nutrition Discharge Planning: adequate PO intake    Nutrition Risk Screen    Nutrition Risk Screen: no indicators present    Anthropometrics    Temp: 97.8 °F (36.6 °C)  Height Method: Stated  Height: 5' 7" (170.2 cm)  Height (inches): 67 in  Weight Method: Bed Scale  Weight: 49.9 kg (109 lb 15.1 oz)  Weight (lb): 109.94 lb  Ideal Body Weight (IBW), Female: 135 lb  % Ideal Body Weight, Female (lb): 81.44 %  BMI (Calculated): 17.2  BMI Grade: 17 - 18.4 protein-energy malnutrition grade I    Lab/Procedures/Meds    Pertinent Labs Reviewed: reviewed  Pertinent Labs Comments: BUN: 92, cr: 2.4, GFR: 19.6, Ca: 8.0, M.7, alb: 2.8, CRP: 59.2  Pertinent Medications Reviewed: reviewed  Pertinent Medications Comments: abx, Fe, heparin, Renal MVI, CoQ10, anastrozole    Estimated/Assessed Needs    Weight Used For Calorie Calculations: 49.9 kg (110 lb 0.2 oz)  Energy Calorie Requirements (kcal): 1497 kcal  Energy Need Method: Kcal/kg (30kcal/kg)  Protein Requirements: 40- 50g (0.8-1.0g/kg d/t CKD3)  Weight Used For Protein Calculations: 49.9 kg (110 lb 0.2 oz)  Fluid Requirements (mL): 1ml/1kcalor per MD  Estimated Fluid Requirement Method: RDA Method  RDA Method (mL): 1497    Nutrition Prescription Ordered    Current " Diet Order: Renal Diet    Evaluation of Received Nutrient/Fluid Intake    I/O: incomplete  Energy Calories Required: not meeting needs  Protein Required: not meeting needs  Fluid Required:  (as per MD)  Comments: LBM 10/27  Tolerance: tolerating  % Intake of Estimated Energy Needs: 0 - 25 %  % Meal Intake: 0 - 25 %    Nutrition Risk    Level of Risk/Frequency of Follow-up:  (RD to f/u 1-2/week)     Monitor and Evaluation    Food and Nutrient Intake: energy intake, food and beverage intake  Food and Nutrient Adminstration: diet order  Physical Activity and Function: nutrition-related ADLs and IADLs  Anthropometric Measurements: weight, weight change, body mass index  Biochemical Data, Medical Tests and Procedures: electrolyte and renal panel, gastrointestinal profile, glucose/endocrine profile, inflammatory profile, lipid profile  Nutrition-Focused Physical Findings: overall appearance, skin     Nutrition Follow-Up    RD Follow-up?: Yes

## 2023-10-27 NOTE — ASSESSMENT & PLAN NOTE
Ms. Sorto is a 83 YOF with CKD3, HTN, breast cancer s/p lumpectomy, CHF (EF 60%), and osteoporosis who presents to the ED with elevated creatinine. Was advised by Nephrology to come to the ED due to an JOCELIN (Cr 3.3 with bl 1.2) needing IV hydration. Had continued diarrhea (10+ BM per day) after recent discharge from [facility], decreased PO intake to prevent further diarrhea. Got 1 L LR bolus in ED. Was placed on LR 50 cc/her with an improvement of her Cr to 2.4. Low urine sodium. UR renal without hydronephrosis. Bladder debris likely from known R nephrolithiasis and UTI.     - can increase IVF to 75 cc/hr  - Bicarbonate tabs 650 TID for 1 day   - ok to discharge from nephrology's stand point  - outpatient nephrology follow up  - needs 1 week follow up lab

## 2023-10-27 NOTE — ASSESSMENT & PLAN NOTE
"Patient is identified as having Diastolic (HFpEF) heart failure that is Chronic. CHF is currently controlled. Latest ECHO performed and demonstrates- Results for orders placed during the hospital encounter of 07/07/23    Echo    Interpretation Summary  · The left ventricle is normal in size with normal systolic function. The estimated ejection fraction is 60%.  · Normal right ventricular size with normal right ventricular systolic function.  · Grade II left ventricular diastolic dysfunction.  · Biatrial enlargement.  · The estimated PA systolic pressure is 52 mmHg.  · Intermediate central venous pressure (8 mmHg).  Monitor clinical status closely. Monitor on telemetry. Patient is off CHF pathway.  Monitor strict Is&Os and daily weights. Continue to stress to patient importance of self efficacy and  on diet for CHF. Last BNP reviewed- and noted below No results for input(s): "BNP", "BNPTRIAGEBLO" in the last 168 hours..  -Appears hypovolemic on exam.  -Hold jardiance, aldactone, valsartan and lasix in the setting of dehydration and JOCELIN.  "

## 2023-10-27 NOTE — SUBJECTIVE & OBJECTIVE
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       Past Surgical History:   Procedure Laterality Date    BREAST CYST ASPIRATION Right 1999    BREAST LUMPECTOMY Right 2018    with radiation    COLONOSCOPY N/A 7/24/2018    Procedure: COLONOSCOPY;  Surgeon: Juan Miguel Pena MD;  Location: [facility] ENDO (4TH FLR);  Service: Endoscopy;  Laterality: N/A;    COLONOSCOPY N/A 5/10/2023    Procedure: COLONOSCOPY;  Surgeon: Keven Garza MD;  Location: [facility] (4TH FLR);  Service: Endoscopy;  Laterality: N/A;  *Pending C-Diff*  Request rGeg  procedure order telephone encounter 5/1  PEG prep, instructions portal -LW  5/4/23 no answer for precall/mleone lpn  5/9lm    ESOPHAGOGASTRODUODENOSCOPY N/A 3/17/2023    Procedure: EGD (ESOPHAGOGASTRODUODENOSCOPY);  Surgeon: Keven Garza MD;  Location: [facility] (4TH FLR);  Service: Endoscopy;  Laterality: N/A;  Medically Urgent  3/2 instructions to portal-st    pre call attempted, no answer from pt 3/13/23 -egh    INJECTION FOR SENTINEL NODE IDENTIFICATION Right 8/17/2018    Procedure: INJECTION, FOR SENTINEL NODE IDENTIFICATION;  Surgeon: Abby Mason MD;  Location: [facility] OR [address] [place] [address];  Service: General;  Laterality: Right;    interstim placed stage 1      and removed    KIDNEY STONE SURGERY  2000    @ [facility]    MASTECTOMY, PARTIAL Right 8/17/2018    Procedure: MASTECTOMY, PARTIAL-US guided;  Surgeon: Abby Mason MD;  Location: [facility] OR [address] [place] [address];  Service: General;  Laterality: Right;    MOHS procedure      SENTINEL LYMPH NODE  BIOPSY Right 2018    Procedure: BIOPSY, LYMPH NODE, SENTINEL;  Surgeon: Abby Mason MD;  Location: [facility] OR [address] [place] [address];  Service: General;  Laterality: Right;       Review of patient's allergies indicates:   Allergen Reactions    Asparagus Rash     Current Facility-Administered Medications   Medication Frequency    acetaminophen tablet 650 mg Q6H PRN    ampicillin (OMNIPEN) 2 g in sodium chloride 0.9 % 100 mL IVPB (MB+) Daily    anastrozole tablet 1 mg Daily    coenzyme Q10 100 mg Daily    dextrose 10% bolus 125 mL 125 mL PRN    dextrose 10% bolus 250 mL 250 mL PRN    ferrous gluconate tablet 324 mg Daily with breakfast    glucagon (human recombinant) injection 1 mg PRN    glucose chewable tablet 16 g PRN    glucose chewable tablet 24 g PRN    heparin (porcine) injection 5,000 Units Q8H    hydrALAZINE injection 5 mg Q8H PRN    lactated ringers infusion Continuous    naloxone 0.4 mg/mL injection 0.02 mg PRN    ondansetron injection 4 mg Q8H PRN    sodium chloride 0.9% flush 10 mL Q12H PRN    vancomycin 125 mg/5 mL oral solution 125 mg Q6H    vitamin renal formula (B-complex-vitamin c-folic acid) 1 mg per capsule 1 capsule Daily     Family History       Problem Relation (Age of Onset)    Breast cancer Maternal Aunt    Hearing loss Son    Heart attack Father    Heart disease Father    Hyperlipidemia Son    Kidney disease Mother    Melanoma Father          Tobacco Use    Smoking status: Former     Current packs/day: 0.00     Average packs/day: 0.5 packs/day for 8.0 years (4.0 ttl pk-yrs)     Types: Cigarettes     Start date: 1956     Quit date: 1964     Years since quittin.2    Smokeless tobacco: Never   Substance and Sexual Activity    Alcohol use: Not Currently    Drug use: No    Sexual activity: Not Currently     Review of Systems   Constitutional:  Positive for appetite change (decreased). Negative for chills, diaphoresis, fatigue and fever.   HENT:  Negative for congestion, rhinorrhea and  sneezing.    Eyes:  Negative for photophobia and visual disturbance.   Respiratory:  Negative for cough, shortness of breath and wheezing.    Cardiovascular:  Negative for chest pain, palpitations and leg swelling.   Gastrointestinal:  Positive for diarrhea and nausea. Negative for abdominal distention, abdominal pain, blood in stool and vomiting.   Genitourinary:  Negative for dysuria, frequency and hematuria.   Musculoskeletal:  Negative for arthralgias, back pain, gait problem, myalgias and neck pain.   Skin:  Negative for color change, pallor, rash and wound.   Neurological:  Negative for dizziness, syncope, weakness and light-headedness.   Psychiatric/Behavioral:  Negative for confusion and hallucinations. The patient is not nervous/anxious.      Objective:     Vital Signs (Most Recent):  Temp: 97.8 °F (36.6 °C) (10/27/23 1205)  Pulse: 76 (10/27/23 1205)  Resp: 18 (10/27/23 1205)  BP: 126/60 (10/27/23 1205)  SpO2: 99 % (10/27/23 1205) Vital Signs (24h Range):  Temp:  [97.5 °F (36.4 °C)-97.8 °F (36.6 °C)] 97.8 °F (36.6 °C)  Pulse:  [60-77] 76  Resp:  [16-19] 18  SpO2:  [96 %-100 %] 99 %  BP: (126-150)/(58-86) 126/60     Weight: 49.9 kg (109 lb 15.1 oz) (10/26/23 2315)  Body mass index is 17.22 kg/m².  Body surface area is 1.54 meters squared.    I/O last 3 completed shifts:  In: -   Out: 1800 [Urine:1800]     Physical Exam  Vitals and nursing note reviewed.   Constitutional:       General: She is not in acute distress.     Appearance: She is not toxic-appearing or diaphoretic.   HENT:      Head: Normocephalic and atraumatic.      Nose: Nose normal.      Mouth/Throat:      Mouth: Mucous membranes are dry.   Eyes:      Pupils: Pupils are equal, round, and reactive to light.   Cardiovascular:      Rate and Rhythm: Normal rate and regular rhythm.      Pulses: Normal pulses.   Pulmonary:      Effort: Pulmonary effort is normal. No respiratory distress.      Breath sounds: No wheezing, rhonchi or rales.   Abdominal:       General: Bowel sounds are normal. There is no distension.      Palpations: Abdomen is soft.      Tenderness: There is no abdominal tenderness. There is no guarding.   Musculoskeletal:         General: No swelling, tenderness or deformity. Normal range of motion.      Cervical back: Normal range of motion.   Skin:     General: Skin is warm and dry.      Capillary Refill: Capillary refill takes less than 2 seconds.      Findings: Bruising present.   Neurological:      General: No focal deficit present.      Mental Status: She is alert and oriented to person, place, and time.      Sensory: No sensory deficit.      Motor: No weakness.   Psychiatric:         Mood and Affect: Mood normal.         Behavior: Behavior normal.          Significant Labs:  All labs within the past 24 hours have been reviewed.    Significant Imaging:  Labs: Reviewed

## 2023-10-27 NOTE — NURSING
Patient stated that when she had ultrasound completed she had a lot of urine in her bladder. Bladder scan performed at bedside= 681. In/out cath completed 900ml out. Urine sample sent to lab. Patient tolerated well. Post bladder scan completed = 0ml. Will continue to monitor.

## 2023-10-27 NOTE — NURSING
Nurses Note -- 4 Eyes      10/26/23   2045      Skin assessed during: Admit      [x] No Altered Skin Integrity Present    []Prevention Measures Documented      [] Yes- Altered Skin Integrity Present or Discovered   [] LDA Added if Not in Epic (Describe Wound)   [] New Altered Skin Integrity was Present on Admit and Documented in LDA   [] Wound Image Taken    Wound Care Consulted? No    Attending Nurse:  Cindi Teixeira RN/Staff Member:  Krystyna

## 2023-10-27 NOTE — ASSESSMENT & PLAN NOTE
Patient's anemia is currently controlled. S/p 0 units of PRBCs.   Current CBC reviewed-   Lab Results   Component Value Date    HGB 11.2 (L) 10/26/2023    HCT 34.3 (L) 10/26/2023     Monitor serial CBC and transfuse if patient becomes hemodynamically unstable, symptomatic or H/H drops below 7/21.

## 2023-10-27 NOTE — ASSESSMENT & PLAN NOTE
Chronic issue, followed by Urology outpatient. Patient mainly complaints of incomplete bladder emptying and was taught CIC due to incomplete bladder emptying. Patient had been performing CIC twice daily at home, however on recent visit, PVR was noted to be elevated despite CIC, so she was instructed to increase catheterization to TID. She tells me however that she is still continuing to only self-cath twice a day.     - Straight cath TID and PRN  - Continue home flomax.

## 2023-10-27 NOTE — PLAN OF CARE
Problem: Adult Inpatient Plan of Care  Goal: Plan of Care Review  Outcome: Ongoing, Progressing  Goal: Patient-Specific Goal (Individualized)  Outcome: Ongoing, Progressing  Goal: Absence of Hospital-Acquired Illness or Injury  Outcome: Ongoing, Progressing  Goal: Optimal Comfort and Wellbeing  Outcome: Ongoing, Progressing  Goal: Readiness for Transition of Care  Outcome: Ongoing, Progressing     Problem: Fluid and Electrolyte Imbalance (Acute Kidney Injury/Impairment)  Goal: Fluid and Electrolyte Balance  Outcome: Ongoing, Progressing     Problem: Oral Intake Inadequate (Acute Kidney Injury/Impairment)  Goal: Optimal Nutrition Intake  Outcome: Ongoing, Progressing     Problem: Renal Function Impairment (Acute Kidney Injury/Impairment)  Goal: Effective Renal Function  Outcome: Ongoing, Progressing     Problem: Heart Failure Comorbidity  Goal: Maintenance of Heart Failure Symptom Control  Outcome: Ongoing, Progressing     Problem: Hypertension Comorbidity  Goal: Blood Pressure in Desired Range  Outcome: Ongoing, Progressing     Problem: Obstructive Sleep Apnea Risk or Actual Comorbidity Management  Goal: Unobstructed Breathing During Sleep  Outcome: Ongoing, Progressing     Problem: Adjustment to Illness (Chronic Kidney Disease)  Goal: Optimal Coping with Chronic Illness  Outcome: Ongoing, Progressing     Problem: Electrolyte Imbalance (Chronic Kidney Disease)  Goal: Electrolyte Balance  Outcome: Ongoing, Progressing     Problem: Fluid Volume Excess (Chronic Kidney Disease)  Goal: Fluid Balance  Outcome: Ongoing, Progressing     Problem: Functional Decline (Chronic Kidney Disease)  Goal: Optimal Functional Ability  Outcome: Ongoing, Progressing     Problem: Hematologic Alteration (Chronic Kidney Disease)  Goal: Absence of Anemia Signs and Symptoms  Outcome: Ongoing, Progressing     Problem: Oral Intake Inadequate (Chronic Kidney Disease)  Goal: Optimal Oral Intake  Outcome: Ongoing, Progressing     Problem: Pain  (Chronic Kidney Disease)  Goal: Acceptable Pain Control  Outcome: Ongoing, Progressing     Problem: Renal Function Impairment (Chronic Kidney Disease)  Goal: Minimize Renal Failure Effects  Outcome: Ongoing, Progressing     Problem: Fall Injury Risk  Goal: Absence of Fall and Fall-Related Injury  Outcome: Ongoing, Progressing     Problem: Infection  Goal: Absence of Infection Signs and Symptoms  Outcome: Ongoing, Progressing     Problem: Skin Injury Risk Increased  Goal: Skin Health and Integrity  Outcome: Ongoing, Progressing

## 2023-10-27 NOTE — ASSESSMENT & PLAN NOTE
-Chronic issue. Initially hypotensive which improved with IVF.  -Hold valsartan in the setting of JOCELIN.  -PRN hydralazine for SBP >180.  -Continue to monitor BP closely.

## 2023-10-27 NOTE — HPI
Ms. Sorto is a 83 YOF with CKD3, HTN, breast cancer s/p lumpectomy, CHF (EF 60%), and osteoporosis who presents to the ED with elevated creatinine. Was advised by Nephrology to come to the ED due to an JOCELIN (Cr 3.3 with bl 1.2) needing IV hydration. The pt was admitted from 10/9 - 10/14 for acute cystitis and JOCELIN and there developed diarrhea 2/2 to c diff. Started on PO vanc 10/25. ROS positive for lightheadedness, fatigue, nausea, continued diarrhea (10+ BM per day), decreased PO intake to prevent further diarrhea. Got 1 L LR bolus in ED.

## 2023-10-28 LAB
ALBUMIN SERPL BCP-MCNC: 3.1 G/DL (ref 3.5–5.2)
ALP SERPL-CCNC: 100 U/L (ref 55–135)
ALT SERPL W/O P-5'-P-CCNC: 14 U/L (ref 10–44)
ANION GAP SERPL CALC-SCNC: 15 MMOL/L (ref 8–16)
ANISOCYTOSIS BLD QL SMEAR: SLIGHT
AST SERPL-CCNC: 20 U/L (ref 10–40)
BASOPHILS # BLD AUTO: 0.04 K/UL (ref 0–0.2)
BASOPHILS NFR BLD: 0.7 % (ref 0–1.9)
BILIRUB SERPL-MCNC: 0.4 MG/DL (ref 0.1–1)
BUN SERPL-MCNC: 73 MG/DL (ref 8–23)
BURR CELLS BLD QL SMEAR: ABNORMAL
CALCIUM SERPL-MCNC: 7.8 MG/DL (ref 8.7–10.5)
CHLORIDE SERPL-SCNC: 108 MMOL/L (ref 95–110)
CO2 SERPL-SCNC: 19 MMOL/L (ref 23–29)
CREAT SERPL-MCNC: 1.7 MG/DL (ref 0.5–1.4)
DIFFERENTIAL METHOD: ABNORMAL
EOSINOPHIL # BLD AUTO: 0.1 K/UL (ref 0–0.5)
EOSINOPHIL NFR BLD: 1 % (ref 0–8)
ERYTHROCYTE [DISTWIDTH] IN BLOOD BY AUTOMATED COUNT: 14.2 % (ref 11.5–14.5)
EST. GFR  (NO RACE VARIABLE): 29.6 ML/MIN/1.73 M^2
GLUCOSE SERPL-MCNC: 97 MG/DL (ref 70–110)
HCT VFR BLD AUTO: 35.5 % (ref 37–48.5)
HGB BLD-MCNC: 11.6 G/DL (ref 12–16)
IMM GRANULOCYTES # BLD AUTO: 0.08 K/UL (ref 0–0.04)
IMM GRANULOCYTES NFR BLD AUTO: 1.3 % (ref 0–0.5)
LYMPHOCYTES # BLD AUTO: 1.6 K/UL (ref 1–4.8)
LYMPHOCYTES NFR BLD: 27.2 % (ref 18–48)
MAGNESIUM SERPL-MCNC: 2.5 MG/DL (ref 1.6–2.6)
MCH RBC QN AUTO: 30.8 PG (ref 27–31)
MCHC RBC AUTO-ENTMCNC: 32.7 G/DL (ref 32–36)
MCV RBC AUTO: 94 FL (ref 82–98)
MONOCYTES # BLD AUTO: 0.6 K/UL (ref 0.3–1)
MONOCYTES NFR BLD: 9.9 % (ref 4–15)
NEUTROPHILS # BLD AUTO: 3.6 K/UL (ref 1.8–7.7)
NEUTROPHILS NFR BLD: 59.9 % (ref 38–73)
NRBC BLD-RTO: 0 /100 WBC
PHOSPHATE SERPL-MCNC: 1.8 MG/DL (ref 2.7–4.5)
PLATELET # BLD AUTO: 287 K/UL (ref 150–450)
PLATELET BLD QL SMEAR: ABNORMAL
PMV BLD AUTO: 12.2 FL (ref 9.2–12.9)
POIKILOCYTOSIS BLD QL SMEAR: SLIGHT
POTASSIUM SERPL-SCNC: 3.6 MMOL/L (ref 3.5–5.1)
PROT SERPL-MCNC: 6.8 G/DL (ref 6–8.4)
RBC # BLD AUTO: 3.77 M/UL (ref 4–5.4)
SODIUM SERPL-SCNC: 142 MMOL/L (ref 136–145)
WBC # BLD AUTO: 5.95 K/UL (ref 3.9–12.7)

## 2023-10-28 PROCEDURE — 85025 COMPLETE CBC W/AUTO DIFF WBC: CPT | Performed by: NURSE PRACTITIONER

## 2023-10-28 PROCEDURE — 27000207 HC ISOLATION

## 2023-10-28 PROCEDURE — 80053 COMPREHEN METABOLIC PANEL: CPT | Performed by: NURSE PRACTITIONER

## 2023-10-28 PROCEDURE — 84100 ASSAY OF PHOSPHORUS: CPT | Performed by: NURSE PRACTITIONER

## 2023-10-28 PROCEDURE — 11000001 HC ACUTE MED/SURG PRIVATE ROOM

## 2023-10-28 PROCEDURE — 36415 COLL VENOUS BLD VENIPUNCTURE: CPT | Performed by: NURSE PRACTITIONER

## 2023-10-28 PROCEDURE — 63600175 PHARM REV CODE 636 W HCPCS

## 2023-10-28 PROCEDURE — 83735 ASSAY OF MAGNESIUM: CPT | Performed by: NURSE PRACTITIONER

## 2023-10-28 PROCEDURE — 63600175 PHARM REV CODE 636 W HCPCS: Performed by: NURSE PRACTITIONER

## 2023-10-28 PROCEDURE — 25000003 PHARM REV CODE 250: Performed by: NURSE PRACTITIONER

## 2023-10-28 PROCEDURE — 25000003 PHARM REV CODE 250

## 2023-10-28 RX ORDER — SODIUM CHLORIDE, SODIUM LACTATE, POTASSIUM CHLORIDE, CALCIUM CHLORIDE 600; 310; 30; 20 MG/100ML; MG/100ML; MG/100ML; MG/100ML
INJECTION, SOLUTION INTRAVENOUS CONTINUOUS
Status: ACTIVE | OUTPATIENT
Start: 2023-10-28 | End: 2023-10-29

## 2023-10-28 RX ORDER — SODIUM,POTASSIUM PHOSPHATES 280-250MG
1 POWDER IN PACKET (EA) ORAL
Status: COMPLETED | OUTPATIENT
Start: 2023-10-28 | End: 2023-10-29

## 2023-10-28 RX ORDER — SODIUM BICARBONATE 650 MG/1
650 TABLET ORAL 3 TIMES DAILY
Qty: 1 TABLET | Refills: 0 | Status: CANCELLED | OUTPATIENT
Start: 2023-10-28 | End: 2023-10-29

## 2023-10-28 RX ORDER — AMPICILLIN 500 MG/1
500 CAPSULE ORAL EVERY 6 HOURS
Qty: 12 CAPSULE | Refills: 0 | Status: CANCELLED | OUTPATIENT
Start: 2023-10-28 | End: 2023-10-31

## 2023-10-28 RX ORDER — SODIUM BICARBONATE 650 MG/1
650 TABLET ORAL 3 TIMES DAILY
Status: COMPLETED | OUTPATIENT
Start: 2023-10-28 | End: 2023-10-28

## 2023-10-28 RX ADMIN — SODIUM BICARBONATE 650 MG TABLET 650 MG: at 09:10

## 2023-10-28 RX ADMIN — ANASTROZOLE 1 MG: 1 TABLET, COATED ORAL at 08:10

## 2023-10-28 RX ADMIN — AMPICILLIN 2 G: 2 INJECTION, POWDER, FOR SOLUTION INTRAMUSCULAR; INTRAVENOUS at 08:10

## 2023-10-28 RX ADMIN — Medication 324 MG: at 08:10

## 2023-10-28 RX ADMIN — POTASSIUM & SODIUM PHOSPHATES POWDER PACK 280-160-250 MG 1 PACKET: 280-160-250 PACK at 09:10

## 2023-10-28 RX ADMIN — SODIUM BICARBONATE 650 MG TABLET 650 MG: at 08:10

## 2023-10-28 RX ADMIN — Medication 100 MG: at 05:10

## 2023-10-28 RX ADMIN — HEPARIN SODIUM 5000 UNITS: 5000 INJECTION INTRAVENOUS; SUBCUTANEOUS at 03:10

## 2023-10-28 RX ADMIN — POTASSIUM & SODIUM PHOSPHATES POWDER PACK 280-160-250 MG 1 PACKET: 280-160-250 PACK at 08:10

## 2023-10-28 RX ADMIN — POTASSIUM & SODIUM PHOSPHATES POWDER PACK 280-160-250 MG 1 PACKET: 280-160-250 PACK at 05:10

## 2023-10-28 RX ADMIN — VANCOMYCIN HYDROCHLORIDE 125 MG: KIT at 05:10

## 2023-10-28 RX ADMIN — HEPARIN SODIUM 5000 UNITS: 5000 INJECTION INTRAVENOUS; SUBCUTANEOUS at 05:10

## 2023-10-28 RX ADMIN — ONDANSETRON 4 MG: 2 INJECTION INTRAMUSCULAR; INTRAVENOUS at 09:10

## 2023-10-28 RX ADMIN — NEPHROCAP 1 CAPSULE: 1 CAP ORAL at 08:10

## 2023-10-28 RX ADMIN — SODIUM BICARBONATE 650 MG TABLET 650 MG: at 03:10

## 2023-10-28 RX ADMIN — SODIUM CHLORIDE, POTASSIUM CHLORIDE, SODIUM LACTATE AND CALCIUM CHLORIDE: 600; 310; 30; 20 INJECTION, SOLUTION INTRAVENOUS at 09:10

## 2023-10-28 RX ADMIN — VANCOMYCIN HYDROCHLORIDE 125 MG: KIT at 12:10

## 2023-10-28 NOTE — ASSESSMENT & PLAN NOTE
Acute cystitis   JOCELIN noted during previous admission, discharged on 10/14 with Cr of 1.6.  -Cr 3.4 on admit, bl ~1.2-1.6  -Cr improved to 1.7 10/28  -JOCELIN likely due to significant GI losses and poor oral intake.   -Continue IVF hydration.  -Follow renal panel and electrolytes closely.  -Check Renal Ultrasound - stable appearance of kidneys, does reveal debris in bladder.   -Adjust renal dose medications for Estimated Creatinine Clearance: 19.8 mL/min (A) (based on SCr of 1.7 mg/dL (H)).   -Avoid NSAIDs, Lovelace-II inhibitors, ACE-I, Angiotensin Receptor Blockers, or Aminoglycosides.  -UA infectious. Prior cultures reviewed, most recent gram+ bacteria sensitive to ampicillin. Treating with ampicillin for now. F/u urine cultures, pending.  -Check Urine Na 36, Cr 121, Protein 40.  - FeNa 0.5% -  JOCELIN likely pre-renal. Good response to volume repletion

## 2023-10-28 NOTE — PLAN OF CARE
Problem: Adult Inpatient Plan of Care  Goal: Plan of Care Review  Outcome: Ongoing, Progressing  Goal: Patient-Specific Goal (Individualized)  Outcome: Ongoing, Progressing  Goal: Absence of Hospital-Acquired Illness or Injury  Outcome: Ongoing, Progressing  Goal: Optimal Comfort and Wellbeing  Outcome: Ongoing, Progressing  Goal: Readiness for Transition of Care  Outcome: Ongoing, Progressing     Problem: Fluid and Electrolyte Imbalance (Acute Kidney Injury/Impairment)  Goal: Fluid and Electrolyte Balance  Outcome: Ongoing, Progressing     Problem: Oral Intake Inadequate (Acute Kidney Injury/Impairment)  Goal: Optimal Nutrition Intake  Outcome: Ongoing, Progressing     Problem: Renal Function Impairment (Acute Kidney Injury/Impairment)  Goal: Effective Renal Function  Outcome: Ongoing, Progressing     Problem: Heart Failure Comorbidity  Goal: Maintenance of Heart Failure Symptom Control  Outcome: Ongoing, Progressing     Problem: Hypertension Comorbidity  Goal: Blood Pressure in Desired Range  Outcome: Ongoing, Progressing     Problem: Obstructive Sleep Apnea Risk or Actual Comorbidity Management  Goal: Unobstructed Breathing During Sleep  Outcome: Ongoing, Progressing     Problem: Adjustment to Illness (Chronic Kidney Disease)  Goal: Optimal Coping with Chronic Illness  Outcome: Ongoing, Progressing     Problem: Electrolyte Imbalance (Chronic Kidney Disease)  Goal: Electrolyte Balance  Outcome: Ongoing, Progressing     Problem: Fluid Volume Excess (Chronic Kidney Disease)  Goal: Fluid Balance  Outcome: Ongoing, Progressing     Problem: Functional Decline (Chronic Kidney Disease)  Goal: Optimal Functional Ability  Outcome: Ongoing, Progressing     Problem: Pain (Chronic Kidney Disease)  Goal: Acceptable Pain Control  Outcome: Ongoing, Progressing     Problem: Renal Function Impairment (Chronic Kidney Disease)  Goal: Minimize Renal Failure Effects  Outcome: Ongoing, Progressing     Problem: Fall Injury Risk  Goal:  Absence of Fall and Fall-Related Injury  Outcome: Ongoing, Progressing     Problem: Infection  Goal: Absence of Infection Signs and Symptoms  Outcome: Ongoing, Progressing     Problem: Skin Injury Risk Increased  Goal: Skin Health and Integrity  Outcome: Ongoing, Progressing

## 2023-10-28 NOTE — SUBJECTIVE & OBJECTIVE
Interval History: NAEON. AF, VSS. Patient seen and evaluate by me. No new complaints this AM. She is eager to get home. Cr improved from 2.4>> 1.7. Discussed need for urine culture results given multiple species grown in the past with variable resistant to certain abx. Patient understanding. Has not had a bowel movement today. Will continue vanc for c.diff treatment. Hopeful discharge in the AM.     Review of Systems   Constitutional:  Negative for chills and fever.   Respiratory:  Negative for chest tightness and shortness of breath.    Cardiovascular:  Negative for chest pain and leg swelling.   Gastrointestinal:  Negative for abdominal pain and nausea.   Neurological:  Negative for dizziness and weakness.     Objective:     Vital Signs (Most Recent):  Temp: 97.4 °F (36.3 °C) (10/28/23 1150)  Pulse: 72 (10/28/23 1150)  Resp: 16 (10/28/23 1150)  BP: (!) 144/70 (10/28/23 1150)  SpO2: 97 % (10/28/23 1150) Vital Signs (24h Range):  Temp:  [97.4 °F (36.3 °C)-98.2 °F (36.8 °C)] 97.4 °F (36.3 °C)  Pulse:  [64-73] 72  Resp:  [14-16] 16  SpO2:  [97 %-100 %] 97 %  BP: (117-175)/(56-74) 144/70     Weight: 49.9 kg (109 lb 15.1 oz)  Body mass index is 17.22 kg/m².    Intake/Output Summary (Last 24 hours) at 10/28/2023 1315  Last data filed at 10/27/2023 1403  Gross per 24 hour   Intake --   Output 700 ml   Net -700 ml         Physical Exam  Vitals and nursing note reviewed.   Constitutional:       Appearance: She is well-developed.   Eyes:      Pupils: Pupils are equal, round, and reactive to light.   Cardiovascular:      Rate and Rhythm: Normal rate and regular rhythm.   Pulmonary:      Effort: Pulmonary effort is normal.      Breath sounds: Normal breath sounds.   Abdominal:      Palpations: Abdomen is soft.      Tenderness: There is no abdominal tenderness.   Musculoskeletal:         General: No tenderness.   Skin:     General: Skin is warm and dry.   Neurological:      Mental Status: She is alert and oriented to person,  place, and time.   Psychiatric:         Behavior: Behavior normal.             Significant Labs: All pertinent labs within the past 24 hours have been reviewed.    Significant Imaging: I have reviewed all pertinent imaging results/findings within the past 24 hours.

## 2023-10-28 NOTE — ASSESSMENT & PLAN NOTE
-Reports diarrhea starting 10/16. Recent stool studies +c diff. Started on oral vancomycin for 14 days yesterday.  -Continue oral vancomycin qid- will need continued treatment after finishing abx for UTI  -Afebrile, no leukocytosis.  -Special contact precautions.

## 2023-10-28 NOTE — PROGRESS NOTES
[facility] [address] [place] [address] Medicine  Progress Note    Patient Name: Shima Sorto  MRN: [id number]  Patient Class: IP- Inpatient   Admission Date: 10/26/2023  Length of Stay: 1 days  Attending Physician: Beth Olguin MD  Primary Care Provider: Carmen Milton MD        Subjective:     Principal Problem:Acute renal failure superimposed on stage 3b chronic kidney disease        HPI:  Shima Sorto is a 83 y.o. with a PMHx of CKD3, HTN, breast cancer s/p lumpectomy, CHF (EF 60%), and osteoporosis who presents to the ED with elevated creatinine. Was advised by Nephrology to come to the ED due to elevated kidney function. The patient was recently admitted 10/9-10/14 for acute cystitis and JOCELIN. She reports she developed diarrhea on 10/16, noting ~10 episodes of watery, non bloody stool daily. She was diagnosed with c diff and started on oral vancomycin yesterday. She denies any fever/chills or abdominal pain. The patient does endorse lightheadedness, fatigue, nausea, and poor PO intake both due to decreased appetite but also because she is scared to eat because it increases the amount of diarrhea she is having. The patient denies CP, SOB, cough, vomiting, dizziness, syncope, or dysuria.    In the ED, patient initially mildly hypotensive which improved with IVF otherwise vitals stable, afebrile. CBC with stable anemia. Bicarb 17. Anion gap 17. Cr 3.4 (bl 1.2). Calcium 8.5. Albumin 3.3. The patient received LR bolus and zofran.       Overview/Hospital Course:  Shima Sorto was placed in  observation for management of JOCELIN in setting of acute c. diff infection. Continue oral vanc. Diarrhea persistent, monitor for improvement. Given 1 L iVF in ED followed by continuous IVF overnight. Cr 3.5 -> 2.4. Continue IVF and repeat bmp. Prior urine cultures grew variable organisms with resistance. Urine cultures pending. Started on ampicillin, continue for now.      Interval History: NAEON. AF, VSS.  Patient seen and evaluate by me. No new complaints this AM. She is eager to get home. Cr improved from 2.4>> 1.7. Discussed need for urine culture results given multiple species grown in the past with variable resistant to certain abx. Patient understanding. Has not had a bowel movement today. Will continue vanc for c.diff treatment. Hopeful discharge in the AM.     Review of Systems   Constitutional:  Negative for chills and fever.   Respiratory:  Negative for chest tightness and shortness of breath.    Cardiovascular:  Negative for chest pain and leg swelling.   Gastrointestinal:  Negative for abdominal pain and nausea.   Neurological:  Negative for dizziness and weakness.     Objective:     Vital Signs (Most Recent):  Temp: 97.4 °F (36.3 °C) (10/28/23 1150)  Pulse: 72 (10/28/23 1150)  Resp: 16 (10/28/23 1150)  BP: (!) 144/70 (10/28/23 1150)  SpO2: 97 % (10/28/23 1150) Vital Signs (24h Range):  Temp:  [97.4 °F (36.3 °C)-98.2 °F (36.8 °C)] 97.4 °F (36.3 °C)  Pulse:  [64-73] 72  Resp:  [14-16] 16  SpO2:  [97 %-100 %] 97 %  BP: (117-175)/(56-74) 144/70     Weight: 49.9 kg (109 lb 15.1 oz)  Body mass index is 17.22 kg/m².    Intake/Output Summary (Last 24 hours) at 10/28/2023 1315  Last data filed at 10/27/2023 1403  Gross per 24 hour   Intake --   Output 700 ml   Net -700 ml         Physical Exam  Vitals and nursing note reviewed.   Constitutional:       Appearance: She is well-developed.   Eyes:      Pupils: Pupils are equal, round, and reactive to light.   Cardiovascular:      Rate and Rhythm: Normal rate and regular rhythm.   Pulmonary:      Effort: Pulmonary effort is normal.      Breath sounds: Normal breath sounds.   Abdominal:      Palpations: Abdomen is soft.      Tenderness: There is no abdominal tenderness.   Musculoskeletal:         General: No tenderness.   Skin:     General: Skin is warm and dry.   Neurological:      Mental Status: She is alert and oriented to person, place, and time.   Psychiatric:          Behavior: Behavior normal.             Significant Labs: All pertinent labs within the past 24 hours have been reviewed.    Significant Imaging: I have reviewed all pertinent imaging results/findings within the past 24 hours.      Assessment/Plan:      * Acute renal failure superimposed on stage 3b chronic kidney disease  Acute cystitis   JOCELIN noted during previous admission, discharged on 10/14 with Cr of 1.6.  -Cr 3.4 on admit, bl ~1.2-1.6  -Cr improved to 1.7 10/28  -JOCELIN likely due to significant GI losses and poor oral intake.   -Continue IVF hydration.  -Follow renal panel and electrolytes closely.  -Check Renal Ultrasound - stable appearance of kidneys, does reveal debris in bladder.   -Adjust renal dose medications for Estimated Creatinine Clearance: 19.8 mL/min (A) (based on SCr of 1.7 mg/dL (H)).   -Avoid NSAIDs, Lovelace-II inhibitors, ACE-I, Angiotensin Receptor Blockers, or Aminoglycosides.  -UA infectious. Prior cultures reviewed, most recent gram+ bacteria sensitive to ampicillin. Treating with ampicillin for now. F/u urine cultures, pending.  -Check Urine Na 36, Cr 121, Protein 40.  - FeNa 0.5% -  JOCELIN likely pre-renal. Good response to volume repletion    Moderate malnutrition  Nutrition consulted. Most recent weight and BMI monitored-     Measurements:  Wt Readings from Last 1 Encounters:   10/26/23 49.9 kg (109 lb 15.1 oz)   Body mass index is 17.22 kg/m².    Patient has been screened and assessed by RD.    Malnutrition Type:  Context: acute illness or injury  Level: moderate    Malnutrition Characteristic Summary:  Weight Loss (Malnutrition): greater than 5% in 1 month  Energy Intake (Malnutrition): less than 75% for greater than 7 days  Fluid Accumulation (Malnutrition): moderate    Interventions/Recommendations (treatment strategy):  1.) Recommend continuing with Renal diet, fluid per MD. - may liberalize diet as needed to help increase PO intake. 2.) Recommend adding Novasource Renal QD to  "provide an additional 22g PRO and 475 kcal to help optimize PRO/Kcal intake. 3.) Consider probiotic daily to help with diarrhea. 4.) RD to monitor wt, PO intake, skin, labs.    Clostridium difficile infection  -Reports diarrhea starting 10/16. Recent stool studies +c diff. Started on oral vancomycin for 14 days yesterday.  -Continue oral vancomycin qid- will need continued treatment after finishing abx for UTI  -Afebrile, no leukocytosis.  -Special contact precautions.    ACC/AHA stage C heart failure with preserved ejection fraction  Patient is identified as having Diastolic (HFpEF) heart failure that is Chronic. CHF is currently controlled. Latest ECHO performed and demonstrates- Results for orders placed during the hospital encounter of 07/07/23    Echo    Interpretation Summary  · The left ventricle is normal in size with normal systolic function. The estimated ejection fraction is 60%.  · Normal right ventricular size with normal right ventricular systolic function.  · Grade II left ventricular diastolic dysfunction.  · Biatrial enlargement.  · The estimated PA systolic pressure is 52 mmHg.  · Intermediate central venous pressure (8 mmHg).  Monitor clinical status closely. Monitor on telemetry. Patient is off CHF pathway.  Monitor strict Is&Os and daily weights. Continue to stress to patient importance of self efficacy and  on diet for CHF. Last BNP reviewed- and noted below No results for input(s): "BNP", "BNPTRIAGEBLO" in the last 168 hours..  -Appears hypovolemic on exam.  -Hold jardiance, aldactone, valsartan and lasix in the setting of dehydration and JOCELIN.    Normochromic normocytic anemia  Patient's anemia is currently controlled. S/p 0 units of PRBCs.   Current CBC reviewed-   Lab Results   Component Value Date    HGB 11.6 (L) 10/28/2023    HCT 35.5 (L) 10/28/2023     Monitor serial CBC and transfuse if patient becomes hemodynamically unstable, symptomatic or H/H drops below 7/21.     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  -Follows with hem/onc outpatient.    Age-related osteoporosis with current pathological fracture with routine healing  -Continue calcium and Vit D supplementation.  -Receives prolia q6 months.    Urinary retention  Chronic issue, followed by Urology outpatient. Patient mainly complaints of incomplete bladder emptying and was taught CIC due to incomplete bladder emptying. Patient had been performing CIC twice daily at home, however on recent visit, PVR was noted to be elevated despite CIC, so she was instructed to increase catheterization to TID. She tells me however that she is still continuing to only self-cath twice a day.  - Straight cath TID and PRN  - Continue home flomax.    Hypertension  Chronic  -Hold valsartan in the setting of JOCELIN.  -PRN hydralazine for SBP >180.  -Continue to monitor BP closely.      VTE Risk Mitigation (From admission, onward)         Ordered     heparin (porcine) injection 5,000 Units  Every 8 hours         10/26/23 1846     IP VTE HIGH RISK PATIENT  Once         10/26/23 1845     Place sequential compression device  Until discontinued         10/26/23 1845                Discharge Planning   PHILLIP: 10/28/2023     Code Status: Full Code   Is the patient medically ready for discharge?: No    Reason for patient still in hospital (select all that apply): Laboratory test and Treatment  Discharge Plan A: Home            Maribell Thompson PA-C  Department of Hospital Medicine   [facility]

## 2023-10-28 NOTE — ASSESSMENT & PLAN NOTE
Chronic  -Hold valsartan in the setting of JOCELIN.  -PRN hydralazine for SBP >180.  -Continue to monitor BP closely.

## 2023-10-28 NOTE — ASSESSMENT & PLAN NOTE
Nutrition consulted. Most recent weight and BMI monitored-     Measurements:  Wt Readings from Last 1 Encounters:   10/26/23 49.9 kg (109 lb 15.1 oz)   Body mass index is 17.22 kg/m².    Patient has been screened and assessed by RD.    Malnutrition Type:  Context: acute illness or injury  Level: moderate    Malnutrition Characteristic Summary:  Weight Loss (Malnutrition): greater than 5% in 1 month  Energy Intake (Malnutrition): less than 75% for greater than 7 days  Fluid Accumulation (Malnutrition): moderate    Interventions/Recommendations (treatment strategy):  1.) Recommend continuing with Renal diet, fluid per MD. - may liberalize diet as needed to help increase PO intake. 2.) Recommend adding Novasource Renal QD to provide an additional 22g PRO and 475 kcal to help optimize PRO/Kcal intake. 3.) Consider probiotic daily to help with diarrhea. 4.) RD to monitor wt, PO intake, skin, labs.

## 2023-10-28 NOTE — ASSESSMENT & PLAN NOTE
Patient's anemia is currently controlled. S/p 0 units of PRBCs.   Current CBC reviewed-   Lab Results   Component Value Date    HGB 11.6 (L) 10/28/2023    HCT 35.5 (L) 10/28/2023     Monitor serial CBC and transfuse if patient becomes hemodynamically unstable, symptomatic or H/H drops below 7/21.

## 2023-10-29 VITALS
OXYGEN SATURATION: 97 % | HEART RATE: 70 BPM | HEIGHT: 67 IN | BODY MASS INDEX: 17.26 KG/M2 | RESPIRATION RATE: 17 BRPM | TEMPERATURE: 98 F | DIASTOLIC BLOOD PRESSURE: 70 MMHG | SYSTOLIC BLOOD PRESSURE: 130 MMHG | WEIGHT: 109.94 LBS

## 2023-10-29 LAB
ALBUMIN SERPL BCP-MCNC: 2.9 G/DL (ref 3.5–5.2)
ALP SERPL-CCNC: 73 U/L (ref 55–135)
ALT SERPL W/O P-5'-P-CCNC: 14 U/L (ref 10–44)
ANION GAP SERPL CALC-SCNC: 12 MMOL/L (ref 8–16)
ANISOCYTOSIS BLD QL SMEAR: SLIGHT
AST SERPL-CCNC: 23 U/L (ref 10–40)
BACTERIA UR CULT: ABNORMAL
BASOPHILS # BLD AUTO: 0.03 K/UL (ref 0–0.2)
BASOPHILS NFR BLD: 0.7 % (ref 0–1.9)
BILIRUB SERPL-MCNC: 0.4 MG/DL (ref 0.1–1)
BUN SERPL-MCNC: 51 MG/DL (ref 8–23)
BURR CELLS BLD QL SMEAR: ABNORMAL
CALCIUM SERPL-MCNC: 7.5 MG/DL (ref 8.7–10.5)
CHLORIDE SERPL-SCNC: 107 MMOL/L (ref 95–110)
CO2 SERPL-SCNC: 19 MMOL/L (ref 23–29)
CREAT SERPL-MCNC: 1.3 MG/DL (ref 0.5–1.4)
DIFFERENTIAL METHOD: ABNORMAL
EOSINOPHIL # BLD AUTO: 0 K/UL (ref 0–0.5)
EOSINOPHIL NFR BLD: 0.5 % (ref 0–8)
ERYTHROCYTE [DISTWIDTH] IN BLOOD BY AUTOMATED COUNT: 14.5 % (ref 11.5–14.5)
EST. GFR  (NO RACE VARIABLE): 40.8 ML/MIN/1.73 M^2
GLUCOSE SERPL-MCNC: 96 MG/DL (ref 70–110)
HCT VFR BLD AUTO: 31.6 % (ref 37–48.5)
HGB BLD-MCNC: 10.5 G/DL (ref 12–16)
HYPOCHROMIA BLD QL SMEAR: ABNORMAL
IMM GRANULOCYTES # BLD AUTO: 0.08 K/UL (ref 0–0.04)
IMM GRANULOCYTES NFR BLD AUTO: 1.9 % (ref 0–0.5)
LYMPHOCYTES # BLD AUTO: 1.3 K/UL (ref 1–4.8)
LYMPHOCYTES NFR BLD: 31 % (ref 18–48)
MAGNESIUM SERPL-MCNC: 2.2 MG/DL (ref 1.6–2.6)
MCH RBC QN AUTO: 31 PG (ref 27–31)
MCHC RBC AUTO-ENTMCNC: 33.2 G/DL (ref 32–36)
MCV RBC AUTO: 93 FL (ref 82–98)
MONOCYTES # BLD AUTO: 0.5 K/UL (ref 0.3–1)
MONOCYTES NFR BLD: 12.3 % (ref 4–15)
NEUTROPHILS # BLD AUTO: 2.3 K/UL (ref 1.8–7.7)
NEUTROPHILS NFR BLD: 53.6 % (ref 38–73)
NRBC BLD-RTO: 0 /100 WBC
OVALOCYTES BLD QL SMEAR: ABNORMAL
PHOSPHATE SERPL-MCNC: 1.2 MG/DL (ref 2.7–4.5)
PLATELET # BLD AUTO: 247 K/UL (ref 150–450)
PMV BLD AUTO: 12.2 FL (ref 9.2–12.9)
POIKILOCYTOSIS BLD QL SMEAR: SLIGHT
POLYCHROMASIA BLD QL SMEAR: ABNORMAL
POTASSIUM SERPL-SCNC: 3.8 MMOL/L (ref 3.5–5.1)
PROT SERPL-MCNC: 6.2 G/DL (ref 6–8.4)
RBC # BLD AUTO: 3.39 M/UL (ref 4–5.4)
SCHISTOCYTES BLD QL SMEAR: ABNORMAL
SODIUM SERPL-SCNC: 138 MMOL/L (ref 136–145)
WBC # BLD AUTO: 4.32 K/UL (ref 3.9–12.7)

## 2023-10-29 PROCEDURE — 63600175 PHARM REV CODE 636 W HCPCS: Performed by: NURSE PRACTITIONER

## 2023-10-29 PROCEDURE — 63600175 PHARM REV CODE 636 W HCPCS

## 2023-10-29 PROCEDURE — 99223 PR INITIAL HOSPITAL CARE,LEVL III: ICD-10-PCS | Mod: ,,, | Performed by: PHYSICIAN ASSISTANT

## 2023-10-29 PROCEDURE — 25000003 PHARM REV CODE 250

## 2023-10-29 PROCEDURE — 84100 ASSAY OF PHOSPHORUS: CPT | Performed by: NURSE PRACTITIONER

## 2023-10-29 PROCEDURE — 36415 COLL VENOUS BLD VENIPUNCTURE: CPT | Performed by: NURSE PRACTITIONER

## 2023-10-29 PROCEDURE — 80053 COMPREHEN METABOLIC PANEL: CPT | Performed by: NURSE PRACTITIONER

## 2023-10-29 PROCEDURE — 99223 1ST HOSP IP/OBS HIGH 75: CPT | Mod: ,,, | Performed by: PHYSICIAN ASSISTANT

## 2023-10-29 PROCEDURE — 83735 ASSAY OF MAGNESIUM: CPT | Performed by: NURSE PRACTITIONER

## 2023-10-29 PROCEDURE — 85025 COMPLETE CBC W/AUTO DIFF WBC: CPT | Performed by: NURSE PRACTITIONER

## 2023-10-29 PROCEDURE — 25000003 PHARM REV CODE 250: Performed by: NURSE PRACTITIONER

## 2023-10-29 RX ORDER — VANCOMYCIN HYDROCHLORIDE 125 MG/1
125 CAPSULE ORAL EVERY 6 HOURS
Qty: 84 CAPSULE | Refills: 0 | Status: SHIPPED | OUTPATIENT
Start: 2023-10-29 | End: 2023-11-10 | Stop reason: SDUPTHER

## 2023-10-29 RX ORDER — SODIUM,POTASSIUM PHOSPHATES 280-250MG
2 POWDER IN PACKET (EA) ORAL
Status: DISCONTINUED | OUTPATIENT
Start: 2023-10-29 | End: 2023-10-29 | Stop reason: HOSPADM

## 2023-10-29 RX ORDER — CIPROFLOXACIN 500 MG/1
500 TABLET ORAL 2 TIMES DAILY
Qty: 14 TABLET | Refills: 0 | Status: SHIPPED | OUTPATIENT
Start: 2023-10-29 | End: 2023-11-05

## 2023-10-29 RX ADMIN — ANASTROZOLE 1 MG: 1 TABLET, COATED ORAL at 08:10

## 2023-10-29 RX ADMIN — VANCOMYCIN HYDROCHLORIDE 125 MG: KIT at 06:10

## 2023-10-29 RX ADMIN — HEPARIN SODIUM 5000 UNITS: 5000 INJECTION INTRAVENOUS; SUBCUTANEOUS at 08:10

## 2023-10-29 RX ADMIN — Medication 324 MG: at 08:10

## 2023-10-29 RX ADMIN — NEPHROCAP 1 CAPSULE: 1 CAP ORAL at 08:10

## 2023-10-29 RX ADMIN — POTASSIUM & SODIUM PHOSPHATES POWDER PACK 280-160-250 MG 2 PACKET: 280-160-250 PACK at 12:10

## 2023-10-29 RX ADMIN — VANCOMYCIN HYDROCHLORIDE 125 MG: KIT at 12:10

## 2023-10-29 RX ADMIN — POTASSIUM & SODIUM PHOSPHATES POWDER PACK 280-160-250 MG 2 PACKET: 280-160-250 PACK at 03:10

## 2023-10-29 RX ADMIN — POTASSIUM & SODIUM PHOSPHATES POWDER PACK 280-160-250 MG 1 PACKET: 280-160-250 PACK at 06:10

## 2023-10-29 RX ADMIN — ERTAPENEM 1 G: 1 INJECTION INTRAMUSCULAR; INTRAVENOUS at 11:10

## 2023-10-29 RX ADMIN — AMPICILLIN 2 G: 2 INJECTION, POWDER, FOR SOLUTION INTRAMUSCULAR; INTRAVENOUS at 08:10

## 2023-10-29 RX ADMIN — VANCOMYCIN HYDROCHLORIDE 125 MG: KIT at 01:10

## 2023-10-29 RX ADMIN — HEPARIN SODIUM 5000 UNITS: 5000 INJECTION INTRAVENOUS; SUBCUTANEOUS at 01:10

## 2023-10-29 NOTE — ASSESSMENT & PLAN NOTE
- UA infectious  - started on ampicillin 2/2 previous cultures  - urine culture growing ESBL  - started on ertapenem  - ID consulted, appreciate assistance

## 2023-10-29 NOTE — PLAN OF CARE
[facility]  Discharge Reassessment    Primary Care Provider: Carmen Milton MD    Expected Discharge Date: 10/30/2023    Reassessment (most recent)       Discharge Reassessment - 10/29/23 1245          Discharge Reassessment    Assessment Type Discharge Planning Reassessment     Did the patient's condition or plan change since previous assessment? No     Discharge Plan discussed with: Patient     Communicated PHILLIP with patient/caregiver Yes     Discharge Plan A Home Health;Home     Discharge Plan B Home     DME Needed Upon Discharge  none     Transition of Care Barriers None     Why the patient remains in the hospital Requires continued medical care        Post-Acute Status    Post-Acute Authorization Other     Other Status Awaiting f/u Appts     Discharge Delays Procedure Scheduling (IR, OR, Labs, Echo, Cath, Echo, EEG)                   MARLI Khanna, LCSW  Weekend -[facility]  [facility] [phone]

## 2023-10-29 NOTE — SUBJECTIVE & OBJECTIVE
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       Past Surgical History:   Procedure Laterality Date    BREAST CYST ASPIRATION Right 1999    BREAST LUMPECTOMY Right 2018    with radiation    COLONOSCOPY N/A 7/24/2018    Procedure: COLONOSCOPY;  Surgeon: Juan Miguel Pena MD;  Location: [facility] ENDO (4TH FLR);  Service: Endoscopy;  Laterality: N/A;    COLONOSCOPY N/A 5/10/2023    Procedure: COLONOSCOPY;  Surgeon: Keven Garza MD;  Location: [facility] (4TH FLR);  Service: Endoscopy;  Laterality: N/A;  *Pending C-Diff*  Request Greg  procedure order telephone encounter 5/1  PEG prep, instructions portal -LW  5/4/23 no answer for precall/mleone lpn  5/9lm    ESOPHAGOGASTRODUODENOSCOPY N/A 3/17/2023    Procedure: EGD (ESOPHAGOGASTRODUODENOSCOPY);  Surgeon: Keven Garza MD;  Location: [facility] (4TH FLR);  Service: Endoscopy;  Laterality: N/A;  Medically Urgent  3/2 instructions to portal-st    pre call attempted, no answer from pt 3/13/23 -egh    INJECTION FOR SENTINEL NODE IDENTIFICATION Right 8/17/2018    Procedure: INJECTION, FOR SENTINEL NODE IDENTIFICATION;  Surgeon: Abby Mason MD;  Location: [facility] OR [address] [place] [address];  Service: General;  Laterality: Right;    interstim placed stage 1      and removed    KIDNEY STONE SURGERY  2000    @ [facility]    MASTECTOMY, PARTIAL Right 8/17/2018    Procedure: MASTECTOMY, PARTIAL-US guided;  Surgeon: Abby Mason MD;  Location: [facility] OR [address] [place] [address];  Service: General;  Laterality: Right;    MOHS procedure      SENTINEL LYMPH NODE  BIOPSY Right 8/17/2018    Procedure: BIOPSY, LYMPH NODE, SENTINEL;  Surgeon: Abyb Mason MD;  Location: [facility] OR [address] [place] [address];  Service: General;  Laterality: Right;       Review of patient's allergies indicates:   Allergen Reactions    Asparagus Rash       Medications:  Medications Prior to Admission   Medication Sig    acetaminophen (TYLENOL) 650 MG TbSR Take 1,300 mg by mouth 2 (two) times daily as needed (Severe pain).    anastrozole (ARIMIDEX) 1 mg Tab TAKE 1 TABLET(1 MG) BY MOUTH EVERY DAY    coQ10, ubiquinol, 100 mg Cap Take 100 mg by mouth once daily.    denosumab (PROLIA) 60 mg/mL Syrg Inject 60 mg into the skin every 6 (six) months.    empagliflozin (JARDIANCE) 10 mg tablet Take 1 tablet (10 mg total) by mouth once daily.    ferrous gluconate (FERGON) 324 MG tablet Take 1 tablet (324 mg total) by mouth daily with breakfast.    fish oil-E-fatty acid5-sfxp222 400-5 mg-unit Cap Take 1 capsule by mouth Daily.    furosemide (LASIX) 20 MG tablet Take 2 tablets (40 mg total) by mouth once daily. Hold until you see the doctor for you follow up appointment    PRESERVISION AREDS-2 250-90-40-1 mg Cap Take 1 tablet by mouth 2 (two) times daily.    propylene glycol/peg 400/PF (SYSTANE, PF, OPHT) Place 1 drop into both eyes 2 (two) times daily as needed (Dry eye).    SALONPAS, LIDOCAINE, TOP Apply 1 patch topically daily as needed (Pain).    spironolactone (ALDACTONE) 25 MG tablet Take 1 tablet (25 mg total) by mouth once daily. Hold until you see the doctor for you follow up appointment    tamsulosin (FLOMAX) 0.4 mg Cap Take 1 capsule (0.4 mg total) by mouth once daily. Hold until you see the doctor for you follow up appointment    UNABLE TO FIND Rufus-Mag-Citrate with D3 & K2 - Takes 2 tablets by mouth every morning, 1 at midday and 1 every evening.    UNABLE TO FIND Take 4 capsules by mouth Daily. Nutrafol    valsartan (DIOVAN) 160 MG tablet Take 1 tablet (160 mg total) by mouth once daily. Hold until you see the doctor  for you follow up appointment    vancomycin (VANCOCIN) 125 MG capsule Take 1 capsule (125 mg total) by mouth every 6 (six) hours. for 14 days    vitamin renal formula, B-complex-vitamin c-folic acid, (NEPHROCAPS) 1 mg Cap Take 1 capsule by mouth once daily.     Antibiotics (From admission, onward)      Start     Stop Route Frequency Ordered    10/29/23 1145  ertapenem (INVANZ) 1 g in sodium chloride 0.9 % 100 mL IVPB (MB+)         -- IV Every 24 hours (non-standard times) 10/29/23 1040    10/26/23 2000  vancomycin 125 mg/5 mL oral solution 125 mg  (C. difficile Infection (CDI) Treatment Order Panel)         11/08/23 0309 Oral Every 6 hours 10/26/23 1845          Antifungals (From admission, onward)      None          Antivirals (From admission, onward)      None             Immunization History   Administered Date(s) Administered    COVID-19 MRNA, LN-S PF (MODERNA HALF 0.25 ML DOSE) 05/17/2022    COVID-19, MRNA, LN-S, PF (MODERNA FULL 0.5 ML DOSE) 01/27/2021, 02/24/2021, 11/09/2021    COVID-19, mRNA, LNP-S, bivalent booster, PF (Moderna Omicron)12 + YEARS 10/05/2022, 05/08/2023, 09/01/2023    Influenza 10/29/2007, 01/20/2010, 09/03/2010, 11/22/2011, 12/18/2013    Influenza (FLUAD) - Quadrivalent - Adjuvanted - PF *Preferred* (65+) 09/09/2020, 10/08/2021, 09/12/2022, 09/12/2023    Influenza - High Dose - PF (65 years and older) 12/06/2014, 11/25/2015, 02/21/2017, 12/05/2017, 10/10/2018, 09/12/2019    Influenza - Quadrivalent - PF *Preferred* (6 months and older) 10/29/2007, 01/20/2010, 09/03/2010, 11/22/2011, 12/18/2013    Influenza Split 12/18/2013    Influenza Whole 01/27/2013    Pneumococcal Conjugate - 13 Valent 02/21/2017    Pneumococcal Polysaccharide - 23 Valent 11/01/2006, 11/25/2015    Tdap 12/06/2014    Zoster 08/23/2012    Zoster Recombinant 05/23/2018, 08/30/2018       Family History       Problem Relation (Age of Onset)    Breast cancer Maternal Aunt    Hearing loss Son    Heart attack Father    Heart  disease Father    Hyperlipidemia Son    Kidney disease Mother    Melanoma Father          Social History     Socioeconomic History    Marital status:    Occupational History    Occupation:      Employer: [employer]   Tobacco Use    Smoking status: Former     Current packs/day: 0.00     Average packs/day: 0.5 packs/day for 8.0 years (4.0 ttl pk-yrs)     Types: Cigarettes     Start date: 1956     Quit date: 1964     Years since quittin.2    Smokeless tobacco: Never   Substance and Sexual Activity    Alcohol use: Not Currently    Drug use: No    Sexual activity: Not Currently     Social Determinants of Health     Financial Resource Strain: Low Risk  (10/11/2023)    Overall Financial Resource Strain (CARDIA)     Difficulty of Paying Living Expenses: Not very hard   Food Insecurity: No Food Insecurity (10/11/2023)    Hunger Vital Sign     Worried About Running Out of Food in the Last Year: Never true     Ran Out of Food in the Last Year: Never true   Transportation Needs: No Transportation Needs (10/11/2023)    PRAPARE - Transportation     Lack of Transportation (Medical): No     Lack of Transportation (Non-Medical): No   Physical Activity: Insufficiently Active (10/11/2023)    Exercise Vital Sign     Days of Exercise per Week: 4 days     Minutes of Exercise per Session: 30 min   Stress: No Stress Concern Present (10/11/2023)    [place] [employer] - Occupational Stress Questionnaire     Feeling of Stress : Not at all   Social Connections: Unknown (10/11/2023)    Social Connection and Isolation Panel [NHANES]     Frequency of Communication with Friends and Family: More than three times a week     Frequency of Social Gatherings with Friends and Family: More than three times a week     Active Member of Clubs or Organizations: Yes     Attends Club or Organization Meetings: 1 to 4 times per year     Marital Status:    Housing Stability: Low  Risk  (10/11/2023)    Housing Stability Vital Sign     Unable to Pay for Housing in the Last Year: No     Number of Places Lived in the Last Year: 1     Unstable Housing in the Last Year: No     Review of Systems   Constitutional:  Negative for chills and fever.   Respiratory:  Negative for chest tightness and shortness of breath.    Cardiovascular:  Negative for chest pain and leg swelling.   Gastrointestinal:  Positive for diarrhea. Negative for abdominal distention, abdominal pain and nausea.   Genitourinary:  Positive for difficulty urinating (self caths). Negative for dysuria, frequency, hematuria and urgency.   Neurological:  Negative for dizziness and weakness.   Psychiatric/Behavioral:  Negative for agitation and confusion. The patient is not nervous/anxious.      Objective:     Vital Signs (Most Recent):  Temp: 98 °F (36.7 °C) (10/29/23 1157)  Pulse: 70 (10/29/23 1157)  Resp: 17 (10/29/23 1157)  BP: 130/70 (10/29/23 1157)  SpO2: 97 % (10/29/23 1157) Vital Signs (24h Range):  Temp:  [97.5 °F (36.4 °C)-98 °F (36.7 °C)] 98 °F (36.7 °C)  Pulse:  [63-75] 70  Resp:  [15-18] 17  SpO2:  [97 %-100 %] 97 %  BP: (115-190)/() 130/70     Weight: 49.9 kg (109 lb 15.1 oz)  Body mass index is 17.22 kg/m².    Estimated Creatinine Clearance: 25.8 mL/min (based on SCr of 1.3 mg/dL).     Physical Exam  Vitals and nursing note reviewed.   Neurological:      Mental Status: She is alert.   Psychiatric:         Mood and Affect: Mood normal.         Behavior: Behavior normal.         Thought Content: Thought content normal.         Judgment: Judgment normal.          Significant Labs: BMP:   Recent Labs   Lab 10/29/23  0606   GLU 96      K 3.8      CO2 19*   BUN 51*   CREATININE 1.3   CALCIUM 7.5*   MG 2.2     CBC:   Recent Labs   Lab 10/28/23  0550 10/29/23  0606   WBC 5.95 4.32   HGB 11.6* 10.5*   HCT 35.5* 31.6*    247     CMP:   Recent Labs   Lab 10/28/23  0550 10/29/23  0606    138   K 3.6 3.8     107   CO2 19* 19*   GLU 97 96   BUN 73* 51*   CREATININE 1.7* 1.3   CALCIUM 7.8* 7.5*   PROT 6.8 6.2   ALBUMIN 3.1* 2.9*   BILITOT 0.4 0.4   ALKPHOS 100 73   AST 20 23   ALT 14 14   ANIONGAP 15 12     Microbiology Results (last 7 days)       Procedure Component Value Units Date/Time    Urine culture [[id number]]  (Abnormal)  (Susceptibility) Collected: 10/27/23 0150    Order Status: Completed Specimen: Urine Updated: 10/29/23 1016     Urine Culture, Routine KLEBSIELLA OXYTOCA ESBL  >100,000 cfu/ml      Narrative:      Specimen Source->Urine          Urine Culture:   Recent Labs   Lab 06/21/23  1618 08/11/23  0958 10/06/23  1106 10/09/23  2214 10/27/23  0150   LABURIN CITROBACTER KOSERI  >100,000 cfu/ml  * ENTEROBACTER HORMAECHEI  >100,000 cfu/ml  * ESCHERICHIA COLI  >100,000 cfu/ml  * ENTEROCOCCUS FAECALIS  50,000 - 99,999 cfu/ml  * KLEBSIELLA OXYTOCA ESBL  >100,000 cfu/ml  *     Urine Studies:   Recent Labs   Lab 10/27/23  0150   COLORU Yellow  Yellow   APPEARANCEUA Hazy*  Hazy*   PHUR 6.0  6.0   SPECGRAV 1.020  1.020   PROTEINUA 1+*  1+*   GLUCUA Negative  Negative   KETONESU Trace*  Trace*   BILIRUBINUA Negative  Negative   OCCULTUA 2+*  2+*   NITRITE Positive*  Positive*   LEUKOCYTESUR 3+*  3+*   RBCUA 6*   WBCUA 74*   BACTERIA Many*   HYALINECASTS 0     Recent Lab Results         10/29/23  0606        Albumin 2.9       ALP 73       ALT 14       Anion Gap 12       Aniso Slight       AST 23       Baso # 0.03       Basophil % 0.7       BILIRUBIN TOTAL 0.4  Comment: For infants and newborns, interpretation of results should be based  on gestational age, weight and in agreement with clinical  observations.    Premature Infant recommended reference ranges:  Up to 24 hours.............<8.0 mg/dL  Up to 48 hours............<12.0 mg/dL  3-5 days..................<15.0 mg/dL  6-29 days.................<15.0 mg/dL         BUN 51       [place]/Echinocytes Occasional       Calcium 7.5       Chloride 107        CO2 19       Creatinine 1.3       Differential Method Automated       eGFR 40.8       Eos # 0.0       Eosinophil % 0.5       Fragmented Cells Occasional       Glucose 96       Gran # (ANC) 2.3       Gran % 53.6       Hematocrit 31.6       Hemoglobin 10.5       Hypo Occasional       Immature Grans (Abs) 0.08  Comment: Mild elevation in immature granulocytes is non specific and   can be seen in a variety of conditions including stress response,   acute inflammation, trauma and pregnancy. Correlation with other   laboratory and clinical findings is essential.         Immature Granulocytes 1.9       Lymph # 1.3       Lymph % 31.0       Magnesium  2.2       MCH 31.0       MCHC 33.2       MCV 93       Mono # 0.5       Mono % 12.3       MPV 12.2       nRBC 0       Ovalocytes Occasional       Phosphorus Level 1.2       Platelet Count 247       Poikilocytosis Slight       Poly Occasional       Potassium 3.8       PROTEIN TOTAL 6.2       RBC 3.39       RDW 14.5       Sodium 138       WBC 4.32               Significant Imaging:     Imaging Results              X-Ray Lumbar Spine 2 Or 3 Views (Final result)  Result time 10/27/23 01:06:52      Final result by Roe Mata DO (10/27/23 01:06:52)                   Impression:      No acute fracture or dislocation of the lumbar spine.      Electronically signed by: Roe Mata  Date:    10/27/2023  Time:    01:06               Narrative:    EXAMINATION:  XR LUMBAR SPINE 2 OR 3 VIEWS    CLINICAL HISTORY:  s/p fall;    TECHNIQUE:  AP, lateral, and spot radiographs of the lumbar spine.    COMPARISON:  DEXA from 11/14/2019.  CT of the abdomen and pelvis from 10/09/2023.    FINDINGS:  There is no evidence of an acute fracture or dislocation.  There is levoconvex scoliosis centered at approximately L3-L4.  There is grade 1 anterolisthesis of L5 on S1. The vertebral body heights are maintained.  There is moderate disc height loss at L1-L2, L2-L3, and L3-L4 and mild disc height  loss at L5-S1.  There is a Schmorl's node within the L3 superior endplate, unchanged from prior.  There are multilevel degenerative changes, not significantly unchanged from prior.  Remaining visualized osseous structures are intact.                                       X-Ray Hips Bilateral 2 View Inc AP Pelvis (Final result)  Result time 10/27/23 01:03:26      Final result by Roe Mata DO (10/27/23 01:03:26)                   Impression:      No acute fracture or dislocation.      Electronically signed by: Roe Mata  Date:    10/27/2023  Time:    01:03               Narrative:    EXAMINATION:  XR HIPS BILATERAL 2 VIEW INCL AP PELVIS    CLINICAL HISTORY:  s/p fall;    TECHNIQUE:  AP view of the pelvis and frogleg lateral views of both hips were performed.    COMPARISON:  Radiographs from 03/08/2019.  CT of the abdomen and pelvis from 10/09/2023.    FINDINGS:  There is no evidence of an acute fracture or dislocation.  Alignment is normal.  There are degenerative changes of the partially visualized lower lumbar spine and the bilateral sacroiliac joints.  There is a bubbly lesion overlying the right femoral neck, corresponding to a soft tissue calcification within the posterior soft tissues seen on CT abdomen and pelvis.                                        US Retroperitoneal Complete (Final result)  Result time 10/27/23 01:02:38      Final result by Alf Blake MD (10/27/23 01:02:38)                   Impression:      No hydronephrosis.    Right nephrolithiasis.    Left kidney minimally complex cyst.    Findings suggestive for chronic medical renal disease.    Intraluminal debris within the bladder, for which the DDX generally includes purulent debris, blood products, or other cellular debris (such as from occult urinary tract neoplasm).  Correlate clinically, including with urinalysis.    Occasional ectopic cardiac beats are suggested on pulsed Doppler waveforms.  Correlate clinically.    This  report was flagged in Epic as abnormal.    Electronically signed by resident: Harjit Thompson  Date:    10/27/2023  Time:    00:32    Electronically signed by: Alf Blake  Date:    10/27/2023  Time:    01:02               Narrative:    EXAMINATION:  US RETROPERITONEAL COMPLETE    CLINICAL HISTORY:  acute on chronic kidney injury;    TECHNIQUE:  Ultrasound of the kidneys and urinary bladder was performed including color flow and Doppler evaluation of the kidneys.    COMPARISON:  U/S retroperitoneal 10/12/2023    FINDINGS:  Right kidney: The right kidney measures 11.0 cm.  Mild cortical thinning.  Resistive index measures 0.83.    No solid mass. Multiple echogenic foci demonstrating twinkle artifact compatible with renal stones, largest measuring 10 mm in the lower pole.  No hydronephrosis.    Left kidney: The left kidney measures 11.4 cm.  Mild cortical thinning.  Resistive index measures 0.89.  Minimally complex cyst in the lower pole measuring 2.0 x 1.8 x 1.7 cm with thin 2 mm septation.  No solid mass. No renal stone. No hydronephrosis.    Bladder is distended containing intraluminal debris.    Splenic resistive index: 0.77.    Some pulsed Doppler arterial waveforms demonstrate an occasional early systolic upstroke, suspicious for ectopic cardiac beats.

## 2023-10-29 NOTE — HPI
Shima Sorto is an 83 year old female with a PMHx of CKD, HTN, breast cancer s/p lumpectomy on anastrozole, CHF, urinary retention requiring CIC c/b UTIs who presents to the ED with an JOCELIN.    Patient reports being hospitalized earlier this month for acute cystitis and hyponatremia. Sodium corrected. Urine culture grew enterococcus and patient treated with Ampicillin. She was discharged on Amoxil. She developed diffuse watery diarrhea, noting ~10 episodes of watery stool daily. She presented to the ED and was diagnosed with an JOCELIN and C. diff and started on oral vancomycin. JOCELIN improving with IVF. Urine culture grew ESBL Klebsiella.  ID consulted for abx recommendations.

## 2023-10-29 NOTE — PROGRESS NOTES
Pharmacist Renal Dose Adjustment Note    Shima Sorto is a 83 y.o. female being treated with the medication, ampicillin.    Patient Data:    Vital Signs (Most Recent):  Temp: 97.7 °F (36.5 °C) (10/29/23 0804)  Pulse: 66 (10/29/23 0804)  Resp: 17 (10/29/23 0804)  BP: 120/61 (10/29/23 0804)  SpO2: 100 % (10/29/23 0804) Vital Signs (72h Range):  Temp:  [97.4 °F (36.3 °C)-98.2 °F (36.8 °C)]   Pulse:  [60-77]   Resp:  [14-20]   BP: ()/()   SpO2:  [96 %-100 %]      Recent Labs   Lab 10/27/23  0339 10/28/23  0550 10/29/23  0606   CREATININE 2.4* 1.7* 1.3     Serum creatinine: 1.3 mg/dL 10/29/23 0606  Estimated creatinine clearance: 25.8 mL/min    Ampicillin 2 g IV every 24 hours will be changed to ampicillin 2 g IV every 12 hours    Pharmacist's Name: Mariela Edwards  Pharmacist's Extension: [phone]

## 2023-10-29 NOTE — ASSESSMENT & PLAN NOTE
Resolved  JOCELIN noted during previous admission, discharged on 10/14 with Cr of 1.6.  -Cr 3.4 on admit, bl ~1.2-1.6  -Cr improved to 1.3 10/29  -JOCELIN likely due to significant GI losses and poor oral intake.   -Continue IVF hydration.  -Follow renal panel and electrolytes closely.  -Check Renal Ultrasound - stable appearance of kidneys, does reveal debris in bladder.   -Adjust renal dose medications for Estimated Creatinine Clearance: 25.8 mL/min (based on SCr of 1.3 mg/dL).   -Avoid NSAIDs, Lovelace-II inhibitors, ACE-I, Angiotensin Receptor Blockers, or Aminoglycosides.  -UA infectious. Prior cultures reviewed, most recent gram+ bacteria sensitive to ampicillin. Treating with ampicillin for now. F/u urine cultures, pending.  -Check Urine Na 36, Cr 121, Protein 40.  - FeNa 0.5% -  JOCELIN likely pre-renal. Good response to volume repletion

## 2023-10-29 NOTE — ASSESSMENT & PLAN NOTE
Patient's anemia is currently controlled. S/p 0 units of PRBCs.   Current CBC reviewed-   Lab Results   Component Value Date    HGB 10.5 (L) 10/29/2023    HCT 31.6 (L) 10/29/2023     Monitor serial CBC and transfuse if patient becomes hemodynamically unstable, symptomatic or H/H drops below 7/21.

## 2023-10-29 NOTE — SUBJECTIVE & OBJECTIVE
Interval History: NAEON. AF, VSS. Patient seen and evaluated by me. Reports new new complaints. Urine culture growing ESBL. ID consulted for abx recs. Switched to ertapenem for coverage. Patient denies urinary symptoms currently. Stools are better controlled and are more loose than watery. Will continue vanc.    Review of Systems   Constitutional:  Negative for chills and fever.   Respiratory:  Negative for chest tightness and shortness of breath.    Cardiovascular:  Negative for chest pain and leg swelling.   Gastrointestinal:  Positive for diarrhea (2/2 c.diff). Negative for abdominal pain and nausea.   Neurological:  Negative for dizziness and weakness.     Objective:     Vital Signs (Most Recent):  Temp: 97.7 °F (36.5 °C) (10/29/23 0804)  Pulse: 66 (10/29/23 0804)  Resp: 17 (10/29/23 0804)  BP: 120/61 (10/29/23 0804)  SpO2: 100 % (10/29/23 0804) Vital Signs (24h Range):  Temp:  [97.4 °F (36.3 °C)-98 °F (36.7 °C)] 97.7 °F (36.5 °C)  Pulse:  [63-75] 66  Resp:  [15-18] 17  SpO2:  [97 %-100 %] 100 %  BP: (115-190)/() 120/61     Weight: 49.9 kg (109 lb 15.1 oz)  Body mass index is 17.22 kg/m².    Intake/Output Summary (Last 24 hours) at 10/29/2023 1137  Last data filed at 10/29/2023 0532  Gross per 24 hour   Intake --   Output 300 ml   Net -300 ml         Physical Exam  Vitals and nursing note reviewed.   Constitutional:       Appearance: She is well-developed.   HENT:      Head: Normocephalic and atraumatic.   Eyes:      Pupils: Pupils are equal, round, and reactive to light.   Cardiovascular:      Rate and Rhythm: Normal rate and regular rhythm.   Pulmonary:      Effort: Pulmonary effort is normal.      Breath sounds: Normal breath sounds.   Abdominal:      Palpations: Abdomen is soft.      Tenderness: There is no abdominal tenderness.   Musculoskeletal:         General: No tenderness.   Skin:     General: Skin is warm and dry.   Neurological:      Mental Status: She is alert and oriented to person, place,  and time.   Psychiatric:         Behavior: Behavior normal.             Significant Labs: All pertinent labs within the past 24 hours have been reviewed.    Significant Imaging: I have reviewed all pertinent imaging results/findings within the past 24 hours.

## 2023-10-29 NOTE — CONSULTS
[facility]ont[facility] - [facility]  Infectious Disease  Consult Note    Patient Name: Shima Sorto  MRN: [id number]  Admission Date: 10/26/2023  Hospital Length of Stay: 2 days  Attending Physician: Beth Olguin MD  Primary Care Provider: Carmen Milton MD     Isolation Status: Contact    Patient information was obtained from patient and past medical records.      Inpatient consult to Infectious Diseases  Consult performed by: Oliva Lockwood PA-C  Consult ordered by: Maribell Thompson PA-C        Assessment/Plan:     ID  Clostridium difficile infection  83 year old female with history of CKD, HTN, breast cancer s/p lumpectomy on anastrozole, CHF, urinary retention requiring CIC c/b UTIs admitted for C. Diff colitis and JOCELIN likely secondary to dehydration. UA positive on admit and urine culture grew ESBL Klebsiella.  ID consulted for abx recommendations.    Recommendations  · Continue Vancomycin 125 mg PO QID as planned  · Patient would like to forgo home IV antibiotics. Recommend oral ciprofloxacin  X 7 days for cystitis   · Recommend Urology follow after discharge to discuss UTI prevention measures. Patient educated that repeated antibiotic exposures poses risk for recurrent C diff infections and further antibiotic resistance and should be avoided if possible. In the future, would only check UA/urine culture if patient symptomatic  · ID will sign off.        Thank you for the consult. Please secure chat for any questions.  Oliva Lockwood PA-C      Subjective:     Principal Problem: Acute cystitis    HPI: Shima Sorto is an 83 year old female with a PMHx of CKD, HTN, breast cancer s/p lumpectomy on anastrozole, CHF, urinary retention requiring CIC c/b UTIs who presents to the ED with an JOCELIN.    Patient reports being hospitalized earlier this month for acute cystitis and hyponatremia. Sodium corrected. Urine culture grew enterococcus and patient treated with Ampicillin. She was discharged on  Amoxil. She developed diffuse watery diarrhea, noting ~10 episodes of watery stool daily. She presented to the ED and was diagnosed with an JOCELIN and C. diff and started on oral vancomycin. JOCELIN improving with IVF. Urine culture grew ESBL Klebsiella.  ID consulted for abx recommendations.      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    Visual impairment     reading glasses       Past Surgical History:   Procedure Laterality Date    BREAST CYST ASPIRATION Right 1999    BREAST LUMPECTOMY Right 2018    with radiation    COLONOSCOPY N/A 7/24/2018    Procedure: COLONOSCOPY;  Surgeon: Juan Miguel Pena MD;  Location: [facility] (4TH FLR);  Service: Endoscopy;  Laterality: N/A;    COLONOSCOPY N/A 5/10/2023    Procedure: COLONOSCOPY;  Surgeon: Keven Garza MD;  Location: [facility] (4TH FLR);  Service: Endoscopy;  Laterality: N/A;  *Pending C-Diff*  Request Greg  procedure order telephone encounter 5/1  PEG prep, instructions portal -LW  5/4/23 no answer for precall/mleone lpn  5/9lm    ESOPHAGOGASTRODUODENOSCOPY N/A 3/17/2023    Procedure: EGD (ESOPHAGOGASTRODUODENOSCOPY);  Surgeon: Keven Garza MD;  Location: [facility] (4TH FLR);  Service: Endoscopy;  Laterality: N/A;  Medically Urgent  3/2 instructions to portal-st    pre call attempted, no answer from pt 3/13/23 -eg    INJECTION FOR SENTINEL NODE IDENTIFICATION Right 8/17/2018    Procedure: INJECTION, FOR SENTINEL NODE IDENTIFICATION;  Surgeon: Abby Mason MD;  [facility];  Service: General;  Laterality:  Right;    interstim placed stage 1      and removed    KIDNEY STONE SURGERY  2000    @ [facility]    MASTECTOMY, PARTIAL Right 8/17/2018    Procedure: MASTECTOMY, PARTIAL-US guided;  Surgeon: Abby Mason MD;  Location: [facility] OR [address] [place] [address];  Service: General;  Laterality: Right;    MOHS procedure      SENTINEL LYMPH NODE BIOPSY Right 8/17/2018    Procedure: BIOPSY, LYMPH NODE, SENTINEL;  Surgeon: Abby Mason MD;  Location: [facility] OR [address] [place] [address];  Service: General;  Laterality: Right;       Review of patient's allergies indicates:   Allergen Reactions    Asparagus Rash       Medications:  Medications Prior to Admission   Medication Sig    acetaminophen (TYLENOL) 650 MG TbSR Take 1,300 mg by mouth 2 (two) times daily as needed (Severe pain).    anastrozole (ARIMIDEX) 1 mg Tab TAKE 1 TABLET(1 MG) BY MOUTH EVERY DAY    coQ10, ubiquinol, 100 mg Cap Take 100 mg by mouth once daily.    denosumab (PROLIA) 60 mg/mL Syrg Inject 60 mg into the skin every 6 (six) months.    empagliflozin (JARDIANCE) 10 mg tablet Take 1 tablet (10 mg total) by mouth once daily.    ferrous gluconate (FERGON) 324 MG tablet Take 1 tablet (324 mg total) by mouth daily with breakfast.    fish oil-E-fatty acid5-nzlx430 400-5 mg-unit Cap Take 1 capsule by mouth Daily.    furosemide (LASIX) 20 MG tablet Take 2 tablets (40 mg total) by mouth once daily. Hold until you see the doctor for you follow up appointment    PRESERVISION AREDS-2 250-90-40-1 mg Cap Take 1 tablet by mouth 2 (two) times daily.    propylene glycol/peg 400/PF (SYSTANE, PF, OPHT) Place 1 drop into both eyes 2 (two) times daily as needed (Dry eye).    SALONPAS, LIDOCAINE, TOP Apply 1 patch topically daily as needed (Pain).    spironolactone (ALDACTONE) 25 MG tablet Take 1 tablet (25 mg total) by mouth once daily. Hold until you see the doctor for you follow up appointment    tamsulosin (FLOMAX) 0.4 mg Cap Take 1 capsule (0.4 mg total) by mouth once daily. Hold until you see  the doctor for you follow up appointment    UNABLE TO FIND Rufus-Mag-Citrate with D3 & K2 - Takes 2 tablets by mouth every morning, 1 at midday and 1 every evening.    UNABLE TO FIND Take 4 capsules by mouth Daily. Nutrafol    valsartan (DIOVAN) 160 MG tablet Take 1 tablet (160 mg total) by mouth once daily. Hold until you see the doctor for you follow up appointment    vancomycin (VANCOCIN) 125 MG capsule Take 1 capsule (125 mg total) by mouth every 6 (six) hours. for 14 days    vitamin renal formula, B-complex-vitamin c-folic acid, (NEPHROCAPS) 1 mg Cap Take 1 capsule by mouth once daily.     Antibiotics (From admission, onward)      Start     Stop Route Frequency Ordered    10/29/23 1145  ertapenem (INVANZ) 1 g in sodium chloride 0.9 % 100 mL IVPB (MB+)         -- IV Every 24 hours (non-standard times) 10/29/23 1040    10/26/23 2000  vancomycin 125 mg/5 mL oral solution 125 mg  (C. difficile Infection (CDI) Treatment Order Panel)         11/08/23 2359 Oral Every 6 hours 10/26/23 1845          Antifungals (From admission, onward)      None          Antivirals (From admission, onward)      None             Immunization History   Administered Date(s) Administered    COVID-19 MRNA, LN-S PF (MODERNA HALF 0.25 ML DOSE) 05/17/2022    COVID-19, MRNA, LN-S, PF (MODERNA FULL 0.5 ML DOSE) 01/27/2021, 02/24/2021, 11/09/2021    COVID-19, mRNA, LNP-S, bivalent booster, PF (Moderna Omicron)12 + YEARS 10/05/2022, 05/08/2023, 09/01/2023    Influenza 10/29/2007, 01/20/2010, 09/03/2010, 11/22/2011, 12/18/2013    Influenza (FLUAD) - Quadrivalent - Adjuvanted - PF *Preferred* (65+) 09/09/2020, 10/08/2021, 09/12/2022, 09/12/2023    Influenza - High Dose - PF (65 years and older) 12/06/2014, 11/25/2015, 02/21/2017, 12/05/2017, 10/10/2018, 09/12/2019    Influenza - Quadrivalent - PF *Preferred* (6 months and older) 10/29/2007, 01/20/2010, 09/03/2010, 11/22/2011, 12/18/2013    Influenza Split 12/18/2013    Influenza Whole  2013    Pneumococcal Conjugate - 13 Valent 2017    Pneumococcal Polysaccharide - 23 Valent 2006, 2015    Tdap 2014    Zoster 2012    Zoster Recombinant 2018, 2018       Family History       Problem Relation (Age of Onset)    Breast cancer Maternal Aunt    Hearing loss Son    Heart attack Father    Heart disease Father    Hyperlipidemia Son    Kidney disease Mother    Melanoma Father          Social History     Socioeconomic History    Marital status:    Occupational History    Occupation:      Employer: [employer]   Tobacco Use    Smoking status: Former     Current packs/day: 0.00     Average packs/day: 0.5 packs/day for 8.0 years (4.0 ttl pk-yrs)     Types: Cigarettes     Start date: 1956     Quit date: 1964     Years since quittin.2    Smokeless tobacco: Never   Substance and Sexual Activity    Alcohol use: Not Currently    Drug use: No    Sexual activity: Not Currently     Social Determinants of Health     Financial Resource Strain: Low Risk  (10/11/2023)    Overall Financial Resource Strain (CARDIA)     Difficulty of Paying Living Expenses: Not very hard   Food Insecurity: No Food Insecurity (10/11/2023)    Hunger Vital Sign     Worried About Running Out of Food in the Last Year: Never true     Ran Out of Food in the Last Year: Never true   Transportation Needs: No Transportation Needs (10/11/2023)    PRAPARE - Transportation     Lack of Transportation (Medical): No     Lack of Transportation (Non-Medical): No   Physical Activity: Insufficiently Active (10/11/2023)    Exercise Vital Sign     Days of Exercise per Week: 4 days     Minutes of Exercise per Session: 30 min   Stress: No Stress Concern Present (10/11/2023)    [employer] - Occupational Stress Questionnaire     Feeling of Stress : Not at all   Social Connections: Unknown (10/11/2023)    Social Connection  and Isolation Panel [NHANES]     Frequency of Communication with Friends and Family: More than three times a week     Frequency of Social Gatherings with Friends and Family: More than three times a week     Active Member of Clubs or Organizations: Yes     Attends Club or Organization Meetings: 1 to 4 times per year     Marital Status:    Housing Stability: Low Risk  (10/11/2023)    Housing Stability Vital Sign     Unable to Pay for Housing in the Last Year: No     Number of Places Lived in the Last Year: 1     Unstable Housing in the Last Year: No     Review of Systems   Constitutional:  Negative for chills and fever.   Respiratory:  Negative for chest tightness and shortness of breath.    Cardiovascular:  Negative for chest pain and leg swelling.   Gastrointestinal:  Positive for diarrhea. Negative for abdominal distention, abdominal pain and nausea.   Genitourinary:  Positive for difficulty urinating (self caths). Negative for dysuria, frequency, hematuria and urgency.   Neurological:  Negative for dizziness and weakness.   Psychiatric/Behavioral:  Negative for agitation and confusion. The patient is not nervous/anxious.      Objective:     Vital Signs (Most Recent):  Temp: 98 °F (36.7 °C) (10/29/23 1157)  Pulse: 70 (10/29/23 1157)  Resp: 17 (10/29/23 1157)  BP: 130/70 (10/29/23 1157)  SpO2: 97 % (10/29/23 1157) Vital Signs (24h Range):  Temp:  [97.5 °F (36.4 °C)-98 °F (36.7 °C)] 98 °F (36.7 °C)  Pulse:  [63-75] 70  Resp:  [15-18] 17  SpO2:  [97 %-100 %] 97 %  BP: (115-190)/() 130/70     Weight: 49.9 kg (109 lb 15.1 oz)  Body mass index is 17.22 kg/m².    Estimated Creatinine Clearance: 25.8 mL/min (based on SCr of 1.3 mg/dL).     Physical Exam  Vitals and nursing note reviewed.   Neurological:      Mental Status: She is alert.   Psychiatric:         Mood and Affect: Mood normal.         Behavior: Behavior normal.         Thought Content: Thought content normal.         Judgment: Judgment  normal.          Significant Labs: BMP:   Recent Labs   Lab 10/29/23  0606   GLU 96      K 3.8      CO2 19*   BUN 51*   CREATININE 1.3   CALCIUM 7.5*   MG 2.2     CBC:   Recent Labs   Lab 10/28/23  0550 10/29/23  0606   WBC 5.95 4.32   HGB 11.6* 10.5*   HCT 35.5* 31.6*    247     CMP:   Recent Labs   Lab 10/28/23  0550 10/29/23  0606    138   K 3.6 3.8    107   CO2 19* 19*   GLU 97 96   BUN 73* 51*   CREATININE 1.7* 1.3   CALCIUM 7.8* 7.5*   PROT 6.8 6.2   ALBUMIN 3.1* 2.9*   BILITOT 0.4 0.4   ALKPHOS 100 73   AST 20 23   ALT 14 14   ANIONGAP 15 12     Microbiology Results (last 7 days)       Procedure Component Value Units Date/Time    Urine culture [[id number]]  (Abnormal)  (Susceptibility) Collected: 10/27/23 0150    Order Status: Completed Specimen: Urine Updated: 10/29/23 1016     Urine Culture, Routine KLEBSIELLA OXYTOCA ESBL  >100,000 cfu/ml      Narrative:      Specimen Source->Urine          Urine Culture:   Recent Labs   Lab 06/21/23  1618 08/11/23  0958 10/06/23  1106 10/09/23  2214 10/27/23  0150   LABURIN CITROBACTER KOSERI  >100,000 cfu/ml  * ENTEROBACTER HORMAECHEI  >100,000 cfu/ml  * ESCHERICHIA COLI  >100,000 cfu/ml  * ENTEROCOCCUS FAECALIS  50,000 - 99,999 cfu/ml  * KLEBSIELLA OXYTOCA ESBL  >100,000 cfu/ml  *     Urine Studies:   Recent Labs   Lab 10/27/23  0150   COLORU Yellow  Yellow   APPEARANCEUA Hazy*  Hazy*   PHUR 6.0  6.0   SPECGRAV 1.020  1.020   PROTEINUA 1+*  1+*   GLUCUA Negative  Negative   KETONESU Trace*  Trace*   BILIRUBINUA Negative  Negative   OCCULTUA 2+*  2+*   NITRITE Positive*  Positive*   LEUKOCYTESUR 3+*  3+*   RBCUA 6*   WBCUA 74*   BACTERIA Many*   HYALINECASTS 0     Recent Lab Results         10/29/23  0606        Albumin 2.9       ALP 73       ALT 14       Anion Gap 12       Aniso Slight       AST 23       Baso # 0.03       Basophil % 0.7       BILIRUBIN TOTAL 0.4  Comment: For infants and newborns, interpretation of results  should be based  on gestational age, weight and in agreement with clinical  observations.    Premature Infant recommended reference ranges:  Up to 24 hours.............<8.0 mg/dL  Up to 48 hours............<12.0 mg/dL  3-5 days..................<15.0 mg/dL  6-29 days.................<15.0 mg/dL         BUN 51       [place]/Echinocytes Occasional       Calcium 7.5       Chloride 107       CO2 19       Creatinine 1.3       Differential Method Automated       eGFR 40.8       Eos # 0.0       Eosinophil % 0.5       Fragmented Cells Occasional       Glucose 96       Gran # (ANC) 2.3       Gran % 53.6       Hematocrit 31.6       Hemoglobin 10.5       Hypo Occasional       Immature Grans (Abs) 0.08  Comment: Mild elevation in immature granulocytes is non specific and   can be seen in a variety of conditions including stress response,   acute inflammation, trauma and pregnancy. Correlation with other   laboratory and clinical findings is essential.         Immature Granulocytes 1.9       Lymph # 1.3       Lymph % 31.0       Magnesium  2.2       MCH 31.0       MCHC 33.2       MCV 93       Mono # 0.5       Mono % 12.3       MPV 12.2       nRBC 0       Ovalocytes Occasional       Phosphorus Level 1.2       Platelet Count 247       Poikilocytosis Slight       Poly Occasional       Potassium 3.8       PROTEIN TOTAL 6.2       RBC 3.39       RDW 14.5       Sodium 138       WBC 4.32               Significant Imaging:     Imaging Results              X-Ray Lumbar Spine 2 Or 3 Views (Final result)  Result time 10/27/23 01:06:52      Final result by Roe Mata DO (10/27/23 01:06:52)                   Impression:      No acute fracture or dislocation of the lumbar spine.      Electronically signed by: Roe Mata  Date:    10/27/2023  Time:    01:06               Narrative:    EXAMINATION:  XR LUMBAR SPINE 2 OR 3 VIEWS    CLINICAL HISTORY:  s/p fall;    TECHNIQUE:  AP, lateral, and spot radiographs of the lumbar  spine.    COMPARISON:  DEXA from 11/14/2019.  CT of the abdomen and pelvis from 10/09/2023.    FINDINGS:  There is no evidence of an acute fracture or dislocation.  There is levoconvex scoliosis centered at approximately L3-L4.  There is grade 1 anterolisthesis of L5 on S1. The vertebral body heights are maintained.  There is moderate disc height loss at L1-L2, L2-L3, and L3-L4 and mild disc height loss at L5-S1.  There is a Schmorl's node within the L3 superior endplate, unchanged from prior.  There are multilevel degenerative changes, not significantly unchanged from prior.  Remaining visualized osseous structures are intact.                                       X-Ray Hips Bilateral 2 View Inc AP Pelvis (Final result)  Result time 10/27/23 01:03:26      Final result by Roe Mata DO (10/27/23 01:03:26)                   Impression:      No acute fracture or dislocation.      Electronically signed by: Roe Mata  Date:    10/27/2023  Time:    01:03               Narrative:    EXAMINATION:  XR HIPS BILATERAL 2 VIEW INCL AP PELVIS    CLINICAL HISTORY:  s/p fall;    TECHNIQUE:  AP view of the pelvis and frogleg lateral views of both hips were performed.    COMPARISON:  Radiographs from 03/08/2019.  CT of the abdomen and pelvis from 10/09/2023.    FINDINGS:  There is no evidence of an acute fracture or dislocation.  Alignment is normal.  There are degenerative changes of the partially visualized lower lumbar spine and the bilateral sacroiliac joints.  There is a bubbly lesion overlying the right femoral neck, corresponding to a soft tissue calcification within the posterior soft tissues seen on CT abdomen and pelvis.                                        US Retroperitoneal Complete (Final result)  Result time 10/27/23 01:02:38      Final result by Alf Blake MD (10/27/23 01:02:38)                   Impression:      No hydronephrosis.    Right nephrolithiasis.    Left kidney minimally complex  cyst.    Findings suggestive for chronic medical renal disease.    Intraluminal debris within the bladder, for which the DDX generally includes purulent debris, blood products, or other cellular debris (such as from occult urinary tract neoplasm).  Correlate clinically, including with urinalysis.    Occasional ectopic cardiac beats are suggested on pulsed Doppler waveforms.  Correlate clinically.    This report was flagged in Epic as abnormal.    Electronically signed by resident: Harjit Thompson  Date:    10/27/2023  Time:    00:32    Electronically signed by: Alf Blake  Date:    10/27/2023  Time:    01:02               Narrative:    EXAMINATION:  US RETROPERITONEAL COMPLETE    CLINICAL HISTORY:  acute on chronic kidney injury;    TECHNIQUE:  Ultrasound of the kidneys and urinary bladder was performed including color flow and Doppler evaluation of the kidneys.    COMPARISON:  U/S retroperitoneal 10/12/2023    FINDINGS:  Right kidney: The right kidney measures 11.0 cm.  Mild cortical thinning.  Resistive index measures 0.83.    No solid mass. Multiple echogenic foci demonstrating twinkle artifact compatible with renal stones, largest measuring 10 mm in the lower pole.  No hydronephrosis.    Left kidney: The left kidney measures 11.4 cm.  Mild cortical thinning.  Resistive index measures 0.89.  Minimally complex cyst in the lower pole measuring 2.0 x 1.8 x 1.7 cm with thin 2 mm septation.  No solid mass. No renal stone. No hydronephrosis.    Bladder is distended containing intraluminal debris.    Splenic resistive index: 0.77.    Some pulsed Doppler arterial waveforms demonstrate an occasional early systolic upstroke, suspicious for ectopic cardiac beats.

## 2023-10-29 NOTE — PLAN OF CARE
Problem: Adult Inpatient Plan of Care  Goal: Plan of Care Review  Outcome: Met  Goal: Patient-Specific Goal (Individualized)  Outcome: Met  Goal: Absence of Hospital-Acquired Illness or Injury  Outcome: Met  Goal: Optimal Comfort and Wellbeing  Outcome: Met  Goal: Readiness for Transition of Care  Outcome: Met     Problem: Fluid and Electrolyte Imbalance (Acute Kidney Injury/Impairment)  Goal: Fluid and Electrolyte Balance  Outcome: Met     Problem: Oral Intake Inadequate (Acute Kidney Injury/Impairment)  Goal: Optimal Nutrition Intake  Outcome: Met     Problem: Renal Function Impairment (Acute Kidney Injury/Impairment)  Goal: Effective Renal Function  Outcome: Met     Problem: Heart Failure Comorbidity  Goal: Maintenance of Heart Failure Symptom Control  Outcome: Met     Problem: Hypertension Comorbidity  Goal: Blood Pressure in Desired Range  Outcome: Met     Problem: Obstructive Sleep Apnea Risk or Actual Comorbidity Management  Goal: Unobstructed Breathing During Sleep  Outcome: Met     Problem: Adjustment to Illness (Chronic Kidney Disease)  Goal: Optimal Coping with Chronic Illness  Outcome: Met     Problem: Electrolyte Imbalance (Chronic Kidney Disease)  Goal: Electrolyte Balance  Outcome: Met     Problem: Fluid Volume Excess (Chronic Kidney Disease)  Goal: Fluid Balance  Outcome: Met     Problem: Functional Decline (Chronic Kidney Disease)  Goal: Optimal Functional Ability  Outcome: Met     Problem: Hematologic Alteration (Chronic Kidney Disease)  Goal: Absence of Anemia Signs and Symptoms  Outcome: Met     Problem: Oral Intake Inadequate (Chronic Kidney Disease)  Goal: Optimal Oral Intake  Outcome: Met     Problem: Pain (Chronic Kidney Disease)  Goal: Acceptable Pain Control  Outcome: Met     Problem: Renal Function Impairment (Chronic Kidney Disease)  Goal: Minimize Renal Failure Effects  Outcome: Met     Problem: Fall Injury Risk  Goal: Absence of Fall and Fall-Related Injury  Outcome: Met     Problem:  Infection  Goal: Absence of Infection Signs and Symptoms  Outcome: Met     Problem: Skin Injury Risk Increased  Goal: Skin Health and Integrity  Outcome: Met

## 2023-10-29 NOTE — ASSESSMENT & PLAN NOTE
Chronic  -Hold valsartan in the setting of JOCELIN and normotension   -PRN hydralazine for SBP >180.  -Continue to monitor BP closely.

## 2023-10-29 NOTE — PROGRESS NOTES
[facility] Observation [address] [place] [address] Medicine  Progress Note    Patient Name: Shima Sorto  MRN: [id number]  Patient Class: IP- Inpatient   Admission Date: 10/26/2023  Length of Stay: 2 days  Attending Physician: Beth Olguin MD  Primary Care Provider: Carmen Milton MD        Subjective:     Principal Problem:Acute cystitis        HPI:  Shima Sorto is a 83 y.o. with a PMHx of CKD3, HTN, breast cancer s/p lumpectomy, CHF (EF 60%), and osteoporosis who presents to the ED with elevated creatinine. Was advised by Nephrology to come to the ED due to elevated kidney function. The patient was recently admitted 10/9-10/14 for acute cystitis and JOCELIN. She reports she developed diarrhea on 10/16, noting ~10 episodes of watery, non bloody stool daily. She was diagnosed with c diff and started on oral vancomycin yesterday. She denies any fever/chills or abdominal pain. The patient does endorse lightheadedness, fatigue, nausea, and poor PO intake both due to decreased appetite but also because she is scared to eat because it increases the amount of diarrhea she is having. The patient denies CP, SOB, cough, vomiting, dizziness, syncope, or dysuria.    In the ED, patient initially mildly hypotensive which improved with IVF otherwise vitals stable, afebrile. CBC with stable anemia. Bicarb 17. Anion gap 17. Cr 3.4 (bl 1.2). Calcium 8.5. Albumin 3.3. The patient received LR bolus and zofran.       Overview/Hospital Course:  Shima Sorto was placed in  observation for management of JOCELIN in setting of acute c. diff infection. Continue oral vanc. Diarrhea persistent, monitor for improvement. Given 1 L iVF in ED followed by continuous IVF overnight. Cr 3.5 -> 2.4. Continue IVF and repeat bmp. Prior urine cultures grew variable organisms with resistance. Urine cultures pending. Started on ampicillin, continue for now. Urine culture growing ESBL. Switched to ertapenum. ID consulted.       Interval History:  ALICE. AF, VSS. Patient seen and evaluated by me. Reports new new complaints. Urine culture growing ESBL. ID consulted for abx recs. Switched to ertapenem for coverage. Patient denies urinary symptoms currently. Stools are better controlled and are more loose than watery. Will continue vanc.    Review of Systems   Constitutional:  Negative for chills and fever.   Respiratory:  Negative for chest tightness and shortness of breath.    Cardiovascular:  Negative for chest pain and leg swelling.   Gastrointestinal:  Positive for diarrhea (2/2 c.diff). Negative for abdominal pain and nausea.   Neurological:  Negative for dizziness and weakness.     Objective:     Vital Signs (Most Recent):  Temp: 97.7 °F (36.5 °C) (10/29/23 0804)  Pulse: 66 (10/29/23 0804)  Resp: 17 (10/29/23 0804)  BP: 120/61 (10/29/23 0804)  SpO2: 100 % (10/29/23 0804) Vital Signs (24h Range):  Temp:  [97.4 °F (36.3 °C)-98 °F (36.7 °C)] 97.7 °F (36.5 °C)  Pulse:  [63-75] 66  Resp:  [15-18] 17  SpO2:  [97 %-100 %] 100 %  BP: (115-190)/() 120/61     Weight: 49.9 kg (109 lb 15.1 oz)  Body mass index is 17.22 kg/m².    Intake/Output Summary (Last 24 hours) at 10/29/2023 1137  Last data filed at 10/29/2023 0532  Gross per 24 hour   Intake --   Output 300 ml   Net -300 ml         Physical Exam  Vitals and nursing note reviewed.   Constitutional:       Appearance: She is well-developed.   HENT:      Head: Normocephalic and atraumatic.   Eyes:      Pupils: Pupils are equal, round, and reactive to light.   Cardiovascular:      Rate and Rhythm: Normal rate and regular rhythm.   Pulmonary:      Effort: Pulmonary effort is normal.      Breath sounds: Normal breath sounds.   Abdominal:      Palpations: Abdomen is soft.      Tenderness: There is no abdominal tenderness.   Musculoskeletal:         General: No tenderness.   Skin:     General: Skin is warm and dry.   Neurological:      Mental Status: She is alert and oriented to person, place, and time.    Psychiatric:         Behavior: Behavior normal.             Significant Labs: All pertinent labs within the past 24 hours have been reviewed.    Significant Imaging: I have reviewed all pertinent imaging results/findings within the past 24 hours.      Assessment/Plan:      * Acute cystitis  - UA infectious  - started on ampicillin 2/2 previous cultures  - urine culture growing ESBL  - started on ertapenem  - ID consulted, appreciate assistance     Moderate malnutrition  Nutrition consulted. Most recent weight and BMI monitored-     Measurements:  Wt Readings from Last 1 Encounters:   10/26/23 49.9 kg (109 lb 15.1 oz)   Body mass index is 17.22 kg/m².    Patient has been screened and assessed by RD.    Malnutrition Type:  Context: acute illness or injury  Level: moderate    Malnutrition Characteristic Summary:  Weight Loss (Malnutrition): greater than 5% in 1 month  Energy Intake (Malnutrition): less than 75% for greater than 7 days  Fluid Accumulation (Malnutrition): moderate    Interventions/Recommendations (treatment strategy):  1.) Recommend continuing with Renal diet, fluid per MD. - may liberalize diet as needed to help increase PO intake. 2.) Recommend adding Novasource Renal QD to provide an additional 22g PRO and 475 kcal to help optimize PRO/Kcal intake. 3.) Consider probiotic daily to help with diarrhea. 4.) RD to monitor wt, PO intake, skin, labs.    Clostridium difficile infection  -Reports diarrhea starting 10/16. Recent stool studies +c diff. Started on oral vancomycin for 14 days yesterday.  -Continue oral vancomycin qid- will need continued treatment after finishing abx for UTI  -Afebrile, no leukocytosis.  -Special contact precautions.    ACC/AHA stage C heart failure with preserved ejection fraction  Patient is identified as having Diastolic (HFpEF) heart failure that is Chronic. CHF is currently controlled. Latest ECHO performed and demonstrates- Results for orders placed during the hospital  "encounter of 07/07/23    Echo    Interpretation Summary  · The left ventricle is normal in size with normal systolic function. The estimated ejection fraction is 60%.  · Normal right ventricular size with normal right ventricular systolic function.  · Grade II left ventricular diastolic dysfunction.  · Biatrial enlargement.  · The estimated PA systolic pressure is 52 mmHg.  · Intermediate central venous pressure (8 mmHg).  Monitor clinical status closely. Monitor on telemetry. Patient is off CHF pathway.  Monitor strict Is&Os and daily weights. Continue to stress to patient importance of self efficacy and  on diet for CHF. Last BNP reviewed- and noted below No results for input(s): "BNP", "BNPTRIAGEBLO" in the last 168 hours..  -Appears hypovolemic on exam.  -Hold jardiance, aldactone, valsartan and lasix in the setting of dehydration and JOCELIN.    Normochromic normocytic anemia  Patient's anemia is currently controlled. S/p 0 units of PRBCs.   Current CBC reviewed-   Lab Results   Component Value Date    HGB 10.5 (L) 10/29/2023    HCT 31.6 (L) 10/29/2023     Monitor serial CBC and transfuse if patient becomes hemodynamically unstable, symptomatic or H/H drops below 7/21.     Acute renal failure superimposed on stage 3b chronic kidney disease  Resolved  JOCELIN noted during previous admission, discharged on 10/14 with Cr of 1.6.  -Cr 3.4 on admit, bl ~1.2-1.6  -Cr improved to 1.3 10/29  -JOCELIN likely due to significant GI losses and poor oral intake.   -Continue IVF hydration.  -Follow renal panel and electrolytes closely.  -Check Renal Ultrasound - stable appearance of kidneys, does reveal debris in bladder.   -Adjust renal dose medications for Estimated Creatinine Clearance: 25.8 mL/min (based on SCr of 1.3 mg/dL).   -Avoid NSAIDs, Lovelace-II inhibitors, ACE-I, Angiotensin Receptor Blockers, or Aminoglycosides.  -UA infectious. Prior cultures reviewed, most recent gram+ bacteria sensitive to ampicillin. Treating with " ampicillin for now. F/u urine cultures, pending.  -Check Urine Na 36, Cr 121, Protein 40.  - FeNa 0.5% -  JOCELIN likely pre-renal. Good response to volume repletion    Malignant neoplasm of upper-outer quadrant of right breast in female, estrogen receptor positive  -Continue anastrazole daily.  -Follows with hem/onc outpatient.    Age-related osteoporosis with current pathological fracture with routine healing  -Continue calcium and Vit D supplementation.  -Receives prolia q6 months.    Urinary retention  Chronic issue, followed by Urology outpatient. Patient mainly complaints of incomplete bladder emptying and was taught CIC due to incomplete bladder emptying. Patient had been performing CIC twice daily at home, however on recent visit, PVR was noted to be elevated despite CIC, so she was instructed to increase catheterization to TID. She tells me however that she is still continuing to only self-cath twice a day.  - Straight cath TID and PRN  - Continue home flomax.    Hypertension  Chronic  -Hold valsartan in the setting of JOCELIN and normotension   -PRN hydralazine for SBP >180.  -Continue to monitor BP closely.      VTE Risk Mitigation (From admission, onward)         Ordered     heparin (porcine) injection 5,000 Units  Every 8 hours         10/26/23 1846     IP VTE HIGH RISK PATIENT  Once         10/26/23 1845     Place sequential compression device  Until discontinued         10/26/23 1845                Discharge Planning   PHILLIP: 10/30/2023     Code Status: Full Code   Is the patient medically ready for discharge?: No    Reason for patient still in hospital (select all that apply): Treatment and Consult recommendations  Discharge Plan A: Home            Maribell Thompson PA-C  Department of Hospital Medicine   [facility]

## 2023-10-29 NOTE — ASSESSMENT & PLAN NOTE
83 year old female with history of CKD, HTN, breast cancer s/p lumpectomy on anastrozole, CHF, urinary retention requiring CIC c/b UTIs admitted for C. Diff colitis and JOCELIN likely secondary to dehydration. UA positive on admit and urine culture grew ESBL Klebsiella.  ID consulted for abx recommendations.    Recommendations  · Continue Vancomycin 125 mg PO QID as planned  · Patient would like to forgo home IV antibiotics. Recommend oral ciprofloxacin  X 7 days for cystitis   · Recommend Urology follow after discharge to discuss UTI prevention measures. Patient educated that repeated antibiotic exposures poses risk for recurrent C diff infections and further antibiotic resistance and should be avoided if possible. In the future, would only check UA/urine culture if patient symptomatic  · ID will sign off.

## 2023-10-29 NOTE — PLAN OF CARE
[facility]  Discharge Final Note    Primary Care Provider: Carmen Milton MD    Expected Discharge Date: 10/29/2023    Final Discharge Note (most recent)       Final Note - 10/29/23 1434          Final Note    Assessment Type Final Discharge Note     Anticipated Discharge Disposition Home or Self Care     Hospital Resources/Appts/Education Provided Provided patient/caregiver with written discharge plan information        Post-Acute Status    Post-Acute Authorization Other     Other Status Awaiting f/u Appts     Discharge Delays None known at this time                   Reached out to W for urology follow-up.    MARLI Khanna, LCSW  [facility]  [facility] [phone]

## 2023-10-30 ENCOUNTER — TELEPHONE (OUTPATIENT)
Dept: INTERNAL MEDICINE | Facility: CLINIC | Age: 83
End: 2023-10-30
Payer: MEDICARE

## 2023-10-30 ENCOUNTER — OUTPATIENT CASE MANAGEMENT (OUTPATIENT)
Dept: ADMINISTRATIVE | Facility: OTHER | Age: 83
End: 2023-10-30
Payer: MEDICARE

## 2023-10-30 ENCOUNTER — PATIENT MESSAGE (OUTPATIENT)
Dept: CARDIOLOGY | Facility: CLINIC | Age: 83
End: 2023-10-30
Payer: MEDICARE

## 2023-10-30 ENCOUNTER — TELEPHONE (OUTPATIENT)
Dept: INFECTIOUS DISEASES | Facility: CLINIC | Age: 83
End: 2023-10-30
Payer: MEDICARE

## 2023-10-30 NOTE — TELEPHONE ENCOUNTER
Called pt and was told to call back. Callked again and got disconnected. Appt set, letter sent and [facility] notification sent

## 2023-10-30 NOTE — TELEPHONE ENCOUNTER
----- Message from Yumiko Escobar MA sent at 10/30/2023  3:08 PM CDT -----  Contact: self [phone]    ----- Message -----  From: Zakia Peterson  Sent: 10/30/2023   2:48 PM CDT  To: [facility] Staff    Pt requesting a call in regards to hosp f/u appt.    Please call and advise

## 2023-10-30 NOTE — LETTER
October 30, 2023             Dear Ms. Shima,    Welcome to [facility]s Complex Care Management Program.  It was a pleasure talking with you today.  My name is Sally Ospina RN and I look forward to being your Care Manager.  My goal is to help you function at the healthiest and highest level possible.  You can contact me directly at [phone].    As an [facility] patient, some of the services we may be able to provide include:     Development of an individualized care plan with a Registered Nurse   Connection with a   Connection with available resources and services    Coordinate communication among your care team members   Provide coaching and education   Help you understand your doctors treatment plan  Help you obtain information about your insurance coverage.     All services provided by [facility]s Complex Care Managers and other care team members are coordinated with and communicated to your primary care team.      As part of your enrollment, you will be receiving education materials and more information about these services in your [facility] account, by phone or through the mail.  If you do not wish to participate or receive information, please contact our office at [phone].      Sincerely,        Sally Ospina RN  [facility]   Out-patient RN Complex Care Manager  [phone]

## 2023-10-30 NOTE — TELEPHONE ENCOUNTER
----- Message from Loretta Leal MA sent at 10/30/2023  8:18 AM CDT -----    ----- Message -----  From: Oliva Lockwood PA-C  Sent: 10/29/2023   1:00 PM CDT  To: Cece Langford! Please schedule ID follow up with me on 11/7 or 11/8 in morning. Thanks!

## 2023-10-31 ENCOUNTER — PATIENT MESSAGE (OUTPATIENT)
Dept: UROLOGY | Facility: CLINIC | Age: 83
End: 2023-10-31
Payer: MEDICARE

## 2023-10-31 ENCOUNTER — PATIENT OUTREACH (OUTPATIENT)
Dept: ADMINISTRATIVE | Facility: OTHER | Age: 83
End: 2023-10-31
Payer: MEDICARE

## 2023-10-31 NOTE — TELEPHONE ENCOUNTER
Pt to stay off antihtnsives and diuretics at this time. BMP reviewed. JOCELIN resolved, but still has a high BUN:cr ratio. Bps comntrolled. No significant HF symptoms. Struggling with diarrhea.

## 2023-10-31 NOTE — PROGRESS NOTES
Community Health Worker attempted to contact patient via telephone to assist with SDOH. Left a voicemail message on patient's telephone number [phone].  Will attempt again at a later date.

## 2023-10-31 NOTE — PROGRESS NOTES
Outpatient Care Management  Initial Patient Assessment    Patient: Shima Sorto  MRN: [id number]  Date of Service: 10/30/2023  Completed by: Sally Ospina RN  Referral Date: 10/27/2023  Date of Eligibility: 10/28/2023  Program: High Risk  Status: Ongoing  Effective Dates: 10/30/2023 - present  Responsible Staff: Sally Ospina RN        Reason for Visit   Patient presents with    OPCM Enrollment Call     10/30/23    Nursing Assessment     10/30/23       Brief Summary:  Shima Sorto was referred by Dr. Olguin for JOCELIN (acute kidney injury). Patient qualifies for program based on identified as high risk.   Active problem list, medical, surgical and social history reviewed. Active comorbidities include C. Diff. Areas of need identified by patient include management of C Diff.   Next steps: F/U concerning nausea medications and vancomycin    Disability Status  Is the patient alert and oriented (person, place, time, and situation)?: Alert and oriented x 4  Hearing Difficulty or Deaf: no  Visual Difficulty or Blind: no  Visual and Hearing Needs Conclusion: macular degeneration and cataract  Difficulty Concentrating, Remembering or Making Decisions: no  Communication Difficulty: no  Eating/Swallowing Difficulty: no  Walking or Climbing Stairs Difficulty: no  Walking or Climbing Stairs: ambulation difficulty, assistance 1 person  Dressing/Bathing Difficulty: no  Toileting : Independent  Continence : Continence - Not a problem  Difficulty Managing Errands Independently: no  Equipment Currently Used at Home: none  ADL Conclusion Statement: Independent  Change in Functional Status Since Onset of Current Illness/Injury: no        Spiritual Beliefs  Spiritual, Cultural Beliefs, Anglican Practices, Values that Affect Care: no      Social History     Socioeconomic History    Marital status:    Occupational History    Occupation:      Employer: [employer]   Tobacco Use     Smoking status: Former     Current packs/day: 0.00     Average packs/day: 0.5 packs/day for 8.0 years (4.0 ttl pk-yrs)     Types: Cigarettes     Start date: 1956     Quit date: 1964     Years since quittin.2    Smokeless tobacco: Never   Substance and Sexual Activity    Alcohol use: Not Currently    Drug use: No    Sexual activity: Not Currently     Social Determinants of Health     Financial Resource Strain: Low Risk  (10/11/2023)    Overall Financial Resource Strain (CARDIA)     Difficulty of Paying Living Expenses: Not very hard   Food Insecurity: No Food Insecurity (10/11/2023)    Hunger Vital Sign     Worried About Running Out of Food in the Last Year: Never true     Ran Out of Food in the Last Year: Never true   Transportation Needs: No Transportation Needs (10/11/2023)    PRAPARE - Transportation     Lack of Transportation (Medical): No     Lack of Transportation (Non-Medical): No   Physical Activity: Insufficiently Active (10/11/2023)    Exercise Vital Sign     Days of Exercise per Week: 4 days     Minutes of Exercise per Session: 30 min   Stress: No Stress Concern Present (10/11/2023)    [place] [employer] - Occupational Stress Questionnaire     Feeling of Stress : Not at all   Social Connections: Unknown (10/11/2023)    Social Connection and Isolation Panel [NHANES]     Frequency of Communication with Friends and Family: More than three times a week     Frequency of Social Gatherings with Friends and Family: More than three times a week     Active Member of Clubs or Organizations: Yes     Attends Club or Organization Meetings: 1 to 4 times per year     Marital Status:    Housing Stability: Low Risk  (10/11/2023)    Housing Stability Vital Sign     Unable to Pay for Housing in the Last Year: No     Number of Places Lived in the Last Year: 1     Unstable Housing in the Last Year: No       Roles and Relationships  Primary Source of Support/Comfort: child(yevgeniy)  Name  of Support/Comfort Primary Source: Chi  Primary Roles/Responsibilities: retired  Secondary Source of Support/Comfort: child(yevgeniy)  Name of Support/Comfort Secondary Source: Cassi      Advance Directives (For Healthcare)  Advance Directive  (If Adv Dir status is received, view document under Adv Dir in header or Chart Review Media tab): Patient has advance directive, copy not received.  Patient Requests Assistance: Patient will do independently        Patient Reported Insurance  Verified current insurance plan:: [employer]; Medicare            10/30/2023     4:17 PM 10/19/2023    10:38 AM 9/22/2023    11:31 AM 9/14/2023    10:58 AM 8/9/2023    11:23 AM 7/14/2023    10:06 AM 6/28/2023    10:03 AM   Depression Patient Health Questionnaire   Over the last two weeks how often have you been bothered by little interest or pleasure in doing things Not at all Not at all Not at all Not at all Not at all Not at all Not at all   Over the last two weeks how often have you been bothered by feeling down, depressed or hopeless Not at all Not at all Not at all Not at all Not at all Not at all Not at all   PHQ-2 Total Score 0 0 0 0 0 0 0       Learning Assessment       10/26/2023 [address] - Observation 11[facility] (10/26/2023 - 10/29/2023)   Created by Cindi Apple, RN - RN (Nurse) Status: Complete                 PRIMARY LEARNER     Primary Learner Name:  lisa ho  - 10/26/2023 2326    Relationship:  Patient  - 10/26/2023 2326    Does the primary learner have any barriers to learning?:  No Barriers LM - 10/26/2023 2326    What is the preferred language of the primary learner?:  English LM - 10/26/2023 2326    Is an  required?:  No LM - 10/26/2023 2326    How does the primary learner prefer to learn new concepts?:  Listening, Reading, Demonstration LM - 10/26/2023 2326    How often do you need to have someone help you read instructions, pamphlets, or written material from your doctor or pharmacy?:  Rarely LM - 10/26/2023  1224        CO-LEARNER #1     No question answered        CO-LEARNER #2     No question answered        SPECIAL TOPICS     No question answered        ANSWERED BY:     No question answered        Edit History       Cindi Apple, RN - RN (Nurse)   10/26/2023 0841

## 2023-11-01 ENCOUNTER — PATIENT OUTREACH (OUTPATIENT)
Dept: ADMINISTRATIVE | Facility: OTHER | Age: 83
End: 2023-11-01
Payer: MEDICARE

## 2023-11-01 ENCOUNTER — TELEPHONE (OUTPATIENT)
Dept: INTERNAL MEDICINE | Facility: CLINIC | Age: 83
End: 2023-11-01
Payer: MEDICARE

## 2023-11-01 RX ORDER — ONDANSETRON 4 MG/1
4 TABLET, FILM COATED ORAL 2 TIMES DAILY PRN
Qty: 20 TABLET | Refills: 0 | Status: SHIPPED | OUTPATIENT
Start: 2023-11-01

## 2023-11-01 NOTE — TELEPHONE ENCOUNTER
----- Message from Alma Anaya sent at 11/1/2023  3:07 PM CDT -----  Contact: [phone] Patient  Requesting an RX refill or new RX.  Is this a refill or new RX: new  RX name and strength (copy/paste from chart):  Zofran  Is this a 30 day or 90 day RX:   Pharmacy name and phone # (copy/paste from chart):    [facility][id number] - [address] AT [facility] [address]  [facility] [address]  [facility] [address]  Phone: [phone] Fax: [phone]  Pt states she has C diff and the nausea is terrible.

## 2023-11-01 NOTE — TELEPHONE ENCOUNTER
----- Message from Yojana Harding sent at 11/1/2023  3:27 PM CDT -----  Contact: Joann/Outpatient Case Manangement/   2 nd request and patient is still waiting      ----- Message from Alma Anaya sent at 11/1/2023  3:07 PM CDT -----  Contact: [phone] Patient  Requesting an RX refill or new RX.  Is this a refill or new RX: new  RX name and strength (copy/paste from chart):  Zofran  Is this a 30 day or 90 day RX:   Pharmacy name and phone # (copy/paste from chart):    [facility] #[id number] - [address] AT [facility] [address]  [facility] [address]  [facility] [address]  Phone: [phone] Fax: [phone]  Pt states she has C diff and the nausea is terrible.

## 2023-11-01 NOTE — PROGRESS NOTES
This Community Health Worker (CHW) completed Social Determinant of Health (SDOH)  Questionnaire with patient/caregiver via telephone today.  Notified OPCM CM Sally Ospina RN of completion.      Patient denied any SDOH needs at this time.

## 2023-11-02 ENCOUNTER — PATIENT MESSAGE (OUTPATIENT)
Dept: INTERNAL MEDICINE | Facility: CLINIC | Age: 83
End: 2023-11-02
Payer: MEDICARE

## 2023-11-02 ENCOUNTER — TELEPHONE (OUTPATIENT)
Dept: HEMATOLOGY/ONCOLOGY | Facility: CLINIC | Age: 83
End: 2023-11-02
Payer: MEDICARE

## 2023-11-03 DIAGNOSIS — Z17.0 MALIGNANT NEOPLASM OF UPPER-OUTER QUADRANT OF RIGHT BREAST IN FEMALE, ESTROGEN RECEPTOR POSITIVE: Primary | ICD-10-CM

## 2023-11-03 DIAGNOSIS — C50.411 MALIGNANT NEOPLASM OF UPPER-OUTER QUADRANT OF RIGHT BREAST IN FEMALE, ESTROGEN RECEPTOR POSITIVE: Primary | ICD-10-CM

## 2023-11-06 ENCOUNTER — TELEPHONE (OUTPATIENT)
Dept: INTERNAL MEDICINE | Facility: CLINIC | Age: 83
End: 2023-11-06
Payer: MEDICARE

## 2023-11-06 NOTE — TELEPHONE ENCOUNTER
----- Message from Abby Lopez sent at 11/6/2023 10:09 AM CST -----  Contact: [phone] Patient  Pt is calling in regards to the HH orders. Pt stated she does not want HH visits. Pt would like to talk to Dr Milton about the orders. Pt already called [facility] and cancelled the visits. Please call and advise. Thank you

## 2023-11-07 DIAGNOSIS — N18.30 STAGE 3 CHRONIC KIDNEY DISEASE, UNSPECIFIED WHETHER STAGE 3A OR 3B CKD: Primary | ICD-10-CM

## 2023-11-08 ENCOUNTER — TELEPHONE (OUTPATIENT)
Dept: INTERNAL MEDICINE | Facility: CLINIC | Age: 83
End: 2023-11-08
Payer: MEDICARE

## 2023-11-08 ENCOUNTER — TELEPHONE (OUTPATIENT)
Dept: CARDIOLOGY | Facility: HOSPITAL | Age: 83
End: 2023-11-08
Payer: MEDICARE

## 2023-11-08 ENCOUNTER — PATIENT MESSAGE (OUTPATIENT)
Dept: GASTROENTEROLOGY | Facility: CLINIC | Age: 83
End: 2023-11-08
Payer: MEDICARE

## 2023-11-08 ENCOUNTER — LAB VISIT (OUTPATIENT)
Dept: LAB | Facility: HOSPITAL | Age: 83
End: 2023-11-08
Payer: MEDICARE

## 2023-11-08 ENCOUNTER — PATIENT MESSAGE (OUTPATIENT)
Dept: CARDIOLOGY | Facility: CLINIC | Age: 83
End: 2023-11-08
Payer: MEDICARE

## 2023-11-08 DIAGNOSIS — N18.30 STAGE 3 CHRONIC KIDNEY DISEASE, UNSPECIFIED WHETHER STAGE 3A OR 3B CKD: ICD-10-CM

## 2023-11-08 LAB
ALBUMIN SERPL BCP-MCNC: 3.1 G/DL (ref 3.5–5.2)
ALP SERPL-CCNC: 65 U/L (ref 55–135)
ALT SERPL W/O P-5'-P-CCNC: 22 U/L (ref 10–44)
ANION GAP SERPL CALC-SCNC: 8 MMOL/L (ref 8–16)
AST SERPL-CCNC: 25 U/L (ref 10–40)
BILIRUB SERPL-MCNC: 0.8 MG/DL (ref 0.1–1)
BUN SERPL-MCNC: 25 MG/DL (ref 8–23)
CALCIUM SERPL-MCNC: 9.3 MG/DL (ref 8.7–10.5)
CHLORIDE SERPL-SCNC: 107 MMOL/L (ref 95–110)
CO2 SERPL-SCNC: 27 MMOL/L (ref 23–29)
CREAT SERPL-MCNC: 1.5 MG/DL (ref 0.5–1.4)
EST. GFR  (NO RACE VARIABLE): 34.4 ML/MIN/1.73 M^2
GLUCOSE SERPL-MCNC: 85 MG/DL (ref 70–110)
POTASSIUM SERPL-SCNC: 4.8 MMOL/L (ref 3.5–5.1)
PROT SERPL-MCNC: 6.9 G/DL (ref 6–8.4)
SODIUM SERPL-SCNC: 142 MMOL/L (ref 136–145)

## 2023-11-08 PROCEDURE — 80053 COMPREHEN METABOLIC PANEL: CPT | Performed by: INTERNAL MEDICINE

## 2023-11-08 PROCEDURE — 36415 COLL VENOUS BLD VENIPUNCTURE: CPT | Performed by: INTERNAL MEDICINE

## 2023-11-08 NOTE — TELEPHONE ENCOUNTER
Called to let us know that she declined HH services that was referred by Dr Olguin.Pt also inquired about vaccinations of which she is on ABT for cdiff and should not take it at this time. Will schedule  when she is done with her round of ABTs.

## 2023-11-09 ENCOUNTER — TELEPHONE (OUTPATIENT)
Dept: NEPHROLOGY | Facility: CLINIC | Age: 83
End: 2023-11-09
Payer: MEDICARE

## 2023-11-09 ENCOUNTER — PATIENT MESSAGE (OUTPATIENT)
Dept: GASTROENTEROLOGY | Facility: CLINIC | Age: 83
End: 2023-11-09
Payer: MEDICARE

## 2023-11-09 NOTE — TELEPHONE ENCOUNTER
View All Conversations on this Encounter  Mercy Sims DO routed conversation to You21 minutes ago (2:12 PM)     Mercy Sims DO21 minutes ago (2:12 PM)       Creatinine overall much better -please ask the pt to hydrate well as the creatinine is slightly higher at 1.5 from 1.3 earlier when she left the hospsital.          Note

## 2023-11-09 NOTE — TELEPHONE ENCOUNTER
Creatinine overall much better -please ask the pt to hydrate well as the creatinine is slightly higher at 1.5 from 1.3 earlier when she left the hospsital.

## 2023-11-10 ENCOUNTER — TELEPHONE (OUTPATIENT)
Dept: GASTROENTEROLOGY | Facility: CLINIC | Age: 83
End: 2023-11-10
Payer: MEDICARE

## 2023-11-10 DIAGNOSIS — A49.8 CLOSTRIDIUM DIFFICILE INFECTION: ICD-10-CM

## 2023-11-10 RX ORDER — VANCOMYCIN HYDROCHLORIDE 125 MG/1
125 CAPSULE ORAL EVERY 6 HOURS
Qty: 84 CAPSULE | Refills: 0 | Status: SHIPPED | OUTPATIENT
Start: 2023-11-10 | End: 2023-12-01

## 2023-11-10 NOTE — TELEPHONE ENCOUNTER
----- Message from Demi Salter sent at 11/10/2023  9:22 AM CST -----  Regarding: Refill  Contact: pt [phone]  Rx Refill/Request    Is this a Refill or New Rx:  refill    Rx Name and Strength:  vancomycin (VANCOCIN) 125 MG capsule 84 capsule    Preferred Pharmacy with phone number:    [id number] [address] AT [facility] [address] [facility]  [facility] [address]  [facility] [address]  Phone: [phone] Fax: [phone]    Communication Preference: please call pt @ [phone]    Additional Information: pt will need PA done, pt only has 1 day of medication,

## 2023-11-10 NOTE — TELEPHONE ENCOUNTER
----- Message from Demi Salter sent at 11/10/2023  9:22 AM CST -----  Regarding: Refill  Contact: pt [phone]  Rx Refill/Request    Is this a Refill or New Rx:  refill    Rx Name and Strength:  vancomycin (VANCOCIN) 125 MG capsule 84 capsule    Preferred Pharmacy with phone number:    [id number] [address] AT [facility]  [facility] [address]  [facility] [address]  Phone: [phone] Fax: [phone]    Communication Preference: please call pt @ [phone]    Additional Information: pt will need PA done, pt only has 1 day of medication,

## 2023-11-12 ENCOUNTER — TELEPHONE (OUTPATIENT)
Dept: GASTROENTEROLOGY | Facility: CLINIC | Age: 83
End: 2023-11-12
Payer: MEDICARE

## 2023-11-13 ENCOUNTER — OUTPATIENT CASE MANAGEMENT (OUTPATIENT)
Dept: ADMINISTRATIVE | Facility: OTHER | Age: 83
End: 2023-11-13
Payer: MEDICARE

## 2023-11-13 ENCOUNTER — PATIENT MESSAGE (OUTPATIENT)
Dept: GASTROENTEROLOGY | Facility: CLINIC | Age: 83
End: 2023-11-13
Payer: MEDICARE

## 2023-11-13 NOTE — PROGRESS NOTES
Outpatient Care Management  Plan of Care Follow Up Visit    Patient: Shima Sorto  MRN: [id number]  Date of Service: 11/13/2023  Completed by: Sally Ospina RN  Referral Date: 10/27/2023    Reason for Visit   Patient presents with    Case Closure     Pt declined any further services with OPCM at this time.        Brief Summary: Pt declined further OPCM services because she states she does not need anymore assistance at this time.   Next Steps: Case Closed

## 2023-11-14 ENCOUNTER — PATIENT MESSAGE (OUTPATIENT)
Dept: UROLOGY | Facility: CLINIC | Age: 83
End: 2023-11-14
Payer: MEDICARE

## 2023-11-14 ENCOUNTER — TELEPHONE (OUTPATIENT)
Dept: UROLOGY | Facility: CLINIC | Age: 83
End: 2023-11-14
Payer: MEDICARE

## 2023-11-14 ENCOUNTER — TELEPHONE (OUTPATIENT)
Dept: NEPHROLOGY | Facility: CLINIC | Age: 83
End: 2023-11-14
Payer: MEDICARE

## 2023-11-14 ENCOUNTER — PATIENT MESSAGE (OUTPATIENT)
Dept: NEPHROLOGY | Facility: CLINIC | Age: 83
End: 2023-11-14
Payer: MEDICARE

## 2023-11-14 NOTE — TELEPHONE ENCOUNTER
Spoke to pt. Informed that Marquise was no longer with urology department and she should either contact primary car doctor or go to urgent car.  Appears to comprehend information. AVINASH jones lpn

## 2023-11-15 ENCOUNTER — PATIENT MESSAGE (OUTPATIENT)
Dept: NEPHROLOGY | Facility: CLINIC | Age: 83
End: 2023-11-15
Payer: MEDICARE

## 2023-11-22 ENCOUNTER — EXTERNAL HOME HEALTH (OUTPATIENT)
Dept: HOME HEALTH SERVICES | Facility: HOSPITAL | Age: 83
End: 2023-11-22
Payer: MEDICARE

## 2023-11-27 ENCOUNTER — TELEPHONE (OUTPATIENT)
Dept: GASTROENTEROLOGY | Facility: CLINIC | Age: 83
End: 2023-11-27
Payer: MEDICARE

## 2023-11-28 NOTE — TELEPHONE ENCOUNTER
----- Message from Ainsley Colby sent at 11/27/2023 11:38 AM CST -----  Regarding: pt call back  Contact: [phone]  Pt would like to speak with Linden, in regards to being seen for the virtual visit. Please give pt a call back thanks.

## 2023-11-29 ENCOUNTER — HOSPITAL ENCOUNTER (OUTPATIENT)
Dept: RADIOLOGY | Facility: HOSPITAL | Age: 83
Discharge: HOME | End: 2023-11-29
Attending: INTERNAL MEDICINE
Payer: MEDICARE

## 2023-11-29 DIAGNOSIS — N28.1 RENAL CYST: ICD-10-CM

## 2023-11-29 PROCEDURE — 76770 US EXAM ABDO BACK WALL COMP: CPT | Mod: 26,,, | Performed by: RADIOLOGY

## 2023-11-29 PROCEDURE — 76770 US EXAM ABDO BACK WALL COMP: CPT | Mod: TC

## 2023-11-29 PROCEDURE — 76770 US RETROPERITONEAL COMPLETE: ICD-10-PCS | Mod: 26,,, | Performed by: RADIOLOGY

## 2023-11-30 ENCOUNTER — TELEPHONE (OUTPATIENT)
Dept: GASTROENTEROLOGY | Facility: CLINIC | Age: 83
End: 2023-11-30
Payer: MEDICARE

## 2023-11-30 ENCOUNTER — DOCUMENT SCAN (OUTPATIENT)
Dept: HOME HEALTH SERVICES | Facility: HOSPITAL | Age: 83
End: 2023-11-30
Payer: MEDICARE

## 2023-11-30 NOTE — TELEPHONE ENCOUNTER
----- Message from Savannah Edwards MA sent at 2023 11:18 AM CST -----  Regarding: RE: Scheduling audio visit  Contact: pt @ [phone]  Spoke with patient.  She will except the appointment.   Jennifer  ----- Message -----  From: Candy Dickey MA  Sent: 2023  11:13 AM CST  To: Savannah Edwards MA  Subject: FW: Scheduling audio visit                       Can you see if she can do a virtual on  at 6:30p?  Thanks  ----- Message -----  From: Taty Ho  Sent: 2023   9:16 AM CST  To: Greg NUNEZ Staff  Subject: Scheduling audio visit                           Pt is returning a missed call from someone in the office and is asking for a return call back soon. Thanks.         Reason for call: Schedule virtual appt         Patient's DX:n/a         Patient requesting call back or [facility] [phone]

## 2023-12-03 DIAGNOSIS — Z71.89 COMPLEX CARE COORDINATION: ICD-10-CM

## 2023-12-04 ENCOUNTER — PATIENT MESSAGE (OUTPATIENT)
Dept: GASTROENTEROLOGY | Facility: CLINIC | Age: 83
End: 2023-12-04
Payer: MEDICARE

## 2023-12-04 NOTE — PROGRESS NOTES
Shima your renal ultrasound Looks stable you do have simple and minimally complex liver cyst and some kidney stones happy to refer you to urology for follow-up evaluation if you would like please let me know.    Impression:    Sonographic findings suggestive of chronic medical renal disease.    Left simple and bilateral minimally complex cysts.  Findings are not significantly changed compared to prior exam.    Bilateral nonobstructing renal stones.  No hydronephrosis.    Electronically signed by resident: Debra Galeano  Date:                                            11/29/2023  Time:                                           11:27    Electronically signed by: José Miguel Escalante MD  Date:                                            11/29/2023

## 2023-12-07 ENCOUNTER — PATIENT MESSAGE (OUTPATIENT)
Dept: GASTROENTEROLOGY | Facility: CLINIC | Age: 83
End: 2023-12-07
Payer: MEDICARE

## 2023-12-08 ENCOUNTER — TELEPHONE (OUTPATIENT)
Dept: CARDIOLOGY | Facility: CLINIC | Age: 83
End: 2023-12-08
Payer: MEDICARE

## 2023-12-08 NOTE — TELEPHONE ENCOUNTER
Called patient to rsch. appt on 01/16/2024 left message that appt. was resched. for 01/18/2024@ 1pm

## 2023-12-11 ENCOUNTER — INFUSION (OUTPATIENT)
Dept: INFECTIOUS DISEASES | Facility: HOSPITAL | Age: 83
End: 2023-12-11
Payer: MEDICARE

## 2023-12-11 VITALS
RESPIRATION RATE: 19 BRPM | TEMPERATURE: 98 F | BODY MASS INDEX: 19.26 KG/M2 | HEART RATE: 60 BPM | OXYGEN SATURATION: 100 % | SYSTOLIC BLOOD PRESSURE: 162 MMHG | HEIGHT: 67 IN | WEIGHT: 122.69 LBS | DIASTOLIC BLOOD PRESSURE: 71 MMHG

## 2023-12-11 DIAGNOSIS — M80.00XD AGE-RELATED OSTEOPOROSIS WITH CURRENT PATHOLOGICAL FRACTURE WITH ROUTINE HEALING: Primary | ICD-10-CM

## 2023-12-11 DIAGNOSIS — E55.9 VITAMIN D DEFICIENCY: ICD-10-CM

## 2023-12-11 PROCEDURE — 96372 THER/PROPH/DIAG INJ SC/IM: CPT

## 2023-12-11 PROCEDURE — 63600175 PHARM REV CODE 636 W HCPCS: Mod: JZ,JG | Performed by: NURSE PRACTITIONER

## 2023-12-11 RX ADMIN — DENOSUMAB 60 MG: 60 INJECTION SUBCUTANEOUS at 11:12

## 2023-12-18 ENCOUNTER — LAB VISIT (OUTPATIENT)
Dept: LAB | Facility: HOSPITAL | Age: 83
End: 2023-12-18
Attending: INTERNAL MEDICINE
Payer: MEDICARE

## 2023-12-18 DIAGNOSIS — Z17.0 MALIGNANT NEOPLASM OF UPPER-OUTER QUADRANT OF RIGHT BREAST IN FEMALE, ESTROGEN RECEPTOR POSITIVE: Chronic | ICD-10-CM

## 2023-12-18 DIAGNOSIS — C50.411 MALIGNANT NEOPLASM OF UPPER-OUTER QUADRANT OF RIGHT BREAST IN FEMALE, ESTROGEN RECEPTOR POSITIVE: Chronic | ICD-10-CM

## 2023-12-18 LAB
ALBUMIN SERPL BCP-MCNC: 3.3 G/DL (ref 3.5–5.2)
ALP SERPL-CCNC: 62 U/L (ref 55–135)
ALT SERPL W/O P-5'-P-CCNC: 18 U/L (ref 10–44)
ANION GAP SERPL CALC-SCNC: 7 MMOL/L (ref 8–16)
AST SERPL-CCNC: 29 U/L (ref 10–40)
BILIRUB SERPL-MCNC: 0.5 MG/DL (ref 0.1–1)
BUN SERPL-MCNC: 39 MG/DL (ref 8–23)
CALCIUM SERPL-MCNC: 8.8 MG/DL (ref 8.7–10.5)
CHLORIDE SERPL-SCNC: 103 MMOL/L (ref 95–110)
CO2 SERPL-SCNC: 25 MMOL/L (ref 23–29)
CREAT SERPL-MCNC: 1.3 MG/DL (ref 0.5–1.4)
ERYTHROCYTE [DISTWIDTH] IN BLOOD BY AUTOMATED COUNT: 14.5 % (ref 11.5–14.5)
EST. GFR  (NO RACE VARIABLE): 40.8 ML/MIN/1.73 M^2
FERRITIN SERPL-MCNC: 57 NG/ML (ref 20–300)
GLUCOSE SERPL-MCNC: 85 MG/DL (ref 70–110)
HCT VFR BLD AUTO: 28.9 % (ref 37–48.5)
HGB BLD-MCNC: 9.4 G/DL (ref 12–16)
IMM GRANULOCYTES # BLD AUTO: 0.05 K/UL (ref 0–0.04)
MCH RBC QN AUTO: 31.5 PG (ref 27–31)
MCHC RBC AUTO-ENTMCNC: 32.5 G/DL (ref 32–36)
MCV RBC AUTO: 97 FL (ref 82–98)
NEUTROPHILS # BLD AUTO: 2.9 K/UL (ref 1.8–7.7)
PLATELET # BLD AUTO: 235 K/UL (ref 150–450)
PMV BLD AUTO: 10.3 FL (ref 9.2–12.9)
POTASSIUM SERPL-SCNC: 4.4 MMOL/L (ref 3.5–5.1)
PROT SERPL-MCNC: 7.1 G/DL (ref 6–8.4)
RBC # BLD AUTO: 2.98 M/UL (ref 4–5.4)
SODIUM SERPL-SCNC: 135 MMOL/L (ref 136–145)
WBC # BLD AUTO: 5.65 K/UL (ref 3.9–12.7)

## 2023-12-18 PROCEDURE — 80053 COMPREHEN METABOLIC PANEL: CPT | Performed by: INTERNAL MEDICINE

## 2023-12-18 PROCEDURE — 82728 ASSAY OF FERRITIN: CPT | Performed by: INTERNAL MEDICINE

## 2023-12-18 PROCEDURE — 36415 COLL VENOUS BLD VENIPUNCTURE: CPT | Performed by: INTERNAL MEDICINE

## 2023-12-18 PROCEDURE — 85027 COMPLETE CBC AUTOMATED: CPT | Performed by: INTERNAL MEDICINE

## 2023-12-20 ENCOUNTER — OFFICE VISIT (OUTPATIENT)
Dept: HEMATOLOGY/ONCOLOGY | Facility: CLINIC | Age: 83
End: 2023-12-20
Payer: MEDICARE

## 2023-12-20 VITALS
BODY MASS INDEX: 19.96 KG/M2 | TEMPERATURE: 98 F | HEIGHT: 67 IN | WEIGHT: 127.19 LBS | RESPIRATION RATE: 18 BRPM | HEART RATE: 74 BPM | DIASTOLIC BLOOD PRESSURE: 70 MMHG | SYSTOLIC BLOOD PRESSURE: 162 MMHG | OXYGEN SATURATION: 98 %

## 2023-12-20 DIAGNOSIS — C50.411 MALIGNANT NEOPLASM OF UPPER-OUTER QUADRANT OF RIGHT BREAST IN FEMALE, ESTROGEN RECEPTOR POSITIVE: Primary | Chronic | ICD-10-CM

## 2023-12-20 DIAGNOSIS — Z17.0 MALIGNANT NEOPLASM OF UPPER-OUTER QUADRANT OF RIGHT BREAST IN FEMALE, ESTROGEN RECEPTOR POSITIVE: Primary | Chronic | ICD-10-CM

## 2023-12-20 DIAGNOSIS — Z79.811 USE OF AROMATASE INHIBITORS: ICD-10-CM

## 2023-12-20 DIAGNOSIS — N18.32 STAGE 3B CHRONIC KIDNEY DISEASE: ICD-10-CM

## 2023-12-20 PROCEDURE — 99215 PR OFFICE/OUTPT VISIT, EST, LEVL V, 40-54 MIN: ICD-10-PCS | Mod: S$PBB,,, | Performed by: INTERNAL MEDICINE

## 2023-12-20 PROCEDURE — 99215 OFFICE O/P EST HI 40 MIN: CPT | Mod: S$PBB,,, | Performed by: INTERNAL MEDICINE

## 2023-12-20 PROCEDURE — 99214 OFFICE O/P EST MOD 30 MIN: CPT | Mod: PBBFAC | Performed by: INTERNAL MEDICINE

## 2023-12-20 PROCEDURE — 99999 PR PBB SHADOW E&M-EST. PATIENT-LVL IV: CPT | Mod: PBBFAC,,, | Performed by: INTERNAL MEDICINE

## 2023-12-20 PROCEDURE — 99999 PR PBB SHADOW E&M-EST. PATIENT-LVL IV: ICD-10-PCS | Mod: PBBFAC,,, | Performed by: INTERNAL MEDICINE

## 2023-12-20 NOTE — PROGRESS NOTES
Subjective:       Patient ID: Shima Sorto is a 83 y.o. female.    Chief Complaint: No chief complaint on file.    HPI    Ms. Sorto returns today for follow up.  I had last seen her on October 23, 2023 and she has seen Dr. Prasad during my absence.  She has been on anastrazole since mid November 2018, and completed 5 years at the end of October 2023.  In late August 2023 she underwent a bone marrow biopsy which was essentially unremarkable.  There was no evidence of MDS, leukemia, lymphoma, myeloma, or metastatic carcinoma..  Cytogenetics were normal and reticulin was not increased.  Cellularity was 35 %.    Her most recent labs from December 18, 2023 are as follows:  WBCs are 5,600 per cubic mm, hemoglobin 9.4 grams/deciliter, hematocrit 28.9%, MCV 97 and platelets 235K.  Her creatinine is 1.3 mg/dL      Multiple urinalyses with the most recent one being yesterday have shown persistent hematuria.  Of note, the patient self catheterizes 3 times a day.  Other recent pertinent labs are as follows:  B12 is 406 pg/mL  SPEP shows no monoclonal spike  Direct Lane is negative  Bone marrow biopsy was unremarkable and showed no evidence of MDS    Briefly, she is an 83-year-old  female who diagnosed with breast cancer in 2018.  On 08/17/2018, she underwent a lumpectomy and sentinel lymph node biopsy for an 8 mm grade 1 invasive lobular carcinoma which was ER strongly positive, GA negative and HER-2 negative with a Ki-67 of 5%, with the closest margin being 2 mm.  Two of 2 sentinel lymph nodes were negative for involvement.      She completed her radiation therapy and was subsequently started on AIs.  Her DXA scan from 01/05/2023 shows osteopenia approaching osteoporosis (reviewed).  Of note, the patient is on Prolia.  A mammogram on 7/6/2023 was read as BIRADS II, and a one year follow up was recommended.    In April 2023 she had been diagnosed with C diff and she was treated accordingly.  She underwent a  colonoscopy and EGD by Dr. Garza.  The colonoscopy showed 3 polyps and extensive diverticulosis.  Biopsies were negative for malignancy.  The EGD showed gastritis and a hiatal hernia.  A video capsule swallow did not identify a source of bleeding in her small intestine.       Review of Systems      Overall she feels fair and she again complains of fatigue and generalized weakness.  She has tolerated the anastrazole quite well so far.  ECOG PS is 1.   She denies any anxiety, depression, easy bruising, fevers, chills, night  sweats, weight loss, nausea, vomiting, diarrhea, constipation, diplopia, blurred vision, headache, chest pain, palpitations, shortness of breath, breast pain, abdominal pain, extremity pain, or difficulty ambulating.  The remainder of the ten-point ROS, including general, skin, lymph, H/N, cardiorespiratory, GI, , Neuro, Endocrine, and psychiatric is negative.     Objective:      Physical Exam        She is alert, oriented to time, place, person, pleasant, well      nourished, in no acute physical distress.                                    VITAL SIGNS:  Reviewed                                      HEENT:  Normal.  There are no nasal, oral, lip, gingival, auricular, lid,    or conjunctival lesions.  Mucosae are moist and pink, and there is no        thrush.  Pupils are equal, reactive to light and accommodation.              Extraocular muscle movements are intact.  Dentition is good.                                    NECK:  Supple without JVD, adenopathy, or thyromegaly.                       LUNGS:  Clear to auscultation without wheezing, rales, or rhonchi.           CARDIOVASCULAR:  Reveals an S1, S2, no murmurs, no rubs, no gallops.         ABDOMEN:  Soft, nontender, without organomegaly.  Bowel sounds are    present.                                                                     EXTREMITIES:  No cyanosis, clubbing, or edema.                               BREASTS:  She is status  post right lumpectomy with a well-healed incision in the upper outer quadrant.    There are no masses in either breast.                                     LYMPHATIC:  There is no cervical, axillary, or supraclavicular adenopathy.   SKIN:  Warm and moist, without petechiae, rashes, induration, or ecchymoses.        NEUROLOGIC:  DTRs are 0-1+ bilaterally, symmetrical, motor function is 5/5,and cranial nerves are  within normal limits.    Assessment:       1. Malignant neoplasm of upper-outer quadrant of right breast in female, estrogen receptor positive    2. S/p 5 years of adjuvant hormonal therapy with aromatase inhibitors      3.    Osteopenia approaching osteoporosis    4.   Anemia, chronic, normochromic normocytic, most likely multifactorial.  Recent bone marrow biopsy did not reveal any MDS     5.  CDK 3b  Plan:           I had a long discussion with her.    In regards to the breast cancer, she completed her 5 years of anastrazole at the end of October 2023.  Her mammogram will be repeated after June 28th.  In regards to her anemia, it appears to be chronic and it is probably multifactorial.  Since she does appear to have CKD 3b (GFR 40 ml/min) , we will attempt to obtain an authorization for erythropoietin.  At this point we will proceed as follows:    1. We will begin the authorization for erythropoietin based on her GFR of 40 mL/minute  2. Assuming that she is able to receive erythropoietin I will see her after 3 weekly injections with a repeat CBC.  3. She will submit three more stool samples  4. Finally, she will have a repeat mammogram in July 2024.    Her multiple questions were answered to her satisfaction.  I spent approximately 45 minutes reviewing the available records and evaluating the patient, and coordinating her care.             Route Chart for Scheduling    Med Onc Chart Routing      Follow up with physician 3 weeks. Please obtain authorization for EPO. I want her to receive [facility] first  injection next week, and i want to see her in three weeks.   Follow up with BASHIR    Infusion scheduling note    Injection scheduling note    Labs CBC   Scheduling:  Preferred lab:  Lab interval:     Imaging    Pharmacy appointment    Other referrals                Therapy Plan Information  Medications  denosumab (PROLIA) injection 60 mg  60 mg, Subcutaneous, Every 26 weeks

## 2023-12-21 ENCOUNTER — HOSPITAL ENCOUNTER (OUTPATIENT)
Dept: PULMONOLOGY | Facility: CLINIC | Age: 83
Discharge: HOME | End: 2023-12-21
Payer: MEDICARE

## 2023-12-21 DIAGNOSIS — R06.02 SHORTNESS OF BREATH: ICD-10-CM

## 2023-12-21 LAB
DLCO ADJ PRE: 12.68 ML/(MIN*MMHG) (ref 13.65–25.11)
DLCO SINGLE BREATH LLN: 13.65
DLCO SINGLE BREATH PRE REF: 55.7 %
DLCO SINGLE BREATH REF: 19.38
DLCOC SBVA LLN: 2.52
DLCOC SBVA PRE REF: 85 %
DLCOC SBVA REF: 3.92
DLCOC SINGLE BREATH LLN: 13.65
DLCOC SINGLE BREATH PRE REF: 65.4 %
DLCOC SINGLE BREATH REF: 19.38
DLCOCSBVAULN: 5.33
DLCOCSINGLEBREATHULN: 25.11
DLCOSINGLEBREATHULN: 25.11
DLCOVA LLN: 2.52
DLCOVA PRE REF: 72.3 %
DLCOVA PRE: 2.84 ML/(MIN*MMHG*L) (ref 2.52–5.33)
DLCOVA REF: 3.92
DLCOVAULN: 5.33
DLVAADJ PRE: 3.34 ML/(MIN*MMHG*L) (ref 2.52–5.33)
ERV LLN: -16449.55
ERV PRE REF: 204.4 %
ERV REF: 0.45
ERVULN: ABNORMAL
FEF 25 75 LLN: 0.59
FEF 25 75 PRE REF: 71.9 %
FEF 25 75 REF: 1.46
FET100 CHG: -7.1 %
FEV05 LLN: 0.67
FEV05 REF: 1.52
FEV1 CHG: 3.2 %
FEV1 FVC LLN: 62
FEV1 FVC PRE REF: 87.5 %
FEV1 FVC REF: 77
FEV1 LLN: 1.28
FEV1 PRE REF: 89.7 %
FEV1 REF: 1.85
FEV1 VOL CHG: 0.05
FRCPLETH LLN: 1.9
FRCPLETH PREREF: 113.5 %
FRCPLETH REF: 2.72
FRCPLETHULN: 3.55
FVC CHG: -3.3 %
FVC LLN: 1.69
FVC PRE REF: 101 %
FVC REF: 2.45
FVC VOL CHG: -0.08
IVC PRE: 2.37 L (ref 1.69–3.25)
IVC SINGLE BREATH LLN: 1.69
IVC SINGLE BREATH PRE REF: 96.9 %
IVC SINGLE BREATH REF: 2.45
IVCSINGLEBREATHULN: 3.25
LLN IC: -16448.15
PEF LLN: 2.79
PEF PRE REF: 72.7 %
PEF REF: 4.5
PHYSICIAN COMMENT: ABNORMAL
POST FEF 25 75: 1.17 L/S (ref 0.59–2.81)
POST FET 100: 7.07 SEC
POST FEV1 FVC: 71.64 % (ref 61.54–89.83)
POST FEV1: 1.71 L (ref 1.28–2.39)
POST FEV5: 1.39 L (ref 0.67–2.38)
POST FVC: 2.39 L (ref 1.69–3.25)
POST PEF: 4.02 L/S (ref 2.79–6.21)
PRE DLCO: 10.79 ML/(MIN*MMHG) (ref 13.65–25.11)
PRE ERV: 0.93 L (ref -16449.55–16450.45)
PRE FEF 25 75: 1.05 L/S (ref 0.59–2.81)
PRE FET 100: 7.61 SEC
PRE FEV05 REF: 79.3 %
PRE FEV1 FVC: 67.09 % (ref 61.54–89.83)
PRE FEV1: 1.66 L (ref 1.28–2.39)
PRE FEV5: 1.21 L (ref 0.67–2.38)
PRE FRC PL: 3.09 L (ref 1.9–3.55)
PRE FVC: 2.47 L (ref 1.69–3.25)
PRE IC: 1.54 L (ref -16448.15–16451.85)
PRE PEF: 3.27 L/S (ref 2.79–6.21)
PRE REF IC: 83.5 %
PRE RV: 2.16 L (ref 1.69–2.85)
PRE TLC: 4.64 L (ref 3.95–5.93)
RAW PRE REF: 131.7 %
RAW PRE: 4.03 CMH2O*S/L (ref 3.06–3.06)
RAW REF: 3.06
REF IC: 1.85
RV LLN: 1.69
RV PRE REF: 95.3 %
RV REF: 2.27
RVTLC LLN: 38
RVTLC PRE REF: 98.9 %
RVTLC PRE: 46.66 % (ref 37.59–56.77)
RVTLC REF: 47
RVTLCULN: 57
RVULN: 2.85
SGAW PRE REF: 66 %
SGAW PRE: 0.07 1/(CMH2O*S) (ref 0.1–0.1)
SGAW REF: 0.1
TLC LLN: 3.95
TLC PRE REF: 93.9 %
TLC REF: 4.94
TLC ULN: 5.93
ULN IC: ABNORMAL
VA PRE: 3.8 L (ref 4.79–4.79)
VA SINGLE BREATH LLN: 4.79
VA SINGLE BREATH PRE REF: 79.4 %
VA SINGLE BREATH REF: 4.79
VASINGLEBREATHULN: 4.79
VC LLN: 1.69
VC PRE REF: 101 %
VC PRE: 2.47 L (ref 1.69–3.25)
VC REF: 2.45
VC ULN: 3.25

## 2023-12-21 PROCEDURE — 94060 EVALUATION OF WHEEZING: CPT | Mod: PBBFAC | Performed by: INTERNAL MEDICINE

## 2023-12-21 PROCEDURE — 94060 PR EVAL OF BRONCHOSPASM: ICD-10-PCS | Mod: 26,S$PBB,, | Performed by: INTERNAL MEDICINE

## 2023-12-21 PROCEDURE — 94729 DIFFUSING CAPACITY: CPT | Mod: 26,S$PBB,, | Performed by: INTERNAL MEDICINE

## 2023-12-21 PROCEDURE — 94726 PULM FUNCT TST PLETHYSMOGRAP: ICD-10-PCS | Mod: 26,S$PBB,, | Performed by: INTERNAL MEDICINE

## 2023-12-21 PROCEDURE — 94729 PR C02/MEMBANE DIFFUSE CAPACITY: ICD-10-PCS | Mod: 26,S$PBB,, | Performed by: INTERNAL MEDICINE

## 2023-12-21 PROCEDURE — 94726 PLETHYSMOGRAPHY LUNG VOLUMES: CPT | Mod: PBBFAC | Performed by: INTERNAL MEDICINE

## 2023-12-21 PROCEDURE — 94726 PLETHYSMOGRAPHY LUNG VOLUMES: CPT | Mod: 26,S$PBB,, | Performed by: INTERNAL MEDICINE

## 2023-12-21 PROCEDURE — 94729 DIFFUSING CAPACITY: CPT | Mod: PBBFAC | Performed by: INTERNAL MEDICINE

## 2023-12-21 PROCEDURE — 94060 EVALUATION OF WHEEZING: CPT | Mod: 26,S$PBB,, | Performed by: INTERNAL MEDICINE

## 2023-12-26 ENCOUNTER — PATIENT MESSAGE (OUTPATIENT)
Dept: NEPHROLOGY | Facility: CLINIC | Age: 83
End: 2023-12-26
Payer: MEDICARE

## 2023-12-26 ENCOUNTER — PATIENT MESSAGE (OUTPATIENT)
Dept: CARDIOLOGY | Facility: CLINIC | Age: 83
End: 2023-12-26
Payer: MEDICARE

## 2023-12-27 DIAGNOSIS — N18.31 STAGE 3A CHRONIC KIDNEY DISEASE: Primary | ICD-10-CM

## 2024-01-02 ENCOUNTER — OFFICE VISIT (OUTPATIENT)
Dept: CARDIOLOGY | Facility: CLINIC | Age: 84
End: 2024-01-02
Payer: MEDICARE

## 2024-01-02 DIAGNOSIS — I10 PRIMARY HYPERTENSION: Chronic | ICD-10-CM

## 2024-01-02 DIAGNOSIS — I50.30 ACC/AHA STAGE C HEART FAILURE WITH PRESERVED EJECTION FRACTION: Primary | Chronic | ICD-10-CM

## 2024-01-02 DIAGNOSIS — I87.2 VENOUS INSUFFICIENCY: ICD-10-CM

## 2024-01-02 DIAGNOSIS — N18.32 ACUTE RENAL FAILURE SUPERIMPOSED ON STAGE 3B CHRONIC KIDNEY DISEASE, UNSPECIFIED ACUTE RENAL FAILURE TYPE: ICD-10-CM

## 2024-01-02 DIAGNOSIS — I70.0 AORTO-ILIAC ATHEROSCLEROSIS: ICD-10-CM

## 2024-01-02 DIAGNOSIS — I70.8 AORTO-ILIAC ATHEROSCLEROSIS: ICD-10-CM

## 2024-01-02 DIAGNOSIS — N17.9 ACUTE RENAL FAILURE SUPERIMPOSED ON STAGE 3B CHRONIC KIDNEY DISEASE, UNSPECIFIED ACUTE RENAL FAILURE TYPE: ICD-10-CM

## 2024-01-02 PROBLEM — R06.02 SHORTNESS OF BREATH: Status: RESOLVED | Noted: 2023-10-19 | Resolved: 2024-01-02

## 2024-01-02 PROCEDURE — 99999 PR PBB SHADOW E&M-EST. PATIENT-LVL III: CPT | Mod: PBBFAC,,, | Performed by: INTERNAL MEDICINE

## 2024-01-02 PROCEDURE — 99213 OFFICE O/P EST LOW 20 MIN: CPT | Mod: PBBFAC | Performed by: INTERNAL MEDICINE

## 2024-01-02 PROCEDURE — 99214 OFFICE O/P EST MOD 30 MIN: CPT | Mod: S$PBB,,, | Performed by: INTERNAL MEDICINE

## 2024-01-02 RX ORDER — GLUCOSAMINE SULFATE 1500 MG
POWDER IN PACKET (EA) ORAL
COMMUNITY

## 2024-01-02 NOTE — PROGRESS NOTES
HISTORY:    83-year-old female with a history of HFpEF, hypertension, CKD, venous insufficiency s/p LLE GSV EVLT '17, breast cancer, chronic diarrhea presenting for follow-up.    The patient was initially evaluated for B LE edema for years, intermittent. In '17 underwent LLE GSV EVLT with improvement in symptoms that she had at that time. Had a medial venous ulcer at that time. That healed without issue. Was doing well, but had recurrent c diff in '23. After initial treatment she was debilitated and this resulted in a progression of her edema most recently.  Has struggled with leg swelling since. Had a second admission in October and activity has been disrupted. No recurrence of wounds. We did start furosemide and spironolactone, but had to hold those meds due to significant diarrhea and hypovolemia.     Activity levels remain reduced, but are improving. Energy levels improving. No CP or SOB. No orthopnea.     Tolerates empagliflozin 10x1. Bps usually 130-140s/60-70s.     PHYSICAL EXAM:    Vitals:    01/02/24 1603   BP: (!) (P) 143/68   Pulse: (P) 75   Resp: (P) 18       NAD, A+Ox3.  No jvd, no bruit.  RRR nml s1,s2. No murmurs.  CTA B no wheezes or crackles.  B LE edema mild. B chronic skin changes.     LABS/STUDIES (imaging reviewed during clinic visit):    December 2023 hemoglobin 9.4 with an MCV of 97.  Creatinine 1.3 with a BUN of 39.  Albumin 3.3.  October LDL 88 and HDL 68.  Triglycerides 37.  July .  TSH normal.    EKG October 2023 sinus rhythm no Q-waves or ST changes.    TTE July 2023 normal LV size and function with EF 60%.  Grade 2 diastology.  CVP 8 with estimated PASP of 52.  SAVANAH 2022 10 minutes on a medium ramp protocol.  Normal TTE with stress.  Inferolateral ST depressions with stress.  Venous duplex July 2023 no evidence of DVT bilaterally. L GSV not present at level of the knee. Bilateral GSV reflux BTK present.    Arterial duplex October 2023 1.2 bilaterally.  Diffuse atherosclerosis  without any evidence of significant stenosis by duplex.      ASSESSMENT & PLAN:    1. ACC/AHA stage C heart failure with preserved ejection fraction    2. Aorto-iliac atherosclerosis    3. Primary hypertension    4. Venous insufficiency    5. Acute renal failure superimposed on stage 3b chronic kidney disease, unspecified acute renal failure type                      HFpEF and venous insufficiency. Had significant improvement in le swelling on spironolactone 25x1 and furosemide 40x1. Have since had to hold due to hypovolemia and diarrhea. BUN:cr ratio high. Remains on empagliflozin. No c/o orthopnea or SOB at this time.     Bps reasonable at this time.     CKD and following with nephro. Self-catheterizing 4x/day.    Venous insufficiency. Utilizing compression stockings. May improve further with increased activity.      Follow up in about 6 months (around 7/2/2024).      Mariaelena Wong MD

## 2024-01-08 ENCOUNTER — PATIENT MESSAGE (OUTPATIENT)
Dept: HEMATOLOGY/ONCOLOGY | Facility: CLINIC | Age: 84
End: 2024-01-08
Payer: MEDICARE

## 2024-01-08 ENCOUNTER — PATIENT MESSAGE (OUTPATIENT)
Dept: DERMATOLOGY | Facility: CLINIC | Age: 84
End: 2024-01-08
Payer: MEDICARE

## 2024-01-08 ENCOUNTER — PATIENT MESSAGE (OUTPATIENT)
Dept: UROLOGY | Facility: CLINIC | Age: 84
End: 2024-01-08
Payer: MEDICARE

## 2024-01-10 ENCOUNTER — OFFICE VISIT (OUTPATIENT)
Dept: PODIATRY | Facility: CLINIC | Age: 84
End: 2024-01-10
Payer: MEDICARE

## 2024-01-10 ENCOUNTER — LAB VISIT (OUTPATIENT)
Dept: LAB | Facility: HOSPITAL | Age: 84
End: 2024-01-10
Attending: INTERNAL MEDICINE
Payer: MEDICARE

## 2024-01-10 VITALS
SYSTOLIC BLOOD PRESSURE: 182 MMHG | DIASTOLIC BLOOD PRESSURE: 82 MMHG | HEART RATE: 56 BPM | BODY MASS INDEX: 19.58 KG/M2 | WEIGHT: 124.75 LBS | HEIGHT: 67 IN

## 2024-01-10 DIAGNOSIS — Z79.811 USE OF AROMATASE INHIBITORS: ICD-10-CM

## 2024-01-10 DIAGNOSIS — N18.32 STAGE 3B CHRONIC KIDNEY DISEASE: ICD-10-CM

## 2024-01-10 DIAGNOSIS — C50.411 MALIGNANT NEOPLASM OF UPPER-OUTER QUADRANT OF RIGHT BREAST IN FEMALE, ESTROGEN RECEPTOR POSITIVE: Chronic | ICD-10-CM

## 2024-01-10 DIAGNOSIS — M20.42 HAMMER TOE OF LEFT FOOT: Primary | ICD-10-CM

## 2024-01-10 DIAGNOSIS — Z17.0 MALIGNANT NEOPLASM OF UPPER-OUTER QUADRANT OF RIGHT BREAST IN FEMALE, ESTROGEN RECEPTOR POSITIVE: Chronic | ICD-10-CM

## 2024-01-10 LAB
BASOPHILS # BLD AUTO: 0.04 K/UL (ref 0–0.2)
BASOPHILS NFR BLD: 0.8 % (ref 0–1.9)
DIFFERENTIAL METHOD BLD: ABNORMAL
EOSINOPHIL # BLD AUTO: 0 K/UL (ref 0–0.5)
EOSINOPHIL NFR BLD: 0.8 % (ref 0–8)
ERYTHROCYTE [DISTWIDTH] IN BLOOD BY AUTOMATED COUNT: 13.5 % (ref 11.5–14.5)
HCT VFR BLD AUTO: 33.6 % (ref 37–48.5)
HGB BLD-MCNC: 10.5 G/DL (ref 12–16)
IMM GRANULOCYTES # BLD AUTO: 0.02 K/UL (ref 0–0.04)
IMM GRANULOCYTES NFR BLD AUTO: 0.4 % (ref 0–0.5)
LYMPHOCYTES # BLD AUTO: 1.8 K/UL (ref 1–4.8)
LYMPHOCYTES NFR BLD: 37 % (ref 18–48)
MCH RBC QN AUTO: 31.8 PG (ref 27–31)
MCHC RBC AUTO-ENTMCNC: 31.3 G/DL (ref 32–36)
MCV RBC AUTO: 102 FL (ref 82–98)
MONOCYTES # BLD AUTO: 0.5 K/UL (ref 0.3–1)
MONOCYTES NFR BLD: 10 % (ref 4–15)
NEUTROPHILS # BLD AUTO: 2.4 K/UL (ref 1.8–7.7)
NEUTROPHILS NFR BLD: 51 % (ref 38–73)
NRBC BLD-RTO: 0 /100 WBC
PLATELET # BLD AUTO: 208 K/UL (ref 150–450)
PMV BLD AUTO: 11 FL (ref 9.2–12.9)
RBC # BLD AUTO: 3.3 M/UL (ref 4–5.4)
WBC # BLD AUTO: 4.79 K/UL (ref 3.9–12.7)

## 2024-01-10 PROCEDURE — 36415 COLL VENOUS BLD VENIPUNCTURE: CPT | Performed by: INTERNAL MEDICINE

## 2024-01-10 PROCEDURE — 85025 COMPLETE CBC W/AUTO DIFF WBC: CPT | Performed by: INTERNAL MEDICINE

## 2024-01-10 PROCEDURE — 99999 PR PBB SHADOW E&M-EST. PATIENT-LVL III: CPT | Mod: PBBFAC,,, | Performed by: PODIATRIST

## 2024-01-10 PROCEDURE — 99203 OFFICE O/P NEW LOW 30 MIN: CPT | Mod: S$PBB,,, | Performed by: PODIATRIST

## 2024-01-10 PROCEDURE — 99213 OFFICE O/P EST LOW 20 MIN: CPT | Mod: PBBFAC | Performed by: PODIATRIST

## 2024-01-10 NOTE — PROGRESS NOTES
Subjective:      Patient ID: Shima Sorto is a 83 y.o. female.    Chief Complaint: Sores (L foot great and 2nd toe)    Pt presents today c/o a recurring wound on her left 2nd digit. Pt states at the time she made the appointment, she had an open wound but it has since healed. Pt states she has been unable to use any separation device bc it further causes wounds. Pt states only thing that has been effective is klenex tissue and it shreds.     Review of Systems   Constitutional: Negative for chills, fever and malaise/fatigue.   HENT:  Negative for hearing loss.    Cardiovascular:  Positive for claudication and leg swelling.   Respiratory:  Negative for shortness of breath.    Skin:  Positive for color change and unusual hair distribution. Negative for flushing and rash.   Musculoskeletal:  Negative for joint pain and myalgias.   Neurological:  Negative for loss of balance, numbness, paresthesias and sensory change.   Psychiatric/Behavioral:  Negative for altered mental status.          Objective:      Physical Exam  Vitals reviewed.   Cardiovascular:      Pulses: Decreased pulses.   Musculoskeletal:      Right lower le+ Edema present.      Left lower le+ Edema present.      Comments:   Hallux valgus deformity noted to both feet with dorsal medial eminence. no pain with ROM of 1st MPJ  Rigid hammertoes, with 2nd overlapping hallux noted to left foot  Hammertoes noted, digits 1-5, right, rigid      Feet:      Right foot:      Protective Sensation: 5 sites tested.  5 sites sensed.      Left foot:      Protective Sensation: 5 sites tested.  5 sites sensed.   Skin:     Capillary Refill: Capillary refill takes 2 to 3 seconds.      Comments: Skin is thin, terse, shiny, and cool, b/L.  .   No open lesion noted b/L  Skin temp is warm to warm from proximal to distal b/L.  Webspaces clean, dry, and intact  Thin intact skin noted to 2nd digit, 1st interspace   Neurological:      Mental Status: She is alert and  oriented to person, place, and time.      Comments: Gross sensation intact b/L           Assessment:       Encounter Diagnosis   Name Primary?    Hammer toe of left foot Yes         Plan:       Shima was seen today for sores.    Diagnoses and all orders for this visit:    Hammer toe of left foot      I counseled the patient on her conditions, their implications and medical management.    Long discussion had with pt about her options. Due to some systemic issues, surgery is not advised.   Pt refuses any toe spacers, including the thin ones  Harsha foam cut and placed in between first and second digit, pt states she likes the way that feels  Pt advised if this works for her, harsha foam can be purchased at medical supply stores  Call or return to clinic prn if these symptoms worsen or fail to improve as anticipated.      .

## 2024-01-11 ENCOUNTER — OFFICE VISIT (OUTPATIENT)
Dept: HEMATOLOGY/ONCOLOGY | Facility: CLINIC | Age: 84
End: 2024-01-11
Payer: MEDICARE

## 2024-01-11 DIAGNOSIS — N18.32 STAGE 3B CHRONIC KIDNEY DISEASE: Primary | ICD-10-CM

## 2024-01-11 PROBLEM — Z79.811 USE OF AROMATASE INHIBITORS: Status: RESOLVED | Noted: 2019-09-12 | Resolved: 2024-01-11

## 2024-01-11 PROCEDURE — 99999 PR PBB SHADOW E&M-EST. PATIENT-LVL I: CPT | Mod: PBBFAC,,, | Performed by: INTERNAL MEDICINE

## 2024-01-11 PROCEDURE — 99214 OFFICE O/P EST MOD 30 MIN: CPT | Mod: S$PBB,,, | Performed by: INTERNAL MEDICINE

## 2024-01-11 PROCEDURE — 99211 OFF/OP EST MAY X REQ PHY/QHP: CPT | Mod: PBBFAC | Performed by: INTERNAL MEDICINE

## 2024-01-11 NOTE — PROGRESS NOTES
Subjective:       Patient ID: Shima Sorto is a 83 y.o. female.    Chief Complaint: No chief complaint on file.    HPI    Ms. Sorto returns today for follow up.  I had last seen her on December 20, 2023.  Prior to that, she had seen Dr. Prasad during my absence.  She had been on anastrazole since mid November 2018, and completed 5 years at the end of October 2023.  In late August 2023 she underwent a bone marrow biopsy which was essentially unremarkable.  There was no evidence of MDS, leukemia, lymphoma, myeloma, or metastatic carcinoma.  Cytogenetics were normal and reticulin was not increased.  Cellularity was 35 %.    Her most CBC from yesterday is as follows:  WBCs 4,700 per cubic mm, hemoglobin 10.5 grams/deciliter, hematocrit 33.6 %, , and platelets 208K.  Her most recent creatinine from mid December 2023 was 1.3 mg/dL and EGFR was 40 mL per minute  Three stool samples she submitted today were negative for occult blood.    Multiple urinalyses  have shown persistent hematuria.  Of note, the patient self catheterizes 3 times a day.  Other recent pertinent labs are as follows:  B12 is 406 pg/mL  SPEP shows no monoclonal spike  Direct Lane is negative  Bone marrow biopsy was unremarkable and showed no evidence of MDS    Briefly, she is an 83-year-old  female who diagnosed with breast cancer in 2018.  On 08/17/2018, she underwent a lumpectomy and sentinel lymph node biopsy for an 8 mm grade 1 invasive lobular carcinoma which was ER strongly positive, HI negative and HER-2 negative with a Ki-67 of 5%, with the closest margin being 2 mm.  Two of 2 sentinel lymph nodes were negative for involvement.      She completed her radiation therapy and was subsequently started on AIs.  Her DXA scan from 01/05/2023 shows osteopenia approaching osteoporosis (reviewed).  Of note, the patient is on Prolia.  A mammogram on 7/6/2023 was read as BIRADS II, and a one year follow up was recommended.    In April  2023 she had been diagnosed with C diff and she was treated accordingly.  She underwent a colonoscopy and EGD by Dr. Garza.  The colonoscopy showed 3 polyps and extensive diverticulosis.  Biopsies were negative for malignancy.  The EGD showed gastritis and a hiatal hernia.  A video capsule swallow did not identify a source of bleeding in her small intestine.       Review of Systems      Overall she feels fair and she again complains of fatigue and weakness, but her ECOG PS is 1.   She denies any anxiety, depression, easy bruising, fevers, chills, night  sweats, weight loss, nausea, vomiting, diarrhea, constipation, diplopia, blurred vision, headache, chest pain, palpitations, shortness of breath, breast pain, abdominal pain, extremity pain, or difficulty ambulating.  The remainder of the ten-point ROS, including general, skin, lymph, H/N, cardiorespiratory, GI, , Neuro, Endocrine, and psychiatric is negative.     Objective:      Physical Exam        She is alert, oriented to time, place, person, pleasant, well      nourished, in no acute physical distress.                                    VITAL SIGNS:  Reviewed                                      HEENT:  Normal.  There are no nasal, oral, lip, gingival, auricular, lid,    or conjunctival lesions.  Mucosae are moist and pink, and there is no        thrush.  Pupils are equal, reactive to light and accommodation.              Extraocular muscle movements are intact.  Dentition is good.                                    NECK:  Supple without JVD, adenopathy, or thyromegaly.                       LUNGS:  Clear to auscultation without wheezing, rales, or rhonchi.           CARDIOVASCULAR:  Reveals an S1, S2, no murmurs, no rubs, no gallops.         ABDOMEN:  Soft, nontender, without organomegaly.  Bowel sounds are    present.                                                                     EXTREMITIES:  No cyanosis, clubbing, or edema.                                BREASTS:  She is status post right lumpectomy with a well-healed incision in the upper outer quadrant.    There are no masses in either breast.                                     LYMPHATIC:  There is no cervical, axillary, or supraclavicular adenopathy.   SKIN:  Warm and moist, without petechiae, rashes, induration, or ecchymoses.        NEUROLOGIC:  DTRs are 0-1+ bilaterally, symmetrical, motor function is 5/5,and cranial nerves are  within normal limits.    Assessment:       1. Malignant neoplasm of upper-outer quadrant of right breast in female, estrogen receptor positive    2. S/p 5 years of adjuvant hormonal therapy with aromatase inhibitors      3.    Osteopenia approaching osteoporosis    4.   Anemia, chronic, normochromic normocytic, most likely multifactorial.  Recent bone marrow biopsy did not reveal any MDS     5.  CDK 3b  Plan:           I had a long discussion with her.    In regards to the breast cancer, she completed her 5 years of anastrazole at the end of October 2023.  Her mammogram will be repeated after June 28th.  In regards to her anemia, it appears to be chronic and it is probably multifactorial.  Given her CKD 3b (GFR 40 ml/min) , we will again attempt to obtain an authorization for erythropoietin.  At this point we will proceed as follows:    1. We will inquire about the request for the authorization for erythropoietin based on her GFR of 40 mL/minute  2. Assuming that she is able to receive erythropoietin I will see her after 3 weekly injections with a repeat CBC.  3. RTC in 4 weeks  4. Finally, she will have a repeat mammogram in July 2024.    Her multiple questions were answered to her satisfaction.  I spent approximately 45 minutes reviewing the available records and evaluating the patient, and coordinating her care.             Route Chart for Scheduling  Med Onc Route Chart for Scheduling  Supportive Plan Information  OP EPOETIN MERCED WEEKLY   Robert Hernandez MD   Upcoming  Treatment Dates - OP EPOETIN ENEDINA WEEKLY    12/27/2023       Medications       epoetin enedina-epbx injection 40,000 Units  1/3/2024       Medications       epoetin enedina-epbx injection 40,000 Units  1/10/2024       Medications       epoetin enedina-epbx injection 40,000 Units  1/17/2024       Medications       epoetin enedina-epbx injection    Therapy Plan Information  Medications  denosumab (PROLIA) injection 60 mg  60 mg, Subcutaneous, Every 26 weeks

## 2024-01-12 ENCOUNTER — PATIENT MESSAGE (OUTPATIENT)
Dept: HEMATOLOGY/ONCOLOGY | Facility: CLINIC | Age: 84
End: 2024-01-12
Payer: MEDICARE

## 2024-01-12 ENCOUNTER — INFUSION (OUTPATIENT)
Dept: INFUSION THERAPY | Facility: HOSPITAL | Age: 84
End: 2024-01-12
Payer: MEDICARE

## 2024-01-12 ENCOUNTER — PATIENT MESSAGE (OUTPATIENT)
Dept: UROLOGY | Facility: CLINIC | Age: 84
End: 2024-01-12
Payer: MEDICARE

## 2024-01-12 VITALS — RESPIRATION RATE: 16 BRPM | HEART RATE: 53 BPM | SYSTOLIC BLOOD PRESSURE: 167 MMHG | DIASTOLIC BLOOD PRESSURE: 80 MMHG

## 2024-01-12 DIAGNOSIS — N18.32 STAGE 3B CHRONIC KIDNEY DISEASE: Primary | ICD-10-CM

## 2024-01-12 PROCEDURE — 96372 THER/PROPH/DIAG INJ SC/IM: CPT

## 2024-01-12 PROCEDURE — 63600175 PHARM REV CODE 636 W HCPCS: Mod: JZ,EC,JG | Performed by: INTERNAL MEDICINE

## 2024-01-12 RX ADMIN — EPOETIN ALFA-EPBX 40000 UNITS: 40000 INJECTION, SOLUTION INTRAVENOUS; SUBCUTANEOUS at 02:01

## 2024-01-12 NOTE — NURSING
Pt here for Retacrit injection. Assessment complete and labs reviewed. VSS. Administered injection in left arm. No questions or concerns. Pt ambulated out of unit unassisted.

## 2024-01-13 DIAGNOSIS — D50.9 IRON DEFICIENCY ANEMIA, UNSPECIFIED IRON DEFICIENCY ANEMIA TYPE: Primary | ICD-10-CM

## 2024-01-13 RX ORDER — FERROUS GLUCONATE 324(38)MG
324 TABLET ORAL
Qty: 90 TABLET | Refills: 1 | Status: SHIPPED | OUTPATIENT
Start: 2024-01-13 | End: 2024-07-11

## 2024-01-13 RX ORDER — BLACK COHOSH ROOT 200 MG
500 CAPSULE ORAL EVERY 12 HOURS
Qty: 180 TABLET | Refills: 1 | Status: SHIPPED | OUTPATIENT
Start: 2024-01-13 | End: 2024-07-11

## 2024-01-15 PROBLEM — N17.9 AKI (ACUTE KIDNEY INJURY): Status: RESOLVED | Noted: 2023-10-12 | Resolved: 2024-01-15

## 2024-01-17 ENCOUNTER — IMMUNIZATION (OUTPATIENT)
Dept: INTERNAL MEDICINE | Facility: CLINIC | Age: 84
End: 2024-01-17
Payer: MEDICARE

## 2024-01-17 DIAGNOSIS — Z23 NEED FOR VACCINATION: Primary | ICD-10-CM

## 2024-01-17 PROCEDURE — 91322 SARSCOV2 VAC 50 MCG/0.5ML IM: CPT | Mod: PBBFAC

## 2024-01-17 PROCEDURE — 99999PBSHW COVID-19 VAC, MRNA 2023 (MODERNA)(PF) 50 MCG/0.5 ML IM SUSR (12+): Mod: PBBFAC,,,

## 2024-01-18 ENCOUNTER — TELEPHONE (OUTPATIENT)
Dept: HEMATOLOGY/ONCOLOGY | Facility: CLINIC | Age: 84
End: 2024-01-18
Payer: MEDICARE

## 2024-01-19 ENCOUNTER — LAB VISIT (OUTPATIENT)
Dept: LAB | Facility: HOSPITAL | Age: 84
End: 2024-01-19
Attending: INTERNAL MEDICINE
Payer: MEDICARE

## 2024-01-19 DIAGNOSIS — N18.32 STAGE 3B CHRONIC KIDNEY DISEASE: ICD-10-CM

## 2024-01-19 LAB
ALBUMIN SERPL BCP-MCNC: 3.7 G/DL (ref 3.5–5.2)
ALP SERPL-CCNC: 64 U/L (ref 55–135)
ALT SERPL W/O P-5'-P-CCNC: 19 U/L (ref 10–44)
ANION GAP SERPL CALC-SCNC: 8 MMOL/L (ref 8–16)
AST SERPL-CCNC: 30 U/L (ref 10–40)
BASOPHILS # BLD AUTO: 0.03 K/UL (ref 0–0.2)
BASOPHILS NFR BLD: 0.7 % (ref 0–1.9)
BILIRUB SERPL-MCNC: 0.5 MG/DL (ref 0.1–1)
BUN SERPL-MCNC: 37 MG/DL (ref 8–23)
CALCIUM SERPL-MCNC: 9.4 MG/DL (ref 8.7–10.5)
CHLORIDE SERPL-SCNC: 102 MMOL/L (ref 95–110)
CO2 SERPL-SCNC: 28 MMOL/L (ref 23–29)
CREAT SERPL-MCNC: 1.4 MG/DL (ref 0.5–1.4)
DIFFERENTIAL METHOD BLD: ABNORMAL
EOSINOPHIL # BLD AUTO: 0.1 K/UL (ref 0–0.5)
EOSINOPHIL NFR BLD: 2.1 % (ref 0–8)
ERYTHROCYTE [DISTWIDTH] IN BLOOD BY AUTOMATED COUNT: 13.6 % (ref 11.5–14.5)
EST. GFR  (NO RACE VARIABLE): 37.3 ML/MIN/1.73 M^2
FERRITIN SERPL-MCNC: 33 NG/ML (ref 20–300)
GLUCOSE SERPL-MCNC: 91 MG/DL (ref 70–110)
HCT VFR BLD AUTO: 36.5 % (ref 37–48.5)
HGB BLD-MCNC: 11.1 G/DL (ref 12–16)
IMM GRANULOCYTES # BLD AUTO: 0.01 K/UL (ref 0–0.04)
IMM GRANULOCYTES NFR BLD AUTO: 0.2 % (ref 0–0.5)
LYMPHOCYTES # BLD AUTO: 1.9 K/UL (ref 1–4.8)
LYMPHOCYTES NFR BLD: 43 % (ref 18–48)
MCH RBC QN AUTO: 31 PG (ref 27–31)
MCHC RBC AUTO-ENTMCNC: 30.4 G/DL (ref 32–36)
MCV RBC AUTO: 102 FL (ref 82–98)
MONOCYTES # BLD AUTO: 0.6 K/UL (ref 0.3–1)
MONOCYTES NFR BLD: 14.2 % (ref 4–15)
NEUTROPHILS # BLD AUTO: 1.7 K/UL (ref 1.8–7.7)
NEUTROPHILS NFR BLD: 39.8 % (ref 38–73)
NRBC BLD-RTO: 0 /100 WBC
PLATELET # BLD AUTO: 217 K/UL (ref 150–450)
PMV BLD AUTO: 10.1 FL (ref 9.2–12.9)
POTASSIUM SERPL-SCNC: 4.5 MMOL/L (ref 3.5–5.1)
PROT SERPL-MCNC: 7.5 G/DL (ref 6–8.4)
RBC # BLD AUTO: 3.58 M/UL (ref 4–5.4)
SODIUM SERPL-SCNC: 138 MMOL/L (ref 136–145)
WBC # BLD AUTO: 4.37 K/UL (ref 3.9–12.7)

## 2024-01-19 PROCEDURE — 82728 ASSAY OF FERRITIN: CPT | Performed by: INTERNAL MEDICINE

## 2024-01-19 PROCEDURE — 80053 COMPREHEN METABOLIC PANEL: CPT | Performed by: INTERNAL MEDICINE

## 2024-01-19 PROCEDURE — 36415 COLL VENOUS BLD VENIPUNCTURE: CPT | Performed by: INTERNAL MEDICINE

## 2024-01-19 PROCEDURE — 85025 COMPLETE CBC W/AUTO DIFF WBC: CPT | Performed by: INTERNAL MEDICINE

## 2024-01-20 ENCOUNTER — INFUSION (OUTPATIENT)
Dept: INFUSION THERAPY | Facility: HOSPITAL | Age: 84
End: 2024-01-20
Payer: MEDICARE

## 2024-01-20 VITALS — HEART RATE: 58 BPM | RESPIRATION RATE: 16 BRPM | SYSTOLIC BLOOD PRESSURE: 165 MMHG | DIASTOLIC BLOOD PRESSURE: 73 MMHG

## 2024-01-20 DIAGNOSIS — N18.32 STAGE 3B CHRONIC KIDNEY DISEASE: Primary | ICD-10-CM

## 2024-01-20 PROCEDURE — 63600175 PHARM REV CODE 636 W HCPCS: Mod: JZ,EC,JG | Performed by: INTERNAL MEDICINE

## 2024-01-20 PROCEDURE — 96372 THER/PROPH/DIAG INJ SC/IM: CPT

## 2024-01-20 RX ADMIN — EPOETIN ALFA-EPBX 40000 UNITS: 40000 INJECTION, SOLUTION INTRAVENOUS; SUBCUTANEOUS at 12:01

## 2024-01-20 NOTE — NURSING
Pt here for Retacrit injection. Assessment complete and labs reviewed. VSS. Administered injection in abdominal tissue. No questions or concerns. Pt ambulated out of unit unassisted.

## 2024-01-22 ENCOUNTER — PATIENT MESSAGE (OUTPATIENT)
Dept: HEMATOLOGY/ONCOLOGY | Facility: CLINIC | Age: 84
End: 2024-01-22
Payer: MEDICARE

## 2024-01-23 ENCOUNTER — PATIENT MESSAGE (OUTPATIENT)
Dept: CARDIOLOGY | Facility: CLINIC | Age: 84
End: 2024-01-23
Payer: MEDICARE

## 2024-01-24 ENCOUNTER — PATIENT MESSAGE (OUTPATIENT)
Dept: NEPHROLOGY | Facility: CLINIC | Age: 84
End: 2024-01-24
Payer: MEDICARE

## 2024-01-29 ENCOUNTER — OFFICE VISIT (OUTPATIENT)
Dept: URGENT CARE | Facility: CLINIC | Age: 84
End: 2024-01-29
Payer: MEDICARE

## 2024-01-29 VITALS
WEIGHT: 124 LBS | HEIGHT: 67 IN | HEART RATE: 56 BPM | BODY MASS INDEX: 19.46 KG/M2 | TEMPERATURE: 98 F | SYSTOLIC BLOOD PRESSURE: 136 MMHG | DIASTOLIC BLOOD PRESSURE: 83 MMHG | RESPIRATION RATE: 16 BRPM

## 2024-01-29 DIAGNOSIS — D17.23 LIPOMA OF RIGHT LOWER EXTREMITY: Primary | ICD-10-CM

## 2024-01-29 PROCEDURE — 99213 OFFICE O/P EST LOW 20 MIN: CPT | Mod: S$GLB,,, | Performed by: FAMILY MEDICINE

## 2024-01-29 NOTE — PROGRESS NOTES
"Subjective:      Patient ID: Shima Sorto is a 83 y.o. female.    Vitals:  height is 5' 7" (1.702 m) and weight is 56.2 kg (124 lb). Her oral temperature is 97.6 °F (36.4 °C). Her blood pressure is 136/83 and her pulse is 56 (abnormal). Her respiration is 16.     Chief Complaint: Lump on right hip     Pt stated that she has a lump on her right hip. This occurred 30 days ago. She thought it was a bruise. However, it grew. It does feel sore. Pt have not taken any meds for this issue. This is the 1st time pt has come to any medical facility for this issue.  Patient denies any fever, chills, weakness.    Other  This is a new problem. Episode onset: x 30 days. The problem has been gradually worsening. Associated symptoms comments: Lump on right hip . Nothing aggravates the symptoms. She has tried nothing for the symptoms. The treatment provided no relief.       Skin:  Negative for erythema.      Objective:     Physical Exam   Constitutional: She is oriented to person, place, and time. She appears well-developed.   HENT:   Head: Normocephalic and atraumatic. Head is without abrasion, without contusion and without laceration.   Ears:   Right Ear: External ear normal.   Left Ear: External ear normal.   Nose: Nose normal.   Mouth/Throat: Oropharynx is clear and moist and mucous membranes are normal.   Eyes: Conjunctivae, EOM and lids are normal. Pupils are equal, round, and reactive to light.   Neck: Trachea normal and phonation normal. Neck supple.   Cardiovascular: Normal rate, regular rhythm and normal heart sounds.   No murmur heard.  Pulmonary/Chest: Effort normal and breath sounds normal. No stridor. No respiratory distress. She has no wheezes. She has no rales.   Musculoskeletal: Normal range of motion.         General: Normal range of motion.   Neurological: She is alert and oriented to person, place, and time.   Skin: Skin is warm, dry, intact and no rash. Capillary refill takes less than 2 seconds. No " abrasion, No burn, No bruising, No erythema and No ecchymosis         Comments:   Positive about 4 x 6 cm soft tissue swelling over right trochanteric area.  No redness, induration, fluctuation, tenderness.   Psychiatric: Her speech is normal and behavior is normal. Judgment and thought content normal.   Nursing note and vitals reviewed.      Assessment:     1. Lipoma of right lower extremity        Plan:       Lipoma of right lower extremity  -     Ambulatory referral/consult to General Surgery

## 2024-01-30 ENCOUNTER — PATIENT MESSAGE (OUTPATIENT)
Dept: HEMATOLOGY/ONCOLOGY | Facility: CLINIC | Age: 84
End: 2024-01-30
Payer: MEDICARE

## 2024-01-31 ENCOUNTER — LAB VISIT (OUTPATIENT)
Dept: LAB | Facility: HOSPITAL | Age: 84
End: 2024-01-31
Payer: MEDICARE

## 2024-01-31 DIAGNOSIS — N18.31 STAGE 3A CHRONIC KIDNEY DISEASE: ICD-10-CM

## 2024-01-31 LAB
ALBUMIN SERPL BCP-MCNC: 3.7 G/DL (ref 3.5–5.2)
ANION GAP SERPL CALC-SCNC: 8 MMOL/L (ref 8–16)
BUN SERPL-MCNC: 18 MG/DL (ref 8–23)
CALCIUM SERPL-MCNC: 9.8 MG/DL (ref 8.7–10.5)
CHLORIDE SERPL-SCNC: 100 MMOL/L (ref 95–110)
CO2 SERPL-SCNC: 28 MMOL/L (ref 23–29)
CREAT SERPL-MCNC: 1.2 MG/DL (ref 0.5–1.4)
EST. GFR  (NO RACE VARIABLE): 44.9 ML/MIN/1.73 M^2
GLUCOSE SERPL-MCNC: 87 MG/DL (ref 70–110)
HCT VFR BLD AUTO: 37.3 % (ref 37–48.5)
HGB BLD-MCNC: 11.8 G/DL (ref 12–16)
PHOSPHATE SERPL-MCNC: 3.9 MG/DL (ref 2.7–4.5)
POTASSIUM SERPL-SCNC: 4.4 MMOL/L (ref 3.5–5.1)
PTH-INTACT SERPL-MCNC: 48.1 PG/ML (ref 9–77)
SODIUM SERPL-SCNC: 136 MMOL/L (ref 136–145)

## 2024-01-31 PROCEDURE — 36415 COLL VENOUS BLD VENIPUNCTURE: CPT | Performed by: INTERNAL MEDICINE

## 2024-01-31 PROCEDURE — 85014 HEMATOCRIT: CPT | Performed by: INTERNAL MEDICINE

## 2024-01-31 PROCEDURE — 80069 RENAL FUNCTION PANEL: CPT | Performed by: INTERNAL MEDICINE

## 2024-01-31 PROCEDURE — 85018 HEMOGLOBIN: CPT | Performed by: INTERNAL MEDICINE

## 2024-01-31 PROCEDURE — 83970 ASSAY OF PARATHORMONE: CPT | Performed by: INTERNAL MEDICINE

## 2024-02-01 ENCOUNTER — PATIENT MESSAGE (OUTPATIENT)
Dept: NEPHROLOGY | Facility: CLINIC | Age: 84
End: 2024-02-01

## 2024-02-01 ENCOUNTER — OFFICE VISIT (OUTPATIENT)
Dept: NEPHROLOGY | Facility: CLINIC | Age: 84
End: 2024-02-01
Payer: MEDICARE

## 2024-02-01 VITALS
OXYGEN SATURATION: 100 % | HEART RATE: 60 BPM | SYSTOLIC BLOOD PRESSURE: 165 MMHG | BODY MASS INDEX: 17.96 KG/M2 | WEIGHT: 114.63 LBS | DIASTOLIC BLOOD PRESSURE: 85 MMHG

## 2024-02-01 DIAGNOSIS — N18.31 STAGE 3A CHRONIC KIDNEY DISEASE: Primary | ICD-10-CM

## 2024-02-01 DIAGNOSIS — I10 HYPERTENSION, UNSPECIFIED TYPE: ICD-10-CM

## 2024-02-01 DIAGNOSIS — N20.0 KIDNEY STONES: ICD-10-CM

## 2024-02-01 PROCEDURE — 99215 OFFICE O/P EST HI 40 MIN: CPT | Mod: S$PBB,,, | Performed by: INTERNAL MEDICINE

## 2024-02-01 PROCEDURE — 99999 PR PBB SHADOW E&M-EST. PATIENT-LVL III: CPT | Mod: PBBFAC,,, | Performed by: INTERNAL MEDICINE

## 2024-02-01 PROCEDURE — 99213 OFFICE O/P EST LOW 20 MIN: CPT | Mod: PBBFAC | Performed by: INTERNAL MEDICINE

## 2024-02-01 NOTE — PROGRESS NOTES
HISTORY OF PRESENT ILLNESS:  This is a 83  -year-old white female with a   longstanding history of nephrolithiasis, but no recent symptomatic   nephrolithiasis episodes who is coming in for a followup.  She also has   hypertension with stable blood pressures at home and had failed InterStim   therapy with her bladder in the past and had seen Dr. Stoddard in Urology.  No   recent symptomatic stones.  Her creatinine is stable.   Denies NSAID's.  Her blood pressure is higher  at home in the 140's-160's off benicar. .     PAST MEDICAL HISTORY:  Significant for hypertension for over 10 years, history   of kidney stones, failed InterStim therapy, breast cancer, status post   lumpectomy, XRT and adjuvant hormonal therapy and osteoporosis.  KATHERIN-CPAP    REVIEW OF SYSTEMS:   She states that she is feeling well but some fatigue.  Apetite good.  Weight stable. Denies any back pain or kidney stone pain.  She denies any blood in the urine, frequency, urgency, hematuria, dysuria, nausea, vomiting, chest pain or shortness of breath.    PHYSICAL EXAMINATION:limited  GENERAL:  A well-nourished, well-developed individual, in no acute distress.  CARDIOVASCULAR:  Rate and rhythm regular.  No murmurs, gallops or rubs.  LUNGS:  Clear to auscultation bilaterally.  Normal respiratory effort.  ABDOMEN:  Soft, nontender,some distension.  No edema    EXTREMITIES:  Edema.    ASSESSMENT AND PLAN:  This is a 83-year-old white female with a longstanding   history of hypertension who is coming in for a followup visit.  1.   Nephrolithiasis.  No recent symptomatic nephrolithiasis.  She had a   procedure in 2001, for a kidney stone.  Her renal US shows 1 cm stone with mild pelvicaliectasis-set up f/u with urology but hasn't seen them and is seeing them this month.  Because of the size of the stone, I wanted urology to be aware to see if anything needs to be done preemptively before it may become a problem. 3 liters of urine output per last  24-hour   urine per the patient.  I advised her to drink lemon juice for citrate.  She   also follows a low oxalate diet for high oxalate.  I also advised her to decrease her   meat intake.  US from 6/22 showed 0.5 cm on right side  and mild hydro frances and sees urology.    Sodium nml now. H/o breast cancer. Had EGD, colonoscopy, mammogram, pap smear.  Started on PPI by GI but stopped it and now on gas-x per GI.   2.  Renal/ckd stg 3:  Her creatinines have been stable around 1.2-1.3 with GFR of 45  Ml/min but now 1.2.    per urology, has PVR approximately > 600 ml              - self cath 10 Fr twice daily indefinitely.                -  RBC's on UA's of and  likely sec to stones-had seen urology in 1/22. Had PVR and failed interstim.  No RBC's on last UA  3.  Hypertension.  Blood pressures are stable at home.   She has   no urine protein.  She was  on Benicar for nephroprotection but it looks like it's on hold by cards per pt bc of JOCELIN in the past.  4.  Renal osteodystrophy.  Vitamin D levels are stable.   On OTC vit D. Her PTH is stable.  Calcium and phosphorus are stable.    5.  Anemia: stable. On  iron.  Saw hematology and bone marrow biopsy.    She can be  followed up in 3-4 months.  Pending urine protein today and will need either losartan or nifedipine to help with higher bp's.

## 2024-02-02 ENCOUNTER — TELEPHONE (OUTPATIENT)
Dept: NEPHROLOGY | Facility: CLINIC | Age: 84
End: 2024-02-02
Payer: MEDICARE

## 2024-02-02 NOTE — TELEPHONE ENCOUNTER
She has protein in the urine because of high BP's.  I want her to start taking losartan 25 mg and check her bp's to make sure they are better.  She needs rfp in about 7-10 days and needs to hydrate well. Losartan  and rfp ordered.          Note

## 2024-02-06 ENCOUNTER — OFFICE VISIT (OUTPATIENT)
Dept: DERMATOLOGY | Facility: CLINIC | Age: 84
End: 2024-02-06
Payer: MEDICARE

## 2024-02-06 ENCOUNTER — TELEPHONE (OUTPATIENT)
Dept: INTERNAL MEDICINE | Facility: CLINIC | Age: 84
End: 2024-02-06
Payer: MEDICARE

## 2024-02-06 VITALS — SYSTOLIC BLOOD PRESSURE: 141 MMHG | DIASTOLIC BLOOD PRESSURE: 73 MMHG

## 2024-02-06 DIAGNOSIS — D22.9 MULTIPLE BENIGN NEVI: ICD-10-CM

## 2024-02-06 DIAGNOSIS — Z85.828 HISTORY OF NONMELANOMA SKIN CANCER: ICD-10-CM

## 2024-02-06 DIAGNOSIS — L82.1 SK (SEBORRHEIC KERATOSIS): Primary | ICD-10-CM

## 2024-02-06 DIAGNOSIS — L64.9 ANDROGENETIC ALOPECIA: ICD-10-CM

## 2024-02-06 DIAGNOSIS — L60.3 ONYCHOSCHIZIA: ICD-10-CM

## 2024-02-06 PROCEDURE — 99213 OFFICE O/P EST LOW 20 MIN: CPT | Mod: S$PBB,,, | Performed by: DERMATOLOGY

## 2024-02-06 PROCEDURE — 99999 PR PBB SHADOW E&M-EST. PATIENT-LVL III: CPT | Mod: PBBFAC,,, | Performed by: DERMATOLOGY

## 2024-02-06 PROCEDURE — 99213 OFFICE O/P EST LOW 20 MIN: CPT | Mod: PBBFAC | Performed by: DERMATOLOGY

## 2024-02-06 NOTE — PROGRESS NOTES
"  Subjective:      Patient ID:  Shima Sorto is a 83 y.o. female who presents for   Chief Complaint   Patient presents with    Skin Check     TBSE    Lesion     R lower leg     History of Present Illness: The patient presents for follow up of skin check.    The patient was last seen on: 2/7/23 for cryosurgery to inflamed seborrheic keratosis on right forearm which has resolved.  This is a high risk patient here to check for the development of new lesions.      Pt also seen for probable perforating dermatosis on legs - deferred bx at visit (recheck today).     Other skin complaints:   Patient with new complaint of lesion(s)  Location: L lower post leg  Duration: 5 weeks  Symptoms: bug bite "has tiny punctures", itchy  Relieving factors/Previous treatments: cerave cream qday    Patient with new complaint of lesion(s)  Location: R lower leg  Duration: 6 weeks  Symptoms: itchy occ  Relieving factors/Previous treatments: cerave cream qday    Pt has CRF - stage 3        Review of Systems   Skin:  Positive for activity-related sunscreen use. Negative for daily sunscreen use, recent sunburn and wears hat.   Hematologic/Lymphatic: Bruises/bleeds easily (bruise).       Objective:   Physical Exam   Constitutional: She appears well-developed and well-nourished. No distress.   Neurological: She is alert and oriented to person, place, and time. She is not disoriented.   Psychiatric: She has a normal mood and affect.   Skin:   Areas Examined (abnormalities noted in diagram):   Scalp / Hair Palpated and Inspected  Head / Face Inspection Performed  Neck Inspection Performed  Chest / Axilla Inspection Performed  Abdomen Inspection Performed  Genitals / Buttocks / Groin Inspection Performed  Back Inspection Performed  RUE Inspected  LUE Inspection Performed  RLE Inspected  LLE Inspection Performed  Nails and Digits Inspection Performed                 Diagram Legend     Erythematous scaling macule/papule c/w actinic keratosis "       Vascular papule c/w angioma      Pigmented verrucoid papule/plaque c/w seborrheic keratosis      Yellow umbilicated papule c/w sebaceous hyperplasia      Irregularly shaped tan macule c/w lentigo     1-2 mm smooth white papules consistent with Milia      Movable subcutaneous cyst with punctum c/w epidermal inclusion cyst      Subcutaneous movable cyst c/w pilar cyst      Firm pink to brown papule c/w dermatofibroma      Pedunculated fleshy papule(s) c/w skin tag(s)      Evenly pigmented macule c/w junctional nevus     Mildly variegated pigmented, slightly irregular-bordered macule c/w mildly atypical nevus      Flesh colored to evenly pigmented papule c/w intradermal nevus       Pink pearly papule/plaque c/w basal cell carcinoma      Erythematous hyperkeratotic cursted plaque c/w SCC      Surgical scar with no sign of skin cancer recurrence      Open and closed comedones      Inflammatory papules and pustules      Verrucoid papule consistent consistent with wart     Erythematous eczematous patches and plaques     Dystrophic onycholytic nail with subungual debris c/w onychomycosis     Umbilicated papule    Erythematous-base heme-crusted tan verrucoid plaque consistent with inflamed seborrheic keratosis     Erythematous Silvery Scaling Plaque c/w Psoriasis     See annotation      Assessment / Plan:        SK (seborrheic keratosis)  These are benign inherited growths without a malignant potential. Reassurance given to patient. No treatment is necessary.     Multiple benign nevi   - minor problem and chronic.    No treatment necessary.     Onychoschizia  -Recommended [employer] nail products    Androgenetic alopecia  -Currently taking Nutrafol    History of nonmelanoma skin cancer  Area of previous NMSC examined. Site well healed with no signs of recurrence.    Total body skin examination performed today including at least 12 points as noted in physical examination. No lesions suspicious for malignancy  noted.    Recommend daily sun protection/avoidance, use of at least SPF 30, broad spectrum sunscreen (OTC drug), skin self examinations, and routine physician surveillance to optimize early detection             Follow up in about 1 year (around 2/6/2025) for TBSE.

## 2024-02-06 NOTE — TELEPHONE ENCOUNTER
BP reading of 141/73 was taken from pt's [facility] flow sheet on 2/3/2024. Pt is being followed by [facility].

## 2024-02-09 ENCOUNTER — PATIENT OUTREACH (OUTPATIENT)
Dept: ADMINISTRATIVE | Facility: HOSPITAL | Age: 84
End: 2024-02-09
Payer: MEDICARE

## 2024-02-09 ENCOUNTER — LAB VISIT (OUTPATIENT)
Dept: LAB | Facility: HOSPITAL | Age: 84
End: 2024-02-09
Payer: MEDICARE

## 2024-02-09 ENCOUNTER — OFFICE VISIT (OUTPATIENT)
Dept: HEMATOLOGY/ONCOLOGY | Facility: CLINIC | Age: 84
End: 2024-02-09
Payer: MEDICARE

## 2024-02-09 VITALS
BODY MASS INDEX: 19.07 KG/M2 | TEMPERATURE: 97 F | HEIGHT: 67 IN | DIASTOLIC BLOOD PRESSURE: 121 MMHG | OXYGEN SATURATION: 100 % | RESPIRATION RATE: 17 BRPM | SYSTOLIC BLOOD PRESSURE: 170 MMHG | WEIGHT: 121.5 LBS | HEART RATE: 58 BPM

## 2024-02-09 DIAGNOSIS — C50.411 MALIGNANT NEOPLASM OF UPPER-OUTER QUADRANT OF RIGHT BREAST IN FEMALE, ESTROGEN RECEPTOR POSITIVE: Primary | Chronic | ICD-10-CM

## 2024-02-09 DIAGNOSIS — Z17.0 MALIGNANT NEOPLASM OF UPPER-OUTER QUADRANT OF RIGHT BREAST IN FEMALE, ESTROGEN RECEPTOR POSITIVE: Primary | Chronic | ICD-10-CM

## 2024-02-09 DIAGNOSIS — N18.32 STAGE 3B CHRONIC KIDNEY DISEASE: ICD-10-CM

## 2024-02-09 DIAGNOSIS — R80.9 PROTEINURIA, UNSPECIFIED TYPE: ICD-10-CM

## 2024-02-09 LAB
ALBUMIN SERPL BCP-MCNC: 3.5 G/DL (ref 3.5–5.2)
ALBUMIN SERPL BCP-MCNC: 3.6 G/DL (ref 3.5–5.2)
ALP SERPL-CCNC: 66 U/L (ref 55–135)
ALT SERPL W/O P-5'-P-CCNC: 19 U/L (ref 10–44)
ANION GAP SERPL CALC-SCNC: 7 MMOL/L (ref 8–16)
ANION GAP SERPL CALC-SCNC: 8 MMOL/L (ref 8–16)
AST SERPL-CCNC: 34 U/L (ref 10–40)
BASOPHILS # BLD AUTO: 0.02 K/UL (ref 0–0.2)
BASOPHILS NFR BLD: 0.5 % (ref 0–1.9)
BILIRUB SERPL-MCNC: 0.6 MG/DL (ref 0.1–1)
BUN SERPL-MCNC: 18 MG/DL (ref 8–23)
BUN SERPL-MCNC: 19 MG/DL (ref 8–23)
CALCIUM SERPL-MCNC: 8.9 MG/DL (ref 8.7–10.5)
CALCIUM SERPL-MCNC: 8.9 MG/DL (ref 8.7–10.5)
CHLORIDE SERPL-SCNC: 96 MMOL/L (ref 95–110)
CHLORIDE SERPL-SCNC: 98 MMOL/L (ref 95–110)
CO2 SERPL-SCNC: 25 MMOL/L (ref 23–29)
CO2 SERPL-SCNC: 26 MMOL/L (ref 23–29)
CREAT SERPL-MCNC: 1.2 MG/DL (ref 0.5–1.4)
CREAT SERPL-MCNC: 1.3 MG/DL (ref 0.5–1.4)
DIFFERENTIAL METHOD BLD: ABNORMAL
EOSINOPHIL # BLD AUTO: 0 K/UL (ref 0–0.5)
EOSINOPHIL NFR BLD: 0.7 % (ref 0–8)
ERYTHROCYTE [DISTWIDTH] IN BLOOD BY AUTOMATED COUNT: 12.5 % (ref 11.5–14.5)
EST. GFR  (NO RACE VARIABLE): 40.8 ML/MIN/1.73 M^2
EST. GFR  (NO RACE VARIABLE): 44.9 ML/MIN/1.73 M^2
GLUCOSE SERPL-MCNC: 84 MG/DL (ref 70–110)
GLUCOSE SERPL-MCNC: 84 MG/DL (ref 70–110)
HCT VFR BLD AUTO: 32.5 % (ref 37–48.5)
HGB BLD-MCNC: 10.6 G/DL (ref 12–16)
IMM GRANULOCYTES # BLD AUTO: 0.01 K/UL (ref 0–0.04)
IMM GRANULOCYTES NFR BLD AUTO: 0.2 % (ref 0–0.5)
LYMPHOCYTES # BLD AUTO: 1.4 K/UL (ref 1–4.8)
LYMPHOCYTES NFR BLD: 33.6 % (ref 18–48)
MCH RBC QN AUTO: 31.2 PG (ref 27–31)
MCHC RBC AUTO-ENTMCNC: 32.6 G/DL (ref 32–36)
MCV RBC AUTO: 96 FL (ref 82–98)
MONOCYTES # BLD AUTO: 0.4 K/UL (ref 0.3–1)
MONOCYTES NFR BLD: 9.8 % (ref 4–15)
NEUTROPHILS # BLD AUTO: 2.4 K/UL (ref 1.8–7.7)
NEUTROPHILS NFR BLD: 55.2 % (ref 38–73)
NRBC BLD-RTO: 0 /100 WBC
PHOSPHATE SERPL-MCNC: 3.4 MG/DL (ref 2.7–4.5)
PLATELET # BLD AUTO: 152 K/UL (ref 150–450)
PMV BLD AUTO: 11.7 FL (ref 9.2–12.9)
POTASSIUM SERPL-SCNC: 5 MMOL/L (ref 3.5–5.1)
POTASSIUM SERPL-SCNC: 5 MMOL/L (ref 3.5–5.1)
PROT SERPL-MCNC: 7.1 G/DL (ref 6–8.4)
RBC # BLD AUTO: 3.4 M/UL (ref 4–5.4)
SODIUM SERPL-SCNC: 130 MMOL/L (ref 136–145)
SODIUM SERPL-SCNC: 130 MMOL/L (ref 136–145)
WBC # BLD AUTO: 4.28 K/UL (ref 3.9–12.7)

## 2024-02-09 PROCEDURE — 99214 OFFICE O/P EST MOD 30 MIN: CPT | Mod: PBBFAC | Performed by: INTERNAL MEDICINE

## 2024-02-09 PROCEDURE — 99215 OFFICE O/P EST HI 40 MIN: CPT | Mod: S$PBB,,, | Performed by: INTERNAL MEDICINE

## 2024-02-09 PROCEDURE — 99999 PR PBB SHADOW E&M-EST. PATIENT-LVL IV: CPT | Mod: PBBFAC,,, | Performed by: INTERNAL MEDICINE

## 2024-02-09 PROCEDURE — 80053 COMPREHEN METABOLIC PANEL: CPT | Performed by: INTERNAL MEDICINE

## 2024-02-09 PROCEDURE — 85025 COMPLETE CBC W/AUTO DIFF WBC: CPT | Performed by: INTERNAL MEDICINE

## 2024-02-09 PROCEDURE — 80069 RENAL FUNCTION PANEL: CPT | Performed by: INTERNAL MEDICINE

## 2024-02-09 PROCEDURE — 36415 COLL VENOUS BLD VENIPUNCTURE: CPT | Performed by: INTERNAL MEDICINE

## 2024-02-09 NOTE — PROGRESS NOTES
Subjective:       Patient ID: Shima Sorto is a 83 y.o. female.    Chief Complaint: No chief complaint on file.    HPI    Ms. Sorto returns today for follow up.  I had last seen her on 1/11/2024 and had initiated treatment with weekly EPO.  EPO was held last week due to her hemoglobin being 11.8 grams/dL  However, her CBC today shows a white count of 4200 per cubic mm, hemoglobin 10.6 g per dL, hematocrit 32.5%, MCV 96 and platelets 152 K  Creatinine is 1.2 mg/dl and eGFR is 44 mL/min      In regards to her breast cancer, had been started on anastrazole in mid November 2018, and completed 5 years at the end of October 2023.  In late August 2023 she underwent a bone marrow biopsy which was essentially unremarkable.  There was no evidence of MDS, leukemia, lymphoma, myeloma, or metastatic carcinoma.  Cytogenetics were normal and reticulin was not increased.  Cellularity was 35 %.    Other recent pertinent labs are as follows:  3 stool samples were negative for occult blood  Multiple urinalyses  have shown persistent hematuria.  Of note, the patient self catheterizes 3 times a day.  Other recent pertinent labs are as follows:  B12 is 406 pg/mL  SPEP shows no monoclonal spike  Direct Lane is negative  Bone marrow biopsy was unremarkable and showed no evidence of MDS    Briefly, she is an 83-year-old  female who diagnosed with breast cancer in 2018.  On 08/17/2018, she underwent a lumpectomy and sentinel lymph node biopsy for an 8 mm grade 1 invasive lobular carcinoma which was ER strongly positive, ID negative and HER-2 negative with a Ki-67 of 5%, with the closest margin being 2 mm.  Two of 2 sentinel lymph nodes were negative for involvement.      She completed her radiation therapy and was subsequently started on AIs.    A mammogram on 7/6/2023 was read as BIRADS II, and a one year follow up was recommended.    In April 2023 she had been diagnosed with C diff and she was treated accordingly.  She  underwent a colonoscopy and EGD by Dr. Garza.  The colonoscopy showed 3 polyps and extensive diverticulosis.  Biopsies were negative for malignancy.  The EGD showed gastritis and a hiatal hernia.  A video capsule swallow did not identify a source of bleeding in her small intestine.       Review of Systems      Overall she feels fair and she again complains of fatigue and weakness, but her ECOG PS is 1.   She states that she does not feel that her stamina is any better.  She denies any anxiety, depression, easy bruising, fevers, chills, night  sweats, weight loss, nausea, vomiting, diarrhea, constipation, diplopia, blurred vision, headache, chest pain, palpitations, shortness of breath, breast pain, abdominal pain, extremity pain, or difficulty ambulating.  The remainder of the ten-point ROS, including general, skin, lymph, H/N, cardiorespiratory, GI, , Neuro, Endocrine, and psychiatric is negative.     Objective:      Physical Exam        She is alert, oriented to time, place, person, pleasant, well      nourished, in no acute physical distress.                                    VITAL SIGNS:  Reviewed                                      HEENT:  Normal.  There are no nasal, oral, lip, gingival, auricular, lid,    or conjunctival lesions.  Mucosae are moist and pink, and there is no        thrush.  Pupils are equal, reactive to light and accommodation.              Extraocular muscle movements are intact.  Dentition is good.                                    NECK:  Supple without JVD, adenopathy, or thyromegaly.                       LUNGS:  Clear to auscultation without wheezing, rales, or rhonchi.           CARDIOVASCULAR:  Reveals an S1, S2, no murmurs, no rubs, no gallops.         ABDOMEN:  Soft, nontender, without organomegaly.  Bowel sounds are    present.                                                                     EXTREMITIES:  No cyanosis, clubbing, or edema.                                BREASTS:  She is status post right lumpectomy with a well-healed incision in the upper outer quadrant.    There are no masses in either breast.                                     LYMPHATIC:  There is no cervical, axillary, or supraclavicular adenopathy.   SKIN:  Warm and moist, without petechiae, rashes, induration, or ecchymoses.        NEUROLOGIC:  DTRs are 0-1+ bilaterally, symmetrical, motor function is 5/5,and cranial nerves are  within normal limits.    Assessment:       1. Malignant neoplasm of upper-outer quadrant of right breast in female, estrogen receptor positive    2. S/p 5 years of adjuvant hormonal therapy with aromatase inhibitors      3.    Osteopenia approaching osteoporosis    4.   Anemia, chronic, normochromic normocytic, most likely multifactorial.  Recent bone marrow biopsy did not reveal any MDS     5.  CDK 3b  Plan:           I had a long discussion with her.    In regards to the breast cancer, she completed her 5 years of anastrazole at the end of October 2023.  Her mammogram will be repeated after June 28th.  In regards to her anemia, it appears to be chronic and it is probably multifactorial.  Given her CKD 3b (GFR 40 ml/min), it is reasonable to offer treatment with erythropoietin.  At this point we will proceed as follows:    1. She will restart weekly erythropoietin at 40,000 units on February 12th  2. She should receive 3 weekly injections of erythropoietin I will see her after that with a repeat CBC.  3. RTC in 4 weeks  4. Finally, she will have a repeat mammogram in July 2024.    Her multiple questions were answered to her satisfaction.  I spent approximately 40 minutes reviewing the available records and evaluating the patient, and coordinating her care.             Route Chart for Scheduling    Med Onc Chart Routing  Urgent    Follow up with physician 4 weeks. Patient to receive EPO on February 12, February 19th February 2026.  See me around March 6 with a repeat CBC and CMP    Follow up with BASHIR    Infusion scheduling note    Injection scheduling note    Labs CBC and CMP   Scheduling:  Preferred lab:  Lab interval:     Imaging    Pharmacy appointment    Other referrals          Supportive Plan Information  OP EPOETIN ENEDINA WEEKLY   Robert Hernandez MD   Upcoming Treatment Dates - OP EPOETIN ENEDINA WEEKLY    1/21/2024       Medications       epoetin enedina-epbx injection 40,000 Units  1/28/2024       Medications       epoetin enedina-epbx injection    Therapy Plan Information  Medications  denosumab (PROLIA) injection 60 mg  60 mg, Subcutaneous, Every 26 weeks

## 2024-02-09 NOTE — PROGRESS NOTES
Health Maintenance Due   Topic Date Due    RSV Vaccine (Age 60+ and Pregnant patients) (1 - 1-dose 60+ series) Never done     Population Health Chart Review & Patient Outreach Details    Updates Requested / Reviewed:     [x]  Care Everywhere    [x]     []  External Sources ([facility], [facility], [facility], etc.)    [] [facility]   [] [facility]   [] Other:    [x]  Care Team Updated   []  Removed  or Duplicate Orders   [x]  Immunization Reconciliation Completed / Queried    [x] [place]   [] [place]   [] [place]   [] [place]      Health Maintenance Topics Addressed and Outreach Outcomes / Actions Taken:             Breast Cancer Screening []  Mammogram Order Placed    []  Mammogram Screening Scheduled    []  External Records Requested & Care Team Updated if Applicable    []  External Records Uploaded & Care Team Updated if Applicable    []  Pt Declined Scheduling Mammogram    []  Pt Will Schedule with External Provider / Order Routed & Care Team Updated if Applicable              Cervical Cancer Screening []  Pap Smear Scheduled in Primary Care or OBGYN    []  External Records Requested & Care Team Updated if Applicable       []  External Records Uploaded, Care Team Updated, & History Updated if Applicable    []  Patient Declined Scheduling Pap Smear    []  Patient Will Schedule with External Provider & Care Team Updated if Applicable                  Colorectal Cancer Screening []  Colonoscopy Case Request / Referral / Home Test Order Placed    []  External Records Requested & Care Team Updated if Applicable    []  External Records Uploaded, Care Team Updated, & History Updated if Applicable    []  Patient Declined Completing Colon Cancer Screening    []  Patient Will Schedule with External Provider & Care Team Updated if Applicable    []  Fit Kit Mailed (add the SmartPhrase under additional notes)    []  Reminded Patient to Complete Home Test                Diabetic Eye Exam []  Eye Exam Screening Order Placed     []  Eye Camera Scheduled or Optometry/Ophthalmology Referral Placed    []  External Records Requested & Care Team Updated if Applicable    []  External Records Uploaded, Care Team Updated, & History Updated if Applicable    []  Patient Declined Scheduling Eye Exam    []  Patient Will Schedule with External Provider & Care Team Updated if Applicable             Blood Pressure Control []  Primary Care Follow Up Visit Scheduled     []  Remote Blood Pressure Reading Captured    []  Patient Declined Remote Reading or Scheduling Appt - Escalated to PCP    [x]  Patient Will Call Back or Send Portal Message with Reading                 HbA1c & Other Labs []  Overdue Lab(s) Ordered    []  Overdue Lab(s) Scheduled    []  External Records Uploaded & Care Team Updated if Applicable    []  Primary Care Follow Up Visit Scheduled     []  Reminded Patient to Complete A1c Home Test    []  Patient Declined Scheduling Labs or Will Call Back to Schedule    []  Patient Will Schedule with External Provider / Order Routed, & Care Team Updated if Applicable           Primary Care Appointment []  Primary Care Appt Scheduled    []  Patient Declined Scheduling or Will Call Back to Schedule    []  Pt Established with External Provider, Updated Care Team, & Informed Pt to Notify Payor if Applicable           Medication Adherence /    Statin Use []  Primary Care Appointment Scheduled    []  Patient Reminded to  Prescription    []  Patient Declined, Provider Notified if Needed    []  Sent Provider Message to Review to Evaluate Pt for Statin, Add Exclusion Dx Codes, Document   Exclusion in Problem List, Change Statin Intensity Level to Moderate or High Intensity if Applicable                Osteoporosis Screening []  Dexa Order Placed    []  Dexa Appointment Scheduled    []  External Records Requested & Care Team Updated    []  External Records Uploaded, Care Team Updated, & History Updated if Applicable    []  Patient Declined  Scheduling Dexa or Will Call Back to Schedule    []  Patient Will Schedule with External Provider / Order Routed & Care Team Updated if Applicable       Additional Notes:    Checked pt's [facility] flow sheet for an updated BP reading; no new BP readings recorded in flow sheet. Chart review completed.

## 2024-02-12 ENCOUNTER — INFUSION (OUTPATIENT)
Dept: INFUSION THERAPY | Facility: HOSPITAL | Age: 84
End: 2024-02-12
Payer: MEDICARE

## 2024-02-12 VITALS — SYSTOLIC BLOOD PRESSURE: 144 MMHG | HEART RATE: 62 BPM | DIASTOLIC BLOOD PRESSURE: 65 MMHG

## 2024-02-12 DIAGNOSIS — N18.32 STAGE 3B CHRONIC KIDNEY DISEASE: Primary | ICD-10-CM

## 2024-02-12 PROCEDURE — 96372 THER/PROPH/DIAG INJ SC/IM: CPT

## 2024-02-12 PROCEDURE — 63600175 PHARM REV CODE 636 W HCPCS: Mod: JZ,EC,JG | Performed by: INTERNAL MEDICINE

## 2024-02-12 RX ADMIN — EPOETIN ALFA-EPBX 40000 UNITS: 40000 INJECTION, SOLUTION INTRAVENOUS; SUBCUTANEOUS at 02:02

## 2024-02-12 NOTE — NURSING
Pt here for retacrit injection.  Hgb 10.6.  Retacrit administered to L arm.  Pt tolerated.  Ambulated out unassisted by self.

## 2024-02-13 ENCOUNTER — PATIENT MESSAGE (OUTPATIENT)
Dept: UROLOGY | Facility: CLINIC | Age: 84
End: 2024-02-13
Payer: MEDICARE

## 2024-02-13 DIAGNOSIS — N22 CALCULUS OF URINARY TRACT IN DISEASES CLASSIFIED ELSEWHERE: Primary | ICD-10-CM

## 2024-02-15 NOTE — TELEPHONE ENCOUNTER
----- Message from Rayne Wagner sent at 2/15/2024  9:13 AM CST -----  Regarding: appt  Contact: pt @ [phone]  Message is for fish Hardwick missed your call and asking for a call back

## 2024-02-16 ENCOUNTER — HOSPITAL ENCOUNTER (OUTPATIENT)
Dept: RADIOLOGY | Facility: HOSPITAL | Age: 84
Discharge: HOME | End: 2024-02-16
Attending: UROLOGY
Payer: MEDICARE

## 2024-02-16 ENCOUNTER — PATIENT MESSAGE (OUTPATIENT)
Dept: CARDIOLOGY | Facility: CLINIC | Age: 84
End: 2024-02-16
Payer: MEDICARE

## 2024-02-16 DIAGNOSIS — N22 CALCULUS OF URINARY TRACT IN DISEASES CLASSIFIED ELSEWHERE: ICD-10-CM

## 2024-02-16 PROCEDURE — 74176 CT ABD & PELVIS W/O CONTRAST: CPT | Mod: 26,,, | Performed by: RADIOLOGY

## 2024-02-16 PROCEDURE — 74176 CT ABD & PELVIS W/O CONTRAST: CPT | Mod: TC

## 2024-02-19 ENCOUNTER — INFUSION (OUTPATIENT)
Dept: INFUSION THERAPY | Facility: HOSPITAL | Age: 84
End: 2024-02-19
Payer: MEDICARE

## 2024-02-19 VITALS — DIASTOLIC BLOOD PRESSURE: 63 MMHG | SYSTOLIC BLOOD PRESSURE: 135 MMHG | HEART RATE: 63 BPM | RESPIRATION RATE: 16 BRPM

## 2024-02-19 DIAGNOSIS — N18.32 STAGE 3B CHRONIC KIDNEY DISEASE: Primary | ICD-10-CM

## 2024-02-19 PROCEDURE — 63600175 PHARM REV CODE 636 W HCPCS: Mod: JZ,EC,JG | Performed by: INTERNAL MEDICINE

## 2024-02-19 PROCEDURE — 96372 THER/PROPH/DIAG INJ SC/IM: CPT

## 2024-02-19 RX ADMIN — EPOETIN ALFA-EPBX 40000 UNITS: 40000 INJECTION, SOLUTION INTRAVENOUS; SUBCUTANEOUS at 02:02

## 2024-02-19 NOTE — NURSING
Pt here for Retacrit injection. Assessment complete and labs reviewed. VSS. Administered injection in abdominal tissue. No questions or concerns. Pt ambulated out unit unassisted.

## 2024-02-26 ENCOUNTER — INFUSION (OUTPATIENT)
Dept: INFUSION THERAPY | Facility: HOSPITAL | Age: 84
End: 2024-02-26
Payer: MEDICARE

## 2024-02-26 VITALS — HEART RATE: 65 BPM | SYSTOLIC BLOOD PRESSURE: 150 MMHG | DIASTOLIC BLOOD PRESSURE: 67 MMHG

## 2024-02-26 DIAGNOSIS — N18.32 STAGE 3B CHRONIC KIDNEY DISEASE: Primary | ICD-10-CM

## 2024-02-26 PROCEDURE — 63600175 PHARM REV CODE 636 W HCPCS: Mod: JZ,EC,JG | Performed by: INTERNAL MEDICINE

## 2024-02-26 PROCEDURE — 96372 THER/PROPH/DIAG INJ SC/IM: CPT

## 2024-02-26 RX ADMIN — EPOETIN ALFA-EPBX 40000 UNITS: 40000 INJECTION, SOLUTION INTRAVENOUS; SUBCUTANEOUS at 02:02

## 2024-03-05 ENCOUNTER — OFFICE VISIT (OUTPATIENT)
Dept: UROLOGY | Facility: CLINIC | Age: 84
End: 2024-03-05
Payer: MEDICARE

## 2024-03-05 VITALS
SYSTOLIC BLOOD PRESSURE: 162 MMHG | DIASTOLIC BLOOD PRESSURE: 70 MMHG | BODY MASS INDEX: 19.71 KG/M2 | HEART RATE: 56 BPM | HEIGHT: 67 IN | WEIGHT: 125.56 LBS

## 2024-03-05 DIAGNOSIS — N20.0 NEPHROLITHIASIS: ICD-10-CM

## 2024-03-05 DIAGNOSIS — Q61.9 CYSTIC KIDNEY DISEASE: ICD-10-CM

## 2024-03-05 DIAGNOSIS — R33.9 URINARY RETENTION: Primary | Chronic | ICD-10-CM

## 2024-03-05 PROCEDURE — 99214 OFFICE O/P EST MOD 30 MIN: CPT | Mod: PBBFAC | Performed by: UROLOGY

## 2024-03-05 PROCEDURE — 99999 PR PBB SHADOW E&M-EST. PATIENT-LVL IV: CPT | Mod: PBBFAC,,, | Performed by: UROLOGY

## 2024-03-05 PROCEDURE — 99214 OFFICE O/P EST MOD 30 MIN: CPT | Mod: S$PBB,,, | Performed by: UROLOGY

## 2024-03-05 NOTE — PROGRESS NOTES
"Urology - [facility]  Clinic Note    SUBJECTIVE:     Chief Complaint: Nephrolithiasis    History of Present Illness:  Shima Sorto is a 83 y.o. female who presents to clinic for nephrolithiasis. She is new to our clinic referred by Dr. Stoddard. She recently underwent renal US ordered by nephrology. The read reported a 1 cm right renal stone. CTRSS demonstrated multiple right lower pole nephroliths, as well as 1 upper pole stone. This appears stable from 2019.     She reports past stone surgery for asymptomatic ureteral stones.    She follows with Dr. Stoddard for incomplete bladder emptying. She performs CIC TID. 10F lofric BID.     Anticoagulation:  No    OBJECTIVE:     Estimated body mass index is 19.66 kg/m² as calculated from the following:    Height as of this encounter: 5' 7" (1.702 m).    Weight as of this encounter: 56.9 kg (125 lb 8.8 oz).    Vital Signs (Most Recent)  Pulse: (!) 56 (03/05/24 1327)  BP: (!) 162/70 (03/05/24 1327)    Physical Exam  Constitutional:       General: She is not in acute distress.     Appearance: Normal appearance. She is not ill-appearing.   HENT:      Head: Normocephalic and atraumatic.      Mouth/Throat:      Mouth: Mucous membranes are moist.   Eyes:      Extraocular Movements: Extraocular movements intact.   Cardiovascular:      Rate and Rhythm: Normal rate.   Pulmonary:      Effort: Pulmonary effort is normal.   Abdominal:      Palpations: Abdomen is soft.   Skin:     General: Skin is warm and dry.   Neurological:      Mental Status: She is alert and oriented to person, place, and time.         Lab Results   Component Value Date    BUN 19 02/09/2024    BUN 18 02/09/2024    CREATININE 1.3 02/09/2024    CREATININE 1.2 02/09/2024    WBC 4.28 02/09/2024    HGB 10.6 (L) 02/09/2024    HCT 32.5 (L) 02/09/2024     02/09/2024    AST 34 02/09/2024    ALT 19 02/09/2024    ALKPHOS 66 02/09/2024    ALBUMIN 3.5 02/09/2024    ALBUMIN 3.6 02/09/2024    "     Imaging:    CTRSS 02/16/24:      1. No evidence of obstructive nephrolithiasis.  Stable cluster of nonobstructive stones in the right renal inferior pole.  2. Left renal superior pole hyperdense lesion which appears unchanged compared to imaging dating back to 2019.  Given temporal stability to reflect benign etiology.  Continued follow-up as clinically warranted.  3. Well-circumscribed fluid density collection with few fat density foci in the subcutaneous soft tissues adjacent to the right greater trochanter, possibly reflecting a seroma though new compared October 2023.  Correlation would be helpful.  4. Additional findings as detailed above.  This report was flagged in Epic as abnormal.     Electronically signed by resident: Cammie Briscoe  Date:                                            02/16/2024  Time:                                           15:49     Electronically signed by: William Lubin MD  Date:                                            02/16/2024  Time:                                           16:40    ASSESSMENT     1. Urinary retention    2. Nephrolithiasis    3. Cystic kidney disease      PLAN:   Discussed URS vs. Observation  She would like to observe at this time  Follow up 1 year with KUB and renal US  [facility] medical refill on 10F lofric catheters      Justen El MD  Urology PGY-2  [facility] saw and evaluated patient and agrees with treatment, evaluation and plan.   I spent 25 minutes with the patient of which more than half was spent in direct consultation with the patient in regards to our treatment and plan.    CIC 3x/s day day indefinitely due to permanent urinary retention.

## 2024-03-06 ENCOUNTER — OFFICE VISIT (OUTPATIENT)
Dept: HEMATOLOGY/ONCOLOGY | Facility: CLINIC | Age: 84
End: 2024-03-06
Payer: MEDICARE

## 2024-03-06 ENCOUNTER — LAB VISIT (OUTPATIENT)
Dept: LAB | Facility: HOSPITAL | Age: 84
End: 2024-03-06
Attending: INTERNAL MEDICINE
Payer: MEDICARE

## 2024-03-06 VITALS
TEMPERATURE: 98 F | HEART RATE: 56 BPM | WEIGHT: 123.88 LBS | RESPIRATION RATE: 16 BRPM | BODY MASS INDEX: 21.15 KG/M2 | OXYGEN SATURATION: 100 % | HEIGHT: 64 IN | SYSTOLIC BLOOD PRESSURE: 185 MMHG | DIASTOLIC BLOOD PRESSURE: 86 MMHG

## 2024-03-06 DIAGNOSIS — C50.411 MALIGNANT NEOPLASM OF UPPER-OUTER QUADRANT OF RIGHT BREAST IN FEMALE, ESTROGEN RECEPTOR POSITIVE: Chronic | ICD-10-CM

## 2024-03-06 DIAGNOSIS — Z17.0 MALIGNANT NEOPLASM OF UPPER-OUTER QUADRANT OF RIGHT BREAST IN FEMALE, ESTROGEN RECEPTOR POSITIVE: Primary | Chronic | ICD-10-CM

## 2024-03-06 DIAGNOSIS — Z17.0 MALIGNANT NEOPLASM OF UPPER-OUTER QUADRANT OF RIGHT BREAST IN FEMALE, ESTROGEN RECEPTOR POSITIVE: Chronic | ICD-10-CM

## 2024-03-06 DIAGNOSIS — C50.411 MALIGNANT NEOPLASM OF UPPER-OUTER QUADRANT OF RIGHT BREAST IN FEMALE, ESTROGEN RECEPTOR POSITIVE: Primary | Chronic | ICD-10-CM

## 2024-03-06 LAB
ALBUMIN SERPL BCP-MCNC: 3.6 G/DL (ref 3.5–5.2)
ALP SERPL-CCNC: 62 U/L (ref 55–135)
ALT SERPL W/O P-5'-P-CCNC: 19 U/L (ref 10–44)
ANION GAP SERPL CALC-SCNC: 7 MMOL/L (ref 8–16)
AST SERPL-CCNC: 25 U/L (ref 10–40)
BASOPHILS # BLD AUTO: 0.02 K/UL (ref 0–0.2)
BASOPHILS NFR BLD: 0.5 % (ref 0–1.9)
BILIRUB SERPL-MCNC: 0.4 MG/DL (ref 0.1–1)
BUN SERPL-MCNC: 21 MG/DL (ref 8–23)
CALCIUM SERPL-MCNC: 9.2 MG/DL (ref 8.7–10.5)
CHLORIDE SERPL-SCNC: 104 MMOL/L (ref 95–110)
CO2 SERPL-SCNC: 29 MMOL/L (ref 23–29)
CREAT SERPL-MCNC: 1.3 MG/DL (ref 0.5–1.4)
DIFFERENTIAL METHOD BLD: ABNORMAL
EOSINOPHIL # BLD AUTO: 0 K/UL (ref 0–0.5)
EOSINOPHIL NFR BLD: 0.5 % (ref 0–8)
ERYTHROCYTE [DISTWIDTH] IN BLOOD BY AUTOMATED COUNT: 13.3 % (ref 11.5–14.5)
EST. GFR  (NO RACE VARIABLE): 40.8 ML/MIN/1.73 M^2
GLUCOSE SERPL-MCNC: 68 MG/DL (ref 70–110)
HCT VFR BLD AUTO: 40.1 % (ref 37–48.5)
HGB BLD-MCNC: 12.5 G/DL (ref 12–16)
IMM GRANULOCYTES # BLD AUTO: 0.01 K/UL (ref 0–0.04)
IMM GRANULOCYTES NFR BLD AUTO: 0.3 % (ref 0–0.5)
LYMPHOCYTES # BLD AUTO: 1.6 K/UL (ref 1–4.8)
LYMPHOCYTES NFR BLD: 41.4 % (ref 18–48)
MCH RBC QN AUTO: 30.7 PG (ref 27–31)
MCHC RBC AUTO-ENTMCNC: 31.2 G/DL (ref 32–36)
MCV RBC AUTO: 99 FL (ref 82–98)
MONOCYTES # BLD AUTO: 0.4 K/UL (ref 0.3–1)
MONOCYTES NFR BLD: 10.7 % (ref 4–15)
NEUTROPHILS # BLD AUTO: 1.8 K/UL (ref 1.8–7.7)
NEUTROPHILS NFR BLD: 46.6 % (ref 38–73)
NRBC BLD-RTO: 0 /100 WBC
PLATELET # BLD AUTO: 237 K/UL (ref 150–450)
PMV BLD AUTO: 10.1 FL (ref 9.2–12.9)
POTASSIUM SERPL-SCNC: 4.8 MMOL/L (ref 3.5–5.1)
PROT SERPL-MCNC: 7.2 G/DL (ref 6–8.4)
RBC # BLD AUTO: 4.07 M/UL (ref 4–5.4)
SODIUM SERPL-SCNC: 140 MMOL/L (ref 136–145)
WBC # BLD AUTO: 3.82 K/UL (ref 3.9–12.7)

## 2024-03-06 PROCEDURE — 85025 COMPLETE CBC W/AUTO DIFF WBC: CPT | Performed by: INTERNAL MEDICINE

## 2024-03-06 PROCEDURE — 36415 COLL VENOUS BLD VENIPUNCTURE: CPT | Performed by: INTERNAL MEDICINE

## 2024-03-06 PROCEDURE — 99214 OFFICE O/P EST MOD 30 MIN: CPT | Mod: PBBFAC | Performed by: INTERNAL MEDICINE

## 2024-03-06 PROCEDURE — 99214 OFFICE O/P EST MOD 30 MIN: CPT | Mod: S$PBB,,, | Performed by: INTERNAL MEDICINE

## 2024-03-06 PROCEDURE — 99999 PR PBB SHADOW E&M-EST. PATIENT-LVL IV: CPT | Mod: PBBFAC,,, | Performed by: INTERNAL MEDICINE

## 2024-03-06 PROCEDURE — 80053 COMPREHEN METABOLIC PANEL: CPT | Performed by: INTERNAL MEDICINE

## 2024-03-06 NOTE — PROGRESS NOTES
Subjective:       Patient ID: Shima Sorto is a 83 y.o. female.    Chief Complaint: No chief complaint on file.    HPI    Ms. Sorto returns today for follow up.  I had last seen her on 2/9/2024 and had restarted her weekly EPO.   Her CBC today shows a white count of3,800 per cubic mm, hemoglobin 12.5 g per dL, hematocrit 40.1%, MCV 99 and platelets 237K.  Creatinine is 1.23 mg/dl and eGFR is 40.8 mL/min      In regards to her breast cancer, had been started on anastrazole in mid November 2018, and completed 5 years at the end of October 2023.  In late August 2023 she underwent a bone marrow biopsy which was essentially unremarkable.  There was no evidence of MDS, leukemia, lymphoma, myeloma, or metastatic carcinoma.  Cytogenetics were normal and reticulin was not increased.  Cellularity was 35 %.    Other recent pertinent labs are as follows:  3 stool samples were negative for occult blood  Multiple urinalyses  have shown persistent hematuria.  Of note, the patient self catheterizes 3 times a day.  Other recent pertinent labs are as follows:  B12 is 406 pg/mL  SPEP shows no monoclonal spike  Direct Lane is negative  Bone marrow biopsy was unremarkable and showed no evidence of MDS    Briefly, she is an 83-year-old  female who diagnosed with breast cancer in 2018.  On 08/17/2018, she underwent a lumpectomy and sentinel lymph node biopsy for an 8 mm grade 1 invasive lobular carcinoma which was ER strongly positive, DE negative and HER-2 negative with a Ki-67 of 5%, with the closest margin being 2 mm.  Two of 2 sentinel lymph nodes were negative for involvement.      She completed her radiation therapy and was subsequently started on AIs.  She completed 5 years of adjuvant hormonal therapy and she is being followed expectantly.    A mammogram on 7/6/2023 was read as BIRADS II, and a one year follow up was recommended.    In April 2023 she had been diagnosed with C diff and she was treated  accordingly.  She underwent a colonoscopy and EGD by Dr. Garza.  The colonoscopy showed 3 polyps and extensive diverticulosis.  Biopsies were negative for malignancy.  The EGD showed gastritis and a hiatal hernia.  A video capsule swallow did not identify a source of bleeding in her small intestine.       Review of Systems      Overall she feels OK, and her ECOG PS is 1.  She denies any anxiety, depression, easy bruising, fevers, chills, night  sweats, weight loss, nausea, vomiting, diarrhea, constipation, diplopia, blurred vision, headache, chest pain, palpitations, shortness of breath, breast pain, abdominal pain, extremity pain, or difficulty ambulating.  The remainder of the ten-point ROS, including general, skin, lymph, H/N, cardiorespiratory, GI, , Neuro, Endocrine, and psychiatric is negative.     Objective:      Physical Exam        She is alert, oriented to time, place, person, pleasant, well      nourished, in no acute physical distress.                                    VITAL SIGNS:  Reviewed                                      HEENT:  Normal.  There are no nasal, oral, lip, gingival, auricular, lid,    or conjunctival lesions.  Mucosae are moist and pink, and there is no        thrush.  Pupils are equal, reactive to light and accommodation.              Extraocular muscle movements are intact.  Dentition is good.                                    NECK:  Supple without JVD, adenopathy, or thyromegaly.                       LUNGS:  Clear to auscultation without wheezing, rales, or rhonchi.           CARDIOVASCULAR:  Reveals an S1, S2, no murmurs, no rubs, no gallops.         ABDOMEN:  Soft, nontender, without organomegaly.  Bowel sounds are    present.                                                                     EXTREMITIES:  No cyanosis, clubbing, or edema.                               BREASTS:  She is status post right lumpectomy with a well-healed incision in the upper outer quadrant.     There are no masses in either breast.                                     LYMPHATIC:  There is no cervical, axillary, or supraclavicular adenopathy.   SKIN:  Warm and moist, without petechiae, rashes, induration, or ecchymoses.        NEUROLOGIC:  DTRs are 0-1+ bilaterally, symmetrical, motor function is 5/5,and cranial nerves are  within normal limits.    Assessment:       1. Malignant neoplasm of upper-outer quadrant of right breast in female, estrogen receptor positive    2. S/p 5 years of adjuvant hormonal therapy with aromatase inhibitors      3.    Osteopenia approaching osteoporosis    4.   Anemia, chronic, normochromic normocytic, most likely multifactorial.  Recent bone marrow biopsy did not reveal any MDS     5.  CDK 3b  Plan:           I had a long discussion with her.    In regards to the breast cancer, she completed her 5 years of anastrazole at the end of October 2023.  Her mammogram will be repeated after June 28th.  In regards to her anemia, given her current H&H we will hold the erythropoietin.  I explained to her that she will most likely require lifelong intermittent treatment.  She voiced understanding.  I will see her again in 8 weeks with a repeat CBC.  Finally, she will have a repeat mammogram in July 2024.    Her multiple questions were answered to her satisfaction.  I spent approximately 40 minutes reviewing the available records and evaluating the patient, and coordinating her care.             Route Chart for Scheduling    Med Onc Chart Routing      Follow up with physician 2 months. With labs 1 day prior.  Please have her on the schedule for EPO the day that she sees me   Follow up with BASHIR    Infusion scheduling note    Injection scheduling note    Labs    Imaging    Pharmacy appointment    Other referrals          Supportive Plan Information  OP EPOETIN MERCED WEEKLY   Robert Hernandez MD   Upcoming Treatment Dates - OP EPOETIN MERCED WEEKLY    2/27/2024       Medications       epoetin  enedina-epbx injection 40,000 Units    Therapy Plan Information  Medications  denosumab (PROLIA) injection 60 mg  60 mg, Subcutaneous, Every 26 weeks

## 2024-03-08 ENCOUNTER — LAB VISIT (OUTPATIENT)
Dept: LAB | Facility: HOSPITAL | Age: 84
End: 2024-03-08
Attending: INTERNAL MEDICINE
Payer: MEDICARE

## 2024-03-08 DIAGNOSIS — D50.9 IRON DEFICIENCY ANEMIA, UNSPECIFIED IRON DEFICIENCY ANEMIA TYPE: ICD-10-CM

## 2024-03-08 LAB
FERRITIN SERPL-MCNC: 26 NG/ML (ref 20–300)
HGB BLD-MCNC: 12.3 G/DL (ref 12–16)
IRON SERPL-MCNC: 40 UG/DL (ref 30–160)
SATURATED IRON: 12 % (ref 20–50)
TOTAL IRON BINDING CAPACITY: 336 UG/DL (ref 250–450)
TRANSFERRIN SERPL-MCNC: 227 MG/DL (ref 200–375)

## 2024-03-08 PROCEDURE — 85018 HEMOGLOBIN: CPT | Performed by: INTERNAL MEDICINE

## 2024-03-08 PROCEDURE — 83540 ASSAY OF IRON: CPT | Performed by: INTERNAL MEDICINE

## 2024-03-08 PROCEDURE — 82728 ASSAY OF FERRITIN: CPT | Performed by: INTERNAL MEDICINE

## 2024-03-08 PROCEDURE — 36415 COLL VENOUS BLD VENIPUNCTURE: CPT | Performed by: INTERNAL MEDICINE

## 2024-03-10 DIAGNOSIS — D50.9 IRON DEFICIENCY ANEMIA, UNSPECIFIED IRON DEFICIENCY ANEMIA TYPE: ICD-10-CM

## 2024-03-10 RX ORDER — FERROUS GLUCONATE 324(38)MG
324 TABLET ORAL
Qty: 90 TABLET | Refills: 1 | Status: SHIPPED | OUTPATIENT
Start: 2024-03-10 | End: 2024-09-06

## 2024-03-10 NOTE — PROGRESS NOTES
GI MA team - please tell patient that they are iron deficient and anemic and recommend that they take ferrous gluconate one 324mg pill once daily for next 3 months.    GI MA team -  Please order repeat fasting Hemoglobin, Iron/TIBC, and Ferritin in 8 weeks - Orders placed.     Shima if you take a 500 mg vitamin-C pill once daily with your iron you may absorb more iron per iron pill.

## 2024-04-08 ENCOUNTER — HOSPITAL ENCOUNTER (OUTPATIENT)
Dept: RADIOLOGY | Facility: HOSPITAL | Age: 84
Discharge: HOME | End: 2024-04-08
Attending: UROLOGY
Payer: MEDICARE

## 2024-04-08 DIAGNOSIS — N20.0 NEPHROLITHIASIS: ICD-10-CM

## 2024-04-08 PROCEDURE — 74018 RADEX ABDOMEN 1 VIEW: CPT | Mod: 26,,, | Performed by: INTERNAL MEDICINE

## 2024-04-08 PROCEDURE — 74018 RADEX ABDOMEN 1 VIEW: CPT | Mod: TC

## 2024-05-07 ENCOUNTER — LAB VISIT (OUTPATIENT)
Dept: LAB | Facility: HOSPITAL | Age: 84
End: 2024-05-07
Attending: INTERNAL MEDICINE
Payer: MEDICARE

## 2024-05-07 DIAGNOSIS — Z17.0 MALIGNANT NEOPLASM OF UPPER-OUTER QUADRANT OF RIGHT BREAST IN FEMALE, ESTROGEN RECEPTOR POSITIVE: Chronic | ICD-10-CM

## 2024-05-07 DIAGNOSIS — D50.9 IRON DEFICIENCY ANEMIA, UNSPECIFIED IRON DEFICIENCY ANEMIA TYPE: ICD-10-CM

## 2024-05-07 DIAGNOSIS — C50.411 MALIGNANT NEOPLASM OF UPPER-OUTER QUADRANT OF RIGHT BREAST IN FEMALE, ESTROGEN RECEPTOR POSITIVE: Chronic | ICD-10-CM

## 2024-05-07 LAB
ALBUMIN SERPL BCP-MCNC: 3.6 G/DL (ref 3.5–5.2)
ALP SERPL-CCNC: 60 U/L (ref 55–135)
ALT SERPL W/O P-5'-P-CCNC: 28 U/L (ref 10–44)
ANION GAP SERPL CALC-SCNC: 7 MMOL/L (ref 8–16)
AST SERPL-CCNC: 36 U/L (ref 10–40)
BASOPHILS # BLD AUTO: 0.02 K/UL (ref 0–0.2)
BASOPHILS NFR BLD: 0.5 % (ref 0–1.9)
BILIRUB SERPL-MCNC: 0.8 MG/DL (ref 0.1–1)
BUN SERPL-MCNC: 23 MG/DL (ref 8–23)
CALCIUM SERPL-MCNC: 9.7 MG/DL (ref 8.7–10.5)
CHLORIDE SERPL-SCNC: 104 MMOL/L (ref 95–110)
CO2 SERPL-SCNC: 28 MMOL/L (ref 23–29)
CREAT SERPL-MCNC: 1.4 MG/DL (ref 0.5–1.4)
DIFFERENTIAL METHOD BLD: ABNORMAL
EOSINOPHIL # BLD AUTO: 0.1 K/UL (ref 0–0.5)
EOSINOPHIL NFR BLD: 1.2 % (ref 0–8)
ERYTHROCYTE [DISTWIDTH] IN BLOOD BY AUTOMATED COUNT: 14.5 % (ref 11.5–14.5)
EST. GFR  (NO RACE VARIABLE): 37.3 ML/MIN/1.73 M^2
FERRITIN SERPL-MCNC: 175 NG/ML (ref 20–300)
FERRITIN SERPL-MCNC: 175 NG/ML (ref 20–300)
GLUCOSE SERPL-MCNC: 98 MG/DL (ref 70–110)
HCT VFR BLD AUTO: 33 % (ref 37–48.5)
HGB BLD-MCNC: 10.8 G/DL (ref 12–16)
HGB BLD-MCNC: 10.8 G/DL (ref 12–16)
IMM GRANULOCYTES # BLD AUTO: 0.01 K/UL (ref 0–0.04)
IMM GRANULOCYTES NFR BLD AUTO: 0.2 % (ref 0–0.5)
IRON SERPL-MCNC: 103 UG/DL (ref 30–160)
LYMPHOCYTES # BLD AUTO: 1.8 K/UL (ref 1–4.8)
LYMPHOCYTES NFR BLD: 41.1 % (ref 18–48)
MCH RBC QN AUTO: 30.9 PG (ref 27–31)
MCHC RBC AUTO-ENTMCNC: 32.7 G/DL (ref 32–36)
MCV RBC AUTO: 94 FL (ref 82–98)
MONOCYTES # BLD AUTO: 0.5 K/UL (ref 0.3–1)
MONOCYTES NFR BLD: 10.9 % (ref 4–15)
NEUTROPHILS # BLD AUTO: 2 K/UL (ref 1.8–7.7)
NEUTROPHILS NFR BLD: 46.1 % (ref 38–73)
NRBC BLD-RTO: 0 /100 WBC
PLATELET # BLD AUTO: 161 K/UL (ref 150–450)
PMV BLD AUTO: 11.2 FL (ref 9.2–12.9)
POTASSIUM SERPL-SCNC: 5.1 MMOL/L (ref 3.5–5.1)
PROT SERPL-MCNC: 7.1 G/DL (ref 6–8.4)
RBC # BLD AUTO: 3.5 M/UL (ref 4–5.4)
SATURATED IRON: 36 % (ref 20–50)
SODIUM SERPL-SCNC: 139 MMOL/L (ref 136–145)
TOTAL IRON BINDING CAPACITY: 284 UG/DL (ref 250–450)
TRANSFERRIN SERPL-MCNC: 192 MG/DL (ref 200–375)
WBC # BLD AUTO: 4.31 K/UL (ref 3.9–12.7)

## 2024-05-07 PROCEDURE — 85025 COMPLETE CBC W/AUTO DIFF WBC: CPT | Performed by: INTERNAL MEDICINE

## 2024-05-07 PROCEDURE — 83540 ASSAY OF IRON: CPT | Performed by: INTERNAL MEDICINE

## 2024-05-07 PROCEDURE — 36415 COLL VENOUS BLD VENIPUNCTURE: CPT | Performed by: INTERNAL MEDICINE

## 2024-05-07 PROCEDURE — 80053 COMPREHEN METABOLIC PANEL: CPT | Performed by: INTERNAL MEDICINE

## 2024-05-07 PROCEDURE — 82728 ASSAY OF FERRITIN: CPT | Performed by: INTERNAL MEDICINE

## 2024-05-08 ENCOUNTER — TELEPHONE (OUTPATIENT)
Dept: HEMATOLOGY/ONCOLOGY | Facility: CLINIC | Age: 84
End: 2024-05-08
Payer: MEDICARE

## 2024-05-08 ENCOUNTER — OFFICE VISIT (OUTPATIENT)
Dept: HEMATOLOGY/ONCOLOGY | Facility: CLINIC | Age: 84
End: 2024-05-08
Payer: MEDICARE

## 2024-05-08 ENCOUNTER — INFUSION (OUTPATIENT)
Dept: INFUSION THERAPY | Facility: HOSPITAL | Age: 84
End: 2024-05-08
Payer: MEDICARE

## 2024-05-08 VITALS
BODY MASS INDEX: 20.51 KG/M2 | OXYGEN SATURATION: 100 % | DIASTOLIC BLOOD PRESSURE: 63 MMHG | SYSTOLIC BLOOD PRESSURE: 131 MMHG | WEIGHT: 119.5 LBS | HEART RATE: 54 BPM | TEMPERATURE: 98 F

## 2024-05-08 VITALS — HEART RATE: 54 BPM | SYSTOLIC BLOOD PRESSURE: 152 MMHG | DIASTOLIC BLOOD PRESSURE: 67 MMHG

## 2024-05-08 DIAGNOSIS — N18.32 STAGE 3B CHRONIC KIDNEY DISEASE: Primary | ICD-10-CM

## 2024-05-08 DIAGNOSIS — C50.411 MALIGNANT NEOPLASM OF UPPER-OUTER QUADRANT OF RIGHT BREAST IN FEMALE, ESTROGEN RECEPTOR POSITIVE: Chronic | ICD-10-CM

## 2024-05-08 DIAGNOSIS — Z51.11 ENCOUNTER FOR ANTINEOPLASTIC CHEMOTHERAPY: Primary | ICD-10-CM

## 2024-05-08 DIAGNOSIS — Z17.0 MALIGNANT NEOPLASM OF UPPER-OUTER QUADRANT OF RIGHT BREAST IN FEMALE, ESTROGEN RECEPTOR POSITIVE: Chronic | ICD-10-CM

## 2024-05-08 PROBLEM — N18.9 ANEMIA IN CHRONIC KIDNEY DISEASE (CKD): Status: ACTIVE | Noted: 2023-07-13

## 2024-05-08 PROBLEM — D63.1 ANEMIA IN CHRONIC KIDNEY DISEASE (CKD): Status: ACTIVE | Noted: 2023-07-13

## 2024-05-08 PROCEDURE — 96372 THER/PROPH/DIAG INJ SC/IM: CPT

## 2024-05-08 PROCEDURE — 99214 OFFICE O/P EST MOD 30 MIN: CPT | Mod: S$PBB,,, | Performed by: INTERNAL MEDICINE

## 2024-05-08 PROCEDURE — 63600175 PHARM REV CODE 636 W HCPCS: Mod: JZ,EC,JG | Performed by: INTERNAL MEDICINE

## 2024-05-08 PROCEDURE — 99999 PR PBB SHADOW E&M-EST. PATIENT-LVL II: CPT | Mod: PBBFAC,,, | Performed by: INTERNAL MEDICINE

## 2024-05-08 PROCEDURE — 99212 OFFICE O/P EST SF 10 MIN: CPT | Mod: PBBFAC,25 | Performed by: INTERNAL MEDICINE

## 2024-05-08 RX ADMIN — EPOETIN ALFA-EPBX 40000 UNITS: 40000 INJECTION, SOLUTION INTRAVENOUS; SUBCUTANEOUS at 03:05

## 2024-05-08 NOTE — TELEPHONE ENCOUNTER
----- Message from Taty Ho sent at 5/8/2024  9:37 AM CDT -----  Regarding: returning missed call  Contact: Pt @ [phone]  Pt is returning a missed call from someone in the office and is asking for a return call back soon. Thanks.

## 2024-05-08 NOTE — PROGRESS NOTES
Subjective:       Patient ID: Shima Sorto is a 83 y.o. female.    Chief Complaint: No chief complaint on file.    HPI    Ms. Sorto returns today for follow up.  I had last seen her on 3/6/2024 and had held her weekly EPO due to her Hg being 12.5 gr/dl.    Her most recent labs are from yesterday and they are as follows   Her CBC shows a white count of 4,300 per cubic mm, hemoglobin 10.8 g per dL, hematocrit 33.0, down from 40 % in early March 2024, %, MCV 94 and platelets 161K.  Creatinine is 1.4 mg/dl and eGFR is 37.3 mL/min  Ferritin is 175 ng/mL      In regards to her breast cancer, had been started on anastrazole in mid November 2018, and completed 5 years at the end of October 2023.  In late August 2023 she underwent a bone marrow biopsy which was essentially unremarkable.  There was no evidence of MDS, leukemia, lymphoma, myeloma, or metastatic carcinoma.  Cytogenetics were normal and reticulin was not increased.  Cellularity was 35 %.    Other recent pertinent labs are as follows:  3 stool samples were negative for occult blood  Multiple urinalyses  have shown persistent hematuria.  Of note, the patient self catheterizes 3 times a day.  Other recent pertinent labs are as follows:  B12 is 406 pg/mL  SPEP shows no monoclonal spike  Direct Lane is negative  Bone marrow biopsy was unremarkable and showed no evidence of MDS    Briefly, she is an 83-year-old  female who diagnosed with breast cancer in 2018.  On 08/17/2018, she underwent a lumpectomy and sentinel lymph node biopsy for an 8 mm grade 1 invasive lobular carcinoma which was ER strongly positive, WI negative and HER-2 negative with a Ki-67 of 5%, with the closest margin being 2 mm.  Two of 2 sentinel lymph nodes were negative for involvement.      She completed her radiation therapy and was subsequently started on AIs.  She completed 5 years of adjuvant hormonal therapy and she is being followed expectantly.    A mammogram on 7/6/2023  was read as BIRADS II, and a one year follow up was recommended.    In April 2023 she had been diagnosed with C diff and she was treated accordingly.  She underwent a colonoscopy and EGD by Dr. Garza.  The colonoscopy showed 3 polyps and extensive diverticulosis.  Biopsies were negative for malignancy.  The EGD showed gastritis and a hiatal hernia.  A video capsule swallow did not identify a source of bleeding in her small intestine.       Review of Systems      Overall she feels OK, and her ECOG PS is 1.  She denies any anxiety, depression, easy bruising, fevers, chills, night  sweats, weight loss, nausea, vomiting, diarrhea, constipation, diplopia, blurred vision, headache, chest pain, palpitations, shortness of breath, breast pain, abdominal pain, extremity pain, or difficulty ambulating.  The remainder of the ten-point ROS, including general, skin, lymph, H/N, cardiorespiratory, GI, , Neuro, Endocrine, and psychiatric is negative.     Objective:      Physical Exam        She is alert, oriented to time, place, person, pleasant, well      nourished, in no acute physical distress.                                    VITAL SIGNS:  Reviewed                                      HEENT:  Normal.  There are no nasal, oral, lip, gingival, auricular, lid,    or conjunctival lesions.  Mucosae are moist and pink, and there is no        thrush.  Pupils are equal, reactive to light and accommodation.              Extraocular muscle movements are intact.  Dentition is good.                                    NECK:  Supple without JVD, adenopathy, or thyromegaly.                       LUNGS:  Clear to auscultation without wheezing, rales, or rhonchi.           CARDIOVASCULAR:  Reveals an S1, S2, no murmurs, no rubs, no gallops.         ABDOMEN:  Soft, nontender, without organomegaly.  Bowel sounds are    present.                                                                     EXTREMITIES:  No cyanosis, clubbing, or  edema.                               BREASTS:  She is status post right lumpectomy with a well-healed incision in the upper outer quadrant.    There are no masses in either breast.                                     LYMPHATIC:  There is no cervical, axillary, or supraclavicular adenopathy.   SKIN:  Warm and moist, without petechiae, rashes, induration, or ecchymoses.        NEUROLOGIC:  DTRs are 0-1+ bilaterally, symmetrical, motor function is 5/5,and cranial nerves are  within normal limits.    Assessment:       1. Malignant neoplasm of upper-outer quadrant of right breast in female, estrogen receptor positive    2. S/p 5 years of adjuvant hormonal therapy with aromatase inhibitors      3.    Osteopenia approaching osteoporosis    4.   Anemia, chronic, normochromic normocytic, most likely multifactorial.  Recent bone marrow biopsy did not reveal any MDS     5.  CDK 3b  Plan:           I had a long discussion with her.    In regards to the breast cancer, she completed her 5 years of anastrazole at the end of October 2023.  Her mammogram will be repeated after June 28th.  In regards to her anemia, given her current H&H we will restart the erythropoietin.  I explained to her that she will most likely require lifelong intermittent treatment.  She voiced understanding.  I will see her again in 4 weeks with a repeat CBC.  Finally, she will have a repeat mammogram in July 2024.    Her multiple questions were answered to her satisfaction.  I spent approximately 30 minutes reviewing the available records and evaluating the patient, and coordinating her care.             Route Chart for Scheduling    Med Onc Chart Routing      Follow up with physician 3 weeks. Restart weekly erythropoietin today and see me in 3 weeks with repeat labs   Follow up with BASHIR    Infusion scheduling note    Injection scheduling note    Labs CBC and CMP   Scheduling:  Preferred lab:  Lab interval:     Imaging    Pharmacy appointment    Other  referrals          Supportive Plan Information  OP EPOETIN ENEDINA WEEKLY   Robert Hernandez MD   Upcoming Treatment Dates - OP EPOETIN ENEDINA WEEKLY    2/27/2024       Medications       epoetin enedina-epbx injection 40,000 Units  5/8/2024       Medications       epoetin enedina-epbx injection 40,000 Units  5/15/2024       Medications       epoetin enedina-epbx injection 40,000 Units  7/25/2024       Medications       epoetin enedina-epbx injection 40,000 Units    Therapy Plan Information  Medications  denosumab (PROLIA) injection 60 mg  60 mg, Subcutaneous, Every 26 weeks

## 2024-05-08 NOTE — TELEPHONE ENCOUNTER
Patient is returning phone call. Nurse sees no documentation anyone called her today.  She thanked nurse.

## 2024-05-12 ENCOUNTER — PATIENT MESSAGE (OUTPATIENT)
Dept: GASTROENTEROLOGY | Facility: CLINIC | Age: 84
End: 2024-05-12
Payer: MEDICARE

## 2024-05-15 ENCOUNTER — INFUSION (OUTPATIENT)
Dept: INFUSION THERAPY | Facility: HOSPITAL | Age: 84
End: 2024-05-15
Payer: MEDICARE

## 2024-05-15 VITALS
RESPIRATION RATE: 18 BRPM | BODY MASS INDEX: 20.4 KG/M2 | TEMPERATURE: 98 F | OXYGEN SATURATION: 100 % | HEIGHT: 64 IN | WEIGHT: 119.5 LBS | SYSTOLIC BLOOD PRESSURE: 133 MMHG | DIASTOLIC BLOOD PRESSURE: 62 MMHG | HEART RATE: 66 BPM

## 2024-05-15 DIAGNOSIS — N18.32 STAGE 3B CHRONIC KIDNEY DISEASE: Primary | ICD-10-CM

## 2024-05-15 PROCEDURE — 96372 THER/PROPH/DIAG INJ SC/IM: CPT

## 2024-05-15 PROCEDURE — 63600175 PHARM REV CODE 636 W HCPCS: Mod: JZ,EC,JG | Performed by: INTERNAL MEDICINE

## 2024-05-15 RX ADMIN — EPOETIN ALFA-EPBX 40000 UNITS: 40000 INJECTION, SOLUTION INTRAVENOUS; SUBCUTANEOUS at 01:05

## 2024-05-15 NOTE — PLAN OF CARE
"  Problem: Fatigue  Goal: Improved Activity Tolerance  Outcome: Progressing  Intervention: Promote Improved Energy  Flowsheets (Taken 5/15/2024 1305)  Fatigue Management:   activity schedule adjusted   activity assistance provided   fatigue-related activity identified   frequent rest breaks encouraged   paced activity encouraged  Sleep/Rest Enhancement:   awakenings minimized   consistent schedule promoted   natural light exposure provided   noise level reduced   reading promoted   regular sleep/rest pattern promoted   relaxation techniques promoted  Activity Management:   Ambulated -L4   Up in chair - L3  Environmental Support:   calm environment promoted   caregiver consistency promoted   comfort object encouraged   distractions minimized   environmental consistency promoted   personal routine supported   rest periods encouraged  /62 (Patient Position: Sitting)   Pulse 66   Temp 97.6 °F (36.4 °C)   Resp 18   Ht 5' 4" (1.626 m)   Wt 54.2 kg (119 lb 7.8 oz)   LMP  (LMP Unknown)   SpO2 100%   BMI 20.51 kg/m² Pleasant, alert and oriented patient to Chemo Infusion per self for Reticrit injection - VSS and injection administered to Abdomin, patient tolerated injection well, and patient discharged to home per self with no concerns - RTC on 5/22/24       "

## 2024-05-22 ENCOUNTER — INFUSION (OUTPATIENT)
Dept: INFUSION THERAPY | Facility: HOSPITAL | Age: 84
End: 2024-05-22
Payer: MEDICARE

## 2024-05-22 VITALS — HEART RATE: 54 BPM | SYSTOLIC BLOOD PRESSURE: 144 MMHG | DIASTOLIC BLOOD PRESSURE: 66 MMHG

## 2024-05-22 DIAGNOSIS — N18.32 STAGE 3B CHRONIC KIDNEY DISEASE: Primary | ICD-10-CM

## 2024-05-22 PROCEDURE — 63600175 PHARM REV CODE 636 W HCPCS: Mod: JZ,EC,JG | Performed by: INTERNAL MEDICINE

## 2024-05-22 PROCEDURE — 96372 THER/PROPH/DIAG INJ SC/IM: CPT

## 2024-05-22 RX ADMIN — EPOETIN ALFA-EPBX 40000 UNITS: 40000 INJECTION, SOLUTION INTRAVENOUS; SUBCUTANEOUS at 10:05

## 2024-05-22 NOTE — NURSING
Pt here for retacrit injection.  Hgb 10.8 and VSS.  Retacrit administered to abdominal tissue.  Pt tolerated.

## 2024-05-29 ENCOUNTER — LAB VISIT (OUTPATIENT)
Dept: LAB | Facility: HOSPITAL | Age: 84
End: 2024-05-29
Attending: INTERNAL MEDICINE
Payer: MEDICARE

## 2024-05-29 ENCOUNTER — OFFICE VISIT (OUTPATIENT)
Dept: HEMATOLOGY/ONCOLOGY | Facility: CLINIC | Age: 84
End: 2024-05-29
Payer: MEDICARE

## 2024-05-29 VITALS
HEIGHT: 64 IN | DIASTOLIC BLOOD PRESSURE: 70 MMHG | TEMPERATURE: 98 F | WEIGHT: 120.81 LBS | OXYGEN SATURATION: 100 % | SYSTOLIC BLOOD PRESSURE: 157 MMHG | BODY MASS INDEX: 20.63 KG/M2 | HEART RATE: 50 BPM

## 2024-05-29 DIAGNOSIS — D63.1 ANEMIA IN STAGE 3 CHRONIC KIDNEY DISEASE, UNSPECIFIED WHETHER STAGE 3A OR 3B CKD: ICD-10-CM

## 2024-05-29 DIAGNOSIS — C50.411 MALIGNANT NEOPLASM OF UPPER-OUTER QUADRANT OF RIGHT BREAST IN FEMALE, ESTROGEN RECEPTOR POSITIVE: Primary | Chronic | ICD-10-CM

## 2024-05-29 DIAGNOSIS — Z51.11 ENCOUNTER FOR ANTINEOPLASTIC CHEMOTHERAPY: ICD-10-CM

## 2024-05-29 DIAGNOSIS — Z12.31 ENCOUNTER FOR SCREENING MAMMOGRAM FOR MALIGNANT NEOPLASM OF BREAST: ICD-10-CM

## 2024-05-29 DIAGNOSIS — N18.30 ANEMIA IN STAGE 3 CHRONIC KIDNEY DISEASE, UNSPECIFIED WHETHER STAGE 3A OR 3B CKD: ICD-10-CM

## 2024-05-29 DIAGNOSIS — Z17.0 MALIGNANT NEOPLASM OF UPPER-OUTER QUADRANT OF RIGHT BREAST IN FEMALE, ESTROGEN RECEPTOR POSITIVE: Primary | Chronic | ICD-10-CM

## 2024-05-29 LAB
ALBUMIN SERPL BCP-MCNC: 3.4 G/DL (ref 3.5–5.2)
ALP SERPL-CCNC: 67 U/L (ref 55–135)
ALT SERPL W/O P-5'-P-CCNC: 21 U/L (ref 10–44)
ANION GAP SERPL CALC-SCNC: 8 MMOL/L (ref 8–16)
AST SERPL-CCNC: 27 U/L (ref 10–40)
BASOPHILS # BLD AUTO: 0.03 K/UL (ref 0–0.2)
BASOPHILS NFR BLD: 0.8 % (ref 0–1.9)
BILIRUB SERPL-MCNC: 0.9 MG/DL (ref 0.1–1)
BUN SERPL-MCNC: 13 MG/DL (ref 8–23)
CALCIUM SERPL-MCNC: 9.5 MG/DL (ref 8.7–10.5)
CHLORIDE SERPL-SCNC: 104 MMOL/L (ref 95–110)
CO2 SERPL-SCNC: 29 MMOL/L (ref 23–29)
CREAT SERPL-MCNC: 1.2 MG/DL (ref 0.5–1.4)
DIFFERENTIAL METHOD BLD: ABNORMAL
EOSINOPHIL # BLD AUTO: 0 K/UL (ref 0–0.5)
EOSINOPHIL NFR BLD: 0.3 % (ref 0–8)
ERYTHROCYTE [DISTWIDTH] IN BLOOD BY AUTOMATED COUNT: 18.3 % (ref 11.5–14.5)
EST. GFR  (NO RACE VARIABLE): 44.9 ML/MIN/1.73 M^2
GLUCOSE SERPL-MCNC: 88 MG/DL (ref 70–110)
HCT VFR BLD AUTO: 38.7 % (ref 37–48.5)
HGB BLD-MCNC: 12.1 G/DL (ref 12–16)
IMM GRANULOCYTES # BLD AUTO: 0.02 K/UL (ref 0–0.04)
IMM GRANULOCYTES NFR BLD AUTO: 0.5 % (ref 0–0.5)
LYMPHOCYTES # BLD AUTO: 1.2 K/UL (ref 1–4.8)
LYMPHOCYTES NFR BLD: 31.2 % (ref 18–48)
MCH RBC QN AUTO: 31.3 PG (ref 27–31)
MCHC RBC AUTO-ENTMCNC: 31.3 G/DL (ref 32–36)
MCV RBC AUTO: 100 FL (ref 82–98)
MONOCYTES # BLD AUTO: 0.4 K/UL (ref 0.3–1)
MONOCYTES NFR BLD: 9.9 % (ref 4–15)
NEUTROPHILS # BLD AUTO: 2.2 K/UL (ref 1.8–7.7)
NEUTROPHILS NFR BLD: 57.3 % (ref 38–73)
NRBC BLD-RTO: 0 /100 WBC
PLATELET # BLD AUTO: 223 K/UL (ref 150–450)
PMV BLD AUTO: 10.3 FL (ref 9.2–12.9)
POTASSIUM SERPL-SCNC: 4.6 MMOL/L (ref 3.5–5.1)
PROT SERPL-MCNC: 6.7 G/DL (ref 6–8.4)
RBC # BLD AUTO: 3.86 M/UL (ref 4–5.4)
SODIUM SERPL-SCNC: 141 MMOL/L (ref 136–145)
WBC # BLD AUTO: 3.82 K/UL (ref 3.9–12.7)

## 2024-05-29 PROCEDURE — 85025 COMPLETE CBC W/AUTO DIFF WBC: CPT | Performed by: INTERNAL MEDICINE

## 2024-05-29 PROCEDURE — 99214 OFFICE O/P EST MOD 30 MIN: CPT | Mod: PBBFAC | Performed by: INTERNAL MEDICINE

## 2024-05-29 PROCEDURE — 36415 COLL VENOUS BLD VENIPUNCTURE: CPT | Performed by: INTERNAL MEDICINE

## 2024-05-29 PROCEDURE — 99999 PR PBB SHADOW E&M-EST. PATIENT-LVL IV: CPT | Mod: PBBFAC,,, | Performed by: INTERNAL MEDICINE

## 2024-05-29 PROCEDURE — 99214 OFFICE O/P EST MOD 30 MIN: CPT | Mod: S$PBB,,, | Performed by: INTERNAL MEDICINE

## 2024-05-29 PROCEDURE — 80053 COMPREHEN METABOLIC PANEL: CPT | Performed by: INTERNAL MEDICINE

## 2024-05-29 NOTE — PROGRESS NOTES
Subjective:       Patient ID: Shima Sorto is a 83 y.o. female.    Chief Complaint: Follow-up    HPI    Ms. Sorto returns today for follow up.  I had last seen her on 5/8/2024 and had restarted weekly EPO due to her Hg being 10.8 gr/dl.    Today's labs are as follows   Her CBC shows a white count of 3,800 per cubic mm, hemoglobin 12.1 g per dL, hematocrit 38.7 %, MCV 10 and platelets 223K.      In regards to her breast cancer, had been started on anastrazole in mid November 2018, and completed 5 years at the end of October 2023.  In late August 2023 she underwent a bone marrow biopsy which was essentially unremarkable.  There was no evidence of MDS, leukemia, lymphoma, myeloma, or metastatic carcinoma.  Cytogenetics were normal and reticulin was not increased.  Cellularity was 35 %.    Other recent pertinent labs are as follows:  3 stool samples were negative for occult blood  Multiple urinalyses  have shown persistent hematuria.  Of note, the patient self catheterizes 3 times a day.  Other recent pertinent labs are as follows:  B12 is 406 pg/mL  SPEP shows no monoclonal spike  Direct Lane is negative  Bone marrow biopsy was unremarkable and showed no evidence of MDS    Briefly, she is an 83-year-old  female who diagnosed with breast cancer in 2018.  On 08/17/2018, she underwent a lumpectomy and sentinel lymph node biopsy for an 8 mm grade 1 invasive lobular carcinoma which was ER strongly positive, OK negative and HER-2 negative with a Ki-67 of 5%, with the closest margin being 2 mm.  Two of 2 sentinel lymph nodes were negative for involvement.      She completed her radiation therapy and was subsequently started on AIs.  She completed 5 years of adjuvant hormonal therapy and she is being followed expectantly.    A mammogram on 7/6/2023 was read as BIRADS II, and a one year follow up was recommended.    In April 2023 she had been diagnosed with C diff and she was treated accordingly.  She  underwent a colonoscopy and EGD by Dr. Garza.  The colonoscopy showed 3 polyps and extensive diverticulosis.  Biopsies were negative for malignancy.  The EGD showed gastritis and a hiatal hernia.  A video capsule swallow did not identify a source of bleeding in her small intestine.       Review of Systems      Overall she feels OK, and her ECOG PS is 1.  She states that her stamina has increased.  She denies any anxiety, depression, easy bruising, fevers, chills, night  sweats, weight loss, nausea, vomiting, diarrhea, constipation, diplopia, blurred vision, headache, chest pain, palpitations, shortness of breath, breast pain, abdominal pain, extremity pain, or difficulty ambulating.  The remainder of the ten-point ROS, including general, skin, lymph, H/N, cardiorespiratory, GI, , Neuro, Endocrine, and psychiatric is negative.     Objective:      Physical Exam        She is alert, oriented to time, place, person, pleasant, well      nourished, in no acute physical distress.                                    VITAL SIGNS:  Reviewed                                      HEENT:  Normal.  There are no nasal, oral, lip, gingival, auricular, lid,    or conjunctival lesions.  Mucosae are moist and pink, and there is no        thrush.  Pupils are equal, reactive to light and accommodation.              Extraocular muscle movements are intact.  Dentition is good.                                    NECK:  Supple without JVD, adenopathy, or thyromegaly.                       LUNGS:  Clear to auscultation without wheezing, rales, or rhonchi.           CARDIOVASCULAR:  Reveals an S1, S2, no murmurs, no rubs, no gallops.         ABDOMEN:  Soft, nontender, without organomegaly.  Bowel sounds are    present.                                                                     EXTREMITIES:  No cyanosis, clubbing, or edema.                               BREASTS:  Deferred                                 LYMPHATIC:  There is no  cervical, axillary, or supraclavicular adenopathy.   SKIN:  Warm and moist, without petechiae, rashes, induration, or ecchymoses.        NEUROLOGIC:  DTRs are 0-1+ bilaterally, symmetrical, motor function is 5/5,and cranial nerves are  within normal limits.    Assessment:       1. Malignant neoplasm of upper-outer quadrant of right breast in female, estrogen receptor positive    2. S/p 5 years of adjuvant hormonal therapy with aromatase inhibitors      3.    Osteopenia approaching osteoporosis    4.   Anemia, chronic, normochromic normocytic, most likely multifactorial.  Recent bone marrow biopsy did not reveal any MDS     5.  CDK 3b  Plan:           I had a long discussion with her.    In regards to the breast cancer, she completed her 5 years of anastrazole at the end of October 2023.  Her mammogram will be repeated after June 28th.  In regards to her anemia, given her current H&H we will hold the erythropoietin.  I explained to her that she will most likely require lifelong intermittent treatment.  She voiced understanding.  I will see her again in 4 weeks with a repeat CBC.  Finally, she will have a repeat mammogram in July 2024.    Her multiple questions were answered to her satisfaction.  I spent approximately 30 minutes reviewing the available records and evaluating the patient, and coordinating her care.             Route Chart for Scheduling    Med Onc Chart Routing  Urgent    Follow up with physician . See me in late June with labs one day prior   Follow up with BASHIR    Infusion scheduling note    Injection scheduling note    Labs    Imaging    Pharmacy appointment    Other referrals          Supportive Plan Information  OP EPOETIN ENEDINA WEEKLY   Robert Hernandez MD   Upcoming Treatment Dates - OP EPOETIN ENEDINA WEEKLY    7/26/2024       Medications       epoetin enedina-epbx injection 40,000 Units    Therapy Plan Information  Medications  denosumab (PROLIA) injection 60 mg  60 mg, Subcutaneous, Every 26  weeks

## 2024-05-31 ENCOUNTER — PATIENT MESSAGE (OUTPATIENT)
Dept: UROLOGY | Facility: CLINIC | Age: 84
End: 2024-05-31
Payer: MEDICARE

## 2024-05-31 DIAGNOSIS — M54.9 BACK PAIN, UNSPECIFIED BACK LOCATION, UNSPECIFIED BACK PAIN LATERALITY, UNSPECIFIED CHRONICITY: Primary | ICD-10-CM

## 2024-06-05 ENCOUNTER — PATIENT MESSAGE (OUTPATIENT)
Dept: UROLOGY | Facility: CLINIC | Age: 84
End: 2024-06-05
Payer: MEDICARE

## 2024-06-05 ENCOUNTER — TELEPHONE (OUTPATIENT)
Dept: HEMATOLOGY/ONCOLOGY | Facility: CLINIC | Age: 84
End: 2024-06-05
Payer: MEDICARE

## 2024-06-07 ENCOUNTER — TELEPHONE (OUTPATIENT)
Dept: OTOLARYNGOLOGY | Facility: CLINIC | Age: 84
End: 2024-06-07
Payer: MEDICARE

## 2024-06-07 NOTE — TELEPHONE ENCOUNTER
----- Message from Foreign Iniguez sent at 6/7/2024 11:38 AM CDT -----  Regarding: pt advice  Contact: [phone]  Patient is calling to schedule appointment due to wax removal.Please contact patient to further discuss.

## 2024-06-08 ENCOUNTER — PATIENT MESSAGE (OUTPATIENT)
Dept: UROLOGY | Facility: CLINIC | Age: 84
End: 2024-06-08
Payer: MEDICARE

## 2024-06-10 ENCOUNTER — PATIENT MESSAGE (OUTPATIENT)
Dept: INTERNAL MEDICINE | Facility: CLINIC | Age: 84
End: 2024-06-10
Payer: MEDICARE

## 2024-06-11 ENCOUNTER — INFUSION (OUTPATIENT)
Dept: INFECTIOUS DISEASES | Facility: HOSPITAL | Age: 84
End: 2024-06-11
Payer: MEDICARE

## 2024-06-11 VITALS
HEIGHT: 64 IN | OXYGEN SATURATION: 99 % | DIASTOLIC BLOOD PRESSURE: 67 MMHG | SYSTOLIC BLOOD PRESSURE: 152 MMHG | TEMPERATURE: 98 F | HEART RATE: 63 BPM | RESPIRATION RATE: 21 BRPM | WEIGHT: 120.94 LBS | BODY MASS INDEX: 20.65 KG/M2

## 2024-06-11 DIAGNOSIS — N18.32 ACUTE RENAL FAILURE SUPERIMPOSED ON STAGE 3B CHRONIC KIDNEY DISEASE, UNSPECIFIED ACUTE RENAL FAILURE TYPE: ICD-10-CM

## 2024-06-11 DIAGNOSIS — E55.9 VITAMIN D DEFICIENCY: ICD-10-CM

## 2024-06-11 DIAGNOSIS — N17.9 ACUTE RENAL FAILURE SUPERIMPOSED ON STAGE 3B CHRONIC KIDNEY DISEASE, UNSPECIFIED ACUTE RENAL FAILURE TYPE: ICD-10-CM

## 2024-06-11 DIAGNOSIS — M80.00XD AGE-RELATED OSTEOPOROSIS WITH CURRENT PATHOLOGICAL FRACTURE WITH ROUTINE HEALING: Primary | ICD-10-CM

## 2024-06-11 PROCEDURE — 63600175 PHARM REV CODE 636 W HCPCS: Mod: JZ,JG | Performed by: NURSE PRACTITIONER

## 2024-06-11 PROCEDURE — 96372 THER/PROPH/DIAG INJ SC/IM: CPT

## 2024-06-11 RX ADMIN — DENOSUMAB 60 MG: 60 INJECTION SUBCUTANEOUS at 12:06

## 2024-06-11 NOTE — PROGRESS NOTES
Limited head-to-toe assessment due to privacy issues and visit reason though the opportunity was given for patient to express any concerns

## 2024-06-11 NOTE — PROGRESS NOTES
Patient arrives for Prolia injection - confirms use of calcium and vitamin D supplements and denies dental procedures over past 3 months - administered per guidelines

## 2024-06-13 ENCOUNTER — OFFICE VISIT (OUTPATIENT)
Dept: UROLOGY | Facility: CLINIC | Age: 84
End: 2024-06-13
Payer: MEDICARE

## 2024-06-13 ENCOUNTER — LAB VISIT (OUTPATIENT)
Dept: LAB | Facility: HOSPITAL | Age: 84
End: 2024-06-13
Attending: UROLOGY
Payer: MEDICARE

## 2024-06-13 VITALS
DIASTOLIC BLOOD PRESSURE: 64 MMHG | SYSTOLIC BLOOD PRESSURE: 138 MMHG | BODY MASS INDEX: 20.92 KG/M2 | HEIGHT: 64 IN | HEART RATE: 64 BPM | WEIGHT: 122.56 LBS

## 2024-06-13 DIAGNOSIS — N20.0 NEPHROLITHIASIS: Primary | ICD-10-CM

## 2024-06-13 DIAGNOSIS — R10.9 FLANK PAIN: ICD-10-CM

## 2024-06-13 DIAGNOSIS — R31.0 GROSS HEMATURIA: ICD-10-CM

## 2024-06-13 DIAGNOSIS — N20.0 NEPHROLITHIASIS: ICD-10-CM

## 2024-06-13 DIAGNOSIS — R33.9 URINARY RETENTION: ICD-10-CM

## 2024-06-13 LAB
ANION GAP SERPL CALC-SCNC: 8 MMOL/L (ref 8–16)
BUN SERPL-MCNC: 35 MG/DL (ref 8–23)
CALCIUM SERPL-MCNC: 9.7 MG/DL (ref 8.7–10.5)
CHLORIDE SERPL-SCNC: 103 MMOL/L (ref 95–110)
CO2 SERPL-SCNC: 28 MMOL/L (ref 23–29)
CREAT SERPL-MCNC: 1.4 MG/DL (ref 0.5–1.4)
EST. GFR  (NO RACE VARIABLE): 37.3 ML/MIN/1.73 M^2
GLUCOSE SERPL-MCNC: 88 MG/DL (ref 70–110)
POTASSIUM SERPL-SCNC: 4.2 MMOL/L (ref 3.5–5.1)
SODIUM SERPL-SCNC: 139 MMOL/L (ref 136–145)

## 2024-06-13 PROCEDURE — 99214 OFFICE O/P EST MOD 30 MIN: CPT | Mod: S$PBB,,, | Performed by: UROLOGY

## 2024-06-13 PROCEDURE — 36415 COLL VENOUS BLD VENIPUNCTURE: CPT | Performed by: UROLOGY

## 2024-06-13 PROCEDURE — 88112 CYTOPATH CELL ENHANCE TECH: CPT | Mod: 26,,, | Performed by: PATHOLOGY

## 2024-06-13 PROCEDURE — 87086 URINE CULTURE/COLONY COUNT: CPT | Performed by: UROLOGY

## 2024-06-13 PROCEDURE — 99999 PR PBB SHADOW E&M-EST. PATIENT-LVL III: CPT | Mod: PBBFAC,,, | Performed by: UROLOGY

## 2024-06-13 PROCEDURE — 99213 OFFICE O/P EST LOW 20 MIN: CPT | Mod: PBBFAC | Performed by: UROLOGY

## 2024-06-13 PROCEDURE — 87088 URINE BACTERIA CULTURE: CPT | Performed by: UROLOGY

## 2024-06-13 PROCEDURE — 80048 BASIC METABOLIC PNL TOTAL CA: CPT | Performed by: UROLOGY

## 2024-06-13 PROCEDURE — 87077 CULTURE AEROBIC IDENTIFY: CPT | Mod: 59 | Performed by: UROLOGY

## 2024-06-13 PROCEDURE — 87186 SC STD MICRODIL/AGAR DIL: CPT | Performed by: UROLOGY

## 2024-06-13 PROCEDURE — 88112 CYTOPATH CELL ENHANCE TECH: CPT | Performed by: PATHOLOGY

## 2024-06-13 RX ORDER — NITROFURANTOIN 25; 75 MG/1; MG/1
100 CAPSULE ORAL 2 TIMES DAILY
Qty: 14 CAPSULE | Refills: 0 | Status: SHIPPED | OUTPATIENT
Start: 2024-06-13 | End: 2024-06-18

## 2024-06-13 NOTE — PROGRESS NOTES
"Urology - [facility]  Clinic Note    SUBJECTIVE:     Chief Complaint: Nephrolithiasis    History of Present Illness:  Shima Sorto is a 83 y.o. female who presents to clinic for gross hematuria.  1 week ago. She saw blood in her urine in May and urethral pain.   Bilateral lower back pain - which is constant.   Feels urgency, but takes a minute or so to urinate.     CTRSS demonstrated multiple right lower pole nephroliths, as well as one upper pole stone. This appears stable from 2019.     She reports past stone surgery for asymptomatic ureteral stones.    She follows with Dr. Stoddard for incomplete bladder emptying. She performs CIC TID, however she hasn't done this in over a week. 10F lofric BID.     1 week ago. She saw blood in her urine in May and urethral pain. She has not done CIC since then.   Bilateral lower back pain - which is constant.   Feels urgency, but takes a minute or so to urinate.     No falls.   Has an appt in June with PT.     Anticoagulation:  No    OBJECTIVE:     Estimated body mass index is 21.04 kg/m² as calculated from the following:    Height as of this encounter: 5' 4" (1.626 m).    Weight as of this encounter: 55.6 kg (122 lb 9.2 oz).    Vital Signs (Most Recent)  Pulse: 64 (06/13/24 1342)  BP: 138/64 (06/13/24 1342)    Physical Exam  Constitutional:       General: She is not in acute distress.     Appearance: Normal appearance. She is not ill-appearing.   HENT:      Head: Normocephalic and atraumatic.   Cardiovascular:      Rate and Rhythm: Normal rate.   Pulmonary:      Effort: Pulmonary effort is normal.   Neurological:      Mental Status: She is alert.     The external genitalia were normal. No rash. Atrophic vaginitis was present. Slight urethral prolapse.   And in and out cath was performed under sterile conditions immediately after voiding. The PVR was 850 ml.    No prolapse.     Lab Results   Component Value Date    BUN 13 05/29/2024    CREATININE 1.2 05/29/2024    " WBC 3.82 (L) 05/29/2024    HGB 12.1 05/29/2024    HCT 38.7 05/29/2024     05/29/2024    AST 27 05/29/2024    ALT 21 05/29/2024    ALKPHOS 67 05/29/2024    ALBUMIN 3.4 (L) 05/29/2024        Imaging:    CTRSS 02/16/24:      1. No evidence of obstructive nephrolithiasis.  Stable cluster of nonobstructive stones in the right renal inferior pole.  2. Left renal superior pole hyperdense lesion which appears unchanged compared to imaging dating back to 2019.  Given temporal stability to reflect benign etiology.  Continued follow-up as clinically warranted.  3. Well-circumscribed fluid density collection with few fat density foci in the subcutaneous soft tissues adjacent to the right greater trochanter, possibly reflecting a seroma though new compared October 2023.  Correlation would be helpful.  4. Additional findings as detailed above.  This report was flagged in Epic as abnormal.     Electronically signed by resident: Cammie Briscoe  Date:                                            02/16/2024  Time:                                           15:49     Electronically signed by: William Lubin MD  Date:                                            02/16/2024  Time:                                           16:40    KUB 4/8/2024  There is spinal curvature and degenerative change. There are several small calculi over the right renal silhouette. No definite calcification along the course of either ureter. Bowel gas pattern is unremarkable.     Urine dip leuk, nitrite, blood, protein, ketone positive.   ASSESSMENT     1. Nephrolithiasis    2. Gross hematuria    3. Flank pain    4. Urinary retention      PLAN:   Shima was seen today for nephrolithiasis.    Diagnoses and all orders for this visit:    Nephrolithiasis  -     CULTURE, URINE  -     CT Urogram Abd Pelvis W WO; Future  -     Cystoscopy; Future  -     Cytology, Urine  -     Basic Metabolic Panel; Future    Gross hematuria  -     CULTURE, URINE  -     CT Urogram Abd  Pelvis W WO; Future  -     Cystoscopy; Future  -     Cytology, Urine  -     Basic Metabolic Panel; Future    Flank pain  -     CULTURE, URINE  -     CT Urogram Abd Pelvis W WO; Future  -     Cystoscopy; Future  -     Cytology, Urine    Urinary retention    Other orders  -     nitrofurantoin, macrocrystal-monohydrate, (MACROBID) 100 MG capsule; Take 1 capsule (100 mg total) by mouth 2 (two) times daily. for 7 days       Offered catheter if she isn't going to cath - she refused.   Will start on antibiotics, needs to get back on TID CIC.   Hematuria workup.

## 2024-06-17 ENCOUNTER — NURSE TRIAGE (OUTPATIENT)
Dept: ADMINISTRATIVE | Facility: CLINIC | Age: 84
End: 2024-06-17
Payer: MEDICARE

## 2024-06-17 LAB
BACTERIA UR CULT: ABNORMAL
BACTERIA UR CULT: ABNORMAL
FINAL PATHOLOGIC DIAGNOSIS: NORMAL
Lab: NORMAL

## 2024-06-17 NOTE — TELEPHONE ENCOUNTER
Prescribed macrobid on 6/13 by urology for UTI. Last night couldn't sleep bc of pain & burning with urination. Wanting to know if ok to take tylenol. +worse back/side pain since feeling bad.     Dispo-be seen now. Discussed home care, callback symptoms & pt vu. Declines apt, states just wanted confirmation on tylenol. Will try that first.     Reason for Disposition   Taking antibiotic > 24 hours for UTI and flank or lower back pain getting worse    Additional Information   Negative: Shock suspected (e.g., cold/pale/clammy skin, too weak to stand, low BP, rapid pulse)   Negative: Sounds like a life-threatening emergency to the triager   Negative: Unable to urinate (or only a few drops) > 4 hours and bladder feels very full (e.g., palpable bladder or strong urge to urinate)   Negative: Passing pure blood or large blood clots (i.e., size > a dime)  (Exceptions: Flecks, small strands, or pinkish-red color.)   Negative: Fever > 103 F (39.4 C)   Negative: Patient sounds very sick or weak to the triager   Negative: SEVERE pain (e.g., excruciating) and no improvement 2 hours after pain medications   Negative: Fever > 100.0 F (37.8 C) and new-onset since starting antibiotics   Negative: Side (flank) or lower back pain and new-onset since starting antibiotics    Protocols used: Urinary Tract Infection on Antibiotic Follow-up Call - Female-[facility]

## 2024-06-18 ENCOUNTER — TELEPHONE (OUTPATIENT)
Dept: UROLOGY | Facility: CLINIC | Age: 84
End: 2024-06-18
Payer: MEDICARE

## 2024-06-18 RX ORDER — CIPROFLOXACIN 250 MG/1
250 TABLET, FILM COATED ORAL EVERY 12 HOURS
Qty: 14 TABLET | Refills: 0 | Status: SHIPPED | OUTPATIENT
Start: 2024-06-18 | End: 2024-06-25

## 2024-06-18 NOTE — TELEPHONE ENCOUNTER
----- Message from Gilma Morgan sent at 6/18/2024  1:40 PM CDT -----  Regarding: pt advice  Contact: [phone]  Pt is calling to speak with someone in provider office in regards to symptoms she is having because of medication prescribed by Dr Cruz : nitrofurantoin, macrocrystal-monohydrate, (MACROBID) 100 MG capsule; such as rash on her inner upper legs, pt states it looks like sun burn , she is hoarse, have head ache, body aches upset stomach, pt stated her latest urine test came back stating she has two infections pt   is asking for a return call please call pt at   [phone]

## 2024-06-19 NOTE — TELEPHONE ENCOUNTER
Dr. Cruz had spoken with pt on 6/18/2024 via phone, at length, about symptoms and new allergic reaction to Nitrofuratoin (Macrobid). The antibiotic was discontinued and added to pt's allergy list. Ciprofloxacin 250 mg (7 day supply) was given since pt stated she was still symptomatic with UTI s/s.

## 2024-06-24 ENCOUNTER — OFFICE VISIT (OUTPATIENT)
Dept: CARDIOLOGY | Facility: CLINIC | Age: 84
End: 2024-06-24
Payer: MEDICARE

## 2024-06-24 VITALS
SYSTOLIC BLOOD PRESSURE: 159 MMHG | DIASTOLIC BLOOD PRESSURE: 51 MMHG | BODY MASS INDEX: 21.61 KG/M2 | WEIGHT: 125.88 LBS | HEART RATE: 55 BPM

## 2024-06-24 DIAGNOSIS — I50.30 ACC/AHA STAGE C HEART FAILURE WITH PRESERVED EJECTION FRACTION: Chronic | ICD-10-CM

## 2024-06-24 DIAGNOSIS — I10 PRIMARY HYPERTENSION: Primary | Chronic | ICD-10-CM

## 2024-06-24 DIAGNOSIS — I87.2 VENOUS INSUFFICIENCY: ICD-10-CM

## 2024-06-24 DIAGNOSIS — I70.8 AORTO-ILIAC ATHEROSCLEROSIS: ICD-10-CM

## 2024-06-24 DIAGNOSIS — I70.0 AORTO-ILIAC ATHEROSCLEROSIS: ICD-10-CM

## 2024-06-24 PROCEDURE — 99214 OFFICE O/P EST MOD 30 MIN: CPT | Mod: S$PBB,,, | Performed by: INTERNAL MEDICINE

## 2024-06-24 PROCEDURE — 99999 PR PBB SHADOW E&M-EST. PATIENT-LVL III: CPT | Mod: PBBFAC,,, | Performed by: INTERNAL MEDICINE

## 2024-06-24 PROCEDURE — 99213 OFFICE O/P EST LOW 20 MIN: CPT | Mod: PBBFAC | Performed by: INTERNAL MEDICINE

## 2024-06-24 NOTE — PROGRESS NOTES
HISTORY:    83-year-old female with a history of HFpEF, hypertension, CKD, venous insufficiency s/p LLE GSV EVLT '17, breast cancer, chronic diarrhea presenting for follow-up.    The patient was initially evaluated for B LE edema for years, intermittent. In '17 underwent LLE GSV EVLT with improvement in symptoms that she had at that time. Had a medial venous ulcer at that time. That healed without issue.     Was doing well, but had recurrent c diff in '23. After initial treatment she was debilitated and this resulted in a progression of her edema afterwards. Had a second admission in October '23 with disruption of activity again. Initially responded to furosemide and spironolactone, but had to hold those meds due to significant diarrhea and hypovolemia. With time LE edema has improved with compression stockings and increased activity.     Chronic diarrhea resolved. Now dealing with a UTI.     Activity levels remain reduced, but are better than 6 months ago. Ambulating much more so. Has vertebral fractures that are limiting her at this time. Not related to falls. No CP or SOB. No orthopnea.     Tolerates losartan 25x1. Bps usually 130-140s/60-70s.     PHYSICAL EXAM:    Vitals:    06/24/24 1345   BP: (!) 159/51   Pulse: (!) 55         NAD, A+Ox3.  No jvd, no bruit.  RRR nml s1,s2. No murmurs.  CTA B no wheezes or crackles.  B LE edema mild. B chronic skin changes.     LABS/STUDIES (imaging reviewed during clinic visit):    May 2024 hemoglobin 12.1/.  Iron 103/TIBC 284/ferritin 175.  June creatinine 1.4/BUN 35/GFR 38 (baseline).  Albumin 3.4.  2023 .  /HDL 68/LDL 88/TG 37.  TSH normal.  December 2023 hemoglobin 9.4 with an MCV of 97.  Creatinine 1.3 with a BUN of 39.  Albumin 3.3.  October LDL 88 and HDL 68.  Triglycerides 37.  July .  TSH normal.    EKG October 2023 sinus rhythm no Q-waves or ST changes.    TTE July 2023 normal LV size and function with EF 60%.  Grade 2 diastology.  CVP 8  with estimated PASP of 52.  SAVANAH 2022 10 minutes on a medium ramp protocol.  Normal TTE with stress.  Inferolateral ST depressions with stress.  Venous duplex July 2023 no evidence of DVT bilaterally. L GSV not present at level of the knee. Bilateral GSV reflux BTK present.    Arterial duplex October 2023 1.2 bilaterally.  Diffuse atherosclerosis without any evidence of significant stenosis by duplex.      ASSESSMENT & PLAN:    1. Primary hypertension    2. Venous insufficiency    3. Aorto-iliac atherosclerosis    4. ACC/AHA stage C heart failure with preserved ejection fraction                        HFpEF and venous insufficiency. Euvolemic at this time. Previously had to hold furosemide/spironolactone due to hypovolemia and diarrhea. BUN:cr ratio high even off these meds despite improvement of diarrhea. Can consider low dose diuretic trial if necessary in the future. Was on empagliflozin, but too expensive. Would stay off given recurrent UTIs at this time anyway.     Lower extremity swelling mainly related to venous insufficiency 2/2 decreased activity. Managing with compression stockings. Increase activity as tolerated.    Bps reasonable at this time on losartan 25x1.     CKD and following with nephro. Self-catheterizing 4x/day.    Follow-up as needed.       Mariaelena Wong MD

## 2024-06-26 ENCOUNTER — CLINICAL SUPPORT (OUTPATIENT)
Dept: REHABILITATION | Facility: HOSPITAL | Age: 84
End: 2024-06-26
Payer: MEDICARE

## 2024-06-26 ENCOUNTER — HOSPITAL ENCOUNTER (OUTPATIENT)
Dept: RADIOLOGY | Facility: HOSPITAL | Age: 84
Discharge: HOME | End: 2024-06-26
Attending: UROLOGY
Payer: MEDICARE

## 2024-06-26 DIAGNOSIS — N20.0 NEPHROLITHIASIS: ICD-10-CM

## 2024-06-26 DIAGNOSIS — R31.0 GROSS HEMATURIA: ICD-10-CM

## 2024-06-26 DIAGNOSIS — Z74.09 IMPAIRED FUNCTIONAL MOBILITY, BALANCE, GAIT, AND ENDURANCE: ICD-10-CM

## 2024-06-26 DIAGNOSIS — R26.89 POOR BALANCE: Primary | ICD-10-CM

## 2024-06-26 DIAGNOSIS — M54.9 BACK PAIN, UNSPECIFIED BACK LOCATION, UNSPECIFIED BACK PAIN LATERALITY, UNSPECIFIED CHRONICITY: ICD-10-CM

## 2024-06-26 DIAGNOSIS — R10.9 FLANK PAIN: ICD-10-CM

## 2024-06-26 PROCEDURE — 97112 NEUROMUSCULAR REEDUCATION: CPT

## 2024-06-26 PROCEDURE — 74178 CT ABD&PLV WO CNTR FLWD CNTR: CPT | Mod: TC

## 2024-06-26 PROCEDURE — 97162 PT EVAL MOD COMPLEX 30 MIN: CPT

## 2024-06-26 PROCEDURE — 25500020 PHARM REV CODE 255: Performed by: UROLOGY

## 2024-06-26 PROCEDURE — 74178 CT ABD&PLV WO CNTR FLWD CNTR: CPT | Mod: 26,,, | Performed by: RADIOLOGY

## 2024-06-26 RX ADMIN — IOHEXOL 100 ML: 350 INJECTION, SOLUTION INTRAVENOUS at 07:06

## 2024-06-26 NOTE — PLAN OF CARE
[facility]   Physical Therapy Initial Evaluation      Name: Shima Echols [facility]  Clinic Number: [id number]    Therapy Diagnosis:   Encounter Diagnoses   Name Primary?    Back pain, unspecified back location, unspecified back pain laterality, unspecified chronicity     Poor balance Yes    Impaired functional mobility, balance, gait, and endurance         Physician: Carmen Milton MD    Physician Orders: PT Eval and Treat   Medical Diagnosis from Referral: M54.9 (ICD-10-CM) - Dorsalgia, unspecified   Evaluation Date: 6/26/2024  Authorization Period Expiration: 6/3/2024  Plan of Care Expiration: 9/6/2024  Progress Note Due: 10th visit  Visit # / Visits authorized: 1/ 1   FOTO: 1/ 3    Precautions: Standard and Fall cancer, 3B kidney disease    Time In: 2:30 pm  Time Out: 3:30 pm  Total Billable Time: 60 minutes    Subjective     Date of onset: chronic    History of current condition - Shima reports: she was previously in physical therapy in September of 2023 due to a thoracic or lumbar compression fracture.  Patient reports she then fell in October as she slid down a wall and hit her buttock; notes her fall was from being dehydrated. Reports she then went back too hospital at end of October 2023 due to too much fluid. 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 Patient reports she has been dealing with kidney stones and is unsure if her back pain with coming from kidney issues or her posture; reports she is still undergoing kidney assessment and has an appointment this afternoon. Finished chemo in October 2023; notes she is anemic. Patient reports she is unable to walk in a straight line and has not been able to since 2017. Patient reports she was diagnosed with shingles in 2017 and has lost the nerve that controls her balance.     Falls: in October where she fell to the ground on her buttock    Imaging: XRAY: FINDINGS: 10/26/2024  There is no evidence of an acute fracture or dislocation.  There  is levoconvex scoliosis centered at approximately L3-L4.  There is grade 1 anterolisthesis of L5 on S1. The vertebral body heights are maintained.  There is moderate disc height loss at L1-L2, L2-L3, and L3-L4 and mild disc height loss at L5-S1.  There is a Schmorl's node within the L3 superior endplate, unchanged from prior.  There are multilevel degenerative changes, not significantly unchanged from prior.  Remaining visualized osseous structures are intact.  Impression: No acute fracture or dislocation of the lumbar spine.    Prior Therapy: yes  Social History:  lives alone, 11 steps to enter her home, 14 steps to get to her attic  Occupation: retired  Prior Level of Function:  Used to carry 2 gallon jugs of water in each hand; no longer able to tolerate this  Current Level of Function: feels like she can't straighten up the way she wants to, difficulty lifting due to pain, difficulty walking longer distances   Feels good laying supine    Pain:  Current 2/10, worst 5/10, best 2/10   Location: bilateral back    Description: Dull  Aggravating Factors: Sitting, Standing, and Getting out of bed/chair  Easing Factors:  laying flat    Patients goals: improve balance, decrease low back pain     Medical History:   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    Medications:   Shima has a current medication list which includes the following prescription(s): acetaminophen, cholecalciferol (vitamin d3), coq10 (ubiquinol), denosumab, ferrous gluconate, fish oil-e-fatty [id number], losartan, ondansetron, preservision areds-2, lidocaine, UNABLE TO FIND, UNABLE TO FIND, vitamin c, and nephrocaps.    Allergies:   Review of patient's allergies indicates:   Allergen Reactions    Asparagus Rash    Macrobid [nitrofurantoin monohyd/m-cryst] Rash     Peeling skin and petechia        Objective      Observation: Pt ambulates with non antalgic gait; patient tends to drift to the left; observed left LE crossing midline     Posture: increased forward head and rounded shoulders, thoracic kyphosis. Left ASIS forward compared to R    Lumbar Range of Motion:     Limitation Pain   Flexion 25%  denies pain      Extension 100% discomfort      Left Side Bending 80%  no pain   Right Side Bending 80%  "discomfort"      Balance Assessment:       Evaluation   Single Limb Stance R LE  < 5 seconds w/ HHA  (<10 sec = HIGH FALL RISK)   Single Limb Stance L LE < 5 seconds w/ HHA  (<10 sec = HIGH FALL RISK)   Feet together EO or tandem < 3 with UE support   Feet together EC or tandem < 3 with UE support        Lower Extremity Strength  Right LE   Left LE     Hip Ext 3/5 Hip Ext 3/5   Hip ABD 3+/5 Hip ABD  3+/5   Hip FLEX 4/5 Hip FLEX 4/5   Knee FLEX 4/5 Knee FLEX 4/5   Knee EXT 4+/5 Knee EXT 4+/5   Plantar flexion (seated) 4-/5 Plantar flexion (seated) 4-/5   Dorsiflexion 4/5 Dorsiflexion 4/5      Sensation: intact lower extremity dermatomes     Reflexes:  L3/4: R unable to elicit ;L unable to elicit  S1/2:  R 2+;L 2+     Red flag " screen:               B/B changes: denies changes              Clonus: 0 beats              Babinski: negative    Intake Outcome Measure for FOTO lumbar Survey    Therapist reviewed FOTO scores for Shima Sorto on 6/26/2024.   FOTO report - see Media section or FOTO account episode details.    Intake Score: see media section         Treatment     Total Treatment time (time-based codes) separate from Evaluation: 30 minutes     Shima received the treatments listed below:      neuromuscular re-education activities to improve: Proprioception, Posture, and motor control and patient education for 30 minutes. The following activities were included:  - HEP  - plan of care  -prognosis  - mechanical back pain vs kidney pain  -importance of range of motion for proper gait cycle and functional mobility  - transfers: seated to standing, lateral transfer, safety    Scapula retractions: 10 seconds, 20x  Sit to stand: 10 x nose over toes glute activation      Patient Education and Home Exercises     Education provided: see media section    Written Home Exercises Provided: yes. Exercises were reviewed and Shima was able to demonstrate them prior to the end of the session.  Shima demonstrated good  understanding of the education provided. See EMR under Patient Instructions for exercises provided during therapy sessions.    Assessment     Shima is a 83 y.o. female referred to outpatient Physical Therapy with a medical diagnosis of Dorsalgia, unspecified. Patient presents with s/s consistent with lumbar stenosis with pain distribution present in lumbar spine. Patient presents with impaired gait, static and dynamic balance; balance deficits likely contributing from possible CN8 damage from shingles per patient report.  Pt demonstrates reduced lumbar ROM secondary to pain and poor lumbar functional mobility. Patient to follow up with Urology/nephrology kidney disease and recent ongoing kidney stones. Pt would benefit from static  and dynamic balance training; discussed possible referral request for vestibular training. Patient would also benefit from core and posterolateral hip endurance and postural motor control activities. Patient would benefit from skilled outpatient physical therapy to address motor control, strength and range of motion deficits so that she may return to full independence with all household and personal ADL's, and improve overall functional mobility, reduce risk for falls and meet all social and recreational needs.    Patient prognosis is Fair.   Patient will benefit from skilled outpatient Physical Therapy to address the deficits stated above and in the chart below, provide patient /family education, and to maximize patientt's level of independence.     Plan of care discussed with patient: Yes  Patient's spiritual, cultural and educational needs considered and patient is agreeable to the plan of care and goals as stated below:     Anticipated Barriers for therapy: chronicity, age, kidney disease    Medical Necessity is demonstrated by the following  History  Co-morbidities and personal factors that may impact the plan of care [] LOW: no personal factors / co-morbidities  [] MODERATE: 1-2 personal factors / co-morbidities  [x] HIGH: 3+ personal factors / co-morbidities    Moderate / High Support Documentation:   Co-morbidities affecting plan of care: see medical chart    Personal Factors:   age     Examination  Body Structures and Functions, activity limitations and participation restrictions that may impact the plan of care [] LOW: addressing 1-2 elements  [] MODERATE: 3+ elements  [x] HIGH: 4+ elements (please support below)    Moderate / High Support Documentation: Limitations with prolonged walking, difficulty with ADLs, decreased range of motion, decreased strength, difficulty with gait, neuromuscular re-education, and pain.       Clinical Presentation [] LOW: stable  [x] MODERATE: Evolving  [] HIGH: Unstable      Decision Making/ Complexity Score: moderate       Goals:  Short Term Goals (6 Weeks):   1. Patient will be independent with home exercise program to supplement physical therapy treatment in improving functional status.  2. Pt will improve impaired lower extremity manual muscle tests to >/= 4-/5 to improve dynamic lower extremity support for closed chain tasks.  3. Patient will perform 3 consecutive hip hinges with less than 3 VC to demonstrate improved lumbopelvic movement for safety during household ADL's and improved lifting techniques.   4. Patient will report decreased pain with activity 3/10 to demonstrate improved tolerance for activity and household ADL's.  5. Patient will demonstrates R and SLS with 1x UE support for 10 seconds to decrease fall risk.      Long Term Goals (10 Weeks):   1. Pt will improve impaired lower extremity manual muscle tests to >/= 4/5 to improve dynamic lower extremity support for closed chain tasks.  2. Patient will improve the total FOTO Lumbar Survey Score to </= 40% limited to demonstrate increased perceived functional mobility.  3. Patient will perform at least 10 sit to stands in 30 seconds without UE support to demonstrate increased functional strength.   4. Patient will perform 3 consecutive hip hinges with less than 0VC to demonstrate improved lumbopelvic movement for safety during household ADL's and improved lifting techniques.   5. Patient will initiate walking program to demonstrate improved activity tolerance.  6.  Patient will demonstrates R and SLS with 1x UE support for 15 seconds to decrease fall risk.     Plan     Plan of care Certification: 6/26/2024 to 9/6/2024.    Outpatient Physical Therapy 2 times weekly for 10 weeks to include the following interventions: Aquatic Therapy, Cervical/Lumbar Traction, Electrical Stimulation TENS, Gait Training, Manual Therapy, Moist Heat/ Ice, Neuromuscular Re-ed, Orthotic Management and Training, Patient Education, Self Care,  Therapeutic Activities, Therapeutic Exercise, and functional dry needling .     Christina Mcclure PT        Physician's Signature: _________________________________________ Date: ________________

## 2024-07-02 ENCOUNTER — LAB VISIT (OUTPATIENT)
Dept: LAB | Facility: HOSPITAL | Age: 84
End: 2024-07-02
Attending: INTERNAL MEDICINE
Payer: MEDICARE

## 2024-07-02 DIAGNOSIS — Z17.0 MALIGNANT NEOPLASM OF UPPER-OUTER QUADRANT OF RIGHT BREAST IN FEMALE, ESTROGEN RECEPTOR POSITIVE: Chronic | ICD-10-CM

## 2024-07-02 DIAGNOSIS — D63.1 ANEMIA IN STAGE 3 CHRONIC KIDNEY DISEASE, UNSPECIFIED WHETHER STAGE 3A OR 3B CKD: ICD-10-CM

## 2024-07-02 DIAGNOSIS — C50.411 MALIGNANT NEOPLASM OF UPPER-OUTER QUADRANT OF RIGHT BREAST IN FEMALE, ESTROGEN RECEPTOR POSITIVE: Chronic | ICD-10-CM

## 2024-07-02 DIAGNOSIS — Z12.31 ENCOUNTER FOR SCREENING MAMMOGRAM FOR MALIGNANT NEOPLASM OF BREAST: ICD-10-CM

## 2024-07-02 DIAGNOSIS — N18.30 ANEMIA IN STAGE 3 CHRONIC KIDNEY DISEASE, UNSPECIFIED WHETHER STAGE 3A OR 3B CKD: ICD-10-CM

## 2024-07-02 PROBLEM — Z74.09 IMPAIRED FUNCTIONAL MOBILITY, BALANCE, GAIT, AND ENDURANCE: Status: ACTIVE | Noted: 2024-07-02

## 2024-07-02 PROBLEM — R26.89 POOR BALANCE: Status: ACTIVE | Noted: 2024-07-02

## 2024-07-02 LAB
ALBUMIN SERPL BCP-MCNC: 3.4 G/DL (ref 3.5–5.2)
ALP SERPL-CCNC: 62 U/L (ref 55–135)
ALT SERPL W/O P-5'-P-CCNC: 16 U/L (ref 10–44)
ANION GAP SERPL CALC-SCNC: 8 MMOL/L (ref 8–16)
AST SERPL-CCNC: 26 U/L (ref 10–40)
BASOPHILS # BLD AUTO: 0.04 K/UL (ref 0–0.2)
BASOPHILS NFR BLD: 1.3 % (ref 0–1.9)
BILIRUB SERPL-MCNC: 0.6 MG/DL (ref 0.1–1)
BUN SERPL-MCNC: 21 MG/DL (ref 8–23)
CALCIUM SERPL-MCNC: 9.1 MG/DL (ref 8.7–10.5)
CHLORIDE SERPL-SCNC: 100 MMOL/L (ref 95–110)
CO2 SERPL-SCNC: 28 MMOL/L (ref 23–29)
CREAT SERPL-MCNC: 1.3 MG/DL (ref 0.5–1.4)
DIFFERENTIAL METHOD BLD: ABNORMAL
EOSINOPHIL # BLD AUTO: 0 K/UL (ref 0–0.5)
EOSINOPHIL NFR BLD: 0.6 % (ref 0–8)
ERYTHROCYTE [DISTWIDTH] IN BLOOD BY AUTOMATED COUNT: 13.6 % (ref 11.5–14.5)
EST. GFR  (NO RACE VARIABLE): 40.8 ML/MIN/1.73 M^2
GLUCOSE SERPL-MCNC: 119 MG/DL (ref 70–110)
HCT VFR BLD AUTO: 35.6 % (ref 37–48.5)
HGB BLD-MCNC: 11.3 G/DL (ref 12–16)
IMM GRANULOCYTES # BLD AUTO: 0 K/UL (ref 0–0.04)
IMM GRANULOCYTES NFR BLD AUTO: 0 % (ref 0–0.5)
LYMPHOCYTES # BLD AUTO: 1.5 K/UL (ref 1–4.8)
LYMPHOCYTES NFR BLD: 45.6 % (ref 18–48)
MCH RBC QN AUTO: 31.6 PG (ref 27–31)
MCHC RBC AUTO-ENTMCNC: 31.7 G/DL (ref 32–36)
MCV RBC AUTO: 99 FL (ref 82–98)
MONOCYTES # BLD AUTO: 0.4 K/UL (ref 0.3–1)
MONOCYTES NFR BLD: 12.8 % (ref 4–15)
NEUTROPHILS # BLD AUTO: 1.3 K/UL (ref 1.8–7.7)
NEUTROPHILS NFR BLD: 39.7 % (ref 38–73)
NRBC BLD-RTO: 0 /100 WBC
PLATELET # BLD AUTO: 174 K/UL (ref 150–450)
PMV BLD AUTO: 11.2 FL (ref 9.2–12.9)
POTASSIUM SERPL-SCNC: 4.1 MMOL/L (ref 3.5–5.1)
PROT SERPL-MCNC: 6.9 G/DL (ref 6–8.4)
RBC # BLD AUTO: 3.58 M/UL (ref 4–5.4)
SODIUM SERPL-SCNC: 136 MMOL/L (ref 136–145)
WBC # BLD AUTO: 3.2 K/UL (ref 3.9–12.7)

## 2024-07-02 PROCEDURE — 80053 COMPREHEN METABOLIC PANEL: CPT | Performed by: INTERNAL MEDICINE

## 2024-07-02 PROCEDURE — 85025 COMPLETE CBC W/AUTO DIFF WBC: CPT | Performed by: INTERNAL MEDICINE

## 2024-07-02 PROCEDURE — 36415 COLL VENOUS BLD VENIPUNCTURE: CPT | Performed by: INTERNAL MEDICINE

## 2024-07-03 ENCOUNTER — OFFICE VISIT (OUTPATIENT)
Dept: HEMATOLOGY/ONCOLOGY | Facility: CLINIC | Age: 84
End: 2024-07-03
Payer: MEDICARE

## 2024-07-03 VITALS
OXYGEN SATURATION: 97 % | HEIGHT: 64 IN | HEART RATE: 70 BPM | TEMPERATURE: 97 F | DIASTOLIC BLOOD PRESSURE: 55 MMHG | SYSTOLIC BLOOD PRESSURE: 105 MMHG | WEIGHT: 118.81 LBS | BODY MASS INDEX: 20.28 KG/M2

## 2024-07-03 DIAGNOSIS — Z17.0 MALIGNANT NEOPLASM OF UPPER-OUTER QUADRANT OF RIGHT BREAST IN FEMALE, ESTROGEN RECEPTOR POSITIVE: Primary | Chronic | ICD-10-CM

## 2024-07-03 DIAGNOSIS — Z71.89 COMPLEX CARE COORDINATION: ICD-10-CM

## 2024-07-03 DIAGNOSIS — C50.411 MALIGNANT NEOPLASM OF UPPER-OUTER QUADRANT OF RIGHT BREAST IN FEMALE, ESTROGEN RECEPTOR POSITIVE: Primary | Chronic | ICD-10-CM

## 2024-07-03 DIAGNOSIS — D63.1 ANEMIA IN STAGE 3B CHRONIC KIDNEY DISEASE: ICD-10-CM

## 2024-07-03 DIAGNOSIS — N18.32 ANEMIA IN STAGE 3B CHRONIC KIDNEY DISEASE: ICD-10-CM

## 2024-07-03 PROCEDURE — 99213 OFFICE O/P EST LOW 20 MIN: CPT | Mod: PBBFAC | Performed by: INTERNAL MEDICINE

## 2024-07-03 PROCEDURE — 99999 PR PBB SHADOW E&M-EST. PATIENT-LVL III: CPT | Mod: PBBFAC,,, | Performed by: INTERNAL MEDICINE

## 2024-07-03 NOTE — PROGRESS NOTES
Subjective:       Patient ID: Shima Sorto is a 83 y.o. female.    Chief Complaint: No chief complaint on file.    HPI    Ms. Sorto returns today for follow up.  I had last seen her on 5/29/2024 and had held her weekly EPO due to her Hg being 12.1 gr/dl.    Yesterday's labs are as follows:  Her CBC shows a white count of 3,200 per cubic mm, hemoglobin 11.3 g per dL, hematocrit 35.6 %, MCV 99 and platelets 174 K.    In regards to her breast cancer, had been started on anastrazole in mid November 2018, and completed 5 years at the end of October 2023.  In late August 2023 she underwent a bone marrow biopsy which was essentially unremarkable.  There was no evidence of MDS, leukemia, lymphoma, myeloma, or metastatic carcinoma.  Cytogenetics were normal and reticulin was not increased.  Cellularity was 35 %.    Other recent pertinent labs are as follows:  3 stool samples were negative for occult blood  Multiple urinalyses have shown persistent hematuria.  Of note, the patient self catheterizes 3 times a day.  Other recent pertinent labs are as follows:  B12 is 406 pg/mL  SPEP shows no monoclonal spike  [address] Lane is negative    Briefly, she is an 83-year-old  female who diagnosed with breast cancer in 2018.  On 08/17/2018, she underwent a lumpectomy and sentinel lymph node biopsy for an 8 mm grade 1 invasive lobular carcinoma which was ER strongly positive, KS negative and HER-2 negative with a Ki-67 of 5%, with the closest margin being 2 mm.  Two of 2 sentinel lymph nodes were negative for involvement.      She completed her radiation therapy and was subsequently started on AIs.  She completed 5 years of adjuvant hormonal therapy and she is being followed expectantly.    A mammogram on 7/6/2023 was read as BIRADS II, and a one year follow up was recommended.  One has been scheduled for next week    In April 2023 she had been diagnosed with C diff and she was treated accordingly.  She underwent a  colonoscopy and EGD by Dr. Garza.  The colonoscopy showed 3 polyps and extensive diverticulosis.  Biopsies were negative for malignancy.  The EGD showed gastritis and a hiatal hernia.  A video capsule swallow did not identify a source of bleeding in her small intestine.       Review of Systems      Overall she feels OK, and her ECOG PS is 1.  She states that her stamina is acceptable, but she does complain of longstanding low back pain.  She denies any anxiety, depression, easy bruising, fevers, chills, night  sweats, weight loss, nausea, vomiting, diarrhea, constipation, diplopia, blurred vision, headache, chest pain, palpitations, shortness of breath, breast pain, abdominal pain, extremity pain, or difficulty ambulating.  The remainder of the ten-point ROS, including general, skin, lymph, H/N, cardiorespiratory, GI, , Neuro, Endocrine, and psychiatric is negative.     Objective:      Physical Exam        She is alert, oriented to time, place, person, pleasant, well      nourished, in no acute physical distress.                                    VITAL SIGNS:  Reviewed                                      HEENT:  Normal.  There are no nasal, oral, lip, gingival, auricular, lid,    or conjunctival lesions.  Mucosae are moist and pink, and there is no        thrush.  Pupils are equal, reactive to light and accommodation.              Extraocular muscle movements are intact.  Dentition is good.                                    NECK:  Supple without JVD, adenopathy, or thyromegaly.                       LUNGS:  Clear to auscultation without wheezing, rales, or rhonchi.           CARDIOVASCULAR:  Reveals an S1, S2, no murmurs, no rubs, no gallops.         ABDOMEN:  Soft, nontender, without organomegaly.  Bowel sounds are    present.                                                                     EXTREMITIES:  No cyanosis, clubbing, or edema.                               BREASTS:   She is status post right  lumpectomy with a well-healed incision in the upper outer quadrant.    There are no masses in either breast.                                LYMPHATIC:  There is no cervical, axillary, or supraclavicular adenopathy.   SKIN:  Warm and moist, without petechiae, rashes, induration, or ecchymoses.        NEUROLOGIC:  DTRs are 0-1+ bilaterally, symmetrical, motor function is 5/5,and cranial nerves are  within normal limits.    Assessment:       1. Malignant neoplasm of upper-outer quadrant of right breast in female, estrogen receptor positive    2. S/p 5 years of adjuvant hormonal therapy with aromatase inhibitors      3.    Osteopenia approaching osteoporosis    4.   Anemia, chronic, normochromic normocytic, most likely multifactorial.  Recent bone marrow biopsy did not reveal any MDS     5.  CDK 3b  Plan:           I had a long discussion with her.    In regards to the breast cancer, she completed her 5 years of anastrazole at the end of October 2023.  Her mammogram will be repeated after June 28th.  In regards to her anemia, given her current H&H we will hold the erythropoietin.  I explained to her that she will most likely require lifelong intermittent treatment.  She voiced understanding.  I will see her again in early August with a repeat CBC.  Finally, she will have a repeat mammogram in July 2024.    Her multiple questions were answered to her satisfaction.  I spent approximately 30 minutes reviewing the available records and evaluating the patient, and coordinating her care.  Visit today included increased complexity associated with the anemia, CKD, and breast cancer         Route Chart for Scheduling    Med Onc Chart Routing      Follow up with physician Other. Early August with labs one day prior.  Please have her on the schedule for weekly procrit the day comes back to see me   Follow up with BASHIR    Infusion scheduling note    Injection scheduling note    Labs    Imaging    Pharmacy appointment    Other  referrals            Supportive Plan Information  OP EPOETIN ENEDINA WEEKLY   Robert Hernandez MD   Upcoming Treatment Dates - OP EPOETIN ENEDINA WEEKLY    7/26/2024       Medications       epoetin enedina-epbx injection 40,000 Units    Therapy Plan Information  Medications  denosumab (PROLIA) injection 60 mg  60 mg, Subcutaneous, Every 26 weeks

## 2024-07-05 RX ORDER — CIPROFLOXACIN 500 MG/1
TABLET ORAL
Qty: 6 TABLET | Refills: 0 | Status: SHIPPED | OUTPATIENT
Start: 2024-07-05

## 2024-07-08 ENCOUNTER — CLINICAL SUPPORT (OUTPATIENT)
Dept: REHABILITATION | Facility: HOSPITAL | Age: 84
End: 2024-07-08
Payer: MEDICARE

## 2024-07-08 ENCOUNTER — TELEPHONE (OUTPATIENT)
Dept: UROLOGY | Facility: CLINIC | Age: 84
End: 2024-07-08
Payer: MEDICARE

## 2024-07-08 DIAGNOSIS — Z74.09 IMPAIRED FUNCTIONAL MOBILITY, BALANCE, GAIT, AND ENDURANCE: ICD-10-CM

## 2024-07-08 DIAGNOSIS — R26.89 POOR BALANCE: Primary | ICD-10-CM

## 2024-07-08 PROCEDURE — 97112 NEUROMUSCULAR REEDUCATION: CPT

## 2024-07-08 PROCEDURE — 97530 THERAPEUTIC ACTIVITIES: CPT

## 2024-07-08 NOTE — TELEPHONE ENCOUNTER
Spoke to the patient. She stated she received several messages about how to take the Cipro. She appreciates all the calls.    Patient verbally understood.    Chelsey       ----- Message from Tom Chaudhry MA sent at 7/5/2024  9:34 AM CDT -----    ----- Message -----  From: Roxy Cruz MD  Sent: 7/5/2024   3:32 AM CDT  To: Anthony DA [facility]ILVA Staff    Sent cipro to take the 3 days before cysto  
present

## 2024-07-08 NOTE — PROGRESS NOTES
[facility]   Physical Therapy Treatment Note      Name: Shima Echols [facility]  Clinic Number: [id number]    Therapy Diagnosis:   Encounter Diagnoses   Name Primary?    Poor balance Yes    Impaired functional mobility, balance, gait, and endurance      Physician: Carmen Milton MD    Visit Date: 7/8/2024    Physician Orders: PT Eval and Treat   Medical Diagnosis from Referral: M54.9 (ICD-10-CM) - Dorsalgia, unspecified   Evaluation Date: 6/26/2024  Authorization Period Expiration: 6/3/2024  Plan of Care Expiration: 9/6/2024  Progress Note Due: 10th visit  Visit # / Visits authorized: 1/ 1   FOTO: 1/ 3     Precautions: Standard and Fall cancer, 3B kidney disease     Time In: 1:37 pm  Time Out: 2:30 pm  Total Billable Time: 53 minutes (1 TA, 3 NMR)    PTA Visit #: 0/5       Subjective     Patient reports: that she fell 3 times over the weekend. Patient reports she fell 1x because she was dizzy/distracted from not eating anything and was turning side to side coming down a set of stairs , 1x from tripping in the dark while getting out of care onto a driveway that was half grass and and half concrete, and the a 1x trying to lift a marble slab from the ground. Patient reports when she fell in the driveway she fell to the side and believes she pushed her broken vertebrae back in place because she feels better and can stand up more straight. Denies hitting her head during any of the falls, and two of the falls she fell on her hands and knees. Reports she is experiencing her typical mild low back pain. Patient reports she has an ENT appointment tomorrow where she will talk about the ringing in her ears  She was compliant with home exercise program.  Response to previous treatment: no adverse effects  Functional change: nothing new to note    Pain: 1/10  Location: bilateral lumbar       Objective      Objective Measures updated at progress report unless specified.     Treatment     Shima received  the treatments listed below:      neuromuscular re-education activities to improve: Balance, Coordination, Posture, and motor control and patient education for 38 minutes. The following activities were included:  Patient education:   - HEP  - plan of care  -prognosis  - mechanical back pain vs kidney pain  -importance of range of motion for proper gait cycle and functional mobility  - transfers: seated to standing, lateral transfer, safety  - realistic postural goals    Long arc quad: 5 seconds, 3x10 2 lbs  Scap retractions: 10 seconds, 2x10 yellow theraband   Ball roll up the walls: 10x    therapeutic activities to improve functional performance for 15  minutes, including:  Aerobic activity: Nu Step for CV and UE/LE endurance 10 minutes; goal of 800-1000 steps  Lateral walks: 5 laps of 16 feet, no resistance      Patient Education and Home Exercises       Education provided: see above    Written Home Exercises Provided: yes. Exercises were reviewed and Shima was able to demonstrate them prior to the end of the session.  Shima demonstrated good  understanding of the education provided. See Electronic Medical Record under Patient Instructions for exercises provided during therapy sessions    Assessment     Good tolerance for activities performed. Extended rest breaks required. Challenged with scap retractions requiring several tactile cues. Heavy education regarding fall risk concerns; encouraged patient to speak with ENT regarding vestibular PT referral.    Shima Is progressing well towards her goals.   Patient prognosis is Fair.     Patient will continue to benefit from skilled outpatient physical therapy to address the deficits listed in the problem list box on initial evaluation, provide pt/family education and to maximize pt's level of independence in the home and community environment.     Patient's spiritual, cultural and educational needs considered and pt agreeable to plan of care and goals.     Anticipated  barriers to physical therapy:  chronicity, age, kidney disease     Goals:   Short Term Goals (6 Weeks):   1. Patient will be independent with home exercise program to supplement physical therapy treatment in improving functional status.  2. Pt will improve impaired lower extremity manual muscle tests to >/= 4-/5 to improve dynamic lower extremity support for closed chain tasks.  3. Patient will perform 3 consecutive hip hinges with less than 3 VC to demonstrate improved lumbopelvic movement for safety during household ADL's and improved lifting techniques.   4. Patient will report decreased pain with activity 3/10 to demonstrate improved tolerance for activity and household ADL's.  5. Patient will demonstrates R and SLS with 1x UE support for 10 seconds to decrease fall risk.      Long Term Goals (10 Weeks):   1. Pt will improve impaired lower extremity manual muscle tests to >/= 4/5 to improve dynamic lower extremity support for closed chain tasks.  2. Patient will improve the total FOTO Lumbar Survey Score to </= 40% limited to demonstrate increased perceived functional mobility.  3. Patient will perform at least 10 sit to stands in 30 seconds without UE support to demonstrate increased functional strength.   4. Patient will perform 3 consecutive hip hinges with less than 0VC to demonstrate improved lumbopelvic movement for safety during household ADL's and improved lifting techniques.   5. Patient will initiate walking program to demonstrate improved activity tolerance.  6.  Patient will demonstrates R and SLS with 1x UE support for 15 seconds to decrease fall risk.     Plan     Plan of care Certification: 6/26/2024 to 9/6/2024.     Outpatient Physical Therapy 2 times weekly for 10 weeks to include the following interventions: Aquatic Therapy, Cervical/Lumbar Traction, Electrical Stimulation TENS, Gait Training, Manual Therapy, Moist Heat/ Ice, Neuromuscular Re-ed, Orthotic Management and Training, Patient  Education, Self Care, Therapeutic Activities, Therapeutic Exercise, and functional dry needling .     Christina Mcclure, PT DPT, OCS

## 2024-07-09 ENCOUNTER — CLINICAL SUPPORT (OUTPATIENT)
Dept: AUDIOLOGY | Facility: CLINIC | Age: 84
End: 2024-07-09
Payer: MEDICARE

## 2024-07-09 ENCOUNTER — OFFICE VISIT (OUTPATIENT)
Dept: OTOLARYNGOLOGY | Facility: CLINIC | Age: 84
End: 2024-07-09
Payer: MEDICARE

## 2024-07-09 ENCOUNTER — HOSPITAL ENCOUNTER (OUTPATIENT)
Dept: RADIOLOGY | Facility: HOSPITAL | Age: 84
Discharge: HOME | End: 2024-07-09
Attending: INTERNAL MEDICINE
Payer: MEDICARE

## 2024-07-09 DIAGNOSIS — H93.13 TINNITUS, BILATERAL: ICD-10-CM

## 2024-07-09 DIAGNOSIS — H90.3 SENSORINEURAL HEARING LOSS (SNHL) OF BOTH EARS: ICD-10-CM

## 2024-07-09 DIAGNOSIS — D63.1 ANEMIA IN STAGE 3 CHRONIC KIDNEY DISEASE, UNSPECIFIED WHETHER STAGE 3A OR 3B CKD: ICD-10-CM

## 2024-07-09 DIAGNOSIS — H61.21 IMPACTED CERUMEN OF RIGHT EAR: ICD-10-CM

## 2024-07-09 DIAGNOSIS — Z12.31 ENCOUNTER FOR SCREENING MAMMOGRAM FOR MALIGNANT NEOPLASM OF BREAST: ICD-10-CM

## 2024-07-09 DIAGNOSIS — H93.13 TINNITUS, BILATERAL: Primary | ICD-10-CM

## 2024-07-09 DIAGNOSIS — C50.411 MALIGNANT NEOPLASM OF UPPER-OUTER QUADRANT OF RIGHT BREAST IN FEMALE, ESTROGEN RECEPTOR POSITIVE: Chronic | ICD-10-CM

## 2024-07-09 DIAGNOSIS — Z17.0 MALIGNANT NEOPLASM OF UPPER-OUTER QUADRANT OF RIGHT BREAST IN FEMALE, ESTROGEN RECEPTOR POSITIVE: Chronic | ICD-10-CM

## 2024-07-09 DIAGNOSIS — H90.A22 SENSORINEURAL HEARING LOSS (SNHL) OF LEFT EAR WITH RESTRICTED HEARING OF RIGHT EAR: Primary | ICD-10-CM

## 2024-07-09 DIAGNOSIS — N18.30 ANEMIA IN STAGE 3 CHRONIC KIDNEY DISEASE, UNSPECIFIED WHETHER STAGE 3A OR 3B CKD: ICD-10-CM

## 2024-07-09 PROCEDURE — 77067 SCR MAMMO BI INCL CAD: CPT | Mod: 26,,, | Performed by: RADIOLOGY

## 2024-07-09 PROCEDURE — 99999 PR PBB SHADOW E&M-EST. PATIENT-LVL III: CPT | Mod: PBBFAC,,, | Performed by: NURSE PRACTITIONER

## 2024-07-09 PROCEDURE — 77067 SCR MAMMO BI INCL CAD: CPT | Mod: TC

## 2024-07-09 PROCEDURE — 99213 OFFICE O/P EST LOW 20 MIN: CPT | Mod: PBBFAC | Performed by: NURSE PRACTITIONER

## 2024-07-09 PROCEDURE — 92567 TYMPANOMETRY: CPT | Mod: PBBFAC | Performed by: AUDIOLOGIST

## 2024-07-09 PROCEDURE — 77063 BREAST TOMOSYNTHESIS BI: CPT | Mod: 26,,, | Performed by: RADIOLOGY

## 2024-07-09 PROCEDURE — 92557 COMPREHENSIVE HEARING TEST: CPT | Mod: PBBFAC | Performed by: AUDIOLOGIST

## 2024-07-09 RX ORDER — POLYETHYLENE GLYCOL 400 AND PROPYLENE GLYCOL 4; 3 MG/ML; MG/ML
SOLUTION/ DROPS OPHTHALMIC
COMMUNITY
Start: 2014-01-05

## 2024-07-09 RX ORDER — LORATADINE 10 MG/1
TABLET ORAL
COMMUNITY
Start: 2014-01-05

## 2024-07-09 NOTE — PROGRESS NOTES
Subjective:   Shima Sorto is a 83 y.o. 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 She reports that overall she feels that she hears very well, but presents for a hearing check. She notes years of bilateral, non-bothersome tinnitus. She is able to ignore this well and denies any hearing loss. She denies any otalgia, otorrhea, ear fullness/pressure, or vertigo. She reports a remote episode of vertigo in 2017, and underwent VNG and vestibular PT at that time,denies any episodes of dizziness or vertigo since then.       Past Medical History  She has a past medical history of Allergy, Anemia, Basal cell carcinoma, Breast cancer, colonic polyps, Hypertension, Medullary sponge kidney, MVP (mitral valve prolapse), Osteoporosis, senile, Pneumonia, Renal calculi, Sciatica, Sleep apnea, TMJ syndrome, Urinary retention, Vertigo, Vestibular neuronitis, and Visual impairment.    Past Surgical History  She has a past surgical history that includes Kidney stone surgery (2000); MOHS procedure; interstim placed stage 1; Breast cyst aspiration (Right, 1999); Colonoscopy (N/A, 7/24/2018); Mastectomy, partial (Right, 8/17/2018); Injection for sentinel node identification (Right, 8/17/2018); Outlook lymph node biopsy (Right, 8/17/2018); Breast lumpectomy (Right, 2018); Esophagogastroduodenoscopy (N/A, 3/17/2023); and Colonoscopy (N/A, 5/10/2023).    Family History  Her family history includes Breast cancer in her maternal aunt; Hearing loss in her son; Heart attack in her father; Heart disease in her father; Hyperlipidemia in her son; Kidney disease in her  mother; Melanoma in her father.    Social History  She reports that she quit smoking about 59 years ago. Her smoking use included cigarettes. She started smoking about 67 years ago. She has a 4 pack-year smoking history. She has been exposed to tobacco smoke. She has never used smokeless tobacco. She reports that she does not currently use alcohol. She reports that she does not use drugs.    Allergies  She is allergic to asparagus and macrobid [nitrofurantoin monohyd/m-cryst].    Medications  She has a current medication list which includes the following prescription(s): loratadine, systane ultra, acetaminophen, cholecalciferol (vitamin d3), ciprofloxacin hcl, coq10 (ubiquinol), denosumab, ferrous gluconate, fish oil-[id number]-fatty [id number], losartan, ondansetron, preservision areds-2, lidocaine, UNABLE TO FIND, UNABLE TO FIND, vitamin c, and nephrocaps.  Review of Systems     Constitutional: Negative for chills and fever.      HENT: Positive for ringing in the ears.  Negative for ear discharge, ear infection, ear pain and hearing loss.      Neurological: Negative for dizziness and light-headedness.          Objective:     Constitutional:   She is oriented to person, place, and time. She appears well-developed and well-nourished. She appears alert. She is cooperative.  Non-toxic appearance. She does not have a sickly appearance. She does not appear ill. Normal speech.      Head:  Normocephalic and atraumatic. Not macrocephalic and not microcephalic. Head is without abrasion, without right periorbital erythema, without left periorbital erythema and without TMJ tenderness.     Ears:    Right Ear: No drainage, swelling or tenderness. No mastoid tenderness. Tympanic membrane is not scarred, not perforated, not erythematous, not retracted and not bulging. No middle ear effusion.   Left Ear: No drainage, swelling or tenderness. No mastoid tenderness. Tympanic membrane is not scarred, not perforated, not erythematous, not  retracted and not bulging.  No middle ear effusion.   Ceruminous debris obstructing right TM removed under microscopy    Pulmonary/Chest:   Effort normal.     Psychiatric:   She has a normal mood and affect. Her speech is normal and behavior is normal.     Neurological:   She is alert and oriented to person, place, and time.     Procedure  Cerumen removal performed.  See procedure note.  Procedure Note:  The patient was brought to the minor procedure room and placed under the operating microscope of the right ear canal which was cleaned of ceruminous debris. Using a combination of suction, curettes and cup forceps the patient's cerumen was removed. The patient tolerated the procedure well. There were no complications.    Audiogram      I independently reviewed the tracings of the complete audiometric evaluation. I reviewed the audiogram with the patient as well. Pertinent findings include normal hearing sloping to moderately-severe HF SNHL in the right ear and normal sloping to moderately-severe HF SNHL in the left ear. Type A AU.  Assessment:     1. Tinnitus, bilateral    2. Impacted cerumen of right ear    3. Sensorineural hearing loss (SNHL) of both ears      Plan:     Tinnitus, bilateral  Reassurance provided. Discussed the etiology of tinnitus and management strategies including the use of background sound enrichment. Further instructed that avoidance of caffeine, alcohol, tobacco, and stress can be of benefit.     Impacted cerumen of right ear  Cerumen impaction removed under microscopy. Patient tolerated procedure well and noted improvement upon impaction removal.     Sensorineural hearing loss (SNHL) of both ears  Audiometric testing interpretation consistent with high frequency sensorineural hearing loss. Discussed the etiology of SNHL. Not a good candidate for hearing amplification at this time. Hearing conservation in noisy environments.     RTC in 1 year for cleaning and routine audiogram.

## 2024-07-09 NOTE — PROGRESS NOTES
Shima Sorto, a 83 y.o. female, was seen today in the clinic for an audiologic evaluation.  Patient reported bilateral tinnitus. Patient denied otalgia and vertigo.     Tympanometry revealed Type A in the right ear and Type A in the left ear. Audiogram results revealed normal sloping to moderately-severe sensorineural hearing loss bilaterally. Speech reception thresholds were noted at 20 dB in the right ear and 20 dB in the left ear.  Speech discrimination scores were 88% in the right ear and 92% in the left ear.    Recommendations:  Otologic evaluation  Annual audiogram  Hearing protection when in noise  Hearing aid consultation if patient desires with medical clearance

## 2024-07-10 ENCOUNTER — TELEPHONE (OUTPATIENT)
Dept: HEMATOLOGY/ONCOLOGY | Facility: CLINIC | Age: 84
End: 2024-07-10
Payer: MEDICARE

## 2024-07-10 ENCOUNTER — CLINICAL SUPPORT (OUTPATIENT)
Dept: REHABILITATION | Facility: HOSPITAL | Age: 84
End: 2024-07-10
Payer: MEDICARE

## 2024-07-10 DIAGNOSIS — Z74.09 IMPAIRED FUNCTIONAL MOBILITY, BALANCE, GAIT, AND ENDURANCE: ICD-10-CM

## 2024-07-10 DIAGNOSIS — R26.89 POOR BALANCE: Primary | ICD-10-CM

## 2024-07-10 PROCEDURE — 97112 NEUROMUSCULAR REEDUCATION: CPT | Mod: CQ

## 2024-07-10 PROCEDURE — 97530 THERAPEUTIC ACTIVITIES: CPT | Mod: CQ

## 2024-07-10 NOTE — PROGRESS NOTES
[facility]   Physical Therapy Treatment Note      Name: Shima Echols [facility]  Clinic Number: [id number]    Therapy Diagnosis:   Encounter Diagnoses   Name Primary?    Poor balance Yes    Impaired functional mobility, balance, gait, and endurance        Physician: Carmen Milton MD    Visit Date: 7/10/2024    Physician Orders: PT Eval and Treat   Medical Diagnosis from Referral: M54.9 (ICD-10-CM) - Dorsalgia, unspecified   Evaluation Date: 6/26/2024  Authorization Period Expiration: 6/3/2024  Plan of Care Expiration: 9/6/2024  Progress Note Due: 10th visit  Visit # / Visits authorized: 2/ 10   FOTO: 1/ 3     Precautions: Standard and Fall cancer, 3B kidney disease     Time In: 2:05 pm  Time Out: 3:05 pm  Total Billable Time: 60 minutes (1 TA, 2 NMR)    PTA Visit #: 1/5       Subjective     Patient reports: that she felt a little more stable after the previous treatment visit.   She was compliant with home exercise program.  Response to previous treatment: no adverse effects  Functional change: nothing new to note    Pain: 2/10  Location: bilateral lumbar/thoracic pain      Objective      Objective Measures updated at progress report unless specified.     Treatment     Shima received the treatments listed below:      neuromuscular re-education activities to improve: Balance, Coordination, Posture, and motor control and patient education for 40 minutes. The following activities were included:  Patient education:   - HEP  - plan of care  -prognosis  - mechanical back pain vs kidney pain  -importance of range of motion for proper gait cycle and functional mobility  - transfers: seated to standing, lateral transfer, safety  - realistic postural goals    Bridges in staggered stance + iso hip abd with green tb: 2x10/position  Shuttle-B leg press, 50#: 3x10  Shuttle-U leg press, 25#: 2x10/LE  Side lying clams, 2.5# + yellow tb: 2x15  Gastroc stretch on fitter board L2: 2x1 min  Seated ankle  "dorsiflexion with yellow tb: 2x10/side    Balance:  Dynamic marching with 3 sec holds, CGA, intermittent upper extremity support: 4 min  Static standing with narrow ROBERT on compliant surface, EO, CGA, occasional upper extremity support: 1x30 sec  Static standing with narrow ROBERT on compliant surface, EC, CGA, occasional upper extremity support: 1x30 sec  Static standing in tandem stance on compliant surface, EO, CGA, intermittent upper extremity support: 1x30/position    NP  Long arc quad: 5 seconds, 3x10 2 lbs  Scap retractions: 10 seconds, 2x10 yellow theraband   Ball roll up the walls: 10x    therapeutic activities to improve functional performance for 20  minutes, including:  Step ups on 6" step: 2x10/LE  Step downs on 6" step: 2x10/LE  Lateral walks with BUE support: 3 min, yellow band    NP  Aerobic activity: Nu Step for CV and UE/LE endurance 10 minutes; goal of 800-1000 steps    Patient Education and Home Exercises       Education provided: Patient was educated on HEP and on all therapeutic interventions performed during today's treatment visit.     Written Home Exercises Provided:  Patient instructed to continue prior HEP. Exercises were reviewed and Shima was able to demonstrate them prior to the end of the session.  Shima demonstrated good  understanding of the education provided. See Electronic Medical Record under Patient Instructions for exercises provided during therapy sessions    Assessment     Good tolerance for activities performed. Treatment was expanded in order to promote BLE/hip strengthening and balance. Extended rest breaks required. Challenged balance activities and requiring intermittent upper extremity support to maintain balance. It was noted that patient demonstrated poor standing ankle DF which appears to be affecting her equilibrium.     Shima Is progressing well towards her goals.   Patient prognosis is Fair.     Patient will continue to benefit from skilled outpatient physical therapy " to address the deficits listed in the problem list box on initial evaluation, provide pt/family education and to maximize pt's level of independence in the home and community environment.     Patient's spiritual, cultural and educational needs considered and pt agreeable to plan of care and goals.     Anticipated barriers to physical therapy:  chronicity, age, kidney disease     Goals:   Short Term Goals (6 Weeks):   1. Patient will be independent with home exercise program to supplement physical therapy treatment in improving functional status.  2. Pt will improve impaired lower extremity manual muscle tests to >/= 4-/5 to improve dynamic lower extremity support for closed chain tasks.  3. Patient will perform 3 consecutive hip hinges with less than 3 VC to demonstrate improved lumbopelvic movement for safety during household ADL's and improved lifting techniques.   4. Patient will report decreased pain with activity 3/10 to demonstrate improved tolerance for activity and household ADL's.  5. Patient will demonstrates R and SLS with 1x UE support for 10 seconds to decrease fall risk.      Long Term Goals (10 Weeks):   1. Pt will improve impaired lower extremity manual muscle tests to >/= 4/5 to improve dynamic lower extremity support for closed chain tasks.  2. Patient will improve the total FOTO Lumbar Survey Score to </= 40% limited to demonstrate increased perceived functional mobility.  3. Patient will perform at least 10 sit to stands in 30 seconds without UE support to demonstrate increased functional strength.   4. Patient will perform 3 consecutive hip hinges with less than 0VC to demonstrate improved lumbopelvic movement for safety during household ADL's and improved lifting techniques.   5. Patient will initiate walking program to demonstrate improved activity tolerance.  6.  Patient will demonstrates R and SLS with 1x UE support for 15 seconds to decrease fall risk.     Plan     Plan of care  Certification: 6/26/2024 to 9/6/2024.     Outpatient Physical Therapy 2 times weekly for 10 weeks to include the following interventions: Aquatic Therapy, Cervical/Lumbar Traction, Electrical Stimulation TENS, Gait Training, Manual Therapy, Moist Heat/ Ice, Neuromuscular Re-ed, Orthotic Management and Training, Patient Education, Self Care, Therapeutic Activities, Therapeutic Exercise, and functional dry needling .     Rufino Lr, PTA

## 2024-07-15 ENCOUNTER — CLINICAL SUPPORT (OUTPATIENT)
Dept: REHABILITATION | Facility: HOSPITAL | Age: 84
End: 2024-07-15
Payer: MEDICARE

## 2024-07-15 DIAGNOSIS — R26.89 POOR BALANCE: Primary | ICD-10-CM

## 2024-07-15 DIAGNOSIS — Z74.09 IMPAIRED FUNCTIONAL MOBILITY, BALANCE, GAIT, AND ENDURANCE: ICD-10-CM

## 2024-07-15 PROCEDURE — 97530 THERAPEUTIC ACTIVITIES: CPT | Mod: KX

## 2024-07-15 PROCEDURE — 97112 NEUROMUSCULAR REEDUCATION: CPT | Mod: KX

## 2024-07-15 NOTE — PROGRESS NOTES
[facility]   Physical Therapy Treatment Note      Name: Shima Echols [facility]  Clinic Number: [id number]    Therapy Diagnosis:   Encounter Diagnoses   Name Primary?    Poor balance Yes    Impaired functional mobility, balance, gait, and endurance        Physician: Carmen Milton MD    Visit Date: 7/15/2024    Physician Orders: PT Eval and Treat   Medical Diagnosis from Referral: M54.9 (ICD-10-CM) - Dorsalgia, unspecified   Evaluation Date: 6/26/2024  Authorization Period Expiration: 6/3/2024  Plan of Care Expiration: 9/6/2024  Progress Note Due: 10th visit  Visit # / Visits authorized: 2/ 10   FOTO: 1/ 3     Precautions: Standard and Fall cancer, 3B kidney disease     Time In: 1:30 pm  Time Out: 2:30 pm  Total Billable Time: 60 minutes (1 TA, 3 NMR)    PTA Visit #: 1/5       Subjective     Patient reports: that she has noticed big changes in her gait, balance, and overall strength since beginning therapy.   She was compliant with home exercise program.  Response to previous treatment: no adverse effects  Functional change: nothing new to note    Pain: 2/10  Location: bilateral lumbar/thoracic pain      Objective      Objective Measures updated at progress report unless specified.     Treatment     Shima received the treatments listed below:      neuromuscular re-education activities to improve: Balance, Coordination, Posture, and motor control and patient education for 50 minutes. The following activities were included:  Patient education:   - HEP  - plan of care  -prognosis  - mechanical back pain vs kidney pain  -importance of range of motion for proper gait cycle and functional mobility  - transfers: seated to standing, lateral transfer, safety  - realistic postural goals    Held: resume next visit  Bridges in staggered stance + iso hip abd with green tb: 2x10/position  Shuttle-B leg press, 50#: 3x10  Shuttle-U leg press, 25#: 2x10/LE  Side lying clams, 2.5# + yellow tb: 2x15  Gastroc  "stretch on fitter board L2: 2x1 min  Seated ankle dorsiflexion with yellow tb: 2x10/side    Balance:  Dynamic marching with 3 sec holds, CGA, intermittent upper extremity support: 4 min  Static standing with narrow ROBERT on compliant surface, EO, CGA, occasional upper extremity support: 2x30 sec  Static standing with narrow ROBERT on compliant surface, EC, CGA, occasional upper extremity support: 2x30 sec  Static standing in tandem stance on compliant surface, EO, CGA, intermittent upper extremity support: 1x30/position  Tandem walks: 2 laps    Long arc quad: 5 seconds, 3x10 3 lbs  Seated marches: 3 lbs holding yellow weighted ball: 3x10     therapeutic activities to improve functional performance for 10  minutes, including:  Step ups on 6" step: 2x10/LE  Step downs on 6" step: 2x10/LE  Lateral walks with BUE support: 3 min, yellow band- held    NP  Aerobic activity: Nu Step for CV and UE/LE endurance 10 minutes; goal of 800-1000 steps    Patient Education and Home Exercises       Education provided: Patient was educated on HEP and on all therapeutic interventions performed during today's treatment visit.     Written Home Exercises Provided:  Patient instructed to continue prior HEP. Exercises were reviewed and Shima was able to demonstrate them prior to the end of the session.  Shima demonstrated good  understanding of the education provided. See Electronic Medical Record under Patient Instructions for exercises provided during therapy sessions    Assessment     Challenged with balance activities; specifically challenged with EC. Good stability observed on steps with UE support; consider reducing UE support at future visit.     Shima Is progressing well towards her goals.   Patient prognosis is Fair.     Patient will continue to benefit from skilled outpatient physical therapy to address the deficits listed in the problem list box on initial evaluation, provide pt/family education and to maximize pt's level of " independence in the home and community environment.     Patient's spiritual, cultural and educational needs considered and pt agreeable to plan of care and goals.     Anticipated barriers to physical therapy:  chronicity, age, kidney disease     Goals:   Short Term Goals (6 Weeks):   1. Patient will be independent with home exercise program to supplement physical therapy treatment in improving functional status.  2. Pt will improve impaired lower extremity manual muscle tests to >/= 4-/5 to improve dynamic lower extremity support for closed chain tasks.  3. Patient will perform 3 consecutive hip hinges with less than 3 VC to demonstrate improved lumbopelvic movement for safety during household ADL's and improved lifting techniques.   4. Patient will report decreased pain with activity 3/10 to demonstrate improved tolerance for activity and household ADL's.  5. Patient will demonstrates R and SLS with 1x UE support for 10 seconds to decrease fall risk.      Long Term Goals (10 Weeks):   1. Pt will improve impaired lower extremity manual muscle tests to >/= 4/5 to improve dynamic lower extremity support for closed chain tasks.  2. Patient will improve the total FOTO Lumbar Survey Score to </= 40% limited to demonstrate increased perceived functional mobility.  3. Patient will perform at least 10 sit to stands in 30 seconds without UE support to demonstrate increased functional strength.   4. Patient will perform 3 consecutive hip hinges with less than 0VC to demonstrate improved lumbopelvic movement for safety during household ADL's and improved lifting techniques.   5. Patient will initiate walking program to demonstrate improved activity tolerance.  6.  Patient will demonstrates R and SLS with 1x UE support for 15 seconds to decrease fall risk.     Plan     Plan of care Certification: 6/26/2024 to 9/6/2024.     Outpatient Physical Therapy 2 times weekly for 10 weeks to include the following interventions: Aquatic  Therapy, Cervical/Lumbar Traction, Electrical Stimulation TENS, Gait Training, Manual Therapy, Moist Heat/ Ice, Neuromuscular Re-ed, Orthotic Management and Training, Patient Education, Self Care, Therapeutic Activities, Therapeutic Exercise, and functional dry needling .     Christina Mcclure, PT

## 2024-07-17 ENCOUNTER — CLINICAL SUPPORT (OUTPATIENT)
Dept: REHABILITATION | Facility: HOSPITAL | Age: 84
End: 2024-07-17
Payer: MEDICARE

## 2024-07-17 DIAGNOSIS — R26.89 POOR BALANCE: Primary | ICD-10-CM

## 2024-07-17 DIAGNOSIS — Z74.09 IMPAIRED FUNCTIONAL MOBILITY, BALANCE, GAIT, AND ENDURANCE: ICD-10-CM

## 2024-07-17 PROCEDURE — 97112 NEUROMUSCULAR REEDUCATION: CPT | Mod: KX

## 2024-07-17 NOTE — PROGRESS NOTES
[facility]   Physical Therapy Treatment Note      Name: Shima Echols [facility] Number: [id number]    Therapy Diagnosis:   Encounter Diagnoses   Name Primary?    Poor balance Yes    Impaired functional mobility, balance, gait, and endurance          Physician: Carmen Milton MD    Visit Date: 7/17/2024    Physician Orders: PT Eval and Treat   Medical Diagnosis from Referral: M54.9 (ICD-10-CM) - Dorsalgia, unspecified   Evaluation Date: 6/26/2024  Authorization Period Expiration: 6/3/2024  Plan of Care Expiration: 9/6/2024  Progress Note Due: 10th visit  Visit # / Visits authorized: 2/ 10   FOTO: 1/ 3     Precautions: Standard and Fall cancer, 3B kidney disease     Time In: 1:30 pm  Time Out: 2:30 pm  Total Billable Time: 30 minutes (2 NMR)    PTA Visit #: 1/5       Subjective     Patient reports: she is very tired today even though she slept; she reports her daughter is moving back to town so her daughter and her special needs grandchild are living with her right now.    She was compliant with home exercise program.  Response to previous treatment: no adverse effects  Functional change: nothing new to note    Pain: 2/10  Location: bilateral lumbar/thoracic pain      Objective      Objective Measures updated at progress report unless specified.     Treatment     Shima received the treatments listed below:      neuromuscular re-education activities to improve: Balance, Coordination, Posture, and motor control and patient education for 50 minutes. The following activities were included:  Patient education:   - HEP  - plan of care  -prognosis  - mechanical back pain vs kidney pain  -importance of range of motion for proper gait cycle and functional mobility  - transfers: seated to standing, lateral transfer, safety  - realistic postural goals    Bridges in staggered stance + iso hip abd with green tb: 2x10/position  Side lying clams, green theraband 2x8, 3 second hold  Long arc quad: 5  "seconds, 3x10 3 lbs      Held: resume next visit  Shuttle-B leg press, 50#: 3x10  Shuttle-U leg press, 25#: 2x10/LE  Gastroc stretch on fitter board L2: 2x1 min  Seated ankle dorsiflexion with yellow tb: 2x10/side  Seated marches: 3 lbs holding yellow weighted ball: 3x10     Balance: HELD  Dynamic marching with 3 sec holds, CGA, intermittent upper extremity support: 4 min  Static standing with narrow ROBERT on compliant surface, EO, CGA, occasional upper extremity support: 2x30 sec  Static standing with narrow ROBERT on compliant surface, EC, CGA, occasional upper extremity support: 2x30 sec  Static standing in tandem stance on compliant surface, EO, CGA, intermittent upper extremity support: 1x30/position  Tandem walks: 2 laps    therapeutic activities to improve functional performance for 10  minutes, including:  Aerobic activity: Nu Step for CV and UE/LE endurance 10 minutes; goal of 800-1000 steps      Step ups on 6" step: 2x10/LE  Step downs on 6" step: 2x10/LE  Lateral walks with BUE support: 3 min, yellow band- held    NP      Patient Education and Home Exercises       Education provided: Patient was educated on HEP and on all therapeutic interventions performed during today's treatment visit.     Written Home Exercises Provided:  Patient instructed to continue prior HEP. Exercises were reviewed and Shima was able to demonstrate them prior to the end of the session.  Shima demonstrated good  understanding of the education provided. See Electronic Medical Record under Patient Instructions for exercises provided during therapy sessions    Assessment     Modified today's visit due to increased fatigue. Good tolerance for activities performed.  Positive patient response at end of session; resume balance activities next visit.    Shima Is progressing well towards her goals.   Patient prognosis is Fair.     Patient will continue to benefit from skilled outpatient physical therapy to address the deficits listed in the " problem list box on initial evaluation, provide pt/family education and to maximize pt's level of independence in the home and community environment.     Patient's spiritual, cultural and educational needs considered and pt agreeable to plan of care and goals.     Anticipated barriers to physical therapy:  chronicity, age, kidney disease     Goals:   Short Term Goals (6 Weeks):   1. Patient will be independent with home exercise program to supplement physical therapy treatment in improving functional status.  2. Pt will improve impaired lower extremity manual muscle tests to >/= 4-/5 to improve dynamic lower extremity support for closed chain tasks.  3. Patient will perform 3 consecutive hip hinges with less than 3 VC to demonstrate improved lumbopelvic movement for safety during household ADL's and improved lifting techniques.   4. Patient will report decreased pain with activity 3/10 to demonstrate improved tolerance for activity and household ADL's.  5. Patient will demonstrates R and SLS with 1x UE support for 10 seconds to decrease fall risk.      Long Term Goals (10 Weeks):   1. Pt will improve impaired lower extremity manual muscle tests to >/= 4/5 to improve dynamic lower extremity support for closed chain tasks.  2. Patient will improve the total FOTO Lumbar Survey Score to </= 40% limited to demonstrate increased perceived functional mobility.  3. Patient will perform at least 10 sit to stands in 30 seconds without UE support to demonstrate increased functional strength.   4. Patient will perform 3 consecutive hip hinges with less than 0VC to demonstrate improved lumbopelvic movement for safety during household ADL's and improved lifting techniques.   5. Patient will initiate walking program to demonstrate improved activity tolerance.  6.  Patient will demonstrates R and SLS with 1x UE support for 15 seconds to decrease fall risk.     Plan     Plan of care Certification: 6/26/2024 to 9/6/2024.      Outpatient Physical Therapy 2 times weekly for 10 weeks to include the following interventions: Aquatic Therapy, Cervical/Lumbar Traction, Electrical Stimulation TENS, Gait Training, Manual Therapy, Moist Heat/ Ice, Neuromuscular Re-ed, Orthotic Management and Training, Patient Education, Self Care, Therapeutic Activities, Therapeutic Exercise, and functional dry needling .     Christina Mcclure, PT

## 2024-07-22 ENCOUNTER — CLINICAL SUPPORT (OUTPATIENT)
Dept: REHABILITATION | Facility: HOSPITAL | Age: 84
End: 2024-07-22
Payer: MEDICARE

## 2024-07-22 DIAGNOSIS — Z74.09 IMPAIRED FUNCTIONAL MOBILITY, BALANCE, GAIT, AND ENDURANCE: ICD-10-CM

## 2024-07-22 DIAGNOSIS — R26.89 POOR BALANCE: Primary | ICD-10-CM

## 2024-07-22 PROCEDURE — 97112 NEUROMUSCULAR REEDUCATION: CPT | Mod: KX,CQ

## 2024-07-22 NOTE — PROGRESS NOTES
[facility]   Physical Therapy Treatment Note      Name: Shima Echols [facility]  Clinic Number: [id number]    Therapy Diagnosis:   Encounter Diagnoses   Name Primary?    Poor balance Yes    Impaired functional mobility, balance, gait, and endurance      Physician: Carmen Milton MD    Visit Date: 7/22/2024    Physician Orders: PT Eval and Treat   Medical Diagnosis from Referral: M54.9 (ICD-10-CM) - Dorsalgia, unspecified   Evaluation Date: 6/26/2024  Authorization Period Expiration: 12/31/2024  Plan of Care Expiration: 9/6/2024  Progress Note Due: 10th visit  Visit # / Visits authorized: 5 / 10   FOTO: 1/3     Precautions: Standard and Fall cancer, 3B kidney disease     Time In: 1404  Time Out: 1500  Total Billable Time: 30 minutes (2 NMR)    PTA Visit #: 1 / 5     Subjective     Patient reports: she has her normal pain across her back that only bothers her if she moves the wrong way. States her balance is her primary complaint.   She was compliant with home exercise program.  Response to previous treatment: no adverse effects  Functional change: nothing new to note    Pain: 2/10, currently  Location: bilateral lumbar/thoracic pain      Objective      Objective Measures updated at progress report unless specified.     Treatment     Shima received the treatments listed below:      neuromuscular re-education activities to improve: Balance, Coordination, Posture, and motor control and patient education for 36 minutes. The following activities were included:  Patient education:   - HEP  - plan of care  - prognosis  - mechanical back pain vs kidney pain  - importance of range of motion for proper gait cycle and functional mobility  - transfers: seated to standing, lateral transfer, safety  - realistic postural goals    HL Bridges in staggered stance + iso hip abd with Green TB; 2 x 10 reps/position  SL Clams + Green TB; 2 x 8 reps, 3 second hold  Long arc quads at EOM: 5 second hold, 3 x 10  reps + 3 lb AW Bilateral    Balance:  Dynamic marching with 3 sec holds, CGA, intermittent upper extremity support: 4 min  Static standing with narrow ROBERT on compliant surface, EO, CGA, occasional upper extremity support: 2x30 sec  Static standing with narrow ROBERT on compliant surface, EC, CGA, occasional upper extremity support: 2x30 sec  Static standing in tandem stance on compliant surface, EO, CGA, intermittent upper extremity support: 1x30/position  Tandem walks at 1/2 wall (16 feet): 10 laps    Held: resume next visit  Shuttle-B leg press, 50#: 3x10  Shuttle-U leg press, 25#: 2x10/LE  Gastroc stretch on fitter board L2: 2x1 min  Seated ankle dorsiflexion with yellow tb: 2x10/side  Seated marches: 3 lbs holding yellow weighted ball: 3x10     therapeutic activities to improve functional performance for 20 minutes, including:  Aerobic activity: Nustep for CV and UE/LE endurance; 14 minutes, Level 1 (Goal of 800-1000 steps) - completed 2,000K steps  Step ups onto 6-inch step: 2 x 10 reps/LE  Step downs from 6-inch step: 2 x 10 reps/LE  Lateral walks with BUE support: 3 minutes, Yellow PB    Patient Education and Home Exercises       Education provided: Patient was educated on HEP and on all therapeutic interventions performed during today's treatment visit.     Written Home Exercises Provided:  Patient instructed to continue prior HEP. Exercises were reviewed and Shima was able to demonstrate them prior to the end of the session.  Shima demonstrated good  understanding of the education provided. See Electronic Medical Record under Patient Instructions for exercises provided during therapy sessions    Assessment     Overall good tolerance to therapeutic interventions noting adequate muscle response throughout. Visible posterior and lateral sway with NBOS on airex and EC. She is challenged with tandem walking. Will continue per POC towards treatment goals.  Shima Is progressing well towards her goals.   Patient  prognosis is Fair.     Patient will continue to benefit from skilled outpatient physical therapy to address the deficits listed in the problem list box on initial evaluation, provide pt/family education and to maximize pt's level of independence in the home and community environment.     Patient's spiritual, cultural and educational needs considered and pt agreeable to plan of care and goals.     Anticipated barriers to physical therapy:  chronicity, age, kidney disease     Goals:   Short Term Goals (6 Weeks): - Appropriate, ongoing  1. Patient will be independent with home exercise program to supplement physical therapy treatment in improving functional status.  2. Pt will improve impaired lower extremity manual muscle tests to >/= 4-/5 to improve dynamic lower extremity support for closed chain tasks.  3. Patient will perform 3 consecutive hip hinges with less than 3 VC to demonstrate improved lumbopelvic movement for safety during household ADL's and improved lifting techniques.   4. Patient will report decreased pain with activity 3/10 to demonstrate improved tolerance for activity and household ADL's.  5. Patient will demonstrates R and SLS with 1x UE support for 10 seconds to decrease fall risk.      Long Term Goals (10 Weeks): - Appropriate, ongoing  1. Pt will improve impaired lower extremity manual muscle tests to >/= 4/5 to improve dynamic lower extremity support for closed chain tasks.  2. Patient will improve the total FOTO Lumbar Survey Score to </= 40% limited to demonstrate increased perceived functional mobility.  3. Patient will perform at least 10 sit to stands in 30 seconds without UE support to demonstrate increased functional strength.   4. Patient will perform 3 consecutive hip hinges with less than 0VC to demonstrate improved lumbopelvic movement for safety during household ADL's and improved lifting techniques.   5. Patient will initiate walking program to demonstrate improved activity  tolerance.  6.  Patient will demonstrates R and SLS with 1x UE support for 15 seconds to decrease fall risk.     Plan     Plan of Care Certification: 6/26/2024 to 9/6/2024.     Outpatient Physical Therapy 2 times weekly for 10 weeks to include the following interventions: Aquatic Therapy, Cervical/Lumbar Traction, Electrical Stimulation TENS, Gait Training, Manual Therapy, Moist Heat/ Ice, Neuromuscular Re-ed, Orthotic Management and Training, Patient Education, Self Care, Therapeutic Activities, Therapeutic Exercise, and functional dry needling .     Elsa Campbell, PTA

## 2024-07-24 ENCOUNTER — PROCEDURE VISIT (OUTPATIENT)
Dept: UROLOGY | Facility: CLINIC | Age: 84
End: 2024-07-24
Payer: MEDICARE

## 2024-07-24 ENCOUNTER — CLINICAL SUPPORT (OUTPATIENT)
Dept: REHABILITATION | Facility: HOSPITAL | Age: 84
End: 2024-07-24
Payer: MEDICARE

## 2024-07-24 VITALS
HEART RATE: 49 BPM | BODY MASS INDEX: 20.06 KG/M2 | SYSTOLIC BLOOD PRESSURE: 146 MMHG | TEMPERATURE: 98 F | RESPIRATION RATE: 20 BRPM | WEIGHT: 116.88 LBS | DIASTOLIC BLOOD PRESSURE: 63 MMHG

## 2024-07-24 DIAGNOSIS — R26.89 POOR BALANCE: Primary | ICD-10-CM

## 2024-07-24 DIAGNOSIS — R82.81 PYURIA: Primary | ICD-10-CM

## 2024-07-24 DIAGNOSIS — N20.0 NEPHROLITHIASIS: ICD-10-CM

## 2024-07-24 DIAGNOSIS — R31.0 GROSS HEMATURIA: ICD-10-CM

## 2024-07-24 DIAGNOSIS — Z74.09 IMPAIRED FUNCTIONAL MOBILITY, BALANCE, GAIT, AND ENDURANCE: ICD-10-CM

## 2024-07-24 DIAGNOSIS — R10.9 FLANK PAIN: ICD-10-CM

## 2024-07-24 PROCEDURE — 97530 THERAPEUTIC ACTIVITIES: CPT | Mod: KX

## 2024-07-24 PROCEDURE — 87186 SC STD MICRODIL/AGAR DIL: CPT | Performed by: UROLOGY

## 2024-07-24 PROCEDURE — 87088 URINE BACTERIA CULTURE: CPT | Performed by: UROLOGY

## 2024-07-24 PROCEDURE — 87086 URINE CULTURE/COLONY COUNT: CPT | Performed by: UROLOGY

## 2024-07-24 PROCEDURE — 97112 NEUROMUSCULAR REEDUCATION: CPT | Mod: KX

## 2024-07-24 RX ORDER — CIPROFLOXACIN 500 MG/1
500 TABLET ORAL
Status: COMPLETED | OUTPATIENT
Start: 2024-07-24 | End: 2024-07-24

## 2024-07-24 RX ORDER — LIDOCAINE HYDROCHLORIDE 20 MG/ML
JELLY TOPICAL
Status: COMPLETED | OUTPATIENT
Start: 2024-07-24 | End: 2024-07-24

## 2024-07-24 RX ORDER — CEFTRIAXONE 1 G/1
1 INJECTION, POWDER, FOR SOLUTION INTRAMUSCULAR; INTRAVENOUS
Status: DISCONTINUED | OUTPATIENT
Start: 2024-07-24 | End: 2024-07-24

## 2024-07-24 RX ADMIN — LIDOCAINE HYDROCHLORIDE: 20 JELLY TOPICAL at 11:07

## 2024-07-24 RX ADMIN — CIPROFLOXACIN 500 MG: 500 TABLET ORAL at 11:07

## 2024-07-24 NOTE — PROGRESS NOTES
[facility]   Physical Therapy Treatment Note      Name: Shima Echols [facility]  Clinic Number: [id number]    Therapy Diagnosis:   Encounter Diagnoses   Name Primary?    Poor balance Yes    Impaired functional mobility, balance, gait, and endurance        Physician: Carmen Milton MD    Visit Date: 7/24/2024    Physician Orders: PT Eval and Treat   Medical Diagnosis from Referral: M54.9 (ICD-10-CM) - Dorsalgia, unspecified   Evaluation Date: 6/26/2024  Authorization Period Expiration: 12/31/2024  Plan of Care Expiration: 9/6/2024  Progress Note Due: 10th visit  Visit # / Visits authorized: 5 / 10   FOTO: 1/3     Precautions: Standard and Fall cancer, 3B kidney disease     Time In: 2:40 pm  Time Out: 3:35 pm  Total Billable Time: 55 minutes (3 NMR 1 TA)    PTA Visit #: 0 / 5     Subjective     Patient reports: she would like to do balance work today. Patient reports she is very tired today.   She was compliant with home exercise program.  Response to previous treatment: no adverse effects  Functional change: nothing new to note    Pain: 2/10, currently  Location: bilateral lumbar/thoracic pain      Objective      Objective Measures updated at progress report unless specified.     Treatment     Shima received the treatments listed below:      neuromuscular re-education activities to improve: Balance, Coordination, Posture, and motor control and patient education for 50 minutes. The following activities were included:  Patient education:   - HEP  - plan of care  - prognosis  - mechanical back pain vs kidney pain  - importance of range of motion for proper gait cycle and functional mobility  - transfers: seated to standing, lateral transfer, safety  - realistic postural goals    HL Bridges in staggered stance + iso hip abd with Green TB; 2 x 10 reps/position  Balance:  Dynamic marching with 3 sec holds, CGA, intermittent upper extremity support: 4 min  Static standing with narrow ROBERT on  compliant surface, EO, CGA, occasional upper extremity support: 2x30 sec  Static standing with narrow ROBERT on compliant surface, EC, CGA, occasional upper extremity support: 2x30 sec  Static standing in tandem stance on compliant surface, EO, CGA, intermittent upper extremity support: 1x30/position  Tandem walks in // bars: 3 laps holding tandem for 5 seconds with least amount of UE support; cue to look ahead    Held: resume next visit  SL Clams + Green TB; 2 x 8 reps, 3 second hold - held  Long arc quads at EOM: 5 second hold, 3 x 10 reps + 3 lb AW Bilateral- held  Shuttle-B leg press, 50#: 3x10  Shuttle-U leg press, 25#: 2x10/LE  Gastroc stretch on fitter board L2: 2x1 min  Seated ankle dorsiflexion with yellow tb: 2x10/side  Seated marches: 3 lbs holding yellow weighted ball: 3x10     therapeutic activities to improve functional performance for 10 minutes, including:  Aerobic activity: Nustep for CV and UE/LE endurance; 10 minutes, Level 1 (Goal of 800-1000 steps) - completed 2,000K steps  Step ups onto 6-inch step: 2 x 10 reps/LE - held  Step downs from 6-inch step: 2 x 10 reps/LE - held  Lateral walks with BUE support: 3 minutes, Yellow PB - held    Patient Education and Home Exercises       Education provided: Patient was educated on HEP and on all therapeutic interventions performed during today's treatment visit.     Written Home Exercises Provided:  Patient instructed to continue prior HEP. Exercises were reviewed and Shima was able to demonstrate them prior to the end of the session.  Shima demonstrated good  understanding of the education provided. See Electronic Medical Record under Patient Instructions for exercises provided during therapy sessions    Assessment     Good tolerance for balance activities; significant challenged and upper trunk sway with eyes closed (EC). Increased fatigue this session; modified as patient reported increased fatigue upon arrival.       Shima Is progressing well towards her  goals.   Patient prognosis is Fair.     Patient will continue to benefit from skilled outpatient physical therapy to address the deficits listed in the problem list box on initial evaluation, provide pt/family education and to maximize pt's level of independence in the home and community environment.     Patient's spiritual, cultural and educational needs considered and pt agreeable to plan of care and goals.     Anticipated barriers to physical therapy:  chronicity, age, kidney disease     Goals:   Short Term Goals (6 Weeks): - Appropriate, ongoing  1. Patient will be independent with home exercise program to supplement physical therapy treatment in improving functional status.  2. Pt will improve impaired lower extremity manual muscle tests to >/= 4-/5 to improve dynamic lower extremity support for closed chain tasks.  3. Patient will perform 3 consecutive hip hinges with less than 3 VC to demonstrate improved lumbopelvic movement for safety during household ADL's and improved lifting techniques.   4. Patient will report decreased pain with activity 3/10 to demonstrate improved tolerance for activity and household ADL's.  5. Patient will demonstrates R and SLS with 1x UE support for 10 seconds to decrease fall risk.      Long Term Goals (10 Weeks): - Appropriate, ongoing  1. Pt will improve impaired lower extremity manual muscle tests to >/= 4/5 to improve dynamic lower extremity support for closed chain tasks.  2. Patient will improve the total FOTO Lumbar Survey Score to </= 40% limited to demonstrate increased perceived functional mobility.  3. Patient will perform at least 10 sit to stands in 30 seconds without UE support to demonstrate increased functional strength.   4. Patient will perform 3 consecutive hip hinges with less than 0VC to demonstrate improved lumbopelvic movement for safety during household ADL's and improved lifting techniques.   5. Patient will initiate walking program to demonstrate  improved activity tolerance.  6.  Patient will demonstrates R and SLS with 1x UE support for 15 seconds to decrease fall risk.     Plan     Plan of Care Certification: 6/26/2024 to 9/6/2024.     Outpatient Physical Therapy 2 times weekly for 10 weeks to include the following interventions: Aquatic Therapy, Cervical/Lumbar Traction, Electrical Stimulation TENS, Gait Training, Manual Therapy, Moist Heat/ Ice, Neuromuscular Re-ed, Orthotic Management and Training, Patient Education, Self Care, Therapeutic Activities, Therapeutic Exercise, and functional dry needling .     Christina Mcclure, PT ,DPT, OCS

## 2024-07-24 NOTE — PATIENT INSTRUCTIONS
What to Expect After a Cystoscopy  For the next 24-48 hours, you may feel a mild burning when you urinate. This burning is normal and expected. Drink plenty of water to dilute the urine to help relieve the burning sensation. You may also see a small amount of blood in your urine after the procedure.    Unless you are already taking antibiotics, you may be given an antibiotic after the test to prevent infection.    Signs and Symptoms to Report  Call the [facility] at [phone] if you develop any of the following:  Fever of 101 degrees or higher  Chills or persistent bleeding  Inability to urinate

## 2024-07-25 DIAGNOSIS — N18.31 STAGE 3A CHRONIC KIDNEY DISEASE: Primary | ICD-10-CM

## 2024-07-26 LAB — BACTERIA UR CULT: ABNORMAL

## 2024-07-27 ENCOUNTER — LAB VISIT (OUTPATIENT)
Dept: LAB | Facility: HOSPITAL | Age: 84
End: 2024-07-27
Payer: MEDICARE

## 2024-07-27 DIAGNOSIS — N18.31 STAGE 3A CHRONIC KIDNEY DISEASE: ICD-10-CM

## 2024-07-27 LAB
ALBUMIN SERPL BCP-MCNC: 3.2 G/DL (ref 3.5–5.2)
ANION GAP SERPL CALC-SCNC: 5 MMOL/L (ref 8–16)
BUN SERPL-MCNC: 36 MG/DL (ref 8–23)
CALCIUM SERPL-MCNC: 8.7 MG/DL (ref 8.7–10.5)
CHLORIDE SERPL-SCNC: 107 MMOL/L (ref 95–110)
CO2 SERPL-SCNC: 29 MMOL/L (ref 23–29)
CREAT SERPL-MCNC: 1.5 MG/DL (ref 0.5–1.4)
EST. GFR  (NO RACE VARIABLE): 34.2 ML/MIN/1.73 M^2
GLUCOSE SERPL-MCNC: 96 MG/DL (ref 70–110)
HCT VFR BLD AUTO: 33.8 % (ref 37–48.5)
HGB BLD-MCNC: 10.7 G/DL (ref 12–16)
PHOSPHATE SERPL-MCNC: 3.6 MG/DL (ref 2.7–4.5)
POTASSIUM SERPL-SCNC: 4.3 MMOL/L (ref 3.5–5.1)
PTH-INTACT SERPL-MCNC: 32.6 PG/ML (ref 9–77)
SODIUM SERPL-SCNC: 141 MMOL/L (ref 136–145)

## 2024-07-27 PROCEDURE — 85018 HEMOGLOBIN: CPT | Performed by: INTERNAL MEDICINE

## 2024-07-27 PROCEDURE — 36415 COLL VENOUS BLD VENIPUNCTURE: CPT | Performed by: INTERNAL MEDICINE

## 2024-07-27 PROCEDURE — 80069 RENAL FUNCTION PANEL: CPT | Performed by: INTERNAL MEDICINE

## 2024-07-27 PROCEDURE — 83970 ASSAY OF PARATHORMONE: CPT | Performed by: INTERNAL MEDICINE

## 2024-07-27 PROCEDURE — 85014 HEMATOCRIT: CPT | Performed by: INTERNAL MEDICINE

## 2024-07-29 ENCOUNTER — PATIENT MESSAGE (OUTPATIENT)
Facility: CLINIC | Age: 84
End: 2024-07-29
Payer: MEDICARE

## 2024-07-29 ENCOUNTER — OFFICE VISIT (OUTPATIENT)
Dept: NEPHROLOGY | Facility: CLINIC | Age: 84
End: 2024-07-29
Payer: MEDICARE

## 2024-07-29 VITALS
OXYGEN SATURATION: 99 % | BODY MASS INDEX: 20.06 KG/M2 | HEART RATE: 65 BPM | DIASTOLIC BLOOD PRESSURE: 68 MMHG | SYSTOLIC BLOOD PRESSURE: 119 MMHG | WEIGHT: 116.88 LBS

## 2024-07-29 DIAGNOSIS — I10 HYPERTENSION, UNSPECIFIED TYPE: ICD-10-CM

## 2024-07-29 DIAGNOSIS — N18.32 STAGE 3B CHRONIC KIDNEY DISEASE: Primary | ICD-10-CM

## 2024-07-29 DIAGNOSIS — N20.0 KIDNEY STONES: ICD-10-CM

## 2024-07-29 PROCEDURE — 99213 OFFICE O/P EST LOW 20 MIN: CPT | Mod: PBBFAC | Performed by: INTERNAL MEDICINE

## 2024-07-29 PROCEDURE — 99999 PR PBB SHADOW E&M-EST. PATIENT-LVL III: CPT | Mod: PBBFAC,,, | Performed by: INTERNAL MEDICINE

## 2024-07-29 PROCEDURE — 99215 OFFICE O/P EST HI 40 MIN: CPT | Mod: S$PBB,,, | Performed by: INTERNAL MEDICINE

## 2024-07-29 NOTE — PROGRESS NOTES
HISTORY OF PRESENT ILLNESS:  This is a 84  -year-old white female with a   longstanding history of nephrolithiasis, but no recent symptomatic   nephrolithiasis episodes who is coming in for a followup. Recently had hematuria and had cystoscopy last month.  She also has   hypertension with stable blood pressures at home and had failed InterStim   therapy with her bladder in the past and had seen Dr. Stoddard in Urology.  No   recent symptomatic stones.  Her creatinine is stable.   Denies NSAID's.  Her blood pressure is higher  at home in the 118-130.       PAST MEDICAL HISTORY:  Significant for hypertension for over 10 years, history   of kidney stones, failed InterStim therapy, breast cancer, status post   lumpectomy, XRT and adjuvant hormonal therapy and osteoporosis.  KATHERIN-CPAP    REVIEW OF SYSTEMS:   She states that she is feeling well but some fatigue.  Apetite good.  Weight stable. Denies any back pain or kidney stone pain.  She denies any blood in the urine, frequency, urgency, hematuria, dysuria, nausea, vomiting, chest pain or shortness of breath.    PHYSICAL EXAMINATION:limited  GENERAL:  A well-nourished, well-developed individual, in no acute distress.  CARDIOVASCULAR:  Rate and rhythm regular.  No murmurs, gallops or rubs.  LUNGS:  Clear to auscultation bilaterally.  Normal respiratory effort.  ABDOMEN:  Soft, nontender,some distension.  No edema    EXTREMITIES:  Edema.    ASSESSMENT AND PLAN:  This is a 84-year-old white female with a longstanding   history of hypertension who is coming in for a followup visit.  1.   Nephrolithiasis.  No recent symptomatic nephrolithiasis.  She had a   procedure in 2001, for a kidney stone.  Her renal US shows 1 cm stone with mild pelvicaliectasis-followed in urology. 3 liters of urine output per last 24-hour   urine per the patient.  I advised her to drink lemon juice for citrate.  She   also follows a low oxalate diet for high oxalate.  I also advised her to decrease  her   meat intake.  US from 6/22 showed 0.5 cm on right side  and mild hydro frances and sees urology.    Sodium nml now. H/o breast cancer. Had EGD, colonoscopy, mammogram, pap smear.  Started on PPI by GI but stopped it and now on gas-x per GI.   2.  Renal/ckd stg 3:  Her creatinines have been stable around 1.2-1.3 with GFR of 45  Ml/min but now 1.5.  Not drinking enough.  per urology, has PVR approximately > 600 ml              - self cath 10 Fr twice daily indefinitely.                -  RBC's on UA's of and  likely sec to stones-had seen urology in 1/22. Had PVR and failed interstim.  No RBC's on last UA  3.  Hypertension.  Blood pressures are stable at home.   She had 800 mg of proteinuria but better after losartan at 200 mg.  She was  on Benicar for nephroprotection but it looks like it's on hold by cards per pt bc of JOCELIN in the past. Started on losartan in 2/2024.  4.  Renal osteodystrophy.  Vitamin D levels are stable.   On OTC vit D. Her PTH is stable.  Calcium and phosphorus are stable.    5.  Anemia: stable. On  iron.  Saw hematology and bone marrow biopsy.    She can be  followed up in 5 months.  Rfp next week. Ckd education class

## 2024-07-31 ENCOUNTER — TELEPHONE (OUTPATIENT)
Dept: UROLOGY | Facility: CLINIC | Age: 84
End: 2024-07-31
Payer: MEDICARE

## 2024-07-31 RX ORDER — CEFTRIAXONE 1 G/1
1 INJECTION, POWDER, FOR SOLUTION INTRAMUSCULAR; INTRAVENOUS
Status: COMPLETED | OUTPATIENT
Start: 2024-07-31 | End: 2024-08-01

## 2024-07-31 NOTE — TELEPHONE ENCOUNTER
Phone call was returned to the patient. Spoke to the patient. Message was sent to Dr. Cruz.    SHAKEEL Sánchez    ----- Message from Anish Nascimento sent at 7/31/2024 12:38 PM CDT -----  Type:  Test Results    Who Called:  Shima  Name of Test (Lab/Mammo/Etc):  labs  Date of Test: 7-  Ordering Provider: Roxy Cruz  Where the test was performed: [facility]  Would the patient rather a call back or a response via [facility]?  Call back   Best Call Back Number: [phone] or [phone]  Additional Information:

## 2024-08-01 ENCOUNTER — TELEPHONE (OUTPATIENT)
Dept: UROLOGY | Facility: CLINIC | Age: 84
End: 2024-08-01
Payer: MEDICARE

## 2024-08-01 ENCOUNTER — CLINICAL SUPPORT (OUTPATIENT)
Dept: UROLOGY | Facility: CLINIC | Age: 84
End: 2024-08-01
Payer: MEDICARE

## 2024-08-01 VITALS
WEIGHT: 121.5 LBS | BODY MASS INDEX: 20.74 KG/M2 | HEIGHT: 64 IN | HEART RATE: 57 BPM | SYSTOLIC BLOOD PRESSURE: 152 MMHG | DIASTOLIC BLOOD PRESSURE: 68 MMHG

## 2024-08-01 DIAGNOSIS — R30.0 DYSURIA: Primary | ICD-10-CM

## 2024-08-01 PROCEDURE — 99999PBSHW PR PBB SHADOW TECHNICAL ONLY FILED TO HB: Mod: PBBFAC,,,

## 2024-08-01 PROCEDURE — 99999 PR PBB SHADOW E&M-EST. PATIENT-LVL III: CPT | Mod: PBBFAC,,,

## 2024-08-01 PROCEDURE — 99213 OFFICE O/P EST LOW 20 MIN: CPT | Mod: PBBFAC

## 2024-08-01 RX ADMIN — CEFTRIAXONE SODIUM 1 G: 1 INJECTION, POWDER, FOR SOLUTION INTRAMUSCULAR; INTRAVENOUS at 03:08

## 2024-08-01 NOTE — TELEPHONE ENCOUNTER
Pt here received Rocephin Gm 1 (IM) left gluteal.  Marleni well  amb off unit no reaction noted.  M robert lpn

## 2024-08-01 NOTE — TELEPHONE ENCOUNTER
Patient was reminded about her appointment today for 3 pm for her rocephin injection. Patient was given the time and the location.     SHAKEEL Sánchez      ----- Message from Chelsey Sosa MA sent at 7/31/2024  2:54 PM CDT -----    ----- Message -----  From: Roxy Cruz MD  Sent: 7/31/2024   1:54 PM CDT  To: Anthony DA SILVA Staff    Lets have her come in for rocephin nurse visit tomorrow see how she feels.

## 2024-08-02 ENCOUNTER — NURSE TRIAGE (OUTPATIENT)
Dept: ADMINISTRATIVE | Facility: CLINIC | Age: 84
End: 2024-08-02
Payer: MEDICARE

## 2024-08-03 NOTE — TELEPHONE ENCOUNTER
Had a rocephin shot yesterday. Questioning if ok to take tylenol. Told yes. For gas, is it ok to take pepcid.   States she read the rocephin can make you tired. States been fighting sleepiness all day, but that she has also not been sleeping at night. Declines triage at this time.  Advised to call back with any further questions or concerns. Pt VU.     Reason for Disposition   Caller has medicine question, adult has minor symptoms, caller declines triage, AND triager answers question    Protocols used: Medication Question Call-A-

## 2024-08-05 ENCOUNTER — TELEPHONE (OUTPATIENT)
Dept: UROLOGY | Facility: CLINIC | Age: 84
End: 2024-08-05
Payer: MEDICARE

## 2024-08-06 ENCOUNTER — LAB VISIT (OUTPATIENT)
Dept: LAB | Facility: HOSPITAL | Age: 84
End: 2024-08-06
Attending: INTERNAL MEDICINE
Payer: MEDICARE

## 2024-08-06 ENCOUNTER — TELEPHONE (OUTPATIENT)
Dept: GASTROENTEROLOGY | Facility: CLINIC | Age: 84
End: 2024-08-06
Payer: MEDICARE

## 2024-08-06 ENCOUNTER — OFFICE VISIT (OUTPATIENT)
Dept: UROLOGY | Facility: CLINIC | Age: 84
End: 2024-08-06
Payer: MEDICARE

## 2024-08-06 VITALS
SYSTOLIC BLOOD PRESSURE: 146 MMHG | HEART RATE: 62 BPM | WEIGHT: 122.56 LBS | BODY MASS INDEX: 20.92 KG/M2 | HEIGHT: 64 IN | DIASTOLIC BLOOD PRESSURE: 70 MMHG

## 2024-08-06 DIAGNOSIS — C50.411 MALIGNANT NEOPLASM OF UPPER-OUTER QUADRANT OF RIGHT BREAST IN FEMALE, ESTROGEN RECEPTOR POSITIVE: Chronic | ICD-10-CM

## 2024-08-06 DIAGNOSIS — N20.0 NEPHROLITHIASIS: Primary | ICD-10-CM

## 2024-08-06 DIAGNOSIS — N18.32 STAGE 3B CHRONIC KIDNEY DISEASE: ICD-10-CM

## 2024-08-06 DIAGNOSIS — R19.7 DIARRHEA, UNSPECIFIED TYPE: Primary | ICD-10-CM

## 2024-08-06 DIAGNOSIS — Z17.0 MALIGNANT NEOPLASM OF UPPER-OUTER QUADRANT OF RIGHT BREAST IN FEMALE, ESTROGEN RECEPTOR POSITIVE: Chronic | ICD-10-CM

## 2024-08-06 LAB
ALBUMIN SERPL BCP-MCNC: 3.3 G/DL (ref 3.5–5.2)
ANION GAP SERPL CALC-SCNC: 8 MMOL/L (ref 8–16)
BUN SERPL-MCNC: 19 MG/DL (ref 8–23)
CALCIUM SERPL-MCNC: 9.2 MG/DL (ref 8.7–10.5)
CHLORIDE SERPL-SCNC: 105 MMOL/L (ref 95–110)
CO2 SERPL-SCNC: 29 MMOL/L (ref 23–29)
CREAT SERPL-MCNC: 1.3 MG/DL (ref 0.5–1.4)
EST. GFR  (NO RACE VARIABLE): 40.5 ML/MIN/1.73 M^2
GLUCOSE SERPL-MCNC: 128 MG/DL (ref 70–110)
PHOSPHATE SERPL-MCNC: 4.2 MG/DL (ref 2.7–4.5)
POTASSIUM SERPL-SCNC: 3.6 MMOL/L (ref 3.5–5.1)
SODIUM SERPL-SCNC: 142 MMOL/L (ref 136–145)

## 2024-08-06 PROCEDURE — 99213 OFFICE O/P EST LOW 20 MIN: CPT | Mod: PBBFAC | Performed by: UROLOGY

## 2024-08-06 PROCEDURE — 80069 RENAL FUNCTION PANEL: CPT | Performed by: INTERNAL MEDICINE

## 2024-08-06 PROCEDURE — 36415 COLL VENOUS BLD VENIPUNCTURE: CPT | Performed by: INTERNAL MEDICINE

## 2024-08-06 PROCEDURE — 99999 PR PBB SHADOW E&M-EST. PATIENT-LVL III: CPT | Mod: PBBFAC,,, | Performed by: UROLOGY

## 2024-08-06 RX ORDER — CEFTRIAXONE 1 G/1
1 INJECTION, POWDER, FOR SOLUTION INTRAMUSCULAR; INTRAVENOUS
Status: DISCONTINUED | OUTPATIENT
Start: 2024-08-06 | End: 2024-08-06

## 2024-08-07 ENCOUNTER — LAB VISIT (OUTPATIENT)
Dept: LAB | Facility: HOSPITAL | Age: 84
End: 2024-08-07
Attending: INTERNAL MEDICINE
Payer: MEDICARE

## 2024-08-07 DIAGNOSIS — R19.7 DIARRHEA, UNSPECIFIED TYPE: ICD-10-CM

## 2024-08-07 DIAGNOSIS — N18.32 STAGE 3B CHRONIC KIDNEY DISEASE: Primary | ICD-10-CM

## 2024-08-07 PROCEDURE — 87324 CLOSTRIDIUM AG IA: CPT | Performed by: INTERNAL MEDICINE

## 2024-08-07 PROCEDURE — 87329 GIARDIA AG IA: CPT | Performed by: INTERNAL MEDICINE

## 2024-08-07 PROCEDURE — 87507 IADNA-DNA/RNA PROBE TQ 12-25: CPT | Performed by: INTERNAL MEDICINE

## 2024-08-07 PROCEDURE — 87427 SHIGA-LIKE TOXIN AG IA: CPT | Performed by: INTERNAL MEDICINE

## 2024-08-07 PROCEDURE — 87328 CRYPTOSPORIDIUM AG IA: CPT | Performed by: INTERNAL MEDICINE

## 2024-08-07 PROCEDURE — 87045 FECES CULTURE AEROBIC BACT: CPT | Performed by: INTERNAL MEDICINE

## 2024-08-08 ENCOUNTER — PATIENT MESSAGE (OUTPATIENT)
Dept: NEPHROLOGY | Facility: CLINIC | Age: 84
End: 2024-08-08
Payer: MEDICARE

## 2024-08-08 ENCOUNTER — TELEPHONE (OUTPATIENT)
Dept: NEPHROLOGY | Facility: CLINIC | Age: 84
End: 2024-08-08
Payer: MEDICARE

## 2024-08-08 DIAGNOSIS — N18.32 STAGE 3B CHRONIC KIDNEY DISEASE: Primary | ICD-10-CM

## 2024-08-08 DIAGNOSIS — R19.7 DIARRHEA IN ADULT PATIENT: Primary | ICD-10-CM

## 2024-08-09 ENCOUNTER — TELEPHONE (OUTPATIENT)
Dept: GASTROENTEROLOGY | Facility: CLINIC | Age: 84
End: 2024-08-09
Payer: MEDICARE

## 2024-08-09 ENCOUNTER — PATIENT MESSAGE (OUTPATIENT)
Dept: GASTROENTEROLOGY | Facility: CLINIC | Age: 84
End: 2024-08-09
Payer: MEDICARE

## 2024-08-09 DIAGNOSIS — R19.7 DIARRHEA IN ADULT PATIENT: Primary | ICD-10-CM

## 2024-08-09 DIAGNOSIS — R19.7 DIARRHEA, UNSPECIFIED TYPE: Primary | ICD-10-CM

## 2024-08-09 RX ORDER — VANCOMYCIN HYDROCHLORIDE 125 MG/1
125 CAPSULE ORAL EVERY 6 HOURS
Qty: 56 CAPSULE | Refills: 0 | Status: SHIPPED | OUTPATIENT
Start: 2024-08-09 | End: 2024-08-23

## 2024-08-09 NOTE — TELEPHONE ENCOUNTER
----- Message from Melody Duron sent at 8/9/2024  1:15 PM CDT -----  Regarding: Callback  Contact: [phone] or [phone] landline  Patient calling requesting a callback from nurse or provider in regards to the specimen she dropped off and results. Please call back as soon as possible.

## 2024-08-09 NOTE — TELEPHONE ENCOUNTER
Please reenter orders. I could not find Microporidia  She wants to know if you can give her something for diarrhea

## 2024-08-12 ENCOUNTER — LAB VISIT (OUTPATIENT)
Dept: LAB | Facility: HOSPITAL | Age: 84
End: 2024-08-12
Attending: INTERNAL MEDICINE
Payer: MEDICARE

## 2024-08-12 ENCOUNTER — TELEPHONE (OUTPATIENT)
Dept: GASTROENTEROLOGY | Facility: CLINIC | Age: 84
End: 2024-08-12
Payer: MEDICARE

## 2024-08-12 DIAGNOSIS — R19.7 DIARRHEA, UNSPECIFIED TYPE: Primary | ICD-10-CM

## 2024-08-12 DIAGNOSIS — R19.7 DIARRHEA IN ADULT PATIENT: Primary | ICD-10-CM

## 2024-08-12 DIAGNOSIS — N18.32 STAGE 3B CHRONIC KIDNEY DISEASE: ICD-10-CM

## 2024-08-12 LAB
ALBUMIN SERPL BCP-MCNC: 3.1 G/DL (ref 3.5–5.2)
ANION GAP SERPL CALC-SCNC: 11 MMOL/L (ref 8–16)
BUN SERPL-MCNC: 26 MG/DL (ref 8–23)
CALCIUM SERPL-MCNC: 9.2 MG/DL (ref 8.7–10.5)
CHLORIDE SERPL-SCNC: 109 MMOL/L (ref 95–110)
CO2 SERPL-SCNC: 25 MMOL/L (ref 23–29)
CREAT SERPL-MCNC: 1.6 MG/DL (ref 0.5–1.4)
EST. GFR  (NO RACE VARIABLE): 31.6 ML/MIN/1.73 M^2
GLUCOSE SERPL-MCNC: 82 MG/DL (ref 70–110)
PHOSPHATE SERPL-MCNC: 3.8 MG/DL (ref 2.7–4.5)
POTASSIUM SERPL-SCNC: 3.9 MMOL/L (ref 3.5–5.1)
SODIUM SERPL-SCNC: 145 MMOL/L (ref 136–145)

## 2024-08-12 PROCEDURE — 36415 COLL VENOUS BLD VENIPUNCTURE: CPT | Performed by: INTERNAL MEDICINE

## 2024-08-12 PROCEDURE — 80069 RENAL FUNCTION PANEL: CPT | Performed by: INTERNAL MEDICINE

## 2024-08-13 ENCOUNTER — TELEPHONE (OUTPATIENT)
Dept: NEPHROLOGY | Facility: CLINIC | Age: 84
End: 2024-08-13
Payer: MEDICARE

## 2024-08-13 DIAGNOSIS — N18.31 STAGE 3A CHRONIC KIDNEY DISEASE: Primary | ICD-10-CM

## 2024-08-13 NOTE — TELEPHONE ENCOUNTER
Please let her know that her creatinine is unchanged at 1.6 and to make sure she is self cathing her self several times a day per urology's recs as this can effect her kidney function and to hydrate well.  I also need an US on her this week if possible-ordered.

## 2024-08-14 ENCOUNTER — TELEPHONE (OUTPATIENT)
Dept: NEPHROLOGY | Facility: CLINIC | Age: 84
End: 2024-08-14
Payer: MEDICARE

## 2024-08-15 ENCOUNTER — PATIENT MESSAGE (OUTPATIENT)
Dept: GASTROENTEROLOGY | Facility: CLINIC | Age: 84
End: 2024-08-15
Payer: MEDICARE

## 2024-08-15 ENCOUNTER — HOSPITAL ENCOUNTER (OUTPATIENT)
Dept: RADIOLOGY | Facility: HOSPITAL | Age: 84
Discharge: HOME | End: 2024-08-15
Attending: INTERNAL MEDICINE
Payer: MEDICARE

## 2024-08-15 DIAGNOSIS — R19.7 DIARRHEA IN ADULT PATIENT: Primary | ICD-10-CM

## 2024-08-15 DIAGNOSIS — N18.31 STAGE 3A CHRONIC KIDNEY DISEASE: ICD-10-CM

## 2024-08-15 PROCEDURE — 76775 US EXAM ABDO BACK WALL LIM: CPT | Mod: TC

## 2024-08-15 PROCEDURE — 76775 US EXAM ABDO BACK WALL LIM: CPT | Mod: 26,,, | Performed by: RADIOLOGY

## 2024-08-16 ENCOUNTER — LAB VISIT (OUTPATIENT)
Dept: LAB | Facility: HOSPITAL | Age: 84
End: 2024-08-16
Attending: INTERNAL MEDICINE
Payer: MEDICARE

## 2024-08-16 ENCOUNTER — PATIENT MESSAGE (OUTPATIENT)
Dept: UROLOGY | Facility: CLINIC | Age: 84
End: 2024-08-16
Payer: MEDICARE

## 2024-08-16 DIAGNOSIS — R19.7 DIARRHEA IN ADULT PATIENT: ICD-10-CM

## 2024-08-16 LAB
C DIFF GDH STL QL: POSITIVE
C DIFF TOX A+B STL QL IA: POSITIVE

## 2024-08-16 PROCEDURE — 87449 NOS EACH ORGANISM AG IA: CPT | Mod: 91 | Performed by: INTERNAL MEDICINE

## 2024-08-16 PROCEDURE — 87449 NOS EACH ORGANISM AG IA: CPT | Performed by: INTERNAL MEDICINE

## 2024-08-16 PROCEDURE — 87329 GIARDIA AG IA: CPT | Performed by: INTERNAL MEDICINE

## 2024-08-16 PROCEDURE — 87045 FECES CULTURE AEROBIC BACT: CPT | Performed by: INTERNAL MEDICINE

## 2024-08-16 PROCEDURE — 87427 SHIGA-LIKE TOXIN AG IA: CPT | Mod: 59 | Performed by: INTERNAL MEDICINE

## 2024-08-16 PROCEDURE — 87324 CLOSTRIDIUM AG IA: CPT | Performed by: INTERNAL MEDICINE

## 2024-08-16 PROCEDURE — 87046 STOOL CULTR AEROBIC BACT EA: CPT | Performed by: INTERNAL MEDICINE

## 2024-08-17 ENCOUNTER — PATIENT MESSAGE (OUTPATIENT)
Dept: GASTROENTEROLOGY | Facility: CLINIC | Age: 84
End: 2024-08-17
Payer: MEDICARE

## 2024-08-17 DIAGNOSIS — A04.72 CLOSTRIDIOIDES DIFFICILE DIARRHEA: ICD-10-CM

## 2024-08-17 DIAGNOSIS — A04.72 CLOSTRIDIOIDES DIFFICILE DIARRHEA: Primary | ICD-10-CM

## 2024-08-17 DIAGNOSIS — R19.7 DIARRHEA IN ADULT PATIENT: ICD-10-CM

## 2024-08-17 LAB
CRYPTOSP AG STL QL IA: NEGATIVE
E COLI SXT1 STL QL IA: NEGATIVE
E COLI SXT2 STL QL IA: NEGATIVE
G LAMBLIA AG STL QL IA: NEGATIVE

## 2024-08-17 RX ORDER — VANCOMYCIN HYDROCHLORIDE 125 MG/1
CAPSULE ORAL
Qty: 77 CAPSULE | Refills: 0 | Status: SHIPPED | OUTPATIENT
Start: 2024-08-17 | End: 2024-08-17 | Stop reason: SDUPTHER

## 2024-08-17 RX ORDER — VANCOMYCIN HYDROCHLORIDE 125 MG/1
CAPSULE ORAL
Qty: 77 CAPSULE | Refills: 0 | Status: SHIPPED | OUTPATIENT
Start: 2024-08-17

## 2024-08-17 NOTE — PROGRESS NOTES
Jeremy Hidalgo's stool came back positive for C diff she has started a 14 day prescription for vancomycin today it is 125 mg by mouth every 6 hours for 14 days she has that prescription at her house.    When she finishes that prescription she will start this new prescription which is a tapered vancomycin prescription for some reason will not allow me to send this electronically in [place] could you help get this to her pharmacy so she can have it available to take when her above prescription is completed and she will start this taper.    Thank you so much    vancomycin (VANCOCIN) 125 MG capsule 77 capsule 0 8/17/2024 - No  Sig: Take 1 pill every 6 hours for for 7 days, then  Take 1 pill every 12 hours for 7 days, then  Take 1 pill every day for 7 days, then  Take 1 pill every other day for 8 weeks  Class: Print  Order: [id number]  Date/Time Signed: 8/17/2024 14:08          We discuss what C diff is and how to prevent it in the importance of excellent hygiene with adequate washing of the hands with soap and water as alcohol hand  ers do not kill the spores for C diff and soap and water handwashing is the best

## 2024-08-17 NOTE — PROGRESS NOTES
Rosie please refer Shima to infectious disease clinic for management of recurrent C diff if she can do a video visit that would be the best in light of her communicable diarrhea infectious.  Referral placed thank you

## 2024-08-18 LAB — BACTERIA STL CULT: NORMAL

## 2024-08-19 ENCOUNTER — PATIENT MESSAGE (OUTPATIENT)
Dept: GASTROENTEROLOGY | Facility: CLINIC | Age: 84
End: 2024-08-19
Payer: MEDICARE

## 2024-08-19 LAB
CYCLOSPORA STL SAFRANIN STN: NORMAL
ENCEPHALITOZOON BIENEUSI: NEGATIVE
ENCEPHALITOZOON SPECIES: NEGATIVE
MICROSPORIDIA SPECIMEN SOURCE: NORMAL

## 2024-08-20 ENCOUNTER — TELEPHONE (OUTPATIENT)
Dept: INFECTIOUS DISEASES | Facility: CLINIC | Age: 84
End: 2024-08-20
Payer: MEDICARE

## 2024-08-21 ENCOUNTER — PATIENT MESSAGE (OUTPATIENT)
Dept: GASTROENTEROLOGY | Facility: CLINIC | Age: 84
End: 2024-08-21
Payer: MEDICARE

## 2024-08-21 LAB — O+P STL MICRO: NORMAL

## 2024-08-26 ENCOUNTER — PATIENT MESSAGE (OUTPATIENT)
Dept: GASTROENTEROLOGY | Facility: CLINIC | Age: 84
End: 2024-08-26
Payer: MEDICARE

## 2024-08-26 ENCOUNTER — PATIENT MESSAGE (OUTPATIENT)
Dept: UROLOGY | Facility: CLINIC | Age: 84
End: 2024-08-26
Payer: MEDICARE

## 2024-08-26 DIAGNOSIS — Z01.411 ABNORMAL GYNECOLOGICAL EXAMINATION: Primary | ICD-10-CM

## 2024-08-27 ENCOUNTER — HOSPITAL ENCOUNTER (EMERGENCY)
Facility: HOSPITAL | Age: 84
Discharge: HOME | End: 2024-08-27
Attending: EMERGENCY MEDICINE
Payer: MEDICARE

## 2024-08-27 ENCOUNTER — TELEPHONE (OUTPATIENT)
Dept: INTERNAL MEDICINE | Facility: CLINIC | Age: 84
End: 2024-08-27
Payer: MEDICARE

## 2024-08-27 VITALS
DIASTOLIC BLOOD PRESSURE: 85 MMHG | WEIGHT: 122 LBS | SYSTOLIC BLOOD PRESSURE: 179 MMHG | HEIGHT: 64 IN | HEART RATE: 71 BPM | TEMPERATURE: 98 F | RESPIRATION RATE: 20 BRPM | OXYGEN SATURATION: 100 % | BODY MASS INDEX: 20.83 KG/M2

## 2024-08-27 DIAGNOSIS — A04.72 CLOSTRIDIUM DIFFICILE DIARRHEA: Primary | ICD-10-CM

## 2024-08-27 DIAGNOSIS — Z00.00 ENCOUNTER FOR MEDICARE ANNUAL WELLNESS EXAM: ICD-10-CM

## 2024-08-27 LAB
ALBUMIN SERPL BCP-MCNC: 3.1 G/DL (ref 3.5–5.2)
ALP SERPL-CCNC: 61 U/L (ref 55–135)
ALT SERPL W/O P-5'-P-CCNC: 16 U/L (ref 10–44)
ANION GAP SERPL CALC-SCNC: 7 MMOL/L (ref 8–16)
AST SERPL-CCNC: 25 U/L (ref 10–40)
BASOPHILS # BLD AUTO: 0.02 K/UL (ref 0–0.2)
BASOPHILS NFR BLD: 0.4 % (ref 0–1.9)
BILIRUB SERPL-MCNC: 0.5 MG/DL (ref 0.1–1)
BUN SERPL-MCNC: 27 MG/DL (ref 8–23)
CALCIUM SERPL-MCNC: 8.9 MG/DL (ref 8.7–10.5)
CHLORIDE SERPL-SCNC: 104 MMOL/L (ref 95–110)
CO2 SERPL-SCNC: 30 MMOL/L (ref 23–29)
CREAT SERPL-MCNC: 1.5 MG/DL (ref 0.5–1.4)
DIFFERENTIAL METHOD BLD: ABNORMAL
EOSINOPHIL # BLD AUTO: 0 K/UL (ref 0–0.5)
EOSINOPHIL NFR BLD: 0.4 % (ref 0–8)
ERYTHROCYTE [DISTWIDTH] IN BLOOD BY AUTOMATED COUNT: 14.3 % (ref 11.5–14.5)
EST. GFR  (NO RACE VARIABLE): 34.2 ML/MIN/1.73 M^2
GLUCOSE SERPL-MCNC: 100 MG/DL (ref 70–110)
GLUCOSE SERPL-MCNC: 89 MG/DL (ref 70–110)
HCT VFR BLD AUTO: 30.5 % (ref 37–48.5)
HGB BLD-MCNC: 9.9 G/DL (ref 12–16)
IMM GRANULOCYTES # BLD AUTO: 0.01 K/UL (ref 0–0.04)
IMM GRANULOCYTES NFR BLD AUTO: 0.2 % (ref 0–0.5)
LYMPHOCYTES # BLD AUTO: 1.6 K/UL (ref 1–4.8)
LYMPHOCYTES NFR BLD: 31.9 % (ref 18–48)
MCH RBC QN AUTO: 32.4 PG (ref 27–31)
MCHC RBC AUTO-ENTMCNC: 32.5 G/DL (ref 32–36)
MCV RBC AUTO: 100 FL (ref 82–98)
MONOCYTES # BLD AUTO: 0.4 K/UL (ref 0.3–1)
MONOCYTES NFR BLD: 8.9 % (ref 4–15)
NEUTROPHILS # BLD AUTO: 2.9 K/UL (ref 1.8–7.7)
NEUTROPHILS NFR BLD: 58.2 % (ref 38–73)
NRBC BLD-RTO: 0 /100 WBC
PLATELET # BLD AUTO: 168 K/UL (ref 150–450)
PMV BLD AUTO: 11.5 FL (ref 9.2–12.9)
POCT GLUCOSE: 100 MG/DL (ref 70–110)
POTASSIUM SERPL-SCNC: 4.2 MMOL/L (ref 3.5–5.1)
PROT SERPL-MCNC: 6.6 G/DL (ref 6–8.4)
RBC # BLD AUTO: 3.06 M/UL (ref 4–5.4)
SODIUM SERPL-SCNC: 141 MMOL/L (ref 136–145)
WBC # BLD AUTO: 4.92 K/UL (ref 3.9–12.7)

## 2024-08-27 PROCEDURE — 85025 COMPLETE CBC W/AUTO DIFF WBC: CPT | Performed by: NURSE PRACTITIONER

## 2024-08-27 PROCEDURE — 82962 GLUCOSE BLOOD TEST: CPT

## 2024-08-27 PROCEDURE — 99283 EMERGENCY DEPT VISIT LOW MDM: CPT

## 2024-08-27 PROCEDURE — 80053 COMPREHEN METABOLIC PANEL: CPT | Performed by: NURSE PRACTITIONER

## 2024-08-27 RX ORDER — MUPIROCIN 20 MG/G
OINTMENT TOPICAL 3 TIMES DAILY
Qty: 30 G | Refills: 0 | Status: SHIPPED | OUTPATIENT
Start: 2024-08-27

## 2024-08-27 NOTE — TELEPHONE ENCOUNTER
Called and spoke to patient. Patient states she will be there at 130 because she has not eaten breakfast yet. Informed her that she may need to find someone to drive her if she is having dizziness. Patient states she will be driving herself. Then she hung up.

## 2024-08-27 NOTE — FIRST PROVIDER EVALUATION
Emergency Department TeleTriage Encounter Note      CHIEF COMPLAINT    Chief Complaint   Patient presents with    Dizziness     States currently being treated for Cdiff. States  Called her to come to ED to be evaluated. Patient denies any dizziness at present        VITAL SIGNS   Initial Vitals [08/27/24 1340]   BP Pulse Resp Temp SpO2   132/72 93 20 98.6 °F (37 °C) 97 %      MAP       --            ALLERGIES    Review of patient's allergies indicates:   Allergen Reactions    Asparagus Rash    Macrobid [nitrofurantoin monohyd/m-cryst] Rash     Peeling skin and petechia       PROVIDER TRIAGE NOTE  This is a teletriage evaluation of a 84 y.o. female presenting to the ED complaining of PCP referral. States that her doctor told her to come in but she currently is at baseline. Denies complaints. Specifically, denies dizziness previously and at present.     Initial orders will be placed and care will be transferred to an alternate provider when patient is roomed for a full evaluation. Any additional orders and the final disposition will be determined by that provider.         ORDERS  Labs Reviewed   CBC W/ AUTO DIFFERENTIAL   COMPREHENSIVE METABOLIC PANEL   URINALYSIS, REFLEX TO URINE CULTURE   POCT GLUCOSE MONITORING CONTINUOUS       ED Orders (720h ago, onward)      Start Ordered     Status Ordering Provider    08/27/24 1454 08/27/24 1454  CBC auto differential  STAT         Ordered YOKASTA EDWARDS N.    08/27/24 1454 08/27/24 1454  Comprehensive metabolic panel  STAT         Ordered YOKASTA EDWARDS N.    08/27/24 1454 08/27/24 1454  Insert Saline lock IV  Once         Ordered YOKASTA EDWARDS NMaye    08/27/24 1454 08/27/24 1454  POCT glucose  Once         Ordered YOKASTA EDWARDS N.    08/27/24 1454 08/27/24 1454  Urinalysis, Reflex to Urine Culture Urine, Clean Catch  STAT         Ordered YOKASTA EDWARDS NMaye    08/27/24 1454 08/27/24 1454  Orthostatic vital signs  Once         Ordered  YOKASTA EDWARDS              Virtual Visit Note: The provider triage portion of this emergency department evaluation and documentation was performed via [employer], a HIPAA-compliant telemedicine application, in concert with a tele-presenter in the room. A face to face patient evaluation with one of my colleagues will occur once the patient is placed in an emergency department room.      DISCLAIMER: This note was prepared with emere voice recognition transcription software. Garbled syntax, mangled pronouns, and other bizarre constructions may be attributed to that software system.

## 2024-08-27 NOTE — ED PROVIDER NOTES
"Encounter Date: 8/27/2024       History     Chief Complaint   Patient presents with    Dizziness     States currently being treated for Cdiff. States  Called her to come to ED to be evaluated. Patient denies any dizziness at present      84-year-old female with history of hypertension, osteoporosis, vestibular neuritis, obstructive sleep apnea who presents to the emergency department at the recommendation of her outpatient physician.  Patient states that she began to have diarrhea 3 weeks ago.  She was diagnosed with C diff and started on vancomycin.  Since starting the medication, she states that her symptoms have improved.  She states that her diarrhea has slowed down, although she still does have bowel movements when she eats.  She denies any abdominal pain, nausea, vomiting, fever, chest pain, shortness of breath, palpitations, lightheadedness, dizziness.  She states that she was at the grocery store today when she received a call from her doctor's office.  She was told to come to the emergency department right away.  She asked why this was necessary, but was told that whoever she was speaking with was "just the messenger."   There is a note in the chart in which the patient said that she tripped and fell a few months ago and did strike her head.  She denies loss of consciousness.  She has not had a headache since then.  She also complains of bumping her head almost daily, either on an open kitchen cabinet or her kitchen door. She also complains of ringing in her ears for the last year.   She states that she feels at her baseline. She has no complaints currently, other than her diarrhea. She confirms that her diarrhea is improving.       Review of patient's allergies indicates:   Allergen Reactions    Asparagus Rash    Macrobid [nitrofurantoin monohyd/m-cryst] Rash     Peeling skin and petechia     Past Medical History:   Diagnosis Date    Allergy     seasonal    Anemia     Basal cell carcinoma 7/2013    " forehead    Breast cancer 2018    Hx of colonic polyps     Hypertension     Medullary sponge kidney     MVP (mitral valve prolapse)     Osteoporosis, senile     Pneumonia     Renal calculi     Sciatica     Sleep apnea     TMJ syndrome     sometimes jaw clicking/jaw pain    Urinary retention     Vertigo 1/17/2017    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     Past Surgical History:   Procedure Laterality Date    BREAST CYST ASPIRATION Right 1999    BREAST LUMPECTOMY Right 2018    with radiation    COLONOSCOPY N/A 7/24/2018    Procedure: COLONOSCOPY;  Surgeon: Juan Miguel Pena MD;  Location: [facility] ENDO (4TH FLR);  Service: Endoscopy;  Laterality: N/A;    COLONOSCOPY N/A 5/10/2023    Procedure: COLONOSCOPY;  Surgeon: Keven Garza MD;  Location: [facility] ENDO (4TH FLR);  Service: Endoscopy;  Laterality: N/A;  *Pending C-Diff*  Request Greg  procedure order telephone encounter 5/1  PEG prep, instructions portal -LW  5/4/23 no answer for precall/mleone lpn  5/9lm    ESOPHAGOGASTRODUODENOSCOPY N/A 3/17/2023    Procedure: EGD (ESOPHAGOGASTRODUODENOSCOPY);  Surgeon: Keven Garza MD;  Location: [facility] (4TH FLR);  Service: Endoscopy;  Laterality: N/A;  Medically Urgent  3/2 instructions to portal-st    pre call attempted, no answer from pt 3/13/23 -egh    INJECTION FOR SENTINEL NODE IDENTIFICATION Right 8/17/2018    Procedure: INJECTION, FOR SENTINEL NODE IDENTIFICATION;  Surgeon: Abby Mason MD;  [facility];  Service: General;  Laterality: Right;    interstim placed stage 1      and removed    KIDNEY STONE SURGERY  2000    @ [facility]    MASTECTOMY, PARTIAL Right 8/17/2018    Procedure: MASTECTOMY, PARTIAL-US guided;  Surgeon: Abby Mason MD;  [facility];  Service: General;  Laterality: Right;    MOHS procedure      SENTINEL LYMPH NODE BIOPSY Right 8/17/2018    Procedure: BIOPSY, LYMPH NODE, SENTINEL;  Surgeon: Abby Mason MD;  Location: [facility]  OR 2ND FLR;  Service: General;  Laterality: Right;     Family History   Problem Relation Name Age of Onset    Kidney disease Mother      Heart disease Father      Melanoma Father      Heart attack Father      Breast cancer Maternal Aunt      Hearing loss Son      Hyperlipidemia Son      Anesthesia problems Neg Hx      Malignant hypertension Neg Hx      Hypotension Neg Hx      Malignant hyperthermia Neg Hx      Pseudochol deficiency Neg Hx      Colon cancer Neg Hx      Ovarian cancer Neg Hx      Psoriasis Neg Hx      Lupus Neg Hx      Eczema Neg Hx      Acne Neg Hx      Esophageal cancer Neg Hx       Social History     Tobacco Use    Smoking status: Former     Current packs/day: 0.00     Average packs/day: 0.5 packs/day for 8.0 years (4.0 ttl pk-yrs)     Types: Cigarettes     Start date: 1956     Quit date: 1964     Years since quittin.0     Passive exposure: Past    Smokeless tobacco: Never   Substance Use Topics    Alcohol use: Not Currently    Drug use: No     Review of Systems   Gastrointestinal:  Positive for diarrhea.       Physical Exam     Initial Vitals [24 1340]   BP Pulse Resp Temp SpO2   132/72 93 20 98.6 °F (37 °C) 97 %      MAP       --         Physical Exam    Vitals reviewed.  Constitutional: She appears well-developed and well-nourished. She is not diaphoretic. No distress.   HENT:   Head: Normocephalic and atraumatic.   Mouth/Throat: Oropharynx is clear and moist.   Eyes: Conjunctivae and EOM are normal. Pupils are equal, round, and reactive to light.   Neck: Neck supple.   Normal range of motion.  Cardiovascular:  Normal rate, regular rhythm, normal heart sounds and intact distal pulses.     Exam reveals no gallop and no friction rub.       No murmur heard.  Pulmonary/Chest: Breath sounds normal. She has no wheezes. She has no rhonchi. She has no rales.   Abdominal: Abdomen is soft. Bowel sounds are normal. There is no abdominal tenderness.   Musculoskeletal:         General:  Normal range of motion.      Cervical back: Normal range of motion and neck supple.     Neurological: She is alert and oriented to person, place, and time. She has normal strength. No cranial nerve deficit or sensory deficit. GCS score is 15. GCS eye subscore is 4. GCS verbal subscore is 5. GCS motor subscore is 6.   Skin: Skin is warm and dry. Capillary refill takes less than 2 seconds.   Psychiatric: She has a normal mood and affect. Her behavior is normal. Judgment and thought content normal.         ED Course   Procedures  Labs Reviewed   CBC W/ AUTO DIFFERENTIAL - Abnormal       Result Value    WBC 4.92      RBC 3.06 (*)     Hemoglobin 9.9 (*)     Hematocrit 30.5 (*)      (*)     MCH 32.4 (*)     MCHC 32.5      RDW 14.3      Platelets 168      MPV 11.5      Immature Granulocytes 0.2      Gran # (ANC) 2.9      Immature Grans (Abs) 0.01      Lymph # 1.6      Mono # 0.4      Eos # 0.0      Baso # 0.02      nRBC 0      Gran % 58.2      Lymph % 31.9      Mono % 8.9      Eosinophil % 0.4      Basophil % 0.4      Differential Method Automated     COMPREHENSIVE METABOLIC PANEL - Abnormal    Sodium 141      Potassium 4.2      Chloride 104      CO2 30 (*)     Glucose 89      BUN 27 (*)     Creatinine 1.5 (*)     Calcium 8.9      Total Protein 6.6      Albumin 3.1 (*)     Total Bilirubin 0.5      Alkaline Phosphatase 61      AST 25      ALT 16      eGFR 34.2 (*)     Anion Gap 7 (*)    POCT GLUCOSE MONITORING CONTINUOUS    POC Glucose 100     POCT GLUCOSE    POCT Glucose 100            Imaging Results    None          Medications - No data to display  Medical Decision Making  Emergent evaluation of a 84 y.o. female presenting to the emergency department complaining of diarrhea. Patient is afebrile, hemodynamically stable, and non toxic appearing.   Labs ordered in triage.     Differential diagnosis includes but isn't limited to C diff, metabolic derangement, medication reaction, orthostasis.    Patient presenting to  the emergency department at the request of her outpatient physician.  The patient was messaging with her primary care physician as well as her gastroenterologist.  She states that she was diagnosed with C diff recently and started on vancomycin.  Since starting the vancomycin, she states that her symptoms are improving.  She continues to have some diarrhea.  Otherwise negative review of systems.  She states that she had a trip and fall a few months ago.  She did strike her head at the time.  She did not lose consciousness.  She has not had a headache since then.  She also tells me that she bumps her head almost daily either on her refrigerator door or an open kitchen cabinet.  She does not take blood thinners. I suspect this is why she was referred to the ED.  I offer to perform a head CT, although she adamantly declines.  She states that she thinks that is a waste of money.  She states that she was at the grocery store today when she received a call for her to report to the emergency department.  She does not know why that she needed to come here today and states that she feels at her baseline.  Her symptoms have improved since she started taking the vancomycin. She is tolerating the medication well. She also mentioned in her patient message that she has ringing in her ears.  She tells me that this has been chronic for about the last year and unchanged.   She has no severe metabolic or hematologic derangements.   She is eager for discharge home. Recommend close outpatient follow up. Return precautions given. All questions answered.   The patient was instructed to follow up with a primary care provider and GI or to return to the emergency department for worsening symptoms. The treatment plan was discussed with the patient who demonstrated understanding and comfort with plan. The patient's history, physical exam, and plan of care was discussed with and agreed upon with my supervising physician.     After bringing the  patient her discharge paperwork, she mentions that there is something on her labia that she would like for me to look at.  With a nursing chaperone present, I perform a  exam.  She has a small area of erythema to her left inner gluteal cleft.  There is no fluctuance to suggest an abscess.  She also has safety erythema and induration to the superior portion of her right labia majora.  There appears to be a ruptured pustule in the inferior region of the erythema.  I suspect this may be due to her frequent diarrhea.  She states that the area on her left glute is being irritated by her depends.  We discussed using a barrier, such as a Band-Aid in this area.  We will also start mupirocin.  Would like to avoid additional oral antibiotics as she is currently being treated for C diff. recommend warm compresses in the area and mupirocin.  She will need a wound recheck in 3 days.  She is advised to return to the emergency department if she can not follow up with her PCP or gynecologist in his time.  She voices understanding.  All questions answered.    Risk  Prescription drug management.                                      Clinical Impression:  Final diagnoses:  [A04.72] Clostridium difficile diarrhea (Primary)          ED Disposition Condition    Discharge Stable          ED Prescriptions    None       Follow-up Information       Follow up With Specialties Details Why Contact Info    [facility] - Emergency Dept Emergency Medicine Go to  If symptoms worsen [address] [facility] [id number] [phone]    Carmen Milton MD Internal Medicine Schedule an appointment as soon as possible for a visit   [address] [facility] [address]  [phone]               Marie Thompson PA-C  08/27/24 1632       Marie Thompson PA-C  08/27/24 0367

## 2024-08-27 NOTE — DISCHARGE INSTRUCTIONS
Continue taking your medication as prescribed.  Follow up with your primary doctor and Gastroenterology or return to the emergency department sooner for any new or worsening symptoms.

## 2024-08-27 NOTE — ED TRIAGE NOTES
Pt presents to the ED for evaluation related to dizziness. Pt states her GI doctor told her to come to the ED to be evaluated for dizziness, though she denies being dizzy. Pt is currently being treated for C.diff. Pt denies nausea, chest pain, and SOB.

## 2024-08-28 ENCOUNTER — TELEPHONE (OUTPATIENT)
Dept: INTERNAL MEDICINE | Facility: CLINIC | Age: 84
End: 2024-08-28
Payer: MEDICARE

## 2024-08-28 ENCOUNTER — PATIENT OUTREACH (OUTPATIENT)
Dept: EMERGENCY MEDICINE | Facility: HOSPITAL | Age: 84
End: 2024-08-28
Payer: MEDICARE

## 2024-08-28 DIAGNOSIS — Z51.89 ENCOUNTER FOR WOUND CARE: Primary | ICD-10-CM

## 2024-08-28 NOTE — PROGRESS NOTES
Spoke with Pt regarding their recent ED visit for dizziness. Pt states that she is doing ok and denies having any dizziness then or now. Message forwarded to PCP in regards to her wound care needs to [facility]. Pt encouraged to reach out with any concerns at they arise. ED navigator will follow-up with patient to assist as needed and to remind of upcoming appt's.

## 2024-08-28 NOTE — TELEPHONE ENCOUNTER
----- Message from Kelsie Calvillo MA sent at 8/28/2024 12:41 PM CDT -----    ----- Message -----  From: Rachelle Montiel LPN  Sent: 8/28/2024  12:38 PM CDT  To: [facility] Staff    Please reach out to Pt with questions regarding her recent ED visit and wound to [facility]. She thinks she may need to follow up with wound care. Please reach out to assist.

## 2024-08-29 ENCOUNTER — TELEPHONE (OUTPATIENT)
Dept: WOUND CARE | Facility: HOSPITAL | Age: 84
End: 2024-08-29
Payer: MEDICARE

## 2024-08-29 NOTE — TELEPHONE ENCOUNTER
Attempted to call patient. Left voice message requesting return call in regards to wound care referral.

## 2024-09-03 ENCOUNTER — TELEPHONE (OUTPATIENT)
Dept: INTERNAL MEDICINE | Facility: CLINIC | Age: 84
End: 2024-09-03
Payer: MEDICARE

## 2024-09-03 ENCOUNTER — TELEPHONE (OUTPATIENT)
Dept: WOUND CARE | Facility: HOSPITAL | Age: 84
End: 2024-09-03
Payer: MEDICARE

## 2024-09-03 ENCOUNTER — OFFICE VISIT (OUTPATIENT)
Dept: HEMATOLOGY/ONCOLOGY | Facility: CLINIC | Age: 84
End: 2024-09-03
Payer: MEDICARE

## 2024-09-03 ENCOUNTER — PATIENT MESSAGE (OUTPATIENT)
Dept: GASTROENTEROLOGY | Facility: CLINIC | Age: 84
End: 2024-09-03
Payer: MEDICARE

## 2024-09-03 VITALS
SYSTOLIC BLOOD PRESSURE: 171 MMHG | HEIGHT: 64 IN | BODY MASS INDEX: 21.56 KG/M2 | WEIGHT: 126.31 LBS | TEMPERATURE: 97 F | OXYGEN SATURATION: 98 % | DIASTOLIC BLOOD PRESSURE: 79 MMHG | HEART RATE: 60 BPM

## 2024-09-03 DIAGNOSIS — Z17.0 MALIGNANT NEOPLASM OF UPPER-OUTER QUADRANT OF RIGHT BREAST IN FEMALE, ESTROGEN RECEPTOR POSITIVE: Primary | Chronic | ICD-10-CM

## 2024-09-03 DIAGNOSIS — C50.411 MALIGNANT NEOPLASM OF UPPER-OUTER QUADRANT OF RIGHT BREAST IN FEMALE, ESTROGEN RECEPTOR POSITIVE: Primary | Chronic | ICD-10-CM

## 2024-09-03 DIAGNOSIS — N18.30 ANEMIA IN STAGE 3 CHRONIC KIDNEY DISEASE, UNSPECIFIED WHETHER STAGE 3A OR 3B CKD: ICD-10-CM

## 2024-09-03 DIAGNOSIS — N18.32 STAGE 3B CHRONIC KIDNEY DISEASE: ICD-10-CM

## 2024-09-03 DIAGNOSIS — D63.1 ANEMIA IN STAGE 3 CHRONIC KIDNEY DISEASE, UNSPECIFIED WHETHER STAGE 3A OR 3B CKD: ICD-10-CM

## 2024-09-03 PROCEDURE — G2211 COMPLEX E/M VISIT ADD ON: HCPCS | Mod: S$PBB,,, | Performed by: INTERNAL MEDICINE

## 2024-09-03 PROCEDURE — 99999 PR PBB SHADOW E&M-EST. PATIENT-LVL III: CPT | Mod: PBBFAC,,, | Performed by: INTERNAL MEDICINE

## 2024-09-03 PROCEDURE — 99213 OFFICE O/P EST LOW 20 MIN: CPT | Mod: PBBFAC | Performed by: INTERNAL MEDICINE

## 2024-09-03 PROCEDURE — 99214 OFFICE O/P EST MOD 30 MIN: CPT | Mod: S$PBB,,, | Performed by: INTERNAL MEDICINE

## 2024-09-03 RX ORDER — MUPIROCIN 20 MG/G
OINTMENT TOPICAL 3 TIMES DAILY PRN
Qty: 30 G | Refills: 1 | Status: SHIPPED | OUTPATIENT
Start: 2024-09-03

## 2024-09-03 NOTE — PROGRESS NOTES
Subjective:       Patient ID: Shima Sorto is a 84 y.o. female.    Chief Complaint: No chief complaint on file.    HPI    Ms. Sorto returns today for follow up.  I had last seen her on July 3,2024 and had continued to hold her weekly EPO due to her Hg being 11.3 gr/dl.    Her most recent labs from August 27, 2024 are as follows:  Her CBC shows a white count of 4,900 per cubic mm, hemoglobin 9.9 g per dL, hematocrit 30.5 %,  and platelets 168 K.  Creatinine 1.5 mg/dl, and eGFR 34 ml/min.    In regards to her breast cancer, had been started on anastrazole in mid November 2018, and completed 5 years at the end of October 2023.  In late August 2023 she underwent a bone marrow biopsy which was essentially unremarkable.  There was no evidence of MDS, leukemia, lymphoma, myeloma, or metastatic carcinoma.  Cytogenetics were normal and reticulin was not increased.  Cellularity was 35 %.    Other recent pertinent labs are as follows:  3 stool samples were negative for occult blood  Multiple urinalyses have shown persistent hematuria.  Of note, the patient self catheterizes 3 times a day.  B12 is 406 pg/mL  SPEP shows no monoclonal spike  [address] Lane is negative    Briefly, she is an 84-year-old  female who was diagnosed with breast cancer in 2018.  On 08/17/2018, she underwent a lumpectomy and sentinel lymph node biopsy for an 8 mm grade 1 invasive lobular carcinoma which was ER strongly positive, MA negative and HER-2 negative with a Ki-67 of 5%, with the closest margin being 2 mm.  Two of 2 sentinel lymph nodes were negative for involvement.      She completed her radiation therapy and was subsequently started on AIs.  She completed 5 years of adjuvant hormonal therapy and she is being followed expectantly.    A mammogram on 7/9/2024 was read as BIRADS II, and a one year follow up was recommended.      In April 2023 she had been diagnosed with C diff and she was treated accordingly.  She underwent  a colonoscopy and EGD by Dr. Garza.  The colonoscopy showed 3 polyps and extensive diverticulosis.  Biopsies were negative for malignancy.  The EGD showed gastritis and a hiatal hernia.  A video capsule swallow did not identify a source of bleeding in her small intestine.       Review of Systems      Overall she feels OK, and her ECOG PS is 1.  She states that her stamina is somewhat decreased.  She denies any anxiety, depression, easy bruising, fevers, chills, night  sweats, weight loss, nausea, vomiting, diarrhea, constipation, diplopia, blurred vision, headache, chest pain, palpitations, shortness of breath, breast pain, abdominal pain, extremity pain, or difficulty ambulating.  The remainder of the ten-point ROS, including general, skin, lymph, H/N, cardiorespiratory, GI, , Neuro, Endocrine, and psychiatric is negative.     Objective:      Physical Exam        She is alert, oriented to time, place, person, pleasant, well      nourished, in no acute physical distress.                                    VITAL SIGNS:  Reviewed                                      HEENT:  Normal.  There are no nasal, oral, lip, gingival, auricular, lid,    or conjunctival lesions.  Mucosae are moist and pink, and there is no        thrush.  Pupils are equal, reactive to light and accommodation.              Extraocular muscle movements are intact.  Dentition is good.                                    NECK:  Supple without JVD, adenopathy, or thyromegaly.                       LUNGS:  Clear to auscultation without wheezing, rales, or rhonchi.           CARDIOVASCULAR:  Reveals an S1, S2, no murmurs, no rubs, no gallops.         ABDOMEN:  Soft, nontender, without organomegaly.  Bowel sounds are    present.                                                                     EXTREMITIES:  No cyanosis, clubbing, or edema.                               BREASTS:   She is status post right lumpectomy with a well-healed incision in the  upper outer quadrant.    There are no masses in either breast.                                LYMPHATIC:  There is no cervical, axillary, or supraclavicular adenopathy.   SKIN:  Warm and moist, without petechiae, rashes, induration, or ecchymoses.        NEUROLOGIC:  DTRs are 0-1+ bilaterally, symmetrical, motor function is 5/5,and cranial nerves are  within normal limits.    Assessment:       1. Malignant neoplasm of upper-outer quadrant of right breast in female, estrogen receptor positive    2. S/p 5 years of adjuvant hormonal therapy with aromatase inhibitors      3.    Osteopenia approaching osteoporosis    4.   Anemia, chronic, normochromic normocytic, most likely multifactorial.  Her bone marrow biopsy did not reveal any MDS     5.  CDK 3b  Plan:           I had a long discussion with her.    In regards to the breast cancer, she had completed her 5 years of anastrazole at the end of October 2023.  Her mammogram will be repeated in July 2025.  In regards to her anemia, given her current H&H we will restart the erythropoietin.  I explained to her that she will most likely require lifelong intermittent treatment.  She voiced understanding.  I will see her again in early October with a repeat CBC.      Her multiple questions were answered to her satisfaction.  I spent approximately 30 minutes reviewing the available records and evaluating the patient, and coordinating her care.  Visit today included increased complexity associated with the anemia, CKD, and breast cancer         Route Chart for Scheduling    Med Onc Chart Routing      Follow up with physician . Arrange for weekly EPo starting next week.  See me first week of October with repeat labs   Follow up with [facility]    Infusion scheduling note    Injection scheduling note    Labs    Imaging    Pharmacy appointment    Other referrals          Supportive Plan Information  OP EPOETIN MERCED WEEKLY Robert Hernandez MD   Associated Diagnosis: Stage 3b chronic  kidney disease   noted on 12/20/2023   Line of treatment: Supportive Care   Treatment goal: Supportive     Upcoming Treatment Dates - OP EPOETIN ENEDINA WEEKLY    7/26/2024       Medications       epoetin enedina-epbx injection 40,000 Units    Therapy Plan Information  DENOSUMAB (PROLIA) Q6M for Age-related osteoporosis with current pathological fracture with routine healing, noted on 4/8/2014  DENOSUMAB (PROLIA) Q6M for Acute renal failure superimposed on stage 3b chronic kidney disease, noted on 9/12/2018  DENOSUMAB (PROLIA) Q6M for Vitamin D deficiency, noted on 6/12/2019  Medications  denosumab (PROLIA) injection 60 mg  60 mg, Subcutaneous, Every 26 weeks      No therapy plan of the specified type found.    No therapy plan of the specified type found.

## 2024-09-03 NOTE — TELEPHONE ENCOUNTER
Returned call, no answer. Left voice message with call back number requesting return call for assistance with scheduling a wound care appointment.     ----- Message from Kelsie Calvillo MA sent at 9/3/2024  8:49 AM CDT -----  Contact: [phone]    ----- Message -----  From: Lupillo White  Sent: 8/30/2024   4:53 PM CDT  To: Yo[facility] LINDA Staff    Patient is returning a phone call.  Who left a message for the patient: n/a   Does patient know what this is regarding:  n/a   Would you like a call back, or a response through your [facility] portal?:   call back   Comments:      Pt said she missed a call and would like a call back

## 2024-09-03 NOTE — TELEPHONE ENCOUNTER
----- Message from Chiara Nicholas sent at 9/3/2024  9:01 AM CDT -----  Regarding: Med Refill  Contact: Pt  1. What is the name of the medication you are requesting? Mupirocin  2. What is the dose? 2% ointment, 15 grams  3. How do you take the medication? Orally, topically, etc? n/a  4. How often do you take this medication? n/a  5. Do you need a 30 day or 90 day supply? n/a  6. How many refills are you requesting? n/a  7. What is your preferred pharmacy and location of the pharmacy? [facility] [address]  8. Who can we contact with further questions? The pt at [phone], per pt this medication was given in the ER. This medication is helping but the pt is almost out and needs a refill until her 10/7/2024 appt.

## 2024-09-05 ENCOUNTER — TELEPHONE (OUTPATIENT)
Dept: NEPHROLOGY | Facility: CLINIC | Age: 84
End: 2024-09-05
Payer: MEDICARE

## 2024-09-12 ENCOUNTER — TELEPHONE (OUTPATIENT)
Dept: OBSTETRICS AND GYNECOLOGY | Facility: CLINIC | Age: 84
End: 2024-09-12
Payer: MEDICARE

## 2024-09-13 DIAGNOSIS — A04.72 CLOSTRIDIOIDES DIFFICILE DIARRHEA: ICD-10-CM

## 2024-09-13 DIAGNOSIS — R19.7 DIARRHEA IN ADULT PATIENT: Primary | ICD-10-CM

## 2024-09-17 ENCOUNTER — TELEPHONE (OUTPATIENT)
Dept: INTERNAL MEDICINE | Facility: CLINIC | Age: 84
End: 2024-09-17
Payer: MEDICARE

## 2024-09-18 ENCOUNTER — TELEPHONE (OUTPATIENT)
Dept: INTERNAL MEDICINE | Facility: CLINIC | Age: 84
End: 2024-09-18
Payer: MEDICARE

## 2024-09-18 ENCOUNTER — TELEPHONE (OUTPATIENT)
Dept: HEMATOLOGY/ONCOLOGY | Facility: CLINIC | Age: 84
End: 2024-09-18
Payer: MEDICARE

## 2024-09-18 NOTE — TELEPHONE ENCOUNTER
----- Message from Bam Grider sent at 9/17/2024  2:53 PM CDT -----  Pt request that she would like  to wait to see wound clinic     Contact

## 2024-09-19 ENCOUNTER — HOSPITAL ENCOUNTER (OUTPATIENT)
Dept: RADIOLOGY | Facility: HOSPITAL | Age: 84
Discharge: HOME | End: 2024-09-19
Attending: UROLOGY
Payer: MEDICARE

## 2024-09-19 DIAGNOSIS — N20.0 NEPHROLITHIASIS: ICD-10-CM

## 2024-09-19 PROCEDURE — 76775 US EXAM ABDO BACK WALL LIM: CPT | Mod: 26,,, | Performed by: STUDENT IN AN ORGANIZED HEALTH CARE EDUCATION/TRAINING PROGRAM

## 2024-09-19 PROCEDURE — 76775 US EXAM ABDO BACK WALL LIM: CPT | Mod: TC

## 2024-09-20 ENCOUNTER — INFUSION (OUTPATIENT)
Dept: INFUSION THERAPY | Facility: HOSPITAL | Age: 84
End: 2024-09-20
Payer: MEDICARE

## 2024-09-20 VITALS
DIASTOLIC BLOOD PRESSURE: 63 MMHG | HEART RATE: 62 BPM | TEMPERATURE: 98 F | OXYGEN SATURATION: 98 % | SYSTOLIC BLOOD PRESSURE: 141 MMHG | RESPIRATION RATE: 17 BRPM

## 2024-09-20 DIAGNOSIS — N18.32 STAGE 3B CHRONIC KIDNEY DISEASE: Primary | ICD-10-CM

## 2024-09-20 PROCEDURE — 96372 THER/PROPH/DIAG INJ SC/IM: CPT

## 2024-09-21 ENCOUNTER — IMMUNIZATION (OUTPATIENT)
Dept: PRIMARY CARE CLINIC | Facility: CLINIC | Age: 84
End: 2024-09-21
Payer: MEDICARE

## 2024-09-21 DIAGNOSIS — Z23 NEED FOR VACCINATION: Primary | ICD-10-CM

## 2024-09-21 PROCEDURE — 99999PBSHW FLU VACCINE - ADJUVANTED: Mod: PBBFAC,,,

## 2024-09-21 PROCEDURE — G0008 ADMIN INFLUENZA VIRUS VAC: HCPCS | Mod: PBBFAC

## 2024-09-21 PROCEDURE — 90653 IIV ADJUVANT VACCINE IM: CPT | Mod: PBBFAC

## 2024-09-28 ENCOUNTER — INFUSION (OUTPATIENT)
Dept: INFUSION THERAPY | Facility: HOSPITAL | Age: 84
End: 2024-09-28
Payer: MEDICARE

## 2024-09-28 VITALS
SYSTOLIC BLOOD PRESSURE: 161 MMHG | OXYGEN SATURATION: 99 % | DIASTOLIC BLOOD PRESSURE: 70 MMHG | RESPIRATION RATE: 18 BRPM | HEART RATE: 66 BPM

## 2024-09-28 DIAGNOSIS — N18.32 STAGE 3B CHRONIC KIDNEY DISEASE: Primary | ICD-10-CM

## 2024-09-28 PROCEDURE — 96372 THER/PROPH/DIAG INJ SC/IM: CPT

## 2024-09-28 PROCEDURE — 63600175 PHARM REV CODE 636 W HCPCS: Mod: JZ,EC,JG | Performed by: INTERNAL MEDICINE

## 2024-10-02 ENCOUNTER — TELEPHONE (OUTPATIENT)
Dept: HEMATOLOGY/ONCOLOGY | Facility: CLINIC | Age: 84
End: 2024-10-02
Payer: MEDICARE

## 2024-10-02 NOTE — TELEPHONE ENCOUNTER
----- Message from Tapan sent at 10/2/2024  8:12 AM CDT -----  Regarding: Consult/Advisory  Contact: [phone]  Consult/Advisory     Name Of Caller: pt         Contact Preference:    [phone]     Nature of call: Pt has noticed that today's appt with provider has been canceled and r/s to 10/23. Pt states she is unable to make 10/23 as well.  She would like to know if she should go through went last as well.  Please call to advise

## 2024-10-02 NOTE — TELEPHONE ENCOUNTER
Pt requesting a different appt time on 10/23. No availability for that day. V/u Pt will leave appt as is. Pt accepted to move lab appt scheduled for today closer to appt w/ Dr. Conroy. Pt on waitlist.

## 2024-10-08 ENCOUNTER — OFFICE VISIT (OUTPATIENT)
Dept: HEMATOLOGY/ONCOLOGY | Facility: CLINIC | Age: 84
End: 2024-10-08
Payer: MEDICARE

## 2024-10-08 ENCOUNTER — LAB VISIT (OUTPATIENT)
Dept: LAB | Facility: HOSPITAL | Age: 84
End: 2024-10-08
Attending: INTERNAL MEDICINE
Payer: MEDICARE

## 2024-10-08 VITALS
TEMPERATURE: 97 F | HEIGHT: 64 IN | BODY MASS INDEX: 20.92 KG/M2 | OXYGEN SATURATION: 98 % | SYSTOLIC BLOOD PRESSURE: 197 MMHG | DIASTOLIC BLOOD PRESSURE: 83 MMHG | WEIGHT: 122.56 LBS | HEART RATE: 51 BPM

## 2024-10-08 DIAGNOSIS — N18.32 ANEMIA IN STAGE 3B CHRONIC KIDNEY DISEASE: ICD-10-CM

## 2024-10-08 DIAGNOSIS — D63.1 ANEMIA IN STAGE 3B CHRONIC KIDNEY DISEASE: ICD-10-CM

## 2024-10-08 DIAGNOSIS — C50.411 MALIGNANT NEOPLASM OF UPPER-OUTER QUADRANT OF RIGHT BREAST IN FEMALE, ESTROGEN RECEPTOR POSITIVE: Primary | Chronic | ICD-10-CM

## 2024-10-08 DIAGNOSIS — N18.30 ANEMIA IN STAGE 3 CHRONIC KIDNEY DISEASE, UNSPECIFIED WHETHER STAGE 3A OR 3B CKD: ICD-10-CM

## 2024-10-08 DIAGNOSIS — Z17.0 MALIGNANT NEOPLASM OF UPPER-OUTER QUADRANT OF RIGHT BREAST IN FEMALE, ESTROGEN RECEPTOR POSITIVE: Chronic | ICD-10-CM

## 2024-10-08 DIAGNOSIS — C50.411 MALIGNANT NEOPLASM OF UPPER-OUTER QUADRANT OF RIGHT BREAST IN FEMALE, ESTROGEN RECEPTOR POSITIVE: Chronic | ICD-10-CM

## 2024-10-08 DIAGNOSIS — Z17.0 MALIGNANT NEOPLASM OF UPPER-OUTER QUADRANT OF RIGHT BREAST IN FEMALE, ESTROGEN RECEPTOR POSITIVE: Primary | Chronic | ICD-10-CM

## 2024-10-08 DIAGNOSIS — D63.1 ANEMIA IN STAGE 3 CHRONIC KIDNEY DISEASE, UNSPECIFIED WHETHER STAGE 3A OR 3B CKD: ICD-10-CM

## 2024-10-08 LAB
ALBUMIN SERPL BCP-MCNC: 3.8 G/DL (ref 3.5–5.2)
ALP SERPL-CCNC: 64 U/L (ref 55–135)
ALT SERPL W/O P-5'-P-CCNC: 24 U/L (ref 10–44)
ANION GAP SERPL CALC-SCNC: 7 MMOL/L (ref 8–16)
AST SERPL-CCNC: 29 U/L (ref 10–40)
BASOPHILS # BLD AUTO: 0.04 K/UL (ref 0–0.2)
BASOPHILS NFR BLD: 0.9 % (ref 0–1.9)
BILIRUB SERPL-MCNC: 1.5 MG/DL (ref 0.1–1)
BUN SERPL-MCNC: 43 MG/DL (ref 8–23)
CALCIUM SERPL-MCNC: 9.3 MG/DL (ref 8.7–10.5)
CHLORIDE SERPL-SCNC: 106 MMOL/L (ref 95–110)
CO2 SERPL-SCNC: 28 MMOL/L (ref 23–29)
CREAT SERPL-MCNC: 1.4 MG/DL (ref 0.5–1.4)
DIFFERENTIAL METHOD BLD: ABNORMAL
EOSINOPHIL # BLD AUTO: 0 K/UL (ref 0–0.5)
EOSINOPHIL NFR BLD: 0.5 % (ref 0–8)
ERYTHROCYTE [DISTWIDTH] IN BLOOD BY AUTOMATED COUNT: 15.4 % (ref 11.5–14.5)
EST. GFR  (NO RACE VARIABLE): 37.1 ML/MIN/1.73 M^2
GLUCOSE SERPL-MCNC: 91 MG/DL (ref 70–110)
HCT VFR BLD AUTO: 37.4 % (ref 37–48.5)
HGB BLD-MCNC: 11.7 G/DL (ref 12–16)
IMM GRANULOCYTES # BLD AUTO: 0.01 K/UL (ref 0–0.04)
IMM GRANULOCYTES NFR BLD AUTO: 0.2 % (ref 0–0.5)
LYMPHOCYTES # BLD AUTO: 1.5 K/UL (ref 1–4.8)
LYMPHOCYTES NFR BLD: 35 % (ref 18–48)
MCH RBC QN AUTO: 32.1 PG (ref 27–31)
MCHC RBC AUTO-ENTMCNC: 31.3 G/DL (ref 32–36)
MCV RBC AUTO: 103 FL (ref 82–98)
MONOCYTES # BLD AUTO: 0.5 K/UL (ref 0.3–1)
MONOCYTES NFR BLD: 12 % (ref 4–15)
NEUTROPHILS # BLD AUTO: 2.2 K/UL (ref 1.8–7.7)
NEUTROPHILS NFR BLD: 51.4 % (ref 38–73)
NRBC BLD-RTO: 0 /100 WBC
PLATELET # BLD AUTO: 201 K/UL (ref 150–450)
PMV BLD AUTO: 10.9 FL (ref 9.2–12.9)
POTASSIUM SERPL-SCNC: 4.5 MMOL/L (ref 3.5–5.1)
PROT SERPL-MCNC: 7.5 G/DL (ref 6–8.4)
RBC # BLD AUTO: 3.65 M/UL (ref 4–5.4)
SODIUM SERPL-SCNC: 141 MMOL/L (ref 136–145)
WBC # BLD AUTO: 4.34 K/UL (ref 3.9–12.7)

## 2024-10-08 PROCEDURE — 85025 COMPLETE CBC W/AUTO DIFF WBC: CPT | Performed by: INTERNAL MEDICINE

## 2024-10-08 PROCEDURE — 99999 PR PBB SHADOW E&M-EST. PATIENT-LVL II: CPT | Mod: PBBFAC,,, | Performed by: INTERNAL MEDICINE

## 2024-10-08 PROCEDURE — 99212 OFFICE O/P EST SF 10 MIN: CPT | Mod: PBBFAC | Performed by: INTERNAL MEDICINE

## 2024-10-08 PROCEDURE — 80053 COMPREHEN METABOLIC PANEL: CPT | Performed by: INTERNAL MEDICINE

## 2024-10-08 PROCEDURE — 36415 COLL VENOUS BLD VENIPUNCTURE: CPT | Performed by: INTERNAL MEDICINE

## 2024-10-08 NOTE — PROGRESS NOTES
Subjective:       Patient ID: Shima Sorto is a 84 y.o. female.    Chief Complaint: No chief complaint on file.    HPI    Ms. Sorto returns today for follow up.  I had last seen her on September 3, 2024 and had restarted her weekly EPO due to her Hg being 9.9 gr/dl.  However, it appears that she only received 2 injections, 1 on September 20th and 1 on September 28th..  There was no injection administered on the 4th of October when she was supposed to receive one.    Her CBC today is as follows:  WBCs 4300 per cubic mm, hemoglobin 11.7 grams/deciliter, hematocrit 37.4%, , and platelets 201 K.    In regards to her breast cancer, had been started on anastrazole in mid November 2018, and completed 5 years at the end of October 2023.  In late August 2023 she underwent a bone marrow biopsy which was essentially unremarkable.  There was no evidence of MDS, leukemia, lymphoma, myeloma, or metastatic carcinoma.  Cytogenetics were normal and reticulin was not increased.  Cellularity was 35 %.    Other recent pertinent labs are as follows:  3 stool samples were negative for occult blood  Multiple urinalyses have shown persistent hematuria.  Of note, the patient self catheterizes 3 times a day.  B12 is 406 pg/mL  SPEP shows no monoclonal spike  Direct Lane is negative    Briefly, she is an 84-year-old  female who was diagnosed with breast cancer in 2018.  On 08/17/2018, she underwent a lumpectomy and sentinel lymph node biopsy for an 8 mm grade 1 invasive lobular carcinoma which was ER strongly positive, MA negative and HER-2 negative with a Ki-67 of 5%, with the closest margin being 2 mm.  Two of 2 sentinel lymph nodes were negative for involvement.      She completed her radiation therapy and was subsequently started on AIs.  She completed 5 years of adjuvant hormonal therapy and she is being followed expectantly.    A mammogram on 7/9/2024 was read as BIRADS II, and a one year follow up was  recommended.      In April 2023 she had been diagnosed with C diff and she was treated accordingly.  She underwent a colonoscopy and EGD by Dr. Garza.  The colonoscopy showed 3 polyps and extensive diverticulosis.  Biopsies were negative for malignancy.  The EGD showed gastritis and a hiatal hernia.  A video capsule swallow did not identify a source of bleeding in her small intestine.       Review of Systems      Overall she feels OK, and her ECOG PS is 1.  She states that her stamina is OK.  She denies any anxiety, depression, easy bruising, fevers, chills, night  sweats, weight loss, nausea, vomiting, diarrhea, constipation, diplopia, blurred vision, headache, chest pain, palpitations, shortness of breath, breast pain, abdominal pain, extremity pain, or difficulty ambulating.  The remainder of the ten-point ROS, including general, skin, lymph, H/N, cardiorespiratory, GI, , Neuro, Endocrine, and psychiatric is negative.     Objective:      Physical Exam        She is alert, oriented to time, place, person, pleasant, well      nourished, in no acute physical distress.                                    VITAL SIGNS:  Reviewed                                      HEENT:  Normal.  There are no nasal, oral, lip, gingival, auricular, lid,    or conjunctival lesions.  Mucosae are moist and pink, and there is no        thrush.  Pupils are equal, reactive to light and accommodation.              Extraocular muscle movements are intact.  Dentition is good.                                    NECK:  Supple without JVD, adenopathy, or thyromegaly.                       LUNGS:  Clear to auscultation without wheezing, rales, or rhonchi.           CARDIOVASCULAR:  Reveals an S1, S2, a grade 2 systolic ejection murmur of aortic stenosis, no rubs, no gallops.         ABDOMEN:  Soft, nontender, without organomegaly.  Bowel sounds are    present.                                                                     EXTREMITIES:  No  cyanosis, clubbing, or edema.                               BREASTS:   She is status post right lumpectomy with a well-healed incision in the upper outer quadrant.    There are no masses in either breast.                                LYMPHATIC:  There is no cervical, axillary, or supraclavicular adenopathy.   SKIN:  Warm and moist, without petechiae, rashes, induration, or ecchymoses.        NEUROLOGIC:  DTRs are 0-1+ bilaterally, symmetrical, motor function is 5/5,and cranial nerves are  within normal limits.    Assessment:       1. Malignant neoplasm of upper-outer quadrant of right breast in female, estrogen receptor positive    2. S/p 5 years of adjuvant hormonal therapy with aromatase inhibitors      3.    Osteopenia approaching osteoporosis    4.   Anemia, chronic, normochromic normocytic, most likely multifactorial.  Her bone marrow biopsy did not reveal any MDS. Anemia resolved with 2 EPo injections     5.  CDK 3b  Plan:           I had a long discussion with her.  Her H&H increased with to erythropoietin injections.  We will hold the erythropoietin for now and I will see her in 1 month with a repeat CBC.  In regards to the breast cancer, she had completed her 5 years of anastrazole at the end of October 2023.  Her mammogram will be repeated in July 2025.    I will see her again in mid November with a repeat CBC.      Her multiple questions were answered to her satisfaction.  I spent approximately 30 minutes reviewing the available records, evaluating the patient, and coordinating her care.  Visit today included increased complexity associated with the anemia, CKD, and breast cancer         Route Chart for Scheduling    Med Onc Chart Routing  Urgent    Follow up with physician . Hold any further Procrit injections for now.  Please bring her back in mid november with a repeat CBC and a repeat ferritin and  CMP and have her on the schedule for Procrit that day   Follow up with BASHIR    Infusion scheduling note     Injection scheduling note    Labs Ferritin, CMP and CBC   Scheduling:  Preferred lab:  Lab interval:     Imaging    Pharmacy appointment    Other referrals          Supportive Plan Information  OP EPOETIN ENEDINA WEEKLY Robert Hernandez MD   Associated Diagnosis: Stage 3b chronic kidney disease   noted on 12/20/2023   Line of treatment: Supportive Care   Treatment goal: Supportive     Upcoming Treatment Dates - OP EPOETIN ENEDINA WEEKLY    9/29/2024       Medications       epoetin enedina-epbx injection 40,000 Units  10/6/2024       Medications       epoetin enedina-epbx injection 40,000 Units    Therapy Plan Information  DENOSUMAB (PROLIA) Q6M for Age-related osteoporosis with current pathological fracture with routine healing, noted on 4/8/2014  DENOSUMAB (PROLIA) Q6M for Acute renal failure superimposed on stage 3b chronic kidney disease, noted on 9/12/2018  DENOSUMAB (PROLIA) Q6M for Vitamin D deficiency, noted on 6/12/2019  Medications  denosumab (PROLIA) injection 60 mg  60 mg, Subcutaneous, Every 26 weeks      No therapy plan of the specified type found.    No therapy plan of the specified type found.

## 2024-10-14 ENCOUNTER — TELEPHONE (OUTPATIENT)
Dept: INTERNAL MEDICINE | Facility: CLINIC | Age: 84
End: 2024-10-14
Payer: MEDICARE

## 2024-10-14 NOTE — TELEPHONE ENCOUNTER
----- Message from Shyanne sent at 10/14/2024 12:54 PM CDT -----  Type:  Needs Medical Advice    Who Called:  Pt  Symptoms (please be specific):    How long has patient had these symptoms:     Pharmacy name and phone #:   Would the patient rather a call back or a response via [facility]?  Call back   Best Call Back Number:  [phone]  Additional Information:  regarding upcoming  appt in Nov

## 2024-10-15 ENCOUNTER — TELEPHONE (OUTPATIENT)
Dept: INTERNAL MEDICINE | Facility: CLINIC | Age: 84
End: 2024-10-15
Payer: MEDICARE

## 2024-10-21 ENCOUNTER — PATIENT MESSAGE (OUTPATIENT)
Dept: ADMINISTRATIVE | Facility: HOSPITAL | Age: 84
End: 2024-10-21
Payer: MEDICARE

## 2024-10-24 ENCOUNTER — OFFICE VISIT (OUTPATIENT)
Dept: INFECTIOUS DISEASES | Facility: CLINIC | Age: 84
End: 2024-10-24
Payer: MEDICARE

## 2024-10-24 VITALS
WEIGHT: 122.81 LBS | DIASTOLIC BLOOD PRESSURE: 64 MMHG | SYSTOLIC BLOOD PRESSURE: 141 MMHG | HEIGHT: 64 IN | TEMPERATURE: 98 F | BODY MASS INDEX: 20.97 KG/M2 | HEART RATE: 67 BPM

## 2024-10-24 DIAGNOSIS — R19.7 DIARRHEA IN ADULT PATIENT: ICD-10-CM

## 2024-10-24 DIAGNOSIS — A04.72 CLOSTRIDIOIDES DIFFICILE DIARRHEA: ICD-10-CM

## 2024-10-24 PROCEDURE — 99214 OFFICE O/P EST MOD 30 MIN: CPT | Mod: PBBFAC | Performed by: INTERNAL MEDICINE

## 2024-10-24 PROCEDURE — 99214 OFFICE O/P EST MOD 30 MIN: CPT | Mod: S$PBB,,, | Performed by: INTERNAL MEDICINE

## 2024-10-24 PROCEDURE — 99999 PR PBB SHADOW E&M-EST. PATIENT-LVL IV: CPT | Mod: PBBFAC,,, | Performed by: INTERNAL MEDICINE

## 2024-10-30 ENCOUNTER — TELEPHONE (OUTPATIENT)
Dept: CARDIOLOGY | Facility: CLINIC | Age: 84
End: 2024-10-30
Payer: MEDICARE

## 2024-11-01 ENCOUNTER — LAB VISIT (OUTPATIENT)
Dept: LAB | Facility: HOSPITAL | Age: 84
End: 2024-11-01
Attending: INTERNAL MEDICINE
Payer: MEDICARE

## 2024-11-01 DIAGNOSIS — D63.1 ANEMIA IN STAGE 3B CHRONIC KIDNEY DISEASE: ICD-10-CM

## 2024-11-01 DIAGNOSIS — Z17.0 MALIGNANT NEOPLASM OF UPPER-OUTER QUADRANT OF RIGHT BREAST IN FEMALE, ESTROGEN RECEPTOR POSITIVE: Chronic | ICD-10-CM

## 2024-11-01 DIAGNOSIS — N18.32 ANEMIA IN STAGE 3B CHRONIC KIDNEY DISEASE: ICD-10-CM

## 2024-11-01 DIAGNOSIS — C50.411 MALIGNANT NEOPLASM OF UPPER-OUTER QUADRANT OF RIGHT BREAST IN FEMALE, ESTROGEN RECEPTOR POSITIVE: Chronic | ICD-10-CM

## 2024-11-01 LAB
ALBUMIN SERPL BCP-MCNC: 3.5 G/DL (ref 3.5–5.2)
ALP SERPL-CCNC: 58 U/L (ref 40–150)
ALT SERPL W/O P-5'-P-CCNC: 19 U/L (ref 10–44)
ANION GAP SERPL CALC-SCNC: 6 MMOL/L (ref 8–16)
AST SERPL-CCNC: 29 U/L (ref 10–40)
BASOPHILS # BLD AUTO: 0.02 K/UL (ref 0–0.2)
BASOPHILS NFR BLD: 0.6 % (ref 0–1.9)
BILIRUB SERPL-MCNC: 0.4 MG/DL (ref 0.1–1)
BUN SERPL-MCNC: 31 MG/DL (ref 8–23)
CALCIUM SERPL-MCNC: 9.1 MG/DL (ref 8.7–10.5)
CHLORIDE SERPL-SCNC: 105 MMOL/L (ref 95–110)
CO2 SERPL-SCNC: 28 MMOL/L (ref 23–29)
CREAT SERPL-MCNC: 1.5 MG/DL (ref 0.5–1.4)
DIFFERENTIAL METHOD BLD: ABNORMAL
EOSINOPHIL # BLD AUTO: 0.1 K/UL (ref 0–0.5)
EOSINOPHIL NFR BLD: 1.7 % (ref 0–8)
ERYTHROCYTE [DISTWIDTH] IN BLOOD BY AUTOMATED COUNT: 12.8 % (ref 11.5–14.5)
EST. GFR  (NO RACE VARIABLE): 34.2 ML/MIN/1.73 M^2
FERRITIN SERPL-MCNC: 171 NG/ML (ref 20–300)
GLUCOSE SERPL-MCNC: 86 MG/DL (ref 70–110)
HCT VFR BLD AUTO: 36.1 % (ref 37–48.5)
HGB BLD-MCNC: 11.2 G/DL (ref 12–16)
IMM GRANULOCYTES # BLD AUTO: 0.01 K/UL (ref 0–0.04)
IMM GRANULOCYTES NFR BLD AUTO: 0.3 % (ref 0–0.5)
LYMPHOCYTES # BLD AUTO: 1.8 K/UL (ref 1–4.8)
LYMPHOCYTES NFR BLD: 48.3 % (ref 18–48)
MCH RBC QN AUTO: 31.4 PG (ref 27–31)
MCHC RBC AUTO-ENTMCNC: 31 G/DL (ref 32–36)
MCV RBC AUTO: 101 FL (ref 82–98)
MONOCYTES # BLD AUTO: 0.4 K/UL (ref 0.3–1)
MONOCYTES NFR BLD: 10.8 % (ref 4–15)
NEUTROPHILS # BLD AUTO: 1.4 K/UL (ref 1.8–7.7)
NEUTROPHILS NFR BLD: 38.3 % (ref 38–73)
NRBC BLD-RTO: 0 /100 WBC
PLATELET # BLD AUTO: 128 K/UL (ref 150–450)
PMV BLD AUTO: 12.3 FL (ref 9.2–12.9)
POTASSIUM SERPL-SCNC: 4 MMOL/L (ref 3.5–5.1)
PROT SERPL-MCNC: 7.1 G/DL (ref 6–8.4)
RBC # BLD AUTO: 3.57 M/UL (ref 4–5.4)
SODIUM SERPL-SCNC: 139 MMOL/L (ref 136–145)
WBC # BLD AUTO: 3.62 K/UL (ref 3.9–12.7)

## 2024-11-01 PROCEDURE — 82728 ASSAY OF FERRITIN: CPT | Performed by: INTERNAL MEDICINE

## 2024-11-01 PROCEDURE — 85025 COMPLETE CBC W/AUTO DIFF WBC: CPT | Performed by: INTERNAL MEDICINE

## 2024-11-01 PROCEDURE — 80053 COMPREHEN METABOLIC PANEL: CPT | Performed by: INTERNAL MEDICINE

## 2024-11-01 PROCEDURE — 36415 COLL VENOUS BLD VENIPUNCTURE: CPT | Performed by: INTERNAL MEDICINE

## 2024-11-05 ENCOUNTER — TELEPHONE (OUTPATIENT)
Dept: HEMATOLOGY/ONCOLOGY | Facility: CLINIC | Age: 84
End: 2024-11-05
Payer: MEDICARE

## 2024-11-05 NOTE — TELEPHONE ENCOUNTER
----- Message from [employer] sent at 11/5/2024 11:23 AM CST -----  Regarding: Reschedule Existing Appointment  Contact: :Shima Sorto  Reschedule Existing Appointment     Current appt date:11/20     Type of appt :Injection     Physician:Mary     Reason for rescheduling:Patient is calling to reschedule due to that time 2:15 doesn't work for her. Requesting a call back     Caller:Shima Sorto      Contact Preference:[phone]

## 2024-11-06 ENCOUNTER — TELEPHONE (OUTPATIENT)
Dept: HEMATOLOGY/ONCOLOGY | Facility: CLINIC | Age: 84
End: 2024-11-06
Payer: MEDICARE

## 2024-11-06 NOTE — TELEPHONE ENCOUNTER
----- Message from Taty sent at 11/6/2024 12:15 PM CST -----  Regarding: Change Injection Time  Contact: Pt @[phone]  Pt is asking to speak to someone in the office to change appt time only. Pt  is asking to please keep appt on the same date if possible. Pt is asking for a return call first before making changes. Please call. Thanks.              New appt time pt requesting to change to: 10:15 and 1:45

## 2024-11-11 ENCOUNTER — OFFICE VISIT (OUTPATIENT)
Dept: INTERNAL MEDICINE | Facility: CLINIC | Age: 84
End: 2024-11-11
Payer: MEDICARE

## 2024-11-11 DIAGNOSIS — M81.0 OSTEOPOROSIS, UNSPECIFIED OSTEOPOROSIS TYPE, UNSPECIFIED PATHOLOGICAL FRACTURE PRESENCE: ICD-10-CM

## 2024-11-11 DIAGNOSIS — R53.81 PHYSICAL DECONDITIONING: Primary | ICD-10-CM

## 2024-11-11 DIAGNOSIS — I10 HYPERTENSION, UNSPECIFIED TYPE: ICD-10-CM

## 2024-11-11 PROCEDURE — 99214 OFFICE O/P EST MOD 30 MIN: CPT | Mod: S$PBB,,, | Performed by: INTERNAL MEDICINE

## 2024-11-11 PROCEDURE — 99999 PR PBB SHADOW E&M-EST. PATIENT-LVL V: CPT | Mod: PBBFAC,,, | Performed by: INTERNAL MEDICINE

## 2024-11-11 PROCEDURE — 99215 OFFICE O/P EST HI 40 MIN: CPT | Mod: PBBFAC | Performed by: INTERNAL MEDICINE

## 2024-11-11 PROCEDURE — G2211 COMPLEX E/M VISIT ADD ON: HCPCS | Mod: S$PBB,,, | Performed by: INTERNAL MEDICINE

## 2024-11-14 VITALS
SYSTOLIC BLOOD PRESSURE: 136 MMHG | TEMPERATURE: 99 F | OXYGEN SATURATION: 98 % | HEART RATE: 69 BPM | HEIGHT: 64 IN | WEIGHT: 128.5 LBS | BODY MASS INDEX: 21.94 KG/M2 | DIASTOLIC BLOOD PRESSURE: 70 MMHG

## 2024-11-14 DIAGNOSIS — N18.31 STAGE 3A CHRONIC KIDNEY DISEASE: Primary | ICD-10-CM

## 2024-11-14 PROBLEM — E44.0 MODERATE MALNUTRITION: Status: RESOLVED | Noted: 2023-10-27 | Resolved: 2024-11-14

## 2024-11-14 PROBLEM — E72.53: Status: RESOLVED | Noted: 2020-01-21 | Resolved: 2024-11-14

## 2024-11-14 PROBLEM — L97.502 NON-PRESSURE CHRONIC ULCER OF OTHER PART OF UNSPECIFIED FOOT WITH FAT LAYER EXPOSED: Status: RESOLVED | Noted: 2023-08-16 | Resolved: 2024-11-14

## 2024-11-14 PROBLEM — D70.9 NEUTROPENIA, UNSPECIFIED TYPE: Status: RESOLVED | Noted: 2023-08-16 | Resolved: 2024-11-14

## 2024-11-14 PROBLEM — D69.6 THROMBOCYTOPENIA: Status: RESOLVED | Noted: 2020-01-07 | Resolved: 2024-11-14

## 2024-11-14 NOTE — PROGRESS NOTES
Subjective:       Patient ID: Shima Sorto is a 84 y.o. female.    Chief Complaint: Hypertension    HPI  She returns for management of hypertension.  She has had hypertension for over a year.  Current treatment has included medications outlined in medication list.  She denies chest pain or shortness of breath.  No palpitations.  Denies left arm or neck pain.  She has osteoporosis.  Currently on Prolia     Past medical history:  Hypertension, congestive heart failure, nephrolithiasis, osteoporosis, polycystic kidney disease, sleep apnea, breast cancer, skin cancer,  status post lumpectomy     Medications:   Prolia, Cozaar     Allergies: Macrobid        Review of Systems   Constitutional:  Negative for chills, fatigue, fever and unexpected weight change.   Respiratory:  Negative for chest tightness and shortness of breath.    Cardiovascular:  Negative for chest pain and palpitations.   Gastrointestinal:  Negative for abdominal pain and blood in stool.   Neurological:  Negative for dizziness, syncope, numbness and headaches.       Objective:      Physical Exam  HENT:      Right Ear: External ear normal.      Left Ear: External ear normal.      Nose: Nose normal.      Mouth/Throat:      Mouth: Mucous membranes are moist.      Pharynx: Oropharynx is clear.   Eyes:      Pupils: Pupils are equal, round, and reactive to light.   Cardiovascular:      Rate and Rhythm: Normal rate and regular rhythm.      Heart sounds: No murmur heard.  Pulmonary:      Breath sounds: Normal breath sounds.   Abdominal:      General: There is no distension.      Palpations: There is no hepatomegaly or splenomegaly.      Tenderness: There is no abdominal tenderness.   Musculoskeletal:      Cervical back: Normal range of motion.   Lymphadenopathy:      Cervical: No cervical adenopathy.      Upper Body:      Right upper body: No axillary adenopathy.      Left upper body: No axillary adenopathy.   Neurological:      Cranial Nerves: No  cranial nerve deficit.      Sensory: No sensory deficit.      Motor: Motor function is intact.      Deep Tendon Reflexes: Reflexes are normal and symmetric.         Assessment/Plan       Assessment and plan: 1.  Hypertension: Controlled.  Continue Cozaar   2.  Osteoporosis: Schedule bone density    Visit today included increased complexity associated with the care of the episodic problem hypertension addressed and managing the longitudinal care of the patient due to the serious and/or complex managed problem(s) hypertension, osteoporosis.

## 2024-11-19 ENCOUNTER — PATIENT MESSAGE (OUTPATIENT)
Facility: CLINIC | Age: 84
End: 2024-11-19
Payer: MEDICARE

## 2024-11-20 ENCOUNTER — IMMUNIZATION (OUTPATIENT)
Dept: INTERNAL MEDICINE | Facility: CLINIC | Age: 84
End: 2024-11-20
Payer: MEDICARE

## 2024-11-20 ENCOUNTER — LAB VISIT (OUTPATIENT)
Dept: LAB | Facility: HOSPITAL | Age: 84
End: 2024-11-20
Attending: INTERNAL MEDICINE
Payer: MEDICARE

## 2024-11-20 ENCOUNTER — OFFICE VISIT (OUTPATIENT)
Dept: INTERNAL MEDICINE | Facility: CLINIC | Age: 84
End: 2024-11-20
Payer: MEDICARE

## 2024-11-20 ENCOUNTER — OFFICE VISIT (OUTPATIENT)
Dept: HEMATOLOGY/ONCOLOGY | Facility: CLINIC | Age: 84
End: 2024-11-20
Payer: MEDICARE

## 2024-11-20 VITALS
TEMPERATURE: 98 F | BODY MASS INDEX: 22.54 KG/M2 | WEIGHT: 127.19 LBS | HEART RATE: 65 BPM | SYSTOLIC BLOOD PRESSURE: 157 MMHG | RESPIRATION RATE: 16 BRPM | DIASTOLIC BLOOD PRESSURE: 70 MMHG | OXYGEN SATURATION: 100 % | HEIGHT: 63 IN

## 2024-11-20 VITALS
HEART RATE: 55 BPM | SYSTOLIC BLOOD PRESSURE: 120 MMHG | OXYGEN SATURATION: 99 % | HEIGHT: 64 IN | DIASTOLIC BLOOD PRESSURE: 82 MMHG | WEIGHT: 125.69 LBS | BODY MASS INDEX: 21.46 KG/M2

## 2024-11-20 DIAGNOSIS — N18.32 ANEMIA IN STAGE 3B CHRONIC KIDNEY DISEASE: ICD-10-CM

## 2024-11-20 DIAGNOSIS — Z17.0 MALIGNANT NEOPLASM OF UPPER-OUTER QUADRANT OF RIGHT BREAST IN FEMALE, ESTROGEN RECEPTOR POSITIVE: Chronic | ICD-10-CM

## 2024-11-20 DIAGNOSIS — C50.411 MALIGNANT NEOPLASM OF UPPER-OUTER QUADRANT OF RIGHT BREAST IN FEMALE, ESTROGEN RECEPTOR POSITIVE: Chronic | ICD-10-CM

## 2024-11-20 DIAGNOSIS — I70.0 AORTO-ILIAC ATHEROSCLEROSIS: ICD-10-CM

## 2024-11-20 DIAGNOSIS — Z00.00 ENCOUNTER FOR MEDICARE ANNUAL WELLNESS EXAM: ICD-10-CM

## 2024-11-20 DIAGNOSIS — D63.1 ANEMIA IN STAGE 3B CHRONIC KIDNEY DISEASE: ICD-10-CM

## 2024-11-20 DIAGNOSIS — Z00.00 ENCOUNTER FOR PREVENTIVE HEALTH EXAMINATION: Primary | ICD-10-CM

## 2024-11-20 DIAGNOSIS — M80.00XD AGE-RELATED OSTEOPOROSIS WITH CURRENT PATHOLOGICAL FRACTURE WITH ROUTINE HEALING: ICD-10-CM

## 2024-11-20 DIAGNOSIS — G47.33 OBSTRUCTIVE SLEEP APNEA: Chronic | ICD-10-CM

## 2024-11-20 DIAGNOSIS — I50.30 ACC/AHA STAGE C HEART FAILURE WITH PRESERVED EJECTION FRACTION: Chronic | ICD-10-CM

## 2024-11-20 DIAGNOSIS — N18.32 STAGE 3B CHRONIC KIDNEY DISEASE: ICD-10-CM

## 2024-11-20 DIAGNOSIS — H81.20 VESTIBULAR NEURONITIS, UNSPECIFIED LATERALITY: ICD-10-CM

## 2024-11-20 DIAGNOSIS — I10 PRIMARY HYPERTENSION: Chronic | ICD-10-CM

## 2024-11-20 DIAGNOSIS — C50.411 MALIGNANT NEOPLASM OF UPPER-OUTER QUADRANT OF RIGHT BREAST IN FEMALE, ESTROGEN RECEPTOR POSITIVE: Primary | Chronic | ICD-10-CM

## 2024-11-20 DIAGNOSIS — I27.9 CHRONIC PULMONARY HEART DISEASE: ICD-10-CM

## 2024-11-20 DIAGNOSIS — Z23 NEED FOR VACCINATION: Primary | ICD-10-CM

## 2024-11-20 DIAGNOSIS — Z17.0 MALIGNANT NEOPLASM OF UPPER-OUTER QUADRANT OF RIGHT BREAST IN FEMALE, ESTROGEN RECEPTOR POSITIVE: Primary | Chronic | ICD-10-CM

## 2024-11-20 DIAGNOSIS — I70.8 AORTO-ILIAC ATHEROSCLEROSIS: ICD-10-CM

## 2024-11-20 PROBLEM — M53.82 IMPAIRED RANGE OF MOTION OF CERVICAL SPINE: Status: RESOLVED | Noted: 2022-03-25 | Resolved: 2024-11-20

## 2024-11-20 PROBLEM — M53.84 DECREASED RANGE OF MOTION OF THORACIC SPINE: Status: RESOLVED | Noted: 2022-03-25 | Resolved: 2024-11-20

## 2024-11-20 LAB
ALBUMIN SERPL BCP-MCNC: 3.6 G/DL (ref 3.5–5.2)
ALP SERPL-CCNC: 55 U/L (ref 40–150)
ALT SERPL W/O P-5'-P-CCNC: 18 U/L (ref 10–44)
ANION GAP SERPL CALC-SCNC: 6 MMOL/L (ref 8–16)
AST SERPL-CCNC: 32 U/L (ref 10–40)
BASOPHILS # BLD AUTO: 0.03 K/UL (ref 0–0.2)
BASOPHILS NFR BLD: 0.6 % (ref 0–1.9)
BILIRUB SERPL-MCNC: 0.5 MG/DL (ref 0.1–1)
BUN SERPL-MCNC: 27 MG/DL (ref 8–23)
CALCIUM SERPL-MCNC: 9.8 MG/DL (ref 8.7–10.5)
CHLORIDE SERPL-SCNC: 107 MMOL/L (ref 95–110)
CO2 SERPL-SCNC: 29 MMOL/L (ref 23–29)
CREAT SERPL-MCNC: 1.3 MG/DL (ref 0.5–1.4)
DIFFERENTIAL METHOD BLD: ABNORMAL
EOSINOPHIL # BLD AUTO: 0.1 K/UL (ref 0–0.5)
EOSINOPHIL NFR BLD: 1.3 % (ref 0–8)
ERYTHROCYTE [DISTWIDTH] IN BLOOD BY AUTOMATED COUNT: 13.1 % (ref 11.5–14.5)
EST. GFR  (NO RACE VARIABLE): 40.5 ML/MIN/1.73 M^2
FERRITIN SERPL-MCNC: 156 NG/ML (ref 20–300)
GLUCOSE SERPL-MCNC: 97 MG/DL (ref 70–110)
HCT VFR BLD AUTO: 35.7 % (ref 37–48.5)
HGB BLD-MCNC: 11.4 G/DL (ref 12–16)
IMM GRANULOCYTES # BLD AUTO: 0.03 K/UL (ref 0–0.04)
IMM GRANULOCYTES NFR BLD AUTO: 0.6 % (ref 0–0.5)
LYMPHOCYTES # BLD AUTO: 1.9 K/UL (ref 1–4.8)
LYMPHOCYTES NFR BLD: 35.4 % (ref 18–48)
MCH RBC QN AUTO: 31.7 PG (ref 27–31)
MCHC RBC AUTO-ENTMCNC: 31.9 G/DL (ref 32–36)
MCV RBC AUTO: 99 FL (ref 82–98)
MONOCYTES # BLD AUTO: 0.6 K/UL (ref 0.3–1)
MONOCYTES NFR BLD: 10.6 % (ref 4–15)
NEUTROPHILS # BLD AUTO: 2.7 K/UL (ref 1.8–7.7)
NEUTROPHILS NFR BLD: 51.5 % (ref 38–73)
NRBC BLD-RTO: 0 /100 WBC
PLATELET # BLD AUTO: 157 K/UL (ref 150–450)
PMV BLD AUTO: 11.1 FL (ref 9.2–12.9)
POTASSIUM SERPL-SCNC: 4.4 MMOL/L (ref 3.5–5.1)
PROT SERPL-MCNC: 7.2 G/DL (ref 6–8.4)
RBC # BLD AUTO: 3.6 M/UL (ref 4–5.4)
SODIUM SERPL-SCNC: 142 MMOL/L (ref 136–145)
WBC # BLD AUTO: 5.28 K/UL (ref 3.9–12.7)

## 2024-11-20 PROCEDURE — 91320 SARSCV2 VAC 30MCG TRS-SUC IM: CPT | Mod: PBBFAC

## 2024-11-20 PROCEDURE — 80053 COMPREHEN METABOLIC PANEL: CPT | Performed by: INTERNAL MEDICINE

## 2024-11-20 PROCEDURE — 99999PBSHW COVID-19 VAC, TRIS 2023 (PFIZER)(PF) 30 MCG/0.3 ML IM SUSR (12+): Mod: PBBFAC,,,

## 2024-11-20 PROCEDURE — 99999 PR PBB SHADOW E&M-EST. PATIENT-LVL IV: CPT | Mod: PBBFAC,,, | Performed by: INTERNAL MEDICINE

## 2024-11-20 PROCEDURE — 36415 COLL VENOUS BLD VENIPUNCTURE: CPT | Performed by: INTERNAL MEDICINE

## 2024-11-20 PROCEDURE — 99214 OFFICE O/P EST MOD 30 MIN: CPT | Mod: PBBFAC,27 | Performed by: INTERNAL MEDICINE

## 2024-11-20 PROCEDURE — 85025 COMPLETE CBC W/AUTO DIFF WBC: CPT | Performed by: INTERNAL MEDICINE

## 2024-11-20 PROCEDURE — G0439 PPPS, SUBSEQ VISIT: HCPCS | Mod: ,,, | Performed by: NURSE PRACTITIONER

## 2024-11-20 PROCEDURE — 82728 ASSAY OF FERRITIN: CPT | Performed by: INTERNAL MEDICINE

## 2024-11-20 PROCEDURE — 90480 ADMN SARSCOV2 VAC 1/ONLY CMP: CPT | Mod: PBBFAC

## 2024-11-20 PROCEDURE — 99999 PR PBB SHADOW E&M-EST. PATIENT-LVL V: CPT | Mod: PBBFAC,,, | Performed by: NURSE PRACTITIONER

## 2024-11-20 PROCEDURE — 99215 OFFICE O/P EST HI 40 MIN: CPT | Mod: PBBFAC | Performed by: NURSE PRACTITIONER

## 2024-11-20 NOTE — PROGRESS NOTES
Subjective:       Patient ID: Shima Sorto is a 84 y.o. female.    Chief Complaint: No chief complaint on file.    HPI    Ms. Sorto returns today for follow up.  I had last seen her on October 8, 2024 and had held her weekly EPO due to her Hg being 11.7 gr/dl, up from 9.9 gr/dl.      Her CBC today is as follows:  WBCs 5,200 per cubic mm, hemoglobin 11.4 grams/deciliter, hematocrit 35.7%, MCV 99, and platelets 157K.  Creatinine is 1.3 mg/dl, and eGFR is 40 ml/min.    In regards to her breast cancer, had been started on anastrazole in mid November 2018, and completed 5 years at the end of October 2023.  In late August 2023 she underwent a bone marrow biopsy which was essentially unremarkable.  There was no evidence of MDS, leukemia, lymphoma, myeloma, or metastatic carcinoma.  Cytogenetics were normal and reticulin was not increased.  Cellularity was 35 %.    Other recent pertinent labs are as follows:  3 stool samples were negative for occult blood  Multiple urinalyses have shown persistent hematuria.  Of note, the patient self catheterizes 3 times a day.  B12 is 406 pg/mL  SPEP shows no monoclonal spike  Direct Lane is negative    Briefly, she is an 84-year-old  female who was diagnosed with breast cancer in 2018.  On 08/17/2018, she underwent a lumpectomy and sentinel lymph node biopsy for an 8 mm grade 1 invasive lobular carcinoma which was ER strongly positive, MI negative and HER-2 negative with a Ki-67 of 5%, with the closest margin being 2 mm.  Two of 2 sentinel lymph nodes were negative for involvement.      She completed her radiation therapy and was subsequently started on AIs.  She completed 5 years of adjuvant hormonal therapy and she is being followed expectantly.    A mammogram on 7/9/2024 was read as BIRADS II, and a one year follow up was recommended.      In April 2023 she had been diagnosed with C diff and she was treated accordingly.  She underwent a colonoscopy and EGD by   Greg.  The colonoscopy showed 3 polyps and extensive diverticulosis.  Biopsies were negative for malignancy.  The EGD showed gastritis and a hiatal hernia.  A video capsule swallow did not identify a source of bleeding in her small intestine.       Review of Systems      Overall she feels OK, and her ECOG PS is 1.  She states that her stamina is OK.  She denies any anxiety, depression, easy bruising, fevers, chills, night  sweats, weight loss, nausea, vomiting, diarrhea, constipation, diplopia, blurred vision, headache, chest pain, palpitations, shortness of breath, breast pain, abdominal pain, extremity pain, or difficulty ambulating.  The remainder of the ten-point ROS, including general, skin, lymph, H/N, cardiorespiratory, GI, , Neuro, Endocrine, and psychiatric is negative.     Objective:      Physical Exam        She is alert, oriented to time, place, person, pleasant, well      nourished, in no acute physical distress.                                    VITAL SIGNS:  Reviewed                                      HEENT:  Normal.  There are no nasal, oral, lip, gingival, auricular, lid,    or conjunctival lesions.  Mucosae are moist and pink, and there is no        thrush.  Pupils are equal, reactive to light and accommodation.              Extraocular muscle movements are intact.  Dentition is good.                                    NECK:  Supple without JVD, adenopathy, or thyromegaly.                       LUNGS:  Clear to auscultation without wheezing, rales, or rhonchi.           CARDIOVASCULAR:  Reveals an S1, S2, a grade 2 systolic ejection murmur of aortic stenosis, no rubs, no gallops.         ABDOMEN:  Soft, nontender, without organomegaly.  Bowel sounds are    present.                                                                     EXTREMITIES:  No cyanosis, clubbing, or edema.                               BREASTS:   She is status post right lumpectomy with a well-healed incision in the  upper outer quadrant.    There are no masses in either breast.                                LYMPHATIC:  There is no cervical, axillary, or supraclavicular adenopathy.   SKIN:  Warm and moist, without petechiae, rashes, induration, or ecchymoses.        NEUROLOGIC:  DTRs are 0-1+ bilaterally, symmetrical, motor function is 5/5,and cranial nerves are  within normal limits.    Assessment:       1. Malignant neoplasm of upper-outer quadrant of right breast in female, estrogen receptor positive    2. S/p 5 years of adjuvant hormonal therapy with aromatase inhibitors      3.    Osteopenia approaching osteoporosis    4.   Anemia, chronic, normochromic normocytic, most likely multifactorial.  Her bone marrow biopsy did not reveal any MDS. Anemia resolved with 2 EPo injections     5.  CDK 3b  Plan:           I had a long discussion with her.  Her H&H increased with to erythropoietin injections and it is still maintained at an acceptable level. .  We will continue to hold the erythropoietin for now and I will see her in 6 weeks with a repeat CBC.  In regards to the breast cancer, she had completed her 5 years of anastrazole at the end of October 2023.  Her mammogram will be repeated in July 2025.    Her multiple questions were answered to her satisfaction.  I spent approximately 30 minutes reviewing the available records, evaluating the patient, and coordinating her care.  Visit today included increased complexity associated with the anemia, CKD, and breast cancer         Route Chart for Scheduling    Med Onc Chart Routing      Follow up with physician 6 weeks. with labs. possible EPo that day; she needs to be on the schedule   Follow up with BASHIR    Infusion scheduling note    Injection scheduling note    Labs    Imaging    Pharmacy appointment    Other referrals          Supportive Plan Information  OP EPOETIN MERCED WEEKLY Robert Hernandez MD   Associated Diagnosis: Stage 3b chronic kidney disease   noted on 12/20/2023    Line of treatment: Supportive Care   Treatment goal: Supportive     Upcoming Treatment Dates - OP EPOETIN ENEDINA WEEKLY    9/29/2024       Medications       epoetin enedina-epbx injection 40,000 Units  10/6/2024       Medications       epoetin enedina-epbx injection 40,000 Units    Therapy Plan Information  DENOSUMAB (PROLIA) Q6M for Age-related osteoporosis with current pathological fracture with routine healing, noted on 4/8/2014  DENOSUMAB (PROLIA) Q6M for Acute renal failure superimposed on stage 3b chronic kidney disease, noted on 9/12/2018  DENOSUMAB (PROLIA) Q6M for Vitamin D deficiency, noted on 6/12/2019  Medications  denosumab (PROLIA) injection 60 mg  60 mg, Subcutaneous, Every 26 weeks      No therapy plan of the specified type found.    No therapy plan of the specified type found.

## 2024-11-20 NOTE — PROGRESS NOTES
"  Shima Sorto presented for a follow-up Medicare AWV today. The following components were reviewed and updated:    Medical history  Family History  Social history  Allergies and Current Medications  Health Risk Assessment  Health Maintenance  Care Team    **See Completed Assessments for Annual Wellness visit with in the encounter summary    The following assessments were completed:  Depression Screening  Cognitive function Screening    Timed Get Up Test  Whisper Test      Opioid documentation:      Patient does not have a current opioid prescription.          Vitals:    11/20/24 0901   BP: 120/82   BP Location: Right arm   Pulse: (!) 55   SpO2: 99%   Weight: 57 kg (125 lb 10.6 oz)   Height: 5' 4" (1.626 m)     Body mass index is 21.57 kg/m².       Physical Exam  Vitals and nursing note reviewed.   Constitutional:       Appearance: She is well-developed.   HENT:      Head: Normocephalic.   Cardiovascular:      Rate and Rhythm: Normal rate and regular rhythm.      Heart sounds: Normal heart sounds. No murmur heard.  Pulmonary:      Effort: Pulmonary effort is normal.      Breath sounds: Normal breath sounds.   Abdominal:      General: Bowel sounds are normal.      Palpations: Abdomen is soft.   Musculoskeletal:         General: Normal range of motion.   Skin:     General: Skin is warm and dry.   Neurological:      Mental Status: She is alert and oriented to person, place, and time.      Motor: No abnormal muscle tone.   Psychiatric:         Mood and Affect: Mood normal.            Diagnoses and health risks identified today and associated recommendations/orders:    1. Encounter for preventive health examination  Here for Health Risk Assessment/Annual Wellness Visit.  Health maintenance reviewed and updated. Follow up in one year.     2. Primary hypertension  Chronic, at goal on current medications. Followed by PCP, Cardiology.    3. Aorto-iliac atherosclerosis  Chronic, stable on current medications. Noted CT " Enterogram 3/07/23. Followed by PCP, Cardiology.    4. ACC/AHA stage C heart failure with preserved ejection fraction  Chronic, stable on current medications. No C/O dyspnea. Followed by PCP, Cardiology.    5. Stage 3b chronic kidney disease  Chronic, stable on current medications. Followed by PCP, Nephrology    6. Malignant neoplasm of upper-outer quadrant of right breast in female, estrogen receptor positive  Stable. Completed aromatase inhibitor. Followed by PCP, Oncology.    7. Age-related osteoporosis with current pathological fracture with routine healing  Chronic, stable on current medications/Prolia. Followed by PCP.     8. Obstructive sleep apnea  Chronic, stable. No CPAP. Followed by PCP.    9. Vestibular neuronitis, unspecified laterality  Chronic, stable. No CPAP. Followed by PCP.    10. Chronic pulmonary heart disease  Chronic, stable on current medications. Estimated PA Systolic Pressure 44 onted on TTE 7/07/23. Followed by PCP.     11. Encounter for Medicare annual wellness exam  - Ambulatory Referral/Consult to Enhanced Annual Wellness Visit (eAWV)      Provided Shima with a 5-10 year written screening schedule and personal prevention plan. Recommendations were developed using the USPSTF age appropriate recommendations. Education, counseling, and referrals were provided as needed.  After Visit Summary printed and given to patient which includes a list of additional screenings\tests needed.    Follow up in about 51 weeks (around 11/12/2025).with PCP      Deana Terry NP

## 2024-11-20 NOTE — PATIENT INSTRUCTIONS
Counseling and Referral of Other Preventative  (Italic type indicates deductible and co-insurance are waived)    Patient Name: Shima Sorto  Today's Date: 11/20/2024    Health Maintenance       Date Due Completion Date    RSV Vaccine (Age 60+ and Pregnant patients) (1 - 1-dose 75+ series) Never done ---    COVID-19 Vaccine (16 - 2024-25 season) 09/01/2024 1/17/2024    DEXA Scan 01/05/2025 1/5/2023    TETANUS VACCINE 12/06/2024 12/6/2014    Lipid Panel 10/10/2028 10/10/2023    Colonoscopy 05/10/2030 5/10/2023        No orders of the defined types were placed in this encounter.    The following information is provided to all patients.  This information is to help you find resources for any of the problems found today that may be affecting your health:                  Living healthy guide: www.[facility].[place].gov      Understanding Diabetes: www.diabetes.org      Eating healthy: www.cdc.gov/healthyweight      [facility] home safety checklist: www.cdc.gov/steadi/patient.html      Agency on Aging: www.goea.[place].[facility]      Alcoholics anonymous (AA): www.aa.org      Physical Activity: www.[place].nih.gov/fl9ycxv      Tobacco use: www.quitwithusla.org

## 2024-11-21 ENCOUNTER — CLINICAL SUPPORT (OUTPATIENT)
Facility: CLINIC | Age: 84
End: 2024-11-21
Payer: MEDICARE

## 2024-11-21 ENCOUNTER — TELEPHONE (OUTPATIENT)
Facility: CLINIC | Age: 84
End: 2024-11-21
Payer: MEDICARE

## 2024-11-21 DIAGNOSIS — N18.32 STAGE 3B CHRONIC KIDNEY DISEASE: ICD-10-CM

## 2024-11-21 NOTE — PROGRESS NOTES
Shima Sorto was referred to Introduction to CKD education by Dr. Sims    The patient attended class virtually via [facility] portal in class setting with other participants. Audio and visual support offered throughout the class.    The following material was covered. Outcomes were assessed via frequent question/answer and engagement strategies (yes/no questions to the group; and open ended questions for discussion).    Chronic Kidney Disease Education (Lesson 1 and 2)    Lesson 1 Understanding Kidney Disease  Lesson Objectives  By the end of each session, participants will be able to:  Recognize that fear and grief are usual responses to kidney disease  State causes/risk factors for kidney disease  Explain how GFR reflects kidney function  Explain how urine albumin/protein reflects kidney damage  State two ways the kidneys help maintain health  Describe how kidney disease is progressive but can be slowed down  Topics & Points Covered  Emotional impact of diagnosis  You're not alone  Emotional aspects  Depression, grief, and fear are typical  Physical activity may help  Benefits of education: Why are you here?  There are many causes of CKD. Diabetes and hypertension are the leading causes. Family history, cardiovascular disease, recurrent UTIs, immunological disease and genetics also play a role in CKD  CKD is complicated  The more you understand, the better you'll do. (Stated directly)  Basic anatomy  Location in the body  The nephrons have filters (working units)  Normal kidney function  Maintain chemical balance  Produce hormones  Regulate blood pressure  Kidney damage  Major causes of kidney damage  High blood pressure, diabetes  Fewer functioning nephrons  Monitoring kidney function and damage  eGFR (function)  Urine albumin/UACR or Urine protein/creatinine ratio (damage)  eGFR goal: a stable level  Decline means progression  Urine albumin/UACR goal: a stable or lower level  Increase in urine  albumin/protein may mean progression  Kidney disease is often irreversible and progressive  You'll likely need the help of a kidney specialist  Overview of treatment modalities  There are things you can do that may slow progression (more on this in next session)  Take your medications  Control blood pressure  Manage diabetes  Eat less salt  Miscellaneous  Kidney disease runs in families (Encouraged testing)  Early kidney disease has no symptoms    Lesson 2 Managing Your Kidney Disease  Lesson Objectives  By the end of each session, participants will be able to:  Recognize that managing blood pressure is a key part of managing kidney disease  Recognize that managing diabetes is a key part of managing kidney disease  State at least one step to eating right for kidney health  Recognize the importance of being cautious about over-the-counter medicines  Recognize that smoking can worsen kidney disease  Identify which lab values are used to keep track of diabetes, high blood pressure, and other conditions  Topics & Points Covered  There are steps you can take to keep your kidneys working  Weight management  Blood pressure management  Blood pressure goal: < 140/90 mm Hg  Medications - ACEs/ARBs, diuretics  ACEs/ARBS and risk of hyperkalemia (too much potassium in the blood, but help lower urine albumin)  Sodium reduction (<2,300 mg)  Physical activity  Diabetes management  A1C target   Diabetes medications may change because of kidney disease (often takes less medication to control glucose in the later stages of CKD)  How to treat hypoglycemia appropriately (risk of high potassium with ACEi and ARB use) glucose tablets are preferred [May need to avoid juices with high potassium levels if hyperkalemic]  Hyperglycemia  Cardiovascular disease (CVD)  Physical activity  CVD is major cause of mortality  LDL goal  Medications  Nutritional health  Choose and prepare foods with less salt and sodium  Eat the right amount and kind of  protein  Choose foods that are healthy for your heart  Choose foods based on phosphorus and potassium content (if restricted)  Make choices that help with diabetes management  Dietitian referral/nutrition therapy is covered benefit  Medication safety  Prescription  Over-the-counter (pain relief)  Tobacco cessation    The content of these lessons are adapted from the Kidney Disease Education (KDE) Services benefit as defined by the Centers for Medicare & Medicaid Services.    120 minutes spent educating the patient of the above content.

## 2024-11-21 NOTE — Clinical Note
Primitivo Sims! I saw this patient today for CKD basic education virtually today. She participated in the class and was an active for the discussions. Please let me know if she has any follow up questions or concerns. Thank you, Claudia

## 2024-11-22 ENCOUNTER — CLINICAL SUPPORT (OUTPATIENT)
Dept: REHABILITATION | Facility: HOSPITAL | Age: 84
End: 2024-11-22
Attending: INTERNAL MEDICINE
Payer: MEDICARE

## 2024-11-22 DIAGNOSIS — R53.1 WEAKNESS: Primary | ICD-10-CM

## 2024-11-22 DIAGNOSIS — R53.81 PHYSICAL DECONDITIONING: ICD-10-CM

## 2024-11-22 PROCEDURE — 97161 PT EVAL LOW COMPLEX 20 MIN: CPT

## 2024-11-22 NOTE — TELEPHONE ENCOUNTER
----- Message from Veda sent at 11/21/2024  9:07 AM CST -----  Contact: [phone]  hSima Sorto calling regarding Appointment Access  (message) for #Pt calling to inform that she will not be able to make it to her virtual visit schedule for today at 10am pt asking for a call back to reschedule Pls

## 2024-11-22 NOTE — PLAN OF CARE
[facility]   Physical Therapy Initial Evaluation      Name: Shima Echols [facility] Number: [id number]    Therapy Diagnosis:   Encounter Diagnoses   Name Primary?    Physical deconditioning     Weakness Yes        Physician: Carmen Milton MD    Physician Orders: PT Eval and Treat   Medical Diagnosis from Referral:   R53.81 (ICD-10-CM) - Physical deconditioning   Evaluation Date: 11/22/2024  Authorization Period Expiration: TBD  Plan of Care Expiration: 2/22/2025  Progress Note Due: 12/22/2024  Visit # / Visits authorized: 1/ 1   FOTO: 1/ 3    Precautions: Standard, cancer, CHF, osteoporosis, and HTN     Time In: 11:30 am  Time Out: 12:16 pm  Total Billable Time: 46 minutes    Subjective     Date of onset: chronic    History of current condition - Shima reports: She doesn't walk straight. She had one episode of vertigo  in 2017 and had PT after that. She'd like to sharpen those skills up because it's been a while. Also she had a break out of shingles that wasn't treated soon enough but it went over her head which made her lose function of B inner ear. On her R side, she has no control of inner ear and has to rely on eyes. She was walking after she got out of the hospital she was holding on to fences along the road. She doesn't use an AD. She especially has trouble walking in groups of people. She does have some back pain from stress fx at L3 and across the ilium from the kidneys. Depending on how she sleeps she will have more pain than other days. She is doing minimal exercises when she used to do a good bit more. She had a small injury on L ankle from hitting her ankle on the bed and needed wound care for that. She does have some ringing in her ears.     Falls: none recently    Imaging: none recently    Prior Therapy: yes for the same problem  Social History: lives in multi-story dwelling and has to use one flight everyday with handrail use  Occupation: not working  Prior Level of  Function:  independent   Current Level of Function: independent but unable to walk in a straight line    Pain:  Current 3/10, worst 5/10, best 2/10   Location: bilateral back   Description: Aching  Aggravating Factors: Morning  Easing Factors: salon pas    Patients goals: to be able to walk straighter and work on some strengthening machines     Medical History:   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    Medications:   Shima has a current medication list which includes the following prescription(s): acetaminophen, cholecalciferol (vitamin d3), coq10 (ubiquinol), denosumab, fish oil-e-fatty [id number], loratadine, losartan, systane ultra, preservision areds-2, lidocaine, UNABLE TO FIND, UNABLE TO FIND, and nephrocaps.    Allergies:   Review of patient's allergies indicates:   Allergen Reactions    Asparagus Rash    Macrobid [nitrofurantoin monohyd/m-cryst] Rash     Peeling skin and petechia        Objective      Gait: occasional veering to each side; dec'd step length B; fwd head  "and gaze on the floor;    Posture: L trunk lean, fwd head, kyphosis, rounded shoulders.     TUG Cutoff Scores: 11.2s           30" sit to stand Cutoff Scores: x12  with UE use (x0 without UE use)         Right Left Comment: ! = pain   Hip Flexion: 4+/5 4/5    Hip ABD: 4/5 4-/5    Hip ADD: 4+/5 4/5    Hip IR: 4+/5 4/5    Hip ER: 4+/5 4/5       Right Left Comment ! = pain   Knee extension: 4+/5 4/5    Knee flexion: 4+/5 4/5    Ankle DF: 4+/5 4/5      Functional Gait Assessment:   1. Gait on level surface =  2  2. Change in Gait Speed = 2  3. Gait with horizontal head turns  = 1  4. Gait with vertical head turns = 3  5. Gait with pivot turns = 2  6. Step over obstacle = 2  7. Gait with Narrow ROBERT = 0  8. Gait with eyes closed = 0  9. Ambulating Backwards = 0  10. Steps = 3    Score 15/30       Romberg:   EO EC Comments   Floor 20 sec 15 sec    Foam 20 sec 4 sec    *=LOB      Intake Outcome Measure for FOTO Survey    Therapist reviewed FOTO scores for Shiam Sorto on 11/22/2024.   FOTO report - see Media section or FOTO account episode details.    Intake Score: 70%         Treatment     Total Treatment time (time-based codes) separate from Evaluation: 0 minutes   Next session:  Nustep  Standing marching  Sit to stand  Shuttle  Rows  NBOS with head turns    Patient Education and Home Exercises     Education provided:   - Role of PT  - PT POC      Assessment     Shima is a 84 y.o. female referred to outpatient Physical Therapy with a medical diagnosis of physical deconditioning. Patient presents with Symptoms consistent with medical diagnoses and impairments that are effecting their daily life. She is impaired in functional mobility, strength of LE, balance, gait, activity tolerance, posture, and fall risk as indicated by objective measures above.     Patient prognosis is Excellent.   Patient will benefit from skilled outpatient Physical Therapy to address the deficits stated above and in the chart below, provide " patient /family education, and to maximize patientt's level of independence.     Plan of care discussed with patient: Yes  Patient's spiritual, cultural and educational needs considered and patient is agreeable to the plan of care and goals as stated below:     Anticipated Barriers for therapy: co-morbidities, age    Medical Necessity is demonstrated by the following  History  Co-morbidities and personal factors that may impact the plan of care [] LOW: no personal factors / co-morbidities  [] MODERATE: 1-2 personal factors / co-morbidities  [x] HIGH: 3+ personal factors / co-morbidities    Moderate / High Support Documentation:   Co-morbidities affecting plan of care: see history above    Personal Factors:   age     Examination  Body Structures and Functions, activity limitations and participation restrictions that may impact the plan of care [] LOW: addressing 1-2 elements  [] MODERATE: 3+ elements  [x] HIGH: 4+ elements (please support below)    Moderate / High Support Documentation: balance, strength, gait, posture     Clinical Presentation [x] LOW: stable  [] MODERATE: Evolving  [] HIGH: Unstable     Decision Making/ Complexity Score: low       Goals:  Short Term Goals (4 Weeks):   1. Pt will be compliant with HEP to supplement PT in restoring pain free function.  2. Pt will improve FGA test to x19 to improve for functional community ambulation and reduce fall risk.   3. Pt will improve impaired LE MMTs by 1/2 grade  to improve strength for functional tasks  4. Pt will be able to perform 30s sit to stand test without UE use to improve LE strength and functional mobility.   Long Term Goals (8 Weeks):  1. Pt will improve FOTO score to </= 72% limited to decrease perceived limitation with mobility  2. Pt will improve FGA test to x22 to improve for functional community ambulation and reduce fall risk.   3. Pt will improve impaired LE MMTs by 1 grade to improve strength for functional tasks.  4. Pt improve 30s sit to  stand test to x12 without UE use to improve LE strength and functional mobility.    Plan     Plan of care Certification: 11/22/2024 to 2/22/2025.    Outpatient Physical Therapy 2 times weekly for 12 weeks to include the following interventions: Cervical/Lumbar Traction, Electrical Stimulation, Gait Training, Manual Therapy, Moist Heat/ Ice, Neuromuscular Re-ed, Therapeutic Activities, Therapeutic Exercise, Ultrasound, and dry needling, IASTM, and kinesiotaping PRN.     Bhavesh Steel PT        Physician's Signature: _________________________________________ Date: ________________

## 2024-11-27 ENCOUNTER — CLINICAL SUPPORT (OUTPATIENT)
Dept: REHABILITATION | Facility: HOSPITAL | Age: 84
End: 2024-11-27
Payer: MEDICARE

## 2024-11-27 ENCOUNTER — PATIENT MESSAGE (OUTPATIENT)
Dept: NEPHROLOGY | Facility: CLINIC | Age: 84
End: 2024-11-27
Payer: MEDICARE

## 2024-11-27 DIAGNOSIS — R53.1 WEAKNESS: ICD-10-CM

## 2024-11-27 DIAGNOSIS — R53.81 PHYSICAL DECONDITIONING: Primary | ICD-10-CM

## 2024-11-27 PROCEDURE — 97112 NEUROMUSCULAR REEDUCATION: CPT

## 2024-11-27 NOTE — PROGRESS NOTES
[facility]   Physical Therapy Treatment Note      Name: Shima Echols [facility]  Clinic Number: [id number]    Therapy Diagnosis:   Encounter Diagnoses   Name Primary?    Physical deconditioning Yes    Weakness      Physician: Carmen Milton MD    Visit Date: 11/27/2024    Physician Orders: PT Eval and Treat   Medical Diagnosis from Referral:   R53.81 (ICD-10-CM) - Physical deconditioning   Evaluation Date: 11/22/2024  Authorization Period Expiration: TBD  Plan of Care Expiration: 2/22/2025  Progress Note Due: 12/22/2024  Visit # / Visits authorized: 1/1, 1/12   FOTO: 1/ 3     Precautions: Standard, cancer, CHF, osteoporosis, and HTN      Time In: 12:35 pm (pt arrived late)  Time Out: 1:00 pm  Total Billable Time: 25 minutes    PTA Visit #: 0/5       Subjective     Patient reports: overall she's been doing pretty good with no new complaints. She ran into some traffic on the way here.   She was not yet given a home exercise program.  Response to previous treatment: last session was IE  Functional change: none reported    Pain: 0/10  Location: n/a    Objective      Objective Measures updated at progress report unless specified.     Treatment     Shima received the treatments listed below:        Next session:  Shuttle      neuromuscular re-education activities to improve: Balance, Coordination, Proprioception, Posture, and Motor Control for 25 minutes. The following activities were included:  Nustep x6' (emphasis on muscular endurance and reciprocal muscle activation)  Standing marching 2x10 B  NBOS with head turns 3x30s  Tandem stances with head turns 2x10 B  Cone stance 3x20s B  Rows blue theraband 3x10  Sit to stand with airex on chair 2x10      Patient Education and Home Exercises       Education provided:   - HEP    Written Home Exercises Provided: Yes. Exercises were reviewed and Shima was able to demonstrate them prior to the end of the session.  Shima demonstrated good  understanding  of the education provided. See Electronic Medical Record under Patient Instructions for exercises provided during therapy sessions    Assessment     Pt tolerated first session after initial evaluation well today with no adverse effects. Session with emphasis on balance, posture, and functional activity tolerance. Session abbreviated today d/t pt's late arrival.       Shima Is progressing well towards her goals.   Patient prognosis is Excellent.   Patient will benefit from skilled outpatient Physical Therapy to address the deficits stated above and in the chart below, provide patient /family education, and to maximize patientt's level of independence.      Plan of care discussed with patient: Yes  Patient's spiritual, cultural and educational needs considered and patient is agreeable to the plan of care and goals as stated below:      Anticipated Barriers for therapy: co-morbidities, age    Goals:   Short Term Goals (4 Weeks):   1. Pt will be compliant with HEP to supplement PT in restoring pain free function.  2. Pt will improve FGA test to x19 to improve for functional community ambulation and reduce fall risk.   3. Pt will improve impaired LE MMTs by 1/2 grade  to improve strength for functional tasks  4. Pt will be able to perform 30s sit to stand test without UE use to improve LE strength and functional mobility.   Long Term Goals (8 Weeks):  1. Pt will improve FOTO score to </= 72% limited to decrease perceived limitation with mobility  2. Pt will improve FGA test to x22 to improve for functional community ambulation and reduce fall risk.   3. Pt will improve impaired LE MMTs by 1 grade to improve strength for functional tasks.  4. Pt improve 30s sit to stand test to x12 without UE use to improve LE strength and functional mobility.      Plan     Plan of care Certification: 11/22/2024 to 2/22/2025.     Outpatient Physical Therapy 2 times weekly for 12 weeks to include the following interventions:  Cervical/Lumbar Traction, Electrical Stimulation, Gait Training, Manual Therapy, Moist Heat/ Ice, Neuromuscular Re-ed, Therapeutic Activities, Therapeutic Exercise, Ultrasound, and dry needling, IASTM, and kinesiotaping PRN.     Bhavesh Steel, PT

## 2024-12-02 ENCOUNTER — LAB VISIT (OUTPATIENT)
Dept: LAB | Facility: HOSPITAL | Age: 84
End: 2024-12-02
Attending: INTERNAL MEDICINE
Payer: MEDICARE

## 2024-12-02 ENCOUNTER — CLINICAL SUPPORT (OUTPATIENT)
Dept: REHABILITATION | Facility: HOSPITAL | Age: 84
End: 2024-12-02
Payer: MEDICARE

## 2024-12-02 DIAGNOSIS — N18.31 STAGE 3A CHRONIC KIDNEY DISEASE: ICD-10-CM

## 2024-12-02 DIAGNOSIS — R53.81 PHYSICAL DECONDITIONING: Primary | ICD-10-CM

## 2024-12-02 DIAGNOSIS — R53.1 WEAKNESS: ICD-10-CM

## 2024-12-02 LAB
ALBUMIN SERPL BCP-MCNC: 3.4 G/DL (ref 3.5–5.2)
ANION GAP SERPL CALC-SCNC: 5 MMOL/L (ref 8–16)
BUN SERPL-MCNC: 27 MG/DL (ref 8–23)
CALCIUM SERPL-MCNC: 9.4 MG/DL (ref 8.7–10.5)
CHLORIDE SERPL-SCNC: 107 MMOL/L (ref 95–110)
CO2 SERPL-SCNC: 29 MMOL/L (ref 23–29)
CREAT SERPL-MCNC: 1.4 MG/DL (ref 0.5–1.4)
EST. GFR  (NO RACE VARIABLE): 37.1 ML/MIN/1.73 M^2
GLUCOSE SERPL-MCNC: 94 MG/DL (ref 70–110)
HCT VFR BLD AUTO: 34.7 % (ref 37–48.5)
HGB BLD-MCNC: 11.3 G/DL (ref 12–16)
PHOSPHATE SERPL-MCNC: 4 MG/DL (ref 2.7–4.5)
POTASSIUM SERPL-SCNC: 4.2 MMOL/L (ref 3.5–5.1)
PTH-INTACT SERPL-MCNC: 34.9 PG/ML (ref 9–77)
SODIUM SERPL-SCNC: 141 MMOL/L (ref 136–145)

## 2024-12-02 PROCEDURE — 97112 NEUROMUSCULAR REEDUCATION: CPT

## 2024-12-02 PROCEDURE — 97530 THERAPEUTIC ACTIVITIES: CPT

## 2024-12-02 PROCEDURE — 36415 COLL VENOUS BLD VENIPUNCTURE: CPT | Performed by: INTERNAL MEDICINE

## 2024-12-02 PROCEDURE — 80069 RENAL FUNCTION PANEL: CPT | Performed by: INTERNAL MEDICINE

## 2024-12-02 PROCEDURE — 83970 ASSAY OF PARATHORMONE: CPT | Performed by: INTERNAL MEDICINE

## 2024-12-02 PROCEDURE — 85018 HEMOGLOBIN: CPT | Performed by: INTERNAL MEDICINE

## 2024-12-02 PROCEDURE — 85014 HEMATOCRIT: CPT | Performed by: INTERNAL MEDICINE

## 2024-12-02 NOTE — PROGRESS NOTES
[facility]   Physical Therapy Treatment Note      Name: Shima Echols [facility]  Clinic Number: [id number]    Therapy Diagnosis:   Encounter Diagnoses   Name Primary?    Physical deconditioning Yes    Weakness        Physician: Carmen Milton MD    Visit Date: 12/2/2024    Physician Orders: PT Eval and Treat   Medical Diagnosis from Referral:   R53.81 (ICD-10-CM) - Physical deconditioning   Evaluation Date: 11/22/2024  Authorization Period Expiration: 12/31/2024  Plan of Care Expiration: 2/22/2025  Progress Note Due: 12/22/2024  Visit # / Visits authorized: 1/1, 2/12   FOTO: 1/ 3     Precautions: Standard, cancer, CHF, osteoporosis, and HTN      Time In: 11:31 am  Time Out: 12:16 pm  Total Billable Time: 38 minutes (some time not billed d/t medicare 1:1 guidelines)    PTA Visit #: 0/5       Subjective     Patient reports: she was very busy so she didn't get a chance to do her HEP.   She was not compliant with her home exercise program.  Response to previous treatment: no adverse effects  Functional change: none reported    Pain: 0/10  Location: n/a    Objective      Objective Measures updated at progress report unless specified.     Treatment     Shima received the treatments listed below:        neuromuscular re-education activities to improve: Balance, Coordination, Proprioception, Posture, and Motor Control for 25 minutes. The following activities were included:  Nustep x6' (emphasis on muscular endurance and reciprocal muscle activation)  Standing marching 2x10 B  NBOS with head turns    - firm surface x10 vert and horizontal   - on airex x10 vert and horizontal  Tandem stances with head turns 2x10 B  Cone stance 3x20s B  Seated ball toss with gaze stabilization 4x20s       therapeutic activities to improve functional performance for 20  minutes, including:  Shuttle DLP 2 c 3x10  Standing hip abd/ext 2x10 2# B  Rows blue theraband 3x10  Sit to stand with airex on chair 2x10  Calf  raises 3x10  Tib push ups 3x10        Patient Education and Home Exercises       Education provided:   - HEP    Written Home Exercises Provided: Yes. Exercises were reviewed and Shiam was able to demonstrate them prior to the end of the session.  Shima demonstrated good  understanding of the education provided. See Electronic Medical Record under Patient Instructions for exercises provided during therapy sessions    Assessment     Progressed functional strengthening to progress towards improved activity tolerance with good response and no adverse effects.       Shima Is progressing well towards her goals.   Patient prognosis is Excellent.   Patient will benefit from skilled outpatient Physical Therapy to address the deficits stated above and in the chart below, provide patient /family education, and to maximize patientt's level of independence.      Plan of care discussed with patient: Yes  Patient's spiritual, cultural and educational needs considered and patient is agreeable to the plan of care and goals as stated below:      Anticipated Barriers for therapy: co-morbidities, age    Goals:   Short Term Goals (4 Weeks):   1. Pt will be compliant with HEP to supplement PT in restoring pain free function.  2. Pt will improve FGA test to x19 to improve for functional community ambulation and reduce fall risk.   3. Pt will improve impaired LE MMTs by 1/2 grade  to improve strength for functional tasks  4. Pt will be able to perform 30s sit to stand test without UE use to improve LE strength and functional mobility.   Long Term Goals (8 Weeks):  1. Pt will improve FOTO score to </= 72% limited to decrease perceived limitation with mobility  2. Pt will improve FGA test to x22 to improve for functional community ambulation and reduce fall risk.   3. Pt will improve impaired LE MMTs by 1 grade to improve strength for functional tasks.  4. Pt improve 30s sit to stand test to x12 without UE use to improve LE strength and  functional mobility.      Plan     Plan of care Certification: 11/22/2024 to 2/22/2025.     Outpatient Physical Therapy 2 times weekly for 12 weeks to include the following interventions: Cervical/Lumbar Traction, Electrical Stimulation, Gait Training, Manual Therapy, Moist Heat/ Ice, Neuromuscular Re-ed, Therapeutic Activities, Therapeutic Exercise, Ultrasound, and dry needling, IASTM, and kinesiotaping PRN.     Bhavesh Steel, PT, DPT

## 2024-12-03 ENCOUNTER — OFFICE VISIT (OUTPATIENT)
Dept: NEPHROLOGY | Facility: CLINIC | Age: 84
End: 2024-12-03
Payer: MEDICARE

## 2024-12-03 VITALS
DIASTOLIC BLOOD PRESSURE: 68 MMHG | HEART RATE: 70 BPM | OXYGEN SATURATION: 95 % | BODY MASS INDEX: 21.48 KG/M2 | WEIGHT: 121.25 LBS | SYSTOLIC BLOOD PRESSURE: 140 MMHG

## 2024-12-03 DIAGNOSIS — N20.0 KIDNEY STONES: ICD-10-CM

## 2024-12-03 DIAGNOSIS — I10 PRIMARY HYPERTENSION: ICD-10-CM

## 2024-12-03 DIAGNOSIS — N18.32 STAGE 3B CHRONIC KIDNEY DISEASE: Primary | ICD-10-CM

## 2024-12-03 DIAGNOSIS — I10 HYPERTENSION, UNSPECIFIED TYPE: ICD-10-CM

## 2024-12-03 PROCEDURE — 99999 PR PBB SHADOW E&M-EST. PATIENT-LVL III: CPT | Mod: PBBFAC,,, | Performed by: INTERNAL MEDICINE

## 2024-12-03 PROCEDURE — 99213 OFFICE O/P EST LOW 20 MIN: CPT | Mod: PBBFAC | Performed by: INTERNAL MEDICINE

## 2024-12-03 PROCEDURE — 99215 OFFICE O/P EST HI 40 MIN: CPT | Mod: S$PBB,,, | Performed by: INTERNAL MEDICINE

## 2024-12-03 NOTE — PROGRESS NOTES
HISTORY OF PRESENT ILLNESS:  This is a 84  -year-old white female with a   longstanding history of nephrolithiasis, but no recent symptomatic   nephrolithiasis episodes who is coming in for a followup. Recently had hematuria and had cystoscopy.  She also has   hypertension with stable blood pressures at home and had failed InterStim   therapy with her bladder in the past and had seen Dr. Stoddard in Urology.  No   recent symptomatic stones.  Her creatinine is stable at 1.4.   Denies NSAID's.  Her blood pressure at home between 105-140..       PAST MEDICAL HISTORY:  Significant for hypertension for over 10 years, history   of kidney stones, failed InterStim therapy, breast cancer, status post   lumpectomy, XRT and adjuvant hormonal therapy and osteoporosis.  KATHERIN-CPAP    REVIEW OF SYSTEMS:   She states that she is feeling well but some fatigue.  Apetite good.  Weight stable. Denies any back pain or kidney stone pain.  She denies any blood in the urine, frequency, urgency, hematuria, dysuria, nausea, vomiting, chest pain or shortness of breath.    PHYSICAL EXAMINATION:limited  GENERAL:  A well-nourished, well-developed individual, in no acute distress.  CARDIOVASCULAR:  Rate and rhythm regular.  No murmurs, gallops or rubs.  LUNGS:  Clear to auscultation bilaterally.  Normal respiratory effort.  ABDOMEN:  Soft, nontender,some distension.  No edema      ASSESSMENT AND PLAN:  This is a 84-year-old white female with a longstanding   history of hypertension who is coming in for a followup visit.  1.   Nephrolithiasis.  No recent symptomatic nephrolithiasis.  She had a   procedure in 2001, for a kidney stone.  Her previous renal US shows 1 cm stone with mild pelvicaliectasis-followed in urology. 3 liters of urine output per last 24-hour   urine per the patient.  I advised her to drink lemon juice for citrate.  She   also follows a low oxalate diet for high oxalate.  I also advised her to decrease her   meat intake.   Recent US shows very small stone -and sees urology.    Sodium nml now. H/o breast cancer. Had EGD, colonoscopy, mammogram, pap smear.  Started on PPI by GI but stopped it and now on gas-x per GI.   2.  Renal/ckd stg 3:  Her creatinines have been stable around 1.2-1.3 with GFR of 37  Ml/min but now 1.4.  Not drinking enough.  per urology, has PVR approximately > 600 ml              - self cath 10 Fr twice daily indefinitely.                -  RBC's on UA's of and  likely sec to stones-had seen urology in 1/22. Had PVR and failed interstim.  No RBC's on last UA  3.  Hypertension.  Blood pressures are stable at home.   She had 800 mg of proteinuria but better after losartan at 180 mg.  She was  on Benicar for nephroprotection but it looks like it's on hold by cards per pt bc of JOCELIN in the past. Started on losartan in 2/2024.  4.  Renal osteodystrophy:  Vitamin D levels are stable.   On OTC vit D. Her PTH is stable.  Calcium and phosphorus are stable.    5.  Anemia: stable. On  iron.  Saw hematology and bone marrow biopsy.    Addendum: Kidney US did not show any obstruction but multiple small stones and simple cysts which we can follow.     She can be  followed up in 5 months.  Had  [employer] education class      F/u in 5 months

## 2024-12-04 ENCOUNTER — CLINICAL SUPPORT (OUTPATIENT)
Dept: REHABILITATION | Facility: HOSPITAL | Age: 84
End: 2024-12-04
Payer: MEDICARE

## 2024-12-04 DIAGNOSIS — R53.81 PHYSICAL DECONDITIONING: Primary | ICD-10-CM

## 2024-12-04 DIAGNOSIS — R53.1 WEAKNESS: ICD-10-CM

## 2024-12-04 PROCEDURE — 97530 THERAPEUTIC ACTIVITIES: CPT | Mod: KX,CQ

## 2024-12-04 PROCEDURE — 97112 NEUROMUSCULAR REEDUCATION: CPT | Mod: KX,CQ

## 2024-12-04 NOTE — PROGRESS NOTES
[facility]   Physical Therapy Treatment Note      Name: Shima Echols [facility]  Clinic Number: [id number]    Therapy Diagnosis:   Encounter Diagnoses   Name Primary?    Physical deconditioning Yes    Weakness          Physician: Carmen Milton MD    Visit Date: 12/4/2024    Physician Orders: PT Eval and Treat   Medical Diagnosis from Referral:   R53.81 (ICD-10-CM) - Physical deconditioning   Evaluation Date: 11/22/2024  Authorization Period Expiration: 12/31/2024  Plan of Care Expiration: 2/22/2025  Progress Note Due: 12/22/2024  Visit # / Visits authorized: 1/1, 3/12   FOTO: 1/ 3     Precautions: Standard, cancer, CHF, osteoporosis, and HTN      Time In: 11:28 am  Time Out: 12:18 pm  Total Billable Time: 50 minutes      PTA Visit #: 1/5       Subjective     Patient reports: that she feels like she is improving her balance and strength in her legs, noting she is able to to sit to stands without using her arms on the chair.   She was not compliant with her home exercise program.  Response to previous treatment: no adverse effects  Functional change: none reported    Pain: 0/10  Location: n/a    Objective      Objective Measures updated at progress report unless specified.     Treatment     Shima received the treatments listed below:        neuromuscular re-education activities to improve: Balance, Coordination, Proprioception, Posture, and Motor Control for 30 minutes. The following activities were included:  Nustep x6' (emphasis on muscular endurance and reciprocal muscle activation)  Standing marching 2x10 B  NBOS with head turns    - firm surface x10 vert and horizontal   - on airex x10 vert and horizontal  Tandem stances with head turns 2x10 B  Cone stance 3x20s B  Seated ball toss with gaze stabilization 4x20s   Shld ext GTB 3x10     therapeutic activities to improve functional performance for 20  minutes, including:  Shuttle DLP 2 c 3x10  Standing hip abd/ext 2x10 2# B  Rows blue  theraband 3x10  Sit to stand with airex on chair 2x10  Calf raises 2lb 3x10  Tib push ups 3x10        Patient Education and Home Exercises       Education provided:   - HEP    Written Home Exercises Provided: Yes. Exercises were reviewed and Shima was able to demonstrate them prior to the end of the session.  Shima demonstrated good  understanding of the education provided. See Electronic Medical Record under Patient Instructions for exercises provided during therapy sessions    Assessment     Pt exhibited tolerance to all activities today, including the addition of shoulder extension for improved upright posture. Pt able to complete sit to stands with UE on thighs rather than arms of chair. Minor LOB noted with airex activities with pt using UE on mat to recover with Sveta from PTA. Will continue to progress pt towards goals as tolerated during future sessions.       Shima Is progressing well towards her goals.   Patient prognosis is Excellent.   Patient will benefit from skilled outpatient Physical Therapy to address the deficits stated above and in the chart below, provide patient /family education, and to maximize patientt's level of independence.      Plan of care discussed with patient: Yes  Patient's spiritual, cultural and educational needs considered and patient is agreeable to the plan of care and goals as stated below:      Anticipated Barriers for therapy: co-morbidities, age    Goals:   Short Term Goals (4 Weeks):   1. Pt will be compliant with HEP to supplement PT in restoring pain free function.  2. Pt will improve FGA test to x19 to improve for functional community ambulation and reduce fall risk.   3. Pt will improve impaired LE MMTs by 1/2 grade  to improve strength for functional tasks  4. Pt will be able to perform 30s sit to stand test without UE use to improve LE strength and functional mobility.   Long Term Goals (8 Weeks):  1. Pt will improve FOTO score to </= 72% limited to decrease perceived  limitation with mobility  2. Pt will improve FGA test to x22 to improve for functional community ambulation and reduce fall risk.   3. Pt will improve impaired LE MMTs by 1 grade to improve strength for functional tasks.  4. Pt improve 30s sit to stand test to x12 without UE use to improve LE strength and functional mobility.      Plan     Plan of care Certification: 11/22/2024 to 2/22/2025.     Outpatient Physical Therapy 2 times weekly for 12 weeks to include the following interventions: Cervical/Lumbar Traction, Electrical Stimulation, Gait Training, Manual Therapy, Moist Heat/ Ice, Neuromuscular Re-ed, Therapeutic Activities, Therapeutic Exercise, Ultrasound, and dry needling, IASTM, and kinesiotaping PRN.     Shawna Branch, PTA

## 2024-12-06 ENCOUNTER — PATIENT MESSAGE (OUTPATIENT)
Dept: NEPHROLOGY | Facility: CLINIC | Age: 84
End: 2024-12-06
Payer: MEDICARE

## 2024-12-09 ENCOUNTER — TELEPHONE (OUTPATIENT)
Dept: NEPHROLOGY | Facility: CLINIC | Age: 84
End: 2024-12-09
Payer: MEDICARE

## 2024-12-09 NOTE — TELEPHONE ENCOUNTER
She sent a message stating she wants to go up on the losartan to 50 mg from 25 mg.  Since  she is tired, I don't necessarily want to increase her dose.  Her BP's seem to be fine from her readings she mentioned in the clinic-some on the lower end.  Please see if she is taking losartan 25 mg a day currently and she can con't to monitor bp's.

## 2024-12-11 ENCOUNTER — CLINICAL SUPPORT (OUTPATIENT)
Dept: REHABILITATION | Facility: HOSPITAL | Age: 84
End: 2024-12-11
Payer: MEDICARE

## 2024-12-11 DIAGNOSIS — R53.81 PHYSICAL DECONDITIONING: Primary | ICD-10-CM

## 2024-12-11 DIAGNOSIS — R53.1 WEAKNESS: ICD-10-CM

## 2024-12-11 PROCEDURE — 97530 THERAPEUTIC ACTIVITIES: CPT

## 2024-12-11 PROCEDURE — 97112 NEUROMUSCULAR REEDUCATION: CPT

## 2024-12-11 NOTE — PROGRESS NOTES
"[facility]   Physical Therapy Treatment Note      Name: Shima Echols [facility]  Clinic Number: [id number]    Therapy Diagnosis:   Encounter Diagnoses   Name Primary?    Physical deconditioning Yes    Weakness            Physician: Carmen Milton MD    Visit Date: 12/11/2024    Physician Orders: PT Eval and Treat   Medical Diagnosis from Referral:   R53.81 (ICD-10-CM) - Physical deconditioning   Evaluation Date: 11/22/2024  Authorization Period Expiration: 12/31/2024  Plan of Care Expiration: 2/22/2025  Progress Note Due: 12/22/2024  Visit # / Visits authorized: 1/1, 4/12   FOTO: 1/ 3     Precautions: Standard, cancer, CHF, osteoporosis, and HTN      Time In: 12:22 pm (pt arrived late)  Time Out: 1:03 pm  Total Billable Time: 41 minutes      PTA Visit #: 0/5       Subjective     Patient reports: that she feels like her balance is getting better. She doesn't get sore after working here.   She was not compliant with her home exercise program.  Response to previous treatment: no adverse effects  Functional change: none reported    Pain: 0/10  Location: n/a    Objective      Objective Measures updated at progress report unless specified.     Treatment     Shima received the treatments listed below:        neuromuscular re-education activities to improve: Balance, Coordination, Proprioception, Posture, and Motor Control for 16 minutes. The following activities were included:  Nustep x6' (emphasis on muscular endurance and reciprocal muscle activation)  Standing marching on airex 2x10 B  NBOS with head turns    - on airex 2x10 vert and horizontal  Cone stance 3x20s B  Seated ball toss with gaze stabilization 4x20s - NT  Shld ext GTB 3x10 - NT    therapeutic activities to improve functional performance for 25  minutes, including:  Shuttle DLP 3 c 3x10  Standing hip abd/ext 2x10 2# B  Rows cc 10# 3x10  Sit to stand    - x10 from 19.5"   - with Lvl under unilateral LE from 21" x10 B  Calf raises " 2lb 3x10  Tib push ups 3x10        Patient Education and Home Exercises       Education provided:   - HEP    Written Home Exercises Provided: Yes. Exercises were reviewed and Shima was able to demonstrate them prior to the end of the session.  Shima demonstrated good  understanding of the education provided. See Electronic Medical Record under Patient Instructions for exercises provided during therapy sessions    Assessment     Progressed functional strengthening with fairly good tolerance and no adverse effects.       Shima Is progressing well towards her goals.   Patient prognosis is Excellent.   Patient will benefit from skilled outpatient Physical Therapy to address the deficits stated above and in the chart below, provide patient /family education, and to maximize patientt's level of independence.      Plan of care discussed with patient: Yes  Patient's spiritual, cultural and educational needs considered and patient is agreeable to the plan of care and goals as stated below:      Anticipated Barriers for therapy: co-morbidities, age    Goals:   Short Term Goals (4 Weeks):   1. Pt will be compliant with HEP to supplement PT in restoring pain free function.  2. Pt will improve FGA test to x19 to improve for functional community ambulation and reduce fall risk.   3. Pt will improve impaired LE MMTs by 1/2 grade  to improve strength for functional tasks  4. Pt will be able to perform 30s sit to stand test without UE use to improve LE strength and functional mobility.   Long Term Goals (8 Weeks):  1. Pt will improve FOTO score to </= 72% limited to decrease perceived limitation with mobility  2. Pt will improve FGA test to x22 to improve for functional community ambulation and reduce fall risk.   3. Pt will improve impaired LE MMTs by 1 grade to improve strength for functional tasks.  4. Pt improve 30s sit to stand test to x12 without UE use to improve LE strength and functional mobility.      Plan     Plan of care  Certification: 11/22/2024 to 2/22/2025.     Outpatient Physical Therapy 2 times weekly for 12 weeks to include the following interventions: Cervical/Lumbar Traction, Electrical Stimulation, Gait Training, Manual Therapy, Moist Heat/ Ice, Neuromuscular Re-ed, Therapeutic Activities, Therapeutic Exercise, Ultrasound, and dry needling, IASTM, and kinesiotaping PRN.     Bhavesh Steel, PT, DPT

## 2024-12-12 ENCOUNTER — TELEPHONE (OUTPATIENT)
Dept: NEPHROLOGY | Facility: CLINIC | Age: 84
End: 2024-12-12
Payer: MEDICARE

## 2024-12-15 ENCOUNTER — PATIENT MESSAGE (OUTPATIENT)
Dept: GASTROENTEROLOGY | Facility: CLINIC | Age: 84
End: 2024-12-15
Payer: MEDICARE

## 2024-12-16 ENCOUNTER — CLINICAL SUPPORT (OUTPATIENT)
Dept: REHABILITATION | Facility: HOSPITAL | Age: 84
End: 2024-12-16
Payer: MEDICARE

## 2024-12-16 DIAGNOSIS — R53.1 WEAKNESS: ICD-10-CM

## 2024-12-16 DIAGNOSIS — R53.81 PHYSICAL DECONDITIONING: Primary | ICD-10-CM

## 2024-12-16 PROCEDURE — 97112 NEUROMUSCULAR REEDUCATION: CPT | Mod: KX,CQ

## 2024-12-16 PROCEDURE — 97530 THERAPEUTIC ACTIVITIES: CPT | Mod: KX,CQ

## 2024-12-16 NOTE — PROGRESS NOTES
"[facility]   Physical Therapy Treatment Note      Name: Shima Echols [facility]  Clinic Number: [id number]    Therapy Diagnosis:   Encounter Diagnoses   Name Primary?    Physical deconditioning Yes    Weakness              Physician: Carmen Milton MD    Visit Date: 12/16/2024    Physician Orders: PT Eval and Treat   Medical Diagnosis from Referral:   R53.81 (ICD-10-CM) - Physical deconditioning   Evaluation Date: 11/22/2024  Authorization Period Expiration: 12/31/2024  Plan of Care Expiration: 2/22/2025  Progress Note Due: 12/22/2024  Visit # / Visits authorized: 1/1, 5/12   FOTO: 1/ 3     Precautions: Standard, cancer, CHF, osteoporosis, and HTN      Time In: 12:43 pm   Time Out: 1:31 pm  Total Billable Time: 48 minutes      PTA Visit #: 1/5       Subjective     Patient reports: that she feels like PT has made a big difference in her mobility already.   She was not compliant with her home exercise program.  Response to previous treatment: no adverse effects  Functional change: none reported    Pain: 0/10  Location: n/a    Objective      Blood pressure post Nu-step: 158/76 mmHg   Heart rate post Nu-step: 70 bpm     Treatment     Shima received the treatments listed below:        neuromuscular re-education activities to improve: Balance, Coordination, Proprioception, Posture, and Motor Control for 12 minutes. The following activities were included:  Nustep x6' (emphasis on muscular endurance and reciprocal muscle activation) (held at 3 minutes today)   Standing marching on airex 2x10 B  NBOS with head turns    - on airex 2x10 vert and horizontal  Cone stance 3x20s B  Seated ball toss with gaze stabilization 4x20s -   Shld ext GTB 3x10 - NT    therapeutic activities to improve functional performance for 36 minutes, including:  Shuttle DLP 3 c 3x10  Standing hip abd/ext 2x10 2# B  Rows cc 10# 3x10  Sit to stand    - x10 from 19.5"   - with Lvl under unilateral LE from 21" x10 B  Calf raises " "2lb 3x10  Tib push ups 3x10        Patient Education and Home Exercises       Education provided:   - HEP    Written Home Exercises Provided: Yes. Exercises were reviewed and Shima was able to demonstrate them prior to the end of the session.  Shima demonstrated good  understanding of the education provided. See Electronic Medical Record under Patient Instructions for exercises provided during therapy sessions    Assessment     During nu-step, pt noted feeling like she was over working "like running up a flight of stairs". Pt stated that she had an instance of increased blood pressure on Friday and was concerned of having another episode. Held remaining time on Nu-step and took blood pressure. Slightly elevated systolic reading noted, however pt now asymptomatic. Continued with session as planned and monitored pt subjectively with no adverse effects noted.     Shima Is progressing well towards her goals.   Patient prognosis is Excellent.   Patient will benefit from skilled outpatient Physical Therapy to address the deficits stated above and in the chart below, provide patient /family education, and to maximize patientt's level of independence.      Plan of care discussed with patient: Yes  Patient's spiritual, cultural and educational needs considered and patient is agreeable to the plan of care and goals as stated below:      Anticipated Barriers for therapy: co-morbidities, age    Goals:   Short Term Goals (4 Weeks):   1. Pt will be compliant with HEP to supplement PT in restoring pain free function.  2. Pt will improve FGA test to x19 to improve for functional community ambulation and reduce fall risk.   3. Pt will improve impaired LE MMTs by 1/2 grade  to improve strength for functional tasks  4. Pt will be able to perform 30s sit to stand test without UE use to improve LE strength and functional mobility.   Long Term Goals (8 Weeks):  1. Pt will improve FOTO score to </= 72% limited to decrease perceived " limitation with mobility  2. Pt will improve FGA test to x22 to improve for functional community ambulation and reduce fall risk.   3. Pt will improve impaired LE MMTs by 1 grade to improve strength for functional tasks.  4. Pt improve 30s sit to stand test to x12 without UE use to improve LE strength and functional mobility.      Plan     Plan of care Certification: 11/22/2024 to 2/22/2025.     Outpatient Physical Therapy 2 times weekly for 12 weeks to include the following interventions: Cervical/Lumbar Traction, Electrical Stimulation, Gait Training, Manual Therapy, Moist Heat/ Ice, Neuromuscular Re-ed, Therapeutic Activities, Therapeutic Exercise, Ultrasound, and dry needling, IASTM, and kinesiotaping PRN.     Shawna Branch, PTA

## 2024-12-17 ENCOUNTER — PATIENT MESSAGE (OUTPATIENT)
Dept: GASTROENTEROLOGY | Facility: CLINIC | Age: 84
End: 2024-12-17
Payer: MEDICARE

## 2024-12-19 ENCOUNTER — PATIENT MESSAGE (OUTPATIENT)
Dept: OTHER | Facility: OTHER | Age: 84
End: 2024-12-19
Payer: MEDICARE

## 2024-12-19 ENCOUNTER — INFUSION (OUTPATIENT)
Dept: INFECTIOUS DISEASES | Facility: HOSPITAL | Age: 84
End: 2024-12-19
Payer: MEDICARE

## 2024-12-19 ENCOUNTER — PATIENT MESSAGE (OUTPATIENT)
Dept: ADMINISTRATIVE | Facility: OTHER | Age: 84
End: 2024-12-19
Payer: MEDICARE

## 2024-12-19 VITALS
OXYGEN SATURATION: 98 % | BODY MASS INDEX: 22.12 KG/M2 | SYSTOLIC BLOOD PRESSURE: 160 MMHG | WEIGHT: 124.88 LBS | TEMPERATURE: 98 F | RESPIRATION RATE: 16 BRPM | HEIGHT: 63 IN | DIASTOLIC BLOOD PRESSURE: 69 MMHG | HEART RATE: 54 BPM

## 2024-12-19 DIAGNOSIS — N17.9 ACUTE RENAL FAILURE SUPERIMPOSED ON STAGE 3B CHRONIC KIDNEY DISEASE, UNSPECIFIED ACUTE RENAL FAILURE TYPE: ICD-10-CM

## 2024-12-19 DIAGNOSIS — E55.9 VITAMIN D DEFICIENCY: ICD-10-CM

## 2024-12-19 DIAGNOSIS — M80.00XD AGE-RELATED OSTEOPOROSIS WITH CURRENT PATHOLOGICAL FRACTURE WITH ROUTINE HEALING: Primary | ICD-10-CM

## 2024-12-19 DIAGNOSIS — N18.32 ACUTE RENAL FAILURE SUPERIMPOSED ON STAGE 3B CHRONIC KIDNEY DISEASE, UNSPECIFIED ACUTE RENAL FAILURE TYPE: ICD-10-CM

## 2024-12-19 PROCEDURE — 96372 THER/PROPH/DIAG INJ SC/IM: CPT

## 2024-12-19 PROCEDURE — 63600175 PHARM REV CODE 636 W HCPCS: Mod: JZ,JG | Performed by: NURSE PRACTITIONER

## 2024-12-19 RX ADMIN — DENOSUMAB 60 MG: 60 INJECTION SUBCUTANEOUS at 01:12

## 2024-12-19 NOTE — PROGRESS NOTES
Patient arrives for Prolia injection - confirms use of calcium and vitamin D supplements and denies dental procedures over past 3 months - administered per guidelines      Limited head-to-toe assessment due to privacy issues and visit reason though the opportunity was given for patient to express any concerns

## 2024-12-20 ENCOUNTER — TELEPHONE (OUTPATIENT)
Dept: INTERNAL MEDICINE | Facility: CLINIC | Age: 84
End: 2024-12-20
Payer: MEDICARE

## 2024-12-20 NOTE — TELEPHONE ENCOUNTER
----- Message from Davina sent at 12/20/2024  1:05 PM CST -----  Contact: [phone] patient  Patient is returning a phone call.    Who left a message for the patient: Dr Carmen Paulson    Does patient know what this is regarding:      Would you like a call back, or a response through your [facility] portal?:  call     Comments:

## 2025-01-02 ENCOUNTER — TELEPHONE (OUTPATIENT)
Dept: GASTROENTEROLOGY | Facility: CLINIC | Age: 85
End: 2025-01-02
Payer: MEDICARE

## 2025-01-02 NOTE — TELEPHONE ENCOUNTER
----- Message from Amy sent at 1/2/2025 12:44 PM CST -----  Pt calling to let clinic know she is ready to  her kit , please call back to let pt know     when she should be on her way     Confirmed patient's contact info below:  Contact Name: Shima Sorto  Phone Number: [phone]

## 2025-01-02 NOTE — TELEPHONE ENCOUNTER
Spoke with patient.   Aware specimen kit is at the  on the [address]th floor [facility] for her to .  Patient expressed understanding.  Jennifer

## 2025-01-03 ENCOUNTER — LAB VISIT (OUTPATIENT)
Dept: LAB | Facility: HOSPITAL | Age: 85
End: 2025-01-03
Attending: INTERNAL MEDICINE
Payer: MEDICARE

## 2025-01-03 DIAGNOSIS — R19.7 DIARRHEA IN ADULT PATIENT: ICD-10-CM

## 2025-01-03 PROCEDURE — 87449 NOS EACH ORGANISM AG IA: CPT | Performed by: INTERNAL MEDICINE

## 2025-01-03 PROCEDURE — 87329 GIARDIA AG IA: CPT | Performed by: INTERNAL MEDICINE

## 2025-01-03 PROCEDURE — 87427 SHIGA-LIKE TOXIN AG IA: CPT | Mod: 59 | Performed by: INTERNAL MEDICINE

## 2025-01-03 PROCEDURE — 87045 FECES CULTURE AEROBIC BACT: CPT | Performed by: INTERNAL MEDICINE

## 2025-01-03 PROCEDURE — 87046 STOOL CULTR AEROBIC BACT EA: CPT | Performed by: INTERNAL MEDICINE

## 2025-01-03 PROCEDURE — 87449 NOS EACH ORGANISM AG IA: CPT | Mod: 91 | Performed by: INTERNAL MEDICINE

## 2025-01-04 LAB
C DIFF GDH STL QL: NEGATIVE
C DIFF TOX A+B STL QL IA: NEGATIVE

## 2025-01-05 ENCOUNTER — PATIENT MESSAGE (OUTPATIENT)
Dept: GASTROENTEROLOGY | Facility: CLINIC | Age: 85
End: 2025-01-05
Payer: MEDICARE

## 2025-01-06 ENCOUNTER — HOSPITAL ENCOUNTER (OUTPATIENT)
Dept: RADIOLOGY | Facility: CLINIC | Age: 85
Discharge: HOME | End: 2025-01-06
Attending: INTERNAL MEDICINE
Payer: MEDICARE

## 2025-01-06 DIAGNOSIS — M81.0 OSTEOPOROSIS, UNSPECIFIED OSTEOPOROSIS TYPE, UNSPECIFIED PATHOLOGICAL FRACTURE PRESENCE: ICD-10-CM

## 2025-01-06 LAB — BACTERIA STL CULT: NORMAL

## 2025-01-06 PROCEDURE — 77080 DXA BONE DENSITY AXIAL: CPT | Mod: 26,,, | Performed by: INTERNAL MEDICINE

## 2025-01-06 PROCEDURE — 77080 DXA BONE DENSITY AXIAL: CPT | Mod: TC

## 2025-01-07 ENCOUNTER — LAB VISIT (OUTPATIENT)
Dept: LAB | Facility: HOSPITAL | Age: 85
End: 2025-01-07
Attending: INTERNAL MEDICINE
Payer: MEDICARE

## 2025-01-07 ENCOUNTER — OFFICE VISIT (OUTPATIENT)
Dept: HEMATOLOGY/ONCOLOGY | Facility: CLINIC | Age: 85
End: 2025-01-07
Payer: MEDICARE

## 2025-01-07 VITALS
BODY MASS INDEX: 22.19 KG/M2 | HEART RATE: 55 BPM | SYSTOLIC BLOOD PRESSURE: 166 MMHG | DIASTOLIC BLOOD PRESSURE: 70 MMHG | TEMPERATURE: 98 F | OXYGEN SATURATION: 99 % | HEIGHT: 63 IN | WEIGHT: 125.25 LBS | RESPIRATION RATE: 14 BRPM

## 2025-01-07 DIAGNOSIS — N18.30 ANEMIA IN STAGE 3 CHRONIC KIDNEY DISEASE, UNSPECIFIED WHETHER STAGE 3A OR 3B CKD: ICD-10-CM

## 2025-01-07 DIAGNOSIS — C50.411 MALIGNANT NEOPLASM OF UPPER-OUTER QUADRANT OF RIGHT BREAST IN FEMALE, ESTROGEN RECEPTOR POSITIVE: Chronic | ICD-10-CM

## 2025-01-07 DIAGNOSIS — Z17.0 MALIGNANT NEOPLASM OF UPPER-OUTER QUADRANT OF RIGHT BREAST IN FEMALE, ESTROGEN RECEPTOR POSITIVE: Chronic | ICD-10-CM

## 2025-01-07 DIAGNOSIS — N18.32 ANEMIA IN STAGE 3B CHRONIC KIDNEY DISEASE: ICD-10-CM

## 2025-01-07 DIAGNOSIS — D63.1 ANEMIA IN STAGE 3B CHRONIC KIDNEY DISEASE: ICD-10-CM

## 2025-01-07 DIAGNOSIS — D63.1 ANEMIA DUE TO STAGE 3 CHRONIC KIDNEY DISEASE, UNSPECIFIED WHETHER STAGE 3A OR 3B CKD: Primary | ICD-10-CM

## 2025-01-07 DIAGNOSIS — D63.1 ANEMIA IN STAGE 3 CHRONIC KIDNEY DISEASE, UNSPECIFIED WHETHER STAGE 3A OR 3B CKD: ICD-10-CM

## 2025-01-07 DIAGNOSIS — N18.30 ANEMIA DUE TO STAGE 3 CHRONIC KIDNEY DISEASE, UNSPECIFIED WHETHER STAGE 3A OR 3B CKD: Primary | ICD-10-CM

## 2025-01-07 LAB
ALBUMIN SERPL BCP-MCNC: 3.5 G/DL (ref 3.5–5.2)
ALP SERPL-CCNC: 54 U/L (ref 40–150)
ALT SERPL W/O P-5'-P-CCNC: 19 U/L (ref 10–44)
ANION GAP SERPL CALC-SCNC: 8 MMOL/L (ref 8–16)
AST SERPL-CCNC: 31 U/L (ref 10–40)
BASOPHILS # BLD AUTO: 0.03 K/UL (ref 0–0.2)
BASOPHILS NFR BLD: 0.7 % (ref 0–1.9)
BILIRUB SERPL-MCNC: 0.5 MG/DL (ref 0.1–1)
BUN SERPL-MCNC: 34 MG/DL (ref 8–23)
CALCIUM SERPL-MCNC: 9.2 MG/DL (ref 8.7–10.5)
CHLORIDE SERPL-SCNC: 105 MMOL/L (ref 95–110)
CO2 SERPL-SCNC: 27 MMOL/L (ref 23–29)
CREAT SERPL-MCNC: 1.4 MG/DL (ref 0.5–1.4)
CYCLOSPORA STL SAFRANIN STN: NORMAL
DIFFERENTIAL METHOD BLD: ABNORMAL
ENCEPHALITOZOON BIENEUSI: NEGATIVE
ENCEPHALITOZOON SPECIES: NEGATIVE
EOSINOPHIL # BLD AUTO: 0.2 K/UL (ref 0–0.5)
EOSINOPHIL NFR BLD: 3.5 % (ref 0–8)
ERYTHROCYTE [DISTWIDTH] IN BLOOD BY AUTOMATED COUNT: 13.2 % (ref 11.5–14.5)
EST. GFR  (NO RACE VARIABLE): 37.1 ML/MIN/1.73 M^2
FERRITIN SERPL-MCNC: 218 NG/ML (ref 20–300)
GLUCOSE SERPL-MCNC: 119 MG/DL (ref 70–110)
HCT VFR BLD AUTO: 35 % (ref 37–48.5)
HGB BLD-MCNC: 11.4 G/DL (ref 12–16)
IMM GRANULOCYTES # BLD AUTO: 0.02 K/UL (ref 0–0.04)
IMM GRANULOCYTES NFR BLD AUTO: 0.5 % (ref 0–0.5)
LYMPHOCYTES # BLD AUTO: 1.5 K/UL (ref 1–4.8)
LYMPHOCYTES NFR BLD: 34.1 % (ref 18–48)
MCH RBC QN AUTO: 31.4 PG (ref 27–31)
MCHC RBC AUTO-ENTMCNC: 32.6 G/DL (ref 32–36)
MCV RBC AUTO: 96 FL (ref 82–98)
MICROSPORIDIA SPECIMEN SOURCE: NORMAL
MONOCYTES # BLD AUTO: 0.5 K/UL (ref 0.3–1)
MONOCYTES NFR BLD: 10.7 % (ref 4–15)
NEUTROPHILS # BLD AUTO: 2.2 K/UL (ref 1.8–7.7)
NEUTROPHILS NFR BLD: 50.5 % (ref 38–73)
NRBC BLD-RTO: 0 /100 WBC
PLATELET # BLD AUTO: 151 K/UL (ref 150–450)
PMV BLD AUTO: 11.6 FL (ref 9.2–12.9)
POTASSIUM SERPL-SCNC: 5.1 MMOL/L (ref 3.5–5.1)
PROT SERPL-MCNC: 7.4 G/DL (ref 6–8.4)
RBC # BLD AUTO: 3.63 M/UL (ref 4–5.4)
SODIUM SERPL-SCNC: 140 MMOL/L (ref 136–145)
WBC # BLD AUTO: 4.31 K/UL (ref 3.9–12.7)

## 2025-01-07 PROCEDURE — 36415 COLL VENOUS BLD VENIPUNCTURE: CPT | Performed by: INTERNAL MEDICINE

## 2025-01-07 PROCEDURE — 85025 COMPLETE CBC W/AUTO DIFF WBC: CPT | Performed by: INTERNAL MEDICINE

## 2025-01-07 PROCEDURE — 99214 OFFICE O/P EST MOD 30 MIN: CPT | Mod: PBBFAC | Performed by: INTERNAL MEDICINE

## 2025-01-07 PROCEDURE — 82728 ASSAY OF FERRITIN: CPT | Performed by: INTERNAL MEDICINE

## 2025-01-07 PROCEDURE — 80053 COMPREHEN METABOLIC PANEL: CPT | Performed by: INTERNAL MEDICINE

## 2025-01-07 PROCEDURE — 99999 PR PBB SHADOW E&M-EST. PATIENT-LVL IV: CPT | Mod: PBBFAC,,, | Performed by: INTERNAL MEDICINE

## 2025-01-07 RX ORDER — BUTYROSPERMUM PARKII(SHEA BUTTER), SIMMONDSIA CHINENSIS (JOJOBA) SEED OIL, ALOE BARBADENSIS LEAF EXTRACT .01; 1; 3.5 G/100G; G/100G; G/100G
250 LIQUID TOPICAL 2 TIMES DAILY
COMMUNITY

## 2025-01-07 RX ORDER — FERROUS GLUCONATE 324(38)MG
1 TABLET ORAL
COMMUNITY
Start: 2024-12-07

## 2025-01-07 NOTE — PROGRESS NOTES
Subjective:       Patient ID: Shima Sorto is a 84 y.o. female.    Chief Complaint: Malignant neoplasm of upper-outer quadrant of right breast     HPI    Ms. Sorto returns today for follow up.  I had last seen her on November 20, 2024 and had continued to hold her weekly EPO due to her Hg being 11.4 gr/dl, up from 9.9 gr/dl prior to the initiation of EPO.      Her CBC today is as follows:  WBCs 4,300 per cubic mm, hemoglobin 11.4 grams/deciliter, hematocrit 35.0%, MCV 96, and platelets 151K.  Creatinine is 1.4 mg/dl, and eGFR is 37 ml/min.    In regards to her breast cancer, she had been started on anastrazole in mid November 2018, and completed 5 years at the end of October 2023.  In late August 2023 she underwent a bone marrow biopsy which was essentially unremarkable.  There was no evidence of MDS, leukemia, lymphoma, myeloma, or metastatic carcinoma.  Cytogenetics were normal and reticulin was not increased.  Cellularity was 35 %.    Other recent pertinent labs are as follows:  3 stool samples were negative for occult blood  Multiple urinalyses have shown persistent hematuria.  Of note, the patient self catheterizes 3 times a day.  B12 is 406 pg/mL  SPEP shows no monoclonal spike  Direct Lane is negative    Briefly, she is an 84-year-old  female who was diagnosed with breast cancer in 2018.  On 08/17/2018, she underwent a lumpectomy and sentinel lymph node biopsy for an 8 mm grade 1 invasive lobular carcinoma which was ER strongly positive, MS negative and HER-2 negative with a Ki-67 of 5%, with the closest margin being 2 mm.  Two of 2 sentinel lymph nodes were negative for involvement.      She completed her radiation therapy and was subsequently started on AIs.  She completed 5 years of adjuvant hormonal therapy and she is being followed expectantly.    A mammogram on 7/9/2024 was read as BIRADS II, and a one year follow up was recommended.      In April 2023 she had been diagnosed with C  diff and she was treated accordingly.  She underwent a colonoscopy and EGD by Dr. Garza.  The colonoscopy showed 3 polyps and extensive diverticulosis.  Biopsies were negative for malignancy.  The EGD showed gastritis and a hiatal hernia.  A video capsule swallow did not identify a source of bleeding in her small intestine.       Review of Systems      Overall she feels OK, and her ECOG PS is 1.  Her main complaint is persistent fatigue.  Of note, she is known to have sleep apnea and she is not being treated currently.  She denies any anxiety, depression, easy bruising, fevers, chills, night  sweats, weight loss, nausea, vomiting, diarrhea, constipation, diplopia, blurred vision, headache, chest pain, palpitations, shortness of breath, breast pain, abdominal pain, extremity pain, or difficulty ambulating.  The remainder of the ten-point ROS, including general, skin, lymph, H/N, cardiorespiratory, GI, , Neuro, Endocrine, and psychiatric is negative.     Objective:      Physical Exam        She is alert, oriented to time, place, person, pleasant, well      nourished, in no acute physical distress.                                    VITAL SIGNS:  Reviewed                                      HEENT:  Normal.  There are no nasal, oral, lip, gingival, auricular, lid,    or conjunctival lesions.  Mucosae are moist and pink, and there is no        thrush.  Pupils are equal, reactive to light and accommodation.              Extraocular muscle movements are intact.  Dentition is good.                                    NECK:  Supple without JVD, adenopathy, or thyromegaly.                       LUNGS:  Clear to auscultation without wheezing, rales, or rhonchi.           CARDIOVASCULAR:  Reveals an S1, S2, a grade 2 systolic ejection murmur of aortic stenosis, no rubs, no gallops.         ABDOMEN:  Soft, nontender, without organomegaly.  Bowel sounds are    present.                                                                      EXTREMITIES:  No cyanosis, clubbing, or edema.                               BREASTS:   She is status post right lumpectomy with a well-healed incision in the upper outer quadrant.    There are no masses in either breast.                                LYMPHATIC:  There is no cervical, axillary, or supraclavicular adenopathy.   SKIN:  Warm and moist, without petechiae, rashes, induration, or ecchymoses.        NEUROLOGIC:  DTRs are 0-1+ bilaterally, symmetrical, motor function is 5/5,and cranial nerves are  within normal limits.    Assessment:       1. Malignant neoplasm of upper-outer quadrant of right breast in female, estrogen receptor positive    2. S/p 5 years of adjuvant hormonal therapy with aromatase inhibitors      3.    Osteopenia approaching osteoporosis    4.   Anemia, chronic, normochromic normocytic, most likely multifactorial.  Her bone marrow biopsy did not reveal any MDS. Anemia resolved with 2 EPo injections     5.  CDK 3b     6.  Fatigue most likely secondary to sleep apnea     7.  Documented sleep apnea  Plan:           I had a long discussion with her.  Her H&H increased with to erythropoietin injections and it is still maintained at an acceptable level.  We will continue to hold the erythropoietin for now and I will see her in 8 weeks with a repeat CBC.  In regards to the breast cancer, she had completed her 5 years of anastrazole at the end of October 2023.  Her mammogram will be repeated in July 2025.  Finally, in regards to her sleep apnea, I asked her to address it with her primary care physician.    Her multiple questions were answered to her satisfaction.  I spent 30 minutes reviewing the available records, evaluating the patient, and coordinating her care.  Visit today included increased complexity associated with the anemia, CKD, and breast cancer         Route Chart for Scheduling    Med Onc Chart Routing      Follow up with physician 2 months. With labs 1 day prior   Follow  up with BASHIR    Infusion scheduling note    Injection scheduling note    Labs    Imaging    Pharmacy appointment    Other referrals          Supportive Plan Information  OP EPOETIN ENEDINA WEEKLY Robert Hernandez MD   Associated Diagnosis: Stage 3b chronic kidney disease   noted on 12/20/2023   Line of treatment: Supportive Care   Treatment goal: Supportive     Upcoming Treatment Dates - OP EPOETIN ENEDINA WEEKLY    9/29/2024       Medications       epoetin enedina-epbx injection 40,000 Units  10/6/2024       Medications       epoetin enedina-epbx injection 40,000 Units    Therapy Plan Information  DENOSUMAB (PROLIA) Q6M for Age-related osteoporosis with current pathological fracture with routine healing, noted on 4/8/2014  DENOSUMAB (PROLIA) Q6M for Acute renal failure superimposed on stage 3b chronic kidney disease, noted on 9/12/2018  DENOSUMAB (PROLIA) Q6M for Vitamin D deficiency, noted on 6/12/2019  Medications  denosumab (PROLIA) injection 60 mg  60 mg, Subcutaneous, Every 26 weeks      No therapy plan of the specified type found.    No therapy plan of the specified type found.

## 2025-01-08 ENCOUNTER — PATIENT MESSAGE (OUTPATIENT)
Dept: GASTROENTEROLOGY | Facility: CLINIC | Age: 85
End: 2025-01-08
Payer: MEDICARE

## 2025-01-08 LAB — O+P STL MICRO: NORMAL

## 2025-01-13 ENCOUNTER — PATIENT MESSAGE (OUTPATIENT)
Dept: GASTROENTEROLOGY | Facility: CLINIC | Age: 85
End: 2025-01-13
Payer: MEDICARE

## 2025-01-29 RX ORDER — LOSARTAN POTASSIUM 50 MG/1
50 TABLET ORAL DAILY
Qty: 30 TABLET | Refills: 3 | Status: SHIPPED | OUTPATIENT
Start: 2025-01-29

## 2025-02-05 ENCOUNTER — TELEPHONE (OUTPATIENT)
Dept: HEMATOLOGY/ONCOLOGY | Facility: CLINIC | Age: 85
End: 2025-02-05
Payer: MEDICARE

## 2025-02-05 NOTE — TELEPHONE ENCOUNTER
I called the patient per her portal message. No answer I left a voicemail letting her know that Dr Conroy isn't available at this time for her follow up visit. That His Nurse Shawna scheduled it with  Carroll Moreno. I left my direct line for her to call me and let me know if she is okay with this appt.

## 2025-02-05 NOTE — TELEPHONE ENCOUNTER
----- Message from Georgina sent at 2/5/2025 12:16 PM CST -----  Regarding: Appt Inquiry  Type: Appt Inquiry     Who Called:Shima Sorto      Would the patient rather a call back or a response via [facility]? Call back    Best Call Back Number:  [phone]    Additional Information:Patient would like to speak to office about 3/18 appt and why it is scheduled with a different provider.

## 2025-02-06 NOTE — TELEPHONE ENCOUNTER
Called pt to explain that Dr. Conroy is only on the [facility] 1 day a week and he has advised that we can schedule pts with the NP or PA for pt access.     Pt thankful for call and explaining reason.

## 2025-03-12 ENCOUNTER — TELEPHONE (OUTPATIENT)
Dept: GASTROENTEROLOGY | Facility: CLINIC | Age: 85
End: 2025-03-12
Payer: MEDICARE

## 2025-03-12 DIAGNOSIS — D50.0 ANEMIA DUE TO CHRONIC BLOOD LOSS: ICD-10-CM

## 2025-03-12 DIAGNOSIS — D50.0 ANEMIA DUE TO CHRONIC BLOOD LOSS: Primary | ICD-10-CM

## 2025-03-12 RX ORDER — FERROUS GLUCONATE 324(38)MG
TABLET ORAL
Qty: 60 TABLET | Refills: 1 | Status: SHIPPED | OUTPATIENT
Start: 2025-03-12 | End: 2025-03-12 | Stop reason: SDUPTHER

## 2025-03-12 RX ORDER — FERROUS GLUCONATE 324(38)MG
TABLET ORAL
Qty: 60 TABLET | Refills: 1 | Status: SHIPPED | OUTPATIENT
Start: 2025-03-12

## 2025-03-12 NOTE — TELEPHONE ENCOUNTER
----- Message from Day sent at 3/12/2025  8:33 AM CDT -----  Please refill the medication(s) listed below.Please call the patient when the prescription(s) is ready for  at this phone number  Medication #1ferrous gluconate (FERGON) 324 MG tabletMedication #2Medication #3  Preferred Pharmacy:[id number] [address] [address] AT [facility] [address] [facility] [address]Ffbnc: [phone] Fax: [phone] Would the patient rather a call back or a response via [facility]? Call Best Call Back Number:Telephone Information:Mobile          [phone] Additional Information: Please advise thank you

## 2025-03-12 NOTE — TELEPHONE ENCOUNTER
----- Message from Keven Garza MD sent at 3/12/2025  3:54 PM CDT -----  Regarding: RE: refill  Contact: [phone]  I refilled it  ----- Message -----  From: Savannah Edwards MA  Sent: 3/12/2025   1:04 PM CDT  To: Keven Garza MD  Subject: RE: refill                                       , she came by the office today and Rosie spoke with her.   We reviewed her chart and  refill the Rx for her.   Jennifer  ----- Message -----  From: Keven Garza MD  Sent: 3/12/2025  12:50 PM CDT  To: Savannah Edwards MA  Subject: RE: refill                                       Jennifer please tell her that her ferritin is normal she may want to discuss with her Heme-Onc doctor if he thinks she would benefit from iron with her very slight anemia knowing that her ferritin is completely in the normal range  ----- Message -----  From: Savannah Edwards MA  Sent: 3/12/2025   9:53 AM CDT  To: Keven Garza MD  Subject: FW: refill                                         ----- Message -----  From: Alicia Sierra  Sent: 3/12/2025   9:31 AM CDT  To: Greg NUNEZ Staff  Subject: refill                                           .Type:  Needs Medical AdviceWho Called: pt Symptoms (please be specific): asking to speak to severiano Velez called to speak to staff to get permission to refill prescription listed. They called on 2/28 and have not heard anything since then Pharmacy name and phone #:  [id number] [place] [facility] [address] AT [facility] [address] [address]Cmhuj: [phone] Fax: [phone] the patient rather a call back or a response via [facility]? Call Best Call Back Number: [phone] Information: ferrous gluconate (FERGON) 324 MG tablet

## 2025-03-18 ENCOUNTER — OFFICE VISIT (OUTPATIENT)
Dept: HEMATOLOGY/ONCOLOGY | Facility: CLINIC | Age: 85
End: 2025-03-18
Payer: MEDICARE

## 2025-03-18 ENCOUNTER — LAB VISIT (OUTPATIENT)
Dept: LAB | Facility: HOSPITAL | Age: 85
End: 2025-03-18
Attending: INTERNAL MEDICINE
Payer: MEDICARE

## 2025-03-18 VITALS
WEIGHT: 127 LBS | TEMPERATURE: 98 F | RESPIRATION RATE: 18 BRPM | HEART RATE: 53 BPM | BODY MASS INDEX: 22.5 KG/M2 | SYSTOLIC BLOOD PRESSURE: 168 MMHG | DIASTOLIC BLOOD PRESSURE: 73 MMHG | HEIGHT: 63 IN | OXYGEN SATURATION: 99 %

## 2025-03-18 DIAGNOSIS — Z12.31 ENCOUNTER FOR SCREENING MAMMOGRAM FOR MALIGNANT NEOPLASM OF BREAST: ICD-10-CM

## 2025-03-18 DIAGNOSIS — G47.33 OSA (OBSTRUCTIVE SLEEP APNEA): Primary | ICD-10-CM

## 2025-03-18 DIAGNOSIS — C50.411 MALIGNANT NEOPLASM OF UPPER-OUTER QUADRANT OF RIGHT BREAST IN FEMALE, ESTROGEN RECEPTOR POSITIVE: ICD-10-CM

## 2025-03-18 DIAGNOSIS — N18.32 STAGE 3B CHRONIC KIDNEY DISEASE: ICD-10-CM

## 2025-03-18 DIAGNOSIS — R22.1 MASS OF LEFT SIDE OF NECK: ICD-10-CM

## 2025-03-18 DIAGNOSIS — Z17.0 MALIGNANT NEOPLASM OF UPPER-OUTER QUADRANT OF RIGHT BREAST IN FEMALE, ESTROGEN RECEPTOR POSITIVE: ICD-10-CM

## 2025-03-18 DIAGNOSIS — R53.83 FATIGUE, UNSPECIFIED TYPE: ICD-10-CM

## 2025-03-18 DIAGNOSIS — M80.00XD AGE-RELATED OSTEOPOROSIS WITH CURRENT PATHOLOGICAL FRACTURE WITH ROUTINE HEALING: ICD-10-CM

## 2025-03-18 DIAGNOSIS — N18.30 ANEMIA DUE TO STAGE 3 CHRONIC KIDNEY DISEASE, UNSPECIFIED WHETHER STAGE 3A OR 3B CKD: ICD-10-CM

## 2025-03-18 DIAGNOSIS — D63.1 ANEMIA IN STAGE 3B CHRONIC KIDNEY DISEASE: ICD-10-CM

## 2025-03-18 DIAGNOSIS — N18.32 ANEMIA IN STAGE 3B CHRONIC KIDNEY DISEASE: ICD-10-CM

## 2025-03-18 DIAGNOSIS — D63.1 ANEMIA DUE TO STAGE 3 CHRONIC KIDNEY DISEASE, UNSPECIFIED WHETHER STAGE 3A OR 3B CKD: ICD-10-CM

## 2025-03-18 LAB
ALBUMIN SERPL BCP-MCNC: 3.4 G/DL (ref 3.5–5.2)
ALP SERPL-CCNC: 57 U/L (ref 40–150)
ALT SERPL W/O P-5'-P-CCNC: 17 U/L (ref 10–44)
ANION GAP SERPL CALC-SCNC: 7 MMOL/L (ref 8–16)
AST SERPL-CCNC: 26 U/L (ref 10–40)
BILIRUB SERPL-MCNC: 0.5 MG/DL (ref 0.1–1)
BUN SERPL-MCNC: 37 MG/DL (ref 8–23)
CALCIUM SERPL-MCNC: 9.1 MG/DL (ref 8.7–10.5)
CHLORIDE SERPL-SCNC: 108 MMOL/L (ref 95–110)
CO2 SERPL-SCNC: 26 MMOL/L (ref 23–29)
CREAT SERPL-MCNC: 1.6 MG/DL (ref 0.5–1.4)
ERYTHROCYTE [DISTWIDTH] IN BLOOD BY AUTOMATED COUNT: 13 % (ref 11.5–14.5)
EST. GFR  (NO RACE VARIABLE): 31.6 ML/MIN/1.73 M^2
FERRITIN SERPL-MCNC: 184 NG/ML (ref 20–300)
GLUCOSE SERPL-MCNC: 98 MG/DL (ref 70–110)
HCT VFR BLD AUTO: 30.8 % (ref 37–48.5)
HGB BLD-MCNC: 9.9 G/DL (ref 12–16)
IMM GRANULOCYTES # BLD AUTO: 0.01 K/UL (ref 0–0.04)
MCH RBC QN AUTO: 32.9 PG (ref 27–31)
MCHC RBC AUTO-ENTMCNC: 32.1 G/DL (ref 32–36)
MCV RBC AUTO: 102 FL (ref 82–98)
NEUTROPHILS # BLD AUTO: 2 K/UL (ref 1.8–7.7)
PLATELET # BLD AUTO: 150 K/UL (ref 150–450)
PMV BLD AUTO: 11.7 FL (ref 9.2–12.9)
POTASSIUM SERPL-SCNC: 4.8 MMOL/L (ref 3.5–5.1)
PROT SERPL-MCNC: 6.9 G/DL (ref 6–8.4)
RBC # BLD AUTO: 3.01 M/UL (ref 4–5.4)
SODIUM SERPL-SCNC: 141 MMOL/L (ref 136–145)
WBC # BLD AUTO: 3.83 K/UL (ref 3.9–12.7)

## 2025-03-18 PROCEDURE — 80053 COMPREHEN METABOLIC PANEL: CPT | Performed by: INTERNAL MEDICINE

## 2025-03-18 PROCEDURE — 99999 PR PBB SHADOW E&M-EST. PATIENT-LVL IV: CPT | Mod: PBBFAC,,, | Performed by: NURSE PRACTITIONER

## 2025-03-18 PROCEDURE — 99214 OFFICE O/P EST MOD 30 MIN: CPT | Mod: PBBFAC | Performed by: NURSE PRACTITIONER

## 2025-03-18 PROCEDURE — 36415 COLL VENOUS BLD VENIPUNCTURE: CPT | Performed by: INTERNAL MEDICINE

## 2025-03-18 PROCEDURE — 85027 COMPLETE CBC AUTOMATED: CPT | Performed by: INTERNAL MEDICINE

## 2025-03-18 PROCEDURE — 82728 ASSAY OF FERRITIN: CPT | Performed by: INTERNAL MEDICINE

## 2025-03-18 NOTE — PROGRESS NOTES
Subjective:       Patient ID: Shima Sorto is a 84 y.o. female.    Chief Complaint: Malignant neoplasm of upper-outer quadrant of right breast     HPI    Ms. Sorto returns today for follow up. She last saw Dr. Hernandez on 01/07/25 in which he continued to hold her weekly EPO due to her Hg being 11.4 gr/dl, up from 9.9 gr/dl prior to the initiation of EPO.      Overall, she is feeling generally well today  Does report more fatigue recently  No bleeding  No recent falls  No black stools  Has a lump on the left side of her neck under her chin.  This has been present for a while, came and went, but not has continued to be present, sometimes mildly tender    Her CBC today is as follows:  WBCs 3,830 per cubic mm, hemoglobin 9.9 grams/deciliter, hematocrit 30.8%, , and platelets 150K.  Creatinine is 1.6 mg/dl, and eGFR is 31.6 ml/min.    In regards to her breast cancer, she had been started on anastrazole in mid November 2018, and completed 5 years at the end of October 2023.  In late August 2023 she underwent a bone marrow biopsy which was essentially unremarkable.  There was no evidence of MDS, leukemia, lymphoma, myeloma, or metastatic carcinoma.  Cytogenetics were normal and reticulin was not increased.  Cellularity was 35 %.    Other recent pertinent labs are as follows:  3 stool samples were negative for occult blood  Multiple urinalyses have shown persistent hematuria.  Of note, the patient self catheterizes 3 times a day.  B12 is 406 pg/mL  SPEP shows no monoclonal spike  Direct Lane is negative    Briefly, she is an 84-year-old  female who was diagnosed with breast cancer in 2018.  On 08/17/2018, she underwent a lumpectomy and sentinel lymph node biopsy for an 8 mm grade 1 invasive lobular carcinoma which was ER strongly positive, W[place] negative and HER-2 negative with a Ki-67 of 5%, with the closest margin being 2 mm.  Two of 2 sentinel lymph nodes were negative for involvement.      She  completed her radiation therapy and was subsequently started on AIs.  She completed 5 years of adjuvant hormonal therapy and she is being followed expectantly.    A mammogram on 7/9/2024 was read as BIRADS II, and a one year follow up was recommended.      In April 2023 she had been diagnosed with C diff and she was treated accordingly.  She underwent a colonoscopy and EGD by Dr. Garza.  The colonoscopy showed 3 polyps and extensive diverticulosis.  Biopsies were negative for malignancy.  The EGD showed gastritis and a hiatal hernia.  A video capsule swallow did not identify a source of bleeding in her small intestine.     Review of Systems    Overall she feels OK, and her ECOG PS is 1.  Her main complaint is persistent fatigue and lump to left neck under chin.  Of note, she is known to have sleep apnea and she is not being treated currently.  She denies any anxiety, depression, easy bruising, fevers, chills, night  sweats, weight loss, nausea, vomiting, diarrhea, constipation, diplopia, blurred vision, headache, chest pain, palpitations, shortness of breath, breast pain, abdominal pain, extremity pain, or difficulty ambulating.  The remainder of the ten-point ROS, including general, skin, lymph, H/N, cardiorespiratory, GI, , Neuro, Endocrine, and psychiatric is negative.     Objective:      Physical Exam  Constitutional:       General: She is not in acute distress.     Appearance: She is well-developed. She is not diaphoretic.   HENT:      Head: Normocephalic and atraumatic.   Eyes:      General: No scleral icterus.     Conjunctiva/sclera: Conjunctivae normal.   Neck:      Comments: Left submandibular mass noted, mass noted to be mobile  Cardiovascular:      Rate and Rhythm: Normal rate and regular rhythm.      Pulses: Normal pulses.      Heart sounds: Normal heart sounds.   Pulmonary:      Effort: Pulmonary effort is normal. No respiratory distress.      Breath sounds: Normal breath sounds.   Chest:       Comments: Right lumpectomy scar noted with mild tenderness to area.  No other skin changes or breast masses noted bilaterally, no  lymphadenopathy noted    Abdominal:      General: There is no distension.   Musculoskeletal:         General: Normal range of motion.      Cervical back: Normal range of motion and neck supple.   Skin:     General: Skin is warm and dry.   Neurological:      Mental Status: She is alert and oriented to person, place, and time.   Psychiatric:         Behavior: Behavior normal.           Assessment:       1. Malignant neoplasm of upper-outer quadrant of right breast in female, estrogen receptor positive    2. S/p 5 years of adjuvant hormonal therapy with aromatase inhibitors      3.    Osteopenia approaching osteoporosis    4.   Anemia, chronic, normochromic normocytic, most likely multifactorial.  Her bone marrow biopsy did not reveal any MDS. Anemia resolved with 2 EPo injections     5.  CDK 3b     6.  Fatigue most likely secondary to sleep apnea     7.  Documented sleep apnea  Plan:           I had a long discussion with her.  Her H&H has decreased from 2 months ago and now is back to 9.9 g/dl.  We will schedule erythropoietin weekly x 2 for now and I will get her back in with Dr. Hernandez in 4 to 5 weeks with repeat labs  In regards to the breast cancer, she had completed her 5 years of anastrazole at the end of October 2023.  Her mammogram will be repeated in July 2025.  in regards to her sleep apnea, I referred her to the sleep medicine clinic   On prolia by her pcp for osteoporosis  Mobile sub mandibular lump noted to left neck, pt says this comes and goes, but has been persistently present for a few months now,  will get ultrasound    Her multiple questions were answered to her satisfaction.  I spent 30 minutes reviewing the available records, evaluating the patient, and coordinating her care.  Visit today included increased complexity associated with the anemia, CKD, and breast  cancer     code applied: patient requires or will require a continuous, longitudinal, and active collaborative plan of care related to this patient's health condition, breast cancer -the management of which requires the direction of a practitioner with specialized clinical knowledge, skill, and expertise.       Tiffanie Licona NP     Route Chart for Scheduling    Med Onc Chart Routing  Urgent    Follow up with physician . 4 weeks with Dr. Conroy   Follow up with BASHIR    Infusion scheduling note   needs weekly epoetin enedina infusions x 2, please schedule for as soon as possible   Injection scheduling note    Labs CBC, CMP and ferritin   Scheduling:  Preferred lab:  Lab interval:  in 4 weeks, day prior to Dr. Conroy visit   Imaging Other and mammogram   neck ultrasound now,  mammogram in July   Pharmacy appointment    Other referrals       Additional referrals needed  sleep medicine               Supportive Plan Information  OP EPOETIN ENEDINA WEEKLY Robert Hernandez MD   Associated Diagnosis: Stage 3b chronic kidney disease   noted on 12/20/2023   Line of treatment: Supportive Care   Treatment goal: Supportive     Upcoming Treatment Dates - OP EPOETIN ENEDINA WEEKLY    9/29/2024       Medications       epoetin enedina-epbx injection 40,000 Units  10/6/2024       Medications       epoetin enedina-epbx injection 40,000 Units    Therapy Plan Information  DENOSUMAB (PROLIA) Q6M for Age-related osteoporosis with current pathological fracture with routine healing, noted on 4/8/2014  DENOSUMAB (PROLIA) Q6M for Acute renal failure superimposed on stage 3b chronic kidney disease, noted on 9/12/2018  DENOSUMAB (PROLIA) Q6M for Vitamin D deficiency, noted on 6/12/2019  Medications  denosumab (PROLIA) injection 60 mg  60 mg, Subcutaneous, Every 26 weeks      No therapy plan of the specified type found.    No therapy plan of the specified type found.

## 2025-03-18 NOTE — Clinical Note
Good afternoon!  Saw your sweet patient today.  Plan on giving her epogen weekly x 2 for her hgb of 9.9 and have her follow up with you in 4 weeks with labs prior.  Let me know if you want anything else or something different done.  Thank you.  Tiffanie

## 2025-03-20 ENCOUNTER — TELEPHONE (OUTPATIENT)
Dept: HEMATOLOGY/ONCOLOGY | Facility: CLINIC | Age: 85
End: 2025-03-20
Payer: MEDICARE

## 2025-03-20 NOTE — TELEPHONE ENCOUNTER
----- Message from Tiffanie Licona NP sent at 3/20/2025  9:59 AM CDT -----  Regarding: eopgen x 2  Can you all help get her scheduled for epogen once weekly x 2 and a follow up with Dr. Conroy with labs in about 4 weeks.  The treatment plan is in, but it is from last year so please let me know if an new authorization is needed. Thanks.Tiffanie

## 2025-03-21 ENCOUNTER — PATIENT MESSAGE (OUTPATIENT)
Dept: INTERNAL MEDICINE | Facility: CLINIC | Age: 85
End: 2025-03-21
Payer: MEDICARE

## 2025-03-21 ENCOUNTER — HOSPITAL ENCOUNTER (OUTPATIENT)
Dept: RADIOLOGY | Facility: HOSPITAL | Age: 85
Discharge: HOME | End: 2025-03-21
Attending: NURSE PRACTITIONER
Payer: MEDICARE

## 2025-03-21 ENCOUNTER — TELEPHONE (OUTPATIENT)
Dept: HEMATOLOGY/ONCOLOGY | Facility: CLINIC | Age: 85
End: 2025-03-21
Payer: MEDICARE

## 2025-03-21 DIAGNOSIS — R51.9 NONINTRACTABLE HEADACHE, UNSPECIFIED CHRONICITY PATTERN, UNSPECIFIED HEADACHE TYPE: Primary | ICD-10-CM

## 2025-03-21 DIAGNOSIS — R22.1 MASS OF LEFT SIDE OF NECK: ICD-10-CM

## 2025-03-21 PROCEDURE — 76536 US EXAM OF HEAD AND NECK: CPT | Mod: 26,,, | Performed by: RADIOLOGY

## 2025-03-21 PROCEDURE — 76536 US EXAM OF HEAD AND NECK: CPT | Mod: TC

## 2025-03-21 NOTE — TELEPHONE ENCOUNTER
----- Message from Kaylyn sent at 3/20/2025  3:34 PM CDT -----  Regarding: FW: Return call  Contact: Shima    ----- Message -----  From: Bianca Atkinson  Sent: 3/20/2025   3:27 PM CDT  To: [facility]  Pool  Subject: Return call                                      Consult/Advisory Name Of Caller:Shima Sorto  Contact Preference:[phone] (Mobile), requesting a call back.  Nature of call: return call to Hernando in regards  to scheduling her mammo.

## 2025-03-24 ENCOUNTER — PATIENT MESSAGE (OUTPATIENT)
Dept: HEMATOLOGY/ONCOLOGY | Facility: CLINIC | Age: 85
End: 2025-03-24
Payer: MEDICARE

## 2025-03-25 ENCOUNTER — INFUSION (OUTPATIENT)
Dept: INFUSION THERAPY | Facility: HOSPITAL | Age: 85
End: 2025-03-25
Payer: MEDICARE

## 2025-03-25 ENCOUNTER — OFFICE VISIT (OUTPATIENT)
Dept: UROLOGY | Facility: CLINIC | Age: 85
End: 2025-03-25
Payer: MEDICARE

## 2025-03-25 VITALS
SYSTOLIC BLOOD PRESSURE: 128 MMHG | RESPIRATION RATE: 18 BRPM | DIASTOLIC BLOOD PRESSURE: 58 MMHG | TEMPERATURE: 98 F | HEART RATE: 59 BPM

## 2025-03-25 VITALS
HEART RATE: 58 BPM | SYSTOLIC BLOOD PRESSURE: 107 MMHG | WEIGHT: 127.19 LBS | BODY MASS INDEX: 22.54 KG/M2 | DIASTOLIC BLOOD PRESSURE: 58 MMHG | HEIGHT: 63 IN

## 2025-03-25 DIAGNOSIS — R22.1 NECK MASS: ICD-10-CM

## 2025-03-25 DIAGNOSIS — N18.32 STAGE 3B CHRONIC KIDNEY DISEASE: Primary | ICD-10-CM

## 2025-03-25 DIAGNOSIS — R33.9 URINARY RETENTION: Primary | Chronic | ICD-10-CM

## 2025-03-25 DIAGNOSIS — N20.0 NEPHROLITHIASIS: ICD-10-CM

## 2025-03-25 DIAGNOSIS — Q61.9 CYSTIC KIDNEY DISEASE: ICD-10-CM

## 2025-03-25 DIAGNOSIS — R93.89 ABNORMAL ULTRASOUND OF NECK: ICD-10-CM

## 2025-03-25 DIAGNOSIS — N18.32 STAGE 3B CHRONIC KIDNEY DISEASE: ICD-10-CM

## 2025-03-25 DIAGNOSIS — R22.1 NECK MASS: Primary | ICD-10-CM

## 2025-03-25 PROCEDURE — 99214 OFFICE O/P EST MOD 30 MIN: CPT | Mod: PBBFAC,25 | Performed by: UROLOGY

## 2025-03-25 PROCEDURE — 99999 PR PBB SHADOW E&M-EST. PATIENT-LVL IV: CPT | Mod: PBBFAC,,, | Performed by: UROLOGY

## 2025-03-25 PROCEDURE — 99214 OFFICE O/P EST MOD 30 MIN: CPT | Mod: S$PBB,,, | Performed by: UROLOGY

## 2025-03-25 PROCEDURE — 96372 THER/PROPH/DIAG INJ SC/IM: CPT

## 2025-03-25 PROCEDURE — 63600175 PHARM REV CODE 636 W HCPCS: Mod: JZ,EC,TB | Performed by: INTERNAL MEDICINE

## 2025-03-25 RX ADMIN — EPOETIN ALFA-EPBX 40000 UNITS: 40000 INJECTION, SOLUTION INTRAVENOUS; SUBCUTANEOUS at 11:03

## 2025-03-25 NOTE — PROGRESS NOTES
"Urology - [facility]  Clinic Note    SUBJECTIVE:     Chief Complaint: Annual Follow Up    History of Present Illness:  Shima Sorto is a 84 y.o. female who presents to clinic for annual follow up.    No report of blood in the urine or urinary symptoms.     CTRSS demonstrated multiple right lower pole nephroliths, as well as one upper pole stone. This appears stable from 2019.     She reports past stone surgery for asymptomatic ureteral stones.    She follows with Dr. Stoddard for incomplete bladder emptying. She performs CIC TID with no issues or complaints.      She is currently being worked up by hem/onc for a mass in her left neck and will be seeing ENT.    Anticoagulation:  No    OBJECTIVE:     Estimated body mass index is 22.53 kg/m² as calculated from the following:    Height as of this encounter: 5' 3" (1.6 m).    Weight as of this encounter: 57.7 kg (127 lb 3.3 oz).    Vital Signs (Most Recent)  Pulse: (!) 58 (03/25/25 1318)  BP: (!) 107/58 (03/25/25 1318)    Physical Exam  Constitutional:       General: She is not in acute distress.     Appearance: Normal appearance. She is not ill-appearing.   HENT:      Head: Normocephalic and atraumatic.   Cardiovascular:      Rate and Rhythm: Normal rate.   Pulmonary:      Effort: Pulmonary effort is normal.   Neurological:      Mental Status: She is alert.         Lab Results   Component Value Date    BUN 37 (H) 03/18/2025    CREATININE 1.6 (H) 03/18/2025    WBC 3.83 (L) 03/18/2025    HGB 9.9 (L) 03/18/2025    HCT 30.8 (L) 03/18/2025     03/18/2025    AST 26 03/18/2025    ALT 17 03/18/2025    ALKPHOS 57 03/18/2025    ALBUMIN 3.4 (L) 03/18/2025        Imaging:  Ultrasound 8/15/24  FINDINGS:  Right kidney: The right kidney measures 10.4 cm. Increased cortical echogenicity.  No loss of corticomedullary distinction. Resistive index measures 0.82.  Subcentimeter simple cyst.  No solid mass.  Multiple nonobstructing stones throughout the lower pole, " measuring up to 0.4 cm.  No hydronephrosis.     Left kidney: The left kidney measures 10.7 cm. Increased cortical echogenicity.  No loss of corticomedullary distinction. Resistive index measures 0.82.  Multiple renal cysts.  The largest is simple measuring 1.9 x 1.9 x 2.1 cm, at the superior pole.  Two smaller minimally complex cysts with thin, avascular internal septations at the superior and inferior poles.  No solid mass.  Several nonobstructing stones measuring up to 0.3 cm.  No hydronephrosis.    CTRSS 02/16/24:    1. No evidence of obstructive nephrolithiasis.  Stable cluster of nonobstructive stones in the right renal inferior pole.  2. Left renal superior pole hyperdense lesion which appears unchanged compared to imaging dating back to 2019.  Given temporal stability to reflect benign etiology.  Continued follow-up as clinically warranted.  3. Well-circumscribed fluid density collection with few fat density foci in the subcutaneous soft tissues adjacent to the right greater trochanter, possibly reflecting a seroma though new compared October 2023.  Correlation would be helpful.  4. Additional findings as detailed above.  This report was flagged in Epic as abnormal.     Electronically signed by resident: Cammie Briscoe  Date:                                            02/16/2024  Time:                                           15:49     Electronically signed by: William Lubin MD  Date:                                            02/16/2024  Time:                                           16:40    KUB 4/8/2024  There is spinal curvature and degenerative change. There are several small calculi over the right renal silhouette. No definite calcification along the course of either ureter. Bowel gas pattern is unremarkable.     No urine today  ASSESSMENT     1. Urinary retention    2. Nephrolithiasis    3. Stage 3b chronic kidney disease    4. Cystic kidney disease    5. Neck mass        PLAN:   Shima was seen today  for follow-up.    Diagnoses and all orders for this visit:    Urinary retention    Nephrolithiasis  -     US Kidney; Future    Stage 3b chronic kidney disease    Cystic kidney disease    Neck mass      Continue TID CIC.   Follow up in one year with ultrasound.   Sent message to ENT about the neck mass.   I spent 25 minutes with the patient of which more than half was spent in direct consultation with the patient in regards to our treatment and plan.

## 2025-03-25 NOTE — Clinical Note
Hey, I am seeing this patient. She has a neck mass. Does she need to see the NP first? She has an appt just made on 3/31. I wasn't sure if she should just see you.

## 2025-03-26 ENCOUNTER — PATIENT MESSAGE (OUTPATIENT)
Dept: OTOLARYNGOLOGY | Facility: CLINIC | Age: 85
End: 2025-03-26
Payer: MEDICARE

## 2025-03-27 ENCOUNTER — RESULTS FOLLOW-UP (OUTPATIENT)
Dept: HEMATOLOGY/ONCOLOGY | Facility: CLINIC | Age: 85
End: 2025-03-27

## 2025-03-31 ENCOUNTER — OFFICE VISIT (OUTPATIENT)
Dept: OTOLARYNGOLOGY | Facility: CLINIC | Age: 85
End: 2025-03-31
Payer: MEDICARE

## 2025-03-31 ENCOUNTER — HOSPITAL ENCOUNTER (OUTPATIENT)
Dept: RADIOLOGY | Facility: HOSPITAL | Age: 85
Discharge: HOME | End: 2025-03-31
Attending: OTOLARYNGOLOGY
Payer: MEDICARE

## 2025-03-31 VITALS — WEIGHT: 127.44 LBS | BODY MASS INDEX: 22.57 KG/M2

## 2025-03-31 DIAGNOSIS — R22.1 NECK MASS: ICD-10-CM

## 2025-03-31 DIAGNOSIS — R22.1 NECK MASS: Primary | ICD-10-CM

## 2025-03-31 LAB
CREAT SERPL-MCNC: 1.3 MG/DL (ref 0.5–1.4)
SAMPLE: NORMAL

## 2025-03-31 PROCEDURE — 70491 CT SOFT TISSUE NECK W/DYE: CPT | Mod: 26,,, | Performed by: RADIOLOGY

## 2025-03-31 PROCEDURE — 25500020 PHARM REV CODE 255: Performed by: OTOLARYNGOLOGY

## 2025-03-31 PROCEDURE — 99213 OFFICE O/P EST LOW 20 MIN: CPT | Mod: PBBFAC,25 | Performed by: OTOLARYNGOLOGY

## 2025-03-31 PROCEDURE — 70491 CT SOFT TISSUE NECK W/DYE: CPT | Mod: TC

## 2025-03-31 PROCEDURE — 99999 PR PBB SHADOW E&M-EST. PATIENT-LVL III: CPT | Mod: PBBFAC,,, | Performed by: OTOLARYNGOLOGY

## 2025-03-31 RX ADMIN — IOHEXOL 75 ML: 350 INJECTION, SOLUTION INTRAVENOUS at 10:03

## 2025-03-31 NOTE — PROGRESS NOTES
Chief Complaint   Patient presents with    neck mass       HPI   84 y.o. female presents for evaluation of a left-sided submandibular mass.  She reports that she noted this within the last several months.  No pain.  No change in size.  No dysphagia    Review of Systems   Constitutional: Negative for fatigue and unexpected weight change.   HENT: Per HPI.  Eyes: Negative for visual disturbance.   Respiratory: Negative for shortness of breath, hemoptysis   Cardiovascular: Negative for chest pain and palpitations.   Musculoskeletal: Negative for decreased ROM, back pain.   Skin: Negative for rash, sunburn, itching.   Neurological: Negative for dizziness and seizures.   Hematological: Negative for adenopathy. Does not bruise/bleed easily.   Endocrine: Negative for rapid weight loss/weight gain, heat/cold intolerance.     Past Medical History   Problem List[1]        Past Surgical History   Past Surgical History:   Procedure Laterality Date    BREAST CYST ASPIRATION Right 1999    BREAST LUMPECTOMY Right 2018    with radiation    COLONOSCOPY N/A 7/24/2018    Procedure: COLONOSCOPY;  Surgeon: Juan Miguel Pena MD;  Location: [facility] ([address] [place] [address]);  Service: Endoscopy;  Laterality: N/A;    COLONOSCOPY N/A 5/10/2023    Procedure: COLONOSCOPY;  Surgeon: Keven Garza MD;  [facility]  Service: Endoscopy;  Laterality: N/A;  *Pending C-Diff*  Request Greg  procedure order telephone encounter 5/1  PEG prep, instructions portal -LW  5/4/23 no answer for precall/mleone lpn  5/9lm    ESOPHAGOGASTRODUODENOSCOPY N/A 3/17/2023    Procedure: EGD (ESOPHAGOGASTRODUODENOSCOPY);  Surgeon: Keven Garza MD;  Location: [address]);  Service: Endoscopy;  Laterality: N/A;  Medically Urgent  3/2 instructions to portal-st    pre call attempted, no answer from pt 3/13/23 -egh    INJECTION FOR SENTINEL NODE IDENTIFICATION Right 8/17/2018    Procedure: INJECTION, FOR SENTINEL NODE IDENTIFICATION;  Surgeon: Abby SUGGS  MD Marlon;  Location: [address];  Service: General;  Laterality: Right;    interstim placed stage 1      and removed    KIDNEY STONE SURGERY  2000    @ [facility]    MASTECTOMY, PARTIAL Right 8/17/2018    Procedure: MASTECTOMY, PARTIAL-US guided;  Surgeon: Abby Mason MD;  Location: [facility] OR [address] [place] [address];  Service: General;  Laterality: Right;    MOHS procedure      SENTINEL LYMPH NODE BIOPSY Right 8/17/2018    Procedure: BIOPSY, LYMPH NODE, SENTINEL;  Surgeon: Abby Mason MD;  Location: [address];  Service: General;  Laterality: Right;         Family History   Family History   Problem Relation Name Age of Onset    Kidney disease Mother      Heart disease Father      Melanoma Father      Heart attack Father      Breast cancer Maternal Aunt      Hearing loss Son      Hyperlipidemia Son      Anesthesia problems Neg Hx      Malignant hypertension Neg Hx      Hypotension Neg Hx      Malignant hyperthermia Neg Hx      Pseudochol deficiency Neg Hx      Colon cancer Neg Hx      Ovarian cancer Neg Hx      Psoriasis Neg Hx      Lupus Neg Hx      Eczema Neg Hx      Acne Neg Hx      Esophageal cancer Neg Hx             Social History   .Social History[2]      Allergies   Review of patient's allergies indicates:   Allergen Reactions    Asparagus Rash    Macrobid [nitrofurantoin monohyd/m-cryst] Rash     Peeling skin and petechia           Physical Exam     There were no vitals filed for this visit.      Body mass index is 22.57 kg/m².      General: AOx3, NAD   Respiratory:  Symmetric chest rise, normal effort  Nose: No gross nasal septal deviation. Inferior Turbinates WNL bilaterally. No septal perforation. No masses/lesions.   Oral Cavity:  Oral Tongue mobile, no lesions noted. Hard Palate WNL. No buccal or FOM lesions.  Oropharynx:  No masses/lesions of the posterior pharyngeal wall. Tonsillar fossa without lesions. Soft palate without masses. Midline uvula.   Neck: No scars.  No cervical lymphadenopathy,  thyromegaly or thyroid nodules.  Normal range of motion.  Roughly 1.5 cm firm mass of L SMG.  Face: House Brackmann I bilaterally.     US neck reviewed.    Assessment/Plan  Problem List Items Addressed This Visit          ENT    Neck mass - Primary    Mass of L SMG. CT neck ordered to assess further.         Relevant Orders    CT Soft Tissue Neck With Contrast                    [1]   Patient Active Problem List  Diagnosis    Hypertension    Hyperoxaluria    Nephrolithiasis    Cystic kidney disease    Urinary retention    Age-related osteoporosis with current pathological fracture with routine healing    Hypoglycemia    Trigger middle finger of right hand    Retinal hemorrhage of both eyes at about 1'oclock    Vestibular neuronitis    Obstructive sleep apnea    Venous insufficiency    Bradycardia    Malignant neoplasm of upper-outer quadrant of right breast in female, estrogen receptor positive    At risk for lymphedema    Acute renal failure superimposed on stage 3b chronic kidney disease    Vitamin D deficiency    Anemia in chronic kidney disease (CKD)    Aorto-iliac atherosclerosis    ACC/AHA stage C heart failure with preserved ejection fraction    Posture abnormality    Nausea    Hyponatremia    Acute cystitis    Clostridium difficile infection    Stage 3b chronic kidney disease    Poor balance    Impaired functional mobility, balance, gait, and endurance    Chronic pulmonary heart disease    Physical deconditioning    Weakness    Neck mass   [2]   Social History  Socioeconomic History    Marital status:    Occupational History    Occupation:      Employer: [employer]   Tobacco Use    Smoking status: Former     Current packs/day: 0.00     Average packs/day: 0.5 packs/day for 8.0 years (4.0 ttl pk-yrs)     Types: Cigarettes     Start date: 1956     Quit date: 1964     Years since quittin.6     Passive exposure: Past    Smokeless tobacco: Never   Substance and  Sexual Activity    Alcohol use: Not Currently    Drug use: No    Sexual activity: Not Currently     Social Drivers of Health     Financial Resource Strain: Low Risk  (11/20/2024)    Overall Financial Resource Strain (CARDIA)     Difficulty of Paying Living Expenses: Not hard at all   Food Insecurity: No Food Insecurity (11/20/2024)    Hunger Vital Sign     Worried About Running Out of Food in the Last Year: Never true     Ran Out of Food in the Last Year: Never true   Transportation Needs: No Transportation Needs (11/20/2024)    PRAPARE - Transportation     Lack of Transportation (Medical): No     Lack of Transportation (Non-Medical): No   Physical Activity: Insufficiently Active (11/20/2024)    Exercise Vital Sign     Days of Exercise per Week: 2 days     Minutes of Exercise per Session: 40 min   Stress: No Stress Concern Present (11/20/2024)    [place] [employer] - Occupational Stress Questionnaire     Feeling of Stress : Not at all   Housing Stability: Unknown (11/20/2024)    Housing Stability Vital Sign     Unable to Pay for Housing in the Last Year: No     Homeless in the Last Year: No

## 2025-04-01 ENCOUNTER — INFUSION (OUTPATIENT)
Dept: INFUSION THERAPY | Facility: HOSPITAL | Age: 85
End: 2025-04-01
Payer: MEDICARE

## 2025-04-01 VITALS
HEART RATE: 64 BPM | OXYGEN SATURATION: 100 % | RESPIRATION RATE: 18 BRPM | DIASTOLIC BLOOD PRESSURE: 65 MMHG | SYSTOLIC BLOOD PRESSURE: 137 MMHG | TEMPERATURE: 98 F

## 2025-04-01 DIAGNOSIS — N18.32 STAGE 3B CHRONIC KIDNEY DISEASE: Primary | ICD-10-CM

## 2025-04-01 PROCEDURE — 63600175 PHARM REV CODE 636 W HCPCS: Mod: JZ,EC,TB | Performed by: INTERNAL MEDICINE

## 2025-04-01 PROCEDURE — 96372 THER/PROPH/DIAG INJ SC/IM: CPT

## 2025-04-01 RX ADMIN — EPOETIN ALFA-EPBX 40000 UNITS: 40000 INJECTION, SOLUTION INTRAVENOUS; SUBCUTANEOUS at 11:04

## 2025-04-01 NOTE — PLAN OF CARE
1143 - Pt tolerated Retacrit injection well today, no complaints or complications. SQ injection given in left abdomen without issue. VSS. Pt aware to call provider with any questions or concerns and is aware of upcoming appts. Pt ambulatory from clinic with steady gait, no distress noted.

## 2025-04-02 ENCOUNTER — PATIENT MESSAGE (OUTPATIENT)
Dept: OTOLARYNGOLOGY | Facility: CLINIC | Age: 85
End: 2025-04-02
Payer: MEDICARE

## 2025-04-03 ENCOUNTER — TELEPHONE (OUTPATIENT)
Dept: NEPHROLOGY | Facility: CLINIC | Age: 85
End: 2025-04-03
Payer: MEDICARE

## 2025-04-03 NOTE — TELEPHONE ENCOUNTER
Alicia Sierra Staff  Caller: [phone] (Today,  1:35 PM)  .Type:  Needs Medical Advice    Who Called: pt  Symptoms (please be specific): asking to speak to Elda  in regards to weather or not she can cut in half a certain medication , the losartan, pills that she is out of. She is wondering if you could give her a call . She either needs a new prescription at a lower dose, or double the medicine . She will ask the pharmacist as well    Would the patient rather a call back or a response via [facility]? Call  Best Call Back Number: [phone]  Additional Information: n/a

## 2025-04-08 ENCOUNTER — HOSPITAL ENCOUNTER (OUTPATIENT)
Dept: RADIOLOGY | Facility: HOSPITAL | Age: 85
Discharge: HOME | End: 2025-04-08
Attending: INTERNAL MEDICINE
Payer: MEDICARE

## 2025-04-08 DIAGNOSIS — R51.9 NONINTRACTABLE HEADACHE, UNSPECIFIED CHRONICITY PATTERN, UNSPECIFIED HEADACHE TYPE: ICD-10-CM

## 2025-04-08 PROCEDURE — 70450 CT HEAD/BRAIN W/O DYE: CPT | Mod: TC

## 2025-04-08 PROCEDURE — 70450 CT HEAD/BRAIN W/O DYE: CPT | Mod: 26,,, | Performed by: RADIOLOGY

## 2025-04-10 ENCOUNTER — TELEPHONE (OUTPATIENT)
Dept: UROLOGY | Facility: CLINIC | Age: 85
End: 2025-04-10
Payer: MEDICARE

## 2025-04-10 NOTE — TELEPHONE ENCOUNTER
Copied from CRM #[id number]. Topic: General Inquiry - Patient Advice  >> Apr 10, 2025  3:57 PM Jessica wrote:  .Pt is requesting a  call from someone in the office and is asking for a return call back soon. Thanks.     Reason for call:discuss ultra sound      Patient's DX:     Patient requesting call back or [facility] [phone]

## 2025-04-15 ENCOUNTER — OFFICE VISIT (OUTPATIENT)
Dept: HEMATOLOGY/ONCOLOGY | Facility: CLINIC | Age: 85
End: 2025-04-15
Payer: MEDICARE

## 2025-04-15 ENCOUNTER — LAB VISIT (OUTPATIENT)
Dept: LAB | Facility: HOSPITAL | Age: 85
End: 2025-04-15
Attending: NURSE PRACTITIONER
Payer: MEDICARE

## 2025-04-15 VITALS
BODY MASS INDEX: 21.91 KG/M2 | OXYGEN SATURATION: 99 % | DIASTOLIC BLOOD PRESSURE: 74 MMHG | SYSTOLIC BLOOD PRESSURE: 145 MMHG | HEART RATE: 61 BPM | TEMPERATURE: 97 F | WEIGHT: 123.69 LBS | HEIGHT: 63 IN | RESPIRATION RATE: 16 BRPM

## 2025-04-15 DIAGNOSIS — D63.1 ANEMIA IN STAGE 3B CHRONIC KIDNEY DISEASE: ICD-10-CM

## 2025-04-15 DIAGNOSIS — Z17.0 MALIGNANT NEOPLASM OF UPPER-OUTER QUADRANT OF RIGHT BREAST IN FEMALE, ESTROGEN RECEPTOR POSITIVE: Chronic | ICD-10-CM

## 2025-04-15 DIAGNOSIS — C50.411 MALIGNANT NEOPLASM OF UPPER-OUTER QUADRANT OF RIGHT BREAST IN FEMALE, ESTROGEN RECEPTOR POSITIVE: Chronic | ICD-10-CM

## 2025-04-15 DIAGNOSIS — N18.32 ANEMIA IN STAGE 3B CHRONIC KIDNEY DISEASE: ICD-10-CM

## 2025-04-15 DIAGNOSIS — N18.30 ANEMIA IN STAGE 3 CHRONIC KIDNEY DISEASE, UNSPECIFIED WHETHER STAGE 3A OR 3B CKD: ICD-10-CM

## 2025-04-15 DIAGNOSIS — D63.1 ANEMIA DUE TO STAGE 3 CHRONIC KIDNEY DISEASE, UNSPECIFIED WHETHER STAGE 3A OR 3B CKD: Primary | ICD-10-CM

## 2025-04-15 DIAGNOSIS — D63.1 ANEMIA IN STAGE 3 CHRONIC KIDNEY DISEASE, UNSPECIFIED WHETHER STAGE 3A OR 3B CKD: ICD-10-CM

## 2025-04-15 DIAGNOSIS — N18.30 ANEMIA DUE TO STAGE 3 CHRONIC KIDNEY DISEASE, UNSPECIFIED WHETHER STAGE 3A OR 3B CKD: Primary | ICD-10-CM

## 2025-04-15 LAB
ABSOLUTE NEUTROPHIL COUNT (OHS): 1.99 K/UL (ref 1.8–7.7)
ALBUMIN SERPL BCP-MCNC: 3.4 G/DL (ref 3.5–5.2)
ALP SERPL-CCNC: 63 UNIT/L (ref 40–150)
ALT SERPL W/O P-5'-P-CCNC: 16 UNIT/L (ref 10–44)
ANION GAP (OHS): 8 MMOL/L (ref 8–16)
AST SERPL-CCNC: 27 UNIT/L (ref 11–45)
BILIRUB SERPL-MCNC: 1 MG/DL (ref 0.1–1)
BUN SERPL-MCNC: 28 MG/DL (ref 8–23)
CALCIUM SERPL-MCNC: 9.5 MG/DL (ref 8.7–10.5)
CHLORIDE SERPL-SCNC: 104 MMOL/L (ref 95–110)
CO2 SERPL-SCNC: 29 MMOL/L (ref 23–29)
CREAT SERPL-MCNC: 1.3 MG/DL (ref 0.5–1.4)
ERYTHROCYTE [DISTWIDTH] IN BLOOD BY AUTOMATED COUNT: 13.2 % (ref 11.5–14.5)
FERRITIN SERPL-MCNC: 94 NG/ML (ref 20–300)
GFR SERPLBLD CREATININE-BSD FMLA CKD-EPI: 41 ML/MIN/1.73/M2
GLUCOSE SERPL-MCNC: 88 MG/DL (ref 70–110)
HCT VFR BLD AUTO: 41.2 % (ref 37–48.5)
HGB BLD-MCNC: 12.6 GM/DL (ref 12–16)
IMM GRANULOCYTES # BLD AUTO: 0.01 K/UL (ref 0–0.04)
MCH RBC QN AUTO: 31.4 PG (ref 27–31)
MCHC RBC AUTO-ENTMCNC: 30.6 G/DL (ref 32–36)
MCV RBC AUTO: 103 FL (ref 82–98)
PLATELET # BLD AUTO: 198 K/UL (ref 150–450)
PMV BLD AUTO: 10.8 FL (ref 9.2–12.9)
POTASSIUM SERPL-SCNC: 4.9 MMOL/L (ref 3.5–5.1)
PROT SERPL-MCNC: 7.1 GM/DL (ref 6–8.4)
RBC # BLD AUTO: 4.01 M/UL (ref 4–5.4)
SODIUM SERPL-SCNC: 141 MMOL/L (ref 136–145)
WBC # BLD AUTO: 4.06 K/UL (ref 3.9–12.7)

## 2025-04-15 PROCEDURE — 82040 ASSAY OF SERUM ALBUMIN: CPT

## 2025-04-15 PROCEDURE — 82728 ASSAY OF FERRITIN: CPT

## 2025-04-15 PROCEDURE — 36415 COLL VENOUS BLD VENIPUNCTURE: CPT

## 2025-04-15 PROCEDURE — 99214 OFFICE O/P EST MOD 30 MIN: CPT | Mod: PBBFAC | Performed by: INTERNAL MEDICINE

## 2025-04-15 PROCEDURE — 99999 PR PBB SHADOW E&M-EST. PATIENT-LVL IV: CPT | Mod: PBBFAC,,, | Performed by: INTERNAL MEDICINE

## 2025-04-15 PROCEDURE — 85027 COMPLETE CBC AUTOMATED: CPT

## 2025-04-15 NOTE — PROGRESS NOTES
Subjective:       Patient ID: Shima Sorto is a 84 y.o. female.    Chief Complaint: Anemia due to stage 3 chronic kidney disease, unspecified w    HPI    Ms. Sorto returns today for follow up.  I had last seen her on January 7, 2025 and had continued to hold her weekly EPO due to her Hg being 11.4 gr/dl, up from 9.9 gr/dl prior to the initiation of EPO.  She was seen in March by our nurse practitioner and was restarted on EPO due to her hemoglobin being 9.9 grams/dL. She received two injections of darbopoietin    Her CBC today is as follows:  WBCs 4,000 per cubic mm, hemoglobin 12.6 grams/deciliter, hematocrit 41.3%, , and platelets 198K.  Creatinine is 1.3 mg/dl, and eGFR is 41 ml/min.  Ferritin is 94 ng/ml    In regards to her breast cancer, she had been started on anastrazole in mid November 2018, and completed 5 years at the end of October 2023.  In late August 2023 she underwent a bone marrow biopsy which was essentially unremarkable.  There was no evidence of MDS, leukemia, lymphoma, myeloma, or metastatic carcinoma.  Cytogenetics were normal and reticulin was not increased.  Cellularity was 35 %.    Other recent pertinent labs are as follows:  3 stool samples were negative for occult blood  Multiple urinalyses have shown persistent hematuria.  Of note, the patient self catheterizes 3 times a day.  B12 is 406 pg/mL  SPEP shows no monoclonal spike  Direct Lane is negative    Briefly, she is an 84-year-old  female who was diagnosed with breast cancer in 2018.  On 08/17/2018, she underwent a lumpectomy and sentinel lymph node biopsy for an 8 mm grade 1 invasive lobular carcinoma which was ER strongly positive, CA negative and HER-2 negative with a Ki-67 of 5%, with the closest margin being 2 mm.  Two of 2 sentinel lymph nodes were negative for involvement.      She completed her radiation therapy and was subsequently started on AIs.  She completed 5 years of adjuvant hormonal therapy and  she is being followed expectantly.    A mammogram on 7/9/2024 was read as BIRADS II, and a one year follow up was recommended.      In April 2023 she had been diagnosed with C diff and she was treated accordingly.  She underwent a colonoscopy and EGD by Dr. Garza.  The colonoscopy showed 3 polyps and extensive diverticulosis.  Biopsies were negative for malignancy.  The EGD showed gastritis and a hiatal hernia.  A video capsule swallow did not identify a source of bleeding in her small intestine.       Review of Systems      Overall she feels OK, and her ECOG PS is 1.  Her main complaint is persistent fatigue.  Of note, she is known to have sleep apnea and she is not being treated currently.  She denies any anxiety, depression, easy bruising, fevers, chills, night  sweats, weight loss, nausea, vomiting, diarrhea, constipation, diplopia, blurred vision, headache, chest pain, palpitations, shortness of breath, breast pain, abdominal pain, extremity pain, or difficulty ambulating.  The remainder of the ten-point ROS, including general, skin, lymph, H/N, cardiorespiratory, GI, , Neuro, Endocrine, and psychiatric is negative.     Objective:      Physical Exam        She is alert, oriented to time, place, person, pleasant, well      nourished, in no acute physical distress.                                    VITAL SIGNS:  Reviewed                                      HEENT:  Normal.  There are no nasal, oral, lip, gingival, auricular, lid,    or conjunctival lesions.  Mucosae are moist and pink, and there is no        thrush.  Pupils are equal, reactive to light and accommodation.              Extraocular muscle movements are intact.  Dentition is good.                                    NECK:  Supple without JVD, adenopathy, or thyromegaly.                       LUNGS:  Clear to auscultation without wheezing, rales, or rhonchi.           CARDIOVASCULAR:  Reveals an S1, S2, a grade 2 systolic ejection murmur of  aortic stenosis, no rubs, no gallops.         ABDOMEN:  Soft, nontender, without organomegaly.  Bowel sounds are    present.                                                                     EXTREMITIES:  No cyanosis, clubbing, or edema.                               BREASTS:   She is status post right lumpectomy with a well-healed incision in the upper outer quadrant.    There are no masses in either breast.                                LYMPHATIC:  There is no cervical, axillary, or supraclavicular adenopathy.   SKIN:  Warm and moist, without petechiae, rashes, induration, or ecchymoses.        NEUROLOGIC:  DTRs are 0-1+ bilaterally, symmetrical, motor function is 5/5,and cranial nerves are  within normal limits.    Assessment:       1. Malignant neoplasm of upper-outer quadrant of right breast in female, estrogen receptor positive    2. S/p 5 years of adjuvant hormonal therapy with aromatase inhibitors      3.    Osteopenia approaching osteoporosis    4.   Anemia, chronic, normochromic normocytic, most likely multifactorial.  Her bone marrow biopsy did not reveal any MDS. Anemia resolved with 2 EPo injections     5.  CDK 3b     6.  Fatigue most likely secondary to sleep apnea     7.  Documented sleep apnea  Plan:           I had a long discussion with her.  Her H&H increased with the erythropoietin injections, so we will discontinue the erythropoietin for now and I will see her in 6 weeks with a repeat CBC.  In regards to the breast cancer, she had completed her 5 years of anastrazole at the end of October 2023.  Her mammogram will be repeated in July 2025.  Finally, in regards to her sleep apnea, I have asked her to address it with her primary care physician.    Her multiple questions were answered to her satisfaction.  I spent 30 minutes reviewing the available records, evaluating the patient, and coordinating her care.  Visit today included increased complexity associated with the anemia, CKD, and breast  cancer         Route Chart for Scheduling    Med Onc Chart Routing      Follow up with physician 6 weeks. with labs. kelsey have her on the schedule for EPO that day   Follow up with BASHIR    Infusion scheduling note    Injection scheduling note    Labs    Imaging    Pharmacy appointment    Other referrals          Supportive Plan Information  OP EPOETIN MERCED WEEKLY Robert Hernandez MD   Associated Diagnosis: Stage 3b chronic kidney disease   noted on 12/20/2023   Line of treatment: Supportive Care   Treatment goal: Supportive     Upcoming Treatment Dates - OP EPOETIN MERCED WEEKLY    No upcoming days in selected categories.    Therapy Plan Information  DENOSUMAB (PROLIA) Q6M for Age-related osteoporosis with current pathological fracture with routine healing, noted on 4/8/2014  DENOSUMAB (PROLIA) Q6M for Acute renal failure superimposed on stage 3b chronic kidney disease, noted on 9/12/2018  DENOSUMAB (PROLIA) Q6M for Vitamin D deficiency, noted on 6/12/2019  Medications  denosumab (PROLIA) injection 60 mg  60 mg, Subcutaneous, Every 26 weeks      No therapy plan of the specified type found.    No therapy plan of the specified type found.

## 2025-04-16 ENCOUNTER — PATIENT MESSAGE (OUTPATIENT)
Dept: OTOLARYNGOLOGY | Facility: CLINIC | Age: 85
End: 2025-04-16
Payer: MEDICARE

## 2025-04-17 ENCOUNTER — HOSPITAL ENCOUNTER (OUTPATIENT)
Dept: RADIOLOGY | Facility: HOSPITAL | Age: 85
Discharge: HOME | End: 2025-04-17
Attending: UROLOGY
Payer: MEDICARE

## 2025-04-17 DIAGNOSIS — N20.0 NEPHROLITHIASIS: ICD-10-CM

## 2025-04-17 PROCEDURE — 76775 US EXAM ABDO BACK WALL LIM: CPT | Mod: 26,,, | Performed by: RADIOLOGY

## 2025-04-17 PROCEDURE — 76775 US EXAM ABDO BACK WALL LIM: CPT | Mod: TC

## 2025-04-24 ENCOUNTER — HOSPITAL ENCOUNTER (OUTPATIENT)
Dept: RADIOLOGY | Facility: HOSPITAL | Age: 85
Discharge: HOME | End: 2025-04-24
Attending: OTOLARYNGOLOGY
Payer: MEDICARE

## 2025-04-24 ENCOUNTER — RESULTS FOLLOW-UP (OUTPATIENT)
Dept: UROLOGY | Facility: CLINIC | Age: 85
End: 2025-04-24
Payer: MEDICARE

## 2025-04-24 ENCOUNTER — OFFICE VISIT (OUTPATIENT)
Dept: OTOLARYNGOLOGY | Facility: CLINIC | Age: 85
End: 2025-04-24
Payer: MEDICARE

## 2025-04-24 VITALS — SYSTOLIC BLOOD PRESSURE: 158 MMHG | DIASTOLIC BLOOD PRESSURE: 76 MMHG | WEIGHT: 121.94 LBS | BODY MASS INDEX: 21.6 KG/M2

## 2025-04-24 DIAGNOSIS — R22.1 NECK MASS: Primary | ICD-10-CM

## 2025-04-24 DIAGNOSIS — R22.1 NECK MASS: ICD-10-CM

## 2025-04-24 PROCEDURE — 99999 PR PBB SHADOW E&M-EST. PATIENT-LVL III: CPT | Mod: PBBFAC,,, | Performed by: OTOLARYNGOLOGY

## 2025-04-24 PROCEDURE — 76536 US EXAM OF HEAD AND NECK: CPT | Mod: TC

## 2025-04-24 PROCEDURE — 99213 OFFICE O/P EST LOW 20 MIN: CPT | Mod: PBBFAC,25 | Performed by: OTOLARYNGOLOGY

## 2025-04-24 PROCEDURE — 76536 US EXAM OF HEAD AND NECK: CPT | Mod: 26,,, | Performed by: RADIOLOGY

## 2025-04-25 ENCOUNTER — RESULTS FOLLOW-UP (OUTPATIENT)
Dept: OTOLARYNGOLOGY | Facility: CLINIC | Age: 85
End: 2025-04-25

## 2025-04-25 ENCOUNTER — TELEPHONE (OUTPATIENT)
Dept: UROLOGY | Facility: CLINIC | Age: 85
End: 2025-04-25
Payer: MEDICARE

## 2025-04-25 DIAGNOSIS — R22.1 NECK MASS: Primary | ICD-10-CM

## 2025-04-25 NOTE — ASSESSMENT & PLAN NOTE
Prominent left submandibular gland without obvious mass on CT scan.  Questionable prominence of right submandibular gland.  Will assess further with ultrasound.

## 2025-04-25 NOTE — PROGRESS NOTES
Chief complaint:  Follow up      HPI   84 y.o. female presents for evaluation of a left-sided submandibular mass.  She reports that she noted this within the last several months.  No pain.  No change in size.  No dysphagia.  Recent neck CT revealed enlargement of the left submandibular gland without obvious masses.  She is concerned about the possibility of a right submandibular gland mass.    Review of Systems   Constitutional: Negative for fatigue and unexpected weight change.   HENT: Per HPI.  Eyes: Negative for visual disturbance.   Respiratory: Negative for shortness of breath, hemoptysis   Cardiovascular: Negative for chest pain and palpitations.   Musculoskeletal: Negative for decreased ROM, back pain.   Skin: Negative for rash, sunburn, itching.   Neurological: Negative for dizziness and seizures.   Hematological: Negative for adenopathy. Does not bruise/bleed easily.   Endocrine: Negative for rapid weight loss/weight gain, heat/cold intolerance.     Past Medical History   Problem List[1]        Past Surgical History   Past Surgical History:   Procedure Laterality Date    BREAST CYST ASPIRATION Right 1999    BREAST LUMPECTOMY Right 2018    with radiation    COLONOSCOPY N/A 7/24/2018    Procedure: COLONOSCOPY;  Surgeon: Juan Miguel Pena MD;  Location: [address]);  Service: Endoscopy;  Laterality: N/A;    COLONOSCOPY N/A 5/10/2023    Procedure: COLONOSCOPY;  Surgeon: Keven Garza MD;  [facility]  Service: Endoscopy;  Laterality: N/A;  *Pending C-Diff*  Request Greg  procedure order telephone encounter 5/1  PEG prep, instructions portal -LW  5/4/23 no answer for precall/mleone lpn  5/9lm    ESOPHAGOGASTRODUODENOSCOPY N/A 3/17/2023    Procedure: EGD (ESOPHAGOGASTRODUODENOSCOPY);  Surgeon: Keven Garza MD;  [facility]  Service: Endoscopy;  Laterality: N/A;  Medically Urgent  3/2 instructions to portal-st    pre call attempted, no answer from pt  3/13/23 -egh    INJECTION FOR SENTINEL NODE IDENTIFICATION Right 8/17/2018    Procedure: INJECTION, FOR SENTINEL NODE IDENTIFICATION;  Surgeon: Abby Mason MD;  Location: [facility] OR [address] [place] [address];  Service: General;  Laterality: Right;    interstim placed stage 1      and removed    KIDNEY STONE SURGERY  2000    @ [facility]    MASTECTOMY, PARTIAL Right 8/17/2018    Procedure: MASTECTOMY, PARTIAL-US guided;  Surgeon: Abby Mason MD;  Location: [facility] OR [address] [place] [address];  Service: General;  Laterality: Right;    MOHS procedure      SENTINEL LYMPH NODE BIOPSY Right 8/17/2018    Procedure: BIOPSY, LYMPH NODE, SENTINEL;  Surgeon: Abby Mason MD;  Location: [facility] OR [address] [place] [address];  Service: General;  Laterality: Right;         Family History   Family History   Problem Relation Name Age of Onset    Kidney disease Mother      Heart disease Father      Melanoma Father      Heart attack Father      Breast cancer Maternal Aunt      Hearing loss Son      Hyperlipidemia Son      Anesthesia problems Neg Hx      Malignant hypertension Neg Hx      Hypotension Neg Hx      Malignant hyperthermia Neg Hx      Pseudochol deficiency Neg Hx      Colon cancer Neg Hx      Ovarian cancer Neg Hx      Psoriasis Neg Hx      Lupus Neg Hx      Eczema Neg Hx      Acne Neg Hx      Esophageal cancer Neg Hx             Social History   .Social History[2]      Allergies   Review of patient's allergies indicates:   Allergen Reactions    Asparagus Rash    Macrobid [nitrofurantoin monohyd/m-cryst] Rash     Peeling skin and petechia           Physical Exam     Vitals:    04/24/25 1500   BP: (!) 158/76         Body mass index is 21.6 kg/m².      General: AOx3, NAD   Respiratory:  Symmetric chest rise, normal effort  Nose: No gross nasal septal deviation. Inferior Turbinates WNL bilaterally. No septal perforation. No masses/lesions.   Oral Cavity:  Oral Tongue mobile, no lesions noted. Hard Palate WNL. No buccal or FOM lesions.  Oropharynx:  No masses/lesions of the posterior  pharyngeal wall. Tonsillar fossa without lesions. Soft palate without masses. Midline uvula.   Neck: No scars.  No cervical lymphadenopathy, thyromegaly or thyroid nodules.  Normal range of motion.  Roughly 1.5 cm firm mass of L SMG.  Face: House Brackmann I bilaterally.     Neck CT reviewed    Assessment/Plan  Problem List Items Addressed This Visit          ENT    Neck mass - Primary    Prominent left submandibular gland without obvious mass on CT scan.  Questionable prominence of right submandibular gland.  Will assess further with ultrasound.         Relevant Orders    US Soft Tissue Head Neck (Completed)                      [1]   Patient Active Problem List  Diagnosis    Hypertension    Hyperoxaluria    Nephrolithiasis    Cystic kidney disease    Urinary retention    Age-related osteoporosis with current pathological fracture with routine healing    Hypoglycemia    Trigger middle finger of right hand    Retinal hemorrhage of both eyes at about 1'oclock    Vestibular neuronitis    Obstructive sleep apnea    Venous insufficiency    Bradycardia    Malignant neoplasm of upper-outer quadrant of right breast in female, estrogen receptor positive    At risk for lymphedema    Acute renal failure superimposed on stage 3b chronic kidney disease    Vitamin D deficiency    Anemia in chronic kidney disease (CKD)    Aorto-iliac atherosclerosis    ACC/AHA stage C heart failure with preserved ejection fraction    Posture abnormality    Nausea    Hyponatremia    Acute cystitis    Clostridium difficile infection    Stage 3b chronic kidney disease    Poor balance    Impaired functional mobility, balance, gait, and endurance    Chronic pulmonary heart disease    Physical deconditioning    Weakness    Neck mass   [2]   Social History  Socioeconomic History    Marital status:    Occupational History    Occupation:      Employer: [employer]    Tobacco Use    Smoking status: Former     Current  packs/day: 0.00     Average packs/day: 0.5 packs/day for 8.0 years (4.0 ttl pk-yrs)     Types: Cigarettes     Start date: 1956     Quit date: 1964     Years since quittin.7     Passive exposure: Past    Smokeless tobacco: Never   Substance and Sexual Activity    Alcohol use: Not Currently    Drug use: No    Sexual activity: Not Currently     Social Drivers of Health     Financial Resource Strain: Low Risk  (2024)    Overall Financial Resource Strain (CARDIA)     Difficulty of Paying Living Expenses: Not hard at all   Food Insecurity: No Food Insecurity (2024)    Hunger Vital Sign     Worried About Running Out of Food in the Last Year: Never true     Ran Out of Food in the Last Year: Never true   Transportation Needs: No Transportation Needs (2024)    PRAPARE - Transportation     Lack of Transportation (Medical): No     Lack of Transportation (Non-Medical): No   Physical Activity: Insufficiently Active (2024)    Exercise Vital Sign     Days of Exercise per Week: 2 days     Minutes of Exercise per Session: 40 min   Stress: No Stress Concern Present (2024)    [place]izen of [place] [employer] - Occupational Stress Questionnaire     Feeling of Stress : Not at all   Housing Stability: Unknown (2024)    Housing Stability Vital Sign     Unable to Pay for Housing in the Last Year: No     Homeless in the Last Year: No

## 2025-04-25 NOTE — TELEPHONE ENCOUNTER
----- Message from Nurse Peña sent at 4/24/2025  5:22 PM CDT -----  Can you help with this please  ----- Message -----  From: Roxy Cruz MD  Sent: 4/24/2025   1:09 PM CDT  To: Anthony DA SILVA Staff    Needs follow up to discuss imaging. Can overbook or put in a new patient spot.   ----- Message -----  From: Interface, Rad Results In  Sent: 4/17/2025   3:22 PM CDT  To: Roxy Cruz MD

## 2025-04-30 ENCOUNTER — OFFICE VISIT (OUTPATIENT)
Dept: UROLOGY | Facility: CLINIC | Age: 85
End: 2025-04-30
Payer: MEDICARE

## 2025-04-30 VITALS
SYSTOLIC BLOOD PRESSURE: 171 MMHG | HEART RATE: 53 BPM | WEIGHT: 124.13 LBS | DIASTOLIC BLOOD PRESSURE: 79 MMHG | BODY MASS INDEX: 21.99 KG/M2 | HEIGHT: 63 IN

## 2025-04-30 DIAGNOSIS — R22.1 NECK MASS: Primary | ICD-10-CM

## 2025-04-30 DIAGNOSIS — N20.0 NEPHROLITHIASIS: ICD-10-CM

## 2025-04-30 DIAGNOSIS — R82.81 PYURIA: Primary | ICD-10-CM

## 2025-04-30 PROCEDURE — 99999 PR PBB SHADOW E&M-EST. PATIENT-LVL IV: CPT | Mod: PBBFAC,,, | Performed by: UROLOGY

## 2025-04-30 PROCEDURE — 99214 OFFICE O/P EST MOD 30 MIN: CPT | Mod: S$PBB,,, | Performed by: UROLOGY

## 2025-04-30 PROCEDURE — G2211 COMPLEX E/M VISIT ADD ON: HCPCS | Mod: S$PBB,,, | Performed by: UROLOGY

## 2025-04-30 PROCEDURE — 99214 OFFICE O/P EST MOD 30 MIN: CPT | Mod: PBBFAC | Performed by: UROLOGY

## 2025-04-30 NOTE — PROGRESS NOTES
"Urology - [facility]  Clinic Note    SUBJECTIVE:     Chief Complaint: follow up    History of Present Illness:  Shima Sorto is a 84 y.o. female who presents to clinic for follow up. She is established to our clinic .   History of Present Illness    Patient presents today for follow up of imaging results    CT Scan showed a 1.3 cm lesion in the upper pole of the kidney. Follow-up ultrasound revealed only a cyst with no solid mass present. Multiple kidney stones are present in the right kidney, including a 2 mm stone and a cluster of stones measuring 1.3 cm in total.    She performs intermittent catheterization 3 times daily. Cystoscopy was performed in 2024.    Ultrasound revealed a mass in the left submandibular gland that requires further evaluation.         Anticoagulation:  No    OBJECTIVE:     Estimated body mass index is 21.99 kg/m² as calculated from the following:    Height as of this encounter: 5' 3" (1.6 m).    Weight as of this encounter: 56.3 kg (124 lb 1.9 oz).    Vital Signs (Most Recent)  Pulse: (!) 53 (25 1305)  BP: (!) 171/79 (25 1305)    Physical Exam    Lab Results   Component Value Date    BUN 28 (H) 04/15/2025    CREATININE 1.3 04/15/2025    WBC 4.06 04/15/2025    HGB 12.6 04/15/2025    HCT 41.2 04/15/2025     04/15/2025    AST 27 04/15/2025    ALT 16 04/15/2025    ALKPHOS 63 04/15/2025    ALBUMIN 3.4 (L) 04/15/2025          Imagin25 personally reviewed  Right kidney: The right kidney measures 10.0 cm. No cortical thinning. No loss of corticomedullary distinction. Resistive index measures 0.78.  Two simple cysts, the largest measures 1.2 x 1.2 x 1.1 cm.  Numerous echogenic foci favored to represent nonobstructing renal stones measuring up to 7 mm.  No hydronephrosis.     Left kidney: The left kidney measures 10.3 cm. No cortical thinning. No loss of corticomedullary distinction. Resistive index measures 0.78.  0.9 x 0.9 x 0.9 cm midportion " complex cyst with thin avascular septation.  Two additional simple cysts, the largest measures 2.2 x 1.9 x 1.4 cm.  2 mm nonobstructing renal stone.  Mild hydronephrosis.     Bladder is well distended with posterior focal wall thickening and layering internal debris.        ASSESSMENT     1. Pyuria    2. Nephrolithiasis      PLAN:   Shima was seen today for results.    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  - Compared with August abdominal US, which showed no solid mass, only cyst in left kidney.  - Noted bladder abnormality on recent imaging, possibly layering debris or localized thickening.  - Considered previous cystoscopy from July 2024, which showed no concerning findings.  - Kidney stone situation stable.  - No immediate concern for bladder abnormality given recent normal cystoscopy.    KIDNEY STONES AND BLADDER ISSUES:  - Explained imaging findings, including kidney stones and bladder appearance.  - Discussed possibility of debris in bladder being normal and just sloughing of the lining.  - Patient to deliver urine sample collected at home.  - Ordered urine sample for analysis.    NEOPLASM OF SUBMANDIBULAR SALIVARY GLANDS:  - Sent message to Dr. Roberto regarding scheduling of image-guided biopsy for left submandibular gland.  - Await contact from radiology department for scheduling of biopsy.         This note was generated with the assistance of ambient listening technology. Verbal consent was  obtained by the patient and accompanying visitor(s) for the recording of patient appointment to facilitate this note. I attest to having reviewed and edited the generated note for accuracy, though some syntax or spelling errors may persist. Please contact the author of this note for any clarification.        Roxy Cruz MD

## 2025-05-01 ENCOUNTER — APPOINTMENT (OUTPATIENT)
Dept: LAB | Facility: HOSPITAL | Age: 85
End: 2025-05-01
Payer: MEDICARE

## 2025-05-01 DIAGNOSIS — K11.8 SUBMANDIBULAR GLAND MASS: Primary | ICD-10-CM

## 2025-05-07 NOTE — PROCEDURES
Procedure: Flexible cysto-uretheroscopy    Surgeon: Roxy Cruz MD     Anesthesia: 2% uro-jet lidocaine jelly for local analgesia    Findings of the procedure: no tumors noted.     Procedure in detail:  The risks and benefits were explained and informed consent was obtained.  2% lidocaine urojet was used for local analgesia. The genitalia was prepped and draped in the sterile fashion  Flexible cysto-urethroscopy was then performed. The flexible scope was advanced into the urethra and into the bladder. No strictures were noted.  Bilateral ureteral orifices were evaluated and noted to be normal.  The bladder was completely surveyed in a systematic fashion.   No bladder tumors or lesions were seen.    The patient tolerated the procedure well without complication.

## 2025-05-08 ENCOUNTER — RESULTS FOLLOW-UP (OUTPATIENT)
Dept: UROLOGY | Facility: CLINIC | Age: 85
End: 2025-05-08
Payer: MEDICARE

## 2025-05-14 ENCOUNTER — HOSPITAL ENCOUNTER (OUTPATIENT)
Dept: RADIOLOGY | Facility: OTHER | Age: 85
Discharge: HOME | End: 2025-05-14
Attending: FAMILY MEDICINE
Payer: MEDICARE

## 2025-05-14 DIAGNOSIS — K11.8 SUBMANDIBULAR GLAND MASS: ICD-10-CM

## 2025-05-14 PROCEDURE — 63600175 PHARM REV CODE 636 W HCPCS: Performed by: FAMILY MEDICINE

## 2025-05-14 PROCEDURE — 88333 PATH CONSLTJ SURG CYTO XM 1: CPT | Mod: TC,59 | Performed by: FAMILY MEDICINE

## 2025-05-14 RX ORDER — LIDOCAINE HYDROCHLORIDE 10 MG/ML
5 INJECTION, SOLUTION INFILTRATION; PERINEURAL ONCE
Status: COMPLETED | OUTPATIENT
Start: 2025-05-14 | End: 2025-05-14

## 2025-05-14 RX ADMIN — LIDOCAINE HYDROCHLORIDE 5 ML: 10 INJECTION, SOLUTION INFILTRATION; PERINEURAL at 11:05

## 2025-05-15 LAB
DHEA SERPL-MCNC: NORMAL
ESTROGEN SERPL-MCNC: NORMAL PG/ML
INSULIN SERPL-ACNC: NORMAL U[IU]/ML
LAB AP GROSS DESCRIPTION: NORMAL
LAB AP INTRA OP: NORMAL
LAB AP PERFORMING LOCATION(S): NORMAL
LAB AP REPORT FOOTNOTES: NORMAL

## 2025-05-19 ENCOUNTER — PATIENT MESSAGE (OUTPATIENT)
Dept: UROLOGY | Facility: CLINIC | Age: 85
End: 2025-05-19
Payer: MEDICARE

## 2025-05-22 ENCOUNTER — PATIENT MESSAGE (OUTPATIENT)
Dept: ENDOCRINOLOGY | Facility: CLINIC | Age: 85
End: 2025-05-22
Payer: MEDICARE

## 2025-05-24 ENCOUNTER — OFFICE VISIT (OUTPATIENT)
Dept: URGENT CARE | Facility: CLINIC | Age: 85
End: 2025-05-24
Payer: MEDICARE

## 2025-05-24 VITALS
OXYGEN SATURATION: 99 % | BODY MASS INDEX: 21.99 KG/M2 | HEIGHT: 63 IN | RESPIRATION RATE: 19 BRPM | HEART RATE: 65 BPM | TEMPERATURE: 98 F | WEIGHT: 124.13 LBS | SYSTOLIC BLOOD PRESSURE: 137 MMHG | DIASTOLIC BLOOD PRESSURE: 76 MMHG

## 2025-05-24 DIAGNOSIS — S05.02XA ABRASION OF LEFT CORNEA, INITIAL ENCOUNTER: Primary | ICD-10-CM

## 2025-05-24 DIAGNOSIS — H57.12 DISCOMFORT OF LEFT EYE: ICD-10-CM

## 2025-05-24 PROBLEM — N95.2 ATROPHIC VAGINITIS: Status: ACTIVE | Noted: 2021-07-10

## 2025-05-24 PROBLEM — M81.0 OSTEOPOROSIS: Status: ACTIVE | Noted: 2021-07-10

## 2025-05-24 PROBLEM — R35.0 INCREASED FREQUENCY OF URINATION: Status: ACTIVE | Noted: 2021-07-10

## 2025-05-24 PROCEDURE — 99213 OFFICE O/P EST LOW 20 MIN: CPT | Mod: S$GLB,,, | Performed by: NURSE PRACTITIONER

## 2025-05-24 RX ORDER — FLUORESCEIN SODIUM AND BENOXINATE HYDROCHLORIDE 2.6; 4.4 MG/ML; MG/ML
1 SOLUTION/ DROPS OPHTHALMIC
Status: COMPLETED | OUTPATIENT
Start: 2025-05-24 | End: 2025-05-24

## 2025-05-24 RX ORDER — PROPARACAINE HYDROCHLORIDE 5 MG/ML
1 SOLUTION/ DROPS OPHTHALMIC
Status: COMPLETED | OUTPATIENT
Start: 2025-05-24 | End: 2025-05-24

## 2025-05-24 RX ORDER — OFLOXACIN 3 MG/ML
1 SOLUTION/ DROPS OPHTHALMIC 4 TIMES DAILY
Qty: 5 ML | Refills: 0 | Status: SHIPPED | OUTPATIENT
Start: 2025-05-24 | End: 2025-05-29

## 2025-05-24 RX ADMIN — PROPARACAINE HYDROCHLORIDE 1 DROP: 5 SOLUTION/ DROPS OPHTHALMIC at 05:05

## 2025-05-24 RX ADMIN — FLUORESCEIN SODIUM AND BENOXINATE HYDROCHLORIDE 1 DROP: 2.6; 4.4 SOLUTION/ DROPS OPHTHALMIC at 05:05

## 2025-05-24 NOTE — PATIENT INSTRUCTIONS
You may be more comfortable if you rest with your eyes closed. Bright lights may also cause eye pain. This is normal during the first day after the scratch.  Use your eye drops or eye ointments as ordered.  Do not press or rub your eyes.  Do not wear contact lenses until your eye has healed. Check with your regular eye doctor about when you can start to wear them again. After that, be sure to follow all instructions on proper care. This will help you avoid another corneal abrasion or serious infection.  Wash your hands before and after you touch your eyes.  Please return here or go to the Emergency Department for any concerns or worsening of condition.  You were prescribed antibiotics, please administer them as directed.  Please follow up with your primary care doctor or specialist as needed.    If you  smoke, please stop smoking.

## 2025-05-24 NOTE — PROGRESS NOTES
"Subjective:      Patient ID: Shima Sorto is a 84 y.o. female.    Vitals:  height is 5' 3" (1.6 m) and weight is 56.3 kg (124 lb 1.9 oz). Her oral temperature is 97.7 °F (36.5 °C). Her blood pressure is 137/76 and her pulse is 65. Her respiration is 19 and oxygen saturation is 99%.     Chief Complaint: Eye Problem    Pt is here with c/o eye pain, she states that this morning when she woke up she started to feel a sharp pain and poking sensation in her L eye causing irritation and redness, she says she's not sure what could have caused it and its unknown if a foreign body is present. Pt expresses she believes something is in her eye because its more of a painful stabbing poking sensation rather than just "painful".    84 year old female presents to clinic with complaints of left eye discomfort, light sensitivity, sensation of foreign body.    Eye Problem   The left eye is affected. This is a new problem. The current episode started today. The problem occurs constantly. The problem has been unchanged. The injury mechanism is unknown. The patient is experiencing no pain. There is No known exposure to pink eye. She Does not wear contacts. Associated symptoms include eye redness. Pertinent negatives include no blurred vision, eye discharge or double vision. She has tried eye drops for the symptoms.       Eyes:  Positive for foreign body in eye, eye redness and eyelid swelling. Negative for eye discharge, double vision and blurred vision.      Objective:     Physical Exam   Constitutional: She is oriented to person, place, and time. She appears well-developed.   HENT:   Head: Normocephalic and atraumatic.   Ears:   Right Ear: External ear normal.   Left Ear: External ear normal.   Nose: Nose normal.   Mouth/Throat: Oropharynx is clear and moist.   Eyes: Conjunctivae, EOM and lids are normal. Pupils are equal, round, and reactive to light. Left eye exhibits no discharge and no hordeolum. No foreign body present in " the left eye.     Extraocular movement intact      Comments: Corneal abrasion 6 o'clock region   Neck: Trachea normal and phonation normal. Neck supple.   Cardiovascular: Normal rate.   Pulmonary/Chest: Effort normal.   Musculoskeletal: Normal range of motion.         General: Normal range of motion.   Neurological: She is alert and oriented to person, place, and time.   Skin: Skin is warm, dry and intact.   Psychiatric: Her speech is normal and behavior is normal. Judgment and thought content normal.   Nursing note and vitals reviewed.      Assessment:     1. Abrasion of left cornea, initial encounter    2. Discomfort of left eye        Plan:       Abrasion of left cornea, initial encounter  -     ofloxacin (OCUFLOX) 0.3 % ophthalmic solution; Place 1 drop into the left eye 4 (four) times daily. for 5 days  Dispense: 5 mL; Refill: 0    Discomfort of left eye  -     proparacaine 0.5 % ophthalmic solution 1 drop  -     fluorescein-benoxinate 0.3-0.4% ophthalmic solution 1 drop    Procedure: fluorescein exam: abrasion noted at 6 o'clock region. Eye flushed with saline with unresolved foreign body sensation, light utilized with noted abrasion; drops applied.      You may be more comfortable if you rest with your eyes closed. Bright lights may also cause eye pain. This is normal during the first day after the scratch.  Use your eye drops or eye ointments as ordered.  Do not press or rub your eyes.  Do not wear contact lenses until your eye has healed. Check with your regular eye doctor about when you can start to wear them again. After that, be sure to follow all instructions on proper care. This will help you avoid another corneal abrasion or serious infection.  Wash your hands before and after you touch your eyes.  Please return here or go to the Emergency Department for any concerns or worsening of condition.  You were prescribed antibiotics, please administer them as directed.  Please follow up with your primary  care doctor or specialist as needed.    If you  smoke, please stop smoking.

## 2025-05-26 NOTE — PROGRESS NOTES
Subjective:      Patient ID: Shima Sorto is a 84 y.o. female.    Chief Complaint:  Osteoporosis    History of Present Illness  Shima Sorto is a 84 y.o. female with history significant for HTN, HF, CKD, history of breast cancer s/p partial mastectomy on anastrozole (started 11/2018-10/2023) , vitamin D deficiency, KATHERIN and is here for management of osteoporosis.  Previously seen by MIKAELA Shaw NP.  Last seen 03/2023.  This is their first visit with me.      Since the last visit, she was evaluated for a left-sided submandibular mass. She had a biopsy with ENT which resulted in AUS. She is waiting to reach out to ENT. The last biopsy was complicated by difficulty in getting an adequate sample and patient has had voice changes and dry throat since then. CKD has progressed to stage 3b. She follows nephrology and urology and is hydrating with water and tea. She is struggling with a renal diet and requested a referral to a registered dietitian.     With regards to osteoporosis:     BMD: 01/06/2025  Lumbar spine (L1-L4): T-score is +0.6, and Z-score is +3.4.  Compared with previous DXA, BMD at the lumbar spine has remained stable.     Femoral neck: T-score is -2.6, and Z-score is -0.1.  Total hip: T-score is -1.9, and Z-score is +0.4.  Compared with previous DXA, BMD at the total hip has remained stable.     Trabecular Bone Score  The trabecular bone score (TBS) indicates normal bone quality.     Fracture Risk (FRAX adjusted for Trabecular Bone Score)  18% risk of a major osteoporotic fracture in the next 10 years.  8.0% risk of hip fracture in the next 10 years.    Impression:  *Osteoporosis on treatment with Prolia     RECOMMENDATIONS:  *Daily calcium intake 0965-7087 mg, dietary sources preferred; Vitamin D 1712-9169 IU daily.  *Weight bearing exercise and fall precautions.  *Recommend continued therapy with Prolia every 6 months. If Prolia is discontinued alternative anti-resorptive therapy should be  "started to prevent rapid bone loss associated with Prolia withdrawal.  *Repeat BMD in 2 years.    Medications:    - Alendronate for nine years stopped 2013 - 2016, restarted ALN 2/2018 - 06/2019   - Prolia 11/2019-current   - Last dose 12/2024   - Next dose 06/2025  - Tolerating injection well    Calcium intake: Calcium (1000)-citrate with Vitamin D (1000) and K2; limited diet due to renal diet  Vit D intake: D3 1000 IU daily     Weight bearing exercise: walks when she can but less now due to summer weather  Balance: "needs to work on balance", has vertigo secondary to shingles, "doesn't need" an assistive device  Falls: denies  Fractures: L3 compression fracture 06/2019  Dental Visit: 12/2024, no planned procedures    Significant height loss (>2 inches): + (previously 5'7")    Family history: Mother with fractured hip    Menopause: mid 50s    Tobacco Use: denies  Alcohol Use: social  Glucocorticoid History: denies  Anticoagulant Use: denies  GERD/PPI Use: denies  History of Malabsorption: + Pepcid PRN   Antiseizure Medications: denies  History of Thyroid Disease: denies  Kidney Disease: +  Personal history of kidney stones: 2001/2002  Discontinued Anastrozole ([id number])      Lab Results   Component Value Date    CALCIUM 9.2 05/27/2025    ALBUMIN 3.3 (L) 05/27/2025    PHOS 4.0 12/02/2024     Vit D, 25-Hydroxy   Date Value Ref Range Status   03/01/2023 71 30 - 96 ng/mL Final     Comment:     Vitamin D deficiency.........<10 ng/mL                              Vitamin D insufficiency......10-29 ng/mL       Vitamin D sufficiency........> or equal to 30 ng/mL  Vitamin D toxicity............>100 ng/mL           Review of Systems  As above  Objective:   Physical Exam  Constitutional:       Appearance: Normal appearance.   Cardiovascular:      Rate and Rhythm: Normal rate.      Comments: No edema  Pulmonary:      Effort: Pulmonary effort is normal.   Neurological:      Mental Status: She is alert.   Psychiatric:         " "Mood and Affect: Mood normal.         Behavior: Behavior normal.         Visit Vitals  /80 (BP Location: Right arm, Patient Position: Sitting)   Pulse (!) 58   Ht 5' 3" (1.6 m)   Wt 55.8 kg (123 lb)   LMP  (LMP Unknown)   SpO2 99%   BMI 21.79 kg/m²       Body mass index is 21.79 kg/m².    Lab Review:   No results found for: "HGBA1C"    Lab Results   Component Value Date    CHOL 164 10/10/2023    HDL 68 10/10/2023    LDLCALC 88.6 10/10/2023    TRIG 37 10/10/2023    CHOLHDL 41.5 10/10/2023     Lab Results   Component Value Date     05/27/2025    K 3.8 05/27/2025     05/27/2025    CO2 29 05/27/2025    GLU 92 05/27/2025    BUN 34 (H) 05/27/2025    CREATININE 1.5 (H) 05/27/2025    CALCIUM 9.2 05/27/2025    PROT 6.9 05/27/2025    ALBUMIN 3.3 (L) 05/27/2025    BILITOT 0.6 05/27/2025    ALKPHOS 59 05/27/2025    AST 27 05/27/2025    ALT 17 05/27/2025    ANIONGAP 12 05/27/2025    ESTGFRAFRICA 44.5 (A) 06/13/2022    EGFRNONAA 38.6 (A) 06/13/2022    TSH 3.853 10/11/2023     Vit D, 25-Hydroxy   Date Value Ref Range Status   03/01/2023 71 30 - 96 ng/mL Final     Comment:     Vitamin D deficiency.........<10 ng/mL                              Vitamin D insufficiency......10-29 ng/mL       Vitamin D sufficiency........> or equal to 30 ng/mL  Vitamin D toxicity............>100 ng/mL       Assessment and Plan     1. Osteoporosis, unspecified osteoporosis type, unspecified pathological fracture presence  Vitamin D      2. Stage 3b chronic kidney disease  Ambulatory referral/consult to Nutrition Services      3. Vitamin D deficiency        4. Impaired functional mobility, balance, gait, and endurance            Osteoporosis   - Risks include low weight,  race, menopause, +FH, Aromatase inhibitor therapy   - Reassuring they are not fracturing    - Treatment: Prolia (2019-current), next dose due 06/2025   - Recommend continuing Prolia consistently every 6 months   - Therapy plan signed   - Calcium and Vitamin D " RDD provided.   - Weight-bearing exercise as tolerated   - Fall precautions made in the home.   - Alerted that if dental work needs to be done it should be done prior to initiating therapy. Dental health: up to date   - Repeat DEXA scan 01/2027      Vitamin D deficiency  Check vitamin D    Impaired functional mobility, balance, gait, and endurance   - At increased risk of falls/fractures      Follow up in about 1 year (around 5/30/2026).    Visit today included increased complexity associated with the care of the problems addressed and managing the longitudinal care of the patient due to the serious and/or complex managed problems.    Dorothy Doan PA-C

## 2025-05-27 ENCOUNTER — OFFICE VISIT (OUTPATIENT)
Dept: HEMATOLOGY/ONCOLOGY | Facility: CLINIC | Age: 85
End: 2025-05-27
Payer: MEDICARE

## 2025-05-27 ENCOUNTER — LAB VISIT (OUTPATIENT)
Dept: LAB | Facility: HOSPITAL | Age: 85
End: 2025-05-27
Attending: INTERNAL MEDICINE
Payer: MEDICARE

## 2025-05-27 VITALS
DIASTOLIC BLOOD PRESSURE: 67 MMHG | SYSTOLIC BLOOD PRESSURE: 145 MMHG | HEIGHT: 63 IN | WEIGHT: 125.69 LBS | TEMPERATURE: 98 F | HEART RATE: 56 BPM | OXYGEN SATURATION: 98 % | RESPIRATION RATE: 14 BRPM | BODY MASS INDEX: 22.27 KG/M2

## 2025-05-27 DIAGNOSIS — D63.1 ANEMIA DUE TO STAGE 3 CHRONIC KIDNEY DISEASE, UNSPECIFIED WHETHER STAGE 3A OR 3B CKD: ICD-10-CM

## 2025-05-27 DIAGNOSIS — N18.32 ANEMIA IN STAGE 3B CHRONIC KIDNEY DISEASE: ICD-10-CM

## 2025-05-27 DIAGNOSIS — Z17.0 MALIGNANT NEOPLASM OF UPPER-OUTER QUADRANT OF RIGHT BREAST IN FEMALE, ESTROGEN RECEPTOR POSITIVE: Primary | Chronic | ICD-10-CM

## 2025-05-27 DIAGNOSIS — C50.411 MALIGNANT NEOPLASM OF UPPER-OUTER QUADRANT OF RIGHT BREAST IN FEMALE, ESTROGEN RECEPTOR POSITIVE: Primary | Chronic | ICD-10-CM

## 2025-05-27 DIAGNOSIS — D63.1 ANEMIA IN STAGE 3B CHRONIC KIDNEY DISEASE: ICD-10-CM

## 2025-05-27 DIAGNOSIS — N18.30 ANEMIA DUE TO STAGE 3 CHRONIC KIDNEY DISEASE, UNSPECIFIED WHETHER STAGE 3A OR 3B CKD: ICD-10-CM

## 2025-05-27 LAB
ABSOLUTE EOSINOPHIL (OHS): 0.09 K/UL
ABSOLUTE MONOCYTE (OHS): 0.42 K/UL (ref 0.3–1)
ABSOLUTE NEUTROPHIL COUNT (OHS): 3.03 K/UL (ref 1.8–7.7)
ALBUMIN SERPL BCP-MCNC: 3.3 G/DL (ref 3.5–5.2)
ALP SERPL-CCNC: 59 UNIT/L (ref 40–150)
ALT SERPL W/O P-5'-P-CCNC: 17 UNIT/L (ref 10–44)
ANION GAP (OHS): 12 MMOL/L (ref 8–16)
AST SERPL-CCNC: 27 UNIT/L (ref 11–45)
BASOPHILS # BLD AUTO: 0.02 K/UL
BASOPHILS NFR BLD AUTO: 0.4 %
BILIRUB SERPL-MCNC: 0.6 MG/DL (ref 0.1–1)
BUN SERPL-MCNC: 34 MG/DL (ref 8–23)
CALCIUM SERPL-MCNC: 9.2 MG/DL (ref 8.7–10.5)
CHLORIDE SERPL-SCNC: 104 MMOL/L (ref 95–110)
CO2 SERPL-SCNC: 29 MMOL/L (ref 23–29)
CREAT SERPL-MCNC: 1.5 MG/DL (ref 0.5–1.4)
ERYTHROCYTE [DISTWIDTH] IN BLOOD BY AUTOMATED COUNT: 12.8 % (ref 11.5–14.5)
GFR SERPLBLD CREATININE-BSD FMLA CKD-EPI: 34 ML/MIN/1.73/M2
GLUCOSE SERPL-MCNC: 92 MG/DL (ref 70–110)
HCT VFR BLD AUTO: 34 % (ref 37–48.5)
HGB BLD-MCNC: 10.7 GM/DL (ref 12–16)
IMM GRANULOCYTES # BLD AUTO: 0.01 K/UL (ref 0–0.04)
IMM GRANULOCYTES NFR BLD AUTO: 0.2 % (ref 0–0.5)
LYMPHOCYTES # BLD AUTO: 1.64 K/UL (ref 1–4.8)
MCH RBC QN AUTO: 30.4 PG (ref 27–31)
MCHC RBC AUTO-ENTMCNC: 31.5 G/DL (ref 32–36)
MCV RBC AUTO: 97 FL (ref 82–98)
NUCLEATED RBC (/100WBC) (OHS): 0 /100 WBC
PLATELET # BLD AUTO: 129 K/UL (ref 150–450)
PMV BLD AUTO: 12 FL (ref 9.2–12.9)
POTASSIUM SERPL-SCNC: 3.8 MMOL/L (ref 3.5–5.1)
PROT SERPL-MCNC: 6.9 GM/DL (ref 6–8.4)
RBC # BLD AUTO: 3.52 M/UL (ref 4–5.4)
RELATIVE EOSINOPHIL (OHS): 1.7 %
RELATIVE LYMPHOCYTE (OHS): 31.5 % (ref 18–48)
RELATIVE MONOCYTE (OHS): 8.1 % (ref 4–15)
RELATIVE NEUTROPHIL (OHS): 58.1 % (ref 38–73)
SODIUM SERPL-SCNC: 145 MMOL/L (ref 136–145)
WBC # BLD AUTO: 5.21 K/UL (ref 3.9–12.7)

## 2025-05-27 PROCEDURE — 99214 OFFICE O/P EST MOD 30 MIN: CPT | Mod: PBBFAC | Performed by: INTERNAL MEDICINE

## 2025-05-27 PROCEDURE — 85025 COMPLETE CBC W/AUTO DIFF WBC: CPT

## 2025-05-27 PROCEDURE — 80053 COMPREHEN METABOLIC PANEL: CPT

## 2025-05-27 PROCEDURE — 36415 COLL VENOUS BLD VENIPUNCTURE: CPT

## 2025-05-27 PROCEDURE — 99999 PR PBB SHADOW E&M-EST. PATIENT-LVL IV: CPT | Mod: PBBFAC,,, | Performed by: INTERNAL MEDICINE

## 2025-05-27 NOTE — PROGRESS NOTES
Subjective:       Patient ID: Shima Sorto is a 84 y.o. female.    Chief Complaint: Anemia due to stage 3 chronic kidney disease, unspecified w    HPI    Ms. Sorto returns today for follow up.  I had last seen her on April 15, 2025 and had held her weekly EPO due to her Hg being 12.2 gr/dl, up from 9.9 gr/dl prior to the initiation of EPO.  She was seen in March by our nurse practitioner and was restarted on EPO due to her hemoglobin being 9.9 grams/dL. She received two injections of darbopoietin    Her CBC today is as follows:  WBCs 5,200 per cubic mm, hemoglobin 10.7 grams/deciliter, hematocrit 34.0%, MCV 97, and platelets 129K.  Creatinine is 1.5 mg/dl, and eGFR is 34 ml/min.      In regards to her breast cancer, she had been started on anastrazole in mid November 2018, and completed 5 years at the end of October 2023.  In late August 2023 she underwent a bone marrow biopsy which was essentially unremarkable.  There was no evidence of MDS, leukemia, lymphoma, myeloma, or metastatic carcinoma.  Cytogenetics were normal and reticulin was not increased.  Cellularity was 35 %.    Other recent pertinent labs are as follows:  3 stool samples were negative for occult blood  Multiple urinalyses have shown persistent hematuria.  Of note, the patient self catheterizes 3 times a day.  B12 is 406 pg/mL  SPEP shows no monoclonal spike  [address] Lane is negative    Briefly, she is an 84-year-old  female who was diagnosed with breast cancer in 2018.  On 08/17/2018, she underwent a lumpectomy and sentinel lymph node biopsy for an 8 mm grade 1 invasive lobular carcinoma which was ER strongly positive, NH negative and HER-2 negative with a Ki-67 of 5%, with the closest margin being 2 mm.  Two of 2 sentinel lymph nodes were negative for involvement.      She completed her radiation therapy and was subsequently started on AIs.  She completed 5 years of adjuvant hormonal therapy and she is being followed  expectantly.    A mammogram on 7/9/2024 was read as BIRADS II, and a one year follow up was recommended.      In April 2023 she had been diagnosed with C diff and she was treated accordingly.  She underwent a colonoscopy and EGD by Dr. Garza.  The colonoscopy showed 3 polyps and extensive diverticulosis.  Biopsies were negative for malignancy.  The EGD showed gastritis and a hiatal hernia.  A video capsule swallow did not identify a source of bleeding in her small intestine.       Review of Systems      Overall she feels OK, and her ECOG PS is 1.  Her main complaint is persistent fatigue.  Of note, she is known to have sleep apnea and she is not being treated currently.  She denies any anxiety, depression, easy bruising, fevers, chills, night  sweats, weight loss, nausea, vomiting, diarrhea, constipation, diplopia, blurred vision, headache, chest pain, palpitations, shortness of breath, breast pain, abdominal pain, extremity pain, or difficulty ambulating.  The remainder of the ten-point ROS, including general, skin, lymph, H/N, cardiorespiratory, GI, , Neuro, Endocrine, and psychiatric is negative.     Objective:      Physical Exam        She is alert, oriented to time, place, person, pleasant, well      nourished, in no acute physical distress.                                    VITAL SIGNS:  Reviewed                                      HEENT:  Normal.  There are no nasal, oral, lip, gingival, auricular, lid,    or conjunctival lesions.  Mucosae are moist and pink, and there is no        thrush.  Pupils are equal, reactive to light and accommodation.              Extraocular muscle movements are intact.  Dentition is good.                                    NECK:  Supple without JVD, adenopathy, or thyromegaly.                       LUNGS:  Clear to auscultation without wheezing, rales, or rhonchi.           CARDIOVASCULAR:  Reveals an S1, S2, a grade 2 systolic ejection murmur of aortic stenosis, no rubs,  no gallops.         ABDOMEN:  Soft, nontender, without organomegaly.  Bowel sounds are    present.                                                                     EXTREMITIES:  No cyanosis, clubbing, or edema.                               BREASTS:   Deferred.                                LYMPHATIC:  There is no cervical, axillary, or supraclavicular adenopathy.   SKIN:  Warm and moist, without petechiae, rashes, induration, or ecchymoses.        NEUROLOGIC:  DTRs are 0-1+ bilaterally, symmetrical, motor function is 5/5,and cranial nerves are  within normal limits.    Assessment:       1. Malignant neoplasm of upper-outer quadrant of right breast in female, estrogen receptor positive    2. S/p 5 years of adjuvant hormonal therapy with aromatase inhibitors      3.    Osteopenia approaching osteoporosis    4.   Anemia, chronic, normochromic normocytic, most likely multifactorial.  Her bone marrow biopsy did not reveal any MDS. Anemia resolved with 2 EPo injections     5.  CDK 3b     6.  Fatigue most likely secondary to sleep apnea     7.  Documented sleep apnea  Plan:           I had a long discussion with her.    I explained to her that her hemoglobin has to be lower before she can restart darbepoetin.  She voiced understanding.  I will see her again in 4 weeks with repeat CBC and CMP.  In regards to the breast cancer, she had completed her 5 years of anastrazole at the end of October 2023.  Her mammogram will be repeated in July 2025.  Finally, in regards to her sleep apnea, I have asked her t motor the history does not    Her multiple questions were answered to her satisfaction.  I spent 30 minutes reviewing the available records, evaluating the patient, and coordinating her care.  Visit today included increased complexity associated with the anemia, CKD, and breast cancer         Route Chart for Scheduling  Med Onc Route Chart for Scheduling  Supportive Plan Information  OP EPOETIN MERCED WEEKLY Robert  MD Mary   Associated Diagnosis: Stage 3b chronic kidney disease   noted on 12/20/2023   Line of treatment: Supportive Care   Treatment goal: Supportive     Upcoming Treatment Dates - OP EPOETIN MERCED WEEKLY    No upcoming days in selected categories.    Therapy Plan Information  DENOSUMAB (PROLIA) Q6M for Age-related osteoporosis with current pathological fracture with routine healing, noted on 4/8/2014  DENOSUMAB (PROLIA) Q6M for Acute renal failure superimposed on stage 3b chronic kidney disease, noted on 9/12/2018  DENOSUMAB (PROLIA) Q6M for Vitamin D deficiency, noted on 6/12/2019  Medications  denosumab (PROLIA) injection 60 mg  60 mg, Subcutaneous, Every 26 weeks      No therapy plan of the specified type found.    No therapy plan of the specified type found.

## 2025-05-28 ENCOUNTER — TELEPHONE (OUTPATIENT)
Dept: HEMATOLOGY/ONCOLOGY | Facility: CLINIC | Age: 85
End: 2025-05-28
Payer: MEDICARE

## 2025-05-30 ENCOUNTER — OFFICE VISIT (OUTPATIENT)
Dept: ENDOCRINOLOGY | Facility: CLINIC | Age: 85
End: 2025-05-30
Payer: MEDICARE

## 2025-05-30 VITALS
BODY MASS INDEX: 21.79 KG/M2 | HEIGHT: 63 IN | DIASTOLIC BLOOD PRESSURE: 80 MMHG | SYSTOLIC BLOOD PRESSURE: 136 MMHG | HEART RATE: 58 BPM | WEIGHT: 123 LBS | OXYGEN SATURATION: 99 %

## 2025-05-30 DIAGNOSIS — N18.32 STAGE 3B CHRONIC KIDNEY DISEASE: ICD-10-CM

## 2025-05-30 DIAGNOSIS — Z74.09 IMPAIRED FUNCTIONAL MOBILITY, BALANCE, GAIT, AND ENDURANCE: ICD-10-CM

## 2025-05-30 DIAGNOSIS — E55.9 VITAMIN D DEFICIENCY: ICD-10-CM

## 2025-05-30 DIAGNOSIS — M81.0 OSTEOPOROSIS, UNSPECIFIED OSTEOPOROSIS TYPE, UNSPECIFIED PATHOLOGICAL FRACTURE PRESENCE: Primary | ICD-10-CM

## 2025-05-30 PROCEDURE — 99214 OFFICE O/P EST MOD 30 MIN: CPT | Mod: PBBFAC

## 2025-05-30 PROCEDURE — 99999 PR PBB SHADOW E&M-EST. PATIENT-LVL IV: CPT | Mod: PBBFAC,,,

## 2025-05-30 NOTE — ASSESSMENT & PLAN NOTE
- Risks include low weight,  race, menopause, +FH, Aromatase inhibitor therapy   - Reassuring they are not fracturing    - Treatment: Prolia (2019-current), next dose due 06/2025   - Recommend continuing Prolia consistently every 6 months   - Therapy plan signed   - Calcium and Vitamin D RDD provided.   - Weight-bearing exercise as tolerated   - Fall precautions made in the home.   - Alerted that if dental work needs to be done it should be done prior to initiating therapy. Dental health: up to date   - Repeat DEXA scan 01/2027

## 2025-05-30 NOTE — PATIENT INSTRUCTIONS
Osteoporosis    Recommend daily allowance of calcium is 4166-1207 mg daily, preferably from food. See below  Recommend daily allowance of vitamin D is 1884-6444 IU daily    Weight bearing exercise as tolerated  https://www.bonehealthandosteoporosis.org/preventing-fractures/exercise-to-stay-healthy/    Avoid falls and fractures  https://www.bonehealthandosteoporosis.org/patients/fracturesfall-prevention/    Avoid alcohol and tobacco      Estimated Calcium Content of Foods:  Produce  Serving Size Estimated Calcium*    Katherine greens, frozen 8 oz 360 mg   Broccoli lauren 8 oz 200 mg   Kale, frozen 8 oz 180 mg   Soy Beans, green, boiled 8 oz 175 mg   Bok Miles, cooked, boiled 8 oz 160 mg   Figs, dried 2 figs 65 mg   Broccoli, fresh, cooked 8 oz 60 mg   Oranges 1 whole 55 mg   Seafood Serving Size Estimated Calcium*    Sardines, canned with bones 3 oz 325 mg   Strong, canned with bones 3 oz 180 mg   Shrimp, canned 3 oz 125 mg   Dairy Serving Size Estimated Calcium*    Ricotta, part-skim 4 oz 335 mg   Yogurt, plain, low-fat 6 oz 310 mg   Milk, skim, low-fat, whole 8 oz 300 mg   Yogurt with fruit, low-fat 6 oz 260 mg   Mozzarella, part-skim 1 oz 210 mg   Cheddar 1 oz 205 mg   Yogurt, Greek 6 oz 200 mg   American Cheese 1 oz 195 mg   Feta Cheese 4 oz 140 mg   Cottage Cheese, 2% 4 oz 105 mg   Frozen yogurt, vanilla 8 oz 105 mg   Ice Cream, vanilla 8 oz 85 mg   Parmesan 1 tbsp 55 mg   Fortified Food Serving Size Estimated Calcium*   Truxton milk, rice milk or soy milk, fortified 8 oz 300 mg   Orange juice and other fruit juices, fortified 8 oz 300 mg   Tofu, prepared with calcium 4 oz 205 mg   Waffle, frozen, fortified 2 pieces 200 mg   Oatmeal, fortified 1 packet 140 mg   English muffin, fortified 1 muffin 100 mg   Cereal, fortified 8 oz 100-1,000 mg   Other Serving Size Estimated Calcium*   Mac & cheese, frozen 1 package 325 mg   Pizza, cheese, frozen 1 serving 115 mg   Pudding, chocolate, prepared with 2% milk 4 oz 160 mg    Beans, baked, canned 4 oz 160 mg   *The calcium content listed for most foods is estimated and can vary due to multiple factors. Check the food label to determine how much calcium is in a particular product.  If you read the nutrition label for a food source, it lists the % calcium in that food.  For an 8 oz glass of milk, for example, the label states calcium 30%.  This is equivalent to 300 mg of calcium (multiply the listed number by 10).   **Table from the National Osteoporosis Foundation

## 2025-06-04 ENCOUNTER — OFFICE VISIT (OUTPATIENT)
Dept: SLEEP MEDICINE | Facility: CLINIC | Age: 85
End: 2025-06-04
Payer: MEDICARE

## 2025-06-04 ENCOUNTER — PATIENT MESSAGE (OUTPATIENT)
Dept: SLEEP MEDICINE | Facility: CLINIC | Age: 85
End: 2025-06-04

## 2025-06-04 VITALS
DIASTOLIC BLOOD PRESSURE: 73 MMHG | HEIGHT: 63 IN | BODY MASS INDEX: 21.48 KG/M2 | WEIGHT: 121.25 LBS | SYSTOLIC BLOOD PRESSURE: 173 MMHG | HEART RATE: 61 BPM

## 2025-06-04 DIAGNOSIS — I10 PRIMARY HYPERTENSION: Chronic | ICD-10-CM

## 2025-06-04 DIAGNOSIS — G47.33 OSA (OBSTRUCTIVE SLEEP APNEA): ICD-10-CM

## 2025-06-04 DIAGNOSIS — R06.83 SNORING: ICD-10-CM

## 2025-06-04 DIAGNOSIS — G47.30 SLEEP-DISORDERED BREATHING: ICD-10-CM

## 2025-06-04 DIAGNOSIS — G47.00 INSOMNIA, UNSPECIFIED TYPE: Primary | ICD-10-CM

## 2025-06-04 PROCEDURE — 99999 PR PBB SHADOW E&M-EST. PATIENT-LVL IV: CPT | Mod: PBBFAC,,, | Performed by: NURSE PRACTITIONER

## 2025-06-04 PROCEDURE — 99214 OFFICE O/P EST MOD 30 MIN: CPT | Mod: PBBFAC | Performed by: NURSE PRACTITIONER

## 2025-06-05 ENCOUNTER — TELEPHONE (OUTPATIENT)
Dept: SLEEP MEDICINE | Facility: OTHER | Age: 85
End: 2025-06-05
Payer: MEDICARE

## 2025-06-09 ENCOUNTER — OFFICE VISIT (OUTPATIENT)
Dept: OTOLARYNGOLOGY | Facility: CLINIC | Age: 85
End: 2025-06-09
Payer: MEDICARE

## 2025-06-09 DIAGNOSIS — K11.8 SUBMANDIBULAR GLAND MASS: ICD-10-CM

## 2025-06-09 DIAGNOSIS — R22.1 NECK MASS: Primary | ICD-10-CM

## 2025-06-09 PROCEDURE — 99213 OFFICE O/P EST LOW 20 MIN: CPT | Mod: PBBFAC | Performed by: OTOLARYNGOLOGY

## 2025-06-09 PROCEDURE — 99999 PR PBB SHADOW E&M-EST. PATIENT-LVL III: CPT | Mod: PBBFAC,,, | Performed by: OTOLARYNGOLOGY

## 2025-06-09 PROCEDURE — 99214 OFFICE O/P EST MOD 30 MIN: CPT | Mod: S$PBB,,, | Performed by: OTOLARYNGOLOGY

## 2025-06-09 RX ORDER — SODIUM CHLORIDE 0.9 % (FLUSH) 0.9 %
10 SYRINGE (ML) INJECTION
OUTPATIENT
Start: 2025-06-09

## 2025-06-09 RX ORDER — LIDOCAINE HYDROCHLORIDE 10 MG/ML
1 INJECTION, SOLUTION EPIDURAL; INFILTRATION; INTRACAUDAL; PERINEURAL ONCE
OUTPATIENT
Start: 2025-06-09 | End: 2025-06-09

## 2025-06-09 NOTE — ASSESSMENT & PLAN NOTE
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  Recommended left submandibular gland resection pending medical optimization.  Surgery is tentatively scheduled on 07/02/2025.

## 2025-06-09 NOTE — PROGRESS NOTES
Chief complaint:  Follow up      HPI   84 y.o. female presents for evaluation of a left-sided submandibular mass.  She reports that she noted this within the last several months.  No pain.  No change in size.  No dysphagia.  Recent neck CT revealed enlargement of the left submandibular gland without obvious masses.  She is concerned about the possibility of a right submandibular gland mass.  Recent FNA of left submandibular gland revealed squamous cells and fibrous tissue.  No new complaints today.    Review of Systems   Constitutional: Negative for fatigue and unexpected weight change.   HENT: Per HPI.  Eyes: Negative for visual disturbance.   Respiratory: Negative for shortness of breath, hemoptysis   Cardiovascular: Negative for chest pain and palpitations.   Musculoskeletal: Negative for decreased ROM, back pain.   Skin: Negative for rash, sunburn, itching.   Neurological: Negative for dizziness and seizures.   Hematological: Negative for adenopathy. Does not bruise/bleed easily.   Endocrine: Negative for rapid weight loss/weight gain, heat/cold intolerance.     Past Medical History   Problem List[1]        Past Surgical History   Past Surgical History:   Procedure Laterality Date    BREAST CYST ASPIRATION Right 1999    BREAST LUMPECTOMY Right 2018    with radiation    COLONOSCOPY N/A 7/24/2018    Procedure: COLONOSCOPY;  Surgeon: Juan Miguel Pena MD;  Location: [address]);  Service: Endoscopy;  Laterality: N/A;    COLONOSCOPY N/A 5/10/2023    Procedure: COLONOSCOPY;  Surgeon: Keven Garza MD;  Location: [address]);  Service: Endoscopy;  Laterality: N/A;  *Pending C-Diff*  Request Greg  procedure order telephone encounter 5/1  PEG prep, instructions portal -LW  5/4/23 no answer for precall/mleone lpn  5/9lm    ESOPHAGOGASTRODUODENOSCOPY N/A 3/17/2023    Procedure: EGD (ESOPHAGOGASTRODUODENOSCOPY);  Surgeon: Keven Garza MD;  Location: [facility] ([address] [place] [address]);  Service: Endoscopy;   Laterality: N/A;  Medically Urgent  3/2 instructions to portal-st    pre call attempted, no answer from pt 3/13/23 -egh    INJECTION FOR SENTINEL NODE IDENTIFICATION Right 8/17/2018    Procedure: INJECTION, FOR SENTINEL NODE IDENTIFICATION;  Surgeon: Abby Mason MD;  Location: [facility] OR [address] [place] [address];  Service: General;  Laterality: Right;    interstim placed stage 1      and removed    KIDNEY STONE SURGERY  2000    @ [facility]    MASTECTOMY, PARTIAL Right 8/17/2018    Procedure: MASTECTOMY, PARTIAL-US guided;  Surgeon: Abby Mason MD;  Location: [facility] OR [address] [place] [address];  Service: General;  Laterality: Right;    MOHS procedure      SENTINEL LYMPH NODE BIOPSY Right 8/17/2018    Procedure: BIOPSY, LYMPH NODE, SENTINEL;  Surgeon: Abby Mason MD;  Location: [facility] OR [address] [place] [address];  Service: General;  Laterality: Right;         Family History   Family History   Problem Relation Name Age of Onset    Kidney disease Mother      Heart disease Father      Melanoma Father      Heart attack Father      Breast cancer Maternal Aunt      Hearing loss Son      Hyperlipidemia Son      Anesthesia problems Neg Hx      Malignant hypertension Neg Hx      Hypotension Neg Hx      Malignant hyperthermia Neg Hx      Pseudochol deficiency Neg Hx      Colon cancer Neg Hx      Ovarian cancer Neg Hx      Psoriasis Neg Hx      Lupus Neg Hx      Eczema Neg Hx      Acne Neg Hx      Esophageal cancer Neg Hx             Social History   .Social History[2]      Allergies   Review of patient's allergies indicates:   Allergen Reactions    Asparagus Rash    Macrobid [nitrofurantoin monohyd/m-cryst] Rash     Peeling skin and petechia           Physical Exam     There were no vitals filed for this visit.        There is no height or weight on file to calculate BMI.      General: AOx3, NAD   Respiratory:  Symmetric chest rise, normal effort  Nose: No gross nasal septal deviation. Inferior Turbinates WNL bilaterally. No septal perforation. No masses/lesions.   Oral Cavity:   Oral Tongue mobile, no lesions noted. Hard Palate WNL. No buccal or FOM lesions.  Oropharynx:  No masses/lesions of the posterior pharyngeal wall. Tonsillar fossa without lesions. Soft palate without masses. Midline uvula.   Neck: No scars.  No cervical lymphadenopathy, thyromegaly or thyroid nodules.  Normal range of motion.  Roughly 1.5 cm firm mass of L SMG.  Face: House Brackmann I bilaterally.     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  Recommended left submandibular gland resection pending medical optimization.  Surgery is tentatively scheduled on 07/02/2025.         Relevant Orders    Case Request Operating Room: EXCISION, SUBMANDIBULAR GLAND (Completed)     Other Visit Diagnoses         Submandibular gland mass                                   [1]   Patient Active Problem List  Diagnosis    Hypertension    Hyperoxaluria    Nephrolithiasis    Cystic kidney disease    Urinary retention    Age-related osteoporosis with current pathological fracture with routine healing    Hypoglycemia    Trigger middle finger of right hand    Retinal hemorrhage of both eyes at about 1'oclock    Vestibular neuronitis    Obstructive sleep apnea    Venous insufficiency    Bradycardia    Malignant neoplasm of upper-outer quadrant of right breast in female, estrogen receptor positive    At risk for lymphedema    Acute renal failure superimposed on stage 3b chronic kidney disease    Vitamin D deficiency    Anemia in chronic kidney disease (CKD)    Aorto-iliac atherosclerosis    ACC/AHA stage C heart failure with preserved ejection fraction    Posture abnormality    Nausea    Hyponatremia    Acute cystitis    Clostridium difficile infection    Stage 3b chronic kidney disease    Poor balance    Impaired functional mobility, balance, gait, and endurance    Chronic pulmonary heart disease    Physical  deconditioning    Weakness    Neck mass    Atrophic vaginitis    Increased frequency of urination    Osteoporosis   [2]   Social History  Socioeconomic History    Marital status:    Occupational History    Occupation:      Employer: [employer]   Tobacco Use    Smoking status: Former     Current packs/day: 0.00     Average packs/day: 0.5 packs/day for 8.0 years (4.0 ttl pk-yrs)     Types: Cigarettes     Start date: 1956     Quit date: 1964     Years since quittin.8     Passive exposure: Past    Smokeless tobacco: Never   Vaping Use    Vaping status: Never Used   Substance and Sexual Activity    Alcohol use: Not Currently    Drug use: No    Sexual activity: Not Currently     Social Drivers of Health     Financial Resource Strain: Low Risk  (2025)    Overall Financial Resource Strain (CARDIA)     Difficulty of Paying Living Expenses: Not hard at all   Food Insecurity: No Food Insecurity (2025)    Hunger Vital Sign     Worried About Running Out of Food in the Last Year: Never true     Ran Out of Food in the Last Year: Never true   Transportation Needs: No Transportation Needs (2025)    PRAPARE - Transportation     Lack of Transportation (Medical): No     Lack of Transportation (Non-Medical): No   Physical Activity: Insufficiently Active (2025)    Exercise Vital Sign     Days of Exercise per Week: 3 days     Minutes of Exercise per Session: 30 min   Stress: No Stress Concern Present (2025)    [employer] [place] [employer] - Occupational Stress Questionnaire     Feeling of Stress : Not at all   Housing Stability: Low Risk  (2025)    Housing Stability Vital Sign     Unable to Pay for Housing in the Last Year: No     Number of Times Moved in the Last Year: 0     Homeless in the Last Year: No

## 2025-06-11 ENCOUNTER — TELEPHONE (OUTPATIENT)
Dept: HEMATOLOGY/ONCOLOGY | Facility: CLINIC | Age: 85
End: 2025-06-11
Payer: MEDICARE

## 2025-06-11 NOTE — TELEPHONE ENCOUNTER
----- Message from Shyanne sent at 6/11/2025 12:03 PM CDT -----  Type:  Patient Returning CallWho Called: PtEliecero Left Message for Patient: Does the patient know what this is regarding?: Would the patient rather a call back or a response via [facility]?  Call Dung Call Back Number: [phone] Information:

## 2025-06-11 NOTE — TELEPHONE ENCOUNTER
Copied from CRM #[id number]. Topic: General Inquiry - Return Call  >> Jun 11, 2025 12:41 PM Sandro wrote:  Type:  Patient Returning Call    Who Called:  Patient  Who Left Message for Patient:  Tiffanie  Does the patient know what this is regarding?:  Call  Best Call Back Number:  [phone]   Additional Information:

## 2025-06-20 DIAGNOSIS — Z01.818 PRE-OP TESTING: Primary | ICD-10-CM

## 2025-06-20 NOTE — ANESTHESIA PAT ROS NOTE
06/20/2025  Shima Sorto is a 84 y.o., female.      Pre-op Assessment          Review of Systems  Anesthesia Hx:   History of prior surgery of interest to airway management or planning:  Previous anesthesia: General COLONOSCOPY (Abdomen) 5/10/23 with general anesthesia.  Procedure performed at an [facility].       Denies Family Hx of Anesthesia complications.   Personal Hx of Anesthesia complications          Slow To Awaken/Delayed Emergence and moderate, did not delay extubation, but prolonged PACU stay          Social:  Former Smoker, No Alcohol Use       Hematology/Oncology:       -- Anemia:                    --  Cancer in past history:              surgery and radiation       EENT/Dental:   NECK MASS.         Jaw Problems:  Clicking (TMJ)   Cardiovascular:     Hypertension       Denies Angina.         HEART FAILURE W/ PRESERVED EF   Functional Capacity good / => 4 METS      Mitral Valve Prolapse                        Pulmonary:       Denies Shortness of breath.  Denies Recent URI. Sleep Apnea                Renal/:  Chronic Renal Disease, CKD renal calculi  NEPHROLITHIASIS. MEDULLARY SPONGE KIDNEY.URINARY RETENTION.             Hepatic/GI:  Hepatic/GI Normal                    Musculoskeletal:  Musculoskeletal Normal         Bone Disorders:    Osteoporosis        Neurological:  Neurology Normal                                      Endocrine:  Endocrine Normal            Psych:  Psychiatric Normal                         Anesthesia Assessment: Preoperative EQUATION    Planned Procedure: Procedure(s) (LRB):  EXCISION, SUBMANDIBULAR GLAND (Left)  Requested Anesthesia Type:General  Surgeon: jR Roberto MD  Service: ENT  Known or anticipated Date of Surgery:7/2/2025    Surgeon notes: reviewed    Electronic QUestionnaire Assessment completed via nurse interview with patient.         Triage considerations:     The patient has no apparent active cardiac condition (No unstable coronary Syndrome such as severe unstable angina or recent [<1 month] myocardial infarction, decompensated CHF, severe valvular   disease or significant arrhythmia)    Previous anesthesia records:LMA General and No problems    Airway:  Mallampati: II   Mouth Opening: Normal  TM Distance: Normal  Tongue: Normal  Neck ROM: Normal ROM    Airway Present Prior to Hospital Arrival?: No Placement Date: 08/17/18 Placement Time: 1053 Method of Intubation: Other (Comment) , LMA Inserted by: CRNA Airway Device: LMA Mask Ventilation: Easy Intubated: Postinduction Airway Device Size: 3.0 Style: Cuffed Inflation Amount (mL): 7 Placement Verified By: Auscultation;Capnometry;ETT Condensation Complicating Factors: None Findings Post-Intubation: Positive EtCO2;Bilateral breath sounds;Atraumatic/Condition of teeth unchanged Secured at: Lips Complications: None Breath Sounds: Equal Bilateral Insertion attempts (enter comment if more than 2 attempts): 1       Last PCP note: 6-12 months ago , within [facility]   Subspecialty notes: ENT, Hematology/Oncology, Infectious Disease, Nephrology, Urology, SLEEP MEDICINE    Other important co-morbidities: HTN, KATHERIN, and TMJ, CKD, HEART FAILURE WITH PRESERVED EF      Tests already available:  Available tests,  within 3 months , > 1 year ago , within [facility] .     5/27/25  CBC, CMP    10/19/23  US ARTERIAL LEGS BILATERAL    7/7/23  US LOWER EXTREMITIES , ECHO EF-60%            Instructions given. (See in Nurse's note)    Optimization:  Anesthesia Preop Clinic Assessment  NOT Indicated           Sub-specialist consult indicated:   CARDIOLOGY       Plan:    Testing:  EKG        Consultation:CARDIOLOGY      Patient  has previously scheduled Medical Appointment:6/23, 6/24, 6/25    Navigation: Tests Scheduled.              Consults scheduled.             Results will be tracked by Preop Clinic.    PATIENT  REQUESTS CARDIOLOGY REFERRAL PRIOR TO SURGERY.    6/23: REACHED OUT TO ENDOCRINOLOGY ON-CALL MD. PATIENT OKAY TO RECEIVED PROLIA INJECTION PER ENDOCRINOLOGY & SURGEON.    CARDIOLOGY OPTIMIZATION 6/24/25:  Pt considered moderate risk for alessio-operative events given h/o HFpEF. This has not been a clinical issue and she has no symptoms off most CV meds including diuretic tx. LVEF preserved in '24 w abnormal relaxation. No CV contraindication to proceeding at this time. Watch for iatrogenic HF.   Mariaelena Wong MD    [facility]

## 2025-06-22 ENCOUNTER — PATIENT MESSAGE (OUTPATIENT)
Dept: UROLOGY | Facility: CLINIC | Age: 85
End: 2025-06-22
Payer: MEDICARE

## 2025-06-23 ENCOUNTER — INFUSION (OUTPATIENT)
Dept: INFECTIOUS DISEASES | Facility: HOSPITAL | Age: 85
End: 2025-06-23
Payer: MEDICARE

## 2025-06-23 ENCOUNTER — HOSPITAL ENCOUNTER (OUTPATIENT)
Dept: CARDIOLOGY | Facility: CLINIC | Age: 85
Discharge: HOME | End: 2025-06-23
Payer: MEDICARE

## 2025-06-23 VITALS
DIASTOLIC BLOOD PRESSURE: 63 MMHG | SYSTOLIC BLOOD PRESSURE: 136 MMHG | BODY MASS INDEX: 21.87 KG/M2 | RESPIRATION RATE: 18 BRPM | WEIGHT: 123.44 LBS | HEART RATE: 51 BPM | TEMPERATURE: 98 F | HEIGHT: 63 IN | OXYGEN SATURATION: 98 %

## 2025-06-23 DIAGNOSIS — Z01.818 PRE-OP TESTING: ICD-10-CM

## 2025-06-23 DIAGNOSIS — N18.32 ACUTE RENAL FAILURE SUPERIMPOSED ON STAGE 3B CHRONIC KIDNEY DISEASE, UNSPECIFIED ACUTE RENAL FAILURE TYPE: ICD-10-CM

## 2025-06-23 DIAGNOSIS — M80.00XD AGE-RELATED OSTEOPOROSIS WITH CURRENT PATHOLOGICAL FRACTURE WITH ROUTINE HEALING: Primary | ICD-10-CM

## 2025-06-23 DIAGNOSIS — N17.9 ACUTE RENAL FAILURE SUPERIMPOSED ON STAGE 3B CHRONIC KIDNEY DISEASE, UNSPECIFIED ACUTE RENAL FAILURE TYPE: ICD-10-CM

## 2025-06-23 DIAGNOSIS — E55.9 VITAMIN D DEFICIENCY: ICD-10-CM

## 2025-06-23 LAB
OHS QRS DURATION: 82 MS
OHS QTC CALCULATION: 390 MS

## 2025-06-23 PROCEDURE — 63600175 PHARM REV CODE 636 W HCPCS: Mod: JZ,TB

## 2025-06-23 PROCEDURE — 93010 ELECTROCARDIOGRAM REPORT: CPT | Mod: S$PBB,,, | Performed by: INTERNAL MEDICINE

## 2025-06-23 PROCEDURE — 93005 ELECTROCARDIOGRAM TRACING: CPT | Mod: PBBFAC | Performed by: INTERNAL MEDICINE

## 2025-06-23 PROCEDURE — 96372 THER/PROPH/DIAG INJ SC/IM: CPT

## 2025-06-23 RX ADMIN — DENOSUMAB 60 MG: 60 INJECTION SUBCUTANEOUS at 01:06

## 2025-06-23 NOTE — PROGRESS NOTES
Patient arrives for Prolia injection - confirms use of calcium and vitamin D supplements and denies dental procedures over past 3 months - administered per guidelines    Pt scheduled for Submandibular mass removal on 7/2. Spoke with Preop RN Cece, confirmed with performing surgeon it is ok to receive Prolia.     Limited head-to-toe assessment due to privacy issues and visit reason though the opportunity was given for patient to express any concerns      Next appt scheduled. Pt made aware.

## 2025-06-24 ENCOUNTER — RESULTS FOLLOW-UP (OUTPATIENT)
Dept: ENDOCRINOLOGY | Facility: CLINIC | Age: 85
End: 2025-06-24

## 2025-06-24 ENCOUNTER — E-CONSULT (OUTPATIENT)
Dept: CARDIOLOGY | Facility: CLINIC | Age: 85
End: 2025-06-24
Payer: MEDICARE

## 2025-06-24 ENCOUNTER — LAB VISIT (OUTPATIENT)
Dept: LAB | Facility: HOSPITAL | Age: 85
End: 2025-06-24
Attending: INTERNAL MEDICINE
Payer: MEDICARE

## 2025-06-24 ENCOUNTER — OFFICE VISIT (OUTPATIENT)
Dept: HEMATOLOGY/ONCOLOGY | Facility: CLINIC | Age: 85
End: 2025-06-24
Payer: MEDICARE

## 2025-06-24 VITALS
WEIGHT: 122.81 LBS | HEART RATE: 55 BPM | TEMPERATURE: 98 F | HEIGHT: 63 IN | SYSTOLIC BLOOD PRESSURE: 117 MMHG | RESPIRATION RATE: 16 BRPM | OXYGEN SATURATION: 98 % | BODY MASS INDEX: 21.76 KG/M2 | DIASTOLIC BLOOD PRESSURE: 74 MMHG

## 2025-06-24 DIAGNOSIS — D63.1 ANEMIA DUE TO STAGE 3 CHRONIC KIDNEY DISEASE, UNSPECIFIED WHETHER STAGE 3A OR 3B CKD: Primary | ICD-10-CM

## 2025-06-24 DIAGNOSIS — N18.30 ANEMIA DUE TO STAGE 3 CHRONIC KIDNEY DISEASE, UNSPECIFIED WHETHER STAGE 3A OR 3B CKD: Primary | ICD-10-CM

## 2025-06-24 DIAGNOSIS — M81.0 OSTEOPOROSIS, UNSPECIFIED OSTEOPOROSIS TYPE, UNSPECIFIED PATHOLOGICAL FRACTURE PRESENCE: ICD-10-CM

## 2025-06-24 DIAGNOSIS — Z17.0 MALIGNANT NEOPLASM OF UPPER-OUTER QUADRANT OF RIGHT BREAST IN FEMALE, ESTROGEN RECEPTOR POSITIVE: ICD-10-CM

## 2025-06-24 DIAGNOSIS — D63.1 ANEMIA IN STAGE 3B CHRONIC KIDNEY DISEASE: ICD-10-CM

## 2025-06-24 DIAGNOSIS — I50.30 ACC/AHA STAGE C HEART FAILURE WITH PRESERVED EJECTION FRACTION: Primary | Chronic | ICD-10-CM

## 2025-06-24 DIAGNOSIS — C50.411 MALIGNANT NEOPLASM OF UPPER-OUTER QUADRANT OF RIGHT BREAST IN FEMALE, ESTROGEN RECEPTOR POSITIVE: ICD-10-CM

## 2025-06-24 DIAGNOSIS — N18.32 ANEMIA IN STAGE 3B CHRONIC KIDNEY DISEASE: ICD-10-CM

## 2025-06-24 LAB
25(OH)D3+25(OH)D2 SERPL-MCNC: 75 NG/ML (ref 30–96)
ABSOLUTE EOSINOPHIL (OHS): 0.04 K/UL
ABSOLUTE MONOCYTE (OHS): 0.47 K/UL (ref 0.3–1)
ABSOLUTE NEUTROPHIL COUNT (OHS): 2.07 K/UL (ref 1.8–7.7)
ALBUMIN SERPL BCP-MCNC: 3.4 G/DL (ref 3.5–5.2)
ALP SERPL-CCNC: 54 UNIT/L (ref 40–150)
ALT SERPL W/O P-5'-P-CCNC: 17 UNIT/L (ref 10–44)
ANION GAP (OHS): 7 MMOL/L (ref 8–16)
AST SERPL-CCNC: 27 UNIT/L (ref 11–45)
BASOPHILS # BLD AUTO: 0.03 K/UL
BASOPHILS NFR BLD AUTO: 0.7 %
BILIRUB SERPL-MCNC: 0.6 MG/DL (ref 0.1–1)
BUN SERPL-MCNC: 26 MG/DL (ref 8–23)
CALCIUM SERPL-MCNC: 9.6 MG/DL (ref 8.7–10.5)
CHLORIDE SERPL-SCNC: 105 MMOL/L (ref 95–110)
CO2 SERPL-SCNC: 30 MMOL/L (ref 23–29)
CREAT SERPL-MCNC: 1.6 MG/DL (ref 0.5–1.4)
ERYTHROCYTE [DISTWIDTH] IN BLOOD BY AUTOMATED COUNT: 13.6 % (ref 11.5–14.5)
GFR SERPLBLD CREATININE-BSD FMLA CKD-EPI: 32 ML/MIN/1.73/M2
GLUCOSE SERPL-MCNC: 97 MG/DL (ref 70–110)
HCT VFR BLD AUTO: 33 % (ref 37–48.5)
HGB BLD-MCNC: 10.4 GM/DL (ref 12–16)
IMM GRANULOCYTES # BLD AUTO: 0.01 K/UL (ref 0–0.04)
IMM GRANULOCYTES NFR BLD AUTO: 0.2 % (ref 0–0.5)
LYMPHOCYTES # BLD AUTO: 1.41 K/UL (ref 1–4.8)
MCH RBC QN AUTO: 30.4 PG (ref 27–31)
MCHC RBC AUTO-ENTMCNC: 31.5 G/DL (ref 32–36)
MCV RBC AUTO: 97 FL (ref 82–98)
NUCLEATED RBC (/100WBC) (OHS): 0 /100 WBC
PLATELET # BLD AUTO: 132 K/UL (ref 150–450)
PMV BLD AUTO: 12 FL (ref 9.2–12.9)
POTASSIUM SERPL-SCNC: 4.7 MMOL/L (ref 3.5–5.1)
PROT SERPL-MCNC: 6.5 GM/DL (ref 6–8.4)
RBC # BLD AUTO: 3.42 M/UL (ref 4–5.4)
RELATIVE EOSINOPHIL (OHS): 1 %
RELATIVE LYMPHOCYTE (OHS): 35 % (ref 18–48)
RELATIVE MONOCYTE (OHS): 11.7 % (ref 4–15)
RELATIVE NEUTROPHIL (OHS): 51.4 % (ref 38–73)
SODIUM SERPL-SCNC: 142 MMOL/L (ref 136–145)
WBC # BLD AUTO: 4.03 K/UL (ref 3.9–12.7)

## 2025-06-24 PROCEDURE — 99999 PR PBB SHADOW E&M-EST. PATIENT-LVL IV: CPT | Mod: PBBFAC,,, | Performed by: INTERNAL MEDICINE

## 2025-06-24 PROCEDURE — 99215 OFFICE O/P EST HI 40 MIN: CPT | Mod: S$PBB,,, | Performed by: INTERNAL MEDICINE

## 2025-06-24 PROCEDURE — 36415 COLL VENOUS BLD VENIPUNCTURE: CPT

## 2025-06-24 PROCEDURE — 85025 COMPLETE CBC W/AUTO DIFF WBC: CPT

## 2025-06-24 PROCEDURE — 82040 ASSAY OF SERUM ALBUMIN: CPT

## 2025-06-24 PROCEDURE — 99214 OFFICE O/P EST MOD 30 MIN: CPT | Mod: PBBFAC | Performed by: INTERNAL MEDICINE

## 2025-06-24 PROCEDURE — G2211 COMPLEX E/M VISIT ADD ON: HCPCS | Mod: ,,, | Performed by: INTERNAL MEDICINE

## 2025-06-24 PROCEDURE — 82306 VITAMIN D 25 HYDROXY: CPT

## 2025-06-24 NOTE — PROGRESS NOTES
Subjective:       Patient ID: Shima Sorto is a 84 y.o. female.    Chief Complaint: Malignant neoplasm of upper-outer quadrant of right breast     HPI    Ms. Sorto returns today for follow up.  I had last seen her on May 27 2025 and had continued to hold her weekly EPO due to her Hg being 10.7 gr/dl, up from 9.9 gr/dl prior to the initiation of EPO.  She was seen in March by our nurse practitioner and was restarted on EPO due to her hemoglobin being 9.9 grams/dL. She received two injections of darbopoietin    Her CBC today is as follows:  WBCs 4,000 per cubic mm, hemoglobin 10.4 grams/deciliter, hematocrit 33%, MCV 97, and platelets 132K.  Creatinine is 1.6 mg/dl, and eGFR is 32 ml/min.      In regards to her breast cancer, she had been started on anastrazole in mid November 2018, and completed 5 years at the end of October 2023.  In late August 2023 she underwent a bone marrow biopsy which was essentially unremarkable.  There was no evidence of MDS, leukemia, lymphoma, myeloma, or metastatic carcinoma.  Cytogenetics were normal and reticulin was not increased.  Cellularity was 35 %.    Other recent pertinent labs are as follows:  3 stool samples were negative for occult blood  Multiple urinalyses have shown persistent hematuria.  Of note, the patient self catheterizes 3 times a day.  B12 is 406 pg/mL  SPEP shows no monoclonal spike  Direct Lane is negative    Briefly, she is an 84-year-old  female who was diagnosed with breast cancer in 2018.  On 08/17/2018, she underwent a lumpectomy and sentinel lymph node biopsy for an 8 mm grade 1 invasive lobular carcinoma which was ER strongly positive, AL negative and HER-2 negative with a Ki-67 of 5%, with the closest margin being 2 mm.  Two of 2 sentinel lymph nodes were negative for involvement.      She completed her radiation therapy and was subsequently started on AIs.  She completed 5 years of adjuvant hormonal therapy and she is being followed  expectantly.    A mammogram on 7/9/2024 was read as BIRADS II, and a one year follow up was recommended.      In April 2023 she had been diagnosed with C diff and she was treated accordingly.  She underwent a colonoscopy and EGD by Dr. Garza.  The colonoscopy showed 3 polyps and extensive diverticulosis.  Biopsies were negative for malignancy.  The EGD showed gastritis and a hiatal hernia.  A video capsule swallow did not identify a source of bleeding in her small intestine.       Review of Systems      Overall she feels OK, and her ECOG PS is 1.  Her main complaint is persistent fatigue.  Of note, she is known to have sleep apnea and she is not being treated currently.  She denies any anxiety, depression, easy bruising, fevers, chills, night  sweats, weight loss, nausea, vomiting, diarrhea, constipation, diplopia, blurred vision, headache, chest pain, palpitations, shortness of breath, breast pain, abdominal pain, extremity pain, or difficulty ambulating.  The remainder of the ten-point ROS, including general, skin, lymph, H/N, cardiorespiratory, GI, , Neuro, Endocrine, and psychiatric is negative.     Objective:      Physical Exam        She is alert, oriented to time, place, person, pleasant, well      nourished, in no acute physical distress.                                    VITAL SIGNS:  Reviewed                                      HEENT:  Normal.  There are no nasal, oral, lip, gingival, auricular, lid,    or conjunctival lesions.  Mucosae are moist and pink, and there is no        thrush.  Pupils are equal, reactive to light and accommodation.              Extraocular muscle movements are intact.  Dentition is good.                                    NECK:  Supple without JVD, adenopathy, or thyromegaly.                       LUNGS:  Clear to auscultation without wheezing, rales, or rhonchi.           CARDIOVASCULAR:  Reveals an S1, S2, a grade 2 systolic ejection murmur of aortic stenosis, no rubs,  no gallops.         ABDOMEN:  Soft, nontender, without organomegaly.  Bowel sounds are    present.                                                                     EXTREMITIES:  No cyanosis, clubbing, or edema.                               BREASTS:   .                                LYMPHATIC:  There is no cervical, axillary, or supraclavicular adenopathy.   SKIN:  Warm and moist, without petechiae, rashes, induration, or ecchymoses.        NEUROLOGIC:  DTRs are 0-1+ bilaterally, symmetrical, motor function is 5/5,and cranial nerves are  within normal limits.    Assessment:       1. Malignant neoplasm of upper-outer quadrant of right breast in female, estrogen receptor positive    2. S/p 5 years of adjuvant hormonal therapy with aromatase inhibitors      3.    Osteopenia approaching osteoporosis    4.   Anemia, chronic, normochromic normocytic, most likely multifactorial.  Her bone marrow biopsy did not reveal any MDS. Anemia resolved with 2 EPo injections     5.  CDK 3b     6.  Fatigue most likely secondary to sleep apnea     7.  Documented sleep apnea  Plan:           I had a long discussion with her.    At this point we will proceed as follows:  Given her symptomatic anemia, we will start erythropoietin 40,000 units weekly.  The first injection would be administered next week   I will see her again in 4 weeks with repeat CBC and CMP.  In regards to the breast cancer, she had completed her 5 years of anastrazole at the end of October 2023.  Her mammogram will be repeated in July 2025, prior to her next visit.      Her multiple questions were answered to her satisfaction.  I spent 30 minutes reviewing the available records, evaluating the patient, and coordinating her care.  Visit today included increased complexity associated with the anemia, CKD, and breast cancer         Route Chart for Scheduling    Med Onc Chart Routing      Follow up with physician . Please obtain authorization to restart erythropoietin.   First injection on the 1st of July.  See me in 4 weeks with repeat CBC and CMP   Follow up with BASHIR    Infusion scheduling note    Injection scheduling note    Labs    Imaging    Pharmacy appointment    Other referrals            Supportive Plan Information  OP EPOETIN MERCED WEEKLY Robert Hernandez MD   Associated Diagnosis: Stage 3b chronic kidney disease   noted on 12/20/2023   Line of treatment: Supportive Care   Treatment goal: Supportive     Upcoming Treatment Dates - OP EPOETIN MERCED WEEKLY    No upcoming days in selected categories.    Therapy Plan Information  DENOSUMAB (PROLIA) Q6M for Age-related osteoporosis with current pathological fracture with routine healing, noted on 4/8/2014  DENOSUMAB (PROLIA) Q6M for Acute renal failure superimposed on stage 3b chronic kidney disease, noted on 9/12/2018  DENOSUMAB (PROLIA) Q6M for Vitamin D deficiency, noted on 6/12/2019  Medications  denosumab (PROLIA) injection 60 mg  60 mg, Subcutaneous, Every 26 weeks      No therapy plan of the specified type found.    No therapy plan of the specified type found.

## 2025-06-24 NOTE — CONSULTS
[facility] ([facility])  Response for E-Consult     Patient Name: Shima Sorto  MRN: [id number]  Primary Care Provider: Carmen Milton MD   Requesting Provider: Wendi Coffey MD  Consults    Recommendation:     83-year-old female with a history of HFpEF, hypertension, CKD, venous insufficiency s/p LLE GSV EVLT '17, breast cancer, chronic diarrhea with anticipated head and neck surgery.    Pt last seen by me in '24. Called and spoke to pt over the phone. No CP, SOB, or MONTERO. No orthopnea. Lower extremity swelling has resolved. Bps controlled. ECG today w no Q waves or ST changes. Hb nml. Cr 1.6/BUN 26 today.     Pt considered moderate risk for alessio-operative events given h/o HFpEF. This has not been a clinical issue and she has no symptoms off most CV meds including diuretic tx. LVEF preserved in '24 w abnormal relaxation. No CV contraindication to proceeding at this time. Watch for iatrogenic HF.    Additional future steps to consider: Na    Total time of Consultation: 5 minute    I did not speak to the requesting provider verbally about this.     *This eConsult is based on the clinical data available to me and is furnished without benefit of a physical examination. The eConsult will need to be interpreted in light of any clinical issues or changes in patient status not available to me at the time of filing this eConsults. Significant changes in patient condition or level of acuity should result in immediate formal consultation and reevaluation. Please alert me if you have further questions.    Thank you for this eConsult referral.     Mariaelena Wong MD  [facility] ([facility])

## 2025-06-25 ENCOUNTER — HOSPITAL ENCOUNTER (OUTPATIENT)
Dept: SLEEP MEDICINE | Facility: OTHER | Age: 85
Discharge: HOME | End: 2025-06-25
Attending: NURSE PRACTITIONER
Payer: MEDICARE

## 2025-06-25 DIAGNOSIS — I10 PRIMARY HYPERTENSION: Chronic | ICD-10-CM

## 2025-06-25 DIAGNOSIS — G47.00 INSOMNIA, UNSPECIFIED TYPE: ICD-10-CM

## 2025-06-25 DIAGNOSIS — G47.33 OSA (OBSTRUCTIVE SLEEP APNEA): ICD-10-CM

## 2025-06-25 DIAGNOSIS — R06.83 SNORING: ICD-10-CM

## 2025-06-25 DIAGNOSIS — G47.30 SLEEP-DISORDERED BREATHING: ICD-10-CM

## 2025-06-25 PROCEDURE — 95810 POLYSOM 6/> YRS 4/> PARAM: CPT

## 2025-06-26 NOTE — PROGRESS NOTES
An overnight dianostic sleep study was performed on [facility]. The following was explained to the pt prior to the study: the time to bed and wake time, the set-up process timeframe and the purpose of each sensor, the reason (if sensors fall off) the technician will need to enter the room during the night, the possibility of the tech fitting a PAP mask on pt for treatment in the middle of the night, and how to call out for assistance during the night. A post-study letter was handed to the pt in the morning.    Dr. Mathew updated that the patient would need to be straight cathed to obtain a urine sample, per MD no urine sample needed at this time.

## 2025-06-27 ENCOUNTER — TELEPHONE (OUTPATIENT)
Dept: PREADMISSION TESTING | Facility: HOSPITAL | Age: 85
End: 2025-06-27
Payer: MEDICARE

## 2025-07-01 ENCOUNTER — TELEPHONE (OUTPATIENT)
Dept: OTOLARYNGOLOGY | Facility: CLINIC | Age: 85
End: 2025-07-01
Payer: MEDICARE

## 2025-07-01 ENCOUNTER — INFUSION (OUTPATIENT)
Dept: INFUSION THERAPY | Facility: HOSPITAL | Age: 85
End: 2025-07-01
Payer: MEDICARE

## 2025-07-01 ENCOUNTER — ANESTHESIA EVENT (OUTPATIENT)
Dept: SURGERY | Facility: HOSPITAL | Age: 85
End: 2025-07-01
Payer: MEDICARE

## 2025-07-01 VITALS — SYSTOLIC BLOOD PRESSURE: 157 MMHG | DIASTOLIC BLOOD PRESSURE: 73 MMHG | HEART RATE: 52 BPM

## 2025-07-01 DIAGNOSIS — N18.32 STAGE 3B CHRONIC KIDNEY DISEASE: Primary | ICD-10-CM

## 2025-07-01 PROCEDURE — 63600175 PHARM REV CODE 636 W HCPCS: Mod: JZ,EC,TB | Performed by: INTERNAL MEDICINE

## 2025-07-01 PROCEDURE — 96372 THER/PROPH/DIAG INJ SC/IM: CPT

## 2025-07-01 RX ADMIN — EPOETIN ALFA-EPBX 40000 UNITS: 40000 INJECTION, SOLUTION INTRAVENOUS; SUBCUTANEOUS at 01:07

## 2025-07-01 NOTE — TELEPHONE ENCOUNTER
Patient confirmed an arrival time of 6:00am for her surgery on 7/2 with Dr. Roberto.  NPO instructions reinforced and location details given.

## 2025-07-02 ENCOUNTER — ANESTHESIA (OUTPATIENT)
Dept: SURGERY | Facility: HOSPITAL | Age: 85
End: 2025-07-02
Payer: MEDICARE

## 2025-07-02 ENCOUNTER — HOSPITAL ENCOUNTER (OUTPATIENT)
Facility: HOSPITAL | Age: 85
Discharge: HOME | End: 2025-07-03
Attending: OTOLARYNGOLOGY | Admitting: OTOLARYNGOLOGY
Payer: MEDICARE

## 2025-07-02 DIAGNOSIS — R22.1 NECK MASS: ICD-10-CM

## 2025-07-02 DIAGNOSIS — K11.8 SUBMANDIBULAR GLAND MASS: ICD-10-CM

## 2025-07-02 PROCEDURE — 25000003 PHARM REV CODE 250: Performed by: NURSE PRACTITIONER

## 2025-07-02 PROCEDURE — 36000707: Performed by: OTOLARYNGOLOGY

## 2025-07-02 PROCEDURE — 63600175 PHARM REV CODE 636 W HCPCS: Performed by: OTOLARYNGOLOGY

## 2025-07-02 PROCEDURE — 25000003 PHARM REV CODE 250

## 2025-07-02 PROCEDURE — 37000009 HC ANESTHESIA EA ADD 15 MINS: Performed by: OTOLARYNGOLOGY

## 2025-07-02 PROCEDURE — 94799 UNLISTED PULMONARY SVC/PX: CPT

## 2025-07-02 PROCEDURE — 99900035 HC TECH TIME PER 15 MIN (STAT)

## 2025-07-02 PROCEDURE — 88307 TISSUE EXAM BY PATHOLOGIST: CPT | Mod: TC | Performed by: OTOLARYNGOLOGY

## 2025-07-02 PROCEDURE — 37000008 HC ANESTHESIA 1ST 15 MINUTES: Performed by: OTOLARYNGOLOGY

## 2025-07-02 PROCEDURE — 71000015 HC POSTOP RECOV 1ST HR: Performed by: OTOLARYNGOLOGY

## 2025-07-02 PROCEDURE — 27201423 OPTIME MED/SURG SUP & DEVICES STERILE SUPPLY: Performed by: OTOLARYNGOLOGY

## 2025-07-02 PROCEDURE — 25000003 PHARM REV CODE 250: Performed by: OTOLARYNGOLOGY

## 2025-07-02 PROCEDURE — 71000016 HC POSTOP RECOV ADDL HR: Performed by: OTOLARYNGOLOGY

## 2025-07-02 PROCEDURE — 36000706: Performed by: OTOLARYNGOLOGY

## 2025-07-02 PROCEDURE — 42440 EXCISE SUBMAXILLARY GLAND: CPT | Mod: LT,,, | Performed by: OTOLARYNGOLOGY

## 2025-07-02 PROCEDURE — 63600175 PHARM REV CODE 636 W HCPCS

## 2025-07-02 PROCEDURE — 71000033 HC RECOVERY, INTIAL HOUR: Performed by: OTOLARYNGOLOGY

## 2025-07-02 PROCEDURE — 94761 N-INVAS EAR/PLS OXIMETRY MLT: CPT

## 2025-07-02 PROCEDURE — 63600175 PHARM REV CODE 636 W HCPCS: Mod: JZ,TB

## 2025-07-02 RX ORDER — ONDANSETRON HYDROCHLORIDE 2 MG/ML
INJECTION, SOLUTION INTRAVENOUS
Status: DISCONTINUED | OUTPATIENT
Start: 2025-07-02 | End: 2025-07-02

## 2025-07-02 RX ORDER — LIDOCAINE HYDROCHLORIDE AND EPINEPHRINE 10; 10 UG/ML; MG/ML
INJECTION, SOLUTION INFILTRATION; PERINEURAL
Status: DISCONTINUED | OUTPATIENT
Start: 2025-07-02 | End: 2025-07-02 | Stop reason: HOSPADM

## 2025-07-02 RX ORDER — CEPHALEXIN 500 MG/1
500 CAPSULE ORAL EVERY 6 HOURS
Status: DISCONTINUED | OUTPATIENT
Start: 2025-07-02 | End: 2025-07-03

## 2025-07-02 RX ORDER — KETOROLAC TROMETHAMINE 30 MG/ML
INJECTION, SOLUTION INTRAMUSCULAR; INTRAVENOUS
Status: DISCONTINUED | OUTPATIENT
Start: 2025-07-02 | End: 2025-07-02

## 2025-07-02 RX ORDER — ACETAMINOPHEN 500 MG
1000 TABLET ORAL ONCE
Status: COMPLETED | OUTPATIENT
Start: 2025-07-02 | End: 2025-07-02

## 2025-07-02 RX ORDER — ONDANSETRON HYDROCHLORIDE 2 MG/ML
4 INJECTION, SOLUTION INTRAVENOUS EVERY 8 HOURS PRN
Status: DISCONTINUED | OUTPATIENT
Start: 2025-07-02 | End: 2025-07-03 | Stop reason: HOSPADM

## 2025-07-02 RX ORDER — SODIUM CHLORIDE 9 MG/ML
INJECTION, SOLUTION INTRAVENOUS CONTINUOUS
Status: DISCONTINUED | OUTPATIENT
Start: 2025-07-02 | End: 2025-07-02

## 2025-07-02 RX ORDER — LOSARTAN POTASSIUM 25 MG/1
25 TABLET ORAL 2 TIMES DAILY
Status: DISCONTINUED | OUTPATIENT
Start: 2025-07-02 | End: 2025-07-03 | Stop reason: HOSPADM

## 2025-07-02 RX ORDER — KETAMINE HYDROCHLORIDE 100 MG/ML
INJECTION, SOLUTION INTRAMUSCULAR; INTRAVENOUS
Status: DISCONTINUED | OUTPATIENT
Start: 2025-07-02 | End: 2025-07-02

## 2025-07-02 RX ORDER — ROCURONIUM BROMIDE 10 MG/ML
INJECTION, SOLUTION INTRAVENOUS
Status: DISCONTINUED | OUTPATIENT
Start: 2025-07-02 | End: 2025-07-02

## 2025-07-02 RX ORDER — SODIUM CHLORIDE 0.9 % (FLUSH) 0.9 %
10 SYRINGE (ML) INJECTION
Status: DISCONTINUED | OUTPATIENT
Start: 2025-07-02 | End: 2025-07-02 | Stop reason: HOSPADM

## 2025-07-02 RX ORDER — CEFAZOLIN 2 G/1
2 INJECTION, POWDER, FOR SOLUTION INTRAMUSCULAR; INTRAVENOUS
Status: COMPLETED | OUTPATIENT
Start: 2025-07-02 | End: 2025-07-02

## 2025-07-02 RX ORDER — ACETAMINOPHEN 325 MG/1
650 TABLET ORAL EVERY 6 HOURS PRN
Status: DISCONTINUED | OUTPATIENT
Start: 2025-07-02 | End: 2025-07-03 | Stop reason: HOSPADM

## 2025-07-02 RX ORDER — LABETALOL HCL 20 MG/4 ML
10 SYRINGE (ML) INTRAVENOUS
Status: DISCONTINUED | OUTPATIENT
Start: 2025-07-02 | End: 2025-07-03 | Stop reason: HOSPADM

## 2025-07-02 RX ORDER — FENTANYL CITRATE 50 UG/ML
25 INJECTION, SOLUTION INTRAMUSCULAR; INTRAVENOUS EVERY 5 MIN PRN
Status: DISCONTINUED | OUTPATIENT
Start: 2025-07-02 | End: 2025-07-02 | Stop reason: HOSPADM

## 2025-07-02 RX ORDER — PROPOFOL 10 MG/ML
VIAL (ML) INTRAVENOUS
Status: DISCONTINUED | OUTPATIENT
Start: 2025-07-02 | End: 2025-07-02

## 2025-07-02 RX ORDER — LIDOCAINE HYDROCHLORIDE 10 MG/ML
1 INJECTION, SOLUTION EPIDURAL; INFILTRATION; INTRACAUDAL; PERINEURAL ONCE AS NEEDED
Status: DISCONTINUED | OUTPATIENT
Start: 2025-07-02 | End: 2025-07-02

## 2025-07-02 RX ORDER — DEXMEDETOMIDINE HYDROCHLORIDE 100 UG/ML
INJECTION, SOLUTION INTRAVENOUS
Status: DISCONTINUED | OUTPATIENT
Start: 2025-07-02 | End: 2025-07-02

## 2025-07-02 RX ORDER — LIDOCAINE HYDROCHLORIDE 10 MG/ML
1 INJECTION, SOLUTION EPIDURAL; INFILTRATION; INTRACAUDAL; PERINEURAL ONCE
Status: COMPLETED | OUTPATIENT
Start: 2025-07-02 | End: 2025-07-02

## 2025-07-02 RX ORDER — HYDROCODONE BITARTRATE AND ACETAMINOPHEN 5; 325 MG/1; MG/1
1 TABLET ORAL EVERY 4 HOURS PRN
Refills: 0 | Status: DISCONTINUED | OUTPATIENT
Start: 2025-07-02 | End: 2025-07-03 | Stop reason: HOSPADM

## 2025-07-02 RX ORDER — HALOPERIDOL LACTATE 5 MG/ML
0.5 INJECTION, SOLUTION INTRAMUSCULAR EVERY 10 MIN PRN
Status: DISCONTINUED | OUTPATIENT
Start: 2025-07-02 | End: 2025-07-02 | Stop reason: HOSPADM

## 2025-07-02 RX ORDER — MUPIROCIN 20 MG/G
OINTMENT TOPICAL 2 TIMES DAILY
Status: DISCONTINUED | OUTPATIENT
Start: 2025-07-02 | End: 2025-07-03 | Stop reason: HOSPADM

## 2025-07-02 RX ORDER — TALC
6 POWDER (GRAM) TOPICAL NIGHTLY PRN
Status: DISCONTINUED | OUTPATIENT
Start: 2025-07-02 | End: 2025-07-03 | Stop reason: HOSPADM

## 2025-07-02 RX ORDER — LIDOCAINE HYDROCHLORIDE 20 MG/ML
INJECTION INTRAVENOUS
Status: DISCONTINUED | OUTPATIENT
Start: 2025-07-02 | End: 2025-07-02

## 2025-07-02 RX ORDER — GLUCAGON 1 MG
1 KIT INJECTION
Status: DISCONTINUED | OUTPATIENT
Start: 2025-07-02 | End: 2025-07-02 | Stop reason: HOSPADM

## 2025-07-02 RX ORDER — DEXAMETHASONE SODIUM PHOSPHATE 4 MG/ML
INJECTION, SOLUTION INTRA-ARTICULAR; INTRALESIONAL; INTRAMUSCULAR; INTRAVENOUS; SOFT TISSUE
Status: DISCONTINUED | OUTPATIENT
Start: 2025-07-02 | End: 2025-07-02

## 2025-07-02 RX ORDER — SODIUM CHLORIDE 0.9 % (FLUSH) 0.9 %
10 SYRINGE (ML) INJECTION
Status: DISCONTINUED | OUTPATIENT
Start: 2025-07-02 | End: 2025-07-02

## 2025-07-02 RX ADMIN — KETAMINE HYDROCHLORIDE 20 MG: 100 INJECTION INTRAMUSCULAR; INTRAVENOUS at 08:07

## 2025-07-02 RX ADMIN — CEFAZOLIN 2 G: 2 INJECTION, POWDER, FOR SOLUTION INTRAMUSCULAR; INTRAVENOUS at 08:07

## 2025-07-02 RX ADMIN — MUPIROCIN: 20 OINTMENT TOPICAL at 09:07

## 2025-07-02 RX ADMIN — ONDANSETRON 4 MG: 2 INJECTION INTRAMUSCULAR; INTRAVENOUS at 08:07

## 2025-07-02 RX ADMIN — PROPOFOL 30 MG: 10 INJECTION, EMULSION INTRAVENOUS at 08:07

## 2025-07-02 RX ADMIN — LOSARTAN POTASSIUM 25 MG: 25 TABLET, FILM COATED ORAL at 09:07

## 2025-07-02 RX ADMIN — LIDOCAINE HYDROCHLORIDE 10 MG: 10 INJECTION, SOLUTION EPIDURAL; INFILTRATION; INTRACAUDAL; PERINEURAL at 07:07

## 2025-07-02 RX ADMIN — CEPHALEXIN 500 MG: 500 CAPSULE ORAL at 12:07

## 2025-07-02 RX ADMIN — PROPOFOL 150 MG: 10 INJECTION, EMULSION INTRAVENOUS at 08:07

## 2025-07-02 RX ADMIN — KETOROLAC TROMETHAMINE 15 MG: 30 INJECTION, SOLUTION INTRAMUSCULAR; INTRAVENOUS at 09:07

## 2025-07-02 RX ADMIN — CEPHALEXIN 500 MG: 500 CAPSULE ORAL at 06:07

## 2025-07-02 RX ADMIN — DEXMEDETOMIDINE 4 MCG: 100 INJECTION, SOLUTION, CONCENTRATE INTRAVENOUS at 08:07

## 2025-07-02 RX ADMIN — ACETAMINOPHEN 1000 MG: 500 TABLET ORAL at 07:07

## 2025-07-02 RX ADMIN — KETAMINE HYDROCHLORIDE 10 MG: 100 INJECTION INTRAMUSCULAR; INTRAVENOUS at 08:07

## 2025-07-02 RX ADMIN — ROCURONIUM BROMIDE 40 MG: 10 INJECTION, SOLUTION INTRAVENOUS at 08:07

## 2025-07-02 RX ADMIN — SODIUM CHLORIDE, SODIUM GLUCONATE, SODIUM ACETATE, POTASSIUM CHLORIDE, MAGNESIUM CHLORIDE, SODIUM PHOSPHATE, DIBASIC, AND POTASSIUM PHOSPHATE: .53; .5; .37; .037; .03; .012; .00082 INJECTION, SOLUTION INTRAVENOUS at 08:07

## 2025-07-02 RX ADMIN — DEXMEDETOMIDINE 12 MCG: 100 INJECTION, SOLUTION, CONCENTRATE INTRAVENOUS at 08:07

## 2025-07-02 RX ADMIN — DEXAMETHASONE SODIUM PHOSPHATE 4 MG: 4 INJECTION, SOLUTION INTRAMUSCULAR; INTRAVENOUS at 08:07

## 2025-07-02 RX ADMIN — LIDOCAINE HYDROCHLORIDE 100 MG: 20 INJECTION INTRAVENOUS at 08:07

## 2025-07-02 RX ADMIN — SODIUM CHLORIDE: 9 INJECTION, SOLUTION INTRAVENOUS at 07:07

## 2025-07-02 NOTE — CARE UPDATE
Unit BASH[facility] Care Support Interaction      I have reviewed the chart of Shima Sorto who is hospitalized for <principal problem not specified>. The patient is currently located in the following unit: [facility]        I have assisted the primary physician in management of the following:      MRSA Decolonization - Mupirocin ordered       ALYSSA Gambino  Unit Based BASHIR

## 2025-07-02 NOTE — TRANSFER OF CARE
"Anesthesia Transfer of Care Note    Patient: Shima Sorto    Procedure(s) Performed: Procedure(s) (LRB):  EXCISION, SUBMANDIBULAR GLAND (Left)    Patient location: PACU    Anesthesia Type: general    Transport from OR: Transported from OR on 6-10 L/min O2 by face mask with adequate spontaneous ventilation    Post pain: adequate analgesia    Post assessment: no apparent anesthetic complications and tolerated procedure well    Post vital signs: stable    Level of consciousness: awake and alert    Nausea/Vomiting: no nausea/vomiting    Complications: none    Transfer of care protocol was followed      Last vitals: Visit Vitals  BP (!) 169/77 (BP Location: Left arm, Patient Position: Lying)   Pulse 60   Temp 36.4 °C (97.5 °F) (Temporal)   Resp 18   Ht 5' 4" (1.626 m)   Wt 54.6 kg (120 lb 5.9 oz)   LMP  (LMP Unknown)   SpO2 100%   Breastfeeding No   BMI 20.66 kg/m²     "

## 2025-07-02 NOTE — ANESTHESIA PROCEDURE NOTES
Intubation    Date/Time: 7/2/2025 8:14 AM    Performed by: Roberto Romero MD  Authorized by: Chelle Martinez MD    Intubation:     Induction:  Intravenous    Intubated:  Postinduction    Mask Ventilation:  Easy mask    Attempts:  1    Attempted By:  Resident anesthesiologist    Method of Intubation:  Direct    Blade:  Combs 2    Laryngeal View Grade: Grade I - full view of cords      Difficult Airway Encountered?: No      Complications:  None    Airway Device:  Oral endotracheal tube    Airway Device Size:  7.0    Style/Cuff Inflation:  Cuffed    Tube secured:  21    Secured at:  The lips    Placement Verified By:  Capnometry    Complicating Factors:  None    Findings Post-Intubation:  BS equal bilateral and atraumatic/condition of teeth unchanged

## 2025-07-02 NOTE — BRIEF OP NOTE
[facility] - Surgery ([facility])  Brief Operative Note    SUMMARY     Surgery Date: 7/2/2025     Surgeons and Role:     * Rj Roberto MD - Primary     * Paresh Villa MD - Resident - Assisting        Pre-op Diagnosis:  Neck mass [R22.1]    Post-op Diagnosis:  Post-Op Diagnosis Codes:     * Neck mass [R22.1]    Procedure(s) (LRB):  EXCISION, SUBMANDIBULAR GLAND (Left)    Anesthesia: General    Implants:  * No implants in log *    Operative Findings: Left SMG excision, 10 Fr JPx1.    Estimated Blood Loss: * No values recorded between 7/2/2025  8:04 AM and 7/2/2025  9:24 AM *    Estimated Blood Loss has been documented.         Specimens:   Specimen (24h ago, onward)       Start     Ordered    07/02/25 0845  Specimen to Pathology ENT  RELEASE UPON ORDERING        References:    Click here for ordering Quick Tip   Question:  Release to patient  Answer:  Immediate    07/02/25 0845                  ID Type Source Tests Collected by Time Destination   1 : left submandibular gland Tissue Neck SPECIMEN TO PATHOLOGY Rj Roberto MD 7/2/2025 0844        [id number]

## 2025-07-02 NOTE — ANESTHESIA PREPROCEDURE EVALUATION
[facility]  Anesthesia Pre-Operative Evaluation     Patient Name: Shima Sorto  YOB: 1940  MRN: [id number]  [facility]: [id number]      Code Status: Prior   Date of Procedure: 7/2/2025  Anesthesia: General Procedure: Procedure(s) (LRB):  EXCISION, SUBMANDIBULAR GLAND (Left)  Pre-Operative Diagnosis: Neck mass [R22.1]  Proceduralist: Surgeons and Role:     * Rj Roberto MD - Primary          SUBJECTIVE:     Pre-operative evaluation for Procedure(s) (LRB):  EXCISION, SUBMANDIBULAR GLAND (Left)     07/01/2025    Shima Sorto is a 84 y.o. female with HFpEF, hypertension, CKD, venous insufficiency s/p LLE GSV EVLT '17, breast cancer, chronic diarrhea and left-sided submandibular mass.      No notes on file.     Patient now presents for the above procedure(s).       No current facility-administered medications for this encounter.        ________________________________________  Results for orders placed during the hospital encounter of 07/07/23    Echo    Interpretation Summary  · The left ventricle is normal in size with normal systolic function. The estimated ejection fraction is 60%.  · Normal right ventricular size with normal right ventricular systolic function.  · Grade II left ventricular diastolic dysfunction.  · Biatrial enlargement.  · The estimated PA systolic pressure is 52 mmHg.  · Intermediate central venous pressure (8 mmHg).    ________________________________________    Prev airway: None documented.            LDA: None documented.       Drips: None documented.      Problem List[1]    Review of patient's allergies indicates:   Allergen Reactions    Asparagus Rash    Macrobid [nitrofurantoin monohyd/m-cryst] Rash     Peeling skin and petechia       Current Inpatient Medications:       Medications Ordered Prior to Encounter[2]    Past Surgical History:   Procedure Laterality Date    BREAST CYST ASPIRATION Right 1999    BREAST LUMPECTOMY Right 2018    with  radiation    COLONOSCOPY N/A 7/24/2018    Procedure: COLONOSCOPY;  Surgeon: Juan Miguel Pena MD;  Location: [facility] ENDO (4TH FLR);  Service: Endoscopy;  Laterality: N/A;    COLONOSCOPY N/A 5/10/2023    Procedure: COLONOSCOPY;  Surgeon: Keven Garza MD;  Location: [facility] (4TH FLR);  Service: Endoscopy;  Laterality: N/A;  *Pending C-Diff*  Request Greg  procedure order telephone encounter 5/1  PEG prep, instructions portal -LW  5/4/23 no answer for precall/mleone lpn  5/9lm    ESOPHAGOGASTRODUODENOSCOPY N/A 3/17/2023    Procedure: EGD (ESOPHAGOGASTRODUODENOSCOPY);  Surgeon: Keven Garza MD;  Location: [facility] (4TH FLR);  Service: Endoscopy;  Laterality: N/A;  Medically Urgent  3/2 instructions to portal-st    pre call attempted, no answer from pt 3/13/23 -egh    INJECTION FOR SENTINEL NODE IDENTIFICATION Right 8/17/2018    Procedure: INJECTION, FOR SENTINEL NODE IDENTIFICATION;  Surgeon: Abby Mason MD;  Location: [facility] OR [address] [place] [address];  Service: General;  Laterality: Right;    interstim placed stage 1      and removed    KIDNEY STONE SURGERY  2000    @ [facility]    MASTECTOMY, PARTIAL Right 8/17/2018    Procedure: MASTECTOMY, PARTIAL-US guided;  Surgeon: Abby Mason MD;  Location: [facility] OR [address] [place] [address];  Service: General;  Laterality: Right;    MOHS procedure      SENTINEL LYMPH NODE BIOPSY Right 8/17/2018    Procedure: BIOPSY, LYMPH NODE, SENTINEL;  Surgeon: Abby Mason MD;  Location: [facility] OR [address] [place] [address];  Service: General;  Laterality: Right;       Social History:  Tobacco Use: Medium Risk (6/24/2025)    Patient History     Smoking Tobacco Use: Former     Smokeless Tobacco Use: Never     Passive Exposure: Past       Alcohol Use: Not At Risk (4/25/2025)    AUDIT-C     Frequency of Alcohol Consumption: Never     Average Number of Drinks: Patient does not drink     Frequency of Binge Drinking: Never       OBJECTIVE:     Vital Signs Range:  BMI Readings from Last 1 Encounters:   06/24/25 21.75 kg/m²        Pulse:  [52]   BP: (157)/(73)        Significant Labs:        Component Value Date/Time    WBC 4.03 06/24/2025 0907    HGB 10.4 (L) 06/24/2025 0907    HGB 9.9 (L) 03/18/2025 1032    HCT 33.0 (L) 06/24/2025 0907    HCT 30.8 (L) 03/18/2025 1032    HCT 33 (L) 02/05/2021 1209     (L) 06/24/2025 0907     03/18/2025 1032     06/24/2025 0907     03/18/2025 1032    K 4.7 06/24/2025 0907    K 4.8 03/18/2025 1032     06/24/2025 0907     03/18/2025 1032    CO2 30 (H) 06/24/2025 0907    CO2 26 03/18/2025 1032    GLU 97 06/24/2025 0907    GLU 98 03/18/2025 1032    BUN 26 (H) 06/24/2025 0907    CREATININE 1.6 (H) 06/24/2025 0907    MG 2.2 10/29/2023 0606    PHOS 4.0 12/02/2024 1052    CALCIUM 9.6 06/24/2025 0907    CALCIUM 9.1 03/18/2025 1032    ALBUMIN 3.4 (L) 06/24/2025 0907    ALBUMIN 3.4 (L) 03/18/2025 1032    PROT 6.5 06/24/2025 0907    PROT 6.9 03/18/2025 1032    ALKPHOS 54 06/24/2025 0907    ALKPHOS 57 03/18/2025 1032    BILITOT 0.6 06/24/2025 0907    BILITOT 0.5 03/18/2025 1032    AST 27 06/24/2025 0907    AST 26 03/18/2025 1032    ALT 17 06/24/2025 0907    ALT 17 03/18/2025 1032    INR 1.0 02/05/2021 1048        Please see Results Review for additional labs.     Diagnostic Studies: No relevant studies.    EKG:   Results for orders placed or performed during the hospital encounter of 06/23/25   EKG 12-lead    Collection Time: 06/23/25 11:49 AM   Result Value Ref Range    QRS Duration 82 ms    [facility] QTC Calculation 390 ms    Narrative    Test Reason : Z01.818,    Vent. Rate :  54 BPM     Atrial Rate :  54 BPM     P-R Int : 162 ms          QRS Dur :  82 ms      QT Int : 412 ms       P-R-T Axes :  44   6  27 degrees    QTcB Int : 390 ms    Sinus bradycardia with sinus arrhythmia with occasional Premature  ventricular complexes  Otherwise normal ECG  When compared with ECG of 09-Oct-2023 18:54,  Premature ventricular complexes are now Present  Confirmed by Ingrid Chaparro ([id number]) on  6/23/2025 3:30:34 PM    Referred By: TIANNA WHEELER           Confirmed By: Ingrid Chaparro       ECHO:  See subjective, if available.      ASSESSMENT/PLAN:           Pre-op Assessment          Review of Systems  Anesthesia Hx:   History of prior surgery of interest to airway management or planning:  Previous anesthesia: General COLONOSCOPY (Abdomen) 5/10/23 with general anesthesia.  Procedure performed at an [facility].       Denies Family Hx of Anesthesia complications.   Personal Hx of Anesthesia complications          Slow To Awaken/Delayed Emergence and moderate, did not delay extubation, but prolonged PACU stay          Social:  Former Smoker, No Alcohol Use       Hematology/Oncology:       -- Anemia:                    --  Cancer in past history:              surgery and radiation       EENT/Dental:   NECK MASS.         Jaw Problems:  Clicking (TMJ)   Cardiovascular:     Hypertension       Denies Angina.         HEART FAILURE W/ PRESERVED EF   Functional Capacity good / => 4 METS      Mitral Valve Prolapse                        Pulmonary:       Denies Shortness of breath.  Denies Recent URI. Sleep Apnea                Renal/:  Chronic Renal Disease, CKD renal calculi  NEPHROLITHIASIS. MEDULLARY SPONGE KIDNEY.URINARY RETENTION.             Hepatic/GI:  Hepatic/GI Normal                    Musculoskeletal:  Musculoskeletal Normal         Bone Disorders:    Osteoporosis        Neurological:  Neurology Normal                                      Endocrine:  Endocrine Normal            Psych:  Psychiatric Normal                    Physical Exam  General: Well nourished, Alert and Oriented    Airway:  Mallampati: II / II  Mouth Opening: Normal  Tongue: Normal  Neck ROM: Normal ROM    Dental:  Intact    Chest/Lungs:  Normal Respiratory Rate    Heart:  Rate: Normal        Anesthesia Plan  Type of Anesthesia, risks & benefits discussed:    Anesthesia Type: Gen ETT  Intra-op Monitoring Plan: Standard ASA  Monitors  Post Op Pain Control Plan: multimodal analgesia and IV/PO Opioids PRN  Induction:  IV  Airway Plan: Direct and Video, Post-Induction  Informed Consent: Informed consent signed with the Patient and all parties understand the risks and agree with anesthesia plan.  All questions answered.   ASA Score: 3  Day of Surgery Review of History & Physical: H&P Update referred to the surgeon/provider.    Ready For Surgery From Anesthesia Perspective.     .           [1]   Patient Active Problem List  Diagnosis    Hypertension    Hyperoxaluria    Nephrolithiasis    Cystic kidney disease    Urinary retention    Age-related osteoporosis with current pathological fracture with routine healing    Hypoglycemia    Trigger middle finger of right hand    Retinal hemorrhage of both eyes at about 1'oclock    Vestibular neuronitis    KATHERIN (obstructive sleep apnea)    Venous insufficiency    Bradycardia    Malignant neoplasm of upper-outer quadrant of right breast in female, estrogen receptor positive    At risk for lymphedema    Acute renal failure superimposed on stage 3b chronic kidney disease    Vitamin D deficiency    Anemia in chronic kidney disease (CKD)    Aorto-iliac atherosclerosis    ACC/AHA stage C heart failure with preserved ejection fraction    Posture abnormality    Nausea    Hyponatremia    Acute cystitis    Clostridium difficile infection    Stage 3b chronic kidney disease    Poor balance    Impaired functional mobility, balance, gait, and endurance    Chronic pulmonary heart disease    Physical deconditioning    Weakness    Neck mass    Atrophic vaginitis    Increased frequency of urination    Osteoporosis   [2]   No current facility-administered medications on file prior to encounter.     Current Outpatient Medications on File Prior to Encounter   Medication Sig Dispense Refill    acetaminophen (TYLENOL) 650 MG TbSR Take 1,300 mg by mouth 2 (two) times daily as needed (Severe pain).      cholecalciferol, vitamin  D3, (VITAMIN D3) 25 mcg (1,000 unit) capsule Take 1,000 Units by mouth once daily.      coQ10, ubiquinol, 100 mg Cap Take 100 mg by mouth once daily.      denosumab (PROLIA) 60 mg/mL Syrg Inject 60 mg into the skin every 6 (six) months.      ferrous gluconate (FERGON) 324 MG tablet Taker one daily one hour before a meal 60 tablet 1    fish oil-E-fatty [id number] 400-5 mg-unit Cap Take 1 capsule by mouth Daily.      loratadine (CLARITIN) 10 mg tablet Take 10 mg by mouth once daily.      losartan (COZAAR) 25 MG tablet Take 1 tablet (25 mg total) by mouth 2 (two) times a day. 180 tablet 1    peg 400-propylene glycol (SYSTANE ULTRA) 0.4-0.3 % Drop 2 (two) times daily as needed.      PRESERVISION AREDS-2 250-90-40-1 mg Cap Take 1 tablet by mouth 2 (two) times daily.      SALONPAS, LIDOCAINE, TOP Apply 1 patch topically daily as needed (Pain).      UNABLE TO FIND Rufus-Mag-Citrate with D3 & K2 - Takes 2 tablets by mouth every morning, 1 at midday and 1 every evening.      UNABLE TO FIND Take 4 capsules by mouth Daily. Nutrafol      vitamin renal formula, B-complex-vitamin c-folic acid, (NEPHROCAPS) 1 mg Cap Take 1 capsule by mouth once daily. 90 capsule 3

## 2025-07-02 NOTE — H&P
No changes to H&P below.  To OR for left submandibular gland excision.      Paresh Villa MD  [facility] Otolaryngology, PGY4  07/02/2025 7:27 AM         Chief complaint:  Follow up        HPI   84 y.o. female presents for evaluation of a left-sided submandibular mass.  She reports that she noted this within the last several months.  No pain.  No change in size.  No dysphagia.  Recent neck CT revealed enlargement of the left submandibular gland without obvious masses.  She is concerned about the possibility of a right submandibular gland mass.  Recent FNA of left submandibular gland revealed squamous cells and fibrous tissue.  No new complaints today.     Review of Systems   Constitutional: Negative for fatigue and unexpected weight change.   HENT: Per HPI.  Eyes: Negative for visual disturbance.   Respiratory: Negative for shortness of breath, hemoptysis   Cardiovascular: Negative for chest pain and palpitations.   Musculoskeletal: Negative for decreased ROM, back pain.   Skin: Negative for rash, sunburn, itching.   Neurological: Negative for dizziness and seizures.   Hematological: Negative for adenopathy. Does not bruise/bleed easily.   Endocrine: Negative for rapid weight loss/weight gain, heat/cold intolerance.      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    Vestibular neuronitis    Obstructive sleep apnea    Venous insufficiency    Bradycardia    Malignant neoplasm of upper-outer quadrant of right breast in female, estrogen receptor positive    At risk for lymphedema    Acute renal failure superimposed on stage 3b chronic kidney disease    Vitamin D deficiency     Anemia in chronic kidney disease (CKD)    Aorto-iliac atherosclerosis    ACC/AHA stage C heart failure with preserved ejection fraction    Posture abnormality    Nausea    Hyponatremia    Acute cystitis    Clostridium difficile infection    Stage 3b chronic kidney disease    Poor balance    Impaired functional mobility, balance, gait, and endurance    Chronic pulmonary heart disease    Physical deconditioning    Weakness    Neck mass    Atrophic vaginitis    Increased frequency of urination    Osteoporosis              Past Surgical History         Past Surgical History:   Procedure Laterality Date    BREAST CYST ASPIRATION Right 1999    BREAST LUMPECTOMY Right 2018     with radiation    COLONOSCOPY N/A 7/24/2018     Procedure: COLONOSCOPY;  Surgeon: Juan Miguel Pena MD;  Location: [facility] (4TH FLR);  Service: Endoscopy;  Laterality: N/A;    COLONOSCOPY N/A 5/10/2023     Procedure: COLONOSCOPY;  Surgeon: Keven Garza MD;  Location: [facility] (4TH FLR);  Service: Endoscopy;  Laterality: N/A;  *Pending C-Diff*  Request Greg  procedure order telephone encounter 5/1  PEG prep, instructions portal -LW  5/4/23 no answer for precall/mleone lpn  5/9lm    ESOPHAGOGASTRODUODENOSCOPY N/A 3/17/2023     Procedure: EGD (ESOPHAGOGASTRODUODENOSCOPY);  Surgeon: Keven Garza MD;  Location: [facility] (4TH FLR);  Service: Endoscopy;  Laterality: N/A;  Medically Urgent  3/2 instructions to portal-st     pre call attempted, no answer from pt 3/13/23 -egh    INJECTION FOR SENTINEL NODE IDENTIFICATION Right 8/17/2018     Procedure: INJECTION, FOR SENTINEL NODE IDENTIFICATION;  Surgeon: Abby Mason MD;  Location: [facility] OR [address] [place] [address];  Service: General;  Laterality: Right;    interstim placed stage 1         and removed    KIDNEY STONE SURGERY   2000     @ [facility]    MASTECTOMY, PARTIAL Right 8/17/2018     Procedure: MASTECTOMY, PARTIAL-US guided;  Surgeon: Abby Mason MD;  Location: [facility] OR [address] [place] [address];  Service: General;   Laterality: Right;    MOHS procedure        SENTINEL LYMPH NODE BIOPSY Right 2018     Procedure: BIOPSY, LYMPH NODE, SENTINEL;  Surgeon: Abby Mason MD;  Location: [facility] OR [address] [place] [address];  Service: General;  Laterality: Right;            Family History          Family History   Problem Relation Name Age of Onset    Kidney disease Mother        Heart disease Father        Melanoma Father        Heart attack Father        Breast cancer Maternal Aunt        Hearing loss Son        Hyperlipidemia Son        Anesthesia problems Neg Hx        Malignant hypertension Neg Hx        Hypotension Neg Hx        Malignant hyperthermia Neg Hx        Pseudochol deficiency Neg Hx        Colon cancer Neg Hx        Ovarian cancer Neg Hx        Psoriasis Neg Hx        Lupus Neg Hx        Eczema Neg Hx        Acne Neg Hx        Esophageal cancer Neg Hx                   Social History   .[Social History]    [Social History]        Socioeconomic History    Marital status:    Occupational History    Occupation:        Employer: [employer]   Tobacco Use    Smoking status: Former       Current packs/day: 0.00       Average packs/day: 0.5 packs/day for 8.0 years (4.0 ttl pk-yrs)       Types: Cigarettes       Start date: 1956       Quit date: 1964       Years since quittin.8       Passive exposure: Past    Smokeless tobacco: Never   Vaping Use    Vaping status: Never Used   Substance and Sexual Activity    Alcohol use: Not Currently    Drug use: No    Sexual activity: Not Currently      Social Drivers of Health           Financial Resource Strain: Low Risk  (2025)     Overall Financial Resource Strain (CARDIA)      Difficulty of Paying Living Expenses: Not hard at all   Food Insecurity: No Food Insecurity (2025)     Hunger Vital Sign      Worried About Running Out of Food in the Last Year: Never true      Ran Out of Food in the Last Year: Never true   Transportation Needs: No  Transportation Needs (4/25/2025)     PRAPARE - Transportation      Lack of Transportation (Medical): No      Lack of Transportation (Non-Medical): No   Physical Activity: Insufficiently Active (4/25/2025)     Exercise Vital Sign      Days of Exercise per Week: 3 days      Minutes of Exercise per Session: 30 min   Stress: No Stress Concern Present (4/25/2025)     [employer] [place] [employer] - Occupational Stress Questionnaire      Feeling of Stress : Not at all   Housing Stability: Low Risk  (4/25/2025)     Housing Stability Vital Sign      Unable to Pay for Housing in the Last Year: No      Number of Times Moved in the Last Year: 0      Homeless in the Last Year: No           Allergies         Review of patient's allergies indicates:   Allergen Reactions    Asparagus Rash    Macrobid [nitrofurantoin monohyd/m-cryst] Rash       Peeling skin and petechia               Physical Exam      There were no vitals filed for this visit.           There is no height or weight on file to calculate BMI.        General: AOx3, NAD   Respiratory:  Symmetric chest rise, normal effort  Nose: No gross nasal septal deviation. Inferior Turbinates WNL bilaterally. No septal perforation. No masses/lesions.   Oral Cavity:  Oral Tongue mobile, no lesions noted. Hard Palate WNL. No buccal or FOM lesions.  Oropharynx:  No masses/lesions of the posterior pharyngeal wall. Tonsillar fossa without lesions. Soft palate without masses. Midline uvula.   Neck: No scars.  No cervical lymphadenopathy, thyromegaly or thyroid nodules.  Normal range of motion.  Roughly 1.5 cm firm mass of L SMG.  Face: House Brackmann I bilaterally.      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  Recommended left submandibular gland resection pending medical optimization.  Surgery is tentatively scheduled on  07/02/2025.           Relevant Orders     Case Request Operating Room: EXCISION, SUBMANDIBULAR GLAND (Completed)      Other Visit Diagnoses           Submandibular gland mass

## 2025-07-02 NOTE — NURSING TRANSFER
Nursing Transfer Note      7/2/2025   11:50 AM    Reason patient is being transferred: post procedure    Transfer To:  1004    Transfer via bed    Transported by PCT x2    Any special needs or follow-up needed: routine    Patient belongings transferred with patient: Yes    Chart send with patient: Yes    Notified: daughter    Patient reassessed at: 7/2/2025

## 2025-07-02 NOTE — ANESTHESIA POSTPROCEDURE EVALUATION
Anesthesia Post Evaluation    Patient: Shima Sorto    Procedure(s) Performed: Procedure(s) (LRB):  EXCISION, SUBMANDIBULAR GLAND (Left)    Final Anesthesia Type: general      Patient location during evaluation: PACU  Patient participation: Yes- Able to Participate  Level of consciousness: awake and alert  Post-procedure vital signs: reviewed and stable  Pain management: adequate  Airway patency: patent    PONV status at discharge: No PONV  Anesthetic complications: no      Cardiovascular status: blood pressure returned to baseline  Respiratory status: unassisted  Hydration status: euvolemic  Follow-up not needed.              Vitals Value Taken Time   /65 07/02/25 10:01   Temp 36.5 °C (97.7 °F) 07/02/25 09:25   Pulse 63 07/02/25 10:26   Resp 16 07/02/25 10:26   SpO2 100 % 07/02/25 10:26   Vitals shown include unfiled device data.      Event Time   Out of Recovery 09:45:00         Pain/Eboni Score: Pain Rating Prior to Med Admin: -- (pre op) (7/2/2025  7:10 AM)  Eboni Score: 9 (7/2/2025  9:45 AM)

## 2025-07-02 NOTE — OP NOTE
DATE OF PROCEDURE: 7/2/2025     PREOPERATIVE DIAGNOSES:   Left submandibular gland mass    POSTOPERATIVE DIAGNOSES:   Same    SURGEON:  Surgeons and Role:     * Rj Roberto MD - Primary     * Paresh Villa MD - Resident - Assisting      PROCEDURES PERFORMED:   Resection of left submandibular gland    ANESTHESIA: General      INDICATIONS FOR PROCEDURE:   Shima Sorto is a 84 y.o. woman who recently evaluated for left-sided submandibular gland mass.  Fine-needle aspiration was essentially nondiagnostic.  Given the above, I recommended that we proceed to the operating room for the aforementioned procedures.    She was apprised of the risks, benefits and alternatives to surgery.  In spite of the risk inherent to surgery,she provided informed consent for the aforementioned procedures.     PROCEDURE IN DETAIL:  The patient was taken to the operating room and placed on the operating table in the supine position.  General endotracheal anesthesia was induced by the anesthesia team.     The left neck was addressed.  An approximately 4 cm incision was marked 2 fingerbreadths below the body of the mandible to allow for access to the left submandibular gland.  The intended incision was injected with several cc of 1% lidocaine with epinephrine.  The neck was then prepped and draped in standard sterile fashion.      To begin, the skin of the neck was incised with a 15 blade.  Sharp dissection proceeded through the underlying subcutaneous tissue and platysma.  The subplatysmal flap was elevated over the body of the mandible.  The marginal mandibular nerve was noted to be ptotic and traversing the lateral surface of the gland just external to the fascia.  The fascia was incised inferiorly and retracted superiorly with a protect the nerve.  With the nerve retracted, the vascular branches to the gland were isolated from the facial system.  They were clipped and divided.  The gland was then brought inferiorly.   The fibrofatty tissue overlying the mylohyoid was then taken down.  The mylohyoid neurovascular bundle was isolated, clipped and divided.  The specimen was then retracted inferiorly and the mylohyoid was retracted anteriorly.  The submandibular ganglion and Pima's duct were then clipped and divided.  The lingual nerve was identified and preserved.  The hypoglossal nerve was also identified and preserved.  The final attachments to the gland were then taken down and the specimen was sent to pathology for permanent analysis.  Hemostasis was achieved with electrocautery.  The wound was then packed with fibrillar to assist with hemostasis of the wound was closed over a closed suction drain which was secured with silk suture.  The wound was closed with absorbable suture and Dermabond.      Once the wound was closed, she was handed over to anesthesia.  She was awakened, extubated and transferred to recovery in satisfactory condition.    There were no intraoperative complications.  I was present for and participated in the entire procedure as dictated above.       ESTIMATED BLOOD LOSS: minimal    SPECIMENS:   Specimen (24h ago, onward)       Start     Ordered    07/02/25 8165  Specimen to Pathology ENT  RELEASE UPON ORDERING        References:    Click here for ordering Quick Tip   Question:  Release to patient  Answer:  Immediate    07/02/25 9948

## 2025-07-03 VITALS
HEART RATE: 62 BPM | OXYGEN SATURATION: 95 % | BODY MASS INDEX: 20.55 KG/M2 | HEIGHT: 64 IN | WEIGHT: 120.38 LBS | DIASTOLIC BLOOD PRESSURE: 65 MMHG | RESPIRATION RATE: 15 BRPM | TEMPERATURE: 99 F | SYSTOLIC BLOOD PRESSURE: 135 MMHG

## 2025-07-03 PROBLEM — K11.8 SUBMANDIBULAR GLAND MASS: Status: ACTIVE | Noted: 2025-07-03

## 2025-07-03 PROBLEM — Z98.890: Status: ACTIVE | Noted: 2025-07-03

## 2025-07-03 PROBLEM — Z90.89: Status: ACTIVE | Noted: 2025-07-03

## 2025-07-03 LAB
ABSOLUTE EOSINOPHIL (OHS): 0.01 K/UL
ABSOLUTE MONOCYTE (OHS): 0.53 K/UL (ref 0.3–1)
ABSOLUTE NEUTROPHIL COUNT (OHS): 2.98 K/UL (ref 1.8–7.7)
ALBUMIN SERPL BCP-MCNC: 3.3 G/DL (ref 3.5–5.2)
ANION GAP (OHS): 8 MMOL/L (ref 8–16)
BASOPHILS # BLD AUTO: 0.03 K/UL
BASOPHILS NFR BLD AUTO: 0.6 %
BUN SERPL-MCNC: 44 MG/DL (ref 8–23)
CALCIUM SERPL-MCNC: 8.3 MG/DL (ref 8.7–10.5)
CHLORIDE SERPL-SCNC: 107 MMOL/L (ref 95–110)
CO2 SERPL-SCNC: 25 MMOL/L (ref 23–29)
CREAT SERPL-MCNC: 1.6 MG/DL (ref 0.5–1.4)
ERYTHROCYTE [DISTWIDTH] IN BLOOD BY AUTOMATED COUNT: 14.2 % (ref 11.5–14.5)
GFR SERPLBLD CREATININE-BSD FMLA CKD-EPI: 32 ML/MIN/1.73/M2
GLUCOSE SERPL-MCNC: 104 MG/DL (ref 70–110)
HCT VFR BLD AUTO: 34.1 % (ref 37–48.5)
HGB BLD-MCNC: 11.2 GM/DL (ref 12–16)
IMM GRANULOCYTES # BLD AUTO: 0.06 K/UL (ref 0–0.04)
IMM GRANULOCYTES NFR BLD AUTO: 1.1 % (ref 0–0.5)
LYMPHOCYTES # BLD AUTO: 1.78 K/UL (ref 1–4.8)
MCH RBC QN AUTO: 31.4 PG (ref 27–31)
MCHC RBC AUTO-ENTMCNC: 32.8 G/DL (ref 32–36)
MCV RBC AUTO: 96 FL (ref 82–98)
NUCLEATED RBC (/100WBC) (OHS): 0 /100 WBC
PHOSPHATE SERPL-MCNC: 2.5 MG/DL (ref 2.7–4.5)
PLATELET # BLD AUTO: 152 K/UL (ref 150–450)
PMV BLD AUTO: 11.5 FL (ref 9.2–12.9)
POTASSIUM SERPL-SCNC: 4.9 MMOL/L (ref 3.5–5.1)
RBC # BLD AUTO: 3.57 M/UL (ref 4–5.4)
RELATIVE EOSINOPHIL (OHS): 0.2 %
RELATIVE LYMPHOCYTE (OHS): 33 % (ref 18–48)
RELATIVE MONOCYTE (OHS): 9.8 % (ref 4–15)
RELATIVE NEUTROPHIL (OHS): 55.3 % (ref 38–73)
SODIUM SERPL-SCNC: 140 MMOL/L (ref 136–145)
WBC # BLD AUTO: 5.39 K/UL (ref 3.9–12.7)

## 2025-07-03 PROCEDURE — 25000003 PHARM REV CODE 250

## 2025-07-03 PROCEDURE — 94761 N-INVAS EAR/PLS OXIMETRY MLT: CPT

## 2025-07-03 PROCEDURE — 82040 ASSAY OF SERUM ALBUMIN: CPT

## 2025-07-03 PROCEDURE — 36415 COLL VENOUS BLD VENIPUNCTURE: CPT

## 2025-07-03 PROCEDURE — 85025 COMPLETE CBC W/AUTO DIFF WBC: CPT

## 2025-07-03 RX ORDER — CEPHALEXIN 500 MG/1
500 CAPSULE ORAL EVERY 8 HOURS
Status: DISCONTINUED | OUTPATIENT
Start: 2025-07-03 | End: 2025-07-03 | Stop reason: HOSPADM

## 2025-07-03 RX ORDER — DEXTROMETHORPHAN HYDROBROMIDE, GUAIFENESIN 5; 100 MG/5ML; MG/5ML
650 LIQUID ORAL EVERY 8 HOURS
Qty: 40 TABLET | Refills: 0 | Status: SHIPPED | OUTPATIENT
Start: 2025-07-03 | End: 2025-07-16

## 2025-07-03 RX ADMIN — MUPIROCIN: 20 OINTMENT TOPICAL at 08:07

## 2025-07-03 RX ADMIN — CEPHALEXIN 500 MG: 500 CAPSULE ORAL at 12:07

## 2025-07-03 RX ADMIN — LOSARTAN POTASSIUM 25 MG: 25 TABLET, FILM COATED ORAL at 08:07

## 2025-07-03 RX ADMIN — CEPHALEXIN 500 MG: 500 CAPSULE ORAL at 05:07

## 2025-07-03 NOTE — PROGRESS NOTES
[facility]  Otorhinolaryngology-Head & Neck Surgery  Progress Note    Subjective:     Post-Op Info:  Procedure(s) (LRB):  EXCISION, SUBMANDIBULAR GLAND (Left)   1 Day Post-Op  Hospital Day: 2     Interval History: NAEON. Incision c/d/I  Drain with minimal serosanguinous output    Medications:  Continuous Infusions:  Scheduled Meds:   cephALEXin  500 mg Oral Q6H    losartan  25 mg Oral BID    mupirocin   Nasal BID     PRN Meds:  Current Facility-Administered Medications:     acetaminophen, 650 mg, Oral, Q6H PRN    HYDROcodone-acetaminophen, 1 tablet, Oral, Q4H PRN    labetalol, 10 mg, Intravenous, Q20 Min PRN    melatonin, 6 mg, Oral, Nightly PRN    ondansetron, 4 mg, Intravenous, Q8H PRN     Review of patient's allergies indicates:   Allergen Reactions    Asparagus Rash    Macrobid [nitrofurantoin monohyd/m-cryst] Rash     Peeling skin and petechia     Objective:     Vital Signs (24h Range):  Temp:  [97.6 °F (36.4 °C)-98.6 °F (37 °C)] 98.6 °F (37 °C)  Pulse:  [58-70] 62  Resp:  [12-22] 15  SpO2:  [95 %-100 %] 95 %  BP: (121-170)/(57-74) 135/65       Lines/Drains/Airways       Drain  Duration                  Closed/Suction Drain 07/02/25 0852 Tube - 1 Left Neck Bulb 10 Fr. <1 day              Peripheral Intravenous Line  Duration             Peripheral IV Single Lumen 07/02/25 0659 18 G Left;Posterior Forearm 1 day                     Physical Exam   NAD  Awake and alert  Head atraumatic   Auricles WNL AU  Nose w/ normal external appearance  Neck JOLIE with serosanguinous output  Neck soft, appropriately TTP with incision clean/dry/intact, normal ROM  Normal WOB, no stridor or stertor    Significant Labs:  CBC:   Recent Labs   Lab 07/03/25  0448   WBC 5.39   RBC 3.57*   HGB 11.2*   HCT 34.1*      MCV 96   MCH 31.4*   MCHC 32.8     CMP:   Recent Labs   Lab 07/03/25  0448      CALCIUM 8.3*   ALBUMIN 3.3*      K 4.9   CO2 25      BUN 44*   CREATININE 1.6*       Significant  Diagnostics:  None  Assessment/Plan:     Neck mass  84 F s/p L submandibular gland excision with Dr. Roberto on 7/2/25. Healing appropriately on POD 1. Drain with minimal serosanguinous output.    -- Tolerating regular diet  -- MMPC  -- Will pull JOLIE drain  -- Pt is stable for discharge, will fu outpatient postoperatively  -- Please page ENT on call with any questions        Yomaira Pepper MD  Otorhinolaryngology-Head & Neck Surgery  [facility]

## 2025-07-03 NOTE — PLAN OF CARE
Unable to complete admission assessment. It was patient's first day on the floor today. Patient not answering phone to complete admission assessment.

## 2025-07-03 NOTE — PLAN OF CARE
[facility]  Discharge Final Note    Primary Care Provider: Carmen Milton MD  Expected Discharge Date: 7/3/2025    Patient medically ready for discharge to home.     Transportation by [employer].     Is family/patient aware of discharge: yes     Hospital follow up scheduled: yes       Final Discharge Note (most recent)       Final Note - 07/03/25 1149          Final Note    Assessment Type Final Discharge Note     Anticipated Discharge Disposition Home or Self Care     Hospital Resources/Appts/Education Provided Provided patient/caregiver with written discharge plan information                     Important Message from Medicare         Referral Info (most recent)       Referral Info    No documentation.                 Contact Info       Roselyn Mckeon PA-C   Specialty: Otolaryngology    [address]  [facility] [address]   Phone: [phone]       Next Steps: Follow up on 7/7/2025    Instructions: For wound re-check, post op          Future Appointments   Date Time Provider Department Center   7/8/2025  1:15 PM INJECTION, [facility] INFUSION [facility]   7/10/2025  3:15 PM [facility] TANSEY MAMMO2 [facility]   7/15/2025  2:15 PM INJECTION, [facility] INF[facility]ION [facility]   7/22/2025 12:40 PM LAB, HEMONC CANCER BLDG [facility] LAB HO [facility]   7/22/2025  [facility]:30 [facility]   7/22/2025  2:00 PM INJECTION, [facility] INFUSION [facility]   10/31/2025  2:00 PM [facility] OIC-US1 MASTER [facility] ULTR [facility]   12/29/2025  1:45 PM INJECTION [facility] AMB INF [facility]     Austen Will RN  Case Management  Ext: [phone]  07/03/2025

## 2025-07-03 NOTE — SUBJECTIVE & OBJECTIVE
Interval History: NAEON. Incision c/d/I  Drain with minimal serosanguinous output    Medications:  Continuous Infusions:  Scheduled Meds:   cephALEXin  500 mg Oral Q6H    losartan  25 mg Oral BID    mupirocin   Nasal BID     PRN Meds:  Current Facility-Administered Medications:     acetaminophen, 650 mg, Oral, Q6H PRN    HYDROcodone-acetaminophen, 1 tablet, Oral, Q4H PRN    labetalol, 10 mg, Intravenous, Q20 Min PRN    melatonin, 6 mg, Oral, Nightly PRN    ondansetron, 4 mg, Intravenous, Q8H PRN     Review of patient's allergies indicates:   Allergen Reactions    Asparagus Rash    Macrobid [nitrofurantoin monohyd/m-cryst] Rash     Peeling skin and petechia     Objective:     Vital Signs (24h Range):  Temp:  [97.6 °F (36.4 °C)-98.6 °F (37 °C)] 98.6 °F (37 °C)  Pulse:  [58-70] 62  Resp:  [12-22] 15  SpO2:  [95 %-100 %] 95 %  BP: (121-170)/(57-74) 135/65       Lines/Drains/Airways       Drain  Duration                  Closed/Suction Drain 07/02/25 0852 Tube - 1 Left Neck Bulb 10 Fr. <1 day              Peripheral Intravenous Line  Duration             Peripheral IV Single Lumen 07/02/25 0659 18 G Left;Posterior Forearm 1 day                     Physical Exam   NAD  Awake and alert  Head atraumatic   Auricles WNL AU  Nose w/ normal external appearance  Neck JOLIE with serosanguinous output  Neck soft, appropriately TTP with incision clean/dry/intact, normal ROM  Normal WOB, no stridor or stertor    Significant Labs:  CBC:   Recent Labs   Lab 07/03/25  0448   WBC 5.39   RBC 3.57*   HGB 11.2*   HCT 34.1*      MCV 96   MCH 31.4*   MCHC 32.8     CMP:   Recent Labs   Lab 07/03/25  0448      CALCIUM 8.3*   ALBUMIN 3.3*      K 4.9   CO2 25      BUN 44*   CREATININE 1.6*       Significant Diagnostics:  None

## 2025-07-03 NOTE — CARE UPDATE
I have reviewed the chart of Shima Kinza Sorto who is hospitalized for the following:    Active Hospital Problems    Diagnosis    History of submandibular gland removal    Submandibular gland mass    Hypertension        Mary Cochran, KRISTIEP-C  Unit Based [facility]

## 2025-07-03 NOTE — PROGRESS NOTES
Pharmacist Renal Dose Adjustment Note    Shima Sorto is a 84 y.o. female being treated with the medication Cephalexin     Patient Data:    Vital Signs (Most Recent):  Temp: 98.6 °F (37 °C) (07/03/25 0150)  Pulse: 62 (07/03/25 0150)  Resp: 15 (07/03/25 0150)  BP: 135/65 (07/03/25 0150)  SpO2: 95 % (07/03/25 0150) Vital Signs (72h Range):  Temp:  [97.5 °F (36.4 °C)-98.6 °F (37 °C)]   Pulse:  [52-70]   Resp:  [12-22]   BP: (121-170)/(57-77)   SpO2:  [95 %-100 %]      Recent Labs   Lab 07/03/25 0448   CREATININE 1.6*     Serum creatinine: 1.6 mg/dL (H) 07/03/25 0448  Estimated creatinine clearance: 22.6 mL/min (A)    Medication:Cephalexin 500 mg every 6 hours will be changed to medication, Cephalexin 500 mg every 8 hours     Pharmacist's Name: Romaine Jones  Pharmacist's Extension: [phone]

## 2025-07-03 NOTE — ASSESSMENT & PLAN NOTE
84 F s/p L submandibular gland excision with Dr. Roberto on 7/2/25. Healing appropriately on POD 1. Drain with minimal serosanguinous output.    -- Tolerating regular diet  -- MMPC  -- Will pull JOLIE drain  -- Pt is stable for discharge, will fu outpatient postoperatively  -- Please page ENT on call with any questions

## 2025-07-03 NOTE — DISCHARGE SUMMARY
[facility]  Otorhinolaryngology-Head & Neck Surgery  Discharge Summary      Patient Name: Shima Sorto  MRN: [id number]  Admission Date: 7/2/2025  Hospital Length of Stay: 0 days  Discharge Date and Time:  07/03/2025 8:21 AM  Attending Physician: Rj Roberto MD   Discharging Provider: Yomaira Pepper MD  Primary Care Provider: Carmen Milton MD    HPI:   No notes on file    Procedure(s) (LRB):  EXCISION, SUBMANDIBULAR GLAND (Left)        Hospital Course:   Shima Sorto is a 84 y.o. female that presents to the hospital for surgery for Neck mass [R22.1], left submandibular gland excision. Patient underwent procedures listed below with Rj Roberto MD on 7/2/2025.     Pt was admitted overnight for pain control and observation. On POD1, pt was tolerating po intake with pain well controlled with po medications. Drain with minimal serosanguinous output was pulled on POD1.    Pt was stable for discharge home. Discharge medications and discharge instructions, including strict return precautions, were provided upon discharge. Pt will follow-up in ENT/head and neck clinic as noted below.     Procedure(s):  EXCISION, SUBMANDIBULAR GLAND    Physical Exam  NAD  Awake and alert  Head atraumatic   Auricles WNL AU  Nose w/ normal external appearance  MMM, anterior tongue mobile, FOM soft, OP patent w/ midline uvula  Neck soft, appropriately TTP with incision clean/dry/intact, normal ROM  Normal WOB, no stridor or stertor    Indwelling Lines/Drains at time of discharge:   Lines/Drains/Airways       Drain  Duration                  Closed/Suction Drain 07/02/25 0852 Tube - 1 Left Neck Bulb 10 Fr. 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    Specialty: Otolaryngology  Why: For wound re-check, post op  Contact information:  [address]  [facility] [address]  [phone]                             Patient Instructions:      Diet Adult Regular     No dressing needed     Activity as tolerated       Medications:  Reconciled Home Medications:      Medication List        CHANGE how you take these medications      acetaminophen 650 MG Tbsr  Commonly known as: TYLENOL  Take 1 tablet (650 mg total) by mouth every 8 (eight) hours. for 13 days  What changed:   how much to take  when to take this  reasons to take this            ASK your doctor about these medications      CLARITIN 10 mg tablet  Generic drug: loratadine  Take 10 mg by mouth once daily.     coQ10 (ubiquinol) 100 mg Cap  Take 100 mg by mouth once daily.     denosumab 60 mg/mL Syrg  Commonly known as: PROLIA  Inject 60 mg into the skin every 6 (six) months.     ferrous gluconate 324 MG tablet  Commonly known as: FERGON  Taker one daily one hour before a meal     fish oil-E-fatty acid5-wsum055 400-5 mg-unit Cap  Take 1 capsule by mouth Daily.     losartan 25 MG tablet  Commonly known as: COZAAR  Take 1 tablet (25 mg total) by mouth 2 (two) times a day.     NEPHROCAPS 1 mg Cap  Generic drug: vitamin renal formula (B-complex-vitamin c-folic acid)  Take 1 capsule by mouth once daily.     PRESERVISION AREDS-2 250-90-40-1 mg Cap  Generic drug: vit C,V-Ct-stahn-lutein-zeaxan  Take 1 tablet by mouth 2 (two) times daily.     SALONPAS (LIDOCAINE) TOP  Apply 1 patch topically  daily as needed (Pain).     SYSTANE ULTRA 0.4-0.3 % Drop  Generic drug: peg 400-propylene glycol  2 (two) times daily as needed.     UNABLE TO FIND  Rufus-Mag-Citrate with D3 & K2 - Takes 2 tablets by mouth every morning, 1 at midday and 1 every evening.     UNABLE TO FIND  Take 4 capsules by mouth Daily. Nutrafol     VITAMIN D3 25 mcg (1,000 unit) capsule  Generic drug: cholecalciferol (vitamin D3)  Take 1,000 Units by mouth once daily.              Time spent on the discharge of patient: 40 minutes    Yomaira Pepper MD  Otorhinolaryngology-Head & Neck Surgery  [facility]

## 2025-07-03 NOTE — DISCHARGE INSTRUCTIONS
Discharge instructions    Your follow-up appointment with ENT will be in 1-2 weeks or possibly sooner if you have a drain in place.   If you do not know your f/u appt date or do not receive a call/notification about your follow-up appointment within 5 days following discharge, please call the clinic during business hours at [phone].    Medications/Pain control  - Alternate the use of acetaminophen (Tylenol®) and ibuprofen (Advil®, Motrin®) every 4-6 hours to control your pain (unless otherwise instructed not to take these pain medications by another provider). A low-grade fever (101 degrees or less) following surgery may occur and should be treated with acetaminophen. Follow the directions on the bottle. If the fever persists (more than 2 days) or is greater than 102 degrees, call our office.   - For pain that continues despite over the counter pain medication, take your narcotic pain medication.   - Constipation is common especially when taking narcotic pain medication. You should drink plenty of liquids and eat a diet high in fiber. You may take a stool softener for a short time while on the pain medication. Avoid laxatives.  - Do not drive a car, operate machinery/power tools, drink alcohol including beer, make important decisions or sign legal documents while using narcotic pain medication.  - If prescribed, please take your antibiotic until all of the pills are finished (or until drain/packing removed in clinic)   - avoid taking antibiotics on an empty stomach to prevent nausea/vomiting    Diet  - Resume your regular diet following surgery or if experiencing nausea eat a light diet as tolerated.   - Avoid spicy, greasy, fried or gaseous foods.   - If you experience any nausea, fluids that are clear and high in sugar are recommended (Gatorade, soda, Marcio-aid, nonacidic juices).    Activity  - Please do not lift anything heavier than 10 pounds (2 gallons of milk) for 2 weeks  - No straining or exercising for  2 weeks  - Otherwise you may continue your regular activities as you can tolerate them   - Walking around and moving about is strongly encouraged     Wound care  - OK to shower starting tomorrow (if going home with drain see below), but do not abrasively rub your incision site  - Avoid swimming or soaking (including submerging in bathtub) your incision  - If skin glue is present over your incision, it will begin to peel off on it's own in the next week or so  - If sutures (stitches) are present, you should apply Aquaphor/vaseline or if provided medicated ointment to the incisions twice per day    When to call the clinic or return to a hospital/ED  - If there's any redness or drainage from your incision (if present), concern for significant bleeding, or if you have fever greater than 101.5, call the office immediately or go to the emergency room.  - If you have increased swelling around your neck or difficulty breathing, report to the nearest emergency room.     If you are going home with a drain, please note the following:  - Sponge bathe until your drain is removed.  - Take your antibiotic until your drain is removed.  - Please call clinic and notify nurse of drain output the morning of follow up appointment (2 days from discharge date).   - Please request home drain care instructions from RN prior to discharge. Patient to strip drain and record output TID.       IF URGENT POSTOPERATIVE CONCERNS/QUESTIONS CALL THE  [phone] AND ASK FOR ENT ON CALL.     Follow-up Information       Roselyn Mckeon PA-C Follow up on 7/7/2025.    Specialty: Otolaryngology  Why: For wound re-check, post op  Contact information:  [address]  [facility] [id number] [phone]

## 2025-07-03 NOTE — PLAN OF CARE
POC;    - patient ambulated the hallway with daughter. JOLIE x1 Left neck with dark brown output. Bed alarm and call light within reach.    Problem: Adult Inpatient Plan of Care  Goal: Plan of Care Review  Outcome: Progressing  Goal: Patient-Specific Goal (Individualized)  Outcome: Progressing  Goal: Absence of Hospital-Acquired Illness or Injury  Outcome: Progressing  Goal: Optimal Comfort and Wellbeing  Outcome: Progressing     Problem: Wound  Goal: Optimal Functional Ability  Outcome: Progressing  Goal: Optimal Pain Control and Function  Outcome: Progressing     Problem: Fall Injury Risk  Goal: Absence of Fall and Fall-Related Injury  Outcome: Progressing

## 2025-07-03 NOTE — DISCHARGE SUMMARY
[facility]ico P[facility]  Discharge Note  Short Stay    Procedure(s) (LRB):  EXCISION, SUBMANDIBULAR GLAND (Left)      OUTCOME: Patient tolerated treatment/procedure well without complication and is now ready for discharge.    DISPOSITION: Home or Self Care    FINAL DIAGNOSIS:  <principal problem not specified>    FOLLOWUP: In clinic    DISCHARGE INSTRUCTIONS:    Discharge Procedure Orders   Diet Adult Regular     No dressing needed     Activity as tolerated        TIME SPENT ON DISCHARGE: 10 minutes

## 2025-07-07 ENCOUNTER — OFFICE VISIT (OUTPATIENT)
Dept: OTOLARYNGOLOGY | Facility: CLINIC | Age: 85
End: 2025-07-07
Payer: MEDICARE

## 2025-07-07 ENCOUNTER — RESULTS FOLLOW-UP (OUTPATIENT)
Dept: SLEEP MEDICINE | Facility: CLINIC | Age: 85
End: 2025-07-07

## 2025-07-07 VITALS
BODY MASS INDEX: 21.61 KG/M2 | SYSTOLIC BLOOD PRESSURE: 146 MMHG | DIASTOLIC BLOOD PRESSURE: 69 MMHG | WEIGHT: 125.88 LBS | HEART RATE: 62 BPM

## 2025-07-07 DIAGNOSIS — G47.33 OSA (OBSTRUCTIVE SLEEP APNEA): Primary | ICD-10-CM

## 2025-07-07 DIAGNOSIS — G47.30 SLEEP-DISORDERED BREATHING: ICD-10-CM

## 2025-07-07 DIAGNOSIS — K11.8 SUBMANDIBULAR GLAND MASS: ICD-10-CM

## 2025-07-07 DIAGNOSIS — Z48.89 ENCOUNTER FOR POSTOPERATIVE WOUND CHECK: Primary | ICD-10-CM

## 2025-07-07 DIAGNOSIS — G47.00 INSOMNIA, UNSPECIFIED TYPE: ICD-10-CM

## 2025-07-07 DIAGNOSIS — I10 PRIMARY HYPERTENSION: ICD-10-CM

## 2025-07-07 DIAGNOSIS — R06.83 SNORING: ICD-10-CM

## 2025-07-07 LAB
ESTROGEN SERPL-MCNC: NORMAL PG/ML
INSULIN SERPL-ACNC: NORMAL U[IU]/ML
LAB AP CLINICAL INFORMATION: NORMAL
LAB AP GROSS DESCRIPTION: NORMAL
LAB AP PERFORMING LOCATION(S): NORMAL
LAB AP REPORT FOOTNOTES: NORMAL
T3RU NFR SERPL: NORMAL %

## 2025-07-07 PROCEDURE — 99213 OFFICE O/P EST LOW 20 MIN: CPT | Mod: PBBFAC | Performed by: PHYSICIAN ASSISTANT

## 2025-07-07 PROCEDURE — 95810 POLYSOM 6/> YRS 4/> PARAM: CPT | Mod: 26,,, | Performed by: INTERNAL MEDICINE

## 2025-07-07 PROCEDURE — 99999 PR PBB SHADOW E&M-EST. PATIENT-LVL III: CPT | Mod: PBBFAC,,, | Performed by: PHYSICIAN ASSISTANT

## 2025-07-07 PROCEDURE — 99024 POSTOP FOLLOW-UP VISIT: CPT | Mod: POP,,, | Performed by: PHYSICIAN ASSISTANT

## 2025-07-07 NOTE — PROCEDURES
"[facility]    Polysomnography Interpretation Report    Patient Name:  Shima Sorto  MRN#:  [id number]  :  1940  Study Date:  2025  Referring Provider:  Holly Osborne    Indications for Polysomnography:  The patient is a 84 year old Female who is 5' 3" and weighs 121.0 lbs.  Her BMI equals 21.4.  [place] was - and Neck Circumference was -.  A full night polysomnogram was performed to evaluate for -.    Polysomnogram Data  A full night polysomnogram recorded the standard physiologic parameters including EEG, EOG, EMG, EKG, nasal and oral airflow.  Respiratory parameters of chest and abdominal movements were recorded with (RIP) Respiratory Inductance Plethsmography.  Oxygen saturation was recorded by pulse oximetry.    Sleep Architecture  The total recording time of the polysomnogram was 464.6 minutes.  The total sleep time was 99.5 minutes.  The patient spent 17.1% of total sleep time in Stage N1, 71.4% in Stage N2, 0.0% in Stages N3, and 11.6% in REM.  Sleep latency was 20.0 minutes.  REM latency was 152.5 minutes.  Sleep Efficiency was 21.4%.  Wake after sleep onset was 345.0 minutes.    Respiratory Events  The polysomnogram revealed a presence of - obstructive, - central, and - mixed apneas resulting in a Total Apnea index of - events per hour.  There were 3 hypopneas resulting in a Total Hypopnea index of 1.8 events per hour.  The combined Apnea/Hypopnea index was 1.8 events per hour.  There were a total of 2 RERA events resulting in a Respiratory Disturbance Index (RDI) of 3.0 events per hour.     Mean oxygen saturation was 97.7%.  The lowest oxygen saturation during sleep was 93.0%.  Time spent <=88% oxygen saturation was - minutes (-).    End Tidal CO2 during sleep ranged from - to - mmHg. End Tidal CO2 was greater than 50 mmHg for - minutes and greater than 55 mmHg for - minutes.  Transcutaneous CO2 during sleep ranged from - to - mmHg. Transcutaneous CO2 was greater than 50 mmHg for " "- minutes and greater than 55 mmHg for - minutes.    Limb Activity  There were - limb movements recorded.  Of this total, - were classified as PLMs.  Of the PLMs, - were associated with arousals.  The Limb Movement index was - per hour while the PLM index was - per hour and PLM with arousals index was - per hour.    Cardiac:  single lead EKG revealed normal sinus rhythm with occasional PVCs    Oxygenation:  No significant hypoxemia was observed     Impression:  -The mild sleep-disordered breathing in this study does not meet criteria for obstructive sleep apnea.  -significantly decreased sleep efficiency (SE%)   -prolonged sleep onset latency (SOL)   -increased wakefulness after sleep onset (WASO)   -limited amount of REM sleep     Recommendations:   -consider repeat diagnostic study  with the use of a sleep aid    -the patient has follow up with Sleep Medicine          Dewayne Rhodes MD    (This Sleep Study was interpreted by a Board Certified Sleep Specialist who conducted an epoch-by-epoch review of the entire raw data recording.)  (The indication for this sleep study was reviewed and deemed appropriate by [employer] Practice Parameters or other reasons by a Board Certified Sleep Specialist.)    [facility]     Diagnostic PSG Report     Patient Name: Shima Sorto Study Date: 6/25/2025   YOB: 1940 MRN #: [id number]   Age: 84 year BIANCA #: -   Sex: Female Referring Provider: Holly Osborne   Height: 5' 3" Recording Tech: Khurram Vines RPSGT   Weight: 121.0 lbs Scoring Tech: Clemente Sierra RRT RPSGT   BMI: 21.4 Interpreting Physician: Dewayne Rhodes MD   ESS: - Neck Circumference: -      Study Overview     Lights Off: 09:46:03 PM   Count Index   Lights On: 05:30:39 AM Awakenings: 18 10.9   Time in Bed: 464.6 min. Arousals: 20 12.1   Total Sleep Time: 99.5 min. Apneas & Hypopneas: 3 1.8    Sleep Efficiency: 21.4% Limb Movements: - -   Sleep Latency: 20.0 min. Snores: - -   Wake After Sleep Onset: " 345.0 min. Desaturations: 2 1.2    REM Latency from Sleep Onset: 152.5 min. Minimum SpO2 TST: 93.0%        Sleep Architecture                                                                                                                           % of Time in Bed     Stages Time (mins) % Sleep Time   Wake 366.0     Stage N1 17.0 17.1%   Stage N2 71.0 71.4%   Stage N3 0.0 0.0%   REM 11.5 11.6%         Arousal Summary       NREM REM Sleep Index   Respiratory Arousals - - - -   PLM Arousals - - - -   Isolated Limb Movement Arousals - - - -   Spontaneous Arousals 20 - 20 12.1   Total 20 - 20 12.1         Limb Movement Summary       Count Index   Isolated Limb Movements - -   Periodic Limb Movements (PLMs) - -   Total Limb Movements - -          Respiratory Summary       By Sleep Stage By Body Position Total    NREM REM Supine Non-Supine    Time (min) 88.0 11.5 - 99.5 99.5                 Obstructive Apnea - - - - -   Mixed Apnea - - - - -   Central Apnea - - - - -   Central Apnea Index - - - - -   Total Apneas - - - - -   Total Apnea Index - - - - -                 Hypopnea 3 - - 3 3   Hypopnea Index 2.0 - - 1.8 1.8                 Apnea & Hypopnea 3 - - 3 3   Apnea & Hypopnea Index 2.0 - - 1.8 1.8                 RERAs 2 - - 2 2   RERA Index 1.4 - - 1.2 1.2                 RDI 3.4 - - 3.0 3.0      Scoring Criteria: Hypopneas scored at Choose an item.% desaturation criteria.     Respiratory Event Durations       Apnea Hypopnea    NREM REM NREM REM   Average (seconds) - - 18.9 -   Maximum (seconds) - - 22.8 -         Oxygen Saturation Summary       Wake NREM REM TST TIB   Average SpO2 97.9% 97.2% 97.1% 97.2% 97.7%   Minimum SpO2 90.0% 93.0% 95.0% 93.0% 90.0%   Maximum SpO2 100.0% 99.0% 99.0% 99.0% 100.0%         Oxygen Saturation Distribution     Range (%) Time in range (min) Time in range (%)   90.0 - 100.0 353.6 97.9%   80.0 - 90.0 0.1 0.0%   70.0 - 80.0 - -   60.0 - 70.0 - -   50.0 - 60.0 - -   0.0 - 50.0 - -   Time  Spent <=88% SpO2     Range (%) Time in range (min) Time in range (%)   0.0 - 88.0 - -              Count Index   Desaturations 2 1.2           Cardiac Summary       Wake NREM REM Sleep Total   Average Pulse Rate (BPM) 55.5 48.1 46.4 47.9 53.4   Minimum Pulse Rate (BPM) 38.0 38.0 42.0 38.0 38.0   Maximum Pulse Rate (BPM) 250.0 63.0 58.0 63.0 250.0      Pulse Rate Distribution     Range (bpm) Time in range (min) Time in range (%)   0.0 - 40.0 5.3 1.5%   40.0 - 60.0 317.9 87.9%   60.0 - 80.0 37.6 10.4%   80.0 - 100.0 - -   100.0 - 120.0 - -   120.0 - 140.0 0.1 0.0%   140.0 - 200.0 0.7 0.2%      EtCO2 Summary     Stage Min (mmHg) Average (mmHg) Max (mmHg)   Wake - - -   NREM(1+2+3) - - -   REM - - -      Range (mmHg) Time in range (min) Time in range (%)   20.0 - 40.0 - -   40.0 - 50.0 - -   50.0 - 55.0 - -   55.0 - 100.0 - -      TcCO2 Summary     Stage Min (mmHg) Average (mmHg) Max (mmHg)   Wake - - -   NREM(1+2+3) - - -   REM - - -      Range (mmHg) Time in range (min) Time in range (%)   20.0 - 40.0 - -   40.0 - 50.0 - -   50.0 - 55.0 - -   55.0 - 100.0 - -   Excluded data <20.0 & >65.0 465.5 100.0%      Comments     -

## 2025-07-07 NOTE — PROGRESS NOTES
HEAD AND NECK SURGICAL ONCOLOGY CLINIC NOTE    CC: Follow-up for wound check    TREATMENT HISTORY:  1. EXCISION, SUBMANDIBULAR GLAND (Left), July 2, 2025 - Dr Roberto    PATHOLOGY:   Clinical Information    Procedure:  EXCISION, SUBMANDIBULAR GLAND - Left  Pre-op Diagnosis:  Neck mass [R22.1]  Post-op Diagnosis:  R22.1 - Neck mass [ICD-10-CM]   Final Diagnosis   Submandibular gland, left, excision:  - Serous-predominant salivary gland parenchyma with focal fibrosis, mild chronic inflammation and reactive changes  - Negative for neoplasia or malignancy       INTERVAL HISTORY:  Shima Sorto returns to clinic today without complaints. her and Her voice is normal. She denies perioral or digital paresthesias or dysesthesias. There is no redness or drainage from the wound. She has no fever or chills. She is eating a regular diet.  Denies sore throat, dysphagia, shortness of breath.  Post op pain resolved. She does have fatigue and feels run down.     PHYSICAL EXAM:  Dermabond noted to incision. Incision CDI without redness, swelling, drainage, bleeding. Nontender. Normal ROM of the neck.      PLAN:  Wound care/scar care discussed.   Pathology reviewed.  Follow up 2 weeks for wound check.

## 2025-07-08 ENCOUNTER — INFUSION (OUTPATIENT)
Dept: INFUSION THERAPY | Facility: HOSPITAL | Age: 85
End: 2025-07-08
Payer: MEDICARE

## 2025-07-08 ENCOUNTER — TELEPHONE (OUTPATIENT)
Dept: SLEEP MEDICINE | Facility: OTHER | Age: 85
End: 2025-07-08
Payer: MEDICARE

## 2025-07-10 ENCOUNTER — HOSPITAL ENCOUNTER (OUTPATIENT)
Dept: RADIOLOGY | Facility: HOSPITAL | Age: 85
Discharge: HOME | End: 2025-07-10
Attending: NURSE PRACTITIONER
Payer: MEDICARE

## 2025-07-10 ENCOUNTER — PATIENT MESSAGE (OUTPATIENT)
Dept: NEPHROLOGY | Facility: CLINIC | Age: 85
End: 2025-07-10
Payer: MEDICARE

## 2025-07-10 DIAGNOSIS — Z12.31 ENCOUNTER FOR SCREENING MAMMOGRAM FOR MALIGNANT NEOPLASM OF BREAST: ICD-10-CM

## 2025-07-10 DIAGNOSIS — Z17.0 MALIGNANT NEOPLASM OF UPPER-OUTER QUADRANT OF RIGHT BREAST IN FEMALE, ESTROGEN RECEPTOR POSITIVE: ICD-10-CM

## 2025-07-10 DIAGNOSIS — C50.411 MALIGNANT NEOPLASM OF UPPER-OUTER QUADRANT OF RIGHT BREAST IN FEMALE, ESTROGEN RECEPTOR POSITIVE: ICD-10-CM

## 2025-07-10 PROCEDURE — 77067 SCR MAMMO BI INCL CAD: CPT | Mod: TC

## 2025-07-15 ENCOUNTER — INFUSION (OUTPATIENT)
Dept: INFUSION THERAPY | Facility: HOSPITAL | Age: 85
End: 2025-07-15
Payer: MEDICARE

## 2025-07-15 VITALS — DIASTOLIC BLOOD PRESSURE: 59 MMHG | SYSTOLIC BLOOD PRESSURE: 136 MMHG | HEART RATE: 63 BPM

## 2025-07-15 DIAGNOSIS — N18.32 STAGE 3B CHRONIC KIDNEY DISEASE: Primary | ICD-10-CM

## 2025-07-15 DIAGNOSIS — N18.32 ACUTE RENAL FAILURE SUPERIMPOSED ON STAGE 3B CHRONIC KIDNEY DISEASE, UNSPECIFIED ACUTE RENAL FAILURE TYPE: ICD-10-CM

## 2025-07-15 DIAGNOSIS — E55.9 VITAMIN D DEFICIENCY: ICD-10-CM

## 2025-07-15 DIAGNOSIS — N17.9 ACUTE RENAL FAILURE SUPERIMPOSED ON STAGE 3B CHRONIC KIDNEY DISEASE, UNSPECIFIED ACUTE RENAL FAILURE TYPE: ICD-10-CM

## 2025-07-15 DIAGNOSIS — M80.00XD AGE-RELATED OSTEOPOROSIS WITH CURRENT PATHOLOGICAL FRACTURE WITH ROUTINE HEALING: ICD-10-CM

## 2025-07-15 PROCEDURE — 63600175 PHARM REV CODE 636 W HCPCS: Mod: JZ,EC,TB | Performed by: INTERNAL MEDICINE

## 2025-07-15 PROCEDURE — 96372 THER/PROPH/DIAG INJ SC/IM: CPT

## 2025-07-15 RX ADMIN — EPOETIN ALFA-EPBX 40000 UNITS: 40000 INJECTION, SOLUTION INTRAVENOUS; SUBCUTANEOUS at 02:07

## 2025-07-18 DIAGNOSIS — N18.32 STAGE 3B CHRONIC KIDNEY DISEASE: Primary | ICD-10-CM

## 2025-07-21 ENCOUNTER — TELEPHONE (OUTPATIENT)
Dept: RADIOLOGY | Facility: HOSPITAL | Age: 85
End: 2025-07-21
Payer: MEDICARE

## 2025-07-21 ENCOUNTER — TELEPHONE (OUTPATIENT)
Dept: HEMATOLOGY/ONCOLOGY | Facility: CLINIC | Age: 85
End: 2025-07-21
Payer: MEDICARE

## 2025-07-21 ENCOUNTER — OFFICE VISIT (OUTPATIENT)
Dept: OTOLARYNGOLOGY | Facility: CLINIC | Age: 85
End: 2025-07-21
Payer: MEDICARE

## 2025-07-21 VITALS
WEIGHT: 127.44 LBS | DIASTOLIC BLOOD PRESSURE: 66 MMHG | SYSTOLIC BLOOD PRESSURE: 142 MMHG | BODY MASS INDEX: 21.87 KG/M2

## 2025-07-21 DIAGNOSIS — Z48.02 VISIT FOR SUTURE REMOVAL: ICD-10-CM

## 2025-07-21 DIAGNOSIS — Z48.89 ENCOUNTER FOR POSTOPERATIVE WOUND CHECK: Primary | ICD-10-CM

## 2025-07-21 PROCEDURE — 99213 OFFICE O/P EST LOW 20 MIN: CPT | Mod: PBBFAC | Performed by: PHYSICIAN ASSISTANT

## 2025-07-21 PROCEDURE — 99999 PR PBB SHADOW E&M-EST. PATIENT-LVL III: CPT | Mod: PBBFAC,,, | Performed by: PHYSICIAN ASSISTANT

## 2025-07-21 PROCEDURE — 99024 POSTOP FOLLOW-UP VISIT: CPT | Mod: POP,,, | Performed by: PHYSICIAN ASSISTANT

## 2025-07-21 NOTE — TELEPHONE ENCOUNTER
Spoke with patient and explained mammogram findings.Patient expressed understanding of results. Patient scheduled abnormal mammogram follow up appointment at [facility] [place] on 7/22/2025.

## 2025-07-21 NOTE — PROGRESS NOTES
HEAD AND NECK SURGICAL ONCOLOGY CLINIC NOTE    CC: Follow-up for wound check    TREATMENT HISTORY:  1. EXCISION, SUBMANDIBULAR GLAND (Left), July 2, 2025 - Dr Roberto     PATHOLOGY:   Clinical Information     Procedure:  EXCISION, SUBMANDIBULAR GLAND - Left  Pre-op Diagnosis:  Neck mass [R22.1]  Post-op Diagnosis:  R22.1 - Neck mass [ICD-10-CM]   Final Diagnosis   Submandibular gland, left, excision:  - Serous-predominant salivary gland parenchyma with focal fibrosis, mild chronic inflammation and reactive changes  - Negative for neoplasia or malignancy       INTERVAL HISTORY:  Shima Sorto returns to clinic today for follow up. There is no redness or drainage from the wound. She has no fever or chills. She does have some intermittent L neck pain.      PHYSICAL EXAM:  Well healed incision. Incision CDI without redness, swelling, drainage, bleeding. Nontender. Normal ROM of the neck. There is one palpable dissolvable suture to the must lateral aspect of incision.     PROCEDURE:  Dissolvable suture removed without difficulty.    PLAN:  Well healed, dissolvable suture removed without difficulty. Tolerated well.   Follow up as needed.

## 2025-07-22 ENCOUNTER — OFFICE VISIT (OUTPATIENT)
Dept: HEMATOLOGY/ONCOLOGY | Facility: CLINIC | Age: 85
End: 2025-07-22
Payer: MEDICARE

## 2025-07-22 ENCOUNTER — HOSPITAL ENCOUNTER (OUTPATIENT)
Dept: RADIOLOGY | Facility: HOSPITAL | Age: 85
Discharge: HOME | End: 2025-07-22
Attending: NURSE PRACTITIONER
Payer: MEDICARE

## 2025-07-22 VITALS
OXYGEN SATURATION: 98 % | SYSTOLIC BLOOD PRESSURE: 137 MMHG | TEMPERATURE: 98 F | DIASTOLIC BLOOD PRESSURE: 74 MMHG | HEIGHT: 63 IN | WEIGHT: 126.56 LBS | BODY MASS INDEX: 22.43 KG/M2 | RESPIRATION RATE: 18 BRPM | HEART RATE: 73 BPM

## 2025-07-22 DIAGNOSIS — R92.8 ABNORMAL FINDING ON BREAST IMAGING: ICD-10-CM

## 2025-07-22 DIAGNOSIS — D63.1 ANEMIA IN STAGE 3 CHRONIC KIDNEY DISEASE, UNSPECIFIED WHETHER STAGE 3A OR 3B CKD: ICD-10-CM

## 2025-07-22 DIAGNOSIS — N18.30 ANEMIA IN STAGE 3 CHRONIC KIDNEY DISEASE, UNSPECIFIED WHETHER STAGE 3A OR 3B CKD: ICD-10-CM

## 2025-07-22 DIAGNOSIS — D63.1 ANEMIA DUE TO STAGE 3 CHRONIC KIDNEY DISEASE, UNSPECIFIED WHETHER STAGE 3A OR 3B CKD: Primary | ICD-10-CM

## 2025-07-22 DIAGNOSIS — N18.30 ANEMIA DUE TO STAGE 3 CHRONIC KIDNEY DISEASE, UNSPECIFIED WHETHER STAGE 3A OR 3B CKD: Primary | ICD-10-CM

## 2025-07-22 DIAGNOSIS — Z17.0 MALIGNANT NEOPLASM OF UPPER-OUTER QUADRANT OF RIGHT BREAST IN FEMALE, ESTROGEN RECEPTOR POSITIVE: Chronic | ICD-10-CM

## 2025-07-22 DIAGNOSIS — C50.411 MALIGNANT NEOPLASM OF UPPER-OUTER QUADRANT OF RIGHT BREAST IN FEMALE, ESTROGEN RECEPTOR POSITIVE: Chronic | ICD-10-CM

## 2025-07-22 PROCEDURE — 99999 PR PBB SHADOW E&M-EST. PATIENT-LVL IV: CPT | Mod: PBBFAC,,, | Performed by: INTERNAL MEDICINE

## 2025-07-22 PROCEDURE — G2211 COMPLEX E/M VISIT ADD ON: HCPCS | Mod: ,,, | Performed by: INTERNAL MEDICINE

## 2025-07-22 PROCEDURE — 77065 DX MAMMO INCL CAD UNI: CPT | Mod: 26,RT,, | Performed by: RADIOLOGY

## 2025-07-22 PROCEDURE — 77061 BREAST TOMOSYNTHESIS UNI: CPT | Mod: 26,RT,, | Performed by: RADIOLOGY

## 2025-07-22 PROCEDURE — 99214 OFFICE O/P EST MOD 30 MIN: CPT | Mod: PBBFAC | Performed by: INTERNAL MEDICINE

## 2025-07-22 PROCEDURE — 77065 DX MAMMO INCL CAD UNI: CPT | Mod: TC,RT

## 2025-07-22 PROCEDURE — 99214 OFFICE O/P EST MOD 30 MIN: CPT | Mod: S$PBB,,, | Performed by: INTERNAL MEDICINE

## 2025-07-22 NOTE — PROGRESS NOTES
Subjective:       Patient ID: Shima Sorto is a 85 y.o. female.    Chief Complaint: No chief complaint on file.    HPI    Ms. Sorto returns today for follow up.  I had last seen her on June 24 2025 and had restarted her weekly EPO.  However, it was subsequently held due to her hemoglobin rising to 11.2 grams/dL on July 3, 2025  Her CBC today is as follows:  WBCs 4,300 per cubic mm, hemoglobin 10.9 grams/deciliter, hematocrit 34.9%, , and platelets 208K.  Creatinine is 1.3 mg/dl, and eGFR is 40 ml/min.  Bilirubin is 0.8 mg/dL and transaminases are normal of whenever it got iron to see the the no gum and assessed here in a many D may need he [facility] suite all with XR regards to see us that exits rather the guidance      In regards to her breast cancer, she had been started on anastrazole in mid November 2018, and completed 5 years at the end of October 2023.  In late August 2023 she underwent a bone marrow biopsy which was essentially unremarkable.  There was no evidence of MDS, leukemia, lymphoma, myeloma, or metastatic carcinoma.  Cytogenetics were normal and reticulin was not increased.  Cellularity was 35 %.    Other recent pertinent labs are as follows:  3 stool samples were negative for occult blood  Multiple urinalyses have shown persistent hematuria.  Of note, the patient self catheterizes 3 times a day.  B12 is 406 pg/mL  SPEP shows no monoclonal spike  Direct Lane is negative    Briefly, she is an 84-year-old  female who was diagnosed with breast cancer in 2018.  On 08/17/2018, she underwent a lumpectomy and sentinel lymph node biopsy for an 8 mm grade 1 invasive lobular carcinoma which was ER strongly positive, RI negative and HER-2 negative with a Ki-67 of 5%, with the closest margin being 2 mm.  Two of 2 sentinel lymph nodes were negative for involvement.      She completed her radiation therapy and was subsequently started on AIs.  She completed 5 years of adjuvant hormonal therapy  and she is being followed expectantly.    A right mammogram this morning was read as BIRADS II, and a one year follow up was recommended.      In April 2023 she had been diagnosed with C diff and she was treated accordingly.  She underwent a colonoscopy and EGD by Dr. Garza.  The colonoscopy showed 3 polyps and extensive diverticulosis.  Biopsies were negative for malignancy.  The EGD showed gastritis and a hiatal hernia.  A video capsule swallow did not identify a source of bleeding in her small intestine.       Review of Systems      Overall she feels OK, and her ECOG PS is 1.  Her main complaint is mild discomfort from a recent biopsy of the submandibular salivary gland that showed no evidence of malignancy.  She also complains of fatigue.  Of note, she is known to have sleep apnea and she is not being treated currently.  She denies any anxiety, depression, easy bruising, fevers, chills, night  sweats, weight loss, nausea, vomiting, diarrhea, constipation, diplopia, blurred vision, headache, chest pain, palpitations, shortness of breath, breast pain, abdominal pain, extremity pain, or difficulty ambulating.  The remainder of the ten-point ROS, including general, skin, lymph, H/N, cardiorespiratory, GI, , Neuro, Endocrine, and psychiatric is negative.     Objective:      Physical Exam        She is alert, oriented to time, place, person, pleasant, well      nourished, in no acute physical distress.                                    VITAL SIGNS:  Reviewed                                      HEENT:  Normal.  There are no nasal, oral, lip, gingival, auricular, lid,    or conjunctival lesions.  Mucosae are moist and pink, and there is no        thrush.  Pupils are equal, reactive to light and accommodation.              Extraocular muscle movements are intact.  Dentition is good.   A healing incision is seen in the left submandibular area                                 NECK:  Supple without JVD, adenopathy, or  thyromegaly.                       LUNGS:  Clear to auscultation without wheezing, rales, or rhonchi.           CARDIOVASCULAR:  Reveals an S1, S2, a grade 2 systolic ejection murmur of aortic stenosis, no rubs, no gallops.         ABDOMEN:  Soft, nontender, without organomegaly.  Bowel sounds are    present.                                                                     EXTREMITIES:  No cyanosis, clubbing, or edema.                               BREASTS:   She is status post right lumpectomy with a well-healed incision in the upper outer quadrant.    There are no masses in either breast. .                                LYMPHATIC:  There is no cervical, axillary, or supraclavicular adenopathy.   SKIN:  Warm and moist, without petechiae, rashes, induration, or ecchymoses.        NEUROLOGIC:  DTRs are 0-1+ bilaterally, symmetrical, motor function is 5/5,and cranial nerves are  within normal limits.    Assessment:       1. Malignant neoplasm of upper-outer quadrant of right breast in female, estrogen receptor positive    2. S/p 5 years of adjuvant hormonal therapy with aromatase inhibitors      3.    Osteopenia approaching osteoporosis    4.   Anemia, chronic, normochromic normocytic, most likely multifactorial.  Her bone marrow biopsy did not reveal any MDS. Anemia resolved with 2 EPo injections     5.  CDK 3b     6.  Fatigue most likely secondary to sleep apnea and her anemia     7.  Documented sleep apnea  Plan:           I had a long discussion with her.    At this point we will proceed as follows:  We will continue to hold EPO injections   I will see her again in 6 weeks with repeat CBC, CMP and ferritin.  In regards to the breast cancer, she had completed her 5 years of anastrazole at the end of October 2023.  Her mammogram will be repeated on a yearly basis.      Her multiple questions were answered to her satisfaction.  I spent 30 minutes reviewing the available records, evaluating the patient, and  coordinating her care.  Visit today included increased complexity associated with the anemia, CKD, and breast cancer         Route Chart for Scheduling    Med Onc Chart Routing      Follow up with physician Other. See me in 5 or 6 weeks with labs.  Labs to be drawn 1 day prior.  Please have her on the schedule for EPO that day   Follow up with BASHIR    Infusion scheduling note    Injection scheduling note    Labs    Imaging    Pharmacy appointment    Other referrals            Supportive Plan Information  OP EPOETIN ENEDINA WEEKLY Robert Hernandez MD   Associated Diagnosis: Stage 3b chronic kidney disease   noted on 12/20/2023   Line of treatment: Supportive Care   Treatment goal: Supportive     Upcoming Treatment Dates - OP EPOETIN ENEDINA WEEKLY    7/16/2025       Medications       epoetin enedina-epbx injection 40,000 Units  7/23/2025       Medications       epoetin enedina-epbx injection 40,000 Units  7/30/2025       Medications       epoetin enedina-epbx injection 40,000 Units    Therapy Plan Information  DENOSUMAB (PROLIA) Q6M for Age-related osteoporosis with current pathological fracture with routine healing, noted on 4/8/2014  DENOSUMAB (PROLIA) Q6M for Acute renal failure superimposed on stage 3b chronic kidney disease, noted on 9/12/2018  DENOSUMAB (PROLIA) Q6M for Vitamin D deficiency, noted on 6/12/2019  Medications  denosumab (PROLIA) injection 60 mg  60 mg, Subcutaneous, Every 26 weeks      No therapy plan of the specified type found.    No therapy plan of the specified type found.

## 2025-08-07 ENCOUNTER — PATIENT MESSAGE (OUTPATIENT)
Dept: NEPHROLOGY | Facility: CLINIC | Age: 85
End: 2025-08-07
Payer: MEDICARE

## 2025-08-13 ENCOUNTER — LAB VISIT (OUTPATIENT)
Dept: LAB | Facility: HOSPITAL | Age: 85
End: 2025-08-13
Payer: MEDICARE

## 2025-08-13 DIAGNOSIS — N18.32 STAGE 3B CHRONIC KIDNEY DISEASE: ICD-10-CM

## 2025-08-13 LAB
ALBUMIN SERPL BCP-MCNC: 3.5 G/DL (ref 3.5–5.2)
ANION GAP (OHS): 8 MMOL/L (ref 8–16)
BUN SERPL-MCNC: 27 MG/DL (ref 8–23)
CALCIUM SERPL-MCNC: 9.1 MG/DL (ref 8.7–10.5)
CHLORIDE SERPL-SCNC: 101 MMOL/L (ref 95–110)
CO2 SERPL-SCNC: 29 MMOL/L (ref 23–29)
CREAT SERPL-MCNC: 1.4 MG/DL (ref 0.5–1.4)
GFR SERPLBLD CREATININE-BSD FMLA CKD-EPI: 37 ML/MIN/1.73/M2
GLUCOSE SERPL-MCNC: 95 MG/DL (ref 70–110)
HCT VFR BLD AUTO: 31.6 % (ref 37–48.5)
HGB BLD-MCNC: 10.4 GM/DL (ref 12–16)
PHOSPHATE SERPL-MCNC: 3.8 MG/DL (ref 2.7–4.5)
POTASSIUM SERPL-SCNC: 4.9 MMOL/L (ref 3.5–5.1)
PTH-INTACT SERPL-MCNC: 35.6 PG/ML (ref 9–77)
SODIUM SERPL-SCNC: 138 MMOL/L (ref 136–145)

## 2025-08-13 PROCEDURE — 82040 ASSAY OF SERUM ALBUMIN: CPT

## 2025-08-13 PROCEDURE — 85014 HEMATOCRIT: CPT

## 2025-08-13 PROCEDURE — 36415 COLL VENOUS BLD VENIPUNCTURE: CPT

## 2025-08-13 PROCEDURE — 83970 ASSAY OF PARATHORMONE: CPT

## 2025-08-13 PROCEDURE — 85018 HEMOGLOBIN: CPT

## 2025-08-14 ENCOUNTER — OFFICE VISIT (OUTPATIENT)
Dept: NEPHROLOGY | Facility: CLINIC | Age: 85
End: 2025-08-14
Payer: MEDICARE

## 2025-08-14 VITALS
DIASTOLIC BLOOD PRESSURE: 78 MMHG | BODY MASS INDEX: 21.48 KG/M2 | OXYGEN SATURATION: 100 % | HEART RATE: 66 BPM | SYSTOLIC BLOOD PRESSURE: 138 MMHG | WEIGHT: 121.25 LBS

## 2025-08-14 DIAGNOSIS — N18.32 STAGE 3B CHRONIC KIDNEY DISEASE: Primary | ICD-10-CM

## 2025-08-14 DIAGNOSIS — I10 PRIMARY HYPERTENSION: ICD-10-CM

## 2025-08-14 DIAGNOSIS — N20.0 KIDNEY STONES: ICD-10-CM

## 2025-08-14 DIAGNOSIS — I10 HYPERTENSION, UNSPECIFIED TYPE: ICD-10-CM

## 2025-08-14 PROCEDURE — 99499 UNLISTED E&M SERVICE: CPT | Mod: S$PBB,,, | Performed by: INTERNAL MEDICINE

## 2025-08-14 PROCEDURE — 99213 OFFICE O/P EST LOW 20 MIN: CPT | Mod: PBBFAC | Performed by: INTERNAL MEDICINE

## 2025-08-14 PROCEDURE — 99999 PR PBB SHADOW E&M-EST. PATIENT-LVL III: CPT | Mod: PBBFAC,,, | Performed by: INTERNAL MEDICINE

## 2025-08-18 PROBLEM — Z98.890: Status: RESOLVED | Noted: 2025-07-03 | Resolved: 2025-08-18

## 2025-08-18 PROBLEM — Z90.89: Status: RESOLVED | Noted: 2025-07-03 | Resolved: 2025-08-18

## 2025-08-20 ENCOUNTER — PATIENT MESSAGE (OUTPATIENT)
Dept: NEPHROLOGY | Facility: CLINIC | Age: 85
End: 2025-08-20
Payer: MEDICARE

## 2025-09-02 ENCOUNTER — OFFICE VISIT (OUTPATIENT)
Dept: OTOLARYNGOLOGY | Facility: CLINIC | Age: 85
End: 2025-09-02
Payer: MEDICARE

## 2025-09-02 DIAGNOSIS — Z48.89 ENCOUNTER FOR POSTOPERATIVE WOUND CHECK: Primary | ICD-10-CM

## 2025-09-02 PROCEDURE — 99213 OFFICE O/P EST LOW 20 MIN: CPT | Mod: PBBFAC | Performed by: PHYSICIAN ASSISTANT

## 2025-09-02 PROCEDURE — 99999 PR PBB SHADOW E&M-EST. PATIENT-LVL III: CPT | Mod: PBBFAC,,, | Performed by: PHYSICIAN ASSISTANT

## 2025-09-02 PROCEDURE — 99024 POSTOP FOLLOW-UP VISIT: CPT | Mod: POP,,, | Performed by: PHYSICIAN ASSISTANT

## (undated) DEVICE — SEE MEDLINE ITEM 152572

## (undated) DEVICE — SEE MEDLINE ITEM 146417

## (undated) DEVICE — CLIP MED TICALL

## (undated) DEVICE — PACK UNIVERSAL SPLIT II

## (undated) DEVICE — DRAPE INSTR MAGNETIC 10X16IN

## (undated) DEVICE — CONTAINER SPECIMEN OR STER 4OZ

## (undated) DEVICE — COVERS PROBE NR-48 STERILE

## (undated) DEVICE — TRAY MINOR GEN SURG

## (undated) DEVICE — BLADE SURG #15 CARBON STEEL

## (undated) DEVICE — SUT VICRYL PLUS 3-0 SH 18IN

## (undated) DEVICE — SUT VICRYL 3-0 27 SH

## (undated) DEVICE — FIBRILLAR ABS HEMOSTAT 4X4

## (undated) DEVICE — COVER LIGHT HANDLE 80/CA

## (undated) DEVICE — DRAPE STERI INSTRUMENT 1018

## (undated) DEVICE — HOOK LONE STAR BLUNT 12MM

## (undated) DEVICE — MARKER FN REG DUAL UTIL RULER

## (undated) DEVICE — DRAPE EENT SPLIT STERILE

## (undated) DEVICE — BRA SURGICAL MED 36-38

## (undated) DEVICE — SYR DISP LL 5CC

## (undated) DEVICE — TRAY SKIN SCRUB WET PREMIUM

## (undated) DEVICE — SEE MEDLINE ITEM 157128

## (undated) DEVICE — NDL HYPO REG 25G X 1 1/2

## (undated) DEVICE — SEE MEDLINE ITEM 152622

## (undated) DEVICE — SPONGE LAP 18X18 PREWASHED

## (undated) DEVICE — APPLIER CLIP LIAGCLIP 9.375IN

## (undated) DEVICE — ELECTRODE MEGADYNE RETURN DUAL

## (undated) DEVICE — ELECTRODE REM PLYHSV RETURN 9

## (undated) DEVICE — TRAY CATH 1-LYR URIMTR 16FR

## (undated) DEVICE — SUT ETHILON 2-0 PSLX 30IN

## (undated) DEVICE — NDL 18GA X1 1/2 REG BEVEL

## (undated) DEVICE — GAUZE FLUFF XXLG 36X36 2 PLY

## (undated) DEVICE — EVACUATOR WOUND BULB 100CC

## (undated) DEVICE — STOCKINET 4INX48

## (undated) DEVICE — SUT SILK 2-0 PS 18IN BLACK

## (undated) DEVICE — SUT 3-0 12-18IN SILK

## (undated) DEVICE — DRAPE THYROID WITH ARMBOARD

## (undated) DEVICE — GAUZE SPONGE 4X4 12PLY

## (undated) DEVICE — GAUZE SPONGE PEANUT STRL

## (undated) DEVICE — CORD BIPOLAR 12 FOOT

## (undated) DEVICE — STAPLER SKIN PROXIMATE WIDE

## (undated) DEVICE — SUT VICRYL PLUS 4-0 P3 18IN

## (undated) DEVICE — PENCIL ROCKER SWITCH 10FT CORD

## (undated) DEVICE — TRAY MINOR GEN SURG OMC

## (undated) DEVICE — SYR 10CC LUER LOCK

## (undated) DEVICE — SYS LABEL CORRECT MED

## (undated) DEVICE — NEOGUARD COVER 4X30CM STERILE

## (undated) DEVICE — DRAIN CHANNEL ROUND 19FR

## (undated) DEVICE — COVER PROBE NL STRL 3.6X96IN

## (undated) DEVICE — APPLIER LIGACLIP SM 9.38IN

## (undated) DEVICE — ADHESIVE DERMABOND ADVANCED

## (undated) DEVICE — SUT LIGACLIP SMALL XTRA

## (undated) DEVICE — SUT VICRYL PLUS 4-0 PS2 27

## (undated) DEVICE — SHEET DRAPE FAN-FOLDED 3/4

## (undated) DEVICE — SUT CTD VICRYL 3-0 CR/SH

## (undated) DEVICE — BLADE 4IN EDGE INSULATED

## (undated) DEVICE — ELECTRODE BLADE INSULATED 1 IN

## (undated) DEVICE — SUT SILK 0 STRANDS 30IN BLK

## (undated) DEVICE — DRAPE HALF SURGICAL 40X58IN

## (undated) DEVICE — SUT 2-0 12-18IN SILK

## (undated) DEVICE — GOWN SURGICAL X-LARGE

## (undated) DEVICE — PROBE CATH TEMP 16 FRFOLEY 400

## (undated) DEVICE — SOL 0.9% NACL IRRI.IN STERIL

## (undated) DEVICE — TOWEL OR DISP STRL BLUE 4/PK